# Patient Record
Sex: MALE | Race: BLACK OR AFRICAN AMERICAN | NOT HISPANIC OR LATINO | Employment: OTHER | URBAN - METROPOLITAN AREA
[De-identification: names, ages, dates, MRNs, and addresses within clinical notes are randomized per-mention and may not be internally consistent; named-entity substitution may affect disease eponyms.]

---

## 2017-02-24 ENCOUNTER — OFFICE VISIT (OUTPATIENT)
Dept: PODIATRY | Facility: CLINIC | Age: 62
End: 2017-02-24
Payer: MEDICARE

## 2017-02-24 VITALS
HEART RATE: 71 BPM | HEIGHT: 76 IN | WEIGHT: 222 LBS | BODY MASS INDEX: 27.03 KG/M2 | SYSTOLIC BLOOD PRESSURE: 122 MMHG | DIASTOLIC BLOOD PRESSURE: 57 MMHG

## 2017-02-24 DIAGNOSIS — B35.3 TINEA PEDIS OF BOTH FEET: ICD-10-CM

## 2017-02-24 DIAGNOSIS — E11.49 TYPE II DIABETES MELLITUS WITH NEUROLOGICAL MANIFESTATIONS: Primary | ICD-10-CM

## 2017-02-24 PROCEDURE — 3078F DIAST BP <80 MM HG: CPT | Mod: S$GLB,,, | Performed by: PODIATRIST

## 2017-02-24 PROCEDURE — 99214 OFFICE O/P EST MOD 30 MIN: CPT | Mod: S$GLB,,, | Performed by: PODIATRIST

## 2017-02-24 PROCEDURE — 3074F SYST BP LT 130 MM HG: CPT | Mod: S$GLB,,, | Performed by: PODIATRIST

## 2017-02-24 PROCEDURE — 2022F DILAT RTA XM EVC RTNOPTHY: CPT | Mod: S$GLB,,, | Performed by: PODIATRIST

## 2017-02-24 PROCEDURE — 4010F ACE/ARB THERAPY RXD/TAKEN: CPT | Mod: S$GLB,,, | Performed by: PODIATRIST

## 2017-02-24 PROCEDURE — 3046F HEMOGLOBIN A1C LEVEL >9.0%: CPT | Mod: S$GLB,,, | Performed by: PODIATRIST

## 2017-02-24 PROCEDURE — 99999 PR PBB SHADOW E&M-EST. PATIENT-LVL IV: CPT | Mod: PBBFAC,,, | Performed by: PODIATRIST

## 2017-02-24 PROCEDURE — 1160F RVW MEDS BY RX/DR IN RCRD: CPT | Mod: S$GLB,,, | Performed by: PODIATRIST

## 2017-02-24 NOTE — MR AVS SNAPSHOT
[facility] - [facility]iatry  [address] [facility] [address]  Phone: [phone]                  Ed Patton   2017 1:30 PM   Office Visit    Description:  Male : 1955   Provider:  Any Bhakta DPM   Department:  [facility]           Reason for Visit     Diabetes Mellitus     Foot Ulcer     Routine Foot Care           Diagnoses this Visit        Comments    Type II diabetes mellitus with neurological manifestations    -  Primary            To Do List           Future Appointments        Provider Department Dept Phone    3/15/2017 1:45 PM KAROLINE Ott [facility] - Podiatry [phone]    5/3/2017 9:30 AM DREW Pepe [facility] - Endocrinology [phone]      Goals (5 Years of Data)     None      [facility]     [facility] Care [facility] -  Assistance  Registered nurses in the [facility] provide clinical advisement, health education, appointment booking, and other advisory services.  Call for this free service at [phone].             Medications           Message regarding Medications     Verify the changes and/or additions to your medication regime listed below are the same as discussed with your clinician today.  If any of these changes or additions are incorrect, please notify your healthcare provider.             Verify that the below list of medications is an accurate representation of the medications you are currently taking.  If none reported, the list may be blank. If incorrect, please contact your healthcare provider. Carry this list with you in case of emergency.           Current Medications     albuterol (PROVENTIL) 2.5 mg /3 mL (0.083 %) nebulizer solution INHALE THE CONTENTS OF 1 VIAL VIA NEBULIZER EVERY 4 HOURS AS NEEDED FOR WHEEZING.    atorvastatin (LIPITOR) 40 MG tablet Take 1 tablet (40 mg total) by mouth once daily.    blood sugar diagnostic (FREESTYLE LITE STRIPS) Strp Use to check blood sugar before meals and at  "bedtime.    carvedilol (COREG) 25 MG tablet Take 1 tablet (25 mg total) by mouth 2 (two) times daily with meals.    digoxin (LANOXIN) 125 mcg tablet TAKE 1 TABLET ONE TIME DAILY    econazole nitrate 1 % cream Apply topically 2 (two) times daily. on the feet.    ferrous sulfate 325 mg (65 mg iron) Tab tablet Take 1 tablet (325 mg total) by mouth once daily.    fluticasone (FLONASE) 50 mcg/actuation nasal spray USE 1 SPRAY IN EACH NOSTRIL ONE TIME DAILY    FREESTYLE LITE STRIPS Strp USE ONE STRIP TO CHECK BLOOD SUGAR BEFORE MEAL(S) AND ONE AT BEDTIME    furosemide (LASIX) 80 MG tablet TAKE 1 TABLET IN THE MORNING  AND  1  TABLET  AT  3-4PM    gabapentin (NEURONTIN) 100 MG capsule     insulin NPH-insulin regular, 70/30, (NOVOLIN 70/30) 100 unit/mL (70-30) injection Inject 25 Units into the skin 2 (two) times daily. 15-30 minutes before breakfast and then again before dinner.    insulin syringe-needle U-100 (INSULIN SYRINGE) 1 mL 30 gauge x 5/16 Syrg 1 each by Misc.(Non-Drug; Combo Route) route 2 (two) times daily. Use twice daily as directed with insulin vials.    lancets (ONETOUCH DELICA LANCETS) 33 gauge Misc 1 lancet by Misc.(Non-Drug; Combo Route) route 4 (four) times daily before meals and nightly.    lisinopril (PRINIVIL,ZESTRIL) 40 MG tablet TAKE 1 TABLET ONE TIME DAILY    mometasone 0.1% (ELOCON) 0.1 % cream Applied to the affected area once daily for rash.    pen needle, diabetic (BD ULTRA-FINE RAMIREZ PEN NEEDLES) 32 gauge x 5/32" Ndle Use with multiple daily injections of insulin    spironolactone (ALDACTONE) 25 MG tablet Take 1 tablet (25 mg total) by mouth once daily.    aspirin (ECOTRIN) 81 MG EC tablet Take 1 tablet (81 mg total) by mouth once daily.    blood-glucose meter (FREESTYLE LITE METER) kit Use as instructed    nitroGLYCERIN (NITROSTAT) 0.4 MG SL tablet Place 1 tablet (0.4 mg total) under the tongue every 5 (five) minutes as needed.           Clinical Reference Information           Your Vitals " Were     BP                   122/57           Blood Pressure          Most Recent Value    BP  (!)  122/57      Allergies as of 2/24/2017     Vicodin [Hydrocodone-acetaminophen]      Immunizations Administered on Date of Encounter - 2/24/2017     None      Orders Placed During Today's Visit      Normal Orders This Visit    DIABETIC SHOES FOR HOME USE       [facility] Sign-Up     Activating your [facility] account is as easy as 1-2-3!     1) Visit [email or web address], select Sign Up Now, enter this activation code and your date of birth, then select Next.  [email or web address]  Expires: 4/10/2017  2:21 PM      2) Create a username and password to use when you visit [facility] in the future and select a security question in case you lose your password and select Next.    3) Enter your e-mail address and click Sign Up!    Additional Information  If you have questions, please e-mail [email or web address] or call [phone] to talk to our [facility] staff. Remember, [facility] is NOT to be used for urgent needs. For medical emergencies, dial 911.         Language Assistance Services     ATTENTION: Language assistance services are available, free of charge. Please call [phone].      ATENCIÓN: Si sue ron, tiene a guzman disposición servicios gratuitos de asistencia lingüística. Llame al [phone].     CHÚ Ý: N?u b?n nói Ti?ng Vi?t, có các d?ch v? h? tr? ngôn ng? mi?n phí dành cho b?n. G?i s? [phone].         [facility] complies with applicable Federal civil rights laws and does not discriminate on the basis of race, color, national origin, age, disability, or sex.

## 2017-02-24 NOTE — PROGRESS NOTES
CC:     Foot exam       HPI:   The patient is a 62 y.o.  male  who presents for a diabetic foot exam.     Patient reports no presence of abnormal sensation to the feet .    History of diabetic foot ulcers: none.   Noticed a blister on the right 2nd toe for about a week but no injury recalled.   History of foot surgery: none.     Shoes worn today:  Dress leather shoes    This patient has documented high risk feet requiring routine maintenance secondary to diabetes     Primary care doctor is: Primary Doctor No  Patient last saw primary care doctor on:   8- - Endocrinology        Past Medical History:   Diagnosis Date    CHF (congestive heart failure)     COPD (chronic obstructive pulmonary disease)     Coronary artery disease     Diabetes mellitus     Diabetes mellitus type II     DM (diabetes mellitus) type II uncontrolled with renal manifestation 9/4/2013    Hyperlipidemia     Hypertension          Current Outpatient Prescriptions on File Prior to Visit   Medication Sig Dispense Refill    albuterol (PROVENTIL) 2.5 mg /3 mL (0.083 %) nebulizer solution INHALE THE CONTENTS OF 1 VIAL VIA NEBULIZER EVERY 4 HOURS AS NEEDED FOR WHEEZING. 360 mL 3    atorvastatin (LIPITOR) 40 MG tablet Take 1 tablet (40 mg total) by mouth once daily. 90 tablet 1    blood sugar diagnostic (FREESTYLE LITE STRIPS) Strp Use to check blood sugar before meals and at bedtime. 200 each 11    carvedilol (COREG) 25 MG tablet Take 1 tablet (25 mg total) by mouth 2 (two) times daily with meals. 180 tablet 1    digoxin (LANOXIN) 125 mcg tablet TAKE 1 TABLET ONE TIME DAILY 90 tablet 11    econazole nitrate 1 % cream Apply topically 2 (two) times daily. on the feet. 30 g 1    ferrous sulfate 325 mg (65 mg iron) Tab tablet Take 1 tablet (325 mg total) by mouth once daily. 30 tablet 11    fluticasone (FLONASE) 50 mcg/actuation nasal spray USE 1 SPRAY IN EACH NOSTRIL ONE TIME DAILY 32 g 3    FREESTYLE LITE STRIPS Strp USE ONE STRIP  "TO CHECK BLOOD SUGAR BEFORE MEAL(S) AND ONE AT BEDTIME 200 each 0    furosemide (LASIX) 80 MG tablet TAKE 1 TABLET IN THE MORNING  AND  1  TABLET  AT  3-4PM 180 tablet 1    gabapentin (NEURONTIN) 100 MG capsule       insulin NPH-insulin regular, 70/30, (NOVOLIN 70/30) 100 unit/mL (70-30) injection Inject 25 Units into the skin 2 (two) times daily. 15-30 minutes before breakfast and then again before dinner. 2 vial 6    insulin syringe-needle U-100 (INSULIN SYRINGE) 1 mL 30 gauge x 5/16 Syrg 1 each by Misc.(Non-Drug; Combo Route) route 2 (two) times daily. Use twice daily as directed with insulin vials. 100 each 6    lancets (ONETOUCH DELICA LANCETS) 33 gauge Misc 1 lancet by Misc.(Non-Drug; Combo Route) route 4 (four) times daily before meals and nightly. 200 each 11    lisinopril (PRINIVIL,ZESTRIL) 40 MG tablet TAKE 1 TABLET ONE TIME DAILY 90 tablet 3    mometasone 0.1% (ELOCON) 0.1 % cream Applied to the affected area once daily for rash. 45 g 0    pen needle, diabetic (BD ULTRA-FINE RAMIREZ PEN NEEDLES) 32 gauge x 5/32" Ndle Use with multiple daily injections of insulin 200 each 11    spironolactone (ALDACTONE) 25 MG tablet Take 1 tablet (25 mg total) by mouth once daily. 90 tablet 1    aspirin (ECOTRIN) 81 MG EC tablet Take 1 tablet (81 mg total) by mouth once daily. 30 tablet 6    blood-glucose meter (FREESTYLE LITE METER) kit Use as instructed 1 each 1    nitroGLYCERIN (NITROSTAT) 0.4 MG SL tablet Place 1 tablet (0.4 mg total) under the tongue every 5 (five) minutes as needed. 30 tablet 6     No current facility-administered medications on file prior to visit.          Review of patient's allergies indicates:   Allergen Reactions    Vicodin [hydrocodone-acetaminophen] Itching             ROS:  General ROS: negative  Respiratory ROS: no cough, shortness of breath, or wheezing  Cardiovascular ROS: no chest pain or dyspnea on exertion  Musculoskeletal ROS: negative  Neurological ROS:   negative for - " "numbness/tingling, seizures or tremors  Dermatological ROS: negative      LAST HbA1c:   Hemoglobin A1C   Date Value Ref Range Status   11/11/2015 8.7 (H) 4.5 - 6.2 % Final   08/25/2015 10.9 (H) 4.5 - 6.2 % Final   09/04/2013 9.3 (H) 4.5 - 6.2 % Final           EXAM:   Vitals:    02/24/17 1340   BP: (!) 122/57   Pulse: 71   Weight: 100.7 kg (222 lb)   Height: 6' 4" (1.93 m)       General: alert, no distress, on nasal canula    Vascular:   Dorsalis pedis:   0 bilateral.   Posterior Tibial:   0  Bilateral.   3 seconds capillary refill time and temperature of toes are cool- B/L to touch.   reduced hair growth on the feet.    There is Trace edema to both feet.      Varicosities:  none      Dermatological:    Skin: dry and no suspicious lesions; flaky skin, no vesicles  Nails: toenails 1-5 L and 1-5 R  are onychomycosis of the toenails and dystrophic nails, elongated and thick by 1-2mm with subungual debris.    Callus:  None  Open Wounds: right 2nd toe superficial blister without clinical signs of acute infection.  No cellulitis, no drainage.  No tenderness to palpation   Ecchymoses is not observed.      Erythema:  none .     Interdigital spaces: clean, dry and without evidence of break in skin integrity      Neurological:    Sharp dull - B/L normal and light touch - B/L normal  Vibratory - normal  [place] diminished      Musculoskeletal:     Muscle strength: 5/5, adequate ROM, adequate strength     Right foot:  flat foot   Left foot: flat foot         Assessment:    1. Type II diabetes mellitus with neurological manifestations  DIABETIC SHOES FOR HOME USE   2. Tinea pedis of both feet   OTC Lotrimin daily    3.        right 2nd toe blister  - topical antibiotics daily and cover with thick bandaid.  Supplies provided to patient.       Treatment and Plan:  Shoe inspection. Diabetic Foot Education. Patient reminded of the importance of good nutrition and blood sugar control to help prevent podiatric complications of " diabetes. Patient instructed on proper foot hygiene. We discussed wearing proper shoe gear, daily foot inspections, never walking without protective shoe gear, never putting sharp instruments to feet, and routine diabetic foot exam and care every 3-6 months to prevent complications.      This patient has documented high risk feet requiring routine maintenance secondary to diabetes.     follow up in 2-3 weeks for toe blister.  Patient is amenable to plan.

## 2017-03-01 DIAGNOSIS — E78.5 HYPERLIPIDEMIA: ICD-10-CM

## 2017-03-01 RX ORDER — DIGOXIN 125 MCG
TABLET ORAL
Qty: 90 TABLET | Refills: 3 | Status: SHIPPED | OUTPATIENT
Start: 2017-03-01 | End: 2018-05-01 | Stop reason: SDUPTHER

## 2017-03-02 RX ORDER — LISINOPRIL 40 MG/1
TABLET ORAL
Qty: 90 TABLET | Refills: 3 | Status: SHIPPED | OUTPATIENT
Start: 2017-03-02 | End: 2018-05-01 | Stop reason: SDUPTHER

## 2017-03-02 RX ORDER — ATORVASTATIN CALCIUM 40 MG/1
TABLET, FILM COATED ORAL
Qty: 90 TABLET | Refills: 1 | OUTPATIENT
Start: 2017-03-02

## 2017-03-02 NOTE — TELEPHONE ENCOUNTER
Patient needs PCP for refills of meds.  Please assist in scheduling an establish care appointment for patient.

## 2017-04-21 DIAGNOSIS — E11.69 DIABETES MELLITUS ASSOCIATED WITH HORMONAL ETIOLOGY: Primary | ICD-10-CM

## 2017-04-21 NOTE — TELEPHONE ENCOUNTER
----- Message from Rowena Walton sent at 4/18/2017 10:39 AM CDT -----  Contact: pt   [phone]  carol   -   Patient calling top get an refill on medication NOVOLIN 70/30) 100 unit/mL (70-30) injection  Thanks,

## 2017-05-03 ENCOUNTER — OFFICE VISIT (OUTPATIENT)
Dept: ENDOCRINOLOGY | Facility: CLINIC | Age: 62
End: 2017-05-03
Payer: MEDICARE

## 2017-05-03 VITALS
WEIGHT: 225 LBS | SYSTOLIC BLOOD PRESSURE: 102 MMHG | BODY MASS INDEX: 27.4 KG/M2 | DIASTOLIC BLOOD PRESSURE: 80 MMHG | HEART RATE: 62 BPM | HEIGHT: 76 IN

## 2017-05-03 DIAGNOSIS — E04.1 THYROID NODULE: ICD-10-CM

## 2017-05-03 DIAGNOSIS — E11.65 UNCONTROLLED TYPE 2 DIABETES MELLITUS WITH HYPERGLYCEMIA, WITH LONG-TERM CURRENT USE OF INSULIN: ICD-10-CM

## 2017-05-03 DIAGNOSIS — G62.9 PERIPHERAL POLYNEUROPATHY: ICD-10-CM

## 2017-05-03 DIAGNOSIS — E78.5 HYPERLIPIDEMIA, UNSPECIFIED HYPERLIPIDEMIA TYPE: ICD-10-CM

## 2017-05-03 DIAGNOSIS — Z79.4 UNCONTROLLED TYPE 2 DIABETES MELLITUS WITH HYPERGLYCEMIA, WITH LONG-TERM CURRENT USE OF INSULIN: ICD-10-CM

## 2017-05-03 DIAGNOSIS — N18.3 CKD (CHRONIC KIDNEY DISEASE), STAGE 3 (MODERATE): ICD-10-CM

## 2017-05-03 LAB
CREAT UR-MCNC: 76 MG/DL
MICROALBUMIN UR DL<=1MG/L-MCNC: 8 UG/ML
MICROALBUMIN/CREATININE RATIO: 10.5 UG/MG

## 2017-05-03 PROCEDURE — 99999 PR PBB SHADOW E&M-EST. PATIENT-LVL V: CPT | Mod: PBBFAC,,, | Performed by: NURSE PRACTITIONER

## 2017-05-03 PROCEDURE — 82570 ASSAY OF URINE CREATININE: CPT

## 2017-05-03 PROCEDURE — 3079F DIAST BP 80-89 MM HG: CPT | Mod: S$GLB,,, | Performed by: NURSE PRACTITIONER

## 2017-05-03 PROCEDURE — 3074F SYST BP LT 130 MM HG: CPT | Mod: S$GLB,,, | Performed by: NURSE PRACTITIONER

## 2017-05-03 PROCEDURE — 1160F RVW MEDS BY RX/DR IN RCRD: CPT | Mod: S$GLB,,, | Performed by: NURSE PRACTITIONER

## 2017-05-03 PROCEDURE — 99214 OFFICE O/P EST MOD 30 MIN: CPT | Mod: S$GLB,,, | Performed by: NURSE PRACTITIONER

## 2017-05-03 PROCEDURE — 4010F ACE/ARB THERAPY RXD/TAKEN: CPT | Mod: S$GLB,,, | Performed by: NURSE PRACTITIONER

## 2017-05-03 NOTE — MR AVS SNAPSHOT
[facility] - Endocrinology  [address] [facility] [address]  Phone: [phone]                  Ed Patton   5/3/2017 9:30 AM   Office Visit    Description:  Male : 1955   Provider:  DREW Pepe   Department:  [facility]           Reason for Visit     Diabetes Mellitus           Diagnoses this Visit        Comments    Type 2 diabetes, uncontrolled, with neuropathy    -  Primary     Uncontrolled type 2 diabetes mellitus with hyperglycemia, with long-term current use of insulin         Peripheral polyneuropathy         Hyperlipidemia, unspecified hyperlipidemia type         Thyroid nodule         CKD (chronic kidney disease), stage 3 (moderate)                To Do List           Future Appointments        Provider Department Dept Phone    5/3/2017 11:00 AM LAB, APPOINTMENT [place] [facility] [phone]    5/10/2017 10:15 AM Hanane Partida DPM [facility] - Podiatry [phone]    2017 3:20 PM Kemal Cosme MD [facility] - Rheumatology [phone]    2017 10:30 AM [facility] END[facility]INOLOGY [facility]  [phone]    2017 7:10 AM LAB, APPOINTMENT [place] [facility] [phone]      Goals (5 Years of Data)     None      Follow-Up and Disposition     Return in about 3 months (around 8/3/2017).       These Medications        Disp Refills Start End    blood sugar diagnostic (FREESTYLE LITE STRIPS) Strp 200 each 11 5/3/2017     Use to check blood sugar before meals and at bedtime.    [facility]armacy: [facility] [place], [place] - [address] Ph #: [phone]         [facility]     [facility] -  Assistance  Unless otherwise directed by your provider, please contact [facility], our nurse care line that is available for  assistance.     Registered nurses in the [facility] provide: appointment scheduling, clinical  advisement, health education, and other advisory services.  Call: [phone] (toll free)               Medications           Message regarding Medications     Verify the changes and/or additions to your medication regime listed below are the same as discussed with your clinician today.  If any of these changes or additions are incorrect, please notify your healthcare provider.             Verify that the below list of medications is an accurate representation of the medications you are currently taking.  If none reported, the list may be blank. If incorrect, please contact your healthcare provider. Carry this list with you in case of emergency.           Current Medications     albuterol (PROVENTIL) 2.5 mg /3 mL (0.083 %) nebulizer solution INHALE THE CONTENTS OF 1 VIAL VIA NEBULIZER EVERY 4 HOURS AS NEEDED FOR WHEEZING.    aspirin (ECOTRIN) 81 MG EC tablet Take 1 tablet (81 mg total) by mouth once daily.    atorvastatin (LIPITOR) 40 MG tablet Take 1 tablet (40 mg total) by mouth once daily.    blood sugar diagnostic (FREESTYLE LITE STRIPS) Strp Use to check blood sugar before meals and at bedtime.    blood-glucose meter (FREESTYLE LITE METER) kit Use as instructed    carvedilol (COREG) 25 MG tablet Take 1 tablet (25 mg total) by mouth 2 (two) times daily with meals.    digoxin (LANOXIN) 125 mcg tablet TAKE 1 TABLET ONE TIME DAILY    ferrous sulfate 325 mg (65 mg iron) Tab tablet Take 1 tablet (325 mg total) by mouth once daily.    furosemide (LASIX) 80 MG tablet TAKE 1 TABLET IN THE MORNING  AND  1  TABLET  AT  3-4PM    insulin NPH-insulin regular, 70/30, (NOVOLIN 70/30) 100 unit/mL (70-30) injection Inject 25 Units into the skin 2 (two) times daily. 15-30 minutes before breakfast and then again before dinner.    insulin syringe-needle U-100 (INSULIN SYRINGE) 1 mL 30 gauge x 5/16 Syrg 1 each by Misc.(Non-Drug; Combo Route) route 2 (two) times daily. Use twice daily as directed with insulin vials.    lancets  "(ONETOUCH DELICA LANCETS) 33 gauge Misc 1 lancet by Misc.(Non-Drug; Combo Route) route 4 (four) times daily before meals and nightly.    lisinopril (PRINIVIL,ZESTRIL) 40 MG tablet TAKE 1 TABLET EVERY DAY    mometasone 0.1% (ELOCON) 0.1 % cream Applied to the affected area once daily for rash.    pen needle, diabetic (BD ULTRA-FINE RAMIREZ PEN NEEDLES) 32 gauge x 5/32" Ndle Use with multiple daily injections of insulin    spironolactone (ALDACTONE) 25 MG tablet Take 1 tablet (25 mg total) by mouth once daily.    econazole nitrate 1 % cream Apply topically 2 (two) times daily. on the feet.    fluticasone (FLONASE) 50 mcg/actuation nasal spray USE 1 SPRAY IN EACH NOSTRIL ONE TIME DAILY    gabapentin (NEURONTIN) 100 MG capsule     nitroGLYCERIN (NITROSTAT) 0.4 MG SL tablet Place 1 tablet (0.4 mg total) under the tongue every 5 (five) minutes as needed.           Clinical Reference Information           Your Vitals Were     BP Pulse Height Weight BMI    102/80 62 6' 4" (1.93 m) 102.1 kg (225 lb) 27.39 kg/m2      Blood Pressure          Most Recent Value    BP  102/80      Allergies as of 5/3/2017     Vicodin [Hydrocodone-acetaminophen]      Immunizations Administered on Date of Encounter - 5/3/2017     None      Orders Placed During Today's Visit      Normal Orders This Visit    Ambulatory Referral to Diabetes Education     Ambulatory referral to Optometry     Microalbumin/creatinine urine ratio     Future Labs/Procedures Expected by Expires    Comprehensive metabolic panel  5/3/2017 7/2/2018    Hemoglobin A1c  5/3/2017 7/2/2018    Hemoglobin A1c  5/3/2017 7/2/2018    Lipid panel  5/3/2017 7/2/2018    TSH  5/3/2017 7/2/2018    US Soft Tissue Head Neck Thyroid  5/3/2017 5/3/2018      [facility] Sign-Up     Activating your [facility] account is as easy as 1-2-3!     1) Visit [email or web address], select Sign Up Now, enter this activation code and your date of birth, then select Next.  [email or web address]  Expires: 6/17/2017 10:40 AM  "     2) Create a username and password to use when you visit [facility] in the future and select a security question in case you lose your password and select Next.    3) Enter your e-mail address and click Sign Up!    Additional Information  If you have questions, please e-mail [email or web address] or call [phone] to talk to our [email or web address] staff. Remember, [facility] is NOT to be used for urgent needs. For medical emergencies, dial 911.         Language Assistance Services     ATTENTION: Language assistance services are available, free of charge. Please call [phone].      ATENCIÓN: Si habla español, tiene a guzman disposición servicios gratuitos de asistencia lingüística. Llame al [phone].     CHÚ Ý: N?u b?n nói Ti?ng Vi?t, có các d?ch v? h? tr? ngôn ng? mi?n phí dành cho b?n. G?i s? [phone].         [facility] complies with applicable Federal civil rights laws and does not discriminate on the basis of race, color, national origin, age, disability, or sex.

## 2017-05-03 NOTE — PROGRESS NOTES
"CC: Mr. Ed Patton arrives today for management of Type 2 DM.     HPI: Mr. Ed Patton was diagnosed with Type 2 DM at age 55.  DM with PN.   Poor follow-up h/o. Seen in , then again 2013. Last seen 2015.   He arrives unaccompanied today.   Poor historian relating to medical self-management. Previously referred to case management.    Also with systolic CHF, CMP, CAD, CKD 3.   CRLD, on continuous O2.    BG readings are checked 2x/day. No logs. Oral recall:   Fastin-250, this morning was 100  -250  Hypoglycemia: Yes, up to twice weekly, before dinner. Treats with meat.     Physical Activity: No  Dietary Habits: Eats 3 meals/ day; snacks at night.    CURRENT DIABETIC MEDS: Relion 70/30 35 units before breakfast, 20 units before dinner.   + denies missed doses.     Last Eye Exam: has external appt ([facility]) 2015  Last Podiatry Exam: 2017; overdue for follow-up for blister.    REVIEW OF SYSTEMS  General: no weakness or fatigue. Mood stable.  Eyes: no visual disturbances. Denies intermittent blurry vision.  Cardiac: no chest pain or palpitations.   Respiratory: no cough; + chronic SOB, uses O2.   GI: no abdominal pain or nausea. No bowel changes.   Skin: no rashes or itching. No skin changes.   Neuro: no numbness or tingling.   Endocrine: no polyuria, polydipsia, polyphagia.     Vital Signs  Personally reviewed the labs noted below.     /80  Pulse 62  Ht 6' 4" (1.93 m)  Wt 102.1 kg (225 lb)  BMI 27.39 kg/m2    Hemoglobin A1C   Date Value Ref Range Status   2015 8.7 (H) 4.5 - 6.2 % Final   2015 10.9 (H) 4.5 - 6.2 % Final   2013 9.3 (H) 4.5 - 6.2 % Final       Chemistry        Component Value Date/Time     2015 0723    K 4.0 2015 07     2015 07    CO2 26 2015 07    BUN 20 2015 07    CREATININE 1.2 201523    GLU 91 2015        Component Value Date/Time    CALCIUM 9.5 2015    ALKPHOS " 56 11/11/2015 0723    AST 17 11/11/2015 0723    ALT 16 11/11/2015 0723    BILITOT 0.5 11/11/2015 0723      CrCl cannot be calculated (Unknown ideal weight.).      Lab Results   Component Value Date    CHOL 133 11/11/2015    CHOL 142 08/25/2015    CHOL 159 02/25/2012     Lab Results   Component Value Date    HDL 40 11/11/2015    HDL 39 (L) 08/25/2015    HDL 37 (L) 02/25/2012     Lab Results   Component Value Date    LDLCALC 77.0 11/11/2015    LDLCALC 81.0 08/25/2015    LDLCALC 108.0 02/25/2012     Lab Results   Component Value Date    TRIG 80 11/11/2015    TRIG 110 08/25/2015    TRIG 71 02/25/2012     Lab Results   Component Value Date    CHOLHDL 30.1 11/11/2015    CHOLHDL 27.5 08/25/2015    CHOLHDL 23.3 02/25/2012     Lab Results   Component Value Date    TSH 0.239 (L) 09/17/2015     CrCl cannot be calculated (Unknown ideal weight.).    Lab Results   Component Value Date    MICALBCREAT 20.2 08/25/2015     Vit D, 25-Hydroxy   Date Value Ref Range Status   11/11/2015 13 (L) 30 - 96 ng/mL Final     Comment:     Vitamin D deficiency.........<10 ng/mL                              Vitamin D insufficiency......10-29 ng/mL       Vitamin D sufficiency........> or equal to 30 ng/mL  Vitamin D toxicity............>100 ng/mL         PHYSICAL EXAMINATION  Constitutional: Appears well, no distress  Neck: Supple, trachea midline. R thyroid nodule. No tenderness.  Respiratory: CTA without wheezes, even and unlabored. O2 per NC  Cardiovascular: RRR; no carotid bruits or murmurs.   Lymph: DP pulses  2+ bilaterally; no edema.   Skin: warm and dry; no injection site reactions, no acanthosis nigracans observed.  Neuro:patient alert and cooperative.  Feet: appropriate footwear. exam deferred, left prior.        Assessment/Plan    1. Type II or unspecified type diabetes mellitus with other specified manifestations, uncontrolled   - A1c adjusted for co morbidities and general condition, < 8%    - BG at least twice daily and if sx of  hypo.  - Dubious report of BG. Concerned about hypoglycemia report. Bg was 100 this AM.    - Will continue current doses. Discussed adequate intake.  - Needs DM education for diet/ daily pattern review. Would also benefit from hypoglycemia management review. Bring meter.     Also:   A1c and TSH today  Urine today  Ophth referral   MA to make f/u pod appt- overdue.     - On ACE, ASA, statin  - Call for BG concerns or BG repeatedly < 100, > 180.  - Hypoglycemia management reviewed.      2. Peripheral neuropathy - Optimize BG control. Podiatry f/u next available.   3. CKD 3- avoid hypoglycemia.    4. Thyroid nodule - US 2011 indicates biopsy; did not have repeat US as ordered in 2013 or 2015.   TSH today, repeat thyroid US.    5. Hyperlipidemia - On statin. Reports compliance. Lipid panal RTC. Ate already.   6. CRLD (chronic restrictive lung disease) - follows with Dr. Alexandre.        FOLLOW UP  Return in about 3 months (around 8/3/2017).     Orders Placed This Encounter   Procedures    US Soft Tissue Head Neck Thyroid     Standing Status:   Future     Standing Expiration Date:   5/3/2018     Order Specific Question:   May the Radiologist modify the order per protocol to meet the clinical needs of the patient?     Answer:   Yes    Hemoglobin A1c     Standing Status:   Future     Standing Expiration Date:   7/2/2018    Comprehensive metabolic panel     Standing Status:   Future     Standing Expiration Date:   7/2/2018    Lipid panel     Standing Status:   Future     Standing Expiration Date:   7/2/2018    Microalbumin/creatinine urine ratio     Order Specific Question:   Specimen Source     Answer:   Urine    TSH     Standing Status:   Future     Standing Expiration Date:   7/2/2018    Hemoglobin A1c     Standing Status:   Future     Standing Expiration Date:   7/2/2018    Ambulatory Referral to Diabetes Education     Referral Priority:   Routine     Referral Type:   Consultation     Referral Reason:   Specialty  Services Required     Requested Specialty:   Endocrinology     Number of Visits Requested:   1    Ambulatory referral to Optometry     Referral Priority:   Routine     Referral Type:   Vision (Optometry)     Referral Reason:   Specialty Services Required     Requested Specialty:   Optometry     Number of Visits Requested:   1

## 2017-05-05 ENCOUNTER — TELEPHONE (OUTPATIENT)
Dept: ENDOCRINOLOGY | Facility: HOSPITAL | Age: 62
End: 2017-05-05

## 2017-05-05 NOTE — TELEPHONE ENCOUNTER
TSH low, T4 normal.   Thyroid US pending.   Please schedule appointment in general endocrine for evaluation of hyperthyroid.

## 2017-05-15 NOTE — TELEPHONE ENCOUNTER
----- Message from Ravi Boyd sent at 5/15/2017 11:10 AM CDT -----  Contact: [employer]  Requesting a Rx refill for atorvastatin (LIPITOR) 40 MG tablet    Please send Rx to [facility] [phone] (Phone)  [phone] (Fax)

## 2017-05-16 RX ORDER — ATORVASTATIN CALCIUM 40 MG/1
40 TABLET, FILM COATED ORAL DAILY
Qty: 90 TABLET | Refills: 1 | Status: SHIPPED | OUTPATIENT
Start: 2017-05-16 | End: 2017-07-25 | Stop reason: SDUPTHER

## 2017-05-28 ENCOUNTER — TELEPHONE (OUTPATIENT)
Dept: ENDOCRINOLOGY | Facility: HOSPITAL | Age: 62
End: 2017-05-28

## 2017-05-28 DIAGNOSIS — E11.65 TYPE 2 DIABETES MELLITUS WITH HYPERGLYCEMIA, WITH LONG-TERM CURRENT USE OF INSULIN: Primary | ICD-10-CM

## 2017-05-28 DIAGNOSIS — Z79.4 TYPE 2 DIABETES MELLITUS WITH HYPERGLYCEMIA, WITH LONG-TERM CURRENT USE OF INSULIN: Primary | ICD-10-CM

## 2017-05-28 NOTE — TELEPHONE ENCOUNTER
Please call pt.   A1c is very high.   Needs DM education for diet/ daily pattern/BG review. Would also benefit from hypoglycemia management review. PLEASE BRING METER to the appointment.

## 2017-05-29 ENCOUNTER — TELEPHONE (OUTPATIENT)
Dept: ENDOCRINOLOGY | Facility: CLINIC | Age: 62
End: 2017-05-29

## 2017-06-23 ENCOUNTER — OFFICE VISIT (OUTPATIENT)
Dept: ENDOCRINOLOGY | Facility: CLINIC | Age: 62
End: 2017-06-23
Payer: MEDICARE

## 2017-06-23 VITALS
SYSTOLIC BLOOD PRESSURE: 118 MMHG | DIASTOLIC BLOOD PRESSURE: 72 MMHG | BODY MASS INDEX: 26.13 KG/M2 | HEART RATE: 70 BPM | HEIGHT: 77 IN | WEIGHT: 221.31 LBS

## 2017-06-23 DIAGNOSIS — E04.1 THYROID NODULE: ICD-10-CM

## 2017-06-23 DIAGNOSIS — E05.90 SUBCLINICAL HYPERTHYROIDISM: Primary | ICD-10-CM

## 2017-06-23 PROCEDURE — 99999 PR PBB SHADOW E&M-EST. PATIENT-LVL III: CPT | Mod: PBBFAC,,, | Performed by: INTERNAL MEDICINE

## 2017-06-23 PROCEDURE — 3046F HEMOGLOBIN A1C LEVEL >9.0%: CPT | Mod: S$GLB,,, | Performed by: INTERNAL MEDICINE

## 2017-06-23 PROCEDURE — 99214 OFFICE O/P EST MOD 30 MIN: CPT | Mod: S$GLB,,, | Performed by: INTERNAL MEDICINE

## 2017-06-23 PROCEDURE — 4010F ACE/ARB THERAPY RXD/TAKEN: CPT | Mod: S$GLB,,, | Performed by: INTERNAL MEDICINE

## 2017-06-23 NOTE — PROGRESS NOTES
Subjective:       Patient ID: Ed Patton is a 62 y.o. male.    Chief Complaint: Follow-up    HPI   Mr. Patton presents to clinic today for evaluation of abnormal TFTs     He was found to have a suppressed TSH on routine labs ordered on 05/03/2017     The patient denies palpitations   Denies anxiety   Denies tremors   Denies changed in his bowels     Endorses occasional heat intolerance     Denies family history of thyroid disease or thyroid cancer   Denies personal history of radiation exposure    Denies use of amiodarone   Denies recent contrast   Denies recent viral illness   Denies fever    Denies use of lithium     Also has T2DM, followed by RISA Ortiz NP with last office visit in 05/2017  Reports testing BG 2 times daily with averages between 200-225 mg/dL   Current regimen:  Relion 70/30: 35 units AM, 25 units PM     Denies recent hypoglycemic episodes or symptoms   Reports eye exam is UTD       Review of Systems   Constitutional: Positive for fatigue. Negative for unexpected weight change.   HENT: Negative for sore throat, trouble swallowing and voice change.    Eyes: Negative for visual disturbance.   Respiratory: Positive for shortness of breath.         Portable oxygen at 2 L/minute    Cardiovascular: Negative for palpitations.   Gastrointestinal: Negative for constipation, diarrhea, nausea and vomiting.   Endocrine: Positive for heat intolerance. Negative for cold intolerance, polydipsia, polyphagia and polyuria.   Genitourinary: Negative for dysuria.   Musculoskeletal: Positive for neck pain.   Skin: Negative for rash.   Allergic/Immunologic: Negative for immunocompromised state.   Neurological: Positive for headaches. Negative for tremors.   Psychiatric/Behavioral: Negative for sleep disturbance. The patient is not nervous/anxious.        Objective:      Physical Exam   Constitutional: He is oriented to person, place, and time. No distress.   HENT:   Hearing normal    Neck: Normal range of motion.  Neck supple. Thyromegaly present.   Palpable right thyroid nodule    Cardiovascular: Normal rate.    Pulmonary/Chest: Effort normal.   Abdominal: Soft.   Musculoskeletal: He exhibits no edema.   Lymphadenopathy:     He has no cervical adenopathy.   Neurological: He is alert and oriented to person, place, and time.   Skin: Skin is warm.   Psychiatric: His behavior is normal.   Nursing note and vitals reviewed.      Results for YVON DARBY (MRN [id number]) as of 6/25/2017 18:47   Ref. Range 9/17/2015 11:33 11/11/2015 07:05 11/11/2015 07:23 5/3/2017 10:55   TSH Latest Ref Range: 0.400 - 4.000 uIU/mL 0.239 (L)   0.157 (L)   Free T4 Latest Ref Range: 0.71 - 1.51 ng/dL 1.17   1.08     Results for YVON DARBY (MRN [id number]) as of 6/25/2017 18:47   Ref. Range 11/11/2015 07:05 11/11/2015 07:23 5/3/2017 10:55   Hemoglobin A1C Latest Ref Range: 4.5 - 6.2 % 8.7 (H)  11.1 (H)   Estimated Avg Glucose Latest Ref Range: 68 - 131 mg/dL 203 (H)  272 (H)     Assessment:       1. Subclinical hyperthyroidism    2. Thyroid nodule    3. Type 2 diabetes, uncontrolled, with neuropathy        Plan:       1. Subclinical hyperthyroidism   - check TFTs today   - if abnormal, will plan for thyroid uptake and scan     2. Thyroid nodule   - differential includes toxic nodule   - obtain NM thyroid uptake and scan  - if toxic nodule, will consider COTO   - if cold nodule, will plan for FNA biopsy     3. T2DM   - uncontrolled   - encouraged compliance with dietary and lifestyle modifications   - increase 70/30 to 38 units with breakfast and 28 units with dinner   - send logs in 2 weeks to RISA Ortiz for close follow up   - keep all followup appointments as scheduled   - reviewed signs and symptoms of hypoglycemia along with appropriate treatment protocol

## 2017-06-25 ENCOUNTER — TELEPHONE (OUTPATIENT)
Dept: DIABETES | Facility: CLINIC | Age: 62
End: 2017-06-25

## 2017-06-25 DIAGNOSIS — E05.90 SUBCLINICAL HYPERTHYROIDISM: Primary | ICD-10-CM

## 2017-07-03 ENCOUNTER — TELEPHONE (OUTPATIENT)
Dept: RADIOLOGY | Facility: HOSPITAL | Age: 62
End: 2017-07-03

## 2017-07-21 ENCOUNTER — TELEPHONE (OUTPATIENT)
Dept: RADIOLOGY | Facility: HOSPITAL | Age: 62
End: 2017-07-21

## 2017-07-24 ENCOUNTER — HOSPITAL ENCOUNTER (OUTPATIENT)
Dept: RADIOLOGY | Facility: HOSPITAL | Age: 62
Discharge: HOME | End: 2017-07-24
Attending: INTERNAL MEDICINE
Payer: MEDICARE

## 2017-07-24 DIAGNOSIS — E04.1 THYROID NODULE: ICD-10-CM

## 2017-07-24 DIAGNOSIS — E05.90 SUBCLINICAL HYPERTHYROIDISM: ICD-10-CM

## 2017-07-24 PROCEDURE — 78014 THYROID IMAGING W/BLOOD FLOW: CPT | Mod: 26,,, | Performed by: NUCLEAR MEDICINE

## 2017-07-24 PROCEDURE — A9512 TC99M PERTECHNETATE: HCPCS

## 2017-07-25 ENCOUNTER — HOSPITAL ENCOUNTER (OUTPATIENT)
Dept: RADIOLOGY | Facility: HOSPITAL | Age: 62
Discharge: HOME | End: 2017-07-25
Attending: INTERNAL MEDICINE
Payer: MEDICARE

## 2017-07-25 DIAGNOSIS — Z79.4 UNCONTROLLED TYPE 2 DIABETES MELLITUS WITH HYPERGLYCEMIA, WITH LONG-TERM CURRENT USE OF INSULIN: ICD-10-CM

## 2017-07-25 DIAGNOSIS — E11.65 UNCONTROLLED TYPE 2 DIABETES MELLITUS WITH HYPERGLYCEMIA, WITH LONG-TERM CURRENT USE OF INSULIN: ICD-10-CM

## 2017-07-25 DIAGNOSIS — E08.41 DIABETIC MONONEUROPATHY ASSOCIATED WITH DIABETES MELLITUS DUE TO UNDERLYING CONDITION: ICD-10-CM

## 2017-07-25 PROCEDURE — A9503 TC99M MEDRONATE: HCPCS

## 2017-07-25 RX ORDER — ATORVASTATIN CALCIUM 40 MG/1
40 TABLET, FILM COATED ORAL DAILY
Qty: 90 TABLET | Refills: 1 | Status: SHIPPED | OUTPATIENT
Start: 2017-07-25 | End: 2018-01-11 | Stop reason: SDUPTHER

## 2017-07-25 RX ORDER — INSULIN PUMP SYRINGE, 3 ML
EACH MISCELLANEOUS
Qty: 1 EACH | Refills: 1 | Status: SHIPPED | OUTPATIENT
Start: 2017-07-25 | End: 2017-08-16 | Stop reason: CLARIF

## 2017-07-25 NOTE — TELEPHONE ENCOUNTER
----- Message from Kamari Sams sent at 7/25/2017  2:58 PM CDT -----  Contact: Pt  Pt is requesting rx refill for atorvastatin (LIPITOR) 40 MG tablet, blood sugar diagnostic (FREESTYLE LITE STRIPS) Strp and blood-glucose meter (FREESTYLE LITE METER) kit    Pt can be reached at [phone]

## 2017-07-26 ENCOUNTER — TELEPHONE (OUTPATIENT)
Dept: ENDOCRINOLOGY | Facility: CLINIC | Age: 62
End: 2017-07-26

## 2017-07-26 DIAGNOSIS — E05.10 TOXIC THYROID NODULE: Primary | ICD-10-CM

## 2017-07-26 PROBLEM — E04.1 THYROID NODULE: Status: RESOLVED | Noted: 2017-05-03 | Resolved: 2017-07-26

## 2017-07-26 NOTE — TELEPHONE ENCOUNTER
Patient notified via phone of thyroid uptake and scan results. Patient has a hyperfunctioning right thyroid nodule. Recommended treatment dose is 30 mCi of I-131. Discussed treatment in detail. Patient has agreed. We will arrange.

## 2017-07-26 NOTE — TELEPHONE ENCOUNTER
Patient notified via phone of thyroid uptake and scan results. Patient has a hyperfunction nodule within the right thyroid lobe - affecting the entirety of the right lobe Recommended dose of I-131 is 30 mCi orally. Results and treatment plan discussed in detail with the patient. He verbalized understanding and he agrees to COTO. Will arrange. He also requests medication refill for insulin. Will send to mail order as requested.

## 2017-07-27 ENCOUNTER — TELEPHONE (OUTPATIENT)
Dept: INTERNAL MEDICINE | Facility: CLINIC | Age: 62
End: 2017-07-27

## 2017-07-27 NOTE — TELEPHONE ENCOUNTER
----- Message from Kamari Sams sent at 7/25/2017  3:01 PM CDT -----  Contact: Pt   spironolactone (ALDACTONE) 25 MG tablet, carvedilol (COREG) 25 MG tablet and fluticasone (FLONASE) 50 mcg/actuation nasal spray    Can be reached at [phone]

## 2017-07-31 DIAGNOSIS — I50.42 CHRONIC COMBINED SYSTOLIC AND DIASTOLIC HF (HEART FAILURE): ICD-10-CM

## 2017-08-01 RX ORDER — CARVEDILOL 25 MG/1
TABLET ORAL
Qty: 180 TABLET | Refills: 1 | OUTPATIENT
Start: 2017-08-01

## 2017-08-01 RX ORDER — FUROSEMIDE 80 MG/1
TABLET ORAL
Qty: 180 TABLET | Refills: 1 | OUTPATIENT
Start: 2017-08-01

## 2017-08-01 RX ORDER — SPIRONOLACTONE 25 MG/1
TABLET ORAL
Qty: 90 TABLET | Refills: 1 | OUTPATIENT
Start: 2017-08-01

## 2017-08-03 DIAGNOSIS — Z79.4 UNCONTROLLED TYPE 2 DIABETES MELLITUS WITH HYPERGLYCEMIA, WITH LONG-TERM CURRENT USE OF INSULIN: ICD-10-CM

## 2017-08-03 DIAGNOSIS — E11.65 UNCONTROLLED TYPE 2 DIABETES MELLITUS WITH HYPERGLYCEMIA, WITH LONG-TERM CURRENT USE OF INSULIN: ICD-10-CM

## 2017-08-03 RX ORDER — LANCETS
EACH MISCELLANEOUS
Qty: 600 EACH | Refills: 3 | Status: SHIPPED | OUTPATIENT
Start: 2017-08-03 | End: 2017-08-16 | Stop reason: CLARIF

## 2017-08-03 RX ORDER — ISOPROPYL ALCOHOL 70 ML/100ML
1 SWAB TOPICAL
Refills: 0 | COMMUNITY
Start: 2017-08-03 | End: 2017-08-16 | Stop reason: CLARIF

## 2017-08-08 DIAGNOSIS — I50.42 CHRONIC COMBINED SYSTOLIC AND DIASTOLIC HF (HEART FAILURE): ICD-10-CM

## 2017-08-08 RX ORDER — SPIRONOLACTONE 25 MG/1
TABLET ORAL
Qty: 90 TABLET | Refills: 1 | OUTPATIENT
Start: 2017-08-08

## 2017-08-11 DIAGNOSIS — I50.42 CHRONIC COMBINED SYSTOLIC AND DIASTOLIC HF (HEART FAILURE): ICD-10-CM

## 2017-08-14 RX ORDER — SPIRONOLACTONE 25 MG/1
TABLET ORAL
Qty: 90 TABLET | Refills: 1 | OUTPATIENT
Start: 2017-08-14

## 2017-08-14 RX ORDER — CARVEDILOL 25 MG/1
TABLET ORAL
Qty: 180 TABLET | Refills: 1 | OUTPATIENT
Start: 2017-08-14

## 2017-08-15 DIAGNOSIS — I50.42 CHRONIC COMBINED SYSTOLIC AND DIASTOLIC HF (HEART FAILURE): ICD-10-CM

## 2017-08-15 RX ORDER — FUROSEMIDE 80 MG/1
TABLET ORAL
Qty: 180 TABLET | Refills: 1 | OUTPATIENT
Start: 2017-08-15

## 2017-08-16 ENCOUNTER — TELEPHONE (OUTPATIENT)
Dept: ENDOCRINOLOGY | Facility: CLINIC | Age: 62
End: 2017-08-16

## 2017-08-16 RX ORDER — LANCETS
EACH MISCELLANEOUS
COMMUNITY
End: 2018-12-06 | Stop reason: SDUPTHER

## 2017-08-16 RX ORDER — ISOPROPYL ALCOHOL 70 ML/100ML
1 SWAB TOPICAL
Status: ON HOLD | COMMUNITY
End: 2020-09-10

## 2017-08-16 NOTE — TELEPHONE ENCOUNTER
Spoke with Kylie with [facility] in which she wanted to get authorization for patient to get Accu chek pita meter dispense 1 no refills. Accu chek smartview test strips dispense 400 with 3 refills. Fastclicks lancets dispense 400 with 3 refills. Bd alcohol swabs dispense dispense 400 with 3 refills. Verbal readback done per Kylie

## 2017-08-16 NOTE — TELEPHONE ENCOUNTER
----- Message from Nicole Dave sent at 8/15/2017  9:26 AM CDT -----  [facility]- [phone] / Fax# [phone]  [facility] Accu Check kit (Rebecca) . She can be reached at [phone] / Fax# [phone].

## 2017-09-12 ENCOUNTER — OFFICE VISIT (OUTPATIENT)
Dept: ENDOCRINOLOGY | Facility: CLINIC | Age: 62
End: 2017-09-12
Payer: MEDICARE

## 2017-09-12 ENCOUNTER — LAB VISIT (OUTPATIENT)
Dept: LAB | Facility: HOSPITAL | Age: 62
End: 2017-09-12
Payer: MEDICARE

## 2017-09-12 VITALS
HEART RATE: 75 BPM | WEIGHT: 230.81 LBS | RESPIRATION RATE: 16 BRPM | HEIGHT: 77 IN | DIASTOLIC BLOOD PRESSURE: 76 MMHG | SYSTOLIC BLOOD PRESSURE: 128 MMHG | BODY MASS INDEX: 27.25 KG/M2

## 2017-09-12 DIAGNOSIS — I50.42 CHRONIC COMBINED SYSTOLIC AND DIASTOLIC HF (HEART FAILURE): ICD-10-CM

## 2017-09-12 DIAGNOSIS — E78.2 MIXED HYPERLIPIDEMIA: ICD-10-CM

## 2017-09-12 DIAGNOSIS — E55.9 VITAMIN D DEFICIENCY: ICD-10-CM

## 2017-09-12 DIAGNOSIS — E05.10 TOXIC THYROID NODULE: ICD-10-CM

## 2017-09-12 DIAGNOSIS — I10 ESSENTIAL HYPERTENSION: ICD-10-CM

## 2017-09-12 DIAGNOSIS — I50.9 CONGESTIVE HEART FAILURE, UNSPECIFIED CONGESTIVE HEART FAILURE CHRONICITY, UNSPECIFIED CONGESTIVE HEART FAILURE TYPE: ICD-10-CM

## 2017-09-12 LAB
25(OH)D3+25(OH)D2 SERPL-MCNC: 14 NG/ML
ALBUMIN SERPL BCP-MCNC: 3.1 G/DL
ALP SERPL-CCNC: 107 U/L
ALT SERPL W/O P-5'-P-CCNC: 123 U/L
ANION GAP SERPL CALC-SCNC: 12 MMOL/L
AST SERPL-CCNC: 59 U/L
BILIRUB SERPL-MCNC: 0.9 MG/DL
BUN SERPL-MCNC: 21 MG/DL
CALCIUM SERPL-MCNC: 9 MG/DL
CHLORIDE SERPL-SCNC: 101 MMOL/L
CO2 SERPL-SCNC: 25 MMOL/L
CREAT SERPL-MCNC: 1.2 MG/DL
EST. GFR  (AFRICAN AMERICAN): >60 ML/MIN/1.73 M^2
EST. GFR  (NON AFRICAN AMERICAN): >60 ML/MIN/1.73 M^2
ESTIMATED AVG GLUCOSE: 223 MG/DL
GLUCOSE SERPL-MCNC: 341 MG/DL
HBA1C MFR BLD HPLC: 9.4 %
POTASSIUM SERPL-SCNC: 3.8 MMOL/L
PROT SERPL-MCNC: 7.5 G/DL
SODIUM SERPL-SCNC: 138 MMOL/L
TSH SERPL DL<=0.005 MIU/L-ACNC: 0.69 UIU/ML

## 2017-09-12 PROCEDURE — 82306 VITAMIN D 25 HYDROXY: CPT

## 2017-09-12 PROCEDURE — 36415 COLL VENOUS BLD VENIPUNCTURE: CPT

## 2017-09-12 PROCEDURE — 3046F HEMOGLOBIN A1C LEVEL >9.0%: CPT | Mod: S$GLB,,, | Performed by: INTERNAL MEDICINE

## 2017-09-12 PROCEDURE — 3078F DIAST BP <80 MM HG: CPT | Mod: S$GLB,,, | Performed by: INTERNAL MEDICINE

## 2017-09-12 PROCEDURE — 83036 HEMOGLOBIN GLYCOSYLATED A1C: CPT

## 2017-09-12 PROCEDURE — 80053 COMPREHEN METABOLIC PANEL: CPT

## 2017-09-12 PROCEDURE — 3074F SYST BP LT 130 MM HG: CPT | Mod: S$GLB,,, | Performed by: INTERNAL MEDICINE

## 2017-09-12 PROCEDURE — 84443 ASSAY THYROID STIM HORMONE: CPT

## 2017-09-12 PROCEDURE — 99999 PR PBB SHADOW E&M-EST. PATIENT-LVL III: CPT | Mod: PBBFAC,,, | Performed by: INTERNAL MEDICINE

## 2017-09-12 PROCEDURE — 99214 OFFICE O/P EST MOD 30 MIN: CPT | Mod: S$GLB,,, | Performed by: INTERNAL MEDICINE

## 2017-09-12 PROCEDURE — 3008F BODY MASS INDEX DOCD: CPT | Mod: S$GLB,,, | Performed by: INTERNAL MEDICINE

## 2017-09-12 PROCEDURE — 4010F ACE/ARB THERAPY RXD/TAKEN: CPT | Mod: S$GLB,,, | Performed by: INTERNAL MEDICINE

## 2017-09-12 RX ORDER — GABAPENTIN 100 MG/1
100 CAPSULE ORAL DAILY
Qty: 90 CAPSULE | Refills: 3 | Status: SHIPPED | OUTPATIENT
Start: 2017-09-12 | End: 2019-10-17

## 2017-09-12 RX ORDER — SPIRONOLACTONE 25 MG/1
25 TABLET ORAL DAILY
Qty: 90 TABLET | Refills: 1 | Status: SHIPPED | OUTPATIENT
Start: 2017-09-12 | End: 2018-11-26

## 2017-09-12 RX ORDER — CARVEDILOL 25 MG/1
25 TABLET ORAL 2 TIMES DAILY WITH MEALS
Qty: 180 TABLET | Refills: 3 | Status: SHIPPED | OUTPATIENT
Start: 2017-09-12 | End: 2018-05-01 | Stop reason: SDUPTHER

## 2017-09-12 NOTE — PROGRESS NOTES
Subjective:       Patient ID: Ed Patton is a 62 y.o. male.    Chief Complaint: Diabetes Mellitus    HPI   Mr. Patton presents to clinic today for Toxic Adenoma and T2DM follow up     He was found to have a suppressed TSH on routine labs ordered on 05/03/2017   Evaluated in 06/2017 for subclinical hyperthyroidism   NM thyroid uptake and scan from 7/25/2017 showed a Hyperfunctioning thyroid nodule affecting the entirety of the right thyroid lobe     24 hours radioiodine uptake is 30% (normal is 10-30% )   Impression       1. Hyperfunctioning right thyroid nodule.  2. The 24-hours-Radioiodine Uptake is 30%.  3. In the clinical setting of hyperthyroidism, the suggested treatment dose is 30 mCi orally with I-131.       The patient denies palpitations   Denies anxiety   Denies tremors   Denies bowel changes     Endorses occasional heat intolerance     Denies family history of thyroid disease or thyroid cancer   Denies personal history of radiation exposure    Denies use of amiodarone   Denies recent contrast   Denies recent viral illness   Denies fever    Denies use of lithium     With regards to his T2DM  previously followed by RISA Ortiz NP with last office visit in 05/2017    He arrives today without meter or logs   Reports testing BG 2 times daily with fasting averages 180-200 mg/dL , AC dinner 250's     Current regimen:  Relion 70/30: 40 units AM, 30 units PM     Denies recent hypoglycemic episodes or symptoms     Standards of care:   ACEi/ARB: lisinopril   Statin: lipitor   Last diabetic eye exam: reports within the past 12 months at [facility], reports retinopathy   Last podiatry exam: 02/24/2017 by Dr Bhakta, overdue for follow up for blister     Known complications: Retinopathy     Denies numbness or tingling sensations to bilateral lower extremities    24 hour dietary recall:   Breakfast: french toast and coffee  Lunch: baked chicken, dirty rice, green peas, salad   Dinner: pork chop, french fries and bread    Mainly drinking water, occasional juice once a week      Review of Systems   Constitutional: Positive for fatigue. Negative for unexpected weight change.   HENT: Negative for sore throat, trouble swallowing and voice change.    Eyes: Negative for visual disturbance.   Respiratory: Positive for shortness of breath.    Cardiovascular: Negative for palpitations.   Gastrointestinal: Negative for constipation, diarrhea, nausea and vomiting.   Endocrine: Positive for heat intolerance. Negative for cold intolerance, polydipsia, polyphagia and polyuria.   Genitourinary: Negative for dysuria.   Musculoskeletal: Positive for neck pain.   Skin: Negative for rash.   Allergic/Immunologic: Negative for immunocompromised state.   Neurological: Positive for headaches. Negative for tremors.   Psychiatric/Behavioral: Negative for sleep disturbance. The patient is not nervous/anxious.        Objective:      Physical Exam   Constitutional: He is oriented to person, place, and time. He appears well-developed. No distress.   HENT:   Head: Atraumatic.   Hearing normal    Neck: Normal range of motion. Neck supple. Thyromegaly present.   Palpable right thyroid nodule    Cardiovascular: Normal rate.    Pulses:       Dorsalis pedis pulses are 1+ on the right side, and 1+ on the left side.   Pulmonary/Chest: Effort normal. He has decreased breath sounds in the right lower field and the left lower field.   Portable oxygen at 2 L/minute    Abdominal: Soft.   Musculoskeletal: Normal range of motion. He exhibits no edema.        Right foot: There is no deformity.        Left foot: There is no deformity.   Feet:   Right Foot:   Protective Sensation: 10 sites tested. 5 sites sensed.   Skin Integrity: Positive for dry skin. Negative for ulcer, blister or callus.   Left Foot:   Protective Sensation: 10 sites tested. 5 sites sensed.   Skin Integrity: Positive for dry skin. Negative for ulcer, blister or callus.   Lymphadenopathy:     He has no  cervical adenopathy.   Neurological: He is alert and oriented to person, place, and time.   Decreased vibratory sensation noted bilaterally    Skin: Skin is warm and dry. No rash noted.   Feet: toenails elongated and discolored bilaterally    Psychiatric: He has a normal mood and affect. His behavior is normal.   Nursing note and vitals reviewed.        Labs:   Results for YVON DARBY (MRN [id number]) as of 9/12/2017 09:50   Ref. Range 11/11/2015 07:23   Sodium Latest Ref Range: 136 - 145 mmol/L 141   Potassium Latest Ref Range: 3.5 - 5.1 mmol/L 4.0   Chloride Latest Ref Range: 95 - 110 mmol/L 107   CO2 Latest Ref Range: 23 - 29 mmol/L 26   Anion Gap Latest Ref Range: 8 - 16 mmol/L 8   BUN, Bld Latest Ref Range: 6 - 20 mg/dL 20   Creatinine Latest Ref Range: 0.5 - 1.4 mg/dL 1.2   eGFR if non African American Latest Ref Range: >60 mL/min/1.73 m^2 >60.0   eGFR if African American Latest Ref Range: >60 mL/min/1.73 m^2 >60.0   Glucose Latest Ref Range: 70 - 110 mg/dL 91   Calcium Latest Ref Range: 8.7 - 10.5 mg/dL 9.5   Alkaline Phosphatase Latest Ref Range: 55 - 135 U/L 56   Total Protein Latest Ref Range: 6.0 - 8.4 g/dL 7.7   Albumin Latest Ref Range: 3.5 - 5.2 g/dL 3.5   Total Bilirubin Latest Ref Range: 0.1 - 1.0 mg/dL 0.5   AST Latest Ref Range: 10 - 40 U/L 17   ALT Latest Ref Range: 10 - 44 U/L 16     Results for YVON DARBY (MRN [id number]) as of 9/12/2017 09:50   Ref. Range 11/11/2015 07:23   Vit D, 25-Hydroxy Latest Ref Range: 30 - 96 ng/mL 13 (L)      Ref. Range 11/11/2015 07:05 11/11/2015 07:23 5/3/2017 10:55   Hemoglobin A1C Latest Ref Range: 4.5 - 6.2 % 8.7 (H)  11.1 (H)   Estimated Avg Glucose Latest Ref Range: 68 - 131 mg/dL 203 (H)  272 (H)     Results for YVON DARBY (MRN [id number]) as of 9/12/2017 09:50   Ref. Range 5/3/2017 10:55 6/23/2017 15:25   TSH Latest Ref Range: 0.400 - 4.000 uIU/mL 0.157 (L) 0.056 (L)   Free T4 Latest Ref Range: 0.71 - 1.51 ng/dL 1.08 1.07   Thyrotropin Receptor Ab  Latest Ref Range: 0.00 - 1.75 IU/L  <1.00       Assessment:       1. Type 2 diabetes, uncontrolled, with neuropathy    2. Toxic thyroid nodule    3. Congestive heart failure, unspecified congestive heart failure chronicity, unspecified congestive heart failure type    4. Essential hypertension    5. Mixed hyperlipidemia        Plan:       1. T2DM, uncontrolled, with neuropathy   -- uncontrolled at this time per reported blood sugars   -- emphasized dietary and lifestyle modifications   -- check labs today   -- not enough data to warrant changes   -- advised to smbg 2-3 times daily and send log in 2 weeks for review and adjustments   -- follow up with podiatry   -- advised to never walk barefoot, never put sharp objects to feet   -- reviewed signs and symptoms of hypoglycemia along with appropriate treatment protocol     2. Toxic Thyroid nodule   -- plan for COTO   -- recommended treatment dose is 30 mCi     3. CHF   -- follow up with Cardiology as advised     4. HTN   -- recommended to establish care with new PCP   -- will make appointment today   -- continue current regimen as previously prescribed   -- encouraged to follow a low salt diet     5. HLD   -- on statin therapy per ADA recommendations   -- encouraged to follow a low fat, low chol diet       Labs today   Arrange COTO , will check labs 4-6 weeks after treatment dose     Send logs in 2 weeks for review and adjustments   The patient is instructed to notify the office with BG concerns or questions

## 2017-09-25 ENCOUNTER — TELEPHONE (OUTPATIENT)
Dept: RADIOLOGY | Facility: HOSPITAL | Age: 62
End: 2017-09-25

## 2017-09-26 ENCOUNTER — HOSPITAL ENCOUNTER (OUTPATIENT)
Dept: RADIOLOGY | Facility: HOSPITAL | Age: 62
Discharge: HOME | End: 2017-09-26
Attending: INTERNAL MEDICINE
Payer: MEDICARE

## 2017-09-26 DIAGNOSIS — E05.10 TOXIC THYROID NODULE: ICD-10-CM

## 2017-09-26 PROCEDURE — 79005 NUCLEAR RX ORAL ADMIN: CPT | Mod: TC

## 2017-09-26 PROCEDURE — 79005 NUCLEAR RX ORAL ADMIN: CPT | Mod: 26,,, | Performed by: RADIOLOGY

## 2017-09-29 ENCOUNTER — TELEPHONE (OUTPATIENT)
Dept: ENDOCRINOLOGY | Facility: CLINIC | Age: 62
End: 2017-09-29

## 2017-09-29 DIAGNOSIS — E05.10 TOXIC THYROID NODULE: Primary | ICD-10-CM

## 2017-10-09 ENCOUNTER — OFFICE VISIT (OUTPATIENT)
Dept: PODIATRY | Facility: CLINIC | Age: 62
End: 2017-10-09
Payer: MEDICARE

## 2017-10-09 ENCOUNTER — OFFICE VISIT (OUTPATIENT)
Dept: INTERNAL MEDICINE | Facility: CLINIC | Age: 62
End: 2017-10-09
Payer: MEDICARE

## 2017-10-09 VITALS
SYSTOLIC BLOOD PRESSURE: 118 MMHG | HEIGHT: 77 IN | WEIGHT: 230 LBS | BODY MASS INDEX: 27.16 KG/M2 | DIASTOLIC BLOOD PRESSURE: 74 MMHG | HEART RATE: 70 BPM

## 2017-10-09 VITALS
TEMPERATURE: 98 F | DIASTOLIC BLOOD PRESSURE: 72 MMHG | BODY MASS INDEX: 28.16 KG/M2 | SYSTOLIC BLOOD PRESSURE: 132 MMHG | HEIGHT: 76 IN | WEIGHT: 231.25 LBS | HEART RATE: 72 BPM | OXYGEN SATURATION: 97 %

## 2017-10-09 DIAGNOSIS — E05.10 TOXIC THYROID NODULE: ICD-10-CM

## 2017-10-09 DIAGNOSIS — J96.11 CHRONIC RESPIRATORY FAILURE WITH HYPOXIA: ICD-10-CM

## 2017-10-09 DIAGNOSIS — I50.9 CONGESTIVE HEART FAILURE, UNSPECIFIED CONGESTIVE HEART FAILURE CHRONICITY, UNSPECIFIED CONGESTIVE HEART FAILURE TYPE: ICD-10-CM

## 2017-10-09 DIAGNOSIS — Z79.4 UNCONTROLLED TYPE 2 DIABETES MELLITUS WITH HYPERGLYCEMIA, WITH LONG-TERM CURRENT USE OF INSULIN: Primary | ICD-10-CM

## 2017-10-09 DIAGNOSIS — E11.49 TYPE II DIABETES MELLITUS WITH NEUROLOGICAL MANIFESTATIONS: Primary | ICD-10-CM

## 2017-10-09 DIAGNOSIS — Z79.899 POLYPHARMACY: ICD-10-CM

## 2017-10-09 DIAGNOSIS — B35.1 DERMATOPHYTOSIS OF NAIL: ICD-10-CM

## 2017-10-09 DIAGNOSIS — E11.69 TYPE 2 DIABETES MELLITUS WITH HYPERLIPIDEMIA: ICD-10-CM

## 2017-10-09 DIAGNOSIS — I42.0 DILATED CARDIOMYOPATHY: ICD-10-CM

## 2017-10-09 DIAGNOSIS — J98.4 CRLD (CHRONIC RESTRICTIVE LUNG DISEASE): ICD-10-CM

## 2017-10-09 DIAGNOSIS — E11.22 CKD STAGE 3 DUE TO TYPE 2 DIABETES MELLITUS: ICD-10-CM

## 2017-10-09 DIAGNOSIS — N18.30 CKD STAGE 3 DUE TO TYPE 2 DIABETES MELLITUS: ICD-10-CM

## 2017-10-09 DIAGNOSIS — Z12.11 SCREENING FOR COLON CANCER: ICD-10-CM

## 2017-10-09 DIAGNOSIS — I20.9 ANGINA PECTORIS ASSOCIATED WITH TYPE 2 DIABETES MELLITUS: ICD-10-CM

## 2017-10-09 DIAGNOSIS — E78.5 TYPE 2 DIABETES MELLITUS WITH HYPERLIPIDEMIA: ICD-10-CM

## 2017-10-09 DIAGNOSIS — E11.65 UNCONTROLLED TYPE 2 DIABETES MELLITUS WITH HYPERGLYCEMIA, WITH LONG-TERM CURRENT USE OF INSULIN: Primary | ICD-10-CM

## 2017-10-09 DIAGNOSIS — Z11.59 NEED FOR HEPATITIS C SCREENING TEST: ICD-10-CM

## 2017-10-09 DIAGNOSIS — E11.59 ANGINA PECTORIS ASSOCIATED WITH TYPE 2 DIABETES MELLITUS: ICD-10-CM

## 2017-10-09 DIAGNOSIS — I15.2 HYPERTENSION ASSOCIATED WITH DIABETES: ICD-10-CM

## 2017-10-09 DIAGNOSIS — E11.59 HYPERTENSION ASSOCIATED WITH DIABETES: ICD-10-CM

## 2017-10-09 PROCEDURE — 99213 OFFICE O/P EST LOW 20 MIN: CPT | Mod: S$GLB,,, | Performed by: PODIATRIST

## 2017-10-09 PROCEDURE — 90686 IIV4 VACC NO PRSV 0.5 ML IM: CPT | Mod: S$GLB,,, | Performed by: INTERNAL MEDICINE

## 2017-10-09 PROCEDURE — 99215 OFFICE O/P EST HI 40 MIN: CPT | Mod: S$GLB,,, | Performed by: INTERNAL MEDICINE

## 2017-10-09 PROCEDURE — 99999 PR PBB SHADOW E&M-EST. PATIENT-LVL III: CPT | Mod: PBBFAC,,, | Performed by: PODIATRIST

## 2017-10-09 PROCEDURE — G0008 ADMIN INFLUENZA VIRUS VAC: HCPCS | Mod: S$GLB,,, | Performed by: INTERNAL MEDICINE

## 2017-10-09 PROCEDURE — 99999 PR PBB SHADOW E&M-EST. PATIENT-LVL IV: CPT | Mod: PBBFAC,,, | Performed by: INTERNAL MEDICINE

## 2017-10-09 NOTE — ASSESSMENT & PLAN NOTE
Patient taking multiple meds with limited insight, questionable compliance  · Advised to bring ALL medications to next appt  · Will work with pharmacy for pill packing

## 2017-10-09 NOTE — ASSESSMENT & PLAN NOTE
Abnormal foot exam, gabapentin from Endo  · Continue gabapentin per Neuro  · Advised caution with gabapentin

## 2017-10-09 NOTE — ASSESSMENT & PLAN NOTE
24-hours-Radioiodine Uptake 30% in R lobe. Treated with 30mci orally with I-131 in 9/2017  · Monitor  · F/U Endo

## 2017-10-09 NOTE — PROGRESS NOTES
"Primary Care Provider Appointment    Subjective:      Patient ID: Ed Patton is a 62 y.o. male with CHF, HLD, DM, toxic thyroid nodules, CRLD    Chief Complaint: Establish Care (Patient stated that he is here to Est. Care ); Knee Pain (1+ week , pain= 5); Back Pain (1+ week, pain = 5); Fall (1+ week , patient had a fall in [place]); and Shoulder Pain (1+ week , pain= 5)    New patient to this clinic, hs has never had PCP. He no-showed to a Rheum appt in He last saw Pulm (Dr Alexandre) in 2015 for CRLD. He is on continuous O2 for the past 4 years to treat CRLD.     He is regularly followed by Endo (Ms Wolfe) last appt in 9/2017. Last A1c was 9.4 in 9/2017. He reports compliance with novolin 40U in AM, and 30U in PM. He also has a toxic thyroid nodule in R lobe with plans for COTO. Patient reports being started on a "red and white pill" 3 weeks ago for diabetes.    He last saw Cardiology one year ago, with reported "mixed ischemic and non-ischemic cardiomyopathy out of proportion to coronary disease by catheterization 2006 and 2011." He complains of SOB with any exertion beyond 1/2 block distance. He feels better when he is sitting and not doing anything.    Patient with limited insight into medications and disease.     Social History  Components    Tobacco (-) quit 40 years ago, <1 pack year history    Alcohol (-) last drink 40 years ago    Ililcit drugs (-)    Sex (+) , exclusive, one female partner    Employment (-) Disability for CRLD       Past Surgical History:   Procedure Laterality Date    APPENDECTOMY      CHOLECYSTECTOMY      CORONARY ANGIOPLASTY WITH STENT PLACEMENT      TONSILLECTOMY         Past Medical History:   Diagnosis Date    CHF (congestive heart failure)     COPD (chronic obstructive pulmonary disease)     Coronary artery disease     Diabetes mellitus     Diabetes mellitus type II     DM (diabetes mellitus) type II uncontrolled with renal manifestation 9/4/2013    " "Hyperlipidemia     Hypertension        Review of Systems   Constitutional: Positive for activity change and fatigue. Negative for appetite change.   Respiratory: Positive for shortness of breath. Negative for cough and choking.    Cardiovascular: Negative for chest pain and leg swelling.   Gastrointestinal: Negative for abdominal pain and diarrhea.   Musculoskeletal: Positive for gait problem. Negative for joint swelling.   Psychiatric/Behavioral: Positive for confusion and decreased concentration. Negative for agitation.       Objective:   /72 (BP Location: Right arm, Patient Position: Sitting, BP Method: Medium (Manual))   Pulse 72   Temp 98 °F (36.7 °C)   Ht 6' 4" (1.93 m)   Wt 104.9 kg (231 lb 4.2 oz)   SpO2 97%   BMI 28.15 kg/m²     Physical Exam   Constitutional: He is oriented to person, place, and time. He appears well-developed and well-nourished.   Wearing NC oxygen   HENT:   Head: Normocephalic.   Eyes: EOM are normal.   Cardiovascular: Normal rate.    Musculoskeletal: Normal range of motion.   Neurological: He is alert and oriented to person, place, and time.   Skin: Skin is warm and dry.   Psychiatric: He has a normal mood and affect.   Poor insight into disease   Nursing note and vitals reviewed.      Lab Results   Component Value Date    WBC 14.60 (H) 05/01/2015    HGB 12.6 (L) 05/01/2015    HCT 38.9 (L) 05/01/2015     05/01/2015    CHOL 133 11/11/2015    TRIG 80 11/11/2015    HDL 40 11/11/2015     (H) 09/12/2017    AST 59 (H) 09/12/2017     09/12/2017    K 3.8 09/12/2017     09/12/2017    CREATININE 1.2 09/12/2017    BUN 21 (H) 09/12/2017    CO2 25 09/12/2017    TSH 0.689 09/12/2017    PSA 0.4 03/05/2004    INR 1.0 10/16/2014    HGBA1C 9.4 (H) 09/12/2017         Assessment:   62 y.o. male with multiple co-morbid illnesses here to continue work-up of chronic issues notably CHF, HLD, DM, toxic thyroid nodules, CRLD.     Plan:     Problem List Items Addressed This " Visit        Pulmonary    CRLD (chronic restrictive lung disease)     Followed by Pulm, last seen in 2015  · Continue NC O2  · Re-establish care with Dr Alexandre         Relevant Orders    Ambulatory consult to Pulmonology    Chronic respiratory failure with hypoxia     Continuous O2, followed by Pulm  · Last seen by Pulm in 2015  · Est care with Dr Quintnailla  · Referral placed         Relevant Orders    Ambulatory consult to Pulmonology    Hemoglobin A1c       Cardiac/Vascular    CHF (congestive heart failure)     EF 35% in 2015 echo, patient with mixed ischemic and non-ischemic cardiomyopathy  · F/U Cardiology  · Continue current therapies         Dilated cardiomyopathy     Followed by Cardiology, EF 35% in 2015 echo, with mixed ischemic and non-ischemic cardiomyopathy  · F/U Cardiology  · Continue med management         Angina pectoris associated with type 2 diabetes mellitus     CAD on cath in 2006 and 2011, angina controlled medically on ACE, BB, digoxin, statin, ASA  · F/U Cards  · Continue meds         Hypertension associated with diabetes     BP controlled, A1c elevated  · Continue ACE, BB, digoxin  · Continue statin, ASA  · F/U Cardiology         Relevant Orders    Lipid panel    Diabetic Eye Screening Photo    Hemoglobin A1c    Magnesium    Comprehensive metabolic panel       Endocrine    CKD stage 3 due to type 2 diabetes mellitus     GFR> 60, uncontrolled DM  · Continue novolin 70/30 40U in AM - 30U in PM         Relevant Orders    Diabetic Eye Screening Photo    CBC auto differential    Hemoglobin A1c    Type 2 diabetes, uncontrolled, with neuropathy     Abnormal foot exam, gabapentin from Endo  · Continue gabapentin per Neuro  · Advised caution with gabapentin         Relevant Orders    Diabetic Eye Screening Photo    Hemoglobin A1c    Type 2 diabetes mellitus with hyperlipidemia     High ASCVD with CAD, elevated A1c  · Continue Novolin 40-30  · Continue statin, ASA  · F/U Cardiology         Relevant  Orders    Lipid panel    Diabetic Eye Screening Photo    Hemoglobin A1c    Toxic thyroid nodule     24-hours-Radioiodine Uptake 30% in R lobe. Treated with 30mci orally with I-131 in 9/2017  · Monitor  · F/U Endo         Uncontrolled type 2 diabetes mellitus with hyperglycemia, with long-term current use of insulin - Primary     Novolin 70/30 40U in AM, 30U in PM. Elevated A1c  · Continue current therapies  · F/U Endo            GI    Screening for colon cancer     No history of colonscopy, not medically cleared for invasive imaging  FitKit was given to patient on 10/9/2017 12:06 PM            Relevant Orders    Fecal Immunochemical Test (iFOBT)    Hemoglobin A1c       Other    Polypharmacy     Patient taking multiple meds with limited insight, questionable compliance  · Advised to bring ALL medications to next appt  · Will work with pharmacy for pill packing           Other Visit Diagnoses     Need for hepatitis C screening test        Relevant Orders    Hepatitis C antibody    Comprehensive metabolic panel          Health Maintenance       Date Due Completion Date    Hepatitis C Screening 1955 ---TODAY    Eye Exam 02/09/1967 ---TODAY    Pneumococcal PPSV23 (Medium Risk) (1) 02/09/1975 ---NEXT    Colonoscopy 02/09/2007 ---iFOBT    Lipid Panel 11/11/2016 11/11/2015    Zoster Vaccine 02/09/2017 ---    Influenza Vaccine 08/01/2017 1/16/2014- TODAY    Hemoglobin A1c 12/12/2017 9/12/2017    Foot Exam 09/12/2018 9/12/2017    TETANUS VACCINE 09/14/2023 9/14/2013          Return in about 3 weeks (around 10/30/2017) for established patient follow-up. . One hour spent with this patient today, half of that in counseling.    Qiana Chow MD/MPH  Internal Medicine  [facility]  [phone]

## 2017-10-09 NOTE — PATIENT INSTRUCTIONS
TODAY:  - flu vaccine  - labs and next appt on 11/1/17  - stool card for cancer screening  - diabetic eye camera screening  - Pulm appt (Dr Quintanilla)  - Vit D3 5000U daily ordered from [employer]  - bring ALL meds to next appt  - bring glucose log to next appt  - call this office if you need a ride    NEXT:  - pneumonia vaccine

## 2017-10-09 NOTE — PATIENT INSTRUCTIONS
Your A1c:    Hemoglobin A1C   Date Value Ref Range Status   09/12/2017 9.4 (H) 4.0 - 5.6 % Final     Comment:     According to ADA guidelines, hemoglobin A1c <7.0% represents  optimal control in non-pregnant diabetic patients. Different  metrics may apply to specific patient populations.   Standards of Medical Care in Diabetes-2016.  For the purpose of screening for the presence of diabetes:  <5.7%     Consistent with the absence of diabetes  5.7-6.4%  Consistent with increasing risk for diabetes   (prediabetes)  >or=6.5%  Consistent with diabetes  Currently, no consensus exists for use of hemoglobin A1c  for diagnosis of diabetes for children.  This Hemoglobin A1c assay has significant interference with fetal   hemoglobin   (HbF). The results are invalid for patients with abnormal amounts of   HbF,   including those with known Hereditary Persistence   of Fetal Hemoglobin. Heterozygous hemoglobin variants (HbAS, HbAC,   HbAD, HbAE, HbA2) do not significantly interfere with this assay;   however, presence of multiple variants in a sample may impact the %   interference.     05/03/2017 11.1 (H) 4.5 - 6.2 % Final     Comment:     According to ADA guidelines, hemoglobin A1C <7.0% represents  optimal control in non-pregnant diabetic patients.  Different  metrics may apply to specific populations.   Standards of Medical Care in Diabetes - 2016.  For the purpose of screening for the presence of diabetes:  <5.7%     Consistent with the absence of diabetes  5.7-6.4%  Consistent with increasing risk for diabetes   (prediabetes)  >or=6.5%  Consistent with diabetes  Currently no consensus exists for use of hemoglobin A1C  for diagnosis of diabetes for children.     11/11/2015 8.7 (H) 4.5 - 6.2 % Final       How to Check Your Feet    Below are tips to help you look for foot problems. Try to check your feet at the same time each day, such as when you get out of bed in the morning.    · Check the top of each foot. The tops of  toes, back of the heel, and outer edge of the foot can get a lot of rubbing from poor-fitting shoes.    · Check the bottom of each foot. Daily wear and tear often leads to problems at pressure spots.    · Check the toes and nails. Fungal infections often occur between toes. Toenail problems can also be a sign of fungal infections or lead to breaks in the skin.    · Check your shoes, too. Loose objects inside a shoe can injure the foot. Use your hand to feel inside your shoes for things like maria isabel, loose stitching, or rough areas that could irritate your skin.        Diabetic Foot Care    Diabetes can lead to a number of different foot complications. Fortunately, most of these complications can be prevented with a little extra foot care. If diabetes is not well controlled, the high blood sugar can cause damage to blood vessels and result in poor circulation to the foot. When the skin does not get enough blood flow, it becomes prone to pressure sores and ulcers, which heal slowly.  High blood sugar can also damage nerves, interfering with the ability to feel pain and pressure. When you cant feel your foot normally, it is easy to injure your skin, bones and joints without knowing it. For these reasons diabetes increases the risk of fungal infections, bunions and ulcers. Deep ulcers can lead to bone infection. Gangrene is the most serious foot complication of diabetes. It usually occurs on the tips of the toes as blacked areas of skin. The black area is dead tissue. In severe cases, gangrene spreads to involve the entire toe, other toes and the entire foot. Foot or toe amputation may be required. Good foot care and blood sugar control can prevent this.    Home Care  1. Wear comfortable, proper fitting shoes.  2. Wash your feet daily with warm water and mild soap.  3. After drying, apply a moisturizing cream or lotion.  4. Check your feet daily for skin breaks, blisters, swelling, or redness. Look between your toes  also.  5. Wear cotton socks and change them every day.  6. Trim toe nails carefully and do not cut your cuticles.  7. Strive to keep your blood sugar under control with a combination of medicines, diet and activity.  8. If you smoke and have diabetes, it is very important that you stop. Smoking reduces blood flow to your foot.  9. Avoid activities that increase your risk of foot injury:  · Do not walk barefoot.  · Do not use heating pads or hot water bottles on your feet.  · Do not put your foot in a hot tub without first checking the temperature with your hand.  10) Schedule yearly foot exams.    Follow Up  with your doctor or as advised by our staff. Report any cut, puncture, scrape, other injury, blister, ingrown toenail or ulcer on your foot.    Get Prompt Medical Attention  if any of the following occur:  -- Open ulcer with pus draining from the wound  -- Increasing foot or leg pain  -- New areas of redness or swelling or tender areas of the foot    © 2361-5380 [employer]. [address] [place] [address], [place] [address]. All rights reserved. This information is not intended as a substitute for professional medical care. Always follow your healthcare professional's instructions.

## 2017-10-09 NOTE — ASSESSMENT & PLAN NOTE
EF 35% in 2015 echo, patient with mixed ischemic and non-ischemic cardiomyopathy  · F/U Cardiology  · Continue current therapies

## 2017-10-09 NOTE — ASSESSMENT & PLAN NOTE
High ASCVD with CAD, elevated A1c  · Continue Novolin 40-30  · Continue statin, ASA  · F/U Cardiology

## 2017-10-09 NOTE — ASSESSMENT & PLAN NOTE
BP controlled, A1c elevated  · Continue ACE, BB, digoxin  · Continue statin, ASA  · F/U Cardiology

## 2017-10-09 NOTE — ASSESSMENT & PLAN NOTE
CAD on cath in 2006 and 2011, angina controlled medically on ACE, BB, digoxin, statin, ASA  · F/U Cards  · Continue meds

## 2017-10-09 NOTE — PROGRESS NOTES
CC:     Foot exam       HPI:   The patient is a 62 y.o.  male  who presents for a diabetic foot exam.     Patient reports no presence of abnormal sensation to the feet .    History of diabetic foot ulcers: none.     History of foot surgery: none.     Shoes worn today:  Dress leather shoes  This patient has documented high risk feet requiring routine maintenance secondary to diabetes   Requesting toenail debridement today.  No wounds or blisters noted.         Primary care doctor is: Primary Doctor No  Patient last saw primary care doctor on:   9/12/17 Endocrinology         Past Medical History:   Diagnosis Date    CHF (congestive heart failure)     COPD (chronic obstructive pulmonary disease)     Coronary artery disease     Diabetes mellitus     Diabetes mellitus type II     DM (diabetes mellitus) type II uncontrolled with renal manifestation 9/4/2013    Hyperlipidemia     Hypertension          Current Outpatient Prescriptions on File Prior to Visit   Medication Sig Dispense Refill    albuterol (PROVENTIL) 2.5 mg /3 mL (0.083 %) nebulizer solution INHALE THE CONTENTS OF 1 VIAL VIA NEBULIZER EVERY 4 HOURS AS NEEDED FOR WHEEZING. 360 mL 3    alcohol swabs (BD ALCOHOL SWABS) PadM Apply 1 each topically as needed.      aspirin (ECOTRIN) 81 MG EC tablet Take 1 tablet (81 mg total) by mouth once daily. 30 tablet 6    atorvastatin (LIPITOR) 40 MG tablet Take 1 tablet (40 mg total) by mouth once daily. 90 tablet 1    BLOOD SUGAR DIAGNOSTIC (ACCU-CHEK SMARTVIEW TEST STRIP MISC) 1 strip by Misc.(Non-Drug; Combo Route) route 4 (four) times daily with meals and nightly.      BLOOD-GLUCOSE METER (ACCU-CHEK RAMIREZ MISC) 1 Package by Misc.(Non-Drug; Combo Route) route.      carvedilol (COREG) 25 MG tablet Take 1 tablet (25 mg total) by mouth 2 (two) times daily with meals. 180 tablet 3    digoxin (LANOXIN) 125 mcg tablet TAKE 1 TABLET ONE TIME DAILY 90 tablet 3    econazole nitrate 1 % cream Apply topically 2 (two)  "times daily. on the feet. 30 g 1    ferrous sulfate 325 mg (65 mg iron) Tab tablet Take 1 tablet (325 mg total) by mouth once daily. 30 tablet 11    fluticasone (FLONASE) 50 mcg/actuation nasal spray USE 1 SPRAY IN EACH NOSTRIL ONE TIME DAILY 32 g 3    furosemide (LASIX) 80 MG tablet TAKE 1 TABLET IN THE MORNING  AND  1  TABLET  AT  3-4PM 180 tablet 1    gabapentin (NEURONTIN) 100 MG capsule Take 1 capsule (100 mg total) by mouth once daily. 90 capsule 3    insulin NPH-insulin regular, 70/30, (NOVOLIN 70/30) 100 unit/mL (70-30) injection Inject 38 units before breakfast and 28 units before dinner. [id number] minutes before breakfast and dinner 2 vial 10    insulin syringe-needle U-100 (INSULIN SYRINGE) 1 mL 30 gauge x 5/16 Syrg 1 each by Misc.(Non-Drug; Combo Route) route 2 (two) times daily. Use twice daily as directed with insulin vials. 100 each 6    lancets Misc by Misc.(Non-Drug; Combo Route) route. Patient checks blood sugar 4 times daily      lisinopril (PRINIVIL,ZESTRIL) 40 MG tablet TAKE 1 TABLET EVERY DAY 90 tablet 3    mometasone 0.1% (ELOCON) 0.1 % cream Applied to the affected area once daily for rash. 45 g 0    nitroGLYCERIN (NITROSTAT) 0.4 MG SL tablet Place 1 tablet (0.4 mg total) under the tongue every 5 (five) minutes as needed. 30 tablet 6    pen needle, diabetic (BD ULTRA-FINE RAMIREZ PEN NEEDLES) 32 gauge x 5/32" Ndle Use with multiple daily injections of insulin 200 each 11    spironolactone (ALDACTONE) 25 MG tablet Take 1 tablet (25 mg total) by mouth once daily. 90 tablet 1     No current facility-administered medications on file prior to visit.          Review of patient's allergies indicates:   Allergen Reactions    Vicodin [hydrocodone-acetaminophen] Itching             ROS:  General ROS: negative  Respiratory ROS: no cough, shortness of breath, or wheezing  Cardiovascular ROS: no chest pain or dyspnea on exertion  Musculoskeletal ROS: negative  Neurological ROS:   negative " for - numbness/tingling, seizures or tremors  Dermatological ROS: negative      LAST HbA1c:   Hemoglobin A1C   Date Value Ref Range Status   09/12/2017 9.4 (H) 4.0 - 5.6 % Final     Comment:     According to ADA guidelines, hemoglobin A1c <7.0% represents  optimal control in non-pregnant diabetic patients. Different  metrics may apply to specific patient populations.   Standards of Medical Care in Diabetes-2016.  For the purpose of screening for the presence of diabetes:  <5.7%     Consistent with the absence of diabetes  5.7-6.4%  Consistent with increasing risk for diabetes   (prediabetes)  >or=6.5%  Consistent with diabetes  Currently, no consensus exists for use of hemoglobin A1c  for diagnosis of diabetes for children.  This Hemoglobin A1c assay has significant interference with fetal   hemoglobin   (HbF). The results are invalid for patients with abnormal amounts of   HbF,   including those with known Hereditary Persistence   of Fetal Hemoglobin. Heterozygous hemoglobin variants (HbAS, HbAC,   HbAD, HbAE, HbA2) do not significantly interfere with this assay;   however, presence of multiple variants in a sample may impact the %   interference.     05/03/2017 11.1 (H) 4.5 - 6.2 % Final     Comment:     According to ADA guidelines, hemoglobin A1C <7.0% represents  optimal control in non-pregnant diabetic patients.  Different  metrics may apply to specific populations.   Standards of Medical Care in Diabetes - 2016.  For the purpose of screening for the presence of diabetes:  <5.7%     Consistent with the absence of diabetes  5.7-6.4%  Consistent with increasing risk for diabetes   (prediabetes)  >or=6.5%  Consistent with diabetes  Currently no consensus exists for use of hemoglobin A1C  for diagnosis of diabetes for children.     11/11/2015 8.7 (H) 4.5 - 6.2 % Final             Social History     Social History    Marital status:      Spouse name: N/A    Number of children: N/A    Years of education:  "N/A     Occupational History    Not on file.     Social History Main Topics    Smoking status: Former Smoker     Packs/day: 0.01     Years: 3.00     Types: Cigarettes     Quit date: 5/22/1995    Smokeless tobacco: Never Used    Alcohol use No    Drug use: No    Sexual activity: No      Comment:  with 1 son     Other Topics Concern    Not on file     Social History Narrative     with 1 son           EXAM:   Vitals:    10/09/17 1002   BP: 118/74   Pulse: 70   Weight: 104.3 kg (230 lb)   Height: 6' 5" (1.956 m)       General: alert, no distress, on nasal canula    Vascular:   Dorsalis pedis:   0 bilateral.   Posterior Tibial:   0  Bilateral.   3 seconds capillary refill time and temperature of toes are cool- B/L to touch.   reduced hair growth on the feet.    There is Trace edema to both feet.      Varicosities:  none      Dermatological:    Skin: dry and no suspicious lesions; flaky skin, no vesicles  Nails: toenails 1-5 L and 1-5 R  are onychomycosis of the toenails and dystrophic nails, elongated and thick by 1-2mm with subungual debris.    Callus:  None  Open Wounds: none  Ecchymoses is not observed.      Erythema:  none .     Interdigital spaces: clean, dry and without evidence of break in skin integrity      Neurological:    Sharp dull - B/L normal and light touch - B/L normal  Vibratory - normal  [place] diminished      Musculoskeletal:     Muscle strength: 5/5, adequate ROM, adequate strength     Right foot:  flat foot   Left foot: flat foot         Assessment:    Type II diabetes mellitus with neurological manifestations    Dermatophytosis of nail            Treatment and Plan:  Shoe inspection. Diabetic Foot Education. Patient reminded of the importance of good nutrition and blood sugar control to help prevent podiatric complications of diabetes. Patient instructed on proper foot hygiene. We discussed wearing proper shoe gear, daily foot inspections, never walking without protective shoe " gear, never putting sharp instruments to feet, and routine diabetic foot exam and care every 3-6 months to prevent complications.      Consider Kerasal Nail.  Available OTC.  Toenails trimmed and filed as a courtesy today.     This patient has documented high risk feet requiring routine maintenance secondary to diabetes.       Return in about 4 months (around 2/9/2018) for foot care, or sooner if concerned.

## 2017-10-09 NOTE — ASSESSMENT & PLAN NOTE
Followed by Cardiology, EF 35% in 2015 echo, with mixed ischemic and non-ischemic cardiomyopathy  · F/U Cardiology  · Continue med management

## 2017-10-09 NOTE — ASSESSMENT & PLAN NOTE
No history of colonscopy, not medically cleared for invasive imaging  FitKit was given to patient on 10/9/2017 12:06 PM

## 2018-01-11 DIAGNOSIS — E11.69 TYPE 2 DIABETES MELLITUS WITH HYPERLIPIDEMIA: Primary | ICD-10-CM

## 2018-01-11 DIAGNOSIS — E78.5 TYPE 2 DIABETES MELLITUS WITH HYPERLIPIDEMIA: Primary | ICD-10-CM

## 2018-01-11 RX ORDER — ATORVASTATIN CALCIUM 40 MG/1
TABLET, FILM COATED ORAL
Qty: 90 TABLET | Refills: 1 | Status: SHIPPED | OUTPATIENT
Start: 2018-01-11 | End: 2018-05-01 | Stop reason: SDUPTHER

## 2018-02-07 RX ORDER — FLUTICASONE PROPIONATE 50 MCG
SPRAY, SUSPENSION (ML) NASAL
Qty: 32 G | Refills: 3 | Status: CANCELLED | OUTPATIENT
Start: 2018-02-07

## 2018-02-12 ENCOUNTER — TELEPHONE (OUTPATIENT)
Dept: INTERNAL MEDICINE | Facility: CLINIC | Age: 63
End: 2018-02-12

## 2018-02-12 DIAGNOSIS — J98.4 CRLD (CHRONIC RESTRICTIVE LUNG DISEASE): Primary | ICD-10-CM

## 2018-02-12 RX ORDER — FLUTICASONE PROPIONATE 50 MCG
SPRAY, SUSPENSION (ML) NASAL
Qty: 32 G | Refills: 3 | Status: SHIPPED | OUTPATIENT
Start: 2018-02-12 | End: 2018-11-26 | Stop reason: SDUPTHER

## 2018-02-12 NOTE — TELEPHONE ENCOUNTER
Please ask patient if he continues to use fluticasone nasal spray. Refill sent to [facility] pharmacy.    Thanks,  KJ

## 2018-03-06 ENCOUNTER — PATIENT OUTREACH (OUTPATIENT)
Dept: ADMINISTRATIVE | Facility: HOSPITAL | Age: 63
End: 2018-03-06

## 2018-03-06 NOTE — PROGRESS NOTES
Attempted several times to connect with pt and wife to remind him of the need to re-schedule his missed appt wih his PCP as instructed. All lines not able to accept VM and unable to send a [employer] message since pt declined. Will send a letter to his home.

## 2018-05-01 ENCOUNTER — CLINICAL SUPPORT (OUTPATIENT)
Dept: OPTOMETRY | Facility: CLINIC | Age: 63
End: 2018-05-01
Attending: INTERNAL MEDICINE
Payer: MEDICARE

## 2018-05-01 ENCOUNTER — OFFICE VISIT (OUTPATIENT)
Dept: INTERNAL MEDICINE | Facility: CLINIC | Age: 63
End: 2018-05-01
Payer: MEDICARE

## 2018-05-01 VITALS
WEIGHT: 243.63 LBS | DIASTOLIC BLOOD PRESSURE: 70 MMHG | HEART RATE: 70 BPM | HEIGHT: 76 IN | BODY MASS INDEX: 29.67 KG/M2 | SYSTOLIC BLOOD PRESSURE: 120 MMHG | OXYGEN SATURATION: 97 %

## 2018-05-01 DIAGNOSIS — I42.0 DILATED CARDIOMYOPATHY: ICD-10-CM

## 2018-05-01 DIAGNOSIS — Z79.899 POLYPHARMACY: ICD-10-CM

## 2018-05-01 DIAGNOSIS — J96.11 CHRONIC RESPIRATORY FAILURE WITH HYPOXIA: ICD-10-CM

## 2018-05-01 DIAGNOSIS — E11.69 TYPE 2 DIABETES MELLITUS WITH HYPERLIPIDEMIA: ICD-10-CM

## 2018-05-01 DIAGNOSIS — I50.9 CONGESTIVE HEART FAILURE, UNSPECIFIED HF CHRONICITY, UNSPECIFIED HEART FAILURE TYPE: ICD-10-CM

## 2018-05-01 DIAGNOSIS — N18.30 CKD STAGE 3 DUE TO TYPE 2 DIABETES MELLITUS: ICD-10-CM

## 2018-05-01 DIAGNOSIS — Z79.4 UNCONTROLLED TYPE 2 DIABETES MELLITUS WITH HYPERGLYCEMIA, WITH LONG-TERM CURRENT USE OF INSULIN: ICD-10-CM

## 2018-05-01 DIAGNOSIS — E78.5 TYPE 2 DIABETES MELLITUS WITH HYPERLIPIDEMIA: ICD-10-CM

## 2018-05-01 DIAGNOSIS — I20.9 ANGINA PECTORIS ASSOCIATED WITH TYPE 2 DIABETES MELLITUS: ICD-10-CM

## 2018-05-01 DIAGNOSIS — E11.59 ANGINA PECTORIS ASSOCIATED WITH TYPE 2 DIABETES MELLITUS: ICD-10-CM

## 2018-05-01 DIAGNOSIS — J98.4 CHRONIC RESTRICTIVE LUNG DISEASE: Primary | ICD-10-CM

## 2018-05-01 DIAGNOSIS — E11.59 HYPERTENSION ASSOCIATED WITH DIABETES: ICD-10-CM

## 2018-05-01 DIAGNOSIS — I15.2 HYPERTENSION ASSOCIATED WITH DIABETES: ICD-10-CM

## 2018-05-01 DIAGNOSIS — E11.65 UNCONTROLLED TYPE 2 DIABETES MELLITUS WITH HYPERGLYCEMIA, WITH LONG-TERM CURRENT USE OF INSULIN: ICD-10-CM

## 2018-05-01 DIAGNOSIS — E11.22 CKD STAGE 3 DUE TO TYPE 2 DIABETES MELLITUS: ICD-10-CM

## 2018-05-01 DIAGNOSIS — E83.59 OTHER DISORDERS OF CALCIUM METABOLISM: ICD-10-CM

## 2018-05-01 PROCEDURE — 3078F DIAST BP <80 MM HG: CPT | Mod: CPTII,S$GLB,, | Performed by: INTERNAL MEDICINE

## 2018-05-01 PROCEDURE — 99999 PR PBB SHADOW E&M-EST. PATIENT-LVL III: CPT | Mod: PBBFAC,,, | Performed by: INTERNAL MEDICINE

## 2018-05-01 PROCEDURE — 92250 FUNDUS PHOTOGRAPHY W/I&R: CPT | Mod: S$GLB,,, | Performed by: OPHTHALMOLOGY

## 2018-05-01 PROCEDURE — 3046F HEMOGLOBIN A1C LEVEL >9.0%: CPT | Mod: CPTII,S$GLB,, | Performed by: INTERNAL MEDICINE

## 2018-05-01 PROCEDURE — 99215 OFFICE O/P EST HI 40 MIN: CPT | Mod: S$GLB,,, | Performed by: INTERNAL MEDICINE

## 2018-05-01 PROCEDURE — 99999 PR PBB SHADOW E&M-EST. PATIENT-LVL II: CPT | Mod: PBBFAC,,,

## 2018-05-01 PROCEDURE — 3074F SYST BP LT 130 MM HG: CPT | Mod: CPTII,S$GLB,, | Performed by: INTERNAL MEDICINE

## 2018-05-01 RX ORDER — ALBUTEROL SULFATE 0.83 MG/ML
SOLUTION RESPIRATORY (INHALATION)
Qty: 360 ML | Refills: 3 | Status: ON HOLD | OUTPATIENT
Start: 2018-05-01 | End: 2020-10-07 | Stop reason: SDUPTHER

## 2018-05-01 RX ORDER — CARVEDILOL 25 MG/1
25 TABLET ORAL 2 TIMES DAILY WITH MEALS
Qty: 180 TABLET | Refills: 3 | Status: SHIPPED | OUTPATIENT
Start: 2018-05-01 | End: 2019-10-15

## 2018-05-01 RX ORDER — DIGOXIN 125 MCG
TABLET ORAL
Qty: 90 TABLET | Refills: 3 | Status: SHIPPED | OUTPATIENT
Start: 2018-05-01 | End: 2018-05-02

## 2018-05-01 RX ORDER — ATORVASTATIN CALCIUM 40 MG/1
40 TABLET, FILM COATED ORAL DAILY
Qty: 90 TABLET | Refills: 3 | Status: SHIPPED | OUTPATIENT
Start: 2018-05-01 | End: 2019-07-30 | Stop reason: SDUPTHER

## 2018-05-01 RX ORDER — LISINOPRIL 40 MG/1
40 TABLET ORAL DAILY
Qty: 90 TABLET | Refills: 3 | Status: SHIPPED | OUTPATIENT
Start: 2018-05-01 | End: 2019-10-15 | Stop reason: SDUPTHER

## 2018-05-01 NOTE — PROGRESS NOTES
"Primary Care Provider Appointment    Subjective:      Patient ID: Ed Patton is a 63 y.o. male with CHF, hypoxia, HTN, DM, HLD    Chief Complaint: Back Pain; Leg Pain; and Medication Refill    Patient has not been seen in clinic for 7 mos, has no-showed to EVERY specialist and PCP appointment since 10/2017 (Cardiology, Endo, labs, PCP, optometry). Patient faults the [facility] information delivery system for everything.    He was brought to [facility] by his grandson (Maryellen), who is willing to transport him to [facility] [place]. Patient's wife works, but has off on Mondays.    He is injecting 40U 70/30 in AM, 30U in PM. He states his AM CBG is 160-240, last A1c was 9.4.    Patient is unaware of any meds on his med list, reports compliance but can not confirm whether he's taking anything.      Past Surgical History:   Procedure Laterality Date    APPENDECTOMY      CHOLECYSTECTOMY      CORONARY ANGIOPLASTY WITH STENT PLACEMENT      TONSILLECTOMY         Past Medical History:   Diagnosis Date    CHF (congestive heart failure)     COPD (chronic obstructive pulmonary disease)     Coronary artery disease     Diabetes mellitus     Diabetes mellitus type II     DM (diabetes mellitus) type II uncontrolled with renal manifestation 9/4/2013    Hyperlipidemia     Hypertension        Review of Systems   Constitutional: Positive for activity change and fatigue. Negative for appetite change.   Respiratory: Positive for shortness of breath. Negative for cough and choking.    Cardiovascular: Negative for chest pain and leg swelling.   Gastrointestinal: Negative for abdominal pain and diarrhea.   Musculoskeletal: Positive for gait problem. Negative for joint swelling.   Psychiatric/Behavioral: Positive for confusion and decreased concentration. Negative for agitation.       Objective:   /70 (BP Location: Right arm, Patient Position: Sitting, BP Method: Large (Manual))   Pulse 70   Ht 6' 4" (1.93 m)   Wt 110.5 kg (243 " lb 9.7 oz)   SpO2 97%   BMI 29.65 kg/m²     Physical Exam   Constitutional: He is oriented to person, place, and time. He appears well-developed and well-nourished.   Wearing NC oxygen   HENT:   Head: Normocephalic.   Eyes: EOM are normal.   Cardiovascular: Normal rate.    Musculoskeletal: Normal range of motion.   Neurological: He is alert and oriented to person, place, and time.   Skin: Skin is warm and dry.   Psychiatric: He has a normal mood and affect.   Poor insight into disease   Nursing note and vitals reviewed.      Lab Results   Component Value Date    WBC 11.91 05/01/2018    HGB 12.7 (L) 05/01/2018    HCT 38.3 (L) 05/01/2018     05/01/2018    CHOL 133 11/11/2015    TRIG 80 11/11/2015    HDL 40 11/11/2015    ALT 33 05/01/2018    AST 23 05/01/2018     05/01/2018    K 4.3 05/01/2018     05/01/2018    CREATININE 1.4 05/01/2018    BUN 30 (H) 05/01/2018    CO2 29 05/01/2018    TSH 0.689 09/12/2017    PSA 0.4 03/05/2004    INR 1.0 10/16/2014    HGBA1C 11.4 (H) 05/01/2018       Assessment:   63 y.o. male with multiple co-morbid illnesses here to continue work-up of chronic issues notably CHF, hypoxia, HTN, DM, HLD     Plan:     Problem List Items Addressed This Visit        Pulmonary    Chronic respiratory failure with hypoxia     Continuous O2, followed by Pulm  · Last seen by Pulm in 2015  · Est care with Pulm  · Patient no-show  · Re-establish care         Relevant Medications    albuterol (PROVENTIL) 2.5 mg /3 mL (0.083 %) nebulizer solution       Cardiac/Vascular    CHF (congestive heart failure)     EF 35% in 2015 echo, patient with mixed ischemic and non-ischemic cardiomyopathy  · F/U Cardiology  · Continue current therapies  · Unclear if compliant         Relevant Orders    Magnesium    Dilated cardiomyopathy     Followed by Cardiology, EF 35% in 2015 echo, with mixed ischemic and non-ischemic cardiomyopathy  · F/U Cardiology  · Patient no-showed  · reschedule  · Continue med  management  · Unclear if compliant         Angina pectoris associated with type 2 diabetes mellitus     CAD on cath in 2006 and 2011, angina controlled medically on ACE, BB, digoxin, statin, ASA  · F/U Cards  · Continue meds  · Unclear if compliant         Relevant Orders    Hemoglobin A1c (Completed)    Hypertension associated with diabetes     BP controlled, A1c elevated  · Continue ACE, BB, digoxin  · Continue statin, ASA  · F/U Cardiology  · Reschedule appt         Relevant Medications    carvedilol (COREG) 25 MG tablet    digoxin (LANOXIN) 125 mcg tablet    lisinopril (PRINIVIL,ZESTRIL) 40 MG tablet    Other Relevant Orders    Hemoglobin A1c (Completed)    CBC auto differential (Completed)    Comprehensive metabolic panel (Completed)    Lipid panel    Vitamin D       Endocrine    Type 2 diabetes mellitus with hyperlipidemia    Relevant Medications    atorvastatin (LIPITOR) 40 MG tablet    Uncontrolled type 2 diabetes mellitus with hyperglycemia, with long-term current use of insulin     Novolin 70/30 40U in AM, 30U in PM. Elevated A1c  · Continue current therapies  · F/U Endo  · Patient no-showed  · reschedule         Relevant Orders    Hemoglobin A1c (Completed)    CBC auto differential (Completed)    Comprehensive metabolic panel (Completed)    Lipid panel    Hepatitis C antibody    TSH       Other    Polypharmacy     Patient taking multiple meds with limited insight, questionable compliance  · Advised to bring ALL medications to next appt  · Will work with pharmacy for pill packing           Other Visit Diagnoses     Other disorders of calcium metabolism         Relevant Orders    Vitamin D          Health Maintenance       Date Due Completion Date    Hepatitis C Screening 1955 ---    Eye Exam 02/09/1967 ---    Pneumococcal PPSV23 (Medium Risk) (1) 02/09/1975 ---    Colonoscopy 02/09/2007 ---    Lipid Panel 11/11/2016 11/11/2015    Zoster Vaccine 02/09/2017 ---    Hemoglobin A1c 12/12/2017 9/12/2017     Influenza Vaccine 08/01/2018 10/9/2017    Foot Exam 09/12/2018 9/12/2017    High Dose Statin 01/11/2019 1/11/2018    TETANUS VACCINE 09/14/2023 9/14/2013          Follow-up in about 6 weeks (around 6/12/2018). . One hour spent with this patient today, half of that in counseling.    Qiana Chow MD/MPH  Internal Medicine  [facility]  [phone]

## 2018-05-01 NOTE — ASSESSMENT & PLAN NOTE
Followed by Cardiology, EF 35% in 2015 echo, with mixed ischemic and non-ischemic cardiomyopathy  · F/U Cardiology  · Patient no-showed  · reschedule  · Continue med management  · Unclear if compliant

## 2018-05-01 NOTE — ASSESSMENT & PLAN NOTE
Novolin 70/30 40U in AM, 30U in PM. Elevated A1c  · Continue current therapies  · F/U Endo  · Patient no-showed  · reschedule

## 2018-05-01 NOTE — PATIENT INSTRUCTIONS
TODAY:  - labs  - diabetic eye camera  - Endocrinology (diabetes and thyroid) appt  - Cardiology  - Pulmonology  - pneumonia vaccine at pharmacy  - bring ALL meds to next appt  - Ms Moore needs to come to next appt    all appoitnments on Mondays and Wednesdays

## 2018-05-01 NOTE — ASSESSMENT & PLAN NOTE
Continuous O2, followed by Pulm  · Last seen by Pulm in 2015  · Est care with Pulm  · Patient no-show  · Re-establish care

## 2018-05-01 NOTE — ASSESSMENT & PLAN NOTE
EF 35% in 2015 echo, patient with mixed ischemic and non-ischemic cardiomyopathy  · F/U Cardiology  · Continue current therapies  · Unclear if compliant

## 2018-05-01 NOTE — PROGRESS NOTES
HPI     Diabetic Eye Exam    Additional comments: Photos           Comments   63 y.o. y/o here for screening for Diabetic Renopathy with non-dilated   fundus photos per Qiana Chow MD         Last edited by Kristi Candelario MA on 5/1/2018 10:21 AM. (History)            Assessment /Plan     For exam results, see Encounter Report.    Hypertension associated with diabetes  -     Diabetic Eye Screening Photo    Type 2 diabetes mellitus with hyperlipidemia  -     Diabetic Eye Screening Photo    CKD stage 3 due to type 2 diabetes mellitus  -     Diabetic Eye Screening Photo    Type 2 diabetes, uncontrolled, with neuropathy  -     Diabetic Eye Screening Photo    Patient has very small pupils. Couldn't get good photos on the patient. Patient also has slightly droopy lids.

## 2018-05-01 NOTE — ASSESSMENT & PLAN NOTE
CAD on cath in 2006 and 2011, angina controlled medically on ACE, BB, digoxin, statin, ASA  · F/U Cards  · Continue meds  · Unclear if compliant

## 2018-05-01 NOTE — ASSESSMENT & PLAN NOTE
BP controlled, A1c elevated  · Continue ACE, BB, digoxin  · Continue statin, ASA  · F/U Cardiology  · Reschedule appt

## 2018-05-02 ENCOUNTER — OFFICE VISIT (OUTPATIENT)
Dept: ENDOCRINOLOGY | Facility: CLINIC | Age: 63
End: 2018-05-02
Payer: MEDICARE

## 2018-05-02 ENCOUNTER — OFFICE VISIT (OUTPATIENT)
Dept: CARDIOLOGY | Facility: CLINIC | Age: 63
End: 2018-05-02
Payer: MEDICARE

## 2018-05-02 ENCOUNTER — HOSPITAL ENCOUNTER (OUTPATIENT)
Dept: PULMONOLOGY | Facility: CLINIC | Age: 63
Discharge: HOME | End: 2018-05-02
Payer: MEDICARE

## 2018-05-02 ENCOUNTER — OFFICE VISIT (OUTPATIENT)
Dept: PULMONOLOGY | Facility: CLINIC | Age: 63
End: 2018-05-02
Payer: MEDICARE

## 2018-05-02 VITALS
WEIGHT: 242 LBS | SYSTOLIC BLOOD PRESSURE: 108 MMHG | BODY MASS INDEX: 29.47 KG/M2 | HEIGHT: 76 IN | HEART RATE: 65 BPM | OXYGEN SATURATION: 99 % | DIASTOLIC BLOOD PRESSURE: 80 MMHG

## 2018-05-02 VITALS
DIASTOLIC BLOOD PRESSURE: 60 MMHG | SYSTOLIC BLOOD PRESSURE: 124 MMHG | BODY MASS INDEX: 29.56 KG/M2 | HEIGHT: 76 IN | RESPIRATION RATE: 24 BRPM | HEART RATE: 62 BPM | WEIGHT: 242.75 LBS

## 2018-05-02 VITALS
WEIGHT: 242.5 LBS | SYSTOLIC BLOOD PRESSURE: 112 MMHG | BODY MASS INDEX: 29.53 KG/M2 | DIASTOLIC BLOOD PRESSURE: 59 MMHG | HEART RATE: 62 BPM | HEIGHT: 76 IN

## 2018-05-02 DIAGNOSIS — J96.11 CHRONIC RESPIRATORY FAILURE WITH HYPOXIA: ICD-10-CM

## 2018-05-02 DIAGNOSIS — E11.59 HYPERTENSION ASSOCIATED WITH DIABETES: ICD-10-CM

## 2018-05-02 DIAGNOSIS — N18.30 CKD STAGE 3 DUE TO TYPE 2 DIABETES MELLITUS: ICD-10-CM

## 2018-05-02 DIAGNOSIS — J98.4 CRLD (CHRONIC RESTRICTIVE LUNG DISEASE): ICD-10-CM

## 2018-05-02 DIAGNOSIS — E11.22 CKD STAGE 3 DUE TO TYPE 2 DIABETES MELLITUS: ICD-10-CM

## 2018-05-02 DIAGNOSIS — J98.4 CHRONIC RESTRICTIVE LUNG DISEASE: ICD-10-CM

## 2018-05-02 DIAGNOSIS — E78.5 TYPE 2 DIABETES MELLITUS WITH HYPERLIPIDEMIA: ICD-10-CM

## 2018-05-02 DIAGNOSIS — E05.10 TOXIC THYROID NODULE: ICD-10-CM

## 2018-05-02 DIAGNOSIS — I50.9 CONGESTIVE HEART FAILURE, UNSPECIFIED HF CHRONICITY, UNSPECIFIED HEART FAILURE TYPE: ICD-10-CM

## 2018-05-02 DIAGNOSIS — D50.8 IRON DEFICIENCY ANEMIA SECONDARY TO INADEQUATE DIETARY IRON INTAKE: Chronic | ICD-10-CM

## 2018-05-02 DIAGNOSIS — I50.42 CHRONIC COMBINED SYSTOLIC AND DIASTOLIC CONGESTIVE HEART FAILURE: ICD-10-CM

## 2018-05-02 DIAGNOSIS — J44.9 COPD MIXED TYPE: Primary | ICD-10-CM

## 2018-05-02 DIAGNOSIS — I42.8 NICM (NONISCHEMIC CARDIOMYOPATHY): Primary | ICD-10-CM

## 2018-05-02 DIAGNOSIS — I15.2 HYPERTENSION ASSOCIATED WITH DIABETES: ICD-10-CM

## 2018-05-02 DIAGNOSIS — E78.5 HYPERLIPIDEMIA, UNSPECIFIED HYPERLIPIDEMIA TYPE: ICD-10-CM

## 2018-05-02 DIAGNOSIS — E11.69 TYPE 2 DIABETES MELLITUS WITH HYPERLIPIDEMIA: ICD-10-CM

## 2018-05-02 LAB
PRE FEV1 FVC: 67
PRE FEV1: 1.99
PRE FVC: 2.97
PREDICTED FEV1 FVC: 78
PREDICTED FEV1: 4.55
PREDICTED FVC: 5.68

## 2018-05-02 PROCEDURE — 3078F DIAST BP <80 MM HG: CPT | Mod: CPTII,GC,S$GLB, | Performed by: INTERNAL MEDICINE

## 2018-05-02 PROCEDURE — 99214 OFFICE O/P EST MOD 30 MIN: CPT | Mod: GC,S$GLB,, | Performed by: INTERNAL MEDICINE

## 2018-05-02 PROCEDURE — 3079F DIAST BP 80-89 MM HG: CPT | Mod: CPTII,S$GLB,, | Performed by: NURSE PRACTITIONER

## 2018-05-02 PROCEDURE — 99214 OFFICE O/P EST MOD 30 MIN: CPT | Mod: 25,S$GLB,, | Performed by: NURSE PRACTITIONER

## 2018-05-02 PROCEDURE — 99999 PR PBB SHADOW E&M-EST. PATIENT-LVL V: CPT | Mod: PBBFAC,GC,, | Performed by: INTERNAL MEDICINE

## 2018-05-02 PROCEDURE — 99999 PR PBB SHADOW E&M-EST. PATIENT-LVL III: CPT | Mod: PBBFAC,,, | Performed by: NURSE PRACTITIONER

## 2018-05-02 PROCEDURE — 94010 BREATHING CAPACITY TEST: CPT | Mod: S$GLB,,, | Performed by: INTERNAL MEDICINE

## 2018-05-02 PROCEDURE — 94729 DIFFUSING CAPACITY: CPT | Mod: S$GLB,,, | Performed by: INTERNAL MEDICINE

## 2018-05-02 PROCEDURE — 3046F HEMOGLOBIN A1C LEVEL >9.0%: CPT | Mod: CPTII,GC,S$GLB, | Performed by: INTERNAL MEDICINE

## 2018-05-02 PROCEDURE — 3074F SYST BP LT 130 MM HG: CPT | Mod: CPTII,S$GLB,, | Performed by: NURSE PRACTITIONER

## 2018-05-02 PROCEDURE — 99999 PR PBB SHADOW E&M-EST. PATIENT-LVL III: CPT | Mod: PBBFAC,,, | Performed by: INTERNAL MEDICINE

## 2018-05-02 PROCEDURE — 3074F SYST BP LT 130 MM HG: CPT | Mod: CPTII,GC,S$GLB, | Performed by: INTERNAL MEDICINE

## 2018-05-02 PROCEDURE — 99214 OFFICE O/P EST MOD 30 MIN: CPT | Mod: S$GLB,,, | Performed by: INTERNAL MEDICINE

## 2018-05-02 PROCEDURE — 3008F BODY MASS INDEX DOCD: CPT | Mod: CPTII,GC,S$GLB, | Performed by: INTERNAL MEDICINE

## 2018-05-02 RX ORDER — FLUTICASONE FUROATE AND VILANTEROL 200; 25 UG/1; UG/1
1 POWDER RESPIRATORY (INHALATION) DAILY
Qty: 1 EACH | Refills: 5 | Status: SHIPPED | OUTPATIENT
Start: 2018-05-02 | End: 2018-05-25 | Stop reason: SDUPTHER

## 2018-05-02 RX ORDER — BLOOD SUGAR DIAGNOSTIC
STRIP MISCELLANEOUS
COMMUNITY
Start: 2018-03-04 | End: 2018-12-06 | Stop reason: SDUPTHER

## 2018-05-02 NOTE — PROGRESS NOTES
"Subjective:       Patient ID: Ed Patton is a 63 y.o. male.    Chief Complaint: Diabetes    HPI     Previously seen by Dee Dee Turner.      With regards to the diabetes:   T2DM   Diagnosed:      Current regimen:  Novolog 70/30 40u qAM and 30u qPM     Previously tried:  Metformin in past    Missed doses? no     # times a day testin     Log reviewed: none      Fastin-200  Evening:  Checks after meal 240 - if its high he wont eat.      Hypoglycemic event? no  Symptoms:  During 130-140 (dizziness)  Corrects with - food (cookies, cake).      Typical meals: trying to keep to a healthy diet.    Baked meats (no fried), no sweets regularly, occasional soft drinks, fruits (bananas, oranges, apples, strawberries), vegetables (broccoli, green peas, etc).     Education - last visit:   Exercise - tried to stay active     No polyuria polydipsia nocturia   Notes some blurry vision changes occasionally (allergies)  Denies numbness or tingling of feet      Concerned for: "getting it right"     Standards of care:  ASA: 81 mg  ACEi/ARB: lisinopril 40mg daily  Statin: Lipitor 40mg  Last a1c: 11.4%  Last lipid panel: 18  Last microalb: 2017  Last eye exam: 2018  Last podiatry exam: 2017    Complications: retinopathy?  Comorbidities:  CHF, CKD,     With regards to hypertension:  Tolerating ACEi or ARB     With regards to dyslipidemia:  Tolerating statin     With regards to toxic adenoma:  He was found to have a suppressed TSH on routine labs ordered on 2017   Evaluated in 2017 for subclinical hyperthyroidism   NM thyroid uptake and scan from 2017 showed a Hyperfunctioning thyroid nodule affecting the entirety of the right thyroid lobe      24 hours radioiodine uptake is 30% (normal is 10-30% )   Impression   1. Hyperfunctioning right thyroid nodule.  2. The 24-hours-Radioiodine Uptake is 30%.  3. In the clinical setting of hyperthyroidism, the suggested treatment dose is 30 " mCi orally with I-131.     The patient denies palpitations   Denies anxiety   Denies tremors   Denies bowel changes      Endorses occasional heat intolerance      Denies family history of thyroid disease or thyroid cancer   Denies personal history of radiation exposure    Denies use of amiodarone   Denies recent contrast   Denies recent viral illness   Denies fever    Denies use of lithium     Review of Systems   Constitutional: Negative for chills, fever and unexpected weight change.   HENT: Negative for nosebleeds, sore throat and trouble swallowing.    Eyes: Negative for visual disturbance.   Respiratory: Negative for shortness of breath.    Cardiovascular: Negative for chest pain.   Gastrointestinal: Negative for abdominal pain.   Endocrine: Negative for polyuria.   Genitourinary: Negative for urgency.   Musculoskeletal: Negative for arthralgias.   Skin: Negative for wound.   Neurological: Negative for headaches.   Hematological: Does not bruise/bleed easily.   Psychiatric/Behavioral: Negative for sleep disturbance.         Objective:       Vitals:    05/02/18 1129   BP: 124/60   Pulse: 62   Resp: (!) 24     Body mass index is 29.55 kg/m².    Physical Exam   Neck: No thyromegaly present.   Cardiovascular: Normal rate.    Pulmonary/Chest: Effort normal.   Abdominal: Soft.   Musculoskeletal: He exhibits no edema.   Shoes appropriate    Skin:   No nodules. Injection sites are ok. No lipo hypertrophy or atrophy    Vitals reviewed.         TSH   Date Value Ref Range Status   05/01/2018 8.882 (H) 0.400 - 4.000 uIU/mL Final     Free T4   Date Value Ref Range Status   05/01/2018 0.91 0.71 - 1.51 ng/dL Final     Thyroglobulin Ab Screen   Date Value Ref Range Status   04/07/2011 Negative Neg,<4 IU/mL Final     Thyroglobulin   Date Value Ref Range Status   04/07/2011 158 (H) 2.0 - 40.0 ng/mL Final     Comment:     Positive Anti-Thyroglobulin Antibody Test results may cause an  under-estimation of the Thyroglobulin result  due to antibody  interference with the assay methodology.     Thyroid-Stim IG Quantitative   Date Value Ref Range Status   03/06/2011 < 89 <140 % Final     Comment:     Thyroid-Stim IG Quantitative:  Reference Range: <140% of baseline of Reference Control  Thyroid Stimulating Immunoglobulins (TSI) can engage the TSH  receptors resulting in hyperthyroidism in Graves' disease  patients. TSI levels can be useful in monitoring the clinical  outcome of Graves' disease as well as assessing the potential for  hyperthyroidism from maternal-fetal transfer.  NOTE: A serum TSH level greater than 350 micro-International  Units/mL can interfere with the TSI bioassay and potentially give  false positive results.  Test Performed By:  [facility]  Rohan Presley MD, FCAP,   [address] [place] [id number]  CLIA [id number]         Assessment:       1. Type 2 diabetes, uncontrolled, with neuropathy    2. Type 2 diabetes mellitus with hyperlipidemia    3. Toxic thyroid nodule          Plan:       1. T2DM, uncontrolled, with neuropathy   -- uncontrolled at this time per reported blood sugars   -- emphasized dietary and lifestyle modifications   -- not enough data to warrant changes.  Requested that patient send in logs.  -- advised to smbg 2-3 times daily and send log in 2 weeks for review and adjustments   -- reviewed signs and symptoms of hypoglycemia along with appropriate treatment protocol      2. Toxic Thyroid nodule   -- s/p COTO 30 mCi  -- TSH and free T4 in 4 weeks.     3. CHF   -- follow up with Cardiology as advised      4. HTN   -- BP controlled.   -- continue current regimen as previously prescribed   -- encouraged to follow a low salt diet      5. HLD   -- on statin therapy per ADA recommendations   -- encouraged to follow a low fat, low chol diet      6.  Chronic back pain       Labs in 4 weeks.  No change. Follow Up BG logs    Marie Stroud,  MD  Endocrinology Fellow     This case has been reviewed with staff, Dr. Anne.

## 2018-05-02 NOTE — PATIENT INSTRUCTIONS
Correct low sugars with glucose tablets (okay to buy over the counter)    Dietary Changes:  Chicken (baked/broiled)  Fish (baked/broiled)  Avoiding fried foods.  Minimize soft drinks, but if you must drink choose diet.  Increase vegetables  Okay to eat occasional fruit  Increase water intake.    No change to insulin at this time.  Return BG log once each sheet complete.    No thyroid changes at this time.  Repeat labs in 4 weeks.

## 2018-05-02 NOTE — PROGRESS NOTES
Subjective:       Patient ID: Ed Patton is a 63 y.o. male.    Chief Complaint: Restrictive and obstructive lung disease    HPI  Ed Patton is a 63 y.o. male who presents today for evaluation of chronic restrictive lung disease. His medical hx includes CHF, DM uncontrolled, CAD, HTN, and HDL. He was referred by PCP for reestablishing care with regards to restrictive lung disease. He has an albuterol nebulizer which he uses and notes it helps with SOB. His PFT's today show both obstructive and restrictive pattern. He has never been placed on daily inhaler but is willing to trial. He has not been followed by cardiology in almost 2 yrs but has a follow up appointment today. He has supplemental oxygen 3L. 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 He has no hx of lung cancer. No family hx of lung cancer. He has no fever, chills, chest pain, hemoptysis, or weight loss.     Review of patient's allergies indicates:   Allergen Reactions    Vicodin [hydrocodone-acetaminophen] Itching     Past Medical History:   Diagnosis Date    CHF (congestive heart failure)     COPD (chronic obstructive pulmonary disease)     Coronary artery disease     Diabetes mellitus     Diabetes mellitus type II     DM (diabetes mellitus) type II uncontrolled with renal manifestation 9/4/2013    Hyperlipidemia     Hypertension      Past Surgical History:   Procedure Laterality Date    APPENDECTOMY      CHOLECYSTECTOMY      CORONARY ANGIOPLASTY WITH STENT PLACEMENT      TONSILLECTOMY       Family History     Problem Relation (Age of Onset)    Heart disease Mother    Hypertension Son    No Known Problems Father, Sister, Son, Son        Social History Main Topics    Smoking status: Former Smoker     Packs/day: 0.01     Years: 3.00     Types: Cigarettes     Quit date: 5/22/1995    Smokeless tobacco: Never Used    Alcohol use No    Drug use: No  "   Sexual activity: No      Comment:  with 1 son       Review of Systems   Constitutional: Negative for fever and chills.   HENT: Negative for trouble swallowing.    Respiratory: Positive for shortness of breath and use of rescue inhaler. Negative for hemoptysis.    Cardiovascular: Negative for leg swelling.   Musculoskeletal: Negative for gait problem.   Skin: Negative for rash.   Gastrointestinal: Negative for acid reflux.   Neurological: Negative for headaches.   Hematological: Negative for adenopathy.   Psychiatric/Behavioral: Negative for confusion.       Objective:       Vitals:    05/02/18 0939   BP: 108/80   Pulse: 65   SpO2: 99%   Weight: 109.8 kg (242 lb)   Height: 6' 4" (1.93 m)     Physical Exam   Constitutional: He is oriented to person, place, and time. He appears well-developed. No distress.   HENT:   Head: Normocephalic.   Neck: Normal range of motion.   Cardiovascular: Normal rate and intact distal pulses.    Pulmonary/Chest: Symmetric chest wall expansion and effort normal. No respiratory distress. He has decreased breath sounds. He has no wheezes. He has no rhonchi.   On portable supplemental oxygen 3L (has been for yrs)   Abdominal: Soft. Bowel sounds are normal.   Musculoskeletal: Normal range of motion.   Neurological: He is alert and oriented to person, place, and time.   Skin: Skin is warm and dry. He is not diaphoretic.   Psychiatric: He has a normal mood and affect.   Vitals reviewed.    Personal Diagnostic Review    PFT's reviewed 5/2/18: severe obstruction with moderate/severe restriction. Moderately decreased DLCO FEV1 1.99L 44% with FEV1/FVC ratio of 67%; DLCO 16.7L 60%  Echo reviewed 2015: EF 35%  Assessment:          Outpatient Encounter Prescriptions as of 5/2/2018   Medication Sig Dispense Refill    ACCU-CHEK SMARTVIEW TEST STRIP Strp       albuterol (PROVENTIL) 2.5 mg /3 mL (0.083 %) nebulizer solution INHALE THE CONTENTS OF 1 VIAL VIA NEBULIZER EVERY 4 HOURS AS NEEDED " "FOR WHEEZING. 360 mL 3    alcohol swabs (BD ALCOHOL SWABS) PadM Apply 1 each topically as needed.      aspirin (ECOTRIN) 81 MG EC tablet Take 1 tablet (81 mg total) by mouth once daily. 30 tablet 6    atorvastatin (LIPITOR) 40 MG tablet Take 1 tablet (40 mg total) by mouth once daily. 90 tablet 3    BLOOD SUGAR DIAGNOSTIC (ACCU-CHEK SMARTVIEW TEST STRIP MISC) 1 strip by Misc.(Non-Drug; Combo Route) route 4 (four) times daily with meals and nightly.      BLOOD-GLUCOSE METER (ACCU-CHEK RAMIREZ MISC) 1 Package by Misc.(Non-Drug; Combo Route) route.      carvedilol (COREG) 25 MG tablet Take 1 tablet (25 mg total) by mouth 2 (two) times daily with meals. 180 tablet 3    digoxin (LANOXIN) 125 mcg tablet TAKE 1 TABLET ONE TIME DAILY 90 tablet 3    fluticasone (FLONASE) 50 mcg/actuation nasal spray USE 1 SPRAY IN EACH NOSTRIL ONE TIME DAILY 32 g 3    furosemide (LASIX) 80 MG tablet TAKE 1 TABLET IN THE MORNING  AND  1  TABLET  AT  3-4PM 180 tablet 1    insulin NPH-insulin regular, 70/30, (NOVOLIN 70/30) 100 unit/mL (70-30) injection Inject 38 units before breakfast and 28 units before dinner. [id number] minutes before breakfast and dinner 2 vial 10    insulin syringe-needle U-100 (INSULIN SYRINGE) 1 mL 30 gauge x 5/16 Syrg 1 each by Misc.(Non-Drug; Combo Route) route 2 (two) times daily. Use twice daily as directed with insulin vials. 100 each 6    lancets Misc by Misc.(Non-Drug; Combo Route) route. Patient checks blood sugar 4 times daily      lisinopril (PRINIVIL,ZESTRIL) 40 MG tablet Take 1 tablet (40 mg total) by mouth once daily. 90 tablet 3    nitroGLYCERIN (NITROSTAT) 0.4 MG SL tablet Place 1 tablet (0.4 mg total) under the tongue every 5 (five) minutes as needed. 30 tablet 6    pen needle, diabetic (BD ULTRA-FINE RAMIREZ PEN NEEDLES) 32 gauge x 5/32" Ndle Use with multiple daily injections of insulin 200 each 11    spironolactone (ALDACTONE) 25 MG tablet Take 1 tablet (25 mg total) by mouth once " daily. 90 tablet 1    econazole nitrate 1 % cream Apply topically 2 (two) times daily. on the feet. 30 g 1    ferrous sulfate 325 mg (65 mg iron) Tab tablet Take 1 tablet (325 mg total) by mouth once daily. 30 tablet 11    gabapentin (NEURONTIN) 100 MG capsule Take 1 capsule (100 mg total) by mouth once daily. 90 capsule 3    mometasone 0.1% (ELOCON) 0.1 % cream Applied to the affected area once daily for rash. 45 g 0    [] pneumococcal vaccine (PNU-IMMUNE 23) 25 mcg/0.5 mL injection Inject 0.5 mLs into the muscle once. 0.5 mL 0    [DISCONTINUED] albuterol (PROVENTIL) 2.5 mg /3 mL (0.083 %) nebulizer solution INHALE THE CONTENTS OF 1 VIAL VIA NEBULIZER EVERY 4 HOURS AS NEEDED FOR WHEEZING. 360 mL 3    [DISCONTINUED] atorvastatin (LIPITOR) 40 MG tablet TAKE 1 TABLET EVERY DAY 90 tablet 1    [DISCONTINUED] carvedilol (COREG) 25 MG tablet Take 1 tablet (25 mg total) by mouth 2 (two) times daily with meals. 180 tablet 3    [DISCONTINUED] digoxin (LANOXIN) 125 mcg tablet TAKE 1 TABLET ONE TIME DAILY 90 tablet 3    [DISCONTINUED] lisinopril (PRINIVIL,ZESTRIL) 40 MG tablet TAKE 1 TABLET EVERY DAY 90 tablet 3     No facility-administered encounter medications on file as of 2018.      Problem List Items Addressed This Visit        Pulmonary    Chronic respiratory failure with hypoxia    Relevant Medications    fluticasone-vilanterol (BREO ELLIPTA) 200-25 mcg/dose DsDv diskus inhaler       Cardiac/Vascular    CHF (congestive heart failure)       Oncology    Iron deficiency anemia (Chronic)      Other Visit Diagnoses     COPD mixed type with moderate restriction    -  Primary    Relevant Medications    fluticasone-vilanterol (BREO ELLIPTA) 200-25 mcg/dose DsDv diskus inhaler    Other Relevant Orders    X-Ray Chest PA And Lateral        Plan:       · Start Breo 1 puff daily. Rinse and gargle after every use.  · Take albuterol nebulizer every 6 hrs as needed for rescue.  · Continue with supplemental oxygen.  Patient benefits from small portable concentrator.  · Encouraged to keep cardiology appointment. Recommend repeat echo.  · Obtain CXR.  Follow up in 1-3 months  Contact prior to if any issues or concerns should arise.   Patient verbalized understanding of all information, instruction, education, recommendations provided and agrees with plan of care.

## 2018-05-02 NOTE — PATIENT INSTRUCTIONS
· Start Breo 1 puff daily. Rinse and gargle after every use.  · Take albuterol nebulizer every 6 hrs as needed for rescue.  · Continue with supplemental oxygen. Patient benefits from small portable concentrator.  · Encouraged to keep cardiology appointment. Recommend repeat echo.  Follow up in 1-3 months  Contact prior to if any issues or concerns should arise.

## 2018-05-02 NOTE — LETTER
May 2, 2018      Qiana Chow MD  [address]  [place] [address]           [facility] - Pulmonary Services  [address]  [facility] [address]  Phone: [phone]          Patient: Ed Patton   MR Number: [id number]   YOB: 1955   Date of Visit: 5/2/2018       Dear Dr. Qiana Chow:    Thank you for referring Ed Patton to me for evaluation. Attached you will find relevant portions of my assessment and plan of care.    If you have questions, please do not hesitate to call me. I look forward to following Ed Patton along with you.    Sincerely,    Stefanie Rodriguez, JESÚS    Enclosure  CC:  No Recipients    If you would like to receive this communication electronically, please contact [email or web address] or [phone] to request more information on [employer] access.    For providers and/or their staff who would like to refer a patient to [facility], please contact us through our one-stop-shop provider referral line, [facility], at [phone].    If you feel you have received this communication in error or would no longer like to receive these types of communications, please e-mail [email or web address]

## 2018-05-02 NOTE — LETTER
May 6, 2018      Qiana Chow MD  [address]  [facility] [address]           [facility] - Cardiology  [address]  [facility] [address]  Phone: [phone]          Patient: Ed Patton   MR Number: [id number]   YOB: 1955   Date of Visit: 5/2/2018       Dear Dr. Qiana Chow:    Thank you for referring Ed Patton to me for evaluation. Attached you will find relevant portions of my assessment and plan of care.    If you have questions, please do not hesitate to call me. I look forward to following Ed Patton along with you.    Sincerely,    Edelmira Patel MD    Enclosure  CC:  No Recipients    If you would like to receive this communication electronically, please contact [email or web address] or [phone] to request more information on [employer] Link access.    For providers and/or their staff who would like to refer a patient to [facility], please contact us through our one-stop-shop provider referral line, [facility] , at [phone].    If you feel you have received this communication in error or would no longer like to receive these types of communications, please e-mail [email or web address]

## 2018-05-02 NOTE — PROGRESS NOTES
Subjective:   Patient ID:  Ed Patton is a 63 y.o. male who presents for follow-up of Hypertension and Chronic combined systolic and diastolic HF (heart failure)   Patient is a 61 y.o. year old male with history of mixed ischemic and non-ischemic cardiomyopathy, CAD (moderate diffuse disease, no prior PCI, thought scar present on PET anterolateral wall), restrictive lung disease on home oxygen, CKD 2/3, and DM (HbA1c 8.7) who presents for evaluation of his cardiac disease. He was previously followed by the heart failure department and was last seen ~1.5 years ago. He was seen by primary care who referred patient back to cardiology. He currently feels well. He reports dyspnea after walking 1 to 2 blocks. He walks three days ago for exercise. He is on a good guideline directed heart failure regimen and takes his medications daily. Patient is otherwise well and denies syncope, chest pain, orthopnea, PND, palpitations, claudication, or lower extremity edema.     HPI:   On COPD and home oxygen  No chest pain, Orthopnea, PND of heart failure symptoms.   No palpitations  Patient has not taken lasix as it was not refilled.  HERNADEZ on mild to moderate exertion.    Echo 2015    1 - Moderately depressed left ventricular systolic function (EF 35%).     2 - Segmental wall motion abnormalities.     3 - Normal left ventricular diastolic function.     4 - Normal right ventricular systolic function .       Patient Active Problem List   Diagnosis    CRLD (chronic restrictive lung disease)    CHF (congestive heart failure)    Dilated cardiomyopathy    Angina pectoris associated with type 2 diabetes mellitus    CKD stage 3 due to type 2 diabetes mellitus    Type 2 diabetes, uncontrolled, with neuropathy    Type 2 diabetes mellitus with hyperlipidemia    Iron deficiency anemia    Toxic thyroid nodule    Hypertension associated with diabetes    Screening for colon cancer    Uncontrolled type 2 diabetes mellitus with  "hyperglycemia, with long-term current use of insulin    Polypharmacy    Chronic respiratory failure with hypoxia     BP (!) 112/59 (BP Location: Left arm, Patient Position: Sitting, BP Method: X-Large (Automatic))   Pulse 62   Ht 6' 4" (1.93 m)   Wt 110 kg (242 lb 8.1 oz)   BMI 29.52 kg/m²   Body mass index is 29.52 kg/m².  Estimated Creatinine Clearance: 75.3 mL/min (based on SCr of 1.4 mg/dL).    Lab Results   Component Value Date     05/01/2018    K 4.3 05/01/2018     05/01/2018    CO2 29 05/01/2018    BUN 30 (H) 05/01/2018    CREATININE 1.4 05/01/2018     (H) 05/01/2018    HGBA1C 11.4 (H) 05/01/2018    MG 2.1 05/01/2018    AST 23 05/01/2018    ALT 33 05/01/2018    ALBUMIN 3.9 05/01/2018    PROT 8.3 05/01/2018    BILITOT 0.5 05/01/2018    WBC 11.91 05/01/2018    HGB 12.7 (L) 05/01/2018    HCT 38.3 (L) 05/01/2018    MCV 87 05/01/2018     05/01/2018    INR 1.0 10/16/2014    PSA 0.4 03/05/2004    TSH 8.882 (H) 05/01/2018    CHOL 143 05/01/2018    HDL 41 05/01/2018    LDLCALC 78.2 05/01/2018    TRIG 119 05/01/2018       Current Outpatient Prescriptions   Medication Sig    ACCU-CHEK SMARTVIEW TEST STRIP Strp     albuterol (PROVENTIL) 2.5 mg /3 mL (0.083 %) nebulizer solution INHALE THE CONTENTS OF 1 VIAL VIA NEBULIZER EVERY 4 HOURS AS NEEDED FOR WHEEZING.    alcohol swabs (BD ALCOHOL SWABS) PadM Apply 1 each topically as needed.    aspirin (ECOTRIN) 81 MG EC tablet Take 1 tablet (81 mg total) by mouth once daily.    atorvastatin (LIPITOR) 40 MG tablet Take 1 tablet (40 mg total) by mouth once daily.    BLOOD SUGAR DIAGNOSTIC (ACCU-CHEK SMARTVIEW TEST STRIP MISC) 1 strip by Misc.(Non-Drug; Combo Route) route 4 (four) times daily with meals and nightly.    BLOOD-GLUCOSE METER (ACCU-CHEK RAMIREZ MISC) 1 Package by Misc.(Non-Drug; Combo Route) route.    carvedilol (COREG) 25 MG tablet Take 1 tablet (25 mg total) by mouth 2 (two) times daily with meals.    ferrous sulfate 325 mg (65 mg " "iron) Tab tablet Take 1 tablet (325 mg total) by mouth once daily.    fluticasone (FLONASE) 50 mcg/actuation nasal spray USE 1 SPRAY IN EACH NOSTRIL ONE TIME DAILY    furosemide (LASIX) 80 MG tablet TAKE 1 TABLET IN THE MORNING  AND  1  TABLET  AT  3-4PM    gabapentin (NEURONTIN) 100 MG capsule Take 1 capsule (100 mg total) by mouth once daily.    insulin NPH-insulin regular, 70/30, (NOVOLIN 70/30) 100 unit/mL (70-30) injection Inject 38 units before breakfast and 28 units before dinner. [id number] minutes before breakfast and dinner    insulin syringe-needle U-100 (INSULIN SYRINGE) 1 mL 30 gauge x 5/16 Syrg 1 each by Misc.(Non-Drug; Combo Route) route 2 (two) times daily. Use twice daily as directed with insulin vials.    lancets Misc by Misc.(Non-Drug; Combo Route) route. Patient checks blood sugar 4 times daily    lisinopril (PRINIVIL,ZESTRIL) 40 MG tablet Take 1 tablet (40 mg total) by mouth once daily.    mometasone 0.1% (ELOCON) 0.1 % cream Applied to the affected area once daily for rash.    nitroGLYCERIN (NITROSTAT) 0.4 MG SL tablet Place 1 tablet (0.4 mg total) under the tongue every 5 (five) minutes as needed.    pen needle, diabetic (BD ULTRA-FINE RAMIREZ PEN NEEDLES) 32 gauge x 5/32" Ndle Use with multiple daily injections of insulin    spironolactone (ALDACTONE) 25 MG tablet Take 1 tablet (25 mg total) by mouth once daily.    fluticasone-vilanterol (BREO ELLIPTA) 200-25 mcg/dose DsDv diskus inhaler Inhale 1 puff into the lungs once daily. Controller     No current facility-administered medications for this visit.        Review of Systems   Constitution: Negative for chills, decreased appetite, weakness, malaise/fatigue, night sweats, weight gain and weight loss.   Eyes: Negative for blurred vision, double vision, visual disturbance and visual halos.   Cardiovascular: Negative for chest pain, claudication, cyanosis, dyspnea on exertion, irregular heartbeat, leg swelling, near-syncope, " orthopnea, palpitations, paroxysmal nocturnal dyspnea and syncope.   Respiratory: Negative for cough, hemoptysis, snoring, sputum production and wheezing.    Endocrine: Negative for cold intolerance, heat intolerance, polydipsia and polyphagia.   Hematologic/Lymphatic: Negative for adenopathy and bleeding problem. Does not bruise/bleed easily.   Skin: Negative for flushing, itching, poor wound healing and rash.   Musculoskeletal: Negative for arthritis, back pain, falls, gout, joint pain, joint swelling, muscle cramps, muscle weakness, myalgias, neck pain and stiffness.   Gastrointestinal: Negative for bloating, abdominal pain, anorexia, diarrhea, dysphagia, excessive appetite, flatus, hematemesis, jaundice, melena and nausea.   Genitourinary: Negative for hesitancy and incomplete emptying.   Neurological: Negative for aphonia, brief paralysis, difficulty with concentration, disturbances in coordination, excessive daytime sleepiness, dizziness, focal weakness, light-headedness and loss of balance.   Psychiatric/Behavioral: Negative for altered mental status, depression, hallucinations, hypervigilance, memory loss, substance abuse and suicidal ideas. The patient does not have insomnia and is not nervous/anxious.        Objective:   Physical Exam   Constitutional: He is oriented to person, place, and time. He appears well-developed and well-nourished. No distress.   HENT:   Head: Normocephalic and atraumatic.   Nose: Nose normal.   Mouth/Throat: No oropharyngeal exudate.   Eyes: Conjunctivae and EOM are normal. Pupils are equal, round, and reactive to light. Right eye exhibits no discharge. Left eye exhibits no discharge. No scleral icterus.   Neck: Normal range of motion. Neck supple. No JVD present. No tracheal deviation present. No thyromegaly present.   Cardiovascular: Normal rate, regular rhythm, normal heart sounds and intact distal pulses.  Exam reveals no gallop and no friction rub.    No murmur  heard.  Pulmonary/Chest: Effort normal and breath sounds normal. No stridor. No respiratory distress. He has no wheezes. He has no rales. He exhibits no tenderness.   Abdominal: Soft. Bowel sounds are normal. He exhibits no distension and no mass. There is no tenderness.   Musculoskeletal: He exhibits no edema or tenderness.   Lymphadenopathy:     He has no cervical adenopathy.   Neurological: He is alert and oriented to person, place, and time. He displays normal reflexes. No cranial nerve deficit. He exhibits normal muscle tone. Coordination normal.   Skin: Skin is warm. No rash noted. He is not diaphoretic. No erythema. No pallor.   Psychiatric: He has a normal mood and affect. His behavior is normal. Judgment and thought content normal.       Assessment:     1. NICM (nonischemic cardiomyopathy)    2. Chronic combined systolic and diastolic congestive heart failure    3. Hyperlipidemia, unspecified hyperlipidemia type    4. Type 2 diabetes, uncontrolled, with neuropathy    5. CKD stage 3 due to type 2 diabetes mellitus    6. CRLD (chronic restrictive lung disease)    7. Chronic respiratory failure with hypoxia    8. Hypertension associated with diabetes        Plan:   Ed was seen today for hypertension and chronic combined systolic and diastolic hf (heart failure).    Diagnoses and all orders for this visit:    NICM (nonischemic cardiomyopathy)  -     2D Echo w/ Color Flow Doppler; Future  -     Brain natriuretic peptide; Future    Chronic combined systolic and diastolic congestive heart failure    Hyperlipidemia, unspecified hyperlipidemia type    Type 2 diabetes, uncontrolled, with neuropathy    CKD stage 3 due to type 2 diabetes mellitus    CRLD (chronic restrictive lung disease)    Chronic respiratory failure with hypoxia    Hypertension associated with diabetes      Patient is on optimal cardiac meds at this time. We discussed reevaluation of LV function and its prognostic signiuficance.   Excercsie as  tolerated  Counseled on importance of heart healthy diet low in saturated and trans fat and salt as well gradually starting a regular aerobic exercise regimen with goal of 30min 5x/week. Recommend BP diary. Call if systolic BP > 140 mmHg on checking repeatedly  All meds reviewed and are appropriate  Patient is compliant with his medications.

## 2018-05-09 ENCOUNTER — TELEPHONE (OUTPATIENT)
Dept: ENDOCRINOLOGY | Facility: CLINIC | Age: 63
End: 2018-05-09

## 2018-05-09 NOTE — TELEPHONE ENCOUNTER
"Called patient to get update on BG log.    Patient notes:     Fast Lunc Dinner  5/3 230 125 125  5/4   115 115 99  5/5 201 150 125  5/6 200 175 125  5/7 230 150 70    5/8 200 150 100   5/9 200    Unclear if these are actual numbers as patient continues to report "around 200" despite my request for the actual numbers on the glucometer.     Notes that he gets hungry at night, and eats crackers and peanut butter.   Recommended increasing vegetable intake throughout the day.  Remove late night snacks.    No change to insulin regimen at this time.  Will call patient in one week for another update on his BG.     Marie Stroud MD  Endocrinology Fellow     "

## 2018-05-11 ENCOUNTER — LAB VISIT (OUTPATIENT)
Dept: LAB | Facility: HOSPITAL | Age: 63
End: 2018-05-11
Attending: INTERNAL MEDICINE
Payer: MEDICARE

## 2018-05-11 ENCOUNTER — HOSPITAL ENCOUNTER (OUTPATIENT)
Dept: CARDIOLOGY | Facility: CLINIC | Age: 63
Discharge: HOME | End: 2018-05-11
Attending: INTERNAL MEDICINE
Payer: MEDICARE

## 2018-05-11 ENCOUNTER — TELEPHONE (OUTPATIENT)
Dept: CARDIOLOGY | Facility: CLINIC | Age: 63
End: 2018-05-11

## 2018-05-11 DIAGNOSIS — I42.8 NICM (NONISCHEMIC CARDIOMYOPATHY): ICD-10-CM

## 2018-05-11 LAB
BNP SERPL-MCNC: 69 PG/ML
RETIRED EF AND QEF - SEE NOTES: 45 (ref 55–65)

## 2018-05-11 PROCEDURE — 83880 ASSAY OF NATRIURETIC PEPTIDE: CPT

## 2018-05-11 PROCEDURE — 93306 TTE W/DOPPLER COMPLETE: CPT | Mod: S$GLB,,, | Performed by: INTERNAL MEDICINE

## 2018-05-11 PROCEDURE — 36415 COLL VENOUS BLD VENIPUNCTURE: CPT

## 2018-05-11 NOTE — PROGRESS NOTES
Please let patient know that heart function has improved compared to the prior studies. Continue medications as prescribed.

## 2018-05-21 ENCOUNTER — TELEPHONE (OUTPATIENT)
Dept: OPTOMETRY | Facility: CLINIC | Age: 63
End: 2018-05-21

## 2018-05-21 NOTE — TELEPHONE ENCOUNTER
Called the patient to give results from the diabetic photos but he didn't answer the phones. There wasn't a way for me to leave a message.

## 2018-05-23 ENCOUNTER — TELEPHONE (OUTPATIENT)
Dept: INTERNAL MEDICINE | Facility: CLINIC | Age: 63
End: 2018-05-23

## 2018-05-23 NOTE — TELEPHONE ENCOUNTER
Spoke with pt pt said as long as he's inside out of the sun he's great he also said yes he's taking his insulin as directed pt is also checking his BS fasting readings are as followed ELLEN   5-15-18 230  5-16-18 175  5-17-18 182  5-18-18 230  5-19-18 240  5-20-18 135  5-21-18 150  5-22-18 175  5-23-18 230  5-24-18 245

## 2018-05-23 NOTE — TELEPHONE ENCOUNTER
Please check on patient and see how he is doing. Is he injecting his insulin, what have his sugars been?    Thanks,  KJ

## 2018-05-24 DIAGNOSIS — R07.9 CHEST PAIN, CARDIAC: ICD-10-CM

## 2018-05-24 DIAGNOSIS — J96.11 CHRONIC RESPIRATORY FAILURE WITH HYPOXIA: ICD-10-CM

## 2018-05-24 DIAGNOSIS — J44.9 COPD MIXED TYPE: ICD-10-CM

## 2018-05-24 NOTE — TELEPHONE ENCOUNTER
Those readings are good for now. He can increase his morning insulin to 42U of 70/30 insulin, but keep the evening dose at 28U.    Thanks,  KRISTINA

## 2018-05-25 RX ORDER — NITROGLYCERIN 0.4 MG/1
0.4 TABLET SUBLINGUAL EVERY 5 MIN PRN
Qty: 30 TABLET | Refills: 3 | Status: SHIPPED | OUTPATIENT
Start: 2018-05-25 | End: 2019-06-11 | Stop reason: SDUPTHER

## 2018-05-25 RX ORDER — FLUTICASONE FUROATE AND VILANTEROL 200; 25 UG/1; UG/1
1 POWDER RESPIRATORY (INHALATION) DAILY
Qty: 90 EACH | Refills: 3 | Status: SHIPPED | OUTPATIENT
Start: 2018-05-25 | End: 2019-10-17 | Stop reason: SDUPTHER

## 2018-05-25 NOTE — TELEPHONE ENCOUNTER
Spoke with pt pt said he never received his breathing medication I pend two medications to you also informed him of the medication change he understood ELLEN

## 2018-08-09 ENCOUNTER — TELEPHONE (OUTPATIENT)
Dept: INTERNAL MEDICINE | Facility: CLINIC | Age: 63
End: 2018-08-09

## 2018-08-09 NOTE — TELEPHONE ENCOUNTER
Patient needs an appt ASAP, will run out of O2.    Please see if he can come tomorrow. We can confirm appointment tomorrow if we get more cancellations.    Thanks,  KJ

## 2018-08-09 NOTE — TELEPHONE ENCOUNTER
There is no 6MWT, oxygen desat test, or PFT in chart since 2015. Patient will need new testing to get new orders for O2.    Can you have DME fax the forms we need to us?    Thanks,  KRISTINA

## 2018-09-04 ENCOUNTER — TELEPHONE (OUTPATIENT)
Dept: ADMINISTRATIVE | Facility: CLINIC | Age: 63
End: 2018-09-04

## 2018-09-24 ENCOUNTER — TELEPHONE (OUTPATIENT)
Dept: PRIMARY CARE CLINIC | Facility: CLINIC | Age: 63
End: 2018-09-24

## 2018-09-24 NOTE — TELEPHONE ENCOUNTER
Please check on this patient. He has not opened the door for home health numerous times (notification sent).    He should be encouraged to attend his appointment with me and make sure he has his oxygen.    Thanks,  KJ

## 2018-09-26 ENCOUNTER — TELEPHONE (OUTPATIENT)
Dept: PRIMARY CARE CLINIC | Facility: CLINIC | Age: 63
End: 2018-09-26

## 2018-09-26 NOTE — TELEPHONE ENCOUNTER
Please let patient know that I only provide notes for jury duty excuse. You could reach out to [place] to see if we are allowed to provide excuses for actual court dates, but I don't see why this patient cant have someone push him in a wheelchair to court.    Thanks,  KJ

## 2018-09-26 NOTE — TELEPHONE ENCOUNTER
----- Message from Lilli Sams MA sent at 9/26/2018 12:33 PM CDT -----  Contact: Patient  [phone]  Pt said he couldn't make it to traffic court he said he wasn't feeling good and was scared to walk up the stairs he said he needs a doctors note to excuse his two court days he missed   ----- Message -----  From: Nella Bhatia  Sent: 9/26/2018  11:19 AM  To: Geraldo Ramirez Staff    Pt states that he received a call on yesterday from Lilli and he is returning her call. Please call back      Thanks

## 2018-10-25 ENCOUNTER — TELEPHONE (OUTPATIENT)
Dept: INTERNAL MEDICINE | Facility: CLINIC | Age: 63
End: 2018-10-25

## 2018-10-25 NOTE — TELEPHONE ENCOUNTER
Please set up peer to peer. Unfortunately I can't talk with anyone until Monday afternoon.    Thanks,  KJ    ----- Message from Lilli Sams MA sent at 10/24/2018  3:24 PM CDT -----  Contact: [place] [phone]  Please, advise   ----- Message -----  From: Gus Tam  Sent: 10/24/2018   3:13 PM  To: Geraldo Ramirez Staff    Peer to Peer, Calling in regards to home health case that has been denied, wants to know if Dr wants to do a peer to peer, is needing to hear back from office by 2:00pm Friday.    Please call an advise  Thank you

## 2018-10-29 ENCOUNTER — TELEPHONE (OUTPATIENT)
Dept: ADMINISTRATIVE | Facility: CLINIC | Age: 63
End: 2018-10-29

## 2018-10-29 NOTE — TELEPHONE ENCOUNTER
Home Health Recert with [facility]-Dr. Qiana Chow. Pt received SN and PT services.

## 2018-10-30 ENCOUNTER — TELEPHONE (OUTPATIENT)
Dept: INTERNAL MEDICINE | Facility: CLINIC | Age: 63
End: 2018-10-30

## 2018-10-30 NOTE — TELEPHONE ENCOUNTER
Patient's acute needs were addressed, now has home O2. We can manage the skilled nursing visits with the [facility] NPs. I will not push to perform a peer to peer at this time, since we are out of this window.    Thanks,  KJ    ----- Message from Lilli Sams MA sent at 10/30/2018 10:50 AM CDT -----  Contact: Naeem Hyatt [phone]  Spoke with [employer] its too late to do a peer to peer. Originally you asked for 9 skilled nurse visits but [place] only approved 2 if more are needed you will have to request it then follow up with a pear to pear   ----- Message -----  From: Nella Bhatia  Sent: 10/29/2018   3:58 PM  To: Geraldo Hyatt was calling to speak with Lilli in regards to a peer to peer She states that it is past the time that you asked to have this and that it has been denied. Please advise.      Thanks

## 2018-11-20 ENCOUNTER — TELEPHONE (OUTPATIENT)
Dept: INTERNAL MEDICINE | Facility: CLINIC | Age: 63
End: 2018-11-20

## 2018-11-20 ENCOUNTER — TELEPHONE (OUTPATIENT)
Dept: PRIMARY CARE CLINIC | Facility: CLINIC | Age: 63
End: 2018-11-20

## 2018-11-20 NOTE — TELEPHONE ENCOUNTER
Is patient taking his BP meds? If he is not, then his BP will be elevated. His current BP is not alarming, he can see me in clinic next week for it, and needs advice to take his meds.    Is [facility] performing pill packing for him? He would benefit from this. If not, we can perform here.    Thanks,  KJ    Nurse is calling to leave a message for Lilli. She states that the pt's BP is elevated to 152/94. She also states that he took his medication this morning and she wants to know if you can give her a call back is regards to this. Please advise. Sherrie [facility] [phone] also pt BP was elevated last week  nurse said do you want blood work to be done or any medication changes. Pt has apt next week.

## 2018-11-26 ENCOUNTER — IMMUNIZATION (OUTPATIENT)
Dept: INTERNAL MEDICINE | Facility: CLINIC | Age: 63
End: 2018-11-26
Payer: MEDICARE

## 2018-11-26 ENCOUNTER — OFFICE VISIT (OUTPATIENT)
Dept: PODIATRY | Facility: CLINIC | Age: 63
End: 2018-11-26
Payer: MEDICARE

## 2018-11-26 ENCOUNTER — OFFICE VISIT (OUTPATIENT)
Dept: PRIMARY CARE CLINIC | Facility: CLINIC | Age: 63
End: 2018-11-26
Payer: MEDICARE

## 2018-11-26 ENCOUNTER — TELEPHONE (OUTPATIENT)
Dept: INTERNAL MEDICINE | Facility: CLINIC | Age: 63
End: 2018-11-26

## 2018-11-26 ENCOUNTER — TELEPHONE (OUTPATIENT)
Dept: PRIMARY CARE CLINIC | Facility: CLINIC | Age: 63
End: 2018-11-26

## 2018-11-26 VITALS
SYSTOLIC BLOOD PRESSURE: 145 MMHG | HEART RATE: 58 BPM | HEIGHT: 77 IN | BODY MASS INDEX: 29.87 KG/M2 | WEIGHT: 253 LBS | DIASTOLIC BLOOD PRESSURE: 81 MMHG

## 2018-11-26 VITALS
SYSTOLIC BLOOD PRESSURE: 118 MMHG | BODY MASS INDEX: 30.87 KG/M2 | WEIGHT: 253.5 LBS | DIASTOLIC BLOOD PRESSURE: 70 MMHG | OXYGEN SATURATION: 99 % | HEART RATE: 65 BPM | HEIGHT: 76 IN

## 2018-11-26 DIAGNOSIS — J96.11 CHRONIC RESPIRATORY FAILURE WITH HYPOXIA: ICD-10-CM

## 2018-11-26 DIAGNOSIS — I50.9 CONGESTIVE HEART FAILURE, UNSPECIFIED HF CHRONICITY, UNSPECIFIED HEART FAILURE TYPE: ICD-10-CM

## 2018-11-26 DIAGNOSIS — Z79.899 POLYPHARMACY: ICD-10-CM

## 2018-11-26 DIAGNOSIS — J98.4 CRLD (CHRONIC RESTRICTIVE LUNG DISEASE): ICD-10-CM

## 2018-11-26 DIAGNOSIS — Z12.11 SCREENING FOR COLON CANCER: ICD-10-CM

## 2018-11-26 DIAGNOSIS — B35.1 ONYCHOMYCOSIS DUE TO DERMATOPHYTE: ICD-10-CM

## 2018-11-26 DIAGNOSIS — Z79.4 UNCONTROLLED TYPE 2 DIABETES MELLITUS WITH HYPERGLYCEMIA, WITH LONG-TERM CURRENT USE OF INSULIN: ICD-10-CM

## 2018-11-26 DIAGNOSIS — E05.10 TOXIC THYROID NODULE: ICD-10-CM

## 2018-11-26 DIAGNOSIS — E11.65 UNCONTROLLED TYPE 2 DIABETES MELLITUS WITH HYPERGLYCEMIA, WITH LONG-TERM CURRENT USE OF INSULIN: ICD-10-CM

## 2018-11-26 PROCEDURE — 99999 PR PBB SHADOW E&M-EST. PATIENT-LVL III: CPT | Mod: PBBFAC,,, | Performed by: PODIATRIST

## 2018-11-26 PROCEDURE — 3008F BODY MASS INDEX DOCD: CPT | Mod: CPTII,S$GLB,, | Performed by: PODIATRIST

## 2018-11-26 PROCEDURE — 3078F DIAST BP <80 MM HG: CPT | Mod: CPTII,S$GLB,, | Performed by: INTERNAL MEDICINE

## 2018-11-26 PROCEDURE — 3074F SYST BP LT 130 MM HG: CPT | Mod: CPTII,S$GLB,, | Performed by: INTERNAL MEDICINE

## 2018-11-26 PROCEDURE — 99213 OFFICE O/P EST LOW 20 MIN: CPT | Mod: 25,S$GLB,, | Performed by: PODIATRIST

## 2018-11-26 PROCEDURE — 3074F SYST BP LT 130 MM HG: CPT | Mod: CPTII,S$GLB,, | Performed by: PODIATRIST

## 2018-11-26 PROCEDURE — 11721 DEBRIDE NAIL 6 OR MORE: CPT | Mod: Q9,S$GLB,, | Performed by: PODIATRIST

## 2018-11-26 PROCEDURE — 3045F PR MOST RECENT HEMOGLOBIN A1C LEVEL 7.0-9.0%: CPT | Mod: CPTII,S$GLB,, | Performed by: PODIATRIST

## 2018-11-26 PROCEDURE — 3046F HEMOGLOBIN A1C LEVEL >9.0%: CPT | Mod: CPTII,S$GLB,, | Performed by: INTERNAL MEDICINE

## 2018-11-26 PROCEDURE — 90686 IIV4 VACC NO PRSV 0.5 ML IM: CPT | Mod: S$GLB,,, | Performed by: INTERNAL MEDICINE

## 2018-11-26 PROCEDURE — G0008 ADMIN INFLUENZA VIRUS VAC: HCPCS | Mod: S$GLB,,, | Performed by: INTERNAL MEDICINE

## 2018-11-26 PROCEDURE — 3008F BODY MASS INDEX DOCD: CPT | Mod: CPTII,S$GLB,, | Performed by: INTERNAL MEDICINE

## 2018-11-26 PROCEDURE — 3079F DIAST BP 80-89 MM HG: CPT | Mod: CPTII,S$GLB,, | Performed by: PODIATRIST

## 2018-11-26 PROCEDURE — 99215 OFFICE O/P EST HI 40 MIN: CPT | Mod: 25,S$GLB,, | Performed by: INTERNAL MEDICINE

## 2018-11-26 RX ORDER — FLUTICASONE PROPIONATE 50 MCG
SPRAY, SUSPENSION (ML) NASAL
Qty: 32 G | Refills: 3 | Status: SHIPPED | OUTPATIENT
Start: 2018-11-26 | End: 2019-10-17 | Stop reason: SDUPTHER

## 2018-11-26 RX ORDER — FLUTICASONE PROPIONATE AND SALMETEROL 50; 250 UG/1; UG/1
POWDER RESPIRATORY (INHALATION)
COMMUNITY
Start: 2018-08-24 | End: 2018-11-26

## 2018-11-26 RX ORDER — FLUTICASONE FUROATE AND VILANTEROL 200; 25 UG/1; UG/1
1 POWDER RESPIRATORY (INHALATION) DAILY
Qty: 60 EACH | Refills: 0 | Status: SHIPPED | OUTPATIENT
Start: 2018-11-26 | End: 2019-10-17 | Stop reason: SDUPTHER

## 2018-11-26 RX ORDER — LISINOPRIL 40 MG/1
40 TABLET ORAL DAILY
Qty: 90 TABLET | Refills: 0 | Status: SHIPPED | OUTPATIENT
Start: 2018-11-26 | End: 2019-10-15 | Stop reason: SDUPTHER

## 2018-11-26 RX ORDER — ATORVASTATIN CALCIUM 40 MG/1
40 TABLET, FILM COATED ORAL DAILY
Qty: 90 TABLET | Refills: 0 | Status: SHIPPED | OUTPATIENT
Start: 2018-11-26 | End: 2019-07-30 | Stop reason: SDUPTHER

## 2018-11-26 RX ORDER — ACETAMINOPHEN 500 MG
5000 TABLET ORAL DAILY
Qty: 90 TABLET | Refills: 3 | Status: SHIPPED | OUTPATIENT
Start: 2018-11-26 | End: 2019-10-15 | Stop reason: DRUGHIGH

## 2018-11-26 RX ORDER — CARVEDILOL 25 MG/1
25 TABLET ORAL 2 TIMES DAILY WITH MEALS
Qty: 180 TABLET | Refills: 0 | Status: SHIPPED | OUTPATIENT
Start: 2018-11-26 | End: 2019-10-15 | Stop reason: SDUPTHER

## 2018-11-26 RX ORDER — ASPIRIN 81 MG/1
81 TABLET ORAL DAILY
Qty: 30 TABLET | Refills: 6 | Status: SHIPPED | OUTPATIENT
Start: 2018-11-26 | End: 2019-10-15 | Stop reason: SDUPTHER

## 2018-11-26 NOTE — PATIENT INSTRUCTIONS
TODAY:  - pills packed at pharmacy for 2 months  - labs today  - flu vaccine  - stop taking digoxin and spironolactone  - [facility]  - will order ASA 81mg and Vit D3 5000U from [employer] OTC mag  - take your lasix twice daily  - take gabapentin twice daily as needed for pain

## 2018-11-26 NOTE — ASSESSMENT & PLAN NOTE
Continuous O2, followed by Pulm  · Lost to follow-up with Pulm since 2015  · Re-est care with Pulm in 5/2018  · Continue O2  · Started on BREO  · Continue PRN albuterol  · Needs follow-up

## 2018-11-26 NOTE — ASSESSMENT & PLAN NOTE
EF 35% in 2015 echo, improved in 5/2018. Patient with mixed ischemic and non-ischemic cardiomyopathy  · F/U Cardiology  · Continue current therapies  · Unclear if compliant  · Pill packign with [facility] pharmacy today  · DC spironolactone and digoxin (due to noncompliance)  · Continue BB, ACE, statin, ACE, diuretic

## 2018-11-26 NOTE — ASSESSMENT & PLAN NOTE
No history of colonscopy, not medically cleared for invasive imaging  [employer] was given to patient on 11/26/2018 12:06 PM

## 2018-11-26 NOTE — ASSESSMENT & PLAN NOTE
24-hours-Radioiodine Uptake 30% in R lobe. Treated with 30mci orally with I-131 in 9/2017  · Monitor TSH  · F/U Endo  · Schedule appt

## 2018-11-26 NOTE — PROGRESS NOTES
Primary Care Provider Appointment    Subjective:      Patient ID: Ed Patton is a 63 y.o. male with DM, CHF, CRLD, hypoxia    Chief Complaint: Pain (leg/back/knee); Congestive Heart Failure; and Diabetes    Patient not seen in clinic for quite some time due to lack of portable O2. He is here today with new tank. Is more mobile than usual.    He brought all pills today to appt, but some bottles almost 2 years old. When offered assistance with pill packing he states that he hangs on to the old bottles.     He is taking aleve for back and knee pain. He is receiving PT at home with HH.    Most of his meds have not been refilled in a year. He has not had inhalers recently from [facility], but does have his empty BREO with him today. Last seen by Pulm in 5/2018. BREO started and is overdue for follow-up.    BP well-controlled today. He reports compliance with lasix twice daily. His last echo was 5/2018, EF improved. Seen by Dr Patel in 5/2018 with no med changes. Noncompliant with spironolactone, but reports compliance with BB, ACE, statin ,diuretic, ASA,     He reports controlled glucose readings. AM readings . Injects 70/30 40U in AM, 30U in PM. Last seen by Endo in 5/2018.    Has toxic thyroid nodule s/p COTO. TSH elevated but at goal for geriatric patient.    Past Surgical History:   Procedure Laterality Date    APPENDECTOMY      CHOLECYSTECTOMY      CORONARY ANGIOPLASTY WITH STENT PLACEMENT      TONSILLECTOMY         Past Medical History:   Diagnosis Date    CHF (congestive heart failure)     COPD (chronic obstructive pulmonary disease)     Coronary artery disease     Diabetes mellitus     Diabetes mellitus type II     DM (diabetes mellitus) type II uncontrolled with renal manifestation 9/4/2013    Hyperlipidemia     Hypertension        Review of Systems   Constitutional: Positive for activity change and fatigue. Negative for appetite change.   Respiratory: Positive for shortness of breath.  "Negative for cough and choking.    Cardiovascular: Negative for chest pain and leg swelling.   Gastrointestinal: Negative for abdominal pain and diarrhea.   Musculoskeletal: Positive for gait problem. Negative for joint swelling.   Psychiatric/Behavioral: Positive for confusion and decreased concentration. Negative for agitation.       Objective:   /70 (BP Location: Right arm, Patient Position: Sitting, BP Method: Medium (Manual))   Pulse 65   Ht 6' 4" (1.93 m)   Wt 115 kg (253 lb 8.5 oz)   SpO2 99%   BMI 30.86 kg/m²     Physical Exam   Constitutional: He is oriented to person, place, and time. He appears well-developed and well-nourished.   Wearing NC oxygen   HENT:   Head: Normocephalic.   Eyes: EOM are normal.   Cardiovascular: Normal rate.   Musculoskeletal: Normal range of motion.   Neurological: He is alert and oriented to person, place, and time.   Skin: Skin is warm and dry.   Psychiatric: He has a normal mood and affect.   Poor insight into disease   Nursing note and vitals reviewed.      Lab Results   Component Value Date    WBC 11.91 05/01/2018    HGB 12.7 (L) 05/01/2018    HCT 38.3 (L) 05/01/2018     05/01/2018    CHOL 143 05/01/2018    TRIG 119 05/01/2018    HDL 41 05/01/2018    ALT 33 05/01/2018    AST 23 05/01/2018     05/01/2018    K 4.3 05/01/2018     05/01/2018    CREATININE 1.4 05/01/2018    BUN 30 (H) 05/01/2018    CO2 29 05/01/2018    TSH 8.882 (H) 05/01/2018    PSA 0.4 03/05/2004    INR 1.0 10/16/2014    HGBA1C 11.4 (H) 05/01/2018         Assessment:   63 y.o. male with multiple co-morbid illnesses here to continue work-up of chronic issues notably  DM, CHF, CRLD, hypoxia    Plan:     Problem List Items Addressed This Visit        Pulmonary    CRLD (chronic restrictive lung disease)    Relevant Medications    fluticasone (FLONASE) 50 mcg/actuation nasal spray    aspirin (ECOTRIN) 81 MG EC tablet    Chronic respiratory failure with hypoxia     Continuous O2, " followed by Pulm  · Lost to follow-up with Pulm since 2015  · Re-est care with Pulm in 5/2018  · Continue O2  · Started on BREO  · Continue PRN albuterol  · Needs follow-up         Relevant Medications    fluticasone-vilanterol (BREO ELLIPTA) 200-25 mcg/dose DsDv diskus inhaler       Cardiac/Vascular    CHF (congestive heart failure)     EF 35% in 2015 echo, improved in 5/2018. Patient with mixed ischemic and non-ischemic cardiomyopathy  · F/U Cardiology  · Continue current therapies  · Unclear if compliant  · Pill packign with [facility] today  · DC spironolactone and digoxin (due to noncompliance)  · Continue BB, ACE, statin, ACE, diuretic         Relevant Medications    aspirin (ECOTRIN) 81 MG EC tablet       Endocrine    Toxic thyroid nodule     24-hours-Radioiodine Uptake 30% in R lobe. Treated with 30mci orally with I-131 in 9/2017  · Monitor TSH  · F/U Endo  · Schedule appt         Relevant Orders    TSH    Uncontrolled type 2 diabetes mellitus with hyperglycemia, with long-term current use of insulin     Novolin 70/30 40U in AM, 30U in PM. Elevated A1c  · Continue current therapies  · F/U Endo  · Patient no-showed         Relevant Medications    aspirin (ECOTRIN) 81 MG EC tablet    Other Relevant Orders    Hemoglobin A1c    Basic metabolic panel    CBC auto differential       GI    Screening for colon cancer     No history of colonscopy, not medically cleared for invasive imaging  [facility] was given to patient on 11/26/2018 12:06 PM            Relevant Orders    Fecal Immunochemical Test (iFOBT)       Other    Polypharmacy     Patient taking multiple meds with limited insight, questionable compliance  · Brought ALL medications that he is taking to his appt today  · sprinolactone missing  · Will work with pharmacy for pill packing           Other Visit Diagnoses     Diabetes mellitus, type II              Health Maintenance       Date Due Completion Date    Colonoscopy 02/09/2007 ---iFOBT today    Zoster  Vaccine 02/09/2017 ---    Influenza Vaccine 08/01/2018 10/9/2017- TODAY    Hemoglobin A1c 08/01/2018 5/1/2018- TODAY    Foot Exam 09/12/2018 9/12/2017- TODAY    Lipid Panel 05/01/2019 5/1/2018    Eye Exam 05/01/2019 5/1/2018    High Dose Statin 05/08/2019 5/8/2018    Pneumococcal PPSV23 (Medium Risk) (2) 05/01/2023 5/1/2018    TETANUS VACCINE 09/14/2023 9/14/2013          Follow-up in about 2 months (around 1/26/2019). One hour spent with this patient today, half of that in counseling.    Qiana Chow MD/MPH  Internal Medicine  [facility]  [phone]

## 2018-11-26 NOTE — ASSESSMENT & PLAN NOTE
Novolin 70/30 40U in AM, 30U in PM. Elevated A1c  · Continue current therapies  · F/U Endo  · Patient no-showed

## 2018-11-26 NOTE — PROGRESS NOTES
Adherence packed for 60 days:    Morning card:  Aspirin 81 mg  Carvedilol 25 mg  Lisinopril 40 mg  Vitamin D3 5000 IU    Evening card:  Atorvastatin 40 mg  Carvedilol 25 mg

## 2018-12-03 ENCOUNTER — TELEPHONE (OUTPATIENT)
Dept: INTERNAL MEDICINE | Facility: CLINIC | Age: 63
End: 2018-12-03

## 2018-12-04 NOTE — TELEPHONE ENCOUNTER
PCP attempted to reach patient regarding his labs, but he did not answer phone.    Please add him to your call list and let me know when you reach him. I need to go over his test results.    KRISTINA Perez

## 2018-12-04 NOTE — PROGRESS NOTES
CC:     Foot exam       HPI:   The patient is a 63 y.o.  male  who presents for a diabetic foot exam.     Patient reports no presence of abnormal sensation to the feet .    History of diabetic foot ulcers: none.     History of foot surgery: none.     Shoes worn today:  Dress leather shoes  This patient has documented high risk feet requiring routine maintenance secondary to diabetes   Requesting toenail debridement today.  No wounds or blisters noted.         Primary care doctor is: Qiana Brush MD  Patient last saw primary care doctor on:   05/01/2018/ dr brush      Past Medical History:   Diagnosis Date    CHF (congestive heart failure)     COPD (chronic obstructive pulmonary disease)     Coronary artery disease     Diabetes mellitus     Diabetes mellitus type II     DM (diabetes mellitus) type II uncontrolled with renal manifestation 9/4/2013    Hyperlipidemia     Hypertension          Current Outpatient Medications on File Prior to Visit   Medication Sig Dispense Refill    ACCU-CHEK SMARTVIEW TEST STRIP Strp       albuterol (PROVENTIL) 2.5 mg /3 mL (0.083 %) nebulizer solution INHALE THE CONTENTS OF 1 VIAL VIA NEBULIZER EVERY 4 HOURS AS NEEDED FOR WHEEZING. 360 mL 3    alcohol swabs (BD ALCOHOL SWABS) PadM Apply 1 each topically as needed.      aspirin (ECOTRIN) 81 MG EC tablet Take 1 tablet (81 mg total) by mouth once daily. 30 tablet 6    atorvastatin (LIPITOR) 40 MG tablet Take 1 tablet (40 mg total) by mouth once daily. 90 tablet 3    atorvastatin (LIPITOR) 40 MG tablet Take 1 tablet (40 mg total) by mouth once daily. 90 tablet 0    BLOOD SUGAR DIAGNOSTIC (ACCU-CHEK SMARTVIEW TEST STRIP MISC) 1 strip by Misc.(Non-Drug; Combo Route) route 4 (four) times daily with meals and nightly.      BLOOD-GLUCOSE METER (ACCU-CHEK RAMIREZ MISC) 1 Package by Misc.(Non-Drug; Combo Route) route.      carvedilol (COREG) 25 MG tablet Take 1 tablet (25 mg total) by mouth 2 (two) times daily with  "meals. 180 tablet 3    carvedilol (COREG) 25 MG tablet Take 1 tablet (25 mg total) by mouth 2 (two) times daily with meals. 180 tablet 0    cholecalciferol, vitamin D3, (VITAMIN D3) 5,000 unit Tab Take 5,000 Units by mouth once daily. 90 tablet 3    fluticasone (FLONASE) 50 mcg/actuation nasal spray USE 1 SPRAY IN EACH NOSTRIL ONE TIME DAILY 32 g 3    fluticasone-vilanterol (BREO ELLIPTA) 200-25 mcg/dose DsDv diskus inhaler Inhale 1 puff into the lungs once daily. Controller 90 each 3    fluticasone-vilanterol (BREO ELLIPTA) 200-25 mcg/dose DsDv diskus inhaler Inhale 1 puff into the lungs once daily. Controller 60 each 0    furosemide (LASIX) 80 MG tablet TAKE 1 TABLET IN THE MORNING  AND  1  TABLET  AT  3-4PM 180 tablet 1    gabapentin (NEURONTIN) 100 MG capsule Take 1 capsule (100 mg total) by mouth once daily. 90 capsule 3    insulin NPH-insulin regular, 70/30, (NOVOLIN 70/30) 100 unit/mL (70-30) injection Inject 38 units before breakfast and 28 units before dinner. [id number] minutes before breakfast and dinner 2 vial 10    insulin syringe-needle U-100 (INSULIN SYRINGE) 1 mL 30 gauge x 5/16 Syrg 1 each by Misc.(Non-Drug; Combo Route) route 2 (two) times daily. Use twice daily as directed with insulin vials. 100 each 6    lancets Misc by Misc.(Non-Drug; Combo Route) route. Patient checks blood sugar 4 times daily      lisinopril (PRINIVIL,ZESTRIL) 40 MG tablet Take 1 tablet (40 mg total) by mouth once daily. 90 tablet 3    lisinopril (PRINIVIL,ZESTRIL) 40 MG tablet Take 1 tablet (40 mg total) by mouth once daily. 90 tablet 0    mometasone 0.1% (ELOCON) 0.1 % cream Applied to the affected area once daily for rash. 45 g 0    pen needle, diabetic (BD ULTRA-FINE RAMIREZ PEN NEEDLES) 32 gauge x 5/32" Ndle Use with multiple daily injections of insulin 200 each 11    nitroGLYCERIN (NITROSTAT) 0.4 MG SL tablet Place 1 tablet (0.4 mg total) under the tongue every 5 (five) minutes as needed. 30 tablet 3 "     No current facility-administered medications on file prior to visit.          Review of patient's allergies indicates:   Allergen Reactions    Vicodin [hydrocodone-acetaminophen] Itching             ROS:  General ROS: negative  Respiratory ROS: no cough, shortness of breath, or wheezing  Cardiovascular ROS: no chest pain or dyspnea on exertion  Musculoskeletal ROS: negative  Neurological ROS:   negative for - numbness/tingling, seizures or tremors  Dermatological ROS: negative      LAST HbA1c:   Hemoglobin A1C   Date Value Ref Range Status   11/26/2018 8.8 (H) 4.0 - 5.6 % Final     Comment:     ADA Screening Guidelines:  5.7-6.4%  Consistent with prediabetes  >or=6.5%  Consistent with diabetes  High levels of fetal hemoglobin interfere with the HbA1C  assay. Heterozygous hemoglobin variants (HbS, HgC, etc)do  not significantly interfere with this assay.   However, presence of multiple variants may affect accuracy.     05/01/2018 11.4 (H) 4.0 - 5.6 % Final     Comment:     According to ADA guidelines, hemoglobin A1c <7.0% represents  optimal control in non-pregnant diabetic patients. Different  metrics may apply to specific patient populations.   Standards of Medical Care in Diabetes-2016.  For the purpose of screening for the presence of diabetes:  <5.7%     Consistent with the absence of diabetes  5.7-6.4%  Consistent with increasing risk for diabetes   (prediabetes)  >or=6.5%  Consistent with diabetes  Currently, no consensus exists for use of hemoglobin A1c  for diagnosis of diabetes for children.  This Hemoglobin A1c assay has significant interference with fetal   hemoglobin   (HbF). The results are invalid for patients with abnormal amounts of   HbF,   including those with known Hereditary Persistence   of Fetal Hemoglobin. Heterozygous hemoglobin variants (HbAS, HbAC,   HbAD, HbAE, HbA2) do not significantly interfere with this assay;   however, presence of multiple variants in a sample may impact the %    interference.     2017 9.4 (H) 4.0 - 5.6 % Final     Comment:     According to ADA guidelines, hemoglobin A1c <7.0% represents  optimal control in non-pregnant diabetic patients. Different  metrics may apply to specific patient populations.   Standards of Medical Care in Diabetes-2016.  For the purpose of screening for the presence of diabetes:  <5.7%     Consistent with the absence of diabetes  5.7-6.4%  Consistent with increasing risk for diabetes   (prediabetes)  >or=6.5%  Consistent with diabetes  Currently, no consensus exists for use of hemoglobin A1c  for diagnosis of diabetes for children.  This Hemoglobin A1c assay has significant interference with fetal   hemoglobin   (HbF). The results are invalid for patients with abnormal amounts of   HbF,   including those with known Hereditary Persistence   of Fetal Hemoglobin. Heterozygous hemoglobin variants (HbAS, HbAC,   HbAD, HbAE, HbA2) do not significantly interfere with this assay;   however, presence of multiple variants in a sample may impact the %   interference.               Social History     Socioeconomic History    Marital status:      Spouse name: Not on file    Number of children: Not on file    Years of education: Not on file    Highest education level: Not on file   Social Needs    Financial resource strain: Not on file    Food insecurity - worry: Not on file    Food insecurity - inability: Not on file    Transportation needs - medical: Not on file    Transportation needs - non-medical: Not on file   Occupational History    Not on file   Tobacco Use    Smoking status: Former Smoker     Packs/day: 0.01     Years: 3.00     Pack years: 0.03     Types: Cigarettes     Last attempt to quit: 1995     Years since quittin.5    Smokeless tobacco: Never Used   Substance and Sexual Activity    Alcohol use: No    Drug use: No    Sexual activity: No     Comment:  with 1 son   Other Topics Concern    Not on file  "  Social History Narrative     with 1 son           EXAM:   Vitals:    11/26/18 1250   BP: (!) 145/81   Pulse: (!) 58   Weight: 114.8 kg (253 lb)   Height: 6' 4.8" (1.951 m)       General: alert, no distress, on nasal canula    Vascular:   Dorsalis pedis:   0 bilateral.   Posterior Tibial:   0  Bilateral.   3 seconds capillary refill time and temperature of toes are cool- B/L to touch.   reduced hair growth on the feet.    There is Trace edema to both feet.      Varicosities:  none      Dermatological:    Skin: dry and no suspicious lesions; flaky skin, no vesicles  Nails: toenails 1-5 L and 1-5 R  are onychomycosis of the toenails and dystrophic nails, elongated and thick by 1-2mm with subungual debris.    Callus:  None  Open Wounds: none  Ecchymoses is not observed.      Erythema:  none .     Interdigital spaces: clean, dry and without evidence of break in skin integrity      Neurological:    Sharp dull - B/L normal and light touch - B/L normal  Vibratory - normal  Rush diminished      Musculoskeletal:     Muscle strength: 5/5, adequate ROM, adequate strength     Right foot:  flat foot   Left foot: flat foot         Assessment:    Type 2 diabetes, uncontrolled, with neuropathy    Onychomycosis due to dermatophyte            Treatment and Plan:  Shoe inspection. Diabetic Foot Education. Patient reminded of the importance of good nutrition and blood sugar control to help prevent podiatric complications of diabetes. Patient instructed on proper foot hygiene. We discussed wearing proper shoe gear, daily foot inspections, never walking without protective shoe gear, never putting sharp instruments to feet, and routine diabetic foot exam and care every 3-6 months to prevent complications.      With the patient's permission, nails were aggressively reduced and debrided X 10 to their soft tissue attachment mechanically and with an electric  removing all offending nail and debris. Pt relates relief following " the procedure. He will continue to monitor the areas daily, inspect his feet, wear protective shoe gear when ambulating, moisturized daily to maintain skin integrity and follow up in the office in 3 months.         Follow-up in about 3 months (around 2/26/2019).

## 2018-12-06 DIAGNOSIS — E11.65 UNCONTROLLED TYPE 2 DIABETES MELLITUS WITH HYPERGLYCEMIA, WITH LONG-TERM CURRENT USE OF INSULIN: ICD-10-CM

## 2018-12-06 DIAGNOSIS — Z79.4 UNCONTROLLED TYPE 2 DIABETES MELLITUS WITH HYPERGLYCEMIA, WITH LONG-TERM CURRENT USE OF INSULIN: ICD-10-CM

## 2018-12-06 PROBLEM — E89.0 POSTABLATIVE HYPOTHYROIDISM: Status: ACTIVE | Noted: 2018-12-06

## 2018-12-10 ENCOUNTER — TELEPHONE (OUTPATIENT)
Dept: INTERNAL MEDICINE | Facility: CLINIC | Age: 63
End: 2018-12-10

## 2018-12-10 ENCOUNTER — TELEPHONE (OUTPATIENT)
Dept: ENDOCRINOLOGY | Facility: CLINIC | Age: 63
End: 2018-12-10

## 2018-12-10 DIAGNOSIS — Z79.4 UNCONTROLLED TYPE 2 DIABETES MELLITUS WITH HYPERGLYCEMIA, WITH LONG-TERM CURRENT USE OF INSULIN: ICD-10-CM

## 2018-12-10 DIAGNOSIS — E11.65 UNCONTROLLED TYPE 2 DIABETES MELLITUS WITH HYPERGLYCEMIA, WITH LONG-TERM CURRENT USE OF INSULIN: ICD-10-CM

## 2018-12-10 DIAGNOSIS — E89.0 POSTABLATIVE HYPOTHYROIDISM: Primary | ICD-10-CM

## 2018-12-10 RX ORDER — LANCETS
EACH MISCELLANEOUS
Qty: 408 EACH | Refills: 3 | Status: SHIPPED | OUTPATIENT
Start: 2018-12-10

## 2018-12-10 RX ORDER — PEN NEEDLE, DIABETIC 30 GX3/16"
NEEDLE, DISPOSABLE MISCELLANEOUS
Qty: 200 EACH | Refills: 3 | Status: ON HOLD | OUTPATIENT
Start: 2018-12-10 | End: 2020-09-10

## 2018-12-10 RX ORDER — BLOOD SUGAR DIAGNOSTIC
STRIP MISCELLANEOUS
Qty: 400 STRIP | Refills: 3 | Status: SHIPPED | OUTPATIENT
Start: 2018-12-10

## 2018-12-10 RX ORDER — LEVOTHYROXINE SODIUM 75 UG/1
75 TABLET ORAL DAILY
Qty: 30 TABLET | Refills: 11 | Status: SHIPPED | OUTPATIENT
Start: 2018-12-10 | End: 2019-07-30 | Stop reason: SDUPTHER

## 2018-12-10 RX ORDER — SYRINGE-NEEDLE,INSULIN,0.5 ML 30 GX5/16"
1 SYRINGE, EMPTY DISPOSABLE MISCELLANEOUS 2 TIMES DAILY
Qty: 200 EACH | Refills: 6 | Status: SHIPPED | OUTPATIENT
Start: 2018-12-10

## 2018-12-10 NOTE — TELEPHONE ENCOUNTER
PCP called [facility] to discuss his labs, but he does not answer any of his phones.    His wife was reached and informed that his thyroid lab was very abnormal (TSH 30), and he needs to start levothyroxine 75mcg. Script sent to [facility] on [address]. His wife was advised that he should take the thyroid medication separate from his other meds and food. He should continue his pill packs.    Adherence packed for 60 days:     Morning card:  Aspirin 81 mg  Carvedilol 25 mg  Lisinopril 40 mg  Vitamin D3 5000 IU     Evening card:  Atorvastatin 40 mg  Carvedilol 25 mg    He will need a repeat TSH and free T4 in 6 weeks. Please schedule him for labs in 6 weeks.    Also insulin needles and insulin refill sent to [facility]. His Endocrinologist already sent his glucometer script to [facility].    KRISTINA Perez

## 2018-12-10 NOTE — TELEPHONE ENCOUNTER
Prescription request refilled.  Supplies refilled for the year.  Insulin refilled for two months.    Patient no showed last 3 endocrine appointments.    Needs follow up.      Marie Stroud MD  Endocrinology Fellow

## 2018-12-10 NOTE — TELEPHONE ENCOUNTER
Patient last seen in Endo clinic 5/2018.  Patient has no showed last 3 appointments.  No recent labs.  Last creatinine and GFR, 1.5 and 50s respectively.  Patient has not sent in BG log since clinic visit.    Recommend patient update labs and schedule follow up appointment.  Prescription will last for only two months.

## 2018-12-11 NOTE — TELEPHONE ENCOUNTER
Spoke to patient and let him know that Dr Stroud prescription for insulin has been filled for two months only.  You need a follow up appointment. Dr Stroud is request BG log to be sent into clinic covering the next two weeks. Schedule an apt over the phone.

## 2019-03-19 ENCOUNTER — OFFICE VISIT (OUTPATIENT)
Dept: PODIATRY | Facility: CLINIC | Age: 64
End: 2019-03-19
Payer: MEDICARE

## 2019-03-19 VITALS
BODY MASS INDEX: 29.87 KG/M2 | HEART RATE: 63 BPM | SYSTOLIC BLOOD PRESSURE: 136 MMHG | DIASTOLIC BLOOD PRESSURE: 75 MMHG | HEIGHT: 77 IN | WEIGHT: 253 LBS

## 2019-03-19 DIAGNOSIS — B35.1 ONYCHOMYCOSIS DUE TO DERMATOPHYTE: ICD-10-CM

## 2019-03-19 LAB — HM FOOT EXAM: NORMAL

## 2019-03-19 PROCEDURE — 11721 DEBRIDE NAIL 6 OR MORE: CPT | Mod: Q9,S$GLB,, | Performed by: PODIATRIST

## 2019-03-19 PROCEDURE — 11721 PR DEBRIDEMENT OF NAILS, 6 OR MORE: ICD-10-PCS | Mod: Q9,S$GLB,, | Performed by: PODIATRIST

## 2019-03-19 PROCEDURE — 99499 NO LOS: ICD-10-PCS | Mod: S$GLB,,, | Performed by: PODIATRIST

## 2019-03-19 PROCEDURE — 99999 PR PBB SHADOW E&M-EST. PATIENT-LVL IV: CPT | Mod: PBBFAC,,, | Performed by: PODIATRIST

## 2019-03-19 PROCEDURE — 99999 PR PBB SHADOW E&M-EST. PATIENT-LVL IV: ICD-10-PCS | Mod: PBBFAC,,, | Performed by: PODIATRIST

## 2019-03-19 PROCEDURE — 99499 UNLISTED E&M SERVICE: CPT | Mod: S$GLB,,, | Performed by: PODIATRIST

## 2019-03-25 NOTE — PROGRESS NOTES
CC:     Foot exam       HPI:   The patient is a 64 y.o.  male  who presents for a diabetic foot exam.     Patient reports no presence of abnormal sensation to the feet .    History of diabetic foot ulcers: none.     History of foot surgery: none.     Shoes worn today: tennis shoes  This patient has documented high risk feet requiring routine maintenance secondary to diabetes   Requesting toenail debridement today.  No wounds or blisters noted. No new issues noted        Primary care doctor is: Qiana Brush MD  Patient last saw primary care doctor on:   12/10/2018 dr brush    Past Medical History:   Diagnosis Date    CHF (congestive heart failure)     COPD (chronic obstructive pulmonary disease)     Coronary artery disease     Diabetes mellitus     Diabetes mellitus type II     DM (diabetes mellitus) type II uncontrolled with renal manifestation 9/4/2013    Hyperlipidemia     Hypertension     Postablative hypothyroidism 12/6/2018         Current Outpatient Medications on File Prior to Visit   Medication Sig Dispense Refill    ACCU-CHEK FASTCLIX LANCET DRUM Misc TEST FOUR TIMES DAILY 408 each 3    ACCU-CHEK RAMIREZ Misc USE AS DIRECTED 1 each 0    ACCU-CHEK SMARTVIEW TEST STRIP Strp TEST FOUR TIMES DAILY 400 strip 3    albuterol (PROVENTIL) 2.5 mg /3 mL (0.083 %) nebulizer solution INHALE THE CONTENTS OF 1 VIAL VIA NEBULIZER EVERY 4 HOURS AS NEEDED FOR WHEEZING. 360 mL 3    alcohol swabs (BD ALCOHOL SWABS) PadM Apply 1 each topically as needed.      aspirin (ECOTRIN) 81 MG EC tablet Take 1 tablet (81 mg total) by mouth once daily. 30 tablet 6    atorvastatin (LIPITOR) 40 MG tablet Take 1 tablet (40 mg total) by mouth once daily. 90 tablet 3    atorvastatin (LIPITOR) 40 MG tablet Take 1 tablet (40 mg total) by mouth once daily. 90 tablet 0    carvedilol (COREG) 25 MG tablet Take 1 tablet (25 mg total) by mouth 2 (two) times daily with meals. 180 tablet 3    carvedilol (COREG) 25 MG tablet  "Take 1 tablet (25 mg total) by mouth 2 (two) times daily with meals. 180 tablet 0    cholecalciferol, vitamin D3, (VITAMIN D3) 5,000 unit Tab Take 5,000 Units by mouth once daily. 90 tablet 3    fluticasone (FLONASE) 50 mcg/actuation nasal spray USE 1 SPRAY IN EACH NOSTRIL ONE TIME DAILY 32 g 3    fluticasone-vilanterol (BREO ELLIPTA) 200-25 mcg/dose DsDv diskus inhaler Inhale 1 puff into the lungs once daily. Controller 90 each 3    fluticasone-vilanterol (BREO ELLIPTA) 200-25 mcg/dose DsDv diskus inhaler Inhale 1 puff into the lungs once daily. Controller 60 each 0    furosemide (LASIX) 80 MG tablet TAKE 1 TABLET IN THE MORNING  AND  1  TABLET  AT  3-4PM 180 tablet 1    gabapentin (NEURONTIN) 100 MG capsule Take 1 capsule (100 mg total) by mouth once daily. 90 capsule 3    insulin NPH-insulin regular, 70/30, (NOVOLIN 70/30 U-100 INSULIN) 100 unit/mL (70-30) injection Inject 38 Units into the skin before breakfast AND 28 Units before dinner. [id number] MINUTES BEFORE BREAKFAST AND DINNER. 20 mL 1    insulin syringe-needle U-100 (INSULIN SYRINGE) 1 mL 30 gauge x 5/16 Syrg 1 each by Misc.(Non-Drug; Combo Route) route 2 (two) times daily. Use twice daily as directed with insulin vials. 200 each 6    levothyroxine (SYNTHROID) 75 MCG tablet Take 1 tablet (75 mcg total) by mouth once daily. 30 tablet 11    lisinopril (PRINIVIL,ZESTRIL) 40 MG tablet Take 1 tablet (40 mg total) by mouth once daily. 90 tablet 3    lisinopril (PRINIVIL,ZESTRIL) 40 MG tablet Take 1 tablet (40 mg total) by mouth once daily. 90 tablet 0    mometasone 0.1% (ELOCON) 0.1 % cream Applied to the affected area once daily for rash. 45 g 0    pen needle, diabetic (BD ULTRA-FINE RAMIREZ PEN NEEDLE) 32 gauge x 5/32" Ndle Use with multiple daily injections of insulin 200 each 3    nitroGLYCERIN (NITROSTAT) 0.4 MG SL tablet Place 1 tablet (0.4 mg total) under the tongue every 5 (five) minutes as needed. 30 tablet 3     No current " facility-administered medications on file prior to visit.          Review of patient's allergies indicates:   Allergen Reactions    Vicodin [hydrocodone-acetaminophen] Itching             ROS:  General ROS: negative  Respiratory ROS: no cough, shortness of breath, or wheezing  Cardiovascular ROS: no chest pain or dyspnea on exertion  Musculoskeletal ROS: negative  Neurological ROS:   negative for - numbness/tingling, seizures or tremors  Dermatological ROS: negative      LAST HbA1c:   Hemoglobin A1C   Date Value Ref Range Status   11/26/2018 8.8 (H) 4.0 - 5.6 % Final     Comment:     ADA Screening Guidelines:  5.7-6.4%  Consistent with prediabetes  >or=6.5%  Consistent with diabetes  High levels of fetal hemoglobin interfere with the HbA1C  assay. Heterozygous hemoglobin variants (HbS, HgC, etc)do  not significantly interfere with this assay.   However, presence of multiple variants may affect accuracy.     05/01/2018 11.4 (H) 4.0 - 5.6 % Final     Comment:     According to ADA guidelines, hemoglobin A1c <7.0% represents  optimal control in non-pregnant diabetic patients. Different  metrics may apply to specific patient populations.   Standards of Medical Care in Diabetes-2016.  For the purpose of screening for the presence of diabetes:  <5.7%     Consistent with the absence of diabetes  5.7-6.4%  Consistent with increasing risk for diabetes   (prediabetes)  >or=6.5%  Consistent with diabetes  Currently, no consensus exists for use of hemoglobin A1c  for diagnosis of diabetes for children.  This Hemoglobin A1c assay has significant interference with fetal   hemoglobin   (HbF). The results are invalid for patients with abnormal amounts of   HbF,   including those with known Hereditary Persistence   of Fetal Hemoglobin. Heterozygous hemoglobin variants (HbAS, HbAC,   HbAD, HbAE, HbA2) do not significantly interfere with this assay;   however, presence of multiple variants in a sample may impact the %    interference.     2017 9.4 (H) 4.0 - 5.6 % Final     Comment:     According to ADA guidelines, hemoglobin A1c <7.0% represents  optimal control in non-pregnant diabetic patients. Different  metrics may apply to specific patient populations.   Standards of Medical Care in Diabetes-2016.  For the purpose of screening for the presence of diabetes:  <5.7%     Consistent with the absence of diabetes  5.7-6.4%  Consistent with increasing risk for diabetes   (prediabetes)  >or=6.5%  Consistent with diabetes  Currently, no consensus exists for use of hemoglobin A1c  for diagnosis of diabetes for children.  This Hemoglobin A1c assay has significant interference with fetal   hemoglobin   (HbF). The results are invalid for patients with abnormal amounts of   HbF,   including those with known Hereditary Persistence   of Fetal Hemoglobin. Heterozygous hemoglobin variants (HbAS, HbAC,   HbAD, HbAE, HbA2) do not significantly interfere with this assay;   however, presence of multiple variants in a sample may impact the %   interference.               Social History     Socioeconomic History    Marital status:      Spouse name: Not on file    Number of children: Not on file    Years of education: Not on file    Highest education level: Not on file   Occupational History    Not on file   Social Needs    Financial resource strain: Not on file    Food insecurity:     Worry: Not on file     Inability: Not on file    Transportation needs:     Medical: Not on file     Non-medical: Not on file   Tobacco Use    Smoking status: Former Smoker     Packs/day: 0.01     Years: 3.00     Pack years: 0.03     Types: Cigarettes     Last attempt to quit: 1995     Years since quittin.8    Smokeless tobacco: Never Used   Substance and Sexual Activity    Alcohol use: No    Drug use: No    Sexual activity: Never     Comment:  with 1 son   Lifestyle    Physical activity:     Days per week: Not on file      "Minutes per session: Not on file    Stress: Not on file   Relationships    Social connections:     Talks on phone: Not on file     Gets together: Not on file     Attends [employer] service: Not on file     Active member of club or organization: Not on file     Attends meetings of clubs or organizations: Not on file     Relationship status: Not on file    Intimate partner violence:     Fear of current or ex partner: Not on file     Emotionally abused: Not on file     Physically abused: Not on file     Forced sexual activity: Not on file   Other Topics Concern    Not on file   Social History Narrative     with 1 son           EXAM:   Vitals:    03/19/19 0800   BP: 136/75   Pulse: 63   Weight: 114.8 kg (253 lb)   Height: 6' 4.8" (1.951 m)       General: alert, no distress, on nasal canula    Vascular:   Dorsalis pedis:   0 bilateral.   Posterior Tibial:   0  Bilateral.   3 seconds capillary refill time and temperature of toes are cool- B/L to touch.   reduced hair growth on the feet.    There is Trace edema to both feet.      Varicosities:  none      Dermatological:    Skin: dry and no suspicious lesions; flaky skin, no vesicles  Nails: Nails x10 are elongated by  4-5mm's, thickened by 2-3 mm's, dystrophic, and are yellowish in  coloration . Xerosis Bilaterally. No open lesions noted.   Callus:  None  Open Wounds: none  Ecchymoses is not observed.      Erythema:  none .     Interdigital spaces: clean, dry and without evidence of break in skin integrity      Neurological:    Sharp dull - B/L normal and light touch - B/L normal  Vibratory - normal  [place]aelder diminished      Musculoskeletal:     Muscle strength: 5/5, adequate ROM, adequate strength     Right foot:  flat foot   Left foot: flat foot         Assessment:    Type 2 diabetes, uncontrolled, with neuropathy    Onychomycosis due to dermatophyte            Treatment and Plan:  Shoe inspection. Diabetic Foot Education. Patient reminded of the importance of " good nutrition and blood sugar control to help prevent podiatric complications of diabetes. Patient instructed on proper foot hygiene. We discussed wearing proper shoe gear, daily foot inspections, never walking without protective shoe gear, never putting sharp instruments to feet, and routine diabetic foot exam and care every 3-6 months to prevent complications.      With the patient's permission, nails were aggressively reduced and debrided X 10 to their soft tissue attachment mechanically and with an electric  removing all offending nail and debris. Pt relates relief following the procedure. He will continue to monitor the areas daily, inspect his feet, wear protective shoe gear when ambulating, moisturized daily to maintain skin integrity and follow up in the office in 3 months.         Follow up in about 3 months (around 6/19/2019).

## 2019-05-02 ENCOUNTER — TELEPHONE (OUTPATIENT)
Dept: PULMONOLOGY | Facility: CLINIC | Age: 64
End: 2019-05-02

## 2019-05-02 DIAGNOSIS — J44.9 CHRONIC OBSTRUCTIVE PULMONARY DISEASE, UNSPECIFIED COPD TYPE: Primary | ICD-10-CM

## 2019-05-29 DIAGNOSIS — J44.9 COPD MIXED TYPE: ICD-10-CM

## 2019-05-29 DIAGNOSIS — E11.65 UNCONTROLLED TYPE 2 DIABETES MELLITUS WITH HYPERGLYCEMIA, WITH LONG-TERM CURRENT USE OF INSULIN: ICD-10-CM

## 2019-05-29 DIAGNOSIS — I50.42 CHRONIC COMBINED SYSTOLIC AND DIASTOLIC HF (HEART FAILURE): ICD-10-CM

## 2019-05-29 DIAGNOSIS — I50.9 CONGESTIVE HEART FAILURE, UNSPECIFIED HF CHRONICITY, UNSPECIFIED HEART FAILURE TYPE: ICD-10-CM

## 2019-05-29 DIAGNOSIS — J98.4 CRLD (CHRONIC RESTRICTIVE LUNG DISEASE): ICD-10-CM

## 2019-05-29 DIAGNOSIS — I15.2 HYPERTENSION ASSOCIATED WITH DIABETES: ICD-10-CM

## 2019-05-29 DIAGNOSIS — Z79.4 UNCONTROLLED TYPE 2 DIABETES MELLITUS WITH HYPERGLYCEMIA, WITH LONG-TERM CURRENT USE OF INSULIN: ICD-10-CM

## 2019-05-29 DIAGNOSIS — E11.59 HYPERTENSION ASSOCIATED WITH DIABETES: ICD-10-CM

## 2019-05-29 DIAGNOSIS — E89.0 POSTABLATIVE HYPOTHYROIDISM: ICD-10-CM

## 2019-05-29 DIAGNOSIS — J96.11 CHRONIC RESPIRATORY FAILURE WITH HYPOXIA: ICD-10-CM

## 2019-05-29 RX ORDER — FUROSEMIDE 80 MG/1
TABLET ORAL
Qty: 180 TABLET | Refills: 1 | OUTPATIENT
Start: 2019-05-29

## 2019-05-29 RX ORDER — ASPIRIN 81 MG/1
81 TABLET ORAL DAILY
Qty: 30 TABLET | Refills: 6 | OUTPATIENT
Start: 2019-05-29 | End: 2020-05-28

## 2019-05-29 RX ORDER — LEVOTHYROXINE SODIUM 75 UG/1
75 TABLET ORAL DAILY
Qty: 30 TABLET | Refills: 11 | OUTPATIENT
Start: 2019-05-29 | End: 2020-05-28

## 2019-05-29 RX ORDER — PEN NEEDLE, DIABETIC 30 GX3/16"
NEEDLE, DISPOSABLE MISCELLANEOUS
Qty: 200 EACH | Refills: 3 | OUTPATIENT
Start: 2019-05-29

## 2019-05-29 RX ORDER — LISINOPRIL 40 MG/1
40 TABLET ORAL DAILY
Qty: 90 TABLET | Refills: 0 | OUTPATIENT
Start: 2019-05-29 | End: 2020-05-28

## 2019-05-29 RX ORDER — CARVEDILOL 25 MG/1
25 TABLET ORAL 2 TIMES DAILY WITH MEALS
Qty: 180 TABLET | Refills: 3 | OUTPATIENT
Start: 2019-05-29

## 2019-05-29 RX ORDER — FLUTICASONE FUROATE AND VILANTEROL 200; 25 UG/1; UG/1
1 POWDER RESPIRATORY (INHALATION) DAILY
Qty: 90 EACH | Refills: 3 | OUTPATIENT
Start: 2019-05-29

## 2019-05-29 RX ORDER — FLUTICASONE PROPIONATE 50 MCG
SPRAY, SUSPENSION (ML) NASAL
Qty: 32 G | Refills: 3 | OUTPATIENT
Start: 2019-05-29

## 2019-05-29 NOTE — TELEPHONE ENCOUNTER
"Please call krystina. He has no/showed or cancelled almost every appointment since 11/2018. He needs to be seen before all of those meds are refilled. Is he about to run out, has he been taking the meds?    He was doing pill packing in January. He should continue to do this. He needs to come in so that we can discuss his options to improve medication compliance.    Thanks,  KRISTINA    ----- Message from Jovana Morrison sent at 5/29/2019 10:38 AM CDT -----  Contact: Patient [phone]  Is this a refill or new RX:  Refill    RX name and strength: lisinopril (PRINIVIL,ZESTRIL) 40 MG tablet, furosemide (LASIX) 80 MG tablet, atorvastatin (LIPITOR) 40 MG tablet, levothyroxine (SYNTHROID) 75 MCG tablet, pen needle, diabetic (BD ULTRA-FINE RAMIREZ PEN NEEDLE) 32 gauge x 5/32" Ndle, and fluticasone (FLONASE) 50 mcg/actuation nasal spray and aspirin (ECOTRIN) 81 MG EC tablet, fluticasone-vilanterol (BREO ELLIPTA) 200-25 mcg/dose DsDv diskus inhaler and carvedilol (COREG) 25 MG tablet      Pharmacy name and phone # [facility] - [place], [place] - [address] [phone] (Phone) [phone] (Fax)              "

## 2019-06-11 DIAGNOSIS — R07.9 CHEST PAIN, CARDIAC: ICD-10-CM

## 2019-06-11 DIAGNOSIS — J96.11 CHRONIC RESPIRATORY FAILURE WITH HYPOXIA: Primary | ICD-10-CM

## 2019-06-11 RX ORDER — FLUTICASONE FUROATE AND VILANTEROL TRIFENATATE 200; 25 UG/1; UG/1
POWDER RESPIRATORY (INHALATION)
Qty: 180 EACH | Refills: 3 | Status: SHIPPED | OUTPATIENT
Start: 2019-06-11 | End: 2019-10-17 | Stop reason: SDUPTHER

## 2019-06-11 RX ORDER — NITROGLYCERIN 0.4 MG/1
TABLET SUBLINGUAL
Qty: 25 TABLET | Refills: 3 | Status: SHIPPED | OUTPATIENT
Start: 2019-06-11 | End: 2020-01-20 | Stop reason: SDUPTHER

## 2019-06-11 NOTE — TELEPHONE ENCOUNTER
BREO and nitroglycerine sent to [facility]. Is patient using this meds? Is this the pharmacy he wants them sent to ?    Thanks,  KJ

## 2019-07-11 DIAGNOSIS — E11.9 TYPE 2 DIABETES MELLITUS WITHOUT COMPLICATION: ICD-10-CM

## 2019-07-30 ENCOUNTER — TELEPHONE (OUTPATIENT)
Dept: INTERNAL MEDICINE | Facility: CLINIC | Age: 64
End: 2019-07-30

## 2019-07-30 DIAGNOSIS — E78.5 TYPE 2 DIABETES MELLITUS WITH HYPERLIPIDEMIA: ICD-10-CM

## 2019-07-30 DIAGNOSIS — M54.30 SCIATICA, UNSPECIFIED LATERALITY: Primary | ICD-10-CM

## 2019-07-30 DIAGNOSIS — I50.9 CONGESTIVE HEART FAILURE, UNSPECIFIED HF CHRONICITY, UNSPECIFIED HEART FAILURE TYPE: ICD-10-CM

## 2019-07-30 DIAGNOSIS — E89.0 POSTABLATIVE HYPOTHYROIDISM: ICD-10-CM

## 2019-07-30 DIAGNOSIS — E11.69 TYPE 2 DIABETES MELLITUS WITH HYPERLIPIDEMIA: ICD-10-CM

## 2019-07-30 DIAGNOSIS — M17.0 PRIMARY OSTEOARTHRITIS OF BOTH KNEES: ICD-10-CM

## 2019-07-30 DIAGNOSIS — Z79.4 UNCONTROLLED TYPE 2 DIABETES MELLITUS WITH HYPERGLYCEMIA, WITH LONG-TERM CURRENT USE OF INSULIN: ICD-10-CM

## 2019-07-30 DIAGNOSIS — E11.65 UNCONTROLLED TYPE 2 DIABETES MELLITUS WITH HYPERGLYCEMIA, WITH LONG-TERM CURRENT USE OF INSULIN: ICD-10-CM

## 2019-07-30 RX ORDER — ATORVASTATIN CALCIUM 40 MG/1
40 TABLET, FILM COATED ORAL DAILY
Qty: 90 TABLET | Refills: 3 | Status: SHIPPED | OUTPATIENT
Start: 2019-07-30 | End: 2019-10-15 | Stop reason: SDUPTHER

## 2019-07-30 RX ORDER — ATORVASTATIN CALCIUM 40 MG/1
40 TABLET, FILM COATED ORAL DAILY
Qty: 90 TABLET | Refills: 3 | Status: CANCELLED | OUTPATIENT
Start: 2019-07-30

## 2019-07-30 RX ORDER — LEVOTHYROXINE SODIUM 75 UG/1
75 TABLET ORAL DAILY
Qty: 90 TABLET | Refills: 3 | Status: SHIPPED | OUTPATIENT
Start: 2019-07-30 | End: 2019-10-15 | Stop reason: SDUPTHER

## 2019-07-30 RX ORDER — LEVOTHYROXINE SODIUM 75 UG/1
75 TABLET ORAL DAILY
Qty: 30 TABLET | Refills: 11 | Status: CANCELLED | OUTPATIENT
Start: 2019-07-30 | End: 2020-07-29

## 2019-07-30 RX ORDER — ATORVASTATIN CALCIUM 40 MG/1
40 TABLET, FILM COATED ORAL DAILY
Qty: 90 TABLET | Refills: 0 | Status: CANCELLED | OUTPATIENT
Start: 2019-07-30 | End: 2020-07-29

## 2019-07-30 NOTE — TELEPHONE ENCOUNTER
Pt is asking for Synthroid and Lipitor meds to be refilled @ [facility] near his home.  He would also like to restart physical therapy for his back and legs. He will need a new Eval and Tx order since last Pt was in 2015.

## 2019-07-30 NOTE — TELEPHONE ENCOUNTER
Called to notify pt his Rx was sent to [facility], attempted to Lm with Teresa but  is not set up with this phone

## 2019-07-30 NOTE — TELEPHONE ENCOUNTER
Synthroid and lipitor refilled to [facility]. PT ordered. Please let patient know.    Thanks,  KJ    Rosalva Huerta LPN 4 hours ago (11:19 AM)         Addended by: ROSALVA HUERTA on: 7/30/2019 11:19 AM       Modules accepted: Orders           Documentation       Rosalva Huerta LPN  You 4 hours ago (11:19 AM)      Pt is asking for PT and to have Synthroid and Lipitor med orders sent to [facility].    Routing comment       Rosalva Huerta LPN 4 hours ago (11:14 AM)         Pt is asking for Synthroid and Lipitor meds to be refilled @ [facility] near his home.  He would also like to restart physical therapy for his back and legs. He will need a new Eval and Tx order since last Pt was in 2015.         Documentation       Rosalva Huerta LPN  [facility] - [place], [place] - [address] 4 hours ago (11:01 AM)      CONSTANCE Serrano 5 hours ago (10:12 AM)      Message was sent to us     Routing comment       Ave Lezama MA 6 hours ago (9:45 AM)         ----- Message from Nakia Briceno sent at 7/30/2019  9:20 AM CDT -----  Contact: Self  Pt is calling to speak with Staff because he is scheduled to be seen on 8/15/19, however, he will run out of medication prior to the appt and requests a returned call for a refill.     He can be reached at [phone].

## 2019-07-30 NOTE — TELEPHONE ENCOUNTER
----- Message from Nakia Briceno sent at 7/30/2019  9:20 AM CDT -----  Contact: Self  Pt is calling to speak with Staff because he is scheduled to be seen on 8/15/19, however, he will run out of medication prior to the appt and requests a returned call for a refill.    He can be reached at [phone].    Thank you.

## 2019-09-24 ENCOUNTER — TELEPHONE (OUTPATIENT)
Dept: ENDOCRINOLOGY | Facility: CLINIC | Age: 64
End: 2019-09-24

## 2019-09-24 NOTE — TELEPHONE ENCOUNTER
----- Message from Alma Lynn sent at 9/24/2019  2:23 PM CDT -----  Contact: Pt  Pt called to speak to the nurse to schedule a work in appt and he would also like a call back today due to needing medication refills and can be reached at [phone]

## 2019-09-26 ENCOUNTER — TELEPHONE (OUTPATIENT)
Dept: PRIMARY CARE CLINIC | Facility: CLINIC | Age: 64
End: 2019-09-26

## 2019-09-26 DIAGNOSIS — I50.42 CHRONIC COMBINED SYSTOLIC AND DIASTOLIC HF (HEART FAILURE): Primary | ICD-10-CM

## 2019-09-26 RX ORDER — FUROSEMIDE 80 MG/1
TABLET ORAL
Qty: 180 TABLET | Refills: 1 | Status: SHIPPED | OUTPATIENT
Start: 2019-09-26 | End: 2019-10-17 | Stop reason: SDUPTHER

## 2019-10-14 ENCOUNTER — PATIENT OUTREACH (OUTPATIENT)
Dept: ADMINISTRATIVE | Facility: OTHER | Age: 64
End: 2019-10-14

## 2019-10-14 ENCOUNTER — TELEPHONE (OUTPATIENT)
Dept: PRIMARY CARE CLINIC | Facility: CLINIC | Age: 64
End: 2019-10-14

## 2019-10-14 NOTE — TELEPHONE ENCOUNTER
Patient has no-showed to many appointments, but I did refill his meds in 7/30/19 to [facility]. Did he pick those up?    He can come for pill packs as follows and meet with you tomorrow. His meds would be packed like this, he has not picked them up since 1/2019.    Adherence packed for 60 days:     Morning card:  Aspirin 81 mg  Carvedilol 25 mg  Lisinopril 40 mg  Vitamin D3 5000 IU     Evening card:  Atorvastatin 40 mg  Carvedilol 25 mg      Electronically signed by Anton Mullen, PharmD at 1/17/2019  2:37 PM    Overall he needs a routine appointment with me, but do NOT overbook him. He needs a full hour appointment.    Thanks,  KJ

## 2019-10-14 NOTE — TELEPHONE ENCOUNTER
Cammy Harley LPN 1 hour ago (3:54 PM)         Spoke to pt at length, he has agreed to come in tomorrow for med reconciliation and a flu shot.  He said he did  the meds from [facility] in July but has maybe 5 tabs of Lipitor and lisinopril. Completely out of Synthroid. Also needs Flonase, Breo, something for Sinus and pain.

## 2019-10-14 NOTE — TELEPHONE ENCOUNTER
Spoke to pt at length, he has agreed to come in tomorrow for med reconciliation and a flu shot.  He said he did  the meds from [facility] in July but has maybe 5 tabs of Lipitor and lisinopril. Completely out of Synthroid. Also needs Flonase, Breo, something for Sinus and pain.

## 2019-10-15 ENCOUNTER — OFFICE VISIT (OUTPATIENT)
Dept: PRIMARY CARE CLINIC | Facility: CLINIC | Age: 64
End: 2019-10-15
Payer: MEDICARE

## 2019-10-15 ENCOUNTER — CLINICAL SUPPORT (OUTPATIENT)
Dept: PRIMARY CARE CLINIC | Facility: CLINIC | Age: 64
End: 2019-10-15
Payer: MEDICARE

## 2019-10-15 ENCOUNTER — TELEPHONE (OUTPATIENT)
Dept: PRIMARY CARE CLINIC | Facility: CLINIC | Age: 64
End: 2019-10-15

## 2019-10-15 ENCOUNTER — OUTPATIENT CASE MANAGEMENT (OUTPATIENT)
Dept: ADMINISTRATIVE | Facility: OTHER | Age: 64
End: 2019-10-15

## 2019-10-15 VITALS
BODY MASS INDEX: 29.83 KG/M2 | WEIGHT: 250.25 LBS | SYSTOLIC BLOOD PRESSURE: 108 MMHG | HEART RATE: 63 BPM | DIASTOLIC BLOOD PRESSURE: 62 MMHG | OXYGEN SATURATION: 98 %

## 2019-10-15 DIAGNOSIS — I15.2 HYPERTENSION ASSOCIATED WITH DIABETES: ICD-10-CM

## 2019-10-15 DIAGNOSIS — E05.10 TOXIC THYROID NODULE: ICD-10-CM

## 2019-10-15 DIAGNOSIS — E11.65 UNCONTROLLED TYPE 2 DIABETES MELLITUS WITH HYPERGLYCEMIA, WITH LONG-TERM CURRENT USE OF INSULIN: ICD-10-CM

## 2019-10-15 DIAGNOSIS — Z79.899 POLYPHARMACY: ICD-10-CM

## 2019-10-15 DIAGNOSIS — E78.5 TYPE 2 DIABETES MELLITUS WITH HYPERLIPIDEMIA: ICD-10-CM

## 2019-10-15 DIAGNOSIS — E11.22 CKD STAGE 3 DUE TO TYPE 2 DIABETES MELLITUS: ICD-10-CM

## 2019-10-15 DIAGNOSIS — I50.9 CONGESTIVE HEART FAILURE, UNSPECIFIED HF CHRONICITY, UNSPECIFIED HEART FAILURE TYPE: ICD-10-CM

## 2019-10-15 DIAGNOSIS — E11.59 ANGINA PECTORIS ASSOCIATED WITH TYPE 2 DIABETES MELLITUS: ICD-10-CM

## 2019-10-15 DIAGNOSIS — Z79.4 UNCONTROLLED TYPE 2 DIABETES MELLITUS WITH HYPERGLYCEMIA, WITH LONG-TERM CURRENT USE OF INSULIN: ICD-10-CM

## 2019-10-15 DIAGNOSIS — E11.59 HYPERTENSION ASSOCIATED WITH DIABETES: ICD-10-CM

## 2019-10-15 DIAGNOSIS — J44.9 COPD MIXED TYPE: ICD-10-CM

## 2019-10-15 DIAGNOSIS — E55.9 VITAMIN D DEFICIENCY: Primary | ICD-10-CM

## 2019-10-15 DIAGNOSIS — I20.9 ANGINA PECTORIS ASSOCIATED WITH TYPE 2 DIABETES MELLITUS: ICD-10-CM

## 2019-10-15 DIAGNOSIS — J98.4 CRLD (CHRONIC RESTRICTIVE LUNG DISEASE): ICD-10-CM

## 2019-10-15 DIAGNOSIS — J96.11 CHRONIC RESPIRATORY FAILURE WITH HYPOXIA: ICD-10-CM

## 2019-10-15 DIAGNOSIS — N18.30 CKD STAGE 3 DUE TO TYPE 2 DIABETES MELLITUS: ICD-10-CM

## 2019-10-15 DIAGNOSIS — E11.69 TYPE 2 DIABETES MELLITUS WITH HYPERLIPIDEMIA: ICD-10-CM

## 2019-10-15 DIAGNOSIS — E89.0 POSTABLATIVE HYPOTHYROIDISM: ICD-10-CM

## 2019-10-15 PROCEDURE — 99215 OFFICE O/P EST HI 40 MIN: CPT | Mod: 25,S$GLB,, | Performed by: INTERNAL MEDICINE

## 2019-10-15 PROCEDURE — 90662 IIV NO PRSV INCREASED AG IM: CPT | Mod: S$GLB,,, | Performed by: INTERNAL MEDICINE

## 2019-10-15 PROCEDURE — 99215 PR OFFICE/OUTPT VISIT, EST, LEVL V, 40-54 MIN: ICD-10-PCS | Mod: 25,S$GLB,, | Performed by: INTERNAL MEDICINE

## 2019-10-15 PROCEDURE — 3078F DIAST BP <80 MM HG: CPT | Mod: CPTII,S$GLB,, | Performed by: INTERNAL MEDICINE

## 2019-10-15 PROCEDURE — 3074F SYST BP LT 130 MM HG: CPT | Mod: CPTII,S$GLB,, | Performed by: INTERNAL MEDICINE

## 2019-10-15 PROCEDURE — 90662 FLU VACCINE - HIGH DOSE (65+) PRESERVATIVE FREE IM: ICD-10-PCS | Mod: S$GLB,,, | Performed by: INTERNAL MEDICINE

## 2019-10-15 PROCEDURE — 3074F PR MOST RECENT SYSTOLIC BLOOD PRESSURE < 130 MM HG: ICD-10-PCS | Mod: CPTII,S$GLB,, | Performed by: INTERNAL MEDICINE

## 2019-10-15 PROCEDURE — G0008 FLU VACCINE - HIGH DOSE (65+) PRESERVATIVE FREE IM: ICD-10-PCS | Mod: S$GLB,,, | Performed by: INTERNAL MEDICINE

## 2019-10-15 PROCEDURE — 3078F PR MOST RECENT DIASTOLIC BLOOD PRESSURE < 80 MM HG: ICD-10-PCS | Mod: CPTII,S$GLB,, | Performed by: INTERNAL MEDICINE

## 2019-10-15 PROCEDURE — G0008 ADMIN INFLUENZA VIRUS VAC: HCPCS | Mod: S$GLB,,, | Performed by: INTERNAL MEDICINE

## 2019-10-15 RX ORDER — ASPIRIN 81 MG/1
81 TABLET ORAL DAILY
Qty: 30 TABLET | Refills: 6 | Status: SHIPPED | OUTPATIENT
Start: 2019-10-15 | End: 2019-10-15 | Stop reason: SDUPTHER

## 2019-10-15 RX ORDER — ATORVASTATIN CALCIUM 40 MG/1
40 TABLET, FILM COATED ORAL DAILY
Qty: 90 TABLET | Refills: 3 | Status: SHIPPED | OUTPATIENT
Start: 2019-10-15 | End: 2019-10-15 | Stop reason: SDUPTHER

## 2019-10-15 RX ORDER — LISINOPRIL 40 MG/1
40 TABLET ORAL DAILY
Qty: 90 TABLET | Refills: 3 | Status: SHIPPED | OUTPATIENT
Start: 2019-10-15 | End: 2019-10-15 | Stop reason: SDUPTHER

## 2019-10-15 RX ORDER — LISINOPRIL 40 MG/1
40 TABLET ORAL DAILY
Qty: 90 TABLET | Refills: 3 | Status: SHIPPED | OUTPATIENT
Start: 2019-10-15 | End: 2020-01-20 | Stop reason: SDUPTHER

## 2019-10-15 RX ORDER — VIT C/E/ZN/COPPR/LUTEIN/ZEAXAN 250MG-90MG
1000 CAPSULE ORAL DAILY
Refills: 0 | COMMUNITY
Start: 2019-10-15 | End: 2019-10-15 | Stop reason: SDUPTHER

## 2019-10-15 RX ORDER — ASPIRIN 81 MG/1
81 TABLET ORAL DAILY
Qty: 90 TABLET | Refills: 3 | Status: SHIPPED | OUTPATIENT
Start: 2019-10-15 | End: 2020-11-27 | Stop reason: SDUPTHER

## 2019-10-15 RX ORDER — ATORVASTATIN CALCIUM 40 MG/1
40 TABLET, FILM COATED ORAL DAILY
Qty: 90 TABLET | Refills: 3 | Status: SHIPPED | OUTPATIENT
Start: 2019-10-15 | End: 2020-01-20 | Stop reason: SDUPTHER

## 2019-10-15 RX ORDER — LEVOTHYROXINE SODIUM 75 UG/1
75 TABLET ORAL DAILY
Qty: 90 TABLET | Refills: 3 | Status: SHIPPED | OUTPATIENT
Start: 2019-10-15 | End: 2020-01-20 | Stop reason: SDUPTHER

## 2019-10-15 RX ORDER — VIT C/E/ZN/COPPR/LUTEIN/ZEAXAN 250MG-90MG
1000 CAPSULE ORAL DAILY
Qty: 90 CAPSULE | Refills: 3 | Status: ON HOLD | OUTPATIENT
Start: 2019-10-15 | End: 2020-10-07 | Stop reason: SDUPTHER

## 2019-10-15 RX ORDER — LEVOTHYROXINE SODIUM 75 UG/1
75 TABLET ORAL DAILY
Qty: 90 TABLET | Refills: 3 | Status: SHIPPED | OUTPATIENT
Start: 2019-10-15 | End: 2019-10-15 | Stop reason: SDUPTHER

## 2019-10-15 NOTE — Clinical Note
Please help patient with cost of inhalers (BREO). He has low health literacy. Any forms that need completion need to be brought to PCP.Thanks,KRISTINA

## 2019-10-15 NOTE — ASSESSMENT & PLAN NOTE
Novolin 70/30 40U in AM, 30U in PM. Elevated A1c  · Continue current therapies  · F/U Endo  · Patient consistently no-shows  · Monitor A1c today

## 2019-10-15 NOTE — TELEPHONE ENCOUNTER
Pt has been seen by nurse, med student and PCP today. Med rec was done, Used 2 pt identifiers and reviewed allergies prior to giving FLUZONE HD injection in the L/deltoid, VIS given then asked pt to wait 15 mins post injection for s/sx of adverse reactions.

## 2019-10-15 NOTE — ASSESSMENT & PLAN NOTE
Patient taking multiple meds with limited insight, questionable compliance  · Needs to bring ALL medications that he is taking to next appt  · Is he taking coreg or lasix?  · Very poor historian regarding med compliance  · pharmacy for pill packing with change in regimen to daily dosing

## 2019-10-15 NOTE — ASSESSMENT & PLAN NOTE
Continuous O2, followed by Pulm  · Lost to follow-up with Pulm since 2015  · Re-est care with Pulm in 5/2018  · Continue O2  · Started on BREO  · Needs patient assistance  · Continue PRN albuterol  · Needs follow-up, but refuses to navigate hospital  · Will address compliance issues today

## 2019-10-15 NOTE — ASSESSMENT & PLAN NOTE
24-hours-Radioiodine Uptake 30% in R lobe. Treated with 30mci orally with I-131 in 9/2017  · Monitor TSH  · Lost to F/U Endo  · Refuses appts in main hospital  · Continue to address

## 2019-10-15 NOTE — PROGRESS NOTES
"PCP Addendum to Nursing Note    HPI: Patient here for pill packing. Has not taken meds for an unknown amount of time. Last scripts sent to a local pharmacy in 7/2019, but it is unclear whether he took those meds. Last pill pack was 1/2019.    Plan to repeat pill packs with thyroid medication enclosed to improve compliance.    He is injecting insulin 70/30U 40U in AM, 30U in PM. His sugars are in the 130s when "I eat right."    Plan:  - Labs today  - flu vaccine  - pill packing   + will need coreg or lasix added    + dangerous to add in setting of unknown compliance  - pharmacy patient assistance for JOHN Chow MD/MPH  Internal Medicine  [facility]  [phone] spectralink     "

## 2019-10-15 NOTE — PROGRESS NOTES
Adherence packed for 28 days using Dispill:     Morning card:   Aspirin 81 mg   Atorvastatin 40 mg  Levothyroxine 75 mcg  Lisinopril 40 mg   Vitamin D3 5000 IU

## 2019-10-15 NOTE — PROGRESS NOTES
"Primary Care Provider Appointment    Subjective:      Patient ID: Ed Patton is a 64 y.o. male with CHF, HTN, HLD, DM, noncompliance, low health literacy    Chief Complaint: Diabetes; Hypertension; and Thyroid Problem    Patient here for pill packing. Has not taken meds for an unknown amount of time. Last scripts sent to a local pharmacy in 7/2019, but it is unclear whether he took those meds. Last pill pack was 1/2019. Has considerable confusion surrounding all meds.      Plan to repeat pill packs with thyroid medication enclosed to improve compliance.     He is injecting insulin 70/30U 40U in AM, 30U in PM. His sugars are in the 130s when "I eat right." States he buys his insulin OTC at [facility].    Proposed pill packing changes as follows to once daily dosing.           Past Surgical History:   Procedure Laterality Date    APPENDECTOMY      CHOLECYSTECTOMY      CORONARY ANGIOPLASTY WITH STENT PLACEMENT      TONSILLECTOMY         Past Medical History:   Diagnosis Date    CHF (congestive heart failure)     COPD (chronic obstructive pulmonary disease)     Coronary artery disease     Diabetes mellitus     Diabetes mellitus type II     DM (diabetes mellitus) type II uncontrolled with renal manifestation 9/4/2013    Hyperlipidemia     Hypertension     Postablative hypothyroidism 12/6/2018       Social History     Socioeconomic History    Marital status:      Spouse name: Not on file    Number of children: Not on file    Years of education: Not on file    Highest education level: Not on file   Occupational History    Not on file   Social Needs    Financial resource strain: Not on file    Food insecurity:     Worry: Not on file     Inability: Not on file    Transportation needs:     Medical: Not on file     Non-medical: Not on file   Tobacco Use    Smoking status: Former Smoker     Packs/day: 0.01     Years: 3.00     Pack years: 0.03     Types: Cigarettes     Last attempt to quit: " 1995     Years since quittin.4    Smokeless tobacco: Never Used   Substance and Sexual Activity    Alcohol use: No    Drug use: No    Sexual activity: Never     Comment:  with 1 son   Lifestyle    Physical activity:     Days per week: Not on file     Minutes per session: Not on file    Stress: Not on file   Relationships    Social connections:     Talks on phone: Not on file     Gets together: Not on file     Attends Catholic service: Not on file     Active member of club or organization: Not on file     Attends meetings of clubs or organizations: Not on file     Relationship status: Not on file   Other Topics Concern    Not on file   Social History Narrative     with 1 son       Review of Systems   Constitutional: Positive for activity change and fatigue. Negative for appetite change.   Respiratory: Negative for cough, choking and shortness of breath.    Cardiovascular: Negative for chest pain and leg swelling.   Gastrointestinal: Negative for abdominal pain and diarrhea.   Musculoskeletal: Positive for gait problem. Negative for joint swelling.   Psychiatric/Behavioral: Positive for confusion and decreased concentration. Negative for agitation.       Objective:   There were no vitals taken for this visit.    Physical Exam   Constitutional: He is oriented to person, place, and time. He appears well-developed and well-nourished.   Wearing NC oxygen   HENT:   Head: Normocephalic.   Eyes: EOM are normal.   Cardiovascular: Normal rate.   Musculoskeletal: Normal range of motion.   Neurological: He is alert and oriented to person, place, and time.   Skin: Skin is warm and dry.   Psychiatric: He has a normal mood and affect.   Poor insight into disease   Nursing note and vitals reviewed.      Lab Results   Component Value Date    WBC 11.19 2018    HGB 11.9 (L) 2018    HCT 39.1 (L) 2018     2018    CHOL 143 2018    TRIG 119 2018    HDL 41  05/01/2018    ALT 33 05/01/2018    AST 23 05/01/2018     11/26/2018    K 3.5 11/26/2018     11/26/2018    CREATININE 1.5 (H) 11/26/2018    BUN 32 (H) 11/26/2018    CO2 28 11/26/2018    TSH 31.843 (H) 11/26/2018    PSA 0.4 03/05/2004    INR 1.0 10/16/2014    HGBA1C 8.8 (H) 11/26/2018       RESULTS: Reviewed labs and images today    Assessment:   64 y.o. male with multiple co-morbid illnesses here to continue work-up of chronic issues notably with CHF, HTN, HLD, DM, noncompliance, low health literacy     Plan:     Problem List Items Addressed This Visit        Pulmonary    Chronic respiratory failure with hypoxia     Continuous O2, followed by Pulm  · Lost to follow-up with Pulm since 2015  · Re-est care with Pulm in 5/2018  · Continue O2  · Started on BREO  · Needs patient assistance  · Continue PRN albuterol  · Needs follow-up, but refuses to navigate hospital  · Will address compliance issues today         Relevant Orders    Ambulatory referral to Outpatient Case Management    COPD mixed type    Relevant Orders    Ambulatory referral to Outpatient Case Management       Cardiac/Vascular    CHF (congestive heart failure)    Relevant Orders    Ambulatory referral to Outpatient Case Management    Angina pectoris associated with type 2 diabetes mellitus     CAD on cath in 2006 and 2011, angina controlled medically on ACE, BB, digoxin, statin, ASA  · Refuses F/U Cards  · Continue meds  · NOT COMPLIANT         Relevant Orders    Ambulatory referral to Outpatient Case Management       Endocrine    CKD stage 3 due to type 2 diabetes mellitus     GFR> 60, uncontrolled DM  · Continue novolin 70/30 40U in AM - 30U in PM  · A1c today         Relevant Orders    Ambulatory referral to Outpatient Case Management    Type 2 diabetes mellitus with hyperlipidemia     High ASCVD with CAD, elevated A1c  · Continue Novolin 40-30  · Continue statin, ASA  · Refuses F/U with Cardiology due to challenges of navigating the  hospital         Relevant Orders    Ambulatory referral to Outpatient Case Management    Toxic thyroid nodule     24-hours-Radioiodine Uptake 30% in R lobe. Treated with 30mci orally with I-131 in 9/2017  · Monitor TSH  · Lost to F/U Endo  · Refuses appts in main hospital  · Continue to address         Relevant Orders    Ambulatory referral to Outpatient Case Management    Uncontrolled type 2 diabetes mellitus with hyperglycemia, with long-term current use of insulin     Novolin 70/30 40U in AM, 30U in PM. Elevated A1c  · Continue current therapies  · F/U Endo  · Patient consistently no-shows  · Monitor A1c today         Relevant Orders    Ambulatory referral to Outpatient Case Management       Other    Polypharmacy     Patient taking multiple meds with limited insight, questionable compliance  · Needs to bring ALL medications that he is taking to next appt  · Is he taking coreg or lasix?  · Very poor historian regarding med compliance  · pharmacy for pill packing with change in regimen to daily dosing         Relevant Orders    Ambulatory referral to Outpatient Case Management          Health Maintenance       Date Due Completion Date    Shingles Vaccine (1 of 2) 02/09/2007 ---    Colonoscopy 02/09/2007 ---    Hemoglobin A1c 02/26/2019 11/26/2018- TODAY    Lipid Panel 05/01/2019 5/1/2018- TODAY    Eye Exam 05/01/2019 5/1/2018- TODAY    Foot Exam 12/04/2019 12/4/2018 (Done)    Override on 12/4/2018: Done    High Dose Statin 10/15/2020 10/15/2019    Aspirin/Antiplatelet Therapy 10/15/2020 10/15/2019    TETANUS VACCINE 09/14/2023 9/14/2013          Follow up in about 4 weeks (around 11/12/2019). Total face-to-face time was 60 min, 50% of this was spent on counseling and coordination of care. The following issues were discussed: with CHF, HTN, HLD, DM, noncompliance, low health literacy    Qiana Chow MD/MPH  Internal Medicine  [facility]  [phone]

## 2019-10-15 NOTE — ASSESSMENT & PLAN NOTE
CAD on cath in 2006 and 2011, angina controlled medically on ACE, BB, digoxin, statin, ASA  · Refuses F/U Cards  · Continue meds  · NOT COMPLIANT

## 2019-10-15 NOTE — ASSESSMENT & PLAN NOTE
High ASCVD with CAD, elevated A1c  · Continue Novolin 40-30  · Continue statin, ASA  · Refuses F/U with Cardiology due to challenges of navigating the hospital

## 2019-10-15 NOTE — PATIENT INSTRUCTIONS
TODAY:  - Labs today  - flu vaccine  - pill packing  - pharmacy patient assistance for BREO  - follow-up appts on 11/5 Tues  - nurse visit in 1 week

## 2019-10-16 ENCOUNTER — TELEPHONE (OUTPATIENT)
Dept: PRIMARY CARE CLINIC | Facility: CLINIC | Age: 64
End: 2019-10-16

## 2019-10-16 NOTE — TELEPHONE ENCOUNTER
Pt called later to say he will come in the morning for instruction and med reconciliation w/ home supply.

## 2019-10-16 NOTE — PROGRESS NOTES
This LMSW received a referral on the above patient.   Reason for referral: assess and provide resources  Name of the community resource that was provided: [employer] advance care planning packet; [facility]; [employer] OTC catalog; [employer] transportation  Resource given to: pt via in person    [facility] LMSW met with pt in [facility] clinic to assess and provide resources. Pt is insured by [employer] O. Pts PCP is Dr. Qiana Chow. Pt performs ADLs independently. Pt drives and owns a vehicle. 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 Pt is a high school graduated; pt graduated from [employer] in [place], [place]. Pt reports that his income is approximately $2,700 per month. Pt does not know his wifes income. Pt reports that his disaster plan is to evacuate with his wife in his vehicle. LMSW discussed [employer] benefits with pt. LMSW provided education regarding [employer] transportation and [employer] OTC benefits. Pt reports that he does not have a medical POA or living will. LMSW provided education regarding advance directives and the [employer] Advance Care planning packet. Pt reports that he has difficulty paying for his medications and medical bills. LMSW referred pt to [facility] financial and prescription via [employer] in basket. Pt reports that he does not have a psychiatric dx. PHQ score is 0. Pt denies SI/HI.  LMSW explained and mailed information about all discussed resources. LMSW also provided contact information and encouraged pt to call if any additional needs arise. Case will be closed at this time.

## 2019-10-17 ENCOUNTER — CLINICAL SUPPORT (OUTPATIENT)
Dept: PRIMARY CARE CLINIC | Facility: CLINIC | Age: 64
End: 2019-10-17
Payer: MEDICARE

## 2019-10-17 VITALS — SYSTOLIC BLOOD PRESSURE: 122 MMHG | DIASTOLIC BLOOD PRESSURE: 78 MMHG | HEART RATE: 61 BPM

## 2019-10-17 DIAGNOSIS — J98.4 CRLD (CHRONIC RESTRICTIVE LUNG DISEASE): ICD-10-CM

## 2019-10-17 DIAGNOSIS — I50.9 CONGESTIVE HEART FAILURE, UNSPECIFIED HF CHRONICITY, UNSPECIFIED HEART FAILURE TYPE: Primary | ICD-10-CM

## 2019-10-17 DIAGNOSIS — J96.11 CHRONIC RESPIRATORY FAILURE WITH HYPOXIA: ICD-10-CM

## 2019-10-17 DIAGNOSIS — I50.42 CHRONIC COMBINED SYSTOLIC AND DIASTOLIC HF (HEART FAILURE): ICD-10-CM

## 2019-10-17 RX ORDER — FLUTICASONE FUROATE AND VILANTEROL 200; 25 UG/1; UG/1
POWDER RESPIRATORY (INHALATION)
Qty: 180 EACH | Refills: 3 | Status: SHIPPED | OUTPATIENT
Start: 2019-10-17 | End: 2021-03-03 | Stop reason: CLARIF

## 2019-10-17 RX ORDER — FLUTICASONE PROPIONATE 50 MCG
SPRAY, SUSPENSION (ML) NASAL
Qty: 32 G | Refills: 3 | Status: SHIPPED | OUTPATIENT
Start: 2019-10-17 | End: 2020-01-20 | Stop reason: SDUPTHER

## 2019-10-17 RX ORDER — CARVEDILOL 25 MG/1
25 TABLET ORAL 2 TIMES DAILY
Qty: 180 TABLET | Refills: 3 | Status: SHIPPED | OUTPATIENT
Start: 2019-10-17 | End: 2020-01-20 | Stop reason: SDUPTHER

## 2019-10-17 RX ORDER — FUROSEMIDE 80 MG/1
TABLET ORAL
Qty: 180 TABLET | Refills: 3 | Status: SHIPPED | OUTPATIENT
Start: 2019-10-17 | End: 2020-01-20 | Stop reason: SDUPTHER

## 2019-10-17 RX ORDER — GABAPENTIN 100 MG/1
100 CAPSULE ORAL DAILY
Qty: 90 CAPSULE | Refills: 3 | Status: SHIPPED | OUTPATIENT
Start: 2019-10-17 | End: 2020-01-20 | Stop reason: SDUPTHER

## 2019-10-17 RX ORDER — CARVEDILOL 25 MG/1
25 TABLET ORAL
COMMUNITY
End: 2019-10-17 | Stop reason: SDUPTHER

## 2019-10-17 NOTE — TELEPHONE ENCOUNTER
Pt visited included a full review of pill packs and meds from home. He explained he currently takes the following:    In the morning     ASA 81 mg po daily  Vit d3 5000 iu daily  Lisinopril 40 mg po daily  Neurontin 100 mg po daily  Coreg 25 mg po BID  Lasix 80 mg po BID         In the evening    Lasix 80 mg po BID  Coreg 25 mg po BID  Atorvastatin 40 mg po BID    He said he needs his Breo, NTG and Flonase, requested a price for him.  His vs were 122/78 - 61- 98%. He used his o2 while walking and took it off while seated for awhile.

## 2019-10-17 NOTE — TELEPHONE ENCOUNTER
Scripts sent to [facility] pharmacy.    Ray:  - please pack the coreg into his pill packs, will need repacking  - bottles of lasix, gabapentin sent, those should not be in packs  - BREO script sent, will need pharmacy patient assistance for cost. Message sent.    KJ  ----- Message from Magda Cruz PharmD sent at 10/17/2019 12:55 PM CDT -----  Regarding: need new script  Contact: [phone]  Hi!    I need a new script for carvedilol please. I sent the request for the gabapentin.    These are the last 2 I need to finish packing.     Thanks!

## 2019-10-17 NOTE — PROGRESS NOTES
Adherence packed for 28 days using Dispill:     Morning card:   Aspirin 81 mg   Atorvastatin 40 mg  Carvedilol 25 mg  Furosemide 80 mg  Levothyroxine 75 mcg   Lisinopril 40 mg   Vitamin D3 1000 IU     Evening card:  Carvedilol 25 mg  Furosemide 80 mg      10/17/2019: gave bottle of gabapentin 100 mg, 90 capsules.

## 2019-10-18 ENCOUNTER — TELEPHONE (OUTPATIENT)
Dept: PRIMARY CARE CLINIC | Facility: CLINIC | Age: 64
End: 2019-10-18

## 2019-10-18 NOTE — TELEPHONE ENCOUNTER
Please see if patient was able to  his pill packs. Is he taking the meds?    Also let him know that his A1c is uncontrolled at 9. Please get more information on whether he is injecting his insulin on a regular basis, and what is he injecting?    His TSH was elevated, but he should continue his current thyroid med in the pill packs. He is still within the normal range for his age for TSH, even though it's elevated.    Thanks,  KRISTINA

## 2019-10-22 ENCOUNTER — PATIENT OUTREACH (OUTPATIENT)
Dept: ADMINISTRATIVE | Facility: HOSPITAL | Age: 64
End: 2019-10-22

## 2019-10-22 ENCOUNTER — OFFICE VISIT (OUTPATIENT)
Dept: PRIMARY CARE CLINIC | Facility: CLINIC | Age: 64
End: 2019-10-22
Payer: MEDICARE

## 2019-10-22 VITALS
WEIGHT: 250.25 LBS | HEART RATE: 65 BPM | SYSTOLIC BLOOD PRESSURE: 130 MMHG | OXYGEN SATURATION: 97 % | BODY MASS INDEX: 33.89 KG/M2 | DIASTOLIC BLOOD PRESSURE: 80 MMHG | HEIGHT: 72 IN

## 2019-10-22 DIAGNOSIS — Z79.4 UNCONTROLLED TYPE 2 DIABETES MELLITUS WITH HYPERGLYCEMIA, WITH LONG-TERM CURRENT USE OF INSULIN: ICD-10-CM

## 2019-10-22 DIAGNOSIS — Z79.899 POLYPHARMACY: ICD-10-CM

## 2019-10-22 DIAGNOSIS — E11.65 UNCONTROLLED TYPE 2 DIABETES MELLITUS WITH HYPERGLYCEMIA, WITH LONG-TERM CURRENT USE OF INSULIN: ICD-10-CM

## 2019-10-22 PROCEDURE — 3075F SYST BP GE 130 - 139MM HG: CPT | Mod: CPTII,S$GLB,, | Performed by: INTERNAL MEDICINE

## 2019-10-22 PROCEDURE — 3079F DIAST BP 80-89 MM HG: CPT | Mod: CPTII,S$GLB,, | Performed by: INTERNAL MEDICINE

## 2019-10-22 PROCEDURE — 3046F PR MOST RECENT HEMOGLOBIN A1C LEVEL > 9.0%: ICD-10-PCS | Mod: CPTII,S$GLB,, | Performed by: INTERNAL MEDICINE

## 2019-10-22 PROCEDURE — 3046F HEMOGLOBIN A1C LEVEL >9.0%: CPT | Mod: CPTII,S$GLB,, | Performed by: INTERNAL MEDICINE

## 2019-10-22 PROCEDURE — 3008F PR BODY MASS INDEX (BMI) DOCUMENTED: ICD-10-PCS | Mod: CPTII,S$GLB,, | Performed by: INTERNAL MEDICINE

## 2019-10-22 PROCEDURE — 99215 OFFICE O/P EST HI 40 MIN: CPT | Mod: S$GLB,,, | Performed by: INTERNAL MEDICINE

## 2019-10-22 PROCEDURE — 3008F BODY MASS INDEX DOCD: CPT | Mod: CPTII,S$GLB,, | Performed by: INTERNAL MEDICINE

## 2019-10-22 PROCEDURE — 3075F PR MOST RECENT SYSTOLIC BLOOD PRESS GE 130-139MM HG: ICD-10-PCS | Mod: CPTII,S$GLB,, | Performed by: INTERNAL MEDICINE

## 2019-10-22 PROCEDURE — 3079F PR MOST RECENT DIASTOLIC BLOOD PRESSURE 80-89 MM HG: ICD-10-PCS | Mod: CPTII,S$GLB,, | Performed by: INTERNAL MEDICINE

## 2019-10-22 PROCEDURE — 99215 PR OFFICE/OUTPT VISIT, EST, LEVL V, 40-54 MIN: ICD-10-PCS | Mod: S$GLB,,, | Performed by: INTERNAL MEDICINE

## 2019-10-22 NOTE — ASSESSMENT & PLAN NOTE
Patient taking multiple meds with limited insight, questionable compliance  · Remains unclear whether he is consistently taking coreg or lasix?  · Very poor historian regarding med compliance  · pharmacy for pill packing with change in regimen to BID dosing  · Patient picked up today

## 2019-10-22 NOTE — PROGRESS NOTES
"Primary Care Provider Appointment    Subjective:      Patient ID: Ed Patton is a 64 y.o. male with HTN, HLD, CKD    Chief Complaint: Follow-up    Patient still hasn't started pill packs. He states he is taking all meds as directed, it remains unclear. He states today that he is taking ACE, BB BID, lasix BID. He only knows the color of the tablets and not the names.    Last A1c was 9.6. He reports he eats when they "go down low." He injects 70/30 insulin 40U in AM, 30U in PM. He reports not skipping any doses.    TSH at goal for his age with levothyroxine 75mcg. Will continue to monitor. Is asymptomatic.    Pills packed as followed:  Adherence packed for 28 days using Dispill:      Morning card:   Aspirin 81 mg   Atorvastatin 40 mg  Carvedilol 25 mg  Furosemide 80 mg  Levothyroxine 75 mcg   Lisinopril 40 mg   Vitamin D3 1000 IU      Evening card:  Carvedilol 25 mg  Furosemide 80 mg        10/17/2019: gave bottle of gabapentin 100 mg, 90 capsules.     Past Surgical History:   Procedure Laterality Date    APPENDECTOMY      CHOLECYSTECTOMY      CORONARY ANGIOPLASTY WITH STENT PLACEMENT      TONSILLECTOMY         Past Medical History:   Diagnosis Date    CHF (congestive heart failure)     COPD (chronic obstructive pulmonary disease)     Coronary artery disease     Diabetes mellitus     Diabetes mellitus type II     DM (diabetes mellitus) type II uncontrolled with renal manifestation 9/4/2013    Hyperlipidemia     Hypertension     Postablative hypothyroidism 12/6/2018       Social History     Socioeconomic History    Marital status:      Spouse name: Not on file    Number of children: Not on file    Years of education: Not on file    Highest education level: Not on file   Occupational History    Not on file   Social Needs    Financial resource strain: Not on file    Food insecurity:     Worry: Not on file     Inability: Not on file    Transportation needs:     Medical: Not on file     " Non-medical: Not on file   Tobacco Use    Smoking status: Former Smoker     Packs/day: 0.01     Years: 3.00     Pack years: 0.03     Types: Cigarettes     Last attempt to quit: 1995     Years since quittin.4    Smokeless tobacco: Never Used   Substance and Sexual Activity    Alcohol use: No    Drug use: No    Sexual activity: Never     Comment:  with 1 son   Lifestyle    Physical activity:     Days per week: Not on file     Minutes per session: Not on file    Stress: Not on file   Relationships    Social connections:     Talks on phone: Not on file     Gets together: Not on file     Attends Oriental orthodox service: Not on file     Active member of club or organization: Not on file     Attends meetings of clubs or organizations: Not on file     Relationship status: Not on file   Other Topics Concern    Not on file   Social History Narrative     with 1 son       Review of Systems   Constitutional: Positive for activity change and fatigue. Negative for appetite change.   Respiratory: Negative for cough, choking and shortness of breath.    Cardiovascular: Negative for chest pain and leg swelling.   Gastrointestinal: Negative for abdominal pain and diarrhea.   Musculoskeletal: Positive for gait problem. Negative for joint swelling.   Psychiatric/Behavioral: Positive for confusion and decreased concentration. Negative for agitation.       Objective:   /80   Pulse 65   Ht 6' (1.829 m)   Wt 113.5 kg (250 lb 3.6 oz)   SpO2 97%   BMI 33.94 kg/m²     Physical Exam   Constitutional: He is oriented to person, place, and time. He appears well-developed and well-nourished.   Wearing NC oxygen   HENT:   Head: Normocephalic.   Eyes: EOM are normal.   Cardiovascular: Normal rate.   Musculoskeletal: Normal range of motion.   Neurological: He is alert and oriented to person, place, and time.   Skin: Skin is warm and dry.   Psychiatric: He has a normal mood and affect.   Poor insight into disease    Nursing note and vitals reviewed.      Lab Results   Component Value Date    WBC 10.77 10/15/2019    HGB 12.2 (L) 10/15/2019    HCT 38.4 (L) 10/15/2019     10/15/2019    CHOL 157 10/15/2019    TRIG 73 10/15/2019    HDL 47 10/15/2019    ALT 18 10/15/2019    AST 16 10/15/2019     10/15/2019    K 3.7 10/15/2019     10/15/2019    CREATININE 1.5 (H) 10/15/2019    BUN 31 (H) 10/15/2019    CO2 30 (H) 10/15/2019    TSH 8.692 (H) 10/15/2019    PSA 0.4 03/05/2004    INR 1.0 10/16/2014    HGBA1C 9.6 (H) 10/15/2019       RESULTS: Reviewed labs and images today    Assessment:   64 y.o. male with multiple co-morbid illnesses here to continue work-up of chronic issues notably with HTN, HLD, CKD     Plan:     Problem List Items Addressed This Visit        Endocrine    Uncontrolled type 2 diabetes mellitus with hyperglycemia, with long-term current use of insulin    Relevant Medications    insulin NPH-insulin regular, 70/30, (NOVOLIN 70/30 U-100 INSULIN) 100 unit/mL (70-30) injection       Other    Polypharmacy     Patient taking multiple meds with limited insight, questionable compliance  · Remains unclear whether he is consistently taking coreg or lasix?  · Very poor historian regarding med compliance  · pharmacy for pill packing with change in regimen to BID dosing  · Patient picked up today               Health Maintenance       Date Due Completion Date    Shingles Vaccine (1 of 2) 02/09/2007 ---    Colonoscopy 02/09/2007 ---    Pneumococcal Vaccine (Highest Risk) (2 of 3 - PCV13) 05/01/2019 5/1/2018- NEXT    Eye Exam 05/01/2019 5/1/2018- NEXT    Foot Exam 12/04/2019 12/4/2018 (Done)- NEXT    Override on 12/4/2018: Done    Hemoglobin A1c 01/15/2020 10/15/2019    Lipid Panel 10/15/2020 10/15/2019    High Dose Statin 10/17/2020 10/17/2019    Aspirin/Antiplatelet Therapy 10/17/2020 10/17/2019    TETANUS VACCINE 09/14/2023 9/14/2013          Follow up in about 1 week (around 10/29/2019). . Total face-to-face  time was 60 min, 50% of this was spent on counseling and coordination of care. The following issues were discussed: with HTN, HLD, CKD    Qiana Chow MD/MPH  Internal Medicine  [facility]  [phone]

## 2019-10-23 ENCOUNTER — TELEPHONE (OUTPATIENT)
Dept: PRIMARY CARE CLINIC | Facility: CLINIC | Age: 64
End: 2019-10-23

## 2019-10-23 ENCOUNTER — TELEPHONE (OUTPATIENT)
Dept: PHARMACY | Facility: CLINIC | Age: 64
End: 2019-10-23

## 2019-10-23 NOTE — TELEPHONE ENCOUNTER
Unable to leave a voice message because patient do not have a voicemail set up.  I mailed a letter offering assistance with Pharmacy Patient Assistance.

## 2019-10-23 NOTE — TELEPHONE ENCOUNTER
----- Message from Ksenia Miller sent at 10/23/2019  4:16 PM CDT -----  Can we change the novolin 70/30 to humlin 70/30?  If not the patient will get Novolin 70/30 vials and must spend $1000 out of pocket expense before they would be approved.  ----- Message -----  From: Qiana Chow MD  Sent: 10/23/2019   1:04 PM CDT  To: Ksenia Miller    Can you bring me the forms? The patient can complete them with us at his next visit.    ThanksKRISTINA  ----- Message -----  From: Ksenia Miller  Sent: 10/23/2019  11:49 AM CDT  To: Qiana Chow MD    Hi Dr. Chow,  Unable to leave a voice message because patient do not have a voicemail set up.  I mailed a letter offering assistance with Pharmacy Patient Assistance.   ----- Message -----  From: Qiana Chow MD  Sent: 10/15/2019  12:05 PM CDT  To: Pharmacy Patient Assistance Team    Please help patient with cost of inhalers (BREO). He has low health literacy. Any forms that need completion need to be brought to PCP.    ThanksKRISTINA

## 2019-10-25 ENCOUNTER — TELEPHONE (OUTPATIENT)
Dept: PHARMACY | Facility: CLINIC | Age: 64
End: 2019-10-25

## 2019-10-28 ENCOUNTER — TELEPHONE (OUTPATIENT)
Dept: OPHTHALMOLOGY | Facility: CLINIC | Age: 64
End: 2019-10-28

## 2019-10-28 NOTE — TELEPHONE ENCOUNTER
Called Opthalmology to request assitance in scheduling and Axel sent a message to 's staff to schedule per [facility] protocol. Podiatry appt is scheduled for 11/5. Letter sent as a reminder.

## 2019-10-28 NOTE — TELEPHONE ENCOUNTER
----- Message from Axel Navarrete sent at 10/28/2019  4:25 PM CDT -----  Contact: Cammy (with Dr. Chow)l;  Cammy called to get the pt scheduled a appt to check for diabetic eye disease, pt was seen in the past to do the diabetes eye cam, camera was unable to read the pt's eyes, per Dr. Chow would like for the pt to be seen by MD      Please contact [phone]

## 2019-10-28 NOTE — TELEPHONE ENCOUNTER
No voice mail is set up for either phone [phone], [phone]. Pt stated his Arthritis flared up so he was willing to reschedule for next Tuesday. Pt stated he needs insulin needles and 70/30 insulin. Explained the 70/30 insulin was ordered on 10/22/2019. Pended insulin syringe order. Updated pt's new mobile number in demographics.

## 2019-10-29 RX ORDER — SYRINGE-NEEDLE,INSULIN,0.5 ML 30 GX5/16"
1 SYRINGE, EMPTY DISPOSABLE MISCELLANEOUS 2 TIMES DAILY
Qty: 200 EACH | Refills: 6 | OUTPATIENT
Start: 2019-10-29

## 2019-10-29 NOTE — TELEPHONE ENCOUNTER
----- Message from Ksenia Miller sent at 10/29/2019  9:03 AM CDT -----  Hi Dr. Chow,   I reminded him Friday  I still need his proof of income and EOB from his insurance.    ----- Message -----  From: Qiana Chow MD  Sent: 10/28/2019   4:34 PM CDT  To: Ksenia Miller    Do you have everything you need for this patient? He just messaged me asking for a refill but I'm not sure which version or where I should send it to.    KRISTINA Pascal  ----- Message -----  From: Ksenia Miller  Sent: 10/23/2019   4:16 PM CDT  To: Qiana Chow MD    Can we change the novolin 70/30 to humlin 70/30?  If not the patient will get Novolin 70/30 vials and must spend $1000 out of pocket expense before they would be approved.  ----- Message -----  From: Qiana Chow MD  Sent: 10/23/2019   1:04 PM CDT  To: Ksenia Miller    Can you bring me the forms? The patient can complete them with us at his next visit.    ThanksKRISTINA  ----- Message -----  From: Ksenia Miller  Sent: 10/23/2019  11:49 AM CDT  To: Qiana Chow MD    Hi Dr. Chow,  Unable to leave a voice message because patient do not have a voicemail set up.  I mailed a letter offering assistance with Pharmacy Patient Assistance.   ----- Message -----  From: Qiana Chow MD  Sent: 10/15/2019  12:05 PM CDT  To: Pharmacy Patient Assistance Team    Please help patient with cost of inhalers (BREO). He has low health literacy. Any forms that need completion need to be brought to PCP.    KRISTINA Pascal

## 2019-10-29 NOTE — TELEPHONE ENCOUNTER
Cammy- please see this note from pharmacy patient assistance and remind patient that he needs to bring those documents to clinic in order to get his meds discounted.    Where does he want me to send his insulin scripts to? Was he buying it at [facility]? I will send  humilin 70/30 and his syringes to wherever he can get it the cheapest.    Thanks,  KJ

## 2019-10-30 NOTE — TELEPHONE ENCOUNTER
LM for pt to return a call to us regarding the insulin and reminded him to turn in the necessary forms to the Pharmacy assistance program.

## 2019-10-31 ENCOUNTER — PATIENT OUTREACH (OUTPATIENT)
Dept: ADMINISTRATIVE | Facility: OTHER | Age: 64
End: 2019-10-31

## 2019-10-31 NOTE — PROGRESS NOTES
Pt was seen on 10/22/19 by the nurse to re-check his Bp and review meds for pill packing. Vs are doc on the flowsheet and med review was fwd'd to pharmacy to prepare pill packs.

## 2019-11-05 ENCOUNTER — TELEPHONE (OUTPATIENT)
Dept: PRIMARY CARE CLINIC | Facility: CLINIC | Age: 64
End: 2019-11-05

## 2019-11-05 NOTE — TELEPHONE ENCOUNTER
Does patient have enough pill packs? He no-showed today. Can he come to clinic sooner than 11/18?    Thanks,  KJ

## 2019-11-05 NOTE — TELEPHONE ENCOUNTER
CEE for pt but when I spoke w/ him earlier he did state  He had enough meds to last him until his appt

## 2019-11-14 ENCOUNTER — PATIENT OUTREACH (OUTPATIENT)
Dept: ADMINISTRATIVE | Facility: OTHER | Age: 64
End: 2019-11-14

## 2019-11-18 ENCOUNTER — TELEPHONE (OUTPATIENT)
Dept: PRIMARY CARE CLINIC | Facility: CLINIC | Age: 64
End: 2019-11-18

## 2019-11-18 NOTE — PROGRESS NOTES
Adherence packed for 28 days using Dispill:     Morning card:   Aspirin 81 mg   Atorvastatin 40 mg   Carvedilol 25 mg   Furosemide 80 mg   Levothyroxine 75 mcg   Lisinopril 40 mg   Vitamin D3 5,000 IU    Evening card:   Carvedilol 25 mg   Furosemide 80 mg

## 2019-11-18 NOTE — TELEPHONE ENCOUNTER
Patient is late for his appt today, is he coming?    Does he need more pill packs?     If he agrees please reschedule (but no overbook).    Thanks,  KJ

## 2019-12-04 ENCOUNTER — TELEPHONE (OUTPATIENT)
Dept: INTERNAL MEDICINE | Facility: CLINIC | Age: 64
End: 2019-12-04

## 2019-12-06 ENCOUNTER — TELEPHONE (OUTPATIENT)
Dept: PRIMARY CARE CLINIC | Facility: CLINIC | Age: 64
End: 2019-12-06

## 2019-12-27 ENCOUNTER — TELEPHONE (OUTPATIENT)
Dept: PHARMACY | Facility: CLINIC | Age: 64
End: 2019-12-27

## 2020-01-15 NOTE — PROGRESS NOTES
**Repacked November packs that were not picked up**    Adherence packed for 28 days using Dispill:     Morning card:   Aspirin 81 mg   Atorvastatin 40 mg   Carvedilol 25 mg   Furosemide 80 mg   Levothyroxine 75 mcg   Lisinopril 40 mg   Vitamin D3 5,000 IU     Evening card:   Carvedilol 25 mg   Furosemide 80 mg

## 2020-01-16 ENCOUNTER — TELEPHONE (OUTPATIENT)
Dept: PRIMARY CARE CLINIC | Facility: CLINIC | Age: 65
End: 2020-01-16

## 2020-01-16 NOTE — TELEPHONE ENCOUNTER
----- Message from Mary Kay Scott sent at 1/16/2020  1:24 PM CST -----  Contact: Patient [phone]  Stated that he needs refills on all of his Rx and said that last time you sent them to [facility] and they packed them out for him.    Please call and advise.    Thank You

## 2020-01-20 DIAGNOSIS — I50.42 CHRONIC COMBINED SYSTOLIC AND DIASTOLIC HF (HEART FAILURE): ICD-10-CM

## 2020-01-20 DIAGNOSIS — E11.65 UNCONTROLLED TYPE 2 DIABETES MELLITUS WITH HYPERGLYCEMIA, WITH LONG-TERM CURRENT USE OF INSULIN: ICD-10-CM

## 2020-01-20 DIAGNOSIS — Z79.4 UNCONTROLLED TYPE 2 DIABETES MELLITUS WITH HYPERGLYCEMIA, WITH LONG-TERM CURRENT USE OF INSULIN: ICD-10-CM

## 2020-01-20 DIAGNOSIS — J98.4 CRLD (CHRONIC RESTRICTIVE LUNG DISEASE): ICD-10-CM

## 2020-01-20 DIAGNOSIS — E11.59 HYPERTENSION ASSOCIATED WITH DIABETES: ICD-10-CM

## 2020-01-20 DIAGNOSIS — I15.2 HYPERTENSION ASSOCIATED WITH DIABETES: ICD-10-CM

## 2020-01-20 DIAGNOSIS — R07.9 CHEST PAIN, CARDIAC: ICD-10-CM

## 2020-01-20 DIAGNOSIS — E89.0 POSTABLATIVE HYPOTHYROIDISM: ICD-10-CM

## 2020-01-20 DIAGNOSIS — I50.9 CONGESTIVE HEART FAILURE, UNSPECIFIED HF CHRONICITY, UNSPECIFIED HEART FAILURE TYPE: ICD-10-CM

## 2020-01-20 NOTE — TELEPHONE ENCOUNTER
Got wet and got a cold started coughing x3 days with headache comes and goes. Took Vicks daytime helped but didn't want to take that much. Pt want to know if he could get sinus tablets and nasal spray. Pt does not feel need to be seen. Pt also states completely out of medications and would like a supply now to [employer] [facility] and than to [facility] mail delivery. Pt does not want the pill pack.

## 2020-01-20 NOTE — TELEPHONE ENCOUNTER
----- Message from Pepe Walton sent at 1/20/2020 12:18 PM CST -----  Contact: self   Patient state he need all his Rx refill. Patient state he is out of medication. The pharmacy the patient want it sent to [facility] [address] [place] [address], [place] - [address] [phone] (Phone) [phone] (Fax), so he can get is faster.    Please call and advise.    Patient would like to get medical advice.  Symptoms (please be specific):  Cough, sinus headache  How long has patient had these symptoms:  3 days  Pharmacy name and phone # (copy/paste from chart):  [facility] [address] [place] [address], [place] - [address] [phone] (Phone)  [phone] (Fax)  Any drug allergies (copy/paste from chart):      Would the patient rather a call back or a response via [facility]?:  Call back  Comments:

## 2020-01-21 NOTE — TELEPHONE ENCOUNTER
----- Message from Myah Turner sent at 1/21/2020  3:04 PM CST -----  Contact: self/[phone]  Pt called in regards to checking  the status of his Rx refill for 8 of his med's.    [facility]  [phone]   please advise

## 2020-01-22 ENCOUNTER — TELEPHONE (OUTPATIENT)
Dept: PRIMARY CARE CLINIC | Facility: CLINIC | Age: 65
End: 2020-01-22

## 2020-01-22 RX ORDER — LISINOPRIL 40 MG/1
40 TABLET ORAL DAILY
Qty: 90 TABLET | Refills: 3 | Status: ON HOLD | OUTPATIENT
Start: 2020-01-22 | End: 2020-10-07 | Stop reason: HOSPADM

## 2020-01-22 RX ORDER — FLUTICASONE PROPIONATE 50 MCG
SPRAY, SUSPENSION (ML) NASAL
Qty: 32 G | Refills: 3 | Status: SHIPPED | OUTPATIENT
Start: 2020-01-22

## 2020-01-22 RX ORDER — NITROGLYCERIN 0.4 MG/1
TABLET SUBLINGUAL
Qty: 25 TABLET | Refills: 3 | Status: SHIPPED | OUTPATIENT
Start: 2020-01-22

## 2020-01-22 RX ORDER — ATORVASTATIN CALCIUM 40 MG/1
40 TABLET, FILM COATED ORAL DAILY
Qty: 90 TABLET | Refills: 3 | Status: ON HOLD | OUTPATIENT
Start: 2020-01-22 | End: 2020-10-07 | Stop reason: SDUPTHER

## 2020-01-22 RX ORDER — GABAPENTIN 100 MG/1
100 CAPSULE ORAL DAILY
Qty: 90 CAPSULE | Refills: 3 | Status: SHIPPED | OUTPATIENT
Start: 2020-01-22 | End: 2020-04-15 | Stop reason: SDUPTHER

## 2020-01-22 RX ORDER — LEVOTHYROXINE SODIUM 75 UG/1
75 TABLET ORAL DAILY
Qty: 90 TABLET | Refills: 3 | Status: SHIPPED | OUTPATIENT
Start: 2020-01-22 | End: 2021-03-03 | Stop reason: SDUPTHER

## 2020-01-22 RX ORDER — FUROSEMIDE 80 MG/1
TABLET ORAL
Qty: 180 TABLET | Refills: 3 | Status: ON HOLD | OUTPATIENT
Start: 2020-01-22 | End: 2020-10-07 | Stop reason: HOSPADM

## 2020-01-22 RX ORDER — CARVEDILOL 25 MG/1
25 TABLET ORAL 2 TIMES DAILY
Qty: 180 TABLET | Refills: 3 | Status: SHIPPED | OUTPATIENT
Start: 2020-01-22 | End: 2021-03-03 | Stop reason: SDUPTHER

## 2020-01-22 NOTE — TELEPHONE ENCOUNTER
----- Message from Jovana Morrison sent at 1/22/2020  9:31 AM CST -----  Contact: Pt [phone]  Patient is returning a phone call.  Who left a message for the patient: Charla  Does patient know what this is regarding:  No

## 2020-01-22 NOTE — TELEPHONE ENCOUNTER
All meds refilled to [facility]. He needs an appointment! He has no-showed to past 2 appts.    Please get him rescheudled. Also needs labs.    Thanks,  KJ

## 2020-01-23 ENCOUNTER — PATIENT OUTREACH (OUTPATIENT)
Dept: ADMINISTRATIVE | Facility: OTHER | Age: 65
End: 2020-01-23

## 2020-02-04 ENCOUNTER — TELEPHONE (OUTPATIENT)
Dept: ADMINISTRATIVE | Facility: HOSPITAL | Age: 65
End: 2020-02-04

## 2020-02-04 ENCOUNTER — PATIENT OUTREACH (OUTPATIENT)
Dept: ADMINISTRATIVE | Facility: HOSPITAL | Age: 65
End: 2020-02-04

## 2020-02-04 NOTE — PROGRESS NOTES
Chart Reviewed - Vaccines Updated - Spoke with patient ,he declines to do his Colonoscopy and or Fit  Kit . He will call to schedule his Foot Exam AND Eye Exam .

## 2020-04-01 ENCOUNTER — TELEPHONE (OUTPATIENT)
Dept: PRIMARY CARE CLINIC | Facility: CLINIC | Age: 65
End: 2020-04-01

## 2020-04-01 NOTE — TELEPHONE ENCOUNTER
Charla- Please clarify what this patient wants. He previously refused pill packs, does he want to restart them? I sent all of his meds to [facility] [address] in January and he has refills for a year. What does he want??    Hoda- can you help him with food resources? Here are a few new ones:    Have them call 311 and request food delivery services due to COVID-19 quarantine. They must be over 60 years and just need to provide an address and valid phone number. It is porch drop off, no patient contact. Drop offs are Monday and Thursday between 11am - 2pm.   For more information [email or web address]/detail/?agency_[email or web address]    KRISTINA Pascal

## 2020-04-01 NOTE — TELEPHONE ENCOUNTER
"----- Message from Claudia Doty sent at 4/1/2020  2:24 PM CDT -----  Contact: Claudia Doty MS4  Pt NEEDS MEDICATION REFILL (~1 week supply left). PREFERS PILL PACK & DELIVERY OF MEDS. CAN SEND RX TO [facility] IF UNABLE TO DELIVER. NO Advance Directive ON FILE. WOULD LIKE TO BE CONSIDERED FOR HELP WITH OBTAINING FOOD AND/OR FOOD STAMPS.    Talked to patient directly on phone.  Discussed   -SOB: this is a chronic condition; pt uses home O2 as needed for relief of sx  -HTN: has not checked BP recently  -DM: reports BGL of "180, 200, and 85-89" recently.   -Requesting all medications be refilled    Pill packs/medications: Pt NEEDS MEDICATION REFILL. Has ~1 week supply left of most meds. PREFERS PILL PACKS, but states hasn't been able to pick them up due to them "not being ready" at [facility]. Would prefer medications be delivered to him, but can also  medications at [facility] @ [address]   Upcoming appointments: Pt is stable and denied appt  Adv Dir status: NO Advance Directive ON FILE.   Drew status: Pt denies [facility]  Telemedicine: Patient denied    Pt is coping okay with isolation precautions. Has been going to grocery store, bank etc. Trying to purchase mask. Pt has access to food and housing; but WOULD LIKE TO BE CONSIDERED FOR HELP WITH OBTAINING FOOD AND/OR FOOD STAMPS. He is able to access transportation if needed; wife and son drive.  -------------------------------------------------------------------------------------------------------------------------  Assessed for symptoms of covid.  Discussed symptoms to look out for and to call clinic FIRST with any concerns or if pt develops symptoms: [facility] [phone] or [phone], or COVID HOTLINE [phone].    Advised to NOT go to ED for testing.  Counseled on staying at home, washing hands frequently, avoiding touching face and covering cough or sneeze.     Resources for patients: " https://ready.[place].gov/incident/coronavirus/resources/    Drew help: [phone]

## 2020-04-01 NOTE — TELEPHONE ENCOUNTER
Spoke w/ pt he states he have his meds and is taking them everyday, he states he will pick them up from pharmacy

## 2020-08-17 ENCOUNTER — TELEPHONE (OUTPATIENT)
Dept: PRIMARY CARE CLINIC | Facility: CLINIC | Age: 65
End: 2020-08-17

## 2020-08-17 NOTE — TELEPHONE ENCOUNTER
----- Message from Walter Marrufo sent at 8/17/2020  1:30 PM CDT -----  Regarding: Please Schedule AM follow up appt  Talked to patient directly on phone.      Pill packs/medications: Pt needs MEDICATION REFILL - unsure how much medication he has left. Would like to do a home delivery service for this. He states that the gabapentin,and tylenol is no longer helping for his back pain.   Upcoming appointments: Pt would like to SCHEDULE FACE TO FACE VISIT  Adv Dir status: NO Advance Directive   Drew status: Does not have access . Has previously declined  Telemedicine: Patient does not have access and has previously declined though thinks one of his grandkids may be able to help him.    Pt is coping okay with isolation precautions.  Pt has has access to housing; Would like to speak to britney about food resources. Would like to potentially get a ride for  his appointment as wife is not always available . Wife does most the shopping.     -------------------------------------------------------------------------------------------------------------------------  Assessed for symptoms of covid.  Discussed symptoms to look out for and to call clinic FIRST with any concerns or if pt develops symptoms: [facility] [phone] or [phone], or COVID HOTLINE [phone].    Advised to NOT go to ED for testing.  Counseled on staying at home, washing hands frequently, avoiding touching face and covering cough or sneeze.     Resources for patients:     Telemed training/video: [email or web address]    Housing and food: https://ready.[place].gov/incident/coronavirus/resources/    Drew help: [phone]

## 2020-08-18 ENCOUNTER — TELEPHONE (OUTPATIENT)
Dept: PRIMARY CARE CLINIC | Facility: CLINIC | Age: 65
End: 2020-08-18

## 2020-08-30 ENCOUNTER — HOSPITAL ENCOUNTER (INPATIENT)
Facility: HOSPITAL | Age: 65
LOS: 40 days | Discharge: NURSING, REHAB OR LONG-TERM CARE | DRG: 853 | End: 2020-10-09
Attending: EMERGENCY MEDICINE | Admitting: HOSPITALIST
Payer: MEDICARE
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
## 2020-08-30 DIAGNOSIS — E11.42 DIABETIC POLYNEUROPATHY ASSOCIATED WITH TYPE 2 DIABETES MELLITUS: Chronic | ICD-10-CM

## 2020-08-30 DIAGNOSIS — A41.02 SEPSIS DUE TO METHICILLIN RESISTANT STAPHYLOCOCCUS AUREUS (MRSA): ICD-10-CM

## 2020-08-30 DIAGNOSIS — A41.02 SEPSIS DUE TO METHICILLIN RESISTANT STAPHYLOCOCCUS AUREUS: ICD-10-CM

## 2020-08-30 DIAGNOSIS — A41.02: ICD-10-CM

## 2020-08-30 DIAGNOSIS — E11.628 DIABETIC FOOT INFECTION: Primary | ICD-10-CM

## 2020-08-30 DIAGNOSIS — D72.829 LEUKOCYTOSIS, UNSPECIFIED TYPE: ICD-10-CM

## 2020-08-30 DIAGNOSIS — J44.9 COPD MIXED TYPE: Chronic | ICD-10-CM

## 2020-08-30 DIAGNOSIS — Z79.4 UNCONTROLLED TYPE 2 DIABETES MELLITUS WITH HYPERGLYCEMIA, WITH LONG-TERM CURRENT USE OF INSULIN: Chronic | ICD-10-CM

## 2020-08-30 DIAGNOSIS — Z79.4 TYPE 2 DIABETES MELLITUS WITH KETOACIDOSIS WITHOUT COMA, WITH LONG-TERM CURRENT USE OF INSULIN: ICD-10-CM

## 2020-08-30 DIAGNOSIS — Z79.4 TYPE 2 DIABETES MELLITUS WITH STAGE 3 CHRONIC KIDNEY DISEASE, WITH LONG-TERM CURRENT USE OF INSULIN: ICD-10-CM

## 2020-08-30 DIAGNOSIS — I50.9 CHF (CONGESTIVE HEART FAILURE): ICD-10-CM

## 2020-08-30 DIAGNOSIS — J96.11 CHRONIC RESPIRATORY FAILURE WITH HYPOXIA, ON HOME OXYGEN THERAPY: Chronic | ICD-10-CM

## 2020-08-30 DIAGNOSIS — R65.21: ICD-10-CM

## 2020-08-30 DIAGNOSIS — A41.02 SEPSIS DUE TO METHICILLIN RESISTANT STAPHYLOCOCCUS AUREUS (MRSA) WITHOUT ACUTE ORGAN DYSFUNCTION: ICD-10-CM

## 2020-08-30 DIAGNOSIS — Z79.4 TYPE 2 DIABETES MELLITUS WITH STAGE 3 CHRONIC KIDNEY DISEASE, WITH LONG-TERM CURRENT USE OF INSULIN, UNSPECIFIED WHETHER STAGE 3A OR 3B CKD: ICD-10-CM

## 2020-08-30 DIAGNOSIS — E11.621 DIABETIC ULCER OF LEFT FOOT ASSOCIATED WITH TYPE 2 DIABETES MELLITUS, WITH FAT LAYER EXPOSED, UNSPECIFIED PART OF FOOT: ICD-10-CM

## 2020-08-30 DIAGNOSIS — L97.522 DIABETIC ULCER OF LEFT FOOT ASSOCIATED WITH TYPE 2 DIABETES MELLITUS, WITH FAT LAYER EXPOSED, UNSPECIFIED PART OF FOOT: ICD-10-CM

## 2020-08-30 DIAGNOSIS — R78.81 BACTEREMIA DUE TO METHICILLIN RESISTANT STAPHYLOCOCCUS AUREUS: ICD-10-CM

## 2020-08-30 DIAGNOSIS — K83.1 CHOLESTASIS: ICD-10-CM

## 2020-08-30 DIAGNOSIS — D75.839 THROMBOCYTOSIS: ICD-10-CM

## 2020-08-30 DIAGNOSIS — I38 ENDOCARDITIS: ICD-10-CM

## 2020-08-30 DIAGNOSIS — E11.22 TYPE 2 DIABETES MELLITUS WITH STAGE 3 CHRONIC KIDNEY DISEASE, WITH LONG-TERM CURRENT USE OF INSULIN, UNSPECIFIED WHETHER STAGE 3A OR 3B CKD: ICD-10-CM

## 2020-08-30 DIAGNOSIS — I50.22 CHRONIC SYSTOLIC CONGESTIVE HEART FAILURE: Chronic | ICD-10-CM

## 2020-08-30 DIAGNOSIS — Z99.81 CHRONIC RESPIRATORY FAILURE WITH HYPOXIA, ON HOME OXYGEN THERAPY: Chronic | ICD-10-CM

## 2020-08-30 DIAGNOSIS — E89.0 POSTABLATIVE HYPOTHYROIDISM: Chronic | ICD-10-CM

## 2020-08-30 DIAGNOSIS — J96.20 ACUTE ON CHRONIC RESPIRATORY FAILURE: ICD-10-CM

## 2020-08-30 DIAGNOSIS — K81.2 ACUTE ON CHRONIC CHOLECYSTITIS: ICD-10-CM

## 2020-08-30 DIAGNOSIS — I50.9 CONGESTIVE HEART FAILURE, UNSPECIFIED HF CHRONICITY, UNSPECIFIED HEART FAILURE TYPE: ICD-10-CM

## 2020-08-30 DIAGNOSIS — L03.116 CELLULITIS OF LEFT LOWER EXTREMITY: ICD-10-CM

## 2020-08-30 DIAGNOSIS — E11.10 TYPE 2 DIABETES MELLITUS WITH KETOACIDOSIS WITHOUT COMA, WITH LONG-TERM CURRENT USE OF INSULIN: ICD-10-CM

## 2020-08-30 DIAGNOSIS — I95.9 HYPOTENSION, UNSPECIFIED HYPOTENSION TYPE: ICD-10-CM

## 2020-08-30 DIAGNOSIS — R65.21 SEPTIC SHOCK: ICD-10-CM

## 2020-08-30 DIAGNOSIS — E55.9 VITAMIN D DEFICIENCY: ICD-10-CM

## 2020-08-30 DIAGNOSIS — E11.22 TYPE 2 DIABETES MELLITUS WITH STAGE 3 CHRONIC KIDNEY DISEASE, WITH LONG-TERM CURRENT USE OF INSULIN: ICD-10-CM

## 2020-08-30 DIAGNOSIS — L08.9 DIABETIC FOOT INFECTION: Primary | ICD-10-CM

## 2020-08-30 DIAGNOSIS — N18.30 TYPE 2 DIABETES MELLITUS WITH STAGE 3 CHRONIC KIDNEY DISEASE, WITH LONG-TERM CURRENT USE OF INSULIN: ICD-10-CM

## 2020-08-30 DIAGNOSIS — I50.42 CHRONIC COMBINED SYSTOLIC AND DIASTOLIC HF (HEART FAILURE): ICD-10-CM

## 2020-08-30 DIAGNOSIS — I73.9 PVD (PERIPHERAL VASCULAR DISEASE): ICD-10-CM

## 2020-08-30 DIAGNOSIS — R33.9 URINARY RETENTION: ICD-10-CM

## 2020-08-30 DIAGNOSIS — R57.9 SHOCK, UNSPECIFIED: ICD-10-CM

## 2020-08-30 DIAGNOSIS — A41.9 SEPTIC SHOCK: ICD-10-CM

## 2020-08-30 DIAGNOSIS — N28.9 ACUTE RENAL INSUFFICIENCY: ICD-10-CM

## 2020-08-30 DIAGNOSIS — N18.30 CKD STAGE 3 DUE TO TYPE 2 DIABETES MELLITUS: Chronic | ICD-10-CM

## 2020-08-30 DIAGNOSIS — M00.061 STAPHYLOCOCCAL ARTHRITIS OF RIGHT KNEE: ICD-10-CM

## 2020-08-30 DIAGNOSIS — R00.0 TACHYCARDIA: ICD-10-CM

## 2020-08-30 DIAGNOSIS — E11.65 UNCONTROLLED TYPE 2 DIABETES MELLITUS WITH HYPERGLYCEMIA, WITH LONG-TERM CURRENT USE OF INSULIN: Chronic | ICD-10-CM

## 2020-08-30 DIAGNOSIS — B95.62 BACTEREMIA DUE TO METHICILLIN RESISTANT STAPHYLOCOCCUS AUREUS: ICD-10-CM

## 2020-08-30 DIAGNOSIS — N17.9 AKI (ACUTE KIDNEY INJURY): ICD-10-CM

## 2020-08-30 DIAGNOSIS — J96.11 CHRONIC RESPIRATORY FAILURE WITH HYPOXIA: ICD-10-CM

## 2020-08-30 DIAGNOSIS — B99.9 INFECTION: ICD-10-CM

## 2020-08-30 DIAGNOSIS — N18.30 TYPE 2 DIABETES MELLITUS WITH STAGE 3 CHRONIC KIDNEY DISEASE, WITH LONG-TERM CURRENT USE OF INSULIN, UNSPECIFIED WHETHER STAGE 3A OR 3B CKD: ICD-10-CM
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
## 2020-08-30 DIAGNOSIS — E11.22 CKD STAGE 3 DUE TO TYPE 2 DIABETES MELLITUS: Chronic | ICD-10-CM

## 2020-08-30 DIAGNOSIS — I38 ENDOCARDITIS, UNSPECIFIED CHRONICITY, UNSPECIFIED ENDOCARDITIS TYPE: ICD-10-CM

## 2020-08-30 PROBLEM — Z12.11 SCREENING FOR COLON CANCER: Status: RESOLVED | Noted: 2017-10-09 | Resolved: 2020-08-30

## 2020-08-30 LAB
ALBUMIN SERPL BCP-MCNC: 2.8 G/DL (ref 3.5–5.2)
ALP SERPL-CCNC: 100 U/L (ref 55–135)
ALT SERPL W/O P-5'-P-CCNC: 23 U/L (ref 10–44)
ANION GAP SERPL CALC-SCNC: 13 MMOL/L (ref 8–16)
AST SERPL-CCNC: 27 U/L (ref 10–40)
BACTERIA #/AREA URNS AUTO: ABNORMAL /HPF
BASOPHILS # BLD AUTO: 0.08 K/UL (ref 0–0.2)
BASOPHILS NFR BLD: 0.4 % (ref 0–1.9)
BILIRUB SERPL-MCNC: 0.9 MG/DL (ref 0.1–1)
BILIRUB UR QL STRIP: NEGATIVE
BUN SERPL-MCNC: 24 MG/DL (ref 8–23)
CALCIUM SERPL-MCNC: 9.8 MG/DL (ref 8.7–10.5)
CHLORIDE SERPL-SCNC: 97 MMOL/L (ref 95–110)
CLARITY UR REFRACT.AUTO: ABNORMAL
CO2 SERPL-SCNC: 29 MMOL/L (ref 23–29)
COLOR UR AUTO: YELLOW
CREAT SERPL-MCNC: 1.5 MG/DL (ref 0.5–1.4)
DIFFERENTIAL METHOD: ABNORMAL
EOSINOPHIL # BLD AUTO: 0.3 K/UL (ref 0–0.5)
EOSINOPHIL NFR BLD: 1.4 % (ref 0–8)
ERYTHROCYTE [DISTWIDTH] IN BLOOD BY AUTOMATED COUNT: 15.7 % (ref 11.5–14.5)
EST. GFR  (AFRICAN AMERICAN): 56.5 ML/MIN/1.73 M^2
EST. GFR  (NON AFRICAN AMERICAN): 48.8 ML/MIN/1.73 M^2
GLUCOSE SERPL-MCNC: 250 MG/DL (ref 70–110)
GLUCOSE UR QL STRIP: NEGATIVE
HCT VFR BLD AUTO: 36.3 % (ref 40–54)
HGB BLD-MCNC: 11.4 G/DL (ref 14–18)
HGB UR QL STRIP: ABNORMAL
HYALINE CASTS UR QL AUTO: 0 /LPF
IMM GRANULOCYTES # BLD AUTO: 0.14 K/UL (ref 0–0.04)
IMM GRANULOCYTES NFR BLD AUTO: 0.7 % (ref 0–0.5)
KETONES UR QL STRIP: NEGATIVE
LACTATE SERPL-SCNC: 1.4 MMOL/L (ref 0.5–2.2)
LEUKOCYTE ESTERASE UR QL STRIP: ABNORMAL
LYMPHOCYTES # BLD AUTO: 1.1 K/UL (ref 1–4.8)
LYMPHOCYTES NFR BLD: 5.2 % (ref 18–48)
MCH RBC QN AUTO: 27 PG (ref 27–31)
MCHC RBC AUTO-ENTMCNC: 31.4 G/DL (ref 32–36)
MCV RBC AUTO: 86 FL (ref 82–98)
MICROSCOPIC COMMENT: ABNORMAL
MONOCYTES # BLD AUTO: 1.6 K/UL (ref 0.3–1)
MONOCYTES NFR BLD: 7.5 % (ref 4–15)
NEUTROPHILS # BLD AUTO: 18.2 K/UL (ref 1.8–7.7)
NEUTROPHILS NFR BLD: 84.8 % (ref 38–73)
NITRITE UR QL STRIP: NEGATIVE
NRBC BLD-RTO: 0 /100 WBC
PH UR STRIP: 5 [PH] (ref 5–8)
PLATELET # BLD AUTO: 392 K/UL (ref 150–350)
PMV BLD AUTO: 11.6 FL (ref 9.2–12.9)
POTASSIUM SERPL-SCNC: 3.3 MMOL/L (ref 3.5–5.1)
PROT SERPL-MCNC: 8.8 G/DL (ref 6–8.4)
PROT UR QL STRIP: ABNORMAL
RBC # BLD AUTO: 4.22 M/UL (ref 4.6–6.2)
RBC #/AREA URNS AUTO: 5 /HPF (ref 0–4)
SARS-COV-2 RDRP RESP QL NAA+PROBE: NEGATIVE
SODIUM SERPL-SCNC: 139 MMOL/L (ref 136–145)
SP GR UR STRIP: 1.01 (ref 1–1.03)
TROPONIN I SERPL DL<=0.01 NG/ML-MCNC: 0.03 NG/ML (ref 0–0.03)
URN SPEC COLLECT METH UR: ABNORMAL
WBC # BLD AUTO: 21.43 K/UL (ref 3.9–12.7)
WBC #/AREA URNS AUTO: 39 /HPF (ref 0–5)

## 2020-08-30 PROCEDURE — U0002 COVID-19 LAB TEST NON-CDC: HCPCS | Mod: HCNC

## 2020-08-30 PROCEDURE — 87077 CULTURE AEROBIC IDENTIFY: CPT | Mod: 59,HCNC

## 2020-08-30 PROCEDURE — 63600175 PHARM REV CODE 636 W HCPCS: Mod: HCNC | Performed by: HOSPITALIST

## 2020-08-30 PROCEDURE — 84484 ASSAY OF TROPONIN QUANT: CPT | Mod: HCNC

## 2020-08-30 PROCEDURE — 99223 PR INITIAL HOSPITAL CARE,LEVL III: ICD-10-PCS | Mod: HCNC,AI,, | Performed by: HOSPITALIST

## 2020-08-30 PROCEDURE — 87086 URINE CULTURE/COLONY COUNT: CPT | Mod: HCNC

## 2020-08-30 PROCEDURE — 87186 SC STD MICRODIL/AGAR DIL: CPT | Mod: HCNC

## 2020-08-30 PROCEDURE — 11000001 HC ACUTE MED/SURG PRIVATE ROOM: Mod: HCNC

## 2020-08-30 PROCEDURE — 96366 THER/PROPH/DIAG IV INF ADDON: CPT

## 2020-08-30 PROCEDURE — 87040 BLOOD CULTURE FOR BACTERIA: CPT | Mod: 59,HCNC

## 2020-08-30 PROCEDURE — 85025 COMPLETE CBC W/AUTO DIFF WBC: CPT | Mod: HCNC

## 2020-08-30 PROCEDURE — 87088 URINE BACTERIA CULTURE: CPT | Mod: HCNC

## 2020-08-30 PROCEDURE — 93010 EKG 12-LEAD: ICD-10-PCS | Mod: HCNC,,, | Performed by: INTERNAL MEDICINE

## 2020-08-30 PROCEDURE — 99285 EMERGENCY DEPT VISIT HI MDM: CPT | Mod: 25,HCNC

## 2020-08-30 PROCEDURE — 25000003 PHARM REV CODE 250: Mod: HCNC | Performed by: HOSPITALIST

## 2020-08-30 PROCEDURE — 99284 EMERGENCY DEPT VISIT MOD MDM: CPT | Mod: ,,, | Performed by: EMERGENCY MEDICINE

## 2020-08-30 PROCEDURE — 96365 THER/PROPH/DIAG IV INF INIT: CPT | Mod: HCNC

## 2020-08-30 PROCEDURE — 99223 1ST HOSP IP/OBS HIGH 75: CPT | Mod: HCNC,AI,, | Performed by: HOSPITALIST

## 2020-08-30 PROCEDURE — 93010 ELECTROCARDIOGRAM REPORT: CPT | Mod: HCNC,,, | Performed by: INTERNAL MEDICINE

## 2020-08-30 PROCEDURE — 93005 ELECTROCARDIOGRAM TRACING: CPT | Mod: HCNC

## 2020-08-30 PROCEDURE — 96375 TX/PRO/DX INJ NEW DRUG ADDON: CPT

## 2020-08-30 PROCEDURE — 83605 ASSAY OF LACTIC ACID: CPT | Mod: HCNC

## 2020-08-30 PROCEDURE — 80053 COMPREHEN METABOLIC PANEL: CPT | Mod: HCNC

## 2020-08-30 PROCEDURE — 94761 N-INVAS EAR/PLS OXIMETRY MLT: CPT | Mod: HCNC

## 2020-08-30 PROCEDURE — 96367 TX/PROPH/DG ADDL SEQ IV INF: CPT | Mod: HCNC

## 2020-08-30 PROCEDURE — 63600175 PHARM REV CODE 636 W HCPCS: Mod: HCNC | Performed by: EMERGENCY MEDICINE

## 2020-08-30 PROCEDURE — 81001 URINALYSIS AUTO W/SCOPE: CPT | Mod: HCNC

## 2020-08-30 PROCEDURE — 99284 PR EMERGENCY DEPT VISIT,LEVEL IV: ICD-10-PCS | Mod: ,,, | Performed by: EMERGENCY MEDICINE

## 2020-08-30 RX ORDER — VANCOMYCIN HCL IN 5 % DEXTROSE 1G/250ML
1000 PLASTIC BAG, INJECTION (ML) INTRAVENOUS
Status: DISCONTINUED | OUTPATIENT
Start: 2020-08-31 | End: 2020-09-02

## 2020-08-30 RX ORDER — FLUTICASONE PROPIONATE 50 MCG
1 SPRAY, SUSPENSION (ML) NASAL DAILY
Status: DISCONTINUED | OUTPATIENT
Start: 2020-08-31 | End: 2020-10-09 | Stop reason: HOSPADM

## 2020-08-30 RX ORDER — IPRATROPIUM BROMIDE AND ALBUTEROL SULFATE 2.5; .5 MG/3ML; MG/3ML
3 SOLUTION RESPIRATORY (INHALATION) EVERY 4 HOURS PRN
Status: DISCONTINUED | OUTPATIENT
Start: 2020-08-30 | End: 2020-10-09 | Stop reason: HOSPADM

## 2020-08-30 RX ORDER — CARVEDILOL 25 MG/1
25 TABLET ORAL 2 TIMES DAILY
Status: DISCONTINUED | OUTPATIENT
Start: 2020-08-30 | End: 2020-09-10

## 2020-08-30 RX ORDER — LISINOPRIL 20 MG/1
40 TABLET ORAL DAILY
Status: DISCONTINUED | OUTPATIENT
Start: 2020-08-31 | End: 2020-09-09

## 2020-08-30 RX ORDER — SODIUM CHLORIDE 0.9 % (FLUSH) 0.9 %
10 SYRINGE (ML) INJECTION
Status: DISCONTINUED | OUTPATIENT
Start: 2020-08-30 | End: 2020-09-09

## 2020-08-30 RX ORDER — KETOROLAC TROMETHAMINE 30 MG/ML
15 INJECTION, SOLUTION INTRAMUSCULAR; INTRAVENOUS EVERY 6 HOURS PRN
Status: DISPENSED | OUTPATIENT
Start: 2020-08-30 | End: 2020-09-02

## 2020-08-30 RX ORDER — INSULIN ASPART 100 [IU]/ML
0-5 INJECTION, SOLUTION INTRAVENOUS; SUBCUTANEOUS
Status: DISCONTINUED | OUTPATIENT
Start: 2020-08-30 | End: 2020-09-12

## 2020-08-30 RX ORDER — IBUPROFEN 200 MG
16 TABLET ORAL
Status: DISCONTINUED | OUTPATIENT
Start: 2020-08-30 | End: 2020-09-18

## 2020-08-30 RX ORDER — INSULIN ASPART 100 [IU]/ML
8 INJECTION, SOLUTION INTRAVENOUS; SUBCUTANEOUS
Status: DISCONTINUED | OUTPATIENT
Start: 2020-08-31 | End: 2020-09-01

## 2020-08-30 RX ORDER — IBUPROFEN 200 MG
24 TABLET ORAL
Status: DISCONTINUED | OUTPATIENT
Start: 2020-08-30 | End: 2020-09-18

## 2020-08-30 RX ORDER — CHOLECALCIFEROL (VITAMIN D3) 25 MCG
1000 TABLET ORAL DAILY
Status: DISCONTINUED | OUTPATIENT
Start: 2020-08-31 | End: 2020-09-12

## 2020-08-30 RX ORDER — LEVOTHYROXINE SODIUM 75 UG/1
75 TABLET ORAL
Status: DISCONTINUED | OUTPATIENT
Start: 2020-08-31 | End: 2020-10-09 | Stop reason: HOSPADM

## 2020-08-30 RX ORDER — ACETAMINOPHEN 325 MG/1
650 TABLET ORAL EVERY 6 HOURS PRN
Status: DISCONTINUED | OUTPATIENT
Start: 2020-08-30 | End: 2020-09-12

## 2020-08-30 RX ORDER — FLUTICASONE FUROATE AND VILANTEROL 200; 25 UG/1; UG/1
1 POWDER RESPIRATORY (INHALATION) DAILY
Status: DISCONTINUED | OUTPATIENT
Start: 2020-08-31 | End: 2020-10-09 | Stop reason: HOSPADM

## 2020-08-30 RX ORDER — CIPROFLOXACIN 500 MG/1
500 TABLET ORAL EVERY 12 HOURS
Status: DISCONTINUED | OUTPATIENT
Start: 2020-08-31 | End: 2020-09-07

## 2020-08-30 RX ORDER — ASPIRIN 81 MG/1
81 TABLET ORAL DAILY
Status: DISCONTINUED | OUTPATIENT
Start: 2020-08-31 | End: 2020-09-13

## 2020-08-30 RX ORDER — ATORVASTATIN CALCIUM 20 MG/1
40 TABLET, FILM COATED ORAL DAILY
Status: DISCONTINUED | OUTPATIENT
Start: 2020-08-31 | End: 2020-09-10

## 2020-08-30 RX ORDER — FUROSEMIDE 40 MG/1
80 TABLET ORAL 2 TIMES DAILY
Status: DISCONTINUED | OUTPATIENT
Start: 2020-08-31 | End: 2020-09-08

## 2020-08-30 RX ORDER — POLYETHYLENE GLYCOL 3350 17 G/17G
17 POWDER, FOR SOLUTION ORAL 2 TIMES DAILY PRN
Status: DISCONTINUED | OUTPATIENT
Start: 2020-08-30 | End: 2020-10-09 | Stop reason: HOSPADM

## 2020-08-30 RX ORDER — GABAPENTIN 100 MG/1
100 CAPSULE ORAL 3 TIMES DAILY PRN
Status: DISCONTINUED | OUTPATIENT
Start: 2020-08-30 | End: 2020-09-12

## 2020-08-30 RX ORDER — ENOXAPARIN SODIUM 100 MG/ML
40 INJECTION SUBCUTANEOUS EVERY 24 HOURS
Status: DISCONTINUED | OUTPATIENT
Start: 2020-08-31 | End: 2020-09-09

## 2020-08-30 RX ORDER — GLUCAGON 1 MG
1 KIT INJECTION
Status: DISCONTINUED | OUTPATIENT
Start: 2020-08-30 | End: 2020-09-18

## 2020-08-30 RX ORDER — FUROSEMIDE 10 MG/ML
40 INJECTION INTRAMUSCULAR; INTRAVENOUS
Status: COMPLETED | OUTPATIENT
Start: 2020-08-30 | End: 2020-08-30

## 2020-08-30 RX ORDER — ONDANSETRON 2 MG/ML
4 INJECTION INTRAMUSCULAR; INTRAVENOUS EVERY 8 HOURS PRN
Status: DISCONTINUED | OUTPATIENT
Start: 2020-08-30 | End: 2020-10-09 | Stop reason: HOSPADM

## 2020-08-30 RX ORDER — TALC
6 POWDER (GRAM) TOPICAL NIGHTLY PRN
Status: DISCONTINUED | OUTPATIENT
Start: 2020-08-30 | End: 2020-10-09 | Stop reason: HOSPADM

## 2020-08-30 RX ADMIN — VANCOMYCIN HYDROCHLORIDE 1750 MG: 750 INJECTION, POWDER, LYOPHILIZED, FOR SOLUTION INTRAVENOUS at 04:08

## 2020-08-30 RX ADMIN — PIPERACILLIN SODIUM,TAZOBACTAM SODIUM 4.5 G: 4; .5 INJECTION, POWDER, FOR SOLUTION INTRAVENOUS at 03:08

## 2020-08-30 RX ADMIN — GABAPENTIN 100 MG: 100 CAPSULE ORAL at 09:08

## 2020-08-30 RX ADMIN — FUROSEMIDE 40 MG: 10 INJECTION, SOLUTION INTRAMUSCULAR; INTRAVENOUS at 06:08

## 2020-08-30 RX ADMIN — CARVEDILOL 25 MG: 25 TABLET, FILM COATED ORAL at 09:08

## 2020-08-30 RX ADMIN — INSULIN ASPART 1 UNITS: 100 INJECTION, SOLUTION INTRAVENOUS; SUBCUTANEOUS at 09:08

## 2020-08-30 NOTE — ED PROVIDER NOTES
"Encounter Date: 2020       History     Chief Complaint   Patient presents with    Frequent Falls     frequent falls, weakness, "stepped on a tack" left leg now swollen.     Leg Swelling     66 yo male with a h/o CHF, COPD, HLD, HTN, CAD, presenting with leg swelling and falls.    Context:  The patient endorses two fall episodes in the last week or so.  He states both times he was walking and his legs 'gave out.'  He never passed out or hit his head.  He also states that he stepped on a tack with his left foot and his foot has been painful.   Onset:  Gradual   Location: left foot   Duration:  1 week  Modifiers:  Pain in foot worse with weight bearing   Associated Symptoms: no head injury, no chest pain    The history is provided by the patient and medical records. No  was used.     Review of patient's allergies indicates:   Allergen Reactions    Vicodin [hydrocodone-acetaminophen] Itching     Past Medical History:   Diagnosis Date    CHF (congestive heart failure)     COPD (chronic obstructive pulmonary disease)     Coronary artery disease     Diabetes mellitus     Diabetes mellitus type II     DM (diabetes mellitus) type II uncontrolled with renal manifestation 2013    Hyperlipidemia     Hypertension     Postablative hypothyroidism 2018     Past Surgical History:   Procedure Laterality Date    APPENDECTOMY      CHOLECYSTECTOMY      CORONARY ANGIOPLASTY WITH STENT PLACEMENT      TONSILLECTOMY       Family History   Problem Relation Age of Onset    Heart disease Mother     No Known Problems Father     No Known Problems Sister     Hypertension Son     No Known Problems Son     No Known Problems Son      Social History     Tobacco Use    Smoking status: Former Smoker     Packs/day: 0.01     Years: 3.00     Pack years: 0.03     Types: Cigarettes     Quit date: 1995     Years since quittin.2    Smokeless tobacco: Never Used   Substance Use Topics    " Alcohol use: No    Drug use: No     Review of Systems   Constitutional: Negative for chills and fever.   HENT: Negative for facial swelling and rhinorrhea.    Cardiovascular: Positive for leg swelling. Negative for chest pain.   Skin: Positive for wound (to left foot ).   Neurological: Negative for tremors, syncope, facial asymmetry, speech difficulty, numbness and headaches.   All other systems reviewed and are negative.      Physical Exam     Initial Vitals [08/30/20 1253]   BP Pulse Resp Temp SpO2   124/73 102 20 98 °F (36.7 °C) 98 %      MAP       --         Physical Exam    Nursing note and vitals reviewed.  Constitutional: He is not diaphoretic. No distress.   HENT:   Head: Normocephalic and atraumatic.   Eyes: Right eye exhibits no discharge. Left eye exhibits no discharge.   Neck: Neck supple. No tracheal deviation present.   Cardiovascular: Normal rate, regular rhythm and intact distal pulses.   2+ DP b/l    Pulmonary/Chest: Breath sounds normal. No respiratory distress.   Abdominal: Soft. There is no abdominal tenderness.   Musculoskeletal: Edema (pitting, b/l LE) present.   Neurological: He is alert and oriented to person, place, and time. He has normal strength. No sensory deficit.   Skin: Skin is warm.        <1cm x 1 cm wound to plantar left heel, also wound to first toe with surrounding erythema and warmth    Psychiatric: He has a normal mood and affect. His behavior is normal.         ED Course   Procedures  Labs Reviewed   COMPREHENSIVE METABOLIC PANEL - Abnormal; Notable for the following components:       Result Value    Potassium 3.3 (*)     Glucose 250 (*)     BUN, Bld 24 (*)     Creatinine 1.5 (*)     Total Protein 8.8 (*)     Albumin 2.8 (*)     eGFR if  56.5 (*)     eGFR if non  48.8 (*)     All other components within normal limits   CBC W/ AUTO DIFFERENTIAL - Abnormal; Notable for the following components:    WBC 21.43 (*)     RBC 4.22 (*)     Hemoglobin  11.4 (*)     Hematocrit 36.3 (*)     Mean Corpuscular Hemoglobin Conc 31.4 (*)     RDW 15.7 (*)     Platelets 392 (*)     Immature Granulocytes 0.7 (*)     Gran # (ANC) 18.2 (*)     Immature Grans (Abs) 0.14 (*)     Mono # 1.6 (*)     Gran% 84.8 (*)     Lymph% 5.2 (*)     All other components within normal limits   TROPONIN I - Abnormal; Notable for the following components:    Troponin I 0.029 (*)     All other components within normal limits   URINALYSIS, REFLEX TO URINE CULTURE - Abnormal; Notable for the following components:    Appearance, UA Hazy (*)     Protein, UA 1+ (*)     Occult Blood UA 2+ (*)     Leukocytes, UA 2+ (*)     All other components within normal limits    Narrative:     Specimen Source->Urine   URINALYSIS MICROSCOPIC - Abnormal; Notable for the following components:    RBC, UA 5 (*)     WBC, UA 39 (*)     Bacteria Many (*)     All other components within normal limits    Narrative:     Specimen Source->Urine   CULTURE, BLOOD   CULTURE, BLOOD   CULTURE, URINE   SARS-COV-2 RNA AMPLIFICATION, QUAL   LACTIC ACID, PLASMA     EKG Readings: (Independently Interpreted)   Initial Reading: No STEMI. Previous EKG: Compared with most recent EKG Previous EKG Date: 5/1/2015. Rhythm: Normal Sinus Rhythm. Heart Rate: 101. Axis: Left Axis Deviation.       Imaging Results          CT Head Without Contrast (Final result)  Result time 08/30/20 16:51:33    Final result by Giovany Hernandez MD (08/30/20 16:51:33)                 Impression:      No acute abnormality.  Follow-up/further evaluation as clinically indicated.    Chronic microvascular ischemic changes.      Electronically signed by: Giovany Hernandez MD  Date:    08/30/2020  Time:    16:51             Narrative:    EXAMINATION:  CT HEAD WITHOUT CONTRAST    CLINICAL HISTORY:  Syncope, recurrent;    TECHNIQUE:  Low dose axial CT images obtained throughout the head without intravenous contrast. Sagittal and coronal reconstructions were  performed.    COMPARISON:  None.    FINDINGS:  Intracranial compartment:    Ventricles and sulci are normal in size for age without evidence of hydrocephalus. No extra-axial blood or fluid collections.    Mild generalized cerebral volume loss.  Scattered hypoattenuation of the supratentorial white matter which is nonspecific but likely represents chronic microvascular ischemic changes.  No parenchymal mass, hemorrhage, edema or major vascular distribution infarct.    Skull/extracranial contents (limited evaluation): No fracture. Mastoid air cells and paranasal sinuses are essentially clear.  Congenital nonunion of C1.                               X-Ray Foot Complete Left (Final result)  Result time 08/30/20 15:58:35    Final result by Giovany Hernandez MD (08/30/20 15:58:35)                 Impression:      No evidence of fracture.      Electronically signed by: Giovany Hernandez MD  Date:    08/30/2020  Time:    15:58             Narrative:    EXAMINATION:  XR FOOT COMPLETE 3 VIEW LEFT    CLINICAL HISTORY:  .  Unspecified infectious disease    TECHNIQUE:  AP, lateral and oblique views of the left foot were performed.    COMPARISON:  None    FINDINGS:  No evidence of acute fracture or dislocation.  No osseous destructive process.  Spurring of the inferior calcaneus.  Atherosclerosis noted.  Soft tissue defect along the plantar aspect of the midfoot likely a laceration or wound.  No radiopaque foreign body.                               X-Ray Chest AP Portable (Final result)  Result time 08/30/20 16:02:41    Final result by Rigoberto Keen MD (08/30/20 16:02:41)                 Impression:      Findings suggesting mild pulmonary edema/CHF pattern without focal consolidation.  Interstitial pneumonia not excluded.      Electronically signed by: Rigoberto Keen MD  Date:    08/30/2020  Time:    16:02             Narrative:    EXAMINATION:  XR CHEST AP PORTABLE    CLINICAL HISTORY:  leukocytosis;    TECHNIQUE:  Single  frontal view of the chest was performed.    COMPARISON:  Chest radiograph 05/22/2015    FINDINGS:  Monitoring leads overlie the chest.  Large body habitus.  Patient is slightly rotated.  Inferior-most aspect of the left costophrenic angle not included in field of view.    Cardiac silhouette is upper limits of normal in size with prominence of the central pulmonary vasculature and bilateral diffuse nonspecific interstitial coarsening with a perihilar and basilar distribution suggesting pulmonary edema/CHF pattern with interstitial pneumonia not excluded.  There is calcific atherosclerosis and tortuosity of the thoracic aorta.  Hilar contours are within normal limits.  There is chronic nonspecific elevation of the right hemidiaphragm with right basilar platelike scarring versus atelectasis.  No large consolidation, pleural effusion or pneumothorax definitively seen allowing for limitations.  No acute osseous process seen.                                 Medical Decision Making:   Initial Assessment:   64 yo male presenting after multiple falls and with left foot pain.  Afebrile, well appearing, no respiratory distress, nonfocal neuro exam.  Plan for labs, xray foot, CTH.   Clinical Tests:   Lab Tests: Reviewed and Ordered  Radiological Study: Ordered and Reviewed  Medical Tests: Ordered and Reviewed  ED Management:  Left foot/lower leg concerning for infection.  No gas on xray, no concern for necrotizing infection at this time.   Labs notable for leukocytosis, CTNI at approximate baseline, creatinine at baseline.    Given CXR findings and leg edema, patient given additional lasix in the ED.  Leg edema could be the source of his falls.  CTH w/o acute pathology and his symptomatology does not fit with CVA.     Admitted for IV abx, additional management.                    ED Course as of Aug 30 1729   Sun Aug 30, 2020   1527 Leukocytosis    WBC(!): 21.43 [AB]   1528 Stable anemia    Hemoglobin(!): 11.4 [AB]   1616 Most  recent 0.027   Troponin I(!): 0.029 [AB]   1616 Unchanged from most recent    Creatinine(!): 1.5 [AB]      ED Course User Index  [AB] Jimmy Smith MD                Clinical Impression:       ICD-10-CM ICD-9-CM   1. Cellulitis of left lower extremity  L03.116 682.6   2. Fall  W19.XXXA E888.9   3. Infection  B99.9 136.9             ED Disposition Condition    Observation                           Jimmy Smith MD  08/30/20 0674

## 2020-08-31 PROBLEM — E11.621 DIABETIC ULCER OF LEFT FOOT ASSOCIATED WITH TYPE 2 DIABETES MELLITUS, WITH FAT LAYER EXPOSED: Status: ACTIVE | Noted: 2020-08-31

## 2020-08-31 PROBLEM — J44.9 COPD MIXED TYPE: Chronic | Status: ACTIVE | Noted: 2019-10-15

## 2020-08-31 PROBLEM — Z99.81 CHRONIC RESPIRATORY FAILURE WITH HYPOXIA, ON HOME OXYGEN THERAPY: Chronic | Status: ACTIVE | Noted: 2017-10-09

## 2020-08-31 PROBLEM — J96.11 CHRONIC RESPIRATORY FAILURE WITH HYPOXIA, ON HOME OXYGEN THERAPY: Chronic | Status: ACTIVE | Noted: 2017-10-09

## 2020-08-31 PROBLEM — E11.65 UNCONTROLLED TYPE 2 DIABETES MELLITUS WITH HYPERGLYCEMIA, WITH LONG-TERM CURRENT USE OF INSULIN: Chronic | Status: ACTIVE | Noted: 2017-10-09

## 2020-08-31 PROBLEM — Z79.4 UNCONTROLLED TYPE 2 DIABETES MELLITUS WITH HYPERGLYCEMIA, WITH LONG-TERM CURRENT USE OF INSULIN: Chronic | Status: ACTIVE | Noted: 2017-10-09

## 2020-08-31 PROBLEM — E89.0 POSTABLATIVE HYPOTHYROIDISM: Chronic | Status: ACTIVE | Noted: 2018-12-06

## 2020-08-31 PROBLEM — L97.522 DIABETIC ULCER OF LEFT FOOT ASSOCIATED WITH TYPE 2 DIABETES MELLITUS, WITH FAT LAYER EXPOSED: Status: ACTIVE | Noted: 2020-08-31

## 2020-08-31 LAB
ANION GAP SERPL CALC-SCNC: 13 MMOL/L (ref 8–16)
BASOPHILS # BLD AUTO: 0.06 K/UL (ref 0–0.2)
BASOPHILS NFR BLD: 0.3 % (ref 0–1.9)
BUN SERPL-MCNC: 22 MG/DL (ref 8–23)
CALCIUM SERPL-MCNC: 8.8 MG/DL (ref 8.7–10.5)
CHLORIDE SERPL-SCNC: 99 MMOL/L (ref 95–110)
CO2 SERPL-SCNC: 28 MMOL/L (ref 23–29)
CREAT SERPL-MCNC: 1.4 MG/DL (ref 0.5–1.4)
DIFFERENTIAL METHOD: ABNORMAL
EOSINOPHIL # BLD AUTO: 0.5 K/UL (ref 0–0.5)
EOSINOPHIL NFR BLD: 2.2 % (ref 0–8)
ERYTHROCYTE [DISTWIDTH] IN BLOOD BY AUTOMATED COUNT: 15.6 % (ref 11.5–14.5)
EST. GFR  (AFRICAN AMERICAN): >60 ML/MIN/1.73 M^2
EST. GFR  (NON AFRICAN AMERICAN): 53.1 ML/MIN/1.73 M^2
ESTIMATED AVG GLUCOSE: 226 MG/DL (ref 68–131)
GLUCOSE SERPL-MCNC: 212 MG/DL (ref 70–110)
GRAM STN SPEC: NORMAL
HBA1C MFR BLD HPLC: 9.5 % (ref 4–5.6)
HCT VFR BLD AUTO: 34.5 % (ref 40–54)
HGB BLD-MCNC: 10.5 G/DL (ref 14–18)
IMM GRANULOCYTES # BLD AUTO: 0.16 K/UL (ref 0–0.04)
IMM GRANULOCYTES NFR BLD AUTO: 0.8 % (ref 0–0.5)
LYMPHOCYTES # BLD AUTO: 1.2 K/UL (ref 1–4.8)
LYMPHOCYTES NFR BLD: 5.9 % (ref 18–48)
MAGNESIUM SERPL-MCNC: 1.8 MG/DL (ref 1.6–2.6)
MCH RBC QN AUTO: 26.7 PG (ref 27–31)
MCHC RBC AUTO-ENTMCNC: 30.4 G/DL (ref 32–36)
MCV RBC AUTO: 88 FL (ref 82–98)
MONOCYTES # BLD AUTO: 1.6 K/UL (ref 0.3–1)
MONOCYTES NFR BLD: 7.6 % (ref 4–15)
NEUTROPHILS # BLD AUTO: 17.2 K/UL (ref 1.8–7.7)
NEUTROPHILS NFR BLD: 83.2 % (ref 38–73)
NRBC BLD-RTO: 0 /100 WBC
PHOSPHATE SERPL-MCNC: 4.1 MG/DL (ref 2.7–4.5)
PLATELET # BLD AUTO: 354 K/UL (ref 150–350)
PMV BLD AUTO: 11.8 FL (ref 9.2–12.9)
POCT GLUCOSE: 227 MG/DL (ref 70–110)
POCT GLUCOSE: 227 MG/DL (ref 70–110)
POCT GLUCOSE: 232 MG/DL (ref 70–110)
POCT GLUCOSE: 246 MG/DL (ref 70–110)
POCT GLUCOSE: 262 MG/DL (ref 70–110)
POTASSIUM SERPL-SCNC: 3.1 MMOL/L (ref 3.5–5.1)
RBC # BLD AUTO: 3.93 M/UL (ref 4.6–6.2)
SODIUM SERPL-SCNC: 140 MMOL/L (ref 136–145)
WBC # BLD AUTO: 20.6 K/UL (ref 3.9–12.7)

## 2020-08-31 PROCEDURE — 83036 HEMOGLOBIN GLYCOSYLATED A1C: CPT | Mod: HCNC

## 2020-08-31 PROCEDURE — 87184 SC STD DISK METHOD PER PLATE: CPT | Mod: HCNC

## 2020-08-31 PROCEDURE — 99233 SBSQ HOSP IP/OBS HIGH 50: CPT | Mod: HCNC,,, | Performed by: STUDENT IN AN ORGANIZED HEALTH CARE EDUCATION/TRAINING PROGRAM

## 2020-08-31 PROCEDURE — 63600175 PHARM REV CODE 636 W HCPCS: Mod: HCNC | Performed by: HOSPITALIST

## 2020-08-31 PROCEDURE — 85025 COMPLETE CBC W/AUTO DIFF WBC: CPT | Mod: HCNC

## 2020-08-31 PROCEDURE — 11000001 HC ACUTE MED/SURG PRIVATE ROOM: Mod: HCNC

## 2020-08-31 PROCEDURE — 99232 PR SUBSEQUENT HOSPITAL CARE,LEVL II: ICD-10-PCS | Mod: HCNC,,, | Performed by: HOSPITALIST

## 2020-08-31 PROCEDURE — 87205 SMEAR GRAM STAIN: CPT | Mod: HCNC

## 2020-08-31 PROCEDURE — 80048 BASIC METABOLIC PNL TOTAL CA: CPT | Mod: HCNC

## 2020-08-31 PROCEDURE — 87075 CULTR BACTERIA EXCEPT BLOOD: CPT | Mod: HCNC

## 2020-08-31 PROCEDURE — 25500020 PHARM REV CODE 255: Mod: HCNC | Performed by: HOSPITALIST

## 2020-08-31 PROCEDURE — 87186 SC STD MICRODIL/AGAR DIL: CPT | Mod: 59,HCNC

## 2020-08-31 PROCEDURE — 94761 N-INVAS EAR/PLS OXIMETRY MLT: CPT | Mod: HCNC

## 2020-08-31 PROCEDURE — 87040 BLOOD CULTURE FOR BACTERIA: CPT | Mod: 59,HCNC

## 2020-08-31 PROCEDURE — 25000003 PHARM REV CODE 250: Mod: HCNC | Performed by: HOSPITALIST

## 2020-08-31 PROCEDURE — 99232 SBSQ HOSP IP/OBS MODERATE 35: CPT | Mod: HCNC,,, | Performed by: HOSPITALIST

## 2020-08-31 PROCEDURE — C9399 UNCLASSIFIED DRUGS OR BIOLOG: HCPCS | Mod: HCNC | Performed by: HOSPITALIST

## 2020-08-31 PROCEDURE — 87077 CULTURE AEROBIC IDENTIFY: CPT | Mod: 59,HCNC

## 2020-08-31 PROCEDURE — 83735 ASSAY OF MAGNESIUM: CPT | Mod: HCNC

## 2020-08-31 PROCEDURE — 36415 COLL VENOUS BLD VENIPUNCTURE: CPT | Mod: HCNC

## 2020-08-31 PROCEDURE — 87070 CULTURE OTHR SPECIMN AEROBIC: CPT | Mod: HCNC

## 2020-08-31 PROCEDURE — 84100 ASSAY OF PHOSPHORUS: CPT | Mod: HCNC

## 2020-08-31 PROCEDURE — A9585 GADOBUTROL INJECTION: HCPCS | Mod: HCNC | Performed by: HOSPITALIST

## 2020-08-31 PROCEDURE — 99233 PR SUBSEQUENT HOSPITAL CARE,LEVL III: ICD-10-PCS | Mod: HCNC,,, | Performed by: STUDENT IN AN ORGANIZED HEALTH CARE EDUCATION/TRAINING PROGRAM

## 2020-08-31 RX ORDER — GADOBUTROL 604.72 MG/ML
10 INJECTION INTRAVENOUS
Status: COMPLETED | OUTPATIENT
Start: 2020-08-31 | End: 2020-08-31

## 2020-08-31 RX ORDER — POTASSIUM CHLORIDE 20 MEQ/1
40 TABLET, EXTENDED RELEASE ORAL ONCE
Status: COMPLETED | OUTPATIENT
Start: 2020-08-31 | End: 2020-08-31

## 2020-08-31 RX ADMIN — INSULIN DETEMIR 25 UNITS: 100 INJECTION, SOLUTION SUBCUTANEOUS at 02:08

## 2020-08-31 RX ADMIN — LEVOTHYROXINE SODIUM 75 MCG: 25 TABLET ORAL at 05:08

## 2020-08-31 RX ADMIN — INSULIN ASPART 8 UNITS: 100 INJECTION, SOLUTION INTRAVENOUS; SUBCUTANEOUS at 09:08

## 2020-08-31 RX ADMIN — KETOROLAC TROMETHAMINE 15 MG: 30 INJECTION, SOLUTION INTRAMUSCULAR at 06:08

## 2020-08-31 RX ADMIN — VANCOMYCIN HYDROCHLORIDE 1000 MG: 1 INJECTION, POWDER, LYOPHILIZED, FOR SOLUTION INTRAVENOUS at 05:08

## 2020-08-31 RX ADMIN — VANCOMYCIN HYDROCHLORIDE 1000 MG: 1 INJECTION, POWDER, LYOPHILIZED, FOR SOLUTION INTRAVENOUS at 06:08

## 2020-08-31 RX ADMIN — CARVEDILOL 25 MG: 25 TABLET, FILM COATED ORAL at 11:08

## 2020-08-31 RX ADMIN — INSULIN ASPART 2 UNITS: 100 INJECTION, SOLUTION INTRAVENOUS; SUBCUTANEOUS at 09:08

## 2020-08-31 RX ADMIN — CIPROFLOXACIN 500 MG: 500 TABLET, FILM COATED ORAL at 11:08

## 2020-08-31 RX ADMIN — ASPIRIN 81 MG: 81 TABLET, COATED ORAL at 09:08

## 2020-08-31 RX ADMIN — ATORVASTATIN CALCIUM 40 MG: 20 TABLET, FILM COATED ORAL at 09:08

## 2020-08-31 RX ADMIN — INSULIN ASPART 2 UNITS: 100 INJECTION, SOLUTION INTRAVENOUS; SUBCUTANEOUS at 02:08

## 2020-08-31 RX ADMIN — FUROSEMIDE 80 MG: 40 TABLET ORAL at 09:08

## 2020-08-31 RX ADMIN — INSULIN ASPART 1 UNITS: 100 INJECTION, SOLUTION INTRAVENOUS; SUBCUTANEOUS at 11:08

## 2020-08-31 RX ADMIN — FUROSEMIDE 80 MG: 40 TABLET ORAL at 06:08

## 2020-08-31 RX ADMIN — CIPROFLOXACIN 500 MG: 500 TABLET, FILM COATED ORAL at 09:08

## 2020-08-31 RX ADMIN — POTASSIUM CHLORIDE 40 MEQ: 1500 TABLET, EXTENDED RELEASE ORAL at 02:08

## 2020-08-31 RX ADMIN — KETOROLAC TROMETHAMINE 15 MG: 30 INJECTION, SOLUTION INTRAMUSCULAR at 09:08

## 2020-08-31 RX ADMIN — INSULIN ASPART 8 UNITS: 100 INJECTION, SOLUTION INTRAVENOUS; SUBCUTANEOUS at 06:08

## 2020-08-31 RX ADMIN — LISINOPRIL 40 MG: 20 TABLET ORAL at 09:08

## 2020-08-31 RX ADMIN — ENOXAPARIN SODIUM 40 MG: 40 INJECTION SUBCUTANEOUS at 06:08

## 2020-08-31 RX ADMIN — CARVEDILOL 25 MG: 25 TABLET, FILM COATED ORAL at 09:08

## 2020-08-31 RX ADMIN — INSULIN ASPART 8 UNITS: 100 INJECTION, SOLUTION INTRAVENOUS; SUBCUTANEOUS at 02:08

## 2020-08-31 RX ADMIN — GADOBUTROL 10 ML: 604.72 INJECTION INTRAVENOUS at 10:08

## 2020-08-31 RX ADMIN — INSULIN ASPART 2 UNITS: 100 INJECTION, SOLUTION INTRAVENOUS; SUBCUTANEOUS at 06:08

## 2020-08-31 RX ADMIN — CHOLECALCIFEROL (VITAMIN D3) 25 MCG (1,000 UNIT) TABLET 1000 UNITS: at 09:08

## 2020-08-31 NOTE — ASSESSMENT & PLAN NOTE
Converting to Levemir 25 units daily and NovoLog 8 units with meals plus low-dose sliding scale.  Will check A1c for the morning.

## 2020-08-31 NOTE — PROGRESS NOTES
Progress Note  Hospital Medicine      Admit Date: 8/30/2020   [facility] HOSP MED D    SUBJECTIVE:     Follow-up For:  Diabetic foot infection     Interval history/ROS: No acute events overnight.     HPI: 65-year-old male with type 2 diabetes on insulin, chronic hypoxemic respiratory failure on 2 L oxygen via nasal cannula, and chronic systolic heart failure who presented to the ED for recurrent falls.  He reports a few episodes where his legs seem to just give out under him and other times where he has to crawl around the house over the past week.  At some point prior to this he found a tack imbedded in the bottom of his slipper which had punctured the sole of his left foot.  His left foot has become more swollen recently and he has had occasional twinges of pain in both of his legs.  He denies any fevers or chills, or any other symptoms out of the ordinary.  He denies any change in his respiratory status.  He says that he has been afraid to leave his house on account of the Coronavirus pandemic.  He has been getting home health services including physical therapy has been walking to his mailbox and back for exercise.    Review of Systems   Constitutional: Negative for activity change, chills and fever.   HENT: Negative for congestion, nosebleeds, rhinorrhea and sore throat.    Eyes: Negative for photophobia and visual disturbance.   Respiratory: Negative for cough, chest tightness and shortness of breath.    Cardiovascular: Positive for leg swelling. Negative for chest pain.   Gastrointestinal: Negative for abdominal pain, nausea and vomiting.   Endocrine: Negative for polydipsia and polyuria.   Genitourinary: Negative for dysuria and hematuria.   Musculoskeletal: Positive for joint swelling. Negative for arthralgias and myalgias.   Skin: Negative for color change and rash.   Neurological: Positive for weakness. Negative for dizziness, syncope and light-headedness.        OBJECTIVE:     Body mass index is 31.45  kg/m².    Vital Signs Range (Last 24H):  Temp:  [98.1 °F (36.7 °C)-99.7 °F (37.6 °C)]   Pulse:  []   Resp:  [16-25]   BP: (112-171)/()   SpO2:  [94 %-100 %]     I & O (Last 24H):    Intake/Output Summary (Last 24 hours) at 8/31/2020 1438  Last data filed at 8/31/2020 1300  Gross per 24 hour   Intake 730 ml   Output 850 ml   Net -120 ml        Physical Exam:  Vitals signs and nursing note reviewed.   Constitutional:       General: He is not in acute distress.     Appearance: He is normal weight. He is not toxic-appearing.      Interventions: Nasal cannula in place.   HENT:      Head: Normocephalic and atraumatic.      Nose: Nose normal.      Mouth/Throat:      Comments: Wearing face mask below the nose to accommodate nasal cannula  Eyes:      General: No scleral icterus.     Extraocular Movements: Extraocular movements intact.      Conjunctiva/sclera: Conjunctivae normal.      Pupils: Pupils are equal, round, and reactive to light.   Neck:      Musculoskeletal: Normal range of motion and neck supple.   Cardiovascular:      Rate and Rhythm: Normal rate and regular rhythm.      Pulses: Normal pulses.      Heart sounds: Normal heart sounds. No murmur. No gallop.    Pulmonary:      Effort: Pulmonary effort is normal.      Breath sounds: Normal breath sounds.   Abdominal:      General: Bowel sounds are normal. There is no distension.      Palpations: Abdomen is soft.      Tenderness: There is no abdominal tenderness. There is no guarding.   Musculoskeletal:         General: Swelling (left ankle) present.   Feet:      Right foot:      Skin integrity: Callus and dry skin present.      Toenail Condition: Right toenails are abnormally thick. Fungal disease present.     Left foot:      Skin integrity: Ulcer (plantar surface), warmth, callus and dry skin present.      Toenail Condition: Left toenails are abnormally thick. Fungal disease present.     Comments: Extensive onychomycosis all toenails.  Left foot  "appreciably warmer than right.  Foot ulcer dorsum left foot from presumed puncture site, with subcutaneous pyoderma tracking from ulcer around lateral side of foot superiorly about 2 inches.  There is additionally an erosion at the distal tip of the left great toe.  Lymphadenopathy:      Cervical: No cervical adenopathy.   Skin:     General: Skin is warm and dry.      Comments: Dry skin of feet and other findings as described under "feet"   Neurological:      Mental Status: He is alert and oriented to person, place, and time.      Cranial Nerves: No cranial nerve deficit.   Psychiatric:         Behavior: Behavior is cooperative.           Recent Labs   Lab 08/30/20  1448 08/31/20  0434    140   K 3.3* 3.1*   CL 97 99   CO2 29 28   BUN 24* 22   CREATININE 1.5* 1.4   * 212*   CALCIUM 9.8 8.8   MG  --  1.8   PHOS  --  4.1     Recent Labs   Lab 08/30/20  1448   ALKPHOS 100   ALT 23   AST 27   ALBUMIN 2.8*   PROT 8.8*   BILITOT 0.9       Recent Labs   Lab 08/30/20  1448 08/31/20  0434   WBC 21.43* 20.60*   HGB 11.4* 10.5*   HCT 36.3* 34.5*   * 354*   GRAN 84.8*  18.2* 83.2*  17.2*   LYMPH 5.2*  1.1 5.9*  1.2   MONO 7.5  1.6* 7.6  1.6*       Recent Labs   Lab 08/30/20  2125 08/31/20  0745 08/31/20  1218   POCTGLUCOSE 227* 246* 227*       Recent Labs   Lab 08/30/20  1448   TROPONINI 0.029*       Diagnostic Results:  Labs: Reviewed    aspirin, 81 mg, Oral, Daily  atorvastatin, 40 mg, Oral, Daily  carvediloL, 25 mg, Oral, BID  ciprofloxacin HCl, 500 mg, Oral, Q12H  enoxaparin, 40 mg, Subcutaneous, Q24H  fluticasone furoate-vilanteroL, 1 puff, Inhalation, Daily  fluticasone propionate, 1 spray, Each Nostril, Daily  furosemide, 80 mg, Oral, BID  insulin aspart U-100, 8 Units, Subcutaneous, TIDWM  insulin detemir U-100, 25 Units, Subcutaneous, Daily  levothyroxine, 75 mcg, Oral, Before breakfast  lisinopriL, 40 mg, Oral, Daily  potassium chloride, 40 mEq, Oral, Once  vancomycin (VANCOCIN) IVPB, 1,000 " mg, Intravenous, Q12H  vitamin D, 1,000 Units, Oral, Daily        As Needed acetaminophen, albuterol-ipratropium, dextrose 50%, dextrose 50%, gabapentin, glucagon (human recombinant), glucose, glucose, insulin aspart U-100, ketorolac, melatonin, ondansetron, polyethylene glycol, sodium chloride 0.9%, DIPH,PERTUS (ADACEL),TETANUS PF VAC (ADULT), Pharmacy to dose Vancomycin consult **AND** vancomycin - pharmacy to dose    ASSESSMENT/PLAN:     Assessment: Ed Patton is a 65 y.o. male here with:     Active Hospital Problems    Diagnosis  POA    *Diabetic foot infection [E11.628, L08.9]  Yes    Cellulitis of left lower extremity [L03.116]  Yes    Sepsis [A41.9]  Yes    COPD mixed type [J44.9]  Yes     Chronic    Uncontrolled type 2 diabetes mellitus with hyperglycemia, with long-term current use of insulin [E11.65, Z79.4]  Not Applicable     Chronic     Novolin 70/30 40U in AM, 30U in PM. Elevated A1c      Chronic respiratory failure with hypoxia, on home oxygen therapy [J96.11, Z99.81]  Not Applicable     Chronic     Continuous O2, followed by Pulm      Chronic systolic congestive heart failure [I50.22]  Yes     Chronic     EF 35% in 2015 echo, improved in 5/2018. Patient with mixed ischemic and non-ischemic cardiomyopathy        Resolved Hospital Problems   No resolved problems to display.        Plan:    Diabetic foot infection  Patient's diabetic peripheral neuropathy his impaired his awareness of injury to the plantar aspect of his foot as well as its worsening.   Initial puncture wound has progressed into a diabetic foot ulcer   empirically with vancomycin IV and ciprofloxacin p.o.  consult Podiatry for evaluation for local debridement if necessary as well as further wound care as indicated.  Arterial assessment of LE        Sepsis  3/4 SIRS at presentation; no end-organ dysfunction.  Clinically improving with IV antibiotics and initial IV fluids given in emergency department.  Will continue to monitor.   Due to significant systolic heart failure will not give any further IV fluids unless clinically indicated.  Blood cultures G + cocci  Consult infectious disease        Cellulitis of left lower extremity  Infection of puncture wound appears to have spread through the soft tissues of the foot resulting in significant swelling of the foot and ankle.  In addition to antibiotic therapy will keep left lower extremity elevated to assist in lymphatic drainage.        COPD mixed type  Will continue Breo inhaler plus DuoNebs p.r.n..        Chronic respiratory failure with hypoxia  On 2 L nasal cannula chronically due to combination of COPD and CHF; titrate as needed.        Uncontrolled type 2 diabetes mellitus with hyperglycemia, with long-term current use of insulin  Converting to Levemir 25 units daily and NovoLog 8 units with meals plus low-dose sliding scale.  Will check A1c for the morning.        Chronic systolic congestive heart failure  No signs of decompensation presently.  Will continue carvedilol, Lasix, lisinopril for now.  Will avoid IV fluids for now unless clinically needed for hemodynamic support in context of sepsis.     HIGH RISK CONDITION(S):  Patient has a condition that poses threat to life and bodily function: bacteremia      Jacqueline Curran MD

## 2020-08-31 NOTE — PROGRESS NOTES
Nutrition-Related Diabetes Education      Time Spent: 5 minutes    Learners: patient    Current HbA1c: 9.5    Is patient aware of their A1c and their goal A1c?       >7.0_______ yes    Home diabetes medication(s): insulin    Nutrition Education with handouts: consistent carbohydrate diet     Comments: Pt denies any changes in weight and having a good appetite. Pt appears well nourished, NFPE not warranted at this time. RD began educating patient, however he fell asleep immediately. RD will follow up once pt is less drowsy.           Barriers to Learning: motivation, participation in education.     Follow up:yes    Please consult as needed.  Thank you!

## 2020-08-31 NOTE — HPI
65 y.o M with PMHx of Type 2 DM, chronic hypoxemic respiratory failure on 2 L O2 via NC, chronic systolic heart failure who presented to the ED for recurrent falls.      Podiatry consulted for left foot ulcer. Patient states he stepped on a thumb tack 1 week ago on the left foot. He did not feel the injury at the time but noticed the thumb tack embedded in the bottom of his shoe and punctured through the insole of the shoe and subsequently a wound at the bottom of his foot. He denies any drainage, denies any increased redness. He is complaining of swelling and pain from his lower back radiating down to his hips and knees described as sharp and shooting. Denies any pedal pain. Denies any numbness or tingling in his feet. He follows Dr. Taveras in clinic for diabetic foot management but has not seen her in over 1 year. He ambulates independently at baseline. Denies N/V/F/C. Denies smoking hx.     In the ED, afebrile. WBC elevated at 21k with left shift. Xray left foot with no signs of soft tissue gas, no fx, no osteo, no foreign body.

## 2020-08-31 NOTE — HPI
"Ed Patton is a 65 year old Black man with obesity, diabetes mellitus type 2 with neuropathy (treated with insulin), hyperlipidemia, coronary artery disease, dilated cardiomyopathy with chronic systolic heart failure, chronic obstructive pulmonary disease and chronic restrictive lung disease with chronic hypoxic respiratory failure (on supplemental oxygen with nasal cannula at 2 liters/minute), postablative hypothyroidism, iron deficiency anemia, chronic kidney disease stage 3. He lives in [facility]. He is . His primary care physician is Dr. Qiana Chow.    He presented to [facility] Emergency Department on 8/30/2020 due to recurrent falls, where his legs seemed to just "give out" under him and other times when he had to crawl around his house, over the past week. At some point prior to this, he found a tack embedded in the bottom of his slipper that had punctured the sole of his left foot. His left foot became swollen, and he had occasional twinges of pain in both legs. He was afraid to leave his house due to the COVID-19 pandemic. He had been getting home health services, including physical therapy, and was walking between his mailbox and house for exercise. He was admitted to Hospital Medicine Team D.  "

## 2020-08-31 NOTE — ASSESSMENT & PLAN NOTE
3/4 SIRS at presentation; no end-organ dysfunction.  Clinically improving with IV antibiotics and initial IV fluids given in emergency department.  Will continue to monitor.  Due to significant systolic heart failure will not give any further IV fluids unless clinically indicated.

## 2020-08-31 NOTE — SUBJECTIVE & OBJECTIVE
Past Medical History:   Diagnosis Date    CHF (congestive heart failure)     COPD (chronic obstructive pulmonary disease)     Coronary artery disease     Diabetes mellitus     Diabetes mellitus type II     DM (diabetes mellitus) type II uncontrolled with renal manifestation 9/4/2013    Hyperlipidemia     Hypertension     Postablative hypothyroidism 12/6/2018       Past Surgical History:   Procedure Laterality Date    APPENDECTOMY      CHOLECYSTECTOMY      CORONARY ANGIOPLASTY WITH STENT PLACEMENT      TONSILLECTOMY         Review of patient's allergies indicates:   Allergen Reactions    Vicodin [hydrocodone-acetaminophen] Itching       No current facility-administered medications on file prior to encounter.      Current Outpatient Medications on File Prior to Encounter   Medication Sig    ACCU-CHEK FASTCLIX LANCET DRUM Misc TEST FOUR TIMES DAILY    ACCU-CHEK RAMIREZ Misc USE AS DIRECTED    ACCU-CHEK SMARTVIEW TEST STRIP Strp TEST FOUR TIMES DAILY    albuterol (PROVENTIL) 2.5 mg /3 mL (0.083 %) nebulizer solution INHALE THE CONTENTS OF 1 VIAL VIA NEBULIZER EVERY 4 HOURS AS NEEDED FOR WHEEZING.    alcohol swabs (BD ALCOHOL SWABS) PadM Apply 1 each topically as needed.    aspirin (ECOTRIN) 81 MG EC tablet Take 1 tablet (81 mg total) by mouth once daily.    atorvastatin (LIPITOR) 40 MG tablet Take 1 tablet (40 mg total) by mouth once daily.    carvedilol (COREG) 25 MG tablet Take 1 tablet (25 mg total) by mouth 2 (two) times daily.    cholecalciferol, vitamin D3, (VITAMIN D3) 1,000 unit capsule Take 1 capsule (1,000 Units total) by mouth once daily.    fluticasone furoate-vilanterol (BREO ELLIPTA) 200-25 mcg/dose DsDv diskus inhaler INHALE 1 PUFF INTO THE LUNGS ONCE DAILY. (CONTROLLER)    fluticasone propionate (FLONASE) 50 mcg/actuation nasal spray USE 1 SPRAY IN EACH NOSTRIL ONE TIME DAILY    furosemide (LASIX) 80 MG tablet TAKE 1 TABLET BY MOUTH IN THE MORNING  AND  1 TABLET BY MOUTH DAILY  AT   "3- TO 4PM    gabapentin (NEURONTIN) 100 MG capsule Take 1 capsule (100 mg total) by mouth 3 (three) times daily as needed (pain).    insulin NPH-insulin regular, 70/30, (NOVOLIN 70/30 U-100 INSULIN) 100 unit/mL (70-30) injection Inject 45 Units into the skin before breakfast AND 35 Units before dinner. [id number] MINUTES BEFORE BREAKFAST AND DINNER.    insulin syringe-needle U-100 (INSULIN SYRINGE) 1 mL 30 gauge x 5/16 Syrg 1 each by Misc.(Non-Drug; Combo Route) route 2 (two) times daily. Use twice daily as directed with insulin vials.    levothyroxine (SYNTHROID) 75 MCG tablet Take 1 tablet (75 mcg total) by mouth once daily.    lisinopril (PRINIVIL,ZESTRIL) 40 MG tablet Take 1 tablet (40 mg total) by mouth once daily.    mometasone 0.1% (ELOCON) 0.1 % cream Applied to the affected area once daily for rash.    nitroGLYCERIN (NITROSTAT) 0.4 MG SL tablet PLACE 1 TABLET UNDER THE TONGUE EVERY 5  MINUTES AS NEEDED FOR CHEST PAIN    pen needle, diabetic (BD ULTRA-FINE RAMIREZ PEN NEEDLE) 32 gauge x 5/32" Ndle Use with multiple daily injections of insulin     Family History     Problem Relation (Age of Onset)    Heart disease Mother    Hypertension Son    No Known Problems Father, Sister, Son, Son        Tobacco Use    Smoking status: Former Smoker     Packs/day: 0.01     Years: 3.00     Pack years: 0.03     Types: Cigarettes     Quit date: 1995     Years since quittin.2    Smokeless tobacco: Never Used   Substance and Sexual Activity    Alcohol use: No    Drug use: No    Sexual activity: Never     Comment:  with 1 son     Review of Systems   Constitutional: Negative for activity change, chills and fever.   HENT: Negative for congestion, nosebleeds, rhinorrhea and sore throat.    Eyes: Negative for photophobia and visual disturbance.   Respiratory: Negative for cough, chest tightness and shortness of breath.    Cardiovascular: Positive for leg swelling. Negative for chest pain. "   Gastrointestinal: Negative for abdominal pain, nausea and vomiting.   Endocrine: Negative for polydipsia and polyuria.   Genitourinary: Negative for dysuria and hematuria.   Musculoskeletal: Positive for joint swelling. Negative for arthralgias and myalgias.   Skin: Negative for color change and rash.   Neurological: Positive for weakness. Negative for dizziness, syncope and light-headedness.     Objective:     Vital Signs (Most Recent):  Temp: 99.4 °F (37.4 °C) (08/30/20 2021)  Pulse: 99 (08/30/20 2021)  Resp: 18 (08/30/20 2021)  BP: 126/79 (08/30/20 2021)  SpO2: 98 % (08/30/20 2021) Vital Signs (24h Range):  Temp:  [98 °F (36.7 °C)-99.4 °F (37.4 °C)] 99.4 °F (37.4 °C)  Pulse:  [] 99  Resp:  [16-25] 18  SpO2:  [96 %-100 %] 98 %  BP: (124-171)/() 126/79     Weight: 108.9 kg (240 lb)  Body mass index is 29.21 kg/m².    Physical Exam  Vitals signs and nursing note reviewed.   Constitutional:       General: He is not in acute distress.     Appearance: He is normal weight. He is not toxic-appearing.      Interventions: Nasal cannula in place.   HENT:      Head: Normocephalic and atraumatic.      Nose: Nose normal.      Mouth/Throat:      Comments: Wearing face mask below the nose to accommodate nasal cannula  Eyes:      General: No scleral icterus.     Extraocular Movements: Extraocular movements intact.      Conjunctiva/sclera: Conjunctivae normal.      Pupils: Pupils are equal, round, and reactive to light.   Neck:      Musculoskeletal: Normal range of motion and neck supple.   Cardiovascular:      Rate and Rhythm: Normal rate and regular rhythm.      Pulses: Normal pulses.      Heart sounds: Normal heart sounds. No murmur. No gallop.    Pulmonary:      Effort: Pulmonary effort is normal.      Breath sounds: Normal breath sounds.   Abdominal:      General: Bowel sounds are normal. There is no distension.      Palpations: Abdomen is soft.      Tenderness: There is no abdominal tenderness. There is no  "guarding.   Musculoskeletal:         General: Swelling (left ankle) present.   Feet:      Right foot:      Skin integrity: Callus and dry skin present.      Toenail Condition: Right toenails are abnormally thick. Fungal disease present.     Left foot:      Skin integrity: Ulcer (plantar surface), warmth, callus and dry skin present.      Toenail Condition: Left toenails are abnormally thick. Fungal disease present.     Comments: Extensive onychomycosis all toenails.  Left foot appreciably warmer than right.  Foot ulcer dorsum left foot from presumed puncture site, with subcutaneous pyoderma tracking from ulcer around lateral side of foot superiorly about 2 inches.  There is additionally an erosion at the distal tip of the left great toe.  Lymphadenopathy:      Cervical: No cervical adenopathy.   Skin:     General: Skin is warm and dry.      Comments: Dry skin of feet and other findings as described under "feet"   Neurological:      Mental Status: He is alert and oriented to person, place, and time.      Cranial Nerves: No cranial nerve deficit.   Psychiatric:         Behavior: Behavior is cooperative.           CRANIAL NERVES     CN III, IV, VI   Pupils are equal, round, and reactive to light.       Significant Labs:   CBC:   Recent Labs   Lab 08/30/20  1448   WBC 21.43*   HGB 11.4*   HCT 36.3*   *     CMP:   Recent Labs   Lab 08/30/20  1448      K 3.3*   CL 97   CO2 29   *   BUN 24*   CREATININE 1.5*   CALCIUM 9.8   PROT 8.8*   ALBUMIN 2.8*   BILITOT 0.9   ALKPHOS 100   AST 27   ALT 23   ANIONGAP 13   EGFRNONAA 48.8*     Lactic Acid:   Recent Labs   Lab 08/30/20  1532   LACTATE 1.4       Significant Imaging: I have reviewed all pertinent imaging results/findings within the past 24 hours.  "

## 2020-08-31 NOTE — ASSESSMENT & PLAN NOTE
No signs of decompensation presently.  Will continue carvedilol, Lasix, lisinopril for now.  Will avoid IV fluids for now unless clinically needed for hemodynamic support in context of sepsis.

## 2020-08-31 NOTE — CONSULTS
[facility]  Podiatry  Consult Note    Patient Name: Ed Patton  MRN: [id number]  Admission Date: 8/30/2020  Hospital Length of Stay: 1 days  Attending Physician: Jacqueline Curran MD  Primary Care Provider: Qiana Chow MD     Inpatient consult to Podiatry  Consult performed by: Nita Faulkner MD  Consult ordered by: Jarad Ma MD        Subjective:     History of Present Illness:  65 y.o M with PMHx of Type 2 DM, chronic hypoxemic respiratory failure on 2 L O2 via NC, chronic systolic heart failure who presented to the ED for recurrent falls.      Podiatry consulted for left foot ulcer. Patient states he stepped on a thumb tack 1 week ago on the left foot. He did not feel the injury at the time but noticed the thumb tack embedded in the bottom of his shoe and punctured through the insole of the shoe and subsequently a wound at the bottom of his foot. He denies any drainage, denies any increased redness. He is complaining of swelling and pain from his lower back radiating down to his hips and knees described as sharp and shooting. Denies any pedal pain. Denies any numbness or tingling in his feet. He follows Dr. Taveras in clinic for diabetic foot management but has not seen her in over 1 year. He ambulates independently at baseline. Denies N/V/F/C. Denies smoking hx.     In the ED, afebrile. WBC elevated at 21k with left shift. Xray left foot with no signs of soft tissue gas, no fx, no osteo, no foreign body.     Scheduled Meds:   aspirin  81 mg Oral Daily    atorvastatin  40 mg Oral Daily    carvediloL  25 mg Oral BID    ciprofloxacin HCl  500 mg Oral Q12H    enoxaparin  40 mg Subcutaneous Q24H    fluticasone furoate-vilanteroL  1 puff Inhalation Daily    fluticasone propionate  1 spray Each Nostril Daily    furosemide  80 mg Oral BID    insulin aspart U-100  8 Units Subcutaneous TIDWM    insulin detemir U-100  25 Units Subcutaneous Daily    levothyroxine  75  mcg Oral Before breakfast    lisinopriL  40 mg Oral Daily    vancomycin (VANCOCIN) IVPB  1,000 mg Intravenous Q12H    vitamin D  1,000 Units Oral Daily     Continuous Infusions:  PRN Meds:acetaminophen, albuterol-ipratropium, dextrose 50%, dextrose 50%, gabapentin, glucagon (human recombinant), glucose, glucose, insulin aspart U-100, ketorolac, melatonin, ondansetron, polyethylene glycol, sodium chloride 0.9%, DIPH,PERTUS (ADACEL),TETANUS PF VAC (ADULT), Pharmacy to dose Vancomycin consult **AND** vancomycin - pharmacy to dose    Review of patient's allergies indicates:   Allergen Reactions    Vicodin [hydrocodone-acetaminophen] Itching        Past Medical History:   Diagnosis Date    CHF (congestive heart failure)     COPD (chronic obstructive pulmonary disease)     Coronary artery disease     Diabetes mellitus     Diabetes mellitus type II     DM (diabetes mellitus) type II uncontrolled with renal manifestation 2013    Hyperlipidemia     Hypertension     Postablative hypothyroidism 2018     Past Surgical History:   Procedure Laterality Date    APPENDECTOMY      CHOLECYSTECTOMY      CORONARY ANGIOPLASTY WITH STENT PLACEMENT      TONSILLECTOMY         Family History     Problem Relation (Age of Onset)    Heart disease Mother    Hypertension Son    No Known Problems Father, Sister, Son, Son        Tobacco Use    Smoking status: Former Smoker     Packs/day: 0.01     Years: 3.00     Pack years: 0.03     Types: Cigarettes     Quit date: 1995     Years since quittin.2    Smokeless tobacco: Never Used   Substance and Sexual Activity    Alcohol use: No    Drug use: No    Sexual activity: Never     Comment:  with 1 son     Review of Systems   Constitutional: Positive for activity change. Negative for appetite change, chills and fever.   HENT: Negative for congestion.    Respiratory: Negative for cough and shortness of breath.    Gastrointestinal: Negative for nausea and  vomiting.   Musculoskeletal: Positive for arthralgias, back pain and myalgias.   Skin: Positive for wound. Negative for color change.   Neurological: Positive for weakness. Negative for numbness.   Psychiatric/Behavioral: Negative for behavioral problems and confusion.     Objective:     Vital Signs (Most Recent):  Temp: 98.5 °F (36.9 °C) (08/31/20 1220)  Pulse: 92 (08/31/20 1415)  Resp: 18 (08/31/20 1415)  BP: 115/67 (08/31/20 1220)  SpO2: (!) 94 % (08/31/20 1415) Vital Signs (24h Range):  Temp:  [98.1 °F (36.7 °C)-99.7 °F (37.6 °C)] 98.5 °F (36.9 °C)  Pulse:  [] 92  Resp:  [16-20] 18  SpO2:  [94 %-100 %] 94 %  BP: (112-165)/() 115/67     Weight: 117.2 kg (258 lb 6.1 oz)  Body mass index is 31.45 kg/m².    Foot Exam    General  Orientation: alert and oriented to person, place, and time       Right Foot/Ankle     Inspection and Palpation  Tenderness: none   Swelling: none   Skin Exam: drainage and ulcer; no cellulitis and no erythema     Neurovascular  Dorsalis pedis: absent  Posterior tibial: absent      Left Foot/Ankle      Inspection and Palpation  Tenderness: none   Swelling: none   Skin Exam: skin intact;     Neurovascular  Dorsalis pedis: absent  Posterior tibial: absent            Laboratory:  CBC:   Recent Labs   Lab 08/31/20  0434   WBC 20.60*   RBC 3.93*   HGB 10.5*   HCT 34.5*   *   MCV 88   MCH 26.7*   MCHC 30.4*     CRP: No results for input(s): CRP in the last 168 hours.  ESR: No results for input(s): SEDRATE in the last 168 hours.  Wound Cultures: No results for input(s): LABAERO in the last 4320 hours.  Microbiology Results (last 7 days)     Procedure Component Value Units Date/Time    Culture, Anaerobe [[id number]] Collected: 08/31/20 1754    Order Status: Sent Specimen: Wound from Foot, Left Updated: 08/31/20 1754    Aerobic culture [[id number]] Collected: 08/31/20 1754    Order Status: Sent Specimen: Wound from Foot, Left Updated: 08/31/20 1754    Gram stain [[id number]] Collected:  08/31/20 1754    Order Status: Sent Specimen: Wound from Foot, Left Updated: 08/31/20 1754    Blood culture [[id number]] Collected: 08/31/20 1324    Order Status: Sent Specimen: Blood from Peripheral, Left Hand Updated: 08/31/20 1337    Blood culture [[id number]] Collected: 08/31/20 1328    Order Status: Sent Specimen: Blood from Peripheral, Right Hand Updated: 08/31/20 1337    Blood culture #2 **CANNOT BE ORDERED STAT** [[id number]] Collected: 08/30/20 1543    Order Status: Completed Specimen: Blood from Peripheral, Antecubital, Left Updated: 08/31/20 1236     Blood Culture, Routine Gram stain aer bottle: Gram positive cocci in clusters resembling Staph       Gram stain oksana bottle: Gram positive cocci in clusters resembling Staph       Results called to and read back by: Elsy Pena RN 08/31/2020  12:35    Blood culture #1 **CANNOT BE ORDERED STAT** [[id number]] Collected: 08/30/20 1532    Order Status: Completed Specimen: Blood from Peripheral, Antecubital, Left Updated: 08/31/20 1235     Blood Culture, Routine Gram stain aer bottle: Gram positive cocci in clusters resembling Staph       Gram stain oksana bottle: Gram positive cocci in clusters resembling Staph       Results called to and read back by: Elsy Pena RN  08/31/2020  12:35    Urine culture [[id number]] Collected: 08/30/20 1633    Order Status: No result Specimen: Urine Updated: 08/30/20 1721        Specimen (12h ago, onward)    None          Diagnostic Results:  I have reviewed all pertinent imaging results/findings within the past 24 hours.   Imaging Results          CT Head Without Contrast (Final result)  Result time 08/30/20 16:51:33    Final result by Giovany Hernandez MD (08/30/20 16:51:33)                 Impression:      No acute abnormality.  Follow-up/further evaluation as clinically indicated.    Chronic microvascular ischemic changes.      Electronically signed by: Giovany Hernandez MD  Date:    08/30/2020  Time:    16:51             Narrative:     EXAMINATION:  CT HEAD WITHOUT CONTRAST    CLINICAL HISTORY:  Syncope, recurrent;    TECHNIQUE:  Low dose axial CT images obtained throughout the head without intravenous contrast. Sagittal and coronal reconstructions were performed.    COMPARISON:  None.    FINDINGS:  Intracranial compartment:    Ventricles and sulci are normal in size for age without evidence of hydrocephalus. No extra-axial blood or fluid collections.    Mild generalized cerebral volume loss.  Scattered hypoattenuation of the supratentorial white matter which is nonspecific but likely represents chronic microvascular ischemic changes.  No parenchymal mass, hemorrhage, edema or major vascular distribution infarct.    Skull/extracranial contents (limited evaluation): No fracture. Mastoid air cells and paranasal sinuses are essentially clear.  Congenital nonunion of C1.                               X-Ray Foot Complete Left (Final result)  Result time 08/30/20 15:58:35    Final result by Giovany Hernandez MD (08/30/20 15:58:35)                 Impression:      No evidence of fracture.      Electronically signed by: Giovany Hernandez MD  Date:    08/30/2020  Time:    15:58             Narrative:    EXAMINATION:  XR FOOT COMPLETE 3 VIEW LEFT    CLINICAL HISTORY:  .  Unspecified infectious disease    TECHNIQUE:  AP, lateral and oblique views of the left foot were performed.    COMPARISON:  None    FINDINGS:  No evidence of acute fracture or dislocation.  No osseous destructive process.  Spurring of the inferior calcaneus.  Atherosclerosis noted.  Soft tissue defect along the plantar aspect of the midfoot likely a laceration or wound.  No radiopaque foreign body.                               X-Ray Chest AP Portable (Final result)  Result time 08/30/20 16:02:41    Final result by Rigoberto Keen MD (08/30/20 16:02:41)                 Impression:      Findings suggesting mild pulmonary edema/CHF pattern without focal consolidation.  Interstitial  pneumonia not excluded.      Electronically signed by: Rigoberto Keen MD  Date:    08/30/2020  Time:    16:02             Narrative:    EXAMINATION:  XR CHEST AP PORTABLE    CLINICAL HISTORY:  leukocytosis;    TECHNIQUE:  Single frontal view of the chest was performed.    COMPARISON:  Chest radiograph 05/22/2015    FINDINGS:  Monitoring leads overlie the chest.  Large body habitus.  Patient is slightly rotated.  Inferior-most aspect of the left costophrenic angle not included in field of view.    Cardiac silhouette is upper limits of normal in size with prominence of the central pulmonary vasculature and bilateral diffuse nonspecific interstitial coarsening with a perihilar and basilar distribution suggesting pulmonary edema/CHF pattern with interstitial pneumonia not excluded.  There is calcific atherosclerosis and tortuosity of the thoracic aorta.  Hilar contours are within normal limits.  There is chronic nonspecific elevation of the right hemidiaphragm with right basilar platelike scarring versus atelectasis.  No large consolidation, pleural effusion or pneumothorax definitively seen allowing for limitations.  No acute osseous process seen.                                  Clinical Findings:  Left plantar foot ulcer  Measurements: 4.0cmx1.5cmx0.3 cm  Wound base: fibrogranular  Drainage: Scant purulent drainage  No probe to bone, no undermining, no erythema, no edema, no fluctuance no crepitus.          Assessment/Plan:     Diabetic ulcer of left foot associated with type 2 diabetes mellitus, with fat layer exposed  65 y.o M with PMHx of Type 2 DM, chronic hypoxemic respiratory failure on 2 L O2 via NC, chronic systolic heart failure who presented to the ED for recurrent falls.  Podiatry consulted for left foot ulcer.  In the ED, afebrile. WBC elevated at 21k with left shift. Xray left foot with no signs of soft tissue gas, no fx, no osteo, no foreign body.     Plan:  -Wound cx obtained. Follow up cx for abx  tailoring  -Continue IV abx  -ID on board  -Follow up MRI and arterial studies  -No emergent surgical intervention from podiatry warranted at this time  -Continue local wound care. Orders placed  -Rest of care per primary  -Podiatry will continue to follow      Sepsis  Per primary        Thank you for your consult. I will follow-up with patient. Please contact us if you have any additional questions.    Nita Faulkner DPM  [facility]  Podiatry PGY2  Pager: [phone]  Spectra:[phone]

## 2020-08-31 NOTE — PROGRESS NOTES
Pharmacokinetic Initial Assessment: IV Vancomycin    Assessment/Plan:    Initiate intravenous vancomycin with loading dose of 1750 mg once, done in ED, followed by a maintenance dose of vancomycin 1000 mg IV every 12 hours.  Desired empiric serum trough concentration is 10 to 20 mcg/mL.  Draw vancomycin trough level 60 min prior to fourth dose on 09/01/2020 at 0400.  Pharmacy will continue to follow and monitor vancomycin.      Please contact pharmacy at extension [phone] with any questions regarding this assessment.     Thank you for the consult,   Alberto Mckeon       Patient brief summary:  Ed Patton is a 65 y.o. male initiated on antimicrobial therapy with IV Vancomycin for treatment of suspected skin & soft tissue infection.    Drug Allergies:   Review of patient's allergies indicates:   Allergen Reactions    Vicodin [hydrocodone-acetaminophen] Itching       Actual Body Weight:   108.9 kg    Renal Function:   Estimated Creatinine Clearance: 68.2 mL/min (A) (based on SCr of 1.5 mg/dL (H)).    CBC (last 72 hours):  Recent Labs   Lab Result Units 08/30/20  1448   WBC K/uL 21.43*   Hemoglobin g/dL 11.4*   Hematocrit % 36.3*   Platelets K/uL 392*   Gran% % 84.8*   Lymph% % 5.2*   Mono% % 7.5   Eosinophil% % 1.4   Basophil% % 0.4   Differential Method  Automated       Metabolic Panel (last 72 hours):  Recent Labs   Lab Result Units 08/30/20  1448 08/30/20  1633   Sodium mmol/L 139  --    Potassium mmol/L 3.3*  --    Chloride mmol/L 97  --    CO2 mmol/L 29  --    Glucose mg/dL 250*  --    Glucose, UA   --  Negative   BUN, Bld mg/dL 24*  --    Creatinine mg/dL 1.5*  --    Albumin g/dL 2.8*  --    Total Bilirubin mg/dL 0.9  --    Alkaline Phosphatase U/L 100  --    AST U/L 27  --    ALT U/L 23  --        Drug levels (last 3 results):  No results for input(s): VANCOMYCINRA, VANCOMYCINPE, VANCOMYCINTR in the last 72 hours.    Microbiologic Results:  Microbiology Results (last 7 days)     Procedure Component Value  Units Date/Time    Urine culture [[id number]] Collected: 08/30/20 1633    Order Status: No result Specimen: Urine Updated: 08/30/20 1721    Blood culture #2 **CANNOT BE ORDERED STAT** [[id number]] Collected: 08/30/20 1543    Order Status: Sent Specimen: Blood from Peripheral, Antecubital, Left Updated: 08/30/20 1557    Blood culture #1 **CANNOT BE ORDERED STAT** [[id number]] Collected: 08/30/20 1532    Order Status: Sent Specimen: Blood from Peripheral, Antecubital, Left Updated: 08/30/20 1541

## 2020-08-31 NOTE — SUBJECTIVE & OBJECTIVE
Scheduled Meds:   aspirin  81 mg Oral Daily    atorvastatin  40 mg Oral Daily    carvediloL  25 mg Oral BID    ciprofloxacin HCl  500 mg Oral Q12H    enoxaparin  40 mg Subcutaneous Q24H    fluticasone furoate-vilanteroL  1 puff Inhalation Daily    fluticasone propionate  1 spray Each Nostril Daily    furosemide  80 mg Oral BID    insulin aspart U-100  8 Units Subcutaneous TIDWM    insulin detemir U-100  25 Units Subcutaneous Daily    levothyroxine  75 mcg Oral Before breakfast    lisinopriL  40 mg Oral Daily    vancomycin (VANCOCIN) IVPB  1,000 mg Intravenous Q12H    vitamin D  1,000 Units Oral Daily     Continuous Infusions:  PRN Meds:acetaminophen, albuterol-ipratropium, dextrose 50%, dextrose 50%, gabapentin, glucagon (human recombinant), glucose, glucose, insulin aspart U-100, ketorolac, melatonin, ondansetron, polyethylene glycol, sodium chloride 0.9%, DIPH,PERTUS (ADACEL),TETANUS PF VAC (ADULT), Pharmacy to dose Vancomycin consult **AND** vancomycin - pharmacy to dose    Review of patient's allergies indicates:   Allergen Reactions    Vicodin [hydrocodone-acetaminophen] Itching        Past Medical History:   Diagnosis Date    CHF (congestive heart failure)     COPD (chronic obstructive pulmonary disease)     Coronary artery disease     Diabetes mellitus     Diabetes mellitus type II     DM (diabetes mellitus) type II uncontrolled with renal manifestation 9/4/2013    Hyperlipidemia     Hypertension     Postablative hypothyroidism 12/6/2018     Past Surgical History:   Procedure Laterality Date    APPENDECTOMY      CHOLECYSTECTOMY      CORONARY ANGIOPLASTY WITH STENT PLACEMENT      TONSILLECTOMY         Family History     Problem Relation (Age of Onset)    Heart disease Mother    Hypertension Son    No Known Problems Father, Sister, Son, Son        Tobacco Use    Smoking status: Former Smoker     Packs/day: 0.01     Years: 3.00     Pack years: 0.03     Types: Cigarettes     Quit  date: 1995     Years since quittin.2    Smokeless tobacco: Never Used   Substance and Sexual Activity    Alcohol use: No    Drug use: No    Sexual activity: Never     Comment:  with 1 son     Review of Systems   Constitutional: Positive for activity change. Negative for appetite change, chills and fever.   HENT: Negative for congestion.    Respiratory: Negative for cough and shortness of breath.    Gastrointestinal: Negative for nausea and vomiting.   Musculoskeletal: Positive for arthralgias, back pain and myalgias.   Skin: Positive for wound. Negative for color change.   Neurological: Positive for weakness. Negative for numbness.   Psychiatric/Behavioral: Negative for behavioral problems and confusion.     Objective:     Vital Signs (Most Recent):  Temp: 98.5 °F (36.9 °C) (20 1220)  Pulse: 92 (20 1415)  Resp: 18 (20 1415)  BP: 115/67 (20 1220)  SpO2: (!) 94 % (20 1415) Vital Signs (24h Range):  Temp:  [98.1 °F (36.7 °C)-99.7 °F (37.6 °C)] 98.5 °F (36.9 °C)  Pulse:  [] 92  Resp:  [16-20] 18  SpO2:  [94 %-100 %] 94 %  BP: (112-165)/() 115/67     Weight: 117.2 kg (258 lb 6.1 oz)  Body mass index is 31.45 kg/m².    Foot Exam    General  Orientation: alert and oriented to person, place, and time       Right Foot/Ankle     Inspection and Palpation  Tenderness: none   Swelling: none   Skin Exam: drainage and ulcer; no cellulitis and no erythema     Neurovascular  Dorsalis pedis: absent  Posterior tibial: absent      Left Foot/Ankle      Inspection and Palpation  Tenderness: none   Swelling: none   Skin Exam: skin intact;     Neurovascular  Dorsalis pedis: absent  Posterior tibial: absent            Laboratory:  CBC:   Recent Labs   Lab 20  0434   WBC 20.60*   RBC 3.93*   HGB 10.5*   HCT 34.5*   *   MCV 88   MCH 26.7*   MCHC 30.4*     CRP: No results for input(s): CRP in the last 168 hours.  ESR: No results for input(s): SEDRATE in the last 168  hours.  Wound Cultures: No results for input(s): LABAERO in the last 4320 hours.  Microbiology Results (last 7 days)     Procedure Component Value Units Date/Time    Culture, Anaerobe [[id number]] Collected: 08/31/20 1754    Order Status: Sent Specimen: Wound from Foot, Left Updated: 08/31/20 1754    Aerobic culture [[id number]] Collected: 08/31/20 1754    Order Status: Sent Specimen: Wound from Foot, Left Updated: 08/31/20 1754    Gram stain [[id number]] Collected: 08/31/20 1754    Order Status: Sent Specimen: Wound from Foot, Left Updated: 08/31/20 1754    Blood culture [[id number]] Collected: 08/31/20 1324    Order Status: Sent Specimen: Blood from Peripheral, Left Hand Updated: 08/31/20 1337    Blood culture [[id number]] Collected: 08/31/20 1328    Order Status: Sent Specimen: Blood from Peripheral, Right Hand Updated: 08/31/20 1337    Blood culture #2 **CANNOT BE ORDERED STAT** [[id number]] Collected: 08/30/20 1543    Order Status: Completed Specimen: Blood from Peripheral, Antecubital, Left Updated: 08/31/20 1236     Blood Culture, Routine Gram stain aer bottle: Gram positive cocci in clusters resembling Staph       Gram stain oksana bottle: Gram positive cocci in clusters resembling Staph       Results called to and read back by: Elsy Pena RN 08/31/2020  12:35    Blood culture #1 **CANNOT BE ORDERED STAT** [[id number]] Collected: 08/30/20 1532    Order Status: Completed Specimen: Blood from Peripheral, Antecubital, Left Updated: 08/31/20 1235     Blood Culture, Routine Gram stain aer bottle: Gram positive cocci in clusters resembling Staph       Gram stain oksana bottle: Gram positive cocci in clusters resembling Staph       Results called to and read back by: Elsy Pena RN  08/31/2020  12:35    Urine culture [[id number]] Collected: 08/30/20 1633    Order Status: No result Specimen: Urine Updated: 08/30/20 1721        Specimen (12h ago, onward)    None          Diagnostic Results:  I have reviewed all pertinent  imaging results/findings within the past 24 hours.   Imaging Results          CT Head Without Contrast (Final result)  Result time 08/30/20 16:51:33    Final result by Giovany Hernandez MD (08/30/20 16:51:33)                 Impression:      No acute abnormality.  Follow-up/further evaluation as clinically indicated.    Chronic microvascular ischemic changes.      Electronically signed by: Giovany Hernandez MD  Date:    08/30/2020  Time:    16:51             Narrative:    EXAMINATION:  CT HEAD WITHOUT CONTRAST    CLINICAL HISTORY:  Syncope, recurrent;    TECHNIQUE:  Low dose axial CT images obtained throughout the head without intravenous contrast. Sagittal and coronal reconstructions were performed.    COMPARISON:  None.    FINDINGS:  Intracranial compartment:    Ventricles and sulci are normal in size for age without evidence of hydrocephalus. No extra-axial blood or fluid collections.    Mild generalized cerebral volume loss.  Scattered hypoattenuation of the supratentorial white matter which is nonspecific but likely represents chronic microvascular ischemic changes.  No parenchymal mass, hemorrhage, edema or major vascular distribution infarct.    Skull/extracranial contents (limited evaluation): No fracture. Mastoid air cells and paranasal sinuses are essentially clear.  Congenital nonunion of C1.                               X-Ray Foot Complete Left (Final result)  Result time 08/30/20 15:58:35    Final result by Giovany Hernandez MD (08/30/20 15:58:35)                 Impression:      No evidence of fracture.      Electronically signed by: Giovany Hernandez MD  Date:    08/30/2020  Time:    15:58             Narrative:    EXAMINATION:  XR FOOT COMPLETE 3 VIEW LEFT    CLINICAL HISTORY:  .  Unspecified infectious disease    TECHNIQUE:  AP, lateral and oblique views of the left foot were performed.    COMPARISON:  None    FINDINGS:  No evidence of acute fracture or dislocation.  No osseous destructive  process.  Spurring of the inferior calcaneus.  Atherosclerosis noted.  Soft tissue defect along the plantar aspect of the midfoot likely a laceration or wound.  No radiopaque foreign body.                               X-Ray Chest AP Portable (Final result)  Result time 08/30/20 16:02:41    Final result by Rigoberto Keen MD (08/30/20 16:02:41)                 Impression:      Findings suggesting mild pulmonary edema/CHF pattern without focal consolidation.  Interstitial pneumonia not excluded.      Electronically signed by: Rigoberto Keen MD  Date:    08/30/2020  Time:    16:02             Narrative:    EXAMINATION:  XR CHEST AP PORTABLE    CLINICAL HISTORY:  leukocytosis;    TECHNIQUE:  Single frontal view of the chest was performed.    COMPARISON:  Chest radiograph 05/22/2015    FINDINGS:  Monitoring leads overlie the chest.  Large body habitus.  Patient is slightly rotated.  Inferior-most aspect of the left costophrenic angle not included in field of view.    Cardiac silhouette is upper limits of normal in size with prominence of the central pulmonary vasculature and bilateral diffuse nonspecific interstitial coarsening with a perihilar and basilar distribution suggesting pulmonary edema/CHF pattern with interstitial pneumonia not excluded.  There is calcific atherosclerosis and tortuosity of the thoracic aorta.  Hilar contours are within normal limits.  There is chronic nonspecific elevation of the right hemidiaphragm with right basilar platelike scarring versus atelectasis.  No large consolidation, pleural effusion or pneumothorax definitively seen allowing for limitations.  No acute osseous process seen.                                  Clinical Findings:  Left plantar foot ulcer  Measurements: 4.0cmx1.5cmx0.3 cm  Wound base: fibrogranular  Drainage: Scant purulent drainage  No probe to bone, no undermining, no erythema, no edema, no fluctuance no crepitus.

## 2020-08-31 NOTE — ASSESSMENT & PLAN NOTE
Infection of puncture wound appears to have spread through the soft tissues of the foot resulting in significant swelling of the foot and ankle.  In addition to antibiotic therapy will keep left lower extremity elevated to assist in lymphatic drainage.

## 2020-08-31 NOTE — ASSESSMENT & PLAN NOTE
Patient's diabetic peripheral neuropathy his impaired his awareness of injury to the plantar aspect of his foot as well as its worsening.  Initial puncture wound has progressed into a diabetic foot ulcer and will be treated empirically with vancomycin IV and ciprofloxacin p.o. and I will consult Podiatry for evaluation for local debridement if necessary as well as further wound care as indicated.

## 2020-08-31 NOTE — ASSESSMENT & PLAN NOTE
65 y.o M with PMHx of Type 2 DM, chronic hypoxemic respiratory failure on 2 L O2 via NC, chronic systolic heart failure who presented to the ED for recurrent falls.  Podiatry consulted for left foot ulcer.  In the ED, afebrile. WBC elevated at 21k with left shift. Xray left foot with no signs of soft tissue gas, no fx, no osteo, no foreign body.     Plan:  -Wound cx obtained. Follow up cx for abx tailoring  -Continue IV abx  -Follow up MRI and arterial studies  -No emergent surgical intervention from podiatry warranted at this time  -Continue local wound care. Orders placed  -Rest of care per primary  -Podiatry will continue to follow

## 2020-08-31 NOTE — H&P
"[facility] Hospital Medicine  History & Physical    Patient Name: Ed Patton  MRN: [id number]  Admission Date: 8/30/2020  Attending Physician: Jacqueline Curran MD   Primary Care Provider: Qiana Chow MD    [facility] Medicine Team: [facility] HOSP MED D Jarad Ma MD     Patient information was obtained from patient, past medical records and ER records.     Subjective:     Principal Problem:Diabetic foot infection    Chief Complaint:   Chief Complaint   Patient presents with    Frequent Falls     frequent falls, weakness, "stepped on a tack" left leg now swollen.     Leg Swelling        HPI: 65-year-old male with type 2 diabetes on insulin, chronic hypoxemic respiratory failure on 2 L oxygen via nasal cannula, and chronic systolic heart failure who presented to the ED for recurrent falls.  He reports a few episodes where his legs seem to just give out under him and other times where he has to crawl around the house over the past week.  At some point prior to this he found a tack imbedded in the bottom of his slipper which had punctured the sole of his left foot.  His left foot has become more swollen recently and he has had occasional twinges of pain in both of his legs.  He denies any fevers or chills, or any other symptoms out of the ordinary.  He denies any change in his respiratory status.  He says that he has been afraid to leave his house on account of the Coronavirus pandemic.  He has been getting home health services including physical therapy has been walking to his mailbox and back for exercise.    Past Medical History:   Diagnosis Date    CHF (congestive heart failure)     COPD (chronic obstructive pulmonary disease)     Coronary artery disease     Diabetes mellitus     Diabetes mellitus type II     DM (diabetes mellitus) type II uncontrolled with renal manifestation 9/4/2013    Hyperlipidemia     Hypertension     Postablative hypothyroidism 12/6/2018 "       Past Surgical History:   Procedure Laterality Date    APPENDECTOMY      CHOLECYSTECTOMY      CORONARY ANGIOPLASTY WITH STENT PLACEMENT      TONSILLECTOMY         Review of patient's allergies indicates:   Allergen Reactions    Vicodin [hydrocodone-acetaminophen] Itching       No current facility-administered medications on file prior to encounter.      Current Outpatient Medications on File Prior to Encounter   Medication Sig    ACCU-CHEK FASTCLIX LANCET DRUM Misc TEST FOUR TIMES DAILY    ACCU-CHEK RAMIREZ Misc USE AS DIRECTED    ACCU-CHEK SMARTVIEW TEST STRIP Strp TEST FOUR TIMES DAILY    albuterol (PROVENTIL) 2.5 mg /3 mL (0.083 %) nebulizer solution INHALE THE CONTENTS OF 1 VIAL VIA NEBULIZER EVERY 4 HOURS AS NEEDED FOR WHEEZING.    alcohol swabs (BD ALCOHOL SWABS) PadM Apply 1 each topically as needed.    aspirin (ECOTRIN) 81 MG EC tablet Take 1 tablet (81 mg total) by mouth once daily.    atorvastatin (LIPITOR) 40 MG tablet Take 1 tablet (40 mg total) by mouth once daily.    carvedilol (COREG) 25 MG tablet Take 1 tablet (25 mg total) by mouth 2 (two) times daily.    cholecalciferol, vitamin D3, (VITAMIN D3) 1,000 unit capsule Take 1 capsule (1,000 Units total) by mouth once daily.    fluticasone furoate-vilanterol (BREO ELLIPTA) 200-25 mcg/dose DsDv diskus inhaler INHALE 1 PUFF INTO THE LUNGS ONCE DAILY. (CONTROLLER)    fluticasone propionate (FLONASE) 50 mcg/actuation nasal spray USE 1 SPRAY IN EACH NOSTRIL ONE TIME DAILY    furosemide (LASIX) 80 MG tablet TAKE 1 TABLET BY MOUTH IN THE MORNING  AND  1 TABLET BY MOUTH DAILY  AT  3- TO 4PM    gabapentin (NEURONTIN) 100 MG capsule Take 1 capsule (100 mg total) by mouth 3 (three) times daily as needed (pain).    insulin NPH-insulin regular, 70/30, (NOVOLIN 70/30 U-100 INSULIN) 100 unit/mL (70-30) injection Inject 45 Units into the skin before breakfast AND 35 Units before dinner. [id number] MINUTES BEFORE BREAKFAST AND DINNER.     "insulin syringe-needle U-100 (INSULIN SYRINGE) 1 mL 30 gauge x 5/16 Syrg 1 each by Misc.(Non-Drug; Combo Route) route 2 (two) times daily. Use twice daily as directed with insulin vials.    levothyroxine (SYNTHROID) 75 MCG tablet Take 1 tablet (75 mcg total) by mouth once daily.    lisinopril (PRINIVIL,ZESTRIL) 40 MG tablet Take 1 tablet (40 mg total) by mouth once daily.    mometasone 0.1% (ELOCON) 0.1 % cream Applied to the affected area once daily for rash.    nitroGLYCERIN (NITROSTAT) 0.4 MG SL tablet PLACE 1 TABLET UNDER THE TONGUE EVERY 5  MINUTES AS NEEDED FOR CHEST PAIN    pen needle, diabetic (BD ULTRA-FINE RAMIREZ PEN NEEDLE) 32 gauge x 5/32" Ndle Use with multiple daily injections of insulin     Family History     Problem Relation (Age of Onset)    Heart disease Mother    Hypertension Son    No Known Problems Father, Sister, Son, Son        Tobacco Use    Smoking status: Former Smoker     Packs/day: 0.01     Years: 3.00     Pack years: 0.03     Types: Cigarettes     Quit date: 1995     Years since quittin.2    Smokeless tobacco: Never Used   Substance and Sexual Activity    Alcohol use: No    Drug use: No    Sexual activity: Never     Comment:  with 1 son     Review of Systems   Constitutional: Negative for activity change, chills and fever.   HENT: Negative for congestion, nosebleeds, rhinorrhea and sore throat.    Eyes: Negative for photophobia and visual disturbance.   Respiratory: Negative for cough, chest tightness and shortness of breath.    Cardiovascular: Positive for leg swelling. Negative for chest pain.   Gastrointestinal: Negative for abdominal pain, nausea and vomiting.   Endocrine: Negative for polydipsia and polyuria.   Genitourinary: Negative for dysuria and hematuria.   Musculoskeletal: Positive for joint swelling. Negative for arthralgias and myalgias.   Skin: Negative for color change and rash.   Neurological: Positive for weakness. Negative for dizziness, " syncope and light-headedness.     Objective:     Vital Signs (Most Recent):  Temp: 99.4 °F (37.4 °C) (08/30/20 2021)  Pulse: 99 (08/30/20 2021)  Resp: 18 (08/30/20 2021)  BP: 126/79 (08/30/20 2021)  SpO2: 98 % (08/30/20 2021) Vital Signs (24h Range):  Temp:  [98 °F (36.7 °C)-99.4 °F (37.4 °C)] 99.4 °F (37.4 °C)  Pulse:  [] 99  Resp:  [16-25] 18  SpO2:  [96 %-100 %] 98 %  BP: (124-171)/() 126/79     Weight: 108.9 kg (240 lb)  Body mass index is 29.21 kg/m².    Physical Exam  Vitals signs and nursing note reviewed.   Constitutional:       General: He is not in acute distress.     Appearance: He is normal weight. He is not toxic-appearing.      Interventions: Nasal cannula in place.   HENT:      Head: Normocephalic and atraumatic.      Nose: Nose normal.      Mouth/Throat:      Comments: Wearing face mask below the nose to accommodate nasal cannula  Eyes:      General: No scleral icterus.     Extraocular Movements: Extraocular movements intact.      Conjunctiva/sclera: Conjunctivae normal.      Pupils: Pupils are equal, round, and reactive to light.   Neck:      Musculoskeletal: Normal range of motion and neck supple.   Cardiovascular:      Rate and Rhythm: Normal rate and regular rhythm.      Pulses: Normal pulses.      Heart sounds: Normal heart sounds. No murmur. No gallop.    Pulmonary:      Effort: Pulmonary effort is normal.      Breath sounds: Normal breath sounds.   Abdominal:      General: Bowel sounds are normal. There is no distension.      Palpations: Abdomen is soft.      Tenderness: There is no abdominal tenderness. There is no guarding.   Musculoskeletal:         General: Swelling (left ankle) present.   Feet:      Right foot:      Skin integrity: Callus and dry skin present.      Toenail Condition: Right toenails are abnormally thick. Fungal disease present.     Left foot:      Skin integrity: Ulcer (plantar surface), warmth, callus and dry skin present.      Toenail Condition: Left toenails  "are abnormally thick. Fungal disease present.     Comments: Extensive onychomycosis all toenails.  Left foot appreciably warmer than right.  Foot ulcer dorsum left foot from presumed puncture site, with subcutaneous pyoderma tracking from ulcer around lateral side of foot superiorly about 2 inches.  There is additionally an erosion at the distal tip of the left great toe.  Lymphadenopathy:      Cervical: No cervical adenopathy.   Skin:     General: Skin is warm and dry.      Comments: Dry skin of feet and other findings as described under "feet"   Neurological:      Mental Status: He is alert and oriented to person, place, and time.      Cranial Nerves: No cranial nerve deficit.   Psychiatric:         Behavior: Behavior is cooperative.           CRANIAL NERVES     CN III, IV, VI   Pupils are equal, round, and reactive to light.       Significant Labs:   CBC:   Recent Labs   Lab 08/30/20  1448   WBC 21.43*   HGB 11.4*   HCT 36.3*   *     CMP:   Recent Labs   Lab 08/30/20  1448      K 3.3*   CL 97   CO2 29   *   BUN 24*   CREATININE 1.5*   CALCIUM 9.8   PROT 8.8*   ALBUMIN 2.8*   BILITOT 0.9   ALKPHOS 100   AST 27   ALT 23   ANIONGAP 13   EGFRNONAA 48.8*     Lactic Acid:   Recent Labs   Lab 08/30/20  1532   LACTATE 1.4       Significant Imaging: I have reviewed all pertinent imaging results/findings within the past 24 hours.    Assessment/Plan:     * Diabetic foot infection  Patient's diabetic peripheral neuropathy his impaired his awareness of injury to the plantar aspect of his foot as well as its worsening.  Initial puncture wound has progressed into a diabetic foot ulcer and will be treated empirically with vancomycin IV and ciprofloxacin p.o. and I will consult Podiatry for evaluation for local debridement if necessary as well as further wound care as indicated.      Sepsis  3/4 SIRS at presentation; no end-organ dysfunction.  Clinically improving with IV antibiotics and initial IV fluids " given in emergency department.  Will continue to monitor.  Due to significant systolic heart failure will not give any further IV fluids unless clinically indicated.      Cellulitis of left lower extremity  Infection of puncture wound appears to have spread through the soft tissues of the foot resulting in significant swelling of the foot and ankle.  In addition to antibiotic therapy will keep left lower extremity elevated to assist in lymphatic drainage.      COPD mixed type  Will continue Breo inhaler plus DuoNebs p.r.n..      Chronic respiratory failure with hypoxia  On 2 L nasal cannula chronically due to combination of COPD and CHF; titrate as needed.      Uncontrolled type 2 diabetes mellitus with hyperglycemia, with long-term current use of insulin  Converting to Levemir 25 units daily and NovoLog 8 units with meals plus low-dose sliding scale.  Will check A1c for the morning.      Chronic systolic congestive heart failure  No signs of decompensation presently.  Will continue carvedilol, Lasix, lisinopril for now.  Will avoid IV fluids for now unless clinically needed for hemodynamic support in context of sepsis.        VTE Risk Mitigation (From admission, onward)         Ordered     enoxaparin injection 40 mg  Every 24 hours      08/30/20 2004     IP VTE HIGH RISK PATIENT  Once      08/30/20 2004     Place sequential compression device  Until discontinued      08/30/20 8183                   Jarad Ma MD  Department of Hospital Medicine   [facility]

## 2020-08-31 NOTE — PLAN OF CARE
Pt is AAOx4. Pt remain free from fall and injuries. VS remain stable. Questions and concerns voiced and answered. Medication compliance. Call light in reach.  Weakness to BLE and in generalized weakness, educated to call before getting up, Verbalized understanding. Bed in low locked position. Side rails x2. Belongings at bedside.

## 2020-09-01 LAB
ANION GAP SERPL CALC-SCNC: 11 MMOL/L (ref 8–16)
ASCENDING AORTA: 2.82 CM
AV INDEX (PROSTH): 0.53
AV MEAN GRADIENT: 3 MMHG
AV PEAK GRADIENT: 6 MMHG
AV VALVE AREA: 2.19 CM2
AV VELOCITY RATIO: 0.57
BASOPHILS # BLD AUTO: 0.06 K/UL (ref 0–0.2)
BASOPHILS NFR BLD: 0.3 % (ref 0–1.9)
BSA FOR ECHO PROCEDURE: 2.5 M2
BUN SERPL-MCNC: 33 MG/DL (ref 8–23)
CALCIUM SERPL-MCNC: 8.8 MG/DL (ref 8.7–10.5)
CHLORIDE SERPL-SCNC: 98 MMOL/L (ref 95–110)
CO2 SERPL-SCNC: 29 MMOL/L (ref 23–29)
CREAT SERPL-MCNC: 1.6 MG/DL (ref 0.5–1.4)
CV ECHO LV RWT: 0.4 CM
DIFFERENTIAL METHOD: ABNORMAL
DOP CALC AO PEAK VEL: 1.18 M/S
DOP CALC AO VTI: 23.1 CM
DOP CALC LVOT AREA: 4.2 CM2
DOP CALC LVOT DIAMETER: 2.3 CM
DOP CALC LVOT PEAK VEL: 0.67 M/S
DOP CALC LVOT STROKE VOLUME: 50.66 CM3
DOP CALCLVOT PEAK VEL VTI: 12.2 CM
E WAVE DECELERATION TIME: 164.89 MSEC
E/E' RATIO: 13 M/S
ECHO LV POSTERIOR WALL: 1.07 CM (ref 0.6–1.1)
EOSINOPHIL # BLD AUTO: 0.8 K/UL (ref 0–0.5)
EOSINOPHIL NFR BLD: 4 % (ref 0–8)
ERYTHROCYTE [DISTWIDTH] IN BLOOD BY AUTOMATED COUNT: 15.7 % (ref 11.5–14.5)
EST. GFR  (AFRICAN AMERICAN): 52.2 ML/MIN/1.73 M^2
EST. GFR  (NON AFRICAN AMERICAN): 45.2 ML/MIN/1.73 M^2
FRACTIONAL SHORTENING: 23 % (ref 28–44)
GLUCOSE SERPL-MCNC: 243 MG/DL (ref 70–110)
HCT VFR BLD AUTO: 33.2 % (ref 40–54)
HGB BLD-MCNC: 10.1 G/DL (ref 14–18)
IMM GRANULOCYTES # BLD AUTO: 0.22 K/UL (ref 0–0.04)
IMM GRANULOCYTES NFR BLD AUTO: 1.1 % (ref 0–0.5)
INTERVENTRICULAR SEPTUM: 0.76 CM (ref 0.6–1.1)
IVRT: 42.82 MSEC
LA MAJOR: 5.84 CM
LA MINOR: 5.82 CM
LA WIDTH: 4.84 CM
LEFT ATRIUM SIZE: 4.79 CM
LEFT ATRIUM VOLUME INDEX: 46.5 ML/M2
LEFT ATRIUM VOLUME: 114.89 CM3
LEFT INTERNAL DIMENSION IN SYSTOLE: 4.14 CM (ref 2.1–4)
LEFT VENTRICLE DIASTOLIC VOLUME INDEX: 55.95 ML/M2
LEFT VENTRICLE DIASTOLIC VOLUME: 138.12 ML
LEFT VENTRICLE MASS INDEX: 73 G/M2
LEFT VENTRICLE SYSTOLIC VOLUME INDEX: 30.8 ML/M2
LEFT VENTRICLE SYSTOLIC VOLUME: 76.15 ML
LEFT VENTRICULAR INTERNAL DIMENSION IN DIASTOLE: 5.35 CM (ref 3.5–6)
LEFT VENTRICULAR MASS: 181.16 G
LV LATERAL E/E' RATIO: 10.11 M/S
LV SEPTAL E/E' RATIO: 18.2 M/S
LYMPHOCYTES # BLD AUTO: 1.5 K/UL (ref 1–4.8)
LYMPHOCYTES NFR BLD: 6.9 % (ref 18–48)
MAGNESIUM SERPL-MCNC: 2 MG/DL (ref 1.6–2.6)
MCH RBC QN AUTO: 27 PG (ref 27–31)
MCHC RBC AUTO-ENTMCNC: 30.4 G/DL (ref 32–36)
MCV RBC AUTO: 89 FL (ref 82–98)
MONOCYTES # BLD AUTO: 1.4 K/UL (ref 0.3–1)
MONOCYTES NFR BLD: 6.6 % (ref 4–15)
MV PEAK E VEL: 0.91 M/S
MV STENOSIS PRESSURE HALF TIME: 47.82 MS
MV VALVE AREA P 1/2 METHOD: 4.6 CM2
NEUTROPHILS # BLD AUTO: 17 K/UL (ref 1.8–7.7)
NEUTROPHILS NFR BLD: 81.1 % (ref 38–73)
NRBC BLD-RTO: 0 /100 WBC
PHOSPHATE SERPL-MCNC: 3.6 MG/DL (ref 2.7–4.5)
PISA TR MAX VEL: 3.37 M/S
PLATELET # BLD AUTO: 326 K/UL (ref 150–350)
PMV BLD AUTO: 11.9 FL (ref 9.2–12.9)
POCT GLUCOSE: 234 MG/DL (ref 70–110)
POCT GLUCOSE: 268 MG/DL (ref 70–110)
POCT GLUCOSE: 288 MG/DL (ref 70–110)
POTASSIUM SERPL-SCNC: 3.3 MMOL/L (ref 3.5–5.1)
PULM VEIN S/D RATIO: 0.32
PV PEAK D VEL: 0.53 M/S
PV PEAK S VEL: 0.17 M/S
RA MAJOR: 5.61 CM
RA PRESSURE: 8 MMHG
RA WIDTH: 3.95 CM
RBC # BLD AUTO: 3.74 M/UL (ref 4.6–6.2)
RIGHT VENTRICULAR END-DIASTOLIC DIMENSION: 3.25 CM
RV TISSUE DOPPLER FREE WALL SYSTOLIC VELOCITY 1 (APICAL 4 CHAMBER VIEW): 10.24 CM/S
SINUS: 3.01 CM
SODIUM SERPL-SCNC: 138 MMOL/L (ref 136–145)
STJ: 2.56 CM
TDI LATERAL: 0.09 M/S
TDI SEPTAL: 0.05 M/S
TDI: 0.07 M/S
TR MAX PG: 45 MMHG
TRICUSPID ANNULAR PLANE SYSTOLIC EXCURSION: 1.24 CM
TV REST PULMONARY ARTERY PRESSURE: 53 MMHG
VANCOMYCIN TROUGH SERPL-MCNC: 17.8 UG/ML (ref 10–22)
WBC # BLD AUTO: 20.95 K/UL (ref 3.9–12.7)

## 2020-09-01 PROCEDURE — 11000001 HC ACUTE MED/SURG PRIVATE ROOM: Mod: HCNC

## 2020-09-01 PROCEDURE — 97165 OT EVAL LOW COMPLEX 30 MIN: CPT | Mod: HCNC

## 2020-09-01 PROCEDURE — 83735 ASSAY OF MAGNESIUM: CPT | Mod: HCNC

## 2020-09-01 PROCEDURE — 94640 AIRWAY INHALATION TREATMENT: CPT | Mod: HCNC

## 2020-09-01 PROCEDURE — 97116 GAIT TRAINING THERAPY: CPT | Mod: HCNC

## 2020-09-01 PROCEDURE — 25000242 PHARM REV CODE 250 ALT 637 W/ HCPCS: Mod: HCNC | Performed by: HOSPITALIST

## 2020-09-01 PROCEDURE — 97161 PT EVAL LOW COMPLEX 20 MIN: CPT | Mod: HCNC

## 2020-09-01 PROCEDURE — 99232 SBSQ HOSP IP/OBS MODERATE 35: CPT | Mod: HCNC,,, | Performed by: PODIATRIST

## 2020-09-01 PROCEDURE — 99223 1ST HOSP IP/OBS HIGH 75: CPT | Mod: HCNC,,, | Performed by: INTERNAL MEDICINE

## 2020-09-01 PROCEDURE — 80202 ASSAY OF VANCOMYCIN: CPT | Mod: HCNC

## 2020-09-01 PROCEDURE — 80048 BASIC METABOLIC PNL TOTAL CA: CPT | Mod: HCNC

## 2020-09-01 PROCEDURE — 99232 PR SUBSEQUENT HOSPITAL CARE,LEVL II: ICD-10-PCS | Mod: HCNC,,, | Performed by: HOSPITALIST

## 2020-09-01 PROCEDURE — 87040 BLOOD CULTURE FOR BACTERIA: CPT | Mod: HCNC

## 2020-09-01 PROCEDURE — 97530 THERAPEUTIC ACTIVITIES: CPT | Mod: HCNC

## 2020-09-01 PROCEDURE — 94761 N-INVAS EAR/PLS OXIMETRY MLT: CPT | Mod: HCNC

## 2020-09-01 PROCEDURE — 25000003 PHARM REV CODE 250: Mod: HCNC | Performed by: HOSPITALIST

## 2020-09-01 PROCEDURE — 85025 COMPLETE CBC W/AUTO DIFF WBC: CPT | Mod: HCNC

## 2020-09-01 PROCEDURE — 36415 COLL VENOUS BLD VENIPUNCTURE: CPT | Mod: HCNC

## 2020-09-01 PROCEDURE — 97110 THERAPEUTIC EXERCISES: CPT | Mod: HCNC

## 2020-09-01 PROCEDURE — 99232 PR SUBSEQUENT HOSPITAL CARE,LEVL II: ICD-10-PCS | Mod: HCNC,,, | Performed by: PODIATRIST

## 2020-09-01 PROCEDURE — 99900035 HC TECH TIME PER 15 MIN (STAT): Mod: HCNC

## 2020-09-01 PROCEDURE — 27000221 HC OXYGEN, UP TO 24 HOURS: Mod: HCNC

## 2020-09-01 PROCEDURE — 99223 PR INITIAL HOSPITAL CARE,LEVL III: ICD-10-PCS | Mod: HCNC,,, | Performed by: INTERNAL MEDICINE

## 2020-09-01 PROCEDURE — 99232 SBSQ HOSP IP/OBS MODERATE 35: CPT | Mod: HCNC,,, | Performed by: HOSPITALIST

## 2020-09-01 PROCEDURE — 84100 ASSAY OF PHOSPHORUS: CPT | Mod: HCNC

## 2020-09-01 PROCEDURE — 63600175 PHARM REV CODE 636 W HCPCS: Mod: HCNC | Performed by: HOSPITALIST

## 2020-09-01 PROCEDURE — 97535 SELF CARE MNGMENT TRAINING: CPT | Mod: HCNC

## 2020-09-01 RX ORDER — POTASSIUM CHLORIDE 20 MEQ/1
40 TABLET, EXTENDED RELEASE ORAL ONCE
Status: COMPLETED | OUTPATIENT
Start: 2020-09-01 | End: 2020-09-01

## 2020-09-01 RX ORDER — INSULIN ASPART 100 [IU]/ML
12 INJECTION, SOLUTION INTRAVENOUS; SUBCUTANEOUS
Status: DISCONTINUED | OUTPATIENT
Start: 2020-09-01 | End: 2020-09-02

## 2020-09-01 RX ADMIN — FUROSEMIDE 80 MG: 40 TABLET ORAL at 09:09

## 2020-09-01 RX ADMIN — FLUTICASONE PROPIONATE 50 MCG: 50 SPRAY, METERED NASAL at 03:09

## 2020-09-01 RX ADMIN — ENOXAPARIN SODIUM 40 MG: 40 INJECTION SUBCUTANEOUS at 04:09

## 2020-09-01 RX ADMIN — FUROSEMIDE 80 MG: 40 TABLET ORAL at 06:09

## 2020-09-01 RX ADMIN — CIPROFLOXACIN 500 MG: 500 TABLET, FILM COATED ORAL at 09:09

## 2020-09-01 RX ADMIN — LISINOPRIL 40 MG: 20 TABLET ORAL at 09:09

## 2020-09-01 RX ADMIN — KETOROLAC TROMETHAMINE 15 MG: 30 INJECTION, SOLUTION INTRAMUSCULAR at 05:09

## 2020-09-01 RX ADMIN — GABAPENTIN 100 MG: 100 CAPSULE ORAL at 09:09

## 2020-09-01 RX ADMIN — INSULIN ASPART 12 UNITS: 100 INJECTION, SOLUTION INTRAVENOUS; SUBCUTANEOUS at 04:09

## 2020-09-01 RX ADMIN — LEVOTHYROXINE SODIUM 75 MCG: 25 TABLET ORAL at 05:09

## 2020-09-01 RX ADMIN — KETOROLAC TROMETHAMINE 15 MG: 30 INJECTION, SOLUTION INTRAMUSCULAR at 06:09

## 2020-09-01 RX ADMIN — ATORVASTATIN CALCIUM 40 MG: 20 TABLET, FILM COATED ORAL at 09:09

## 2020-09-01 RX ADMIN — INSULIN ASPART 3 UNITS: 100 INJECTION, SOLUTION INTRAVENOUS; SUBCUTANEOUS at 04:09

## 2020-09-01 RX ADMIN — INSULIN ASPART 8 UNITS: 100 INJECTION, SOLUTION INTRAVENOUS; SUBCUTANEOUS at 09:09

## 2020-09-01 RX ADMIN — ASPIRIN 81 MG: 81 TABLET, COATED ORAL at 09:09

## 2020-09-01 RX ADMIN — FLUTICASONE FUROATE AND VILANTEROL TRIFENATATE 1 PUFF: 200; 25 POWDER RESPIRATORY (INHALATION) at 09:09

## 2020-09-01 RX ADMIN — CARVEDILOL 25 MG: 25 TABLET, FILM COATED ORAL at 09:09

## 2020-09-01 RX ADMIN — CHOLECALCIFEROL (VITAMIN D3) 25 MCG (1,000 UNIT) TABLET 1000 UNITS: at 09:09

## 2020-09-01 RX ADMIN — POTASSIUM CHLORIDE 40 MEQ: 1500 TABLET, EXTENDED RELEASE ORAL at 01:09

## 2020-09-01 RX ADMIN — VANCOMYCIN HYDROCHLORIDE 1000 MG: 1 INJECTION, POWDER, LYOPHILIZED, FOR SOLUTION INTRAVENOUS at 05:09

## 2020-09-01 RX ADMIN — INSULIN ASPART 3 UNITS: 100 INJECTION, SOLUTION INTRAVENOUS; SUBCUTANEOUS at 01:09

## 2020-09-01 RX ADMIN — INSULIN ASPART 3 UNITS: 100 INJECTION, SOLUTION INTRAVENOUS; SUBCUTANEOUS at 09:09

## 2020-09-01 RX ADMIN — INSULIN DETEMIR 25 UNITS: 100 INJECTION, SOLUTION SUBCUTANEOUS at 09:09

## 2020-09-01 RX ADMIN — VANCOMYCIN HYDROCHLORIDE 1000 MG: 1 INJECTION, POWDER, LYOPHILIZED, FOR SOLUTION INTRAVENOUS at 06:09

## 2020-09-01 NOTE — CONSULTS
[facility]  Infectious Disease  Consult Note    Patient Name: Ed Patton  MRN: [id number]  Admission Date: 8/30/2020  Hospital Length of Stay: 2 days  Attending Physician: Jacqueline Curran MD  Primary Care Provider: Qiana Chow MD     Isolation Status: No active isolations    Patient information was obtained from patient, past medical records and ER records.      Inpatient consult to Infectious Diseases  Consult performed by: Mk Cook MD  Consult ordered by: Jacqueline Curran MD        Assessment/Plan:     * Diabetic foot infection  A: 64 yo M with left plantar foot wound. Episodes of tachypnea and tachycardia today, on 3.5 L/min O2, BUN/Cr 33/1.6. WBCs 20.95, glucose 243. Lactate WNL. Vascular study showed left PT APSV 35 cm/s and right AT APSV 35 cm/s, both monophasic. MRI shows talocalcaneal joint inflammatory process likely to be degenerative rather than septic, anterolateral foot cellulitis, no definite abscess. Blood cultures growing Staph aureus, urine growing GNRs, Gram stain shows Gram + cocci and GNRs, cultures growing presumptive Proteus species.    P:  -Continue IV vancomycin and PO ciprofloxacin.  -Anticipate 2 weeks parenteral antibiotics for bacteremia and skin and soft tissue infection.   -Followup on wound cultures and repeat blood cultures in progress.  -Will continue to follow.         Thank you for your consult. I will follow-up with patient. Please contact us if you have any additional questions.    Mk Cook MD   Infectious Disease  [facility]    Subjective:     Principal Problem: Diabetic foot infection    HPI: 64 yo M with Hx of DM2 (on insulin), chronic hypoxemic respiratory failure (on O2 at home), chronic systolic heart failure presented to ED after recurrent falls 7 and 8 days ago. He has a history of falls but reports that his tendency to fall has worsened recently, resulting in injury to his lower back and right  thigh. After his fall last Tuesday he looked at his left foot and noticed a wound, denies pain or drainage related to the wound, he admits to not regularly checking his feet and does not know how long the wound has been present. He believes the wound is related to a tack that he previously found imbedded in the bottom of a slipper. He had previously seen Ang Lugo DPM for diabetic foot care but has not seen her this year due to fear of the coronavirus pandemic. He has been getting home health services including physical therapy. No subjective change as of today, still denies pain and drainage related to the wound. Denies F/C/N/V.    Past Medical History:   Diagnosis Date    CHF (congestive heart failure)     COPD (chronic obstructive pulmonary disease)     Coronary artery disease     Diabetes mellitus     Diabetes mellitus type II     DM (diabetes mellitus) type II uncontrolled with renal manifestation 9/4/2013    Hyperlipidemia     Hypertension     Postablative hypothyroidism 12/6/2018       Past Surgical History:   Procedure Laterality Date    APPENDECTOMY      CHOLECYSTECTOMY      CORONARY ANGIOPLASTY WITH STENT PLACEMENT      TONSILLECTOMY         Review of patient's allergies indicates:   Allergen Reactions    Vicodin [hydrocodone-acetaminophen] Itching       Medications:  Medications Prior to Admission   Medication Sig    ACCU-CHEK FASTCLIX LANCET DRUM Misc TEST FOUR TIMES DAILY    ACCU-CHEK RAMIREZ Misc USE AS DIRECTED    ACCU-CHEK SMARTVIEW TEST STRIP Strp TEST FOUR TIMES DAILY    albuterol (PROVENTIL) 2.5 mg /3 mL (0.083 %) nebulizer solution INHALE THE CONTENTS OF 1 VIAL VIA NEBULIZER EVERY 4 HOURS AS NEEDED FOR WHEEZING.    alcohol swabs (BD ALCOHOL SWABS) PadM Apply 1 each topically as needed.    aspirin (ECOTRIN) 81 MG EC tablet Take 1 tablet (81 mg total) by mouth once daily.    atorvastatin (LIPITOR) 40 MG tablet Take 1 tablet (40 mg total) by mouth once daily.    carvedilol  "(COREG) 25 MG tablet Take 1 tablet (25 mg total) by mouth 2 (two) times daily.    cholecalciferol, vitamin D3, (VITAMIN D3) 1,000 unit capsule Take 1 capsule (1,000 Units total) by mouth once daily.    fluticasone furoate-vilanterol (BREO ELLIPTA) 200-25 mcg/dose DsDv diskus inhaler INHALE 1 PUFF INTO THE LUNGS ONCE DAILY. (CONTROLLER)    fluticasone propionate (FLONASE) 50 mcg/actuation nasal spray USE 1 SPRAY IN EACH NOSTRIL ONE TIME DAILY    furosemide (LASIX) 80 MG tablet TAKE 1 TABLET BY MOUTH IN THE MORNING  AND  1 TABLET BY MOUTH DAILY  AT  3- TO 4PM    gabapentin (NEURONTIN) 100 MG capsule Take 1 capsule (100 mg total) by mouth 3 (three) times daily as needed (pain).    insulin NPH-insulin regular, 70/30, (NOVOLIN 70/30 U-100 INSULIN) 100 unit/mL (70-30) injection Inject 45 Units into the skin before breakfast AND 35 Units before dinner. [id number] MINUTES BEFORE BREAKFAST AND DINNER.    insulin syringe-needle U-100 (INSULIN SYRINGE) 1 mL 30 gauge x 5/16 Syrg 1 each by Misc.(Non-Drug; Combo Route) route 2 (two) times daily. Use twice daily as directed with insulin vials.    levothyroxine (SYNTHROID) 75 MCG tablet Take 1 tablet (75 mcg total) by mouth once daily.    lisinopril (PRINIVIL,ZESTRIL) 40 MG tablet Take 1 tablet (40 mg total) by mouth once daily.    mometasone 0.1% (ELOCON) 0.1 % cream Applied to the affected area once daily for rash.    nitroGLYCERIN (NITROSTAT) 0.4 MG SL tablet PLACE 1 TABLET UNDER THE TONGUE EVERY 5  MINUTES AS NEEDED FOR CHEST PAIN    pen needle, diabetic (BD ULTRA-FINE RAMIREZ PEN NEEDLE) 32 gauge x 5/32" Ndle Use with multiple daily injections of insulin     Antibiotics (From admission, onward)    Start     Stop Route Frequency Ordered    08/31/20 0900  ciprofloxacin HCl tablet 500 mg      -- Oral Every 12 hours 08/30/20 2004 08/31/20 0500  vancomycin in dextrose 5 % 1 gram/250 mL IVPB 1,000 mg      -- IV Every 12 hours (non-standard times) 08/30/20 2010    " 20  vancomycin - pharmacy to dose  (vancomycin IVPB)      -- IV pharmacy to manage frequency 20        Antifungals (From admission, onward)    None        Antivirals (From admission, onward)    None           Immunization History   Administered Date(s) Administered    Influenza - High Dose - PF (65 years and older) 10/15/2019    Influenza - Quadrivalent - PF *Preferred* (6 months and older) 2014, 10/09/2017, 2018    Pneumococcal Polysaccharide - 23 Valent 2018    Tdap 2013       Family History     Problem Relation (Age of Onset)    Heart disease Mother    Hypertension Son    No Known Problems Father, Sister, Son, Son        Social History     Socioeconomic History    Marital status:      Spouse name: Not on file    Number of children: Not on file    Years of education: Not on file    Highest education level: Not on file   Occupational History    Not on file   Social Needs    Financial resource strain: Not on file    Food insecurity     Worry: Not on file     Inability: Not on file    Transportation needs     Medical: Not on file     Non-medical: Not on file   Tobacco Use    Smoking status: Former Smoker     Packs/day: 0.01     Years: 3.00     Pack years: 0.03     Types: Cigarettes     Quit date: 1995     Years since quittin.2    Smokeless tobacco: Never Used   Substance and Sexual Activity    Alcohol use: No    Drug use: No    Sexual activity: Never     Comment:  with 1 son   Lifestyle    Physical activity     Days per week: Not on file     Minutes per session: Not on file    Stress: Not on file   Relationships    Social connections     Talks on phone: Not on file     Gets together: Not on file     Attends Anglican service: Not on file     Active member of club or organization: Not on file     Attends meetings of clubs or organizations: Not on file     Relationship status: Not on file   Other Topics Concern    Not on file    Social History Narrative     with 1 son     Review of Systems   Constitutional: Negative for chills and fever.   HENT: Negative for congestion, ear pain, sneezing and sore throat.    Eyes: Negative for pain.   Respiratory: Negative for cough and shortness of breath.    Cardiovascular: Negative for chest pain.   Gastrointestinal: Negative for abdominal pain, constipation, diarrhea, nausea and vomiting.   Endocrine: Negative for polyuria.   Genitourinary: Negative for decreased urine volume, difficulty urinating, dysuria and flank pain.   Musculoskeletal: Positive for back pain and gait problem. Negative for neck pain and neck stiffness.   Skin: Positive for wound. Negative for rash.   Neurological: Positive for numbness. Negative for headaches.   Psychiatric/Behavioral: Negative for confusion. The patient is not nervous/anxious.      Objective:     Vital Signs (Most Recent):  Temp: 97.4 °F (36.3 °C) (09/01/20 0950)  Pulse: 101 (09/01/20 0950)  Resp: 18 (09/01/20 0950)  BP: 117/70 (09/01/20 0950)  SpO2: 97 % (09/01/20 0950) Vital Signs (24h Range):  Temp:  [97.4 °F (36.3 °C)-98.7 °F (37.1 °C)] 97.4 °F (36.3 °C)  Pulse:  [] 101  Resp:  [15-21] 18  SpO2:  [94 %-99 %] 97 %  BP: (115-121)/(67-74) 117/70     Weight: 117 kg (257 lb 15 oz)  Body mass index is 31.4 kg/m².    Estimated Creatinine Clearance: 66.1 mL/min (A) (based on SCr of 1.6 mg/dL (H)).    Physical Exam  Vitals signs reviewed.   Constitutional:       General: He is not in acute distress.     Appearance: Normal appearance. He is normal weight. He is not diaphoretic.   HENT:      Head: Normocephalic and atraumatic.      Right Ear: External ear normal.      Left Ear: External ear normal.      Nose: Nose normal. No congestion or rhinorrhea.   Eyes:      General:         Right eye: No discharge.         Left eye: No discharge.      Extraocular Movements: Extraocular movements intact.   Neck:      Musculoskeletal: Normal range of motion and neck  supple. No neck rigidity or muscular tenderness.   Cardiovascular:      Rate and Rhythm: Regular rhythm. Tachycardia present.      Heart sounds: No murmur.      Comments: Diminished DP/PT pulses  Pulmonary:      Effort: Pulmonary effort is normal. No respiratory distress.      Breath sounds: No wheezing.      Comments: On 3.5 L/m oxygen  Chest:      Chest wall: No tenderness.   Abdominal:      General: There is no distension.      Palpations: Abdomen is soft.      Tenderness: There is no abdominal tenderness. There is no guarding.   Musculoskeletal: Normal range of motion.         General: Tenderness (right anterior thigh, lower back) present.   Skin:     General: Skin is warm and dry.      Findings: Lesion (left plantar foot, 4.0x1.5x0.3 cm, no drainage at time of exam, no probe to bone or undermining, not tender) present.   Neurological:      Mental Status: He is alert and oriented to person, place, and time.      Sensory: Sensory deficit present.      Gait: Gait abnormal.   Psychiatric:         Mood and Affect: Mood normal.         Behavior: Behavior normal.         Thought Content: Thought content normal.         Judgment: Judgment normal.         Significant Labs:   Blood Culture:   Recent Labs   Lab 08/30/20  1532 08/30/20  1543 08/31/20  1324 08/31/20  1328   LABBLOO Gram stain aer bottle: Gram positive cocci in clusters resembling Staph   Gram stain oksana bottle: Gram positive cocci in clusters resembling Staph   Results called to and read back by: Elsy Pena RN  08/31/2020  12:35  STAPHYLOCOCCUS AUREUS  Susceptibility pending  ID consult required at [facility] and [facility].  * Gram stain aer bottle: Gram positive cocci in clusters resembling Staph   Gram stain oksana bottle: Gram positive cocci in clusters resembling Staph   Results called to and read back by: Elsy Pena RN 08/31/2020  12:35  STAPHYLOCOCCUS AUREUS  ID consult required at [facility] and [facility].  For  susceptibility see order # [id number]  * No Growth to date No Growth to date     CBC:   Recent Labs   Lab 08/30/20  1448 08/31/20  0434 09/01/20  0510   WBC 21.43* 20.60* 20.95*   HGB 11.4* 10.5* 10.1*   HCT 36.3* 34.5* 33.2*   * 354* 326     CMP:   Recent Labs   Lab 08/30/20  1448 08/31/20  0434 09/01/20  0510    140 138   K 3.3* 3.1* 3.3*   CL 97 99 98   CO2 29 28 29   * 212* 243*   BUN 24* 22 33*   CREATININE 1.5* 1.4 1.6*   CALCIUM 9.8 8.8 8.8   PROT 8.8*  --   --    ALBUMIN 2.8*  --   --    BILITOT 0.9  --   --    ALKPHOS 100  --   --    AST 27  --   --    ALT 23  --   --    ANIONGAP 13 13 11   EGFRNONAA 48.8* 53.1* 45.2*     Lactic Acid:   Recent Labs   Lab 08/30/20  1532   LACTATE 1.4     POCT Glucose:   Recent Labs   Lab 08/31/20  1831 08/31/20  2034 09/01/20  0745   POCTGLUCOSE 232* 262* 268*     Procalcitonin: No results for input(s): PROCAL in the last 48 hours.  Urine Culture:   Recent Labs   Lab 08/30/20  1633   LABURIN GRAM NEGATIVE FAVIAN  > 100,000 cfu/ml  Identification and susceptibility pending  *     Wound Culture:   Recent Labs   Lab 08/31/20  1754   LABAERO PRESUMPTIVE PROTEUS SPECIES  Moderate  Identification and susceptibility pending  *       Significant Imaging  US Arteries 08/31: Posterior tibial artery: 35. Anterior tibial artery: 35. Left lower extremity demonstrates triphasic waveforms from the common femoral artery to the distal superficial femoral artery.  The popliteal artery, anterior tibial artery, and posterior tibial artery demonstrate monophasic waveforms.   MRI L Foot 09/01: Intense inflammatory process at the mid/anterior talocalcaneal joint, possibly active degenerative change, early septic arthritis is consider less likely, not excluded.  Changes of neuropathic joint not excluded. Small osteochondral defects at the anterior distal tibial plateau fall possibly degenerative. Peroneus brevis tendinosis.   Cellulitis anterior and lateral aspect of the foot. No  definite abscess seen. Well-defined, benign-appearing osseous lesion distal fibula most suggestive of an intra osseous ganglion cyst. Consider short-term follow-up MRI examination to monitor evolution if clinically warranted.

## 2020-09-01 NOTE — PT/OT/SLP EVAL
"Physical Therapy Evaluation    Patient Name:  Ed Patton   MRN:  [id number]    Recommendations:     Discharge Recommendations:  nursing facility, skilled   Discharge Equipment Recommendations: walker, rolling, bedside commode   Barriers to discharge: None    Assessment:       Ed Patton is a 65 y.o. male admitted with a medical diagnosis of Diabetic foot infection.  He presents with the following impairments/functional limitations:  weakness, impaired endurance, impaired self care skills, impaired functional mobilty, gait instability, impaired balance, pain, decreased lower extremity function, decreased ROM, impaired muscle length, orthopedic precautions.       Patient demonstrates good motivation and decreased activity tolerance secondary to weakness and pain.  Patient demonstrated increased soreness of R LE along with L foot pain.  Patient with decreased functional mobility secondary to pain and difficulty maintaining weight bearing status.  Patient required between min A and mod A for bed mobility, transfers and short distance ambulation.  Patient unable to maintain L foot nonweight bearing during 2-3 side steps.  Patient will benefit from skilled PT for strengthening and mobility training in an acute care and skilled nursing setting.      Rehab Prognosis: Good; patient would benefit from acute skilled PT services 4 x/week to address these deficits and reach maximum level of function.  Patient is most appropriate to go to nursing facility, skilled .  Recent Surgery: * No surgery found *      Plan:     During this hospitalization, patient to be seen 4 x/week to address the identified rehab impairments via gait training, therapeutic activities, therapeutic exercises, neuromuscular re-education and progress toward the following goals:    · Plan of Care Expires:  10/01/20    Subjective     Subjective:"Both my legs are pretty sore"  Pain/Comfort:  · Pain Rating 1: 6/10  · Location - Side 1: " Bilateral  · Location 1: leg    Patients cultural, spiritual, Gnosticist conflicts given the current situation: no    Living Environment:  Prior level of function: Patient reports intermittently ambulating with a cane and uses a shower chair for bathing.  Patient reports multiple falls.  Residence: lives with their spouse and son often there 1-story house/ trailer, number of outside stairs: 0, walk-in shower   Support available upon discharge: family  Equipment owned: cane, straight, shower chair, grab bar  Objective:     Communicated with RN prior to session.  Patient found supine with telemetry, pulse ox (continuous), oxygen  upon PT entry to room.    General Precautions: Standard, fall   Orthopedic Precautions:LLE non weight bearing   Braces: N/A     Exams:  · RLE ROM: ~ 75% of functional range secondary to pain  · RLE Strength: grossly 4/5  · LLE ROM: WFL  · LLE Strength: grossly 4/5  · Cognitive Exam:  Patient is oriented to Person, Place, Time and Situation      Functional Mobility:  · Bed Mobility:     · Supine to Sit: minimum assistance for trunk management  · Sit to Supine: minimum assistance  · Transfers:     · Sit to Stand:  moderate assistance with rolling walker and poor maintenance of weight bearing status.  · Gait: Patient ambulated 2-3 side steps with rolling walker and moderate assistance with difficulty maintaining weight bearing status.  · Balance:   · Sitting: GOOD: Maintains balance through MODERATE excursions of active trunk movement  · Standing: POOR+: Needs MIN (minimal ) assist during gait      Therapeutic Activities and Exercises:   Exercises: Patient performed 1 set(s) of 10 repetitions of  the following bed level exercises: ankle pumps, quad sets and glut sets for bilateral LE. Patient required skilled PT for instruction of exercises and appropriate cues to perform exercises safely and appropriately.    Gait training:  Patient required cues for upright posture and weight bearing status to  increase independence and safety.  Patient required cues ~ 50% of the time.    Patient Education:    Patient educated on assistive device use, bed mobility training, Fall risk, gait training, home safety, Home exercise program, plan of care and transfer training by explanation.  Patient was receptive to education and needs reinforcement.     AM-PAC 6 CLICK MOBILITY  Total Score:14     Patient left supine with all lines intact and call button in reach.    GOALS:   Multidisciplinary Problems     Physical Therapy Goals        Problem: Physical Therapy Goal    Goal Priority Disciplines Outcome Goal Variances Interventions   Physical Therapy Goal     PT, PT/OT Ongoing, Progressing     Description: Goals to be met by: 9/15/20    Patient will increase functional independence with mobility by performin. Supine to sit with Stand-by Assistance  2. Sit to supine with Stand-by Assistance  3. Sit to stand transfer with Stand-by Assistance  4. Gait  x 50 feet with Stand-by Assistance using Rolling Walker and maintenance of weight bearing status.  5.  Patient will demonstrate independence with a home exercise program  6. Patient's balance will be GOOD: Needs SUPERVISION only during gait and able to self right with moderate LOB.                   History:     Past Medical History:   Diagnosis Date    CHF (congestive heart failure)     COPD (chronic obstructive pulmonary disease)     Coronary artery disease     Diabetes mellitus     Diabetes mellitus type II     DM (diabetes mellitus) type II uncontrolled with renal manifestation 2013    Hyperlipidemia     Hypertension     Postablative hypothyroidism 2018       Past Surgical History:   Procedure Laterality Date    APPENDECTOMY      CHOLECYSTECTOMY      CORONARY ANGIOPLASTY WITH STENT PLACEMENT      TONSILLECTOMY         Time Tracking:     PT Received On: 20  PT Start Time: 1152     PT Stop Time: 1230  PT Total Time (min): 38 min     Billable  Minutes: Evaluation 10 min Gait Training 10 min and Therapeutic Activity 18 min      Giovany Parker, PT  09/01/2020

## 2020-09-01 NOTE — HPI
64 yo M with Hx of DM2 (on insulin), chronic hypoxemic respiratory failure (on O2 at home), chronic systolic heart failure presented to ED after recurrent falls 7 and 8 days ago. He has a history of falls but reports that his tendency to fall has worsened recently, resulting in injury to his lower back and right thigh. After his fall last Tuesday he looked at his left foot and noticed a wound, denies pain or drainage related to the wound, he admits to not regularly checking his feet and does not know how long the wound has been present. He believes the wound is related to a tack that he previously found imbedded in the bottom of a slipper. He had previously seen Ang Lugo DPM for diabetic foot care but has not seen her this year due to fear of the coronavirus pandemic. He has been getting home health services including physical therapy. No subjective change as of today, still denies pain and drainage related to the wound. Denies F/C/N/V.

## 2020-09-01 NOTE — PLAN OF CARE
Problem: Physical Therapy Goal  Goal: Physical Therapy Goal  Description: Goals to be met by: 9/15/20    Patient will increase functional independence with mobility by performin. Supine to sit with Stand-by Assistance  2. Sit to supine with Stand-by Assistance  3. Sit to stand transfer with Stand-by Assistance  4. Gait  x 50 feet with Stand-by Assistance using Rolling Walker and maintenance of weight bearing status.  5.  Patient will demonstrate independence with a home exercise program  6. Patient's balance will be GOOD: Needs SUPERVISION only during gait and able to self right with moderate LOB.  Outcome: Ongoing, Progressing     PT evaluation completed, goals established and plan of care reviewed with patient.  Patient demonstrates good motivation and decreased activity tolerance secondary to weakness and pain.  Patient demonstrated increased soreness of R LE along with L foot pain.  Patient with decreased functional mobility secondary to pain and difficulty maintaining weight bearing status.  Patient required between min A and mod A for bed mobility, transfers and short distance ambulation.  Patient unable to maintain L foot nonweight bearing during 2-3 side steps.  Patient will benefit from skilled PT for strengthening and mobility training in an acute care and skilled nursing setting.      Goivany Parker, PT, DPT  2020  Pager #: [phone]

## 2020-09-01 NOTE — PLAN OF CARE
Problem: Adult Inpatient Plan of Care  Goal: Plan of Care Review  Outcome: Ongoing, Progressing  Goal: Patient-Specific Goal (Individualization)  Outcome: Ongoing, Progressing  Goal: Absence of Hospital-Acquired Illness or Injury  Outcome: Ongoing, Progressing  Goal: Optimal Comfort and Wellbeing  Outcome: Ongoing, Progressing  Goal: Readiness for Transition of Care  Outcome: Ongoing, Progressing  Goal: Rounds/Family Conference  Outcome: Ongoing, Progressing   Patient is AAOx4. Blood cultures collected on 8/31 resulted in gram + cocci. Results messaged to Dr. Bina MD. Patient has minimal discomfort on today. Vanc IV abt in progress. PT at bedside this morning. Safety measures maintained. Call light within reach.

## 2020-09-01 NOTE — PROGRESS NOTES
Progress Note  Hospital Medicine      Admit Date: 8/30/2020   [facility] HOSP MED D    SUBJECTIVE:     Follow-up For:  Diabetic foot infection     Interval history/ROS: No acute events overnight.     HPI: 65-year-old male with type 2 diabetes on insulin, chronic hypoxemic respiratory failure on 2 L oxygen via nasal cannula, and chronic systolic heart failure who presented to the ED for recurrent falls.  He reports a few episodes where his legs seem to just give out under him and other times where he has to crawl around the house over the past week.  At some point prior to this he found a tack imbedded in the bottom of his slipper which had punctured the sole of his left foot.  His left foot has become more swollen recently and he has had occasional twinges of pain in both of his legs.  He denies any fevers or chills, or any other symptoms out of the ordinary.  He denies any change in his respiratory status.  He says that he has been afraid to leave his house on account of the Coronavirus pandemic.  He has been getting home health services including physical therapy has been walking to his mailbox and back for exercise.    Review of Systems   Constitutional: Negative for activity change, chills and fever.   HENT: Negative for congestion, nosebleeds, rhinorrhea and sore throat.    Eyes: Negative for photophobia and visual disturbance.   Respiratory: Negative for cough, chest tightness and shortness of breath.    Cardiovascular: Positive for leg swelling. Negative for chest pain.   Gastrointestinal: Negative for abdominal pain, nausea and vomiting.   Endocrine: Negative for polydipsia and polyuria.   Genitourinary: Negative for dysuria and hematuria.   Musculoskeletal: Positive for joint swelling. Negative for arthralgias and myalgias.   Skin: Negative for color change and rash.   Neurological: Positive for weakness. Negative for dizziness, syncope and light-headedness.        OBJECTIVE:     Body mass index is 31.4  kg/m².    Vital Signs Range (Last 24H):  Temp:  [97.4 °F (36.3 °C)-98.7 °F (37.1 °C)]   Pulse:  []   Resp:  [15-21]   BP: (115-121)/(70-74)   SpO2:  [94 %-99 %]     I & O (Last 24H):    Intake/Output Summary (Last 24 hours) at 9/1/2020 1353  Last data filed at 9/1/2020 1100  Gross per 24 hour   Intake 580 ml   Output 700 ml   Net -120 ml        Physical Exam:  Vitals signs and nursing note reviewed.   Constitutional:       General: He is not in acute distress.     Appearance: He is normal weight. He is not toxic-appearing.      Interventions: Nasal cannula in place.   HENT:      Head: Normocephalic and atraumatic.      Nose: Nose normal.      Mouth/Throat:      Comments: Wearing face mask below the nose to accommodate nasal cannula  Eyes:      General: No scleral icterus.     Extraocular Movements: Extraocular movements intact.      Conjunctiva/sclera: Conjunctivae normal.      Pupils: Pupils are equal, round, and reactive to light.   Neck:      Musculoskeletal: Normal range of motion and neck supple.   Cardiovascular:      Rate and Rhythm: Normal rate and regular rhythm.      Pulses: Normal pulses.      Heart sounds: Normal heart sounds. No murmur. No gallop.    Pulmonary:      Effort: Pulmonary effort is normal.      Breath sounds: Normal breath sounds.   Abdominal:      General: Bowel sounds are normal. There is no distension.      Palpations: Abdomen is soft.      Tenderness: There is no abdominal tenderness. There is no guarding.   Musculoskeletal:         General: Swelling (left ankle) present.   Feet:      Right foot:      Skin integrity: Callus and dry skin present.      Toenail Condition: Right toenails are abnormally thick. Fungal disease present.     Left foot:      Skin integrity: Ulcer (plantar surface), warmth, callus and dry skin present.      Toenail Condition: Left toenails are abnormally thick. Fungal disease present.     Comments: Extensive onychomycosis all toenails.  Left foot appreciably  "warmer than right.  Foot ulcer dorsum left foot from presumed puncture site, with subcutaneous pyoderma tracking from ulcer around lateral side of foot superiorly about 2 inches.  There is additionally an erosion at the distal tip of the left great toe.  Lymphadenopathy:      Cervical: No cervical adenopathy.   Skin:     General: Skin is warm and dry.      Comments: Dry skin of feet and other findings as described under "feet"   Neurological:      Mental Status: He is alert and oriented to person, place, and time.      Cranial Nerves: No cranial nerve deficit.   Psychiatric:         Behavior: Behavior is cooperative.           Recent Labs   Lab 08/30/20  1448 08/31/20  0434 09/01/20  0510    140 138   K 3.3* 3.1* 3.3*   CL 97 99 98   CO2 29 28 29   BUN 24* 22 33*   CREATININE 1.5* 1.4 1.6*   * 212* 243*   CALCIUM 9.8 8.8 8.8   MG  --  1.8 2.0   PHOS  --  4.1 3.6     Recent Labs   Lab 08/30/20  1448   ALKPHOS 100   ALT 23   AST 27   ALBUMIN 2.8*   PROT 8.8*   BILITOT 0.9       Recent Labs   Lab 08/30/20  1448 08/31/20  0434 09/01/20  0510   WBC 21.43* 20.60* 20.95*   HGB 11.4* 10.5* 10.1*   HCT 36.3* 34.5* 33.2*   * 354* 326   GRAN 84.8*  18.2* 83.2*  17.2* 81.1*  17.0*   LYMPH 5.2*  1.1 5.9*  1.2 6.9*  1.5   MONO 7.5  1.6* 7.6  1.6* 6.6  1.4*       Recent Labs   Lab 08/31/20  0745 08/31/20  1218 08/31/20  1831 08/31/20  2034 09/01/20  0745 09/01/20  1154   POCTGLUCOSE 246* 227* 232* 262* 268* 288*       Recent Labs   Lab 08/30/20  1448   TROPONINI 0.029*       Diagnostic Results:  Labs: Reviewed    aspirin, 81 mg, Oral, Daily  atorvastatin, 40 mg, Oral, Daily  carvediloL, 25 mg, Oral, BID  ciprofloxacin HCl, 500 mg, Oral, Q12H  enoxaparin, 40 mg, Subcutaneous, Q24H  fluticasone furoate-vilanteroL, 1 puff, Inhalation, Daily  fluticasone propionate, 1 spray, Each Nostril, Daily  furosemide, 80 mg, Oral, BID  insulin aspart U-100, 12 Units, Subcutaneous, TIDWM  [START ON 9/2/2020] insulin " detemir U-100, 25 Units, Subcutaneous, QHS  levothyroxine, 75 mcg, Oral, Before breakfast  lisinopriL, 40 mg, Oral, Daily  vancomycin (VANCOCIN) IVPB, 1,000 mg, Intravenous, Q12H  vitamin D, 1,000 Units, Oral, Daily        As Needed acetaminophen, albuterol-ipratropium, dextrose 50%, dextrose 50%, gabapentin, glucagon (human recombinant), glucose, glucose, insulin aspart U-100, ketorolac, melatonin, ondansetron, polyethylene glycol, sodium chloride 0.9%, DIPH,PERTUS (ADACEL),TETANUS PF VAC (ADULT), Pharmacy to dose Vancomycin consult **AND** vancomycin - pharmacy to dose    ASSESSMENT/PLAN:     Assessment: Ed Patton is a 65 y.o. male here with:     Active Hospital Problems    Diagnosis  POA    *Diabetic foot infection [E11.628, L08.9]  Yes    Diabetic ulcer of left foot associated with type 2 diabetes mellitus, with fat layer exposed [E11.621, L97.522]  Yes    Cellulitis of left lower extremity [L03.116]  Yes    Sepsis [A41.9]  Yes    COPD mixed type [J44.9]  Yes     Chronic    Uncontrolled type 2 diabetes mellitus with hyperglycemia, with long-term current use of insulin [E11.65, Z79.4]  Not Applicable     Chronic     Novolin 70/30 40U in AM, 30U in PM. Elevated A1c      Chronic respiratory failure with hypoxia, on home oxygen therapy [J96.11, Z99.81]  Not Applicable     Chronic     Continuous O2, followed by Pulm      Chronic systolic congestive heart failure [I50.22]  Yes     Chronic     EF 35% in 2015 echo, improved in 5/2018. Patient with mixed ischemic and non-ischemic cardiomyopathy        Resolved Hospital Problems   No resolved problems to display.        Plan:    Diabetic foot infection  Patient's diabetic peripheral neuropathy his impaired his awareness of injury to the plantar aspect of his foot as well as its worsening.   Initial puncture wound has progressed into a diabetic foot ulcer   empirically with vancomycin IV and ciprofloxacin p.o.  consult Podiatry for evaluation for local  debridement if necessary as well as further wound care as indicated.  Arterial assessment of LE without stenosis  MRI without osteo        Sepsis  3/4 SIRS at presentation; no end-organ dysfunction.  Clinically improving with IV antibiotics and initial IV fluids given in emergency department.  Will continue to monitor.  Due to significant systolic heart failure will not give any further IV fluids unless clinically indicated.  Blood cultures G + cocci  Consult infectious disease  Check echo         Cellulitis of left lower extremity  Infection of puncture wound appears to have spread through the soft tissues of the foot resulting in significant swelling of the foot and ankle.  In addition to antibiotic therapy will keep left lower extremity elevated to assist in lymphatic drainage.        COPD mixed type  Will continue Breo inhaler plus DuoNebs p.r.n..        Chronic respiratory failure with hypoxia  On 2 L nasal cannula chronically due to combination of COPD and CHF; titrate as needed.        Uncontrolled type 2 diabetes mellitus with hyperglycemia, with long-term current use of insulin  Converting to Levemir 25 units daily and NovoLog 8 units with meals plus low-dose sliding scale.  Will check A1c for the morning.        Chronic systolic congestive heart failure  No signs of decompensation presently.  Will continue carvedilol, Lasix, lisinopril for now.  Will avoid IV fluids for now unless clinically needed for hemodynamic support in context of sepsis.     HIGH RISK CONDITION(S):  Patient has a condition that poses threat to life and bodily function: bacteremia      Jacqueline Curran MD

## 2020-09-01 NOTE — PLAN OF CARE
Problem: Occupational Therapy Goal  Goal: Occupational Therapy Goal  Description: Goals to be met by:10/01/2020    Patient will increase functional independence with ADLs by performing:    UE Dressing with [place].  LE Dressing with Stand-by Assistance.  Grooming while seated at sink with [place].  Toileting from bedside commode with Supervision for hygiene and clothing management.   Bathing from  sitting at sink with Set-up Assistance.  Toilet transfer to bedside commode with Stand-by Assistance, accurate adherence to NWB precautions LLE.    Outcome: Ongoing, Not Progressing

## 2020-09-01 NOTE — SUBJECTIVE & OBJECTIVE
Past Medical History:   Diagnosis Date    CHF (congestive heart failure)     COPD (chronic obstructive pulmonary disease)     Coronary artery disease     Diabetes mellitus     Diabetes mellitus type II     DM (diabetes mellitus) type II uncontrolled with renal manifestation 9/4/2013    Hyperlipidemia     Hypertension     Postablative hypothyroidism 12/6/2018       Past Surgical History:   Procedure Laterality Date    APPENDECTOMY      CHOLECYSTECTOMY      CORONARY ANGIOPLASTY WITH STENT PLACEMENT      TONSILLECTOMY         Review of patient's allergies indicates:   Allergen Reactions    Vicodin [hydrocodone-acetaminophen] Itching       Medications:  Medications Prior to Admission   Medication Sig    ACCU-CHEK FASTCLIX LANCET DRUM Misc TEST FOUR TIMES DAILY    ACCU-CHEK RAMIREZ Misc USE AS DIRECTED    ACCU-CHEK SMARTVIEW TEST STRIP Strp TEST FOUR TIMES DAILY    albuterol (PROVENTIL) 2.5 mg /3 mL (0.083 %) nebulizer solution INHALE THE CONTENTS OF 1 VIAL VIA NEBULIZER EVERY 4 HOURS AS NEEDED FOR WHEEZING.    alcohol swabs (BD ALCOHOL SWABS) PadM Apply 1 each topically as needed.    aspirin (ECOTRIN) 81 MG EC tablet Take 1 tablet (81 mg total) by mouth once daily.    atorvastatin (LIPITOR) 40 MG tablet Take 1 tablet (40 mg total) by mouth once daily.    carvedilol (COREG) 25 MG tablet Take 1 tablet (25 mg total) by mouth 2 (two) times daily.    cholecalciferol, vitamin D3, (VITAMIN D3) 1,000 unit capsule Take 1 capsule (1,000 Units total) by mouth once daily.    fluticasone furoate-vilanterol (BREO ELLIPTA) 200-25 mcg/dose DsDv diskus inhaler INHALE 1 PUFF INTO THE LUNGS ONCE DAILY. (CONTROLLER)    fluticasone propionate (FLONASE) 50 mcg/actuation nasal spray USE 1 SPRAY IN EACH NOSTRIL ONE TIME DAILY    furosemide (LASIX) 80 MG tablet TAKE 1 TABLET BY MOUTH IN THE MORNING  AND  1 TABLET BY MOUTH DAILY  AT  3- TO 4PM    gabapentin (NEURONTIN) 100 MG capsule Take 1 capsule (100 mg total) by mouth  "3 (three) times daily as needed (pain).    insulin NPH-insulin regular, 70/30, (NOVOLIN 70/30 U-100 INSULIN) 100 unit/mL (70-30) injection Inject 45 Units into the skin before breakfast AND 35 Units before dinner. [id number] MINUTES BEFORE BREAKFAST AND DINNER.    insulin syringe-needle U-100 (INSULIN SYRINGE) 1 mL 30 gauge x 5/16 Syrg 1 each by Misc.(Non-Drug; Combo Route) route 2 (two) times daily. Use twice daily as directed with insulin vials.    levothyroxine (SYNTHROID) 75 MCG tablet Take 1 tablet (75 mcg total) by mouth once daily.    lisinopril (PRINIVIL,ZESTRIL) 40 MG tablet Take 1 tablet (40 mg total) by mouth once daily.    mometasone 0.1% (ELOCON) 0.1 % cream Applied to the affected area once daily for rash.    nitroGLYCERIN (NITROSTAT) 0.4 MG SL tablet PLACE 1 TABLET UNDER THE TONGUE EVERY 5  MINUTES AS NEEDED FOR CHEST PAIN    pen needle, diabetic (BD ULTRA-FINE RAMIREZ PEN NEEDLE) 32 gauge x 5/32" Ndle Use with multiple daily injections of insulin     Antibiotics (From admission, onward)    Start     Stop Route Frequency Ordered    08/31/20 0900  ciprofloxacin HCl tablet 500 mg      -- Oral Every 12 hours 08/30/20 2004 08/31/20 0500  vancomycin in dextrose 5 % 1 gram/250 mL IVPB 1,000 mg      -- IV Every 12 hours (non-standard times) 08/30/20 2010 08/30/20 2101  vancomycin - pharmacy to dose  (vancomycin IVPB)      -- IV pharmacy to manage frequency 08/30/20 2004        Antifungals (From admission, onward)    None        Antivirals (From admission, onward)    None           Immunization History   Administered Date(s) Administered    Influenza - High Dose - PF (65 years and older) 10/15/2019    Influenza - Quadrivalent - PF *Preferred* (6 months and older) 01/16/2014, 10/09/2017, 11/26/2018    Pneumococcal Polysaccharide - 23 Valent 05/01/2018    Tdap 09/14/2013       Family History     Problem Relation (Age of Onset)    Heart disease Mother    Hypertension Son    No Known Problems " Father, Sister, Son, Son        Social History     Socioeconomic History    Marital status:      Spouse name: Not on file    Number of children: Not on file    Years of education: Not on file    Highest education level: Not on file   Occupational History    Not on file   Social Needs    Financial resource strain: Not on file    Food insecurity     Worry: Not on file     Inability: Not on file    Transportation needs     Medical: Not on file     Non-medical: Not on file   Tobacco Use    Smoking status: Former Smoker     Packs/day: 0.01     Years: 3.00     Pack years: 0.03     Types: Cigarettes     Quit date: 1995     Years since quittin.2    Smokeless tobacco: Never Used   Substance and Sexual Activity    Alcohol use: No    Drug use: No    Sexual activity: Never     Comment:  with 1 son   Lifestyle    Physical activity     Days per week: Not on file     Minutes per session: Not on file    Stress: Not on file   Relationships    Social connections     Talks on phone: Not on file     Gets together: Not on file     Attends Orthodoxy service: Not on file     Active member of club or organization: Not on file     Attends meetings of clubs or organizations: Not on file     Relationship status: Not on file   Other Topics Concern    Not on file   Social History Narrative     with 1 son     Review of Systems   Constitutional: Negative for chills and fever.   HENT: Negative for congestion, ear pain, sneezing and sore throat.    Eyes: Negative for pain.   Respiratory: Negative for cough and shortness of breath.    Cardiovascular: Negative for chest pain.   Gastrointestinal: Negative for abdominal pain, constipation, diarrhea, nausea and vomiting.   Endocrine: Negative for polyuria.   Genitourinary: Negative for decreased urine volume, difficulty urinating, dysuria and flank pain.   Musculoskeletal: Positive for back pain and gait problem. Negative for neck pain and neck stiffness.    Skin: Positive for wound. Negative for rash.   Neurological: Positive for numbness. Negative for headaches.   Psychiatric/Behavioral: Negative for confusion. The patient is not nervous/anxious.      Objective:     Vital Signs (Most Recent):  Temp: 97.4 °F (36.3 °C) (09/01/20 0950)  Pulse: 101 (09/01/20 0950)  Resp: 18 (09/01/20 0950)  BP: 117/70 (09/01/20 0950)  SpO2: 97 % (09/01/20 0950) Vital Signs (24h Range):  Temp:  [97.4 °F (36.3 °C)-98.7 °F (37.1 °C)] 97.4 °F (36.3 °C)  Pulse:  [] 101  Resp:  [15-21] 18  SpO2:  [94 %-99 %] 97 %  BP: (115-121)/(67-74) 117/70     Weight: 117 kg (257 lb 15 oz)  Body mass index is 31.4 kg/m².    Estimated Creatinine Clearance: 66.1 mL/min (A) (based on SCr of 1.6 mg/dL (H)).    Physical Exam  Vitals signs reviewed.   Constitutional:       General: He is not in acute distress.     Appearance: Normal appearance. He is normal weight. He is not diaphoretic.   HENT:      Head: Normocephalic and atraumatic.      Right Ear: External ear normal.      Left Ear: External ear normal.      Nose: Nose normal. No congestion or rhinorrhea.   Eyes:      General:         Right eye: No discharge.         Left eye: No discharge.      Extraocular Movements: Extraocular movements intact.   Neck:      Musculoskeletal: Normal range of motion and neck supple. No neck rigidity or muscular tenderness.   Cardiovascular:      Rate and Rhythm: Regular rhythm. Tachycardia present.      Heart sounds: No murmur.      Comments: Diminished DP/PT pulses  Pulmonary:      Effort: Pulmonary effort is normal. No respiratory distress.      Breath sounds: No wheezing.      Comments: On 3.5 L/m oxygen  Chest:      Chest wall: No tenderness.   Abdominal:      General: There is no distension.      Palpations: Abdomen is soft.      Tenderness: There is no abdominal tenderness. There is no guarding.   Musculoskeletal: Normal range of motion.         General: Tenderness (right anterior thigh, lower back) present.    Skin:     General: Skin is warm and dry.      Findings: Lesion (left plantar foot, 4.0x1.5x0.3 cm, no drainage at time of exam, no probe to bone or undermining, not tender) present.   Neurological:      Mental Status: He is alert and oriented to person, place, and time.      Sensory: Sensory deficit present.      Gait: Gait abnormal.   Psychiatric:         Mood and Affect: Mood normal.         Behavior: Behavior normal.         Thought Content: Thought content normal.         Judgment: Judgment normal.         Significant Labs:   Blood Culture:   Recent Labs   Lab 08/30/20  1532 08/30/20  1543 08/31/20  1324 08/31/20  1328   LABBLOO Gram stain aer bottle: Gram positive cocci in clusters resembling Staph   Gram stain oksana bottle: Gram positive cocci in clusters resembling Staph   Results called to and read back by: Elsy Pena RN  08/31/2020  12:35  STAPHYLOCOCCUS AUREUS  Susceptibility pending  ID consult required at [facility].  * Gram stain aer bottle: Gram positive cocci in clusters resembling Staph   Gram stain oksana bottle: Gram positive cocci in clusters resembling Staph   Results called to and read back by: Elsy Pena RN 08/31/2020  12:35  STAPHYLOCOCCUS AUREUS  ID consult required at [facility].  For susceptibility see order # [id number]  * No Growth to date No Growth to date     CBC:   Recent Labs   Lab 08/30/20  1448 08/31/20  0434 09/01/20  0510   WBC 21.43* 20.60* 20.95*   HGB 11.4* 10.5* 10.1*   HCT 36.3* 34.5* 33.2*   * 354* 326     CMP:   Recent Labs   Lab 08/30/20  1448 08/31/20  0434 09/01/20  0510    140 138   K 3.3* 3.1* 3.3*   CL 97 99 98   CO2 29 28 29   * 212* 243*   BUN 24* 22 33*   CREATININE 1.5* 1.4 1.6*   CALCIUM 9.8 8.8 8.8   PROT 8.8*  --   --    ALBUMIN 2.8*  --   --    BILITOT 0.9  --   --    ALKPHOS 100  --   --    AST 27  --   --    ALT 23  --   --    ANIONGAP 13 13 11   EGFRNONAA 48.8* 53.1*  45.2*     Lactic Acid:   Recent Labs   Lab 08/30/20  1532   LACTATE 1.4     POCT Glucose:   Recent Labs   Lab 08/31/20  1831 08/31/20  2034 09/01/20  0745   POCTGLUCOSE 232* 262* 268*     Procalcitonin: No results for input(s): PROCAL in the last 48 hours.  Urine Culture:   Recent Labs   Lab 08/30/20  1633   LABURIN GRAM NEGATIVE FAVIAN  > 100,000 cfu/ml  Identification and susceptibility pending  *     Wound Culture:   Recent Labs   Lab 08/31/20  1754   LABAERO PRESUMPTIVE PROTEUS SPECIES  Moderate  Identification and susceptibility pending  *       Significant Imaging  US Arteries 08/31: Posterior tibial artery: 35. Anterior tibial artery: 35. Left lower extremity demonstrates triphasic waveforms from the common femoral artery to the distal superficial femoral artery.  The popliteal artery, anterior tibial artery, and posterior tibial artery demonstrate monophasic waveforms.   MRI L Foot 09/01: Intense inflammatory process at the mid/anterior talocalcaneal joint, possibly active degenerative change, early septic arthritis is consider less likely, not excluded.  Changes of neuropathic joint not excluded. Small osteochondral defects at the anterior distal tibial plateau fall possibly degenerative. Peroneus brevis tendinosis.   Cellulitis anterior and lateral aspect of the foot. No definite abscess seen. Well-defined, benign-appearing osseous lesion distal fibula most suggestive of an intra osseous ganglion cyst. Consider short-term follow-up MRI examination to monitor evolution if clinically warranted.

## 2020-09-01 NOTE — ASSESSMENT & PLAN NOTE
A: 66 yo M with left plantar foot wound. Episodes of tachypnea and tachycardia today, on 3.5 L/min O2, BUN/Cr 33/1.6. WBCs 20.95, glucose 243. Lactate WNL. Vascular study showed left PT APSV 35 cm/s and right AT APSV 35 cm/s, both monophasic. MRI shows talocalcaneal joint inflammatory process likely to be degenerative rather than septic, anterolateral foot cellulitis, no definite abscess. Blood cultures growing Staph aureus, urine growing GNRs, Gram stain shows Gram + cocci and GNRs, cultures growing presumptive Proteus species.    P:  -Continue IV vancomycin and PO ciprofloxacin.  -Anticipate 2 weeks parenteral antibiotics for bacteremia and skin and soft tissue infection.   -Followup on wound cultures and repeat blood cultures in progress.  -Will continue to follow.

## 2020-09-01 NOTE — SUBJECTIVE & OBJECTIVE
Subjective:     Interval History:   Patient still with persistent WBC in the 20k. Denies any pedal pain. Denies N/V/F/C. Gram stain of left foot wound +GPCs, GNRs. Aerobic +Proteus. Blood cx + GPCs. MRI left foot with no signs of abscess or osteo. +degenerative changes.         Scheduled Meds:   aspirin  81 mg Oral Daily    atorvastatin  40 mg Oral Daily    carvediloL  25 mg Oral BID    ciprofloxacin HCl  500 mg Oral Q12H    enoxaparin  40 mg Subcutaneous Q24H    fluticasone furoate-vilanteroL  1 puff Inhalation Daily    fluticasone propionate  1 spray Each Nostril Daily    furosemide  80 mg Oral BID    insulin aspart U-100  12 Units Subcutaneous TIDWM    [START ON 9/2/2020] insulin detemir U-100  25 Units Subcutaneous QHS    levothyroxine  75 mcg Oral Before breakfast    lisinopriL  40 mg Oral Daily    vancomycin (VANCOCIN) IVPB  1,000 mg Intravenous Q12H    vitamin D  1,000 Units Oral Daily     Continuous Infusions:  PRN Meds:acetaminophen, albuterol-ipratropium, dextrose 50%, dextrose 50%, gabapentin, glucagon (human recombinant), glucose, glucose, insulin aspart U-100, ketorolac, melatonin, ondansetron, polyethylene glycol, sodium chloride 0.9%, DIPH,PERTUS (ADACEL),TETANUS PF VAC (ADULT), Pharmacy to dose Vancomycin consult **AND** vancomycin - pharmacy to dose    Review of Systems   Constitutional: Positive for activity change. Negative for appetite change, chills and fever.   HENT: Negative for congestion.    Respiratory: Negative for cough and shortness of breath.    Gastrointestinal: Negative for nausea and vomiting.   Musculoskeletal: Positive for arthralgias, back pain and myalgias.   Skin: Positive for wound. Negative for color change.   Neurological: Positive for weakness. Negative for numbness.   Psychiatric/Behavioral: Negative for behavioral problems and confusion.     Objective:     Vital Signs (Most Recent):  Temp: 97.4 °F (36.3 °C) (09/01/20 1156)  Pulse: 101 (09/01/20 0950)  Resp:  18 (09/01/20 0950)  BP: 117/70 (09/01/20 0950)  SpO2: 97 % (09/01/20 0950) Vital Signs (24h Range):  Temp:  [97.4 °F (36.3 °C)-98.7 °F (37.1 °C)] 97.4 °F (36.3 °C)  Pulse:  [] 101  Resp:  [15-21] 18  SpO2:  [95 %-99 %] 97 %  BP: (115-121)/(70-74) 117/70     Weight: 117 kg (257 lb 15 oz)  Body mass index is 31.4 kg/m².    Foot Exam    General  Orientation: alert and oriented to person, place, and time       Right Foot/Ankle     Inspection and Palpation  Tenderness: none   Swelling: none   Skin Exam: drainage and ulcer; no cellulitis and no erythema     Neurovascular  Dorsalis pedis: absent  Posterior tibial: absent      Left Foot/Ankle      Inspection and Palpation  Tenderness: none   Swelling: none   Skin Exam: skin intact;     Neurovascular  Dorsalis pedis: absent  Posterior tibial: absent            Laboratory:  CBC:   Recent Labs   Lab 09/01/20  0510   WBC 20.95*   RBC 3.74*   HGB 10.1*   HCT 33.2*      MCV 89   MCH 27.0   MCHC 30.4*     CRP: No results for input(s): CRP in the last 168 hours.  ESR: No results for input(s): SEDRATE in the last 168 hours.  Wound Cultures:   Recent Labs   Lab 08/31/20  1754   LABAERO PRESUMPTIVE PROTEUS SPECIES  Moderate  Identification and susceptibility pending  *     Microbiology Results (last 7 days)     Procedure Component Value Units Date/Time    Blood culture [[id number]] Collected: 09/01/20 1417    Order Status: Sent Specimen: Blood from Antecubital, Right Hand Updated: 09/01/20 1429    Narrative:      Collection has been rescheduled by CBE at 09/01/2020 13:58 Reason:   due at 13:55  Collection has been rescheduled by CBE at 09/01/2020 13:58 Reason:   due at 13:55    Blood culture [[id number]] Collected: 08/31/20 1328    Order Status: Completed Specimen: Blood from Peripheral, Right Hand Updated: 09/01/20 1147     Blood Culture, Routine Gram stain oksana bottle: Gram positive cocci       Results called to and read back by: Elsy Pena  09/01/2020  11:46    Blood  culture [[id number]] Collected: 08/31/20 1324    Order Status: Completed Specimen: Blood from Peripheral, Left Hand Updated: 09/01/20 1146     Blood Culture, Routine Gram stain aer bottle: Gram positive cocci       Gram stain oksana bottle: Gram positive cocci       Results called to and read back by: Elsy Pena  09/01/2020  11:46    Blood culture #2 **CANNOT BE ORDERED STAT** [[id number]]  (Abnormal) Collected: 08/30/20 1543    Order Status: Completed Specimen: Blood from Peripheral, Antecubital, Left Updated: 09/01/20 1103     Blood Culture, Routine Gram stain aer bottle: Gram positive cocci in clusters resembling Staph       Gram stain oksana bottle: Gram positive cocci in clusters resembling Staph       Results called to and read back by: Elsy Pena RN 08/31/2020  12:35      STAPHYLOCOCCUS AUREUS  ID consult required at [facility].  For susceptibility see order # [id number]      Blood culture #1 **CANNOT BE ORDERED STAT** [[id number]]  (Abnormal) Collected: 08/30/20 1532    Order Status: Completed Specimen: Blood from Peripheral, Antecubital, Left Updated: 09/01/20 1100     Blood Culture, Routine Gram stain aer bottle: Gram positive cocci in clusters resembling Staph       Gram stain oksana bottle: Gram positive cocci in clusters resembling Staph       Results called to and read back by: Elsy Pena RN  08/31/2020  12:35      STAPHYLOCOCCUS AUREUS  Susceptibility pending  ID consult required at [facility].      Aerobic culture [[id number]]  (Abnormal) Collected: 08/31/20 1754    Order Status: Completed Specimen: Wound from Foot, Left Updated: 09/01/20 0922     Aerobic Bacterial Culture PRESUMPTIVE PROTEUS SPECIES  Moderate  Identification and susceptibility pending      Gram stain [[id number]] Collected: 08/31/20 1754    Order Status: Completed Specimen: Wound from Foot, Left Updated: 08/31/20 2143     Gram Stain Result Rare WBC's      Moderate Gram negative  rods      Moderate Gram positive cocci    Urine culture [[id number]]  (Abnormal) Collected: 08/30/20 1633    Order Status: Completed Specimen: Urine Updated: 08/31/20 2005     Urine Culture, Routine GRAM NEGATIVE FAVIAN  > 100,000 cfu/ml  Identification and susceptibility pending      Narrative:      Specimen Source->Urine    Culture, Anaerobe [[id number]] Collected: 08/31/20 5514    Order Status: Sent Specimen: Wound from Foot, Left Updated: 08/31/20 1928        Specimen (12h ago, onward)    None          Diagnostic Results:  I have reviewed all pertinent imaging results/findings within the past 24 hours.   Imaging Results          CT Head Without Contrast (Final result)  Result time 08/30/20 16:51:33    Final result by Giovany Hernandez MD (08/30/20 16:51:33)                 Impression:      No acute abnormality.  Follow-up/further evaluation as clinically indicated.    Chronic microvascular ischemic changes.      Electronically signed by: Giovany Hernandez MD  Date:    08/30/2020  Time:    16:51             Narrative:    EXAMINATION:  CT HEAD WITHOUT CONTRAST    CLINICAL HISTORY:  Syncope, recurrent;    TECHNIQUE:  Low dose axial CT images obtained throughout the head without intravenous contrast. Sagittal and coronal reconstructions were performed.    COMPARISON:  None.    FINDINGS:  Intracranial compartment:    Ventricles and sulci are normal in size for age without evidence of hydrocephalus. No extra-axial blood or fluid collections.    Mild generalized cerebral volume loss.  Scattered hypoattenuation of the supratentorial white matter which is nonspecific but likely represents chronic microvascular ischemic changes.  No parenchymal mass, hemorrhage, edema or major vascular distribution infarct.    Skull/extracranial contents (limited evaluation): No fracture. Mastoid air cells and paranasal sinuses are essentially clear.  Congenital nonunion of C1.                               X-Ray Foot Complete Left (Final  result)  Result time 08/30/20 15:58:35    Final result by Giovany Hernandez MD (08/30/20 15:58:35)                 Impression:      No evidence of fracture.      Electronically signed by: Giovany Hernandez MD  Date:    08/30/2020  Time:    15:58             Narrative:    EXAMINATION:  XR FOOT COMPLETE 3 VIEW LEFT    CLINICAL HISTORY:  .  Unspecified infectious disease    TECHNIQUE:  AP, lateral and oblique views of the left foot were performed.    COMPARISON:  None    FINDINGS:  No evidence of acute fracture or dislocation.  No osseous destructive process.  Spurring of the inferior calcaneus.  Atherosclerosis noted.  Soft tissue defect along the plantar aspect of the midfoot likely a laceration or wound.  No radiopaque foreign body.                               X-Ray Chest AP Portable (Final result)  Result time 08/30/20 16:02:41    Final result by Rigoberto Keen MD (08/30/20 16:02:41)                 Impression:      Findings suggesting mild pulmonary edema/CHF pattern without focal consolidation.  Interstitial pneumonia not excluded.      Electronically signed by: Rigoberto Keen MD  Date:    08/30/2020  Time:    16:02             Narrative:    EXAMINATION:  XR CHEST AP PORTABLE    CLINICAL HISTORY:  leukocytosis;    TECHNIQUE:  Single frontal view of the chest was performed.    COMPARISON:  Chest radiograph 05/22/2015    FINDINGS:  Monitoring leads overlie the chest.  Large body habitus.  Patient is slightly rotated.  Inferior-most aspect of the left costophrenic angle not included in field of view.    Cardiac silhouette is upper limits of normal in size with prominence of the central pulmonary vasculature and bilateral diffuse nonspecific interstitial coarsening with a perihilar and basilar distribution suggesting pulmonary edema/CHF pattern with interstitial pneumonia not excluded.  There is calcific atherosclerosis and tortuosity of the thoracic aorta.  Hilar contours are within normal limits.  There is  chronic nonspecific elevation of the right hemidiaphragm with right basilar platelike scarring versus atelectasis.  No large consolidation, pleural effusion or pneumothorax definitively seen allowing for limitations.  No acute osseous process seen.                              MRI left midfoot 9/1/2020  Narrative & Impression     EXAMINATION:  MRI FOOT (MIDFOOT) LEFT W W/O CONTRAST     CLINICAL HISTORY:  Osteomyelitis suspected, diabetic;     TECHNIQUE:  Multiplanar multi sequences images of the mid foot obtained before and after the administration of contrast, the patient received 10 cc of Gadavist IV contrast.     COMPARISON:  Correlation 08/30/2020 radiographs     FINDINGS:  There are 2 small areas of osteochondral defect anterolateral tibial plafond, one measures 11 x 12 mm and one measures 7 x 7 mm.     The talar dome is intact.     Trace of fluid in the tibiotalar joint.     There is significant nonspecific edema at the anterior talonavicular joint and sinus tarsi region, no cortical irregularity or definite osseous erosions.     There is a well define low T1 high T2 septated lesion tip of the fibula measuring 16 by 17 mm, well define possibly an intra osseous ganglion cyst.     There is nonspecific edema diffusely seen of the anterior calcaneus and mid talus.     The navicular, cuboid and cuneiforms appear intact.  The metatarsal bones are intact.     There is the thickening and tendinosis of the peroneus brevis tendon.  The peroneus longus tendon is intact.  There is a trace of fluid within the tendon sheath of  the flexor hallucis longus tendon, the tendon is intact.     The extensor tendons appear normal.     There is sick Cheo it can not subcutaneous soft tissue edema dorsal aspect of the foot and lateral aspect of the foot.     No soft tissue or osseous abscess seen.     There is significant postcontrast enhancement at the anterior talocalcaneal joint, mid talus and anterior  calcaneus.     Impression:     Intense inflammatory process at the mid/anterior talocalcaneal joint, possibly active degenerative change, early septic arthritis is consider less likely, not excluded.  Changes of neuropathic joint not excluded.     Small osteochondral defects at the anterior distal tibial plateau fall possibly degenerative.     Peroneus brevis tendinosis.     Cellulitis anterior and lateral aspect of the foot.     No definite abscess seen.     Well-defined, benign-appearing osseous lesion distal fibula most suggestive of an intra osseous ganglion cyst.     Consider short-term follow-up MRI examination to monitor evolution if clinically warranted.     Narrative & Impression     EXAMINATION:  US ARTERIAL LOWER EXTREMITY BILAT WITH GINA (XPD)     CLINICAL HISTORY:  non healing diabetic foor infection;     TECHNIQUE:  Bilateral lower extremity arterial duplex ultrasound examination performed. Multiple gray scale and color doppler images were obtained in addition to waveform analysis.     COMPARISON:  No relevant prior imaging for comparison.     FINDINGS:  Ankle brachial indices were not obtained.     The peak systolic velocities on the right are as follows, in centimeters/second:     Common femoral artery: 95     Deep femoral artery: 80     Superficial femoral artery, proximal: 167     Superficial femoral artery, mid portion: 71     Superficial femoral artery, distal: 114     Popliteal artery: 96     Posterior tibial artery: 50     Anterior tibial artery: 174     The peak systolic velocities on the left are as follows, in centimeters/second:     Common femoral artery: 90     Deep femoral artery: 101     Superficial femoral artery, proximal: 83     Superficial femoral artery, mid portion: 72     Superficial femoral artery, distal: 105     Popliteal artery: 106     Posterior tibial artery: 35     Anterior tibial artery: 35     Right lower extremity demonstrates biphasic or triphasic waveforms from the  common femoral artery to the distal popliteal artery.  The anterior tibial artery and posterior tibial artery demonstrate monophasic waveforms.     Left lower extremity demonstrates triphasic waveforms from the common femoral artery to the distal superficial femoral artery.  The popliteal artery, anterior tibial artery, and posterior tibial artery demonstrate monophasic waveforms.     Impression:     No hemodynamically significant stenosis demonstrated in the right or left lower extremity arterial system.     Monophasic waveforms in the right anterior tibial artery and posterior tibial artery, as well as the left popliteal artery, anterior tibial artery, and posterior tibial artery.     Electronically signed by resident: Fred Benavides  Date:                                            08/31/2020  Time:                                           17:34     Electronically signed by: Rigoberto Keen MD  Date:                                            08/31/2020  Time:                                           18:51            Clinical Findings:    9/1  Left plantar foot ulcer  Wound base: fibrogranular  Drainage: none  No probe to bone, no undermining, no erythema, no edema, no fluctuance no crepitus.

## 2020-09-01 NOTE — PLAN OF CARE
09/01/20 1100   Discharge Assessment   Assessment Type Discharge Planning Assessment   Confirmed/corrected address and phone number on facesheet? Yes   Assessment information obtained from? Caregiver;Medical Record   Prior to hospitilization cognitive status: Alert/Oriented   Prior to hospitalization functional status: Independent   Current cognitive status: Alert/Oriented   Current Functional Status: Independent;Needs Assistance   Lives With spouse   Able to Return to Prior Arrangements yes   Is patient able to care for self after discharge? Unable to determine at this time (comments)   Who are your caregiver(s) and their phone number(s)? Teresa Patton([phone])   Readmission Within the Last 30 Days no previous admission in last 30 days   Patient currently receives any other outside agency services? No   Equipment Currently Used at Home walker, rolling;glucometer   Do you have any problems affording any of your prescribed medications? No   Is the patient taking medications as prescribed? yes   Discharge Plan A Home with family;Home Health   Discharge Plan B Skilled Nursing Facility   DME Needed Upon Discharge  other (see comments)  (TBD)   DME Needs TBD    Qiana Chow MD  [address] [address] [facility] [address]       [facility] - [place], [place] - [address]  [address] [address]  Phone: [phone] Fax: [phone]    [facility] - [place], [place] - [address]  [address] [facility] [address]  Phone: [phone] Fax: [phone]    Payor: [employer] MANAGED MEDICARE / Plan: [employer] MEDICARE HMO / Product Type: Capitation /    PCP F/U scheduled.

## 2020-09-01 NOTE — ASSESSMENT & PLAN NOTE
65 y.o M with PMHx of Type 2 DM, chronic hypoxemic respiratory failure on 2 L O2 via NC, chronic systolic heart failure who presented to the ED for recurrent falls.  Podiatry consulted for left foot ulcer.  In the ED, afebrile. WBC elevated at 21k with left shift. Xray left foot with no signs of soft tissue gas, no fx, no osteo, no foreign body.     Plan:  -Continue IV abx. Wound cx gram stain +GPCs, GNRs. +Proteus. Blood cx 8/30, 8/31 +GPCs.   -MRI left foot with no signs of osteo or abscess. ID on board.  -Arterial studies with monophasic waveforms, no significant stenosis.  -No emergent surgical intervention from podiatry warranted at this time  -Continue local wound care. Orders placed  -Rest of care per primary  -Podiatry will continue to follow

## 2020-09-01 NOTE — PLAN OF CARE
09/01/20 1317   Post-Acute Status   Post-Acute Authorization Home Health   Home Health Status Awaiting Therapy Documentation

## 2020-09-01 NOTE — PT/OT/SLP EVAL
Occupational Therapy   Evaluation    Name: Ed Patton  MRN: [id number]  Admitting Diagnosis:  Diabetic foot infection      Recommendations:     Discharge Recommendations: nursing facility, skilled  Discharge Equipment Recommendations:  bedside commode, bath bench, walker, rolling  Barriers to discharge:  Decreased caregiver support    Assessment:     Ed Patton is a 65 y.o. male with a medical diagnosis of Diabetic foot infection.  Patient presents with difficult weight bearing precaution adherence. Performance deficits affecting function: weakness, impaired endurance, impaired self care skills, impaired functional mobilty, gait instability, impaired balance, decreased lower extremity function, decreased safety awareness, pain, impaired cardiopulmonary response to activity, orthopedic precautions.      Rehab Prognosis: Good; patient would benefit from acute skilled OT services to address these deficits and reach maximum level of function.       Plan:     Patient to be seen 3 x/week to address the above listed problems via    · Plan of Care Expires: 10/01/20  · Plan of Care Reviewed with: patient    Subjective     Chief Complaint: BLE pain  Patient/Family Comments/goals: Home at recent norm ADL engagement levels. Minimize care giver strain risk (spouse).   Occupational Profile:  Living Environment: Patient reports 1-story home unable to ID # of outside stairs, walk-in shower, s-chair, 1 bar for t/f stabilization. Patient further reporting ambulating with a cane inconsistently, utilizing shower chair for bathing.  Patient reports multiple falls, lives with their spouse (his Son visits frequently and is supportive). Spouse and Son post d/c support.  Equipment owned: cane, straight, shower chair, grab bar  Equipment Used at Home:  cane, straight, shower chair, grab bar      Pain/Comfort:  · Pain Rating 1: 6/10  · Location - Side 1: Right  · Location 1: leg  · Pain Addressed 1: Nurse notified, Distraction,  Reposition  · Pain Rating Post-Intervention 1: 4/10    Patients cultural, spiritual, Worship conflicts given the current situation: no    Objective:     Communicated with: RN prior to session.  Patient found semi recumbent, engaged w/ PT.  with oxygen, pulse ox (continuous), telemetry upon OT entry to room.    General Precautions: Standard, fall   Orthopedic Precautions:LLE non weight bearing   Braces: N/A     Occupational Performance:    Bed Mobility:  Min a bed mobility, min a sit to stand, max cues required for weight bearing precaution observance, for effective weight shifting, and effective use of UB as a stabilizer.     Functional Mobility/Transfers:  · Patient completed Sit <> Stand Transfer with minimum assistance  with  rolling walker   · Functional Mobility: See above.     Activities of Daily Living:  · Feeding:  independence    · Upper Body Dressing: minimum assistance    · Lower Body Dressing: maximal assistance  > mod a.   · Toileting: maximal assistance BSC requested.     Cognitive/Visual Perceptual:  Oriented to person, place, situation, able to make needs known. Behaviors pleasant, cooperative, thankful for interventions.     Physical Exam:  BUE AROM WFL  BUE MMT grossly >/= 4/5  G/FM coordination WFL  genral seated and standing task iniitation/task tolerance levels good.     AMPAC 6 Click ADL:  AMPAC Total Score: 17    Treatment & Education:  SKILLED EDUCATION WAS PROVIDED TO PATIENT FOR:    *Patient educated on role of OT, Treatment rationales and protocols  *Importance of OOB activities daily in conjunction w/ edu related to importance of performing BUE AROM exercises while in bed.  *Patient agreeable to therapy session  *Lights turned on and shade open for session   *Basic self-care tasks and rudimentary functional mobility undertaken -- assist levels noted above, edu for aspiration risks and necessary precautions;:    fullest upright seating for all intake w/ bed in chair position or OOB  instructed.    *General in room safety and (S) mobility advised, Call button use reviewed  *Communication board up to date, questions/concerns within OT scope addressed  *Patient further engaged in there ex to BUE/BLE for all major planes of tolerable motion x 1 set 10 reps while seated .    INITIAL TRAINING: Intro deep relaxed breathing tech as needed for non Rx pain management/MM relaxation, and for internal self regulation, in conjunction w/ edu for AROM seated all joints/allplanes as indicated for recovery of effective respiration in functional tasks, and to bolster proper circulation.  Education:    Patient left with bed in chair position with all lines intact, call button in reach, bed alarm on and RN notified    GOALS:   Multidisciplinary Problems     Occupational Therapy Goals        Problem: Occupational Therapy Goal    Goal Priority Disciplines Outcome Interventions   Occupational Therapy Goal     OT, PT/OT Ongoing, Not Progressing    Description: Goals to be met by:10/01/2020    Patient will increase functional independence with ADLs by performing:    UE Dressing with Ware.  LE Dressing with Stand-by Assistance.  Grooming while seated at sink with Ware.  Toileting from bedside commode with Supervision for hygiene and clothing management.   Bathing from  sitting at sink with Set-up Assistance.  Toilet transfer to bedside commode with Stand-by Assistance, accurate adherence to NWB precautions LLE.                     History:     Past Medical History:   Diagnosis Date    CHF (congestive heart failure)     COPD (chronic obstructive pulmonary disease)     Coronary artery disease     Diabetes mellitus     Diabetes mellitus type II     DM (diabetes mellitus) type II uncontrolled with renal manifestation 9/4/2013    Hyperlipidemia     Hypertension     Postablative hypothyroidism 12/6/2018       Past Surgical History:   Procedure Laterality Date    APPENDECTOMY      CHOLECYSTECTOMY       CORONARY ANGIOPLASTY WITH STENT PLACEMENT      TONSILLECTOMY         Time Tracking:     OT Date of Treatment: 09/01/20  OT Start Time: 1147  OT Stop Time: 1227  OT Total Time (min): 40 min    Billable Minutes:Evaluation 15  Self Care/Home Management 15  Therapeutic Exercise 10    ELISEO Harrison  9/1/2020

## 2020-09-01 NOTE — PROGRESS NOTES
Pharmacokinetic Assessment Follow Up: IV Vancomycin    Vancomycin serum concentration assessment(s):    -Vancomycin trough was drawn appropriately, ~ 11 hrs from the previous dose, and can be used to guide therapy.  -A level of 17.8 mcg/mL is within therapeutic goal 15-20 mcg/mL for diabetic foot infection.  -Scr remains elevated 1.6 mg/dL.  -ke= 0.059 hr-1   -T 1/2= 11.74 hr      -GPCs in wound Cx, GNRs in UCx     Vancomycin Regimen Plan:    -Continue vancomycin 1000 mg IV Q12H.  -Obtain a trough prior to the fourth dose of this regimen, 9/2 @ 1630.  -Continue to monitor renal function closely.     Drug levels (last 3 results):  Recent Labs   Lab Result Units 09/01/20  0510   Vancomycin-Trough ug/mL 17.8       Pharmacy will continue to follow and monitor vancomycin.    Please contact pharmacy at extension [phone] for questions regarding this assessment.    Thank you for the consult,   Shayan Bhakta       Patient brief summary:  Ed Patton is a 65 y.o. male initiated on antimicrobial therapy with IV Vancomycin for treatment of diabetic foot infection    Drug Allergies:   Review of patient's allergies indicates:   Allergen Reactions    Vicodin [hydrocodone-acetaminophen] Itching       Actual Body Weight:   117 kg    Renal Function:   Estimated Creatinine Clearance: 66.1 mL/min (A) (based on SCr of 1.6 mg/dL (H)).,     Dialysis Method (if applicable):  N/A    CBC (last 72 hours):  Recent Labs   Lab Result Units 08/30/20  1448 08/31/20  0434 09/01/20  0510   WBC K/uL 21.43* 20.60* 20.95*   Hemoglobin g/dL 11.4* 10.5* 10.1*   Hemoglobin A1C %  --  9.5*  --    Hematocrit % 36.3* 34.5* 33.2*   Platelets K/uL 392* 354* 326   Gran% % 84.8* 83.2* 81.1*   Lymph% % 5.2* 5.9* 6.9*   Mono% % 7.5 7.6 6.6   Eosinophil% % 1.4 2.2 4.0   Basophil% % 0.4 0.3 0.3   Differential Method  Automated Automated Automated       Metabolic Panel (last 72 hours):  Recent Labs   Lab Result Units 08/30/20  1448 08/30/20  1633 08/31/20  0434  09/01/20  0510   Sodium mmol/L 139  --  140 138   Potassium mmol/L 3.3*  --  3.1* 3.3*   Chloride mmol/L 97  --  99 98   CO2 mmol/L 29  --  28 29   Glucose mg/dL 250*  --  212* 243*   Glucose, UA   --  Negative  --   --    BUN, Bld mg/dL 24*  --  22 33*   Creatinine mg/dL 1.5*  --  1.4 1.6*   Albumin g/dL 2.8*  --   --   --    Total Bilirubin mg/dL 0.9  --   --   --    Alkaline Phosphatase U/L 100  --   --   --    AST U/L 27  --   --   --    ALT U/L 23  --   --   --    Magnesium mg/dL  --   --  1.8 2.0   Phosphorus mg/dL  --   --  4.1 3.6       Vancomycin Administrations:  vancomycin given in the last 96 hours                   vancomycin in dextrose 5 % 1 gram/250 mL IVPB 1,000 mg (mg) 1,000 mg New Bag 09/01/20 0540     1,000 mg New Bag 08/31/20 1825     1,000 mg New Bag  0508    vancomycin 1.75 g in 5 % dextrose 500 mL IVPB (mg) 1,750 mg New Bag 08/30/20 1655                Microbiologic Results:  Microbiology Results (last 7 days)     Procedure Component Value Units Date/Time    Gram stain [[id number]] Collected: 08/31/20 1754    Order Status: Completed Specimen: Wound from Foot, Left Updated: 08/31/20 2143     Gram Stain Result Rare WBC's      Moderate Gram negative rods      Moderate Gram positive cocci    Blood culture [[id number]] Collected: 08/31/20 1324    Order Status: Completed Specimen: Blood from Peripheral, Left Hand Updated: 08/31/20 2115     Blood Culture, Routine No Growth to date    Blood culture [[id number]] Collected: 08/31/20 1328    Order Status: Completed Specimen: Blood from Peripheral, Right Hand Updated: 08/31/20 2115     Blood Culture, Routine No Growth to date    Urine culture [[id number]]  (Abnormal) Collected: 08/30/20 1633    Order Status: Completed Specimen: Urine Updated: 08/31/20 2005     Urine Culture, Routine GRAM NEGATIVE FAVIAN  > 100,000 cfu/ml  Identification and susceptibility pending      Narrative:      Specimen Source->Urine    Aerobic culture [[id number]] Collected: 08/31/20 7059     Order Status: Sent Specimen: Wound from Foot, Left Updated: 08/31/20 1929    Culture, Anaerobe [[id number]] Collected: 08/31/20 1754    Order Status: Sent Specimen: Wound from Foot, Left Updated: 08/31/20 1928    Blood culture #2 **CANNOT BE ORDERED STAT** [[id number]] Collected: 08/30/20 1543    Order Status: Completed Specimen: Blood from Peripheral, Antecubital, Left Updated: 08/31/20 1236     Blood Culture, Routine Gram stain aer bottle: Gram positive cocci in clusters resembling Staph       Gram stain oksana bottle: Gram positive cocci in clusters resembling Staph       Results called to and read back by: Elsy Pena RN 08/31/2020  12:35    Blood culture #1 **CANNOT BE ORDERED STAT** [[id number]] Collected: 08/30/20 1532    Order Status: Completed Specimen: Blood from Peripheral, Antecubital, Left Updated: 08/31/20 1235     Blood Culture, Routine Gram stain aer bottle: Gram positive cocci in clusters resembling Staph       Gram stain oksana bottle: Gram positive cocci in clusters resembling Staph       Results called to and read back by: Elsy Pena RN  08/31/2020  12:35

## 2020-09-01 NOTE — PROGRESS NOTES
[facility]  Podiatry  Progress Note    Patient Name: Ed Patton  MRN: [id number]  Admission Date: 8/30/2020  Hospital Length of Stay: 2 days  Attending Physician: Jacqueline Curran MD  Primary Care Provider: Qiana Chow MD     Subjective:     Interval History:   Patient still with persistent WBC in the 20k. Denies any pedal pain. Denies N/V/F/C. Gram stain of left foot wound +GPCs, GNRs. Aerobic +Proteus. Blood cx + GPCs. MRI left foot with no signs of abscess or osteo. +degenerative changes.         Scheduled Meds:   aspirin  81 mg Oral Daily    atorvastatin  40 mg Oral Daily    carvediloL  25 mg Oral BID    ciprofloxacin HCl  500 mg Oral Q12H    enoxaparin  40 mg Subcutaneous Q24H    fluticasone furoate-vilanteroL  1 puff Inhalation Daily    fluticasone propionate  1 spray Each Nostril Daily    furosemide  80 mg Oral BID    insulin aspart U-100  12 Units Subcutaneous TIDWM    [START ON 9/2/2020] insulin detemir U-100  25 Units Subcutaneous QHS    levothyroxine  75 mcg Oral Before breakfast    lisinopriL  40 mg Oral Daily    vancomycin (VANCOCIN) IVPB  1,000 mg Intravenous Q12H    vitamin D  1,000 Units Oral Daily     Continuous Infusions:  PRN Meds:acetaminophen, albuterol-ipratropium, dextrose 50%, dextrose 50%, gabapentin, glucagon (human recombinant), glucose, glucose, insulin aspart U-100, ketorolac, melatonin, ondansetron, polyethylene glycol, sodium chloride 0.9%, DIPH,PERTUS (ADACEL),TETANUS PF VAC (ADULT), Pharmacy to dose Vancomycin consult **AND** vancomycin - pharmacy to dose    Review of Systems   Constitutional: Positive for activity change. Negative for appetite change, chills and fever.   HENT: Negative for congestion.    Respiratory: Negative for cough and shortness of breath.    Gastrointestinal: Negative for nausea and vomiting.   Musculoskeletal: Positive for arthralgias, back pain and myalgias.   Skin: Positive for wound. Negative for color change.    Neurological: Positive for weakness. Negative for numbness.   Psychiatric/Behavioral: Negative for behavioral problems and confusion.     Objective:     Vital Signs (Most Recent):  Temp: 97.4 °F (36.3 °C) (09/01/20 1156)  Pulse: 101 (09/01/20 0950)  Resp: 18 (09/01/20 0950)  BP: 117/70 (09/01/20 0950)  SpO2: 97 % (09/01/20 0950) Vital Signs (24h Range):  Temp:  [97.4 °F (36.3 °C)-98.7 °F (37.1 °C)] 97.4 °F (36.3 °C)  Pulse:  [] 101  Resp:  [15-21] 18  SpO2:  [95 %-99 %] 97 %  BP: (115-121)/(70-74) 117/70     Weight: 117 kg (257 lb 15 oz)  Body mass index is 31.4 kg/m².    Foot Exam    General  Orientation: alert and oriented to person, place, and time       Right Foot/Ankle     Inspection and Palpation  Tenderness: none   Swelling: none   Skin Exam: drainage and ulcer; no cellulitis and no erythema     Neurovascular  Dorsalis pedis: absent  Posterior tibial: absent      Left Foot/Ankle      Inspection and Palpation  Tenderness: none   Swelling: none   Skin Exam: skin intact;     Neurovascular  Dorsalis pedis: absent  Posterior tibial: absent            Laboratory:  CBC:   Recent Labs   Lab 09/01/20  0510   WBC 20.95*   RBC 3.74*   HGB 10.1*   HCT 33.2*      MCV 89   MCH 27.0   MCHC 30.4*     CRP: No results for input(s): CRP in the last 168 hours.  ESR: No results for input(s): SEDRATE in the last 168 hours.  Wound Cultures:   Recent Labs   Lab 08/31/20  1900   LABAERO PRESUMPTIVE PROTEUS SPECIES  Moderate  Identification and susceptibility pending  *     Microbiology Results (last 7 days)     Procedure Component Value Units Date/Time    Blood culture [[id number]] Collected: 09/01/20 1417    Order Status: Sent Specimen: Blood from Antecubital, Right Hand Updated: 09/01/20 1429    Narrative:      Collection has been rescheduled by CBE at 09/01/2020 13:58 Reason:   due at 13:55  Collection has been rescheduled by CBE at 09/01/2020 13:58 Reason:   due at 13:55    Blood culture [[id number]] Collected:  08/31/20 1328    Order Status: Completed Specimen: Blood from Peripheral, Right Hand Updated: 09/01/20 1147     Blood Culture, Routine Gram stain oksana bottle: Gram positive cocci       Results called to and read back by: Elsy Pena  09/01/2020  11:46    Blood culture [[id number]] Collected: 08/31/20 1324    Order Status: Completed Specimen: Blood from Peripheral, Left Hand Updated: 09/01/20 1146     Blood Culture, Routine Gram stain aer bottle: Gram positive cocci       Gram stain oksana bottle: Gram positive cocci       Results called to and read back by: Elsy Pena  09/01/2020  11:46    Blood culture #2 **CANNOT BE ORDERED STAT** [[id number]]  (Abnormal) Collected: 08/30/20 1543    Order Status: Completed Specimen: Blood from Peripheral, Antecubital, Left Updated: 09/01/20 1103     Blood Culture, Routine Gram stain aer bottle: Gram positive cocci in clusters resembling Staph       Gram stain oksana bottle: Gram positive cocci in clusters resembling Staph       Results called to and read back by: Elsy Pena RN 08/31/2020  12:35      STAPHYLOCOCCUS AUREUS  ID consult required at [facility].  For susceptibility see order # [id number]      Blood culture #1 **CANNOT BE ORDERED STAT** [[id number]]  (Abnormal) Collected: 08/30/20 1532    Order Status: Completed Specimen: Blood from Peripheral, Antecubital, Left Updated: 09/01/20 1100     Blood Culture, Routine Gram stain aer bottle: Gram positive cocci in clusters resembling Staph       Gram stain oksana bottle: Gram positive cocci in clusters resembling Staph       Results called to and read back by: Elsy Pena RN  08/31/2020  12:35      STAPHYLOCOCCUS AUREUS  Susceptibility pending  ID consult required at [facility].      Aerobic culture [[id number]]  (Abnormal) Collected: 08/31/20 1754    Order Status: Completed Specimen: Wound from Foot, Left Updated: 09/01/20 0922     Aerobic Bacterial Culture PRESUMPTIVE  PROTEUS SPECIES  Moderate  Identification and susceptibility pending      Gram stain [[id number]] Collected: 08/31/20 1754    Order Status: Completed Specimen: Wound from Foot, Left Updated: 08/31/20 2143     Gram Stain Result Rare WBC's      Moderate Gram negative rods      Moderate Gram positive cocci    Urine culture [[id number]]  (Abnormal) Collected: 08/30/20 1633    Order Status: Completed Specimen: Urine Updated: 08/31/20 2005     Urine Culture, Routine GRAM NEGATIVE FAVIAN  > 100,000 cfu/ml  Identification and susceptibility pending      Narrative:      Specimen Source->Urine    Culture, Anaerobe [[id number]] Collected: 08/31/20 1754    Order Status: Sent Specimen: Wound from Foot, Left Updated: 08/31/20 1928        Specimen (12h ago, onward)    None          Diagnostic Results:  I have reviewed all pertinent imaging results/findings within the past 24 hours.   Imaging Results          CT Head Without Contrast (Final result)  Result time 08/30/20 16:51:33    Final result by Giovany Hernandez MD (08/30/20 16:51:33)                 Impression:      No acute abnormality.  Follow-up/further evaluation as clinically indicated.    Chronic microvascular ischemic changes.      Electronically signed by: Giovany Hernandez MD  Date:    08/30/2020  Time:    16:51             Narrative:    EXAMINATION:  CT HEAD WITHOUT CONTRAST    CLINICAL HISTORY:  Syncope, recurrent;    TECHNIQUE:  Low dose axial CT images obtained throughout the head without intravenous contrast. Sagittal and coronal reconstructions were performed.    COMPARISON:  None.    FINDINGS:  Intracranial compartment:    Ventricles and sulci are normal in size for age without evidence of hydrocephalus. No extra-axial blood or fluid collections.    Mild generalized cerebral volume loss.  Scattered hypoattenuation of the supratentorial white matter which is nonspecific but likely represents chronic microvascular ischemic changes.  No parenchymal mass, hemorrhage,  edema or major vascular distribution infarct.    Skull/extracranial contents (limited evaluation): No fracture. Mastoid air cells and paranasal sinuses are essentially clear.  Congenital nonunion of C1.                               X-Ray Foot Complete Left (Final result)  Result time 08/30/20 15:58:35    Final result by Giovany Hernandez MD (08/30/20 15:58:35)                 Impression:      No evidence of fracture.      Electronically signed by: Giovany Hernandez MD  Date:    08/30/2020  Time:    15:58             Narrative:    EXAMINATION:  XR FOOT COMPLETE 3 VIEW LEFT    CLINICAL HISTORY:  .  Unspecified infectious disease    TECHNIQUE:  AP, lateral and oblique views of the left foot were performed.    COMPARISON:  None    FINDINGS:  No evidence of acute fracture or dislocation.  No osseous destructive process.  Spurring of the inferior calcaneus.  Atherosclerosis noted.  Soft tissue defect along the plantar aspect of the midfoot likely a laceration or wound.  No radiopaque foreign body.                               X-Ray Chest AP Portable (Final result)  Result time 08/30/20 16:02:41    Final result by Rigoberto Keen MD (08/30/20 16:02:41)                 Impression:      Findings suggesting mild pulmonary edema/CHF pattern without focal consolidation.  Interstitial pneumonia not excluded.      Electronically signed by: Rigoberto Keen MD  Date:    08/30/2020  Time:    16:02             Narrative:    EXAMINATION:  XR CHEST AP PORTABLE    CLINICAL HISTORY:  leukocytosis;    TECHNIQUE:  Single frontal view of the chest was performed.    COMPARISON:  Chest radiograph 05/22/2015    FINDINGS:  Monitoring leads overlie the chest.  Large body habitus.  Patient is slightly rotated.  Inferior-most aspect of the left costophrenic angle not included in field of view.    Cardiac silhouette is upper limits of normal in size with prominence of the central pulmonary vasculature and bilateral diffuse nonspecific interstitial  coarsening with a perihilar and basilar distribution suggesting pulmonary edema/CHF pattern with interstitial pneumonia not excluded.  There is calcific atherosclerosis and tortuosity of the thoracic aorta.  Hilar contours are within normal limits.  There is chronic nonspecific elevation of the right hemidiaphragm with right basilar platelike scarring versus atelectasis.  No large consolidation, pleural effusion or pneumothorax definitively seen allowing for limitations.  No acute osseous process seen.                              MRI left midfoot 9/1/2020  Narrative & Impression     EXAMINATION:  MRI FOOT (MIDFOOT) LEFT W W/O CONTRAST     CLINICAL HISTORY:  Osteomyelitis suspected, diabetic;     TECHNIQUE:  Multiplanar multi sequences images of the mid foot obtained before and after the administration of contrast, the patient received 10 cc of Gadavist IV contrast.     COMPARISON:  Correlation 08/30/2020 radiographs     FINDINGS:  There are 2 small areas of osteochondral defect anterolateral tibial plafond, one measures 11 x 12 mm and one measures 7 x 7 mm.     The talar dome is intact.     Trace of fluid in the tibiotalar joint.     There is significant nonspecific edema at the anterior talonavicular joint and sinus tarsi region, no cortical irregularity or definite osseous erosions.     There is a well define low T1 high T2 septated lesion tip of the fibula measuring 16 by 17 mm, well define possibly an intra osseous ganglion cyst.     There is nonspecific edema diffusely seen of the anterior calcaneus and mid talus.     The navicular, cuboid and cuneiforms appear intact.  The metatarsal bones are intact.     There is the thickening and tendinosis of the peroneus brevis tendon.  The peroneus longus tendon is intact.  There is a trace of fluid within the tendon sheath of  the flexor hallucis longus tendon, the tendon is intact.     The extensor tendons appear normal.     There is sick Cheo it can not subcutaneous  soft tissue edema dorsal aspect of the foot and lateral aspect of the foot.     No soft tissue or osseous abscess seen.     There is significant postcontrast enhancement at the anterior talocalcaneal joint, mid talus and anterior calcaneus.     Impression:     Intense inflammatory process at the mid/anterior talocalcaneal joint, possibly active degenerative change, early septic arthritis is consider less likely, not excluded.  Changes of neuropathic joint not excluded.     Small osteochondral defects at the anterior distal tibial plateau fall possibly degenerative.     Peroneus brevis tendinosis.     Cellulitis anterior and lateral aspect of the foot.     No definite abscess seen.     Well-defined, benign-appearing osseous lesion distal fibula most suggestive of an intra osseous ganglion cyst.     Consider short-term follow-up MRI examination to monitor evolution if clinically warranted.     Narrative & Impression     EXAMINATION:  US ARTERIAL LOWER EXTREMITY BILAT WITH GINA (XPD)     CLINICAL HISTORY:  non healing diabetic foor infection;     TECHNIQUE:  Bilateral lower extremity arterial duplex ultrasound examination performed. Multiple gray scale and color doppler images were obtained in addition to waveform analysis.     COMPARISON:  No relevant prior imaging for comparison.     FINDINGS:  Ankle brachial indices were not obtained.     The peak systolic velocities on the right are as follows, in centimeters/second:     Common femoral artery: 95     Deep femoral artery: 80     Superficial femoral artery, proximal: 167     Superficial femoral artery, mid portion: 71     Superficial femoral artery, distal: 114     Popliteal artery: 96     Posterior tibial artery: 50     Anterior tibial artery: 174     The peak systolic velocities on the left are as follows, in centimeters/second:     Common femoral artery: 90     Deep femoral artery: 101     Superficial femoral artery, proximal: 83     Superficial femoral artery, mid  portion: 72     Superficial femoral artery, distal: 105     Popliteal artery: 106     Posterior tibial artery: 35     Anterior tibial artery: 35     Right lower extremity demonstrates biphasic or triphasic waveforms from the common femoral artery to the distal popliteal artery.  The anterior tibial artery and posterior tibial artery demonstrate monophasic waveforms.     Left lower extremity demonstrates triphasic waveforms from the common femoral artery to the distal superficial femoral artery.  The popliteal artery, anterior tibial artery, and posterior tibial artery demonstrate monophasic waveforms.     Impression:     No hemodynamically significant stenosis demonstrated in the right or left lower extremity arterial system.     Monophasic waveforms in the right anterior tibial artery and posterior tibial artery, as well as the left popliteal artery, anterior tibial artery, and posterior tibial artery.     Electronically signed by resident: Fred Benavides  Date:                                            08/31/2020  Time:                                           17:34     Electronically signed by: Rigoberto Keen MD  Date:                                            08/31/2020  Time:                                           18:51            Clinical Findings:    9/1  Left plantar foot ulcer  Wound base: fibrogranular  Drainage: none  No probe to bone, no undermining, no erythema, no edema, no fluctuance no crepitus.          Assessment/Plan:     Diabetic ulcer of left foot associated with type 2 diabetes mellitus, with fat layer exposed  65 y.o M with PMHx of Type 2 DM, chronic hypoxemic respiratory failure on 2 L O2 via NC, chronic systolic heart failure who presented to the ED for recurrent falls.  Podiatry consulted for left foot ulcer.  In the ED, afebrile. WBC elevated at 21k with left shift. Xray left foot with no signs of soft tissue gas, no fx, no osteo, no foreign body.     Plan:  -Continue IV abx. Wound cx gram  stain +GPCs, GNRs. +Proteus. Blood cx 8/30, 8/31 +GPCs.   -MRI left foot with no signs of osteo or abscess. ID on board.  -Arterial studies with monophasic waveforms, no significant stenosis.  -No emergent surgical intervention from podiatry warranted at this time  -Continue local wound care. Orders placed  -Rest of care per primary  -Podiatry will continue to follow      Sepsis  Follow up ID reccs        Nita Faulkner DPM  [facility]  Podiatry PGY2  Pager: [phone]  Spectra:[phone]

## 2020-09-01 NOTE — NURSING
Pt arrived to MRI and transferred to MRI table without difficulty. MRI safe cardiac monitor and O2 monitor applied to pt. No acute distress noted. Will continue to monitor pt and to monitor vital signs during duration of exam.                                        HR                                 98                                    SpO2                                 99% 3 lpm                                    BP

## 2020-09-02 PROBLEM — R78.81 BACTEREMIA DUE TO METHICILLIN RESISTANT STAPHYLOCOCCUS AUREUS: Status: ACTIVE | Noted: 2020-08-30

## 2020-09-02 PROBLEM — B95.62 BACTEREMIA DUE TO METHICILLIN RESISTANT STAPHYLOCOCCUS AUREUS: Status: ACTIVE | Noted: 2020-08-30

## 2020-09-02 LAB
ANION GAP SERPL CALC-SCNC: 13 MMOL/L (ref 8–16)
BACTERIA BLD CULT: ABNORMAL
BASOPHILS # BLD AUTO: 0.07 K/UL (ref 0–0.2)
BASOPHILS NFR BLD: 0.4 % (ref 0–1.9)
BUN SERPL-MCNC: 43 MG/DL (ref 8–23)
CALCIUM SERPL-MCNC: 8.7 MG/DL (ref 8.7–10.5)
CHLORIDE SERPL-SCNC: 98 MMOL/L (ref 95–110)
CO2 SERPL-SCNC: 27 MMOL/L (ref 23–29)
CREAT SERPL-MCNC: 1.8 MG/DL (ref 0.5–1.4)
DIFFERENTIAL METHOD: ABNORMAL
EOSINOPHIL # BLD AUTO: 1 K/UL (ref 0–0.5)
EOSINOPHIL NFR BLD: 5.3 % (ref 0–8)
ERYTHROCYTE [DISTWIDTH] IN BLOOD BY AUTOMATED COUNT: 15.8 % (ref 11.5–14.5)
EST. GFR  (AFRICAN AMERICAN): 45.3 ML/MIN/1.73 M^2
EST. GFR  (NON AFRICAN AMERICAN): 39.2 ML/MIN/1.73 M^2
GLUCOSE SERPL-MCNC: 180 MG/DL (ref 70–110)
HCT VFR BLD AUTO: 32.6 % (ref 40–54)
HGB BLD-MCNC: 9.8 G/DL (ref 14–18)
IMM GRANULOCYTES # BLD AUTO: 0.27 K/UL (ref 0–0.04)
IMM GRANULOCYTES NFR BLD AUTO: 1.4 % (ref 0–0.5)
LYMPHOCYTES # BLD AUTO: 1.2 K/UL (ref 1–4.8)
LYMPHOCYTES NFR BLD: 6.1 % (ref 18–48)
MAGNESIUM SERPL-MCNC: 2 MG/DL (ref 1.6–2.6)
MCH RBC QN AUTO: 26.6 PG (ref 27–31)
MCHC RBC AUTO-ENTMCNC: 30.1 G/DL (ref 32–36)
MCV RBC AUTO: 89 FL (ref 82–98)
MONOCYTES # BLD AUTO: 1.4 K/UL (ref 0.3–1)
MONOCYTES NFR BLD: 7 % (ref 4–15)
NEUTROPHILS # BLD AUTO: 15.6 K/UL (ref 1.8–7.7)
NEUTROPHILS NFR BLD: 79.8 % (ref 38–73)
NRBC BLD-RTO: 0 /100 WBC
PHOSPHATE SERPL-MCNC: 3.1 MG/DL (ref 2.7–4.5)
PLATELET # BLD AUTO: 382 K/UL (ref 150–350)
PMV BLD AUTO: 11.4 FL (ref 9.2–12.9)
POCT GLUCOSE: 224 MG/DL (ref 70–110)
POCT GLUCOSE: 226 MG/DL (ref 70–110)
POCT GLUCOSE: 237 MG/DL (ref 70–110)
POCT GLUCOSE: 238 MG/DL (ref 70–110)
POCT GLUCOSE: 245 MG/DL (ref 70–110)
POTASSIUM SERPL-SCNC: 3.6 MMOL/L (ref 3.5–5.1)
RBC # BLD AUTO: 3.68 M/UL (ref 4.6–6.2)
SODIUM SERPL-SCNC: 138 MMOL/L (ref 136–145)
VANCOMYCIN TROUGH SERPL-MCNC: 26.5 UG/ML (ref 10–22)
WBC # BLD AUTO: 19.52 K/UL (ref 3.9–12.7)

## 2020-09-02 PROCEDURE — 63600175 PHARM REV CODE 636 W HCPCS: Mod: HCNC | Performed by: HOSPITALIST

## 2020-09-02 PROCEDURE — 97110 THERAPEUTIC EXERCISES: CPT | Mod: HCNC,CQ

## 2020-09-02 PROCEDURE — 97530 THERAPEUTIC ACTIVITIES: CPT | Mod: HCNC,CQ

## 2020-09-02 PROCEDURE — 36415 COLL VENOUS BLD VENIPUNCTURE: CPT | Mod: HCNC

## 2020-09-02 PROCEDURE — 11000001 HC ACUTE MED/SURG PRIVATE ROOM: Mod: HCNC

## 2020-09-02 PROCEDURE — 80048 BASIC METABOLIC PNL TOTAL CA: CPT | Mod: HCNC

## 2020-09-02 PROCEDURE — 25000003 PHARM REV CODE 250: Mod: HCNC | Performed by: HOSPITALIST

## 2020-09-02 PROCEDURE — 25000242 PHARM REV CODE 250 ALT 637 W/ HCPCS: Mod: HCNC | Performed by: HOSPITALIST

## 2020-09-02 PROCEDURE — 87077 CULTURE AEROBIC IDENTIFY: CPT | Mod: HCNC

## 2020-09-02 PROCEDURE — 99233 PR SUBSEQUENT HOSPITAL CARE,LEVL III: ICD-10-PCS | Mod: HCNC,,, | Performed by: INTERNAL MEDICINE

## 2020-09-02 PROCEDURE — 80202 ASSAY OF VANCOMYCIN: CPT | Mod: HCNC

## 2020-09-02 PROCEDURE — 94761 N-INVAS EAR/PLS OXIMETRY MLT: CPT | Mod: HCNC

## 2020-09-02 PROCEDURE — 63600175 PHARM REV CODE 636 W HCPCS: Mod: HCNC | Performed by: NURSE PRACTITIONER

## 2020-09-02 PROCEDURE — 87186 SC STD MICRODIL/AGAR DIL: CPT | Mod: HCNC

## 2020-09-02 PROCEDURE — 85025 COMPLETE CBC W/AUTO DIFF WBC: CPT | Mod: HCNC

## 2020-09-02 PROCEDURE — 83735 ASSAY OF MAGNESIUM: CPT | Mod: HCNC

## 2020-09-02 PROCEDURE — 99233 SBSQ HOSP IP/OBS HIGH 50: CPT | Mod: HCNC,,, | Performed by: INTERNAL MEDICINE

## 2020-09-02 PROCEDURE — 97112 NEUROMUSCULAR REEDUCATION: CPT | Mod: HCNC,CQ

## 2020-09-02 PROCEDURE — 27000221 HC OXYGEN, UP TO 24 HOURS: Mod: HCNC

## 2020-09-02 PROCEDURE — 84100 ASSAY OF PHOSPHORUS: CPT | Mod: HCNC

## 2020-09-02 PROCEDURE — 99232 SBSQ HOSP IP/OBS MODERATE 35: CPT | Mod: HCNC,,, | Performed by: HOSPITALIST

## 2020-09-02 PROCEDURE — 87040 BLOOD CULTURE FOR BACTERIA: CPT | Mod: HCNC

## 2020-09-02 PROCEDURE — 99232 PR SUBSEQUENT HOSPITAL CARE,LEVL II: ICD-10-PCS | Mod: HCNC,,, | Performed by: HOSPITALIST

## 2020-09-02 RX ORDER — KETOROLAC TROMETHAMINE 15 MG/ML
15 INJECTION, SOLUTION INTRAMUSCULAR; INTRAVENOUS EVERY 6 HOURS PRN
Status: DISPENSED | OUTPATIENT
Start: 2020-09-02 | End: 2020-09-03

## 2020-09-02 RX ORDER — INSULIN ASPART 100 [IU]/ML
14 INJECTION, SOLUTION INTRAVENOUS; SUBCUTANEOUS
Status: DISCONTINUED | OUTPATIENT
Start: 2020-09-02 | End: 2020-09-09

## 2020-09-02 RX ADMIN — CIPROFLOXACIN 500 MG: 500 TABLET, FILM COATED ORAL at 09:09

## 2020-09-02 RX ADMIN — LISINOPRIL 40 MG: 20 TABLET ORAL at 08:09

## 2020-09-02 RX ADMIN — INSULIN ASPART 1 UNITS: 100 INJECTION, SOLUTION INTRAVENOUS; SUBCUTANEOUS at 10:09

## 2020-09-02 RX ADMIN — INSULIN ASPART 2 UNITS: 100 INJECTION, SOLUTION INTRAVENOUS; SUBCUTANEOUS at 08:09

## 2020-09-02 RX ADMIN — INSULIN ASPART 2 UNITS: 100 INJECTION, SOLUTION INTRAVENOUS; SUBCUTANEOUS at 05:09

## 2020-09-02 RX ADMIN — INSULIN ASPART 12 UNITS: 100 INJECTION, SOLUTION INTRAVENOUS; SUBCUTANEOUS at 08:09

## 2020-09-02 RX ADMIN — KETOROLAC TROMETHAMINE 15 MG: 30 INJECTION, SOLUTION INTRAMUSCULAR at 01:09

## 2020-09-02 RX ADMIN — INSULIN ASPART 14 UNITS: 100 INJECTION, SOLUTION INTRAVENOUS; SUBCUTANEOUS at 11:09

## 2020-09-02 RX ADMIN — INSULIN ASPART 1 UNITS: 100 INJECTION, SOLUTION INTRAVENOUS; SUBCUTANEOUS at 01:09

## 2020-09-02 RX ADMIN — INSULIN ASPART 2 UNITS: 100 INJECTION, SOLUTION INTRAVENOUS; SUBCUTANEOUS at 11:09

## 2020-09-02 RX ADMIN — ENOXAPARIN SODIUM 40 MG: 40 INJECTION SUBCUTANEOUS at 05:09

## 2020-09-02 RX ADMIN — INSULIN DETEMIR 29 UNITS: 100 INJECTION, SOLUTION SUBCUTANEOUS at 10:09

## 2020-09-02 RX ADMIN — FUROSEMIDE 80 MG: 40 TABLET ORAL at 08:09

## 2020-09-02 RX ADMIN — FLUTICASONE PROPIONATE 50 MCG: 50 SPRAY, METERED NASAL at 08:09

## 2020-09-02 RX ADMIN — INSULIN ASPART 14 UNITS: 100 INJECTION, SOLUTION INTRAVENOUS; SUBCUTANEOUS at 05:09

## 2020-09-02 RX ADMIN — CARVEDILOL 25 MG: 25 TABLET, FILM COATED ORAL at 09:09

## 2020-09-02 RX ADMIN — VANCOMYCIN HYDROCHLORIDE 1000 MG: 1 INJECTION, POWDER, LYOPHILIZED, FOR SOLUTION INTRAVENOUS at 05:09

## 2020-09-02 RX ADMIN — CHOLECALCIFEROL (VITAMIN D3) 25 MCG (1,000 UNIT) TABLET 1000 UNITS: at 08:09

## 2020-09-02 RX ADMIN — CARVEDILOL 25 MG: 25 TABLET, FILM COATED ORAL at 08:09

## 2020-09-02 RX ADMIN — FLUTICASONE FUROATE AND VILANTEROL TRIFENATATE 1 PUFF: 200; 25 POWDER RESPIRATORY (INHALATION) at 01:09

## 2020-09-02 RX ADMIN — ASPIRIN 81 MG: 81 TABLET, COATED ORAL at 08:09

## 2020-09-02 RX ADMIN — ATORVASTATIN CALCIUM 40 MG: 20 TABLET, FILM COATED ORAL at 08:09

## 2020-09-02 RX ADMIN — LEVOTHYROXINE SODIUM 75 MCG: 25 TABLET ORAL at 05:09

## 2020-09-02 RX ADMIN — CIPROFLOXACIN 500 MG: 500 TABLET, FILM COATED ORAL at 08:09

## 2020-09-02 RX ADMIN — FUROSEMIDE 80 MG: 40 TABLET ORAL at 05:09

## 2020-09-02 RX ADMIN — KETOROLAC TROMETHAMINE 15 MG: 15 INJECTION, SOLUTION INTRAMUSCULAR; INTRAVENOUS at 10:09

## 2020-09-02 NOTE — PLAN OF CARE
09/02/20 0827   Post-Acute Status   Post-Acute Authorization Placement   Post-Acute Placement Status Patient List Provided     Pt to consider facilities in his zip code area and list is with Pt.

## 2020-09-02 NOTE — PROGRESS NOTES
[facility]  Infectious Disease  Progress Note    Patient Name: Ed Patton  MRN: [id number]  Admission Date: 8/30/2020  Length of Stay: 3 days  Attending Physician: Jacqueline Curran MD  Primary Care Provider: Qiana Chow MD    Isolation Status: Contact  Assessment/Plan:      * Diabetic foot infection  A: 64 yo M with left plantar foot wound. Still tachycardic, still on 3.5 L/min oxygen, BUN/Cr worsened to 43/1.8. Vascular study showed left PT APSV 35 cm/s and right AT APSV 35 cm/s, both monophasic. MRI shows talocalcaneal joint inflammatory process (degenerative favored over septic process), anterolateral foot cellulitis, no definite abscess. Persistent MRSA bacteremia, wound growing Proteus and Staph aureus (susceptibility pending), urine growing GNRs. Vancomycin trough 17.8 yesterday. TTE negative for signs of cardiac infection.     P:  -Continue IV vancomycin and PO ciprofloxacin. Maintain vanc trough in 15-20 range.   -Followup on wound culture susceptibilities.  -Followup on LUKAS findings.   -Check ESR, CRP, rheumatoid factor. Orders entered.  -Will continue to follow.           Thank you for your consult. I will follow-up with patient. Please contact us if you have any additional questions.    Mk Cook MD  Infectious Disease  [facility]    Subjective:     Principal Problem:Diabetic foot infection    HPI: 64 yo M with Hx of DM2 (on insulin), chronic hypoxemic respiratory failure (on O2 at home), chronic systolic heart failure presented to ED after recurrent falls 7 and 8 days ago. He has a history of falls but reports that his tendency to fall has worsened recently, resulting in injury to his lower back and right thigh. After his fall last Tuesday he looked at his left foot and noticed a wound, denies pain or drainage related to the wound, he admits to not regularly checking his feet and does not know how long the wound has been present. He believes  the wound is related to a tack that he previously found imbedded in the bottom of a slipper. He had previously seen Ang Lugo DPM for diabetic foot care but has not seen her this year due to fear of the coronavirus pandemic. He has been getting home health services including physical therapy. No subjective change as of today, still denies pain and drainage related to the wound. Denies F/C/N/V.  Interval History: NAEON. Patient still tachycardic. WBCs from 20.95 yesterday to 19.52 today. Persistent MRSA bacteremia. TTE showed no vegetations or abscesses, LUKAS ordered. Wound culture now growing Staph aureus in addition to Proteus, susceptibility pending.     Review of Systems   Constitutional: Negative for chills and fever.   HENT: Negative for congestion, ear pain, sneezing and sore throat.    Eyes: Negative for pain.   Respiratory: Negative for cough and shortness of breath.    Cardiovascular: Negative for chest pain.   Gastrointestinal: Negative for abdominal pain, constipation, diarrhea, nausea and vomiting.   Endocrine: Negative for polyuria.   Genitourinary: Negative for decreased urine volume, difficulty urinating, dysuria and flank pain.   Musculoskeletal: Positive for back pain and gait problem. Negative for neck pain and neck stiffness.   Skin: Positive for wound. Negative for rash.   Neurological: Positive for numbness. Negative for headaches.   Psychiatric/Behavioral: Negative for confusion. The patient is not nervous/anxious.      Objective:     Vital Signs (Most Recent):  Temp: 97.5 °F (36.4 °C) (09/02/20 1155)  Pulse: 78 (09/02/20 1334)  Resp: 18 (09/02/20 1334)  BP: 124/64 (09/02/20 1203)  SpO2: 95 % (09/02/20 1334) Vital Signs (24h Range):  Temp:  [97.5 °F (36.4 °C)-99 °F (37.2 °C)] 97.5 °F (36.4 °C)  Pulse:  [] 78  Resp:  [16-18] 18  SpO2:  [95 %-100 %] 95 %  BP: (107-135)/(64-82) 124/64     Weight: 120.3 kg (265 lb 3.4 oz)  Body mass index is 32.28 kg/m².    Estimated Creatinine Clearance:  59.5 mL/min (A) (based on SCr of 1.8 mg/dL (H)).    Physical Exam  Vitals signs reviewed.   Constitutional:       General: He is not in acute distress.     Appearance: Normal appearance. He is normal weight. He is not diaphoretic.   HENT:      Head: Normocephalic and atraumatic.      Right Ear: External ear normal.      Left Ear: External ear normal.      Nose: Nose normal. No congestion or rhinorrhea.   Eyes:      General:         Right eye: No discharge.         Left eye: No discharge.      Extraocular Movements: Extraocular movements intact.   Neck:      Musculoskeletal: Normal range of motion and neck supple. No neck rigidity or muscular tenderness.   Cardiovascular:      Rate and Rhythm: Regular rhythm. Tachycardia present.      Heart sounds: No murmur.      Comments: Diminished DP/PT pulses  Pulmonary:      Effort: Pulmonary effort is normal. No respiratory distress.      Breath sounds: No wheezing.      Comments: On 3.5 L/m oxygen  Chest:      Chest wall: No tenderness.   Abdominal:      General: There is no distension.      Palpations: Abdomen is soft.      Tenderness: There is no abdominal tenderness. There is no guarding.   Musculoskeletal: Normal range of motion.         General: Tenderness (right anterior thigh, lower back) present.   Skin:     General: Skin is warm and dry.      Findings: Lesion (left plantar foot, 4.0x1.5x0.3 cm, no drainage at time of exam, no probe to bone or undermining, not tender) present.   Neurological:      Mental Status: He is alert and oriented to person, place, and time.      Sensory: Sensory deficit present.      Gait: Gait abnormal.   Psychiatric:         Mood and Affect: Mood normal.         Behavior: Behavior normal.         Thought Content: Thought content normal.         Judgment: Judgment normal.         Significant Labs:   Blood Culture:   Recent Labs   Lab 08/30/20  1532 08/30/20  1543 08/31/20  1324 08/31/20  1328 09/01/20  1417   LABBLOO Gram stain aer bottle:  Gram positive cocci in clusters resembling Staph   Gram stain oksana bottle: Gram positive cocci in clusters resembling Staph   Results called to and read back by: Elsy Pena RN  08/31/2020  12:35  METHICILLIN RESISTANT STAPHYLOCOCCUS AUREUS  ID consult required at [facility].  * Gram stain aer bottle: Gram positive cocci in clusters resembling Staph   Gram stain oksana bottle: Gram positive cocci in clusters resembling Staph   Results called to and read back by: Elsy Pena RN 08/31/2020  12:35  METHICILLIN RESISTANT STAPHYLOCOCCUS AUREUS  ID consult required at [facility].  For susceptibility see order # [id number]  * Gram stain aer bottle: Gram positive cocci   Gram stain oksana bottle: Gram positive cocci   Results called to and read back by: Elsy Pena  09/01/2020  11:46  METHICILLIN RESISTANT STAPHYLOCOCCUS AUREUS  ID consult required at [facility].  For susceptibility see order #[id number]  * Gram stain oksana bottle: Gram positive cocci   Results called to and read back by: Elsy Pena  09/01/2020  11:46  Gram stain aer bottle: Gram positive cocci in clusters resembling Staph   Positive results previously called  METHICILLIN RESISTANT STAPHYLOCOCCUS AUREUS  ID consult required at [facility].  For susceptibility see order #[id number]  * Gram stain aer bottle: Gram positive cocci   Results called to and read back by: Tuesday Christi   09/02/2020  12:06     CBC:   Recent Labs   Lab 09/01/20  0510 09/02/20  0352   WBC 20.95* 19.52*   HGB 10.1* 9.8*   HCT 33.2* 32.6*    382*     CMP:   Recent Labs   Lab 09/01/20  0510 09/02/20  0352    138   K 3.3* 3.6   CL 98 98   CO2 29 27   * 180*   BUN 33* 43*   CREATININE 1.6* 1.8*   CALCIUM 8.8 8.7   ANIONGAP 11 13   EGFRNONAA 45.2* 39.2*     Lactic Acid: No results for input(s): LACTATE in the last 48 hours.  Pathology  Results  (Last 10 years)    None        POCT Glucose:   Recent Labs   Lab 09/01/20  2120 09/02/20  0744 09/02/20  1153   POCTGLUCOSE 234* 238* 237*     Urine Culture:   Recent Labs   Lab 08/30/20  1633   LABURIN GRAM NEGATIVE FAVIAN  > 100,000 cfu/ml  Identification and susceptibility pending  *  KLEBSIELLA PNEUMONIAE  10,000 - 49,999 cfu/ml  *     Wound Culture:   Recent Labs   Lab 08/31/20  1754   LABAERO PROTEUS MIRABILIS  Moderate  *  STAPHYLOCOCCUS AUREUS  Moderate  Susceptibility pending  *       Significant Imaging  Transthoracic Echo 9/1:   Moderate decreased ejection fraction at 35%.  Normal left ventricular cavity size. Normal wall thickness observed. Abnormal septal motion. Atrial fibrillation observed. Global hypokinesis.   Right Ventricle Normal cavity size. The systolic function is moderately reduced.   Left Atrium There is moderate left atrial enlargement. Left atrial volume index is 46.5 mL/m2. Systolic blunting of pulmonary venous inflow.   Right Atrium There is normal right atrial size.   Aortic Valve The aortic valve appears structurally normal. There is normal leaflet mobility.   Mitral Valve The mitral valve appears structurally normal. There is normal leaflet mobility.   Tricuspid Valve The tricuspid valve appears structurally normal. Trace regurgitation. Pulmonary hypertension present. There is normal leaflet mobility. The estimated PA systolic pressure is 53 mmHg.   Pulmonic Valve The pulmonic valve was not well visualized.   IVC/SVC Intermediate central venous pressure (8 mm Hg).   Ascending Aorta The aortic root and ascending aorta are normal in size.   Pericardium No pericardial effusion.

## 2020-09-02 NOTE — SUBJECTIVE & OBJECTIVE
Interval History: NAEON. Patient still tachycardic. WBCs from 20.95 yesterday to 19.52 today. Persistent MRSA bacteremia. TTE showed no vegetations or abscesses, LUKAS ordered. Wound culture now growing Staph aureus in addition to Proteus, susceptibility pending.     Review of Systems   Constitutional: Negative for chills and fever.   HENT: Negative for congestion, ear pain, sneezing and sore throat.    Eyes: Negative for pain.   Respiratory: Negative for cough and shortness of breath.    Cardiovascular: Negative for chest pain.   Gastrointestinal: Negative for abdominal pain, constipation, diarrhea, nausea and vomiting.   Endocrine: Negative for polyuria.   Genitourinary: Negative for decreased urine volume, difficulty urinating, dysuria and flank pain.   Musculoskeletal: Positive for back pain and gait problem. Negative for neck pain and neck stiffness.   Skin: Positive for wound. Negative for rash.   Neurological: Positive for numbness. Negative for headaches.   Psychiatric/Behavioral: Negative for confusion. The patient is not nervous/anxious.      Objective:     Vital Signs (Most Recent):  Temp: 97.5 °F (36.4 °C) (09/02/20 1155)  Pulse: 78 (09/02/20 1334)  Resp: 18 (09/02/20 1334)  BP: 124/64 (09/02/20 1203)  SpO2: 95 % (09/02/20 1334) Vital Signs (24h Range):  Temp:  [97.5 °F (36.4 °C)-99 °F (37.2 °C)] 97.5 °F (36.4 °C)  Pulse:  [] 78  Resp:  [16-18] 18  SpO2:  [95 %-100 %] 95 %  BP: (107-135)/(64-82) 124/64     Weight: 120.3 kg (265 lb 3.4 oz)  Body mass index is 32.28 kg/m².    Estimated Creatinine Clearance: 59.5 mL/min (A) (based on SCr of 1.8 mg/dL (H)).    Physical Exam  Vitals signs reviewed.   Constitutional:       General: He is not in acute distress.     Appearance: Normal appearance. He is normal weight. He is not diaphoretic.   HENT:      Head: Normocephalic and atraumatic.      Right Ear: External ear normal.      Left Ear: External ear normal.      Nose: Nose normal. No congestion or  rhinorrhea.   Eyes:      General:         Right eye: No discharge.         Left eye: No discharge.      Extraocular Movements: Extraocular movements intact.   Neck:      Musculoskeletal: Normal range of motion and neck supple. No neck rigidity or muscular tenderness.   Cardiovascular:      Rate and Rhythm: Regular rhythm. Tachycardia present.      Heart sounds: No murmur.      Comments: Diminished DP/PT pulses  Pulmonary:      Effort: Pulmonary effort is normal. No respiratory distress.      Breath sounds: No wheezing.      Comments: On 3.5 L/m oxygen  Chest:      Chest wall: No tenderness.   Abdominal:      General: There is no distension.      Palpations: Abdomen is soft.      Tenderness: There is no abdominal tenderness. There is no guarding.   Musculoskeletal: Normal range of motion.         General: Tenderness (right anterior thigh, lower back) present.   Skin:     General: Skin is warm and dry.      Findings: Lesion (left plantar foot, 4.0x1.5x0.3 cm, no drainage at time of exam, no probe to bone or undermining, not tender) present.   Neurological:      Mental Status: He is alert and oriented to person, place, and time.      Sensory: Sensory deficit present.      Gait: Gait abnormal.   Psychiatric:         Mood and Affect: Mood normal.         Behavior: Behavior normal.         Thought Content: Thought content normal.         Judgment: Judgment normal.         Significant Labs:   Blood Culture:   Recent Labs   Lab 08/30/20  1532 08/30/20  1543 08/31/20  1324 08/31/20  1328 09/01/20  1417   LABBLOO Gram stain aer bottle: Gram positive cocci in clusters resembling Staph   Gram stain oksana bottle: Gram positive cocci in clusters resembling Staph   Results called to and read back by: Elsy Pena RN  08/31/2020  12:35  METHICILLIN RESISTANT STAPHYLOCOCCUS AUREUS  ID consult required at [facility] and [place] locations.  * Gram stain aer bottle: Gram positive cocci in clusters resembling Staph   Gram  stain oksana bottle: Gram positive cocci in clusters resembling Staph   Results called to and read back by: Elsy Pena RN 08/31/2020  12:35  METHICILLIN RESISTANT STAPHYLOCOCCUS AUREUS  ID consult required at [facility].  For susceptibility see order # [id number]  * Gram stain aer bottle: Gram positive cocci   Gram stain oksana bottle: Gram positive cocci   Results called to and read back by: Elsy Pena  09/01/2020  11:46  METHICILLIN RESISTANT STAPHYLOCOCCUS AUREUS  ID consult required at [facility].  For susceptibility see order #[id number]  * Gram stain oksana bottle: Gram positive cocci   Results called to and read back by: Elsy Pena  09/01/2020  11:46  Gram stain aer bottle: Gram positive cocci in clusters resembling Staph   Positive results previously called  METHICILLIN RESISTANT STAPHYLOCOCCUS AUREUS  ID consult required at [facility].  For susceptibility see order #[id number]  * Gram stain aer bottle: Gram positive cocci   Results called to and read back by: Jolene Barraza   09/02/2020  12:06     CBC:   Recent Labs   Lab 09/01/20  0510 09/02/20  0352   WBC 20.95* 19.52*   HGB 10.1* 9.8*   HCT 33.2* 32.6*    382*     CMP:   Recent Labs   Lab 09/01/20  0510 09/02/20  0352    138   K 3.3* 3.6   CL 98 98   CO2 29 27   * 180*   BUN 33* 43*   CREATININE 1.6* 1.8*   CALCIUM 8.8 8.7   ANIONGAP 11 13   EGFRNONAA 45.2* 39.2*     Lactic Acid: No results for input(s): LACTATE in the last 48 hours.  Pathology Results  (Last 10 years)    None        POCT Glucose:   Recent Labs   Lab 09/01/20  2120 09/02/20  0744 09/02/20  1153   POCTGLUCOSE 234* 238* 237*     Urine Culture:   Recent Labs   Lab 08/30/20  1633   LABURIN GRAM NEGATIVE FAVIAN  > 100,000 cfu/ml  Identification and susceptibility pending  *  KLEBSIELLA PNEUMONIAE  10,000 - 49,999 cfu/ml  *     Wound Culture:   Recent Labs   Lab 08/31/20  4824    LABAERO PROTEUS MIRABILIS  Moderate  *  STAPHYLOCOCCUS AUREUS  Moderate  Susceptibility pending  *       Significant Imaging  Transthoracic Echo 9/1:   Moderate decreased ejection fraction at 35%.  Normal left ventricular cavity size. Normal wall thickness observed. Abnormal septal motion. Atrial fibrillation observed. Global hypokinesis.   Right Ventricle Normal cavity size. The systolic function is moderately reduced.   Left Atrium There is moderate left atrial enlargement. Left atrial volume index is 46.5 mL/m2. Systolic blunting of pulmonary venous inflow.   Right Atrium There is normal right atrial size.   Aortic Valve The aortic valve appears structurally normal. There is normal leaflet mobility.   Mitral Valve The mitral valve appears structurally normal. There is normal leaflet mobility.   Tricuspid Valve The tricuspid valve appears structurally normal. Trace regurgitation. Pulmonary hypertension present. There is normal leaflet mobility. The estimated PA systolic pressure is 53 mmHg.   Pulmonic Valve The pulmonic valve was not well visualized.   IVC/SVC Intermediate central venous pressure (8 mm Hg).   Ascending Aorta The aortic root and ascending aorta are normal in size.   Pericardium No pericardial effusion.

## 2020-09-02 NOTE — PT/OT/SLP PROGRESS
"Physical Therapy Treatment    Patient Name:  Ed Patton   MRN:  [id number]    Recommendations:     Discharge Recommendations:  nursing facility, skilled   Discharge Equipment Recommendations: bedside commode, bath bench, walker, rolling   Barriers to discharge: Inaccessible home and Decreased caregiver support    Assessment:     Ed Patton is a 65 y.o. male admitted with a medical diagnosis of Diabetic foot infection.  He presents with the following impairments/functional limitations:  weakness, impaired endurance, impaired self care skills, impaired functional mobilty, gait instability, impaired balance, decreased lower extremity function, decreased safety awareness, pain, impaired cardiopulmonary response to activity Patient tolerated treatment well focusing on bed mobility, therex and sitting balance/tolerance. Patient will continue to improve with skilled physical therapy services in order to return to functional baseline.    Rehab Prognosis: Good; patient would benefit from acute skilled PT services to address these deficits and reach maximum level of function.    Recent Surgery: * No surgery found *      Plan:     During this hospitalization, patient to be seen 4 x/week to address the identified rehab impairments via gait training, therapeutic activities, therapeutic exercises, neuromuscular re-education and progress toward the following goals:    · Plan of Care Expires:  10/01/20    Subjective     Chief Complaint: back pain  Patient/Family Comments/goals: "I'll do the standing tomorrow"  Pain/Comfort:  · Pain Rating 1: other (see comments)(not rated)  · Location - Side 1: Bilateral  · Location - Orientation 1: lower  · Location 1: back  · Pain Addressed 1: Reposition, Distraction, Cessation of Activity  · Pain Rating Post-Intervention 1: other (see comments)(not rated)      Objective:     Communicated with nursing prior to session.  Patient found HOB elevated with oxygen, pulse ox (continuous), " telemetry upon PT entry to room.     General Precautions: Standard, fall   Orthopedic Precautions:LLE non weight bearing   Braces:       Functional Mobility:  · Bed Mobility:     · Rolling Left:  stand by assistance  · Rolling Right: stand by assistance  · Scooting: stand by assistance  · Bridging: stand by assistance  · Supine to Sit: moderate assistance  · Sit to Supine: minimum assistance  · Transfers: pt refused on this date due to increased pain/fatigue  · Balance: seated EOB Min/CGA for posterior lean, dynamic sitting while reaching out side base of support R and L UE 10xeach      AM-PAC 6 CLICK MOBILITY  Turning over in bed (including adjusting bedclothes, sheets and blankets)?: 4  Sitting down on and standing up from a chair with arms (e.g., wheelchair, bedside commode, etc.): 2  Moving from lying on back to sitting on the side of the bed?: 3  Moving to and from a bed to a chair (including a wheelchair)?: 2  Need to walk in hospital room?: 2  Climbing 3-5 steps with a railing?: 1  Basic Mobility Total Score: 14       Therapeutic Activities and Exercises:  weight shift ant/post, R/L lateral onto elbows Min A to return to midline, vcs for upright posture and to correct posterior lean pt c/o increased back pain  Patient educated on importance of increased time out of bed and OLGA throughout the day    Patient left HOB elevated with call button in reach and nurse notified.    GOALS:   Multidisciplinary Problems     Physical Therapy Goals        Problem: Physical Therapy Goal    Goal Priority Disciplines Outcome Goal Variances Interventions   Physical Therapy Goal     PT, PT/OT Ongoing, Progressing     Description: Goals to be met by: 9/15/20    Patient will increase functional independence with mobility by performin. Supine to sit with Stand-by Assistance  2. Sit to supine with Stand-by Assistance  3. Sit to stand transfer with Stand-by Assistance  4. Gait  x 50 feet with Stand-by Assistance using Rolling  Walker and maintenance of weight bearing status.  5.  Patient will demonstrate independence with a home exercise program  6. Patient's balance will be GOOD: Needs SUPERVISION only during gait and able to self right with moderate LOB.                   Time Tracking:     PT Received On: 09/02/20  PT Start Time: 1337     PT Stop Time: 1415  PT Total Time (min): 38 min     Billable Minutes: Therapeutic Activity 13, Therapeutic Exercise 10 and Neuromuscular Re-education 15    Treatment Type: Treatment  PT/PTA: PTA     PTA Visit Number: 1     Blaire Lopez, PTA  09/02/2020

## 2020-09-02 NOTE — ASSESSMENT & PLAN NOTE
A: 66 yo M with left plantar foot wound. Still tachycardic, still on 3.5 L/min oxygen, BUN/Cr worsened to 43/1.8. Vascular study showed left PT APSV 35 cm/s and right AT APSV 35 cm/s, both monophasic. MRI shows talocalcaneal joint inflammatory process (degenerative favored over septic process), anterolateral foot cellulitis, no definite abscess. Persistent MRSA bacteremia, wound growing Proteus and Staph aureus (susceptibility pending), urine growing GNRs. Vancomycin trough 17.8 yesterday. TTE negative for signs of cardiac infection.     P:  -Continue IV vancomycin and PO ciprofloxacin. Maintain vanc trough in 15-20 range.   -Followup on wound culture susceptibilities.  -Followup on LUKAS findings.   -Check ESR, CRP, rheumatoid factor. Orders entered.  -Will continue to follow.

## 2020-09-02 NOTE — PROGRESS NOTES
Lab called, pt with positive blood culture, gram positive cocci drawn sept 1 from right hand, paged med team with a report

## 2020-09-02 NOTE — PROGRESS NOTES
Progress Note  Hospital Medicine      Admit Date: 8/30/2020   [facility] [place] [facility]    SUBJECTIVE:     Follow-up For:  Diabetic foot infection     Interval history/ROS: No acute events overnight. Foot looks improved.     HPI: 65-year-old male with type 2 diabetes on insulin, chronic hypoxemic respiratory failure on 2 L oxygen via nasal cannula, and chronic systolic heart failure who presented to the ED for recurrent falls.  He reports a few episodes where his legs seem to just give out under him and other times where he has to crawl around the house over the past week.  At some point prior to this he found a tack imbedded in the bottom of his slipper which had punctured the sole of his left foot.  His left foot has become more swollen recently and he has had occasional twinges of pain in both of his legs.  He denies any fevers or chills, or any other symptoms out of the ordinary.  He denies any change in his respiratory status.  He says that he has been afraid to leave his house on account of the Coronavirus pandemic.  He has been getting home health services including physical therapy has been walking to his mailbox and back for exercise.    Review of Systems   Constitutional: Negative for activity change, chills and fever.   HENT: Negative for congestion, nosebleeds, rhinorrhea and sore throat.    Eyes: Negative for photophobia and visual disturbance.   Respiratory: Negative for cough, chest tightness and shortness of breath.    Cardiovascular: Positive for leg swelling. Negative for chest pain.   Gastrointestinal: Negative for abdominal pain, nausea and vomiting.   Endocrine: Negative for polydipsia and polyuria.   Genitourinary: Negative for dysuria and hematuria.   Musculoskeletal: Positive for joint swelling. Negative for arthralgias and myalgias.   Skin: Negative for color change and rash.   Neurological: Positive for weakness. Negative for dizziness, syncope and light-headedness.        OBJECTIVE:     Body mass  index is 32.28 kg/m².    Vital Signs Range (Last 24H):  Temp:  [97.5 °F (36.4 °C)-99 °F (37.2 °C)]   Pulse:  []   Resp:  [16-18]   BP: (107-135)/(64-82)   SpO2:  [95 %-100 %]     I & O (Last 24H):    Intake/Output Summary (Last 24 hours) at 9/2/2020 1428  Last data filed at 9/2/2020 0100  Gross per 24 hour   Intake 120 ml   Output 440 ml   Net -320 ml        Physical Exam:  Vitals signs and nursing note reviewed.   Constitutional:       General: He is not in acute distress.     Appearance: He is normal weight. He is not toxic-appearing.      Interventions: Nasal cannula in place.   HENT:      Head: Normocephalic and atraumatic.      Nose: Nose normal.      Mouth/Throat:      Comments: Wearing face mask below the nose to accommodate nasal cannula  Eyes:      General: No scleral icterus.     Extraocular Movements: Extraocular movements intact.      Conjunctiva/sclera: Conjunctivae normal.      Pupils: Pupils are equal, round, and reactive to light.   Neck:      Musculoskeletal: Normal range of motion and neck supple.   Cardiovascular:      Rate and Rhythm: Normal rate and regular rhythm.      Pulses: Normal pulses.      Heart sounds: Normal heart sounds. No murmur. No gallop.    Pulmonary:      Effort: Pulmonary effort is normal.      Breath sounds: Normal breath sounds.   Abdominal:      General: Bowel sounds are normal. There is no distension.      Palpations: Abdomen is soft.      Tenderness: There is no abdominal tenderness. There is no guarding.   Musculoskeletal:         General: Swelling (left ankle) present.   Feet:      Right foot:      Skin integrity: Callus and dry skin present.      Toenail Condition: Right toenails are abnormally thick. Fungal disease present.     Left foot:      Skin integrity: Ulcer (plantar surface), warmth, callus and dry skin present.      Toenail Condition: Left toenails are abnormally thick. Fungal disease present.     Comments: Extensive onychomycosis all toenails.  Left  "foot appreciably warmer than right.  Foot ulcer dorsum left foot from presumed puncture site, with subcutaneous pyoderma tracking from ulcer around lateral side of foot superiorly about 2 inches.  There is additionally an erosion at the distal tip of the left great toe.  Lymphadenopathy:      Cervical: No cervical adenopathy.   Skin:     General: Skin is warm and dry.      Comments: Dry skin of feet and other findings as described under "feet"   Neurological:      Mental Status: He is alert and oriented to person, place, and time.      Cranial Nerves: No cranial nerve deficit.   Psychiatric:         Behavior: Behavior is cooperative.           Recent Labs   Lab 08/31/20  0434 09/01/20  0510 09/02/20  0352    138 138   K 3.1* 3.3* 3.6   CL 99 98 98   CO2 28 29 27   BUN 22 33* 43*   CREATININE 1.4 1.6* 1.8*   * 243* 180*   CALCIUM 8.8 8.8 8.7   MG 1.8 2.0 2.0   PHOS 4.1 3.6 3.1     Recent Labs   Lab 08/30/20  1448   ALKPHOS 100   ALT 23   AST 27   ALBUMIN 2.8*   PROT 8.8*   BILITOT 0.9       Recent Labs   Lab 08/31/20  0434 09/01/20  0510 09/02/20  0352   WBC 20.60* 20.95* 19.52*   HGB 10.5* 10.1* 9.8*   HCT 34.5* 33.2* 32.6*   * 326 382*   GRAN 83.2*  17.2* 81.1*  17.0* 79.8*  15.6*   LYMPH 5.9*  1.2 6.9*  1.5 6.1*  1.2   MONO 7.6  1.6* 6.6  1.4* 7.0  1.4*       Recent Labs   Lab 09/01/20  0745 09/01/20  1154 09/01/20  1533 09/01/20  2120 09/02/20  0744 09/02/20  1153   POCTGLUCOSE 268* 288* 224* 234* 238* 237*       Recent Labs   Lab 08/30/20  1448   TROPONINI 0.029*       Diagnostic Results:  Labs: Reviewed    aspirin, 81 mg, Oral, Daily  atorvastatin, 40 mg, Oral, Daily  carvediloL, 25 mg, Oral, BID  ciprofloxacin HCl, 500 mg, Oral, Q12H  enoxaparin, 40 mg, Subcutaneous, Q24H  fluticasone furoate-vilanteroL, 1 puff, Inhalation, Daily  fluticasone propionate, 1 spray, Each Nostril, Daily  furosemide, 80 mg, Oral, BID  insulin aspart U-100, 14 Units, Subcutaneous, TIDWM  insulin detemir " U-100, 29 Units, Subcutaneous, QHS  levothyroxine, 75 mcg, Oral, Before breakfast  lisinopriL, 40 mg, Oral, Daily  vitamin D, 1,000 Units, Oral, Daily        As Needed acetaminophen, albuterol-ipratropium, dextrose 50%, dextrose 50%, gabapentin, glucagon (human recombinant), glucose, glucose, insulin aspart U-100, ketorolac, melatonin, ondansetron, polyethylene glycol, sodium chloride 0.9%, DIPH,PERTUS (ADACEL),TETANUS PF VAC (ADULT), Pharmacy to dose Vancomycin consult **AND** vancomycin - pharmacy to dose    ASSESSMENT/PLAN:     Assessment: Ed Patton is a 65 y.o. male here with:     Active Hospital Problems    Diagnosis  POA    *Diabetic foot infection [E11.628, L08.9]  Yes    Diabetic ulcer of left foot associated with type 2 diabetes mellitus, with fat layer exposed [E11.621, L97.522]  Yes    Cellulitis of left lower extremity [L03.116]  Yes    Sepsis [A41.9]  Yes    Bacteremia due to methicillin resistant Staphylococcus aureus [R78.81]  Yes    COPD mixed type [J44.9]  Yes     Chronic    Postablative hypothyroidism [E89.0]  Yes     Chronic    Uncontrolled type 2 diabetes mellitus with hyperglycemia, with long-term current use of insulin [E11.65, Z79.4]  Not Applicable     Chronic     Novolin 70/30 40U in AM, 30U in PM. Elevated A1c      Chronic respiratory failure with hypoxia, on home oxygen therapy [J96.11, Z99.81]  Not Applicable     Chronic     Continuous O2, followed by Pulm      Diabetic polyneuropathy associated with type 2 diabetes mellitus [E11.42]  Yes     Chronic     Abnormal foot exam, gabapentin from Endo      CKD stage 3 due to type 2 diabetes mellitus [E11.22, N18.3]  Yes     Chronic     GFR> 60, uncontrolled DM      Chronic systolic congestive heart failure [I50.22]  Yes     Chronic     EF 35% in 2015 echo, improved in 5/2018. Patient with mixed ischemic and non-ischemic cardiomyopathy        Resolved Hospital Problems   No resolved problems to display.        Plan:    Diabetic  foot infection  Patient's diabetic peripheral neuropathy his impaired his awareness of injury to the plantar aspect of his foot as well as its worsening.   Initial puncture wound has progressed into a diabetic foot ulcer   Empirically treating with vancomycin IV and ciprofloxacin p.o.  consulted Podiatry for evaluation for local debridement if necessary as well as further wound care as indicated.  Arterial assessment of LE without stenosis  MRI without osteo        Sepsis  3/4 SIRS at presentation; no end-organ dysfunction.  Clinically improving with IV antibiotics and initial IV fluids given in emergency department.  Will continue to monitor.  Due to significant systolic heart failure will not give any further IV fluids unless clinically indicated.  Blood cultures G + cocci- MRSA resulted continue positive on 9/1  Consult infectious disease  2 D echo without vegetation  LUKAS ordered will need scheduled         Cellulitis of left lower extremity  Infection of puncture wound appears to have spread through the soft tissues of the foot resulting in significant swelling of the foot and ankle.  In addition to antibiotic therapy will keep left lower extremity elevated to assist in lymphatic drainage.        COPD mixed type  Will continue Breo inhaler plus DuoNebs p.r.n..        Chronic respiratory failure with hypoxia  On 2 L nasal cannula chronically due to combination of COPD and CHF; titrate as needed.        Uncontrolled type 2 diabetes mellitus with hyperglycemia, with long-term current use of insulin  Converting to Levemir 25 units daily and NovoLog 8 units with meals plus low-dose sliding scale.  Will check A1c for the morning.        Chronic systolic congestive heart failure  No signs of decompensation presently.  Will continue carvedilol, Lasix, lisinopril for now.  Will avoid IV fluids for now unless clinically needed for hemodynamic support in context of sepsis.     HIGH RISK CONDITION(S):  Patient has a  condition that poses threat to life and bodily function: bacteremia      Jacqueline Curran MD

## 2020-09-02 NOTE — PROGRESS NOTES
Pharmacokinetic Assessment Follow Up: IV Vancomycin    Vancomycin serum concentration assessment(s):     -Vancomycin trough was drawn appropriately, ~ 11 hrs from the previous dose, and can be used to guide therapy.  -A level of 17.8 mcg/mL is within therapeutic goal 15-20 mcg/mL for diabetic foot infection.  -Worsening Scr, 1.4 > 1.6 > 1.8 mg/dL. Will discontinue standing order of vancomycin.  -ke= 0.059 hr-1   -T 1/2= 11.74 hr       -GPCs in wound Cx, GNRs in UCx      Vancomycin Regimen Plan:     -Initiate pulse dosing in the setting of NANETTE.  -Obtain a trough prior to the fourth dose of this regimen, 9/2 @ 1630. ~ 12 hrs from the last dose this morning. Re-dose for levels < 20 mcg/mL.   -Continue to monitor renal function closely.     Drug levels (last 3 results):  Recent Labs   Lab Result Units 09/01/20  0510   Vancomycin-Trough ug/mL 17.8       Pharmacy will continue to follow and monitor vancomycin.    Please contact pharmacy at extension [phone] for questions regarding this assessment.    Thank you for the consult,   Shayan Bhakta       Patient brief summary:  Ed Patton is a 65 y.o. male initiated on antimicrobial therapy with IV Vancomycin for treatment of diabetic foot infection    Drug Allergies:   Review of patient's allergies indicates:   Allergen Reactions    Vicodin [hydrocodone-acetaminophen] Itching       Actual Body Weight:   120.3 kg    Renal Function:   Estimated Creatinine Clearance: 59.5 mL/min (A) (based on SCr of 1.8 mg/dL (H)).,     Dialysis Method (if applicable):  N/A    CBC (last 72 hours):  Recent Labs   Lab Result Units 08/30/20  1448 08/31/20  0434 09/01/20  0510 09/02/20  0352   WBC K/uL 21.43* 20.60* 20.95* 19.52*   Hemoglobin g/dL 11.4* 10.5* 10.1* 9.8*   Hemoglobin A1C %  --  9.5*  --   --    Hematocrit % 36.3* 34.5* 33.2* 32.6*   Platelets K/uL 392* 354* 326 382*   Gran% % 84.8* 83.2* 81.1* 79.8*   Lymph% % 5.2* 5.9* 6.9* 6.1*   Mono% % 7.5 7.6 6.6 7.0   Eosinophil% % 1.4 2.2  4.0 5.3   Basophil% % 0.4 0.3 0.3 0.4   Differential Method  Automated Automated Automated Automated       Metabolic Panel (last 72 hours):  Recent Labs   Lab Result Units 08/30/20  1448 08/30/20  1633 08/31/20  0434 09/01/20  0510 09/02/20  0352   Sodium mmol/L 139  --  140 138 138   Potassium mmol/L 3.3*  --  3.1* 3.3* 3.6   Chloride mmol/L 97  --  99 98 98   CO2 mmol/L 29  --  28 29 27   Glucose mg/dL 250*  --  212* 243* 180*   Glucose, UA   --  Negative  --   --   --    BUN, Bld mg/dL 24*  --  22 33* 43*   Creatinine mg/dL 1.5*  --  1.4 1.6* 1.8*   Albumin g/dL 2.8*  --   --   --   --    Total Bilirubin mg/dL 0.9  --   --   --   --    Alkaline Phosphatase U/L 100  --   --   --   --    AST U/L 27  --   --   --   --    ALT U/L 23  --   --   --   --    Magnesium mg/dL  --   --  1.8 2.0 2.0   Phosphorus mg/dL  --   --  4.1 3.6 3.1       Vancomycin Administrations:  vancomycin given in the last 96 hours                   vancomycin in dextrose 5 % 1 gram/250 mL IVPB 1,000 mg (mg) 1,000 mg New Bag 09/02/20 0533     1,000 mg New Bag 09/01/20 1839     1,000 mg New Bag  0540     1,000 mg New Bag 08/31/20 1825     1,000 mg New Bag  0508    vancomycin 1.75 g in 5 % dextrose 500 mL IVPB (mg) 1,750 mg New Bag 08/30/20 1655                Microbiologic Results:  Microbiology Results (last 7 days)     Procedure Component Value Units Date/Time    Blood culture #2 **CANNOT BE ORDERED STAT** [[id number]]  (Abnormal) Collected: 08/30/20 9897    Order Status: Completed Specimen: Blood from Peripheral, Antecubital, Left Updated: 09/02/20 0747     Blood Culture, Routine Gram stain aer bottle: Gram positive cocci in clusters resembling Staph       Gram stain oksana bottle: Gram positive cocci in clusters resembling Staph       Results called to and read back by: Elsy Pena RN 08/31/2020  12:35      METHICILLIN RESISTANT STAPHYLOCOCCUS AUREUS  ID consult required at [facility] [place] [facility].[place] and [place]ogesh lerner.  For susceptibility see order  # [id number]      Blood culture #1 **CANNOT BE ORDERED STAT** [[id number]]  (Abnormal)  (Susceptibility) Collected: 08/30/20 1532    Order Status: Completed Specimen: Blood from Peripheral, Antecubital, Left Updated: 09/02/20 0746     Blood Culture, Routine Gram stain aer bottle: Gram positive cocci in clusters resembling Staph       Gram stain oksana bottle: Gram positive cocci in clusters resembling Staph       Results called to and read back by: Elsy Pena RN  08/31/2020  12:35      METHICILLIN RESISTANT STAPHYLOCOCCUS AUREUS  ID consult required at [facility],[place] and [place] locations.      Culture, Anaerobe [[id number]] Collected: 08/31/20 1754    Order Status: Completed Specimen: Wound from Foot, Left Updated: 09/02/20 0728     Anaerobic Culture Culture in progress    Urine culture [[id number]]  (Abnormal)  (Susceptibility) Collected: 08/30/20 1633    Order Status: Completed Specimen: Urine Updated: 09/01/20 2239     Urine Culture, Routine GRAM NEGATIVE FAVIAN  > 100,000 cfu/ml  Identification and susceptibility pending        KLEBSIELLA PNEUMONIAE  10,000 - 49,999 cfu/ml      Narrative:      Specimen Source->Urine    Blood culture [[id number]] Collected: 09/01/20 1417    Order Status: Completed Specimen: Blood from Antecubital, Right Hand Updated: 09/01/20 2145     Blood Culture, Routine No Growth to date    Narrative:      Collection has been rescheduled by CBE at 09/01/2020 13:58 Reason:   due at 13:55  Collection has been rescheduled by CBE at 09/01/2020 13:58 Reason:   due at 13:55    Blood culture [[id number]] Collected: 08/31/20 1328    Order Status: Completed Specimen: Blood from Peripheral, Right Hand Updated: 09/01/20 1533     Blood Culture, Routine Gram stain oksana bottle: Gram positive cocci       Results called to and read back by: Elsy Pena  09/01/2020  11:46      Gram stain aer bottle: Gram positive cocci in clusters resembling Staph       Positive results previously called    Blood culture  [[id number]] Collected: 08/31/20 1324    Order Status: Completed Specimen: Blood from Peripheral, Left Hand Updated: 09/01/20 1146     Blood Culture, Routine Gram stain aer bottle: Gram positive cocci       Gram stain oksana bottle: Gram positive cocci       Results called to and read back by: Elsy Pena  09/01/2020  11:46    Aerobic culture [[id number]]  (Abnormal) Collected: 08/31/20 1754    Order Status: Completed Specimen: Wound from Foot, Left Updated: 09/01/20 0922     Aerobic Bacterial Culture PRESUMPTIVE PROTEUS SPECIES  Moderate  Identification and susceptibility pending      Gram stain [[id number]] Collected: 08/31/20 1754    Order Status: Completed Specimen: Wound from Foot, Left Updated: 08/31/20 2143     Gram Stain Result Rare WBC's      Moderate Gram negative rods      Moderate Gram positive cocci

## 2020-09-03 ENCOUNTER — ANESTHESIA EVENT (OUTPATIENT)
Dept: MEDSURG UNIT | Facility: HOSPITAL | Age: 65
DRG: 853 | End: 2020-09-03
Payer: MEDICARE

## 2020-09-03 ENCOUNTER — TELEPHONE (OUTPATIENT)
Dept: ADMINISTRATIVE | Facility: OTHER | Age: 65
End: 2020-09-03

## 2020-09-03 PROBLEM — M25.561 RIGHT KNEE PAIN: Status: ACTIVE | Noted: 2020-09-03

## 2020-09-03 LAB
ANION GAP SERPL CALC-SCNC: 12 MMOL/L (ref 8–16)
APPEARANCE FLD: NORMAL
BACTERIA BLD CULT: ABNORMAL
BACTERIA SPEC AEROBE CULT: ABNORMAL
BACTERIA SPEC AEROBE CULT: ABNORMAL
BACTERIA UR CULT: ABNORMAL
BASOPHILS # BLD AUTO: 0.06 K/UL (ref 0–0.2)
BASOPHILS NFR BLD: 0.3 % (ref 0–1.9)
BODY FLD TYPE: NORMAL
BODY FLD TYPE: NORMAL
BUN SERPL-MCNC: 46 MG/DL (ref 8–23)
CALCIUM SERPL-MCNC: 9 MG/DL (ref 8.7–10.5)
CHLORIDE SERPL-SCNC: 99 MMOL/L (ref 95–110)
CO2 SERPL-SCNC: 26 MMOL/L (ref 23–29)
COLOR FLD: YELLOW
CREAT SERPL-MCNC: 1.8 MG/DL (ref 0.5–1.4)
CRP SERPL-MCNC: 227.1 MG/L (ref 0–8.2)
CRYSTALS FLD MICRO: NEGATIVE
DIFFERENTIAL METHOD: ABNORMAL
EOSINOPHIL # BLD AUTO: 1 K/UL (ref 0–0.5)
EOSINOPHIL NFR BLD: 5 % (ref 0–8)
ERYTHROCYTE [DISTWIDTH] IN BLOOD BY AUTOMATED COUNT: 15.7 % (ref 11.5–14.5)
ERYTHROCYTE [SEDIMENTATION RATE] IN BLOOD BY WESTERGREN METHOD: 80 MM/HR (ref 0–23)
EST. GFR  (AFRICAN AMERICAN): 45.3 ML/MIN/1.73 M^2
EST. GFR  (NON AFRICAN AMERICAN): 39.2 ML/MIN/1.73 M^2
GLUCOSE SERPL-MCNC: 280 MG/DL (ref 70–110)
GRAM STN SPEC: NORMAL
GRAM STN SPEC: NORMAL
HCT VFR BLD AUTO: 32.4 % (ref 40–54)
HGB BLD-MCNC: 9.7 G/DL (ref 14–18)
IMM GRANULOCYTES # BLD AUTO: 0.22 K/UL (ref 0–0.04)
IMM GRANULOCYTES NFR BLD AUTO: 1.1 % (ref 0–0.5)
LYMPHOCYTES # BLD AUTO: 1.2 K/UL (ref 1–4.8)
LYMPHOCYTES NFR BLD: 5.9 % (ref 18–48)
LYMPHOCYTES NFR FLD MANUAL: 8 %
MAGNESIUM SERPL-MCNC: 1.9 MG/DL (ref 1.6–2.6)
MCH RBC QN AUTO: 26.9 PG (ref 27–31)
MCHC RBC AUTO-ENTMCNC: 29.9 G/DL (ref 32–36)
MCV RBC AUTO: 90 FL (ref 82–98)
MONOCYTES # BLD AUTO: 1.2 K/UL (ref 0.3–1)
MONOCYTES NFR BLD: 6.1 % (ref 4–15)
MONOS+MACROS NFR FLD MANUAL: 2 %
NEUTROPHILS # BLD AUTO: 15.9 K/UL (ref 1.8–7.7)
NEUTROPHILS NFR BLD: 81.6 % (ref 38–73)
NEUTROPHILS NFR FLD MANUAL: 90 %
NRBC BLD-RTO: 0 /100 WBC
PHOSPHATE SERPL-MCNC: 3.6 MG/DL (ref 2.7–4.5)
PLATELET # BLD AUTO: 408 K/UL (ref 150–350)
PMV BLD AUTO: 11 FL (ref 9.2–12.9)
POCT GLUCOSE: 117 MG/DL (ref 70–110)
POCT GLUCOSE: 260 MG/DL (ref 70–110)
POCT GLUCOSE: 265 MG/DL (ref 70–110)
POCT GLUCOSE: 86 MG/DL (ref 70–110)
POTASSIUM SERPL-SCNC: 3.8 MMOL/L (ref 3.5–5.1)
PROCALCITONIN SERPL IA-MCNC: 0.13 NG/ML
RBC # BLD AUTO: 3.61 M/UL (ref 4.6–6.2)
RHEUMATOID FACT SERPL-ACNC: 13 IU/ML (ref 0–15)
SARS-COV-2 RNA RESP QL NAA+PROBE: NOT DETECTED
SODIUM SERPL-SCNC: 137 MMOL/L (ref 136–145)
VANCOMYCIN SERPL-MCNC: 20.9 UG/ML
WBC # BLD AUTO: 19.52 K/UL (ref 3.9–12.7)
WBC # FLD: NORMAL /CU MM

## 2020-09-03 PROCEDURE — 36415 COLL VENOUS BLD VENIPUNCTURE: CPT | Mod: HCNC

## 2020-09-03 PROCEDURE — 87102 FUNGUS ISOLATION CULTURE: CPT | Mod: HCNC

## 2020-09-03 PROCEDURE — 97112 NEUROMUSCULAR REEDUCATION: CPT | Mod: HCNC

## 2020-09-03 PROCEDURE — 80202 ASSAY OF VANCOMYCIN: CPT | Mod: HCNC

## 2020-09-03 PROCEDURE — 63600175 PHARM REV CODE 636 W HCPCS: Mod: HCNC | Performed by: HOSPITALIST

## 2020-09-03 PROCEDURE — 89051 BODY FLUID CELL COUNT: CPT | Mod: HCNC

## 2020-09-03 PROCEDURE — 30200315 PPD INTRADERMAL TEST REV CODE 302: Mod: HCNC | Performed by: HOSPITALIST

## 2020-09-03 PROCEDURE — 11000001 HC ACUTE MED/SURG PRIVATE ROOM: Mod: HCNC

## 2020-09-03 PROCEDURE — 63600175 PHARM REV CODE 636 W HCPCS: Mod: HCNC | Performed by: NURSE PRACTITIONER

## 2020-09-03 PROCEDURE — U0003 INFECTIOUS AGENT DETECTION BY NUCLEIC ACID (DNA OR RNA); SEVERE ACUTE RESPIRATORY SYNDROME CORONAVIRUS 2 (SARS-COV-2) (CORONAVIRUS DISEASE [COVID-19]), AMPLIFIED PROBE TECHNIQUE, MAKING USE OF HIGH THROUGHPUT TECHNOLOGIES AS DESCRIBED BY CMS-2020-01-R: HCPCS | Mod: HCNC

## 2020-09-03 PROCEDURE — 80048 BASIC METABOLIC PNL TOTAL CA: CPT | Mod: HCNC

## 2020-09-03 PROCEDURE — 97530 THERAPEUTIC ACTIVITIES: CPT | Mod: HCNC

## 2020-09-03 PROCEDURE — 83735 ASSAY OF MAGNESIUM: CPT | Mod: HCNC

## 2020-09-03 PROCEDURE — 97535 SELF CARE MNGMENT TRAINING: CPT | Mod: HCNC

## 2020-09-03 PROCEDURE — 84100 ASSAY OF PHOSPHORUS: CPT | Mod: HCNC

## 2020-09-03 PROCEDURE — 85652 RBC SED RATE AUTOMATED: CPT | Mod: HCNC

## 2020-09-03 PROCEDURE — 89060 EXAM SYNOVIAL FLUID CRYSTALS: CPT | Mod: HCNC

## 2020-09-03 PROCEDURE — 87116 MYCOBACTERIA CULTURE: CPT | Mod: HCNC

## 2020-09-03 PROCEDURE — 99232 PR SUBSEQUENT HOSPITAL CARE,LEVL II: ICD-10-PCS | Mod: HCNC,,, | Performed by: HOSPITALIST

## 2020-09-03 PROCEDURE — 87070 CULTURE OTHR SPECIMN AEROBIC: CPT | Mod: HCNC

## 2020-09-03 PROCEDURE — 86140 C-REACTIVE PROTEIN: CPT | Mod: HCNC

## 2020-09-03 PROCEDURE — 99233 PR SUBSEQUENT HOSPITAL CARE,LEVL III: ICD-10-PCS | Mod: HCNC,,, | Performed by: INTERNAL MEDICINE

## 2020-09-03 PROCEDURE — 86580 TB INTRADERMAL TEST: CPT | Mod: HCNC | Performed by: HOSPITALIST

## 2020-09-03 PROCEDURE — 86431 RHEUMATOID FACTOR QUANT: CPT | Mod: HCNC

## 2020-09-03 PROCEDURE — 25000003 PHARM REV CODE 250: Mod: HCNC | Performed by: HOSPITALIST

## 2020-09-03 PROCEDURE — 84145 PROCALCITONIN (PCT): CPT | Mod: HCNC

## 2020-09-03 PROCEDURE — 87077 CULTURE AEROBIC IDENTIFY: CPT | Mod: HCNC

## 2020-09-03 PROCEDURE — 87205 SMEAR GRAM STAIN: CPT | Mod: HCNC

## 2020-09-03 PROCEDURE — 99232 SBSQ HOSP IP/OBS MODERATE 35: CPT | Mod: HCNC,,, | Performed by: HOSPITALIST

## 2020-09-03 PROCEDURE — 87206 SMEAR FLUORESCENT/ACID STAI: CPT | Mod: HCNC

## 2020-09-03 PROCEDURE — 87186 SC STD MICRODIL/AGAR DIL: CPT | Mod: HCNC

## 2020-09-03 PROCEDURE — 97110 THERAPEUTIC EXERCISES: CPT | Mod: HCNC

## 2020-09-03 PROCEDURE — 99233 SBSQ HOSP IP/OBS HIGH 50: CPT | Mod: HCNC,,, | Performed by: INTERNAL MEDICINE

## 2020-09-03 PROCEDURE — 87075 CULTR BACTERIA EXCEPT BLOOD: CPT | Mod: HCNC

## 2020-09-03 PROCEDURE — 85025 COMPLETE CBC W/AUTO DIFF WBC: CPT | Mod: HCNC

## 2020-09-03 RX ADMIN — FLUTICASONE PROPIONATE 50 MCG: 50 SPRAY, METERED NASAL at 08:09

## 2020-09-03 RX ADMIN — INSULIN ASPART 14 UNITS: 100 INJECTION, SOLUTION INTRAVENOUS; SUBCUTANEOUS at 08:09

## 2020-09-03 RX ADMIN — INSULIN ASPART 14 UNITS: 100 INJECTION, SOLUTION INTRAVENOUS; SUBCUTANEOUS at 05:09

## 2020-09-03 RX ADMIN — CIPROFLOXACIN 500 MG: 500 TABLET, FILM COATED ORAL at 08:09

## 2020-09-03 RX ADMIN — TUBERCULIN PURIFIED PROTEIN DERIVATIVE 5 UNITS: 5 INJECTION, SOLUTION INTRADERMAL at 12:09

## 2020-09-03 RX ADMIN — CARVEDILOL 25 MG: 25 TABLET, FILM COATED ORAL at 08:09

## 2020-09-03 RX ADMIN — LEVOTHYROXINE SODIUM 75 MCG: 25 TABLET ORAL at 05:09

## 2020-09-03 RX ADMIN — KETOROLAC TROMETHAMINE 15 MG: 15 INJECTION, SOLUTION INTRAMUSCULAR; INTRAVENOUS at 03:09

## 2020-09-03 RX ADMIN — ENOXAPARIN SODIUM 40 MG: 40 INJECTION SUBCUTANEOUS at 04:09

## 2020-09-03 RX ADMIN — ASPIRIN 81 MG: 81 TABLET, COATED ORAL at 08:09

## 2020-09-03 RX ADMIN — VANCOMYCIN HYDROCHLORIDE 750 MG: 750 INJECTION, POWDER, LYOPHILIZED, FOR SOLUTION INTRAVENOUS at 09:09

## 2020-09-03 RX ADMIN — ATORVASTATIN CALCIUM 40 MG: 20 TABLET, FILM COATED ORAL at 08:09

## 2020-09-03 RX ADMIN — CIPROFLOXACIN 500 MG: 500 TABLET, FILM COATED ORAL at 09:09

## 2020-09-03 RX ADMIN — FUROSEMIDE 80 MG: 40 TABLET ORAL at 08:09

## 2020-09-03 RX ADMIN — FLUTICASONE FUROATE AND VILANTEROL TRIFENATATE 1 PUFF: 200; 25 POWDER RESPIRATORY (INHALATION) at 08:09

## 2020-09-03 RX ADMIN — FUROSEMIDE 80 MG: 40 TABLET ORAL at 05:09

## 2020-09-03 RX ADMIN — CHOLECALCIFEROL (VITAMIN D3) 25 MCG (1,000 UNIT) TABLET 1000 UNITS: at 08:09

## 2020-09-03 RX ADMIN — INSULIN ASPART 14 UNITS: 100 INJECTION, SOLUTION INTRAVENOUS; SUBCUTANEOUS at 12:09

## 2020-09-03 RX ADMIN — INSULIN ASPART 3 UNITS: 100 INJECTION, SOLUTION INTRAVENOUS; SUBCUTANEOUS at 08:09

## 2020-09-03 RX ADMIN — CARVEDILOL 25 MG: 25 TABLET, FILM COATED ORAL at 09:09

## 2020-09-03 RX ADMIN — INSULIN ASPART 3 UNITS: 100 INJECTION, SOLUTION INTRAVENOUS; SUBCUTANEOUS at 12:09

## 2020-09-03 RX ADMIN — LISINOPRIL 40 MG: 20 TABLET ORAL at 08:09

## 2020-09-03 NOTE — PROGRESS NOTES
Pharmacokinetic Assessment Follow Up: IV Vancomycin    Vancomycin serum concentration assessment(s):    The random level was drawn correctly and can be used to guide therapy at this time. The measurement is above the desired definitive target range of 15 to 20 mcg/mL.    Vancomycin Regimen Plan:    Re-dose when the random level is less than 20 mcg/mL, next level to be drawn at 0400 on 9/3    Drug levels (last 3 results):  Recent Labs   Lab Result Units 09/01/20  0510 09/02/20  1639   Vancomycin-Trough ug/mL 17.8 26.5*       Pharmacy will continue to follow and monitor vancomycin.    Please contact pharmacy at extension [phone] for questions regarding this assessment.    Thank you for the consult,   Livia Madrid       Patient brief summary:  Ed Patton is a 65 y.o. male initiated on antimicrobial therapy with IV Vancomycin for treatment of skin & soft tissue infection    The patient's current regimen is pulse dosing    Drug Allergies:   Review of patient's allergies indicates:   Allergen Reactions    Vicodin [hydrocodone-acetaminophen] Itching       Actual Body Weight:   120.3 kg    Renal Function:   Estimated Creatinine Clearance: 59.5 mL/min (A) (based on SCr of 1.8 mg/dL (H)).,     Dialysis Method (if applicable):  N/A    CBC (last 72 hours):  Recent Labs   Lab Result Units 08/31/20 0434 09/01/20  0510 09/02/20  0352   WBC K/uL 20.60* 20.95* 19.52*   Hemoglobin g/dL 10.5* 10.1* 9.8*   Hemoglobin A1C % 9.5*  --   --    Hematocrit % 34.5* 33.2* 32.6*   Platelets K/uL 354* 326 382*   Gran% % 83.2* 81.1* 79.8*   Lymph% % 5.9* 6.9* 6.1*   Mono% % 7.6 6.6 7.0   Eosinophil% % 2.2 4.0 5.3   Basophil% % 0.3 0.3 0.4   Differential Method  Automated Automated Automated       Metabolic Panel (last 72 hours):  Recent Labs   Lab Result Units 08/31/20  0434 09/01/20  0510 09/02/20  0352   Sodium mmol/L 140 138 138   Potassium mmol/L 3.1* 3.3* 3.6   Chloride mmol/L 99 98 98   CO2 mmol/L 28 29 27   Glucose mg/dL 212* 243*  180*   BUN, Bld mg/dL 22 33* 43*   Creatinine mg/dL 1.4 1.6* 1.8*   Magnesium mg/dL 1.8 2.0 2.0   Phosphorus mg/dL 4.1 3.6 3.1       Vancomycin Administrations:  vancomycin given in the last 96 hours                   vancomycin in dextrose 5 % 1 gram/250 mL IVPB 1,000 mg (mg) 1,000 mg New Bag 09/02/20 0533     1,000 mg New Bag 09/01/20 1839     1,000 mg New Bag  0540     1,000 mg New Bag 08/31/20 1825     1,000 mg New Bag  0508    vancomycin 1.75 g in 5 % dextrose 500 mL IVPB (mg) 1,750 mg New Bag 08/30/20 1655                Microbiologic Results:  Microbiology Results (last 7 days)     Procedure Component Value Units Date/Time    Blood culture [[id number]] Collected: 09/02/20 1441    Order Status: Sent Specimen: Blood Updated: 09/02/20 1524    Narrative:      right median cubital  right median    Aerobic culture [[id number]]  (Abnormal)  (Susceptibility) Collected: 08/31/20 1754    Order Status: Completed Specimen: Wound from Foot, Left Updated: 09/02/20 1247     Aerobic Bacterial Culture PROTEUS MIRABILIS  Moderate        STAPHYLOCOCCUS AUREUS  Moderate  Susceptibility pending      Blood culture [[id number]] Collected: 09/01/20 1417    Order Status: Completed Specimen: Blood from Antecubital, Right Hand Updated: 09/02/20 1207     Blood Culture, Routine Gram stain aer bottle: Gram positive cocci       Results called to and read back by: Jolene Barraza   09/02/2020  12:06    Narrative:      Collection has been rescheduled by CBE at 09/01/2020 13:58 Reason:   due at 13:55  Collection has been rescheduled by CBE at 09/01/2020 13:58 Reason:   due at 13:55    Culture, Anaerobe [[id number]] Collected: 08/31/20 1754    Order Status: Completed Specimen: Wound from Foot, Left Updated: 09/02/20 1122     Anaerobic Culture Culture in progress    Blood culture [[id number]]  (Abnormal) Collected: 08/31/20 1328    Order Status: Completed Specimen: Blood from Peripheral, Right Hand Updated: 09/02/20 0958     Blood Culture, Routine  Gram stain oksana bottle: Gram positive cocci       Results called to and read back by: Elsy Pena  09/01/2020  11:46      Gram stain aer bottle: Gram positive cocci in clusters resembling Staph       Positive results previously called      METHICILLIN RESISTANT STAPHYLOCOCCUS AUREUS  ID consult required at [facility].  For susceptibility see order #[id number]      Blood culture [[id number]]  (Abnormal) Collected: 08/31/20 1324    Order Status: Completed Specimen: Blood from Peripheral, Left Hand Updated: 09/02/20 0955     Blood Culture, Routine Gram stain aer bottle: Gram positive cocci       Gram stain oksana bottle: Gram positive cocci       Results called to and read back by: Elsy Pena  09/01/2020  11:46      METHICILLIN RESISTANT STAPHYLOCOCCUS AUREUS  ID consult required at [facility].  For susceptibility see order #[id number]      Blood culture #2 **CANNOT BE ORDERED STAT** [[id number]]  (Abnormal) Collected: 08/30/20 1543    Order Status: Completed Specimen: Blood from Peripheral, Antecubital, Left Updated: 09/02/20 0747     Blood Culture, Routine Gram stain aer bottle: Gram positive cocci in clusters resembling Staph       Gram stain oksana bottle: Gram positive cocci in clusters resembling Staph       Results called to and read back by: Elsy Pena RN 08/31/2020  12:35      METHICILLIN RESISTANT STAPHYLOCOCCUS AUREUS  ID consult required at [facility].  For susceptibility see order # [id number]      Blood culture #1 **CANNOT BE ORDERED STAT** [[id number]]  (Abnormal)  (Susceptibility) Collected: 08/30/20 1532    Order Status: Completed Specimen: Blood from Peripheral, Antecubital, Left Updated: 09/02/20 0746     Blood Culture, Routine Gram stain aer bottle: Gram positive cocci in clusters resembling Staph       Gram stain oksana bottle: Gram positive cocci in clusters resembling Staph       Results called to and read back  by: Elsy Pena RN  08/31/2020  12:35      METHICILLIN RESISTANT STAPHYLOCOCCUS AUREUS  ID consult required at [facility] [facility].      Urine culture [[id number]]  (Abnormal)  (Susceptibility) Collected: 08/30/20 1633    Order Status: Completed Specimen: Urine Updated: 09/01/20 2239     Urine Culture, Routine GRAM NEGATIVE FAVIAN  > 100,000 cfu/ml  Identification and susceptibility pending        KLEBSIELLA PNEUMONIAE  10,000 - 49,999 cfu/ml      Narrative:      Specimen Source->Urine    Gram stain [[id number]] Collected: 08/31/20 1754    Order Status: Completed Specimen: Wound from Foot, Left Updated: 08/31/20 2143     Gram Stain Result Rare WBC's      Moderate Gram negative rods      Moderate Gram positive cocci

## 2020-09-03 NOTE — PLAN OF CARE
Ed Patton tolerated treatment fair today. He is very pleasant and agreeable to participate. Current PT/OT orders in chart list for LLE non-weightbearing so he is being treated as such. He is unable to maintain weightbearing precaution with transfers today. Despite max cueing to not use LLE to stand, continuously attempts to do so. Had OT kneel on floor and hold L foot off floor while PT assisting patient to stand but he is unable to achieve full stand. I do feel that if he were allowed to weightbear onto LLE that he could be able to stand with min/moderate assist and possibly take a few steps with a rolling walker at this current time. I've messaged hospital medicine and podiatry via [employer] to get update to L leg weightbearing status; recent podiatry notes indicating MRI (-) for osteo, no acute fracture. PT/OT hopeful that patient is allowed to weightbear with darco shoe to L foot which will greatly help his progress and increasing mobility. Discussed PT role, POC, goals and recommendations (Skilled Nursing Facility) with patient; verbalized understanding. Ed Patton will continue to benefit from acute PT services to promote mobility during this admission and improve return to PLOF.    *Addendum: received new orders from podiatry after completion of this session; ok for patient to weightbear as tolerated in darco shoe to LLE*    Problem: Physical Therapy Goal  Goal: Physical Therapy Goal  Description: Goals to be met by: 9/15/20    Patient will increase functional independence with mobility by performin. Supine to sit with Stand-by Assistance - Not met  2. Sit to supine with Stand-by Assistance - Not met  3. Sit to stand transfer with Stand-by Assistance with RW - Not met  4. Gait  x 50 feet with Stand-by Assistance using Rolling Walker and maintenance of weight bearing status - Not met  5.  Patient will demonstrate independence with a home exercise program - Not met  6. Patient's balance  will be GOOD: Needs SUPERVISION only during gait and able to self right with moderate LOB - Not met  Outcome: Ongoing, Progressing    Yazan So, PT  9/3/2020

## 2020-09-03 NOTE — PLAN OF CARE
09/03/20 0911   Post-Acute Status   Post-Acute Authorization Placement   Post-Acute Placement Status Referrals Sent     Pt may need LUKAS and ortho intervention. Sw to follow, did update Brenda with [facility].

## 2020-09-03 NOTE — PROGRESS NOTES
Pharmacokinetic Assessment Follow Up: IV Vancomycin    Vancomycin serum concentration assessment(s):    -Vancomycin random level was drawn ~ 23 hrs from the previous dose and can be used to guide therapy.  -A random level of 20.9 mcg/mL is above therapeutic goal 15-20 mcg/mL for GPC bacteremia and diabetic foot infection.   -NANETTE, Scr 1.6 > 1.8>1.8 mg/dL. UO 0.5 mL/kg/hr. Standing order of vancomycin was stopped, pulse dosing initiated.  -Two-point kinetics estimates:  -ke= 0.0197 hr-1  -T 1/2 = 35 hrs     Vancomycin Regimen Plan:    -Resume vancomycin 750 mg IV Q12H later this evening. Scr has peaked at 1.8 mg/dL.   -Obtain a trough prior to the fourth dose of the restarted regimen, 9/5 @ 0730.   -Monitor renal function closely.    Drug levels (last 3 results):  Recent Labs   Lab Result Units 09/01/20  0510 09/02/20  1639 09/03/20  0410   Vancomycin, Random ug/mL  --   --  20.9   Vancomycin-Trough ug/mL 17.8 26.5*  --        Pharmacy will continue to follow and monitor vancomycin.    Please contact pharmacy at extension [phone] for questions regarding this assessment.    Thank you for the consult,   Shayan Bhakta       Patient brief summary:  Ed Patton is a 65 y.o. male initiated on antimicrobial therapy with IV Vancomycin for treatment of bacteremia/diabetic foot infection    Drug Allergies:   Review of patient's allergies indicates:   Allergen Reactions    Vicodin [hydrocodone-acetaminophen] Itching       Actual Body Weight:   120.4 kg    Renal Function:   Estimated Creatinine Clearance: 59.5 mL/min (A) (based on SCr of 1.8 mg/dL (H)).,     Dialysis Method (if applicable):  N/A    CBC (last 72 hours):  Recent Labs   Lab Result Units 09/01/20  0510 09/02/20  0352 09/03/20  0410   WBC K/uL 20.95* 19.52* 19.52*   Hemoglobin g/dL 10.1* 9.8* 9.7*   Hematocrit % 33.2* 32.6* 32.4*   Platelets K/uL 326 382* 408*   Gran% % 81.1* 79.8* 81.6*   Lymph% % 6.9* 6.1* 5.9*   Mono% % 6.6 7.0 6.1   Eosinophil% % 4.0 5.3 5.0    Basophil% % 0.3 0.4 0.3   Differential Method  Automated Automated Automated       Metabolic Panel (last 72 hours):  Recent Labs   Lab Result Units 09/01/20  0510 09/02/20  0352   Sodium mmol/L 138 138   Potassium mmol/L 3.3* 3.6   Chloride mmol/L 98 98   CO2 mmol/L 29 27   Glucose mg/dL 243* 180*   BUN, Bld mg/dL 33* 43*   Creatinine mg/dL 1.6* 1.8*   Magnesium mg/dL 2.0 2.0   Phosphorus mg/dL 3.6 3.1       Vancomycin Administrations:  vancomycin given in the last 96 hours                   vancomycin in dextrose 5 % 1 gram/250 mL IVPB 1,000 mg (mg) 1,000 mg New Bag 09/02/20 0533     1,000 mg New Bag 09/01/20 1839     1,000 mg New Bag  0540     1,000 mg New Bag 08/31/20 1825     1,000 mg New Bag  0508    vancomycin 1.75 g in 5 % dextrose 500 mL IVPB (mg) 1,750 mg New Bag 08/30/20 1655                Microbiologic Results:  Microbiology Results (last 7 days)     Procedure Component Value Units Date/Time    Urine culture [[id number]]  (Abnormal)  (Susceptibility) Collected: 08/30/20 1633    Order Status: Completed Specimen: Urine Updated: 09/03/20 0124     Urine Culture, Routine KLEBSIELLA PNEUMONIAE  > 100,000 cfu/ml      Narrative:      Specimen Source->Urine    Blood culture [[id number]] Collected: 09/02/20 1441    Order Status: Completed Specimen: Blood Updated: 09/03/20 0115     Blood Culture, Routine No Growth to date    Narrative:      right median cubital  right median    Aerobic culture [[id number]]  (Abnormal)  (Susceptibility) Collected: 08/31/20 1754    Order Status: Completed Specimen: Wound from Foot, Left Updated: 09/02/20 1247     Aerobic Bacterial Culture PROTEUS MIRABILIS  Moderate        STAPHYLOCOCCUS AUREUS  Moderate  Susceptibility pending      Blood culture [[id number]] Collected: 09/01/20 1417    Order Status: Completed Specimen: Blood from Antecubital, Right Hand Updated: 09/02/20 1207     Blood Culture, Routine Gram stain aer bottle: Gram positive cocci       Results called to and read back  by: Jolene Barraza   09/02/2020  12:06    Narrative:      Collection has been rescheduled by CBE at 09/01/2020 13:58 Reason:   due at 13:55  Collection has been rescheduled by CBE at 09/01/2020 13:58 Reason:   due at 13:55    Culture, Anaerobe [[id number]] Collected: 08/31/20 1754    Order Status: Completed Specimen: Wound from Foot, Left Updated: 09/02/20 1122     Anaerobic Culture Culture in progress    Blood culture [[id number]]  (Abnormal) Collected: 08/31/20 1328    Order Status: Completed Specimen: Blood from Peripheral, Right Hand Updated: 09/02/20 0958     Blood Culture, Routine Gram stain oksana bottle: Gram positive cocci       Results called to and read back by: Elsy Pena  09/01/2020  11:46      Gram stain aer bottle: Gram positive cocci in clusters resembling Staph       Positive results previously called      METHICILLIN RESISTANT STAPHYLOCOCCUS AUREUS  ID consult required at [facility].  For susceptibility see order #[id number]      Blood culture [[id number]]  (Abnormal) Collected: 08/31/20 1324    Order Status: Completed Specimen: Blood from Peripheral, Left Hand Updated: 09/02/20 0955     Blood Culture, Routine Gram stain aer bottle: Gram positive cocci       Gram stain oksana bottle: Gram positive cocci       Results called to and read back by: Elsy Pena  09/01/2020  11:46      METHICILLIN RESISTANT STAPHYLOCOCCUS AUREUS  ID consult required at [facility].  For susceptibility see order #[id number]      Blood culture #2 **CANNOT BE ORDERED STAT** [[id number]]  (Abnormal) Collected: 08/30/20 1543    Order Status: Completed Specimen: Blood from Peripheral, Antecubital, Left Updated: 09/02/20 0747     Blood Culture, Routine Gram stain aer bottle: Gram positive cocci in clusters resembling Staph       Gram stain oksana bottle: Gram positive cocci in clusters resembling Staph       Results called to and read back by: Elsy Pena RN 08/31/2020  12:35       METHICILLIN RESISTANT STAPHYLOCOCCUS AUREUS  ID consult required at [facility] and [facility].  For susceptibility see order # [id number]      Blood culture #1 **CANNOT BE ORDERED STAT** [[id number]]  (Abnormal)  (Susceptibility) Collected: 08/30/20 1532    Order Status: Completed Specimen: Blood from Peripheral, Antecubital, Left Updated: 09/02/20 0746     Blood Culture, Routine Gram stain aer bottle: Gram positive cocci in clusters resembling Staph       Gram stain oksana bottle: Gram positive cocci in clusters resembling Staph       Results called to and read back by: Elsy Pena RN  08/31/2020  12:35      METHICILLIN RESISTANT STAPHYLOCOCCUS AUREUS  ID consult required at [facility] and [facility] locations.      Gram stain [[id number]] Collected: 08/31/20 1754    Order Status: Completed Specimen: Wound from Foot, Left Updated: 08/31/20 2143     Gram Stain Result Rare WBC's      Moderate Gram negative rods      Moderate Gram positive cocci

## 2020-09-03 NOTE — ASSESSMENT & PLAN NOTE
A: MRSA septicemia. Still tachycardic, SpO2 reached 92%, renal function continues to worsen. WBCs not improving. ESR and CRP elevated, RF WNL. Vancomycin was supratherapeutic, switched to pulse dose regimen. Source of bacteremia unclear, may be left foot wound or a septic process at the right knee joint. Right knee now clinically suspicious for septic arthritis (see physical exam). Left foot wound growing Proteus and MRSA, urine growing Klebsiella. TTE negative for signs of cardiac infection, LUKAS is planned.     P:  -Continue IV vancomycin and PO ciprofloxacin. Maintain vanc trough in 15-20 range.   -Followup on subsequent blood cultures.   -Followup on LUKAS findings.   -Recommend consult to ortho for suspected septic arthritis of the right knee. Order placed by primary team.   -Will continue to follow.

## 2020-09-03 NOTE — ASSESSMENT & PLAN NOTE
Diabetic polyneuropathy associated with type 2 diabetes mellitus  Takes insulin NPH-insulin regular 70/30 45 units before breakfast and 35 units before dinner. Giving insulin detemir 29 units daily and insulin aspart 14 units with meals and sliding scale. Monitor Accuchecks.

## 2020-09-03 NOTE — PROGRESS NOTES
"[facility] Hospital Medicine  Progress Note    Patient Name: Ed Patton  MRN: [id number]  Patient Class: IP- Inpatient   Admission Date: 8/30/2020  Length of Stay: 4 days  Attending Physician: Lele Calderón MD  Primary Care Provider: Qiana Chow MD    [facility] Medicine Team: [facility] HOSP MED D Lele Calderón MD    Subjective:     Principal Problem:Diabetic foot infection        HPI:  Ed Patton is a 65 year old Black man with obesity, diabetes mellitus type 2 with neuropathy (treated with insulin), hyperlipidemia, coronary artery disease, dilated cardiomyopathy with chronic systolic heart failure, chronic obstructive pulmonary disease and chronic restrictive lung disease with chronic hypoxic respiratory failure (on supplemental oxygen with nasal cannula at 2 liters/minute), postablative hypothyroidism, iron deficiency anemia, chronic kidney disease stage 3. He lives in [place]. He is . His primary care physician is Dr. Qiana Chow.    He presented to [facility] on 8/30/2020 due to recurrent falls, where his legs seemed to just "give out" under him and other times when he had to crawl around his house, over the past week. At some point prior to this, he found a tack embedded in the bottom of his slipper that had punctured the sole of his left foot. His left foot became swollen, and he had occasional twinges of pain in both legs. He was afraid to leave his house due to the COVID-19 pandemic. He had been getting home health services, including physical therapy, and was walking between his mailbox and house for exercise. He was admitted to Hospital Medicine Team D.    Overview/Hospital Course:  He was put on antibiotics. Blood cultures taken on admission grew methicillin-resistant Staphylococcus aureus. Foot wound cultures grew Proteus mirabilis and Staphylococcus aureus. Urine culture grew Klebsiella pneumoniae. " Foot MRI showed no osteomyelitis or abscess. Physical and Occupational Therapy recommended skilled nursing facility.     Interval History: Blood cultures still positive. He was told that the LUKAS would be done tomorrow.    Review of Systems   Constitutional: Negative for chills and fever.   Respiratory: Negative for cough and shortness of breath.    Gastrointestinal: Negative for nausea and vomiting.     Objective:     Vital Signs (Most Recent):  Temp: 98.3 °F (36.8 °C) (09/03/20 1218)  Pulse: 99 (09/03/20 1218)  Resp: 16 (09/03/20 1218)  BP: 114/68 (09/03/20 1218)  SpO2: 100 % (09/03/20 1218) Vital Signs (24h Range):  Temp:  [96.4 °F (35.8 °C)-99.2 °F (37.3 °C)] 98.3 °F (36.8 °C)  Pulse:  [] 99  Resp:  [16-20] 16  SpO2:  [92 %-100 %] 100 %  BP: ()/(56-72) 114/68     Weight: 120.4 kg (265 lb 6.9 oz)  Body mass index is 32.31 kg/m².    Intake/Output Summary (Last 24 hours) at 9/3/2020 1334  Last data filed at 9/3/2020 1200  Gross per 24 hour   Intake 200 ml   Output 1900 ml   Net -1700 ml      Physical Exam  Vitals signs and nursing note reviewed.   Constitutional:       General: He is not in acute distress.  Pulmonary:      Effort: Pulmonary effort is normal. No respiratory distress.   Skin:     General: Skin is warm and dry.   Neurological:      Mental Status: He is alert and oriented to person, place, and time.   Psychiatric:         Attention and Perception: Attention normal.         Mood and Affect: Mood and affect normal.         Significant Labs: All pertinent labs within the past 24 hours have been reviewed.    Significant Imaging: I have reviewed all pertinent imaging results/findings within the past 24 hours.   X-Ray Foot Complete Left 8/30/20: FINDINGS: No evidence of acute fracture or dislocation.  No osseous destructive process.  Spurring of the inferior calcaneus.  Atherosclerosis noted.  Soft tissue defect along the plantar aspect of the midfoot likely a laceration or wound.  No radiopaque  foreign body.   X-Ray Chest AP Portable 8/30/20: FINDINGS: Monitoring leads overlie the chest.  Large body habitus.  Patient is slightly rotated.  Inferior-most aspect of the left costophrenic angle not included in field of view.   Cardiac silhouette is upper limits of normal in size with prominence of the central pulmonary vasculature and bilateral diffuse nonspecific interstitial coarsening with a perihilar and basilar distribution suggesting pulmonary edema/CHF pattern with interstitial pneumonia not excluded.  There is calcific atherosclerosis and tortuosity of the thoracic aorta.  Hilar contours are within normal limits.  There is chronic nonspecific elevation of the right hemidiaphragm with right basilar platelike scarring versus atelectasis.  No large consolidation, pleural effusion or pneumothorax definitively seen allowing for limitations.  No acute osseous process seen.   Impression: Findings suggesting mild pulmonary edema/CHF pattern without focal consolidation.  Interstitial pneumonia not excluded.   CT Head Without Contrast 8/30/20: FINDINGS:   Intracranial compartment:   Ventricles and sulci are normal in size for age without evidence of hydrocephalus. No extra-axial blood or fluid collections.   Mild generalized cerebral volume loss.  Scattered hypoattenuation of the supratentorial white matter which is nonspecific but likely represents chronic microvascular ischemic changes.  No parenchymal mass, hemorrhage, edema or major vascular distribution infarct.   Skull/extracranial contents (limited evaluation): No fracture. Mastoid air cells and paranasal sinuses are essentially clear.  Congenital nonunion of C1.   Impression: No acute abnormality.  Follow-up/further evaluation as clinically indicated.   Chronic microvascular ischemic changes.   US Lower Extrem Arteries Bilat with GINA 8/31/20: FINDINGS:   Ankle brachial indices were not obtained.   The peak systolic velocities on the right are as follows,  in centimeters/second:   Common femoral artery: 95   Deep femoral artery: 80   Superficial femoral artery, proximal: 167   Superficial femoral artery, mid portion: 71   Superficial femoral artery, distal: 114   Popliteal artery: 96   Posterior tibial artery: 50   Anterior tibial artery: 174   The peak systolic velocities on the left are as follows, in centimeters/second:   Common femoral artery: 90   Deep femoral artery: 101   Superficial femoral artery, proximal: 83   Superficial femoral artery, mid portion: 72   Superficial femoral artery, distal: 105   Popliteal artery: 106   Posterior tibial artery: 35   Anterior tibial artery: 35   Right lower extremity demonstrates biphasic or triphasic waveforms from the common femoral artery to the distal popliteal artery.  The anterior tibial artery and posterior tibial artery demonstrate monophasic waveforms.   Left lower extremity demonstrates triphasic waveforms from the common femoral artery to the distal superficial femoral artery.  The popliteal artery, anterior tibial artery, and posterior tibial artery demonstrate monophasic waveforms.   Impression:   No hemodynamically significant stenosis demonstrated in the right or left lower extremity arterial system.   Monophasic waveforms in the right anterior tibial artery and posterior tibial artery, as well as the left popliteal artery, anterior tibial artery, and posterior tibial artery.   MRI Foot (Midfoot) Left W W/O Contrast 9/01/20: FINDINGS:   There are 2 small areas of osteochondral defect anterolateral tibial plafond, one measures 11 x 12 mm and one measures 7 x 7 mm.   The talar dome is intact.   Trace of fluid in the tibiotalar joint.   There is significant nonspecific edema at the anterior talonavicular joint and sinus tarsi region, no cortical irregularity or definite osseous erosions.   There is a well define low T1 high T2 septated lesion tip of the fibula measuring 16 by 17 mm, well define possibly an intra  osseous ganglion cyst.   There is nonspecific edema diffusely seen of the anterior calcaneus and mid talus.   The navicular, cuboid and cuneiforms appear intact.  The metatarsal bones are intact.   There is the thickening and tendinosis of the peroneus brevis tendon.  The peroneus longus tendon is intact.  There is a trace of fluid within the tendon sheath of  the flexor hallucis longus tendon, the tendon is intact.   The extensor tendons appear normal.   There is sick Cheo it can not subcutaneous soft tissue edema dorsal aspect of the foot and lateral aspect of the foot.   No soft tissue or osseous abscess seen.   There is significant postcontrast enhancement at the anterior talocalcaneal joint, mid talus and anterior calcaneus.   Impression:  Intense inflammatory process at the mid/anterior talocalcaneal joint, possibly active degenerative change, early septic arthritis is consider less likely, not excluded.  Changes of neuropathic joint not excluded.   Small osteochondral defects at the anterior distal tibial plateau fall possibly degenerative.   Peroneus brevis tendinosis.   Cellulitis anterior and lateral aspect of the foot.   No definite abscess seen.   Well-defined, benign-appearing osseous lesion distal fibula most suggestive of an intra osseous ganglion cyst.   Consider short-term follow-up MRI examination to monitor evolution if clinically warranted.   Transthoracic echo complete 9/01/20:   · Moderately decreased left ventricular systolic function. The estimated ejection fraction is 35%.  · Moderately reduced right ventricular systolic function.  · Moderate left atrial enlargement.  · Atrial fibrillation observed.  · The estimated PA systolic pressure is 53 mmHg.  · Intermediate central venous pressure (8 mmHg).      Assessment/Plan:      * Diabetic foot infection  Bacteremia due to methicillin resistant Staphylococcus aureus  Diabetic ulcer of left foot associated with type 2 diabetes mellitus, with fat  layer exposed  Giving antibiotics. LUKAS.    Sepsis  Due to Proteus and MRSA. Giving antibiotics.    Cellulitis of left lower extremity  Giving antibiotics.    COPD mixed type  Continue home fluticasone-vilanterol, albuterol nebulizer.    Postablative hypothyroidism  Continue home levothyroxine.    Chronic respiratory failure with hypoxia, on home oxygen therapy  On 2 L nasal cannula chronically due to combination of COPD and CHF; titrate as needed.    Uncontrolled type 2 diabetes mellitus with hyperglycemia, with long-term current use of insulin  Diabetic polyneuropathy associated with type 2 diabetes mellitus  Takes insulin NPH-insulin regular 70/30 45 units before breakfast and 35 units before dinner. Giving insulin detemir 29 units daily and insulin aspart 14 units with meals and sliding scale. Monitor Accuchecks.    Chronic systolic congestive heart failure  Continue home carvedilol, lisinopril.      VTE Risk Mitigation (From admission, onward)         Ordered     enoxaparin injection 40 mg  Every 24 hours      08/30/20 2004     IP VTE HIGH RISK PATIENT  Once      08/30/20 2004     Place sequential compression device  Until discontinued      08/30/20 5764                Discharge Planning   EDY: 9/4/2020     Code Status: Full Code   Is the patient medically ready for discharge?:     Reason for patient still in hospital (select all that apply): Treatment  Discharge Plan A: Home with family, Home Health                  Lele Calderón MD  Department of Hospital Medicine   [facility]

## 2020-09-03 NOTE — PROGRESS NOTES
Pt placed in contact isolation due to MRSA infection, wound care done pt was medicated for pain x1 today, He has been kept free from falls and injury

## 2020-09-03 NOTE — ASSESSMENT & PLAN NOTE
Bacteremia due to methicillin resistant Staphylococcus aureus  Diabetic ulcer of left foot associated with type 2 diabetes mellitus, with fat layer exposed  Giving antibiotics. LUKAS.

## 2020-09-03 NOTE — SUBJECTIVE & OBJECTIVE
Interval History: Blood cultures still positive. He was told that the LUKAS would be done tomorrow.    Review of Systems   Constitutional: Negative for chills and fever.   Respiratory: Negative for cough and shortness of breath.    Gastrointestinal: Negative for nausea and vomiting.     Objective:     Vital Signs (Most Recent):  Temp: 98.3 °F (36.8 °C) (09/03/20 1218)  Pulse: 99 (09/03/20 1218)  Resp: 16 (09/03/20 1218)  BP: 114/68 (09/03/20 1218)  SpO2: 100 % (09/03/20 1218) Vital Signs (24h Range):  Temp:  [96.4 °F (35.8 °C)-99.2 °F (37.3 °C)] 98.3 °F (36.8 °C)  Pulse:  [] 99  Resp:  [16-20] 16  SpO2:  [92 %-100 %] 100 %  BP: ()/(56-72) 114/68     Weight: 120.4 kg (265 lb 6.9 oz)  Body mass index is 32.31 kg/m².    Intake/Output Summary (Last 24 hours) at 9/3/2020 1334  Last data filed at 9/3/2020 1200  Gross per 24 hour   Intake 200 ml   Output 1900 ml   Net -1700 ml      Physical Exam  Vitals signs and nursing note reviewed.   Constitutional:       General: He is not in acute distress.  Pulmonary:      Effort: Pulmonary effort is normal. No respiratory distress.   Skin:     General: Skin is warm and dry.   Neurological:      Mental Status: He is alert and oriented to person, place, and time.   Psychiatric:         Attention and Perception: Attention normal.         Mood and Affect: Mood and affect normal.         Significant Labs: All pertinent labs within the past 24 hours have been reviewed.    Significant Imaging: I have reviewed all pertinent imaging results/findings within the past 24 hours.   X-Ray Foot Complete Left 8/30/20: FINDINGS: No evidence of acute fracture or dislocation.  No osseous destructive process.  Spurring of the inferior calcaneus.  Atherosclerosis noted.  Soft tissue defect along the plantar aspect of the midfoot likely a laceration or wound.  No radiopaque foreign body.   X-Ray Chest AP Portable 8/30/20: FINDINGS: Monitoring leads overlie the chest.  Large body habitus.   Patient is slightly rotated.  Inferior-most aspect of the left costophrenic angle not included in field of view.   Cardiac silhouette is upper limits of normal in size with prominence of the central pulmonary vasculature and bilateral diffuse nonspecific interstitial coarsening with a perihilar and basilar distribution suggesting pulmonary edema/CHF pattern with interstitial pneumonia not excluded.  There is calcific atherosclerosis and tortuosity of the thoracic aorta.  Hilar contours are within normal limits.  There is chronic nonspecific elevation of the right hemidiaphragm with right basilar platelike scarring versus atelectasis.  No large consolidation, pleural effusion or pneumothorax definitively seen allowing for limitations.  No acute osseous process seen.   Impression: Findings suggesting mild pulmonary edema/CHF pattern without focal consolidation.  Interstitial pneumonia not excluded.   CT Head Without Contrast 8/30/20: FINDINGS:   Intracranial compartment:   Ventricles and sulci are normal in size for age without evidence of hydrocephalus. No extra-axial blood or fluid collections.   Mild generalized cerebral volume loss.  Scattered hypoattenuation of the supratentorial white matter which is nonspecific but likely represents chronic microvascular ischemic changes.  No parenchymal mass, hemorrhage, edema or major vascular distribution infarct.   Skull/extracranial contents (limited evaluation): No fracture. Mastoid air cells and paranasal sinuses are essentially clear.  Congenital nonunion of C1.   Impression: No acute abnormality.  Follow-up/further evaluation as clinically indicated.   Chronic microvascular ischemic changes.   US Lower Extrem Arteries Bilat with GINA 8/31/20: FINDINGS:   Ankle brachial indices were not obtained.   The peak systolic velocities on the right are as follows, in centimeters/second:   Common femoral artery: 95   Deep femoral artery: 80   Superficial femoral artery, proximal:  167   Superficial femoral artery, mid portion: 71   Superficial femoral artery, distal: 114   Popliteal artery: 96   Posterior tibial artery: 50   Anterior tibial artery: 174   The peak systolic velocities on the left are as follows, in centimeters/second:   Common femoral artery: 90   Deep femoral artery: 101   Superficial femoral artery, proximal: 83   Superficial femoral artery, mid portion: 72   Superficial femoral artery, distal: 105   Popliteal artery: 106   Posterior tibial artery: 35   Anterior tibial artery: 35   Right lower extremity demonstrates biphasic or triphasic waveforms from the common femoral artery to the distal popliteal artery.  The anterior tibial artery and posterior tibial artery demonstrate monophasic waveforms.   Left lower extremity demonstrates triphasic waveforms from the common femoral artery to the distal superficial femoral artery.  The popliteal artery, anterior tibial artery, and posterior tibial artery demonstrate monophasic waveforms.   Impression:   No hemodynamically significant stenosis demonstrated in the right or left lower extremity arterial system.   Monophasic waveforms in the right anterior tibial artery and posterior tibial artery, as well as the left popliteal artery, anterior tibial artery, and posterior tibial artery.   MRI Foot (Midfoot) Left W W/O Contrast 9/01/20: FINDINGS:   There are 2 small areas of osteochondral defect anterolateral tibial plafond, one measures 11 x 12 mm and one measures 7 x 7 mm.   The talar dome is intact.   Trace of fluid in the tibiotalar joint.   There is significant nonspecific edema at the anterior talonavicular joint and sinus tarsi region, no cortical irregularity or definite osseous erosions.   There is a well define low T1 high T2 septated lesion tip of the fibula measuring 16 by 17 mm, well define possibly an intra osseous ganglion cyst.   There is nonspecific edema diffusely seen of the anterior calcaneus and mid talus.   The  navicular, cuboid and cuneiforms appear intact.  The metatarsal bones are intact.   There is the thickening and tendinosis of the peroneus brevis tendon.  The peroneus longus tendon is intact.  There is a trace of fluid within the tendon sheath of  the flexor hallucis longus tendon, the tendon is intact.   The extensor tendons appear normal.   There is sick [place] it can not subcutaneous soft tissue edema dorsal aspect of the foot and lateral aspect of the foot.   No soft tissue or osseous abscess seen.   There is significant postcontrast enhancement at the anterior talocalcaneal joint, mid talus and anterior calcaneus.   Impression:  Intense inflammatory process at the mid/anterior talocalcaneal joint, possibly active degenerative change, early septic arthritis is consider less likely, not excluded.  Changes of neuropathic joint not excluded.   Small osteochondral defects at the anterior distal tibial plateau fall possibly degenerative.   Peroneus brevis tendinosis.   Cellulitis anterior and lateral aspect of the foot.   No definite abscess seen.   Well-defined, benign-appearing osseous lesion distal fibula most suggestive of an intra osseous ganglion cyst.   Consider short-term follow-up MRI examination to monitor evolution if clinically warranted.   Transthoracic echo complete 9/01/20:   · Moderately decreased left ventricular systolic function. The estimated ejection fraction is 35%.  · Moderately reduced right ventricular systolic function.  · Moderate left atrial enlargement.  · Atrial fibrillation observed.  · The estimated PA systolic pressure is 53 mmHg.  · Intermediate central venous pressure (8 mmHg).

## 2020-09-03 NOTE — PROGRESS NOTES
"[facility]  Infectious Disease  Progress Note    Patient Name: Ed Patton  MRN: [id number]  Admission Date: 8/30/2020  Length of Stay: 4 days  Attending Physician: Lele Calderón MD  Primary Care Provider: Qiana Chow MD    Isolation Status: Contact  Assessment/Plan:      * Diabetic foot infection  See "sepsis"      Bacteremia due to methicillin resistant Staphylococcus aureus  See "sepsis"    Sepsis  A: MRSA septicemia. Still tachycardic, SpO2 reached 92%, renal function continues to worsen. WBCs not improving. ESR and CRP elevated, RF WNL. Vancomycin was supratherapeutic, switched to pulse dose regimen. Source of bacteremia unclear, may be left foot wound or a septic process at the right knee joint. Right knee now clinically suspicious for septic arthritis (see physical exam). Left foot wound growing Proteus and MRSA, urine growing Klebsiella. TTE negative for signs of cardiac infection, LUKAS is planned.     P:  -Continue IV vancomycin and PO ciprofloxacin. Maintain vanc trough in 15-20 range.   -Followup on subsequent blood cultures.   -Followup on LUKAS findings.   -Recommend consult to ortho for suspected septic arthritis of the right knee. Order placed by primary team.   -Will continue to follow.         Thank you for your consult. I will follow-up with patient. Please contact us if you have any additional questions.    Mk Cook MD  Infectious Disease  [facility]    Subjective:     Principal Problem:Diabetic foot infection    HPI: 64 yo M with Hx of DM2 (on insulin), chronic hypoxemic respiratory failure (on O2 at home), chronic systolic heart failure presented to ED after recurrent falls 7 and 8 days ago. He has a history of falls but reports that his tendency to fall has worsened recently, resulting in injury to his lower back and right thigh. After his fall last Tuesday he looked at his left foot and noticed a wound, denies pain or drainage related " to the wound, he admits to not regularly checking his feet and does not know how long the wound has been present. He believes the wound is related to a tack that he previously found imbedded in the bottom of a slipper. He had previously seen Ang Lugo DPM for diabetic foot care but has not seen her this year due to fear of the coronavirus pandemic. He has been getting home health services including physical therapy. No subjective change as of today, still denies pain and drainage related to the wound. Denies F/C/N/V.  Interval History: NAEON. Still tachycardic. SpO2 transiently reached 92% today, still on 3.5 L/min oxygen. WBCs not improving. ESR 80, .1. Recent vancomycin levels have been supratherapeutic and renal function has been declining, pulse dosing initiated. Discussed need for LUKAS with patient, this had not previously been discussed with him, he expressed understanding. Patient is reporting worsening pain in the right knee, 8/10 at rest up to 10/10 with motion. Patient reports history of severe injury to left ankle/rearfoot over 2 decades ago with subsequent occasional pain which may account for inflammatory changes seen at talocalcaneal joint on MRI.     Review of Systems   Constitutional: Negative for chills and fever.   HENT: Negative for congestion, ear pain, sneezing and sore throat.    Eyes: Negative for pain.   Respiratory: Negative for cough and shortness of breath.    Cardiovascular: Negative for chest pain.   Gastrointestinal: Negative for abdominal pain, constipation, diarrhea, nausea and vomiting.   Endocrine: Negative for polyuria.   Genitourinary: Negative for decreased urine volume, difficulty urinating, dysuria and flank pain.   Musculoskeletal: Positive for arthralgias (R knee pain), back pain and gait problem. Negative for neck pain and neck stiffness.   Skin: Positive for wound. Negative for rash.   Neurological: Positive for numbness. Negative for headaches.    Psychiatric/Behavioral: Negative for confusion. The patient is not nervous/anxious.      Objective:     Vital Signs (Most Recent):  Temp: 97.7 °F (36.5 °C) (09/03/20 0735)  Pulse: 74 (09/03/20 0836)  Resp: 18 (09/03/20 0836)  BP: 121/64 (09/03/20 0735)  SpO2: (!) 92 % (09/03/20 0735) Vital Signs (24h Range):  Temp:  [96.4 °F (35.8 °C)-99.2 °F (37.3 °C)] 97.7 °F (36.5 °C)  Pulse:  [] 74  Resp:  [16-20] 18  SpO2:  [92 %-100 %] 92 %  BP: ()/(56-72) 121/64     Weight: 120.4 kg (265 lb 6.9 oz)  Body mass index is 32.31 kg/m².    Estimated Creatinine Clearance: 59.5 mL/min (A) (based on SCr of 1.8 mg/dL (H)).    Physical Exam  Vitals signs reviewed.   Constitutional:       General: He is not in acute distress.     Appearance: Normal appearance. He is normal weight. He is not diaphoretic.   HENT:      Head: Normocephalic and atraumatic.      Right Ear: External ear normal.      Left Ear: External ear normal.      Nose: Nose normal. No congestion or rhinorrhea.   Eyes:      General:         Right eye: No discharge.         Left eye: No discharge.      Extraocular Movements: Extraocular movements intact.   Neck:      Musculoskeletal: Normal range of motion and neck supple. No neck rigidity or muscular tenderness.   Cardiovascular:      Rate and Rhythm: Regular rhythm. Tachycardia present.      Heart sounds: No murmur.      Comments: Diminished DP/PT pulses  Pulmonary:      Effort: Pulmonary effort is normal. No respiratory distress.      Breath sounds: No wheezing.      Comments: On 3.5 L/m oxygen  Chest:      Chest wall: No tenderness.   Abdominal:      General: There is no distension.      Palpations: Abdomen is soft.      Tenderness: There is no abdominal tenderness. There is no guarding.   Musculoskeletal: Normal range of motion.         General: Tenderness (right anterior thigh, lower back) present.      Comments: Swelling, warmth, tenderness of the right knee. Pain out of proportion on attempted PROM of  right knee.    Skin:     General: Skin is warm and dry.      Findings: Lesion (left plantar foot, 4.0x1.5x0.3 cm, no drainage at time of exam, no probe to bone or undermining, not tender) present.   Neurological:      Mental Status: He is alert and oriented to person, place, and time.      Sensory: Sensory deficit present.      Gait: Gait abnormal.   Psychiatric:         Mood and Affect: Mood normal.         Behavior: Behavior normal.         Thought Content: Thought content normal.         Judgment: Judgment normal.         Significant Labs:   Blood Culture:   Recent Labs   Lab 08/30/20  1543 08/31/20  1324 08/31/20  1328 09/01/20  1417 09/02/20  1441   LABBLOO Gram stain aer bottle: Gram positive cocci in clusters resembling Staph   Gram stain oksana bottle: Gram positive cocci in clusters resembling Staph   Results called to and read back by: Elsy Pena RN 08/31/2020  12:35  METHICILLIN RESISTANT STAPHYLOCOCCUS AUREUS  ID consult required at [facility].  For susceptibility see order # [id number]  * Gram stain aer bottle: Gram positive cocci   Gram stain oksana bottle: Gram positive cocci   Results called to and read back by: Elsy Pena  09/01/2020  11:46  METHICILLIN RESISTANT STAPHYLOCOCCUS AUREUS  ID consult required at [facility].  For susceptibility see order #[id number]  * Gram stain oksana bottle: Gram positive cocci   Results called to and read back by: Elsy Pena  09/01/2020  11:46  Gram stain aer bottle: Gram positive cocci in clusters resembling Staph   Positive results previously called  METHICILLIN RESISTANT STAPHYLOCOCCUS AUREUS  ID consult required at [facility].  For susceptibility see order #[id number]  * Gram stain aer bottle: Gram positive cocci   Results called to and read back by: Tuesday Christi   09/02/2020  12:06  METHICILLIN RESISTANT STAPHYLOCOCCUS AUREUS  ID consult required at [facility]  [facility] and [place] locations.  For susceptibility see order #[id number]  * No Growth to date     CBC:   Recent Labs   Lab 09/02/20  0352 09/03/20  0410   WBC 19.52* 19.52*   HGB 9.8* 9.7*   HCT 32.6* 32.4*   * 408*     CMP:   Recent Labs   Lab 09/02/20  0352 09/03/20  0410    137   K 3.6 3.8   CL 98 99   CO2 27 26   * 280*   BUN 43* 46*   CREATININE 1.8* 1.8*   CALCIUM 8.7 9.0   ANIONGAP 13 12   EGFRNONAA 39.2* 39.2*     Lactic Acid: No results for input(s): LACTATE in the last 48 hours.  Pathology Results  (Last 10 years)    None        POCT Glucose:   Recent Labs   Lab 09/02/20  1633 09/02/20  2128 09/03/20  0821   POCTGLUCOSE 245* 226* 260*     Urine Culture:   Recent Labs   Lab 08/30/20  1633   LABURIN KLEBSIELLA PNEUMONIAE  > 100,000 cfu/ml  *     Wound Culture:   Recent Labs   Lab 08/31/20  1754   LABAERO PROTEUS MIRABILIS  Moderate  *  STAPHYLOCOCCUS AUREUS  Moderate  Susceptibility pending  *       Significant Imaging: None

## 2020-09-03 NOTE — PLAN OF CARE
Problem: Occupational Therapy Goal  Goal: Occupational Therapy Goal  Description: Goals to be met by:10/01/2020    Patient will increase functional independence with ADLs by performing:    UE Dressing with [place].  LE Dressing with Stand-by Assistance.  Grooming while seated at sink with [place].  Toileting from bedside commode with Supervision for hygiene and clothing management.   Bathing from  sitting at sink with Set-up Assistance.  Toilet transfer to bedside commode with Stand-by Assistance, accurate adherence to NWB precautions LLE.    Outcome: Ongoing, Progressing   Patient's goals are appropriate.  ELISEO Stokes  9/3/2020

## 2020-09-03 NOTE — PLAN OF CARE
Pt remains free from falls and injury. Provided with PRN analgesic with moderate results. Bed low and locked, Call light within reach.

## 2020-09-03 NOTE — TELEPHONE ENCOUNTER
Pt is currently an inpatient at [facility]. Per pt's request [facility] added pt's name to the  waiting list for the [employer] meals on wheels program.

## 2020-09-03 NOTE — PT/OT/SLP PROGRESS
Occupational Therapy   Treatment    Name: Ed Patton  MRN: [id number]  Admitting Diagnosis:  Diabetic foot infection  Day of Surgery    Recommendations:     Discharge Recommendations: nursing facility, skilled  Discharge Equipment Recommendations:  bedside commode, wheelchair, walker, rolling  Barriers to discharge:  Decreased caregiver support    Assessment:     Ed Patton is a 65 y.o. male with a medical diagnosis of Diabetic foot infection. Performance deficits affecting function are weakness, impaired endurance, impaired self care skills, impaired functional mobilty, gait instability, impaired balance, impaired cognition, pain, decreased ROM, orthopedic precautions, impaired skin, decreased lower extremity function. Patient seen on this date and educated patient with L LE NWB. Despite maximal cueing verbally and tactilly, patient was unable to maintain weightbearing status on multiple attempts sit<>stands with RW. Patient needed max A<>Total A of 2 assists each attempt. After treating patient, hospital medicine and podiatry via [employer] was messaged to provide an update to L leg weightbearing status with notification of DARCO shoe in room. They agreed to L LE WBAT as long as patient had DARCO shoe on in future therapy sessions. Patient would benefit from continued skilled acute OT 3x/wk to improve functional mobility, increase independence with ADLs, and address established goals. Recommending SNF once medically appropriate for discharge to increase maximal independence, reduce burden of care, and ensure safety.     Rehab Prognosis:  Good; patient would benefit from acute skilled OT services to address these deficits and reach maximum level of function.       Plan:     Patient to be seen 3 x/week to address the above listed problems via self-care/home management, therapeutic activities, therapeutic exercises  · Plan of Care Expires: 10/01/20  · Plan of Care Reviewed with: patient    Subjective      Pain/Comfort:  · Pain Rating 1: 0/10  · Pain Rating Post-Intervention 1: 0/10    Objective:     Communicated with: NSG prior to session.  Patient found HOB elevated with oxygen, telemetry, pulse ox (continuous) upon OT entry to room.    General Precautions: Standard, fall, contact   Orthopedic Precautions:LLE weight bearing as tolerated   Braces: (patient must wear DARCO shoe. Clarification and new orders enters regarding weight bearing status from MD on 9/3/2020)     Occupational Performance:     Bed Mobility:    · Patient completed Supine to Sit with moderate assistance  · Patient completed Sit to Supine with minimum assistance     Functional Mobility/Transfers:  · Patient completed Sit <> Stand Transfer with maximal assistance<>total assistance with  rolling walker 3 attempts. Patient was unable to maintain NWB precautions each attempt. Second attempt with therapist foot under L foot to cue patient not to put weight on floor/therapist foot and once with therapist holding L LE off of ground.     Activities of Daily Living:  Lower Body Dressing: total assistance Wife assisted patient with donning shorts while in bed prior to OT session, but they were not all the way pulled up. When patient attempted first sit<>stand from EOB, therapist had to pull them up for patient.     [facility] 6 Click ADL: 17    Treatment & Education:  Patient performed B UE ROM exercises in all planes and joints focusing to improve strength and endurance to increase independence with ADLs 1 x 10 sitting EOB.     Role of OT and POC  Safety  ADL retraining  Functional mobility training  Importance of weightbearing status    Patient left HOB elevated with call button in reach and all needs met. Education:      GOALS:   Multidisciplinary Problems     Occupational Therapy Goals        Problem: Occupational Therapy Goal    Goal Priority Disciplines Outcome Interventions   Occupational Therapy Goal     OT, PT/OT Ongoing, Progressing    Description:  Goals to be met by:10/01/2020    Patient will increase functional independence with ADLs by performing:    UE Dressing with [place].  LE Dressing with Stand-by Assistance.  Grooming while seated at sink with [place].  Toileting from bedside commode with Supervision for hygiene and clothing management.   Bathing from  sitting at sink with Set-up Assistance.  Toilet transfer to bedside commode with Stand-by Assistance, accurate adherence to NWB precautions LLE.                     Time Tracking:     OT Date of Treatment: 09/03/20  OT Start Time: 1335  OT Stop Time: 1402  OT Total Time (min): 27 min    Billable Minutes:Self Care/Home Management 17  Therapeutic Exercise 10    ELISEO Stokes  9/3/2020

## 2020-09-03 NOTE — HOSPITAL COURSE
He was put on antibiotics. Blood cultures taken on admission grew methicillin-resistant Staphylococcus aureus. Foot wound cultures grew Proteus mirabilis and Staphylococcus aureus. Urine culture grew Klebsiella pneumoniae. Foot MRI showed no osteomyelitis or abscess. Physical and Occupational Therapy recommended skilled nursing facility. Infectious Disease was concerned about his right knee pain, which is chronic but worsened after a fall. Orthopedic Surgery was consulted and performed right knee arthrocentesis, finding fluid with 10566 white blood cells with 90% segmented neutrophils. Echocardiogram showed no valvular vegetations.

## 2020-09-03 NOTE — PT/OT/SLP PROGRESS
Physical Therapy  Treatment    Ed Patton   [id number]    Time Tracking:     PT Received On: 09/03/20   PT Start Time: 1335   PT Stop Time: 1405   PT Total Time (min): 30 min    Billable Minutes: Therapeutic Activity 15 and Neuromuscular Re-education 15      Recommendations:     Discharge recommendations: Skilled Nursing Facility     Equipment recommendations: Rolling Walker and Bedside Commode    Barriers to Discharge: Not ready to discharge home from a mobility standpoint, needs further therapy.    Patient Information:     Recent Surgery: none    Diagnosis: Diabetic foot infection    Length of Stay: 4 days    General Precautions: Standard, fall, contact  Orthopedic Precautions: LLE NWB (I messaged podiatry after session regarding weightbearing status and obtained new order for LLE WBAT in darco shoe)  Brace: None (despite darco shoe being in room in plastic wrapping)    Assessment:     Ed Patton tolerated treatment fair today. He is very pleasant and agreeable to participate. Current PT/OT orders in chart list for LLE non-weightbearing so he is being treated as such. He is unable to maintain weightbearing precaution with transfers today. Despite max cueing to not use LLE to stand, continuously attempts to do so. Had OT kneel on floor and hold L foot off floor while PT assisting patient to stand but he is unable to achieve full stand. I do feel that if he were allowed to weightbear onto LLE that he could be able to stand with min/moderate assist and possibly take a few steps with a rolling walker at this current time. I've messaged [facility] medicine and podiatry via [employer] to get update to L leg weightbearing status; recent podiatry notes indicating MRI (-) for osteo, no acute fracture. PT/OT hopeful that patient is allowed to weightbear with darco shoe to L foot which will greatly help his progress and increasing mobility. Discussed PT role, POC, goals and recommendations (Skilled Nursing  Facility) with patient; verbalized understanding. Ed Patton will continue to benefit from acute PT services to promote mobility during this admission and improve return to PLOF.    Addendum: received new orders from podiatry after completion of this session; ok for patient to weightbear as tolerated in darco shoe to LLE    Problem List: weakness, decreased endurance, impaired self-care skills, impaired mobility, decreased sitting or standing balance, gait instability, decreased cognition and orthopedic precautions    Rehab Prognosis: Good; patient would benefit from acute skilled PT services to address these deficits and reach maximum level of function.    Plan:     Patient to be seen 4 x/week to address the above listed problems via gait training, therapeutic activities, therapeutic exercises, neuromuscular re-education    Plan of Care Expires: 10/01/20  Plan of Care reviewed with: patient    Subjective:     Communicated with RN prior to treatment, appropriate to see for treatment.    Pt found supine in bed (HOB elevated) upon PT entry to room, agreeable to treatment.    Does this patient have any cultural, spiritual, Jainism conflicts given the current situation? Patient/family has no barriers to learning. Patient/family verbalizes understanding of his/her program and goals and demonstrates them correctly. No cultural, spiritual, or educational needs identified.    Objective:     Patient found with: oxygen, telemetry, pulse ox (continuous)    Pain:  Pain Rating 1: 0/10  Pain Rating Post-Intervention 1: 0/10    Functional Mobility:    · Bed Mobility:  · Supine to Sitting: moderate assist for trunk elevation and LE assistance  · Sitting to Supine: min A, max cueing    · Transfers:  · Sit to Stand: max-total A from elevated EOB with RW x 3 attempts; however, he is unable to achieve full standing within LLE weightbearing (NWB) precautions    · Gait:  · NT (see above, transfers)    · Balance:  · Static Sit:  Stand-By Assist at EOB for 20 minutes; participating in therex and ADL's    Additional Therapeutic Activity/Exercises:     1. Current PT/OT orders in chart list for LLE non-weightbearing so he is being treated as such. He is unable to maintain weightbearing precaution with transfers today. Despite max cueing to not use LLE to stand, continuously attempts to do so. Had OT kneel on floor and hold L foot off floor while PT assisting patient to stand but he is unable to achieve full stand. I do feel that if he were allowed to weightbear onto LLE that he could be able to stand with min/moderate assist and possibly take a few steps with a rolling walker at this current time.    2. I've messaged hospital medicine and podiatry via [employer] to get update to L leg weightbearing status; recent podiatry notes indicating MRI (-) for osteo, no acute fracture. PT/OT hopeful that patient is allowed to weightbear with darco shoe to L foot which will greatly help his progress and increasing mobility.    3. Discussed PT role, POC, goals and recommendations (Skilled Nursing Facility) with patient; verbalized understanding.    AM-PAC 6 CLICK MOBILITY  Turning over in bed (including adjusting bedclothes, sheets and blankets)?: 3  Sitting down on and standing up from a chair with arms (e.g., wheelchair, bedside commode, etc.): 2  Moving from lying on back to sitting on the side of the bed?: 3  Moving to and from a bed to a chair (including a wheelchair)?: 2  Need to walk in hospital room?: 1  Climbing 3-5 steps with a railing?: 1  Basic Mobility Total Score: 12    Patient was left supine in bed (HOB elevated) with all lines intact, call button in reach and bed alarm on.    GOALS:   Multidisciplinary Problems     Physical Therapy Goals        Problem: Physical Therapy Goal    Goal Priority Disciplines Outcome Goal Variances Interventions   Physical Therapy Goal     PT, PT/OT Ongoing, Progressing     Description: Goals to be met by:  9/15/20    Patient will increase functional independence with mobility by performin. Supine to sit with Stand-by Assistance - Not met  2. Sit to supine with Stand-by Assistance - Not met  3. Sit to stand transfer with Stand-by Assistance with RW - Not met  4. Gait  x 50 feet with Stand-by Assistance using Rolling Walker and maintenance of weight bearing status - Not met  5.  Patient will demonstrate independence with a home exercise program - Not met  6. Patient's balance will be GOOD: Needs SUPERVISION only during gait and able to self right with moderate LOB - Not met                 Yazan So, PT   9/3/2020

## 2020-09-03 NOTE — SUBJECTIVE & OBJECTIVE
Interval History: NAEON. Still tachycardic. SpO2 transiently reached 92% today, still on 3.5 L/min oxygen. WBCs not improving. ESR 80, .1. Recent vancomycin levels have been supratherapeutic and renal function has been declining, pulse dosing initiated. Discussed need for LUKAS with patient, this had not previously been discussed with him, he expressed understanding. Patient is reporting worsening pain in the right knee, 8/10 at rest up to 10/10 with motion. Patient reports history of severe injury to left ankle/rearfoot over 2 decades ago with subsequent occasional pain which may account for inflammatory changes seen at talocalcaneal joint on MRI.     Review of Systems   Constitutional: Negative for chills and fever.   HENT: Negative for congestion, ear pain, sneezing and sore throat.    Eyes: Negative for pain.   Respiratory: Negative for cough and shortness of breath.    Cardiovascular: Negative for chest pain.   Gastrointestinal: Negative for abdominal pain, constipation, diarrhea, nausea and vomiting.   Endocrine: Negative for polyuria.   Genitourinary: Negative for decreased urine volume, difficulty urinating, dysuria and flank pain.   Musculoskeletal: Positive for arthralgias (R knee pain), back pain and gait problem. Negative for neck pain and neck stiffness.   Skin: Positive for wound. Negative for rash.   Neurological: Positive for numbness. Negative for headaches.   Psychiatric/Behavioral: Negative for confusion. The patient is not nervous/anxious.      Objective:     Vital Signs (Most Recent):  Temp: 97.7 °F (36.5 °C) (09/03/20 0735)  Pulse: 74 (09/03/20 0836)  Resp: 18 (09/03/20 0836)  BP: 121/64 (09/03/20 0735)  SpO2: (!) 92 % (09/03/20 0735) Vital Signs (24h Range):  Temp:  [96.4 °F (35.8 °C)-99.2 °F (37.3 °C)] 97.7 °F (36.5 °C)  Pulse:  [] 74  Resp:  [16-20] 18  SpO2:  [92 %-100 %] 92 %  BP: ()/(56-72) 121/64     Weight: 120.4 kg (265 lb 6.9 oz)  Body mass index is 32.31  kg/m².    Estimated Creatinine Clearance: 59.5 mL/min (A) (based on SCr of 1.8 mg/dL (H)).    Physical Exam  Vitals signs reviewed.   Constitutional:       General: He is not in acute distress.     Appearance: Normal appearance. He is normal weight. He is not diaphoretic.   HENT:      Head: Normocephalic and atraumatic.      Right Ear: External ear normal.      Left Ear: External ear normal.      Nose: Nose normal. No congestion or rhinorrhea.   Eyes:      General:         Right eye: No discharge.         Left eye: No discharge.      Extraocular Movements: Extraocular movements intact.   Neck:      Musculoskeletal: Normal range of motion and neck supple. No neck rigidity or muscular tenderness.   Cardiovascular:      Rate and Rhythm: Regular rhythm. Tachycardia present.      Heart sounds: No murmur.      Comments: Diminished DP/PT pulses  Pulmonary:      Effort: Pulmonary effort is normal. No respiratory distress.      Breath sounds: No wheezing.      Comments: On 3.5 L/m oxygen  Chest:      Chest wall: No tenderness.   Abdominal:      General: There is no distension.      Palpations: Abdomen is soft.      Tenderness: There is no abdominal tenderness. There is no guarding.   Musculoskeletal: Normal range of motion.         General: Tenderness (right anterior thigh, lower back) present.      Comments: Swelling, warmth, tenderness of the right knee. Pain out of proportion on attempted PROM of right knee.    Skin:     General: Skin is warm and dry.      Findings: Lesion (left plantar foot, 4.0x1.5x0.3 cm, no drainage at time of exam, no probe to bone or undermining, not tender) present.   Neurological:      Mental Status: He is alert and oriented to person, place, and time.      Sensory: Sensory deficit present.      Gait: Gait abnormal.   Psychiatric:         Mood and Affect: Mood normal.         Behavior: Behavior normal.         Thought Content: Thought content normal.         Judgment: Judgment normal.          Significant Labs:   Blood Culture:   Recent Labs   Lab 08/30/20  1543 08/31/20  1324 08/31/20  1328 09/01/20  1417 09/02/20  1441   LABBLOO Gram stain aer bottle: Gram positive cocci in clusters resembling Staph   Gram stain oksana bottle: Gram positive cocci in clusters resembling Staph   Results called to and read back by: Elsy Pena RN 08/31/2020  12:35  METHICILLIN RESISTANT STAPHYLOCOCCUS AUREUS  ID consult required at [facility].  For susceptibility see order # [id number]  * Gram stain aer bottle: Gram positive cocci   Gram stain oksana bottle: Gram positive cocci   Results called to and read back by: Elsy Pena  09/01/2020  11:46  METHICILLIN RESISTANT STAPHYLOCOCCUS AUREUS  ID consult required at [facility].  For susceptibility see order #[id number]  * Gram stain oksana bottle: Gram positive cocci   Results called to and read back by: Elsy Pena  09/01/2020  11:46  Gram stain aer bottle: Gram positive cocci in clusters resembling Staph   Positive results previously called  METHICILLIN RESISTANT STAPHYLOCOCCUS AUREUS  ID consult required at [facility].  For susceptibility see order #[id number]  * Gram stain aer bottle: Gram positive cocci   Results called to and read back by: Jolene Barraza   09/02/2020  12:06  METHICILLIN RESISTANT STAPHYLOCOCCUS AUREUS  ID consult required at [facility].  For susceptibility see order #[id number]  * No Growth to date     CBC:   Recent Labs   Lab 09/02/20  0352 09/03/20  0410   WBC 19.52* 19.52*   HGB 9.8* 9.7*   HCT 32.6* 32.4*   * 408*     CMP:   Recent Labs   Lab 09/02/20  0352 09/03/20  0410    137   K 3.6 3.8   CL 98 99   CO2 27 26   * 280*   BUN 43* 46*   CREATININE 1.8* 1.8*   CALCIUM 8.7 9.0   ANIONGAP 13 12   EGFRNONAA 39.2* 39.2*     Lactic Acid: No results for input(s): LACTATE in the last 48  hours.  Pathology Results  (Last 10 years)    None        POCT Glucose:   Recent Labs   Lab 09/02/20  1633 09/02/20  2128 09/03/20  0821   POCTGLUCOSE 245* 226* 260*     Urine Culture:   Recent Labs   Lab 08/30/20  1633   LABURIN KLEBSIELLA PNEUMONIAE  > 100,000 cfu/ml  *     Wound Culture:   Recent Labs   Lab 08/31/20  1754   LABAERO PROTEUS MIRABILIS  Moderate  *  STAPHYLOCOCCUS AUREUS  Moderate  Susceptibility pending  *       Significant Imaging: None

## 2020-09-03 NOTE — PLAN OF CARE
NO FALLS  COVID SCREEN PERFORMED  TB SKIN TEST TO LEFT FOREARM ADMINISTERED  RIGHT KNEE FLUID ASPIRATION PERFORMED  NPO AT MIDNIGHT FOR LUKAS IN AM  POSITIVE BLOOD CULTURES NOTED  XRAY OF RIGHT KNEE PERFORMED

## 2020-09-04 ENCOUNTER — OUTPATIENT CASE MANAGEMENT (OUTPATIENT)
Dept: ADMINISTRATIVE | Facility: OTHER | Age: 65
End: 2020-09-04

## 2020-09-04 ENCOUNTER — ANESTHESIA (OUTPATIENT)
Dept: MEDSURG UNIT | Facility: HOSPITAL | Age: 65
DRG: 853 | End: 2020-09-04
Payer: MEDICARE

## 2020-09-04 PROBLEM — A41.02 SEPSIS DUE TO METHICILLIN RESISTANT STAPHYLOCOCCUS AUREUS: Status: ACTIVE | Noted: 2020-08-30

## 2020-09-04 LAB
ANION GAP SERPL CALC-SCNC: 14 MMOL/L (ref 8–16)
BACTERIA BLD CULT: ABNORMAL
BACTERIA SPEC ANAEROBE CULT: NORMAL
BASOPHILS # BLD AUTO: 0.08 K/UL (ref 0–0.2)
BASOPHILS NFR BLD: 0.4 % (ref 0–1.9)
BSA FOR ECHO PROCEDURE: 2.5 M2
BUN SERPL-MCNC: 40 MG/DL (ref 8–23)
CALCIUM SERPL-MCNC: 9.1 MG/DL (ref 8.7–10.5)
CHLORIDE SERPL-SCNC: 100 MMOL/L (ref 95–110)
CO2 SERPL-SCNC: 24 MMOL/L (ref 23–29)
CREAT SERPL-MCNC: 1.5 MG/DL (ref 0.5–1.4)
DIFFERENTIAL METHOD: ABNORMAL
EOSINOPHIL # BLD AUTO: 0.8 K/UL (ref 0–0.5)
EOSINOPHIL NFR BLD: 3.5 % (ref 0–8)
ERYTHROCYTE [DISTWIDTH] IN BLOOD BY AUTOMATED COUNT: 15.6 % (ref 11.5–14.5)
EST. GFR  (AFRICAN AMERICAN): 56.5 ML/MIN/1.73 M^2
EST. GFR  (NON AFRICAN AMERICAN): 48.8 ML/MIN/1.73 M^2
GLUCOSE SERPL-MCNC: 169 MG/DL (ref 70–110)
HCT VFR BLD AUTO: 31.4 % (ref 40–54)
HGB BLD-MCNC: 9.6 G/DL (ref 14–18)
IMM GRANULOCYTES # BLD AUTO: 0.22 K/UL (ref 0–0.04)
IMM GRANULOCYTES NFR BLD AUTO: 1 % (ref 0–0.5)
LYMPHOCYTES # BLD AUTO: 1.1 K/UL (ref 1–4.8)
LYMPHOCYTES NFR BLD: 5.2 % (ref 18–48)
MAGNESIUM SERPL-MCNC: 1.9 MG/DL (ref 1.6–2.6)
MCH RBC QN AUTO: 26.8 PG (ref 27–31)
MCHC RBC AUTO-ENTMCNC: 30.6 G/DL (ref 32–36)
MCV RBC AUTO: 88 FL (ref 82–98)
MONOCYTES # BLD AUTO: 1.4 K/UL (ref 0.3–1)
MONOCYTES NFR BLD: 6.6 % (ref 4–15)
NEUTROPHILS # BLD AUTO: 17.8 K/UL (ref 1.8–7.7)
NEUTROPHILS NFR BLD: 83.3 % (ref 38–73)
NRBC BLD-RTO: 0 /100 WBC
PATH INTERP FLD-IMP: NORMAL
PHOSPHATE SERPL-MCNC: 3.7 MG/DL (ref 2.7–4.5)
PLATELET # BLD AUTO: 418 K/UL (ref 150–350)
PMV BLD AUTO: 11.1 FL (ref 9.2–12.9)
POCT GLUCOSE: 142 MG/DL (ref 70–110)
POCT GLUCOSE: 187 MG/DL (ref 70–110)
POCT GLUCOSE: 235 MG/DL (ref 70–110)
POCT GLUCOSE: 259 MG/DL (ref 70–110)
POCT GLUCOSE: 272 MG/DL (ref 70–110)
POCT GLUCOSE: 346 MG/DL (ref 70–110)
POTASSIUM SERPL-SCNC: 3.6 MMOL/L (ref 3.5–5.1)
RBC # BLD AUTO: 3.58 M/UL (ref 4.6–6.2)
SODIUM SERPL-SCNC: 138 MMOL/L (ref 136–145)
WBC # BLD AUTO: 21.41 K/UL (ref 3.9–12.7)

## 2020-09-04 PROCEDURE — 84100 ASSAY OF PHOSPHORUS: CPT | Mod: HCNC

## 2020-09-04 PROCEDURE — D9220A PRA ANESTHESIA: ICD-10-PCS | Mod: HCNC,ANES,, | Performed by: ANESTHESIOLOGY

## 2020-09-04 PROCEDURE — 27000221 HC OXYGEN, UP TO 24 HOURS: Mod: HCNC

## 2020-09-04 PROCEDURE — 99232 PR SUBSEQUENT HOSPITAL CARE,LEVL II: ICD-10-PCS | Mod: HCNC,,, | Performed by: HOSPITALIST

## 2020-09-04 PROCEDURE — D9220A PRA ANESTHESIA: ICD-10-PCS | Mod: HCNC,CRNA,, | Performed by: NURSE ANESTHETIST, CERTIFIED REGISTERED

## 2020-09-04 PROCEDURE — 83735 ASSAY OF MAGNESIUM: CPT | Mod: HCNC

## 2020-09-04 PROCEDURE — 63600175 PHARM REV CODE 636 W HCPCS: Mod: HCNC | Performed by: NURSE ANESTHETIST, CERTIFIED REGISTERED

## 2020-09-04 PROCEDURE — 25000003 PHARM REV CODE 250: Mod: HCNC | Performed by: HOSPITALIST

## 2020-09-04 PROCEDURE — 63600175 PHARM REV CODE 636 W HCPCS: Mod: HCNC | Performed by: HOSPITALIST

## 2020-09-04 PROCEDURE — 97530 THERAPEUTIC ACTIVITIES: CPT | Mod: HCNC

## 2020-09-04 PROCEDURE — 25000003 PHARM REV CODE 250: Mod: HCNC | Performed by: NURSE ANESTHETIST, CERTIFIED REGISTERED

## 2020-09-04 PROCEDURE — 99233 SBSQ HOSP IP/OBS HIGH 50: CPT | Mod: HCNC,,, | Performed by: INTERNAL MEDICINE

## 2020-09-04 PROCEDURE — 99233 PR SUBSEQUENT HOSPITAL CARE,LEVL III: ICD-10-PCS | Mod: HCNC,,, | Performed by: INTERNAL MEDICINE

## 2020-09-04 PROCEDURE — 94761 N-INVAS EAR/PLS OXIMETRY MLT: CPT | Mod: HCNC

## 2020-09-04 PROCEDURE — 99232 SBSQ HOSP IP/OBS MODERATE 35: CPT | Mod: HCNC,,, | Performed by: HOSPITALIST

## 2020-09-04 PROCEDURE — 82962 GLUCOSE BLOOD TEST: CPT | Mod: HCNC

## 2020-09-04 PROCEDURE — 80048 BASIC METABOLIC PNL TOTAL CA: CPT | Mod: HCNC

## 2020-09-04 PROCEDURE — 37000008 HC ANESTHESIA 1ST 15 MINUTES: Mod: HCNC

## 2020-09-04 PROCEDURE — 37000009 HC ANESTHESIA EA ADD 15 MINS: Mod: HCNC

## 2020-09-04 PROCEDURE — 36415 COLL VENOUS BLD VENIPUNCTURE: CPT | Mod: HCNC

## 2020-09-04 PROCEDURE — D9220A PRA ANESTHESIA: Mod: HCNC,ANES,, | Performed by: ANESTHESIOLOGY

## 2020-09-04 PROCEDURE — 97535 SELF CARE MNGMENT TRAINING: CPT | Mod: HCNC

## 2020-09-04 PROCEDURE — 85025 COMPLETE CBC W/AUTO DIFF WBC: CPT | Mod: HCNC

## 2020-09-04 PROCEDURE — 94640 AIRWAY INHALATION TREATMENT: CPT | Mod: HCNC

## 2020-09-04 PROCEDURE — D9220A PRA ANESTHESIA: Mod: HCNC,CRNA,, | Performed by: NURSE ANESTHETIST, CERTIFIED REGISTERED

## 2020-09-04 PROCEDURE — 11000001 HC ACUTE MED/SURG PRIVATE ROOM: Mod: HCNC

## 2020-09-04 RX ORDER — LIDOCAINE HYDROCHLORIDE 20 MG/ML
SOLUTION OROPHARYNGEAL
Status: DISCONTINUED | OUTPATIENT
Start: 2020-09-04 | End: 2020-09-04

## 2020-09-04 RX ORDER — SODIUM CHLORIDE 9 MG/ML
INJECTION, SOLUTION INTRAVENOUS CONTINUOUS PRN
Status: DISCONTINUED | OUTPATIENT
Start: 2020-09-04 | End: 2020-09-04

## 2020-09-04 RX ORDER — PROPOFOL 10 MG/ML
VIAL (ML) INTRAVENOUS
Status: DISCONTINUED | OUTPATIENT
Start: 2020-09-04 | End: 2020-09-04

## 2020-09-04 RX ORDER — LIDOCAINE HYDROCHLORIDE 20 MG/ML
INJECTION INTRAVENOUS
Status: DISCONTINUED | OUTPATIENT
Start: 2020-09-04 | End: 2020-09-04

## 2020-09-04 RX ORDER — PROPOFOL 10 MG/ML
VIAL (ML) INTRAVENOUS CONTINUOUS PRN
Status: DISCONTINUED | OUTPATIENT
Start: 2020-09-04 | End: 2020-09-04

## 2020-09-04 RX ORDER — ACETAMINOPHEN 500 MG
1000 TABLET ORAL EVERY 8 HOURS PRN
Status: DISCONTINUED | OUTPATIENT
Start: 2020-09-04 | End: 2020-09-12

## 2020-09-04 RX ORDER — OXYCODONE HYDROCHLORIDE 5 MG/1
5 TABLET ORAL EVERY 8 HOURS PRN
Status: DISCONTINUED | OUTPATIENT
Start: 2020-09-04 | End: 2020-09-20

## 2020-09-04 RX ORDER — KETAMINE HCL IN 0.9 % NACL 50 MG/5 ML
SYRINGE (ML) INTRAVENOUS
Status: DISCONTINUED | OUTPATIENT
Start: 2020-09-04 | End: 2020-09-04

## 2020-09-04 RX ADMIN — ATORVASTATIN CALCIUM 40 MG: 20 TABLET, FILM COATED ORAL at 09:09

## 2020-09-04 RX ADMIN — PROPOFOL 10 MG: 10 INJECTION, EMULSION INTRAVENOUS at 02:09

## 2020-09-04 RX ADMIN — CIPROFLOXACIN 500 MG: 500 TABLET, FILM COATED ORAL at 09:09

## 2020-09-04 RX ADMIN — LIDOCAINE HYDROCHLORIDE 15 ML: 20 SOLUTION OROPHARYNGEAL at 02:09

## 2020-09-04 RX ADMIN — FLUTICASONE PROPIONATE 50 MCG: 50 SPRAY, METERED NASAL at 09:09

## 2020-09-04 RX ADMIN — CHOLECALCIFEROL (VITAMIN D3) 25 MCG (1,000 UNIT) TABLET 1000 UNITS: at 09:09

## 2020-09-04 RX ADMIN — FUROSEMIDE 80 MG: 40 TABLET ORAL at 04:09

## 2020-09-04 RX ADMIN — ENOXAPARIN SODIUM 40 MG: 40 INJECTION SUBCUTANEOUS at 04:09

## 2020-09-04 RX ADMIN — VANCOMYCIN HYDROCHLORIDE 750 MG: 750 INJECTION, POWDER, LYOPHILIZED, FOR SOLUTION INTRAVENOUS at 09:09

## 2020-09-04 RX ADMIN — SODIUM CHLORIDE: 9 INJECTION, SOLUTION INTRAVENOUS at 02:09

## 2020-09-04 RX ADMIN — Medication 20 MG: at 02:09

## 2020-09-04 RX ADMIN — FLUTICASONE FUROATE AND VILANTEROL TRIFENATATE 1 PUFF: 200; 25 POWDER RESPIRATORY (INHALATION) at 09:09

## 2020-09-04 RX ADMIN — PROPOFOL 40 MG: 10 INJECTION, EMULSION INTRAVENOUS at 02:09

## 2020-09-04 RX ADMIN — GABAPENTIN 100 MG: 100 CAPSULE ORAL at 01:09

## 2020-09-04 RX ADMIN — FUROSEMIDE 80 MG: 40 TABLET ORAL at 09:09

## 2020-09-04 RX ADMIN — ASPIRIN 81 MG: 81 TABLET, COATED ORAL at 09:09

## 2020-09-04 RX ADMIN — LIDOCAINE HYDROCHLORIDE 100 MG: 20 INJECTION, SOLUTION INTRAVENOUS at 02:09

## 2020-09-04 RX ADMIN — CARVEDILOL 25 MG: 25 TABLET, FILM COATED ORAL at 09:09

## 2020-09-04 RX ADMIN — INSULIN DETEMIR 29 UNITS: 100 INJECTION, SOLUTION SUBCUTANEOUS at 09:09

## 2020-09-04 RX ADMIN — INSULIN ASPART 2 UNITS: 100 INJECTION, SOLUTION INTRAVENOUS; SUBCUTANEOUS at 09:09

## 2020-09-04 RX ADMIN — LEVOTHYROXINE SODIUM 75 MCG: 25 TABLET ORAL at 06:09

## 2020-09-04 RX ADMIN — ACETAMINOPHEN 650 MG: 325 TABLET ORAL at 01:09

## 2020-09-04 RX ADMIN — LISINOPRIL 40 MG: 20 TABLET ORAL at 09:09

## 2020-09-04 RX ADMIN — INSULIN ASPART 3 UNITS: 100 INJECTION, SOLUTION INTRAVENOUS; SUBCUTANEOUS at 04:09

## 2020-09-04 RX ADMIN — PROPOFOL 125 MCG/KG/MIN: 10 INJECTION, EMULSION INTRAVENOUS at 02:09

## 2020-09-04 RX ADMIN — OXYCODONE 5 MG: 5 TABLET ORAL at 04:09

## 2020-09-04 RX ADMIN — INSULIN ASPART 14 UNITS: 100 INJECTION, SOLUTION INTRAVENOUS; SUBCUTANEOUS at 04:09

## 2020-09-04 RX ADMIN — INSULIN ASPART 4 UNITS: 100 INJECTION, SOLUTION INTRAVENOUS; SUBCUTANEOUS at 12:09

## 2020-09-04 NOTE — TRANSFER OF CARE
"Anesthesia Transfer of Care Note    Patient: Ed Patton    Procedure(s) Performed: Procedure(s) (LRB):  ECHOCARDIOGRAM, TRANSESOPHAGEAL (N/A)    Patient location: PACU    Anesthesia Type: general    Transport from OR: Transported from OR on 2-3 L/min O2 by NC with adequate spontaneous ventilation    Post pain: adequate analgesia    Post assessment: no apparent anesthetic complications and tolerated procedure well    Post vital signs: stable    Level of consciousness: sedated    Nausea/Vomiting: no nausea/vomiting    Complications: none    Transfer of care protocol was followed      Last vitals:   Visit Vitals  /70 (BP Location: Left arm, Patient Position: Lying)   Pulse 102   Temp 35.7 °C (96.2 °F) (Oral)   Resp 16   Ht 6' 4" (1.93 m)   Wt 120.4 kg (265 lb 6.9 oz)   SpO2 100%   BMI 32.31 kg/m²     "

## 2020-09-04 NOTE — PROGRESS NOTES
[facility]  Podiatry  Progress Note    Patient Name: Ed Patton  MRN: [id number]  Admission Date: 8/30/2020  Hospital Length of Stay: 5 days  Attending Physician: Lele Calderón MD  Primary Care Provider: Qiana Chow MD     Subjective:     Interval History: NAEON. Patient still having occasional episodes of hypoxia but his nasal cannula regularly displaces from his nose which may account for some occasions. Tachycardic today. Awaiting LUKAS today. Right knee tap yesterday did not confirm source of right knee pain. No new pedal complaints.     Follow-up For: Procedure(s) (LRB):  ECHOCARDIOGRAM, TRANSESOPHAGEAL (N/A)    Post-Operative Day: 1 Day Post-Op    Scheduled Meds:   aspirin  81 mg Oral Daily    atorvastatin  40 mg Oral Daily    carvediloL  25 mg Oral BID    ciprofloxacin HCl  500 mg Oral Q12H    enoxaparin  40 mg Subcutaneous Q24H    fluticasone furoate-vilanteroL  1 puff Inhalation Daily    fluticasone propionate  1 spray Each Nostril Daily    furosemide  80 mg Oral BID    insulin aspart U-100  14 Units Subcutaneous TIDWM    insulin detemir U-100  29 Units Subcutaneous QHS    levothyroxine  75 mcg Oral Before breakfast    lisinopriL  40 mg Oral Daily    vancomycin (VANCOCIN) IVPB  750 mg Intravenous Q12H    vitamin D  1,000 Units Oral Daily     Continuous Infusions:  PRN Meds:acetaminophen, albuterol-ipratropium, dextrose 50%, dextrose 50%, gabapentin, glucagon (human recombinant), glucose, glucose, insulin aspart U-100, melatonin, ondansetron, polyethylene glycol, sodium chloride 0.9%, DIPH,PERTUS (ADACEL),TETANUS PF VAC (ADULT), Pharmacy to dose Vancomycin consult **AND** vancomycin - pharmacy to dose    Review of Systems   Constitutional: Positive for activity change. Negative for appetite change, chills and fever.   HENT: Negative for congestion.    Respiratory: Negative for cough and shortness of breath.    Gastrointestinal: Negative for nausea and vomiting.    Musculoskeletal: Positive for arthralgias, back pain and myalgias.   Skin: Positive for wound. Negative for color change.   Neurological: Positive for weakness. Negative for numbness.   Psychiatric/Behavioral: Negative for behavioral problems and confusion.     Objective:     Vital Signs (Most Recent):  Temp: 96.2 °F (35.7 °C) (09/04/20 1112)  Pulse: 90 (09/04/20 1112)  Resp: 16 (09/04/20 1112)  BP: 118/72 (09/04/20 1112)  SpO2: 96 % (09/04/20 1112) Vital Signs (24h Range):  Temp:  [96.2 °F (35.7 °C)-99.1 °F (37.3 °C)] 96.2 °F (35.7 °C)  Pulse:  [] 90  Resp:  [16-20] 16  SpO2:  [90 %-100 %] 96 %  BP: (114-143)/(58-77) 118/72     Weight: 120.4 kg (265 lb 6.9 oz)  Body mass index is 32.31 kg/m².    Foot Exam    General  Orientation: alert and oriented to person, place, and time       Right Foot/Ankle     Inspection and Palpation  Tenderness: none   Swelling: none   Skin Exam: drainage and ulcer; no cellulitis and no erythema     Neurovascular  Dorsalis pedis: absent  Posterior tibial: absent      Left Foot/Ankle      Inspection and Palpation  Tenderness: none   Swelling: none   Skin Exam: skin intact;     Neurovascular  Dorsalis pedis: absent  Posterior tibial: absent            Laboratory:  CBC:   Recent Labs   Lab 09/04/20  0332   WBC 21.41*   RBC 3.58*   HGB 9.6*   HCT 31.4*   *   MCV 88   MCH 26.8*   MCHC 30.6*     CMP:   Recent Labs   Lab 08/30/20  1448  09/04/20  0332   *   < > 169*   CALCIUM 9.8   < > 9.1   ALBUMIN 2.8*  --   --    PROT 8.8*  --   --       < > 138   K 3.3*   < > 3.6   CO2 29   < > 24   CL 97   < > 100   BUN 24*   < > 40*   CREATININE 1.5*   < > 1.5*   ALKPHOS 100  --   --    ALT 23  --   --    AST 27  --   --    BILITOT 0.9  --   --     < > = values in this interval not displayed.     CRP:   Recent Labs   Lab 09/03/20  0410   .1*     ESR:   Recent Labs   Lab 09/03/20  0410   SEDRATE 80*     Wound Cultures:   Recent Labs   Lab 08/31/20  1754 09/03/20  1647    LABAERO PROTEUS MIRABILIS  Moderate  *  METHICILLIN RESISTANT STAPHYLOCOCCUS AUREUS  Moderate  * No growth     Microbiology Results (last 7 days)     Procedure Component Value Units Date/Time    AFB Culture & Smear [[id number]] Collected: 09/03/20 1647    Order Status: Completed Specimen: Joint Fluid from Knee, Right Updated: 09/04/20 1237     AFB CULTURE STAIN No acid fast bacilli seen.    Blood culture [[id number]]  (Abnormal) Collected: 09/02/20 1441    Order Status: Completed Specimen: Blood Updated: 09/04/20 0955     Blood Culture, Routine Gram stain oksana bottle: Gram positive cocci in clusters resembling Staph       Results called to and read back by: Rossana Garza RN 09/03/2020  10:12      Gram stain aer bottle: Gram positive cocci in clusters resembling Staph       Positive results previously called      STAPHYLOCOCCUS AUREUS  Susceptibility pending  ID consult required at [facility] and [facility].      Narrative:      right median cubital  right median    Blood culture [[id number]]  (Abnormal) Collected: 09/01/20 1417    Order Status: Completed Specimen: Blood from Antecubital, Right Hand Updated: 09/04/20 0838     Blood Culture, Routine Gram stain aer bottle: Gram positive cocci       Results called to and read back by: Tuesday Christi   09/02/2020  12:06      METHICILLIN RESISTANT STAPHYLOCOCCUS AUREUS  ID consult required at [facility].  For susceptibility see order #[id number]      Narrative:      Collection has been rescheduled by CBE at 09/01/2020 13:58 Reason:   due at 13:55  Collection has been rescheduled by CBE at 09/01/2020 13:58 Reason:   due at 13:55    Culture, Anaerobe [[id number]] Collected: 08/31/20 1754    Order Status: Completed Specimen: Wound from Foot, Left Updated: 09/04/20 0741     Anaerobic Culture No anaerobes isolated    Culture, Anaerobe [[id number]] Collected: 09/03/20 1647    Order Status: Completed Specimen: Joint Fluid from Knee,  Right Updated: 09/04/20 0731     Anaerobic Culture Culture in progress    Aerobic culture [[id number]] Collected: 09/03/20 1647    Order Status: Completed Specimen: Joint Fluid from Knee, Right Updated: 09/04/20 0729     Aerobic Bacterial Culture No growth    Gram stain [[id number]] Collected: 09/03/20 1647    Order Status: Completed Specimen: Joint Fluid from Knee, Right Updated: 09/03/20 1851     Gram Stain Result Rare WBC's      No organisms seen    Fungus culture [[id number]] Collected: 09/03/20 1647    Order Status: Sent Specimen: Joint Fluid from Knee, Right Updated: 09/03/20 1734    Aerobic culture [[id number]]  (Abnormal)  (Susceptibility) Collected: 08/31/20 1754    Order Status: Completed Specimen: Wound from Foot, Left Updated: 09/03/20 1316     Aerobic Bacterial Culture PROTEUS MIRABILIS  Moderate        METHICILLIN RESISTANT STAPHYLOCOCCUS AUREUS  Moderate      Blood culture [[id number]]  (Abnormal) Collected: 08/31/20 1328    Order Status: Completed Specimen: Blood from Peripheral, Right Hand Updated: 09/03/20 0716     Blood Culture, Routine Gram stain oksana bottle: Gram positive cocci       Results called to and read back by: Elsy Pena  09/01/2020  11:46      Gram stain aer bottle: Gram positive cocci in clusters resembling Staph       Positive results previously called      METHICILLIN RESISTANT STAPHYLOCOCCUS AUREUS  ID consult required at [facility].  For susceptibility see order #[id number]      Blood culture [[id number]]  (Abnormal) Collected: 08/31/20 1324    Order Status: Completed Specimen: Blood from Peripheral, Left Hand Updated: 09/03/20 0714     Blood Culture, Routine Gram stain aer bottle: Gram positive cocci       Gram stain oksana bottle: Gram positive cocci       Results called to and read back by: Elsy Pena  09/01/2020  11:46      METHICILLIN RESISTANT STAPHYLOCOCCUS AUREUS  ID consult required at [facility].  For susceptibility  see order #[id number]      Urine culture [[id number]]  (Abnormal)  (Susceptibility) Collected: 08/30/20 1633    Order Status: Completed Specimen: Urine Updated: 09/03/20 0124     Urine Culture, Routine KLEBSIELLA PNEUMONIAE  > 100,000 cfu/ml      Narrative:      Specimen Source->Urine    Blood culture #2 **CANNOT BE ORDERED STAT** [[id number]]  (Abnormal) Collected: 08/30/20 1543    Order Status: Completed Specimen: Blood from Peripheral, Antecubital, Left Updated: 09/02/20 0747     Blood Culture, Routine Gram stain aer bottle: Gram positive cocci in clusters resembling Staph       Gram stain oksana bottle: Gram positive cocci in clusters resembling Staph       Results called to and read back by: Elsy Pena RN 08/31/2020  12:35      METHICILLIN RESISTANT STAPHYLOCOCCUS AUREUS  ID consult required at [facility].  For susceptibility see order # [id number]      Blood culture #1 **CANNOT BE ORDERED STAT** [[id number]]  (Abnormal)  (Susceptibility) Collected: 08/30/20 1532    Order Status: Completed Specimen: Blood from Peripheral, Antecubital, Left Updated: 09/02/20 0746     Blood Culture, Routine Gram stain aer bottle: Gram positive cocci in clusters resembling Staph       Gram stain oksana bottle: Gram positive cocci in clusters resembling Staph       Results called to and read back by: Elsy Pena RN  08/31/2020  12:35      METHICILLIN RESISTANT STAPHYLOCOCCUS AUREUS  ID consult required at [facility].      Gram stain [[id number]] Collected: 08/31/20 1754    Order Status: Completed Specimen: Wound from Foot, Left Updated: 08/31/20 2143     Gram Stain Result Rare WBC's      Moderate Gram negative rods      Moderate Gram positive cocci        Specimen (12h ago, onward)    None        Recent Labs   Lab 08/30/20  1633   COLORU Yellow   SPECGRAV 1.015   PHUR 5.0   PROTEINUA 1+*   BACTERIA Many*   NITRITE Negative   LEUKOCYTESUR 2+*   HYALINECASTS 0       Diagnostic  Results  09/03 R Knee X Rays: Possible small suprapatellar effusion versus positioning, no acute displaced fracture or dislocation of the knee.     Clinical Findings: Left Shearer 1 plantar foot ulcer, fibrogranular wound base, no drainage, no undermining, no erythema, no edema, no tenderness, no fluctuance or crepitus. Wound dry, no exudate. Appears stable overall.     Assessment/Plan:     Diabetic ulcer of left foot associated with type 2 diabetes mellitus, with fat layer exposed  65 y.o M with PMHx of Type 2 DM, chronic hypoxemic respiratory failure on 2 L O2 via NC, chronic systolic heart failure. Podiatry consulted for left foot ulcer. Xray left foot with no signs of soft tissue gas, no fx, no osteo, no foreign body. MRI left foot with no signs of osteo or abscess. Foot wound superficial and stable and likely not the cause of patient's persistent septicemia.     Plan:  -Dressing changed today.  -Continue IV abx per ID.   -MRI left foot with no signs of osteo or abscess. ID on board.  -No emergent surgical intervention from podiatry warranted at this time.   -Continue local wound care. Orders placed.  -Rest of care per primary  -Podiatry will continue to follow      Mk Cook DPM PGY-1  Podiatric Medicine & Surgery  [facility]

## 2020-09-04 NOTE — CONSULTS
[facility]  Cardiology  Consult Note    Patient Name: Ed Patton  MRN: [id number]  Admission Date: 8/30/2020  Hospital Length of Stay: 5 days  Code Status: Full Code   Attending Provider: Lele Calderón MD   Consulting Provider: Liz Meier MD  Primary Care Physician: Qiana Chow MD  Principal Problem:Diabetic foot infection    Patient information was obtained from patient and ER records.     Consults  Subjective:     HPI:   Ed Patton is a 65 year old M with obesity, diabetes mellitus type 2 with neuropathy (treated with insulin), hyperlipidemia, coronary artery disease, dilated cardiomyopathy with chronic systolic heart failure, chronic obstructive pulmonary disease and chronic restrictive lung disease with chronic hypoxic respiratory failure (on supplemental oxygen with nasal cannula at 2 liters/minute), postablative hypothyroidism, iron deficiency anemia, chronic kidney disease stage 3. [place] [facility] [place] and was found to be septic, blood Cx growing MRSA. Foot wound cultures grew Proteus mirabilis and Staphylococcus aureus. Repeat blood Cx still growing MRSA despite Abx therapy. TTE ordered without evidence of valvular vegetations. Cardiology was consulted for LUKAS to rule out vegetations/endocarditis given recurrent MRSA bacteremia.     Dysphagia or odynophagia:  No  Liver Disease, esophageal disease, or known varices:  No  Upper GI Bleeding: No  Snoring:  No  Sleep Apnea:  No  Prior neck surgery or radiation:  No  History of anesthetic difficulties:  No  Family history of anesthetic difficulties:  No  Last oral intake:  12 hours ago  Able to move neck in all directions:  Yes     TTE: 09/01/2020  · Moderately decreased left ventricular systolic function. The estimated ejection fraction is 35%.  · Moderately reduced right ventricular systolic function.  · Moderate left atrial enlargement.  · Atrial fibrillation observed.  · The estimated PA  systolic pressure is 53 mmHg.  · Intermediate central venous pressure (8 mmHg).             Past Medical History:   Diagnosis Date    CHF (congestive heart failure)     COPD (chronic obstructive pulmonary disease)     Coronary artery disease     Diabetes mellitus     Diabetes mellitus type II     DM (diabetes mellitus) type II uncontrolled with renal manifestation 9/4/2013    Hyperlipidemia     Hypertension     Postablative hypothyroidism 12/6/2018       Past Surgical History:   Procedure Laterality Date    APPENDECTOMY      CHOLECYSTECTOMY      CORONARY ANGIOPLASTY WITH STENT PLACEMENT      TONSILLECTOMY         Review of patient's allergies indicates:   Allergen Reactions    Vicodin [hydrocodone-acetaminophen] Itching       No current facility-administered medications on file prior to encounter.      Current Outpatient Medications on File Prior to Encounter   Medication Sig    ACCU-CHEK FASTCLIX LANCET DRUM Misc TEST FOUR TIMES DAILY    ACCU-CHEK RAMIREZ Misc USE AS DIRECTED    ACCU-CHEK SMARTVIEW TEST STRIP Strp TEST FOUR TIMES DAILY    albuterol (PROVENTIL) 2.5 mg /3 mL (0.083 %) nebulizer solution INHALE THE CONTENTS OF 1 VIAL VIA NEBULIZER EVERY 4 HOURS AS NEEDED FOR WHEEZING.    alcohol swabs (BD ALCOHOL SWABS) PadM Apply 1 each topically as needed.    aspirin (ECOTRIN) 81 MG EC tablet Take 1 tablet (81 mg total) by mouth once daily.    atorvastatin (LIPITOR) 40 MG tablet Take 1 tablet (40 mg total) by mouth once daily.    carvedilol (COREG) 25 MG tablet Take 1 tablet (25 mg total) by mouth 2 (two) times daily.    cholecalciferol, vitamin D3, (VITAMIN D3) 1,000 unit capsule Take 1 capsule (1,000 Units total) by mouth once daily.    fluticasone furoate-vilanterol (BREO ELLIPTA) 200-25 mcg/dose DsDv diskus inhaler INHALE 1 PUFF INTO THE LUNGS ONCE DAILY. (CONTROLLER)    fluticasone propionate (FLONASE) 50 mcg/actuation nasal spray USE 1 SPRAY IN EACH NOSTRIL ONE TIME DAILY    furosemide  "(LASIX) 80 MG tablet TAKE 1 TABLET BY MOUTH IN THE MORNING  AND  1 TABLET BY MOUTH DAILY  AT  3- TO 4PM    gabapentin (NEURONTIN) 100 MG capsule Take 1 capsule (100 mg total) by mouth 3 (three) times daily as needed (pain).    insulin NPH-insulin regular, 70/30, (NOVOLIN 70/30 U-100 INSULIN) 100 unit/mL (70-30) injection Inject 45 Units into the skin before breakfast AND 35 Units before dinner. [id number] MINUTES BEFORE BREAKFAST AND DINNER.    insulin syringe-needle U-100 (INSULIN SYRINGE) 1 mL 30 gauge x 5/16 Syrg 1 each by Misc.(Non-Drug; Combo Route) route 2 (two) times daily. Use twice daily as directed with insulin vials.    levothyroxine (SYNTHROID) 75 MCG tablet Take 1 tablet (75 mcg total) by mouth once daily.    lisinopril (PRINIVIL,ZESTRIL) 40 MG tablet Take 1 tablet (40 mg total) by mouth once daily.    mometasone 0.1% (ELOCON) 0.1 % cream Applied to the affected area once daily for rash.    nitroGLYCERIN (NITROSTAT) 0.4 MG SL tablet PLACE 1 TABLET UNDER THE TONGUE EVERY 5  MINUTES AS NEEDED FOR CHEST PAIN    pen needle, diabetic (BD ULTRA-FINE RAMIREZ PEN NEEDLE) 32 gauge x 532" Ndle Use with multiple daily injections of insulin     Family History     Problem Relation (Age of Onset)    Heart disease Mother    Hypertension Son    No Known Problems Father, Sister, Son, Son        Tobacco Use    Smoking status: Former Smoker     Packs/day: 0.01     Years: 3.00     Pack years: 0.03     Types: Cigarettes     Quit date: 1995     Years since quittin.3    Smokeless tobacco: Never Used   Substance and Sexual Activity    Alcohol use: No    Drug use: No    Sexual activity: Never     Comment:  with 1 son     ROS  Objective:     Vital Signs (Most Recent):  Temp: 96.2 °F (35.7 °C) (20 111)  Pulse: 90 (20)  Resp: 16 (20 111)  BP: 118/72 (20 1112)  SpO2: 96 % (20) Vital Signs (24h Range):  Temp:  [96.2 °F (35.7 °C)-99.1 °F (37.3 °C)] 96.2 °F " (35.7 °C)  Pulse:  [] 90  Resp:  [16-20] 16  SpO2:  [90 %-100 %] 96 %  BP: (114-143)/(58-77) 118/72     Weight: 120.4 kg (265 lb 6.9 oz)  Body mass index is 32.31 kg/m².    SpO2: 96 %  O2 Device (Oxygen Therapy): nasal cannula      Intake/Output Summary (Last 24 hours) at 9/4/2020 1429  Last data filed at 9/4/2020 1200  Gross per 24 hour   Intake 60 ml   Output 1725 ml   Net -1665 ml       Lines/Drains/Airways     Peripheral Intravenous Line                 Peripheral IV - Single Lumen 08/30/20 1448 20 G Left Antecubital 4 days                Physical Exam   Constitutional: He is oriented to person, place, and time. He appears well-developed.   HENT:   Head: Normocephalic.   Eyes: Pupils are equal, round, and reactive to light.   Neck: No JVD present.   Cardiovascular: Normal rate and regular rhythm.   No murmur heard.  Pulmonary/Chest: Effort normal.   Abdominal: Soft.   Musculoskeletal:         General: Tenderness present. No edema.   Neurological: He is alert and oriented to person, place, and time.   Skin: Skin is warm.   Psychiatric: He has a normal mood and affect.       Significant Labs:   Recent Lab Results       09/04/20  1115   09/04/20  0819   09/04/20  0332   09/03/20 2022 09/03/20  1804        WBC, Body Fluid               Lymphs, Fluid               Segs, Fluid               Body Fluid Type               Monocytes/Macrophages, Fluid               Body Fluid Source, Crystal Exam               Body Fluid Crystal               Pathologist Review, Body Fluid               Procalcitonin               Aerobic Bacterial Culture               AFB CULTURE STAIN               Anaerobic Culture               Anion Gap     14         Baso #     0.08         Basophil%     0.4         BUN, Bld     40         Calcium     9.1         Chloride     100         CO2     24         Creatinine     1.5         Differential Method     Automated         eGFR if      56.5         eGFR if non   American     48.8  Comment:  Calculation used to obtain the estimated glomerular filtration  rate (eGFR) is the CKD-EPI equation.            Eos #     0.8         Eosinophil%     3.5         Fluid Appearance               Fluid Color               Glucose     169         Gram Stain Result               Gran # (ANC)     17.8         Gran%     83.3         Hematocrit     31.4         Hemoglobin     9.6         Immature Grans (Abs)     0.22  Comment:  Mild elevation in immature granulocytes is non specific and   can be seen in a variety of conditions including stress response,   acute inflammation, trauma and pregnancy. Correlation with other   laboratory and clinical findings is essential.           Immature Granulocytes     1.0         Lymph #     1.1         Lymph%     5.2         Magnesium     1.9         MCH     26.8         MCHC     30.6         MCV     88         Mono #     1.4         Mono%     6.6         MPV     11.1         nRBC     0         Phosphorus     3.7         Platelets     418         POCT Glucose 346 235   86 117     Potassium     3.6         RBC     3.58         RDW     15.6         Sodium     138         WBC     21.41                          09/03/20  1708   09/03/20  1647   09/03/20  1538        WBC, Body Fluid   03414  Comment:  Reference ranges for body fluids not established.   Correlate clinically.         Lymphs, Fluid   8       Segs, Fluid   90       Body Fluid Type   Joint Fluid, Right Knee       Monocytes/Macrophages, Fluid   2       Body Fluid Source, Crystal Exam   Joint Fluid, Right Knee       Body Fluid Crystal   Negative       Pathologist Review, Body Fluid   REVIEWED  Comment:  Electronically reviewed and signed by:  Beatriz Valderrama M.D.  Signed on 09/04/20 at 10:37  PATHOLOGIST INTERPRETATION  No urate or pyrophosphate crystals identified  Interpreted by Beatriz Valderrama M.D.         Procalcitonin     0.13  Comment:  A concentration < 0.25 ng/mL represents a low risk  bacterial   infection.  Procalcitonin may not be accurate among patients with localized   infection, recent trauma or major surgery, immunosuppressed state,   invasive fungal infection, renal dysfunction. Decisions regarding   initiation or continuation of antibiotic therapy should not be based   solely on procalcitonin levels.       Aerobic Bacterial Culture   No growth[P]       AFB CULTURE STAIN   No acid fast bacilli seen.       Anaerobic Culture   Culture in progress[P]       Anion Gap           Baso #           Basophil%           BUN, Bld           Calcium           Chloride           CO2           Creatinine           Differential Method           eGFR if            eGFR if non            Eos #           Eosinophil%           Fluid Appearance   Cloudy       Fluid Color   Yellow       Glucose           Gram Stain Result   Rare WBC's          No organisms seen       Gran # (ANC)           Gran%           Hematocrit           Hemoglobin           Immature Grans (Abs)           Immature Granulocytes           Lymph #           Lymph%           Magnesium           MCH           MCHC           MCV           Mono #           Mono%           MPV           nRBC           Phosphorus           Platelets           POCT Glucose 142         Potassium           RBC           RDW           Sodium           WBC                 Significant Imaging: Echocardiogram:   2D echo with color flow doppler:   Results for orders placed or performed during the hospital encounter of 05/11/18   2D Echo w/ Color Flow Doppler   Result Value Ref Range    QEF 45 55 - 65    Narrative    Date of Procedure: 05/11/2018        TEST DESCRIPTION   Technical Quality: This is a technically adequate study.     Aorta: The aortic root is normal in size, measuring 3.0 cm at sinotubular junction and 3.1 cm at Sinuses of Valsalva. The proximal ascending aorta is normal in size, measuring 2.9 cm across.     Left Atrium: The  left atrial volume index is moderately enlarged, measuring 43.28 cc/m2.     Left Ventricle: The left ventricle is upper limit of normal, with an end-diastolic diameter of 5.8 cm, and an end-systolic diameter of 4.4 cm. LV wall thickness is normal, with the septum measuring 1.0 cm and the posterior wall measuring 0.9 cm across.   Relative wall thickness was normal at 0.31, and the LV mass index was 107.6 g/m2 consistent with normal left ventricular mass. There is global hypokinesis. Left ventricular systolic function appears mildly depressed. Visually estimated ejection fraction   is 45-50%. The LV Doppler derived stroke volume equals 62.0 ccs.     Diastolic indices: E wave velocity 0.6 m/s, E/A ratio 0.8,  msec., E/e' ratio(avg) 12. Diastolic function is indeterminate.     Right Atrium: The right atrium is normal in size, measuring 4.5 cm in length and 3.8 cm in width in the apical view.     Right Ventricle: The right ventricle is upper limit of normal measuring 4.2 cm at the base in the apical right ventricle-focused view. Global right ventricular systolic function appears normal. Tricuspid annular plane systolic excursion (TAPSE) is 2.3   cm. Tissue Doppler-derived tricuspid annular peak systolic velocity (S prime) is 10.2 cm/s.     Aortic Valve:  The aortic valve is mildly sclerotic. The aortic valve is tri-leaflet in structure.     IVC: IVC is normal in size and collapses > 50% with a sniff, suggesting normal right atrial pressure of 3 mmHg.     Intracavitary: There is no evidence of pericardial effusion, intracavity mass, thrombi, or vegetation.         CONCLUSIONS     1 - Mildly depressed left ventricular systolic function (EF 45-50%).     2 - Right ventricle is upper limit of normal in size with normal systolic function.     3 - Indeterminate LV diastolic function.     4 - Moderate left atrial enlargement.             This document has been electronically    SIGNED BY: Chencho Ratliff MD On: 05/11/2018  15:00    and Transthoracic echo (TTE) complete (Cupid Only):   Results for orders placed or performed during the hospital encounter of 08/30/20   Echo Color Flow Doppler? Yes   Result Value Ref Range    Ascending aorta 2.82 cm    STJ 2.56 cm    AV mean gradient 3 mmHg    Ao peak david 1.18 m/s    Ao VTI 23.10 cm    IVRT 42.82 msec    IVS 0.76 0.6 - 1.1 cm    LA size 4.79 cm    Left Atrium Major Axis 5.84 cm    Left Atrium Minor Axis 5.82 cm    LVIDD 5.35 3.5 - 6.0 cm    LVIDS 4.14 (A) 2.1 - 4.0 cm    LVOT diameter 2.30 cm    LVOT peak VTI 12.20 cm    PW 1.07 0.6 - 1.1 cm    E wave decelartion time 164.89 msec    MV Peak E David 0.91 m/s    PV Peak D David 0.53 m/s    PV Peak S David 0.17 m/s    RA Major Axis 5.61 cm    RA Width 3.95 cm    RVDD 3.25 cm    Sinus 3.01 cm    TAPSE 1.24 cm    TR Max David 3.37 m/s    TDI LATERAL 0.09 m/s    TDI SEPTAL 0.05 m/s    LA WIDTH 4.84 cm    MV stenosis pressure 1/2 time 47.82 ms    LV Diastolic Volume 138.12 mL    LV Systolic Volume 76.15 mL    RV S' 10.24 cm/s    LVOT peak david 0.67 m/s    LV LATERAL E/E' RATIO 10.11 m/s    LV SEPTAL E/E' RATIO 18.20 m/s    FS 23 %    LA volume 114.89 cm3    LV mass 181.16 g    Left Ventricle Relative Wall Thickness 0.40 cm    AV valve area 2.19 cm2    AV Velocity Ratio 0.57     AV index (prosthetic) 0.53     MV valve area p 1/2 method 4.60 cm2    Mean e' 0.07 m/s    Pulm vein S/D ratio 0.32     LVOT area 4.2 cm2    LVOT stroke volume 50.66 cm3    AV peak gradient 6 mmHg    E/E' ratio 13.00 m/s    LV Systolic Volume Index 30.8 mL/m2    LV Diastolic Volume Index 55.95 mL/m2    LA Volume Index 46.5 mL/m2    LV Mass Index 73 g/m2    Triscuspid Valve Regurgitation Peak Gradient 45 mmHg    BSA 2.5 m2    Right Atrial Pressure (from IVC) 8 mmHg    TV rest pulmonary artery pressure 53 mmHg    Narrative    · Moderately decreased left ventricular systolic function. The estimated   ejection fraction is 35%.  · Moderately reduced right ventricular systolic  function.  · Moderate left atrial enlargement.  · Atrial fibrillation observed.  · The estimated PA systolic pressure is 53 mmHg.  · Intermediate central venous pressure (8 mmHg).       and X-Ray: CXR: X-Ray Chest 1 View (CXR): No results found for this visit on 08/30/20. and X-Ray Chest PA and Lateral (CXR): No results found for this visit on 08/30/20.    Assessment and Plan:     Bacteremia due to methicillin resistant Staphylococcus aureus  1. LUKAS for evaluation for vegetation/endocarditis given MRSA bacteremia   -No absolute contraindications of esophageal stricture, tumor, perforation, laceration,or diverticulum and/or active GI bleed  -The risks, benefits & alternatives of the procedure were explained to the patient.   -The risks of transesophageal echo include but are not limited to:  Dental trauma, esophageal trauma/perforation, bleeding, laryngospasm/brochospasm, aspiration, sore throat/hoarseness, & dislodgement of the endotracheal tube/nasogastric tube (where applicable).    -The risks of moderate sedation include hypotension, respiratory depression, arrhythmias, bronchospasm, & death.    -Informed consent was obtained. The patient is agreeable to proceed with the procedure and all questions and concerns addressed.    Case discussed with an attending in echocardiography lab.     Further recommendations per attending addendum          VTE Risk Mitigation (From admission, onward)         Ordered     enoxaparin injection 40 mg  Every 24 hours      08/30/20 2004     IP VTE HIGH RISK PATIENT  Once      08/30/20 2004     Place sequential compression device  Until discontinued      08/30/20 7289                Liz Meier MD  Cardiology   [facility]

## 2020-09-04 NOTE — SUBJECTIVE & OBJECTIVE
Interval History: He was told that the LUKAS would be done this afternoon.    Review of Systems   Constitutional: Negative for chills and fever.   Respiratory: Negative for cough and shortness of breath.    Gastrointestinal: Negative for nausea and vomiting.     Objective:     Vital Signs (Most Recent):  Temp: 96.2 °F (35.7 °C) (09/04/20 1112)  Pulse: 90 (09/04/20 1112)  Resp: 16 (09/04/20 1112)  BP: 118/72 (09/04/20 1112)  SpO2: 96 % (09/04/20 1112) Vital Signs (24h Range):  Temp:  [96.2 °F (35.7 °C)-99.1 °F (37.3 °C)] 96.2 °F (35.7 °C)  Pulse:  [] 90  Resp:  [16-20] 16  SpO2:  [90 %-100 %] 96 %  BP: (114-143)/(58-77) 118/72     Weight: 120.4 kg (265 lb 6.9 oz)  Body mass index is 32.31 kg/m².    Intake/Output Summary (Last 24 hours) at 9/4/2020 1324  Last data filed at 9/4/2020 0900  Gross per 24 hour   Intake --   Output 1375 ml   Net -1375 ml      Physical Exam  Vitals signs and nursing note reviewed.   Constitutional:       General: He is not in acute distress.  Pulmonary:      Effort: Pulmonary effort is normal. No respiratory distress.   Skin:     General: Skin is warm and dry.   Neurological:      Mental Status: He is alert and oriented to person, place, and time.   Psychiatric:         Attention and Perception: Attention normal.         Mood and Affect: Mood and affect normal.         Significant Labs: All pertinent labs within the past 24 hours have been reviewed.    Significant Imaging: I have reviewed all pertinent imaging results/findings within the past 24 hours.   X-Ray Knee 1 or 2 View Right 9/03/20: FINDINGS: Two views right knee.   No acute displaced fracture of the knee.  There may be a small suprapatellar effusion versus positioning..  There is vascular calcification.  No dislocation.   Impression: Possible small suprapatellar effusion versus positioning, no acute displaced fracture or dislocation of the knee.

## 2020-09-04 NOTE — HPI
Ed Patton is a 65 year old M with obesity, diabetes mellitus type 2 with neuropathy (treated with insulin), hyperlipidemia, coronary artery disease, dilated cardiomyopathy with chronic systolic heart failure, chronic obstructive pulmonary disease and chronic restrictive lung disease with chronic hypoxic respiratory failure (on supplemental oxygen with nasal cannula at 2 liters/minute), postablative hypothyroidism, iron deficiency anemia, chronic kidney disease stage 3. Presented to [facility] due to recurrent falls and was found to be septic, blood Cx growing MRSA. Foot wound cultures grew Proteus mirabilis and Staphylococcus aureus. Repeat blood Cx still growing MRSA despite Abx therapy. TTE ordered without evidence of valvular vegetations. Cardiology was consulted for LUKAS to rule out vegetations/endocarditis given recurrent MRSA bacteremia.     Dysphagia or odynophagia:  No  Liver Disease, esophageal disease, or known varices:  No  Upper GI Bleeding: No  Snoring:  No  Sleep Apnea:  No  Prior neck surgery or radiation:  No  History of anesthetic difficulties:  No  Family history of anesthetic difficulties:  No  Last oral intake:  12 hours ago  Able to move neck in all directions:  Yes     TTE: 09/01/2020  · Moderately decreased left ventricular systolic function. The estimated ejection fraction is 35%.  · Moderately reduced right ventricular systolic function.  · Moderate left atrial enlargement.  · Atrial fibrillation observed.  · The estimated PA systolic pressure is 53 mmHg.  · Intermediate central venous pressure (8 mmHg).

## 2020-09-04 NOTE — ASSESSMENT & PLAN NOTE
Ed Patton is a 65 y.o. male with chronic right knee pain acutely worse after fall.  Discussed management options with the patient. Explained that to rule out infection from the joint, aspiration of the joint would be required. Currently, the patient has no other joints that are tender or painful with ROM. The patient agrees to aspiration of the joint. See procedure note for details. The aspiration results are currently pending. Will follow these lab results up and update in the chart. For now, remain NPO.  -WBAT RLE  -NPO  -FU lab results

## 2020-09-04 NOTE — ASSESSMENT & PLAN NOTE
1. LUKAS for evaluation for vegetation/endocarditis given MRSA bacteremia   -No absolute contraindications of esophageal stricture, tumor, perforation, laceration,or diverticulum and/or active GI bleed  -The risks, benefits & alternatives of the procedure were explained to the patient.   -The risks of transesophageal echo include but are not limited to:  Dental trauma, esophageal trauma/perforation, bleeding, laryngospasm/brochospasm, aspiration, sore throat/hoarseness, & dislodgement of the endotracheal tube/nasogastric tube (where applicable).    -The risks of moderate sedation include hypotension, respiratory depression, arrhythmias, bronchospasm, & death.    -Informed consent was obtained. The patient is agreeable to proceed with the procedure and all questions and concerns addressed.    Case discussed with an attending in echocardiography lab.     Further recommendations per attending addendum

## 2020-09-04 NOTE — PROGRESS NOTES
"[facility] Hospital Medicine  Progress Note    Patient Name: Ed Patton  MRN: [id number]  Patient Class: IP- Inpatient   Admission Date: 8/30/2020  Length of Stay: 5 days  Attending Physician: Lele Calderón MD  Primary Care Provider: Qiana Chow MD    [facility] Medicine Team: [facility] HOSP MED D Lele Calderón MD    Subjective:     Principal Problem:Diabetic foot infection        HPI:  Ed Patton is a 65 year old Black man with obesity, diabetes mellitus type 2 with neuropathy (treated with insulin), hyperlipidemia, coronary artery disease, dilated cardiomyopathy with chronic systolic heart failure, chronic obstructive pulmonary disease and chronic restrictive lung disease with chronic hypoxic respiratory failure (on supplemental oxygen with nasal cannula at 2 liters/minute), postablative hypothyroidism, iron deficiency anemia, chronic kidney disease stage 3. He lives in [facility]. He is . His primary care physician is Dr. Qiana Chow.    He presented to [facility] Emergency Department on 8/30/2020 due to recurrent falls, where his legs seemed to just "give out" under him and other times when he had to crawl around his house, over the past week. At some point prior to this, he found a tack embedded in the bottom of his slipper that had punctured the sole of his left foot. His left foot became swollen, and he had occasional twinges of pain in both legs. He was afraid to leave his house due to the COVID-19 pandemic. He had been getting home health services, including physical therapy, and was walking between his mailbox and house for exercise. He was admitted to Hospital Medicine Team D.    Overview/Hospital Course:  He was put on antibiotics. Blood cultures taken on admission grew methicillin-resistant Staphylococcus aureus. Foot wound cultures grew Proteus mirabilis and Staphylococcus aureus. Urine culture grew Klebsiella pneumoniae. " Foot MRI showed no osteomyelitis or abscess. Physical and Occupational Therapy recommended skilled nursing facility. Infectious Disease was concerned about his right knee pain, which is chronic but worsened after a fall. Orthopedic Surgery was consulted and performed right knee arthrocentesis, finding fluid with 12309 white blood cells with 90% segmented neutrophils.     Interval History: He was told that the LUKAS would be done this afternoon.    Review of Systems   Constitutional: Negative for chills and fever.   Respiratory: Negative for cough and shortness of breath.    Gastrointestinal: Negative for nausea and vomiting.     Objective:     Vital Signs (Most Recent):  Temp: 96.2 °F (35.7 °C) (09/04/20 1112)  Pulse: 90 (09/04/20 1112)  Resp: 16 (09/04/20 1112)  BP: 118/72 (09/04/20 1112)  SpO2: 96 % (09/04/20 1112) Vital Signs (24h Range):  Temp:  [96.2 °F (35.7 °C)-99.1 °F (37.3 °C)] 96.2 °F (35.7 °C)  Pulse:  [] 90  Resp:  [16-20] 16  SpO2:  [90 %-100 %] 96 %  BP: (114-143)/(58-77) 118/72     Weight: 120.4 kg (265 lb 6.9 oz)  Body mass index is 32.31 kg/m².    Intake/Output Summary (Last 24 hours) at 9/4/2020 1324  Last data filed at 9/4/2020 0900  Gross per 24 hour   Intake --   Output 1375 ml   Net -1375 ml      Physical Exam  Vitals signs and nursing note reviewed.   Constitutional:       General: He is not in acute distress.  Pulmonary:      Effort: Pulmonary effort is normal. No respiratory distress.   Skin:     General: Skin is warm and dry.   Neurological:      Mental Status: He is alert and oriented to person, place, and time.   Psychiatric:         Attention and Perception: Attention normal.         Mood and Affect: Mood and affect normal.         Significant Labs: All pertinent labs within the past 24 hours have been reviewed.    Significant Imaging: I have reviewed all pertinent imaging results/findings within the past 24 hours.   X-Ray Knee 1 or 2 View Right 9/03/20: FINDINGS: Two views right  knee.   No acute displaced fracture of the knee.  There may be a small suprapatellar effusion versus positioning..  There is vascular calcification.  No dislocation.   Impression: Possible small suprapatellar effusion versus positioning, no acute displaced fracture or dislocation of the knee.       Assessment/Plan:      * Diabetic foot infection  Bacteremia due to methicillin resistant Staphylococcus aureus  Diabetic ulcer of left foot associated with type 2 diabetes mellitus, with fat layer exposed  Right knee pain  Appreciate Infectious Disease, Orthopedic Surgery. Giving antibiotics. Arthrocentesis suggest septic knee. LUKAS to evaluate for endocarditis.    Sepsis  Due to Proteus and MRSA. Giving antibiotics.    Cellulitis of left lower extremity  Giving antibiotics.    COPD mixed type  Continue home fluticasone-vilanterol, albuterol nebulizer.    Postablative hypothyroidism  Continue home levothyroxine.    Chronic respiratory failure with hypoxia, on home oxygen therapy  On 2 L nasal cannula chronically due to combination of COPD and CHF; titrate as needed.    Uncontrolled type 2 diabetes mellitus with hyperglycemia, with long-term current use of insulin  Diabetic polyneuropathy associated with type 2 diabetes mellitus  Takes insulin NPH-insulin regular 70/30 45 units before breakfast and 35 units before dinner. Giving insulin detemir 29 units daily and insulin aspart 14 units with meals and sliding scale. Monitor Accuchecks.    Chronic systolic congestive heart failure  Continue home carvedilol, lisinopril.      VTE Risk Mitigation (From admission, onward)         Ordered     enoxaparin injection 40 mg  Every 24 hours      08/30/20 2004     IP VTE HIGH RISK PATIENT  Once      08/30/20 2004     Place sequential compression device  Until discontinued      08/30/20 2233                Discharge Planning   EDY: 9/7/2020     Code Status: Full Code   Is the patient medically ready for discharge?:     Reason for patient  still in hospital (select all that apply): Treatment  Discharge Plan A: Home with family, Home Health                  Lele Calderón MD  Department of Hospital Medicine   [facility]

## 2020-09-04 NOTE — PROGRESS NOTES
"[facility]  Adult Nutrition  Progress Note    SUMMARY       Recommendations  1. As medically appropriate, ADAT to diabetic cardiac.   2. If PO intake < 50%, add Boost Glucose Control TID.   3. RD to monitor.    Goals: Adequate PO intake to meet > 75% EEN/EPN by RD follow up  Nutrition Goal Status: new  Communication of RD Recs: other (comment)(POC)    Reason for Assessment    Reason For Assessment: RD follow-up  Diagnosis: diabetes diagnosis/complications(diabetic foot infection)  Relevant Medical History: DM2, CHF, DCM, CAD, COPD, HLD  Interdisciplinary Rounds: did not attend  General Information Comments: NPO for procedure today. Pt continues to report good appetite and states he has been consuming 100% of meals. 50-75% intake per RN documentation. Pt with good appetite, no wt changes PTA. -250# > 1 year. 25# wt gain noted since admission, unclear if scale error or fluid, will monitor. NFPE not warranted, pt with no indicators of malnutrition at this time. Last A1c > 9, reviewed diabetic diet education today with handout provided. Pt voiced understanding.  Nutrition Discharge Planning: Adequate PO intake on diabetic cardiac diet    Nutrition Risk Screen    Nutrition Risk Screen: large or nonhealing wound, burn or pressure injury    Nutrition/Diet History    Spiritual, Cultural Beliefs, Bahai Practices, Values that Affect Care: no    Anthropometrics    Temp: 96.2 °F (35.7 °C)  Height Method: Stated  Height: 6' 4" (193 cm)  Height (inches): 76 in  Weight Method: Bed Scale  Weight: 120.4 kg (265 lb 6.9 oz)  Weight (lb): 265.44 lb  Ideal Body Weight (IBW), Male: 202 lb  % Ideal Body Weight, Male (lb): 127.72 %  BMI (Calculated): 32.3    Lab/Procedures/Meds    Pertinent Labs Reviewed: reviewed  Pertinent Labs Comments: BUN 40, Cr 1.5, GFR 56.5, Glu 169, .1  Pertinent Medications Reviewed: reviewed  Pertinent Medications Comments: statin, lasix, insulin, lisinopril, vitamin " D    Estimated/Assessed Needs    Weight Used For Calorie Calculations: 120.4 kg (265 lb 6.9 oz)  Energy Calorie Requirements (kcal): 2521 kcal/day  Energy Need Method: [place]-St Jeor(x 1.2 PAL)  Protein Requirements: 121-145 g/day(1-1.2 g/kg)  Weight Used For Protein Calculations: 120.4 kg (265 lb 6.9 oz)  Fluid Requirements (mL): per MD or 1 mL/kcal     RDA Method (mL): 2521  CHO Requirement: 315g/day    Nutrition Prescription Ordered    Current Diet Order: NPO    Evaluation of Received Nutrient/Fluid Intake    I/O: -2.6L since admit  Comments: LBM 9/2  % Intake of Estimated Energy Needs: 0 - 25 %  % Meal Intake: NPO    Nutrition Risk    Level of Risk/Frequency of Follow-up: low     Assessment and Plan    Nutrition Problem  Inadequate energy intake    Related to (etiology):   Decreased ability to consume sufficient energy    Signs and Symptoms (as evidenced by):   NPO with no alternate means of nutrition at this time    Interventions (treatment strategy):  Collaboration with other providers    Nutrition Diagnosis Status:   New    Monitor and Evaluation    Food and Nutrient Intake: energy intake, food and beverage intake  Food and Nutrient Adminstration: diet order  Knowledge/Beliefs/Attitudes: food and nutrition knowledge/skill  Anthropometric Measurements: weight, weight change, body mass index  Biochemical Data, Medical Tests and Procedures: electrolyte and renal panel, gastrointestinal profile, glucose/endocrine profile, inflammatory profile, lipid profile  Nutrition-Focused Physical Findings: overall appearance    Nutrition Follow-Up    RD Follow-up?: Yes

## 2020-09-04 NOTE — ASSESSMENT & PLAN NOTE
A: MRSA septicemia. Still tachycardic, SpO2 reached 92%, renal function continues to worsen. WBCs not improving. ESR and CRP elevated, RF WNL. Vancomycin was supratherapeutic, switched to pulse dose regimen. Source of bacteremia unclear, may be left foot wound or a septic process at the right knee joint. Right knee now clinically suspicious for septic arthritis (see physical exam). Left foot wound growing Proteus and MRSA, urine growing Klebsiella. TTE negative for signs of cardiac infection, LUKAS is planned.     P:  -Continue IV vancomycin and PO ciprofloxacin. Maintain vanc trough in 15-20 range.   -Followup on subsequent blood cultures. BCx from 9/2/20 MRSA+. Recommend alternate day Bcx for following clearance  -Followup on LUKAS findings. To happen today  -Swollen and tender right knee s/p ortho bedside aspiration, WBC 11k, 90% segs, but negative gram stain and no crystals. Would ideally expect gram stain to be positive if blood seeded joint but unclear at this point and also WBCs not very significantly elevated. Patient to receive antibiotics regardless for MRSA bacteremia.   - Further recs: Currently pending LUKAS. If LUKAS normal and persistent MRSA bacteremia, would discuss switching antibiotics to as likely vanc not effective.   -Will continue to follow.

## 2020-09-04 NOTE — ANESTHESIA POSTPROCEDURE EVALUATION
Anesthesia Post Evaluation    Patient: Ed Patton    Procedure(s) Performed: Procedure(s) (LRB):  ECHOCARDIOGRAM, TRANSESOPHAGEAL (N/A)    Final Anesthesia Type: general    Patient location during evaluation: PACU  Patient participation: Yes- Able to Participate  Level of consciousness: awake and alert  Post-procedure vital signs: reviewed and stable  Pain management: adequate  Airway patency: patent    PONV status at discharge: No PONV  Anesthetic complications: no      Cardiovascular status: hemodynamically stable  Respiratory status: unassisted  Hydration status: euvolemic  Follow-up not needed.          Vitals Value Taken Time   /78 09/04/20 1615   Temp 36.6 °C (97.9 °F) 09/04/20 1600   Pulse 102 09/04/20 1615   Resp 18 09/04/20 1615   SpO2 99 % 09/04/20 1615         No case tracking events are documented in the log.      Pain/Natalie Score: Pain Rating Prior to Med Admin: 4 (9/4/2020  1:41 AM)  Natalie Score: 8 (9/4/2020  3:45 PM)

## 2020-09-04 NOTE — SUBJECTIVE & OBJECTIVE
Past Medical History:   Diagnosis Date    CHF (congestive heart failure)     COPD (chronic obstructive pulmonary disease)     Coronary artery disease     Diabetes mellitus     Diabetes mellitus type II     DM (diabetes mellitus) type II uncontrolled with renal manifestation 9/4/2013    Hyperlipidemia     Hypertension     Postablative hypothyroidism 12/6/2018       Past Surgical History:   Procedure Laterality Date    APPENDECTOMY      CHOLECYSTECTOMY      CORONARY ANGIOPLASTY WITH STENT PLACEMENT      TONSILLECTOMY         Review of patient's allergies indicates:   Allergen Reactions    Vicodin [hydrocodone-acetaminophen] Itching       Current Facility-Administered Medications   Medication    acetaminophen tablet 650 mg    albuterol-ipratropium 2.5 mg-0.5 mg/3 mL nebulizer solution 3 mL    aspirin EC tablet 81 mg    atorvastatin tablet 40 mg    carvediloL tablet 25 mg    ciprofloxacin HCl tablet 500 mg    dextrose 50% injection 12.5 g    dextrose 50% injection 25 g    enoxaparin injection 40 mg    fluticasone furoate-vilanteroL 200-25 mcg/dose diskus inhaler 1 puff    fluticasone propionate 50 mcg/actuation nasal spray 50 mcg    furosemide tablet 80 mg    gabapentin capsule 100 mg    glucagon (human recombinant) injection 1 mg    glucose chewable tablet 16 g    glucose chewable tablet 24 g    insulin aspart U-100 pen 0-5 Units    insulin aspart U-100 pen 14 Units    insulin detemir U-100 pen 29 Units    ketorolac injection 15 mg    levothyroxine tablet 75 mcg    lisinopriL tablet 40 mg    melatonin tablet 6 mg    ondansetron injection 4 mg    polyethylene glycol packet 17 g    sodium chloride 0.9% flush 10 mL    Tdap vaccine injection 0.5 mL    vancomycin - pharmacy to dose    vancomycin 750 mg in dextrose 5 % 250 mL IVPB (ready to mix system)    vitamin D 1000 units tablet 1,000 Units     Family History     Problem Relation (Age of Onset)    Heart disease Mother     "Hypertension Son    No Known Problems Father, Sister, Son, Son        Tobacco Use    Smoking status: Former Smoker     Packs/day: 0.01     Years: 3.00     Pack years: 0.03     Types: Cigarettes     Quit date: 1995     Years since quittin.3    Smokeless tobacco: Never Used   Substance and Sexual Activity    Alcohol use: No    Drug use: No    Sexual activity: Never     Comment:  with 1 son     ROS   Per primary ROS 9/3/20  Objective:     Vital Signs (Most Recent):  Temp: 98.7 °F (37.1 °C) (20)  Pulse: 85 (20)  Resp: 16 (20)  BP: (!) 143/66 (20)  SpO2: 100 % (20) Vital Signs (24h Range):  Temp:  [97.7 °F (36.5 °C)-99.2 °F (37.3 °C)] 98.7 °F (37.1 °C)  Pulse:  [] 85  Resp:  [16-20] 16  SpO2:  [92 %-100 %] 100 %  BP: (114-143)/(64-72) 143/66     Weight: 120.4 kg (265 lb 6.9 oz)  Height: 6' 4" (193 cm)  Body mass index is 32.31 kg/m².      Intake/Output Summary (Last 24 hours) at 9/3/2020 2058  Last data filed at 9/3/2020 1700  Gross per 24 hour   Intake 720 ml   Output 1975 ml   Net -1255 ml       Ortho/SPM Exam     Vitals: Afebrile.  Vital signs stable.  General: No acute distress.  Cardio: Regular rate.  Chest: No increased work of breathing.    RLE  Skin intact  Pain with Passive ROM of right knee  Flexion contracture of 20 degrees  No bony TTP throughout  Compartments soft  Painless ROM ankle   SILT Sa/Cho/DP/SP/T  Motor intact TA/SP/DP  1+ DP and PT     LLE:  Dressing over left foot by podiatry  No TTP  Compartments soft  Full painless ROM throughout lower extremity  SILT Sa/Cho/DP/SP/T  Motor intact TA/SP/DP  2+ DP/PT     BUE:  Skin intact, no deformity noted  No open wounds/abrasions/crepitus  No bony TTP  FROM shoulder, elbow and wrist  SILT M/U/R  Motor intact AIN/PIN/M/U/R   Cap refill < 2s  2+ RP      Spine: No TTP along spine, no step offs palpated. no sacral decubitus ulcers      Significant Labs:   BMP:   Recent Labs   Lab " 09/03/20  0410   *      K 3.8   CL 99   CO2 26   BUN 46*   CREATININE 1.8*   CALCIUM 9.0   MG 1.9     CMP:   Recent Labs   Lab 09/02/20  0352 09/03/20  0410    137   K 3.6 3.8   CL 98 99   CO2 27 26   * 280*   BUN 43* 46*   CREATININE 1.8* 1.8*   CALCIUM 8.7 9.0   ANIONGAP 13 12   EGFRNONAA 39.2* 39.2*     CRP:   Recent Labs   Lab 09/03/20  0410   .1*     All pertinent labs within the past 24 hours have been reviewed.    Significant Imaging: I have reviewed all pertinent imaging results/findings.  Xray right knee: no fracture or dislocation    Procedure Note: Right knee joint aspiration  Patient was explained risks, benefits, and alternatives to treatment and verbalized consent to proceed. Following confirmation of the  patient name, site, and procedure, we began. Skin was sterilely prepared with betadine and then alcohol solution. An 18 gauge needle on a 60 cc syringe was used for aspiration through anterior approach. 5 cc of yellow colored fluid collected. Fluid and cultures were obtained and sent for analysis. Aspiration site was covered with a bandaid. The patient tolerated the procedure quite well.

## 2020-09-04 NOTE — CARE UPDATE
Aspiration results reviewed:    -Cell count 65502 cells, 90 percent segs  -neg gram stain.  - Cultures are pending  -may  have diet  -WBAT with PT/OT  -Will fu cultures  Please call with any questions

## 2020-09-04 NOTE — PT/OT/SLP PROGRESS
Occupational Therapy   Treatment    Name: Ed Patton  MRN: [id number]  Admitting Diagnosis:  Diabetic foot infection  Day of Surgery    Recommendations:     Discharge Recommendations: nursing facility, skilled  Discharge Equipment Recommendations:  bedside commode, wheelchair, walker, rolling  Barriers to discharge:  Decreased caregiver support    Assessment:     Ed Patton is a 65 y.o. male with a medical diagnosis of Diabetic foot infection. Performance deficits affecting function are weakness, impaired endurance, impaired self care skills, impaired functional mobilty, gait instability, impaired balance, impaired cognition, pain, decreased ROM, orthopedic precautions, impaired skin, decreased lower extremity function. Patient's treatment session was limited by pain on this date. Patient would benefit from continued skilled acute OT 3x/wk to improve functional mobility, increase independence with ADLs, and address established goals. Recommending SNF once medically appropriate for discharge to increase maximal independence, reduce burden of care, and ensure safety.     Rehab Prognosis:  Good; patient would benefit from acute skilled OT services to address these deficits and reach maximum level of function.       Plan:     Patient to be seen 3 x/week to address the above listed problems via self-care/home management, therapeutic activities, therapeutic exercises  · Plan of Care Expires: 10/01/20  · Plan of Care Reviewed with: patient    Subjective     Pain/Comfort:  · Pain Rating 1: 8/10(patient reports pain with standing only)  · Location - Side 1: Right  · Location - Orientation 1: lower  · Location 1: knee  · Pain Addressed 1: Reposition, Distraction, Cessation of Activity  · Pain Rating Post-Intervention 1: (see above)    Objective:     Communicated with: NSG prior to session.  Patient found performing bed mobility supine>sit on EOB to R side with oxygen, telemetry, pulse ox (continuous) upon OT entry to  room.    General Precautions: Standard, fall, contact   Orthopedic Precautions:LLE weight bearing as tolerated   Braces: (DARCO shoe)     Occupational Performance:     Bed Mobility:    · Patient completed Rolling/Turning to Left with  supervision  · Patient completed Rolling/Turning to Right with supervision  · Patient completed Scooting/Bridging with supervision to EOB sitting EOB  · Patient completed Supine to Sit with supervision when sitting up on R side of bed, but needed minimal assistance to sit up on the L side of bed  · Patient completed Sit to Supine with minimum assistance 2x    Functional Mobility/Transfers:  · Patient completed Sit <> Stand Transfer with moderate assistance  with  rolling walker. 2 attempts. Patient was unable to come to a complete stand due to having a lot of pain in R knee.    Activities of Daily Living:  · Lower Body Dressing: total assistance Donning and doffing L DARCO shoe and R sock and slipper sitting EOB. Patient attempted to don slipper, but was unable to perform task.     [facility] 6 Click ADL: 16    Treatment & Education:  Role of OT and POC  ADL retraining  Functional mobility   safety  Importance of using DARCO shoe when OOB    Patient left HOB elevated with call button in reach and all needs met. Education:      GOALS:   Multidisciplinary Problems     Occupational Therapy Goals        Problem: Occupational Therapy Goal    Goal Priority Disciplines Outcome Interventions   Occupational Therapy Goal     OT, PT/OT Ongoing, Progressing    Description: Goals to be met by:10/01/2020    Patient will increase functional independence with ADLs by performing:    UE Dressing with [place].  LE Dressing with Stand-by Assistance.  Grooming while seated at sink with [place].  Toileting from bedside commode with Supervision for hygiene and clothing management.   Bathing from  sitting at sink with Set-up Assistance.  Toilet transfer to bedside commode with Stand-by Assistance,  accurate adherence to NWB precautions LLE.                     Time Tracking:     OT Date of Treatment: 09/04/20  OT Start Time: 1047  OT Stop Time: 1102  OT Total Time (min): 15 min    Billable Minutes:Self Care/Home Management 15    ELISEO Stokes  9/4/2020

## 2020-09-04 NOTE — PT/OT/SLP PROGRESS
Physical Therapy  Co-Treatment with OT    Patient Name:  Ed Patton   MRN:  [id number]    Recommendations:     Discharge Recommendations:  nursing facility, skilled   Discharge Equipment Recommendations: (will determine DME needs closer to discharge)   Barriers to discharge: Decreased caregiver support    Assessment:     Ed Patton is a 65 y.o. male admitted with a medical diagnosis of Diabetic foot infection.  He presents with the following impairments/functional limitations:  impaired endurance, impaired functional mobilty, gait instability, impaired balance, decreased safety awareness, pain pt ajun treatment fair but pain limited functional mobility. Pt will benefit from cont skilled PT 4x/wk to progress physically. Pt will need SNF placement to maximize rehab potential.     Rehab Prognosis: Good; patient would benefit from acute skilled PT services to address these deficits and reach maximum level of function.    Recent Surgery: Procedure(s) (LRB):  ECHOCARDIOGRAM, TRANSESOPHAGEAL (N/A) 1 Day Post-Op    Plan:     During this hospitalization, patient to be seen 4 x/week to address the identified rehab impairments via gait training, therapeutic activities, therapeutic exercises and progress toward the following goals:    · Plan of Care Expires:  10/01/20    Subjective     Chief Complaint: pt c/o pain in R knee with standing.   Patient/Family Comments/goals:  To get better and go home.   Pain/Comfort:  · Pain Rating 1: 10/10(R knee. pain with standing)  · Pain Rating Post-Intervention 1: (see above)      Objective:     Communicated with nurse prior to session.  Patient found sitting on EOB.  with blood pressure cuff, telemetry, pulse ox (continuous), peripheral IV upon PT entry to room.  Pt is c/o treatment with OT due to decreased tolerance to activities and not being able to tolerate 2 sessions of therapy in one day.     General Precautions: Standard, contact   Orthopedic Precautions:LLE weight bearing as  tolerated   Braces: (Darco shoe L foot)     Functional Mobility:  · Bed Mobility:  Pt needed verbal cues for hand placement and sequencing for functional mobility.    · Rolling Left:  minimum assistance  · Supine to Sit: minimum assistance  · Sit to Supine: minimum assistance  ·   · Transfers:     · Sit to Stand:  moderate assistance with rolling walker. Pt stood x 2 reps and was not able to come to upright posture with standing.   ·   · Gait: pt was not able to gait train due to pain in R knee. pt was Independent scooting on bed to get higher in bed.       AM-PAC 6 CLICK MOBILITY  Turning over in bed (including adjusting bedclothes, sheets and blankets)?: 3  Sitting down on and standing up from a chair with arms (e.g., wheelchair, bedside commode, etc.): 2  Moving from lying on back to sitting on the side of the bed?: 3  Moving to and from a bed to a chair (including a wheelchair)?: 1  Need to walk in hospital room?: 1  Climbing 3-5 steps with a railing?: 1  Basic Mobility Total Score: 11       Therapeutic Activities and Exercises:   pt received verbal instruction in PT POC and verbally expressed understanding of such.     Patient left supine with all lines intact and call button in reach..    GOALS:   Multidisciplinary Problems     Physical Therapy Goals        Problem: Physical Therapy Goal    Goal Priority Disciplines Outcome Goal Variances Interventions   Physical Therapy Goal     PT, PT/OT Ongoing, Progressing     Description: Goals to be met by: 9/15/20    Patient will increase functional independence with mobility by performin. Supine to sit with Stand-by Assistance - Not met  2. Sit to supine with Stand-by Assistance - Not met  3. Sit to stand transfer with Stand-by Assistance with RW - Not met  4. Gait  x 50 feet with Stand-by Assistance using Rolling Walker and maintenance of weight bearing status - Not met  5.  Patient will demonstrate independence with a home exercise program - Not met  6.  Patient's balance will be GOOD: Needs SUPERVISION only during gait and able to self right with moderate LOB - Not met                   Time Tracking:     PT Received On: 09/04/20  PT Start Time: 1047     PT Stop Time: 1102  PT Total Time (min): 15 min     Billable Minutes: Therapeutic Activity 15 min    Treatment Type: Treatment(co-treatment with OT)  PT/PTA: PT     PTA Visit Number: 0     Vandana Helm, PT  09/04/2020

## 2020-09-04 NOTE — HPI
Ed Patton is a 65 year old  man withdiabetes mellitus type 2 insulin dependent, hyperlipidemia, coronary artery disease, dilated cardiomyopathy with chronic systolic heart failure  He presented to [facility] 8/30/2020 due to recurrent falls. Patient complains about weakness. He developed a foot infection and is being managed by podiatry. Additionally, the patient complains of right knee pain. He has chronic pain, but this is now worse than usual after a recent fall. He has elevated inflammatory  Markers.

## 2020-09-04 NOTE — SUBJECTIVE & OBJECTIVE
Interval History: No events overnight. Joint fluid aspiration    Review of Systems   Constitutional: Negative for chills and fever.   HENT: Negative for congestion, ear pain, sneezing and sore throat.    Eyes: Negative for pain.   Respiratory: Negative for cough and shortness of breath.    Cardiovascular: Negative for chest pain.   Gastrointestinal: Negative for abdominal pain, constipation, diarrhea, nausea and vomiting.   Endocrine: Negative for polyuria.   Genitourinary: Negative for decreased urine volume, difficulty urinating, dysuria and flank pain.   Musculoskeletal: Positive for arthralgias (R knee pain) and gait problem. Negative for back pain, neck pain and neck stiffness.   Skin: Positive for wound. Negative for rash.   Neurological: Positive for numbness. Negative for headaches.   Psychiatric/Behavioral: Negative for confusion. The patient is not nervous/anxious.      Objective:     Vital Signs (Most Recent):  Temp: 96.2 °F (35.7 °C) (09/04/20 1112)  Pulse: 90 (09/04/20 1112)  Resp: 16 (09/04/20 1112)  BP: 118/72 (09/04/20 1112)  SpO2: 96 % (09/04/20 1112) Vital Signs (24h Range):  Temp:  [96.2 °F (35.7 °C)-99.1 °F (37.3 °C)] 96.2 °F (35.7 °C)  Pulse:  [] 90  Resp:  [16-20] 16  SpO2:  [90 %-100 %] 96 %  BP: (114-143)/(58-77) 118/72     Weight: 120.4 kg (265 lb 6.9 oz)  Body mass index is 32.31 kg/m².    Estimated Creatinine Clearance: 71.4 mL/min (A) (based on SCr of 1.5 mg/dL (H)).    Physical Exam  Vitals signs reviewed.   Constitutional:       General: He is not in acute distress.     Appearance: Normal appearance. He is normal weight. He is not diaphoretic.   HENT:      Head: Normocephalic and atraumatic.      Right Ear: External ear normal.      Left Ear: External ear normal.      Nose: Nose normal. No congestion or rhinorrhea.   Eyes:      General:         Right eye: No discharge.         Left eye: No discharge.      Extraocular Movements: Extraocular movements intact.   Neck:       Musculoskeletal: Normal range of motion and neck supple. No neck rigidity or muscular tenderness.   Cardiovascular:      Rate and Rhythm: Regular rhythm. Tachycardia present.      Heart sounds: No murmur.      Comments: Diminished DP/PT pulses  Pulmonary:      Effort: Pulmonary effort is normal. No respiratory distress.      Breath sounds: No wheezing.      Comments: On 3.5 L/m oxygen  Chest:      Chest wall: No tenderness.   Abdominal:      General: There is no distension.      Palpations: Abdomen is soft.      Tenderness: There is no abdominal tenderness. There is no guarding.   Musculoskeletal: Normal range of motion.         General: Tenderness (right anterior thigh, lower back) present.      Comments: Improved swelling and lesser tenderness of the right knee this am.    Skin:     General: Skin is warm and dry.      Findings: Lesion (left foot. Dressing cdi) present.   Neurological:      Mental Status: He is alert and oriented to person, place, and time.      Sensory: Sensory deficit present.      Gait: Gait normal.   Psychiatric:         Mood and Affect: Mood normal.         Behavior: Behavior normal.         Thought Content: Thought content normal.         Judgment: Judgment normal.         Significant Labs: All pertinent labs within the past 24 hours have been reviewed.    Significant Imaging: I have reviewed all pertinent imaging results/findings within the past 24 hours.

## 2020-09-04 NOTE — PROGRESS NOTES
"[facility]  Infectious Disease  Progress Note    Patient Name: Ed Patton  MRN: [id number]  Admission Date: 8/30/2020  Length of Stay: 5 days  Attending Physician: Lele Calderón MD  Primary Care Provider: Qiana Chow MD    Isolation Status: Contact  Assessment/Plan:      * Diabetic foot infection  See "sepsis"      Bacteremia due to methicillin resistant Staphylococcus aureus  See "sepsis"    Sepsis  A: MRSA septicemia. Still tachycardic, SpO2 reached 92%, renal function continues to worsen. WBCs not improving. ESR and CRP elevated, RF WNL. Vancomycin was supratherapeutic, switched to pulse dose regimen. Source of bacteremia unclear, may be left foot wound or a septic process at the right knee joint. Right knee now clinically suspicious for septic arthritis (see physical exam). Left foot wound growing Proteus and MRSA, urine growing Klebsiella. TTE negative for signs of cardiac infection, LUKAS is planned.     P:  -Continue IV vancomycin and PO ciprofloxacin. Maintain vanc trough in 15-20 range.   -Followup on subsequent blood cultures. BCx from 9/2/20 MRSA+. Recommend alternate day Bcx for following clearance  -Followup on LUKAS findings. To happen today  -Swollen and tender right knee s/p ortho bedside aspiration, WBC 11k, 90% segs, but negative gram stain and no crystals. Would ideally expect gram stain to be positive if blood seeded joint but unclear at this point and also WBCs not very significantly elevated. Patient to receive antibiotics regardless for MRSA bacteremia.   - Further recs: Currently pending LUKAS. If LUKAS normal and persistent MRSA bacteremia, would discuss switching antibiotics to as likely vanc not effective.   -Will continue to follow.         Anticipated Disposition:TBD    Thank you for your consult. I will follow-up with patient. Please contact us if you have any additional questions.    Shabbir Quezada MD  Infectious Disease  [facility]    Subjective:     Principal Problem:Diabetic foot infection    HPI: 64 yo M with Hx of DM2 (on insulin), chronic hypoxemic respiratory failure (on O2 at home), chronic systolic heart failure presented to ED after recurrent falls 7 and 8 days ago. He has a history of falls but reports that his tendency to fall has worsened recently, resulting in injury to his lower back and right thigh. After his fall last Tuesday he looked at his left foot and noticed a wound, denies pain or drainage related to the wound, he admits to not regularly checking his feet and does not know how long the wound has been present. He believes the wound is related to a tack that he previously found imbedded in the bottom of a slipper. He had previously seen Ang Lugo DPM for diabetic foot care but has not seen her this year due to fear of the coronavirus pandemic. He has been getting home health services including physical therapy. No subjective change as of today, still denies pain and drainage related to the wound. Denies F/C/N/V.  Interval History: No events overnight. Joint fluid aspiration    Review of Systems   Constitutional: Negative for chills and fever.   HENT: Negative for congestion, ear pain, sneezing and sore throat.    Eyes: Negative for pain.   Respiratory: Negative for cough and shortness of breath.    Cardiovascular: Negative for chest pain.   Gastrointestinal: Negative for abdominal pain, constipation, diarrhea, nausea and vomiting.   Endocrine: Negative for polyuria.   Genitourinary: Negative for decreased urine volume, difficulty urinating, dysuria and flank pain.   Musculoskeletal: Positive for arthralgias (R knee pain) and gait problem. Negative for back pain, neck pain and neck stiffness.   Skin: Positive for wound. Negative for rash.   Neurological: Positive for numbness. Negative for headaches.   Psychiatric/Behavioral: Negative for confusion. The patient is not nervous/anxious.      Objective:      Vital Signs (Most Recent):  Temp: 96.2 °F (35.7 °C) (09/04/20 1112)  Pulse: 90 (09/04/20 1112)  Resp: 16 (09/04/20 1112)  BP: 118/72 (09/04/20 1112)  SpO2: 96 % (09/04/20 1112) Vital Signs (24h Range):  Temp:  [96.2 °F (35.7 °C)-99.1 °F (37.3 °C)] 96.2 °F (35.7 °C)  Pulse:  [] 90  Resp:  [16-20] 16  SpO2:  [90 %-100 %] 96 %  BP: (114-143)/(58-77) 118/72     Weight: 120.4 kg (265 lb 6.9 oz)  Body mass index is 32.31 kg/m².    Estimated Creatinine Clearance: 71.4 mL/min (A) (based on SCr of 1.5 mg/dL (H)).    Physical Exam  Vitals signs reviewed.   Constitutional:       General: He is not in acute distress.     Appearance: Normal appearance. He is normal weight. He is not diaphoretic.   HENT:      Head: Normocephalic and atraumatic.      Right Ear: External ear normal.      Left Ear: External ear normal.      Nose: Nose normal. No congestion or rhinorrhea.   Eyes:      General:         Right eye: No discharge.         Left eye: No discharge.      Extraocular Movements: Extraocular movements intact.   Neck:      Musculoskeletal: Normal range of motion and neck supple. No neck rigidity or muscular tenderness.   Cardiovascular:      Rate and Rhythm: Regular rhythm. Tachycardia present.      Heart sounds: No murmur.      Comments: Diminished DP/PT pulses  Pulmonary:      Effort: Pulmonary effort is normal. No respiratory distress.      Breath sounds: No wheezing.      Comments: On 3.5 L/m oxygen  Chest:      Chest wall: No tenderness.   Abdominal:      General: There is no distension.      Palpations: Abdomen is soft.      Tenderness: There is no abdominal tenderness. There is no guarding.   Musculoskeletal: Normal range of motion.         General: Tenderness (right anterior thigh, lower back) present.      Comments: Improved swelling and lesser tenderness of the right knee this am.    Skin:     General: Skin is warm and dry.      Findings: Lesion (left foot. Dressing cdi) present.   Neurological:      Mental  Status: He is alert and oriented to person, place, and time.      Sensory: Sensory deficit present.      Gait: Gait normal.   Psychiatric:         Mood and Affect: Mood normal.         Behavior: Behavior normal.         Thought Content: Thought content normal.         Judgment: Judgment normal.         Significant Labs: All pertinent labs within the past 24 hours have been reviewed.    Significant Imaging: I have reviewed all pertinent imaging results/findings within the past 24 hours.

## 2020-09-04 NOTE — SUBJECTIVE & OBJECTIVE
Subjective:     Interval History: NAEON. Patient still having occasional episodes of hypoxia but his nasal cannula regularly displaces from his nose which may account for some occasions. Tachycardic today. Awaiting LUKAS today. Right knee tap yesterday did not confirm source of right knee pain. No new pedal complaints.     Follow-up For: Procedure(s) (LRB):  ECHOCARDIOGRAM, TRANSESOPHAGEAL (N/A)    Post-Operative Day: 1 Day Post-Op    Scheduled Meds:   aspirin  81 mg Oral Daily    atorvastatin  40 mg Oral Daily    carvediloL  25 mg Oral BID    ciprofloxacin HCl  500 mg Oral Q12H    enoxaparin  40 mg Subcutaneous Q24H    fluticasone furoate-vilanteroL  1 puff Inhalation Daily    fluticasone propionate  1 spray Each Nostril Daily    furosemide  80 mg Oral BID    insulin aspart U-100  14 Units Subcutaneous TIDWM    insulin detemir U-100  29 Units Subcutaneous QHS    levothyroxine  75 mcg Oral Before breakfast    lisinopriL  40 mg Oral Daily    vancomycin (VANCOCIN) IVPB  750 mg Intravenous Q12H    vitamin D  1,000 Units Oral Daily     Continuous Infusions:  PRN Meds:acetaminophen, albuterol-ipratropium, dextrose 50%, dextrose 50%, gabapentin, glucagon (human recombinant), glucose, glucose, insulin aspart U-100, melatonin, ondansetron, polyethylene glycol, sodium chloride 0.9%, DIPH,PERTUS (ADACEL),TETANUS PF VAC (ADULT), Pharmacy to dose Vancomycin consult **AND** vancomycin - pharmacy to dose    Review of Systems   Constitutional: Positive for activity change. Negative for appetite change, chills and fever.   HENT: Negative for congestion.    Respiratory: Negative for cough and shortness of breath.    Gastrointestinal: Negative for nausea and vomiting.   Musculoskeletal: Positive for arthralgias, back pain and myalgias.   Skin: Positive for wound. Negative for color change.   Neurological: Positive for weakness. Negative for numbness.   Psychiatric/Behavioral: Negative for behavioral problems and  confusion.     Objective:     Vital Signs (Most Recent):  Temp: 96.2 °F (35.7 °C) (09/04/20 1112)  Pulse: 90 (09/04/20 1112)  Resp: 16 (09/04/20 1112)  BP: 118/72 (09/04/20 1112)  SpO2: 96 % (09/04/20 1112) Vital Signs (24h Range):  Temp:  [96.2 °F (35.7 °C)-99.1 °F (37.3 °C)] 96.2 °F (35.7 °C)  Pulse:  [] 90  Resp:  [16-20] 16  SpO2:  [90 %-100 %] 96 %  BP: (114-143)/(58-77) 118/72     Weight: 120.4 kg (265 lb 6.9 oz)  Body mass index is 32.31 kg/m².    Foot Exam    General  Orientation: alert and oriented to person, place, and time       Right Foot/Ankle     Inspection and Palpation  Tenderness: none   Swelling: none   Skin Exam: drainage and ulcer; no cellulitis and no erythema     Neurovascular  Dorsalis pedis: absent  Posterior tibial: absent      Left Foot/Ankle      Inspection and Palpation  Tenderness: none   Swelling: none   Skin Exam: skin intact;     Neurovascular  Dorsalis pedis: absent  Posterior tibial: absent            Laboratory:  CBC:   Recent Labs   Lab 09/04/20 0332   WBC 21.41*   RBC 3.58*   HGB 9.6*   HCT 31.4*   *   MCV 88   MCH 26.8*   MCHC 30.6*     CMP:   Recent Labs   Lab 08/30/20  1448  09/04/20  0332   *   < > 169*   CALCIUM 9.8   < > 9.1   ALBUMIN 2.8*  --   --    PROT 8.8*  --   --       < > 138   K 3.3*   < > 3.6   CO2 29   < > 24   CL 97   < > 100   BUN 24*   < > 40*   CREATININE 1.5*   < > 1.5*   ALKPHOS 100  --   --    ALT 23  --   --    AST 27  --   --    BILITOT 0.9  --   --     < > = values in this interval not displayed.     CRP:   Recent Labs   Lab 09/03/20  0410   .1*     ESR:   Recent Labs   Lab 09/03/20 0410   SEDRATE 80*     Wound Cultures:   Recent Labs   Lab 08/31/20  1754 09/03/20  1647   LABAERO PROTEUS MIRABILIS  Moderate  *  METHICILLIN RESISTANT STAPHYLOCOCCUS AUREUS  Moderate  * No growth     Microbiology Results (last 7 days)     Procedure Component Value Units Date/Time    AFB Culture & Smear [[id number]] Collected: 09/03/20  1647    Order Status: Completed Specimen: Joint Fluid from Knee, Right Updated: 09/04/20 1237     AFB CULTURE STAIN No acid fast bacilli seen.    Blood culture [[id number]]  (Abnormal) Collected: 09/02/20 1441    Order Status: Completed Specimen: Blood Updated: 09/04/20 0955     Blood Culture, Routine Gram stain oksana bottle: Gram positive cocci in clusters resembling Staph       Results called to and read back by: Rossana Garza RN 09/03/2020  10:12      Gram stain aer bottle: Gram positive cocci in clusters resembling Staph       Positive results previously called      STAPHYLOCOCCUS AUREUS  Susceptibility pending  [facility].      Narrative:      right median cubital  right median    Blood culture [[id number]]  (Abnormal) Collected: 09/01/20 1417    Order Status: Completed Specimen: Blood from Antecubital, Right Hand Updated: 09/04/20 0838     Blood Culture, Routine Gram stain aer bottle: Gram positive cocci       Results called to and read back by: Jolene Barraza   09/02/2020  12:06      METHICILLIN RESISTANT STAPHYLOCOCCUS AUREUS  ID consult required at [facility] and [facility].  For susceptibility see order #[id number]      Narrative:      Collection has been rescheduled by CBE at 09/01/2020 13:58 Reason:   due at 13:55  Collection has been rescheduled by CBE at 09/01/2020 13:58 Reason:   due at 13:55    Culture, Anaerobe [[id number]] Collected: 08/31/20 1754    Order Status: Completed Specimen: Wound from Foot, Left Updated: 09/04/20 0741     Anaerobic Culture No anaerobes isolated    Culture, Anaerobe [[id number]] Collected: 09/03/20 1647    Order Status: Completed Specimen: Joint Fluid from Knee, Right Updated: 09/04/20 0731     Anaerobic Culture Culture in progress    Aerobic culture [[id number]] Collected: 09/03/20 1647    Order Status: Completed Specimen: Joint Fluid from Knee, Right Updated: 09/04/20 0729     Aerobic Bacterial Culture No  growth    Gram stain [[id number]] Collected: 09/03/20 1647    Order Status: Completed Specimen: Joint Fluid from Knee, Right Updated: 09/03/20 1851     Gram Stain Result Rare WBC's      No organisms seen    Fungus culture [[id number]] Collected: 09/03/20 1647    Order Status: Sent Specimen: Joint Fluid from Knee, Right Updated: 09/03/20 1734    Aerobic culture [[id number]]  (Abnormal)  (Susceptibility) Collected: 08/31/20 1754    Order Status: Completed Specimen: Wound from Foot, Left Updated: 09/03/20 1316     Aerobic Bacterial Culture PROTEUS MIRABILIS  Moderate        METHICILLIN RESISTANT STAPHYLOCOCCUS AUREUS  Moderate      Blood culture [[id number]]  (Abnormal) Collected: 08/31/20 1328    Order Status: Completed Specimen: Blood from Peripheral, Right Hand Updated: 09/03/20 0716     Blood Culture, Routine Gram stain oksana bottle: Gram positive cocci       Results called to and read back by: Elsy Pena  09/01/2020  11:46      Gram stain aer bottle: Gram positive cocci in clusters resembling Staph       Positive results previously called      METHICILLIN RESISTANT STAPHYLOCOCCUS AUREUS  ID consult required at [facility].  For susceptibility see order #[id number]      Blood culture [[id number]]  (Abnormal) Collected: 08/31/20 1324    Order Status: Completed Specimen: Blood from Peripheral, Left Hand Updated: 09/03/20 0714     Blood Culture, Routine Gram stain aer bottle: Gram positive cocci       Gram stain oksana bottle: Gram positive cocci       Results called to and read back by: Elsy Pena  09/01/2020  11:46      METHICILLIN RESISTANT STAPHYLOCOCCUS AUREUS  ID consult required at [facility].  For susceptibility see order #[id number]      Urine culture [[id number]]  (Abnormal)  (Susceptibility) Collected: 08/30/20 1633    Order Status: Completed Specimen: Urine Updated: 09/03/20 0124     Urine Culture, Routine KLEBSIELLA PNEUMONIAE  > 100,000 cfu/ml       Narrative:      Specimen Source->Urine    Blood culture #2 **CANNOT BE ORDERED STAT** [[id number]]  (Abnormal) Collected: 08/30/20 1543    Order Status: Completed Specimen: Blood from Peripheral, Antecubital, Left Updated: 09/02/20 0747     Blood Culture, Routine Gram stain aer bottle: Gram positive cocci in clusters resembling Staph       Gram stain oksana bottle: Gram positive cocci in clusters resembling Staph       Results called to and read back by: Elsy Pena RN 08/31/2020  12:35      METHICILLIN RESISTANT STAPHYLOCOCCUS AUREUS  ID consult required at [facility].  For susceptibility see order # [id number]      Blood culture #1 **CANNOT BE ORDERED STAT** [[id number]]  (Abnormal)  (Susceptibility) Collected: 08/30/20 1532    Order Status: Completed Specimen: Blood from Peripheral, Antecubital, Left Updated: 09/02/20 0746     Blood Culture, Routine Gram stain aer bottle: Gram positive cocci in clusters resembling Staph       Gram stain oksana bottle: Gram positive cocci in clusters resembling Staph       Results called to and read back by: Elsy Pena RN  08/31/2020  12:35      METHICILLIN RESISTANT STAPHYLOCOCCUS AUREUS  ID consult required at [facility].      Gram stain [[id number]] Collected: 08/31/20 1754    Order Status: Completed Specimen: Wound from Foot, Left Updated: 08/31/20 2143     Gram Stain Result Rare WBC's      Moderate Gram negative rods      Moderate Gram positive cocci        Specimen (12h ago, onward)    None        Recent Labs   Lab 08/30/20  1633   COLORU Yellow   SPECGRAV 1.015   PHUR 5.0   PROTEINUA 1+*   BACTERIA Many*   NITRITE Negative   LEUKOCYTESUR 2+*   HYALINECASTS 0       Diagnostic Results  09/03 R Knee X Rays: Possible small suprapatellar effusion versus positioning, no acute displaced fracture or dislocation of the knee.     Clinical Findings: Left Shearer 1 plantar foot ulcer, fibrogranular wound base, no drainage, no  undermining, no erythema, no edema, no tenderness, no fluctuance or crepitus. Wound dry, no exudate. Appears stable overall.

## 2020-09-04 NOTE — PLAN OF CARE
09/04/20 0906   Post-Acute Status   Post-Acute Authorization Placement   Home Health Status Awaiting Internal Medical Clearance

## 2020-09-04 NOTE — PLAN OF CARE
Problem: Occupational Therapy Goal  Goal: Occupational Therapy Goal  Description: Goals to be met by:10/01/2020    Patient will increase functional independence with ADLs by performing:    UE Dressing with [place].  LE Dressing with Stand-by Assistance.  Grooming while seated at sink with [place].  Toileting from bedside commode with Supervision for hygiene and clothing management.   Bathing from  sitting at sink with Set-up Assistance.  Toilet transfer to bedside commode with Stand-by Assistance, accurate adherence to NWB precautions LLE.    Outcome: Ongoing, Progressing   Patient's goals are appropriate.   ELISEO Stokes  9/4/2020

## 2020-09-04 NOTE — CONSULTS
"[facility]  Orthopedics  Consult Note    Patient Name: Ed Patton  MRN: [id number]  Admission Date: 8/30/2020  Hospital Length of Stay: 4 days  Attending Provider: Lele Calderón MD  Primary Care Provider: Qiana Chow MD      Inpatient consult to Orthopedic Surgery  Consult performed by: Simon Sales MD  Consult ordered by: Lele Calderón MD        Subjective:     Principal Problem:Diabetic foot infection    Chief Complaint:   Chief Complaint   Patient presents with    Frequent Falls     frequent falls, weakness, "stepped on a tack" left leg now swollen.     Leg Swelling        HPI: Ed Patton is a 65 year old  man withdiabetes mellitus type 2 insulin dependent, hyperlipidemia, coronary artery disease, dilated cardiomyopathy with chronic systolic heart failure  He presented to [facility] 8/30/2020 due to recurrent falls. Patient complains about weakness. He developed a foot infection and is being managed by podiatry. Additionally, the patient complains of right knee pain. He has chronic pain, but this is now worse than usual after a recent fall. He has elevated inflammatory  Markers.     Past Medical History:   Diagnosis Date    CHF (congestive heart failure)     COPD (chronic obstructive pulmonary disease)     Coronary artery disease     Diabetes mellitus     Diabetes mellitus type II     DM (diabetes mellitus) type II uncontrolled with renal manifestation 9/4/2013    Hyperlipidemia     Hypertension     Postablative hypothyroidism 12/6/2018       Past Surgical History:   Procedure Laterality Date    APPENDECTOMY      CHOLECYSTECTOMY      CORONARY ANGIOPLASTY WITH STENT PLACEMENT      TONSILLECTOMY         Review of patient's allergies indicates:   Allergen Reactions    Vicodin [hydrocodone-acetaminophen] Itching       Current Facility-Administered Medications   Medication    acetaminophen tablet 650 mg    albuterol-ipratropium 2.5 mg-0.5 mg/3 mL " nebulizer solution 3 mL    aspirin EC tablet 81 mg    atorvastatin tablet 40 mg    carvediloL tablet 25 mg    ciprofloxacin HCl tablet 500 mg    dextrose 50% injection 12.5 g    dextrose 50% injection 25 g    enoxaparin injection 40 mg    fluticasone furoate-vilanteroL 200-25 mcg/dose diskus inhaler 1 puff    fluticasone propionate 50 mcg/actuation nasal spray 50 mcg    furosemide tablet 80 mg    gabapentin capsule 100 mg    glucagon (human recombinant) injection 1 mg    glucose chewable tablet 16 g    glucose chewable tablet 24 g    insulin aspart U-100 pen 0-5 Units    insulin aspart U-100 pen 14 Units    insulin detemir U-100 pen 29 Units    ketorolac injection 15 mg    levothyroxine tablet 75 mcg    lisinopriL tablet 40 mg    melatonin tablet 6 mg    ondansetron injection 4 mg    polyethylene glycol packet 17 g    sodium chloride 0.9% flush 10 mL    Tdap vaccine injection 0.5 mL    vancomycin - pharmacy to dose    vancomycin 750 mg in dextrose 5 % 250 mL IVPB (ready to mix system)    vitamin D 1000 units tablet 1,000 Units     Family History     Problem Relation (Age of Onset)    Heart disease Mother    Hypertension Son    No Known Problems Father, Sister, Son, Son        Tobacco Use    Smoking status: Former Smoker     Packs/day: 0.01     Years: 3.00     Pack years: 0.03     Types: Cigarettes     Quit date: 1995     Years since quittin.3    Smokeless tobacco: Never Used   Substance and Sexual Activity    Alcohol use: No    Drug use: No    Sexual activity: Never     Comment:  with 1 son     ROS   Per primary ROS 9/3/20  Objective:     Vital Signs (Most Recent):  Temp: 98.7 °F (37.1 °C) (20)  Pulse: 85 (20)  Resp: 16 (20)  BP: (!) 143/66 (20)  SpO2: 100 % (20) Vital Signs (24h Range):  Temp:  [97.7 °F (36.5 °C)-99.2 °F (37.3 °C)] 98.7 °F (37.1 °C)  Pulse:  [] 85  Resp:  [16-20] 16  SpO2:  [92 %-100 %]  "100 %  BP: (114-143)/(64-72) 143/66     Weight: 120.4 kg (265 lb 6.9 oz)  Height: 6' 4" (193 cm)  Body mass index is 32.31 kg/m².      Intake/Output Summary (Last 24 hours) at 9/3/2020 2058  Last data filed at 9/3/2020 1700  Gross per 24 hour   Intake 720 ml   Output 1975 ml   Net -1255 ml       Ortho/SPM Exam     Vitals: Afebrile.  Vital signs stable.  General: No acute distress.  Cardio: Regular rate.  Chest: No increased work of breathing.    RLE  Skin intact  Pain with Passive ROM of right knee  Flexion contracture of 20 degrees  No bony TTP throughout  Compartments soft  Painless ROM ankle   SILT Sa/Cho/DP/SP/T  Motor intact TA/SP/DP  1+ DP and PT     LLE:  Dressing over left foot by podiatry  No TTP  Compartments soft  Full painless ROM throughout lower extremity  SILT Sa/Cho/DP/SP/T  Motor intact TA/SP/DP  2+ DP/PT     BUE:  Skin intact, no deformity noted  No open wounds/abrasions/crepitus  No bony TTP  FROM shoulder, elbow and wrist  SILT M/U/R  Motor intact AIN/PIN/M/U/R   Cap refill < 2s  2+ RP      Spine: No TTP along spine, no step offs palpated. no sacral decubitus ulcers      Significant Labs:   BMP:   Recent Labs   Lab 09/03/20 0410   *      K 3.8   CL 99   CO2 26   BUN 46*   CREATININE 1.8*   CALCIUM 9.0   MG 1.9     CMP:   Recent Labs   Lab 09/02/20  0352 09/03/20  0410    137   K 3.6 3.8   CL 98 99   CO2 27 26   * 280*   BUN 43* 46*   CREATININE 1.8* 1.8*   CALCIUM 8.7 9.0   ANIONGAP 13 12   EGFRNONAA 39.2* 39.2*     CRP:   Recent Labs   Lab 09/03/20 0410   .1*     All pertinent labs within the past 24 hours have been reviewed.    Significant Imaging: I have reviewed all pertinent imaging results/findings.  Xray right knee: no fracture or dislocation    Procedure Note: Right knee joint aspiration  Patient was explained risks, benefits, and alternatives to treatment and verbalized consent to proceed. Following confirmation of the  patient name, site, and procedure, " we began. Skin was sterilely prepared with betadine and then alcohol solution. An 18 gauge needle on a 60 cc syringe was used for aspiration through anterior approach. 5 cc of yellow colored fluid collected. Fluid and cultures were obtained and sent for analysis. Aspiration site was covered with a bandaid. The patient tolerated the procedure quite well.             Assessment/Plan:     Right knee pain  Ed Patton is a 65 y.o. male with chronic right knee pain acutely worse after fall.  Discussed management options with the patient. Explained that to rule out infection from the joint, aspiration of the joint would be required. Currently, the patient has no other joints that are tender or painful with ROM. The patient agrees to aspiration of the joint. See procedure note for details. The aspiration results are currently pending. Will follow these lab results up and update in the chart. For now, remain NPO.  -WBAT RLE  -NPO  -FU lab results          Simon Sales MD  Orthopedics  [facility]

## 2020-09-04 NOTE — PLAN OF CARE
Problem: Physical Therapy Goal  Goal: Physical Therapy Goal  Description: Goals to be met by: 9/15/20    Patient will increase functional independence with mobility by performin. Supine to sit with Stand-by Assistance - Not met  2. Sit to supine with Stand-by Assistance - Not met  3. Sit to stand transfer with Stand-by Assistance with RW - Not met  4. Gait  x 50 feet with Stand-by Assistance using Rolling Walker and maintenance of weight bearing status - Not met  5.  Patient will demonstrate independence with a home exercise program - Not met  6. Patient's balance will be GOOD: Needs SUPERVISION only during gait and able to self right with moderate LOB - Not met  Outcome: Ongoing, Progressing   Goals remain appropriate. Vandana Helm, PT  2020

## 2020-09-04 NOTE — ASSESSMENT & PLAN NOTE
Bacteremia due to methicillin resistant Staphylococcus aureus  Diabetic ulcer of left foot associated with type 2 diabetes mellitus, with fat layer exposed  Right knee pain  Appreciate Infectious Disease, Orthopedic Surgery. Giving antibiotics. Arthrocentesis suggest septic knee. LUKAS to evaluate for endocarditis.

## 2020-09-04 NOTE — ASSESSMENT & PLAN NOTE
65 y.o M with PMHx of Type 2 DM, chronic hypoxemic respiratory failure on 2 L O2 via NC, chronic systolic heart failure. Podiatry consulted for left foot ulcer. Xray left foot with no signs of soft tissue gas, no fx, no osteo, no foreign body. MRI left foot with no signs of osteo or abscess. Foot wound superficial and stable and likely not the cause of patient's persistent septicemia.     Plan:  -Dressing changed today.  -Continue IV abx per ID.   -MRI left foot with no signs of osteo or abscess. ID on board.  -No emergent surgical intervention from podiatry warranted at this time.   -Continue local wound care. Orders placed.  -Rest of care per primary  -Podiatry will continue to follow

## 2020-09-04 NOTE — PROGRESS NOTES
Outpatient Care Management  Patient Not Qualified    Patient: Ed Patton  MRN:  [id number]  Date of Service:  9/4/2020  Completed by:  Hoda Natarajan LMSW    Chief Complaint   Patient presents with    OPCM Chart Review     9/4/2020    Case Closure     9/4/2020       Patient Summary     Program:  [facility] Low Risk  Qualification Status:  No  Reason Not Qualified:  Currently inpatient    Pt is currently an inpatient at [facility] main campus and discharge plan is for pt to be transferred to SNF.[facility] LMSW will follow this [facility] pt's chart and will assist as needed.

## 2020-09-04 NOTE — SUBJECTIVE & OBJECTIVE
Past Medical History:   Diagnosis Date    CHF (congestive heart failure)     COPD (chronic obstructive pulmonary disease)     Coronary artery disease     Diabetes mellitus     Diabetes mellitus type II     DM (diabetes mellitus) type II uncontrolled with renal manifestation 9/4/2013    Hyperlipidemia     Hypertension     Postablative hypothyroidism 12/6/2018       Past Surgical History:   Procedure Laterality Date    APPENDECTOMY      CHOLECYSTECTOMY      CORONARY ANGIOPLASTY WITH STENT PLACEMENT      TONSILLECTOMY         Review of patient's allergies indicates:   Allergen Reactions    Vicodin [hydrocodone-acetaminophen] Itching       No current facility-administered medications on file prior to encounter.      Current Outpatient Medications on File Prior to Encounter   Medication Sig    ACCU-CHEK FASTCLIX LANCET DRUM Misc TEST FOUR TIMES DAILY    ACCU-CHEK RAMIREZ Misc USE AS DIRECTED    ACCU-CHEK SMARTVIEW TEST STRIP Strp TEST FOUR TIMES DAILY    albuterol (PROVENTIL) 2.5 mg /3 mL (0.083 %) nebulizer solution INHALE THE CONTENTS OF 1 VIAL VIA NEBULIZER EVERY 4 HOURS AS NEEDED FOR WHEEZING.    alcohol swabs (BD ALCOHOL SWABS) PadM Apply 1 each topically as needed.    aspirin (ECOTRIN) 81 MG EC tablet Take 1 tablet (81 mg total) by mouth once daily.    atorvastatin (LIPITOR) 40 MG tablet Take 1 tablet (40 mg total) by mouth once daily.    carvedilol (COREG) 25 MG tablet Take 1 tablet (25 mg total) by mouth 2 (two) times daily.    cholecalciferol, vitamin D3, (VITAMIN D3) 1,000 unit capsule Take 1 capsule (1,000 Units total) by mouth once daily.    fluticasone furoate-vilanterol (BREO ELLIPTA) 200-25 mcg/dose DsDv diskus inhaler INHALE 1 PUFF INTO THE LUNGS ONCE DAILY. (CONTROLLER)    fluticasone propionate (FLONASE) 50 mcg/actuation nasal spray USE 1 SPRAY IN EACH NOSTRIL ONE TIME DAILY    furosemide (LASIX) 80 MG tablet TAKE 1 TABLET BY MOUTH IN THE MORNING  AND  1 TABLET BY MOUTH DAILY  AT   "3- TO 4PM    gabapentin (NEURONTIN) 100 MG capsule Take 1 capsule (100 mg total) by mouth 3 (three) times daily as needed (pain).    insulin NPH-insulin regular, 70/30, (NOVOLIN 70/30 U-100 INSULIN) 100 unit/mL (70-30) injection Inject 45 Units into the skin before breakfast AND 35 Units before dinner. [id number] MINUTES BEFORE BREAKFAST AND DINNER.    insulin syringe-needle U-100 (INSULIN SYRINGE) 1 mL 30 gauge x 5/16 Syrg 1 each by Misc.(Non-Drug; Combo Route) route 2 (two) times daily. Use twice daily as directed with insulin vials.    levothyroxine (SYNTHROID) 75 MCG tablet Take 1 tablet (75 mcg total) by mouth once daily.    lisinopril (PRINIVIL,ZESTRIL) 40 MG tablet Take 1 tablet (40 mg total) by mouth once daily.    mometasone 0.1% (ELOCON) 0.1 % cream Applied to the affected area once daily for rash.    nitroGLYCERIN (NITROSTAT) 0.4 MG SL tablet PLACE 1 TABLET UNDER THE TONGUE EVERY 5  MINUTES AS NEEDED FOR CHEST PAIN    pen needle, diabetic (BD ULTRA-FINE RAMIREZ PEN NEEDLE) 32 gauge x 5/32" Ndle Use with multiple daily injections of insulin     Family History     Problem Relation (Age of Onset)    Heart disease Mother    Hypertension Son    No Known Problems Father, Sister, Son, Son        Tobacco Use    Smoking status: Former Smoker     Packs/day: 0.01     Years: 3.00     Pack years: 0.03     Types: Cigarettes     Quit date: 1995     Years since quittin.3    Smokeless tobacco: Never Used   Substance and Sexual Activity    Alcohol use: No    Drug use: No    Sexual activity: Never     Comment:  with 1 son     ROS  Objective:     Vital Signs (Most Recent):  Temp: 96.2 °F (35.7 °C) (20 111)  Pulse: 90 (20)  Resp: 16 (20)  BP: 118/72 (20)  SpO2: 96 % (20) Vital Signs (24h Range):  Temp:  [96.2 °F (35.7 °C)-99.1 °F (37.3 °C)] 96.2 °F (35.7 °C)  Pulse:  [] 90  Resp:  [16-20] 16  SpO2:  [90 %-100 %] 96 %  BP: " (114-143)/(58-77) 118/72     Weight: 120.4 kg (265 lb 6.9 oz)  Body mass index is 32.31 kg/m².    SpO2: 96 %  O2 Device (Oxygen Therapy): nasal cannula      Intake/Output Summary (Last 24 hours) at 9/4/2020 1429  Last data filed at 9/4/2020 1200  Gross per 24 hour   Intake 60 ml   Output 1725 ml   Net -1665 ml       Lines/Drains/Airways     Peripheral Intravenous Line                 Peripheral IV - Single Lumen 08/30/20 1448 20 G Left Antecubital 4 days                Physical Exam   Constitutional: He is oriented to person, place, and time. He appears well-developed.   HENT:   Head: Normocephalic.   Eyes: Pupils are equal, round, and reactive to light.   Neck: No JVD present.   Cardiovascular: Normal rate and regular rhythm.   No murmur heard.  Pulmonary/Chest: Effort normal.   Abdominal: Soft.   Musculoskeletal:         General: Tenderness present. No edema.   Neurological: He is alert and oriented to person, place, and time.   Skin: Skin is warm.   Psychiatric: He has a normal mood and affect.       Significant Labs:   Recent Lab Results       09/04/20  1115   09/04/20  0819   09/04/20  0332   09/03/20 2022 09/03/20  1804        WBC, Body Fluid               Lymphs, Fluid               Segs, Fluid               Body Fluid Type               Monocytes/Macrophages, Fluid               Body Fluid Source, Crystal Exam               Body Fluid Crystal               Pathologist Review, Body Fluid               Procalcitonin               Aerobic Bacterial Culture               AFB CULTURE STAIN               Anaerobic Culture               Anion Gap     14         Baso #     0.08         Basophil%     0.4         BUN, Bld     40         Calcium     9.1         Chloride     100         CO2     24         Creatinine     1.5         Differential Method     Automated         eGFR if      56.5         eGFR if non      48.8  Comment:  Calculation used to obtain the estimated glomerular  filtration  rate (eGFR) is the CKD-EPI equation.            Eos #     0.8         Eosinophil%     3.5         Fluid Appearance               Fluid Color               Glucose     169         Gram Stain Result               Gran # (ANC)     17.8         Gran%     83.3         Hematocrit     31.4         Hemoglobin     9.6         Immature Grans (Abs)     0.22  Comment:  Mild elevation in immature granulocytes is non specific and   can be seen in a variety of conditions including stress response,   acute inflammation, trauma and pregnancy. Correlation with other   laboratory and clinical findings is essential.           Immature Granulocytes     1.0         Lymph #     1.1         Lymph%     5.2         Magnesium     1.9         MCH     26.8         MCHC     30.6         MCV     88         Mono #     1.4         Mono%     6.6         MPV     11.1         nRBC     0         Phosphorus     3.7         Platelets     418         POCT Glucose 346 235   86 117     Potassium     3.6         RBC     3.58         RDW     15.6         Sodium     138         WBC     21.41                          09/03/20  1708   09/03/20  1647   09/03/20  1538        WBC, Body Fluid   89960  Comment:  Reference ranges for body fluids not established.   Correlate clinically.         Lymphs, Fluid   8       Segs, Fluid   90       Body Fluid Type   Joint Fluid, Right Knee       Monocytes/Macrophages, Fluid   2       Body Fluid Source, Crystal Exam   Joint Fluid, Right Knee       Body Fluid Crystal   Negative       Pathologist Review, Body Fluid   REVIEWED  Comment:  Electronically reviewed and signed by:  Beatirz Valderrama M.D.  Signed on 09/04/20 at 10:37  PATHOLOGIST INTERPRETATION  No urate or pyrophosphate crystals identified  Interpreted by Beatriz Valderrama M.D.         Procalcitonin     0.13  Comment:  A concentration < 0.25 ng/mL represents a low risk bacterial   infection.  Procalcitonin may not be accurate among patients with  localized   infection, recent trauma or major surgery, immunosuppressed state,   invasive fungal infection, renal dysfunction. Decisions regarding   initiation or continuation of antibiotic therapy should not be based   solely on procalcitonin levels.       Aerobic Bacterial Culture   No growth[P]       AFB CULTURE STAIN   No acid fast bacilli seen.       Anaerobic Culture   Culture in progress[P]       Anion Gap           Baso #           Basophil%           BUN, Bld           Calcium           Chloride           CO2           Creatinine           Differential Method           eGFR if            eGFR if non            Eos #           Eosinophil%           Fluid Appearance   Cloudy       Fluid Color   Yellow       Glucose           Gram Stain Result   Rare WBC's          No organisms seen       Gran # (ANC)           Gran%           Hematocrit           Hemoglobin           Immature Grans (Abs)           Immature Granulocytes           Lymph #           Lymph%           Magnesium           MCH           MCHC           MCV           Mono #           Mono%           MPV           nRBC           Phosphorus           Platelets           POCT Glucose 142         Potassium           RBC           RDW           Sodium           WBC                 Significant Imaging: Echocardiogram:   2D echo with color flow doppler:   Results for orders placed or performed during the hospital encounter of 05/11/18   2D Echo w/ Color Flow Doppler   Result Value Ref Range    QEF 45 55 - 65    Narrative    Date of Procedure: 05/11/2018        TEST DESCRIPTION   Technical Quality: This is a technically adequate study.     Aorta: The aortic root is normal in size, measuring 3.0 cm at sinotubular junction and 3.1 cm at Sinuses of Valsalva. The proximal ascending aorta is normal in size, measuring 2.9 cm across.     Left Atrium: The left atrial volume index is moderately enlarged, measuring 43.28 cc/m2.     Left  Ventricle: The left ventricle is upper limit of normal, with an end-diastolic diameter of 5.8 cm, and an end-systolic diameter of 4.4 cm. LV wall thickness is normal, with the septum measuring 1.0 cm and the posterior wall measuring 0.9 cm across.   Relative wall thickness was normal at 0.31, and the LV mass index was 107.6 g/m2 consistent with normal left ventricular mass. There is global hypokinesis. Left ventricular systolic function appears mildly depressed. Visually estimated ejection fraction   is 45-50%. The LV Doppler derived stroke volume equals 62.0 ccs.     Diastolic indices: E wave velocity 0.6 m/s, E/A ratio 0.8,  msec., E/e' ratio(avg) 12. Diastolic function is indeterminate.     Right Atrium: The right atrium is normal in size, measuring 4.5 cm in length and 3.8 cm in width in the apical view.     Right Ventricle: The right ventricle is upper limit of normal measuring 4.2 cm at the base in the apical right ventricle-focused view. Global right ventricular systolic function appears normal. Tricuspid annular plane systolic excursion (TAPSE) is 2.3   cm. Tissue Doppler-derived tricuspid annular peak systolic velocity (S prime) is 10.2 cm/s.     Aortic Valve:  The aortic valve is mildly sclerotic. The aortic valve is tri-leaflet in structure.     IVC: IVC is normal in size and collapses > 50% with a sniff, suggesting normal right atrial pressure of 3 mmHg.     Intracavitary: There is no evidence of pericardial effusion, intracavity mass, thrombi, or vegetation.         CONCLUSIONS     1 - Mildly depressed left ventricular systolic function (EF 45-50%).     2 - Right ventricle is upper limit of normal in size with normal systolic function.     3 - Indeterminate LV diastolic function.     4 - Moderate left atrial enlargement.             This document has been electronically    SIGNED BY: Chencho Ratliff MD On: 05/11/2018 15:00    and Transthoracic echo (TTE) complete (Cupid Only):   Results for orders  placed or performed during the hospital encounter of 08/30/20   Echo Color Flow Doppler? Yes   Result Value Ref Range    Ascending aorta 2.82 cm    STJ 2.56 cm    AV mean gradient 3 mmHg    Ao peak david 1.18 m/s    Ao VTI 23.10 cm    IVRT 42.82 msec    IVS 0.76 0.6 - 1.1 cm    LA size 4.79 cm    Left Atrium Major Axis 5.84 cm    Left Atrium Minor Axis 5.82 cm    LVIDD 5.35 3.5 - 6.0 cm    LVIDS 4.14 (A) 2.1 - 4.0 cm    LVOT diameter 2.30 cm    LVOT peak VTI 12.20 cm    PW 1.07 0.6 - 1.1 cm    E wave decelartion time 164.89 msec    MV Peak E David 0.91 m/s    PV Peak D David 0.53 m/s    PV Peak S David 0.17 m/s    RA Major Axis 5.61 cm    RA Width 3.95 cm    RVDD 3.25 cm    Sinus 3.01 cm    TAPSE 1.24 cm    TR Max David 3.37 m/s    TDI LATERAL 0.09 m/s    TDI SEPTAL 0.05 m/s    LA WIDTH 4.84 cm    MV stenosis pressure 1/2 time 47.82 ms    LV Diastolic Volume 138.12 mL    LV Systolic Volume 76.15 mL    RV S' 10.24 cm/s    LVOT peak david 0.67 m/s    LV LATERAL E/E' RATIO 10.11 m/s    LV SEPTAL E/E' RATIO 18.20 m/s    FS 23 %    LA volume 114.89 cm3    LV mass 181.16 g    Left Ventricle Relative Wall Thickness 0.40 cm    AV valve area 2.19 cm2    AV Velocity Ratio 0.57     AV index (prosthetic) 0.53     MV valve area p 1/2 method 4.60 cm2    Mean e' 0.07 m/s    Pulm vein S/D ratio 0.32     LVOT area 4.2 cm2    LVOT stroke volume 50.66 cm3    AV peak gradient 6 mmHg    E/E' ratio 13.00 m/s    LV Systolic Volume Index 30.8 mL/m2    LV Diastolic Volume Index 55.95 mL/m2    LA Volume Index 46.5 mL/m2    LV Mass Index 73 g/m2    Triscuspid Valve Regurgitation Peak Gradient 45 mmHg    BSA 2.5 m2    Right Atrial Pressure (from IVC) 8 mmHg    TV rest pulmonary artery pressure 53 mmHg    Narrative    · Moderately decreased left ventricular systolic function. The estimated   ejection fraction is 35%.  · Moderately reduced right ventricular systolic function.  · Moderate left atrial enlargement.  · Atrial fibrillation observed.  · The  estimated PA systolic pressure is 53 mmHg.  · Intermediate central venous pressure (8 mmHg).       and X-Ray: CXR: X-Ray Chest 1 View (CXR): No results found for this visit on 08/30/20. and X-Ray Chest PA and Lateral (CXR): No results found for this visit on 08/30/20.

## 2020-09-04 NOTE — PROGRESS NOTES
Patient admitted to recovery see [facility] for complete assessment pacu bcg's maintained safety measures verified patient sedated at this time no distress noted. Patient's blood sugar 272.

## 2020-09-04 NOTE — NURSING TRANSFER
Nursing Transfer Note      9/4/2020     Transfer To: [id number]    Transfer via stretcher    Transfer with 2liters nc to O2    Transported by escort with ticket to ride    Medicines sent: none    Chart send with patient: Yes    Notified: reported to floor nurse and also patient stated that he will call his family when he goes back to his room     Patient reassessed at: see epic (date, time)    Upon arrival to floor: to room no complaints no distress noted.

## 2020-09-05 PROBLEM — M00.061 STAPHYLOCOCCAL ARTHRITIS OF RIGHT KNEE: Status: ACTIVE | Noted: 2020-09-05

## 2020-09-05 LAB
BACTERIA BLD CULT: ABNORMAL
CK SERPL-CCNC: 210 U/L (ref 20–200)
POCT GLUCOSE: 114 MG/DL (ref 70–110)
POCT GLUCOSE: 145 MG/DL (ref 70–110)
POCT GLUCOSE: 266 MG/DL (ref 70–110)
POCT GLUCOSE: 310 MG/DL (ref 70–110)
TB INDURATION 48 - 72 HR READ: 0 MM
VANCOMYCIN TROUGH SERPL-MCNC: 13.1 UG/ML (ref 10–22)

## 2020-09-05 PROCEDURE — 11000001 HC ACUTE MED/SURG PRIVATE ROOM: Mod: HCNC

## 2020-09-05 PROCEDURE — 27000221 HC OXYGEN, UP TO 24 HOURS: Mod: HCNC

## 2020-09-05 PROCEDURE — 99233 PR SUBSEQUENT HOSPITAL CARE,LEVL III: ICD-10-PCS | Mod: HCNC,,, | Performed by: INTERNAL MEDICINE

## 2020-09-05 PROCEDURE — 87040 BLOOD CULTURE FOR BACTERIA: CPT | Mod: HCNC

## 2020-09-05 PROCEDURE — 99233 SBSQ HOSP IP/OBS HIGH 50: CPT | Mod: HCNC,,, | Performed by: INTERNAL MEDICINE

## 2020-09-05 PROCEDURE — 63600175 PHARM REV CODE 636 W HCPCS: Mod: HCNC | Performed by: HOSPITALIST

## 2020-09-05 PROCEDURE — 25000003 PHARM REV CODE 250: Mod: HCNC | Performed by: HOSPITALIST

## 2020-09-05 PROCEDURE — 25000003 PHARM REV CODE 250: Mod: HCNC | Performed by: INTERNAL MEDICINE

## 2020-09-05 PROCEDURE — 80202 ASSAY OF VANCOMYCIN: CPT | Mod: HCNC

## 2020-09-05 PROCEDURE — 63600175 PHARM REV CODE 636 W HCPCS: Mod: HCNC | Performed by: INTERNAL MEDICINE

## 2020-09-05 PROCEDURE — 99900035 HC TECH TIME PER 15 MIN (STAT): Mod: HCNC

## 2020-09-05 PROCEDURE — 82550 ASSAY OF CK (CPK): CPT | Mod: HCNC

## 2020-09-05 PROCEDURE — 36415 COLL VENOUS BLD VENIPUNCTURE: CPT | Mod: HCNC

## 2020-09-05 PROCEDURE — 94761 N-INVAS EAR/PLS OXIMETRY MLT: CPT | Mod: HCNC

## 2020-09-05 PROCEDURE — 87077 CULTURE AEROBIC IDENTIFY: CPT | Mod: HCNC

## 2020-09-05 PROCEDURE — 87186 SC STD MICRODIL/AGAR DIL: CPT | Mod: HCNC

## 2020-09-05 RX ORDER — VANCOMYCIN HCL IN 5 % DEXTROSE 1G/250ML
1000 PLASTIC BAG, INJECTION (ML) INTRAVENOUS
Status: DISCONTINUED | OUTPATIENT
Start: 2020-09-05 | End: 2020-09-07

## 2020-09-05 RX ORDER — CALCIUM CARBONATE 200(500)MG
500 TABLET,CHEWABLE ORAL 2 TIMES DAILY PRN
Status: DISCONTINUED | OUTPATIENT
Start: 2020-09-05 | End: 2020-10-09 | Stop reason: HOSPADM

## 2020-09-05 RX ADMIN — CHOLECALCIFEROL (VITAMIN D3) 25 MCG (1,000 UNIT) TABLET 1000 UNITS: at 09:09

## 2020-09-05 RX ADMIN — CIPROFLOXACIN 500 MG: 500 TABLET, FILM COATED ORAL at 09:09

## 2020-09-05 RX ADMIN — FUROSEMIDE 80 MG: 40 TABLET ORAL at 09:09

## 2020-09-05 RX ADMIN — FUROSEMIDE 80 MG: 40 TABLET ORAL at 05:09

## 2020-09-05 RX ADMIN — CIPROFLOXACIN 500 MG: 500 TABLET, FILM COATED ORAL at 10:09

## 2020-09-05 RX ADMIN — LEVOTHYROXINE SODIUM 75 MCG: 25 TABLET ORAL at 05:09

## 2020-09-05 RX ADMIN — INSULIN ASPART 3 UNITS: 100 INJECTION, SOLUTION INTRAVENOUS; SUBCUTANEOUS at 12:09

## 2020-09-05 RX ADMIN — ENOXAPARIN SODIUM 40 MG: 40 INJECTION SUBCUTANEOUS at 05:09

## 2020-09-05 RX ADMIN — INSULIN ASPART 14 UNITS: 100 INJECTION, SOLUTION INTRAVENOUS; SUBCUTANEOUS at 05:09

## 2020-09-05 RX ADMIN — ASPIRIN 81 MG: 81 TABLET, COATED ORAL at 09:09

## 2020-09-05 RX ADMIN — ATORVASTATIN CALCIUM 40 MG: 20 TABLET, FILM COATED ORAL at 09:09

## 2020-09-05 RX ADMIN — INSULIN ASPART 14 UNITS: 100 INJECTION, SOLUTION INTRAVENOUS; SUBCUTANEOUS at 12:09

## 2020-09-05 RX ADMIN — LISINOPRIL 40 MG: 20 TABLET ORAL at 09:09

## 2020-09-05 RX ADMIN — FLUTICASONE FUROATE AND VILANTEROL TRIFENATATE 1 PUFF: 200; 25 POWDER RESPIRATORY (INHALATION) at 08:09

## 2020-09-05 RX ADMIN — CARVEDILOL 25 MG: 25 TABLET, FILM COATED ORAL at 10:09

## 2020-09-05 RX ADMIN — INSULIN DETEMIR 29 UNITS: 100 INJECTION, SOLUTION SUBCUTANEOUS at 10:09

## 2020-09-05 RX ADMIN — INSULIN ASPART 14 UNITS: 100 INJECTION, SOLUTION INTRAVENOUS; SUBCUTANEOUS at 09:09

## 2020-09-05 RX ADMIN — VANCOMYCIN HYDROCHLORIDE 750 MG: 750 INJECTION, POWDER, LYOPHILIZED, FOR SOLUTION INTRAVENOUS at 09:09

## 2020-09-05 RX ADMIN — ONDANSETRON 4 MG: 2 INJECTION INTRAMUSCULAR; INTRAVENOUS at 05:09

## 2020-09-05 RX ADMIN — FLUTICASONE PROPIONATE 50 MCG: 50 SPRAY, METERED NASAL at 09:09

## 2020-09-05 RX ADMIN — CALCIUM CARBONATE (ANTACID) CHEW TAB 500 MG 500 MG: 500 CHEW TAB at 05:09

## 2020-09-05 RX ADMIN — CARVEDILOL 25 MG: 25 TABLET, FILM COATED ORAL at 09:09

## 2020-09-05 RX ADMIN — INSULIN ASPART 4 UNITS: 100 INJECTION, SOLUTION INTRAVENOUS; SUBCUTANEOUS at 09:09

## 2020-09-05 RX ADMIN — VANCOMYCIN HYDROCHLORIDE 1000 MG: 1 INJECTION, POWDER, LYOPHILIZED, FOR SOLUTION INTRAVENOUS at 10:09

## 2020-09-05 NOTE — ASSESSMENT & PLAN NOTE
A: MRSA septicemia. Still tachycardic, SpO2 reached 92%, renal function continues to worsen. WBCs not improving. ESR and CRP elevated, RF WNL. Vancomycin was supratherapeutic, switched to pulse dose regimen. Source of bacteremia unclear, may be left foot wound or a septic process at the right knee joint. Right knee now clinically suspicious for septic arthritis (see physical exam). Left foot wound growing Proteus and MRSA, urine growing Klebsiella. TTE negative for signs of cardiac infection, LUKAS is negative as well.     P:  -Swollen and tender right knee s/p ortho bedside aspiration, WBC 11k, 90% segs, but negative gram stain and no crystals. Growing Staph Aureus now- unlikely to be the primary source. Likely seeding from blood rather than the other way around.   -Will order NM whole body tagged wbc scan for inflammatory localization  -Will switch vanc and cipro to daptomycin and ceftaroline because of persistent bacteremia and persistent leucocytosis and now likely seeding joint. Negative LUKAS  - will get a baseline CPK  - Daily CBC and CMP and weekly CPK after initiating dapto and ceftaroline  - Will need a PICC line after clearance of bacteremia, anticipate prolonged IV antibiotics on discharge  -Followup on subsequent blood cultures from 9/4/20. BCx from 9/2/20 MRSA+. Recommend alternate day Bcx for following clearance  -Will continue to follow.

## 2020-09-05 NOTE — PROGRESS NOTES
"      [facility] Hospital Medicine  Telemedicine Progress Note    Patient Name: Ed Patton  MRN: [id number]  Patient Class: IP- Inpatient   Admission Date: 8/30/2020  Length of Stay: 6 days  Attending Physician: Gisela Mota MD  Primary Care Provider: Qiana Chow MD    [facility] Medicine Team: [facility] VIRTUAL TEAM 10 Gisela Mota MD  Virtual Telemedicine Progress Note  Start time: 1023  Chief complaint: Sepsis due to methicillin resistant Staphylococcus aureus  The patient location is: 660/660 A  The patient arrived at: 8/30/2020  2:18 PM  Present with the patient at the time of the telemed/virtual assessment: telepresenter   End time:  1028  Total time spent with patient: 5 min  I have assessed findings virtually using a telemedicine platform and with assistance of the bedside nurse or telemedicine presenter.  The attending portion of this evaluation, treatment, and documentation was performed per Gisela Mota MD via audiovisual.    Patient was transferred to the telemedicine service on: 9/5/2020    Subjective:     Admission CC:   Chief Complaint   Patient presents with    Frequent Falls     frequent falls, weakness, "stepped on a tack" left leg now swollen.     Leg Swelling     Follow up visit for: Sepsis due to methicillin resistant Staphylococcus aureus    Interval History / Events Overnight:   The patient is able to provide adequate history. Additional history was obtained from past medical records. No significant events reported by Nursing. Has home O2.  Patient complains of insomnia. Symptoms have been unchanged since yesterday. Associated symptoms include: LE pain. Symptoms are stable. Alleviating factors include: medication: analgesics.     Data reviewed 9/5/2020: Lab test(s) reviewed: H&H and sCR stable. Hyperglycemia  I have assessed findings virtually using a telemedicine platform and with assistance of the bedside nurse or telemedicine presenter.      Review " of Systems   Constitutional: Negative for fever.   Respiratory: Negative for shortness of breath.      Objective:     Vital Signs (Most Recent):  Temp: 97.7 °F (36.5 °C) (09/05/20 1632)  Pulse: (!) 116 (09/05/20 0934)  Resp: 16 (09/05/20 0934)  BP: (!) 140/88 (09/05/20 0934)  SpO2: 98 % (09/05/20 0934) Vital Signs (24h Range):  Temp:  [96.9 °F (36.1 °C)-99 °F (37.2 °C)] 97.7 °F (36.5 °C)  Pulse:  [] 116  Resp:  [16-17] 16  SpO2:  [95 %-98 %] 98 %  BP: (115-140)/(75-88) 140/88     Weight: 118 kg (260 lb 2.3 oz)  Body mass index is 31.67 kg/m².    Intake/Output Summary (Last 24 hours) at 9/5/2020 1725  Last data filed at 9/5/2020 1600  Gross per 24 hour   Intake 700 ml   Output 450 ml   Net 250 ml      Physical Exam  Constitutional:       General: He is not in acute distress.     Appearance: Normal appearance. He is not diaphoretic.   Eyes:      General: Lids are normal. No scleral icterus.        Right eye: No discharge.         Left eye: No discharge.      Conjunctiva/sclera: Conjunctivae normal.   Neck:      Trachea: Phonation normal.   Cardiovascular:      Rate and Rhythm: Tachycardia present.   Pulmonary:      Effort: Pulmonary effort is normal. No tachypnea, accessory muscle usage or respiratory distress.   Abdominal:      General: There is no distension.   Neurological:      Mental Status: He is alert. Mental status is at baseline. He is not disoriented.   Psychiatric:         Attention and Perception: Attention normal.         Mood and Affect: Affect normal.         Behavior: Behavior is cooperative.           Significant Labs:   Recent Labs   Lab 09/02/20  0352 09/03/20  0410 09/04/20  0332   WBC 19.52* 19.52* 21.41*   HGB 9.8* 9.7* 9.6*   HCT 32.6* 32.4* 31.4*   * 408* 418*     Recent Labs   Lab 09/02/20  0352 09/03/20  0410 09/03/20  1647 09/04/20  0332   GRAN 79.8*  15.6* 81.6*  15.9*  --  83.3*  17.8*   SEGS  --   --  90  --    LYMPH 6.1*  1.2 5.9*  1.2  --  5.2*  1.1   LYMPHS  --   --   8  --    MONO 7.0  1.4* 6.1  1.2*  --  6.6  1.4*   EOS 1.0* 1.0*  --  0.8*     Recent Labs   Lab 08/30/20  1448  09/02/20  0352 09/03/20  0410 09/04/20  0332      < > 138 137 138   K 3.3*   < > 3.6 3.8 3.6   CL 97   < > 98 99 100   CO2 29   < > 27 26 24   BUN 24*   < > 43* 46* 40*   CREATININE 1.5*   < > 1.8* 1.8* 1.5*   *   < > 180* 280* 169*   CALCIUM 9.8   < > 8.7 9.0 9.1   ALBUMIN 2.8*  --   --   --   --    MG  --    < > 2.0 1.9 1.9   PHOS  --    < > 3.1 3.6 3.7    < > = values in this interval not displayed.     Recent Labs   Lab 08/30/20  1448 09/03/20  0410   ALKPHOS 100  --    ALT 23  --    AST 27  --    PROT 8.8*  --    BILITOT 0.9  --    CRP  --  227.1*     Recent Labs   Lab 08/30/20  1448 09/05/20  1346   CPK  --  210*   TROPONINI 0.029*  --      Recent Labs   Lab 08/30/20  1532 09/03/20  0410 09/03/20  1538   PROCAL  --   --  0.13   LACTATE 1.4  --   --    SEDRATE  --  80*  --      SARS-CoV2 (COVID-19) Qualitative PCR (no units)   Date Value   09/03/2020 Not Detected     SARS-CoV-2 RNA, Amplification, Qual (no units)   Date Value   08/30/2020 Negative     Recent Labs   Lab 08/31/20  0434   HGBA1C 9.5*     Recent Labs   Lab 09/05/20  0752 09/05/20  1206 09/05/20  1630   POCTGLUCOSE 310* 266* 145*       Significant Imaging:     Assessment/Plan:      Active Diagnoses:    Diagnosis Date Noted POA    PRINCIPAL PROBLEM:  Sepsis due to methicillin resistant Staphylococcus aureus [A41.02] 08/30/2020 Yes    Staphylococcal arthritis of right knee [M00.061] 09/05/2020 Yes    Right knee pain [M25.561] 09/03/2020 Yes    Diabetic ulcer of left foot associated with type 2 diabetes mellitus, with fat layer exposed [E11.621, L97.522] 08/31/2020 Yes    Cellulitis of left lower extremity [L03.116] 08/30/2020 Yes    Diabetic foot infection [E11.628, L08.9] 08/30/2020 Yes    Bacteremia due to methicillin resistant Staphylococcus aureus [R78.81] 08/30/2020 Yes    COPD mixed type [J44.9] 10/15/2019 Yes      Chronic    Postablative hypothyroidism [E89.0] 12/06/2018 Yes     Chronic    Uncontrolled type 2 diabetes mellitus with hyperglycemia, with long-term current use of insulin [E11.65, Z79.4] 10/09/2017 Not Applicable     Chronic    Chronic respiratory failure with hypoxia, on home oxygen therapy [J96.11, Z99.81] 10/09/2017 Not Applicable     Chronic    Diabetic polyneuropathy associated with type 2 diabetes mellitus [E11.42] 08/25/2015 Yes     Chronic    CKD stage 3 due to type 2 diabetes mellitus [E11.22, N18.3] 12/23/2014 Yes     Chronic    Chronic systolic congestive heart failure [I50.22] 05/07/2013 Yes     Chronic      Problems Resolved During this Admission:       Overview / ICU Course:    Ed Patton is a 65 y.o. male admitted for Sepsis due to methicillin resistant Staphylococcus aureus.    Inpatient Medications Prescribed for Management of Current Problems:     Scheduled Meds:    aspirin  81 mg Oral Daily    atorvastatin  40 mg Oral Daily    carvediloL  25 mg Oral BID    ciprofloxacin HCl  500 mg Oral Q12H    enoxaparin  40 mg Subcutaneous Q24H    fluticasone furoate-vilanteroL  1 puff Inhalation Daily    fluticasone propionate  1 spray Each Nostril Daily    furosemide  80 mg Oral BID    insulin aspart U-100  14 Units Subcutaneous TIDWM    insulin detemir U-100  29 Units Subcutaneous QHS    levothyroxine  75 mcg Oral Before breakfast    lisinopriL  40 mg Oral Daily    vancomycin (VANCOCIN) IVPB  1,000 mg Intravenous Q12H    vitamin D  1,000 Units Oral Daily     Continuous Infusions:   As Needed: acetaminophen, acetaminophen, albuterol-ipratropium, calcium carbonate, dextrose 50%, dextrose 50%, gabapentin, glucagon (human recombinant), glucose, glucose, insulin aspart U-100, melatonin, ondansetron, oxyCODONE, polyethylene glycol, sodium chloride 0.9%, DIPH,PERTUS (ADACEL),TETANUS PF VAC (ADULT), Pharmacy to dose Vancomycin consult **AND** vancomycin - pharmacy to  dose      Assessment and Plan by Problem    Diabetic foot infection  Bacteremia due to methicillin resistant Staphylococcus aureus  Diabetic ulcer of left foot associated with type 2 diabetes mellitus, with fat layer exposed  Right knee pain / septic arthritis  Appreciate Infectious Disease, Orthopedic Surgery. Treating with antibiotics per ID.   Arthrocentesis and cultures suggest septic knee. LUKAS to evaluate for endocarditis negative for vegetations.     Sepsis  Due to Proteus and MRSA. Continuing current management with antibiotics.  Follow up on ID recommendations.     Cellulitis of left lower extremity  Continue antibiotics.     COPD mixed type  Continue home fluticasone-vilanterol, albuterol nebulizer.     Postablative hypothyroidism  Continue home levothyroxine.     Chronic respiratory failure with hypoxia, on home oxygen therapy  On 2 L nasal cannula chronically due to combination of COPD and CHF; titrate as needed.     Uncontrolled type 2 diabetes mellitus with hyperglycemia, with long-term current use of insulin  Diabetic polyneuropathy associated with type 2 diabetes mellitus  Takes insulin NPH-insulin regular 70/30 45 units before breakfast and 35 units before dinner. Giving insulin detemir 29 units daily and insulin aspart 14 units with meals and correction dose scale. Monitor BGs.     Chronic systolic congestive heart failure  Continue home carvedilol, lisinopril. Lasix.  EF 25%         Diet: Diet diabetic [facility]; 2000 Calorie  GI Prophylaxis: Not indicated  Significant LDAs:   IV Access Type: Peripheral  Urinary Catheter Indication if present: Patient Does Not Have Urinary Catheter  Other Lines/Tubes/Drains:    HIGH RISK CONDITION(S):   Patient is currently on drug therapy requiring intensive monitoring for toxicity: Vancomycin     Goals of Care:    Previous admission:  4/15/16  Likely prognosis:  Fair  Code Status: Full Code  Comfort Only: No  Hospice: No  Goals at discharge: remain at  home    Discharge Planning   EDY: 9/7/2020     Code Status: Full Code   Is the patient medically ready for discharge?:     Reason for patient still in hospital (select all that apply): Treatment  Discharge Plan A: Home with family, Home Health        VTE Risk Mitigation (From admission, onward)         Ordered     enoxaparin injection 40 mg  Every 24 hours      08/30/20 2004     IP VTE HIGH RISK PATIENT  Once      08/30/20 2004     Place sequential compression device  Until discontinued      08/30/20 2585                   Gisela Mota MD  Department of Hospital Medicine   [facility]

## 2020-09-05 NOTE — PROGRESS NOTES
"[facility]  Infectious Disease  Progress Note    Patient Name: Ed Patton  MRN: [id number]  Admission Date: 8/30/2020  Length of Stay: 6 days  Attending Physician: Gisela Mota MD  Primary Care Provider: Qiana Chow MD    Isolation Status: Contact  Assessment/Plan:      * Sepsis due to methicillin resistant Staphylococcus aureus  A: MRSA septicemia. Still tachycardic, SpO2 reached 92%, renal function continues to worsen. WBCs not improving. ESR and CRP elevated, RF WNL. Vancomycin was supratherapeutic, switched to pulse dose regimen. Source of bacteremia unclear, may be left foot wound or a septic process at the right knee joint. Right knee now clinically suspicious for septic arthritis (see physical exam). Left foot wound growing Proteus and MRSA, urine growing Klebsiella. TTE negative for signs of cardiac infection, LUKAS is negative as well.     P:  -Swollen and tender right knee s/p ortho bedside aspiration, WBC 11k, 90% segs, but negative gram stain and no crystals. Growing Staph Aureus now- unlikely to be the primary source. Likely seeding from blood rather than the other way around.   -Will order NM whole body tagged wbc scan for inflammatory localization  -Will switch vanc and cipro to daptomycin and ceftaroline because of persistent bacteremia and persistent leucocytosis and now likely seeding joint. Negative LUKAS  - will get a baseline CPK  - Daily CBC and CMP and weekly CPK after initiating dapto and ceftaroline  - Will need a PICC line after clearance of bacteremia, anticipate prolonged IV antibiotics on discharge  -Followup on subsequent blood cultures from 9/4/20. BCx from 9/2/20 MRSA+. Recommend alternate day Bcx for following clearance  -Will continue to follow.     Staphylococcal arthritis of right knee  See 'Sepsis'    Diabetic foot infection  See "sepsis"        Anticipated Disposition: TBD    Thank you for your consult. I will follow-up with patient. Please " contact us if you have any additional questions.    Shabbir Quezada MD  Infectious Disease  [facility]    Subjective:     Principal Problem:Sepsis due to methicillin resistant Staphylococcus aureus    HPI: 64 yo M with Hx of DM2 (on insulin), chronic hypoxemic respiratory failure (on O2 at home), chronic systolic heart failure presented to ED after recurrent falls 7 and 8 days ago. He has a history of falls but reports that his tendency to fall has worsened recently, resulting in injury to his lower back and right thigh. After his fall last Tuesday he looked at his left foot and noticed a wound, denies pain or drainage related to the wound, he admits to not regularly checking his feet and does not know how long the wound has been present. He believes the wound is related to a tack that he previously found imbedded in the bottom of a slipper. He had previously seen Ang Lugo DPM for diabetic foot care but has not seen her this year due to fear of the coronavirus pandemic. He has been getting home health services including physical therapy. No subjective change as of today, still denies pain and drainage related to the wound. Denies F/C/N/V.  Interval History: No acute events overnight. LUKAS negative    Review of Systems   Constitutional: Negative for chills and fever.   HENT: Negative for congestion, ear pain, sneezing and sore throat.    Eyes: Negative for pain.   Respiratory: Negative for cough and shortness of breath.    Cardiovascular: Negative for chest pain.   Gastrointestinal: Negative for abdominal pain, constipation, diarrhea, nausea and vomiting.   Endocrine: Negative for polyuria.   Genitourinary: Negative for decreased urine volume, difficulty urinating, dysuria and flank pain.   Musculoskeletal: Positive for arthralgias (R knee pain) and gait problem. Negative for back pain, neck pain and neck stiffness.   Skin: Positive for wound. Negative for rash.   Neurological: Positive  for numbness. Negative for headaches.   Psychiatric/Behavioral: Negative for confusion. The patient is not nervous/anxious.      Objective:     Vital Signs (Most Recent):  Temp: 98.7 °F (37.1 °C) (09/05/20 1204)  Pulse: (!) 116 (09/05/20 0934)  Resp: 16 (09/05/20 0934)  BP: (!) 140/88 (09/05/20 0934)  SpO2: 98 % (09/05/20 0934) Vital Signs (24h Range):  Temp:  [96.9 °F (36.1 °C)-99 °F (37.2 °C)] 98.7 °F (37.1 °C)  Pulse:  [] 116  Resp:  [16-20] 16  SpO2:  [95 %-100 %] 98 %  BP: (101-140)/(67-88) 140/88     Weight: 118 kg (260 lb 2.3 oz)  Body mass index is 31.67 kg/m².    Estimated Creatinine Clearance: 70.8 mL/min (A) (based on SCr of 1.5 mg/dL (H)).    Physical Exam  Vitals signs reviewed.   Constitutional:       General: He is not in acute distress.     Appearance: Normal appearance. He is normal weight. He is not diaphoretic.   HENT:      Head: Normocephalic and atraumatic.      Right Ear: External ear normal.      Left Ear: External ear normal.      Nose: Nose normal. No congestion or rhinorrhea.   Eyes:      General:         Right eye: No discharge.         Left eye: No discharge.      Extraocular Movements: Extraocular movements intact.   Neck:      Musculoskeletal: Normal range of motion and neck supple. No neck rigidity or muscular tenderness.   Cardiovascular:      Rate and Rhythm: Regular rhythm. Tachycardia present.      Heart sounds: No murmur.      Comments: Diminished DP/PT pulses  Pulmonary:      Effort: Pulmonary effort is normal. No respiratory distress.      Breath sounds: No wheezing.      Comments: On 2 L/m oxygen  Chest:      Chest wall: No tenderness.   Abdominal:      General: There is no distension.      Palpations: Abdomen is soft.      Tenderness: There is no abdominal tenderness. There is no guarding.   Musculoskeletal: Normal range of motion.         General: Tenderness (right anterior thigh, lower back) present.      Comments: Improved swelling and lesser tenderness of the right  knee this am.    Skin:     General: Skin is warm and dry.      Findings: Lesion (left foot. Dressing cdi) present.   Neurological:      Mental Status: He is alert and oriented to person, place, and time.      Sensory: Sensory deficit present.      Gait: Gait normal.   Psychiatric:         Mood and Affect: Mood normal.         Behavior: Behavior normal.         Thought Content: Thought content normal.         Judgment: Judgment normal.         Significant Labs: All pertinent labs within the past 24 hours have been reviewed.    Significant Imaging: I have reviewed all pertinent imaging results/findings within the past 24 hours.

## 2020-09-05 NOTE — SUBJECTIVE & OBJECTIVE
Interval History: No acute events overnight. LUKAS negative    Review of Systems   Constitutional: Negative for chills and fever.   HENT: Negative for congestion, ear pain, sneezing and sore throat.    Eyes: Negative for pain.   Respiratory: Negative for cough and shortness of breath.    Cardiovascular: Negative for chest pain.   Gastrointestinal: Negative for abdominal pain, constipation, diarrhea, nausea and vomiting.   Endocrine: Negative for polyuria.   Genitourinary: Negative for decreased urine volume, difficulty urinating, dysuria and flank pain.   Musculoskeletal: Positive for arthralgias (R knee pain) and gait problem. Negative for back pain, neck pain and neck stiffness.   Skin: Positive for wound. Negative for rash.   Neurological: Positive for numbness. Negative for headaches.   Psychiatric/Behavioral: Negative for confusion. The patient is not nervous/anxious.      Objective:     Vital Signs (Most Recent):  Temp: 98.7 °F (37.1 °C) (09/05/20 1204)  Pulse: (!) 116 (09/05/20 0934)  Resp: 16 (09/05/20 0934)  BP: (!) 140/88 (09/05/20 0934)  SpO2: 98 % (09/05/20 0934) Vital Signs (24h Range):  Temp:  [96.9 °F (36.1 °C)-99 °F (37.2 °C)] 98.7 °F (37.1 °C)  Pulse:  [] 116  Resp:  [16-20] 16  SpO2:  [95 %-100 %] 98 %  BP: (101-140)/(67-88) 140/88     Weight: 118 kg (260 lb 2.3 oz)  Body mass index is 31.67 kg/m².    Estimated Creatinine Clearance: 70.8 mL/min (A) (based on SCr of 1.5 mg/dL (H)).    Physical Exam  Vitals signs reviewed.   Constitutional:       General: He is not in acute distress.     Appearance: Normal appearance. He is normal weight. He is not diaphoretic.   HENT:      Head: Normocephalic and atraumatic.      Right Ear: External ear normal.      Left Ear: External ear normal.      Nose: Nose normal. No congestion or rhinorrhea.   Eyes:      General:         Right eye: No discharge.         Left eye: No discharge.      Extraocular Movements: Extraocular movements intact.   Neck:       Musculoskeletal: Normal range of motion and neck supple. No neck rigidity or muscular tenderness.   Cardiovascular:      Rate and Rhythm: Regular rhythm. Tachycardia present.      Heart sounds: No murmur.      Comments: Diminished DP/PT pulses  Pulmonary:      Effort: Pulmonary effort is normal. No respiratory distress.      Breath sounds: No wheezing.      Comments: On 2 L/m oxygen  Chest:      Chest wall: No tenderness.   Abdominal:      General: There is no distension.      Palpations: Abdomen is soft.      Tenderness: There is no abdominal tenderness. There is no guarding.   Musculoskeletal: Normal range of motion.         General: Tenderness (right anterior thigh, lower back) present.      Comments: Improved swelling and lesser tenderness of the right knee this am.    Skin:     General: Skin is warm and dry.      Findings: Lesion (left foot. Dressing cdi) present.   Neurological:      Mental Status: He is alert and oriented to person, place, and time.      Sensory: Sensory deficit present.      Gait: Gait normal.   Psychiatric:         Mood and Affect: Mood normal.         Behavior: Behavior normal.         Thought Content: Thought content normal.         Judgment: Judgment normal.         Significant Labs: All pertinent labs within the past 24 hours have been reviewed.    Significant Imaging: I have reviewed all pertinent imaging results/findings within the past 24 hours.

## 2020-09-05 NOTE — CONSULTS
[facility] Hospital Medicine  Telemedicine Consult Note    Patient Name: Ed Patton  MRN: [id number]  Admission Date: 8/30/2020  Hospital Length of Stay: 5 days  Attending Physician: Lele Calderón MD   Primary Care Provider: Qiana Chow MD         Ed Patton has been accepted for transfer to [facility] and will be followed through telemedicine services beginning 09/05/20 at 7 AM.        Gisela Mota MD  Department of Hospital Medicine   [facility]

## 2020-09-05 NOTE — PLAN OF CARE
Problem: Adult Inpatient Plan of Care  Goal: Plan of Care Review  Outcome: Ongoing, Progressing  Goal: Patient-Specific Goal (Individualization)  Outcome: Ongoing, Progressing  Goal: Absence of Hospital-Acquired Illness or Injury  Outcome: Ongoing, Progressing  Goal: Optimal Comfort and Wellbeing  Outcome: Ongoing, Progressing  Goal: Readiness for Transition of Care  Outcome: Ongoing, Progressing  Goal: Rounds/Family Conference  Outcome: Ongoing, Progressing   Patient is AAOx4. Vital signs stable. O2 3.5 liters continuous. No reports of pain or distress noted. Safety measures maintained.

## 2020-09-05 NOTE — PROGRESS NOTES
Pharmacokinetic Assessment Follow Up: IV Vancomycin    Vancomycin serum concentration assessment(s):    -Vancomycin trough 13.1 mcg/mL (~11h level), goal 15-20 mcg/mL  -Increase vancomycin from 750 mg q12h to 1000 mg q12h, dose this AM already charted- increased dose to begin 9/5 2000   -Vancomycin level ordered prior to new 4th dose 9/7 0700   -Renal function improving, pharmacy to continue to follow      Drug levels (last 3 results):  Recent Labs   Lab Result Units 09/02/20  1639 09/03/20  0410 09/05/20  0750   Vancomycin, Random ug/mL  --  20.9  --    Vancomycin-Trough ug/mL 26.5*  --  13.1       Pharmacy will continue to follow and monitor vancomycin.    Please contact pharmacy at extension [phone] for questions regarding this assessment.    Thank you for the consult,   Geraldine Pena, PharmD, BCPS       Patient brief summary:  Ed Patton is a 65 y.o. male initiated on antimicrobial therapy with IV Vancomycin for treatment of bacteremia, diabetic foot infection.    Actual Body Weight:   118 kg    Renal Function:   Estimated Creatinine Clearance: 70.8 mL/min (A) (based on SCr of 1.5 mg/dL (H)).,     Dialysis Method (if applicable):  N/A

## 2020-09-06 LAB
25(OH)D3+25(OH)D2 SERPL-MCNC: 35 NG/ML (ref 30–96)
ALBUMIN SERPL BCP-MCNC: 1.8 G/DL (ref 3.5–5.2)
ANION GAP SERPL CALC-SCNC: 10 MMOL/L (ref 8–16)
BASOPHILS # BLD AUTO: 0.09 K/UL (ref 0–0.2)
BASOPHILS NFR BLD: 0.5 % (ref 0–1.9)
BUN SERPL-MCNC: 30 MG/DL (ref 8–23)
CALCIUM SERPL-MCNC: 9 MG/DL (ref 8.7–10.5)
CHLORIDE SERPL-SCNC: 99 MMOL/L (ref 95–110)
CO2 SERPL-SCNC: 29 MMOL/L (ref 23–29)
CREAT SERPL-MCNC: 1.3 MG/DL (ref 0.5–1.4)
CRP SERPL-MCNC: 197.3 MG/L (ref 0–8.2)
DIFFERENTIAL METHOD: ABNORMAL
EOSINOPHIL # BLD AUTO: 0.6 K/UL (ref 0–0.5)
EOSINOPHIL NFR BLD: 2.9 % (ref 0–8)
ERYTHROCYTE [DISTWIDTH] IN BLOOD BY AUTOMATED COUNT: 15.4 % (ref 11.5–14.5)
EST. GFR  (AFRICAN AMERICAN): >60 ML/MIN/1.73 M^2
EST. GFR  (NON AFRICAN AMERICAN): 58.1 ML/MIN/1.73 M^2
GLUCOSE SERPL-MCNC: 200 MG/DL (ref 70–110)
HCT VFR BLD AUTO: 33.8 % (ref 40–54)
HGB BLD-MCNC: 9.9 G/DL (ref 14–18)
IMM GRANULOCYTES # BLD AUTO: 0.41 K/UL (ref 0–0.04)
IMM GRANULOCYTES NFR BLD AUTO: 2.1 % (ref 0–0.5)
LYMPHOCYTES # BLD AUTO: 1.3 K/UL (ref 1–4.8)
LYMPHOCYTES NFR BLD: 6.8 % (ref 18–48)
MAGNESIUM SERPL-MCNC: 1.9 MG/DL (ref 1.6–2.6)
MCH RBC QN AUTO: 26.7 PG (ref 27–31)
MCHC RBC AUTO-ENTMCNC: 29.3 G/DL (ref 32–36)
MCV RBC AUTO: 91 FL (ref 82–98)
MONOCYTES # BLD AUTO: 1.8 K/UL (ref 0.3–1)
MONOCYTES NFR BLD: 9.4 % (ref 4–15)
NEUTROPHILS # BLD AUTO: 15.3 K/UL (ref 1.8–7.7)
NEUTROPHILS NFR BLD: 78.3 % (ref 38–73)
NRBC BLD-RTO: 0 /100 WBC
PHOSPHATE SERPL-MCNC: 3.4 MG/DL (ref 2.7–4.5)
PLATELET # BLD AUTO: 432 K/UL (ref 150–350)
PMV BLD AUTO: 10.4 FL (ref 9.2–12.9)
POCT GLUCOSE: 117 MG/DL (ref 70–110)
POCT GLUCOSE: 125 MG/DL (ref 70–110)
POCT GLUCOSE: 132 MG/DL (ref 70–110)
POCT GLUCOSE: 181 MG/DL (ref 70–110)
POCT GLUCOSE: 214 MG/DL (ref 70–110)
POTASSIUM SERPL-SCNC: 3.7 MMOL/L (ref 3.5–5.1)
RBC # BLD AUTO: 3.71 M/UL (ref 4.6–6.2)
SODIUM SERPL-SCNC: 138 MMOL/L (ref 136–145)
WBC # BLD AUTO: 19.52 K/UL (ref 3.9–12.7)

## 2020-09-06 PROCEDURE — 36415 COLL VENOUS BLD VENIPUNCTURE: CPT | Mod: HCNC

## 2020-09-06 PROCEDURE — 94640 AIRWAY INHALATION TREATMENT: CPT | Mod: HCNC

## 2020-09-06 PROCEDURE — 99233 SBSQ HOSP IP/OBS HIGH 50: CPT | Mod: HCNC,,, | Performed by: INTERNAL MEDICINE

## 2020-09-06 PROCEDURE — 82306 VITAMIN D 25 HYDROXY: CPT | Mod: HCNC

## 2020-09-06 PROCEDURE — 86140 C-REACTIVE PROTEIN: CPT | Mod: HCNC

## 2020-09-06 PROCEDURE — 25000003 PHARM REV CODE 250: Mod: HCNC | Performed by: INTERNAL MEDICINE

## 2020-09-06 PROCEDURE — 25000003 PHARM REV CODE 250: Mod: HCNC | Performed by: HOSPITALIST

## 2020-09-06 PROCEDURE — 11000001 HC ACUTE MED/SURG PRIVATE ROOM: Mod: HCNC

## 2020-09-06 PROCEDURE — 94761 N-INVAS EAR/PLS OXIMETRY MLT: CPT | Mod: HCNC

## 2020-09-06 PROCEDURE — 83735 ASSAY OF MAGNESIUM: CPT | Mod: HCNC

## 2020-09-06 PROCEDURE — 80069 RENAL FUNCTION PANEL: CPT | Mod: HCNC

## 2020-09-06 PROCEDURE — 63600175 PHARM REV CODE 636 W HCPCS: Mod: HCNC | Performed by: INTERNAL MEDICINE

## 2020-09-06 PROCEDURE — 99233 PR SUBSEQUENT HOSPITAL CARE,LEVL III: ICD-10-PCS | Mod: HCNC,,, | Performed by: INTERNAL MEDICINE

## 2020-09-06 PROCEDURE — 85025 COMPLETE CBC W/AUTO DIFF WBC: CPT | Mod: HCNC

## 2020-09-06 PROCEDURE — 27000221 HC OXYGEN, UP TO 24 HOURS: Mod: HCNC

## 2020-09-06 PROCEDURE — 87040 BLOOD CULTURE FOR BACTERIA: CPT | Mod: HCNC

## 2020-09-06 PROCEDURE — 63600175 PHARM REV CODE 636 W HCPCS: Mod: HCNC | Performed by: HOSPITALIST

## 2020-09-06 RX ADMIN — VANCOMYCIN HYDROCHLORIDE 1000 MG: 1 INJECTION, POWDER, LYOPHILIZED, FOR SOLUTION INTRAVENOUS at 01:09

## 2020-09-06 RX ADMIN — POLYETHYLENE GLYCOL 3350 17 G: 17 POWDER, FOR SOLUTION ORAL at 09:09

## 2020-09-06 RX ADMIN — FUROSEMIDE 80 MG: 40 TABLET ORAL at 09:09

## 2020-09-06 RX ADMIN — ATORVASTATIN CALCIUM 40 MG: 20 TABLET, FILM COATED ORAL at 09:09

## 2020-09-06 RX ADMIN — INSULIN ASPART 14 UNITS: 100 INJECTION, SOLUTION INTRAVENOUS; SUBCUTANEOUS at 01:09

## 2020-09-06 RX ADMIN — OXYCODONE 5 MG: 5 TABLET ORAL at 09:09

## 2020-09-06 RX ADMIN — FUROSEMIDE 80 MG: 40 TABLET ORAL at 05:09

## 2020-09-06 RX ADMIN — CARVEDILOL 25 MG: 25 TABLET, FILM COATED ORAL at 08:09

## 2020-09-06 RX ADMIN — POLYETHYLENE GLYCOL 3350 17 G: 17 POWDER, FOR SOLUTION ORAL at 08:09

## 2020-09-06 RX ADMIN — FLUTICASONE FUROATE AND VILANTEROL TRIFENATATE 1 PUFF: 200; 25 POWDER RESPIRATORY (INHALATION) at 08:09

## 2020-09-06 RX ADMIN — CIPROFLOXACIN 500 MG: 500 TABLET, FILM COATED ORAL at 09:09

## 2020-09-06 RX ADMIN — ENOXAPARIN SODIUM 40 MG: 40 INJECTION SUBCUTANEOUS at 05:09

## 2020-09-06 RX ADMIN — CIPROFLOXACIN 500 MG: 500 TABLET, FILM COATED ORAL at 08:09

## 2020-09-06 RX ADMIN — VANCOMYCIN HYDROCHLORIDE 1000 MG: 1 INJECTION, POWDER, LYOPHILIZED, FOR SOLUTION INTRAVENOUS at 08:09

## 2020-09-06 RX ADMIN — ASPIRIN 81 MG: 81 TABLET, COATED ORAL at 09:09

## 2020-09-06 RX ADMIN — LISINOPRIL 40 MG: 20 TABLET ORAL at 09:09

## 2020-09-06 RX ADMIN — LEVOTHYROXINE SODIUM 75 MCG: 25 TABLET ORAL at 06:09

## 2020-09-06 RX ADMIN — CARVEDILOL 25 MG: 25 TABLET, FILM COATED ORAL at 09:09

## 2020-09-06 RX ADMIN — INSULIN ASPART 14 UNITS: 100 INJECTION, SOLUTION INTRAVENOUS; SUBCUTANEOUS at 09:09

## 2020-09-06 RX ADMIN — INSULIN DETEMIR 29 UNITS: 100 INJECTION, SOLUTION SUBCUTANEOUS at 10:09

## 2020-09-06 RX ADMIN — INSULIN ASPART 2 UNITS: 100 INJECTION, SOLUTION INTRAVENOUS; SUBCUTANEOUS at 09:09

## 2020-09-06 RX ADMIN — OXYCODONE 5 MG: 5 TABLET ORAL at 01:09

## 2020-09-06 RX ADMIN — CHOLECALCIFEROL (VITAMIN D3) 25 MCG (1,000 UNIT) TABLET 1000 UNITS: at 09:09

## 2020-09-06 RX ADMIN — ONDANSETRON 4 MG: 2 INJECTION INTRAMUSCULAR; INTRAVENOUS at 01:09

## 2020-09-06 RX ADMIN — INSULIN ASPART 14 UNITS: 100 INJECTION, SOLUTION INTRAVENOUS; SUBCUTANEOUS at 05:09

## 2020-09-06 RX ADMIN — FLUTICASONE PROPIONATE 50 MCG: 50 SPRAY, METERED NASAL at 09:09

## 2020-09-06 NOTE — PROGRESS NOTES
"  [facility] Hospital Medicine  Telemedicine Progress Note    Patient Name: Ed Patton  MRN: [id number]  Patient Class: IP- Inpatient   Admission Date: 8/30/2020  Length of Stay: 7 days  Attending Physician: Gisela Mota MD  Primary Care Provider: Qiana Chow MD    [facility] Medicine Team: [facility] VIRTUAL TEAM 10 Gisela Mota MD  Virtual Telemedicine Progress Note  Start time: 1152  Chief complaint: Sepsis due to methicillin resistant Staphylococcus aureus  The patient location is: 660/660 A  The patient arrived at: 8/30/2020  2:18 PM  Present with the patient at the time of the telemed/virtual assessment: telepresenter   End time:  1201  Total time spent with patient: 9 min  I have assessed findings virtually using a telemedicine platform and with assistance of the bedside nurse or telemedicine presenter.  The attending portion of this evaluation, treatment, and documentation was performed per Gisela Mota MD via audiovisual.    Patient was transferred to the telemedicine service on: 9/5/2020    Subjective:     Admission CC:   Chief Complaint   Patient presents with    Frequent Falls     frequent falls, weakness, "stepped on a tack" left leg now swollen.     Leg Swelling     Follow up visit for: Sepsis due to methicillin resistant Staphylococcus aureus    Interval History / Events Overnight:   The patient is able to provide adequate history. Additional history was obtained from past medical records and nurse. No significant events reported by Nursing. Has home O2. Blood cultures remain positive.  Patient complains of insomnia. Symptoms have improved since yesterday. Associated symptoms include: LE pain. Symptoms are stable. Alleviating factors include: medication: analgesics.     Data reviewed 9/6/2020: Lab test(s) reviewed: H&H and sCR stable. Hyperglycemia improved  I have assessed findings virtually using a telemedicine platform and with assistance of the bedside " nurse or telemedicine presenter.    Review of Systems   Constitutional: Negative for fever.   Respiratory: Negative for shortness of breath.      Objective:     Vital Signs (Most Recent):  Temp: 98.5 °F (36.9 °C) (09/06/20 1733)  Pulse: 101 (09/06/20 1733)  Resp: 12 (09/06/20 1733)  BP: 135/80 (09/06/20 1733)  SpO2: (!) 92 % (09/06/20 1733) Vital Signs (24h Range):  Temp:  [97.1 °F (36.2 °C)-98.7 °F (37.1 °C)] 98.5 °F (36.9 °C)  Pulse:  [] 101  Resp:  [12-21] 12  SpO2:  [92 %-98 %] 92 %  BP: (123-144)/(64-84) 135/80     Weight: 116.2 kg (256 lb 2.8 oz)  Body mass index is 31.18 kg/m².    Intake/Output Summary (Last 24 hours) at 9/6/2020 1752  Last data filed at 9/6/2020 1350  Gross per 24 hour   Intake 1110 ml   Output 1470 ml   Net -360 ml      Physical Exam  Constitutional:       General: He is not in acute distress.     Appearance: Normal appearance. He is not diaphoretic.   Eyes:      General: Lids are normal. No scleral icterus.        Right eye: No discharge.         Left eye: No discharge.      Conjunctiva/sclera: Conjunctivae normal.   Neck:      Trachea: Phonation normal.   Cardiovascular:      Rate and Rhythm: Tachycardia present.   Pulmonary:      Effort: Pulmonary effort is normal. No tachypnea, accessory muscle usage or respiratory distress.   Abdominal:      General: There is no distension.   Musculoskeletal:      Right lower leg: Edema present.      Left lower leg: Edema present.   Neurological:      Mental Status: He is alert. Mental status is at baseline. He is not disoriented.   Psychiatric:         Attention and Perception: Attention normal.         Mood and Affect: Affect normal.         Behavior: Behavior is cooperative.         Significant Labs:   Recent Labs   Lab 09/03/20  0410 09/04/20  0332 09/06/20  0517   WBC 19.52* 21.41* 19.52*   HGB 9.7* 9.6* 9.9*   HCT 32.4* 31.4* 33.8*   * 418* 432*     Recent Labs   Lab 09/03/20  0410 09/03/20  1647 09/04/20  0332 09/06/20  0517   GRAN  81.6*  15.9*  --  83.3*  17.8* 78.3*  15.3*   SEGS  --  90  --   --    LYMPH 5.9*  1.2  --  5.2*  1.1 6.8*  1.3   LYMPHS  --  8  --   --    MONO 6.1  1.2*  --  6.6  1.4* 9.4  1.8*   EOS 1.0*  --  0.8* 0.6*     Recent Labs   Lab 09/03/20  0410 09/04/20  0332 09/06/20  0517    138 138   K 3.8 3.6 3.7   CL 99 100 99   CO2 26 24 29   BUN 46* 40* 30*   CREATININE 1.8* 1.5* 1.3   * 169* 200*   CALCIUM 9.0 9.1 9.0   ALBUMIN  --   --  1.8*   MG 1.9 1.9 1.9   PHOS 3.6 3.7 3.4     Recent Labs   Lab 09/03/20  0410 09/06/20  0517   .1* 197.3*     Recent Labs   Lab 09/05/20  1346   *     Recent Labs   Lab 08/30/20  1532 09/03/20  0410 09/03/20  1538   PROCAL  --   --  0.13   LACTATE 1.4  --   --    SEDRATE  --  80*  --      SARS-CoV2 (COVID-19) Qualitative PCR (no units)   Date Value   09/03/2020 Not Detected     SARS-CoV-2 RNA, Amplification, Qual (no units)   Date Value   08/30/2020 Negative     Recent Labs   Lab 08/31/20  0434   HGBA1C 9.5*     Recent Labs   Lab 09/06/20  0726 09/06/20  1214 09/06/20  1721   POCTGLUCOSE 214* 181* 125*       Significant Imaging:     Assessment/Plan:      Active Diagnoses:    Diagnosis Date Noted POA    PRINCIPAL PROBLEM:  Sepsis due to methicillin resistant Staphylococcus aureus [A41.02] 08/30/2020 Yes    Staphylococcal arthritis of right knee [M00.061] 09/05/2020 Yes    Right knee pain [M25.561] 09/03/2020 Yes    Diabetic ulcer of left foot associated with type 2 diabetes mellitus, with fat layer exposed [E11.621, L97.522] 08/31/2020 Yes    Cellulitis of left lower extremity [L03.116] 08/30/2020 Yes    Diabetic foot infection [E11.628, L08.9] 08/30/2020 Yes    Bacteremia due to methicillin resistant Staphylococcus aureus [R78.81] 08/30/2020 Yes    COPD mixed type [J44.9] 10/15/2019 Yes     Chronic    Postablative hypothyroidism [E89.0] 12/06/2018 Yes     Chronic    Uncontrolled type 2 diabetes mellitus with hyperglycemia, with long-term current  use of insulin [E11.65, Z79.4] 10/09/2017 Not Applicable     Chronic    Chronic respiratory failure with hypoxia, on home oxygen therapy [J96.11, Z99.81] 10/09/2017 Not Applicable     Chronic    Diabetic polyneuropathy associated with type 2 diabetes mellitus [E11.42] 08/25/2015 Yes     Chronic    CKD stage 3 due to type 2 diabetes mellitus [E11.22, N18.3] 12/23/2014 Yes     Chronic    Chronic systolic congestive heart failure [I50.22] 05/07/2013 Yes     Chronic      Problems Resolved During this Admission:       Overview / ICU Course:    Ed Patton is a 65 y.o. male admitted for Sepsis due to methicillin resistant Staphylococcus aureus.    Inpatient Medications Prescribed for Management of Current Problems:     Scheduled Meds:    aspirin  81 mg Oral Daily    atorvastatin  40 mg Oral Daily    carvediloL  25 mg Oral BID    ciprofloxacin HCl  500 mg Oral Q12H    enoxaparin  40 mg Subcutaneous Q24H    fluticasone furoate-vilanteroL  1 puff Inhalation Daily    fluticasone propionate  1 spray Each Nostril Daily    furosemide  80 mg Oral BID    insulin aspart U-100  14 Units Subcutaneous TIDWM    insulin detemir U-100  29 Units Subcutaneous QHS    levothyroxine  75 mcg Oral Before breakfast    lisinopriL  40 mg Oral Daily    vancomycin (VANCOCIN) IVPB  1,000 mg Intravenous Q12H    vitamin D  1,000 Units Oral Daily     Continuous Infusions:   As Needed: acetaminophen, acetaminophen, albuterol-ipratropium, calcium carbonate, dextrose 50%, dextrose 50%, gabapentin, glucagon (human recombinant), glucose, glucose, insulin aspart U-100, melatonin, ondansetron, oxyCODONE, polyethylene glycol, sodium chloride 0.9%, DIPH,PERTUS (ADACEL),TETANUS PF VAC (ADULT), Pharmacy to dose Vancomycin consult **AND** vancomycin - pharmacy to dose      Assessment and Plan by Problem    Diabetic foot infection  Bacteremia due to methicillin resistant Staphylococcus aureus  Diabetic ulcer of left foot associated with type 2  diabetes mellitus, with fat layer exposed  Right knee pain / septic arthritis  Appreciate Infectious Disease, Orthopedic Surgery. Treating with antibiotics per ID.   Arthrocentesis and cultures suggest septic knee. LUKAS to evaluate for endocarditis negative for vegetations.     Sepsis  Due to Proteus and MRSA. Continuing current management with antibiotics.  Follow up on ID recommendations.     Cellulitis of left lower extremity  Continue antibiotics.     COPD mixed type  Continue home fluticasone-vilanterol, albuterol nebulizer.     Postablative hypothyroidism  Continue home levothyroxine.     Chronic respiratory failure with hypoxia, on home oxygen therapy  On 2 L nasal cannula chronically due to combination of COPD and CHF; titrate as needed.     Uncontrolled type 2 diabetes mellitus with hyperglycemia, with long-term current use of insulin  Diabetic polyneuropathy associated with type 2 diabetes mellitus  Takes insulin NPH-insulin regular 70/30 45 units before breakfast and 35 units before dinner. Giving insulin detemir 29 units daily and insulin aspart 14 units with meals and correction dose scale. Monitor BGs.     Chronic systolic congestive heart failure  Continue home carvedilol, lisinopril. Lasix.  EF 25%       Diet: Diet diabetic [facility]; 2000 Calorie  GI Prophylaxis: Not indicated  Significant LDAs:   IV Access Type: Peripheral  Urinary Catheter Indication if present: Patient Does Not Have Urinary Catheter  Other Lines/Tubes/Drains:    HIGH RISK CONDITION(S):   Patient is currently on drug therapy requiring intensive monitoring for toxicity: Vancomycin     Goals of Care:    Previous admission:  4/15/16  Likely prognosis:  Fair  Code Status: Full Code  Comfort Only: No  Hospice: No  Goals at discharge: remain at home    Discharge Planning   EDY: 9/7/2020     Code Status: Full Code   Is the patient medically ready for discharge?:     Reason for patient still in hospital (select all that apply):  Treatment  Discharge Plan A: Home with family, Home Health        VTE Risk Mitigation (From admission, onward)         Ordered     enoxaparin injection 40 mg  Every 24 hours      08/30/20 2004     IP VTE HIGH RISK PATIENT  Once      08/30/20 2004     Place sequential compression device  Until discontinued      08/30/20 6670                   Gisela Mota MD  Department of Hospital Medicine   [facility]

## 2020-09-06 NOTE — PROGRESS NOTES
[facility]  Infectious Disease  Progress Note    Patient Name: Ed Patton  MRN: [id number]  Admission Date: 8/30/2020  Length of Stay: 7 days  Attending Physician: Gisela Mota MD  Primary Care Provider: Qiana Chow MD    Isolation Status: Contact  Assessment/Plan:      * Sepsis due to methicillin resistant Staphylococcus aureus  MRSA septicemia. Still tachycardic, SpO2 reached 92%, renal function continues to worsen. WBCs not improving. ESR and CRP elevated, RF WNL. Vancomycin was supratherapeutic, switched to pulse dose regimen. Source of bacteremia unclear, may be left foot wound or a septic process at the right knee joint. Right knee now clinically suspicious for septic arthritis (see physical exam). TTE negative for signs of cardiac infection, LUKAS is negative as well.     Blood cultures: MRSA  R knee fluid culture: Staph aureus  Left foot wound: Proteus, MRSA  Urine culture: Klebsiella    Recommendations:   -- Currently on Vancomycin. If repeat blood cultures continue to grow MRSA, will switch to Daptomycin + Ceftaroline.   -- Repeat blood cultures until clear.  -- s/p R knee aspiration (WBC 11k, 90% segs, but negative gram stain and no crystals).    -- Follow up NM whole body tagged WBC scan for inflammatory localization   -- CPK = 210  -- Will need a PICC line after clearance of bacteremia, anticipate prolonged IV antibiotics on discharge  -- Will continue to follow.     Staphylococcal arthritis of right knee  See Sepsis    Diabetic foot infection  See Sepsis          Thank you for your consult. I will follow-up with patient. Please contact us if you have any additional questions.    Meenu Hernandez MD  Infectious Disease  [facility]    Subjective:     Principal Problem:Sepsis due to methicillin resistant Staphylococcus aureus    HPI: 66 yo M with Hx of DM2 (on insulin), chronic hypoxemic respiratory failure (on O2 at home), chronic systolic heart  failure presented to ED after recurrent falls 7 and 8 days ago. He has a history of falls but reports that his tendency to fall has worsened recently, resulting in injury to his lower back and right thigh. After his fall last Tuesday he looked at his left foot and noticed a wound, denies pain or drainage related to the wound, he admits to not regularly checking his feet and does not know how long the wound has been present. He believes the wound is related to a tack that he previously found imbedded in the bottom of a slipper. He had previously seen Ang Lugo DPM for diabetic foot care but has not seen her this year due to fear of the coronavirus pandemic. He has been getting home health services including physical therapy. No subjective change as of today, still denies pain and drainage related to the wound. Denies F/C/N/V.      Interval History: Patient examined by myself this morning. No acute events overnight. R knee fluid growing Staph. Most recent BCx with NGTD. Currently on vancomycin. Patient complaining of right knee soreness and left calf tenderness with some swelling on exam today. Will order US to rule out DVT of the left lower extremity.     Review of Systems   Constitutional: Negative for chills and fever.   HENT: Negative for congestion, ear pain, sneezing and sore throat.    Eyes: Negative for pain.   Respiratory: Negative for cough and shortness of breath.    Cardiovascular: Negative for chest pain.   Gastrointestinal: Negative for abdominal pain, constipation, diarrhea, nausea and vomiting.   Endocrine: Negative for polyuria.   Genitourinary: Negative for decreased urine volume, difficulty urinating, dysuria and flank pain.   Musculoskeletal: Positive for arthralgias (R knee pain) and gait problem. Negative for back pain, neck pain and neck stiffness.   Skin: Positive for wound. Negative for rash.   Neurological: Positive for numbness. Negative for headaches.   Psychiatric/Behavioral: Negative  for confusion. The patient is not nervous/anxious.      Objective:     Vital Signs (Most Recent):  Temp: 97.7 °F (36.5 °C) (09/06/20 0929)  Pulse: (!) 115 (09/06/20 0837)  Resp: (!) 21 (09/06/20 0930)  BP: 127/64 (09/06/20 0300)  SpO2: 96 % (09/06/20 0837) Vital Signs (24h Range):  Temp:  [97.1 °F (36.2 °C)-98.7 °F (37.1 °C)] 97.7 °F (36.5 °C)  Pulse:  [] 115  Resp:  [14-21] 21  SpO2:  [96 %-98 %] 96 %  BP: (127-144)/(64-84) 127/64     Weight: 116.2 kg (256 lb 2.8 oz)  Body mass index is 31.18 kg/m².    Estimated Creatinine Clearance: 81.1 mL/min (based on SCr of 1.3 mg/dL).    Physical Exam  Vitals signs reviewed.   Constitutional:       General: He is not in acute distress.     Appearance: Normal appearance. He is normal weight. He is not diaphoretic.   HENT:      Head: Normocephalic and atraumatic.      Right Ear: External ear normal.      Left Ear: External ear normal.      Nose: Nose normal. No congestion or rhinorrhea.   Eyes:      General:         Right eye: No discharge.         Left eye: No discharge.      Extraocular Movements: Extraocular movements intact.   Neck:      Musculoskeletal: Normal range of motion and neck supple. No neck rigidity or muscular tenderness.   Cardiovascular:      Rate and Rhythm: Regular rhythm. Tachycardia present.      Heart sounds: No murmur.      Comments: Diminished DP/PT pulses  Pulmonary:      Effort: Pulmonary effort is normal. No respiratory distress.      Breath sounds: No wheezing.   Chest:      Chest wall: No tenderness.   Abdominal:      General: There is no distension.      Palpations: Abdomen is soft.      Tenderness: There is no abdominal tenderness. There is no guarding.   Musculoskeletal: Normal range of motion.         General: Tenderness (right anterior thigh, lower back) present.      Comments: Improved swelling and lesser tenderness of the right knee this am.    Skin:     General: Skin is warm and dry.      Findings: Lesion (left foot. Dressing cdi)  present.   Neurological:      Mental Status: He is alert and oriented to person, place, and time.      Sensory: Sensory deficit present.      Gait: Gait normal.   Psychiatric:         Mood and Affect: Mood normal.         Behavior: Behavior normal.         Thought Content: Thought content normal.         Judgment: Judgment normal.         Significant Labs: All pertinent labs within the past 24 hours have been reviewed.    Significant Imaging: I have reviewed all pertinent imaging results/findings within the past 24 hours.

## 2020-09-06 NOTE — PLAN OF CARE
Problem: Fall Injury Risk  Goal: Absence of Fall and Fall-Related Injury  Outcome: Ongoing, Progressing     Problem: Adult Inpatient Plan of Care  Goal: Plan of Care Review  Outcome: Ongoing, Progressing   ALert with intermittent confusion noted. Void 700 Ml clear yellow urine this shift.  Dressing in place to Lt foot. Rt knee remain swollen. Complain once this shift of pain nausea which verbalize relieve with medicines. Receive IV VANc and oral Cipro this shift. No fall this shift

## 2020-09-06 NOTE — SUBJECTIVE & OBJECTIVE
Interval History: Patient examined by myself this morning. No acute events overnight. R knee fluid growing Staph. Most recent BCx with NGTD. Currently on vancomycin. Patient complaining of right knee soreness and left calf tenderness with some swelling on exam today. Will order US to rule out DVT of the left lower extremity.     Review of Systems   Constitutional: Negative for chills and fever.   HENT: Negative for congestion, ear pain, sneezing and sore throat.    Eyes: Negative for pain.   Respiratory: Negative for cough and shortness of breath.    Cardiovascular: Negative for chest pain.   Gastrointestinal: Negative for abdominal pain, constipation, diarrhea, nausea and vomiting.   Endocrine: Negative for polyuria.   Genitourinary: Negative for decreased urine volume, difficulty urinating, dysuria and flank pain.   Musculoskeletal: Positive for arthralgias (R knee pain) and gait problem. Negative for back pain, neck pain and neck stiffness.   Skin: Positive for wound. Negative for rash.   Neurological: Positive for numbness. Negative for headaches.   Psychiatric/Behavioral: Negative for confusion. The patient is not nervous/anxious.      Objective:     Vital Signs (Most Recent):  Temp: 97.7 °F (36.5 °C) (09/06/20 0929)  Pulse: (!) 115 (09/06/20 0837)  Resp: (!) 21 (09/06/20 0930)  BP: 127/64 (09/06/20 0300)  SpO2: 96 % (09/06/20 0837) Vital Signs (24h Range):  Temp:  [97.1 °F (36.2 °C)-98.7 °F (37.1 °C)] 97.7 °F (36.5 °C)  Pulse:  [] 115  Resp:  [14-21] 21  SpO2:  [96 %-98 %] 96 %  BP: (127-144)/(64-84) 127/64     Weight: 116.2 kg (256 lb 2.8 oz)  Body mass index is 31.18 kg/m².    Estimated Creatinine Clearance: 81.1 mL/min (based on SCr of 1.3 mg/dL).    Physical Exam  Vitals signs reviewed.   Constitutional:       General: He is not in acute distress.     Appearance: Normal appearance. He is normal weight. He is not diaphoretic.   HENT:      Head: Normocephalic and atraumatic.      Right Ear: External  ear normal.      Left Ear: External ear normal.      Nose: Nose normal. No congestion or rhinorrhea.   Eyes:      General:         Right eye: No discharge.         Left eye: No discharge.      Extraocular Movements: Extraocular movements intact.   Neck:      Musculoskeletal: Normal range of motion and neck supple. No neck rigidity or muscular tenderness.   Cardiovascular:      Rate and Rhythm: Regular rhythm. Tachycardia present.      Heart sounds: No murmur.      Comments: Diminished DP/PT pulses  Pulmonary:      Effort: Pulmonary effort is normal. No respiratory distress.      Breath sounds: No wheezing.   Chest:      Chest wall: No tenderness.   Abdominal:      General: There is no distension.      Palpations: Abdomen is soft.      Tenderness: There is no abdominal tenderness. There is no guarding.   Musculoskeletal: Normal range of motion.         General: Tenderness (right anterior thigh, lower back) present.      Comments: Improved swelling and lesser tenderness of the right knee this am.    Skin:     General: Skin is warm and dry.      Findings: Lesion (left foot. Dressing cdi) present.   Neurological:      Mental Status: He is alert and oriented to person, place, and time.      Sensory: Sensory deficit present.      Gait: Gait normal.   Psychiatric:         Mood and Affect: Mood normal.         Behavior: Behavior normal.         Thought Content: Thought content normal.         Judgment: Judgment normal.         Significant Labs: All pertinent labs within the past 24 hours have been reviewed.    Significant Imaging: I have reviewed all pertinent imaging results/findings within the past 24 hours.

## 2020-09-06 NOTE — PLAN OF CARE
Problem: Adult Inpatient Plan of Care  Goal: Plan of Care Review  Outcome: Ongoing, Progressing  Goal: Patient-Specific Goal (Individualization)  Outcome: Ongoing, Progressing  Goal: Absence of Hospital-Acquired Illness or Injury  Outcome: Ongoing, Progressing  Goal: Optimal Comfort and Wellbeing  Outcome: Ongoing, Progressing  Goal: Readiness for Transition of Care  Outcome: Ongoing, Progressing  Goal: Rounds/Family Conference  Outcome: Ongoing, Progressing   Patient is scheduled for US of left lower extremity today.

## 2020-09-06 NOTE — ASSESSMENT & PLAN NOTE
MRSA septicemia. Still tachycardic, SpO2 reached 92%, renal function continues to worsen. WBCs not improving. ESR and CRP elevated, RF WNL. Vancomycin was supratherapeutic, switched to pulse dose regimen. Source of bacteremia unclear, may be left foot wound or a septic process at the right knee joint. Right knee now clinically suspicious for septic arthritis (see physical exam). Left foot wound growing Proteus and MRSA, urine growing Klebsiella. TTE negative for signs of cardiac infection, LUKAS is negative as well.     Blood cultures: MRSA  R knee fluid culture: Staph aureus  Left foot wound: Proteus, MRSA  Urine culture: Klebsiella    Recommendations:   -- Currently on Vancomycin. If repeat blood cultures continue to grow MRSA, will switch to Daptomycin + Ceftaroline.   -- Repeat blood cultures until clear.  -- s/p R knee aspiration (WBC 11k, 90% segs, but negative gram stain and no crystals).    -- Follow up NM whole body tagged WBC scan for inflammatory localization   -- CPK = 210  -- Will need a PICC line after clearance of bacteremia, anticipate prolonged IV antibiotics on discharge  -- Will continue to follow.

## 2020-09-07 ENCOUNTER — ANESTHESIA (OUTPATIENT)
Dept: SURGERY | Facility: HOSPITAL | Age: 65
DRG: 853 | End: 2020-09-07
Payer: MEDICARE

## 2020-09-07 ENCOUNTER — ANESTHESIA EVENT (OUTPATIENT)
Dept: SURGERY | Facility: HOSPITAL | Age: 65
DRG: 853 | End: 2020-09-07
Payer: MEDICARE

## 2020-09-07 PROBLEM — M00.9 SEPTIC ARTHRITIS OF KNEE, RIGHT: Status: ACTIVE | Noted: 2020-09-07

## 2020-09-07 LAB
ABO + RH BLD: NORMAL
ALBUMIN SERPL BCP-MCNC: 1.8 G/DL (ref 3.5–5.2)
ANION GAP SERPL CALC-SCNC: 11 MMOL/L (ref 8–16)
BACTERIA SPEC AEROBE CULT: ABNORMAL
BACTERIA SPEC ANAEROBE CULT: NORMAL
BASOPHILS # BLD AUTO: 0.11 K/UL (ref 0–0.2)
BASOPHILS NFR BLD: 0.5 % (ref 0–1.9)
BLD GP AB SCN CELLS X3 SERPL QL: NORMAL
BUN SERPL-MCNC: 27 MG/DL (ref 8–23)
CALCIUM SERPL-MCNC: 8.9 MG/DL (ref 8.7–10.5)
CHLORIDE SERPL-SCNC: 97 MMOL/L (ref 95–110)
CO2 SERPL-SCNC: 27 MMOL/L (ref 23–29)
CREAT SERPL-MCNC: 1.4 MG/DL (ref 0.5–1.4)
DIFFERENTIAL METHOD: ABNORMAL
EOSINOPHIL # BLD AUTO: 0.9 K/UL (ref 0–0.5)
EOSINOPHIL NFR BLD: 4 % (ref 0–8)
ERYTHROCYTE [DISTWIDTH] IN BLOOD BY AUTOMATED COUNT: 15.5 % (ref 11.5–14.5)
EST. GFR  (AFRICAN AMERICAN): >60 ML/MIN/1.73 M^2
EST. GFR  (NON AFRICAN AMERICAN): 53.1 ML/MIN/1.73 M^2
GLUCOSE SERPL-MCNC: 234 MG/DL (ref 70–110)
GRAM STN SPEC: NORMAL
GRAM STN SPEC: NORMAL
HCT VFR BLD AUTO: 35.6 % (ref 40–54)
HGB BLD-MCNC: 11 G/DL (ref 14–18)
IMM GRANULOCYTES # BLD AUTO: 0.67 K/UL (ref 0–0.04)
IMM GRANULOCYTES NFR BLD AUTO: 2.8 % (ref 0–0.5)
LYMPHOCYTES # BLD AUTO: 1.5 K/UL (ref 1–4.8)
LYMPHOCYTES NFR BLD: 6.5 % (ref 18–48)
MAGNESIUM SERPL-MCNC: 1.7 MG/DL (ref 1.6–2.6)
MCH RBC QN AUTO: 26.8 PG (ref 27–31)
MCHC RBC AUTO-ENTMCNC: 30.9 G/DL (ref 32–36)
MCV RBC AUTO: 87 FL (ref 82–98)
MONOCYTES # BLD AUTO: 2 K/UL (ref 0.3–1)
MONOCYTES NFR BLD: 8.7 % (ref 4–15)
NEUTROPHILS # BLD AUTO: 18.2 K/UL (ref 1.8–7.7)
NEUTROPHILS NFR BLD: 77.5 % (ref 38–73)
NRBC BLD-RTO: 0 /100 WBC
PHOSPHATE SERPL-MCNC: 3.7 MG/DL (ref 2.7–4.5)
PLATELET # BLD AUTO: 372 K/UL (ref 150–350)
PMV BLD AUTO: 11.3 FL (ref 9.2–12.9)
POCT GLUCOSE: 208 MG/DL (ref 70–110)
POCT GLUCOSE: 253 MG/DL (ref 70–110)
POCT GLUCOSE: 280 MG/DL (ref 70–110)
POCT GLUCOSE: 325 MG/DL (ref 70–110)
POTASSIUM SERPL-SCNC: 4 MMOL/L (ref 3.5–5.1)
RBC # BLD AUTO: 4.11 M/UL (ref 4.6–6.2)
SARS-COV-2 RDRP RESP QL NAA+PROBE: NEGATIVE
SODIUM SERPL-SCNC: 135 MMOL/L (ref 136–145)
VANCOMYCIN TROUGH SERPL-MCNC: 20.9 UG/ML (ref 10–22)
WBC # BLD AUTO: 23.53 K/UL (ref 3.9–12.7)

## 2020-09-07 PROCEDURE — 25000003 PHARM REV CODE 250: Mod: HCNC | Performed by: STUDENT IN AN ORGANIZED HEALTH CARE EDUCATION/TRAINING PROGRAM

## 2020-09-07 PROCEDURE — D9220A PRA ANESTHESIA: Mod: HCNC,ANES,, | Performed by: ANESTHESIOLOGY

## 2020-09-07 PROCEDURE — 63600175 PHARM REV CODE 636 W HCPCS: Mod: JG,HCNC | Performed by: STUDENT IN AN ORGANIZED HEALTH CARE EDUCATION/TRAINING PROGRAM

## 2020-09-07 PROCEDURE — 85025 COMPLETE CBC W/AUTO DIFF WBC: CPT | Mod: HCNC

## 2020-09-07 PROCEDURE — 25000003 PHARM REV CODE 250: Mod: HCNC | Performed by: HOSPITALIST

## 2020-09-07 PROCEDURE — 94640 AIRWAY INHALATION TREATMENT: CPT | Mod: HCNC

## 2020-09-07 PROCEDURE — 94761 N-INVAS EAR/PLS OXIMETRY MLT: CPT | Mod: HCNC

## 2020-09-07 PROCEDURE — 36000710: Mod: HCNC | Performed by: ORTHOPAEDIC SURGERY

## 2020-09-07 PROCEDURE — 71000033 HC RECOVERY, INTIAL HOUR: Mod: HCNC | Performed by: ORTHOPAEDIC SURGERY

## 2020-09-07 PROCEDURE — 87075 CULTR BACTERIA EXCEPT BLOOD: CPT | Mod: HCNC

## 2020-09-07 PROCEDURE — 37000009 HC ANESTHESIA EA ADD 15 MINS: Mod: HCNC | Performed by: ORTHOPAEDIC SURGERY

## 2020-09-07 PROCEDURE — 99232 SBSQ HOSP IP/OBS MODERATE 35: CPT | Mod: HCNC,,, | Performed by: INTERNAL MEDICINE

## 2020-09-07 PROCEDURE — 99900035 HC TECH TIME PER 15 MIN (STAT): Mod: HCNC

## 2020-09-07 PROCEDURE — 87040 BLOOD CULTURE FOR BACTERIA: CPT | Mod: HCNC

## 2020-09-07 PROCEDURE — 11000001 HC ACUTE MED/SURG PRIVATE ROOM: Mod: HCNC

## 2020-09-07 PROCEDURE — 87070 CULTURE OTHR SPECIMN AEROBIC: CPT | Mod: 59,HCNC

## 2020-09-07 PROCEDURE — 27000221 HC OXYGEN, UP TO 24 HOURS: Mod: HCNC

## 2020-09-07 PROCEDURE — 83735 ASSAY OF MAGNESIUM: CPT | Mod: HCNC

## 2020-09-07 PROCEDURE — 27201423 OPTIME MED/SURG SUP & DEVICES STERILE SUPPLY: Mod: HCNC | Performed by: ORTHOPAEDIC SURGERY

## 2020-09-07 PROCEDURE — 80202 ASSAY OF VANCOMYCIN: CPT | Mod: HCNC

## 2020-09-07 PROCEDURE — 99233 PR SUBSEQUENT HOSPITAL CARE,LEVL III: ICD-10-PCS | Mod: HCNC,GC,, | Performed by: INTERNAL MEDICINE

## 2020-09-07 PROCEDURE — 99232 PR SUBSEQUENT HOSPITAL CARE,LEVL II: ICD-10-PCS | Mod: HCNC,,, | Performed by: INTERNAL MEDICINE

## 2020-09-07 PROCEDURE — U0002 COVID-19 LAB TEST NON-CDC: HCPCS | Mod: HCNC

## 2020-09-07 PROCEDURE — 87205 SMEAR GRAM STAIN: CPT | Mod: 59,HCNC

## 2020-09-07 PROCEDURE — 29876 ARTHRS KNEE SURG SYNVCT MAJ: CPT | Mod: RT,,, | Performed by: ORTHOPAEDIC SURGERY

## 2020-09-07 PROCEDURE — D9220A PRA ANESTHESIA: Mod: HCNC,CRNA,, | Performed by: NURSE ANESTHETIST, CERTIFIED REGISTERED

## 2020-09-07 PROCEDURE — 63600175 PHARM REV CODE 636 W HCPCS: Mod: HCNC | Performed by: ANESTHESIOLOGY

## 2020-09-07 PROCEDURE — 87116 MYCOBACTERIA CULTURE: CPT | Mod: 59,HCNC

## 2020-09-07 PROCEDURE — 36415 COLL VENOUS BLD VENIPUNCTURE: CPT | Mod: HCNC

## 2020-09-07 PROCEDURE — 63600175 PHARM REV CODE 636 W HCPCS: Mod: HCNC | Performed by: ORTHOPAEDIC SURGERY

## 2020-09-07 PROCEDURE — C9399 UNCLASSIFIED DRUGS OR BIOLOG: HCPCS | Mod: HCNC | Performed by: HOSPITALIST

## 2020-09-07 PROCEDURE — 63600175 PHARM REV CODE 636 W HCPCS: Mod: HCNC

## 2020-09-07 PROCEDURE — 87102 FUNGUS ISOLATION CULTURE: CPT | Mod: 59,HCNC

## 2020-09-07 PROCEDURE — 82962 GLUCOSE BLOOD TEST: CPT | Mod: HCNC | Performed by: ORTHOPAEDIC SURGERY

## 2020-09-07 PROCEDURE — 99233 SBSQ HOSP IP/OBS HIGH 50: CPT | Mod: HCNC,GC,, | Performed by: INTERNAL MEDICINE

## 2020-09-07 PROCEDURE — 87206 SMEAR FLUORESCENT/ACID STAI: CPT | Mod: 91,HCNC

## 2020-09-07 PROCEDURE — D9220A PRA ANESTHESIA: ICD-10-PCS | Mod: HCNC,CRNA,, | Performed by: NURSE ANESTHETIST, CERTIFIED REGISTERED

## 2020-09-07 PROCEDURE — 80069 RENAL FUNCTION PANEL: CPT | Mod: HCNC

## 2020-09-07 PROCEDURE — 36000711: Mod: HCNC | Performed by: ORTHOPAEDIC SURGERY

## 2020-09-07 PROCEDURE — 71000039 HC RECOVERY, EACH ADD'L HOUR: Mod: HCNC | Performed by: ORTHOPAEDIC SURGERY

## 2020-09-07 PROCEDURE — 63600175 PHARM REV CODE 636 W HCPCS: Mod: HCNC | Performed by: INTERNAL MEDICINE

## 2020-09-07 PROCEDURE — 29876 PR KNEE SCOPE,FULL SYNOVECT: ICD-10-PCS | Mod: RT,,, | Performed by: ORTHOPAEDIC SURGERY

## 2020-09-07 PROCEDURE — 37000008 HC ANESTHESIA 1ST 15 MINUTES: Mod: HCNC | Performed by: ORTHOPAEDIC SURGERY

## 2020-09-07 PROCEDURE — D9220A PRA ANESTHESIA: ICD-10-PCS | Mod: HCNC,ANES,, | Performed by: ANESTHESIOLOGY

## 2020-09-07 PROCEDURE — 86850 RBC ANTIBODY SCREEN: CPT | Mod: HCNC

## 2020-09-07 PROCEDURE — 25000003 PHARM REV CODE 250: Mod: HCNC | Performed by: ANESTHESIOLOGY

## 2020-09-07 PROCEDURE — 63600175 PHARM REV CODE 636 W HCPCS: Mod: HCNC | Performed by: HOSPITALIST

## 2020-09-07 RX ORDER — MUPIROCIN 20 MG/G
OINTMENT TOPICAL
Status: CANCELLED | OUTPATIENT
Start: 2020-09-07

## 2020-09-07 RX ORDER — ONDANSETRON 2 MG/ML
4 INJECTION INTRAMUSCULAR; INTRAVENOUS DAILY PRN
Status: DISCONTINUED | OUTPATIENT
Start: 2020-09-07 | End: 2020-09-08

## 2020-09-07 RX ORDER — PHENYLEPHRINE HCL IN 0.9% NACL 1 MG/10 ML
SYRINGE (ML) INTRAVENOUS
Status: DISCONTINUED | OUTPATIENT
Start: 2020-09-07 | End: 2020-09-07

## 2020-09-07 RX ORDER — MEPERIDINE HYDROCHLORIDE 50 MG/ML
12.5 INJECTION INTRAMUSCULAR; INTRAVENOUS; SUBCUTANEOUS ONCE AS NEEDED
Status: DISCONTINUED | OUTPATIENT
Start: 2020-09-07 | End: 2020-09-08

## 2020-09-07 RX ORDER — EPINEPHRINE 1 MG/ML
INJECTION INTRAMUSCULAR; INTRAVENOUS; SUBCUTANEOUS
Status: DISCONTINUED | OUTPATIENT
Start: 2020-09-07 | End: 2020-09-07 | Stop reason: HOSPADM

## 2020-09-07 RX ORDER — ETOMIDATE 2 MG/ML
INJECTION INTRAVENOUS
Status: DISCONTINUED | OUTPATIENT
Start: 2020-09-07 | End: 2020-09-07

## 2020-09-07 RX ORDER — FENTANYL CITRATE 50 UG/ML
INJECTION, SOLUTION INTRAMUSCULAR; INTRAVENOUS
Status: COMPLETED
Start: 2020-09-07 | End: 2020-09-07

## 2020-09-07 RX ORDER — ESMOLOL HYDROCHLORIDE 10 MG/ML
INJECTION INTRAVENOUS
Status: DISCONTINUED | OUTPATIENT
Start: 2020-09-07 | End: 2020-09-07

## 2020-09-07 RX ORDER — SODIUM CHLORIDE 0.9 % (FLUSH) 0.9 %
3 SYRINGE (ML) INJECTION
Status: DISCONTINUED | OUTPATIENT
Start: 2020-09-07 | End: 2020-09-08

## 2020-09-07 RX ORDER — FENTANYL CITRATE 50 UG/ML
25 INJECTION, SOLUTION INTRAMUSCULAR; INTRAVENOUS EVERY 5 MIN PRN
Status: COMPLETED | OUTPATIENT
Start: 2020-09-07 | End: 2020-09-07

## 2020-09-07 RX ORDER — MIDAZOLAM HYDROCHLORIDE 1 MG/ML
INJECTION INTRAMUSCULAR; INTRAVENOUS
Status: DISCONTINUED | OUTPATIENT
Start: 2020-09-07 | End: 2020-09-07

## 2020-09-07 RX ORDER — VASOPRESSIN 20 [USP'U]/ML
INJECTION, SOLUTION INTRAMUSCULAR; SUBCUTANEOUS
Status: DISCONTINUED | OUTPATIENT
Start: 2020-09-07 | End: 2020-09-07

## 2020-09-07 RX ORDER — LIDOCAINE HCL/PF 100 MG/5ML
SYRINGE (ML) INTRAVENOUS
Status: DISCONTINUED | OUTPATIENT
Start: 2020-09-07 | End: 2020-09-07

## 2020-09-07 RX ORDER — VANCOMYCIN HYDROCHLORIDE 1 G/20ML
INJECTION, POWDER, LYOPHILIZED, FOR SOLUTION INTRAVENOUS
Status: DISCONTINUED | OUTPATIENT
Start: 2020-09-07 | End: 2020-09-07 | Stop reason: HOSPADM

## 2020-09-07 RX ORDER — PROPOFOL 10 MG/ML
VIAL (ML) INTRAVENOUS
Status: DISCONTINUED | OUTPATIENT
Start: 2020-09-07 | End: 2020-09-07

## 2020-09-07 RX ORDER — SODIUM CHLORIDE 0.9 % (FLUSH) 0.9 %
10 SYRINGE (ML) INJECTION
Status: DISCONTINUED | OUTPATIENT
Start: 2020-09-07 | End: 2020-09-12

## 2020-09-07 RX ORDER — FENTANYL CITRATE 50 UG/ML
INJECTION, SOLUTION INTRAMUSCULAR; INTRAVENOUS
Status: DISCONTINUED | OUTPATIENT
Start: 2020-09-07 | End: 2020-09-07

## 2020-09-07 RX ORDER — CEFAZOLIN SODIUM 1 G/3ML
2 INJECTION, POWDER, FOR SOLUTION INTRAMUSCULAR; INTRAVENOUS
Status: CANCELLED | OUTPATIENT
Start: 2020-09-07

## 2020-09-07 RX ADMIN — DAPTOMYCIN 1050 MG: 350 INJECTION, POWDER, LYOPHILIZED, FOR SOLUTION INTRAVENOUS at 02:09

## 2020-09-07 RX ADMIN — LISINOPRIL 40 MG: 20 TABLET ORAL at 08:09

## 2020-09-07 RX ADMIN — PROPOFOL 40 MG: 10 INJECTION, EMULSION INTRAVENOUS at 04:09

## 2020-09-07 RX ADMIN — ETOMIDATE 8 MG: 2 INJECTION, SOLUTION INTRAVENOUS at 04:09

## 2020-09-07 RX ADMIN — FUROSEMIDE 80 MG: 40 TABLET ORAL at 10:09

## 2020-09-07 RX ADMIN — FUROSEMIDE 80 MG: 40 TABLET ORAL at 08:09

## 2020-09-07 RX ADMIN — FENTANYL CITRATE 25 MCG: 50 INJECTION INTRAMUSCULAR; INTRAVENOUS at 06:09

## 2020-09-07 RX ADMIN — LIDOCAINE HYDROCHLORIDE 100 MG: 20 INJECTION, SOLUTION INTRAVENOUS at 04:09

## 2020-09-07 RX ADMIN — Medication 200 MCG: at 05:09

## 2020-09-07 RX ADMIN — FENTANYL CITRATE 25 MCG: 50 INJECTION, SOLUTION INTRAMUSCULAR; INTRAVENOUS at 04:09

## 2020-09-07 RX ADMIN — CIPROFLOXACIN 500 MG: 500 TABLET, FILM COATED ORAL at 08:09

## 2020-09-07 RX ADMIN — MIDAZOLAM HYDROCHLORIDE 2 MG: 1 INJECTION, SOLUTION INTRAMUSCULAR; INTRAVENOUS at 04:09

## 2020-09-07 RX ADMIN — CHOLECALCIFEROL (VITAMIN D3) 25 MCG (1,000 UNIT) TABLET 1000 UNITS: at 08:09

## 2020-09-07 RX ADMIN — CARVEDILOL 25 MG: 25 TABLET, FILM COATED ORAL at 08:09

## 2020-09-07 RX ADMIN — ESMOLOL HYDROCHLORIDE 20 MG: 100 INJECTION, SOLUTION INTRAVENOUS at 04:09

## 2020-09-07 RX ADMIN — ONDANSETRON 4 MG: 2 INJECTION INTRAMUSCULAR; INTRAVENOUS at 12:09

## 2020-09-07 RX ADMIN — CEFTAROLINE FOSAMIL 600 MG: 600 POWDER, FOR SOLUTION INTRAVENOUS at 03:09

## 2020-09-07 RX ADMIN — MELATONIN TAB 3 MG 6 MG: 3 TAB at 09:09

## 2020-09-07 RX ADMIN — ENOXAPARIN SODIUM 40 MG: 40 INJECTION SUBCUTANEOUS at 10:09

## 2020-09-07 RX ADMIN — FLUTICASONE FUROATE AND VILANTEROL TRIFENATATE 1 PUFF: 200; 25 POWDER RESPIRATORY (INHALATION) at 07:09

## 2020-09-07 RX ADMIN — CEFTAROLINE FOSAMIL 600 MG: 600 POWDER, FOR SOLUTION INTRAVENOUS at 09:09

## 2020-09-07 RX ADMIN — ATORVASTATIN CALCIUM 40 MG: 20 TABLET, FILM COATED ORAL at 08:09

## 2020-09-07 RX ADMIN — OXYCODONE 5 MG: 5 TABLET ORAL at 08:09

## 2020-09-07 RX ADMIN — ASPIRIN 81 MG: 81 TABLET, COATED ORAL at 08:09

## 2020-09-07 RX ADMIN — CARVEDILOL 25 MG: 25 TABLET, FILM COATED ORAL at 09:09

## 2020-09-07 RX ADMIN — OXYCODONE 5 MG: 5 TABLET ORAL at 12:09

## 2020-09-07 RX ADMIN — INSULIN DETEMIR 29 UNITS: 100 INJECTION, SOLUTION SUBCUTANEOUS at 09:09

## 2020-09-07 RX ADMIN — FENTANYL CITRATE 50 MCG: 50 INJECTION, SOLUTION INTRAMUSCULAR; INTRAVENOUS at 04:09

## 2020-09-07 RX ADMIN — OXYCODONE 5 MG: 5 TABLET ORAL at 06:09

## 2020-09-07 RX ADMIN — SODIUM CHLORIDE, SODIUM GLUCONATE, SODIUM ACETATE, POTASSIUM CHLORIDE, MAGNESIUM CHLORIDE, SODIUM PHOSPHATE, DIBASIC, AND POTASSIUM PHOSPHATE: .53; .5; .37; .037; .03; .012; .00082 INJECTION, SOLUTION INTRAVENOUS at 04:09

## 2020-09-07 RX ADMIN — GABAPENTIN 100 MG: 100 CAPSULE ORAL at 09:09

## 2020-09-07 RX ADMIN — INSULIN ASPART 1 UNITS: 100 INJECTION, SOLUTION INTRAVENOUS; SUBCUTANEOUS at 10:09

## 2020-09-07 RX ADMIN — VASOPRESSIN 3 UNITS: 20 INJECTION INTRAVENOUS at 05:09

## 2020-09-07 NOTE — NURSING TRANSFER
Nursing Transfer Note      9/7/2020     Transfer to: 660    Transfer via: Bed    Transfer with: Face Mask in place; Oxygen at 2L via NC    Transported by: Patient Escort    Medicines sent: N/A    Chart send with patient: Yes    Notified: Report called to FENG Bashir    Patient reassessed at: [id number]

## 2020-09-07 NOTE — OP NOTE
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 Right  Arthroscopy, knee, synovectomy, major 86191  2.  Right  Arthroscopy, debridement/shaving of articular cartilage (chondroplasty) 97405  3.  Right  Arthroscopy, knee, surgical; for infection, lavage and drainage 88955      ANESTHESIA: General    FLUIDS IN THE CASE: 1000 ml    ESTIMATED BLOOD LOSS: Minimal    URINE OUTPUT: 0 ml    COMPLICATIONS: none    CONDITION ON RETURN TO RECOVERY ROOM: Good     Expand All Collapse All       IMPLANTS UTILIZED: [employer] Arthroscopic equipment    9 L Vancomycin solution with epinephrine    INDICATIONS FOR OPERATIVE PROCEDURE: Ed Patton is a 65 y.o.  male with history of right knee pain and pathology. The patient's history and physical examination findings consistent with the procedure performed. He noted significant problems in the area of concern with problems on activities of daily living and aggressive use of the right leg. As a result of these problems and problems with overall activity level, the patient was deemed to be an appropriate candidate for operative intervention. Nonoperative versus operative options were discussed. The risks and benefits were discussed with the  patient. The patient acknowledged understanding and wished to proceed with operative intervention. Informed consent was obtained prior to the procedure. Details of the surgical procedure were explained, including incisions and probable rehabilitation course. The patient understands the likely length of convalescence after surgery; and we have explained the risks, benefits, and alternatives of surgery. Reasonable expectations and potential complications were discussed and acknowledged, including but not limited to infection, bleeding, blood clots, (DVT and/or PE), nerve injury, retear, instability, continued pain and stiffness. It was also explained that there was a chance of failure which may require further management. The patient agreed and understood and wished to proceed.       FINDINGS:     ARTICULAR CARTILAGE LESION(S):  Medial Femoral Condyle: ICRS Grade 3      Size: 2.0 x 2.0 cm  Medial tibial plateau: ICRS Grade 0      Size: none        Lateral Femoral Condyle: ICRS Grade 0      Size: none  Lateral tibial plateau: ICRS Grade 0      Size: none        Patellar surface: ICRS Grade 0      Size: none  Trochlear groove: ICRS Grade 0      Size: none        EXAMINATION UNDER ANESTHESIA:   Extension 50 degrees  Flexion 90 degrees  Lachman Maneuver:  Negative  Anterior Drawer: Negative  Pivot Shift: Negative  Posterior Drawer:  Negative  Varus stability @ 30 degrees: 0  Valgus stability @ 30 degrees: 0  Patellar glide:1 quadrant lateral, 2 quadrant medial          DESCRIPTION OF PROCEDURE: The patient was brought into the Operating Room and placed in supine position. Upon application of no block in the preoperative holding area, the patient underwent General to stabilize the airway. The patient was given the appropriate dose of antibiotics based on body weight. Timeout was utilized to verify the side as the operative side. Both upper extremities were placed in comfortable position. Examination under anesthesia was  performed. The nonoperative leg was carefully padded along the heel and peroneal nerve regions and then placed into a well padded well leg anderson. The operative leg was then placed into an arthroscopic leg anderson with a tourniquet applied proximally.  No bump was placed under the hip on the operative side. The operative leg was prepped and draped in a sterile fashion with ChloraPrep material.     A #11 blade was used to make the arthroscopic portals. Fluid was sent for anaerobic, aerobic, AFB, Fungal cultures, gram stain and cell count. A separate superolateral portal was created filled with a 3 mm cannula for outflow. Blunt trocar and sheath were inserted into the intercondylar notch and then subsequently into the suprapatellar pouch. This patellofemoral joint was visualized, demonstrating normal lateral patellar tilt, normal patellar subluxation. The patellar tracked midline with flexion and extension. Arthroscopic pictures were obtained. There was no articular cartilage damage in the patellofemoral compartment The lesion if present was treated with observation.    Attention was turned to the intercondylar notch where the anterior cruciate ligament (ACL) and posterior cruciate ligament (PCL) structures were visualized. Visualization demonstrated an intact ACL and an intact PCL. Probe analysis revealed no signs of occult pathology within the ligamentous structures.     Attention was then turned to the lateral compartment. The patient demonstrated an intact lateral meniscus with probe analysis demonstrating no occult tears or pathology Arthroscopic instrumentation was used to veify no tear and pictures obtained. no articular cartilage damage in the lateral compartment The lesion if present was treated withobservation.    Attention was then turned to the medial compartment. The patient demonstrated an intact medial meniscus with probe analysis demonstrating no occult tears or pathology Arthroscopic instrumentation  was used to  veify no tear and pictures obtained. There was articular cartilage damage was present in the medial compartment as noted in the Findings section of this operative report. The lesion if present was treated with arthroscopic chondroplasty technique removing all irregular edges and flaps of articular cartilage at the lesion.    Arthroscopic major synovectomy was performed in the suprapatellar pouch, medial and lateral gutters and anterior medial and lateral regions of the knee.    No further treatments were performed in the knee.     Final arthroscopic pictures were obtained.  no further treatments were performed to the knee. Xeroform was applied along with application of sterile electrodes proximally and distally, gauze, ABD pads,cast padding, long-leg WILMAR hose stocking and cooling unit. No immobilizer was required postoperatively for this patient. The patient was then allowed to recover from anesthesia.  General was removed. The patient was taken to Recovery Room in  Good condition. At the completion case, all instrument and sponge counts were   correct.    NOTE: I was present and scrubbed for the key portions of the procedure.    PHYSICAL THERAPY:  The patient should begin physical therapy on postoperative   day # 1 and will be advanced to inpatient therapy as soon as   Possible following discharge.  Weight bearing:as tolerated  right leg  Range of Motion:Full normal motion symmetric to opposite side    No CPM required due to procedure performed    Additional exercises to be performed are:   to begin quad sets with a heel roll to obtain hyperextension, straight leg raise and heel slides with the heel supported in a closed-chain fashion    Immediate specific exercises should include:   Gait program should include the above stated weightbearing; in addition: active extension with a heel-to-toe gait working on maintaining extension    Discharge summary:  The patient was discharged to home in  Good  Follow-up as scheduled preoperatively.    Medication(s): Refer to Discharge Medication List         Resume preoperative diet as tolerated    Activity per outpatient discharge instruction sheet              Estimated Blood Loss: * No values recorded between 9/7/2020  4:52 PM and 9/7/2020  5:16 PM *           Specimens (From admission, onward)    None        Implants: * No implants in log *           Disposition: PACU - hemodynamically stable.           Condition: Good    Attestation:  I was present and scrubbed for the key portions of the procedure.           Discharge Note    Admit Date: 8/30/2020    Attending Physician: Gisela Mota MD     Discharge Physician: Gisela Mota MD    Final Diagnosis: Post-Op Diagnosis Codes:     * Staphylococcal arthritis of right knee [M00.061]    Disposition: Admitted as an Inpatient    Patient Instructions:   Current Discharge Medication List      CONTINUE these medications which have NOT CHANGED    Details   ACCU-CHEK FASTCLIX LANCET DRUM Misc TEST FOUR TIMES DAILY  Qty: 408 each, Refills: 3      ACCU-CHEK RAMIREZ Misc USE AS DIRECTED  Qty: 1 each, Refills: 0      ACCU-CHEK SMARTVIEW TEST STRIP Strp TEST FOUR TIMES DAILY  Qty: 400 strip, Refills: 3      albuterol (PROVENTIL) 2.5 mg /3 mL (0.083 %) nebulizer solution INHALE THE CONTENTS OF 1 VIAL VIA NEBULIZER EVERY 4 HOURS AS NEEDED FOR WHEEZING.  Qty: 360 mL, Refills: 3    Associated Diagnoses: Chronic respiratory failure with hypoxia      alcohol swabs (BD ALCOHOL SWABS) PadM Apply 1 each topically as needed.      aspirin (ECOTRIN) 81 MG EC tablet Take 1 tablet (81 mg total) by mouth once daily.  Qty: 90 tablet, Refills: 3    Associated Diagnoses: CRLD (chronic restrictive lung disease); Uncontrolled type 2 diabetes mellitus with hyperglycemia, with long-term current use of insulin; Congestive heart failure, unspecified HF chronicity, unspecified heart failure type      atorvastatin (LIPITOR) 40 MG tablet Take 1  tablet (40 mg total) by mouth once daily.  Qty: 90 tablet, Refills: 3    Associated Diagnoses: Uncontrolled type 2 diabetes mellitus with hyperglycemia, with long-term current use of insulin; Congestive heart failure, unspecified HF chronicity, unspecified heart failure type      carvedilol (COREG) 25 MG tablet Take 1 tablet (25 mg total) by mouth 2 (two) times daily.  Qty: 180 tablet, Refills: 3    Associated Diagnoses: Chronic combined systolic and diastolic HF (heart failure)      cholecalciferol, vitamin D3, (VITAMIN D3) 1,000 unit capsule Take 1 capsule (1,000 Units total) by mouth once daily.  Qty: 90 capsule, Refills: 3    Associated Diagnoses: Vitamin D deficiency      fluticasone furoate-vilanterol (BREO ELLIPTA) 200-25 mcg/dose DsDv diskus inhaler INHALE 1 PUFF INTO THE LUNGS ONCE DAILY. (CONTROLLER)  Qty: 180 each, Refills: 3    Associated Diagnoses: Chronic respiratory failure with hypoxia      fluticasone propionate (FLONASE) 50 mcg/actuation nasal spray USE 1 SPRAY IN EACH NOSTRIL ONE TIME DAILY  Qty: 32 g, Refills: 3    Associated Diagnoses: CRLD (chronic restrictive lung disease)      furosemide (LASIX) 80 MG tablet TAKE 1 TABLET BY MOUTH IN THE MORNING  AND  1 TABLET BY MOUTH DAILY  AT  3- TO 4PM  Qty: 180 tablet, Refills: 3    Associated Diagnoses: Chronic combined systolic and diastolic HF (heart failure)      gabapentin (NEURONTIN) 100 MG capsule Take 1 capsule (100 mg total) by mouth 3 (three) times daily as needed (pain).  Qty: 90 capsule, Refills: 3    Associated Diagnoses: Type 2 diabetes, uncontrolled, with neuropathy      insulin NPH-insulin regular, 70/30, (NOVOLIN 70/30 U-100 INSULIN) 100 unit/mL (70-30) injection Inject 45 Units into the skin before breakfast AND 35 Units before dinner. [id number] MINUTES BEFORE BREAKFAST AND DINNER.  Qty: 20 mL, Refills: 1    Associated Diagnoses: Uncontrolled type 2 diabetes mellitus with hyperglycemia, with long-term current use of insulin     "  insulin syringe-needle U-100 (INSULIN SYRINGE) 1 mL 30 gauge x 5/16 Syrg 1 each by Misc.(Non-Drug; Combo Route) route 2 (two) times daily. Use twice daily as directed with insulin vials.  Qty: 200 each, Refills: 6    Associated Diagnoses: DM (diabetes mellitus), type 2, uncontrolled      levothyroxine (SYNTHROID) 75 MCG tablet Take 1 tablet (75 mcg total) by mouth once daily.  Qty: 90 tablet, Refills: 3    Associated Diagnoses: Postablative hypothyroidism      lisinopril (PRINIVIL,ZESTRIL) 40 MG tablet Take 1 tablet (40 mg total) by mouth once daily.  Qty: 90 tablet, Refills: 3    Associated Diagnoses: Hypertension associated with diabetes      mometasone 0.1% (ELOCON) 0.1 % cream Applied to the affected area once daily for rash.  Qty: 45 g, Refills: 0      nitroGLYCERIN (NITROSTAT) 0.4 MG SL tablet PLACE 1 TABLET UNDER THE TONGUE EVERY 5  MINUTES AS NEEDED FOR CHEST PAIN  Qty: 25 tablet, Refills: 3    Associated Diagnoses: Chest pain, cardiac      pen needle, diabetic (BD ULTRA-FINE RAMIREZ PEN NEEDLE) 32 gauge x 5/32" Ndle Use with multiple daily injections of insulin  Qty: 200 each, Refills: 3    Associated Diagnoses: DM (diabetes mellitus), type 2, uncontrolled             Discharge Procedure Orders (must include Diet, Follow-up, Activity)   Discharge Procedure Orders (must include Diet, Follow-up, Activity)   WALKER FOR HOME USE     Order Specific Question Answer Comments   Type of Walker: Adult (5'4"-6'6")    With wheels? Yes    Height: 6' 4" (1.93 m)    Weight: 117 kg (257 lb 15 oz)    Length of need (1-99 months): 99    Please check all that apply: Patient's condition impairs ambulation.      Ambulatory referral/consult to Outpatient Case Management   Referral Priority: Routine Referral Type: Consultation   Referral Reason: Specialty Services Required   Number of Visits Requested: 1        Discharge Date: No discharge date for patient encounter.  "

## 2020-09-07 NOTE — ANESTHESIA PREPROCEDURE EVALUATION
[facility]  Anesthesia Pre-Operative Evaluation         Patient Name: Ed Patton  YOB: 1955  MRN: [id number]         09/07/2020    Ed Patton is a 65 y.o. male w/ a significant PMHx of CAD s/p PCI with stent placement, CHF (EF 25%) PA pressures 53mmhg, COPD on home oxygen, chronic restrictive lung disease, uncontrolled DM II, HTN, admitted for diabetic foot ulcer with MRSA bacteremia on vanc and cipro. Plan for LUKAS tomorrow to r/o endocarditis.     Patient now presents for the above procedure(s).      LDA: None documented.       Peripheral IV - Single Lumen 08/30/20 1448 20 G Left Antecubital (Active)   Site Assessment Clean;Dry;Intact;No redness;No swelling;No warmth;No drainage 09/03/20 0735   Line Status Saline locked 09/03/20 0735   Dressing Status Clean;Dry;Intact 09/03/20 0735   Dressing Intervention Integrity maintained 09/02/20 0800   Dressing Change Due 09/03/20 09/02/20 0800   Site Change Due 09/03/20 09/02/20 0800   Reason Not Rotated Not due 09/02/20 0800   Number of days: 4       Prev airway: None documented.    Drips: None documented.      Patient Active Problem List   Diagnosis    CRLD (chronic restrictive lung disease)    Chronic systolic congestive heart failure    Dilated cardiomyopathy    Angina pectoris associated with type 2 diabetes mellitus    CKD stage 3 due to type 2 diabetes mellitus    Diabetic polyneuropathy associated with type 2 diabetes mellitus    Hyperlipidemia associated with type 2 diabetes mellitus    Iron deficiency anemia    Toxic thyroid nodule    Hypertension associated with diabetes    Uncontrolled type 2 diabetes mellitus with hyperglycemia, with long-term current use of insulin    Polypharmacy    Chronic respiratory failure with hypoxia, on home oxygen therapy    Postablative hypothyroidism    COPD mixed type     Cellulitis of left lower extremity    Diabetic foot infection    Sepsis due to methicillin resistant Staphylococcus aureus    Diabetic ulcer of left foot associated with type 2 diabetes mellitus, with fat layer exposed    Bacteremia due to methicillin resistant Staphylococcus aureus    Right knee pain    Staphylococcal arthritis of right knee    Septic arthritis of knee, right       Review of patient's allergies indicates:   Allergen Reactions    Vicodin [hydrocodone-acetaminophen] Itching       Current Inpatient Medications:      Current Facility-Administered Medications on File Prior to Visit   Medication Dose Route Frequency Provider Last Rate Last Dose    acetaminophen tablet 1,000 mg  1,000 mg Oral Q8H PRN Lele Calderón MD        acetaminophen tablet 650 mg  650 mg Oral Q6H PRN Jarad Ma MD   650 mg at 09/04/20 0141    albuterol-ipratropium 2.5 mg-0.5 mg/3 mL nebulizer solution 3 mL  3 mL Nebulization Q4H PRN Jarad Ma MD        aspirin EC tablet 81 mg  81 mg Oral Daily Jarad Ma MD   81 mg at 09/07/20 0849    atorvastatin tablet 40 mg  40 mg Oral Daily Jarad Ma MD   40 mg at 09/07/20 0849    calcium carbonate 200 mg calcium (500 mg) chewable tablet 500 mg  500 mg Oral BID PRN Gisela Mota MD   500 mg at 09/05/20 1713    carvediloL tablet 25 mg  25 mg Oral BID Jarad Ma MD   25 mg at 09/07/20 0848    ceftaroline fosamiL (TEFLARO) 600 mg in dextrose 5 % 50 mL IVPB  600 mg Intravenous Q8H Shabbir Quezada MD        DAPTOmycin (CUBICIN) 1,050 mg in sodium chloride 0.9% IVPB  1,050 mg Intravenous Q24H Shabbir Quezada  mL/hr at 09/07/20 1403 1,050 mg at 09/07/20 1403    dextrose 50% injection 12.5 g  12.5 g Intravenous PRN Jarad Ma MD        dextrose 50% injection 25 g  25 g Intravenous PRN Jarad Ma MD        enoxaparin injection 40 mg  40 mg Subcutaneous Q24H Jarad Ma  MD   40 mg at 09/06/20 1705    fluticasone furoate-vilanteroL 200-25 mcg/dose diskus inhaler 1 puff  1 puff Inhalation Daily Jarad Ma MD   1 puff at 09/07/20 0749    fluticasone propionate 50 mcg/actuation nasal spray 50 mcg  1 spray Each Nostril Daily Jarad Ma MD   50 mcg at 09/06/20 0930    furosemide tablet 80 mg  80 mg Oral BID Jarad Ma MD   80 mg at 09/07/20 0848    gabapentin capsule 100 mg  100 mg Oral TID PRN Jarad Ma MD   100 mg at 09/04/20 0141    glucagon (human recombinant) injection 1 mg  1 mg Intramuscular PRN Jarad Ma MD        glucose chewable tablet 16 g  16 g Oral PRN Jarad Ma MD        glucose chewable tablet 24 g  24 g Oral PRN Jarad Ma MD        insulin aspart U-100 pen 0-5 Units  0-5 Units Subcutaneous QID (AC + HS) PRN Jarad Ma MD   2 Units at 09/06/20 0932    insulin aspart U-100 pen 14 Units  14 Units Subcutaneous TIDWM Jacqueline Curran MD   14 Units at 09/06/20 1723    insulin detemir U-100 pen 29 Units  29 Units Subcutaneous QHS Jacqueline Curran MD   29 Units at 09/06/20 2203    levothyroxine tablet 75 mcg  75 mcg Oral Before breakfast Jarad Ma MD   75 mcg at 09/06/20 0608    lisinopriL tablet 40 mg  40 mg Oral Daily Jarad Ma MD   40 mg at 09/07/20 0848    melatonin tablet 6 mg  6 mg Oral Nightly PRN Jarad Ma MD        ondansetron injection 4 mg  4 mg Intravenous Q8H PRN Jarad Ma MD   4 mg at 09/07/20 0019    oxyCODONE immediate release tablet 5 mg  5 mg Oral Q8H PRN Lele Calderón MD   5 mg at 09/07/20 0857    polyethylene glycol packet 17 g  17 g Oral BID PRN Jarad Ma MD   17 g at 09/06/20 2033    sodium chloride 0.9% flush 10 mL  10 mL Intravenous PRN Jimmy Smith MD        sodium chloride 0.9% flush 10 mL  10 mL Intravenous PRN Lynette Quiroz MD        Tdap vaccine injection 0.5 mL  0.5 mL  Intramuscular vaccine x 1 dose Jarad Ma MD        vitamin D 1000 units tablet 1,000 Units  1,000 Units Oral Daily Jarad Ma MD   1,000 Units at 09/07/20 0848     Current Outpatient Medications on File Prior to Visit   Medication Sig Dispense Refill    ACCU-CHEK FASTCLIX LANCET DRUM Misc TEST FOUR TIMES DAILY 408 each 3    ACCU-CHEK RAMIREZ Misc USE AS DIRECTED 1 each 0    ACCU-CHEK SMARTVIEW TEST STRIP Strp TEST FOUR TIMES DAILY 400 strip 3    albuterol (PROVENTIL) 2.5 mg /3 mL (0.083 %) nebulizer solution INHALE THE CONTENTS OF 1 VIAL VIA NEBULIZER EVERY 4 HOURS AS NEEDED FOR WHEEZING. 360 mL 3    alcohol swabs (BD ALCOHOL SWABS) PadM Apply 1 each topically as needed.      aspirin (ECOTRIN) 81 MG EC tablet Take 1 tablet (81 mg total) by mouth once daily. 90 tablet 3    atorvastatin (LIPITOR) 40 MG tablet Take 1 tablet (40 mg total) by mouth once daily. 90 tablet 3    carvedilol (COREG) 25 MG tablet Take 1 tablet (25 mg total) by mouth 2 (two) times daily. 180 tablet 3    cholecalciferol, vitamin D3, (VITAMIN D3) 1,000 unit capsule Take 1 capsule (1,000 Units total) by mouth once daily. 90 capsule 3    fluticasone furoate-vilanterol (BREO ELLIPTA) 200-25 mcg/dose DsDv diskus inhaler INHALE 1 PUFF INTO THE LUNGS ONCE DAILY. (CONTROLLER) 180 each 3    fluticasone propionate (FLONASE) 50 mcg/actuation nasal spray USE 1 SPRAY IN EACH NOSTRIL ONE TIME DAILY 32 g 3    furosemide (LASIX) 80 MG tablet TAKE 1 TABLET BY MOUTH IN THE MORNING  AND  1 TABLET BY MOUTH DAILY  AT  3- TO 4PM 180 tablet 3    gabapentin (NEURONTIN) 100 MG capsule Take 1 capsule (100 mg total) by mouth 3 (three) times daily as needed (pain). 90 capsule 3    insulin NPH-insulin regular, 70/30, (NOVOLIN 70/30 U-100 INSULIN) 100 unit/mL (70-30) injection Inject 45 Units into the skin before breakfast AND 35 Units before dinner. [id number] MINUTES BEFORE BREAKFAST AND DINNER. 20 mL 1    insulin syringe-needle  "U-100 (INSULIN SYRINGE) 1 mL 30 gauge x 5/16 Syrg 1 each by Misc.(Non-Drug; Combo Route) route 2 (two) times daily. Use twice daily as directed with insulin vials. 200 each 6    levothyroxine (SYNTHROID) 75 MCG tablet Take 1 tablet (75 mcg total) by mouth once daily. 90 tablet 3    lisinopril (PRINIVIL,ZESTRIL) 40 MG tablet Take 1 tablet (40 mg total) by mouth once daily. 90 tablet 3    mometasone 0.1% (ELOCON) 0.1 % cream Applied to the affected area once daily for rash. 45 g 0    nitroGLYCERIN (NITROSTAT) 0.4 MG SL tablet PLACE 1 TABLET UNDER THE TONGUE EVERY 5  MINUTES AS NEEDED FOR CHEST PAIN 25 tablet 3    pen needle, diabetic (BD ULTRA-FINE RAMIREZ PEN NEEDLE) 32 gauge x 5/32" Ndle Use with multiple daily injections of insulin 200 each 3       Past Surgical History:   Procedure Laterality Date    APPENDECTOMY      CHOLECYSTECTOMY      CORONARY ANGIOPLASTY WITH STENT PLACEMENT      TONSILLECTOMY      TRANSESOPHAGEAL ECHOCARDIOGRAPHY N/A 2020    Procedure: ECHOCARDIOGRAM, TRANSESOPHAGEAL;  Surgeon: [facility] Diagnostic Provider;  Location: [facility] EP LAB;  Service: Anesthesiology;  Laterality: N/A;       Social History     Socioeconomic History    Marital status:      Spouse name: Not on file    Number of children: Not on file    Years of education: Not on file    Highest education level: Not on file   Occupational History    Not on file   Social Needs    Financial resource strain: Not on file    Food insecurity     Worry: Not on file     Inability: Not on file    Transportation needs     Medical: Not on file     Non-medical: Not on file   Tobacco Use    Smoking status: Former Smoker     Packs/day: 0.01     Years: 3.00     Pack years: 0.03     Types: Cigarettes     Quit date: 1995     Years since quittin.3    Smokeless tobacco: Never Used   Substance and Sexual Activity    Alcohol use: No    Drug use: No    Sexual activity: Never     Comment:  with 1 son   Lifestyle    " Physical activity     Days per week: Not on file     Minutes per session: Not on file    Stress: Not on file   Relationships    Social connections     Talks on phone: Not on file     Gets together: Not on file     Attends Judaism service: Not on file     Active member of club or organization: Not on file     Attends meetings of clubs or organizations: Not on file     Relationship status: Not on file   Other Topics Concern    Not on file   Social History Narrative     with 1 son       OBJECTIVE:     Vital Signs Range (Last 24H):  Temp:  [35.7 °C (96.3 °F)-37.1 °C (98.8 °F)]   Pulse:  []   Resp:  [12-20]   BP: (100-135)/(64-86)   SpO2:  [92 %-99 %]       Significant Labs:  Lab Results   Component Value Date    WBC 23.53 (H) 09/07/2020    HGB 11.0 (L) 09/07/2020    HCT 35.6 (L) 09/07/2020     (H) 09/07/2020    CHOL 157 10/15/2019    TRIG 73 10/15/2019    HDL 47 10/15/2019    ALT 23 08/30/2020    AST 27 08/30/2020     (L) 09/07/2020    K 4.0 09/07/2020    CL 97 09/07/2020    CREATININE 1.4 09/07/2020    BUN 27 (H) 09/07/2020    CO2 27 09/07/2020    TSH 8.692 (H) 10/15/2019    PSA 0.4 03/05/2004    INR 1.0 10/16/2014    HGBA1C 9.5 (H) 08/31/2020       Diagnostic Studies: No relevant studies.    EKG:   Results for orders placed or performed during the hospital encounter of 08/30/20   EKG 12-lead    Collection Time: 08/30/20  3:17 PM    Narrative    Test Reason : W19.XXXA,    Vent. Rate : 101 BPM     Atrial Rate : 234 BPM     P-R Int : 000 ms          QRS Dur : 098 ms      QT Int : 308 ms       P-R-T Axes : 000 -55 035 degrees     QTc Int : 399 ms    Age and gender specific analysis  Atrial fibrillation with rapid ventricular response  Left axis deviation  Cannot rule out Septal infarct ,age undetermined  Nonspecific ST and/or T wave abnormalities  Abnormal ECG  When compared with ECG of 01-MAY-2015 10:32,  Atrial fibrillation has replaced Sinus rhythm  Confirmed by Danish Linda MD ([id number]) on  9/1/2020 5:01:56 AM    Referred By: [email or web address]   SELF           Confirmed By:Danish Linda MD       2D ECHO:  TTE:  Results for orders placed or performed during the hospital encounter of 08/30/20   Echo Color Flow Doppler? Yes   Result Value Ref Range    Ascending aorta 2.82 cm    STJ 2.56 cm    AV mean gradient 3 mmHg    Ao peak david 1.18 m/s    Ao VTI 23.10 cm    IVRT 42.82 msec    IVS 0.76 0.6 - 1.1 cm    LA size 4.79 cm    Left Atrium Major Axis 5.84 cm    Left Atrium Minor Axis 5.82 cm    LVIDD 5.35 3.5 - 6.0 cm    LVIDS 4.14 (A) 2.1 - 4.0 cm    LVOT diameter 2.30 cm    LVOT peak VTI 12.20 cm    PW 1.07 0.6 - 1.1 cm    E wave decelartion time 164.89 msec    MV Peak E David 0.91 m/s    PV Peak D David 0.53 m/s    PV Peak S David 0.17 m/s    RA Major Axis 5.61 cm    RA Width 3.95 cm    RVDD 3.25 cm    Sinus 3.01 cm    TAPSE 1.24 cm    TR Max David 3.37 m/s    TDI LATERAL 0.09 m/s    TDI SEPTAL 0.05 m/s    LA WIDTH 4.84 cm    MV stenosis pressure 1/2 time 47.82 ms    LV Diastolic Volume 138.12 mL    LV Systolic Volume 76.15 mL    RV S' 10.24 cm/s    LVOT peak david 0.67 m/s    LV LATERAL E/E' RATIO 10.11 m/s    LV SEPTAL E/E' RATIO 18.20 m/s    FS 23 %    LA volume 114.89 cm3    LV mass 181.16 g    Left Ventricle Relative Wall Thickness 0.40 cm    AV valve area 2.19 cm2    AV Velocity Ratio 0.57     AV index (prosthetic) 0.53     MV valve area p 1/2 method 4.60 cm2    Mean e' 0.07 m/s    Pulm vein S/D ratio 0.32     LVOT area 4.2 cm2    LVOT stroke volume 50.66 cm3    AV peak gradient 6 mmHg    E/E' ratio 13.00 m/s    LV Systolic Volume Index 30.8 mL/m2    LV Diastolic Volume Index 55.95 mL/m2    LA Volume Index 46.5 mL/m2    LV Mass Index 73 g/m2    Triscuspid Valve Regurgitation Peak Gradient 45 mmHg    BSA 2.5 m2    Right Atrial Pressure (from IVC) 8 mmHg    TV rest pulmonary artery pressure 53 mmHg    Narrative    · Moderately decreased left ventricular systolic function. The estimated   ejection fraction is 35%.  ·  Moderately reduced right ventricular systolic function.  · Moderate left atrial enlargement.  · Atrial fibrillation observed.  · The estimated PA systolic pressure is 53 mmHg.  · Intermediate central venous pressure (8 mmHg).          LUKAS:  Results for orders placed or performed during the hospital encounter of 08/30/20   Transesophageal echo (LUKAS)   Result Value Ref Range    BSA 2.5 m2    Narrative    · Severely decreased left ventricular systolic function. The estimated   ejection fraction is 25%.  · Moderately reduced right ventricular systolic function.  · Grade 2 plaque present in the descending aorta.  · No vegetations seen.          ASSESSMENT/PLAN:       Pre-op Assessment    I have reviewed the Patient Summary Reports.     I have reviewed the Nursing Notes. I have reviewed the NPO Status.   I have reviewed the Medications.     Review of Systems  Anesthesia Hx:  History of prior surgery of interest to airway management or planning:  Denies Personal Hx of Anesthesia complications.   Hematology/Oncology:         -- Anemia:   Cardiovascular:   Hypertension CAD  CABG/stent  CHF · Severely decreased left ventricular systolic function. The estimated ejection fraction is 25%.  · Moderately reduced right ventricular systolic function.  · Grade 2 plaque present in the descending aorta.  · No vegetations seen.   Pulmonary:   COPD, moderate    Renal/:   Chronic Renal Disease, CRI    Neurological:   Peripheral Neuropathy    Endocrine:   Diabetes, poorly controlled, type 2, using insulin        Physical Exam  General:  Well nourished    Airway/Jaw/Neck:  Airway Findings: Mouth Opening: Normal Tongue: Normal  General Airway Assessment: Adult  Mallampati: III  TM Distance: Normal, at least 6 cm         Dental:  Dental Findings: Edentulous   Chest/Lungs:  Chest/Lungs Findings: Clear to auscultation, Normal Respiratory Rate  On 2 liters   Heart/Vascular:  Heart Findings: Rate: Normal  Rhythm: Irregularly Irregular  Sounds:  Normal  Heart murmur: negative       Mental Status:  Mental Status Findings:  Cooperative, Alert and Oriented         Anesthesia Plan  Type of Anesthesia, risks & benefits discussed:  Anesthesia Type:  general, MAC  Patient's Preference:   Intra-op Monitoring Plan: standard ASA monitors  Intra-op Monitoring Plan Comments:   Post Op Pain Control Plan: per primary service following discharge from PACU  Post Op Pain Control Plan Comments:   Induction:   IV  Beta Blocker:  Patient is on a Beta-Blocker and has received one dose within the past 24 hours (No further documentation required).       Informed Consent: Patient understands risks and agrees with Anesthesia plan.  Questions answered. Anesthesia consent signed with patient.  ASA Score: 4     Day of Surgery Review of History & Physical: I have interviewed and examined the patient. I have reviewed the patient's H&P dated:  There are no significant changes.  H&P update referred to the provider.         Ready For Surgery From Anesthesia Perspective.

## 2020-09-07 NOTE — PROGRESS NOTES
"  [facility] Medicine  Telemedicine Progress Note    Patient Name: Ed Patton  MRN: [id number]  Patient Class: IP- Inpatient   Admission Date: 8/30/2020  Length of Stay: 8 days  Attending Physician: Gisela Mota MD  Primary Care Provider: Qiana Chow MD    [facility] Medicine Team: [facility] VIRTUAL TEAM 10 Gisela Mota MD  Virtual Telemedicine Progress Note  Start time: 0903  Chief complaint: Sepsis due to methicillin resistant Staphylococcus aureus  The patient location is: 660/660 A  The patient arrived at: 8/30/2020  2:18 PM  Present with the patient at the time of the telemed/virtual assessment: telepresenter   End time:  0909  Total time spent with patient: 6 min  I have assessed findings virtually using a telemedicine platform and with assistance of the bedside nurse or telemedicine presenter.  The attending portion of this evaluation, treatment, and documentation was performed per Gisela Mota MD via audiovisual.    Patient was transferred to the telemedicine service on: 9/5/2020    Subjective:     Admission CC:   Chief Complaint   Patient presents with    Frequent Falls     frequent falls, weakness, "stepped on a tack" left leg now swollen.     Leg Swelling     Follow up visit for: Sepsis due to methicillin resistant Staphylococcus aureus    Interval History / Events Overnight:   The patient is able to provide adequate history. Additional history was obtained from past medical records and nurse. No significant events reported by Nursing.  Blood cultures remain positive.  Awaiting I and D of the knee by Ortho today.  Patient complains of insomnia. Symptoms have improved since yesterday. Associated symptoms include: LE pain. Symptoms are stable. Alleviating factors include: medication: analgesics.     Data reviewed 9/7/2020: Lab test(s) reviewed: H&H and sCR stable. Hyperglycemia improved  I have assessed findings virtually using a telemedicine platform and " with assistance of the bedside nurse or telemedicine presenter.    Review of Systems   Constitutional: Negative for fever.   Respiratory: Negative for shortness of breath.      Objective:     Vital Signs (Most Recent):  Temp: 98.3 °F (36.8 °C) (09/07/20 1305)  Pulse: 107 (09/07/20 0845)  Resp: 16 (09/07/20 0857)  BP: 125/86 (09/07/20 0845)  SpO2: 98 % (09/07/20 0845) Vital Signs (24h Range):  Temp:  [96.3 °F (35.7 °C)-98.8 °F (37.1 °C)] 98.3 °F (36.8 °C)  Pulse:  [] 107  Resp:  [12-20] 16  SpO2:  [92 %-99 %] 98 %  BP: (100-135)/(64-86) 125/86     Weight: 116 kg (255 lb 11.7 oz)  Body mass index is 31.13 kg/m².    Intake/Output Summary (Last 24 hours) at 9/7/2020 1638  Last data filed at 9/7/2020 0700  Gross per 24 hour   Intake 750 ml   Output 1625 ml   Net -875 ml      Physical Exam  Constitutional:       General: He is not in acute distress.     Appearance: Normal appearance. He is not diaphoretic.   Eyes:      General: Lids are normal. No scleral icterus.        Right eye: No discharge.         Left eye: No discharge.      Conjunctiva/sclera: Conjunctivae normal.   Neck:      Trachea: Phonation normal.   Cardiovascular:      Rate and Rhythm: Tachycardia present.   Pulmonary:      Effort: Pulmonary effort is normal. No tachypnea, accessory muscle usage or respiratory distress.   Abdominal:      General: There is no distension.   Musculoskeletal:      Right lower leg: Edema present.      Left lower leg: Edema present.   Neurological:      Mental Status: He is alert. Mental status is at baseline. He is not disoriented.   Psychiatric:         Attention and Perception: Attention normal.         Mood and Affect: Affect normal.         Behavior: Behavior is cooperative.         Significant Labs:   Recent Labs   Lab 09/04/20  0332 09/06/20  0517 09/07/20  0733   WBC 21.41* 19.52* 23.53*   HGB 9.6* 9.9* 11.0*   HCT 31.4* 33.8* 35.6*   * 432* 372*     Recent Labs   Lab 09/03/20  0410 09/03/20  3274  09/04/20  0332 09/06/20  0517   GRAN 81.6*  15.9*  --  83.3*  17.8* 78.3*  15.3*   SEGS  --  90  --   --    LYMPH 5.9*  1.2  --  5.2*  1.1 6.8*  1.3   LYMPHS  --  8  --   --    MONO 6.1  1.2*  --  6.6  1.4* 9.4  1.8*   EOS 1.0*  --  0.8* 0.6*     Recent Labs   Lab 09/04/20  0332 09/06/20  0517 09/07/20  1041    138 135*   K 3.6 3.7 4.0    99 97   CO2 24 29 27   BUN 40* 30* 27*   CREATININE 1.5* 1.3 1.4   * 200* 234*   CALCIUM 9.1 9.0 8.9   ALBUMIN  --  1.8* 1.8*   MG 1.9 1.9 1.7   PHOS 3.7 3.4 3.7     Recent Labs   Lab 09/03/20  0410 09/06/20  0517   .1* 197.3*     Recent Labs   Lab 09/05/20  1346   *     Recent Labs   Lab 08/30/20  1532 09/03/20  0410 09/03/20  1538   PROCAL  --   --  0.13   LACTATE 1.4  --   --    SEDRATE  --  80*  --      SARS-CoV2 (COVID-19) Qualitative PCR (no units)   Date Value   09/03/2020 Not Detected     SARS-CoV-2 RNA, Amplification, Qual (no units)   Date Value   09/07/2020 Negative   08/30/2020 Negative     Recent Labs   Lab 08/31/20  0434   HGBA1C 9.5*     Recent Labs   Lab 09/06/20  2118 09/07/20  0829 09/07/20  1303   POCTGLUCOSE 132* 208* 253*       Significant Imaging:     Assessment/Plan:      Active Diagnoses:    Diagnosis Date Noted POA    PRINCIPAL PROBLEM:  Sepsis due to methicillin resistant Staphylococcus aureus [A41.02] 08/30/2020 Yes    Septic arthritis of knee, right [M00.9] 09/07/2020 Unknown    Staphylococcal arthritis of right knee [M00.061] 09/05/2020 Yes    Right knee pain [M25.561] 09/03/2020 Yes    Diabetic ulcer of left foot associated with type 2 diabetes mellitus, with fat layer exposed [E11.621, L97.522] 08/31/2020 Yes    Cellulitis of left lower extremity [L03.116] 08/30/2020 Yes    Diabetic foot infection [E11.628, L08.9] 08/30/2020 Yes    Bacteremia due to methicillin resistant Staphylococcus aureus [R78.81] 08/30/2020 Yes    COPD mixed type [J44.9] 10/15/2019 Yes     Chronic    Postablative hypothyroidism  [E89.0] 12/06/2018 Yes     Chronic    Uncontrolled type 2 diabetes mellitus with hyperglycemia, with long-term current use of insulin [E11.65, Z79.4] 10/09/2017 Not Applicable     Chronic    Chronic respiratory failure with hypoxia, on home oxygen therapy [J96.11, Z99.81] 10/09/2017 Not Applicable     Chronic    Diabetic polyneuropathy associated with type 2 diabetes mellitus [E11.42] 08/25/2015 Yes     Chronic    CKD stage 3 due to type 2 diabetes mellitus [E11.22, N18.3] 12/23/2014 Yes     Chronic    Chronic systolic congestive heart failure [I50.22] 05/07/2013 Yes     Chronic      Problems Resolved During this Admission:       Overview / ICU Course:    Ed Patton is a 65 y.o. male admitted for Sepsis due to methicillin resistant Staphylococcus aureus.    Inpatient Medications Prescribed for Management of Current Problems:     Scheduled Meds:    aspirin  81 mg Oral Daily    atorvastatin  40 mg Oral Daily    carvediloL  25 mg Oral BID    ceftaroline fosamil  600 mg Intravenous Q8H    DAPTOmycin (CUBICIN)  IV  1,050 mg Intravenous Q24H    enoxaparin  40 mg Subcutaneous Q24H    fluticasone furoate-vilanteroL  1 puff Inhalation Daily    fluticasone propionate  1 spray Each Nostril Daily    furosemide  80 mg Oral BID    insulin aspart U-100  14 Units Subcutaneous TIDWM    insulin detemir U-100  29 Units Subcutaneous QHS    levothyroxine  75 mcg Oral Before breakfast    lisinopriL  40 mg Oral Daily    vitamin D  1,000 Units Oral Daily     Continuous Infusions:   As Needed: acetaminophen, acetaminophen, albuterol-ipratropium, calcium carbonate, dextrose 50%, dextrose 50%, gabapentin, glucagon (human recombinant), glucose, glucose, insulin aspart U-100, melatonin, ondansetron, oxyCODONE, polyethylene glycol, sodium chloride 0.9%, sodium chloride 0.9%, DIPH,PERTUS (ADACEL),TETANUS PF VAC (ADULT)      Assessment and Plan by Problem    Diabetic foot infection  Bacteremia due to methicillin resistant  Staphylococcus aureus  Diabetic ulcer of left foot associated with type 2 diabetes mellitus, with fat layer exposed  Right knee pain / septic arthritis  Appreciate Infectious Disease, Orthopedic Surgery. Treating with antibiotics per ID.   Arthrocentesis and cultures suggest septic knee. LUKAS to evaluate for endocarditis negative for vegetations.  Plan for I and D of the knee 9/7/2020.  Per ID: Antibiotics changed to daptomycin and ceftaroline combination therapy for 'persistent MRSA bacteremia'  -- Monitor CPK and CBC (ceftaroline can lead to leucopenia)  -- MRI lumbar spine w/wo contrast ordered  -- NM WB WBC scan for inflammatory localization ordered  -- Repeat blood cultures  pending  -- PICC line after clearance of bacteremia, anticipate prolonged IV antibiotics on discharge.     Sepsis  Due to Proteus and MRSA. Continuing current management with antibiotics.  Resolved.  Follow up on ID recommendations.  Discontinue Ciprofloxacine (completed 7 days, was administered for diabetic foot wound growing Proteus and urine growing Klebseilla)     Cellulitis of left lower extremity  Continued antibiotics.  Resolved     COPD mixed type  Continue home fluticasone-vilanterol, albuterol nebulizer.     Postablative hypothyroidism  Continue home levothyroxine.     Chronic respiratory failure with hypoxia, on home oxygen therapy  On 2 L nasal cannula chronically due to combination of COPD and CHF; titrate as needed.     Uncontrolled type 2 diabetes mellitus with hyperglycemia, with long-term current use of insulin  Diabetic polyneuropathy associated with type 2 diabetes mellitus  Takes insulin NPH-insulin regular 70/30 45 units before breakfast and 35 units before dinner. Giving insulin detemir 29 units daily and insulin aspart 14 units with meals and correction dose scale. Monitor BGs.     Chronic systolic congestive heart failure  Continue home carvedilol, lisinopril. Lasix.  EF 25%       Diet: Diet NPO  GI Prophylaxis: Not  indicated  Significant LDAs:   IV Access Type: Peripheral  Urinary Catheter Indication if present: Patient Does Not Have Urinary Catheter  Other Lines/Tubes/Drains:    Goals of Care:    Previous admission:  4/15/16  Likely prognosis:  Fair  Code Status: Full Code  Comfort Only: No  Hospice: No  Goals at discharge: remain at home    Discharge Planning   EDY: 9/7/2020     Code Status: Full Code   Is the patient medically ready for discharge?:     Reason for patient still in hospital (select all that apply): Treatment  Discharge Plan A: Home with family, Home Health        VTE Risk Mitigation (From admission, onward)         Ordered     enoxaparin injection 40 mg  Every 24 hours      08/30/20 2004     IP VTE HIGH RISK PATIENT  Once      08/30/20 2004     Place sequential compression device  Until discontinued      08/30/20 8370                   Gisela Mota MD  Department of Hospital Medicine   [facility]

## 2020-09-07 NOTE — PROGRESS NOTES
Pharmacokinetic Assessment Follow Up: IV Vancomycin    Assessment/Plan:  -Vancomycin trough returned supratherapeutic at 20.9 mcg/mL (~14h level) on vancomycin 1000 mg q12h   -Scr increased slightly from 1.3 to 1.4 mg/dL, UOP 2.3L/24h  -Will HOLD and re-check Vr 9/7 1800 to ensure clearance prior to resuming dose.   -Of note, pt previously tolerated vancomycin 750 mg q12h.   -Pharmacy to continue to follow and monitor vancomycin.    Drug levels (last 3 results):  Recent Labs   Lab Result Units 09/05/20  0750 09/07/20  1041   Vancomycin-Trough ug/mL 13.1 20.9       Pharmacy will continue to follow and monitor vancomycin.    Please contact pharmacy at extension [phone] for questions regarding this assessment.    Thank you for the consult,   Geraldine Pena, PharmD, BCPS       Patient brief summary:  Ed Patton is a 65 y.o. male initiated on antimicrobial therapy with IV Vancomycin for treatment of bacteremia, diabetic foot infection.    The patient's current regimen is pulse dose vancomycin.    Drug Allergies:   Review of patient's allergies indicates:   Allergen Reactions    Vicodin [hydrocodone-acetaminophen] Itching       Actual Body Weight:   116 kg    Renal Function:   Estimated Creatinine Clearance: 75.2 mL/min (based on SCr of 1.4 mg/dL).,     Dialysis Method (if applicable):  N/A

## 2020-09-07 NOTE — TRANSFER OF CARE
"Anesthesia Transfer of Care Note    Patient: Ed Patton    Procedure(s) Performed: Procedure(s) (LRB):  ARTHROSCOPY, KNEE, RIGHT  (Right)    Patient location: PACU    Anesthesia Type: general    Transport from OR: Transported from OR on 6-10 L/min O2 by face mask with adequate spontaneous ventilation    Post pain: adequate analgesia    Post assessment: no apparent anesthetic complications    Post vital signs: stable    Level of consciousness: sedated    Nausea/Vomiting: no nausea/vomiting    Complications: none    Transfer of care protocol was followed      Last vitals:   Visit Vitals  BP (!) 140/94   Pulse (!) 112   Temp 36.5 °C (97.7 °F) (Temporal)   Resp 12   Ht 6' 4" (1.93 m)   Wt 116 kg (255 lb 11.7 oz)   SpO2 99%   BMI 31.13 kg/m²     "

## 2020-09-07 NOTE — PLAN OF CARE
Problem: Fall Injury Risk  Goal: Absence of Fall and Fall-Related Injury  Outcome: Ongoing, Progressing     Problem: Adult Inpatient Plan of Care  Goal: Plan of Care Review  Outcome: Ongoing, Progressing   Alert and orient times 4 this shift. Md round before midnight and discuss with patient about having surgery this morning. Pt made aware that is is to be nothing by mouth after midnight. A couple hours later and other MD came to see patient and I obtain covid 19 test and MD took swab to lab. BG was 132 at HS. No Bm since 9/2. Received miralax on day shift as well as with HS meds with no results. Day shift Rn report pt with decrease appetite.No fall this shift.

## 2020-09-07 NOTE — SUBJECTIVE & OBJECTIVE
Principal Problem:Sepsis due to methicillin resistant Staphylococcus aureus    Principal Orthopedic Problem: right septic knee    Interval History: Patient's right knee aspirate resulted positive for MRSA. Patient reports continued pain in right knee, unable to bear weight 2/2 pain.    Review of patient's allergies indicates:   Allergen Reactions    Vicodin [hydrocodone-acetaminophen] Itching       Current Facility-Administered Medications   Medication    acetaminophen tablet 1,000 mg    acetaminophen tablet 650 mg    albuterol-ipratropium 2.5 mg-0.5 mg/3 mL nebulizer solution 3 mL    aspirin EC tablet 81 mg    atorvastatin tablet 40 mg    calcium carbonate 200 mg calcium (500 mg) chewable tablet 500 mg    carvediloL tablet 25 mg    ciprofloxacin HCl tablet 500 mg    dextrose 50% injection 12.5 g    dextrose 50% injection 25 g    enoxaparin injection 40 mg    fluticasone furoate-vilanteroL 200-25 mcg/dose diskus inhaler 1 puff    fluticasone propionate 50 mcg/actuation nasal spray 50 mcg    furosemide tablet 80 mg    gabapentin capsule 100 mg    glucagon (human recombinant) injection 1 mg    glucose chewable tablet 16 g    glucose chewable tablet 24 g    insulin aspart U-100 pen 0-5 Units    insulin aspart U-100 pen 14 Units    insulin detemir U-100 pen 29 Units    levothyroxine tablet 75 mcg    lisinopriL tablet 40 mg    melatonin tablet 6 mg    ondansetron injection 4 mg    oxyCODONE immediate release tablet 5 mg    polyethylene glycol packet 17 g    sodium chloride 0.9% flush 10 mL    sodium chloride 0.9% flush 10 mL    Tdap vaccine injection 0.5 mL    vancomycin - pharmacy to dose    vancomycin in dextrose 5 % 1 gram/250 mL IVPB 1,000 mg    vitamin D 1000 units tablet 1,000 Units     Objective:     Vital Signs (Most Recent):  Temp: 98.8 °F (37.1 °C) (09/07/20 0037)  Pulse: 103 (09/07/20 0100)  Resp: 20 (09/07/20 0018)  BP: 100/64 (09/06/20 2300)  SpO2: 98 % (09/07/20 0100) Vital  "Signs (24h Range):  Temp:  [97.1 °F (36.2 °C)-98.8 °F (37.1 °C)] 98.8 °F (37.1 °C)  Pulse:  [] 103  Resp:  [12-21] 20  SpO2:  [92 %-99 %] 98 %  BP: (100-135)/(64-80) 100/64     Weight: 116.2 kg (256 lb 2.8 oz)  Height: 6' 4" (193 cm)  Body mass index is 31.18 kg/m².      Intake/Output Summary (Last 24 hours) at 9/7/2020 0435  Last data filed at 9/6/2020 1800  Gross per 24 hour   Intake 820 ml   Output 1670 ml   Net -850 ml       Ortho/SPM Exam   General: A&Ox3, NAD, VS wnl except mild tachycardia    Focused Exam of RLE:   Palpable effusion right knee. ROM of knee limited 2/2 pain. Motor and sensory intact in all nerve distributions. DP pulse 2+, toes WWP    Significant Labs:   BMP:   Recent Labs   Lab 09/06/20  0517   *      K 3.7   CL 99   CO2 29   BUN 30*   CREATININE 1.3   CALCIUM 9.0   MG 1.9     CBC:   Recent Labs   Lab 09/06/20  0517   WBC 19.52*   HGB 9.9*   HCT 33.8*   *     All pertinent labs within the past 24 hours have been reviewed.    Significant Imaging: I have reviewed all pertinent imaging results/findings.  "

## 2020-09-07 NOTE — PROGRESS NOTES
[facility]  Infectious Disease  Progress Note    Patient Name: Ed Patton  MRN: [id number]  Admission Date: 8/30/2020  Length of Stay: 8 days  Attending Physician: Gisela Mota MD  Primary Care Provider: Qiana Chow MD    Isolation Status: Contact  Assessment/Plan:      * Sepsis due to methicillin resistant Staphylococcus aureus  MRSA septicemia. Still tachycardic, SpO2 reached 92%, renal function continues to worsen. WBCs not improving. ESR and CRP elevated, RF WNL. Vancomycin was supratherapeutic, switched to pulse dose regimen. Source of bacteremia unclear, may be left foot wound or a septic process at the right knee joint. Right knee now clinically suspicious for septic arthritis (see physical exam). Left foot wound growing Proteus and MRSA, urine growing Klebsiella. TTE negative for signs of cardiac infection, LUKAS is negative as well.     Blood cultures: MRSA  R knee fluid culture: Staph aureus  Left foot wound: Proteus, MRSA  Urine culture: Klebsiella    Recommendations:   -- Cultures from 9/5 grew MRSA. Patient will be switched to daptomycin and ceftaroline combination therapy for 'persistent MRSA bacteremia'  -- Baseline CPK  -- Daily CBC (ceftaroline can lead to leucopenia)  -- s/p R knee aspiration (WBC 11k, 90% segs, but negative gram stain and no crystals), could be the source, to OR for I&D today for source control  -- will order MRI lumbar spine w/wo contrast  -- Follow up on NM WB WBC scan for inflammatory localization (?hiding abscess)  -- Discontinue Ciprofloxacine (completed 7 days, was administered for diabetic foot wound growing proteus and urine growing Klebseilla)  -- Repeat blood cultures every 48 hours until clear >48 hours  -- Baseline CPK = 210  -- Will need a PICC line after clearance of bacteremia, anticipate prolonged IV antibiotics on discharge  -- Will continue to follow closely      Anticipated Disposition: TBD  Thank you for your consult. I will  follow-up with patient. Please contact us if you have any additional questions.    Shabbir Quezada MD  Infectious Disease  [facility]    Subjective:     Principal Problem:Sepsis due to methicillin resistant Staphylococcus aureus    HPI: 64 yo M with Hx of DM2 (on insulin), chronic hypoxemic respiratory failure (on O2 at home), chronic systolic heart failure presented to ED after recurrent falls 7 and 8 days ago. He has a history of falls but reports that his tendency to fall has worsened recently, resulting in injury to his lower back and right thigh. After his fall last Tuesday he looked at his left foot and noticed a wound, denies pain or drainage related to the wound, he admits to not regularly checking his feet and does not know how long the wound has been present. He believes the wound is related to a tack that he previously found imbedded in the bottom of a slipper. He had previously seen Ang Lugo DPM for diabetic foot care but has not seen her this year due to fear of the coronavirus pandemic. He has been getting home health services including physical therapy. No subjective change as of today, still denies pain and drainage related to the wound. Denies F/C/N/V.  Interval History: NAEON    Review of Systems   Constitutional: Negative for chills and fever.   HENT: Negative for congestion, ear pain, sneezing and sore throat.    Eyes: Negative for pain.   Respiratory: Negative for cough and shortness of breath.    Cardiovascular: Negative for chest pain.   Gastrointestinal: Negative for abdominal pain, constipation, diarrhea, nausea and vomiting.   Endocrine: Negative for polyuria.   Genitourinary: Negative for decreased urine volume, difficulty urinating, dysuria and flank pain.   Musculoskeletal: Positive for arthralgias (R knee pain) and gait problem. Negative for back pain, neck pain and neck stiffness.   Skin: Positive for wound. Negative for rash.   Neurological: Positive  for numbness. Negative for headaches.   Psychiatric/Behavioral: Negative for confusion. The patient is not nervous/anxious.      Objective:     Vital Signs (Most Recent):  Temp: 98.3 °F (36.8 °C) (09/07/20 0845)  Pulse: 107 (09/07/20 0845)  Resp: 16 (09/07/20 0857)  BP: 125/86 (09/07/20 0845)  SpO2: 98 % (09/07/20 0845) Vital Signs (24h Range):  Temp:  [96.3 °F (35.7 °C)-98.8 °F (37.1 °C)] 98.3 °F (36.8 °C)  Pulse:  [] 107  Resp:  [12-20] 16  SpO2:  [92 %-99 %] 98 %  BP: (100-135)/(64-86) 125/86     Weight: 116 kg (255 lb 11.7 oz)  Body mass index is 31.13 kg/m².    Estimated Creatinine Clearance: 75.2 mL/min (based on SCr of 1.4 mg/dL).    Physical Exam  Vitals signs reviewed.   Constitutional:       General: He is not in acute distress.     Appearance: Normal appearance. He is normal weight. He is not diaphoretic.   HENT:      Head: Normocephalic and atraumatic.      Right Ear: External ear normal.      Left Ear: External ear normal.      Nose: Nose normal. No congestion or rhinorrhea.   Eyes:      General:         Right eye: No discharge.         Left eye: No discharge.      Extraocular Movements: Extraocular movements intact.   Neck:      Musculoskeletal: Normal range of motion and neck supple. No neck rigidity or muscular tenderness.   Cardiovascular:      Rate and Rhythm: Regular rhythm. Tachycardia present.      Heart sounds: No murmur.      Comments: Diminished DP/PT pulses  Pulmonary:      Effort: Pulmonary effort is normal. No respiratory distress.      Breath sounds: No wheezing.      Comments: On 2 L/m oxygen  Chest:      Chest wall: No tenderness.   Abdominal:      General: There is no distension.      Palpations: Abdomen is soft.      Tenderness: There is no abdominal tenderness. There is no guarding.   Musculoskeletal: Normal range of motion.         General: Tenderness (right anterior thigh, lower back) present.      Comments: Improved swelling and lesser tenderness of the right knee this am.     Skin:     General: Skin is warm and dry.      Findings: Lesion (left foot. Dressing cdi) present.   Neurological:      Mental Status: He is alert and oriented to person, place, and time.      Sensory: Sensory deficit present.      Gait: Gait normal.   Psychiatric:         Mood and Affect: Mood normal.         Behavior: Behavior normal.         Thought Content: Thought content normal.         Judgment: Judgment normal.         Significant Labs: All pertinent labs within the past 24 hours have been reviewed.    Significant Imaging: I have reviewed all pertinent imaging results/findings within the past 24 hours.

## 2020-09-07 NOTE — ASSESSMENT & PLAN NOTE
65yoM who from an orthopedic standpoint has right knee septic arthritis.           Plan:  OR: I&D today. Patient marked and consented for surgery.  WBS: WBAT RLE  DVT ppx: per primary- on LVX 40 qhs and ASA 81mg qd  PT/OT: continue with PT after surgery  Cx: 9/3 right knee aspirate +MRSA  Abx: vanc  Labs: type & screen UTD, covid UTD (negative), Hb pending  Dispo: pending clinical improvement  F/u: 2 weeks from date of surgery

## 2020-09-07 NOTE — PROGRESS NOTES
[facility]  Orthopedics  Progress Note    Patient Name: Ed Patton  MRN: [id number]  Admission Date: 8/30/2020  Hospital Length of Stay: 8 days  Attending Provider: Gisela Mota MD  Primary Care Provider: Qiana Chow MD  Follow-up For: Procedure(s) (LRB):  ECHOCARDIOGRAM, TRANSESOPHAGEAL (N/A)    Post-Operative Day: 3 Days Post-Op  Subjective:     Principal Problem:Sepsis due to methicillin resistant Staphylococcus aureus    Principal Orthopedic Problem: right septic knee    Interval History: Patient's right knee aspirate resulted positive for MRSA. Patient reports continued pain in right knee, unable to bear weight 2/2 pain.    Review of patient's allergies indicates:   Allergen Reactions    Vicodin [hydrocodone-acetaminophen] Itching       Current Facility-Administered Medications   Medication    acetaminophen tablet 1,000 mg    acetaminophen tablet 650 mg    albuterol-ipratropium 2.5 mg-0.5 mg/3 mL nebulizer solution 3 mL    aspirin EC tablet 81 mg    atorvastatin tablet 40 mg    calcium carbonate 200 mg calcium (500 mg) chewable tablet 500 mg    carvediloL tablet 25 mg    ciprofloxacin HCl tablet 500 mg    dextrose 50% injection 12.5 g    dextrose 50% injection 25 g    enoxaparin injection 40 mg    fluticasone furoate-vilanteroL 200-25 mcg/dose diskus inhaler 1 puff    fluticasone propionate 50 mcg/actuation nasal spray 50 mcg    furosemide tablet 80 mg    gabapentin capsule 100 mg    glucagon (human recombinant) injection 1 mg    glucose chewable tablet 16 g    glucose chewable tablet 24 g    insulin aspart U-100 pen 0-5 Units    insulin aspart U-100 pen 14 Units    insulin detemir U-100 pen 29 Units    levothyroxine tablet 75 mcg    lisinopriL tablet 40 mg    melatonin tablet 6 mg    ondansetron injection 4 mg    oxyCODONE immediate release tablet 5 mg    polyethylene glycol packet 17 g    sodium chloride 0.9% flush 10 mL    sodium chloride  "0.9% flush 10 mL    Tdap vaccine injection 0.5 mL    vancomycin - pharmacy to dose    vancomycin in dextrose 5 % 1 gram/250 mL IVPB 1,000 mg    vitamin D 1000 units tablet 1,000 Units     Objective:     Vital Signs (Most Recent):  Temp: 98.8 °F (37.1 °C) (09/07/20 0037)  Pulse: 103 (09/07/20 0100)  Resp: 20 (09/07/20 0018)  BP: 100/64 (09/06/20 2300)  SpO2: 98 % (09/07/20 0100) Vital Signs (24h Range):  Temp:  [97.1 °F (36.2 °C)-98.8 °F (37.1 °C)] 98.8 °F (37.1 °C)  Pulse:  [] 103  Resp:  [12-21] 20  SpO2:  [92 %-99 %] 98 %  BP: (100-135)/(64-80) 100/64     Weight: 116.2 kg (256 lb 2.8 oz)  Height: 6' 4" (193 cm)  Body mass index is 31.18 kg/m².      Intake/Output Summary (Last 24 hours) at 9/7/2020 0435  Last data filed at 9/6/2020 1800  Gross per 24 hour   Intake 820 ml   Output 1670 ml   Net -850 ml       Ortho/SPM Exam   General: A&Ox3, NAD, VS wnl except mild tachycardia    Focused Exam of RLE:   Palpable effusion right knee. ROM of knee limited 2/2 pain. Motor and sensory intact in all nerve distributions. DP pulse 2+, toes WWP    Significant Labs:   BMP:   Recent Labs   Lab 09/06/20  0517   *      K 3.7   CL 99   CO2 29   BUN 30*   CREATININE 1.3   CALCIUM 9.0   MG 1.9     CBC:   Recent Labs   Lab 09/06/20  0517   WBC 19.52*   HGB 9.9*   HCT 33.8*   *     All pertinent labs within the past 24 hours have been reviewed.    Significant Imaging: I have reviewed all pertinent imaging results/findings.    Assessment/Plan:     Septic arthritis of knee, right  65yoM who from an orthopedic standpoint has right knee septic arthritis.           Plan:  OR: I&D today. Patient marked and consented for surgery.  WBS: WBAT RLE  DVT ppx: per primary- on LVX 40 qhs and ASA 81mg qd  PT/OT: continue with PT after surgery  Cx: 9/3 right knee aspirate +MRSA  Abx: vanc  Labs: type & screen UTD, covid UTD (negative), Hb pending  Dispo: pending clinical improvement  F/u: 2 weeks from date of surgery "               Lynette Quiroz MD  Orthopedics  [facility]

## 2020-09-07 NOTE — SUBJECTIVE & OBJECTIVE
Interval History: NAEON    Review of Systems   Constitutional: Negative for chills and fever.   HENT: Negative for congestion, ear pain, sneezing and sore throat.    Eyes: Negative for pain.   Respiratory: Negative for cough and shortness of breath.    Cardiovascular: Negative for chest pain.   Gastrointestinal: Negative for abdominal pain, constipation, diarrhea, nausea and vomiting.   Endocrine: Negative for polyuria.   Genitourinary: Negative for decreased urine volume, difficulty urinating, dysuria and flank pain.   Musculoskeletal: Positive for arthralgias (R knee pain) and gait problem. Negative for back pain, neck pain and neck stiffness.   Skin: Positive for wound. Negative for rash.   Neurological: Positive for numbness. Negative for headaches.   Psychiatric/Behavioral: Negative for confusion. The patient is not nervous/anxious.      Objective:     Vital Signs (Most Recent):  Temp: 98.3 °F (36.8 °C) (09/07/20 0845)  Pulse: 107 (09/07/20 0845)  Resp: 16 (09/07/20 0857)  BP: 125/86 (09/07/20 0845)  SpO2: 98 % (09/07/20 0845) Vital Signs (24h Range):  Temp:  [96.3 °F (35.7 °C)-98.8 °F (37.1 °C)] 98.3 °F (36.8 °C)  Pulse:  [] 107  Resp:  [12-20] 16  SpO2:  [92 %-99 %] 98 %  BP: (100-135)/(64-86) 125/86     Weight: 116 kg (255 lb 11.7 oz)  Body mass index is 31.13 kg/m².    Estimated Creatinine Clearance: 75.2 mL/min (based on SCr of 1.4 mg/dL).    Physical Exam  Vitals signs reviewed.   Constitutional:       General: He is not in acute distress.     Appearance: Normal appearance. He is normal weight. He is not diaphoretic.   HENT:      Head: Normocephalic and atraumatic.      Right Ear: External ear normal.      Left Ear: External ear normal.      Nose: Nose normal. No congestion or rhinorrhea.   Eyes:      General:         Right eye: No discharge.         Left eye: No discharge.      Extraocular Movements: Extraocular movements intact.   Neck:      Musculoskeletal: Normal range of motion and neck supple.  No neck rigidity or muscular tenderness.   Cardiovascular:      Rate and Rhythm: Regular rhythm. Tachycardia present.      Heart sounds: No murmur.      Comments: Diminished DP/PT pulses  Pulmonary:      Effort: Pulmonary effort is normal. No respiratory distress.      Breath sounds: No wheezing.      Comments: On 2 L/m oxygen  Chest:      Chest wall: No tenderness.   Abdominal:      General: There is no distension.      Palpations: Abdomen is soft.      Tenderness: There is no abdominal tenderness. There is no guarding.   Musculoskeletal: Normal range of motion.         General: Tenderness (right anterior thigh, lower back) present.      Comments: Improved swelling and lesser tenderness of the right knee this am.    Skin:     General: Skin is warm and dry.      Findings: Lesion (left foot. Dressing cdi) present.   Neurological:      Mental Status: He is alert and oriented to person, place, and time.      Sensory: Sensory deficit present.      Gait: Gait normal.   Psychiatric:         Mood and Affect: Mood normal.         Behavior: Behavior normal.         Thought Content: Thought content normal.         Judgment: Judgment normal.         Significant Labs: All pertinent labs within the past 24 hours have been reviewed.    Significant Imaging: I have reviewed all pertinent imaging results/findings within the past 24 hours.

## 2020-09-07 NOTE — PROGRESS NOTES
Therapy with IV vancomycin complete and/or consult discontinued by provider.  Pharmacy will sign off, please re-consult as needed.    Geraldine ePna, GlenD, BCPS

## 2020-09-07 NOTE — ASSESSMENT & PLAN NOTE
MRSA septicemia. Still tachycardic, SpO2 reached 92%, renal function continues to worsen. WBCs not improving. ESR and CRP elevated, RF WNL. Vancomycin was supratherapeutic, switched to pulse dose regimen. Source of bacteremia unclear, may be left foot wound or a septic process at the right knee joint. Right knee now clinically suspicious for septic arthritis (see physical exam). Left foot wound growing Proteus and MRSA, urine growing Klebsiella. TTE negative for signs of cardiac infection, LUKAS is negative as well.     Blood cultures: MRSA  R knee fluid culture: Staph aureus  Left foot wound: Proteus, MRSA  Urine culture: Klebsiella    Recommendations:   -- Cultures from 9/5 grew MRSA. Patient will be switched to daptomycin and ceftaroline combination therapy for 'persistent MRSA bacteremia'  -- Baseline CPK  -- Daily CBC (ceftaroline can lead to leucopenia)  -- s/p R knee aspiration (WBC 11k, 90% segs, but negative gram stain and no crystals), could be the source, to OR for I&D today for source control  -- Follow up on NM WB WBC scan for inflammatory localization (?hiding abscess)  -- Discontinue Ciprofloxacine (completed 7 days, was administered for diabetic foot wound growing proteus and urine growing Klebseilla)  -- Repeat blood cultures every 48 hours until clear >48 hours  -- Baseline CPK = 210  -- Will need a PICC line after clearance of bacteremia, anticipate prolonged IV antibiotics on discharge  -- Will continue to follow closely

## 2020-09-08 ENCOUNTER — TELEPHONE (OUTPATIENT)
Dept: ADMINISTRATIVE | Facility: OTHER | Age: 65
End: 2020-09-08

## 2020-09-08 LAB
ALBUMIN SERPL BCP-MCNC: 1.9 G/DL (ref 3.5–5.2)
ANION GAP SERPL CALC-SCNC: 11 MMOL/L (ref 8–16)
BACTERIA BLD CULT: ABNORMAL
BASOPHILS # BLD AUTO: 0.08 K/UL (ref 0–0.2)
BASOPHILS NFR BLD: 0.4 % (ref 0–1.9)
BUN SERPL-MCNC: 26 MG/DL (ref 8–23)
CALCIUM SERPL-MCNC: 8.6 MG/DL (ref 8.7–10.5)
CHLORIDE SERPL-SCNC: 95 MMOL/L (ref 95–110)
CK SERPL-CCNC: 164 U/L (ref 20–200)
CO2 SERPL-SCNC: 29 MMOL/L (ref 23–29)
CREAT SERPL-MCNC: 1.9 MG/DL (ref 0.5–1.4)
CRP SERPL-MCNC: 176.2 MG/L (ref 0–8.2)
DIFFERENTIAL METHOD: ABNORMAL
EOSINOPHIL # BLD AUTO: 0.8 K/UL (ref 0–0.5)
EOSINOPHIL NFR BLD: 4.3 % (ref 0–8)
ERYTHROCYTE [DISTWIDTH] IN BLOOD BY AUTOMATED COUNT: 15.6 % (ref 11.5–14.5)
EST. GFR  (AFRICAN AMERICAN): 42.4 ML/MIN/1.73 M^2
EST. GFR  (NON AFRICAN AMERICAN): 36.7 ML/MIN/1.73 M^2
GLUCOSE SERPL-MCNC: 337 MG/DL (ref 70–110)
GRAM STN SPEC: NORMAL
GRAM STN SPEC: NORMAL
HCT VFR BLD AUTO: 34.5 % (ref 40–54)
HGB BLD-MCNC: 10.1 G/DL (ref 14–18)
IMM GRANULOCYTES # BLD AUTO: 0.34 K/UL (ref 0–0.04)
IMM GRANULOCYTES NFR BLD AUTO: 1.8 % (ref 0–0.5)
LYMPHOCYTES # BLD AUTO: 1.1 K/UL (ref 1–4.8)
LYMPHOCYTES NFR BLD: 6 % (ref 18–48)
MAGNESIUM SERPL-MCNC: 1.9 MG/DL (ref 1.6–2.6)
MCH RBC QN AUTO: 26.4 PG (ref 27–31)
MCHC RBC AUTO-ENTMCNC: 29.3 G/DL (ref 32–36)
MCV RBC AUTO: 90 FL (ref 82–98)
MONOCYTES # BLD AUTO: 1.4 K/UL (ref 0.3–1)
MONOCYTES NFR BLD: 7.5 % (ref 4–15)
NEUTROPHILS # BLD AUTO: 15.1 K/UL (ref 1.8–7.7)
NEUTROPHILS NFR BLD: 80 % (ref 38–73)
NRBC BLD-RTO: 0 /100 WBC
PHOSPHATE SERPL-MCNC: 4.7 MG/DL (ref 2.7–4.5)
PLATELET # BLD AUTO: 394 K/UL (ref 150–350)
PMV BLD AUTO: 11.2 FL (ref 9.2–12.9)
POCT GLUCOSE: 190 MG/DL (ref 70–110)
POCT GLUCOSE: 223 MG/DL (ref 70–110)
POCT GLUCOSE: 229 MG/DL (ref 70–110)
POCT GLUCOSE: 285 MG/DL (ref 70–110)
POCT GLUCOSE: 314 MG/DL (ref 70–110)
POTASSIUM SERPL-SCNC: 4.1 MMOL/L (ref 3.5–5.1)
RBC # BLD AUTO: 3.82 M/UL (ref 4.6–6.2)
SODIUM SERPL-SCNC: 135 MMOL/L (ref 136–145)
WBC # BLD AUTO: 18.87 K/UL (ref 3.9–12.7)

## 2020-09-08 PROCEDURE — 97164 PT RE-EVAL EST PLAN CARE: CPT | Mod: HCNC

## 2020-09-08 PROCEDURE — 83735 ASSAY OF MAGNESIUM: CPT | Mod: HCNC

## 2020-09-08 PROCEDURE — 11000001 HC ACUTE MED/SURG PRIVATE ROOM: Mod: HCNC

## 2020-09-08 PROCEDURE — 99232 SBSQ HOSP IP/OBS MODERATE 35: CPT | Mod: HCNC,,, | Performed by: INTERNAL MEDICINE

## 2020-09-08 PROCEDURE — 25500020 PHARM REV CODE 255: Mod: HCNC | Performed by: INTERNAL MEDICINE

## 2020-09-08 PROCEDURE — 94640 AIRWAY INHALATION TREATMENT: CPT | Mod: HCNC

## 2020-09-08 PROCEDURE — 63600175 PHARM REV CODE 636 W HCPCS: Mod: HCNC | Performed by: HOSPITALIST

## 2020-09-08 PROCEDURE — 97168 OT RE-EVAL EST PLAN CARE: CPT | Mod: HCNC

## 2020-09-08 PROCEDURE — 86140 C-REACTIVE PROTEIN: CPT | Mod: HCNC

## 2020-09-08 PROCEDURE — 97530 THERAPEUTIC ACTIVITIES: CPT | Mod: HCNC

## 2020-09-08 PROCEDURE — 99232 PR SUBSEQUENT HOSPITAL CARE,LEVL II: ICD-10-PCS | Mod: HCNC,,, | Performed by: INTERNAL MEDICINE

## 2020-09-08 PROCEDURE — A9585 GADOBUTROL INJECTION: HCPCS | Mod: HCNC | Performed by: INTERNAL MEDICINE

## 2020-09-08 PROCEDURE — 99233 PR SUBSEQUENT HOSPITAL CARE,LEVL III: ICD-10-PCS | Mod: HCNC,,, | Performed by: INTERNAL MEDICINE

## 2020-09-08 PROCEDURE — 25000242 PHARM REV CODE 250 ALT 637 W/ HCPCS: Mod: HCNC | Performed by: HOSPITALIST

## 2020-09-08 PROCEDURE — 80069 RENAL FUNCTION PANEL: CPT | Mod: HCNC

## 2020-09-08 PROCEDURE — 36415 COLL VENOUS BLD VENIPUNCTURE: CPT | Mod: HCNC

## 2020-09-08 PROCEDURE — 87040 BLOOD CULTURE FOR BACTERIA: CPT | Mod: HCNC

## 2020-09-08 PROCEDURE — 25000003 PHARM REV CODE 250: Mod: HCNC | Performed by: STUDENT IN AN ORGANIZED HEALTH CARE EDUCATION/TRAINING PROGRAM

## 2020-09-08 PROCEDURE — 27000221 HC OXYGEN, UP TO 24 HOURS: Mod: HCNC

## 2020-09-08 PROCEDURE — 25000003 PHARM REV CODE 250: Mod: HCNC | Performed by: HOSPITALIST

## 2020-09-08 PROCEDURE — 87077 CULTURE AEROBIC IDENTIFY: CPT | Mod: HCNC

## 2020-09-08 PROCEDURE — 82550 ASSAY OF CK (CPK): CPT | Mod: HCNC

## 2020-09-08 PROCEDURE — 63600175 PHARM REV CODE 636 W HCPCS: Mod: JG,HCNC | Performed by: STUDENT IN AN ORGANIZED HEALTH CARE EDUCATION/TRAINING PROGRAM

## 2020-09-08 PROCEDURE — 87040 BLOOD CULTURE FOR BACTERIA: CPT | Mod: 59,HCNC

## 2020-09-08 PROCEDURE — 85025 COMPLETE CBC W/AUTO DIFF WBC: CPT | Mod: HCNC

## 2020-09-08 PROCEDURE — 97535 SELF CARE MNGMENT TRAINING: CPT | Mod: HCNC

## 2020-09-08 PROCEDURE — 94761 N-INVAS EAR/PLS OXIMETRY MLT: CPT | Mod: HCNC

## 2020-09-08 PROCEDURE — 99233 SBSQ HOSP IP/OBS HIGH 50: CPT | Mod: HCNC,,, | Performed by: INTERNAL MEDICINE

## 2020-09-08 PROCEDURE — 87186 SC STD MICRODIL/AGAR DIL: CPT | Mod: HCNC

## 2020-09-08 PROCEDURE — 25000003 PHARM REV CODE 250: Mod: HCNC | Performed by: FAMILY MEDICINE

## 2020-09-08 RX ORDER — FUROSEMIDE 40 MG/1
80 TABLET ORAL 2 TIMES DAILY
Status: DISCONTINUED | OUTPATIENT
Start: 2020-09-09 | End: 2020-09-09

## 2020-09-08 RX ORDER — GADOBUTROL 604.72 MG/ML
10 INJECTION INTRAVENOUS
Status: COMPLETED | OUTPATIENT
Start: 2020-09-08 | End: 2020-09-08

## 2020-09-08 RX ADMIN — FLUTICASONE FUROATE AND VILANTEROL TRIFENATATE 1 PUFF: 200; 25 POWDER RESPIRATORY (INHALATION) at 10:09

## 2020-09-08 RX ADMIN — DAPTOMYCIN 1050 MG: 350 INJECTION, POWDER, LYOPHILIZED, FOR SOLUTION INTRAVENOUS at 01:09

## 2020-09-08 RX ADMIN — CEFTAROLINE FOSAMIL 600 MG: 600 POWDER, FOR SOLUTION INTRAVENOUS at 01:09

## 2020-09-08 RX ADMIN — CHOLECALCIFEROL (VITAMIN D3) 25 MCG (1,000 UNIT) TABLET 1000 UNITS: at 08:09

## 2020-09-08 RX ADMIN — OXYCODONE 5 MG: 5 TABLET ORAL at 09:09

## 2020-09-08 RX ADMIN — CEFTAROLINE FOSAMIL 600 MG: 600 POWDER, FOR SOLUTION INTRAVENOUS at 06:09

## 2020-09-08 RX ADMIN — INSULIN ASPART 2 UNITS: 100 INJECTION, SOLUTION INTRAVENOUS; SUBCUTANEOUS at 05:09

## 2020-09-08 RX ADMIN — OXYCODONE 5 MG: 5 TABLET ORAL at 01:09

## 2020-09-08 RX ADMIN — OXYCODONE 5 MG: 5 TABLET ORAL at 06:09

## 2020-09-08 RX ADMIN — ASPIRIN 81 MG: 81 TABLET, COATED ORAL at 08:09

## 2020-09-08 RX ADMIN — INSULIN ASPART 4 UNITS: 100 INJECTION, SOLUTION INTRAVENOUS; SUBCUTANEOUS at 08:09

## 2020-09-08 RX ADMIN — INSULIN ASPART 14 UNITS: 100 INJECTION, SOLUTION INTRAVENOUS; SUBCUTANEOUS at 05:09

## 2020-09-08 RX ADMIN — INSULIN ASPART 3 UNITS: 100 INJECTION, SOLUTION INTRAVENOUS; SUBCUTANEOUS at 12:09

## 2020-09-08 RX ADMIN — GABAPENTIN 100 MG: 100 CAPSULE ORAL at 09:09

## 2020-09-08 RX ADMIN — SODIUM CHLORIDE 250 ML: 0.9 INJECTION, SOLUTION INTRAVENOUS at 09:09

## 2020-09-08 RX ADMIN — CEFTAROLINE FOSAMIL 600 MG: 600 POWDER, FOR SOLUTION INTRAVENOUS at 09:09

## 2020-09-08 RX ADMIN — GADOBUTROL 10 ML: 604.72 INJECTION INTRAVENOUS at 08:09

## 2020-09-08 RX ADMIN — CARVEDILOL 25 MG: 25 TABLET, FILM COATED ORAL at 08:09

## 2020-09-08 RX ADMIN — INSULIN ASPART 14 UNITS: 100 INJECTION, SOLUTION INTRAVENOUS; SUBCUTANEOUS at 08:09

## 2020-09-08 RX ADMIN — FLUTICASONE PROPIONATE 50 MCG: 50 SPRAY, METERED NASAL at 08:09

## 2020-09-08 RX ADMIN — ENOXAPARIN SODIUM 40 MG: 40 INJECTION SUBCUTANEOUS at 05:09

## 2020-09-08 RX ADMIN — LISINOPRIL 40 MG: 20 TABLET ORAL at 08:09

## 2020-09-08 RX ADMIN — INSULIN ASPART 14 UNITS: 100 INJECTION, SOLUTION INTRAVENOUS; SUBCUTANEOUS at 12:09

## 2020-09-08 RX ADMIN — LEVOTHYROXINE SODIUM 75 MCG: 25 TABLET ORAL at 06:09

## 2020-09-08 RX ADMIN — ATORVASTATIN CALCIUM 40 MG: 20 TABLET, FILM COATED ORAL at 08:09

## 2020-09-08 RX ADMIN — CARVEDILOL 25 MG: 25 TABLET, FILM COATED ORAL at 09:09

## 2020-09-08 NOTE — PROGRESS NOTES
"  [facility] Hospital Medicine  Telemedicine Progress Note    Patient Name: Ed Patton  MRN: [id number]  Patient Class: IP- Inpatient   Admission Date: 8/30/2020  Length of Stay: 9 days  Attending Physician: Gisela Mota MD  Primary Care Provider: Qiana Chow MD    [facility] Medicine Team: [facility] VIRTUAL TEAM 10 Gisela Mota MD  Virtual Telemedicine Progress Note  Start time: 1236  Chief complaint: Sepsis due to methicillin resistant Staphylococcus aureus  The patient location is: 660/660 A  The patient arrived at: 8/30/2020  2:18 PM  Present with the patient at the time of the telemed/virtual assessment: telepresenter   End time:  1240  Total time spent with patient: 4 min  I have assessed findings virtually using a telemedicine platform and with assistance of the bedside nurse or telemedicine presenter.  The attending portion of this evaluation, treatment, and documentation was performed per Gisela Mota MD via audiovisual.    Patient was transferred to the telemedicine service on: 9/5/2020    Subjective:     Admission CC:   Chief Complaint   Patient presents with    Frequent Falls     frequent falls, weakness, "stepped on a tack" left leg now swollen.     Leg Swelling     Follow up visit for: Sepsis due to methicillin resistant Staphylococcus aureus    Interval History / Events Overnight:   The patient is able to provide adequate history. Additional history was obtained from past medical records and nurse. No significant events reported by Nursing.  Blood cultures pending.  I and D of the knee by Ortho yesterday. NPO most of the day so prandial insulin was held => hyperglycemia.  Patient complains of nothing specific. Symptoms have improved since yesterday. Associated symptoms include: LE pain. Symptoms are stable. Alleviating factors include: medication: analgesics.     Data reviewed 9/8/2020: Lab test(s) reviewed: H&H stable. sCR increased. Hyperglycemia   I have " assessed findings virtually using a telemedicine platform and with assistance of the bedside nurse or telemedicine presenter.    Review of Systems   Constitutional: Negative for fever.   Respiratory: Negative for shortness of breath.      Objective:     Vital Signs (Most Recent):  Temp: 97.7 °F (36.5 °C) (09/08/20 1600)  Pulse: 90 (09/08/20 1600)  Resp: 16 (09/08/20 1600)  BP: 96/67 (09/08/20 1600)  SpO2: 96 % (09/08/20 1600) Vital Signs (24h Range):  Temp:  [97.3 °F (36.3 °C)-98.7 °F (37.1 °C)] 97.7 °F (36.5 °C)  Pulse:  [] 90  Resp:  [12-29] 16  SpO2:  [93 %-100 %] 96 %  BP: ()/(50-97) 96/67     Weight: 116 kg (255 lb 11.7 oz)  Body mass index is 31.13 kg/m².    Intake/Output Summary (Last 24 hours) at 9/8/2020 1620  Last data filed at 9/7/2020 1730  Gross per 24 hour   Intake 500 ml   Output --   Net 500 ml      Physical Exam  Constitutional:       General: He is not in acute distress.     Appearance: Normal appearance. He is not diaphoretic.   Eyes:      General: Lids are normal. No scleral icterus.        Right eye: No discharge.         Left eye: No discharge.      Conjunctiva/sclera: Conjunctivae normal.   Neck:      Trachea: Phonation normal.   Cardiovascular:      Rate and Rhythm: Normal rate.   Pulmonary:      Effort: Pulmonary effort is normal. No tachypnea, accessory muscle usage or respiratory distress.   Abdominal:      General: There is no distension.   Musculoskeletal:      Right lower leg: Edema present.      Left lower leg: Edema present.   Neurological:      Mental Status: He is alert. Mental status is at baseline. He is not disoriented.   Psychiatric:         Attention and Perception: Attention normal.         Mood and Affect: Affect normal.         Behavior: Behavior is cooperative.         Significant Labs:   Recent Labs   Lab 09/06/20  0517 09/07/20  0733 09/08/20  0408   WBC 19.52* 23.53* 18.87*   HGB 9.9* 11.0* 10.1*   HCT 33.8* 35.6* 34.5*   * 372* 394*     Recent Labs    Lab 09/03/20  1647  09/06/20  0517 09/07/20  0733 09/08/20  0408   GRAN  --    < > 78.3*  15.3* 77.5*  18.2* 80.0*  15.1*   SEGS 90  --   --   --   --    LYMPH  --    < > 6.8*  1.3 6.5*  1.5 6.0*  1.1   LYMPHS 8  --   --   --   --    MONO  --    < > 9.4  1.8* 8.7  2.0* 7.5  1.4*   EOS  --    < > 0.6* 0.9* 0.8*    < > = values in this interval not displayed.     Recent Labs   Lab 09/06/20  0517 09/07/20  1041 09/08/20  0408    135* 135*   K 3.7 4.0 4.1   CL 99 97 95   CO2 29 27 29   BUN 30* 27* 26*   CREATININE 1.3 1.4 1.9*   * 234* 337*   CALCIUM 9.0 8.9 8.6*   ALBUMIN 1.8* 1.8* 1.9*   MG 1.9 1.7 1.9   PHOS 3.4 3.7 4.7*     Recent Labs   Lab 09/03/20  0410 09/06/20  0517 09/08/20  0408   .1* 197.3* 176.2*     Recent Labs   Lab 09/05/20  1346 09/08/20  0408   * 164     Recent Labs   Lab 08/30/20  1532 09/03/20  0410 09/03/20  1538   PROCAL  --   --  0.13   LACTATE 1.4  --   --    SEDRATE  --  80*  --      SARS-CoV2 (COVID-19) Qualitative PCR (no units)   Date Value   09/03/2020 Not Detected     SARS-CoV-2 RNA, Amplification, Qual (no units)   Date Value   09/07/2020 Negative   08/30/2020 Negative     Recent Labs   Lab 08/31/20  0434   HGBA1C 9.5*     Recent Labs   Lab 09/08/20  0734 09/08/20  1113 09/08/20  1602   POCTGLUCOSE 314* 285* 223*       Significant Imaging:     Assessment/Plan:      Active Diagnoses:    Diagnosis Date Noted POA    PRINCIPAL PROBLEM:  Sepsis due to methicillin resistant Staphylococcus aureus [A41.02] 08/30/2020 Yes    Septic arthritis of knee, right [M00.9] 09/07/2020 Unknown    Staphylococcal arthritis of right knee [M00.061] 09/05/2020 Yes    Right knee pain [M25.561] 09/03/2020 Yes    Diabetic ulcer of left foot associated with type 2 diabetes mellitus, with fat layer exposed [E11.621, L97.522] 08/31/2020 Yes    Cellulitis of left lower extremity [L03.116] 08/30/2020 Yes    Diabetic foot infection [E11.628, L08.9] 08/30/2020 Yes    Bacteremia  due to methicillin resistant Staphylococcus aureus [R78.81] 08/30/2020 Yes    COPD mixed type [J44.9] 10/15/2019 Yes     Chronic    Postablative hypothyroidism [E89.0] 12/06/2018 Yes     Chronic    Uncontrolled type 2 diabetes mellitus with hyperglycemia, with long-term current use of insulin [E11.65, Z79.4] 10/09/2017 Not Applicable     Chronic    Chronic respiratory failure with hypoxia, on home oxygen therapy [J96.11, Z99.81] 10/09/2017 Not Applicable     Chronic    Diabetic polyneuropathy associated with type 2 diabetes mellitus [E11.42] 08/25/2015 Yes     Chronic    CKD stage 3 due to type 2 diabetes mellitus [E11.22, N18.3] 12/23/2014 Yes     Chronic    Chronic systolic congestive heart failure [I50.22] 05/07/2013 Yes     Chronic      Problems Resolved During this Admission:       Overview / ICU Course:    Ed Patton is a 65 y.o. male admitted for Sepsis due to methicillin resistant Staphylococcus aureus.    Inpatient Medications Prescribed for Management of Current Problems:     Scheduled Meds:    aspirin  81 mg Oral Daily    atorvastatin  40 mg Oral Daily    carvediloL  25 mg Oral BID    ceftaroline fosamil  600 mg Intravenous Q8H    DAPTOmycin (CUBICIN)  IV  1,050 mg Intravenous Q24H    enoxaparin  40 mg Subcutaneous Q24H    fluticasone furoate-vilanteroL  1 puff Inhalation Daily    fluticasone propionate  1 spray Each Nostril Daily    [START ON 9/9/2020] furosemide  80 mg Oral BID    insulin aspart U-100  14 Units Subcutaneous TIDWM    insulin detemir U-100  29 Units Subcutaneous QHS    levothyroxine  75 mcg Oral Before breakfast    lisinopriL  40 mg Oral Daily    vitamin D  1,000 Units Oral Daily     Continuous Infusions:   As Needed: acetaminophen, acetaminophen, albuterol-ipratropium, calcium carbonate, dextrose 50%, dextrose 50%, gabapentin, glucagon (human recombinant), glucose, glucose, insulin aspart U-100, melatonin, meperidine, ondansetron, ondansetron, oxyCODONE,  polyethylene glycol, promethazine (PHENERGAN) IVPB, sodium chloride 0.9%, sodium chloride 0.9%, sodium chloride 0.9%, DIPH,PERTUS (ADACEL),TETANUS PF VAC (ADULT)      Assessment and Plan by Problem    Diabetic foot infection  Bacteremia due to methicillin resistant Staphylococcus aureus  Diabetic ulcer of left foot associated with type 2 diabetes mellitus, with fat layer exposed  Right knee pain / septic arthritis  Appreciate Infectious Disease, Orthopedic Surgery. Treating with antibiotics per ID.   Arthrocentesis and cultures suggest septic knee. LUKAS to evaluate for endocarditis negative for vegetations.  Plan for I and D of the knee 9/7/2020.  Per ID: Antibiotics changed to daptomycin and ceftaroline combination therapy for 'persistent MRSA bacteremia'  -- Monitor CPK and CBC (ceftaroline can lead to leucopenia)  -- MRI lumbar spine w/wo contrast ordered  -- NM WB WBC scan for inflammatory localization pending  -- Repeat blood cultures until neagtive  -- PICC line after clearance of bacteremia, anticipate prolonged IV antibiotics on discharge.  -- ID recommends: MRI Lumbar spine/L psoas to r/o abscess. Broaden coverage to dapto/ceftaroline. Baseline CK obtained.     Sepsis  Due to Proteus and MRSA. Continued management with antibiotics.  Resolved.  Follow up on ID recommendations.  Discontinued Ciprofloxacine (completed 7 days, was administered for diabetic foot wound growing Proteus and urine growing Klebseilla)     Cellulitis of left lower extremity  Continued antibiotics.  Resolved     COPD mixed type  Continue home fluticasone-vilanterol, albuterol nebulizer.     Postablative hypothyroidism  Continue home levothyroxine.     Chronic respiratory failure with hypoxia, on home oxygen therapy  On 2 L nasal cannula chronically due to combination of COPD and CHF; titrate as needed.     Uncontrolled type 2 diabetes mellitus with hyperglycemia, with long-term current use of insulin  Diabetic polyneuropathy associated  with type 2 diabetes mellitus  Takes insulin NPH-insulin regular 70/30 45 units before breakfast and 35 units before dinner.   Giving insulin detemir 29 units daily and insulin aspart 14 units with meals and correction dose scale. Monitor BGs.  Increased basal insulin slightly 9/8.     Chronic systolic congestive heart failure  Continue home carvedilol, lisinopril. Lasix.  EF 25%  Held Lasix 9/8 due to sCr increase after being NPO 9/7      Diet: Diet Adult Regular (IDDSI Level 7)  GI Prophylaxis: Not indicated  Significant LDAs:   IV Access Type: Peripheral  Urinary Catheter Indication if present: Patient Does Not Have Urinary Catheter  Other Lines/Tubes/Drains:    Goals of Care:    Previous admission:  4/15/16  Likely prognosis:  Fair  Code Status: Full Code  Comfort Only: No  Hospice: No  Goals at discharge: remain at home    Discharge Planning   EDY: 9/11/2020     Code Status: Full Code   Is the patient medically ready for discharge?: No    Reason for patient still in hospital (select all that apply): Treatment  Discharge Plan A: Home with family, Home Health   Discharge Delays: None known at this time    VTE Risk Mitigation (From admission, onward)         Ordered     enoxaparin injection 40 mg  Every 24 hours      08/30/20 2004     IP VTE HIGH RISK PATIENT  Once      08/30/20 2004     Place sequential compression device  Until discontinued      08/30/20 8199                   Gisela Mota MD  Department of Hospital Medicine   [facility]

## 2020-09-08 NOTE — PLAN OF CARE
Problem: Occupational Therapy Goal  Goal: Occupational Therapy Goal  Description: Goals to be met by:10/01/2020    Patient will increase functional independence with ADLs by performing:    UE Dressing with [place].  LE Dressing with Stand-by Assistance.  Grooming while seated at sink with [place].  Toileting from bedside commode with Supervision for hygiene and clothing management.   Bathing from  sitting at sink with Set-up Assistance.  Toilet transfer to bedside commode with Stand-by Assistance, accurate adherence to NWB precautions LLE.    Outcome: Ongoing, Progressing  Patient's goals are appropriate. Reassessment on this date due to having a R knee arthroscopy on 9/7/2020.  ELISEO Stokes  9/8/2020

## 2020-09-08 NOTE — ANESTHESIA PROCEDURE NOTES
Intubation  Performed by: Dominique Stern CRNA  Authorized by: Jimmy Mccann Jr., MD     Intubation:     Induction:  Intravenous    Intubated:  Postinduction    Mask Ventilation:  Easy mask    Attempts:  1    Attempted By:  CRNA    Difficult Airway Encountered?: No      Complications:  None    Airway Device:  Supraglottic airway/LMA (Air-Q)    Airway Device Size:  4.5    Style/Cuff Inflation:  Cuffed    Inflation Amount (mL):  20    Secured at:  The teeth    Placement Verified By:  Capnometry    Complicating Factors:  None    Findings Post-Intubation:  BS equal bilateral and atraumatic/condition of teeth unchanged

## 2020-09-08 NOTE — PLAN OF CARE
Problem: Fall Injury Risk  Goal: Absence of Fall and Fall-Related Injury  Outcome: Ongoing, Progressing     Problem: Adult Inpatient Plan of Care  Goal: Plan of Care Review  Outcome: Ongoing, Progressing  Goal: Patient-Specific Goal (Individualization)  Outcome: Ongoing, Progressing  Goal: Absence of Hospital-Acquired Illness or Injury  Outcome: Ongoing, Progressing  Goal: Optimal Comfort and Wellbeing  Outcome: Ongoing, Progressing  Goal: Readiness for Transition of Care  Outcome: Ongoing, Progressing  Goal: Rounds/Family Conference  Outcome: Ongoing, Progressing     Problem: Diabetes Comorbidity  Goal: Blood Glucose Level Within Desired Range  Outcome: Ongoing, Progressing     Problem: Adjustment to Illness (Sepsis/Septic Shock)  Goal: Optimal Coping  Outcome: Ongoing, Progressing     Problem: Bleeding (Sepsis/Septic Shock)  Goal: Absence of Bleeding  Outcome: Ongoing, Progressing     Problem: Glycemic Control Impaired (Sepsis/Septic Shock)  Goal: Blood Glucose Level Within Desired Range  Outcome: Ongoing, Progressing     Problem: Hemodynamic Instability (Sepsis/Septic Shock)  Goal: Effective Tissue Perfusion  Outcome: Ongoing, Progressing     Problem: Infection (Sepsis/Septic Shock)  Goal: Absence of Infection Signs/Symptoms  Outcome: Ongoing, Progressing     Problem: Nutrition Impaired (Sepsis/Septic Shock)  Goal: Optimal Nutrition Intake  Outcome: Ongoing, Progressing     Problem: Respiratory Compromise (Sepsis/Septic Shock)  Goal: Effective Oxygenation and Ventilation  Outcome: Ongoing, Progressing     Problem: Skin Injury Risk Increased  Goal: Skin Health and Integrity  Outcome: Ongoing, Progressing     Problem: Wound  Goal: Optimal Wound Healing  Outcome: Ongoing, Progressing

## 2020-09-08 NOTE — PT/OT/SLP RE-EVAL
Occupational Therapy   Re-evaluation/Treatment    Name: Ed Patton  MRN: [id number]  Admitting Diagnosis:  Sepsis due to methicillin resistant Staphylococcus aureus 1 Day Post-Op    Recommendations:     Discharge Recommendations: nursing facility, skilled  Discharge Equipment Recommendations:  (to be determined closer to discharge home)  Barriers to discharge:  Decreased caregiver support    Assessment:     Ed Patton is a 65 y.o. male with a medical diagnosis of Sepsis due to methicillin resistant Staphylococcus aureus. Performance deficits affecting function are weakness, impaired endurance, impaired self care skills, impaired functional mobilty, gait instability, impaired balance, decreased safety awareness, pain. Patient was re-evaluated on this date due to having a R knee arthroscopy performed on 9/7/2020. Patient would benefit from continued skilled acute OT 3x/wk to improve functional mobility, increase independence with ADLs, and address established goals. Recommending SNF once medically appropriate for discharge to increase maximal independence, reduce burden of care, and ensure safety.     Rehab Prognosis:  good; patient would benefit from acute skilled OT services to address these deficits and reach maximum level of function.       Plan:     Patient to be seen 3 x/week to address the above listed problems via self-care/home management, therapeutic activities, therapeutic exercises  · Plan of Care Expires: 10/08/20  · Plan of Care Reviewed with: patient    Subjective     Chief Complaint: R knee pain  Communicated with: YESI prior to session.  Pain/Comfort:  · Pain Rating 1: 8/10  · Location - Side 1: Right  · Location - Orientation 1: generalized  · Location 1: knee  · Pain Addressed 1: Reposition, Distraction, Cessation of Activity  · Pain Rating Post-Intervention 1: 8/10    Objective:     Communicated with: YESI prior to session.  Patient found HOB elevated with: oxygen, telemetry, pulse ox  (continuous) upon OT entry to room.    General Precautions: Standard, fall, contact   Orthopedic Precautions:LLE weight bearing as tolerated   Braces: (DARCO shoe)     Occupational Performance:    Bed Mobility:    · Patient completed Rolling/Turning to Right with maximal assistance  · Patient completed Scooting/Bridging with moderate assistance to EOB sitting EOB; to HOB sitting EOB with min A  · Patient completed Supine to Sit with maximal assistance  · Patient completed Sit to Supine with minimal assistance    Functional Mobility/Transfers:  · Patient completed Sit <> Stand Transfer with maximal assistance and of 2 persons  with  rolling walker  multiple attempts and patient was unable to come to a complete stand    Activities of Daily Living:  · Upper Body Dressing: stand by assistance [place] front gown and donning pullover shirt  · Lower Body Dressing: total assistance Donning and doffing DARCO shoe    Cognitive/Visual Perceptual:  Cognitive/Psychosocial Skills:     -       Oriented to: Person, Place, Time and Situation   -       Follows Commands/attention:Follows multistep  commands  -       Communication: clear/fluent  -       Memory: No Deficits noted  -       Safety awareness/insight to disability: intact   -       Mood/Affect/Coping skills/emotional control: Appropriate to situation  Visual/Perceptual:      -Intact      Physical Exam:  Upper Extremity Range of Motion:     -       Right Upper Extremity: WFL  -       Left Upper Extremity: WFL  Upper Extremity Strength:    -       Right Upper Extremity: WFL  -       Left Upper Extremity: WFL   Strength:    -       Right Upper Extremity: WFL  -       Left Upper Extremity: WFL  Fine Motor Coordination:    -       Intact  Gross motor coordination:   WFL    AMPAC 6 Click:  AMPAC Total Score: 16    Treatment & Education:  REducation:  ole of OT and POC  Safety  ADL retraining  Functional mobility training    Linens were soiled and needed to be changed during  therapy session.     Patient left HOB elevated with call button in reach and all needs met (wife present)    GOALS:   Multidisciplinary Problems     Occupational Therapy Goals        Problem: Occupational Therapy Goal    Goal Priority Disciplines Outcome Interventions   Occupational Therapy Goal     OT, PT/OT Ongoing, Progressing    Description: Goals to be met by:10/01/2020    Patient will increase functional independence with ADLs by performing:    UE Dressing with [place].  LE Dressing with Stand-by Assistance.  Grooming while seated at sink with [place].  Toileting from bedside commode with Supervision for hygiene and clothing management.   Bathing from  sitting at sink with Set-up Assistance.  Toilet transfer to bedside commode with Stand-by Assistance, accurate adherence to NWB precautions LLE.                     History:     Past Medical History:   Diagnosis Date    CHF (congestive heart failure)     COPD (chronic obstructive pulmonary disease)     Coronary artery disease     Diabetes mellitus     Diabetes mellitus type II     DM (diabetes mellitus) type II uncontrolled with renal manifestation 9/4/2013    Hyperlipidemia     Hypertension     Postablative hypothyroidism 12/6/2018       Past Surgical History:   Procedure Laterality Date    APPENDECTOMY      ARTHROSCOPY OF KNEE Right 9/7/2020    Procedure: ARTHROSCOPY, KNEE, RIGHT ;  Surgeon: You Bhatia MD;  Location: [facility] OR [address] [place] [address];  Service: Orthopedics;  Laterality: Right;    CHOLECYSTECTOMY      CORONARY ANGIOPLASTY WITH STENT PLACEMENT      TONSILLECTOMY      TRANSESOPHAGEAL ECHOCARDIOGRAPHY N/A 9/4/2020    Procedure: ECHOCARDIOGRAM, TRANSESOPHAGEAL;  Surgeon: [facility] Diagnostic Provider;  Location: [facility] EP LAB;  Service: Anesthesiology;  Laterality: N/A;    TRANSESOPHAGEAL ECHOCARDIOGRAPHY N/A 9/3/2020    Procedure: ECHOCARDIOGRAM, TRANSESOPHAGEAL;  Surgeon: [facility] Diagnostic Provider;  Location: [facility] EP LAB;  Service:  Anesthesiology;  Laterality: N/A;       Time Tracking:     OT Date of Treatment: 09/08/20  OT Start Time: 1133  OT Stop Time: 1215  OT Total Time (min): 42 min    Billable Minutes:Re-eval 15  Self Care/Home Management 27    ELISEO Stokes  9/8/2020

## 2020-09-08 NOTE — SUBJECTIVE & OBJECTIVE
Interval History: No events overnight. S/p I&D of septic right knee yesterday. Patient comfortable except for some persistent gradually improving right knee pain. No new concerns.     Review of Systems   Constitutional: Negative for chills and fever.   HENT: Negative for congestion, ear pain, sneezing and sore throat.    Eyes: Negative for pain.   Respiratory: Negative for cough and shortness of breath.    Cardiovascular: Negative for chest pain.   Gastrointestinal: Negative for abdominal pain, constipation, diarrhea, nausea and vomiting.   Endocrine: Negative for polyuria.   Genitourinary: Negative for decreased urine volume, difficulty urinating, dysuria and flank pain.   Musculoskeletal: Positive for arthralgias (R knee pain). Negative for back pain, neck pain and neck stiffness.   Skin: Positive for wound. Negative for rash.   Neurological: Positive for numbness. Negative for headaches.   Psychiatric/Behavioral: Negative for confusion. The patient is not nervous/anxious.      Objective:     Vital Signs (Most Recent):  Temp: 98.7 °F (37.1 °C) (09/08/20 1306)  Pulse: 99 (09/08/20 1306)  Resp: 16 (09/08/20 1306)  BP: 93/67 (09/08/20 1306)  SpO2: 98 % (09/08/20 1306) Vital Signs (24h Range):  Temp:  [97.3 °F (36.3 °C)-98.7 °F (37.1 °C)] 98.7 °F (37.1 °C)  Pulse:  [] 99  Resp:  [12-29] 16  SpO2:  [93 %-100 %] 98 %  BP: ()/(50-97) 93/67     Weight: 116 kg (255 lb 11.7 oz)  Body mass index is 31.13 kg/m².    Estimated Creatinine Clearance: 55.4 mL/min (A) (based on SCr of 1.9 mg/dL (H)).    Physical Exam  Vitals signs reviewed.   Constitutional:       General: He is not in acute distress.     Appearance: Normal appearance. He is normal weight. He is not diaphoretic.   HENT:      Head: Normocephalic and atraumatic.      Right Ear: External ear normal.      Left Ear: External ear normal.      Nose: Nose normal. No congestion or rhinorrhea.   Eyes:      General:         Right eye: No discharge.         Left  eye: No discharge.      Extraocular Movements: Extraocular movements intact.   Neck:      Musculoskeletal: Normal range of motion and neck supple. No neck rigidity or muscular tenderness.   Cardiovascular:      Rate and Rhythm: Normal rate and regular rhythm.      Heart sounds: No murmur.      Comments: Diminished DP/PT pulses  Pulmonary:      Effort: Pulmonary effort is normal. No respiratory distress.      Breath sounds: No wheezing.      Comments: On 2 L/m oxygen  Chest:      Chest wall: No tenderness.   Abdominal:      General: There is no distension.      Palpations: Abdomen is soft.      Tenderness: There is no abdominal tenderness. There is no guarding.   Musculoskeletal: Normal range of motion.         General: Tenderness (right knee) present.      Comments: Cooling brace applied to right knee    Skin:     General: Skin is warm and dry.      Findings: Lesion (left foot) present.   Neurological:      Mental Status: He is alert and oriented to person, place, and time.      Sensory: Sensory deficit present.      Gait: Gait normal.   Psychiatric:         Mood and Affect: Mood normal.         Behavior: Behavior normal.         Thought Content: Thought content normal.         Judgment: Judgment normal.         Significant Labs: All pertinent labs within the past 24 hours have been reviewed.    Significant Imaging: I have reviewed all pertinent imaging results/findings within the past 24 hours.

## 2020-09-08 NOTE — TELEPHONE ENCOUNTER
OPCM LMSW reviewed pt's chart. Pt is currently an inpatient at [facility] main [facility]. OPC LMSW will follow and assist as needed.

## 2020-09-08 NOTE — SUBJECTIVE & OBJECTIVE
Principal Problem:Sepsis due to methicillin resistant Staphylococcus aureus    Principal Orthopedic Problem: R knee septic arthritis    Interval History: Patient seen and examined at bedside.  No acute events overnight.  Pain controlled.  Surgery yesterday.      Review of patient's allergies indicates:   Allergen Reactions    Vicodin [hydrocodone-acetaminophen] Itching       Current Facility-Administered Medications   Medication    acetaminophen tablet 1,000 mg    acetaminophen tablet 650 mg    albuterol-ipratropium 2.5 mg-0.5 mg/3 mL nebulizer solution 3 mL    aspirin EC tablet 81 mg    atorvastatin tablet 40 mg    calcium carbonate 200 mg calcium (500 mg) chewable tablet 500 mg    carvediloL tablet 25 mg    ceftaroline fosamiL (TEFLARO) 600 mg in dextrose 5 % 50 mL IVPB    DAPTOmycin (CUBICIN) 1,050 mg in sodium chloride 0.9% IVPB    dextrose 50% injection 12.5 g    dextrose 50% injection 25 g    enoxaparin injection 40 mg    fluticasone furoate-vilanteroL 200-25 mcg/dose diskus inhaler 1 puff    fluticasone propionate 50 mcg/actuation nasal spray 50 mcg    [START ON 9/9/2020] furosemide tablet 80 mg    gabapentin capsule 100 mg    glucagon (human recombinant) injection 1 mg    glucose chewable tablet 16 g    glucose chewable tablet 24 g    insulin aspart U-100 pen 0-5 Units    insulin aspart U-100 pen 14 Units    insulin detemir U-100 pen 29 Units    levothyroxine tablet 75 mcg    lisinopriL tablet 40 mg    melatonin tablet 6 mg    meperidine injection 12.5 mg    ondansetron injection 4 mg    ondansetron injection 4 mg    oxyCODONE immediate release tablet 5 mg    polyethylene glycol packet 17 g    promethazine (PHENERGAN) 6.25 mg in dextrose 5 % 50 mL IVPB    sodium chloride 0.9% flush 10 mL    sodium chloride 0.9% flush 10 mL    sodium chloride 0.9% flush 3 mL    Tdap vaccine injection 0.5 mL    vitamin D 1000 units tablet 1,000 Units     Objective:     Vital Signs (Most  "Recent):  Temp: 97.9 °F (36.6 °C) (09/08/20 0403)  Pulse: 97 (09/08/20 0049)  Resp: 16 (09/08/20 0625)  BP: 107/60 (09/08/20 0049)  SpO2: 97 % (09/08/20 0049) Vital Signs (24h Range):  Temp:  [97.3 °F (36.3 °C)-98.3 °F (36.8 °C)] 97.9 °F (36.6 °C)  Pulse:  [] 97  Resp:  [12-29] 16  SpO2:  [93 %-100 %] 97 %  BP: (107-145)/(60-97) 107/60     Weight: 116 kg (255 lb 11.7 oz)  Height: 6' 4" (193 cm)  Body mass index is 31.13 kg/m².      Intake/Output Summary (Last 24 hours) at 9/8/2020 0727  Last data filed at 9/7/2020 1730  Gross per 24 hour   Intake 500 ml   Output --   Net 500 ml       Ortho/SPM Exam  NAD  No increased WOB  Dressing c/d/i  SILT T/SP/DP  Motor intact T/DP  WWP extremities    Significant Labs:   CBC:   Recent Labs   Lab 09/07/20  0733 09/08/20  0408   WBC 23.53* 18.87*   HGB 11.0* 10.1*   HCT 35.6* 34.5*   * 394*     CMP:   Recent Labs   Lab 09/07/20  1041 09/08/20  0408   * 135*   K 4.0 4.1   CL 97 95   CO2 27 29   * 337*   BUN 27* 26*   CREATININE 1.4 1.9*   CALCIUM 8.9 8.6*   ALBUMIN 1.8* 1.9*   ANIONGAP 11 11   EGFRNONAA 53.1* 36.7*     All pertinent labs within the past 24 hours have been reviewed.    Significant Imaging: I have reviewed all pertinent imaging results/findings.  "

## 2020-09-08 NOTE — PLAN OF CARE
09/08/20 1440   Discharge Assessment   Assessment Type Discharge Planning Reassessment   Confirmed/corrected address and phone number on facesheet? Yes   Assessment information obtained from? Patient   Prior to hospitilization cognitive status: Alert/Oriented   Prior to hospitalization functional status: Independent   Current cognitive status: Alert/Oriented   Current Functional Status: Independent;Assistive Equipment   Lives With spouse   Able to Return to Prior Arrangements yes   Is patient able to care for self after discharge? Unable to determine at this time (comments)   Readmission Within the Last 30 Days no previous admission in last 30 days   Patient currently receives any other outside agency services? No   Equipment Currently Used at Home cane, straight;grab bar;shower chair   Do you have any problems affording any of your prescribed medications? No   Is the patient taking medications as prescribed? yes   Does the patient have transportation home? Yes   Transportation Anticipated family or friend will provide   Does the patient receive services at the [facility]? No   Discharge Plan A Home with family;Home Health   Discharge Plan B Skilled Nursing Facility   DME Needed Upon Discharge  other (see comments)  (TBD)   Patient/Family in Agreement with Plan yes

## 2020-09-08 NOTE — PLAN OF CARE
09/08/20 1440   Discharge Reassessment   Assessment Type Discharge Planning Reassessment   Do you have any problems affording any of your prescribed medications? No   Discharge Plan A Home with family;Home Health   Discharge Plan B Skilled Nursing Facility   DME Needed Upon Discharge  other (see comments)  (TBD)   Anticipated Discharge Disposition Home-Health   Can the patient/caregiver answer the patient profile reliably? Yes, cognitively intact   How does the patient rate their overall health at the present time? Good   Describe the patient's ability to walk at the present time. Major restrictions/daily assistance from another person   How often would a person be available to care for the patient? Whenever needed   Number of comorbid conditions (as recorded on the chart) Five or more   During the past month, has the patient often been bothered by feeling down, depressed or hopeless? No   During the past month, has the patient often been bothered by little interest or pleasure in doing things? No   Post-Acute Status   Post-Acute Authorization Placement  (TBD pending REC's)   Post-Acute Placement Status Awaiting Internal Medical Clearance   Home Health Status Awaiting Internal Medical Clearance   Discharge Delays None known at this time

## 2020-09-08 NOTE — PROGRESS NOTES
[facility]  Orthopedics  Progress Note    Patient Name: Ed Patton  MRN: [id number]  Admission Date: 8/30/2020  Hospital Length of Stay: 9 days  Attending Provider: Gisela Mota MD  Primary Care Provider: Qiana Chow MD  Follow-up For: Procedure(s) (LRB):  ARTHROSCOPY, KNEE, RIGHT  (Right)    Post-Operative Day: 1 Day Post-Op  Subjective:     Principal Problem:Sepsis due to methicillin resistant Staphylococcus aureus    Principal Orthopedic Problem: R knee septic arthritis    Interval History: Patient seen and examined at bedside.  No acute events overnight.  Pain controlled.  Surgery yesterday.      Review of patient's allergies indicates:   Allergen Reactions    Vicodin [hydrocodone-acetaminophen] Itching       Current Facility-Administered Medications   Medication    acetaminophen tablet 1,000 mg    acetaminophen tablet 650 mg    albuterol-ipratropium 2.5 mg-0.5 mg/3 mL nebulizer solution 3 mL    aspirin EC tablet 81 mg    atorvastatin tablet 40 mg    calcium carbonate 200 mg calcium (500 mg) chewable tablet 500 mg    carvediloL tablet 25 mg    ceftaroline fosamiL (TEFLARO) 600 mg in dextrose 5 % 50 mL IVPB    DAPTOmycin (CUBICIN) 1,050 mg in sodium chloride 0.9% IVPB    dextrose 50% injection 12.5 g    dextrose 50% injection 25 g    enoxaparin injection 40 mg    fluticasone furoate-vilanteroL 200-25 mcg/dose diskus inhaler 1 puff    fluticasone propionate 50 mcg/actuation nasal spray 50 mcg    [START ON 9/9/2020] furosemide tablet 80 mg    gabapentin capsule 100 mg    glucagon (human recombinant) injection 1 mg    glucose chewable tablet 16 g    glucose chewable tablet 24 g    insulin aspart U-100 pen 0-5 Units    insulin aspart U-100 pen 14 Units    insulin detemir U-100 pen 29 Units    levothyroxine tablet 75 mcg    lisinopriL tablet 40 mg    melatonin tablet 6 mg    meperidine injection 12.5 mg    ondansetron injection 4 mg    ondansetron  "injection 4 mg    oxyCODONE immediate release tablet 5 mg    polyethylene glycol packet 17 g    promethazine (PHENERGAN) 6.25 mg in dextrose 5 % 50 mL IVPB    sodium chloride 0.9% flush 10 mL    sodium chloride 0.9% flush 10 mL    sodium chloride 0.9% flush 3 mL    Tdap vaccine injection 0.5 mL    vitamin D 1000 units tablet 1,000 Units     Objective:     Vital Signs (Most Recent):  Temp: 97.9 °F (36.6 °C) (09/08/20 0403)  Pulse: 97 (09/08/20 0049)  Resp: 16 (09/08/20 0625)  BP: 107/60 (09/08/20 0049)  SpO2: 97 % (09/08/20 0049) Vital Signs (24h Range):  Temp:  [97.3 °F (36.3 °C)-98.3 °F (36.8 °C)] 97.9 °F (36.6 °C)  Pulse:  [] 97  Resp:  [12-29] 16  SpO2:  [93 %-100 %] 97 %  BP: (107-145)/(60-97) 107/60     Weight: 116 kg (255 lb 11.7 oz)  Height: 6' 4" (193 cm)  Body mass index is 31.13 kg/m².      Intake/Output Summary (Last 24 hours) at 9/8/2020 0727  Last data filed at 9/7/2020 1730  Gross per 24 hour   Intake 500 ml   Output --   Net 500 ml       Ortho/SPM Exam  NAD  No increased WOB  Dressing c/d/i  SILT T/SP/DP  Motor intact T/DP  WWP extremities    Significant Labs:   CBC:   Recent Labs   Lab 09/07/20  0733 09/08/20 0408   WBC 23.53* 18.87*   HGB 11.0* 10.1*   HCT 35.6* 34.5*   * 394*     CMP:   Recent Labs   Lab 09/07/20  1041 09/08/20 0408   * 135*   K 4.0 4.1   CL 97 95   CO2 27 29   * 337*   BUN 27* 26*   CREATININE 1.4 1.9*   CALCIUM 8.9 8.6*   ALBUMIN 1.8* 1.9*   ANIONGAP 11 11   EGFRNONAA 53.1* 36.7*     All pertinent labs within the past 24 hours have been reviewed.    Significant Imaging: I have reviewed all pertinent imaging results/findings.    Assessment/Plan:     Septic arthritis of knee, right  65 y.o. male s/p R knee scope I&D 9/7/20    VS:  stable  Nerve block:  none  PT/OT:  WBAT  DVT PPx:  lovenox per primary  Cultures:  MRSA from R knee aspiration 9/3/20; NGTD intraop cx; BCx 9/6 positive  Abx:  Daptomycin, ceftaroline  Labs:  , , Cr 1.9, " Hb 10, WBC 19  Drain:  none  Newell:  none    F/u cx, ID recs    Right knee pain              Durga Walker MD  Orthopedics  [facility]

## 2020-09-08 NOTE — PROGRESS NOTES
Pt arrived to MRI with copper gown and O2 / changed and placed pt to table / O2 2L N/C / placed to Sat monitor 99%

## 2020-09-08 NOTE — ASSESSMENT & PLAN NOTE
MRSA septicemia. Still tachycardic, SpO2 reached 92%, renal function continues to worsen. WBCs not improving. ESR and CRP elevated, RF WNL. Vancomycin was supratherapeutic, switched to pulse dose regimen. Source of bacteremia unclear, may be left foot wound or a septic process at the right knee joint. Right knee now clinically suspicious for septic arthritis (see physical exam). Left foot wound growing Proteus and MRSA, urine growing Klebsiella. TTE negative for signs of cardiac infection, LUKAS is negative as well.     Blood cultures: MRSA  R knee fluid culture: Staph aureus  Left foot wound: Proteus, MRSA  Urine culture: Klebsiella    Recommendations:   -- Last cultures from 9/6 grew MRSA. Patient has been switched to daptomycin and ceftaroline combination therapy for 'persistent MRSA bacteremia'.  -- Daily CBC (ceftaroline can lead to leucopenia)  -- s/p R knee aspiration (WBC 11k, 90% segs, but negative gram stain and no crystals), could be the source s/p I&D yesterday for source control  -- Follow up on NM WB WBC scan for inflammatory localization (?hiding abscess)  -- MRI Lumbar spine with no signs of psoas abscess, unremarkable overall  - If no other possible source found, ultimately patient will need to complete 4 weeks total Abx therapy counting from bacterial clearance since starting dapto-ceftaroline or source control (knee I&D). Daily blood cultures recommended.   -- Discontinued Ciprofloxacine on 9/7 (completed 7 days, was administered for diabetic foot wound growing proteus and urine growing Klebseilla)  -- Will need a PICC line after clearance of bacteremia  -- Will continue to follow closely

## 2020-09-08 NOTE — PT/OT/SLP RE-EVAL
Physical Therapy Re-evaluation    Patient Name:  Ed Patton   MRN:  [id number]    Recommendations:     Discharge Recommendations:  nursing facility, skilled   Discharge Equipment Recommendations: other (see comments)(TBD)   Barriers to discharge: Inaccessible home and Decreased caregiver support    Assessment:     Ed Patton is a 65 y.o. male admitted with a medical diagnosis of Sepsis due to methicillin resistant Staphylococcus aureus.  He presents with the following impairments/functional limitations:  weakness, impaired endurance, impaired functional mobilty, gait instability, impaired balance, pain, decreased lower extremity function, decreased safety awareness, decreased upper extremity function. Pt is WBAT LLE with Darco Shoe, R knee is WBAT. Patient tolerated re-assessment fairly. Pt unable to transfer or ambulate at this time due to pain and subjective reports of weakness. Patient is not safe to return home at this time due to high fall risk. Patient would benefit from SNF placement to address the above deficits and maximize independence with functional mobility.     Rehab Prognosis: fair ; patient would benefit from acute skilled PT services to address these deficits and reach maximum level of function.      Recent Surgery: Procedure(s) (LRB):  ARTHROSCOPY, KNEE, RIGHT  (Right) 1 Day Post-Op    Plan:     During this hospitalization, patient to be seen 4 x/week to address the above listed problems via therapeutic activities, gait training, therapeutic exercises, neuromuscular re-education  · Plan of Care Expires:  10/01/20   Plan of Care Reviewed with: patient    Subjective     Communicated with RN prior to session.  Patient found supine with   upon PT entry to room, agreeable to evaluation.      Chief Complaint: fatigue  Patient comments/goals: none stated  Pain/Comfort:  · Pain Rating 1: 8/10  · Pain Rating Post-Intervention 1: 8/10    Patients cultural, spiritual, Zoroastrianism conflicts given the  current situation: yes      Objective:     Patient found with:       General Precautions: Standard, fall, contact   Orthopedic Precautions:LLE weight bearing as tolerated   Braces: N/A     Exams:  · Cognitive Exam:  Patient is oriented to Person, Place, Time and Situation  · RLE ROM: limited due to knee pain  · RLE Strength: limited due to knee pain, 3/5 grossly  · LLE ROM: WFL  · LLE Strength: WFL    Functional Mobility:  · Bed Mobility:     · Supine to Sit: maximal assistance  · Sit to Supine: maximal assistance  · Transfers:     · Sit to Stand:  maximal assistance x 2, pt unable to achieve full stand   · Gait: unable    Sittng balance: poor +, patient swaying while seated at EOB and reporting dizziness.     AM-PAC 6 CLICK MOBILITY  Total Score:10       Therapeutic Activities and Exercises:   STS x 4 trials, pt unable to clear the bed   Scooting for repositioning in bed with verbal cues and increased time, SBA   Donning/doffing DARCO shoe   Sitting balance at EOB x 15 mins     Patient left supine with all lines intact and call button in reach.wife present     GOALS:   Multidisciplinary Problems     Physical Therapy Goals        Problem: Physical Therapy Goal    Goal Priority Disciplines Outcome Goal Variances Interventions   Physical Therapy Goal     PT, PT/OT Ongoing, Progressing     Description: Goals to be met by: 9/15/20    Patient will increase functional independence with mobility by performin. Supine to sit with Stand-by Assistance - Not met  2. Sit to supine with Stand-by Assistance - Not met  3. Sit to stand transfer with Stand-by Assistance with RW - Not met  4. Gait  x 50 feet with Stand-by Assistance using Rolling Walker and maintenance of weight bearing status - Not met  5.  Patient will demonstrate independence with a home exercise program - Not met  6. Patient's balance will be GOOD: Needs SUPERVISION only during gait and able to self right with moderate LOB - Not met                    History:     Past Medical History:   Diagnosis Date    CHF (congestive heart failure)     COPD (chronic obstructive pulmonary disease)     Coronary artery disease     Diabetes mellitus     Diabetes mellitus type II     DM (diabetes mellitus) type II uncontrolled with renal manifestation 9/4/2013    Hyperlipidemia     Hypertension     Postablative hypothyroidism 12/6/2018       Past Surgical History:   Procedure Laterality Date    APPENDECTOMY      ARTHROSCOPY OF KNEE Right 9/7/2020    Procedure: ARTHROSCOPY, KNEE, RIGHT ;  Surgeon: You Bhatia MD;  Location: [facility] OR [address] [place] [address];  Service: Orthopedics;  Laterality: Right;    CHOLECYSTECTOMY      CORONARY ANGIOPLASTY WITH STENT PLACEMENT      TONSILLECTOMY      TRANSESOPHAGEAL ECHOCARDIOGRAPHY N/A 9/4/2020    Procedure: ECHOCARDIOGRAM, TRANSESOPHAGEAL;  Surgeon: [facility] Diagnostic Provider;  Location: [facility] EP LAB;  Service: Anesthesiology;  Laterality: N/A;    TRANSESOPHAGEAL ECHOCARDIOGRAPHY N/A 9/3/2020    Procedure: ECHOCARDIOGRAM, TRANSESOPHAGEAL;  Surgeon: [facility] Diagnostic Provider;  Location: [facility] EP LAB;  Service: Anesthesiology;  Laterality: N/A;       Time Tracking:     PT Received On: 09/08/20  PT Start Time: 1133     PT Stop Time: 1215  PT Total Time (min): 42 min     Billable Minutes: Re-eval 10 and Therapeutic Activity 32      GRAYSON DELACRUZ, PT  09/08/2020

## 2020-09-08 NOTE — PLAN OF CARE
"   09/08/20 0824   Post-Acute Status   Post-Acute Authorization Placement   Home Health Status Awaiting Internal Medical Clearance     [facility] does not have any beds and [facility] "cannot meet needs."  "

## 2020-09-08 NOTE — PROGRESS NOTES
[facility]  Infectious Disease  Progress Note    Patient Name: Ed Patton  MRN: [id number]  Admission Date: 8/30/2020  Length of Stay: 9 days  Attending Physician: Gisela Mota MD  Primary Care Provider: Qiana Chow MD    Isolation Status: Contact  Assessment/Plan:      * Sepsis due to methicillin resistant Staphylococcus aureus  MRSA septicemia. Still tachycardic, SpO2 reached 92%, renal function continues to worsen. WBCs not improving. ESR and CRP elevated, RF WNL. Vancomycin was supratherapeutic, switched to pulse dose regimen. Source of bacteremia unclear, may be left foot wound or a septic process at the right knee joint. Right knee now clinically suspicious for septic arthritis (see physical exam). Left foot wound growing Proteus and MRSA, urine growing Klebsiella. TTE negative for signs of cardiac infection, LUKAS is negative as well.     Blood cultures: MRSA  R knee fluid culture: Staph aureus  Left foot wound: Proteus, MRSA  Urine culture: Klebsiella    Recommendations:   -- Last cultures from 9/6 grew MRSA. Patient has been switched to daptomycin and ceftaroline combination therapy for 'persistent MRSA bacteremia'.  -- Daily CBC (ceftaroline can lead to leucopenia)  -- s/p R knee aspiration (WBC 11k, 90% segs, but negative gram stain and no crystals), could be the source s/p I&D yesterday for source control  -- Follow up on NM WB WBC scan for inflammatory localization (?hiding abscess)  -- MRI Lumbar spine with no signs of psoas abscess, unremarkable overall  - If no other possible source found, ultimately patient will need to complete 4 weeks total Abx therapy counting from bacterial clearance since starting dapto-ceftaroline or source control (knee I&D). Daily blood cultures recommended.   -- Discontinued Ciprofloxacine on 9/7 (completed 7 days, was administered for diabetic foot wound growing proteus and urine growing Klebseilla)  -- Will need a PICC line after  "clearance of bacteremia  -- Will continue to follow closely    Staphylococcal arthritis of right knee  See Sepsis    Bacteremia due to methicillin resistant Staphylococcus aureus  See "sepsis"      Anticipated Disposition: TBD    Thank you for your consult. I will follow-up with patient. Please contact us if you have any additional questions.    Shabbir Quezada MD  Infectious Disease  [facility]    Subjective:     Principal Problem:Sepsis due to methicillin resistant Staphylococcus aureus    HPI: 64 yo M with Hx of DM2 (on insulin), chronic hypoxemic respiratory failure (on O2 at home), chronic systolic heart failure presented to ED after recurrent falls 7 and 8 days ago. He has a history of falls but reports that his tendency to fall has worsened recently, resulting in injury to his lower back and right thigh. After his fall last Tuesday he looked at his left foot and noticed a wound, denies pain or drainage related to the wound, he admits to not regularly checking his feet and does not know how long the wound has been present. He believes the wound is related to a tack that he previously found imbedded in the bottom of a slipper. He had previously seen Ang Lugo DPM for diabetic foot care but has not seen her this year due to fear of the coronavirus pandemic. He has been getting home health services including physical therapy. No subjective change as of today, still denies pain and drainage related to the wound. Denies F/C/N/V.  Interval History: No events overnight. S/p I&D of septic right knee yesterday. Patient comfortable except for some persistent gradually improving right knee pain. No new concerns.     Review of Systems   Constitutional: Negative for chills and fever.   HENT: Negative for congestion, ear pain, sneezing and sore throat.    Eyes: Negative for pain.   Respiratory: Negative for cough and shortness of breath.    Cardiovascular: Negative for chest pain. "   Gastrointestinal: Negative for abdominal pain, constipation, diarrhea, nausea and vomiting.   Endocrine: Negative for polyuria.   Genitourinary: Negative for decreased urine volume, difficulty urinating, dysuria and flank pain.   Musculoskeletal: Positive for arthralgias (R knee pain). Negative for back pain, neck pain and neck stiffness.   Skin: Positive for wound. Negative for rash.   Neurological: Positive for numbness. Negative for headaches.   Psychiatric/Behavioral: Negative for confusion. The patient is not nervous/anxious.      Objective:     Vital Signs (Most Recent):  Temp: 98.7 °F (37.1 °C) (09/08/20 1306)  Pulse: 99 (09/08/20 1306)  Resp: 16 (09/08/20 1306)  BP: 93/67 (09/08/20 1306)  SpO2: 98 % (09/08/20 1306) Vital Signs (24h Range):  Temp:  [97.3 °F (36.3 °C)-98.7 °F (37.1 °C)] 98.7 °F (37.1 °C)  Pulse:  [] 99  Resp:  [12-29] 16  SpO2:  [93 %-100 %] 98 %  BP: ()/(50-97) 93/67     Weight: 116 kg (255 lb 11.7 oz)  Body mass index is 31.13 kg/m².    Estimated Creatinine Clearance: 55.4 mL/min (A) (based on SCr of 1.9 mg/dL (H)).    Physical Exam  Vitals signs reviewed.   Constitutional:       General: He is not in acute distress.     Appearance: Normal appearance. He is normal weight. He is not diaphoretic.   HENT:      Head: Normocephalic and atraumatic.      Right Ear: External ear normal.      Left Ear: External ear normal.      Nose: Nose normal. No congestion or rhinorrhea.   Eyes:      General:         Right eye: No discharge.         Left eye: No discharge.      Extraocular Movements: Extraocular movements intact.   Neck:      Musculoskeletal: Normal range of motion and neck supple. No neck rigidity or muscular tenderness.   Cardiovascular:      Rate and Rhythm: Normal rate and regular rhythm.      Heart sounds: No murmur.      Comments: Diminished DP/PT pulses  Pulmonary:      Effort: Pulmonary effort is normal. No respiratory distress.      Breath sounds: No wheezing.       Comments: On 2 L/m oxygen  Chest:      Chest wall: No tenderness.   Abdominal:      General: There is no distension.      Palpations: Abdomen is soft.      Tenderness: There is no abdominal tenderness. There is no guarding.   Musculoskeletal: Normal range of motion.         General: Tenderness (right knee) present.      Comments: Cooling brace applied to right knee    Skin:     General: Skin is warm and dry.      Findings: Lesion (left foot) present.   Neurological:      Mental Status: He is alert and oriented to person, place, and time.      Sensory: Sensory deficit present.      Gait: Gait normal.   Psychiatric:         Mood and Affect: Mood normal.         Behavior: Behavior normal.         Thought Content: Thought content normal.         Judgment: Judgment normal.         Significant Labs: All pertinent labs within the past 24 hours have been reviewed.    Significant Imaging: I have reviewed all pertinent imaging results/findings within the past 24 hours.

## 2020-09-08 NOTE — ASSESSMENT & PLAN NOTE
65 y.o. male s/p R knee scope I&D 9/7/20    VS:  stable  Nerve block:  none  PT/OT:  WBAT  DVT PPx:  lovenox per primary  Cultures:  MRSA from R knee aspiration 9/3/20; NGTD intraop cx; BCx 9/6 positive  Abx:  Daptomycin, ceftaroline  Labs:  , , Cr 1.9, Hb 10, WBC 19  Drain:  none  Newell:  none    F/u cx, ID recs

## 2020-09-08 NOTE — ANESTHESIA POSTPROCEDURE EVALUATION
Anesthesia Post Evaluation    Patient: Ed Patton    Procedure(s) Performed: Procedure(s) (LRB):  ARTHROSCOPY, KNEE, RIGHT  (Right)    Final Anesthesia Type: general    Patient location during evaluation: PACU  Patient participation: Yes- Able to Participate  Level of consciousness: awake and alert  Post-procedure vital signs: reviewed and stable  Pain management: adequate  Airway patency: patent    PONV status at discharge: No PONV  Anesthetic complications: no      Cardiovascular status: blood pressure returned to baseline and hemodynamically stable  Respiratory status: unassisted, spontaneous ventilation and room air  Hydration status: euvolemic  Follow-up not needed.          Vitals Value Taken Time   BP 96/53 09/08/20 0752   Temp 36.6 °C (97.9 °F) 09/08/20 0403   Pulse 99 09/08/20 0751   Resp 16 09/08/20 0750   SpO2 99 % 09/08/20 0807   Vitals shown include unvalidated device data.      Event Time   Out of Recovery 09/07/2020 18:52:00         Pain/Natalie Score: Pain Rating Prior to Med Admin: 8 (9/8/2020  6:25 AM)  Pain Rating Post Med Admin: 3 (9/7/2020  8:45 AM)  Natalie Score: 9 (9/7/2020  6:15 PM)

## 2020-09-09 ENCOUNTER — TELEPHONE (OUTPATIENT)
Dept: ADMINISTRATIVE | Facility: OTHER | Age: 65
End: 2020-09-09

## 2020-09-09 PROBLEM — N17.9 AKI (ACUTE KIDNEY INJURY): Status: ACTIVE | Noted: 2020-09-09

## 2020-09-09 PROBLEM — R33.9 URINARY RETENTION: Status: ACTIVE | Noted: 2020-09-09

## 2020-09-09 PROBLEM — N28.9 ACUTE RENAL INSUFFICIENCY: Status: ACTIVE | Noted: 2020-09-09

## 2020-09-09 LAB
ALBUMIN SERPL BCP-MCNC: 1.8 G/DL (ref 3.5–5.2)
ANION GAP SERPL CALC-SCNC: 14 MMOL/L (ref 8–16)
BACTERIA #/AREA URNS AUTO: ABNORMAL /HPF
BASOPHILS # BLD AUTO: 0.1 K/UL (ref 0–0.2)
BASOPHILS NFR BLD: 0.5 % (ref 0–1.9)
BILIRUB UR QL STRIP: NEGATIVE
BUN SERPL-MCNC: 41 MG/DL (ref 8–23)
C3 SERPL-MCNC: 123 MG/DL (ref 50–180)
C3 SERPL-MCNC: 129 MG/DL (ref 50–180)
C4 SERPL-MCNC: 72 MG/DL (ref 11–44)
C4 SERPL-MCNC: 75 MG/DL (ref 11–44)
CALCIUM SERPL-MCNC: 8.7 MG/DL (ref 8.7–10.5)
CHLORIDE SERPL-SCNC: 96 MMOL/L (ref 95–110)
CK SERPL-CCNC: 1142 U/L (ref 20–200)
CLARITY UR REFRACT.AUTO: ABNORMAL
CO2 SERPL-SCNC: 27 MMOL/L (ref 23–29)
COLOR UR AUTO: ABNORMAL
CREAT SERPL-MCNC: 3 MG/DL (ref 0.5–1.4)
DIFFERENTIAL METHOD: ABNORMAL
EOSINOPHIL # BLD AUTO: 1.1 K/UL (ref 0–0.5)
EOSINOPHIL NFR BLD: 5 % (ref 0–8)
ERYTHROCYTE [DISTWIDTH] IN BLOOD BY AUTOMATED COUNT: 15.4 % (ref 11.5–14.5)
EST. GFR  (AFRICAN AMERICAN): 24.4 ML/MIN/1.73 M^2
EST. GFR  (NON AFRICAN AMERICAN): 21.1 ML/MIN/1.73 M^2
GLUCOSE SERPL-MCNC: 105 MG/DL (ref 70–110)
GLUCOSE UR QL STRIP: NEGATIVE
HCT VFR BLD AUTO: 31.9 % (ref 40–54)
HGB BLD-MCNC: 9.3 G/DL (ref 14–18)
HGB UR QL STRIP: NEGATIVE
HYALINE CASTS UR QL AUTO: 7 /LPF
IMM GRANULOCYTES # BLD AUTO: 0.53 K/UL (ref 0–0.04)
IMM GRANULOCYTES NFR BLD AUTO: 2.4 % (ref 0–0.5)
KETONES UR QL STRIP: NEGATIVE
LEUKOCYTE ESTERASE UR QL STRIP: ABNORMAL
LYMPHOCYTES # BLD AUTO: 1.8 K/UL (ref 1–4.8)
LYMPHOCYTES NFR BLD: 8.1 % (ref 18–48)
MAGNESIUM SERPL-MCNC: 1.9 MG/DL (ref 1.6–2.6)
MCH RBC QN AUTO: 26.4 PG (ref 27–31)
MCHC RBC AUTO-ENTMCNC: 29.2 G/DL (ref 32–36)
MCV RBC AUTO: 91 FL (ref 82–98)
MICROSCOPIC COMMENT: ABNORMAL
MONOCYTES # BLD AUTO: 1.6 K/UL (ref 0.3–1)
MONOCYTES NFR BLD: 7.2 % (ref 4–15)
NEUTROPHILS # BLD AUTO: 16.9 K/UL (ref 1.8–7.7)
NEUTROPHILS NFR BLD: 76.8 % (ref 38–73)
NITRITE UR QL STRIP: NEGATIVE
NRBC BLD-RTO: 0 /100 WBC
PH UR STRIP: 5 [PH] (ref 5–8)
PHOSPHATE SERPL-MCNC: 5.4 MG/DL (ref 2.7–4.5)
PLATELET # BLD AUTO: 477 K/UL (ref 150–350)
PMV BLD AUTO: 10.7 FL (ref 9.2–12.9)
POCT GLUCOSE: 150 MG/DL (ref 70–110)
POCT GLUCOSE: 207 MG/DL (ref 70–110)
POTASSIUM SERPL-SCNC: 4 MMOL/L (ref 3.5–5.1)
PROT UR QL STRIP: NEGATIVE
RBC # BLD AUTO: 3.52 M/UL (ref 4.6–6.2)
RBC #/AREA URNS AUTO: 1 /HPF (ref 0–4)
SODIUM SERPL-SCNC: 137 MMOL/L (ref 136–145)
SP GR UR STRIP: 1.02 (ref 1–1.03)
SQUAMOUS #/AREA URNS AUTO: 0 /HPF
URN SPEC COLLECT METH UR: ABNORMAL
WBC # BLD AUTO: 22.01 K/UL (ref 3.9–12.7)
WBC #/AREA URNS AUTO: 4 /HPF (ref 0–5)

## 2020-09-09 PROCEDURE — 86160 COMPLEMENT ANTIGEN: CPT | Mod: 59,HCNC

## 2020-09-09 PROCEDURE — 27000221 HC OXYGEN, UP TO 24 HOURS: Mod: HCNC

## 2020-09-09 PROCEDURE — 63600175 PHARM REV CODE 636 W HCPCS: Mod: HCNC | Performed by: INTERNAL MEDICINE

## 2020-09-09 PROCEDURE — 11000001 HC ACUTE MED/SURG PRIVATE ROOM: Mod: HCNC

## 2020-09-09 PROCEDURE — 25000003 PHARM REV CODE 250: Mod: HCNC | Performed by: INTERNAL MEDICINE

## 2020-09-09 PROCEDURE — 63600175 PHARM REV CODE 636 W HCPCS: Mod: JG,HCNC | Performed by: STUDENT IN AN ORGANIZED HEALTH CARE EDUCATION/TRAINING PROGRAM

## 2020-09-09 PROCEDURE — 99233 SBSQ HOSP IP/OBS HIGH 50: CPT | Mod: HCNC,,, | Performed by: INTERNAL MEDICINE

## 2020-09-09 PROCEDURE — 94761 N-INVAS EAR/PLS OXIMETRY MLT: CPT | Mod: HCNC

## 2020-09-09 PROCEDURE — 97110 THERAPEUTIC EXERCISES: CPT | Mod: HCNC,CQ

## 2020-09-09 PROCEDURE — 36415 COLL VENOUS BLD VENIPUNCTURE: CPT | Mod: HCNC

## 2020-09-09 PROCEDURE — 81001 URINALYSIS AUTO W/SCOPE: CPT | Mod: HCNC

## 2020-09-09 PROCEDURE — 87040 BLOOD CULTURE FOR BACTERIA: CPT | Mod: 59,HCNC

## 2020-09-09 PROCEDURE — 99233 PR SUBSEQUENT HOSPITAL CARE,LEVL III: ICD-10-PCS | Mod: HCNC,,, | Performed by: INTERNAL MEDICINE

## 2020-09-09 PROCEDURE — 25000003 PHARM REV CODE 250: Mod: HCNC | Performed by: STUDENT IN AN ORGANIZED HEALTH CARE EDUCATION/TRAINING PROGRAM

## 2020-09-09 PROCEDURE — 25000003 PHARM REV CODE 250: Mod: HCNC | Performed by: HOSPITALIST

## 2020-09-09 PROCEDURE — 99223 PR INITIAL HOSPITAL CARE,LEVL III: ICD-10-PCS | Mod: HCNC,,, | Performed by: INTERNAL MEDICINE

## 2020-09-09 PROCEDURE — 87040 BLOOD CULTURE FOR BACTERIA: CPT | Mod: HCNC

## 2020-09-09 PROCEDURE — 80069 RENAL FUNCTION PANEL: CPT | Mod: HCNC

## 2020-09-09 PROCEDURE — 82550 ASSAY OF CK (CPK): CPT | Mod: HCNC

## 2020-09-09 PROCEDURE — 97530 THERAPEUTIC ACTIVITIES: CPT | Mod: HCNC,CQ

## 2020-09-09 PROCEDURE — 86160 COMPLEMENT ANTIGEN: CPT | Mod: HCNC

## 2020-09-09 PROCEDURE — 85025 COMPLETE CBC W/AUTO DIFF WBC: CPT | Mod: HCNC

## 2020-09-09 PROCEDURE — 99223 1ST HOSP IP/OBS HIGH 75: CPT | Mod: HCNC,,, | Performed by: INTERNAL MEDICINE

## 2020-09-09 PROCEDURE — 83735 ASSAY OF MAGNESIUM: CPT | Mod: HCNC

## 2020-09-09 RX ORDER — POLYETHYLENE GLYCOL 3350 17 G/17G
17 POWDER, FOR SOLUTION ORAL 2 TIMES DAILY
Status: DISCONTINUED | OUTPATIENT
Start: 2020-09-09 | End: 2020-09-11

## 2020-09-09 RX ORDER — AMOXICILLIN 250 MG
1 CAPSULE ORAL 2 TIMES DAILY
Status: DISCONTINUED | OUTPATIENT
Start: 2020-09-09 | End: 2020-09-11

## 2020-09-09 RX ORDER — ENOXAPARIN SODIUM 100 MG/ML
30 INJECTION SUBCUTANEOUS EVERY 24 HOURS
Status: DISCONTINUED | OUTPATIENT
Start: 2020-09-09 | End: 2020-09-11

## 2020-09-09 RX ORDER — SODIUM CHLORIDE 9 MG/ML
INJECTION, SOLUTION INTRAVENOUS CONTINUOUS
Status: DISCONTINUED | OUTPATIENT
Start: 2020-09-09 | End: 2020-09-09

## 2020-09-09 RX ORDER — INSULIN ASPART 100 [IU]/ML
10 INJECTION, SOLUTION INTRAVENOUS; SUBCUTANEOUS
Status: DISCONTINUED | OUTPATIENT
Start: 2020-09-09 | End: 2020-09-10

## 2020-09-09 RX ADMIN — HYPROMELLOSE 2910 1 DROP: 5 SOLUTION OPHTHALMIC at 09:09

## 2020-09-09 RX ADMIN — INSULIN ASPART 2 UNITS: 100 INJECTION, SOLUTION INTRAVENOUS; SUBCUTANEOUS at 06:09

## 2020-09-09 RX ADMIN — OXYCODONE 5 MG: 5 TABLET ORAL at 05:09

## 2020-09-09 RX ADMIN — ENOXAPARIN SODIUM 30 MG: 30 INJECTION SUBCUTANEOUS at 04:09

## 2020-09-09 RX ADMIN — CHOLECALCIFEROL (VITAMIN D3) 25 MCG (1,000 UNIT) TABLET 1000 UNITS: at 09:09

## 2020-09-09 RX ADMIN — SODIUM CHLORIDE: 0.9 INJECTION, SOLUTION INTRAVENOUS at 01:09

## 2020-09-09 RX ADMIN — DAPTOMYCIN 1050 MG: 350 INJECTION, POWDER, LYOPHILIZED, FOR SOLUTION INTRAVENOUS at 03:09

## 2020-09-09 RX ADMIN — CEFTAROLINE FOSAMIL 400 MG: 400 POWDER, FOR SOLUTION INTRAVENOUS at 09:09

## 2020-09-09 RX ADMIN — DOCUSATE SODIUM 50MG AND SENNOSIDES 8.6MG 1 TABLET: 8.6; 5 TABLET, FILM COATED ORAL at 05:09

## 2020-09-09 RX ADMIN — INSULIN ASPART 10 UNITS: 100 INJECTION, SOLUTION INTRAVENOUS; SUBCUTANEOUS at 01:09

## 2020-09-09 RX ADMIN — CEFTAROLINE FOSAMIL 600 MG: 600 POWDER, FOR SOLUTION INTRAVENOUS at 05:09

## 2020-09-09 RX ADMIN — CARVEDILOL 25 MG: 25 TABLET, FILM COATED ORAL at 09:09

## 2020-09-09 RX ADMIN — CEFTAROLINE FOSAMIL 400 MG: 400 POWDER, FOR SOLUTION INTRAVENOUS at 01:09

## 2020-09-09 RX ADMIN — LEVOTHYROXINE SODIUM 75 MCG: 25 TABLET ORAL at 06:09

## 2020-09-09 RX ADMIN — FLUTICASONE PROPIONATE 50 MCG: 50 SPRAY, METERED NASAL at 09:09

## 2020-09-09 RX ADMIN — POLYETHYLENE GLYCOL 3350 17 G: 17 POWDER, FOR SOLUTION ORAL at 05:09

## 2020-09-09 RX ADMIN — GABAPENTIN 100 MG: 100 CAPSULE ORAL at 09:09

## 2020-09-09 RX ADMIN — INSULIN ASPART 2 UNITS: 100 INJECTION, SOLUTION INTRAVENOUS; SUBCUTANEOUS at 01:09

## 2020-09-09 RX ADMIN — OXYCODONE 5 MG: 5 TABLET ORAL at 09:09

## 2020-09-09 RX ADMIN — INSULIN ASPART 10 UNITS: 100 INJECTION, SOLUTION INTRAVENOUS; SUBCUTANEOUS at 06:09

## 2020-09-09 RX ADMIN — FLUTICASONE FUROATE AND VILANTEROL TRIFENATATE 1 PUFF: 200; 25 POWDER RESPIRATORY (INHALATION) at 09:09

## 2020-09-09 RX ADMIN — ASPIRIN 81 MG: 81 TABLET, COATED ORAL at 09:09

## 2020-09-09 RX ADMIN — ATORVASTATIN CALCIUM 40 MG: 20 TABLET, FILM COATED ORAL at 09:09

## 2020-09-09 NOTE — ASSESSMENT & PLAN NOTE
MRSA septicemia. Still tachycardic, SpO2 reached 92%, renal function continues to worsen. WBCs not improving. ESR and CRP elevated, RF WNL. Vancomycin was supratherapeutic, switched to pulse dose regimen. Source of bacteremia unclear, may be left foot wound or a septic process at the right knee joint. Right knee now clinically suspicious for septic arthritis (see physical exam). Left foot wound growing Proteus and MRSA, urine growing Klebsiella. TTE negative for signs of cardiac infection, LUKAS is negative as well.     Blood cultures: MRSA  R knee fluid culture: Staph aureus  Left foot wound: Proteus, MRSA  Urine culture: Klebsiella    Recommendations:   Worsening leucocytosis to 26k now   -- NM wbc scan - no signs of increased signal/infectious foci anywhere  -- Last cultures from 9/8 growing MRSA. Patient was switched to daptomycin and ceftaroline combination therapy since 9/7/20 for 'persistent MRSA bacteremia'.  - CPK  up 1100s yesterday with worsening kidney function (Cr 3.7 from 1.4-1.5 3 days ago)  - Timeline of NANETTE and CPK elevation not consistent with dapto induced injury, however inadequate data to completely rule it out  - plus now given decreased alertness and minimal urine output, concerns about metabolic encephalopathy (?uremic from NANETTE), will switch back to vancomycin renally dosed (maintain trough 15-20). Stopped daptomycin-ceftaroline.  -- Nephrology following for NANETTE -  Concerned about ATN (on urine microscopy) from pre+post renal etiology  -- Will repeat CPK today - follow up on results  -- s/p R knee aspiration (WBC 11k, 90% segs, but negative gram stain and no crystals), could be the source s/p I&D on 9/7/20 for source control  -- MRI Lumbar spine with no signs of psoas abscess, unremarkable overall  -- MRI foot from 8/31 did not show any signs of OM, however, with persistent bacteremia, discussed with podiatry if wounds needs debridement. Per podiatry - no debridement necessary at this point.  -  If no other possible source found, ultimately patient will need to complete 4 weeks total Abx therapy counting from bacterial clearance since source control (knee I&D). Daily blood cultures recommended.   -- Discontinued Ciprofloxacine on 9/7 (completed 7 days, was administered for diabetic foot wound growing proteus and urine growing Klebseilla)  -- Will need a PICC line after 48-72 hours of clearance of bacteremia  -- Will continue to follow closely

## 2020-09-09 NOTE — PROGRESS NOTES
"  [facility] Medicine  Telemedicine Progress Note    Patient Name: Ed Patton  MRN: [id number]  Patient Class: IP- Inpatient   Admission Date: 8/30/2020  Length of Stay: 10 days  Attending Physician: Gisela Mota MD  Primary Care Provider: Qiana Chow MD    [facility] Medicine Team: [facility] VIRTUAL TEAM 10 Gisela Mota MD  Virtual Telemedicine Progress Note  Start time: 1109  Chief complaint: Sepsis due to methicillin resistant Staphylococcus aureus  The patient location is: 660/660 A  The patient arrived at: 8/30/2020  2:18 PM  Present with the patient at the time of the telemed/virtual assessment: telepresenter   End time:  1150  Total time spent with patient: 41 min  I have assessed findings virtually using a telemedicine platform and with assistance of the bedside nurse or telemedicine presenter.  The attending portion of this evaluation, treatment, and documentation was performed per Gisela Mota MD via audiovisual.    Patient was transferred to the telemedicine service on: 9/5/2020    Subjective:     Admission CC:   Chief Complaint   Patient presents with    Frequent Falls     frequent falls, weakness, "stepped on a tack" left leg now swollen.     Leg Swelling     Follow up visit for: Sepsis due to methicillin resistant Staphylococcus aureus    Interval History / Events Overnight:   The patient is able to provide adequate history. Additional history was obtained from past medical records and nurse. On Call Provider contacted about hypotension received NS bolus x 1; Lasix and lisinopril held. Notified in late afternoon about anuria; bladder scan > 500 mL. Newell ordered.  Patient complains of thirst and dry eyes. Symptoms have worsened since yesterday. Associated symptoms include: constipation. Symptoms are stable. Alleviating factors include: nothing. Patient requesting Systane eye drops.    Data reviewed 9/9/2020: Lab test(s) reviewed: H&H stable. sCR " increased.  I have assessed findings virtually using a telemedicine platform and with assistance of the bedside nurse or telemedicine presenter.    Review of Systems   Constitutional: Negative for fever.   Respiratory: Negative for shortness of breath.    Gastrointestinal: Negative for abdominal pain and nausea.     Objective:     Vital Signs (Most Recent):  Temp: 97.5 °F (36.4 °C) (09/09/20 1648)  Pulse: 102 (09/09/20 1648)  Resp: 17 (09/09/20 1648)  BP: 113/76 (09/09/20 1648)  SpO2: 98 % (09/09/20 1648) Vital Signs (24h Range):  Temp:  [96.5 °F (35.8 °C)-98.3 °F (36.8 °C)] 97.5 °F (36.4 °C)  Pulse:  [] 102  Resp:  [16-18] 17  SpO2:  [91 %-99 %] 98 %  BP: ()/(50-76) 113/76     Weight: 116 kg (255 lb 11.7 oz)  Body mass index is 31.13 kg/m².    Intake/Output Summary (Last 24 hours) at 9/9/2020 1719  Last data filed at 9/9/2020 1515  Gross per 24 hour   Intake 300 ml   Output --   Net 300 ml      Physical Exam  Constitutional:       General: He is not in acute distress.     Appearance: Normal appearance. He is not diaphoretic.   Eyes:      General: Lids are normal. No scleral icterus.        Right eye: No discharge.         Left eye: No discharge.      Conjunctiva/sclera: Conjunctivae normal.   Neck:      Trachea: Phonation normal.   Cardiovascular:      Rate and Rhythm: Normal rate.   Pulmonary:      Effort: Pulmonary effort is normal. No tachypnea, accessory muscle usage or respiratory distress.   Abdominal:      General: There is no distension.   Musculoskeletal:      Right lower leg: Edema present.      Left lower leg: Edema present.   Neurological:      Mental Status: He is alert. Mental status is at baseline. He is not disoriented.   Psychiatric:         Attention and Perception: Attention normal.         Mood and Affect: Affect normal.         Behavior: Behavior is cooperative.         Significant Labs:   Recent Labs   Lab 09/07/20  0733 09/08/20  0408 09/09/20  0303   WBC 23.53* 18.87* 22.01*    HGB 11.0* 10.1* 9.3*   HCT 35.6* 34.5* 31.9*   * 394* 477*     Recent Labs   Lab 09/03/20  1647  09/07/20  0733 09/08/20  0408 09/09/20  0303   GRAN  --    < > 77.5*  18.2* 80.0*  15.1* 76.8*  16.9*   SEGS 90  --   --   --   --    LYMPH  --    < > 6.5*  1.5 6.0*  1.1 8.1*  1.8   LYMPHS 8  --   --   --   --    MONO  --    < > 8.7  2.0* 7.5  1.4* 7.2  1.6*   EOS  --    < > 0.9* 0.8* 1.1*    < > = values in this interval not displayed.     Recent Labs   Lab 09/07/20  1041 09/08/20  0408 09/09/20  0303   * 135* 137   K 4.0 4.1 4.0   CL 97 95 96   CO2 27 29 27   BUN 27* 26* 41*   CREATININE 1.4 1.9* 3.0*   * 337* 105   CALCIUM 8.9 8.6* 8.7   ALBUMIN 1.8* 1.9* 1.8*   MG 1.7 1.9 1.9   PHOS 3.7 4.7* 5.4*     Recent Labs   Lab 09/03/20  0410 09/06/20  0517 09/08/20  0408   .1* 197.3* 176.2*     Recent Labs   Lab 09/05/20  1346 09/08/20  0408 09/09/20  0303   * 164 1142*     Recent Labs   Lab 08/30/20  1532 09/03/20  0410 09/03/20  1538   PROCAL  --   --  0.13   LACTATE 1.4  --   --    SEDRATE  --  80*  --      SARS-CoV2 (COVID-19) Qualitative PCR (no units)   Date Value   09/03/2020 Not Detected     SARS-CoV-2 RNA, Amplification, Qual (no units)   Date Value   09/07/2020 Negative   08/30/2020 Negative     Recent Labs   Lab 08/31/20  0434   HGBA1C 9.5*     Recent Labs   Lab 09/08/20  2118 09/09/20  0729 09/09/20  1624   POCTGLUCOSE 190* 150* 207*     Microbiology Results (last 7 days)     Procedure Component Value Units Date/Time    Blood culture [[id number]] Collected: 09/08/20 1247    Order Status: Completed Specimen: Blood from Antecubital, Right Hand Updated: 09/09/20 1412     Blood Culture, Routine No Growth to date      No Growth to date    Narrative:      Collection has been rescheduled by RBMadhavi at 09/08/2020 11:43 Reason:   Patient unavailable,nurse Bashir #[id number] was notified  Collection has been rescheduled by FOUZIA at 09/08/2020 11:43 Reason:   Patient unavailable,nurse Bashir  #[id number] was notified    Blood culture [[id number]] Collected: 09/08/20 1247    Order Status: Completed Specimen: Blood from Antecubital, Right Arm Updated: 09/09/20 1412     Blood Culture, Routine No Growth to date      No Growth to date    Narrative:      Collection has been rescheduled by RB8 at 09/08/2020 11:43 Reason:   Patient unavailable,nurse Mckay #[id number] was notified  Collection has been rescheduled by RB8 at 09/08/2020 11:43 Reason:   Patient unavailable,nurse Mckay #[id number] was notified    Fungus culture [[id number]] Collected: 09/07/20 1721    Order Status: Completed Specimen: Body Fluid from Knee, Right Updated: 09/09/20 1407     Fungus (Mycology) Culture Culture in progress    Narrative:      1) Right Knee Joint Fluid    Fungus culture [[id number]] Collected: 09/07/20 1721    Order Status: Completed Specimen: Body Fluid from Knee, Right Updated: 09/09/20 1406     Fungus (Mycology) Culture Culture in progress    Narrative:      2) Right Knee Joint Fluid    Fungus culture [[id number]] Collected: 09/03/20 1647    Order Status: Completed Specimen: Joint Fluid from Knee, Right Updated: 09/09/20 1346     Fungus (Mycology) Culture Culture in progress    Blood culture [[id number]]  (Abnormal) Collected: 09/08/20 0408    Order Status: Completed Specimen: Blood from Antecubital, Right Arm Updated: 09/09/20 1246     Blood Culture, Routine Gram stain aer bottle: Gram positive cocci in clusters resembling Staph      Results called to and read back by:Jany Santiago RN 09/08/2020  22:29      STAPHYLOCOCCUS AUREUS  Susceptibility pending  ID consult required at [facility] and [place] locations.      Blood culture [[id number]] Collected: 09/09/20 1040    Order Status: Sent Specimen: Blood Updated: 09/09/20 1103    Culture, Anaerobe [[id number]] Collected: 09/07/20 1721    Order Status: Completed Specimen: Body Fluid from Knee, Right Updated: 09/09/20 1013     Anaerobic Culture Culture in progress    Narrative:      1)  Right Knee Joint Fluid    Blood culture [[id number]]  (Abnormal) Collected: 09/06/20 0517    Order Status: Completed Specimen: Blood Updated: 09/09/20 0823     Blood Culture, Routine Gram stain oksana bottle: Gram positive cocci in clusters resembling Staph       Results called to and read back by: Mckay Cummings RN 09/07/2020  11:37      METHICILLIN RESISTANT STAPHYLOCOCCUS AUREUS  ID consult required at [facility],[place] and [facility].  For susceptibility see order #[id number]      Blood culture [[id number]] Collected: 09/07/20 0733    Order Status: Completed Specimen: Blood from Antecubital, Right Updated: 09/09/20 0822     Blood Culture, Routine No Growth to date      No Growth to date      No Growth to date    Aerobic culture [[id number]] Collected: 09/07/20 1721    Order Status: Completed Specimen: Body Fluid from Knee, Right Updated: 09/09/20 0721     Aerobic Bacterial Culture No growth    Narrative:      1) Right Knee Joint Fluid    Aerobic culture [[id number]] Collected: 09/07/20 1721    Order Status: Completed Specimen: Body Fluid from Knee, Right Updated: 09/09/20 0721     Aerobic Bacterial Culture No growth    Narrative:      2) Right Knee Joint Fluid    Culture, Anaerobe [[id number]] Collected: 09/07/20 1721    Order Status: Completed Specimen: Body Fluid from Knee, Right Updated: 09/09/20 0640     Anaerobic Culture Culture in progress    Narrative:      2) Right Knee Joint Fluid    Blood culture [[id number]] Collected: 09/09/20 0303    Order Status: Sent Specimen: Blood Updated: 09/09/20 0429    AFB Culture & Smear [[id number]] Collected: 09/07/20 1721    Order Status: Completed Specimen: Body Fluid from Knee, Right Updated: 09/08/20 2127     AFB Culture & Smear Culture in progress     AFB CULTURE STAIN No acid fast bacilli seen.    Narrative:      1) Right Knee Joint Fluid    AFB Culture & Smear [[id number]] Collected: 09/07/20 1721    Order Status: Completed Specimen: Body Fluid from Knee, Right  Updated: 09/08/20 2127     AFB Culture & Smear Culture in progress     AFB CULTURE STAIN No acid fast bacilli seen.    Narrative:      2) Right Knee Joint Fluid    Blood culture [[id number]]  (Abnormal)  (Susceptibility) Collected: 09/05/20 1115    Order Status: Completed Specimen: Blood from Peripheral, Right Hand Updated: 09/08/20 0820     Blood Culture, Routine Gram stain aer bottle: Gram positive cocci in clusters resembling Staph       Results called to and read back by: Shalini Pena RN  09/06/2020  11:17      METHICILLIN RESISTANT STAPHYLOCOCCUS AUREUS  ID consult required at [facility],[place] and [facility] locations.      Gram stain [[id number]] Collected: 09/07/20 1721    Order Status: Completed Specimen: Body Fluid from Knee, Right Updated: 09/08/20 0003     Gram Stain Result No WBC's      No organisms seen    Narrative:      2) Right Knee Joint Fluid    Gram stain [[id number]] Collected: 09/07/20 1721    Order Status: Completed Specimen: Body Fluid from Knee, Right Updated: 09/07/20 2245     Gram Stain Result Rare WBC's      No organisms seen    Narrative:      1) Right Knee Joint Fluid    Aerobic culture [[id number]]  (Abnormal)  (Susceptibility) Collected: 09/03/20 1647    Order Status: Completed Specimen: Joint Fluid from Knee, Right Updated: 09/07/20 1049     Aerobic Bacterial Culture METHICILLIN RESISTANT STAPHYLOCOCCUS AUREUS  Few      Culture, Anaerobe [[id number]] Collected: 09/03/20 1647    Order Status: Completed Specimen: Joint Fluid from Knee, Right Updated: 09/07/20 1032     Anaerobic Culture No anaerobes isolated    Blood culture [[id number]]  (Abnormal)  (Susceptibility) Collected: 09/02/20 1441    Order Status: Completed Specimen: Blood Updated: 09/05/20 1401     Blood Culture, Routine Gram stain oksana bottle: Gram positive cocci in clusters resembling Staph       Results called to and read back by: Rossana Garza RN 09/03/2020  10:12      Gram stain aer bottle: Gram positive cocci in clusters  resembling Staph       Positive results previously called      METHICILLIN RESISTANT STAPHYLOCOCCUS AUREUS  ID consult required at [facility] and [facility] locations.      Narrative:      right median cubital  right median    AFB Culture & Smear [[id number]] Collected: 09/03/20 1647    Order Status: Completed Specimen: Joint Fluid from Knee, Right Updated: 09/04/20 2127     AFB Culture & Smear Culture in progress     AFB CULTURE STAIN No acid fast bacilli seen.    Blood culture [[id number]]  (Abnormal) Collected: 09/01/20 1417    Order Status: Completed Specimen: Blood from Antecubital, Right Hand Updated: 09/04/20 0838     Blood Culture, Routine Gram stain aer bottle: Gram positive cocci       Results called to and read back by: Tuesday Christi   09/02/2020  12:06      METHICILLIN RESISTANT STAPHYLOCOCCUS AUREUS  ID consult required at [facility] and [facility].  For susceptibility see order #[id number]      Narrative:      Collection has been rescheduled by CBE at 09/01/2020 13:58 Reason:   due at 13:55  Collection has been rescheduled by CBE at 09/01/2020 13:58 Reason:   due at 13:55    Culture, Anaerobe [[id number]] Collected: 08/31/20 1754    Order Status: Completed Specimen: Wound from Foot, Left Updated: 09/04/20 0741     Anaerobic Culture No anaerobes isolated    Gram stain [[id number]] Collected: 09/03/20 1647    Order Status: Completed Specimen: Joint Fluid from Knee, Right Updated: 09/03/20 1851     Gram Stain Result Rare WBC's      No organisms seen    Aerobic culture [[id number]]  (Abnormal)  (Susceptibility) Collected: 08/31/20 1754    Order Status: Completed Specimen: Wound from Foot, Left Updated: 09/03/20 1316     Aerobic Bacterial Culture PROTEUS MIRABILIS  Moderate        METHICILLIN RESISTANT STAPHYLOCOCCUS AUREUS  Moderate      Blood culture [[id number]]  (Abnormal) Collected: 08/31/20 1328    Order Status: Completed Specimen: Blood from Peripheral, Right Hand Updated: 09/03/20  0716     Blood Culture, Routine Gram stain oksana bottle: Gram positive cocci       Results called to and read back by: Elsy Pena  09/01/2020  11:46      Gram stain aer bottle: Gram positive cocci in clusters resembling Staph       Positive results previously called      METHICILLIN RESISTANT STAPHYLOCOCCUS AUREUS  ID consult required at [facility].  For susceptibility see order #[id number]      Blood culture [[id number]]  (Abnormal) Collected: 08/31/20 1324    Order Status: Completed Specimen: Blood from Peripheral, Left Hand Updated: 09/03/20 0714     Blood Culture, Routine Gram stain aer bottle: Gram positive cocci       Gram stain oksana bottle: Gram positive cocci       Results called to and read back by: Elsy Pena  09/01/2020  11:46      METHICILLIN RESISTANT STAPHYLOCOCCUS AUREUS  ID consult required at [facility].  For susceptibility see order #[id number]      Urine culture [[id number]]  (Abnormal)  (Susceptibility) Collected: 08/30/20 1633    Order Status: Completed Specimen: Urine Updated: 09/03/20 0124     Urine Culture, Routine KLEBSIELLA PNEUMONIAE  > 100,000 cfu/ml      Narrative:      Specimen Source->Urine            Significant Imaging:     Assessment/Plan:      Active Diagnoses:    Diagnosis Date Noted POA    PRINCIPAL PROBLEM:  Sepsis due to methicillin resistant Staphylococcus aureus [A41.02] 08/30/2020 Yes    NANETTE (acute kidney injury) [N17.9] 09/09/2020 Unknown    Urinary retention [R33.9] 09/09/2020 Unknown    Acute renal insufficiency [N28.9] 09/09/2020 Unknown    Septic arthritis of knee, right [M00.9] 09/07/2020 Yes    Staphylococcal arthritis of right knee [M00.061] 09/05/2020 Yes    Right knee pain [M25.561] 09/03/2020 Yes    Diabetic ulcer of left foot associated with type 2 diabetes mellitus, with fat layer exposed [E11.621, L97.522] 08/31/2020 Yes    Cellulitis of left lower extremity [L03.116] 08/30/2020 Yes     Diabetic foot infection [E11.628, L08.9] 08/30/2020 Yes    Bacteremia due to methicillin resistant Staphylococcus aureus [R78.81] 08/30/2020 Yes    COPD mixed type [J44.9] 10/15/2019 Yes     Chronic    Postablative hypothyroidism [E89.0] 12/06/2018 Yes     Chronic    Uncontrolled type 2 diabetes mellitus with hyperglycemia, with long-term current use of insulin [E11.65, Z79.4] 10/09/2017 Not Applicable     Chronic    Chronic respiratory failure with hypoxia, on home oxygen therapy [J96.11, Z99.81] 10/09/2017 Not Applicable     Chronic    Diabetic polyneuropathy associated with type 2 diabetes mellitus [E11.42] 08/25/2015 Yes     Chronic    CKD stage 3 due to type 2 diabetes mellitus [E11.22, N18.3] 12/23/2014 Yes     Chronic    Chronic systolic congestive heart failure [I50.22] 05/07/2013 Yes     Chronic      Problems Resolved During this Admission:       Overview / ICU Course:    Ed Patton is a 65 y.o. male admitted for Sepsis due to methicillin resistant Staphylococcus aureus.    Inpatient Medications Prescribed for Management of Current Problems:     Scheduled Meds:    aspirin  81 mg Oral Daily    atorvastatin  40 mg Oral Daily    carvediloL  25 mg Oral BID    ceftaroline fosamil  400 mg Intravenous Q8H    [START ON 9/11/2020] DAPTOmycin (CUBICIN)  IV  1,050 mg Intravenous Q48H    enoxaparin  30 mg Subcutaneous Q24H    fluticasone furoate-vilanteroL  1 puff Inhalation Daily    fluticasone propionate  1 spray Each Nostril Daily    insulin aspart U-100  10 Units Subcutaneous TIDWM    insulin detemir U-100  25 Units Subcutaneous QHS    levothyroxine  75 mcg Oral Before breakfast    polyethylene glycol  17 g Oral BID    senna-docusate 8.6-50 mg  1 tablet Oral BID    vitamin D  1,000 Units Oral Daily     Continuous Infusions:   As Needed: acetaminophen, acetaminophen, albuterol-ipratropium, calcium carbonate, dextrose 50%, dextrose 50%, gabapentin, glucagon (human recombinant), glucose,  glucose, insulin aspart U-100, melatonin, ondansetron, oxyCODONE, polyethylene glycol, sodium chloride 0.9%, sodium chloride 0.9%, DIPH,PERTUS (ADACEL),TETANUS PF VAC (ADULT)      Assessment and Plan by Problem    Diabetic foot infection  Bacteremia due to methicillin resistant Staphylococcus aureus  Diabetic ulcer of left foot associated with type 2 diabetes mellitus, with fat layer exposed  Right knee pain / septic arthritis  Appreciate Infectious Disease, Orthopedic Surgery. Treating with antibiotics per ID.   Arthrocentesis and cultures suggest septic knee. LUKAS to evaluate for endocarditis negative for vegetations.  Plan for I and D of the knee 9/7/2020.  Per ID: Antibiotics changed to daptomycin and ceftaroline combination therapy for 'persistent MRSA bacteremia'  -- Monitor CPK and CBC (ceftaroline can lead to leucopenia)  -- MRI lumbar spine w/wo contrast ordered  -- NM WB WBC scan for inflammatory localization pending  -- Repeat blood cultures until neagtive  -- PICC line after clearance of bacteremia, anticipate prolonged IV antibiotics on discharge.  -- ID recommends: MRI Lumbar spine/L psoas to r/o abscess. Broaden coverage to dapto/ceftaroline. Baseline CK obtained. Renal dose antibiotics.  -- s/p R knee scope I&D 9/7/20. PT/OT:  WBAT  -- Per ID: Blood cultures seem to be clearing since I&D of knee / adequate source control. Podiatry considering need for debridement. Await repeat blood cultures to be negative x 72h before placing PICC line.     Sepsis due to DM wound, UTI, bacteremia  Due to Proteus and MRSA. Continued management with antibiotics.  Resolved.  Follow up on ID recommendations.  Discontinued Ciprofloxacine (completed 7 days, was administered for diabetic foot wound growing Proteus and urine growing Klebseilla)  -Foot wound Dressing changed 9/9 by Podiatry; plan to consider repeating debridement soon.      Cellulitis of left lower extremity  Continued antibiotics.  Resolved     COPD mixed  type  Continue home fluticasone-vilanterol, albuterol nebulizer.     Postablative hypothyroidism  Continue home levothyroxine.     Chronic respiratory failure with hypoxia, on home oxygen therapy  On 2 L nasal cannula chronically due to combination of COPD and CHF; titrate as needed.     Uncontrolled type 2 diabetes mellitus with hyperglycemia, with long-term current use of insulin  Diabetic polyneuropathy associated with type 2 diabetes mellitus  Takes insulin NPH-insulin regular 70/30 45 units before breakfast and 35 units before dinner.   Giving insulin detemir 29 units daily and insulin aspart 14 units with meals and correction dose scale. Monitor BGs.  Increased basal insulin slightly 9/8. BGs improved.  Due to increase in sCr, insulin regimen reduced 9/9. Monitor for hyperglycemia.     Chronic systolic congestive heart failure  Continue home carvedilol, lisinopril. Lasix.  EF 25%  Held Lasix 9/8 and 9/9 due to sCr increase after being NPO 9/7  Lisinopril held 9/9 due to sCr of 3; parameters placed on Coreg    Acute renal insufficiency, Urinary retention  - Patient has been NPO for procedures intermittently for the past few days while on Lasix b.i.d.  Lasix held 9/8 and 9/9. He received a normal saline bolus overnight 9/9 and a little more fluid 9/9 before found to be anuric for the day. Bladder scan showed >500 mL. He now has a Newell. ACEi held for now.  - Per Nephrology: Given hypotension and simultaneous volume overload reccomend IVF as small volume boluses rather than continuous IVF to maintain MAPS >65.   Repeat UA and Urine culture.  Strict I/Os. Daily weights. C3/C4 levels.  - Treating constipation with Miralax and Angelia-Colace      Diet: Diet Adult Regular (IDDSI Level 7)  GI Prophylaxis: Not indicated  Significant LDAs:   IV Access Type: Peripheral  Urinary Catheter Indication if present: Patient Does Not Have Urinary Catheter  Other Lines/Tubes/Drains:    Goals of Care:    Previous admission:   4/15/16  Likely prognosis:  Fair  Code Status: Full Code  Comfort Only: No  Hospice: No  Goals at discharge: remain at home    Discharge Planning   EDY: 9/15/2020     Code Status: Full Code   Is the patient medically ready for discharge?: No    Reason for patient still in hospital (select all that apply): Treatment  Discharge Plan A: Home with family, Home Health   Discharge Delays: None known at this time    VTE Risk Mitigation (From admission, onward)         Ordered     enoxaparin injection 30 mg  Every 24 hours      09/09/20 0714     IP VTE HIGH RISK PATIENT  Once      08/30/20 2004     Place sequential compression device  Until discontinued      08/30/20 2706                   Gisela Mota MD  Department of Hospital Medicine   [facility]

## 2020-09-09 NOTE — NURSING
Patient B/p  Was below baseline around 2100. Md called. 250 bolus given per MD order. VS signs stable at the moment. Day shift nurse aware.

## 2020-09-09 NOTE — PLAN OF CARE
Problem: Physical Therapy Goal  Goal: Physical Therapy Goal  Description: Goals to be met by: 9/15/20    Patient will increase functional independence with mobility by performin. Supine to sit with Stand-by Assistance - Not met  2. Sit to supine with Stand-by Assistance - Not met  3. Sit to stand transfer with Stand-by Assistance with RW - Not met  4. Gait  x 50 feet with Stand-by Assistance using Rolling Walker and maintenance of weight bearing status - Not met  5.  Patient will demonstrate independence with a home exercise program - Not met  6. Patient's balance will be GOOD: Needs SUPERVISION only during gait and able to self right with moderate LOB - Not met  Outcome: Ongoing, Progressing

## 2020-09-09 NOTE — TELEPHONE ENCOUNTER
OPCM LMSW reviewed pt's EMR. Pt is currently an inpatient at [facility]. Discharge plan is SNF. OPCM LMSW will follow and assist as needed.

## 2020-09-09 NOTE — CONSULTS
PharmD Consult received for:    1.) Admit medication history/reconciliation:  · Separate note to follow shortly    2.) Renally adjust all medications:  Estimated Creatinine Clearance: 35.1 mL/min (A) (based on SCr of 3 mg/dL (H)).   Serum creatinine increased from 1.9 to 3.0 in 24 hours   Patient meets criteria for NANETTE  Medications reviewed, no dose adjustments needed       Thank you for the consult,  Zahra Powers PharmD, BCPS  [id number]     **Note: Consults are reviewed Monday-Friday 7:00am-3:30pm. The above recommendations are only suggested. The recommendations should be considered in conjunction with all patient factors.**

## 2020-09-09 NOTE — SUBJECTIVE & OBJECTIVE
Past Medical History:   Diagnosis Date    CHF (congestive heart failure)     COPD (chronic obstructive pulmonary disease)     Coronary artery disease     Diabetes mellitus     Diabetes mellitus type II     DM (diabetes mellitus) type II uncontrolled with renal manifestation 9/4/2013    Hyperlipidemia     Hypertension     Postablative hypothyroidism 12/6/2018       Past Surgical History:   Procedure Laterality Date    APPENDECTOMY      ARTHROSCOPY OF KNEE Right 9/7/2020    Procedure: ARTHROSCOPY, KNEE, RIGHT ;  Surgeon: You Bhatia MD;  Location: [facility] OR [address] [place] [address];  Service: Orthopedics;  Laterality: Right;    CHOLECYSTECTOMY      CORONARY ANGIOPLASTY WITH STENT PLACEMENT      TONSILLECTOMY      TRANSESOPHAGEAL ECHOCARDIOGRAPHY N/A 9/4/2020    Procedure: ECHOCARDIOGRAM, TRANSESOPHAGEAL;  Surgeon: [facility] Diagnostic Provider;  Location: [facility] EP LAB;  Service: Anesthesiology;  Laterality: N/A;    TRANSESOPHAGEAL ECHOCARDIOGRAPHY N/A 9/3/2020    Procedure: ECHOCARDIOGRAM, TRANSESOPHAGEAL;  Surgeon: [facility] Diagnostic Provider;  Location: [facility] EP LAB;  Service: Anesthesiology;  Laterality: N/A;       Review of patient's allergies indicates:   Allergen Reactions    Vicodin [hydrocodone-acetaminophen] Itching     Current Facility-Administered Medications   Medication Frequency    0.9%  NaCl infusion Continuous    acetaminophen tablet 1,000 mg Q8H PRN    acetaminophen tablet 650 mg Q6H PRN    albuterol-ipratropium 2.5 mg-0.5 mg/3 mL nebulizer solution 3 mL Q4H PRN    aspirin EC tablet 81 mg Daily    atorvastatin tablet 40 mg Daily    calcium carbonate 200 mg calcium (500 mg) chewable tablet 500 mg BID PRN    carvediloL tablet 25 mg BID    ceftaroline fosamiL (TEFLARO) 600 mg in dextrose 5 % 50 mL IVPB Q8H    DAPTOmycin (CUBICIN) 1,050 mg in sodium chloride 0.9% IVPB Q24H    dextrose 50% injection 12.5 g PRN    dextrose 50% injection 25 g PRN    enoxaparin injection 30 mg Q24H    fluticasone  furoate-vilanteroL 200-25 mcg/dose diskus inhaler 1 puff Daily    fluticasone propionate 50 mcg/actuation nasal spray 50 mcg Daily    gabapentin capsule 100 mg TID PRN    glucagon (human recombinant) injection 1 mg PRN    glucose chewable tablet 16 g PRN    glucose chewable tablet 24 g PRN    insulin aspart U-100 pen 0-5 Units QID (AC + HS) PRN    insulin aspart U-100 pen 10 Units TIDWM    insulin detemir U-100 pen 25 Units QHS    levothyroxine tablet 75 mcg Before breakfast    melatonin tablet 6 mg Nightly PRN    ondansetron injection 4 mg Q8H PRN    oxyCODONE immediate release tablet 5 mg Q8H PRN    polyethylene glycol packet 17 g BID PRN    sodium chloride 0.9% flush 10 mL PRN    sodium chloride 0.9% flush 10 mL PRN    Tdap vaccine injection 0.5 mL vaccine x 1 dose    vitamin D 1000 units tablet 1,000 Units Daily     Family History     Problem Relation (Age of Onset)    Heart disease Mother    Hypertension Son    No Known Problems Father, Sister, Son, Son        Tobacco Use    Smoking status: Former Smoker     Packs/day: 0.01     Years: 3.00     Pack years: 0.03     Types: Cigarettes     Quit date: 1995     Years since quittin.3    Smokeless tobacco: Never Used   Substance and Sexual Activity    Alcohol use: No    Drug use: No    Sexual activity: Never     Comment:  with 1 son     Review of Systems   Constitutional: Negative for chills and fever.   HENT: Negative for congestion, ear pain, sneezing and sore throat.    Eyes: Negative for pain.   Respiratory: Negative for cough and shortness of breath.    Cardiovascular: Negative for chest pain and leg swelling.   Gastrointestinal: Negative for abdominal pain, constipation, diarrhea, nausea and vomiting.   Endocrine: Negative for polyuria.   Genitourinary: Negative for decreased urine volume, difficulty urinating, dysuria and flank pain.   Musculoskeletal: Positive for arthralgias (R knee pain). Negative for back pain, neck  pain and neck stiffness.   Skin: Positive for wound. Negative for rash.   Neurological: Positive for numbness. Negative for headaches.   Psychiatric/Behavioral: Negative for confusion. The patient is not nervous/anxious.      Objective:     Vital Signs (Most Recent):  Temp: 98.1 °F (36.7 °C) (09/09/20 0746)  Pulse: 102 (09/09/20 0746)  Resp: 16 (09/09/20 0746)  BP: (!) 95/57 (09/09/20 0746)  SpO2: 99 % (09/09/20 0746)  O2 Device (Oxygen Therapy): nasal cannula (09/08/20 2119) Vital Signs (24h Range):  Temp:  [96.5 °F (35.8 °C)-98.7 °F (37.1 °C)] 98.1 °F (36.7 °C)  Pulse:  [] 102  Resp:  [16-18] 16  SpO2:  [91 %-99 %] 99 %  BP: ()/(50-67) 95/57     Weight: 116 kg (255 lb 11.7 oz) (09/07/20 0400)  Body mass index is 31.13 kg/m².  Body surface area is 2.49 meters squared.    No intake/output data recorded.    Physical Exam  Vitals signs reviewed.   Constitutional:       General: He is not in acute distress.     Appearance: Normal appearance. He is normal weight. He is not diaphoretic.   HENT:      Head: Normocephalic and atraumatic.      Right Ear: External ear normal.      Left Ear: External ear normal.      Nose: Nose normal. No congestion or rhinorrhea.   Eyes:      General:         Right eye: No discharge.         Left eye: No discharge.      Extraocular Movements: Extraocular movements intact.   Neck:      Musculoskeletal: Normal range of motion and neck supple. No neck rigidity or muscular tenderness.   Cardiovascular:      Rate and Rhythm: Normal rate and regular rhythm.      Heart sounds: No murmur.      Comments: + JVD to jawline   Pulmonary:      Effort: Pulmonary effort is normal. No respiratory distress.      Breath sounds: No wheezing.      Comments: On 2 L/m oxygen  Chest:      Chest wall: No tenderness.   Abdominal:      General: There is distension.      Palpations: Abdomen is soft.      Tenderness: There is no abdominal tenderness. There is no guarding.   Musculoskeletal: Normal range of  motion.         General: Tenderness (right knee) present.      Right lower leg: Edema (1+) present.      Left lower leg: Edema (1+) present.      Comments: Cooling brace applied to right knee    Skin:     General: Skin is warm and dry.      Findings: Lesion (left foot) present.   Neurological:      Mental Status: He is alert and oriented to person, place, and time.      Sensory: No sensory deficit.      Gait: Gait normal.   Psychiatric:         Mood and Affect: Mood normal.         Behavior: Behavior normal.         Thought Content: Thought content normal.         Judgment: Judgment normal.         Significant Labs:  CBC:   Recent Labs   Lab 09/09/20  0303   WBC 22.01*   RBC 3.52*   HGB 9.3*   HCT 31.9*   *   MCV 91   MCH 26.4*   MCHC 29.2*     CMP:   Recent Labs   Lab 09/09/20  0303      CALCIUM 8.7   ALBUMIN 1.8*      K 4.0   CO2 27   CL 96   BUN 41*   CREATININE 3.0*     All labs within the past 24 hours have been reviewed.    Significant Imaging:  Labs: Reviewed  Elevated Cr from baseline

## 2020-09-09 NOTE — SUBJECTIVE & OBJECTIVE
Interval History: No events overnight. This am patient had the NM tagged wbc scan which was unremarkable for any focal infection. We did a bedside diabetic foot wound infection again today and wound is little deeper than earlier but not probing to bone    Review of Systems   Constitutional: Negative for chills and fever.   HENT: Negative for congestion, ear pain, sneezing and sore throat.    Eyes: Negative for pain.   Respiratory: Negative for cough and shortness of breath.    Cardiovascular: Negative for chest pain.   Gastrointestinal: Negative for abdominal pain, constipation, diarrhea, nausea and vomiting.   Endocrine: Negative for polyuria.   Genitourinary: Negative for decreased urine volume, difficulty urinating, dysuria and flank pain.   Musculoskeletal: Positive for arthralgias (R knee pain). Negative for back pain, neck pain and neck stiffness.   Skin: Positive for wound. Negative for rash.   Neurological: Positive for numbness. Negative for headaches.   Psychiatric/Behavioral: Negative for confusion. The patient is not nervous/anxious.      Objective:     Vital Signs (Most Recent):  Temp: 98.1 °F (36.7 °C) (09/09/20 0746)  Pulse: 84 (09/09/20 0957)  Resp: 16 (09/09/20 0957)  BP: (!) 95/57 (09/09/20 0746)  SpO2: 99 % (09/09/20 0933) Vital Signs (24h Range):  Temp:  [96.5 °F (35.8 °C)-98.7 °F (37.1 °C)] 98.1 °F (36.7 °C)  Pulse:  [] 84  Resp:  [16-18] 16  SpO2:  [91 %-99 %] 99 %  BP: (85-99)/(50-67) 95/57     Weight: 116 kg (255 lb 11.7 oz)  Body mass index is 31.13 kg/m².    Estimated Creatinine Clearance: 35.1 mL/min (A) (based on SCr of 3 mg/dL (H)).    Physical Exam  Vitals signs reviewed.   Constitutional:       General: He is not in acute distress.     Appearance: Normal appearance. He is normal weight. He is not diaphoretic.   HENT:      Head: Normocephalic and atraumatic.      Right Ear: External ear normal.      Left Ear: External ear normal.      Nose: Nose normal. No congestion or  rhinorrhea.   Eyes:      General:         Right eye: No discharge.         Left eye: No discharge.      Extraocular Movements: Extraocular movements intact.   Neck:      Musculoskeletal: Normal range of motion and neck supple. No neck rigidity or muscular tenderness.   Cardiovascular:      Rate and Rhythm: Normal rate and regular rhythm.      Heart sounds: No murmur.      Comments: Diminished DP/PT pulses  Pulmonary:      Effort: Pulmonary effort is normal. No respiratory distress.      Breath sounds: No wheezing.      Comments: On 2 L/m oxygen  Chest:      Chest wall: No tenderness.   Abdominal:      General: There is no distension.      Palpations: Abdomen is soft.      Tenderness: There is no abdominal tenderness. There is no guarding.   Musculoskeletal: Normal range of motion.         General: Tenderness (right knee) present.      Comments: Cooling brace applied to right knee    Skin:     General: Skin is warm and dry.      Findings: Lesion (left foot - dry, no drainage, probes 1 cm deep but not to the bone, necrotic wound base, tender joseluis-wound) present.   Neurological:      Mental Status: He is alert and oriented to person, place, and time.      Sensory: No sensory deficit.      Gait: Gait normal.   Psychiatric:         Mood and Affect: Mood normal.         Behavior: Behavior normal.         Thought Content: Thought content normal.         Judgment: Judgment normal.         Significant Labs: All pertinent labs within the past 24 hours have been reviewed.    Significant Imaging: I have reviewed all pertinent imaging results/findings within the past 24 hours.

## 2020-09-09 NOTE — PROGRESS NOTES
[facility]  Podiatry  Progress Note    Patient Name: Ed Patton  MRN: [id number]  Admission Date: 8/30/2020  Hospital Length of Stay: 10 days  Attending Physician: Gisela Mota MD  Primary Care Provider: Qiana Chow MD     Subjective:     Interval History: NAEON. Patient still reporting right knee pain and left foot pain. Tachycardic, still having transient hypoxia. No new pedal complaints. No focal uptake seen in the feet on nuclear medicine scan. Infectious disease may consider repeating MRI of foot if bacteremia persists and no other sources are found.     Follow-up For: Procedure(s) (LRB):  ARTHROSCOPY, KNEE, RIGHT  (Right)    Post-Operative Day: 2 Days Post-Op    Scheduled Meds:   aspirin  81 mg Oral Daily    atorvastatin  40 mg Oral Daily    carvediloL  25 mg Oral BID    ceftaroline fosamil  400 mg Intravenous Q8H    [START ON 9/11/2020] DAPTOmycin (CUBICIN)  IV  1,050 mg Intravenous Q48H    enoxaparin  30 mg Subcutaneous Q24H    fluticasone furoate-vilanteroL  1 puff Inhalation Daily    fluticasone propionate  1 spray Each Nostril Daily    insulin aspart U-100  10 Units Subcutaneous TIDWM    insulin detemir U-100  25 Units Subcutaneous QHS    levothyroxine  75 mcg Oral Before breakfast    vitamin D  1,000 Units Oral Daily     Continuous Infusions:   sodium chloride 0.9% 50 mL/hr at 09/09/20 1326     PRN Meds:acetaminophen, acetaminophen, albuterol-ipratropium, calcium carbonate, dextrose 50%, dextrose 50%, gabapentin, glucagon (human recombinant), glucose, glucose, insulin aspart U-100, melatonin, ondansetron, oxyCODONE, polyethylene glycol, sodium chloride 0.9%, sodium chloride 0.9%, DIPH,PERTUS (ADACEL),TETANUS PF VAC (ADULT)    Review of Systems   Constitutional: Positive for activity change. Negative for appetite change, chills and fever.   HENT: Negative for congestion and sneezing.    Respiratory: Negative for cough and shortness of breath.     Gastrointestinal: Negative for nausea and vomiting.   Musculoskeletal: Positive for arthralgias, back pain and myalgias.   Skin: Positive for wound. Negative for color change.   Neurological: Positive for weakness and numbness.   Psychiatric/Behavioral: Negative for behavioral problems and confusion.     Objective:     Vital Signs (Most Recent):  Temp: 97.1 °F (36.2 °C) (09/09/20 1306)  Pulse: 91 (09/09/20 1306)  Resp: 16 (09/09/20 1306)  BP: (!) 99/54 (09/09/20 1306)  SpO2: 99 % (09/09/20 0933) Vital Signs (24h Range):  Temp:  [96.5 °F (35.8 °C)-98.3 °F (36.8 °C)] 97.1 °F (36.2 °C)  Pulse:  [] 91  Resp:  [16-18] 16  SpO2:  [91 %-99 %] 99 %  BP: (85-99)/(50-57) 99/54     Weight: 116 kg (255 lb 11.7 oz)  Body mass index is 31.13 kg/m².    Foot Exam    General  Orientation: alert and oriented to person, place, and time   Affect: appropriate       Right Foot/Ankle     Inspection and Palpation  Ecchymosis: none  Tenderness: none   Swelling: none   Skin Exam: skin intact;     Neurovascular  Dorsalis pedis: absent  Posterior tibial: absent  Saphenous nerve sensation: diminished  Tibial nerve sensation: diminished  Superficial peroneal nerve sensation: diminished  Deep peroneal nerve sensation: diminished  Sural nerve sensation: diminished      Left Foot/Ankle      Inspection and Palpation  Ecchymosis: none  Tenderness: (Periwound)  Swelling: none   Skin Exam: dry skin, skin changes and ulcer; no drainage     Neurovascular  Dorsalis pedis: absent  Posterior tibial: absent  Saphenous nerve sensation: diminished  Tibial nerve sensation: diminished  Superficial peroneal nerve sensation: diminished  Deep peroneal nerve sensation: diminished  Sural nerve sensation: diminished            Laboratory:  Blood Cultures:   Recent Labs   Lab 09/08/20  0408 09/08/20  1247   LABBLOO Gram stain aer bottle: Gram positive cocci in clusters resembling Staph  Results called to and read back by:Jany Santiago RN 09/08/2020  22:29   STAPHYLOCOCCUS AUREUS  Susceptibility pending  ID consult required at [facility] and [facility] locations.  * No Growth to date  No Growth to date  No Growth to date  No Growth to date     CBC:   Recent Labs   Lab 09/09/20  0303   WBC 22.01*   RBC 3.52*   HGB 9.3*   HCT 31.9*   *   MCV 91   MCH 26.4*   MCHC 29.2*     CMP:   Recent Labs   Lab 09/09/20  0303      CALCIUM 8.7   ALBUMIN 1.8*      K 4.0   CO2 27   CL 96   BUN 41*   CREATININE 3.0*     CRP:   Recent Labs   Lab 09/08/20  0408   .2*     ESR:   Recent Labs   Lab 09/03/20  0410   SEDRATE 80*     Wound Cultures:   Recent Labs   Lab 08/31/20  1754 09/03/20  1647 09/07/20  1721   LABAERO PROTEUS MIRABILIS  Moderate  *  METHICILLIN RESISTANT STAPHYLOCOCCUS AUREUS  Moderate  * METHICILLIN RESISTANT STAPHYLOCOCCUS AUREUS  Few  * No growth  No growth     Microbiology Results (last 7 days)     Procedure Component Value Units Date/Time    Blood culture [[id number]] Collected: 09/08/20 1247    Order Status: Completed Specimen: Blood from Antecubital, Right Hand Updated: 09/09/20 1412     Blood Culture, Routine No Growth to date      No Growth to date    Narrative:      Collection has been rescheduled by RB8 at 09/08/2020 11:43 Reason:   Patient unavailable,nurse Mckay #[id number] was notified  Collection has been rescheduled by RB8 at 09/08/2020 11:43 Reason:   Patient unavailable,nurse Mckay #[id number] was notified    Blood culture [[id number]] Collected: 09/08/20 1247    Order Status: Completed Specimen: Blood from Antecubital, Right Arm Updated: 09/09/20 1412     Blood Culture, Routine No Growth to date      No Growth to date    Narrative:      Collection has been rescheduled by RB8 at 09/08/2020 11:43 Reason:   Patient unavailable,nurse Mckay #[id number] was notified  Collection has been rescheduled by RB8 at 09/08/2020 11:43 Reason:   Patient unavailable,nurse Mckay #[id number] was notified    Fungus culture [[id number]] Collected: 09/07/20 1721     Order Status: Completed Specimen: Body Fluid from Knee, Right Updated: 09/09/20 1407     Fungus (Mycology) Culture Culture in progress    Narrative:      1) Right Knee Joint Fluid    Fungus culture [[id number]] Collected: 09/07/20 1721    Order Status: Completed Specimen: Body Fluid from Knee, Right Updated: 09/09/20 1406     Fungus (Mycology) Culture Culture in progress    Narrative:      2) Right Knee Joint Fluid    Fungus culture [[id number]] Collected: 09/03/20 1647    Order Status: Completed Specimen: Joint Fluid from Knee, Right Updated: 09/09/20 1346     Fungus (Mycology) Culture Culture in progress    Blood culture [[id number]]  (Abnormal) Collected: 09/08/20 0408    Order Status: Completed Specimen: Blood from Antecubital, Right Arm Updated: 09/09/20 1246     Blood Culture, Routine Gram stain aer bottle: Gram positive cocci in clusters resembling Staph      Results called to and read back by:Jany Santiago RN 09/08/2020  22:29      STAPHYLOCOCCUS AUREUS  Susceptibility pending  ID consult required at [facility] [facility] and [facility].      Blood culture [[id number]] Collected: 09/09/20 1040    Order Status: Sent Specimen: Blood Updated: 09/09/20 1103    Culture, Anaerobe [[id number]] Collected: 09/07/20 1721    Order Status: Completed Specimen: Body Fluid from Knee, Right Updated: 09/09/20 1013     Anaerobic Culture Culture in progress    Narrative:      1) Right Knee Joint Fluid    Blood culture [[id number]]  (Abnormal) Collected: 09/06/20 0517    Order Status: Completed Specimen: Blood Updated: 09/09/20 0823     Blood Culture, Routine Gram stain oksana bottle: Gram positive cocci in clusters resembling Staph       Results called to and read back by: Mckay Cummings RN 09/07/2020  11:37      METHICILLIN RESISTANT STAPHYLOCOCCUS AUREUS  [facility]  For susceptibility see order #[id number]      Blood culture [[id number]] Collected: 09/07/20 0733    Order  Status: Completed Specimen: Blood from Antecubital, Right Updated: 09/09/20 0822     Blood Culture, Routine No Growth to date      No Growth to date      No Growth to date    Aerobic culture [[id number]] Collected: 09/07/20 1721    Order Status: Completed Specimen: Body Fluid from Knee, Right Updated: 09/09/20 0721     Aerobic Bacterial Culture No growth    Narrative:      1) Right Knee Joint Fluid    Aerobic culture [[id number]] Collected: 09/07/20 1721    Order Status: Completed Specimen: Body Fluid from Knee, Right Updated: 09/09/20 0721     Aerobic Bacterial Culture No growth    Narrative:      2) Right Knee Joint Fluid    Culture, Anaerobe [[id number]] Collected: 09/07/20 1721    Order Status: Completed Specimen: Body Fluid from Knee, Right Updated: 09/09/20 0640     Anaerobic Culture Culture in progress    Narrative:      2) Right Knee Joint Fluid    Blood culture [[id number]] Collected: 09/09/20 0303    Order Status: Sent Specimen: Blood Updated: 09/09/20 0429    AFB Culture & Smear [[id number]] Collected: 09/07/20 1721    Order Status: Completed Specimen: Body Fluid from Knee, Right Updated: 09/08/20 2127     AFB Culture & Smear Culture in progress     AFB CULTURE STAIN No acid fast bacilli seen.    Narrative:      1) Right Knee Joint Fluid    AFB Culture & Smear [[id number]] Collected: 09/07/20 1721    Order Status: Completed Specimen: Body Fluid from Knee, Right Updated: 09/08/20 2127     AFB Culture & Smear Culture in progress     AFB CULTURE STAIN No acid fast bacilli seen.    Narrative:      2) Right Knee Joint Fluid    Blood culture [[id number]]  (Abnormal)  (Susceptibility) Collected: 09/05/20 1115    Order Status: Completed Specimen: Blood from Peripheral, Right Hand Updated: 09/08/20 0820     Blood Culture, Routine Gram stain aer bottle: Gram positive cocci in clusters resembling Staph       Results called to and read back by: Shalini Pena RN  09/06/2020  11:17      METHICILLIN RESISTANT STAPHYLOCOCCUS  AUREUS  ID consult required at [facility].      Gram stain [[id number]] Collected: 09/07/20 1721    Order Status: Completed Specimen: Body Fluid from Knee, Right Updated: 09/08/20 0003     Gram Stain Result No WBC's      No organisms seen    Narrative:      2) Right Knee Joint Fluid    Gram stain [[id number]] Collected: 09/07/20 1721    Order Status: Completed Specimen: Body Fluid from Knee, Right Updated: 09/07/20 2245     Gram Stain Result Rare WBC's      No organisms seen    Narrative:      1) Right Knee Joint Fluid    Aerobic culture [[id number]]  (Abnormal)  (Susceptibility) Collected: 09/03/20 1647    Order Status: Completed Specimen: Joint Fluid from Knee, Right Updated: 09/07/20 1049     Aerobic Bacterial Culture METHICILLIN RESISTANT STAPHYLOCOCCUS AUREUS  Few      Culture, Anaerobe [[id number]] Collected: 09/03/20 1647    Order Status: Completed Specimen: Joint Fluid from Knee, Right Updated: 09/07/20 1032     Anaerobic Culture No anaerobes isolated    Blood culture [[id number]]  (Abnormal)  (Susceptibility) Collected: 09/02/20 1441    Order Status: Completed Specimen: Blood Updated: 09/05/20 1401     Blood Culture, Routine Gram stain oksana bottle: Gram positive cocci in clusters resembling Staph       Results called to and read back by: Rossana Garza RN 09/03/2020  10:12      Gram stain aer bottle: Gram positive cocci in clusters resembling Staph       Positive results previously called      METHICILLIN RESISTANT STAPHYLOCOCCUS AUREUS  ID consult required at [facility].      Narrative:      right median cubital  right median    AFB Culture & Smear [[id number]] Collected: 09/03/20 1647    Order Status: Completed Specimen: Joint Fluid from Knee, Right Updated: 09/04/20 2127     AFB Culture & Smear Culture in progress     AFB CULTURE STAIN No acid fast bacilli seen.    Blood culture [[id number]]  (Abnormal) Collected: 09/01/20 1417    Order Status:  Completed Specimen: Blood from Antecubital, Right Hand Updated: 09/04/20 0838     Blood Culture, Routine Gram stain aer bottle: Gram positive cocci       Results called to and read back by: Jolene Barraza   09/02/2020  12:06      METHICILLIN RESISTANT STAPHYLOCOCCUS AUREUS  ID consult required at [facility] and [facility].  For susceptibility see order #[id number]      Narrative:      Collection has been rescheduled by CBE at 09/01/2020 13:58 Reason:   due at 13:55  Collection has been rescheduled by CBE at 09/01/2020 13:58 Reason:   due at 13:55    Culture, Anaerobe [[id number]] Collected: 08/31/20 1754    Order Status: Completed Specimen: Wound from Foot, Left Updated: 09/04/20 0741     Anaerobic Culture No anaerobes isolated    Gram stain [[id number]] Collected: 09/03/20 1647    Order Status: Completed Specimen: Joint Fluid from Knee, Right Updated: 09/03/20 1851     Gram Stain Result Rare WBC's      No organisms seen    Aerobic culture [[id number]]  (Abnormal)  (Susceptibility) Collected: 08/31/20 1754    Order Status: Completed Specimen: Wound from Foot, Left Updated: 09/03/20 1316     Aerobic Bacterial Culture PROTEUS MIRABILIS  Moderate        METHICILLIN RESISTANT STAPHYLOCOCCUS AUREUS  Moderate      Blood culture [[id number]]  (Abnormal) Collected: 08/31/20 1328    Order Status: Completed Specimen: Blood from Peripheral, Right Hand Updated: 09/03/20 0716     Blood Culture, Routine Gram stain oksana bottle: Gram positive cocci       Results called to and read back by: Elsy Pena  09/01/2020  11:46      Gram stain aer bottle: Gram positive cocci in clusters resembling Staph       Positive results previously called      METHICILLIN RESISTANT STAPHYLOCOCCUS AUREUS  ID consult required at [facility] and [facility].  For susceptibility see order #[id number]      Blood culture [[id number]]  (Abnormal) Collected: 08/31/20 1324    Order Status: Completed Specimen: Blood from Peripheral,  Left Hand Updated: 09/03/20 0714     Blood Culture, Routine Gram stain aer bottle: Gram positive cocci       Gram stain oksana bottle: Gram positive cocci       Results called to and read back by: Elsy Pena  09/01/2020  11:46      METHICILLIN RESISTANT STAPHYLOCOCCUS AUREUS  ID consult required at [facility] and [facility] locations.  For susceptibility see order #[id number]      Urine culture [[id number]]  (Abnormal)  (Susceptibility) Collected: 08/30/20 1633    Order Status: Completed Specimen: Urine Updated: 09/03/20 0124     Urine Culture, Routine KLEBSIELLA PNEUMONIAE  > 100,000 cfu/ml      Narrative:      Specimen Source->Urine        Specimen (12h ago, onward)    None          Diagnostic Results  9/9 Nuclear Medicine Scan: There is no scintigraphic finding to suggest source of infection.     Clinical Findings: Left Shearer 1 plantar foot ulcer, fibrous wound base, no drainage, no undermining, no erythema, no edema, no fluctuance or crepitus. No fluid could be expressed on manual compression. Mild periwound tenderness. Wound dry, no exudate. Appears stable overall. Not clinically infected.           Assessment/Plan:     Diabetic ulcer of left foot associated with type 2 diabetes mellitus, with fat layer exposed  65 y.o M with PMHx of Type 2 DM, chronic hypoxemic respiratory failure on 2 L O2 via NC, chronic systolic heart failure. Tachycardic, hypotensive, WBCs still not improving. Podiatry consulted for left foot ulcer. No signs of deep infection on X ray, MRI, or nuclear medicine scan. Foot wound superficial and stable and likely not the cause of patient's persistent septicemia.     Plan:  -Dressing changed today. Will consider repeating debridement soon.   -Continue IV abx per ID.   -No emergent surgical intervention from podiatry warranted at this time.   -Continue local wound care. Orders placed.  -Rest of care per primary  -Podiatry will continue to follow        Mk Cook DPM PGY-1  Podiatric  Medicine & Surgery  [facility]

## 2020-09-09 NOTE — ASSESSMENT & PLAN NOTE
Mr. Patton is a 64 yo male with IDDM2, chronic hypoxemic resp failure on 2L of oxygen at home, and CHF (25% EF) who presented with recurrent falls and sepsis on 8/30 with septicemia with source likely from diabetic ulcer. Blood cultures have been persistently positive for MRSA. IE negative on LUKAS. Septic arthritis to right knee with I&D performed with ortho on 9/7. Initial management of septicemia was with ciprofloxacin and Vancomycin which was supratherapeutic to 26.5 on 9/2 prompting switch to a pulse dose regimen. Due to persistent bacteremia ID changed antibiotics to ceftaroline and daptomycin Cr on admission was 1.5 at admit up from the baseline (1.2-1.5). On 9/9 Cr level had a sudden increased to 3. Nephrology was consulted for NANETTE.     Over 24-48 hours prior to rise in creatinine Mr. Patton had blood pressures in the 80-90s/50s and Maps in the low 60s. Hypotension could have resulted in ischemic ATN. Also, was retaining urine and has had improved urine output after a toure was placed. Urine microscopy: scant normal red blood cells without any dysmorphic shapes evident and numerous muddy brown casts. Given the casts in the urine and the urinary retention it is likely a mixed pre-renal and a post-renal kidney injury. We will continue to monitor for improvement in Cr.       Reccomendations:  - Bladder scan showed urinary retention and toure was placed; maintain toure for now  - Recommend starting flomax now to prepare for future removal of toure   - Strict I/Os  - Daily weights

## 2020-09-09 NOTE — ASSESSMENT & PLAN NOTE
65 y.o M with PMHx of Type 2 DM, chronic hypoxemic respiratory failure on 2 L O2 via NC, chronic systolic heart failure. Tachycardic, hypotensive, still having transient hypoxia. WBCs now 40.90. No signs of deep foot infection on X ray, MRI, or nuclear medicine scan. Foot wound superficial and stable and likely not the cause of patient's persistent septicemia. Left ankle peak systolic velocity 35 cm/s which is concerning for risk of nonhealing. ABIs ordered, awaiting results.     Plan:  -No surgical intervention from podiatry warranted at this time.   -Awaiting ABIs. Recommend consulting vascular surgery based on 8/31 arterial ultrasound and delayed healing of left foot wound.   -Antibiotic management per ID.  -Dressing changed today.    -Continue local wound care.   -Rest of care per primary  -Podiatry will continue to follow

## 2020-09-09 NOTE — ASSESSMENT & PLAN NOTE
65 y.o. male s/p R knee scope I&D 9/7/20    VS:  stable  Nerve block:  none  PT/OT:  WBAT  DVT PPx:  lovenox per primary  Cultures:  MRSA from R knee aspiration 9/3/20; NGTD intraop cx; BCx 9/8 positive  Abx:  Daptomycin, ceftaroline  Labs:  last , Cr 3, Hb 9.3, WBC 22  Drain:  none  Newell:  none    F/u cx, ID recs

## 2020-09-09 NOTE — HPI
Mr. Patton is a 64yo M with insulin dependent T2DM, chronic hypoxemic resp. failure (on 2L oxygen at home), and CHF (EF 25%). He presented to the ED for recurrent falls and sepsis from an infected diabetic ulcer from stepping on a tack 8/30. Wound cultures from foot positive for proteus and MRSA 8/31. Urine culture 8/30 positive for klebsiella. Blood cultures have been positive for MRSA repeatedly from 8/30 to 9/8. He also developed septic arthritis of the right knee likely from seeding secondary to septicemia positive for MRSA 9/3. LUKAS was negative for IE 9/4. Initial management of septicemia was with ciprofloxacin and Vancomycin which was supratherapeutic to 26.5 on 9/2 prompting switch to a pulse dose regimen. Due to persistent bacteremia ID changed antibiotics to ceftaroline and daptomycin on 9/7 I&D of right knee performed by ortho on 9/7. Cr on admission was 1.5 at admit up from the baseline (1.2-1.5). On 9/9 Cr level had a sudden increased to 3. Nephrology was consulted for NANETTE.

## 2020-09-09 NOTE — ASSESSMENT & PLAN NOTE
MRSA septicemia. Still tachycardic, SpO2 reached 92%, renal function continues to worsen. WBCs not improving. ESR and CRP elevated, RF WNL. Vancomycin was supratherapeutic, switched to pulse dose regimen. Source of bacteremia unclear, may be left foot wound or a septic process at the right knee joint. Right knee now clinically suspicious for septic arthritis (see physical exam). Left foot wound growing Proteus and MRSA, urine growing Klebsiella. TTE negative for signs of cardiac infection, LUKAS is negative as well.     Blood cultures: MRSA  R knee fluid culture: Staph aureus  Left foot wound: Proteus, MRSA  Urine culture: Klebsiella    Recommendations:   -- Last cultures from 9/8 growing GPC (likely MRSA). Patient has been switched to daptomycin and ceftaroline combination therapy since 9/7/20 for 'persistent MRSA bacteremia'.  - CPK rising to 1100s today with worsening kidney function (Cr 3 from 1.4-1.5 2 days ago)  - Timeline of NANETTE and CPK elevation not consistent with dapto induced injury, however inadequate data to completely rule it out  - will switch to daptomycin q48 hours and continue ceftaroline.   -- CPK q48h  -- Daily CBC (ceftaroline can lead to leucopenia)  -- s/p R knee aspiration (WBC 11k, 90% segs, but negative gram stain and no crystals), could be the source s/p I&D on 9/7/20 for source control  -- Follow up on NM WB WBC scan for inflammatory localization (?hiding abscess)  -- MRI Lumbar spine with no signs of psoas abscess, unremarkable overall  -- MRI foot from 8/31 did not show any signs of OM, however, with persistent bacteremia and slow progressively increasing wound pain, will re-consider repeat foot MRI in coming days. Holding off right now  - If no other possible source found, ultimately patient will need to complete 4 weeks total Abx therapy counting from bacterial clearance since starting dapto-ceftaroline or source control (knee I&D). Daily blood cultures recommended.   -- Discontinued  Ciprofloxacine on 9/7 (completed 7 days, was administered for diabetic foot wound growing proteus and urine growing Klebseilla)  -- Will need a PICC line after clearance of bacteremia  -- Will continue to follow closely

## 2020-09-09 NOTE — SUBJECTIVE & OBJECTIVE
Principal Problem:Sepsis due to methicillin resistant Staphylococcus aureus    Principal Orthopedic Problem: R knee septic arthritis    Interval History: Patient seen and examined at bedside.  No acute events overnight.  Pain controlled.  Stood with PT yesterday.      Review of patient's allergies indicates:   Allergen Reactions    Vicodin [hydrocodone-acetaminophen] Itching       Current Facility-Administered Medications   Medication    acetaminophen tablet 1,000 mg    acetaminophen tablet 650 mg    albuterol-ipratropium 2.5 mg-0.5 mg/3 mL nebulizer solution 3 mL    aspirin EC tablet 81 mg    atorvastatin tablet 40 mg    calcium carbonate 200 mg calcium (500 mg) chewable tablet 500 mg    carvediloL tablet 25 mg    ceftaroline fosamiL (TEFLARO) 600 mg in dextrose 5 % 50 mL IVPB    DAPTOmycin (CUBICIN) 1,050 mg in sodium chloride 0.9% IVPB    dextrose 50% injection 12.5 g    dextrose 50% injection 25 g    enoxaparin injection 40 mg    fluticasone furoate-vilanteroL 200-25 mcg/dose diskus inhaler 1 puff    fluticasone propionate 50 mcg/actuation nasal spray 50 mcg    furosemide tablet 80 mg    gabapentin capsule 100 mg    glucagon (human recombinant) injection 1 mg    glucose chewable tablet 16 g    glucose chewable tablet 24 g    insulin aspart U-100 pen 0-5 Units    insulin aspart U-100 pen 14 Units    insulin detemir U-100 pen 32 Units    levothyroxine tablet 75 mcg    lisinopriL tablet 40 mg    melatonin tablet 6 mg    ondansetron injection 4 mg    oxyCODONE immediate release tablet 5 mg    polyethylene glycol packet 17 g    sodium chloride 0.9% flush 10 mL    sodium chloride 0.9% flush 10 mL    Tdap vaccine injection 0.5 mL    vitamin D 1000 units tablet 1,000 Units     Objective:     Vital Signs (Most Recent):  Temp: 98.3 °F (36.8 °C) (09/09/20 0453)  Pulse: 93 (09/09/20 0453)  Resp: 16 (09/09/20 0555)  BP: (!) 99/57 (09/09/20 0453)  SpO2: 99 % (09/09/20 0453) Vital Signs (24h  "Range):  Temp:  [96.5 °F (35.8 °C)-98.7 °F (37.1 °C)] 98.3 °F (36.8 °C)  Pulse:  [82-99] 93  Resp:  [16-18] 16  SpO2:  [91 %-99 %] 99 %  BP: ()/(50-67) 99/57     Weight: 116 kg (255 lb 11.7 oz)  Height: 6' 4" (193 cm)  Body mass index is 31.13 kg/m².        Ortho/SPM Exam    NAD  No increased WOB  Dressing c/d/i  SILT T/SP/DP  Motor intact T/DP  WWP extremities    Significant Labs:   CBC:   Recent Labs   Lab 09/07/20  0733 09/08/20  0408 09/09/20  0303   WBC 23.53* 18.87* 22.01*   HGB 11.0* 10.1* 9.3*   HCT 35.6* 34.5* 31.9*   * 394* 477*     CMP:   Recent Labs   Lab 09/07/20  1041 09/08/20  0408 09/09/20  0303   * 135* 137   K 4.0 4.1 4.0   CL 97 95 96   CO2 27 29 27   * 337* 105   BUN 27* 26* 41*   CREATININE 1.4 1.9* 3.0*   CALCIUM 8.9 8.6* 8.7   ALBUMIN 1.8* 1.9* 1.8*   ANIONGAP 11 11 14   EGFRNONAA 53.1* 36.7* 21.1*     All pertinent labs within the past 24 hours have been reviewed.    Significant Imaging: I have reviewed all pertinent imaging results/findings.  "

## 2020-09-09 NOTE — ASSESSMENT & PLAN NOTE
Mr. Patton is a 64 yo male with IDDM2, chronic hypoxemic resp failure on 2L of oxygen at home, and CHF (25% EF) who presented with recurrent falls and sepsis on 8/30 with septicemia with source likely from diabetic ulcer. Blood cultures have been persistently positive for MRSA. IE negative on LUKAS. Septic arthritis to right knee with I&D performed with ortho on 9/7. Initial management of septicemia was with ciprofloxacin and Vancomycin which was supratherapeutic to 26.5 on 9/2 prompting switch to a pulse dose regimen. Due to persistent bacteremia ID changed antibiotics to ceftaroline and daptomycin Cr on admission was 1.5 at admit up from the baseline (1.2-1.5). On 9/9 Cr level had a sudden increased to 3. Nephrology was consulted for NANETTE.     Over the past 24-48 hours Mr. Patton had blood pressures in the 80-90s/50s and Maps in the low 60s. Hypotension could be causing ischemic ATN, but will need to rule out other causes as well.  Patient is currently fluid overloaded with + JVD and abdominal distention.       Reccomendations:  - Bladder scan  - Urine microscopy   - Repeat UA and Ucx  - Strict I/Os  - Daily weights   - C3/C4 levels

## 2020-09-09 NOTE — PT/OT/SLP PROGRESS
"Physical Therapy Treatment    Patient Name:  Ed Patton   MRN:  [id number]    Recommendations:     Discharge Recommendations:  nursing facility, skilled   Discharge Equipment Recommendations: (TBD)   Barriers to discharge: increased assist level for mobility and self care    Assessment:     Ed Patton is a 65 y.o. male admitted with a medical diagnosis of Sepsis due to methicillin resistant Staphylococcus aureus.  He presents with the following impairments/functional limitations:  weakness, impaired endurance, impaired functional mobilty, gait instability, impaired balance, pain, decreased ROM, decreased lower extremity function. Today patient with slight improvement as evidenced by his ability to sit up EOB with less pain and perform some AROM to R knee. Remains with increased pain and declined attempting to stand today due to pain.     Rehab Prognosis: Good; patient would benefit from acute skilled PT services to address these deficits and reach maximum level of function.    Recent Surgery: Procedure(s) (LRB):  ARTHROSCOPY, KNEE, RIGHT  (Right) 2 Days Post-Op    Plan:     During this hospitalization, patient to be seen 4 x/week to address the identified rehab impairments via therapeutic activities, gait training, therapeutic exercises and progress toward the following goals:    · Plan of Care Expires:  10/01/20    Subjective     Chief Complaint: pain to R knee    Patient/Family Comments/goals: "I feel better but not good enough to stand, I'll be ready tomorrow".   Pain/Comfort:  · Pain Rating 1: 8/10  · Location - Side 1: Right  · Location - Orientation 1: generalized  · Location 1: knee  · Pain Addressed 1: Reposition, Distraction, Cessation of Activity      Objective:     Communicated with nurse prior to session.  Patient found supine with blood pressure cuff, telemetry, pulse ox (continuous), peripheral IV upon PT entry to room.     General Precautions: Standard, fall, contact   Orthopedic " Precautions:LLE weight bearing as tolerated   Braces: N/A     Functional Mobility:  · Bed Mobility:     · Supine to Sit: moderate assistance  · Sit to Supine: minimum assistance  · Transfers:     · Sit to Stand:  unable  · Gait: unable      AM-PAC 6 CLICK MOBILITY  Turning over in bed (including adjusting bedclothes, sheets and blankets)?: 3  Sitting down on and standing up from a chair with arms (e.g., wheelchair, bedside commode, etc.): 2  Moving from lying on back to sitting on the side of the bed?: 2  Moving to and from a bed to a chair (including a wheelchair)?: 1  Need to walk in hospital room?: 1  Climbing 3-5 steps with a railing?: 1  Basic Mobility Total Score: 10       Therapeutic Activities and Exercises:   white board updated  Performed seated EOB rle laq,ap 10 reps each through limited range due to pain    Patient left supine with all lines intact, call button in reach and nurse and podiatrist present..    GOALS:   Multidisciplinary Problems     Physical Therapy Goals        Problem: Physical Therapy Goal    Goal Priority Disciplines Outcome Goal Variances Interventions   Physical Therapy Goal     PT, PT/OT Ongoing, Progressing     Description: Goals to be met by: 9/15/20    Patient will increase functional independence with mobility by performin. Supine to sit with Stand-by Assistance - Not met  2. Sit to supine with Stand-by Assistance - Not met  3. Sit to stand transfer with Stand-by Assistance with RW - Not met  4. Gait  x 50 feet with Stand-by Assistance using Rolling Walker and maintenance of weight bearing status - Not met  5.  Patient will demonstrate independence with a home exercise program - Not met  6. Patient's balance will be GOOD: Needs SUPERVISION only during gait and able to self right with moderate LOB - Not met                   Time Tracking:     PT Received On: 20  PT Start Time: 227     PT Stop Time: 305  PT Total Time (min): 38 min     Billable Minutes: Therapeutic  Activity 23 and Therapeutic Exercise 15    Treatment Type: Treatment  PT/PTA: PTA     PTA Visit Number: 1     Macey Gardner, PTA  09/09/2020

## 2020-09-09 NOTE — PLAN OF CARE
09/09/20 1045   Post-Acute Status   Post-Acute Authorization Placement   Home Health Status Awaiting Internal Medical Clearance     Sw sent updated PT/OT notes to all referred SNF units, awaiting acceptance and medical clearance.

## 2020-09-09 NOTE — CONSULTS
[facility]  Nephrology  Consult Note    Patient Name: Ed Patton  MRN: [id number]  Admission Date: 8/30/2020  Hospital Length of Stay: 10 days  Attending Provider: Gisela Mota MD   Primary Care Physician: Qiana Chow MD  Principal Problem:Sepsis due to methicillin resistant Staphylococcus aureus    Inpatient consult to Nephrology  Consult performed by: Liz Membreno MD  Consult ordered by: Gisela Mota MD        Subjective:     HPI: Mr. Patton is a 64yo M with insulin dependent T2DM, chronic hypoxemic resp. failure (on 2L oxygen at home), and CHF (EF 25%). He presented to the ED for recurrent falls and sepsis from an infected diabetic ulcer from stepping on a tack 8/30. Wound cultures from foot positive for proteus and MRSA 8/31. Urine culture 8/30 positive for klebsiella. Blood cultures have been positive for MRSA repeatedly from 8/30 to 9/8. He also developed septic arthritis of the right knee likely from seeding secondary to septicemia positive for MRSA 9/3. LUKAS was negative for IE 9/4. Initial management of septicemia was with ciprofloxacin and Vancomycin which was supratherapeutic to 26.5 on 9/2 prompting switch to a pulse dose regimen. Due to persistent bacteremia ID changed antibiotics to ceftaroline and daptomycin on 9/7 I&D of right knee performed by ortho on 9/7. Cr on admission was 1.5 at admit up from the baseline (1.2-1.5). On 9/9 Cr level had a sudden increased to 3. Nephrology was consulted for NANETTE.     No new subjective & objective note has been filed under this hospital service since the last note was generated.    Assessment/Plan:     NANETTE (acute kidney injury)  Mr. Patton is a 66 yo male with IDDM2, chronic hypoxemic resp failure on 2L of oxygen at home, and CHF (25% EF) who presented with recurrent falls and sepsis on 8/30 with septicemia with source likely from diabetic ulcer. Blood cultures have been persistently positive for MRSA. IE negative on LUKAS.  Septic arthritis to right knee with I&D performed with ortho on 9/7. Initial management of septicemia was with ciprofloxacin and Vancomycin which was supratherapeutic to 26.5 on 9/2 prompting switch to a pulse dose regimen. Due to persistent bacteremia ID changed antibiotics to ceftaroline and daptomycin Cr on admission was 1.5 at admit up from the baseline (1.2-1.5). On 9/9 Cr level had a sudden increased to 3. Nephrology was consulted for NANETTE.     Over the past 24-48 hours Mr. Patton had blood pressures in the 80-90s/50s and Maps in the low 60s. Hypotension could be causing ischemic ATN, but will need to rule out other causes as well.  Patient is currently fluid overloaded with + JVD and abdominal distention.       Reccomendations:  - Given hypotension and simultaneous volume overload reccomend IVF as small volume boluses rather than continuous IVF to maintain MAPS >65. May require vasopressors as we can only give limited amounts of fluid boluses due to heart failure    - Bladder scan; FU given patient is anuric  - Urine microscopy   - Repeat UA and Ucx  - Strict I/Os  - Daily weights   - C3/C4 levels        Thank you for your consult. I will follow-up with patient. Please contact us if you have any additional questions.    Liz Membreno MD  Nephrology  [facility]

## 2020-09-09 NOTE — PROGRESS NOTES
[facility]  Orthopedics  Progress Note    Patient Name: Ed Patton  MRN: [id number]  Admission Date: 8/30/2020  Hospital Length of Stay: 10 days  Attending Provider: Gisela Mota MD  Primary Care Provider: Qiana Chow MD  Follow-up For: Procedure(s) (LRB):  ARTHROSCOPY, KNEE, RIGHT  (Right)    Post-Operative Day: 2 Days Post-Op  Subjective:     Principal Problem:Sepsis due to methicillin resistant Staphylococcus aureus    Principal Orthopedic Problem: R knee septic arthritis    Interval History: Patient seen and examined at bedside.  No acute events overnight.  Pain controlled.  Stood with PT yesterday.      Review of patient's allergies indicates:   Allergen Reactions    Vicodin [hydrocodone-acetaminophen] Itching       Current Facility-Administered Medications   Medication    acetaminophen tablet 1,000 mg    acetaminophen tablet 650 mg    albuterol-ipratropium 2.5 mg-0.5 mg/3 mL nebulizer solution 3 mL    aspirin EC tablet 81 mg    atorvastatin tablet 40 mg    calcium carbonate 200 mg calcium (500 mg) chewable tablet 500 mg    carvediloL tablet 25 mg    ceftaroline fosamiL (TEFLARO) 600 mg in dextrose 5 % 50 mL IVPB    DAPTOmycin (CUBICIN) 1,050 mg in sodium chloride 0.9% IVPB    dextrose 50% injection 12.5 g    dextrose 50% injection 25 g    enoxaparin injection 40 mg    fluticasone furoate-vilanteroL 200-25 mcg/dose diskus inhaler 1 puff    fluticasone propionate 50 mcg/actuation nasal spray 50 mcg    furosemide tablet 80 mg    gabapentin capsule 100 mg    glucagon (human recombinant) injection 1 mg    glucose chewable tablet 16 g    glucose chewable tablet 24 g    insulin aspart U-100 pen 0-5 Units    insulin aspart U-100 pen 14 Units    insulin detemir U-100 pen 32 Units    levothyroxine tablet 75 mcg    lisinopriL tablet 40 mg    melatonin tablet 6 mg    ondansetron injection 4 mg    oxyCODONE immediate release tablet 5 mg    polyethylene  "glycol packet 17 g    sodium chloride 0.9% flush 10 mL    sodium chloride 0.9% flush 10 mL    Tdap vaccine injection 0.5 mL    vitamin D 1000 units tablet 1,000 Units     Objective:     Vital Signs (Most Recent):  Temp: 98.3 °F (36.8 °C) (09/09/20 0453)  Pulse: 93 (09/09/20 0453)  Resp: 16 (09/09/20 0555)  BP: (!) 99/57 (09/09/20 0453)  SpO2: 99 % (09/09/20 0453) Vital Signs (24h Range):  Temp:  [96.5 °F (35.8 °C)-98.7 °F (37.1 °C)] 98.3 °F (36.8 °C)  Pulse:  [82-99] 93  Resp:  [16-18] 16  SpO2:  [91 %-99 %] 99 %  BP: ()/(50-67) 99/57     Weight: 116 kg (255 lb 11.7 oz)  Height: 6' 4" (193 cm)  Body mass index is 31.13 kg/m².        Ortho/SPM Exam    NAD  No increased WOB  Dressing c/d/i  SILT T/SP/DP  Motor intact T/DP  WWP extremities    Significant Labs:   CBC:   Recent Labs   Lab 09/07/20  0733 09/08/20  0408 09/09/20  0303   WBC 23.53* 18.87* 22.01*   HGB 11.0* 10.1* 9.3*   HCT 35.6* 34.5* 31.9*   * 394* 477*     CMP:   Recent Labs   Lab 09/07/20  1041 09/08/20  0408 09/09/20  0303   * 135* 137   K 4.0 4.1 4.0   CL 97 95 96   CO2 27 29 27   * 337* 105   BUN 27* 26* 41*   CREATININE 1.4 1.9* 3.0*   CALCIUM 8.9 8.6* 8.7   ALBUMIN 1.8* 1.9* 1.8*   ANIONGAP 11 11 14   EGFRNONAA 53.1* 36.7* 21.1*     All pertinent labs within the past 24 hours have been reviewed.    Significant Imaging: I have reviewed all pertinent imaging results/findings.    Assessment/Plan:     Septic arthritis of knee, right  65 y.o. male s/p R knee scope I&D 9/7/20    VS:  stable  Nerve block:  none  PT/OT:  WBAT  DVT PPx:  lovenox per primary  Cultures:  MRSA from R knee aspiration 9/3/20; NGTD intraop cx; BCx 9/8 positive  Abx:  Daptomycin, ceftaroline  Labs:  last , Cr 3, Hb 9.3, WBC 22  Drain:  none  Newell:  none    F/u cx, ID recs    Right knee pain              Durga Walker MD  Orthopedics  [facility]  "

## 2020-09-09 NOTE — PROGRESS NOTES
[facility]  Infectious Disease  Progress Note    Patient Name: Ed Patton  MRN: [id number]  Admission Date: 8/30/2020  Length of Stay: 10 days  Attending Physician: Gisela Mota MD  Primary Care Provider: Qiana Chow MD    Isolation Status: Contact  Assessment/Plan:      * Sepsis due to methicillin resistant Staphylococcus aureus  MRSA septicemia. Still tachycardic, SpO2 reached 92%, renal function continues to worsen. WBCs not improving. ESR and CRP elevated, RF WNL. Vancomycin was supratherapeutic, switched to pulse dose regimen. Source of bacteremia unclear, may be left foot wound or a septic process at the right knee joint. Right knee now clinically suspicious for septic arthritis (see physical exam). Left foot wound growing Proteus and MRSA, urine growing Klebsiella. TTE negative for signs of cardiac infection, LUKAS is negative as well.     Blood cultures: MRSA  R knee fluid culture: Staph aureus  Left foot wound: Proteus, MRSA  Urine culture: Klebsiella    Recommendations:   -- NM wbc scan - no signs of increased signal/infectious foci anywhere  -- Last cultures from 9/8 growing GPC (likely MRSA). Patient has been switched to daptomycin and ceftaroline combination therapy since 9/7/20 for 'persistent MRSA bacteremia'.  - CPK rising to 1100s today with worsening kidney function (Cr 3 from 1.4-1.5 2 days ago)  - Timeline of NANETTE and CPK elevation not consistent with dapto induced injury, however inadequate data to completely rule it out  - will switch to daptomycin q48 hours and continue ceftaroline.   -- CPK q48h  -- Daily CBC (ceftaroline can lead to leucopenia)  -- s/p R knee aspiration (WBC 11k, 90% segs, but negative gram stain and no crystals), could be the source s/p I&D on 9/7/20 for source control  -- Follow up on NM WB WBC scan for inflammatory localization (?hiding abscess)  -- MRI Lumbar spine with no signs of psoas abscess, unremarkable overall  -- MRI foot from  8/31 did not show any signs of OM, however, with persistent bacteremia and slow progressively increasing wound pain, will re-consider repeat foot MRI in coming days. Holding off right now  - If no other possible source found, ultimately patient will need to complete 4 weeks total Abx therapy counting from bacterial clearance since starting dapto-ceftaroline or source control (knee I&D). Daily blood cultures recommended.   -- Discontinued Ciprofloxacine on 9/7 (completed 7 days, was administered for diabetic foot wound growing proteus and urine growing Klebseilla)  -- Will need a PICC line after clearance of bacteremia  -- Will continue to follow closely      Anticipated Disposition: TBD    Thank you for your consult. I will follow-up with patient. Please contact us if you have any additional questions.    Shabbir Quezada MD  Infectious Disease  [facility]    Subjective:     Principal Problem:Sepsis due to methicillin resistant Staphylococcus aureus    HPI: 66 yo M with Hx of DM2 (on insulin), chronic hypoxemic respiratory failure (on O2 at home), chronic systolic heart failure presented to ED after recurrent falls 7 and 8 days ago. He has a history of falls but reports that his tendency to fall has worsened recently, resulting in injury to his lower back and right thigh. After his fall last Tuesday he looked at his left foot and noticed a wound, denies pain or drainage related to the wound, he admits to not regularly checking his feet and does not know how long the wound has been present. He believes the wound is related to a tack that he previously found imbedded in the bottom of a slipper. He had previously seen Ang Lugo DPM for diabetic foot care but has not seen her this year due to fear of the coronavirus pandemic. He has been getting home health services including physical therapy. No subjective change as of today, still denies pain and drainage related to the wound. Denies  F/C/N/V.  Interval History: No events overnight. This am patient had the NM tagged wbc scan which was unremarkable for any focal infection. We did a bedside diabetic foot wound infection again today and wound is little deeper than earlier but not probing to bone    Review of Systems   Constitutional: Negative for chills and fever.   HENT: Negative for congestion, ear pain, sneezing and sore throat.    Eyes: Negative for pain.   Respiratory: Negative for cough and shortness of breath.    Cardiovascular: Negative for chest pain.   Gastrointestinal: Negative for abdominal pain, constipation, diarrhea, nausea and vomiting.   Endocrine: Negative for polyuria.   Genitourinary: Negative for decreased urine volume, difficulty urinating, dysuria and flank pain.   Musculoskeletal: Positive for arthralgias (R knee pain). Negative for back pain, neck pain and neck stiffness.   Skin: Positive for wound. Negative for rash.   Neurological: Positive for numbness. Negative for headaches.   Psychiatric/Behavioral: Negative for confusion. The patient is not nervous/anxious.      Objective:     Vital Signs (Most Recent):  Temp: 98.1 °F (36.7 °C) (09/09/20 0746)  Pulse: 84 (09/09/20 0957)  Resp: 16 (09/09/20 0957)  BP: (!) 95/57 (09/09/20 0746)  SpO2: 99 % (09/09/20 0933) Vital Signs (24h Range):  Temp:  [96.5 °F (35.8 °C)-98.7 °F (37.1 °C)] 98.1 °F (36.7 °C)  Pulse:  [] 84  Resp:  [16-18] 16  SpO2:  [91 %-99 %] 99 %  BP: (85-99)/(50-67) 95/57     Weight: 116 kg (255 lb 11.7 oz)  Body mass index is 31.13 kg/m².    Estimated Creatinine Clearance: 35.1 mL/min (A) (based on SCr of 3 mg/dL (H)).    Physical Exam  Vitals signs reviewed.   Constitutional:       General: He is not in acute distress.     Appearance: Normal appearance. He is normal weight. He is not diaphoretic.   HENT:      Head: Normocephalic and atraumatic.      Right Ear: External ear normal.      Left Ear: External ear normal.      Nose: Nose normal. No congestion or  rhinorrhea.   Eyes:      General:         Right eye: No discharge.         Left eye: No discharge.      Extraocular Movements: Extraocular movements intact.   Neck:      Musculoskeletal: Normal range of motion and neck supple. No neck rigidity or muscular tenderness.   Cardiovascular:      Rate and Rhythm: Normal rate and regular rhythm.      Heart sounds: No murmur.      Comments: Diminished DP/PT pulses  Pulmonary:      Effort: Pulmonary effort is normal. No respiratory distress.      Breath sounds: No wheezing.      Comments: On 2 L/m oxygen  Chest:      Chest wall: No tenderness.   Abdominal:      General: There is no distension.      Palpations: Abdomen is soft.      Tenderness: There is no abdominal tenderness. There is no guarding.   Musculoskeletal: Normal range of motion.         General: Tenderness (right knee) present.      Comments: Cooling brace applied to right knee    Skin:     General: Skin is warm and dry.      Findings: Lesion (left foot - dry, no drainage, probes 1 cm deep but not to the bone, necrotic wound base, tender joseluis-wound) present.   Neurological:      Mental Status: He is alert and oriented to person, place, and time.      Sensory: No sensory deficit.      Gait: Gait normal.   Psychiatric:         Mood and Affect: Mood normal.         Behavior: Behavior normal.         Thought Content: Thought content normal.         Judgment: Judgment normal.         Significant Labs: All pertinent labs within the past 24 hours have been reviewed.    Significant Imaging: I have reviewed all pertinent imaging results/findings within the past 24 hours.

## 2020-09-09 NOTE — SUBJECTIVE & OBJECTIVE
Subjective:     Interval History: Patient still reporting right knee pain and left foot pain, although he admits that the left foot pain is improved compared to when he first arrived. No new pedal complaints. Tachycardic, hypotensive, still having transient hypoxia. WBCs now 40.90. No focal uptake seen in the feet on recent nuclear medicine scan. ABIs ordered, awaiting results.     Follow-up For: Procedure(s) (LRB):  ARTHROSCOPY, KNEE, RIGHT  (Right)    Post-Operative Day: 2 Days Post-Op    Scheduled Meds:   aspirin  81 mg Oral Daily    atorvastatin  40 mg Oral Daily    carvediloL  25 mg Oral BID    ceftaroline fosamil  400 mg Intravenous Q8H    [START ON 9/11/2020] DAPTOmycin (CUBICIN)  IV  1,050 mg Intravenous Q48H    enoxaparin  30 mg Subcutaneous Q24H    fluticasone furoate-vilanteroL  1 puff Inhalation Daily    fluticasone propionate  1 spray Each Nostril Daily    insulin aspart U-100  10 Units Subcutaneous TIDWM    insulin detemir U-100  25 Units Subcutaneous QHS    levothyroxine  75 mcg Oral Before breakfast    vitamin D  1,000 Units Oral Daily     Continuous Infusions:   sodium chloride 0.9% 50 mL/hr at 09/09/20 1326     PRN Meds:acetaminophen, acetaminophen, albuterol-ipratropium, calcium carbonate, dextrose 50%, dextrose 50%, gabapentin, glucagon (human recombinant), glucose, glucose, insulin aspart U-100, melatonin, ondansetron, oxyCODONE, polyethylene glycol, sodium chloride 0.9%, sodium chloride 0.9%, DIPH,PERTUS (ADACEL),TETANUS PF VAC (ADULT)    Review of Systems   Constitutional: Positive for activity change. Negative for appetite change, chills and fever.   HENT: Negative for congestion and sneezing.    Respiratory: Negative for cough and shortness of breath.    Gastrointestinal: Negative for nausea and vomiting.   Musculoskeletal: Positive for arthralgias, back pain and myalgias.   Skin: Positive for wound. Negative for color change.   Neurological: Positive for weakness and  numbness.   Psychiatric/Behavioral: Negative for behavioral problems and confusion.     Objective:     Vital Signs (Most Recent):  Temp: 97.1 °F (36.2 °C) (09/09/20 1306)  Pulse: 91 (09/09/20 1306)  Resp: 16 (09/09/20 1306)  BP: (!) 99/54 (09/09/20 1306)  SpO2: 99 % (09/09/20 0933) Vital Signs (24h Range):  Temp:  [96.5 °F (35.8 °C)-98.3 °F (36.8 °C)] 97.1 °F (36.2 °C)  Pulse:  [] 91  Resp:  [16-18] 16  SpO2:  [91 %-99 %] 99 %  BP: (85-99)/(50-57) 99/54     Weight: 116 kg (255 lb 11.7 oz)  Body mass index is 31.13 kg/m².    Foot Exam    General  Orientation: alert and oriented to person, place, and time   Affect: appropriate       Right Foot/Ankle     Inspection and Palpation  Ecchymosis: none  Tenderness: none   Swelling: none   Skin Exam: skin intact;     Neurovascular  Dorsalis pedis: absent  Posterior tibial: absent  Saphenous nerve sensation: diminished  Tibial nerve sensation: diminished  Superficial peroneal nerve sensation: diminished  Deep peroneal nerve sensation: diminished  Sural nerve sensation: diminished      Left Foot/Ankle      Inspection and Palpation  Ecchymosis: none  Tenderness: (Periwound)  Swelling: none   Skin Exam: dry skin, skin changes and ulcer; no drainage     Neurovascular  Dorsalis pedis: absent  Posterior tibial: absent  Saphenous nerve sensation: diminished  Tibial nerve sensation: diminished  Superficial peroneal nerve sensation: diminished  Deep peroneal nerve sensation: diminished  Sural nerve sensation: diminished            Laboratory:  Blood Cultures:   Recent Labs   Lab 09/08/20  0408 09/08/20  1247   LABBLOO Gram stain aer bottle: Gram positive cocci in clusters resembling Staph  Results called to and read back by:Jany Santiago RN 09/08/2020  22:29  STAPHYLOCOCCUS AUREUS  Susceptibility pending  ID consult required at [facility],[place] and [facility] locations.  * No Growth to date  No Growth to date  No Growth to date  No Growth to date     CBC:   Recent Labs    Lab 09/09/20  0303   WBC 22.01*   RBC 3.52*   HGB 9.3*   HCT 31.9*   *   MCV 91   MCH 26.4*   MCHC 29.2*     CMP:   Recent Labs   Lab 09/09/20  0303      CALCIUM 8.7   ALBUMIN 1.8*      K 4.0   CO2 27   CL 96   BUN 41*   CREATININE 3.0*     CRP:   Recent Labs   Lab 09/08/20  0408   .2*     ESR:   Recent Labs   Lab 09/03/20  0410   SEDRATE 80*     Wound Cultures:   Recent Labs   Lab 08/31/20  1754 09/03/20  1647 09/07/20  1721   LABAERO PROTEUS MIRABILIS  Moderate  *  METHICILLIN RESISTANT STAPHYLOCOCCUS AUREUS  Moderate  * METHICILLIN RESISTANT STAPHYLOCOCCUS AUREUS  Few  * No growth  No growth     Microbiology Results (last 7 days)     Procedure Component Value Units Date/Time    Blood culture [[id number]] Collected: 09/08/20 1247    Order Status: Completed Specimen: Blood from Antecubital, Right Hand Updated: 09/09/20 1412     Blood Culture, Routine No Growth to date      No Growth to date    Narrative:      Collection has been rescheduled by RB8 at 09/08/2020 11:43 Reason:   Patient unavailable,nurse Mckya #[id number] was notified  Collection has been rescheduled by RB8 at 09/08/2020 11:43 Reason:   Patient unavailable,nurse Mckay #[id number] was notified    Blood culture [[id number]] Collected: 09/08/20 1247    Order Status: Completed Specimen: Blood from Antecubital, Right Arm Updated: 09/09/20 1412     Blood Culture, Routine No Growth to date      No Growth to date    Narrative:      Collection has been rescheduled by RB8 at 09/08/2020 11:43 Reason:   Patient unavailable,nurse Mckay #[id number] was notified  Collection has been rescheduled by RB8 at 09/08/2020 11:43 Reason:   Patient unavailable,nurse Mckay #[id number] was notified    Fungus culture [[id number]] Collected: 09/07/20 1721    Order Status: Completed Specimen: Body Fluid from Knee, Right Updated: 09/09/20 1407     Fungus (Mycology) Culture Culture in progress    Narrative:      1) Right Knee Joint Fluid    Fungus culture [[id number]]  Collected: 09/07/20 1721    Order Status: Completed Specimen: Body Fluid from Knee, Right Updated: 09/09/20 1406     Fungus (Mycology) Culture Culture in progress    Narrative:      2) Right Knee Joint Fluid    Fungus culture [[id number]] Collected: 09/03/20 1647    Order Status: Completed Specimen: Joint Fluid from Knee, Right Updated: 09/09/20 1346     Fungus (Mycology) Culture Culture in progress    Blood culture [[id number]]  (Abnormal) Collected: 09/08/20 0408    Order Status: Completed Specimen: Blood from Antecubital, Right Arm Updated: 09/09/20 1246     Blood Culture, Routine Gram stain aer bottle: Gram positive cocci in clusters resembling Staph      Results called to and read back by:Jany Santiago RN 09/08/2020  22:29      STAPHYLOCOCCUS AUREUS  Susceptibility pending  [facility].      Blood culture [[id number]] Collected: 09/09/20 1040    Order Status: Sent Specimen: Blood Updated: 09/09/20 1103    Culture, Anaerobe [[id number]] Collected: 09/07/20 1721    Order Status: Completed Specimen: Body Fluid from Knee, Right Updated: 09/09/20 1013     Anaerobic Culture Culture in progress    Narrative:      1) Right Knee Joint Fluid    Blood culture [[id number]]  (Abnormal) Collected: 09/06/20 0517    Order Status: Completed Specimen: Blood Updated: 09/09/20 0823     Blood Culture, Routine Gram stain oksana bottle: Gram positive cocci in clusters resembling Staph       Results called to and read back by: Mckay Cummings RN 09/07/2020  11:37      METHICILLIN RESISTANT STAPHYLOCOCCUS AUREUS  ID consult required at [facility] and [facility].  For susceptibility see order #[id number]      Blood culture [[id number]] Collected: 09/07/20 0733    Order Status: Completed Specimen: Blood from Antecubital, Right Updated: 09/09/20 0822     Blood Culture, Routine No Growth to date      No Growth to date      No Growth to date    Aerobic culture [[id number]] Collected:  09/07/20 1721    Order Status: Completed Specimen: Body Fluid from Knee, Right Updated: 09/09/20 0721     Aerobic Bacterial Culture No growth    Narrative:      1) Right Knee Joint Fluid    Aerobic culture [[id number]] Collected: 09/07/20 1721    Order Status: Completed Specimen: Body Fluid from Knee, Right Updated: 09/09/20 0721     Aerobic Bacterial Culture No growth    Narrative:      2) Right Knee Joint Fluid    Culture, Anaerobe [[id number]] Collected: 09/07/20 1721    Order Status: Completed Specimen: Body Fluid from Knee, Right Updated: 09/09/20 0640     Anaerobic Culture Culture in progress    Narrative:      2) Right Knee Joint Fluid    Blood culture [[id number]] Collected: 09/09/20 0303    Order Status: Sent Specimen: Blood Updated: 09/09/20 0429    AFB Culture & Smear [[id number]] Collected: 09/07/20 1721    Order Status: Completed Specimen: Body Fluid from Knee, Right Updated: 09/08/20 2127     AFB Culture & Smear Culture in progress     AFB CULTURE STAIN No acid fast bacilli seen.    Narrative:      1) Right Knee Joint Fluid    AFB Culture & Smear [[id number]] Collected: 09/07/20 1721    Order Status: Completed Specimen: Body Fluid from Knee, Right Updated: 09/08/20 2127     AFB Culture & Smear Culture in progress     AFB CULTURE STAIN No acid fast bacilli seen.    Narrative:      2) Right Knee Joint Fluid    Blood culture [[id number]]  (Abnormal)  (Susceptibility) Collected: 09/05/20 1115    Order Status: Completed Specimen: Blood from Peripheral, Right Hand Updated: 09/08/20 0820     Blood Culture, Routine Gram stain aer bottle: Gram positive cocci in clusters resembling Staph       Results called to and read back by: Shalini Pena RN  09/06/2020  11:17      METHICILLIN RESISTANT STAPHYLOCOCCUS AUREUS  ID consult required at [facility].[place] and Yogesh lerner.      Gram stain [[id number]] Collected: 09/07/20 1721    Order Status: Completed Specimen: Body Fluid from Knee, Right Updated: 09/08/20 0003      Gram Stain Result No WBC's      No organisms seen    Narrative:      2) Right Knee Joint Fluid    Gram stain [[id number]] Collected: 09/07/20 1721    Order Status: Completed Specimen: Body Fluid from Knee, Right Updated: 09/07/20 2245     Gram Stain Result Rare WBC's      No organisms seen    Narrative:      1) Right Knee Joint Fluid    Aerobic culture [[id number]]  (Abnormal)  (Susceptibility) Collected: 09/03/20 1647    Order Status: Completed Specimen: Joint Fluid from Knee, Right Updated: 09/07/20 1049     Aerobic Bacterial Culture METHICILLIN RESISTANT STAPHYLOCOCCUS AUREUS  Few      Culture, Anaerobe [[id number]] Collected: 09/03/20 1647    Order Status: Completed Specimen: Joint Fluid from Knee, Right Updated: 09/07/20 1032     Anaerobic Culture No anaerobes isolated    Blood culture [[id number]]  (Abnormal)  (Susceptibility) Collected: 09/02/20 1441    Order Status: Completed Specimen: Blood Updated: 09/05/20 1401     Blood Culture, Routine Gram stain oksana bottle: Gram positive cocci in clusters resembling Staph       Results called to and read back by: Rossana Garza RN 09/03/2020  10:12      Gram stain aer bottle: Gram positive cocci in clusters resembling Staph       Positive results previously called      METHICILLIN RESISTANT STAPHYLOCOCCUS AUREUS  ID consult required at [facility],[place] and [facility] locations.      Narrative:      right median cubital  right median    AFB Culture & Smear [[id number]] Collected: 09/03/20 1647    Order Status: Completed Specimen: Joint Fluid from Knee, Right Updated: 09/04/20 2127     AFB Culture & Smear Culture in progress     AFB CULTURE STAIN No acid fast bacilli seen.    Blood culture [[id number]]  (Abnormal) Collected: 09/01/20 1417    Order Status: Completed Specimen: Blood from Antecubital, Right Hand Updated: 09/04/20 0838     Blood Culture, Routine Gram stain aer bottle: Gram positive cocci       Results called to and read back by: Jolene Barraza   09/02/2020  12:06       METHICILLIN RESISTANT STAPHYLOCOCCUS AUREUS  ID consult required at [facility].  For susceptibility see order #[id number]      Narrative:      Collection has been rescheduled by CBE at 09/01/2020 13:58 Reason:   due at 13:55  Collection has been rescheduled by CBE at 09/01/2020 13:58 Reason:   due at 13:55    Culture, Anaerobe [[id number]] Collected: 08/31/20 1754    Order Status: Completed Specimen: Wound from Foot, Left Updated: 09/04/20 0741     Anaerobic Culture No anaerobes isolated    Gram stain [[id number]] Collected: 09/03/20 1647    Order Status: Completed Specimen: Joint Fluid from Knee, Right Updated: 09/03/20 1851     Gram Stain Result Rare WBC's      No organisms seen    Aerobic culture [[id number]]  (Abnormal)  (Susceptibility) Collected: 08/31/20 1754    Order Status: Completed Specimen: Wound from Foot, Left Updated: 09/03/20 1316     Aerobic Bacterial Culture PROTEUS MIRABILIS  Moderate        METHICILLIN RESISTANT STAPHYLOCOCCUS AUREUS  Moderate      Blood culture [[id number]]  (Abnormal) Collected: 08/31/20 1328    Order Status: Completed Specimen: Blood from Peripheral, Right Hand Updated: 09/03/20 0716     Blood Culture, Routine Gram stain oksana bottle: Gram positive cocci       Results called to and read back by: Elsy Pena  09/01/2020  11:46      Gram stain aer bottle: Gram positive cocci in clusters resembling Staph       Positive results previously called      METHICILLIN RESISTANT STAPHYLOCOCCUS AUREUS  ID consult required at [facility].  For susceptibility see order #[id number]      Blood culture [[id number]]  (Abnormal) Collected: 08/31/20 1324    Order Status: Completed Specimen: Blood from Peripheral, Left Hand Updated: 09/03/20 0714     Blood Culture, Routine Gram stain aer bottle: Gram positive cocci       Gram stain oksana bottle: Gram positive cocci       Results called to and read back by: Elsy Pena  09/01/2020  11:46       METHICILLIN RESISTANT STAPHYLOCOCCUS AUREUS  ID consult required at [facility],[place] and [facility] locations.  For susceptibility see order #[id number]      Urine culture [[id number]]  (Abnormal)  (Susceptibility) Collected: 08/30/20 1633    Order Status: Completed Specimen: Urine Updated: 09/03/20 0124     Urine Culture, Routine KLEBSIELLA PNEUMONIAE  > 100,000 cfu/ml      Narrative:      Specimen Source->Urine        Specimen (12h ago, onward)    None          Diagnostic Results  9/9 Nuclear Medicine Scan: There is no scintigraphic finding to suggest source of infection.     Clinical Findings: Left Shearer 1 plantar foot ulcer 3.2x1.4x0.7 cm, fibronecrotic wound base, no drainage, no undermining, no erythema, no edema, no fluctuance or crepitus. No fluid could be expressed on manual compression. Mild periwound tenderness. Wound dry, no exudate. Appears stable overall. Not clinically infected.

## 2020-09-10 LAB
ALBUMIN SERPL BCP-MCNC: 1.6 G/DL (ref 3.5–5.2)
ANION GAP SERPL CALC-SCNC: 13 MMOL/L (ref 8–16)
ANISOCYTOSIS BLD QL SMEAR: SLIGHT
BACTERIA BLD CULT: ABNORMAL
BASOPHILS # BLD AUTO: 0.08 K/UL (ref 0–0.2)
BASOPHILS NFR BLD: 0.3 % (ref 0–1.9)
BNP SERPL-MCNC: 216 PG/ML (ref 0–99)
BUN SERPL-MCNC: 51 MG/DL (ref 8–23)
CALCIUM SERPL-MCNC: 8.4 MG/DL (ref 8.7–10.5)
CHLORIDE SERPL-SCNC: 97 MMOL/L (ref 95–110)
CK SERPL-CCNC: 1240 U/L (ref 20–200)
CO2 SERPL-SCNC: 25 MMOL/L (ref 23–29)
CREAT SERPL-MCNC: 3.7 MG/DL (ref 0.5–1.4)
CRP SERPL-MCNC: 173 MG/L (ref 0–8.2)
DIFFERENTIAL METHOD: ABNORMAL
EOSINOPHIL # BLD AUTO: 0.7 K/UL (ref 0–0.5)
EOSINOPHIL NFR BLD: 2.6 % (ref 0–8)
ERYTHROCYTE [DISTWIDTH] IN BLOOD BY AUTOMATED COUNT: 15.8 % (ref 11.5–14.5)
EST. GFR  (AFRICAN AMERICAN): 19 ML/MIN/1.73 M^2
EST. GFR  (NON AFRICAN AMERICAN): 16.4 ML/MIN/1.73 M^2
GLUCOSE SERPL-MCNC: 105 MG/DL (ref 70–110)
HCT VFR BLD AUTO: 29.8 % (ref 40–54)
HCT VFR BLD AUTO: 30.3 % (ref 40–54)
HGB BLD-MCNC: 9.1 G/DL (ref 14–18)
HGB BLD-MCNC: 9.1 G/DL (ref 14–18)
HYPOCHROMIA BLD QL SMEAR: ABNORMAL
IMM GRANULOCYTES # BLD AUTO: 0.35 K/UL (ref 0–0.04)
IMM GRANULOCYTES NFR BLD AUTO: 1.3 % (ref 0–0.5)
LACTATE SERPL-SCNC: 1.2 MMOL/L (ref 0.5–2.2)
LYMPHOCYTES # BLD AUTO: 1.1 K/UL (ref 1–4.8)
LYMPHOCYTES NFR BLD: 3.9 % (ref 18–48)
MAGNESIUM SERPL-MCNC: 2 MG/DL (ref 1.6–2.6)
MCH RBC QN AUTO: 26.4 PG (ref 27–31)
MCHC RBC AUTO-ENTMCNC: 30 G/DL (ref 32–36)
MCV RBC AUTO: 88 FL (ref 82–98)
MONOCYTES # BLD AUTO: 1.9 K/UL (ref 0.3–1)
MONOCYTES NFR BLD: 7.2 % (ref 4–15)
NEUTROPHILS # BLD AUTO: 22.7 K/UL (ref 1.8–7.7)
NEUTROPHILS NFR BLD: 84.7 % (ref 38–73)
NRBC BLD-RTO: 0 /100 WBC
OVALOCYTES BLD QL SMEAR: ABNORMAL
PHOSPHATE SERPL-MCNC: 5.7 MG/DL (ref 2.7–4.5)
PLATELET # BLD AUTO: 491 K/UL (ref 150–350)
PMV BLD AUTO: 10.6 FL (ref 9.2–12.9)
POCT GLUCOSE: 195 MG/DL (ref 70–110)
POCT GLUCOSE: 196 MG/DL (ref 70–110)
POCT GLUCOSE: 205 MG/DL (ref 70–110)
POCT GLUCOSE: 58 MG/DL (ref 70–110)
POCT GLUCOSE: 92 MG/DL (ref 70–110)
POIKILOCYTOSIS BLD QL SMEAR: SLIGHT
POLYCHROMASIA BLD QL SMEAR: ABNORMAL
POTASSIUM SERPL-SCNC: 3.9 MMOL/L (ref 3.5–5.1)
PROCALCITONIN SERPL IA-MCNC: 0.26 NG/ML
RBC # BLD AUTO: 3.45 M/UL (ref 4.6–6.2)
SODIUM SERPL-SCNC: 135 MMOL/L (ref 136–145)
WBC # BLD AUTO: 26.74 K/UL (ref 3.9–12.7)

## 2020-09-10 PROCEDURE — 83735 ASSAY OF MAGNESIUM: CPT | Mod: HCNC

## 2020-09-10 PROCEDURE — 99233 SBSQ HOSP IP/OBS HIGH 50: CPT | Mod: HCNC,,, | Performed by: INTERNAL MEDICINE

## 2020-09-10 PROCEDURE — 36415 COLL VENOUS BLD VENIPUNCTURE: CPT | Mod: HCNC

## 2020-09-10 PROCEDURE — 86140 C-REACTIVE PROTEIN: CPT | Mod: HCNC

## 2020-09-10 PROCEDURE — 94761 N-INVAS EAR/PLS OXIMETRY MLT: CPT | Mod: HCNC

## 2020-09-10 PROCEDURE — 80069 RENAL FUNCTION PANEL: CPT | Mod: HCNC

## 2020-09-10 PROCEDURE — 83605 ASSAY OF LACTIC ACID: CPT | Mod: HCNC

## 2020-09-10 PROCEDURE — 85014 HEMATOCRIT: CPT | Mod: HCNC

## 2020-09-10 PROCEDURE — 93005 ELECTROCARDIOGRAM TRACING: CPT | Mod: HCNC

## 2020-09-10 PROCEDURE — 93010 EKG 12-LEAD: ICD-10-PCS | Mod: HCNC,,, | Performed by: INTERNAL MEDICINE

## 2020-09-10 PROCEDURE — 82550 ASSAY OF CK (CPK): CPT | Mod: HCNC

## 2020-09-10 PROCEDURE — 93010 ELECTROCARDIOGRAM REPORT: CPT | Mod: HCNC,,, | Performed by: INTERNAL MEDICINE

## 2020-09-10 PROCEDURE — 25000003 PHARM REV CODE 250: Mod: HCNC | Performed by: STUDENT IN AN ORGANIZED HEALTH CARE EDUCATION/TRAINING PROGRAM

## 2020-09-10 PROCEDURE — 27000221 HC OXYGEN, UP TO 24 HOURS: Mod: HCNC

## 2020-09-10 PROCEDURE — 97530 THERAPEUTIC ACTIVITIES: CPT | Mod: HCNC,CQ

## 2020-09-10 PROCEDURE — 84145 PROCALCITONIN (PCT): CPT | Mod: HCNC

## 2020-09-10 PROCEDURE — 99233 PR SUBSEQUENT HOSPITAL CARE,LEVL III: ICD-10-PCS | Mod: HCNC,,, | Performed by: INTERNAL MEDICINE

## 2020-09-10 PROCEDURE — 85018 HEMOGLOBIN: CPT | Mod: HCNC

## 2020-09-10 PROCEDURE — 85025 COMPLETE CBC W/AUTO DIFF WBC: CPT | Mod: HCNC

## 2020-09-10 PROCEDURE — 97535 SELF CARE MNGMENT TRAINING: CPT | Mod: HCNC

## 2020-09-10 PROCEDURE — 87040 BLOOD CULTURE FOR BACTERIA: CPT | Mod: HCNC

## 2020-09-10 PROCEDURE — 93922 UPR/L XTREMITY ART 2 LEVELS: CPT | Mod: HCNC | Performed by: SURGERY

## 2020-09-10 PROCEDURE — 25000003 PHARM REV CODE 250: Mod: HCNC | Performed by: HOSPITALIST

## 2020-09-10 PROCEDURE — 25000003 PHARM REV CODE 250: Mod: HCNC | Performed by: INTERNAL MEDICINE

## 2020-09-10 PROCEDURE — 99232 SBSQ HOSP IP/OBS MODERATE 35: CPT | Mod: HCNC,,, | Performed by: INTERNAL MEDICINE

## 2020-09-10 PROCEDURE — 63600175 PHARM REV CODE 636 W HCPCS: Mod: HCNC | Performed by: INTERNAL MEDICINE

## 2020-09-10 PROCEDURE — 99232 PR SUBSEQUENT HOSPITAL CARE,LEVL II: ICD-10-PCS | Mod: HCNC,,, | Performed by: INTERNAL MEDICINE

## 2020-09-10 PROCEDURE — 83880 ASSAY OF NATRIURETIC PEPTIDE: CPT | Mod: HCNC

## 2020-09-10 PROCEDURE — 63600175 PHARM REV CODE 636 W HCPCS: Mod: JG,HCNC | Performed by: STUDENT IN AN ORGANIZED HEALTH CARE EDUCATION/TRAINING PROGRAM

## 2020-09-10 PROCEDURE — 11000001 HC ACUTE MED/SURG PRIVATE ROOM: Mod: HCNC

## 2020-09-10 PROCEDURE — U0003 INFECTIOUS AGENT DETECTION BY NUCLEIC ACID (DNA OR RNA); SEVERE ACUTE RESPIRATORY SYNDROME CORONAVIRUS 2 (SARS-COV-2) (CORONAVIRUS DISEASE [COVID-19]), AMPLIFIED PROBE TECHNIQUE, MAKING USE OF HIGH THROUGHPUT TECHNOLOGIES AS DESCRIBED BY CMS-2020-01-R: HCPCS | Mod: HCNC

## 2020-09-10 RX ORDER — TAMSULOSIN HYDROCHLORIDE 0.4 MG/1
0.4 CAPSULE ORAL NIGHTLY
Status: DISCONTINUED | OUTPATIENT
Start: 2020-09-10 | End: 2020-09-13

## 2020-09-10 RX ORDER — CARVEDILOL 6.25 MG/1
6.25 TABLET ORAL 2 TIMES DAILY
Status: DISCONTINUED | OUTPATIENT
Start: 2020-09-10 | End: 2020-09-11

## 2020-09-10 RX ORDER — ALBUTEROL SULFATE 90 UG/1
2 AEROSOL, METERED RESPIRATORY (INHALATION) EVERY 6 HOURS PRN
COMMUNITY
End: 2021-03-03 | Stop reason: SDUPTHER

## 2020-09-10 RX ORDER — INSULIN ASPART 100 [IU]/ML
5 INJECTION, SOLUTION INTRAVENOUS; SUBCUTANEOUS
Status: DISCONTINUED | OUTPATIENT
Start: 2020-09-10 | End: 2020-09-10

## 2020-09-10 RX ADMIN — HYPROMELLOSE 2910 1 DROP: 5 SOLUTION OPHTHALMIC at 02:09

## 2020-09-10 RX ADMIN — DOCUSATE SODIUM 50MG AND SENNOSIDES 8.6MG 1 TABLET: 8.6; 5 TABLET, FILM COATED ORAL at 08:09

## 2020-09-10 RX ADMIN — LEVOTHYROXINE SODIUM 75 MCG: 25 TABLET ORAL at 05:09

## 2020-09-10 RX ADMIN — POLYETHYLENE GLYCOL 3350 17 G: 17 POWDER, FOR SOLUTION ORAL at 08:09

## 2020-09-10 RX ADMIN — HYPROMELLOSE 2910 1 DROP: 5 SOLUTION OPHTHALMIC at 08:09

## 2020-09-10 RX ADMIN — CEFTAROLINE FOSAMIL 400 MG: 400 POWDER, FOR SOLUTION INTRAVENOUS at 01:09

## 2020-09-10 RX ADMIN — FLUTICASONE FUROATE AND VILANTEROL TRIFENATATE 1 PUFF: 200; 25 POWDER RESPIRATORY (INHALATION) at 08:09

## 2020-09-10 RX ADMIN — FLUTICASONE PROPIONATE 50 MCG: 50 SPRAY, METERED NASAL at 08:09

## 2020-09-10 RX ADMIN — SODIUM CHLORIDE 500 ML: 0.9 INJECTION, SOLUTION INTRAVENOUS at 03:09

## 2020-09-10 RX ADMIN — CHOLECALCIFEROL (VITAMIN D3) 25 MCG (1,000 UNIT) TABLET 1000 UNITS: at 08:09

## 2020-09-10 RX ADMIN — CARVEDILOL 6.25 MG: 6.25 TABLET, FILM COATED ORAL at 08:09

## 2020-09-10 RX ADMIN — INSULIN ASPART 10 UNITS: 100 INJECTION, SOLUTION INTRAVENOUS; SUBCUTANEOUS at 09:09

## 2020-09-10 RX ADMIN — INSULIN ASPART 10 UNITS: 100 INJECTION, SOLUTION INTRAVENOUS; SUBCUTANEOUS at 12:09

## 2020-09-10 RX ADMIN — ASPIRIN 81 MG: 81 TABLET, COATED ORAL at 08:09

## 2020-09-10 RX ADMIN — INSULIN ASPART 2 UNITS: 100 INJECTION, SOLUTION INTRAVENOUS; SUBCUTANEOUS at 09:09

## 2020-09-10 RX ADMIN — SODIUM CHLORIDE 250 ML: 0.9 INJECTION, SOLUTION INTRAVENOUS at 02:09

## 2020-09-10 RX ADMIN — HYPROMELLOSE 2910 1 DROP: 5 SOLUTION OPHTHALMIC at 09:09

## 2020-09-10 RX ADMIN — ENOXAPARIN SODIUM 30 MG: 30 INJECTION SUBCUTANEOUS at 04:09

## 2020-09-10 RX ADMIN — ATORVASTATIN CALCIUM 40 MG: 20 TABLET, FILM COATED ORAL at 08:09

## 2020-09-10 RX ADMIN — CEFTAROLINE FOSAMIL 400 MG: 400 POWDER, FOR SOLUTION INTRAVENOUS at 05:09

## 2020-09-10 RX ADMIN — TAMSULOSIN HYDROCHLORIDE 0.4 MG: 0.4 CAPSULE ORAL at 08:09

## 2020-09-10 RX ADMIN — VANCOMYCIN HYDROCHLORIDE 1750 MG: 100 INJECTION, POWDER, LYOPHILIZED, FOR SOLUTION INTRAVENOUS at 05:09

## 2020-09-10 RX ADMIN — DEXTROSE MONOHYDRATE 12.5 G: 25 INJECTION, SOLUTION INTRAVENOUS at 05:09

## 2020-09-10 RX ADMIN — CARVEDILOL 25 MG: 25 TABLET, FILM COATED ORAL at 08:09

## 2020-09-10 NOTE — PROGRESS NOTES
Pharmacokinetic Initial Assessment: IV Vancomycin    Assessment/Plan:    MRSA bacteremia previously on vancomycin with supratherapeutic trough of 26 on 1000mg q12h regimen, stepped down to 750mg q12h with a trough of 13.1, then back to 1000mg q12h with a trough of 20.9, subsequently switched to daptomycin/ceftaroline by ID for persistent bacteremia.    Patient now with increasing Scr (3.7 > 3.0 > 1.9) and CPK >1000. Nephro consulted and no RRT planned at this point. ID recommending step down to vancomycin given increasing CPK and clearance of blood cultures x48h.    Initiate intravenous vancomycin with 1750mg x 1 dose given NANETTE.   Plan for 24h random level 9/11 @ 2075.  Pharmacy will continue to follow and monitor vancomycin.      Please contact pharmacy at extension [phone] with any questions regarding this assessment.     Thank you for the consult,   Keenan Tavares, PharmD  Infectious Diseases Clinical Pharmacist  [phone]       Patient brief summary:  Ed Patton is a 65 y.o. male initiated on antimicrobial therapy with IV Vancomycin for treatment of suspected bacteremia    Drug Allergies:   Review of patient's allergies indicates:   Allergen Reactions    Vicodin [hydrocodone-acetaminophen] Itching       Actual Body Weight:   116kg    Renal Function:   Estimated Creatinine Clearance: 28.5 mL/min (A) (based on SCr of 3.7 mg/dL (H)).,     Dialysis Method (if applicable):  N/A    CBC (last 72 hours):  Recent Labs   Lab Result Units 09/08/20  0408 09/09/20  0303 09/10/20  0459 09/10/20  1513   WBC K/uL 18.87* 22.01* 26.74*  --    Hemoglobin g/dL 10.1* 9.3* 9.1* 9.1*   Hematocrit % 34.5* 31.9* 30.3* 29.8*   Platelets K/uL 394* 477* 491*  --    Gran% % 80.0* 76.8* 84.7*  --    Lymph% % 6.0* 8.1* 3.9*  --    Mono% % 7.5 7.2 7.2  --    Eosinophil% % 4.3 5.0 2.6  --    Basophil% % 0.4 0.5 0.3  --    Differential Method  Automated Automated Automated  --        Metabolic Panel (last 72 hours):  Recent Labs   Lab Result Units  09/08/20  0408 09/09/20  0303 09/09/20  1719 09/10/20  0459   Sodium mmol/L 135* 137  --  135*   Potassium mmol/L 4.1 4.0  --  3.9   Chloride mmol/L 95 96  --  97   CO2 mmol/L 29 27  --  25   Glucose mg/dL 337* 105  --  105   Glucose, UA   --   --  Negative  --    BUN, Bld mg/dL 26* 41*  --  51*   Creatinine mg/dL 1.9* 3.0*  --  3.7*   Albumin g/dL 1.9* 1.8*  --  1.6*   Magnesium mg/dL 1.9 1.9  --  2.0   Phosphorus mg/dL 4.7* 5.4*  --  5.7*       Drug levels (last 3 results):  No results for input(s): VANCOMYCINRA, VANCOMYCINPE, VANCOMYCINTR in the last 72 hours.    Microbiologic Results:  Microbiology Results (last 7 days)     Procedure Component Value Units Date/Time    Blood culture [[id number]]  (Abnormal)  (Susceptibility) Collected: 09/08/20 0408    Order Status: Completed Specimen: Blood from Antecubital, Right Arm Updated: 09/10/20 1601     Blood Culture, Routine Gram stain aer bottle: Gram positive cocci in clusters resembling Staph      Results called to and read back by:Jany Santiago RN 09/08/2020  22:29      METHICILLIN RESISTANT STAPHYLOCOCCUS AUREUS  ID consult required at [facility].      Blood culture [[id number]]  (Abnormal) Collected: 09/06/20 0517    Order Status: Completed Specimen: Blood Updated: 09/10/20 1538     Blood Culture, Routine Gram stain oksana bottle: Gram positive cocci in clusters resembling Staph       Results called to and read back by: Mckay Cummings RN 09/07/2020  11:37      METHICILLIN RESISTANT STAPHYLOCOCCUS AUREUS  ID consult required at [facility].  For susceptibility see order #[id number]      Blood culture [[id number]] Collected: 09/08/20 1247    Order Status: Completed Specimen: Blood from Antecubital, Right Hand Updated: 09/10/20 1412     Blood Culture, Routine No Growth to date      No Growth to date      No Growth to date    Narrative:      Collection has been rescheduled by RB8 at 09/08/2020 11:43 Reason:    Patient unavailable,nurse Mckay #[id number] was notified  Collection has been rescheduled by RB8 at 09/08/2020 11:43 Reason:   Patient unavailable,nurse Mckay #[id number] was notified    Blood culture [[id number]] Collected: 09/08/20 1247    Order Status: Completed Specimen: Blood from Antecubital, Right Arm Updated: 09/10/20 1412     Blood Culture, Routine No Growth to date      No Growth to date      No Growth to date    Narrative:      Collection has been rescheduled by RB8 at 09/08/2020 11:43 Reason:   Patient unavailable,nurse Mckay #[id number] was notified  Collection has been rescheduled by RB8 at 09/08/2020 11:43 Reason:   Patient unavailable,nurse Mckay #[id number] was notified    Blood culture [[id number]] Collected: 09/09/20 1040    Order Status: Completed Specimen: Blood Updated: 09/10/20 1358     Blood Culture, Routine No growth to date    Blood culture [[id number]] Collected: 09/09/20 0303    Order Status: Completed Specimen: Blood Updated: 09/10/20 1355     Blood Culture, Routine No growth to date    Culture, Anaerobe [[id number]] Collected: 09/07/20 1721    Order Status: Completed Specimen: Body Fluid from Knee, Right Updated: 09/10/20 0924     Anaerobic Culture Culture in progress    Narrative:      2) Right Knee Joint Fluid    Blood culture [[id number]] Collected: 09/07/20 0733    Order Status: Completed Specimen: Blood from Antecubital, Right Updated: 09/10/20 0822     Blood Culture, Routine No Growth to date      No Growth to date      No Growth to date      No Growth to date    Blood culture [[id number]] Collected: 09/10/20 0459    Order Status: Sent Specimen: Blood Updated: 09/10/20 0516    Fungus culture [[id number]] Collected: 09/07/20 1721    Order Status: Completed Specimen: Body Fluid from Knee, Right Updated: 09/09/20 1407     Fungus (Mycology) Culture Culture in progress    Narrative:      1) Right Knee Joint Fluid    Fungus culture [[id number]] Collected: 09/07/20 1721    Order Status: Completed Specimen: Body  Fluid from Knee, Right Updated: 09/09/20 1406     Fungus (Mycology) Culture Culture in progress    Narrative:      2) Right Knee Joint Fluid    Fungus culture [[id number]] Collected: 09/03/20 1647    Order Status: Completed Specimen: Joint Fluid from Knee, Right Updated: 09/09/20 1346     Fungus (Mycology) Culture Culture in progress    Culture, Anaerobe [[id number]] Collected: 09/07/20 1721    Order Status: Completed Specimen: Body Fluid from Knee, Right Updated: 09/09/20 1013     Anaerobic Culture Culture in progress    Narrative:      1) Right Knee Joint Fluid    Aerobic culture [[id number]] Collected: 09/07/20 1721    Order Status: Completed Specimen: Body Fluid from Knee, Right Updated: 09/09/20 0721     Aerobic Bacterial Culture No growth    Narrative:      1) Right Knee Joint Fluid    Aerobic culture [[id number]] Collected: 09/07/20 1721    Order Status: Completed Specimen: Body Fluid from Knee, Right Updated: 09/09/20 0721     Aerobic Bacterial Culture No growth    Narrative:      2) Right Knee Joint Fluid    AFB Culture & Smear [[id number]] Collected: 09/07/20 1721    Order Status: Completed Specimen: Body Fluid from Knee, Right Updated: 09/08/20 2127     AFB Culture & Smear Culture in progress     AFB CULTURE STAIN No acid fast bacilli seen.    Narrative:      1) Right Knee Joint Fluid    AFB Culture & Smear [[id number]] Collected: 09/07/20 1721    Order Status: Completed Specimen: Body Fluid from Knee, Right Updated: 09/08/20 2127     AFB Culture & Smear Culture in progress     AFB CULTURE STAIN No acid fast bacilli seen.    Narrative:      2) Right Knee Joint Fluid    Blood culture [[id number]]  (Abnormal)  (Susceptibility) Collected: 09/05/20 1115    Order Status: Completed Specimen: Blood from Peripheral, Right Hand Updated: 09/08/20 0820     Blood Culture, Routine Gram stain aer bottle: Gram positive cocci in clusters resembling Staph       Results called to and read back by: Shalini Pena RN  09/06/2020   11:17      METHICILLIN RESISTANT STAPHYLOCOCCUS AUREUS  ID consult required at [facility] and [facility] locations.      Gram stain [[id number]] Collected: 09/07/20 1721    Order Status: Completed Specimen: Body Fluid from Knee, Right Updated: 09/08/20 0003     Gram Stain Result No WBC's      No organisms seen    Narrative:      2) Right Knee Joint Fluid    Gram stain [[id number]] Collected: 09/07/20 1721    Order Status: Completed Specimen: Body Fluid from Knee, Right Updated: 09/07/20 2245     Gram Stain Result Rare WBC's      No organisms seen    Narrative:      1) Right Knee Joint Fluid    Aerobic culture [[id number]]  (Abnormal)  (Susceptibility) Collected: 09/03/20 1647    Order Status: Completed Specimen: Joint Fluid from Knee, Right Updated: 09/07/20 1049     Aerobic Bacterial Culture METHICILLIN RESISTANT STAPHYLOCOCCUS AUREUS  Few      Culture, Anaerobe [[id number]] Collected: 09/03/20 1647    Order Status: Completed Specimen: Joint Fluid from Knee, Right Updated: 09/07/20 1032     Anaerobic Culture No anaerobes isolated    Blood culture [[id number]]  (Abnormal)  (Susceptibility) Collected: 09/02/20 1441    Order Status: Completed Specimen: Blood Updated: 09/05/20 1401     Blood Culture, Routine Gram stain oksana bottle: Gram positive cocci in clusters resembling Staph       Results called to and read back by: Rossana Garza RN 09/03/2020  10:12      Gram stain aer bottle: Gram positive cocci in clusters resembling Staph       Positive results previously called      METHICILLIN RESISTANT STAPHYLOCOCCUS AUREUS  ID consult required at [facility] and [facility] locations.      Narrative:      right median cubital  right median    AFB Culture & Smear [[id number]] Collected: 09/03/20 1647    Order Status: Completed Specimen: Joint Fluid from Knee, Right Updated: 09/04/20 2127     AFB Culture & Smear Culture in progress     AFB CULTURE STAIN No acid fast bacilli seen.    Blood culture [[id number]]  (Abnormal)  Collected: 09/01/20 1417    Order Status: Completed Specimen: Blood from Antecubital, Right Hand Updated: 09/04/20 0838     Blood Culture, Routine Gram stain aer bottle: Gram positive cocci       Results called to and read back by: Jolene Barraza   09/02/2020  12:06      METHICILLIN RESISTANT STAPHYLOCOCCUS AUREUS  ID consult required at [facility] and [facility] locations.  For susceptibility see order #[id number]      Narrative:      Collection has been rescheduled by CBE at 09/01/2020 13:58 Reason:   due at 13:55  Collection has been rescheduled by CBE at 09/01/2020 13:58 Reason:   due at 13:55    Culture, Anaerobe [[id number]] Collected: 08/31/20 1754    Order Status: Completed Specimen: Wound from Foot, Left Updated: 09/04/20 0741     Anaerobic Culture No anaerobes isolated    Gram stain [[id number]] Collected: 09/03/20 1647    Order Status: Completed Specimen: Joint Fluid from Knee, Right Updated: 09/03/20 1851     Gram Stain Result Rare WBC's      No organisms seen

## 2020-09-10 NOTE — SUBJECTIVE & OBJECTIVE
Principal Problem:Sepsis due to methicillin resistant Staphylococcus aureus    Principal Orthopedic Problem: R knee septic arthritis    Interval History: Patient seen and examined at bedside.  No acute events overnight.  Pain controlled.  Transfers with PT yesterday.      Review of patient's allergies indicates:   Allergen Reactions    Vicodin [hydrocodone-acetaminophen] Itching       Current Facility-Administered Medications   Medication    acetaminophen tablet 1,000 mg    acetaminophen tablet 650 mg    albuterol-ipratropium 2.5 mg-0.5 mg/3 mL nebulizer solution 3 mL    artificial tears 0.5 % ophthalmic solution 1 drop    aspirin EC tablet 81 mg    atorvastatin tablet 40 mg    calcium carbonate 200 mg calcium (500 mg) chewable tablet 500 mg    carvediloL tablet 25 mg    ceftaroline fosamiL (TEFLARO) 400 mg in dextrose 5 % 50 mL IVPB    [START ON 9/11/2020] DAPTOmycin (CUBICIN) 1,050 mg in sodium chloride 0.9% IVPB    dextrose 50% injection 12.5 g    dextrose 50% injection 25 g    enoxaparin injection 30 mg    fluticasone furoate-vilanteroL 200-25 mcg/dose diskus inhaler 1 puff    fluticasone propionate 50 mcg/actuation nasal spray 50 mcg    gabapentin capsule 100 mg    glucagon (human recombinant) injection 1 mg    glucose chewable tablet 16 g    glucose chewable tablet 24 g    insulin aspart U-100 pen 0-5 Units    insulin aspart U-100 pen 10 Units    insulin detemir U-100 pen 25 Units    levothyroxine tablet 75 mcg    melatonin tablet 6 mg    ondansetron injection 4 mg    oxyCODONE immediate release tablet 5 mg    polyethylene glycol packet 17 g    polyethylene glycol packet 17 g    senna-docusate 8.6-50 mg per tablet 1 tablet    sodium chloride 0.9% bolus 250 mL    sodium chloride 0.9% flush 10 mL    Tdap vaccine injection 0.5 mL    vitamin D 1000 units tablet 1,000 Units     Objective:     Vital Signs (Most Recent):  Temp: 98.6 °F (37 °C) (09/09/20 2346)  Pulse: 90 (09/10/20  "0421)  Resp: 18 (09/10/20 0421)  BP: 113/67 (09/10/20 0421)  SpO2: 96 % (09/10/20 0421) Vital Signs (24h Range):  Temp:  [97.1 °F (36.2 °C)-98.6 °F (37 °C)] 98.6 °F (37 °C)  Pulse:  [] 90  Resp:  [16-18] 18  SpO2:  [95 %-100 %] 96 %  BP: ()/(54-76) 113/67     Weight: 116 kg (255 lb 11.7 oz)  Height: 6' 4" (193 cm)  Body mass index is 31.13 kg/m².        Ortho/SPM Exam    NAD  No increased WOB  Incisions c/d/i  SILT T/SP/DP  Motor intact T/DP  WWP extremities    Significant Labs:   CBC:   Recent Labs   Lab 09/09/20  0303 09/10/20  0459   WBC 22.01* 26.74*   HGB 9.3* 9.1*   HCT 31.9* 30.3*   * 491*     CMP:   Recent Labs   Lab 09/09/20  0303 09/10/20  0459    135*   K 4.0 3.9   CL 96 97   CO2 27 25    105   BUN 41* 51*   CREATININE 3.0* 3.7*   CALCIUM 8.7 8.4*   ALBUMIN 1.8* 1.6*   ANIONGAP 14 13   EGFRNONAA 21.1* 16.4*     All pertinent labs within the past 24 hours have been reviewed.    Significant Imaging: I have reviewed all pertinent imaging results/findings.  "

## 2020-09-10 NOTE — PROGRESS NOTES
[facility]  Infectious Disease  Progress Note    Patient Name: Ed Patton  MRN: [id number]  Admission Date: 8/30/2020  Length of Stay: 11 days  Attending Physician: Palmira Gage MD  Primary Care Provider: Qiana Chow MD    Isolation Status: Contact  Assessment/Plan:      * Sepsis due to methicillin resistant Staphylococcus aureus  MRSA septicemia. Still tachycardic, SpO2 reached 92%, renal function continues to worsen. WBCs not improving. ESR and CRP elevated, RF WNL. Vancomycin was supratherapeutic, switched to pulse dose regimen. Source of bacteremia unclear, may be left foot wound or a septic process at the right knee joint. Right knee now clinically suspicious for septic arthritis (see physical exam). Left foot wound growing Proteus and MRSA, urine growing Klebsiella. TTE negative for signs of cardiac infection, LUKAS is negative as well.     Blood cultures: MRSA  R knee fluid culture: Staph aureus  Left foot wound: Proteus, MRSA  Urine culture: Klebsiella    Recommendations:   Worsening leucocytosis to 26k now   -- NM wbc scan - no signs of increased signal/infectious foci anywhere  -- Last cultures from 9/8 growing MRSA. Patient was switched to daptomycin and ceftaroline combination therapy since 9/7/20 for 'persistent MRSA bacteremia'.  - CPK  up 1100s yesterday with worsening kidney function (Cr 3.7 from 1.4-1.5 3 days ago)  - Timeline of NANETTE and CPK elevation not consistent with dapto induced injury, however inadequate data to completely rule it out  - plus now given decreased alertness and minimal urine output, concerns about metabolic encephalopathy (?uremic from NANETTE), will switch back to vancomycin renally dosed (maintain trough 15-20). Stopped daptomycin-ceftaroline.  -- Nephrology following for NANETTE -  Concerned about ATN (on urine microscopy) from pre+post renal etiology  -- Will repeat CPK today - follow up on results  -- s/p R knee aspiration (WBC 11k, 90% segs, but  negative gram stain and no crystals), could be the source s/p I&D on 9/7/20 for source control  -- MRI Lumbar spine with no signs of psoas abscess, unremarkable overall  -- MRI foot from 8/31 did not show any signs of OM, however, with persistent bacteremia, discussed with podiatry if wounds needs debridement. Per podiatry - likely no debridement needed, will get vascular US GINA of LE to assess blood flow and risk of complications if any procedure is planned.   - If no other possible source found, ultimately patient will need to complete 4 weeks total Abx therapy counting from bacterial clearance since source control (knee I&D). Daily blood cultures recommended.   -- Discontinued Ciprofloxacine on 9/7 (completed 7 days, was administered for diabetic foot wound growing proteus and urine growing Klebseilla)  -- Will need a PICC line after 48-72 hours of clearance of bacteremia  -- Will continue to follow closely      Anticipated Disposition:TBD    Thank you for your consult. I will follow-up with patient. Please contact us if you have any additional questions.    Shabbir Quezada MD  Infectious Disease  [facility]    Subjective:     Principal Problem:Sepsis due to methicillin resistant Staphylococcus aureus    HPI: 64 yo M with Hx of DM2 (on insulin), chronic hypoxemic respiratory failure (on O2 at home), chronic systolic heart failure presented to ED after recurrent falls 7 and 8 days ago. He has a history of falls but reports that his tendency to fall has worsened recently, resulting in injury to his lower back and right thigh. After his fall last Tuesday he looked at his left foot and noticed a wound, denies pain or drainage related to the wound, he admits to not regularly checking his feet and does not know how long the wound has been present. He believes the wound is related to a tack that he previously found imbedded in the bottom of a slipper. He had previously seen Ang Lugo DPM  for diabetic foot care but has not seen her this year due to fear of the coronavirus pandemic. He has been getting home health services including physical therapy. No subjective change as of today, still denies pain and drainage related to the wound. Denies F/C/N/V.  Interval History: Patient drowsy this morning and also again on our assessment later in the day. Dozing off and not able to hold a conversation because of that. Kidney function keeps worsening, minimal urine output, Hypotensive in last 48 hours, urinary retention.    Review of Systems   Constitutional: Negative for chills and fever.   HENT: Negative for congestion, ear pain, sneezing and sore throat.    Eyes: Negative for pain.   Respiratory: Negative for cough and shortness of breath.    Cardiovascular: Negative for chest pain.   Gastrointestinal: Negative for abdominal pain, constipation, diarrhea, nausea and vomiting.   Endocrine: Negative for polyuria.   Genitourinary: Positive for decreased urine volume. Negative for difficulty urinating, dysuria and flank pain.   Musculoskeletal: Positive for arthralgias (R knee pain). Negative for back pain, neck pain and neck stiffness.   Skin: Positive for wound. Negative for rash.   Neurological: Negative for numbness and headaches.   Psychiatric/Behavioral: Positive for confusion and sleep disturbance. The patient is not nervous/anxious.      Objective:     Vital Signs (Most Recent):  Temp: 97.7 °F (36.5 °C) (09/10/20 1253)  Pulse: 88 (09/10/20 1331)  Resp: 16 (09/10/20 1331)  BP: (!) 94/58 (09/10/20 1500)  SpO2: 99 % (09/10/20 1331) Vital Signs (24h Range):  Temp:  [97.7 °F (36.5 °C)-98.6 °F (37 °C)] 97.7 °F (36.5 °C)  Pulse:  [] 88  Resp:  [16-20] 16  SpO2:  [95 %-100 %] 99 %  BP: ()/(52-72) 94/58     Weight: 116 kg (255 lb 11.7 oz)  Body mass index is 31.13 kg/m².    Estimated Creatinine Clearance: 28.5 mL/min (A) (based on SCr of 3.7 mg/dL (H)).    Physical Exam  Vitals signs reviewed.    Constitutional:       General: He is not in acute distress.     Appearance: Normal appearance. He is normal weight. He is not diaphoretic.   HENT:      Head: Normocephalic and atraumatic.      Right Ear: External ear normal.      Left Ear: External ear normal.      Nose: Nose normal. No congestion or rhinorrhea.   Eyes:      General:         Right eye: No discharge.         Left eye: No discharge.      Extraocular Movements: Extraocular movements intact.   Neck:      Musculoskeletal: Normal range of motion and neck supple. No neck rigidity or muscular tenderness.   Cardiovascular:      Rate and Rhythm: Normal rate and regular rhythm.      Heart sounds: No murmur. No gallop.       Comments: Diminished DP/PT pulses  Pulmonary:      Effort: Pulmonary effort is normal. No respiratory distress.      Breath sounds: No wheezing.      Comments: On 2 L/m oxygen  Chest:      Chest wall: No tenderness.   Abdominal:      General: There is no distension.      Palpations: Abdomen is soft.      Tenderness: There is no abdominal tenderness. There is no guarding.   Musculoskeletal:         General: Tenderness (right knee) present.      Comments: Cooling brace applied to right knee   Very limited ROM at right knee since the infection and then drainage   Skin:     General: Skin is warm and dry.      Findings: Lesion (left foot - dry, no drainage, unchanged otherwise) present.   Neurological:      Mental Status: He is alert and oriented to person, place, and time.      Sensory: No sensory deficit.      Gait: Gait normal.   Psychiatric:         Mood and Affect: Mood normal.         Behavior: Behavior normal.         Thought Content: Thought content normal.         Judgment: Judgment normal.         Significant Labs: All pertinent labs within the past 24 hours have been reviewed.    Significant Imaging: I have reviewed all pertinent imaging results/findings within the past 24 hours.

## 2020-09-10 NOTE — PROGRESS NOTES
[facility]  Orthopedics  Progress Note    Patient Name: Ed Patton  MRN: [id number]  Admission Date: 8/30/2020  Hospital Length of Stay: 11 days  Attending Provider: Palmira Gage MD  Primary Care Provider: Qiana Chow MD  Follow-up For: Procedure(s) (LRB):  ARTHROSCOPY, KNEE, RIGHT  (Right)    Post-Operative Day: 3 Days Post-Op  Subjective:     Principal Problem:Sepsis due to methicillin resistant Staphylococcus aureus    Principal Orthopedic Problem: R knee septic arthritis    Interval History: Patient seen and examined at bedside.  No acute events overnight.  Pain controlled.  Transfers with PT yesterday.      Review of patient's allergies indicates:   Allergen Reactions    Vicodin [hydrocodone-acetaminophen] Itching       Current Facility-Administered Medications   Medication    acetaminophen tablet 1,000 mg    acetaminophen tablet 650 mg    albuterol-ipratropium 2.5 mg-0.5 mg/3 mL nebulizer solution 3 mL    artificial tears 0.5 % ophthalmic solution 1 drop    aspirin EC tablet 81 mg    atorvastatin tablet 40 mg    calcium carbonate 200 mg calcium (500 mg) chewable tablet 500 mg    carvediloL tablet 25 mg    ceftaroline fosamiL (TEFLARO) 400 mg in dextrose 5 % 50 mL IVPB    [START ON 9/11/2020] DAPTOmycin (CUBICIN) 1,050 mg in sodium chloride 0.9% IVPB    dextrose 50% injection 12.5 g    dextrose 50% injection 25 g    enoxaparin injection 30 mg    fluticasone furoate-vilanteroL 200-25 mcg/dose diskus inhaler 1 puff    fluticasone propionate 50 mcg/actuation nasal spray 50 mcg    gabapentin capsule 100 mg    glucagon (human recombinant) injection 1 mg    glucose chewable tablet 16 g    glucose chewable tablet 24 g    insulin aspart U-100 pen 0-5 Units    insulin aspart U-100 pen 10 Units    insulin detemir U-100 pen 25 Units    levothyroxine tablet 75 mcg    melatonin tablet 6 mg    ondansetron injection 4 mg    oxyCODONE immediate release tablet 5 mg  "   polyethylene glycol packet 17 g    polyethylene glycol packet 17 g    senna-docusate 8.6-50 mg per tablet 1 tablet    sodium chloride 0.9% bolus 250 mL    sodium chloride 0.9% flush 10 mL    Tdap vaccine injection 0.5 mL    vitamin D 1000 units tablet 1,000 Units     Objective:     Vital Signs (Most Recent):  Temp: 98.6 °F (37 °C) (09/09/20 2346)  Pulse: 90 (09/10/20 0421)  Resp: 18 (09/10/20 0421)  BP: 113/67 (09/10/20 0421)  SpO2: 96 % (09/10/20 0421) Vital Signs (24h Range):  Temp:  [97.1 °F (36.2 °C)-98.6 °F (37 °C)] 98.6 °F (37 °C)  Pulse:  [] 90  Resp:  [16-18] 18  SpO2:  [95 %-100 %] 96 %  BP: ()/(54-76) 113/67     Weight: 116 kg (255 lb 11.7 oz)  Height: 6' 4" (193 cm)  Body mass index is 31.13 kg/m².        Ortho/SPM Exam    NAD  No increased WOB  Incisions c/d/i  SILT T/SP/DP  Motor intact T/DP  WWP extremities    Significant Labs:   CBC:   Recent Labs   Lab 09/09/20  0303 09/10/20  0459   WBC 22.01* 26.74*   HGB 9.3* 9.1*   HCT 31.9* 30.3*   * 491*     CMP:   Recent Labs   Lab 09/09/20  0303 09/10/20  0459    135*   K 4.0 3.9   CL 96 97   CO2 27 25    105   BUN 41* 51*   CREATININE 3.0* 3.7*   CALCIUM 8.7 8.4*   ALBUMIN 1.8* 1.6*   ANIONGAP 14 13   EGFRNONAA 21.1* 16.4*     All pertinent labs within the past 24 hours have been reviewed.    Significant Imaging: I have reviewed all pertinent imaging results/findings.    Assessment/Plan:     Septic arthritis of knee, right  65 y.o. male s/p R knee scope I&D 9/7/20    VS:  stable  Nerve block:  none  PT/OT:  WBAT  DVT PPx:  lovenox per primary  Cultures:  MRSA from R knee aspiration 9/3/20; NGTD intraop cx; BCx 9/8 positive  Abx:  Daptomycin, ceftaroline  Labs:  last , , Cr 3.7, Hb 9.1, WBC 27  Drain:  none  Newell:  none    F/u cx, ID recs    Right knee pain              Durga Walker MD  Orthopedics  [facility]  "

## 2020-09-10 NOTE — PLAN OF CARE
Problem: Fall Injury Risk  Goal: Absence of Fall and Fall-Related Injury  Outcome: Ongoing, Progressing     Problem: Adult Inpatient Plan of Care  Goal: Plan of Care Review  Outcome: Ongoing, Progressing  Goal: Patient-Specific Goal (Individualization)  Outcome: Ongoing, Progressing  Goal: Absence of Hospital-Acquired Illness or Injury  Outcome: Ongoing, Progressing  Goal: Optimal Comfort and Wellbeing  Outcome: Ongoing, Progressing  Goal: Readiness for Transition of Care  Outcome: Ongoing, Progressing  Goal: Rounds/Family Conference  Outcome: Ongoing, Progressing     Problem: Diabetes Comorbidity  Goal: Blood Glucose Level Within Desired Range  Outcome: Ongoing, Progressing     Problem: Adjustment to Illness (Sepsis/Septic Shock)  Goal: Optimal Coping  Outcome: Ongoing, Progressing     Problem: Bleeding (Sepsis/Septic Shock)  Goal: Absence of Bleeding  Outcome: Ongoing, Progressing     Problem: Glycemic Control Impaired (Sepsis/Septic Shock)  Goal: Blood Glucose Level Within Desired Range  Outcome: Ongoing, Progressing     Problem: Hemodynamic Instability (Sepsis/Septic Shock)  Goal: Effective Tissue Perfusion  Outcome: Ongoing, Progressing     Problem: Infection (Sepsis/Septic Shock)  Goal: Absence of Infection Signs/Symptoms  Outcome: Ongoing, Progressing     Problem: Nutrition Impaired (Sepsis/Septic Shock)  Goal: Optimal Nutrition Intake  Outcome: Ongoing, Progressing     Problem: Respiratory Compromise (Sepsis/Septic Shock)  Goal: Effective Oxygenation and Ventilation  Outcome: Ongoing, Progressing     Problem: Skin Injury Risk Increased  Goal: Skin Health and Integrity  Outcome: Ongoing, Progressing     Problem: Wound  Goal: Optimal Wound Healing  Outcome: Ongoing, Progressing     Problem: Electrolyte Imbalance (Acute Kidney Injury/Impairment)  Goal: Serum Electrolyte Balance  Outcome: Ongoing, Progressing     Problem: Fluid Imbalance (Acute Kidney Injury/Impairment)  Goal: Optimal Fluid Balance  Outcome:  Ongoing, Progressing     Problem: Hematologic Alteration (Acute Kidney Injury/Impairment)  Goal: Hemoglobin, Hematocrit and Platelets Within Normal Range  Outcome: Ongoing, Progressing     Problem: Oral Intake Inadequate (Acute Kidney Injury/Impairment)  Goal: Optimal Nutrition Intake  Outcome: Ongoing, Progressing     Problem: Renal Function Impairment (Acute Kidney Injury/Impairment)  Goal: Effective Renal Function  Outcome: Ongoing, Progressing     Problem: Infection  Goal: Infection Symptom Resolution  Outcome: Ongoing, Progressing

## 2020-09-10 NOTE — PROGRESS NOTES
"  [facility] Medicine  Telemedicine Progress Note    Patient Name: Ed Patton  MRN: [id number]  Patient Class: IP- Inpatient   Admission Date: 8/30/2020  Length of Stay: 11 days  Attending Physician: Palmira Gage MD  Primary Care Provider: Qiana Chow MD    [facility] Medicine Team: [facility] VIRTUAL TEAM 10 Palmira Gage MD    Provider visited patient at bedside.     Patient was transferred to the telemedicine service on: 9/5/2020    Subjective:     Admission CC:   Chief Complaint   Patient presents with    Frequent Falls     frequent falls, weakness, "stepped on a tack" left leg now swollen.     Leg Swelling     Follow up visit for: Sepsis due to methicillin resistant Staphylococcus aureus    Interval History / Events Overnight:   Patient drowsy with poor memory of day's events.  BP low after coreg 25 mg given with minimal increase with 250 cc IV bolus so 500 cc IV bolus given; afebrile; minimal (<100 cc) UOP to toure; Cr continues to rise; lactate nl and procalcitonin minimally elevated. Patient without cough.  Blood culture remains negative from 9/8 and 9/9 with repeat ordered 9/10.        Data reviewed 9/10/2020:       Review of Systems   Constitutional: Negative for fever.   Respiratory: Negative for shortness of breath.    Gastrointestinal: Negative for abdominal pain, nausea and vomiting.   Psychiatric/Behavioral: Positive for confusion.     Objective:     Vital Signs (Most Recent):  Temp: 97.7 °F (36.5 °C) (09/10/20 1253)  Pulse: 88 (09/10/20 1331)  Resp: 16 (09/10/20 1331)  BP: (!) 94/58 (09/10/20 1500)  SpO2: 99 % (09/10/20 1331) Vital Signs (24h Range):  Temp:  [97.5 °F (36.4 °C)-98.6 °F (37 °C)] 97.7 °F (36.5 °C)  Pulse:  [] 88  Resp:  [16-20] 16  SpO2:  [95 %-100 %] 99 %  BP: ()/(52-76) 94/58     Weight: 116 kg (255 lb 11.7 oz)  Body mass index is 31.13 kg/m².    Intake/Output Summary (Last 24 hours) at 9/10/2020 1613  Last data filed at 9/10/2020 " 1200  Gross per 24 hour   Intake 580 ml   Output 1135 ml   Net -555 ml      Physical Exam  Constitutional:       General: He is not in acute distress.     Appearance: Normal appearance. He is not diaphoretic.   HENT:      Head: Normocephalic and atraumatic.   Eyes:      General: Lids are normal. No scleral icterus.        Right eye: No discharge.         Left eye: No discharge.      Conjunctiva/sclera: Conjunctivae normal.   Neck:      Musculoskeletal: No neck rigidity.      Trachea: Phonation normal.   Cardiovascular:      Rate and Rhythm: Normal rate and regular rhythm.   Pulmonary:      Effort: Pulmonary effort is normal. No tachypnea, accessory muscle usage or respiratory distress.      Breath sounds: Examination of the right-lower field reveals decreased breath sounds. Examination of the left-lower field reveals decreased breath sounds. Decreased breath sounds present. No wheezing.   Abdominal:      General: Bowel sounds are normal. There is distension.      Palpations: Abdomen is soft.      Tenderness: There is no abdominal tenderness.   Musculoskeletal:      Right knee: He exhibits swelling.      Right lower leg: Edema present.      Left lower leg: Edema present.   Neurological:      Mental Status: He is alert. He is disoriented.      Cranial Nerves: Cranial nerves are intact.      Motor: Motor function is intact.   Psychiatric:         Attention and Perception: He is inattentive.         Mood and Affect: Affect normal.         Behavior: Behavior is cooperative.         Cognition and Memory: He exhibits impaired recent memory.         Significant Labs:   Recent Labs   Lab 09/08/20  0408 09/09/20  0303 09/10/20  0459 09/10/20  1513   WBC 18.87* 22.01* 26.74*  --    HGB 10.1* 9.3* 9.1* 9.1*   HCT 34.5* 31.9* 30.3* 29.8*   * 477* 491*  --      Recent Labs   Lab 09/03/20  1647  09/08/20  0408 09/09/20  0303 09/10/20  0459   GRAN  --    < > 80.0*  15.1* 76.8*  16.9* 84.7*  22.7*   SEGS 90  --   --   --    --    LYMPH  --    < > 6.0*  1.1 8.1*  1.8 3.9*  1.1   LYMPHS 8  --   --   --   --    MONO  --    < > 7.5  1.4* 7.2  1.6* 7.2  1.9*   EOS  --    < > 0.8* 1.1* 0.7*    < > = values in this interval not displayed.     Recent Labs   Lab 09/08/20  0408 09/09/20  0303 09/10/20  0459   * 137 135*   K 4.1 4.0 3.9   CL 95 96 97   CO2 29 27 25   BUN 26* 41* 51*   CREATININE 1.9* 3.0* 3.7*   * 105 105   CALCIUM 8.6* 8.7 8.4*   ALBUMIN 1.9* 1.8* 1.6*   MG 1.9 1.9 2.0   PHOS 4.7* 5.4* 5.7*     Recent Labs   Lab 09/06/20  0517 09/08/20  0408 09/10/20  0459   .3* 176.2* 173.0*     Recent Labs   Lab 09/05/20  1346 09/08/20  0408 09/09/20  0303 09/10/20  0459   * 164 1142*  --    BNP  --   --   --  216*     Recent Labs   Lab 08/30/20  1532 09/03/20  0410 09/03/20  1538 09/10/20  1514   PROCAL  --   --  0.13 0.26*   LACTATE 1.4  --   --  1.2   SEDRATE  --  80*  --   --      SARS-CoV2 (COVID-19) Qualitative PCR (no units)   Date Value   09/03/2020 Not Detected     SARS-CoV-2 RNA, Amplification, Qual (no units)   Date Value   09/07/2020 Negative   08/30/2020 Negative     Recent Labs   Lab 08/31/20  0434   HGBA1C 9.5*     Recent Labs   Lab 09/09/20  2110 09/10/20  0754 09/10/20  1253   POCTGLUCOSE 195* 205* 92     Microbiology Results (last 7 days)     Procedure Component Value Units Date/Time    Blood culture [[id number]]  (Abnormal)  (Susceptibility) Collected: 09/08/20 0408    Order Status: Completed Specimen: Blood from Antecubital, Right Arm Updated: 09/10/20 1601     Blood Culture, Routine Gram stain aer bottle: Gram positive cocci in clusters resembling Staph      Results called to and read back by:Jany Santiago RN 09/08/2020  22:29      METHICILLIN RESISTANT STAPHYLOCOCCUS AUREUS  ID consult required at [facility].Critical access hospital,[place] and [facility] locations.      Blood culture [[id number]]  (Abnormal) Collected: 09/06/20 0517    Order Status: Completed Specimen: Blood Updated: 09/10/20 1538     Blood Culture,  Routine Gram stain oksana bottle: Gram positive cocci in clusters resembling Staph       Results called to and read back by: Mckay Cummings RN 09/07/2020  11:37      METHICILLIN RESISTANT STAPHYLOCOCCUS AUREUS  ID consult required at [facility] Jasvir.Isidra,Lorraine and Yogesh lerner.  For susceptibility see order #[id number]      Blood culture [[id number]] Collected: 09/08/20 1247    Order Status: Completed Specimen: Blood from Antecubital, Right Hand Updated: 09/10/20 1412     Blood Culture, Routine No Growth to date      No Growth to date      No Growth to date    Narrative:      Collection has been rescheduled by RB8 at 09/08/2020 11:43 Reason:   Patient unavailable,nurse Mckay #[id number] was notified  Collection has been rescheduled by RB8 at 09/08/2020 11:43 Reason:   Patient unavailable,nurse Mckay #[id number] was notified    Blood culture [[id number]] Collected: 09/08/20 1247    Order Status: Completed Specimen: Blood from Antecubital, Right Arm Updated: 09/10/20 1412     Blood Culture, Routine No Growth to date      No Growth to date      No Growth to date    Narrative:      Collection has been rescheduled by RB8 at 09/08/2020 11:43 Reason:   Patient unavailable,nurse Mckay #[id number] was notified  Collection has been rescheduled by RB8 at 09/08/2020 11:43 Reason:   Patient unavailable,nurse Mckay #[id number] was notified    Blood culture [[id number]] Collected: 09/09/20 1040    Order Status: Completed Specimen: Blood Updated: 09/10/20 1358     Blood Culture, Routine No growth to date    Blood culture [[id number]] Collected: 09/09/20 0303    Order Status: Completed Specimen: Blood Updated: 09/10/20 1355     Blood Culture, Routine No growth to date    Culture, Anaerobe [[id number]] Collected: 09/07/20 1721    Order Status: Completed Specimen: Body Fluid from Knee, Right Updated: 09/10/20 0924     Anaerobic Culture Culture in progress    Narrative:      2) Right Knee Joint Fluid    Blood culture [[id number]] Collected: 09/07/20 0733    Order  Status: Completed Specimen: Blood from Antecubital, Right Updated: 09/10/20 0822     Blood Culture, Routine No Growth to date      No Growth to date      No Growth to date      No Growth to date    Blood culture [[id number]] Collected: 09/10/20 0459    Order Status: Sent Specimen: Blood Updated: 09/10/20 0516    Fungus culture [[id number]] Collected: 09/07/20 1721    Order Status: Completed Specimen: Body Fluid from Knee, Right Updated: 09/09/20 1407     Fungus (Mycology) Culture Culture in progress    Narrative:      1) Right Knee Joint Fluid    Fungus culture [[id number]] Collected: 09/07/20 1721    Order Status: Completed Specimen: Body Fluid from Knee, Right Updated: 09/09/20 1406     Fungus (Mycology) Culture Culture in progress    Narrative:      2) Right Knee Joint Fluid    Fungus culture [[id number]] Collected: 09/03/20 1647    Order Status: Completed Specimen: Joint Fluid from Knee, Right Updated: 09/09/20 1346     Fungus (Mycology) Culture Culture in progress    Culture, Anaerobe [[id number]] Collected: 09/07/20 1721    Order Status: Completed Specimen: Body Fluid from Knee, Right Updated: 09/09/20 1013     Anaerobic Culture Culture in progress    Narrative:      1) Right Knee Joint Fluid    Aerobic culture [[id number]] Collected: 09/07/20 1721    Order Status: Completed Specimen: Body Fluid from Knee, Right Updated: 09/09/20 0721     Aerobic Bacterial Culture No growth    Narrative:      1) Right Knee Joint Fluid    Aerobic culture [[id number]] Collected: 09/07/20 1721    Order Status: Completed Specimen: Body Fluid from Knee, Right Updated: 09/09/20 0721     Aerobic Bacterial Culture No growth    Narrative:      2) Right Knee Joint Fluid    AFB Culture & Smear [[id number]] Collected: 09/07/20 1721    Order Status: Completed Specimen: Body Fluid from Knee, Right Updated: 09/08/20 2127     AFB Culture & Smear Culture in progress     AFB CULTURE STAIN No acid fast bacilli seen.    Narrative:      1) Right Knee  Joint Fluid    AFB Culture & Smear [[id number]] Collected: 09/07/20 1721    Order Status: Completed Specimen: Body Fluid from Knee, Right Updated: 09/08/20 2127     AFB Culture & Smear Culture in progress     AFB CULTURE STAIN No acid fast bacilli seen.    Narrative:      2) Right Knee Joint Fluid    Blood culture [[id number]]  (Abnormal)  (Susceptibility) Collected: 09/05/20 1115    Order Status: Completed Specimen: Blood from Peripheral, Right Hand Updated: 09/08/20 0820     Blood Culture, Routine Gram stain aer bottle: Gram positive cocci in clusters resembling Staph       Results called to and read back by: Shalini Pena RN  09/06/2020  11:17      METHICILLIN RESISTANT STAPHYLOCOCCUS AUREUS  ID consult required at [facility] Jas[facility].Lorraine Miner and Yogesh lerner.      Gram stain [[id number]] Collected: 09/07/20 1721    Order Status: Completed Specimen: Body Fluid from Knee, Right Updated: 09/08/20 0003     Gram Stain Result No WBC's      No organisms seen    Narrative:      2) Right Knee Joint Fluid    Gram stain [[id number]] Collected: 09/07/20 1721    Order Status: Completed Specimen: Body Fluid from Knee, Right Updated: 09/07/20 2245     Gram Stain Result Rare WBC's      No organisms seen    Narrative:      1) Right Knee Joint Fluid    Aerobic culture [[id number]]  (Abnormal)  (Susceptibility) Collected: 09/03/20 1647    Order Status: Completed Specimen: Joint Fluid from Knee, Right Updated: 09/07/20 1049     Aerobic Bacterial Culture METHICILLIN RESISTANT STAPHYLOCOCCUS AUREUS  Few      Culture, Anaerobe [[id number]] Collected: 09/03/20 1647    Order Status: Completed Specimen: Joint Fluid from Knee, Right Updated: 09/07/20 1032     Anaerobic Culture No anaerobes isolated    Blood culture [[id number]]  (Abnormal)  (Susceptibility) Collected: 09/02/20 1441    Order Status: Completed Specimen: Blood Updated: 09/05/20 1401     Blood Culture, Routine Gram stain oksana bottle: Gram positive cocci in clusters resembling Staph        Results called to and read back by: Rossana Garza RN 09/03/2020  10:12      Gram stain aer bottle: Gram positive cocci in clusters resembling Staph       Positive results previously called      METHICILLIN RESISTANT STAPHYLOCOCCUS AUREUS  ID consult required at [facility].      Narrative:      right median cubital  right median    AFB Culture & Smear [[id number]] Collected: 09/03/20 1647    Order Status: Completed Specimen: Joint Fluid from Knee, Right Updated: 09/04/20 2127     AFB Culture & Smear Culture in progress     AFB CULTURE STAIN No acid fast bacilli seen.    Blood culture [[id number]]  (Abnormal) Collected: 09/01/20 1417    Order Status: Completed Specimen: Blood from Antecubital, Right Hand Updated: 09/04/20 0838     Blood Culture, Routine Gram stain aer bottle: Gram positive cocci       Results called to and read back by: Jolene Barraza   09/02/2020  12:06      METHICILLIN RESISTANT STAPHYLOCOCCUS AUREUS  ID consult required at [facility] and [facility].  For susceptibility see order #[id number]      Narrative:      Collection has been rescheduled by CBE at 09/01/2020 13:58 Reason:   due at 13:55  Collection has been rescheduled by CBE at 09/01/2020 13:58 Reason:   due at 13:55    Culture, Anaerobe [[id number]] Collected: 08/31/20 1754    Order Status: Completed Specimen: Wound from Foot, Left Updated: 09/04/20 0741     Anaerobic Culture No anaerobes isolated    Gram stain [[id number]] Collected: 09/03/20 1647    Order Status: Completed Specimen: Joint Fluid from Knee, Right Updated: 09/03/20 1851     Gram Stain Result Rare WBC's      No organisms seen            Significant Imaging:     Assessment/Plan:      Active Diagnoses:    Diagnosis Date Noted POA    PRINCIPAL PROBLEM:  Sepsis due to methicillin resistant Staphylococcus aureus [A41.02] 08/30/2020 Yes    NANETTE (acute kidney injury) [N17.9] 09/09/2020 Unknown    Urinary retention [R33.9] 09/09/2020  Unknown    Acute renal insufficiency [N28.9] 09/09/2020 Unknown    Septic arthritis of knee, right [M00.9] 09/07/2020 Yes    Staphylococcal arthritis of right knee [M00.061] 09/05/2020 Yes    Right knee pain [M25.561] 09/03/2020 Yes    Diabetic ulcer of left foot associated with type 2 diabetes mellitus, with fat layer exposed [E11.621, L97.522] 08/31/2020 Yes    Cellulitis of left lower extremity [L03.116] 08/30/2020 Yes    Diabetic foot infection [E11.628, L08.9] 08/30/2020 Yes    Bacteremia due to methicillin resistant Staphylococcus aureus [R78.81] 08/30/2020 Yes    COPD mixed type [J44.9] 10/15/2019 Yes     Chronic    Postablative hypothyroidism [E89.0] 12/06/2018 Yes     Chronic    Uncontrolled type 2 diabetes mellitus with hyperglycemia, with long-term current use of insulin [E11.65, Z79.4] 10/09/2017 Not Applicable     Chronic    Chronic respiratory failure with hypoxia, on home oxygen therapy [J96.11, Z99.81] 10/09/2017 Not Applicable     Chronic    Diabetic polyneuropathy associated with type 2 diabetes mellitus [E11.42] 08/25/2015 Yes     Chronic    CKD stage 3 due to type 2 diabetes mellitus [E11.22, N18.3] 12/23/2014 Yes     Chronic    Chronic systolic congestive heart failure [I50.22] 05/07/2013 Yes     Chronic      Problems Resolved During this Admission:       Overview / ICU Course:    Ed Patton is a 65 y.o. male admitted for Sepsis due to methicillin resistant Staphylococcus aureus.    Inpatient Medications Prescribed for Management of Current Problems:     Scheduled Meds:    artificial tears  1 drop Both Eyes TID    aspirin  81 mg Oral Daily    carvediloL  6.25 mg Oral BID    ceftaroline fosamil  400 mg Intravenous Q8H    [START ON 9/11/2020] DAPTOmycin (CUBICIN)  IV  1,050 mg Intravenous Q48H    enoxaparin  30 mg Subcutaneous Q24H    fluticasone furoate-vilanteroL  1 puff Inhalation Daily    fluticasone propionate  1 spray Each Nostril Daily    insulin aspart U-100   10 Units Subcutaneous TIDWM    insulin detemir U-100  25 Units Subcutaneous QHS    levothyroxine  75 mcg Oral Before breakfast    polyethylene glycol  17 g Oral BID    senna-docusate 8.6-50 mg  1 tablet Oral BID    vitamin D  1,000 Units Oral Daily     Continuous Infusions:   As Needed: acetaminophen, acetaminophen, albuterol-ipratropium, calcium carbonate, dextrose 50%, dextrose 50%, gabapentin, glucagon (human recombinant), glucose, glucose, insulin aspart U-100, melatonin, ondansetron, oxyCODONE, polyethylene glycol, sodium chloride 0.9%, sodium chloride 0.9%, DIPH,PERTUS (ADACEL),TETANUS PF VAC (ADULT)      Assessment and Plan by Problem    Diabetic foot infection  Bacteremia due to methicillin resistant Staphylococcus aureus  Diabetic ulcer of left foot associated with type 2 diabetes mellitus, with fat layer exposed  Right knee pain / septic arthritis  Appreciate Infectious Disease, Orthopedic Surgery. Treating with antibiotics per ID.   Arthrocentesis and cultures suggest septic knee. LUKAS to evaluate for endocarditis negative for vegetations.  Plan for I and D of the knee 9/7/2020.  Per ID: Antibiotics changed to daptomycin and ceftaroline combination therapy for 'persistent MRSA bacteremia'  -- Monitor CPK and CBC (ceftaroline can lead to leucopenia)  -- MRI lumbar spine w/wo contrast ordered  -- NM WB WBC scan for inflammatory localization pending  -- Repeat blood cultures until neagtive  -- PICC line after clearance of bacteremia, anticipate prolonged IV antibiotics on discharge.  -- ID recommends: MRI Lumbar spine/L psoas to r/o abscess. Broaden coverage to dapto/ceftaroline. Baseline CK obtained. Renal dose antibiotics.  -- s/p R knee scope I&D 9/7/20. PT/OT:  WBAT  -- Per ID: Blood cultures seem to be clearing since I&D of knee / adequate source control. Podiatry considering need for debridement. Await repeat blood cultures to be negative x 72h before placing PICC line.  -GINA's ordered by podiatry  but may need vascular surgery evaluation, unfortunately he is not a candidate for contrasted studies due to NANETTE  -- patient with low BP 9/10/2020 -suspect is due to BP med but must rule out infectious cause - H/H stable; coreg decreased with holding parameters in place; IVF bolus due to hypotension and low urine output; blood culture pending; lactic acid nl - check CXR; persistent leukocytosis and elevated CRP     Sepsis due to DM wound, UTI, bacteremia  Due to Proteus and MRSA. Continued management with antibiotics.  Resolved.  Follow up on ID recommendations.  Discontinued Ciprofloxacine (completed 7 days, was administered for diabetic foot wound growing Proteus and urine growing Klebseilla)  -Foot wound Dressing changed 9/9 by Podiatry; plan to consider repeating debridement soon.      Cellulitis of left lower extremity  Continued antibiotics.  Resolved     COPD mixed type  Continue home fluticasone-vilanterol, albuterol nebulizer.     Postablative hypothyroidism  Continue home levothyroxine.     Chronic respiratory failure with hypoxia, on home oxygen therapy  On 2 L nasal cannula chronically due to combination of COPD and CHF; titrate as needed.     Uncontrolled type 2 diabetes mellitus with hyperglycemia, with long-term current use of insulin  Diabetic polyneuropathy associated with type 2 diabetes mellitus  Takes insulin NPH-insulin regular 70/30 45 units before breakfast and 35 units before dinner.   Giving insulin detemir 29 units daily and insulin aspart 14 units with meals and correction dose scale. Monitor BGs.  Increased basal insulin slightly 9/8. BGs improved.  Due to increase in sCr, insulin regimen reduced 9/9 and 9/10. Monitor for hyperglycemia/hypoglycemia.     Chronic systolic congestive heart failure  Continue home carvedilol, lisinopril. Lasix on admit.  EF 25%  Held Lasix 9/8 and 9/9 due to sCr increase after being NPO 9/7  Lisinopril held 9/9 due to sCr of 3; parameters placed on  Coreg  -hesitant with IVF    Acute kidney injury  Urinary retention - toure in place  - Patient has been NPO for procedures intermittently for the past few days while on Lasix b.i.d.  Lasix held 9/8 and 9/9. He received a normal saline bolus overnight 9/9 and a little more fluid 9/9 before found to be anuric for the day. Bladder scan showed >500 mL. He now has a Toure. ACEi held for now.  - Per Nephrology: Given hypotension and simultaneous volume overload reccomend IVF as small volume boluses rather than continuous IVF to maintain MAPS >65.   Repeat UA and Urine culture.  Strict I/Os. Daily weights. C3/C4 levels.  - Treating constipation with Miralax and Angelia-Colace - last BM on 9/2; will also check an abdominal film       Diet: Diet Adult Regular (IDDSI Level 7)  GI Prophylaxis: Not indicated  Significant LDAs:   IV Access Type: Peripheral  Urinary Catheter Indication if present: Patient Does Not Have Urinary Catheter  Other Lines/Tubes/Drains:    Goals of Care:    Previous admission:  4/15/16  Likely prognosis:  Fair  Code Status: Full Code  Comfort Only: No  Hospice: No  Goals at discharge: remain at home    Discharge Planning   EDY: 9/15/2020     Code Status: Full Code   Is the patient medically ready for discharge?: No    Reason for patient still in hospital (select all that apply): Patient unstable, Patient trending condition and Treatment  Discharge Plan A: SNF  Discharge Delays: None known at this time    VTE Risk Mitigation (From admission, onward)         Ordered     enoxaparin injection 30 mg  Every 24 hours      09/09/20 0714     IP VTE HIGH RISK PATIENT  Once      08/30/20 2004     Place sequential compression device  Until discontinued      08/30/20 1714              High Risk Conditions:    Patient has a condition that poses threat to life and bodily function: Acute Renal Failure     Patient will be transferred to a General Hospital medicine service.     Palmira Gage MD  Department of Hospital  Medicine   [facility]  [phone]

## 2020-09-10 NOTE — ASSESSMENT & PLAN NOTE
MRSA septicemia. Still tachycardic, SpO2 reached 92%, renal function continues to worsen. WBCs not improving. ESR and CRP elevated, RF WNL. Vancomycin was supratherapeutic, switched to pulse dose regimen. Source of bacteremia unclear, may be left foot wound or a septic process at the right knee joint. Right knee now clinically suspicious for septic arthritis (see physical exam). Left foot wound growing Proteus and MRSA, urine growing Klebsiella. TTE negative for signs of cardiac infection, LUKAS is negative as well.     Blood cultures: MRSA  R knee fluid culture: Staph aureus  Left foot wound: Proteus, MRSA  Urine culture: Klebsiella  Imaging: Negative MRI L Foot, MRI L-spine, NM WBC scan  Source Control: I&D of Septic R knee on 9/7/20  Completed 7 days of Ciprofloxacin on 9/7/20 for wound and urine positive cultures  Blood Cultures NGSF since 9/8/20  Daptomycin-ceftaroline switched back to Vancomycin due to worsening kidney function and early signs of rhabdomyolysis (9/10/20). CK down-trending now  Left foot wound not appropriate for any debridement at this point  Worsening leucocytosis to 40k now with new diarrhea and worsening kidney function. No other focal infectious symptoms/signs    Recommendations:   -- C diff test  -- Recommend getting a non contrast CT Chest Abd-Pelvis  -- NANETTE worsening, oliguric renal failure (ATN). Nephrology following the patient. No plans on dialysis at this point. Concerned about new sepsis, however, etiology unclear at this point. Will pan-scan patient  -- MRI foot from 8/31 did not show any signs of OM, however, with persistent bacteremia, had discussed with podiatry earlier if wounds needs debridement. Per podiatry - likely no debridement needed, will get vascular US GINA of LE to assess blood flow and risk of complications if any procedure is planned.   -- If no other possible source found, ultimately patient will need to complete 4 weeks total Vancomycin therapy counting from  bacterial clearance since source control (knee I&D). Daily blood cultures recommended.   -- Will need a PICC line after 48-72 hours of clearance of bacteremia  -- Will continue to follow closely

## 2020-09-10 NOTE — PT/OT/SLP PROGRESS
Occupational Therapy   Treatment    Name: Ed Patton  MRN: [id number]  Admitting Diagnosis:  Sepsis due to methicillin resistant Staphylococcus aureus  3 Days Post-Op    Recommendations:     Discharge Recommendations: nursing facility, skilled  Discharge Equipment Recommendations:  (to be determined closer to discharge home)  Barriers to discharge:  Decreased caregiver support    Assessment:     Ed Patton is a 65 y.o. male with a medical diagnosis of Sepsis due to methicillin resistant Staphylococcus aureus. Performance deficits affecting function are weakness, impaired endurance, impaired self care skills, impaired functional mobilty, gait instability, impaired balance, decreased safety awareness, pain. Patient had difficulty participating in therapy on this date. Patient reports that he was in pain and exhausted due to go to two appointments this am. Patient also verbalized his frustrated that it has been one thing after another.     Rehab Prognosis:  Fair; patient would benefit from acute skilled OT services to address these deficits and reach maximum level of function.       Plan:     Patient to be seen 3 x/week to address the above listed problems via self-care/home management, therapeutic activities, therapeutic exercises  · Plan of Care Expires: 10/08/20  · Plan of Care Reviewed with: patient    Subjective     Pain/Comfort:  · Pain Rating 1: (patient did not rate)  · Location - Side 1: Right  · Location - Orientation 1: generalized  · Pain Addressed 1: Reposition, Distraction  · Pain Rating Post-Intervention 1: (patient did not rate)    Objective:     Communicated with: NSBARTOLO prior to session.  Patient found right sidelying with oxygen, telemetry, pulse ox (continuous) upon OT entry to room.    General Precautions: Standard, fall, contact   Orthopedic Precautions:LLE weight bearing as tolerated   Braces:  N/A    Occupational Performance:     Bed Mobility:    · Patient completed Rolling/Turning to Left  with  maximal assistance  · Patient completed Rolling/Turning to Right with maximal assistance  · Patient completed Scooting/Bridging with maximal assistance to EOB sitting EOB; min A lying supine to HOB  · Patient completed Supine to Sit with maximal assistance  · Patient completed Sit to Supine with maximal assistance   · Patient had difficulty sitting EOB and needed min A<>mod A to maintain upright position as patient wanted to lean posteriorly    Activities of Daily Living:  · Lower Body Dressing: total assistance Donning right sock, donning and doffing L DARCO shoe, and donning L sock    [facility] 6 Click ADL: 16    Treatment & Education:  Role of OT and POC  Safety  ADL retraining  Functional ambulation training    Patient was found R sidelying with catheter kinked and placed under patient's back with a few maribell pads/sheets bunched under him after returning from transport and patient education on importance of rolling and participating with therapy in order reposition patient correctly. Nursing notified.     Patient left HOB elevated with call button in reach, nurse notified and all needs met.Education:      GOALS:   Multidisciplinary Problems     Occupational Therapy Goals        Problem: Occupational Therapy Goal    Goal Priority Disciplines Outcome Interventions   Occupational Therapy Goal     OT, PT/OT Ongoing, Progressing    Description: Goals to be met by:10/01/2020    Patient will increase functional independence with ADLs by performing:    UE Dressing with [place].  LE Dressing with Stand-by Assistance.  Grooming while seated at sink with [place].  Toileting from bedside commode with Supervision for hygiene and clothing management.   Bathing from  sitting at sink with Set-up Assistance.  Toilet transfer to bedside commode with Stand-by Assistance, accurate adherence to NWB precautions LLE.                     Time Tracking:     OT Date of Treatment: 09/10/20  OT Start Time: 1115  OT Stop Time:  1138  OT Total Time (min): 23 min    Billable Minutes:Self Care/Home Management 23    ELISEO Stokes  9/10/2020

## 2020-09-10 NOTE — SUBJECTIVE & OBJECTIVE
Interval History: Mr Patton had urinary retention found yesterday during a bladder scan which has been resolved with the placement of a toure catheter with consequential improvement in urinary output. No change in physical exam from prior and no acute events overnight.    Review of patient's allergies indicates:   Allergen Reactions    Vicodin [hydrocodone-acetaminophen] Itching     Current Facility-Administered Medications   Medication Frequency    acetaminophen tablet 1,000 mg Q8H PRN    acetaminophen tablet 650 mg Q6H PRN    albuterol-ipratropium 2.5 mg-0.5 mg/3 mL nebulizer solution 3 mL Q4H PRN    artificial tears 0.5 % ophthalmic solution 1 drop TID    aspirin EC tablet 81 mg Daily    calcium carbonate 200 mg calcium (500 mg) chewable tablet 500 mg BID PRN    carvediloL tablet 25 mg BID    ceftaroline fosamiL (TEFLARO) 400 mg in dextrose 5 % 50 mL IVPB Q8H    [START ON 9/11/2020] DAPTOmycin (CUBICIN) 1,050 mg in sodium chloride 0.9% IVPB Q48H    dextrose 50% injection 12.5 g PRN    dextrose 50% injection 25 g PRN    enoxaparin injection 30 mg Q24H    fluticasone furoate-vilanteroL 200-25 mcg/dose diskus inhaler 1 puff Daily    fluticasone propionate 50 mcg/actuation nasal spray 50 mcg Daily    gabapentin capsule 100 mg TID PRN    glucagon (human recombinant) injection 1 mg PRN    glucose chewable tablet 16 g PRN    glucose chewable tablet 24 g PRN    insulin aspart U-100 pen 0-5 Units QID (AC + HS) PRN    insulin aspart U-100 pen 10 Units TIDWM    insulin detemir U-100 pen 25 Units QHS    levothyroxine tablet 75 mcg Before breakfast    melatonin tablet 6 mg Nightly PRN    ondansetron injection 4 mg Q8H PRN    oxyCODONE immediate release tablet 5 mg Q8H PRN    polyethylene glycol packet 17 g BID PRN    polyethylene glycol packet 17 g BID    senna-docusate 8.6-50 mg per tablet 1 tablet BID    sodium chloride 0.9% bolus 250 mL PRN    sodium chloride 0.9% flush 10 mL PRN    Tdap  vaccine injection 0.5 mL vaccine x 1 dose    vitamin D 1000 units tablet 1,000 Units Daily       Objective:     Vital Signs (Most Recent):  Temp: 98.5 °F (36.9 °C) (09/10/20 0735)  Pulse: 93 (09/10/20 0823)  Resp: 16 (09/10/20 0823)  BP: 104/66 (09/10/20 0735)  SpO2: 98 % (09/10/20 0823)  O2 Device (Oxygen Therapy): nasal cannula (09/10/20 0735) Vital Signs (24h Range):  Temp:  [97.1 °F (36.2 °C)-98.6 °F (37 °C)] 98.5 °F (36.9 °C)  Pulse:  [] 93  Resp:  [16-18] 16  SpO2:  [95 %-100 %] 98 %  BP: ()/(54-76) 104/66     Weight: 116 kg (255 lb 11.7 oz) (09/07/20 0400)  Body mass index is 31.13 kg/m².  Body surface area is 2.49 meters squared.    I/O last 3 completed shifts:  In: 300 [P.O.:200; IV Piggyback:100]  Out: 1095 [Urine:1095]    Physical Exam  Vitals signs reviewed.   Constitutional:       General: He is not in acute distress.     Appearance: Normal appearance. He is normal weight. He is not diaphoretic.   HENT:      Head: Normocephalic and atraumatic.      Right Ear: External ear normal.      Left Ear: External ear normal.      Nose: Nose normal. No congestion or rhinorrhea.   Eyes:      General:         Right eye: No discharge.         Left eye: No discharge.      Extraocular Movements: Extraocular movements intact.   Neck:      Musculoskeletal: Normal range of motion and neck supple. No neck rigidity or muscular tenderness.   Cardiovascular:      Rate and Rhythm: Normal rate and regular rhythm.      Heart sounds: No murmur.      Comments: + JVD to jawline   Pulmonary:      Effort: Pulmonary effort is normal. No respiratory distress.      Breath sounds: No wheezing.      Comments: On 2 L/m oxygen  Chest:      Chest wall: No tenderness.   Abdominal:      General: There is distension.      Palpations: Abdomen is soft.      Tenderness: There is no abdominal tenderness. There is no guarding.   Musculoskeletal: Normal range of motion.         General: Tenderness (right knee) present.      Right lower  leg: Edema (1+) present.      Left lower leg: Edema (1+) present.      Comments: Cooling brace applied to right knee    Skin:     General: Skin is warm and dry.      Findings: Lesion (left foot) present.   Neurological:      Mental Status: He is alert and oriented to person, place, and time.      Sensory: No sensory deficit.      Gait: Gait normal.   Psychiatric:         Mood and Affect: Mood normal.         Behavior: Behavior normal.         Thought Content: Thought content normal.         Judgment: Judgment normal.         Significant Labs:  CBC:   Recent Labs   Lab 09/10/20  0459   WBC 26.74*   RBC 3.45*   HGB 9.1*   HCT 30.3*   *   MCV 88   MCH 26.4*   MCHC 30.0*     CMP:   Recent Labs   Lab 09/10/20  0459      CALCIUM 8.4*   ALBUMIN 1.6*   *   K 3.9   CO2 25   CL 97   BUN 51*   CREATININE 3.7*     All labs within the past 24 hours have been reviewed.     Significant Imaging:  Labs: Reviewed  Cr is not rising as sharply as days prior

## 2020-09-10 NOTE — PROGRESS NOTES
[facility]  Nephrology  Progress Note    Patient Name: Ed Patton  MRN: [id number]  Admission Date: 8/30/2020  Hospital Length of Stay: 11 days  Attending Provider: Palmira Gage MD   Primary Care Physician: Qiana Chow MD  Principal Problem:Sepsis due to methicillin resistant Staphylococcus aureus    Subjective:     HPI: Mr. Patton is a 64yo M with insulin dependent T2DM, chronic hypoxemic resp. failure (on 2L oxygen at home), and CHF (EF 25%). He presented to the ED for recurrent falls and sepsis from an infected diabetic ulcer from stepping on a tack 8/30. Wound cultures from foot positive for proteus and MRSA 8/31. Urine culture 8/30 positive for klebsiella. Blood cultures have been positive for MRSA repeatedly from 8/30 to 9/8. He also developed septic arthritis of the right knee likely from seeding secondary to septicemia positive for MRSA 9/3. LUKAS was negative for IE 9/4. Initial management of septicemia was with ciprofloxacin and Vancomycin which was supratherapeutic to 26.5 on 9/2 prompting switch to a pulse dose regimen. Due to persistent bacteremia ID changed antibiotics to ceftaroline and daptomycin on 9/7 I&D of right knee performed by ortho on 9/7. Cr on admission was 1.5 at admit up from the baseline (1.2-1.5). On 9/9 Cr level had a sudden increased to 3. Nephrology was consulted for NANETTE.     Interval History: Mr Patton had urinary retention found yesterday during a bladder scan which has been resolved with the placement of a toure catheter with consequential improvement in urinary output. No change in physical exam from prior and no acute events overnight.    Review of patient's allergies indicates:   Allergen Reactions    Vicodin [hydrocodone-acetaminophen] Itching     Current Facility-Administered Medications   Medication Frequency    acetaminophen tablet 1,000 mg Q8H PRN    acetaminophen tablet 650 mg Q6H PRN    albuterol-ipratropium 2.5 mg-0.5 mg/3 mL nebulizer  solution 3 mL Q4H PRN    artificial tears 0.5 % ophthalmic solution 1 drop TID    aspirin EC tablet 81 mg Daily    calcium carbonate 200 mg calcium (500 mg) chewable tablet 500 mg BID PRN    carvediloL tablet 25 mg BID    ceftaroline fosamiL (TEFLARO) 400 mg in dextrose 5 % 50 mL IVPB Q8H    [START ON 9/11/2020] DAPTOmycin (CUBICIN) 1,050 mg in sodium chloride 0.9% IVPB Q48H    dextrose 50% injection 12.5 g PRN    dextrose 50% injection 25 g PRN    enoxaparin injection 30 mg Q24H    fluticasone furoate-vilanteroL 200-25 mcg/dose diskus inhaler 1 puff Daily    fluticasone propionate 50 mcg/actuation nasal spray 50 mcg Daily    gabapentin capsule 100 mg TID PRN    glucagon (human recombinant) injection 1 mg PRN    glucose chewable tablet 16 g PRN    glucose chewable tablet 24 g PRN    insulin aspart U-100 pen 0-5 Units QID (AC + HS) PRN    insulin aspart U-100 pen 10 Units TIDWM    insulin detemir U-100 pen 25 Units QHS    levothyroxine tablet 75 mcg Before breakfast    melatonin tablet 6 mg Nightly PRN    ondansetron injection 4 mg Q8H PRN    oxyCODONE immediate release tablet 5 mg Q8H PRN    polyethylene glycol packet 17 g BID PRN    polyethylene glycol packet 17 g BID    senna-docusate 8.6-50 mg per tablet 1 tablet BID    sodium chloride 0.9% bolus 250 mL PRN    sodium chloride 0.9% flush 10 mL PRN    Tdap vaccine injection 0.5 mL vaccine x 1 dose    vitamin D 1000 units tablet 1,000 Units Daily       Objective:     Vital Signs (Most Recent):  Temp: 98.5 °F (36.9 °C) (09/10/20 0735)  Pulse: 93 (09/10/20 0823)  Resp: 16 (09/10/20 0823)  BP: 104/66 (09/10/20 0735)  SpO2: 98 % (09/10/20 0823)  O2 Device (Oxygen Therapy): nasal cannula (09/10/20 0735) Vital Signs (24h Range):  Temp:  [97.1 °F (36.2 °C)-98.6 °F (37 °C)] 98.5 °F (36.9 °C)  Pulse:  [] 93  Resp:  [16-18] 16  SpO2:  [95 %-100 %] 98 %  BP: ()/(54-76) 104/66     Weight: 116 kg (255 lb 11.7 oz) (09/07/20 0400)  Body  mass index is 31.13 kg/m².  Body surface area is 2.49 meters squared.    I/O last 3 completed shifts:  In: 300 [P.O.:200; IV Piggyback:100]  Out: 1095 [Urine:1095]    Physical Exam  Vitals signs reviewed.   Constitutional:       General: He is not in acute distress.     Appearance: Normal appearance. He is normal weight. He is not diaphoretic.   HENT:      Head: Normocephalic and atraumatic.      Right Ear: External ear normal.      Left Ear: External ear normal.      Nose: Nose normal. No congestion or rhinorrhea.   Eyes:      General:         Right eye: No discharge.         Left eye: No discharge.      Extraocular Movements: Extraocular movements intact.   Neck:      Musculoskeletal: Normal range of motion and neck supple. No neck rigidity or muscular tenderness.   Cardiovascular:      Rate and Rhythm: Normal rate and regular rhythm.      Heart sounds: No murmur.      Comments: + JVD to jawline   Pulmonary:      Effort: Pulmonary effort is normal. No respiratory distress.      Breath sounds: No wheezing.      Comments: On 2 L/m oxygen  Chest:      Chest wall: No tenderness.   Abdominal:      General: There is distension.      Palpations: Abdomen is soft.      Tenderness: There is no abdominal tenderness. There is no guarding.   Musculoskeletal: Normal range of motion.         General: Tenderness (right knee) present.      Right lower leg: Edema (1+) present.      Left lower leg: Edema (1+) present.      Comments: Cooling brace applied to right knee    Skin:     General: Skin is warm and dry.      Findings: Lesion (left foot) present.   Neurological:      Mental Status: He is alert and oriented to person, place, and time.      Sensory: No sensory deficit.      Gait: Gait normal.   Psychiatric:         Mood and Affect: Mood normal.         Behavior: Behavior normal.         Thought Content: Thought content normal.         Judgment: Judgment normal.         Significant Labs:  CBC:   Recent Labs   Lab 09/10/20  5010    WBC 26.74*   RBC 3.45*   HGB 9.1*   HCT 30.3*   *   MCV 88   MCH 26.4*   MCHC 30.0*     CMP:   Recent Labs   Lab 09/10/20  0459      CALCIUM 8.4*   ALBUMIN 1.6*   *   K 3.9   CO2 25   CL 97   BUN 51*   CREATININE 3.7*     All labs within the past 24 hours have been reviewed.     Significant Imaging:  Labs: Reviewed  Cr is not rising as sharply as days prior    Assessment/Plan:     NANETTE (acute kidney injury)  Mr. Patton is a 66 yo male with IDDM2, chronic hypoxemic resp failure on 2L of oxygen at home, and CHF (25% EF) who presented with recurrent falls and sepsis on 8/30 with septicemia with source likely from diabetic ulcer. Blood cultures have been persistently positive for MRSA. IE negative on LUKAS. Septic arthritis to right knee with I&D performed with ortho on 9/7. Initial management of septicemia was with ciprofloxacin and Vancomycin which was supratherapeutic to 26.5 on 9/2 prompting switch to a pulse dose regimen. Due to persistent bacteremia ID changed antibiotics to ceftaroline and daptomycin Cr on admission was 1.5 at admit up from the baseline (1.2-1.5). On 9/9 Cr level had a sudden increased to 3. Nephrology was consulted for NANETTE.     Over 24-48 hours prior to rise in creatinine Mr. Patton had blood pressures in the 80-90s/50s and Maps in the low 60s. Hypotension could have resulted in ischemic ATN. Also, was retaining urine and has had improved urine output after a toure was placed. Urine microscopy: scant normal red blood cells without any dysmorphic shapes evident and numerous muddy brown casts. Given the casts in the urine and the urinary retention it is likely a mixed pre-renal and a post-renal kidney injury. We will continue to monitor for improvement in Cr.       Reccomendations:  - Bladder scan showed urinary retention and toure was placed; maintain toure for now  - Recommend starting flomax now to prepare for future removal of toure   - Strict I/Os  - Daily weights              Thank you for your consult. I will follow-up with patient. Please contact us if you have any additional questions.    Liz Membreno MD  Nephrology  [facility]

## 2020-09-10 NOTE — PLAN OF CARE
Problem: Occupational Therapy Goal  Goal: Occupational Therapy Goal  Description: Goals to be met by:10/01/2020    Patient will increase functional independence with ADLs by performing:    UE Dressing with [place].  LE Dressing with Stand-by Assistance.  Grooming while seated at sink with [place].  Toileting from bedside commode with Supervision for hygiene and clothing management.   Bathing from  sitting at sink with Set-up Assistance.  Toilet transfer to bedside commode with Stand-by Assistance, accurate adherence to NWB precautions LLE.    Outcome: Ongoing, Progressing   Patient's goals are appropriate.   ELISEO Stokes  9/10/2020

## 2020-09-10 NOTE — PT/OT/SLP PROGRESS
"Physical Therapy Treatment    Patient Name:  Ed Patton   MRN:  [id number]    Recommendations:     Discharge Recommendations:  nursing facility, skilled   Discharge Equipment Recommendations: (TBD)   Barriers to discharge: Decreased caregiver support and increased assist level for all mobility and self care    Assessment:     Ed Patton is a 65 y.o. male admitted with a medical diagnosis of Sepsis due to methicillin resistant Staphylococcus aureus.  He presents with the following impairments/functional limitations:  weakness, impaired endurance, impaired functional mobilty, gait instability, pain, decreased ROM, decreased lower extremity function, edema. Patient with increased pain, lethargic. Tolerated sitting up EOB for 10 min with fair balance, unwilling to attempt standing due to increased pain. Patient not feeling well today, hoping he is better tomorrow.     Rehab Prognosis: Fair; patient would benefit from acute skilled PT services to address these deficits and reach maximum level of function.    Recent Surgery: Procedure(s) (LRB):  ARTHROSCOPY, KNEE, RIGHT  (Right) 3 Days Post-Op    Plan:     During this hospitalization, patient to be seen 4 x/week to address the identified rehab impairments via therapeutic activities, gait training, therapeutic exercises and progress toward the following goals:    · Plan of Care Expires:  10/01/20    Subjective     Chief Complaint: pain, fatigue  Patient/Family Comments/goals: "I can't today, I had two tests today".   Pain/Comfort:  · Pain Rating 1: 8/10  · Location - Side 1: Right  · Location - Orientation 1: generalized  · Location 1: knee  · Pain Addressed 1: Pre-medicate for activity      Objective:     Communicated with nurse prior to session.  Patient found supine with toure under patient back, charge nurse notfied.  with blood pressure cuff, telemetry, peripheral IV upon PT entry to room.     General Precautions: Standard, fall, contact   Orthopedic " Precautions:LLE weight bearing as tolerated   Braces: N/A     Functional Mobility:  · Bed Mobility:     · Supine to Sit: maximal assistance  · Sit to Supine: maximal assistance      AM-PAC 6 CLICK MOBILITY  Turning over in bed (including adjusting bedclothes, sheets and blankets)?: 2  Sitting down on and standing up from a chair with arms (e.g., wheelchair, bedside commode, etc.): 2  Moving from lying on back to sitting on the side of the bed?: 2  Moving to and from a bed to a chair (including a wheelchair)?: 1  Need to walk in hospital room?: 1  Climbing 3-5 steps with a railing?: 1  Basic Mobility Total Score: 9       Therapeutic Activities and Exercises:   white board updated  Patient with increased pain today to R LE, lethargic, c/o being tired.     Patient left supine with all lines intact and call button in reach..    GOALS:   Multidisciplinary Problems     Physical Therapy Goals        Problem: Physical Therapy Goal    Goal Priority Disciplines Outcome Goal Variances Interventions   Physical Therapy Goal     PT, PT/OT Ongoing, Progressing     Description: Goals to be met by: 9/15/20    Patient will increase functional independence with mobility by performin. Supine to sit with Stand-by Assistance - Not met  2. Sit to supine with Stand-by Assistance - Not met  3. Sit to stand transfer with Stand-by Assistance with RW - Not met  4. Gait  x 50 feet with Stand-by Assistance using Rolling Walker and maintenance of weight bearing status - Not met  5.  Patient will demonstrate independence with a home exercise program - Not met  6. Patient's balance will be GOOD: Needs SUPERVISION only during gait and able to self right with moderate LOB - Not met                   Time Tracking:     PT Received On: 09/10/20  PT Start Time: 1114     PT Stop Time: 1138  PT Total Time (min): 24 min     Billable Minutes: Therapeutic Activity 24    Treatment Type: Treatment  PT/PTA: PTA     PTA Visit Number: 2     Macey Gardner  PTA  09/10/2020

## 2020-09-10 NOTE — PLAN OF CARE
09/10/20 0887   Post-Acute Status   Post-Acute Authorization Placement   Home Health Status Awaiting Internal Medical Clearance     [facility] following, will update on more clinical information shortly and follow.

## 2020-09-10 NOTE — PHARMACY MED REC
"Admission Medication Reconciliation - Pharmacy Consult Note    The home medication history was taken by Xin Mix, Pharmacy Technician. Based on information gathered and subsequent review by the clinical pharmacist, the items below may need attention.    You may go to "Admission" then "Reconcile Home Medications" tabs to review and/or act upon these items.    No issues noted with the medication reconciliation.    Potential issues to be addressed PRIOR TO DISCHARGE  o Patient lacks understanding of therapy     Please address this information as you see fit.  Feel free to contact us if you have any questions or require assistance.    Zahra Powers, PharmD, BCPS  [phone]     PTA Medications   Medication Sig    ACCU-CHEK FASTCLIX LANCET DRUM Misc TEST FOUR TIMES DAILY (Patient taking differently: Test twice daily)    ACCU-CHEK SMARTVIEW TEST STRIP Strp TEST FOUR TIMES DAILY (Patient taking differently: Test twice daily)    albuterol (PROVENTIL) 2.5 mg /3 mL (0.083 %) nebulizer solution INHALE THE CONTENTS OF 1 VIAL VIA NEBULIZER EVERY 4 HOURS AS NEEDED FOR WHEEZING. (Patient taking differently: INHALE THE CONTENTS OF 1 VIAL VIA NEBULIZER TWICE DAILY)    albuterol (PROVENTIL/VENTOLIN HFA) 90 mcg/actuation inhaler Inhale 2 puffs into the lungs every 6 (six) hours as needed for Wheezing or Shortness of Breath. Rescue    aspirin (ECOTRIN) 81 MG EC tablet Take 1 tablet (81 mg total) by mouth once daily.    atorvastatin (LIPITOR) 40 MG tablet Take 1 tablet (40 mg total) by mouth once daily.    carvedilol (COREG) 25 MG tablet Take 1 tablet (25 mg total) by mouth 2 (two) times daily.    cholecalciferol, vitamin D3, (VITAMIN D3) 1,000 unit capsule Take 1 capsule (1,000 Units total) by mouth once daily. (Patient taking differently: Take 2,000 Units by mouth once daily. )    fluticasone furoate-vilanterol (BREO ELLIPTA) 200-25 mcg/dose DsDv diskus inhaler INHALE 1 PUFF INTO THE LUNGS ONCE DAILY. (CONTROLLER)    " fluticasone propionate (FLONASE) 50 mcg/actuation nasal spray USE 1 SPRAY IN EACH NOSTRIL ONE TIME DAILY    furosemide (LASIX) 80 MG tablet TAKE 1 TABLET BY MOUTH IN THE MORNING  AND  1 TABLET BY MOUTH DAILY  AT  3- TO 4PM    gabapentin (NEURONTIN) 100 MG capsule Take 1 capsule (100 mg total) by mouth 3 (three) times daily as needed (pain).    insulin NPH-insulin regular, 70/30, (NOVOLIN 70/30 U-100 INSULIN) 100 unit/mL (70-30) injection Inject 45 Units into the skin before breakfast AND 35 Units before dinner. [id number] MINUTES BEFORE BREAKFAST AND DINNER. (Patient taking differently: Inject 40 Units into the skin before breakfast AND 20 Units before dinner.)    insulin syringe-needle U-100 (INSULIN SYRINGE) 1 mL 30 gauge x 5/16 Syrg 1 each by Misc.(Non-Drug; Combo Route) route 2 (two) times daily. Use twice daily as directed with insulin vials.    levothyroxine (SYNTHROID) 75 MCG tablet Take 1 tablet (75 mcg total) by mouth once daily.    lisinopril (PRINIVIL,ZESTRIL) 40 MG tablet Take 1 tablet (40 mg total) by mouth once daily.    ACCU-CHEK RAMIREZ Misc USE AS DIRECTED    nitroGLYCERIN (NITROSTAT) 0.4 MG SL tablet PLACE 1 TABLET UNDER THE TONGUE EVERY 5  MINUTES AS NEEDED FOR CHEST PAIN     .    .

## 2020-09-10 NOTE — PHYSICIAN QUERY
PT Name: Ed Patton  MR #: [id number]    Consultant Diagnosis Clarification     CDS/: Dominguez Alicia RN               Contact information:   [email or web address]      This form is a permanent document in the medical record.    Query Date: September 10, 2020      By submitting this query, we are merely seeking further clarification of documentation.  Please utilize your independent clinical judgment when addressing the question(s) below.    The Medical Record reflects the following:    Clinical Information Location in Medical Record   NANETTE is most likely related to ischemic ATN (hemodynamically mediated with documented drop in BP over the last 48 hours)....    Cr on admission was 1.5 at admit up from the baseline (1.2-1.5). On 9/9 Cr level had a sudden increased to 3. Nephrology was consulted for NANETTE....Mr Patton had urinary retention found yesterday during a bladder scan which has been resolved with the placement of a toure catheter with consequential improvement in urinary output....NANETTE...Hypotension could have resulted in ischemic ATN.     Acute renal insufficiency, Urinary retention  - Patient has been NPO for procedures intermittently for the past few days while on Lasix b.i.d.  Lasix held 9/8 and 9/9. He received a normal saline bolus overnight 9/9 and a little more fluid 9/9 before found to be anuric for the day. Bladder scan showed >500 mL. He now has a Toure. ACEi held for now.  - Per Nephrology: Given hypotension and simultaneous volume overload reccomend IVF as small volume boluses rather than continuous IVF to maintain MAPS >65.   Repeat UA and Urine culture.  Strict I/Os. Daily weights. C3/C4 levels.      8/30 sCr: 1.5  8/31 sCr: 1.4  9/2 sCr: 1.8  9/9 sCR 3.0  9/10  SCr: 3.7    8/30 I/O Net: -600  8/31 I/O Net: -60  9/1 I/O Net: 100  9/2 I/O Net: -710     9/9 PN, Nephrology, Dr. Membreno/  Dr. Levin    9/10 PN,  Nephrology, dr. Membreno              9/9 PN, Hospital medicine,     "navarro                      Labs    "  "  "  "    Vitals - flow sheet  "  "  "       Please clarify/confirm the Consultants diagnosis of _____AKI with ATN____________________________:     [  ] Diagnosis ruled in     [  ] Diagnosis ruled in, but it resolved prior to my assessment of the patient     [  ] Diagnosis ruled out     [  ] Other diagnosis (please specify): _____________________________  [   ]  Only NANETTE ruled in      [ x ] Clinically undetermined     Present on admission (POA) status:   [   ] Yes (Y)                          [ x ] Clinically Undetermined (W)  [   ] No (N)                            [   ] Documentation insufficient to determine if condition is POA (U)           "

## 2020-09-10 NOTE — ASSESSMENT & PLAN NOTE
65 y.o. male s/p R knee scope I&D 9/7/20    VS:  stable  Nerve block:  none  PT/OT:  WBAT  DVT PPx:  lovenox per primary  Cultures:  MRSA from R knee aspiration 9/3/20; NGTD intraop cx; BCx 9/8 positive  Abx:  Daptomycin, ceftaroline  Labs:  last , , Cr 3.7, Hb 9.1, WBC 27  Drain:  none  Newell:  none    F/u cx, ID recs

## 2020-09-10 NOTE — PROGRESS NOTES
[facility]  Podiatry  Progress Note    Patient Name: Ed Patton  MRN: [id number]  Admission Date: 8/30/2020  Hospital Length of Stay: 10 days  Attending Physician: Gisela Mota MD  Primary Care Provider: Qiana Chow MD     Subjective:     Interval History: NAEON. Patient still reporting right knee pain and left foot pain. Tachycardic, still having transient hypoxia. No new pedal complaints. No focal uptake seen in the feet on nuclear medicine scan. Infectious disease may consider repeating MRI of foot if bacteremia persists and no other sources are found.     Follow-up For: Procedure(s) (LRB):  ARTHROSCOPY, KNEE, RIGHT  (Right)    Post-Operative Day: 2 Days Post-Op    Scheduled Meds:   aspirin  81 mg Oral Daily    atorvastatin  40 mg Oral Daily    carvediloL  25 mg Oral BID    ceftaroline fosamil  400 mg Intravenous Q8H    [START ON 9/11/2020] DAPTOmycin (CUBICIN)  IV  1,050 mg Intravenous Q48H    enoxaparin  30 mg Subcutaneous Q24H    fluticasone furoate-vilanteroL  1 puff Inhalation Daily    fluticasone propionate  1 spray Each Nostril Daily    insulin aspart U-100  10 Units Subcutaneous TIDWM    insulin detemir U-100  25 Units Subcutaneous QHS    levothyroxine  75 mcg Oral Before breakfast    vitamin D  1,000 Units Oral Daily     Continuous Infusions:   sodium chloride 0.9% 50 mL/hr at 09/09/20 1326     PRN Meds:acetaminophen, acetaminophen, albuterol-ipratropium, calcium carbonate, dextrose 50%, dextrose 50%, gabapentin, glucagon (human recombinant), glucose, glucose, insulin aspart U-100, melatonin, ondansetron, oxyCODONE, polyethylene glycol, sodium chloride 0.9%, sodium chloride 0.9%, DIPH,PERTUS (ADACEL),TETANUS PF VAC (ADULT)    Review of Systems   Constitutional: Positive for activity change. Negative for appetite change, chills and fever.   HENT: Negative for congestion and sneezing.    Respiratory: Negative for cough and shortness of breath.     Gastrointestinal: Negative for nausea and vomiting.   Musculoskeletal: Positive for arthralgias, back pain and myalgias.   Skin: Positive for wound. Negative for color change.   Neurological: Positive for weakness and numbness.   Psychiatric/Behavioral: Negative for behavioral problems and confusion.     Objective:     Vital Signs (Most Recent):  Temp: 97.1 °F (36.2 °C) (09/09/20 1306)  Pulse: 91 (09/09/20 1306)  Resp: 16 (09/09/20 1306)  BP: (!) 99/54 (09/09/20 1306)  SpO2: 99 % (09/09/20 0933) Vital Signs (24h Range):  Temp:  [96.5 °F (35.8 °C)-98.3 °F (36.8 °C)] 97.1 °F (36.2 °C)  Pulse:  [] 91  Resp:  [16-18] 16  SpO2:  [91 %-99 %] 99 %  BP: (85-99)/(50-57) 99/54     Weight: 116 kg (255 lb 11.7 oz)  Body mass index is 31.13 kg/m².    Foot Exam    General  Orientation: alert and oriented to person, place, and time   Affect: appropriate       Right Foot/Ankle     Inspection and Palpation  Ecchymosis: none  Tenderness: none   Swelling: none   Skin Exam: skin intact;     Neurovascular  Dorsalis pedis: absent  Posterior tibial: absent  Saphenous nerve sensation: diminished  Tibial nerve sensation: diminished  Superficial peroneal nerve sensation: diminished  Deep peroneal nerve sensation: diminished  Sural nerve sensation: diminished      Left Foot/Ankle      Inspection and Palpation  Ecchymosis: none  Tenderness: (Periwound)  Swelling: none   Skin Exam: dry skin, skin changes and ulcer; no drainage     Neurovascular  Dorsalis pedis: absent  Posterior tibial: absent  Saphenous nerve sensation: diminished  Tibial nerve sensation: diminished  Superficial peroneal nerve sensation: diminished  Deep peroneal nerve sensation: diminished  Sural nerve sensation: diminished            Laboratory:  Blood Cultures:   Recent Labs   Lab 09/08/20  0408 09/08/20  1247   LABBLOO Gram stain aer bottle: Gram positive cocci in clusters resembling Staph  Results called to and read back by:Jany Santiago RN 09/08/2020  22:29   STAPHYLOCOCCUS AUREUS  Susceptibility pending  ID consult required at [facility] and [facility] locations.  * No Growth to date  No Growth to date  No Growth to date  No Growth to date     CBC:   Recent Labs   Lab 09/09/20  0303   WBC 22.01*   RBC 3.52*   HGB 9.3*   HCT 31.9*   *   MCV 91   MCH 26.4*   MCHC 29.2*     CMP:   Recent Labs   Lab 09/09/20  0303      CALCIUM 8.7   ALBUMIN 1.8*      K 4.0   CO2 27   CL 96   BUN 41*   CREATININE 3.0*     CRP:   Recent Labs   Lab 09/08/20  0408   .2*     ESR:   Recent Labs   Lab 09/03/20  0410   SEDRATE 80*     Wound Cultures:   Recent Labs   Lab 08/31/20  1754 09/03/20  1647 09/07/20  1721   LABAERO PROTEUS MIRABILIS  Moderate  *  METHICILLIN RESISTANT STAPHYLOCOCCUS AUREUS  Moderate  * METHICILLIN RESISTANT STAPHYLOCOCCUS AUREUS  Few  * No growth  No growth     Microbiology Results (last 7 days)     Procedure Component Value Units Date/Time    Blood culture [[id number]] Collected: 09/08/20 1247    Order Status: Completed Specimen: Blood from Antecubital, Right Hand Updated: 09/09/20 1412     Blood Culture, Routine No Growth to date      No Growth to date    Narrative:      Collection has been rescheduled by RB8 at 09/08/2020 11:43 Reason:   Patient unavailable,nurse Mckay #[id number] was notified  Collection has been rescheduled by RB8 at 09/08/2020 11:43 Reason:   Patient unavailable,nurse Mckay #[id number] was notified    Blood culture [[id number]] Collected: 09/08/20 1247    Order Status: Completed Specimen: Blood from Antecubital, Right Arm Updated: 09/09/20 1412     Blood Culture, Routine No Growth to date      No Growth to date    Narrative:      Collection has been rescheduled by RB8 at 09/08/2020 11:43 Reason:   Patient unavailable,nurse Mckay #[id number] was notified  Collection has been rescheduled by RB8 at 09/08/2020 11:43 Reason:   Patient unavailable,nurse Mckay #[id number] was notified    Fungus culture [[id number]] Collected: 09/07/20 1721     Order Status: Completed Specimen: Body Fluid from Knee, Right Updated: 09/09/20 1407     Fungus (Mycology) Culture Culture in progress    Narrative:      1) Right Knee Joint Fluid    Fungus culture [[id number]] Collected: 09/07/20 1721    Order Status: Completed Specimen: Body Fluid from Knee, Right Updated: 09/09/20 1406     Fungus (Mycology) Culture Culture in progress    Narrative:      2) Right Knee Joint Fluid    Fungus culture [[id number]] Collected: 09/03/20 1647    Order Status: Completed Specimen: Joint Fluid from Knee, Right Updated: 09/09/20 1346     Fungus (Mycology) Culture Culture in progress    Blood culture [[id number]]  (Abnormal) Collected: 09/08/20 0408    Order Status: Completed Specimen: Blood from Antecubital, Right Arm Updated: 09/09/20 1246     Blood Culture, Routine Gram stain aer bottle: Gram positive cocci in clusters resembling Staph      Results called to and read back by:Jany Santiago RN 09/08/2020  22:29      STAPHYLOCOCCUS AUREUS  Susceptibility pending  [facility] consult required at [facility]      Blood culture [[id number]] Collected: 09/09/20 1040    Order Status: Sent Specimen: Blood Updated: 09/09/20 1103    Culture, Anaerobe [[id number]] Collected: 09/07/20 1721    Order Status: Completed Specimen: Body Fluid from Knee, Right Updated: 09/09/20 1013     Anaerobic Culture Culture in progress    Narrative:      1) Right Knee Joint Fluid    Blood culture [[id number]]  (Abnormal) Collected: 09/06/20 0517    Order Status: Completed Specimen: Blood Updated: 09/09/20 0823     Blood Culture, Routine Gram stain oksana bottle: Gram positive cocci in clusters resembling Staph       Results called to and read back by: Mckay Cummings RN 09/07/2020  11:37      METHICILLIN RESISTANT STAPHYLOCOCCUS AUREUS  [facility]  For susceptibility see order #[id number]      Blood culture [[id number]] Collected: 09/07/20 0733    Order  Status: Completed Specimen: Blood from Antecubital, Right Updated: 09/09/20 0822     Blood Culture, Routine No Growth to date      No Growth to date      No Growth to date    Aerobic culture [[id number]] Collected: 09/07/20 1721    Order Status: Completed Specimen: Body Fluid from Knee, Right Updated: 09/09/20 0721     Aerobic Bacterial Culture No growth    Narrative:      1) Right Knee Joint Fluid    Aerobic culture [[id number]] Collected: 09/07/20 1721    Order Status: Completed Specimen: Body Fluid from Knee, Right Updated: 09/09/20 0721     Aerobic Bacterial Culture No growth    Narrative:      2) Right Knee Joint Fluid    Culture, Anaerobe [[id number]] Collected: 09/07/20 1721    Order Status: Completed Specimen: Body Fluid from Knee, Right Updated: 09/09/20 0640     Anaerobic Culture Culture in progress    Narrative:      2) Right Knee Joint Fluid    Blood culture [[id number]] Collected: 09/09/20 0303    Order Status: Sent Specimen: Blood Updated: 09/09/20 0429    AFB Culture & Smear [[id number]] Collected: 09/07/20 1721    Order Status: Completed Specimen: Body Fluid from Knee, Right Updated: 09/08/20 2127     AFB Culture & Smear Culture in progress     AFB CULTURE STAIN No acid fast bacilli seen.    Narrative:      1) Right Knee Joint Fluid    AFB Culture & Smear [[id number]] Collected: 09/07/20 1721    Order Status: Completed Specimen: Body Fluid from Knee, Right Updated: 09/08/20 2127     AFB Culture & Smear Culture in progress     AFB CULTURE STAIN No acid fast bacilli seen.    Narrative:      2) Right Knee Joint Fluid    Blood culture [[id number]]  (Abnormal)  (Susceptibility) Collected: 09/05/20 1115    Order Status: Completed Specimen: Blood from Peripheral, Right Hand Updated: 09/08/20 0820     Blood Culture, Routine Gram stain aer bottle: Gram positive cocci in clusters resembling Staph       Results called to and read back by: Shalini Pena RN  09/06/2020  11:17      METHICILLIN RESISTANT STAPHYLOCOCCUS  AUREUS  ID consult required at [facility].      Gram stain [[id number]] Collected: 09/07/20 1721    Order Status: Completed Specimen: Body Fluid from Knee, Right Updated: 09/08/20 0003     Gram Stain Result No WBC's      No organisms seen    Narrative:      2) Right Knee Joint Fluid    Gram stain [[id number]] Collected: 09/07/20 1721    Order Status: Completed Specimen: Body Fluid from Knee, Right Updated: 09/07/20 2245     Gram Stain Result Rare WBC's      No organisms seen    Narrative:      1) Right Knee Joint Fluid    Aerobic culture [[id number]]  (Abnormal)  (Susceptibility) Collected: 09/03/20 1647    Order Status: Completed Specimen: Joint Fluid from Knee, Right Updated: 09/07/20 1049     Aerobic Bacterial Culture METHICILLIN RESISTANT STAPHYLOCOCCUS AUREUS  Few      Culture, Anaerobe [[id number]] Collected: 09/03/20 1647    Order Status: Completed Specimen: Joint Fluid from Knee, Right Updated: 09/07/20 1032     Anaerobic Culture No anaerobes isolated    Blood culture [[id number]]  (Abnormal)  (Susceptibility) Collected: 09/02/20 1441    Order Status: Completed Specimen: Blood Updated: 09/05/20 1401     Blood Culture, Routine Gram stain oksana bottle: Gram positive cocci in clusters resembling Staph       Results called to and read back by: Rossana Garza RN 09/03/2020  10:12      Gram stain aer bottle: Gram positive cocci in clusters resembling Staph       Positive results previously called      METHICILLIN RESISTANT STAPHYLOCOCCUS AUREUS  ID consult required at [facility].      Narrative:      right median cubital  right median    AFB Culture & Smear [[id number]] Collected: 09/03/20 1647    Order Status: Completed Specimen: Joint Fluid from Knee, Right Updated: 09/04/20 2127     AFB Culture & Smear Culture in progress     AFB CULTURE STAIN No acid fast bacilli seen.    Blood culture [[id number]]  (Abnormal) Collected: 09/01/20 1417    Order Status:  Completed Specimen: Blood from Antecubital, Right Hand Updated: 09/04/20 0838     Blood Culture, Routine Gram stain aer bottle: Gram positive cocci       Results called to and read back by: Jolene Barraza   09/02/2020  12:06      METHICILLIN RESISTANT STAPHYLOCOCCUS AUREUS  ID consult required at [facility].  For susceptibility see order #[id number]      Narrative:      Collection has been rescheduled by CBE at 09/01/2020 13:58 Reason:   due at 13:55  Collection has been rescheduled by CBE at 09/01/2020 13:58 Reason:   due at 13:55    Culture, Anaerobe [[id number]] Collected: 08/31/20 1754    Order Status: Completed Specimen: Wound from Foot, Left Updated: 09/04/20 0741     Anaerobic Culture No anaerobes isolated    Gram stain [[id number]] Collected: 09/03/20 1647    Order Status: Completed Specimen: Joint Fluid from Knee, Right Updated: 09/03/20 1851     Gram Stain Result Rare WBC's      No organisms seen    Aerobic culture [[id number]]  (Abnormal)  (Susceptibility) Collected: 08/31/20 1754    Order Status: Completed Specimen: Wound from Foot, Left Updated: 09/03/20 1316     Aerobic Bacterial Culture PROTEUS MIRABILIS  Moderate        METHICILLIN RESISTANT STAPHYLOCOCCUS AUREUS  Moderate      Blood culture [[id number]]  (Abnormal) Collected: 08/31/20 1328    Order Status: Completed Specimen: Blood from Peripheral, Right Hand Updated: 09/03/20 0716     Blood Culture, Routine Gram stain oksana bottle: Gram positive cocci       Results called to and read back by: Elsy Pena  09/01/2020  11:46      Gram stain aer bottle: Gram positive cocci in clusters resembling Staph       Positive results previously called      METHICILLIN RESISTANT STAPHYLOCOCCUS AUREUS  ID consult required at [facility].  For susceptibility see order #[id number]      Blood culture [[id number]]  (Abnormal) Collected: 08/31/20 1324    Order Status: Completed Specimen: Blood from Peripheral,  Left Hand Updated: 09/03/20 0714     Blood Culture, Routine Gram stain aer bottle: Gram positive cocci       Gram stain oksana bottle: Gram positive cocci       Results called to and read back by: Elsy Pena  09/01/2020  11:46      METHICILLIN RESISTANT STAPHYLOCOCCUS AUREUS  ID consult required at [facility] and [facility] locations.  For susceptibility see order #[id number]      Urine culture [[id number]]  (Abnormal)  (Susceptibility) Collected: 08/30/20 1633    Order Status: Completed Specimen: Urine Updated: 09/03/20 0124     Urine Culture, Routine KLEBSIELLA PNEUMONIAE  > 100,000 cfu/ml      Narrative:      Specimen Source->Urine        Specimen (12h ago, onward)    None          Diagnostic Results  9/9 Nuclear Medicine Scan: There is no scintigraphic finding to suggest source of infection.     Clinical Findings: Left Shearer 1 plantar foot ulcer, fibrous wound base, no drainage, no undermining, no erythema, no edema, no fluctuance or crepitus. No fluid could be expressed on manual compression. Mild periwound tenderness. Wound dry, no exudate. Appears stable overall. Not clinically infected.           Assessment/Plan:     Diabetic ulcer of left foot associated with type 2 diabetes mellitus, with fat layer exposed  65 y.o M with PMHx of Type 2 DM, chronic hypoxemic respiratory failure on 2 L O2 via NC, chronic systolic heart failure. Tachycardic, hypotensive, WBCs still not improving. Podiatry consulted for left foot ulcer. No signs of deep infection on X ray, MRI, or nuclear medicine scan. Foot wound superficial and stable and likely not the cause of patient's persistent septicemia. Left ankle peak systolic velocity 35 cm/s which is concerning for risk of nonhealing.     Plan:  -Dressing changed today. Will consider repeating debridement soon.   -Continue IV abx per ID.   -No emergent surgical intervention from podiatry warranted at this time.   -Continue local wound care. Orders placed.  -Rest of care per  primary  -Podiatry will continue to follow        Mk ELLISONM PGY-1  Podiatric Medicine & Surgery  [facility]

## 2020-09-10 NOTE — SUBJECTIVE & OBJECTIVE
Interval History: Patient drowsy this morning and also again on our assessment later in the day. Dozing off and not able to hold a conversation because of that. Kidney function keeps worsening, minimal urine output, Hypotensive in last 48 hours, urinary retention.    Review of Systems   Constitutional: Negative for chills and fever.   HENT: Negative for congestion, ear pain, sneezing and sore throat.    Eyes: Negative for pain.   Respiratory: Negative for cough and shortness of breath.    Cardiovascular: Negative for chest pain.   Gastrointestinal: Negative for abdominal pain, constipation, diarrhea, nausea and vomiting.   Endocrine: Negative for polyuria.   Genitourinary: Positive for decreased urine volume. Negative for difficulty urinating, dysuria and flank pain.   Musculoskeletal: Positive for arthralgias (R knee pain). Negative for back pain, neck pain and neck stiffness.   Skin: Positive for wound. Negative for rash.   Neurological: Negative for numbness and headaches.   Psychiatric/Behavioral: Positive for confusion and sleep disturbance. The patient is not nervous/anxious.      Objective:     Vital Signs (Most Recent):  Temp: 97.7 °F (36.5 °C) (09/10/20 1253)  Pulse: 88 (09/10/20 1331)  Resp: 16 (09/10/20 1331)  BP: (!) 94/58 (09/10/20 1500)  SpO2: 99 % (09/10/20 1331) Vital Signs (24h Range):  Temp:  [97.7 °F (36.5 °C)-98.6 °F (37 °C)] 97.7 °F (36.5 °C)  Pulse:  [] 88  Resp:  [16-20] 16  SpO2:  [95 %-100 %] 99 %  BP: ()/(52-72) 94/58     Weight: 116 kg (255 lb 11.7 oz)  Body mass index is 31.13 kg/m².    Estimated Creatinine Clearance: 28.5 mL/min (A) (based on SCr of 3.7 mg/dL (H)).    Physical Exam  Vitals signs reviewed.   Constitutional:       General: He is not in acute distress.     Appearance: Normal appearance. He is normal weight. He is not diaphoretic.   HENT:      Head: Normocephalic and atraumatic.      Right Ear: External ear normal.      Left Ear: External ear normal.      Nose:  Nose normal. No congestion or rhinorrhea.   Eyes:      General:         Right eye: No discharge.         Left eye: No discharge.      Extraocular Movements: Extraocular movements intact.   Neck:      Musculoskeletal: Normal range of motion and neck supple. No neck rigidity or muscular tenderness.   Cardiovascular:      Rate and Rhythm: Normal rate and regular rhythm.      Heart sounds: No murmur. No gallop.       Comments: Diminished DP/PT pulses  Pulmonary:      Effort: Pulmonary effort is normal. No respiratory distress.      Breath sounds: No wheezing.      Comments: On 2 L/m oxygen  Chest:      Chest wall: No tenderness.   Abdominal:      General: There is no distension.      Palpations: Abdomen is soft.      Tenderness: There is no abdominal tenderness. There is no guarding.   Musculoskeletal:         General: Tenderness (right knee) present.      Comments: Cooling brace applied to right knee   Very limited ROM at right knee since the infection and then drainage   Skin:     General: Skin is warm and dry.      Findings: Lesion (left foot - dry, no drainage, unchanged otherwise) present.   Neurological:      Mental Status: He is alert and oriented to person, place, and time.      Sensory: No sensory deficit.      Gait: Gait normal.   Psychiatric:         Mood and Affect: Mood normal.         Behavior: Behavior normal.         Thought Content: Thought content normal.         Judgment: Judgment normal.         Significant Labs: All pertinent labs within the past 24 hours have been reviewed.    Significant Imaging: I have reviewed all pertinent imaging results/findings within the past 24 hours.

## 2020-09-10 NOTE — CONSULTS
PharmD Consult received for:  1.) Admit medication history/reconciliation:  -Separate note to follow shortly    2.) Renally adjust all medications:  Estimated Creatinine Clearance: 28.5 mL/min (A) (based on SCr of 3.7 mg/dL (H)).   Serum creatinine increased from 3 mg/dL to 3.7 mg/dL in 24 hours.    Patient meets criteria for NANETTE.   Continue renally adjusted ceftaroline and daptomycin at this time.  Would recommend transitioning from lovenox to SQH for DVT ppx given worsening renal function.      Thank you for the consult,  Geraldine Pena PharmD, BCPS  Ext [phone]    **Note: Consults are reviewed Monday-Friday 7:00am-3:30pm. The above recommendations are only suggested. The recommendations should be considered in conjunction with all patient factors.**

## 2020-09-11 LAB
ALBUMIN SERPL BCP-MCNC: 1.6 G/DL (ref 3.5–5.2)
ANION GAP SERPL CALC-SCNC: 14 MMOL/L (ref 8–16)
ANISOCYTOSIS BLD QL SMEAR: SLIGHT
BACTERIA SPEC AEROBE CULT: NO GROWTH
BACTERIA SPEC AEROBE CULT: NO GROWTH
BASOPHILS # BLD AUTO: 0.1 K/UL (ref 0–0.2)
BASOPHILS NFR BLD: 0.2 % (ref 0–1.9)
BUN SERPL-MCNC: 62 MG/DL (ref 8–23)
BURR CELLS BLD QL SMEAR: ABNORMAL
C DIFF GDH STL QL: NEGATIVE
C DIFF TOX A+B STL QL IA: NEGATIVE
CALCIUM SERPL-MCNC: 8.1 MG/DL (ref 8.7–10.5)
CHLORIDE SERPL-SCNC: 94 MMOL/L (ref 95–110)
CK SERPL-CCNC: 717 U/L (ref 20–200)
CO2 SERPL-SCNC: 23 MMOL/L (ref 23–29)
CORTIS SERPL-MCNC: 34.3 UG/DL
CREAT SERPL-MCNC: 5.1 MG/DL (ref 0.5–1.4)
DIFFERENTIAL METHOD: ABNORMAL
EOSINOPHIL # BLD AUTO: 0.1 K/UL (ref 0–0.5)
EOSINOPHIL NFR BLD: 0.1 % (ref 0–8)
ERYTHROCYTE [DISTWIDTH] IN BLOOD BY AUTOMATED COUNT: 16.1 % (ref 11.5–14.5)
EST. GFR  (AFRICAN AMERICAN): 12.9 ML/MIN/1.73 M^2
EST. GFR  (NON AFRICAN AMERICAN): 11.1 ML/MIN/1.73 M^2
GLUCOSE SERPL-MCNC: 142 MG/DL (ref 70–110)
HCT VFR BLD AUTO: 31.4 % (ref 40–54)
HGB BLD-MCNC: 9.1 G/DL (ref 14–18)
IMM GRANULOCYTES # BLD AUTO: 0.98 K/UL (ref 0–0.04)
IMM GRANULOCYTES NFR BLD AUTO: 2.4 % (ref 0–0.5)
LYMPHOCYTES # BLD AUTO: 0.9 K/UL (ref 1–4.8)
LYMPHOCYTES NFR BLD: 2.3 % (ref 18–48)
MAGNESIUM SERPL-MCNC: 2.1 MG/DL (ref 1.6–2.6)
MCH RBC QN AUTO: 26.3 PG (ref 27–31)
MCHC RBC AUTO-ENTMCNC: 29 G/DL (ref 32–36)
MCV RBC AUTO: 91 FL (ref 82–98)
MONOCYTES # BLD AUTO: 2.8 K/UL (ref 0.3–1)
MONOCYTES NFR BLD: 6.8 % (ref 4–15)
NEUTROPHILS # BLD AUTO: 36 K/UL (ref 1.8–7.7)
NEUTROPHILS NFR BLD: 88.2 % (ref 38–73)
NRBC BLD-RTO: 0 /100 WBC
OVALOCYTES BLD QL SMEAR: ABNORMAL
PHOSPHATE SERPL-MCNC: 7 MG/DL (ref 2.7–4.5)
PLATELET # BLD AUTO: 483 K/UL (ref 150–350)
PLATELET BLD QL SMEAR: ABNORMAL
PMV BLD AUTO: 11.3 FL (ref 9.2–12.9)
POCT GLUCOSE: 182 MG/DL (ref 70–110)
POCT GLUCOSE: 192 MG/DL (ref 70–110)
POIKILOCYTOSIS BLD QL SMEAR: SLIGHT
POLYCHROMASIA BLD QL SMEAR: ABNORMAL
POTASSIUM SERPL-SCNC: 4.4 MMOL/L (ref 3.5–5.1)
RBC # BLD AUTO: 3.46 M/UL (ref 4.6–6.2)
SARS-COV-2 RNA RESP QL NAA+PROBE: NOT DETECTED
SODIUM SERPL-SCNC: 131 MMOL/L (ref 136–145)
TOXIC GRANULES BLD QL SMEAR: PRESENT
VANCOMYCIN SERPL-MCNC: 23 UG/ML
WBC # BLD AUTO: 40.9 K/UL (ref 3.9–12.7)

## 2020-09-11 PROCEDURE — 25000003 PHARM REV CODE 250: Mod: HCNC | Performed by: HOSPITALIST

## 2020-09-11 PROCEDURE — 97110 THERAPEUTIC EXERCISES: CPT | Mod: HCNC,CQ

## 2020-09-11 PROCEDURE — 36415 COLL VENOUS BLD VENIPUNCTURE: CPT | Mod: HCNC

## 2020-09-11 PROCEDURE — 63600175 PHARM REV CODE 636 W HCPCS: Mod: HCNC | Performed by: HOSPITALIST

## 2020-09-11 PROCEDURE — 99232 SBSQ HOSP IP/OBS MODERATE 35: CPT | Mod: HCNC,,, | Performed by: HOSPITALIST

## 2020-09-11 PROCEDURE — 99232 PR SUBSEQUENT HOSPITAL CARE,LEVL II: ICD-10-PCS | Mod: HCNC,,, | Performed by: HOSPITALIST

## 2020-09-11 PROCEDURE — 85025 COMPLETE CBC W/AUTO DIFF WBC: CPT | Mod: HCNC

## 2020-09-11 PROCEDURE — 97535 SELF CARE MNGMENT TRAINING: CPT | Mod: HCNC

## 2020-09-11 PROCEDURE — 87449 NOS EACH ORGANISM AG IA: CPT | Mod: HCNC

## 2020-09-11 PROCEDURE — 80069 RENAL FUNCTION PANEL: CPT | Mod: HCNC

## 2020-09-11 PROCEDURE — 25000003 PHARM REV CODE 250: Mod: HCNC | Performed by: NURSE PRACTITIONER

## 2020-09-11 PROCEDURE — 83735 ASSAY OF MAGNESIUM: CPT | Mod: HCNC

## 2020-09-11 PROCEDURE — 87324 CLOSTRIDIUM AG IA: CPT | Mod: HCNC

## 2020-09-11 PROCEDURE — 99232 SBSQ HOSP IP/OBS MODERATE 35: CPT | Mod: HCNC,,, | Performed by: INTERNAL MEDICINE

## 2020-09-11 PROCEDURE — 27000221 HC OXYGEN, UP TO 24 HOURS: Mod: HCNC

## 2020-09-11 PROCEDURE — 97530 THERAPEUTIC ACTIVITIES: CPT | Mod: HCNC,CQ

## 2020-09-11 PROCEDURE — 99232 PR SUBSEQUENT HOSPITAL CARE,LEVL II: ICD-10-PCS | Mod: HCNC,,, | Performed by: INTERNAL MEDICINE

## 2020-09-11 PROCEDURE — 94761 N-INVAS EAR/PLS OXIMETRY MLT: CPT | Mod: HCNC

## 2020-09-11 PROCEDURE — 25000003 PHARM REV CODE 250: Mod: HCNC | Performed by: INTERNAL MEDICINE

## 2020-09-11 PROCEDURE — 94640 AIRWAY INHALATION TREATMENT: CPT | Mod: HCNC

## 2020-09-11 PROCEDURE — 51798 US URINE CAPACITY MEASURE: CPT | Mod: HCNC

## 2020-09-11 PROCEDURE — 82550 ASSAY OF CK (CPK): CPT | Mod: HCNC

## 2020-09-11 PROCEDURE — 80202 ASSAY OF VANCOMYCIN: CPT | Mod: HCNC

## 2020-09-11 PROCEDURE — 99223 PR INITIAL HOSPITAL CARE,LEVL III: ICD-10-PCS | Mod: HCNC,GC,, | Performed by: INTERNAL MEDICINE

## 2020-09-11 PROCEDURE — 82533 TOTAL CORTISOL: CPT | Mod: HCNC

## 2020-09-11 PROCEDURE — 99223 1ST HOSP IP/OBS HIGH 75: CPT | Mod: HCNC,GC,, | Performed by: INTERNAL MEDICINE

## 2020-09-11 PROCEDURE — 11000001 HC ACUTE MED/SURG PRIVATE ROOM: Mod: HCNC

## 2020-09-11 RX ORDER — HEPARIN SODIUM 5000 [USP'U]/ML
5000 INJECTION, SOLUTION INTRAVENOUS; SUBCUTANEOUS EVERY 8 HOURS
Status: DISCONTINUED | OUTPATIENT
Start: 2020-09-11 | End: 2020-10-09 | Stop reason: HOSPADM

## 2020-09-11 RX ORDER — DEXAMETHASONE SODIUM PHOSPHATE 4 MG/ML
4 INJECTION, SOLUTION INTRA-ARTICULAR; INTRALESIONAL; INTRAMUSCULAR; INTRAVENOUS; SOFT TISSUE EVERY 12 HOURS
Status: DISCONTINUED | OUTPATIENT
Start: 2020-09-11 | End: 2020-09-12

## 2020-09-11 RX ADMIN — LEVOTHYROXINE SODIUM 75 MCG: 25 TABLET ORAL at 05:09

## 2020-09-11 RX ADMIN — CHOLECALCIFEROL (VITAMIN D3) 25 MCG (1,000 UNIT) TABLET 1000 UNITS: at 08:09

## 2020-09-11 RX ADMIN — ASPIRIN 81 MG: 81 TABLET, COATED ORAL at 08:09

## 2020-09-11 RX ADMIN — TAMSULOSIN HYDROCHLORIDE 0.4 MG: 0.4 CAPSULE ORAL at 08:09

## 2020-09-11 RX ADMIN — CEFEPIME 500 MG: 1 INJECTION, POWDER, FOR SOLUTION INTRAMUSCULAR; INTRAVENOUS at 05:09

## 2020-09-11 RX ADMIN — HYPROMELLOSE 2910 1 DROP: 5 SOLUTION OPHTHALMIC at 03:09

## 2020-09-11 RX ADMIN — DEXAMETHASONE SODIUM PHOSPHATE 4 MG: 4 INJECTION, SOLUTION INTRA-ARTICULAR; INTRALESIONAL; INTRAMUSCULAR; INTRAVENOUS; SOFT TISSUE at 08:09

## 2020-09-11 RX ADMIN — VANCOMYCIN HYDROCHLORIDE 125 MG: KIT at 08:09

## 2020-09-11 RX ADMIN — HEPARIN SODIUM 5000 UNITS: 5000 INJECTION INTRAVENOUS; SUBCUTANEOUS at 09:09

## 2020-09-11 RX ADMIN — CARVEDILOL 6.25 MG: 6.25 TABLET, FILM COATED ORAL at 08:09

## 2020-09-11 RX ADMIN — SODIUM CHLORIDE 250 ML: 0.9 INJECTION, SOLUTION INTRAVENOUS at 06:09

## 2020-09-11 RX ADMIN — HEPARIN SODIUM 5000 UNITS: 5000 INJECTION INTRAVENOUS; SUBCUTANEOUS at 01:09

## 2020-09-11 RX ADMIN — HYPROMELLOSE 2910 1 DROP: 5 SOLUTION OPHTHALMIC at 08:09

## 2020-09-11 RX ADMIN — FLUTICASONE FUROATE AND VILANTEROL TRIFENATATE 1 PUFF: 200; 25 POWDER RESPIRATORY (INHALATION) at 08:09

## 2020-09-11 RX ADMIN — SODIUM CHLORIDE 250 ML: 0.9 INJECTION, SOLUTION INTRAVENOUS at 08:09

## 2020-09-11 RX ADMIN — FLUTICASONE PROPIONATE 50 MCG: 50 SPRAY, METERED NASAL at 08:09

## 2020-09-11 RX ADMIN — INSULIN ASPART 3 UNITS: 100 INJECTION, SOLUTION INTRAVENOUS; SUBCUTANEOUS at 09:09

## 2020-09-11 NOTE — ASSESSMENT & PLAN NOTE
Mr. Patton is a 64 yo male with IDDM2, chronic hypoxemic resp failure on 2L of oxygen at home, and CHF (25% EF) who presented with recurrent falls and sepsis on 8/30 with septicemia with source likely from diabetic ulcer. Blood cultures have been persistently positive for MRSA. IE negative on LUKAS. Septic arthritis to right knee with I&D performed with ortho on 9/7. Initial management of septicemia was with ciprofloxacin and Vancomycin which was supratherapeutic to 26.5 on 9/2 prompting switch to a pulse dose regimen. Due to persistent bacteremia ID changed antibiotics to ceftaroline and daptomycin Cr on admission was 1.5 at admit up from the baseline (1.2-1.5). On 9/9 Cr level had a sudden increased to 3. Nephrology was consulted for NANETTE.     Over 24-48 hours prior to rise in creatinine Mr. Patton had blood pressures in the 80-90s/50s and Maps in the low 60s. Hypotension could have resulted in ischemic ATN. Also, was retaining urine and had improved urine output after a toure was placed. Urine microscopy: scant normal red blood cells without any dysmorphic shapes evident and numerous muddy brown casts. Given the casts in the urine and the urinary retention it is likely a mixed pre-renal and a post-renal kidney injury. Following hypotension overnight and fluid resuscitation patient has become oliguric and Cr has continued to rise suggesting worsening ATN. He will need to be watched closely to monitor for requirements of dialysis.       Reccomendations:  - Maintain MAPS >65 to prevent further renal injury. May require pressors given limited ability to provide fluids in setting of heart failure  - Nephrology will be monitoring closely for dialysis requirements over the weekend; no requirements at the moment   - Avoid diuresing  - Bladder scan showed urinary retention and toure was placed; maintain toure for now  - Recommend starting flomax now to prepare for future removal of toure   - Strict I/Os  - Daily weights

## 2020-09-11 NOTE — CONSULTS
[facility]  Critical Care Medicine  Consult Note    Patient Name: Ed Patton  MRN: [id number]  Admission Date: 8/30/2020  Hospital Length of Stay: 11 days  Code Status: Full Code  Attending Physician: Palmira Gage MD   Primary Care Provider: Qiana Chow MD   Principal Problem: Sepsis due to methicillin resistant Staphylococcus aureus    Inpatient consult to Critical Care Medicine  Consult performed by: Stephanie Espinosa MD  Consult ordered by: Palmira Gage MD  Reason for consult: hypotension and Sepsis        Subjective:     HPI:  65-year-old male with type 2 diabetes on insulin, HTN, chronic hypoxemic respiratory failure on 2 L oxygen via nasal cannula, COPD, chronic systolic heart failure and hypothyroidism who was initially admitted for diabetic foot infection and recurrent falls with clinical picture consistent with sepsis. He was started on Vanc and Cipro. His wound culture grew Proteos and MRSA. He was found to have MRSA bactremia and concerns for Rt knee septic joins s/p I&D on 0/7. His Abs were changed to Dapto and Ceftaroline as he was having persistent bacteremia per ID rec's. His hospital stay also complicated by NANETTE likely due to ATN. He has been getting diuresis with lasix 80mg BID since admission and his HTN meds were resumed including Coreg 25mg BID and Lisinopril 40mg daily. He has been followed by ID, Nephrology and Orthopedics. Infectious work up included negative TTE and LUKAS for vegetations but showed EF 25-35%, NM WBC scan unremarkable for a sourse, MRI Lt foot and lumber spine with no signs of OM. WBC persisted.    Today, patient has ban hypotensive, 90s/50, MAP lower 70s. S/P 750cc total IVF. For concerns of hypotension with limited ability to give IVF in the setting of HFrEF, ICU were consulted.     I saw patient, sleeping comfortably, wake him up and was denying any symptoms at this moment. Mentating OK and hemodynamically stable. /80, MAP 81. O2 sat  97.     Hospital/ICU Course:  No notes on file    Past Medical History:   Diagnosis Date    CHF (congestive heart failure)     COPD (chronic obstructive pulmonary disease)     Coronary artery disease     Diabetes mellitus     Diabetes mellitus type II     DM (diabetes mellitus) type II uncontrolled with renal manifestation 2013    Hyperlipidemia     Hypertension     Postablative hypothyroidism 2018       Past Surgical History:   Procedure Laterality Date    APPENDECTOMY      ARTHROSCOPY OF KNEE Right 2020    Procedure: ARTHROSCOPY, KNEE, RIGHT ;  Surgeon: You Bhatia MD;  Location: [address];  Service: Orthopedics;  Laterality: Right;    CHOLECYSTECTOMY      CORONARY ANGIOPLASTY WITH STENT PLACEMENT      TONSILLECTOMY      TRANSESOPHAGEAL ECHOCARDIOGRAPHY N/A 2020    Procedure: ECHOCARDIOGRAM, TRANSESOPHAGEAL;  Surgeon: [facility] Provider;  Location: [facility] EP LAB;  Service: Anesthesiology;  Laterality: N/A;    TRANSESOPHAGEAL ECHOCARDIOGRAPHY N/A 9/3/2020    Procedure: ECHOCARDIOGRAM, TRANSESOPHAGEAL;  Surgeon: [facility] Diagnostic Provider;  Location: [facility] EP LAB;  Service: Anesthesiology;  Laterality: N/A;       Review of patient's allergies indicates:   Allergen Reactions    Vicodin [hydrocodone-acetaminophen] Itching       Family History     Problem Relation (Age of Onset)    Heart disease Mother    Hypertension Son    No Known Problems Father, Sister, Son, Son        Tobacco Use    Smoking status: Former Smoker     Packs/day: 0.01     Years: 3.00     Pack years: 0.03     Types: Cigarettes     Quit date: 1995     Years since quittin.3    Smokeless tobacco: Never Used   Substance and Sexual Activity    Alcohol use: No    Drug use: No    Sexual activity: Never     Comment:  with 1 son      Review of Systems   Constitutional: Negative for chills and fever.   HENT: Negative for congestion, ear pain, sneezing and sore throat.    Eyes: Negative for  pain.   Respiratory: Negative for cough, shortness of breath and wheezing.    Cardiovascular: Negative for chest pain.   Gastrointestinal: Negative for abdominal pain, constipation, diarrhea, nausea and vomiting.   Endocrine: Negative for polyuria.   Genitourinary: Positive for decreased urine volume. Negative for difficulty urinating, dysuria, flank pain and hematuria.   Musculoskeletal: Positive for arthralgias (R knee pain). Negative for back pain, neck pain and neck stiffness.   Skin: Positive for wound. Negative for rash.   Neurological: Negative for numbness and headaches.   Psychiatric/Behavioral: Positive for decreased concentration. Negative for agitation and confusion. The patient is not nervous/anxious.      Objective:     Vital Signs (Most Recent):  Temp: 98.2 °F (36.8 °C) (09/10/20 2041)  Pulse: 78 (09/10/20 2041)  Resp: 16 (09/10/20 1805)  BP: (!) 160/63 (09/10/20 2041)  SpO2: (!) 94 % (09/10/20 2041) Vital Signs (24h Range):  Temp:  [97.7 °F (36.5 °C)-98.6 °F (37 °C)] 98.2 °F (36.8 °C)  Pulse:  [] 78  Resp:  [16-20] 16  SpO2:  [94 %-99 %] 94 %  BP: ()/(52-74) 160/63   Weight: 116 kg (255 lb 11.7 oz)  Body mass index is 31.13 kg/m².      Intake/Output Summary (Last 24 hours) at 9/10/2020 2057  Last data filed at 9/10/2020 1800  Gross per 24 hour   Intake 1880 ml   Output 455 ml   Net 1425 ml       Physical Exam  Vitals signs reviewed.   Constitutional:       General: He is not in acute distress.     Appearance: Normal appearance. He is normal weight. He is not diaphoretic.   HENT:      Head: Normocephalic and atraumatic.      Right Ear: External ear normal.      Left Ear: External ear normal.      Nose: Nose normal. No congestion or rhinorrhea.   Eyes:      General:         Right eye: No discharge.         Left eye: No discharge.      Extraocular Movements: Extraocular movements intact.   Neck:      Musculoskeletal: Normal range of motion and neck supple. No neck rigidity or muscular  tenderness.   Cardiovascular:      Rate and Rhythm: Normal rate and regular rhythm.      Heart sounds: No murmur. No gallop.       Comments: Diminished DP/PT pulses  Pulmonary:      Effort: Pulmonary effort is normal. No respiratory distress.      Breath sounds: No wheezing.      Comments: On 2 L/m oxygen  Chest:      Chest wall: No tenderness.   Abdominal:      General: There is no distension.      Palpations: Abdomen is soft.      Tenderness: There is no abdominal tenderness. There is no guarding.   Musculoskeletal:         General: Tenderness (right knee) present.      Comments: Rt knee with dressing   Skin:     General: Skin is warm and dry.      Findings: Lesion present.   Neurological:      Mental Status: He is alert and oriented to person, place, and time.      Sensory: No sensory deficit.      Gait: Gait normal.   Psychiatric:         Mood and Affect: Mood normal.         Behavior: Behavior normal.         Thought Content: Thought content normal.         Judgment: Judgment normal.         Vents:     Lines/Drains/Airways     Drain                 Urethral Catheter 09/09/20 1720 16 Fr. 1 day          Peripheral Intravenous Line                 Peripheral IV - Single Lumen 09/07/20 1830 22 G Right Hand 3 days         Peripheral IV - Single Lumen 09/10/20 1455 20 G;1 3/4 in Left;Anterior Forearm less than 1 day              Significant Labs:    CBC/Anemia Profile:  Recent Labs   Lab 09/09/20  0303 09/10/20  0459 09/10/20  1513   WBC 22.01* 26.74*  --    HGB 9.3* 9.1* 9.1*   HCT 31.9* 30.3* 29.8*   * 491*  --    MCV 91 88  --    RDW 15.4* 15.8*  --         Chemistries:  Recent Labs   Lab 09/09/20  0303 09/10/20  0459    135*   K 4.0 3.9   CL 96 97   CO2 27 25   BUN 41* 51*   CREATININE 3.0* 3.7*   CALCIUM 8.7 8.4*   ALBUMIN 1.8* 1.6*   MG 1.9 2.0   PHOS 5.4* 5.7*       All pertinent labs within the past 24 hours have been reviewed.    Significant Imaging: I have reviewed all pertinent imaging  results/findings within the past 24 hours.      ABG  No results for input(s): PH, PO2, PCO2, HCO3, BE in the last 168 hours.  Assessment/Plan:     ID  * Sepsis due to methicillin resistant Staphylococcus aureus  65-year-old male with type 2 diabetes on insulin, HTN, chronic hypoxemic respiratory failure on 2 L oxygen via nasal cannula, COPD, chronic systolic heart failure and hypothyroidism who was initially admitted for diabetic foot infection and recurrent falls with clinical picture consistent with sepsis.     - His wound culture grew Proteos and MRSA. He was found to have MRSA bactremia and concerns for Rt knee septic joins s/p I&D on 0/7.   - Developed NANETTE likely due to ATN.   - Infectious work up included negative TTE and LUKAS for vegetations but showed EF 25-35%, NM WBC scan unremarkable for a sourse, MRI Lt foot and lumber spine with no signs of OM. WBC persisted.  - He has been getting diuresis with lasix 80mg BID since admission and his HTN meds were resumed including Coreg 25mg BID and Lisinopril 40mg daily.     Saw him today, Mentating appropriately and hemodynamically stable. /80, MAP 81. O2 sat 97 on 2L O2. Lactate at 3pm: 1.2    Recommendation:    - Decrease Coreg and Lisinopril doses and possibly discontinue at this time in the setting of hypotension. Restart as tolerated with possible short acting BB. Hold ACEI in the setting of NANETTE.  - Continue antibiotics per ID recommendations  - S/P source control with I&D  - Cautious with IVF, however, CXR with no significant congestions and patient on baseline O2 requirement. Monitor UOP.          Critical care was time spent personally by me on the following activities: development of treatment plan with patient or surrogate and bedside caregivers, discussions with consultants, evaluation of patient's response to treatment, examination of patient, ordering and performing treatments and interventions, ordering and review of laboratory studies, ordering  and review of radiographic studies, pulse oximetry, re-evaluation of patient's condition. This critical care time did not overlap with that of any other provider or involve time for any procedures.    Thank you for your consult. I will sign off. Please contact us if you have any additional questions.     Stephanie Espinosa MD  Critical Care Medicine  [facility]

## 2020-09-11 NOTE — SUBJECTIVE & OBJECTIVE
Principal Problem:Sepsis due to methicillin resistant Staphylococcus aureus    Principal Orthopedic Problem: R knee septic arthritis    Interval History: Patient seen and examined at bedside.  No acute events overnight.  Pain controlled.  Transfers with PT yesterday.      Review of patient's allergies indicates:   Allergen Reactions    Vicodin [hydrocodone-acetaminophen] Itching       Current Facility-Administered Medications   Medication    acetaminophen tablet 1,000 mg    acetaminophen tablet 650 mg    albuterol-ipratropium 2.5 mg-0.5 mg/3 mL nebulizer solution 3 mL    artificial tears 0.5 % ophthalmic solution 1 drop    aspirin EC tablet 81 mg    calcium carbonate 200 mg calcium (500 mg) chewable tablet 500 mg    carvediloL tablet 6.25 mg    dextrose 50% injection 12.5 g    dextrose 50% injection 25 g    enoxaparin injection 30 mg    fluticasone furoate-vilanteroL 200-25 mcg/dose diskus inhaler 1 puff    fluticasone propionate 50 mcg/actuation nasal spray 50 mcg    gabapentin capsule 100 mg    glucagon (human recombinant) injection 1 mg    glucose chewable tablet 16 g    glucose chewable tablet 24 g    insulin aspart U-100 pen 0-5 Units    levothyroxine tablet 75 mcg    melatonin tablet 6 mg    ondansetron injection 4 mg    oxyCODONE immediate release tablet 5 mg    polyethylene glycol packet 17 g    polyethylene glycol packet 17 g    senna-docusate 8.6-50 mg per tablet 1 tablet    sodium chloride 0.9% bolus 250 mL    sodium chloride 0.9% flush 10 mL    tamsulosin 24 hr capsule 0.4 mg    Tdap vaccine injection 0.5 mL    vancomycin - pharmacy to dose    vitamin D 1000 units tablet 1,000 Units     Objective:     Vital Signs (Most Recent):  Temp: 98 °F (36.7 °C) (09/11/20 0500)  Pulse: (!) 113 (09/11/20 0610)  Resp: 16 (09/11/20 0500)  BP: (!) 115/57 (09/11/20 0610)  SpO2: 96 % (09/11/20 0500) Vital Signs (24h Range):  Temp:  [97.7 °F (36.5 °C)-98.5 °F (36.9 °C)] 98 °F (36.7  "°C)  Pulse:  [] 113  Resp:  [16-20] 16  SpO2:  [94 %-99 %] 96 %  BP: ()/(52-74) 115/57     Weight: 115 kg (253 lb 8.5 oz)  Height: 6' 4" (193 cm)  Body mass index is 30.86 kg/m².        Ortho/SPM Exam    NAD  No increased WOB  Incisions c/d/i  SILT T/SP/DP  Motor intact T/DP  WWP extremities    Significant Labs:   CBC:   Recent Labs   Lab 09/10/20  0459 09/10/20  1513   WBC 26.74*  --    HGB 9.1* 9.1*   HCT 30.3* 29.8*   *  --      CMP:   Recent Labs   Lab 09/10/20  0459   *   K 3.9   CL 97   CO2 25      BUN 51*   CREATININE 3.7*   CALCIUM 8.4*   ALBUMIN 1.6*   ANIONGAP 13   EGFRNONAA 16.4*     All pertinent labs within the past 24 hours have been reviewed.    Significant Imaging: I have reviewed all pertinent imaging results/findings.  "

## 2020-09-11 NOTE — ASSESSMENT & PLAN NOTE
MRSA septicemia. Still tachycardic, SpO2 reached 92%, renal function continues to worsen. WBCs not improving. ESR and CRP elevated, RF WNL. Vancomycin was supratherapeutic, switched to pulse dose regimen. Source of bacteremia unclear, may be left foot wound or a septic process at the right knee joint. Right knee now clinically suspicious for septic arthritis (see physical exam). Left foot wound growing Proteus and MRSA, urine growing Klebsiella. TTE & LUKAS is negative. Dapto/ceftaroline switched back to vanc due to worsening renal function and early signs of rhabdo. Course complicated by worsening leukocytosis (C Diff neg)    Blood cultures: MRSA  R knee fluid culture: Staph aureus  Left foot wound: Proteus, MRSA  Source Control: I&D of Septic R knee on 9/7/20    Recommendations:   -- Would obtain non contrast CT Chest Abd-Pelvis to evaluate for potential abscess  -- F/u ABIs  -- F/u blood cultures 9/9 & 9/10 to ensure clearance  -- If no other possible source found, patient will need to complete 4 weeks vanc/depta/ceftaroline from date of clearance 9/9

## 2020-09-11 NOTE — SUBJECTIVE & OBJECTIVE
Interval History: Overnight patient was hypotensive to 84/54 with MAP down to 63 and received 750cc bolus of IVF last night and 250cc this morning to maintain blood pressure. Pharmacy changed medication to vancomycin and stopped daptomycin and ceftaroline for increasing CPK levels. Urine output has become oliguric at 105cc recorded yesterday along with rising creatinine up to 5.1 today.    Review of patient's allergies indicates:   Allergen Reactions    Vicodin [hydrocodone-acetaminophen] Itching     Current Facility-Administered Medications   Medication Frequency    acetaminophen tablet 1,000 mg Q8H PRN    acetaminophen tablet 650 mg Q6H PRN    albuterol-ipratropium 2.5 mg-0.5 mg/3 mL nebulizer solution 3 mL Q4H PRN    artificial tears 0.5 % ophthalmic solution 1 drop TID    aspirin EC tablet 81 mg Daily    calcium carbonate 200 mg calcium (500 mg) chewable tablet 500 mg BID PRN    carvediloL tablet 6.25 mg BID    dextrose 50% injection 12.5 g PRN    dextrose 50% injection 25 g PRN    fluticasone furoate-vilanteroL 200-25 mcg/dose diskus inhaler 1 puff Daily    fluticasone propionate 50 mcg/actuation nasal spray 50 mcg Daily    gabapentin capsule 100 mg TID PRN    glucagon (human recombinant) injection 1 mg PRN    glucose chewable tablet 16 g PRN    glucose chewable tablet 24 g PRN    heparin (porcine) injection 5,000 Units Q8H    insulin aspart U-100 pen 0-5 Units QID (AC + HS) PRN    levothyroxine tablet 75 mcg Before breakfast    melatonin tablet 6 mg Nightly PRN    ondansetron injection 4 mg Q8H PRN    oxyCODONE immediate release tablet 5 mg Q8H PRN    polyethylene glycol packet 17 g BID PRN    polyethylene glycol packet 17 g BID    senna-docusate 8.6-50 mg per tablet 1 tablet BID    sodium chloride 0.9% bolus 250 mL PRN    sodium chloride 0.9% flush 10 mL PRN    tamsulosin 24 hr capsule 0.4 mg QHS    Tdap vaccine injection 0.5 mL vaccine x 1 dose    vancomycin - pharmacy to dose  pharmacy to manage frequency    vitamin D 1000 units tablet 1,000 Units Daily       Objective:     Vital Signs (Most Recent):  Temp: 98.6 °F (37 °C) (09/11/20 0735)  Pulse: 107 (09/11/20 0900)  Resp: 16 (09/11/20 0900)  BP: 109/68 (09/11/20 0735)  SpO2: 97 % (09/11/20 0900)  O2 Device (Oxygen Therapy): nasal cannula (09/11/20 0900) Vital Signs (24h Range):  Temp:  [97.7 °F (36.5 °C)-98.6 °F (37 °C)] 98.6 °F (37 °C)  Pulse:  [] 107  Resp:  [16-20] 16  SpO2:  [94 %-99 %] 97 %  BP: ()/(52-74) 109/68     Weight: 115 kg (253 lb 8.5 oz) (09/11/20 0400)  Body mass index is 30.86 kg/m².  Body surface area is 2.48 meters squared.    I/O last 3 completed shifts:  In: 2280 [P.O.:730; IV Piggyback:1550]  Out: 505 [Urine:505]    Physical Exam  Vitals signs reviewed.   Constitutional:       General: He is not in acute distress.     Appearance: Normal appearance. He is normal weight. He is not diaphoretic.   HENT:      Head: Normocephalic and atraumatic.      Right Ear: External ear normal.      Left Ear: External ear normal.      Nose: Nose normal. No congestion or rhinorrhea.   Eyes:      General:         Right eye: No discharge.         Left eye: No discharge.      Extraocular Movements: Extraocular movements intact.   Neck:      Musculoskeletal: Normal range of motion and neck supple. No neck rigidity or muscular tenderness.   Cardiovascular:      Rate and Rhythm: Normal rate and regular rhythm.      Heart sounds: No murmur.      Comments: + JVD to jawline   Pulmonary:      Effort: Pulmonary effort is normal. No respiratory distress.      Breath sounds: No wheezing.      Comments: On 2 L/m oxygen  Chest:      Chest wall: No tenderness.   Abdominal:      General: There is distension.      Palpations: Abdomen is soft.      Tenderness: There is no abdominal tenderness. There is no guarding.   Musculoskeletal: Normal range of motion.         General: Tenderness (right knee) present.      Right lower leg: Edema (1+)  present.      Left lower leg: Edema (1+) present.      Comments: Cooling brace applied to right knee    Skin:     General: Skin is warm and dry.      Findings: Lesion (left foot) present.   Neurological:      Mental Status: He is alert and oriented to person, place, and time.      Sensory: No sensory deficit.      Gait: Gait normal.   Psychiatric:         Mood and Affect: Mood normal.         Behavior: Behavior normal.         Thought Content: Thought content normal.         Judgment: Judgment normal.         Significant Labs:  CBC:   Recent Labs   Lab 09/11/20  0731   WBC 40.90*   RBC 3.46*   HGB 9.1*   HCT 31.4*   *   MCV 91   MCH 26.3*   MCHC 29.0*     CMP:   Recent Labs   Lab 09/11/20  0731   *   CALCIUM 8.1*   ALBUMIN 1.6*   *   K 4.4   CO2 23   CL 94*   BUN 62*   CREATININE 5.1*     All labs within the past 24 hours have been reviewed.     Significant Imaging:  Labs: Reviewed  Worsening renal function that is not platauing

## 2020-09-11 NOTE — SUBJECTIVE & OBJECTIVE
Interval History: NAEON. Reports some diarrhea >3 watery BM yesterday. Worsening leucocytosis, worsening kidney function, oliguric, improving CPK, now on Vanc only.  Review of Systems   Constitutional: Negative for chills and fever.   HENT: Negative for congestion, ear pain, sneezing and sore throat.    Eyes: Negative for pain.   Respiratory: Negative for cough, shortness of breath and wheezing.    Cardiovascular: Negative for chest pain.   Gastrointestinal: Positive for diarrhea. Negative for abdominal pain, constipation, nausea and vomiting.   Endocrine: Negative for polyuria.   Genitourinary: Positive for decreased urine volume. Negative for difficulty urinating, dysuria, flank pain and hematuria.   Musculoskeletal: Positive for arthralgias (R knee pain). Negative for back pain, neck pain and neck stiffness.   Skin: Positive for wound. Negative for rash.   Neurological: Negative for numbness and headaches.   Psychiatric/Behavioral: Negative for agitation, confusion and decreased concentration. The patient is not nervous/anxious.      Objective:     Vital Signs (Most Recent):  Temp: 98.6 °F (37 °C) (09/11/20 0735)  Pulse: 107 (09/11/20 0900)  Resp: 16 (09/11/20 0900)  BP: 109/68 (09/11/20 0735)  SpO2: 97 % (09/11/20 0900) Vital Signs (24h Range):  Temp:  [97.7 °F (36.5 °C)-98.6 °F (37 °C)] 98.6 °F (37 °C)  Pulse:  [] 107  Resp:  [16-20] 16  SpO2:  [94 %-99 %] 97 %  BP: ()/(52-74) 109/68     Weight: 115 kg (253 lb 8.5 oz)  Body mass index is 30.86 kg/m².    Estimated Creatinine Clearance: 20.6 mL/min (A) (based on SCr of 5.1 mg/dL (H)).    Physical Exam  Vitals signs reviewed.   Constitutional:       General: He is not in acute distress.     Appearance: Normal appearance. He is normal weight. He is not diaphoretic.   HENT:      Head: Normocephalic and atraumatic.      Right Ear: External ear normal.      Left Ear: External ear normal.      Nose: Nose normal. No congestion or rhinorrhea.   Eyes:       General:         Right eye: No discharge.         Left eye: No discharge.      Extraocular Movements: Extraocular movements intact.   Neck:      Musculoskeletal: Normal range of motion and neck supple. No neck rigidity or muscular tenderness.   Cardiovascular:      Rate and Rhythm: Normal rate and regular rhythm.      Heart sounds: No murmur. No gallop.       Comments: Diminished DP/PT pulses  Pulmonary:      Effort: Pulmonary effort is normal. No respiratory distress.      Breath sounds: No wheezing.      Comments: On 2 L/m oxygen  Chest:      Chest wall: No tenderness.   Abdominal:      General: There is no distension.      Palpations: Abdomen is soft.      Tenderness: There is no abdominal tenderness. There is no guarding.   Musculoskeletal:         General: Tenderness (right knee) present.      Comments: Cooling brace applied to right knee   Very limited ROM at right knee since the infection and then drainage   Skin:     General: Skin is warm and dry.      Findings: Lesion (left foot - dry, no drainage, unchanged otherwise) present.   Neurological:      Mental Status: He is alert and oriented to person, place, and time.      Sensory: No sensory deficit.      Gait: Gait normal.   Psychiatric:         Mood and Affect: Mood normal.         Behavior: Behavior normal.         Thought Content: Thought content normal.         Judgment: Judgment normal.         Significant Labs: All pertinent labs within the past 24 hours have been reviewed.    Significant Imaging: I have reviewed all pertinent imaging results/findings within the past 24 hours.

## 2020-09-11 NOTE — ASSESSMENT & PLAN NOTE
65-year-old male with type 2 diabetes on insulin, HTN, chronic hypoxemic respiratory failure on 2 L oxygen via nasal cannula, COPD, chronic systolic heart failure and hypothyroidism who was initially admitted for diabetic foot infection and recurrent falls with clinical picture consistent with sepsis.     - His wound culture grew Proteos and MRSA. He was found to have MRSA bactremia and concerns for Rt knee septic joins s/p I&D on 0/7.   - Developed NANETTE likely due to ATN.   - Infectious work up included negative TTE and LUKAS for vegetations but showed EF 25-35%, NM WBC scan unremarkable for a sourse, MRI Lt foot and lumber spine with no signs of OM. WBC persisted.  - He has been getting diuresis with lasix 80mg BID since admission and his HTN meds were resumed including Coreg 25mg BID and Lisinopril 40mg daily.     Saw him today, Mentating appropriately and hemodynamically stable. /80, MAP 81. O2 sat 97 on 2L O2. Lactate at 3pm: 1.2    Recommendation:    - Decrease Coreg and Lisinopril doses and possibly discontinue at this time in the setting of hypotension. Restart as tolerated with possible short acting BB. Hold ACEI in the setting of NANETTE.  - Continue antibiotics per ID recommendations  - S/P source control with I&D  - Cautious with IVF, however, CXR with no significant congestions and patient on baseline O2 requirement. Monitor UOP.

## 2020-09-11 NOTE — CONSULTS
"PharmD Consult received for:    1.) Admit medication history/reconciliation:  Completed 9/10, please see note titled "Admission Medication Reconciliation"    2.) Renally adjust all medications:  Estimated Creatinine Clearance: 20.6 mL/min (A) (based on SCr of 5.1 mg/dL (H)).   Serum creatinine increased from 3.7 mg/dL to 5.1 mg/dL in 24 hours.   Patient meets criteria for NANETTE.   Medications reviewed, recommendation was made to transition from enoxaparin to SQH for DVT ppx given acute renal failure.    Thank you for the consult,  Geraldine Pena, PharmD, BCPS  Ext [phone]    **Note: Consults are reviewed Monday-Friday 7:00am-3:30pm. The above recommendations are only suggested. The recommendations should be considered in conjunction with all patient factors.**      "

## 2020-09-11 NOTE — SUBJECTIVE & OBJECTIVE
Past Medical History:   Diagnosis Date    CHF (congestive heart failure)     COPD (chronic obstructive pulmonary disease)     Coronary artery disease     Diabetes mellitus     Diabetes mellitus type II     DM (diabetes mellitus) type II uncontrolled with renal manifestation 2013    Hyperlipidemia     Hypertension     Postablative hypothyroidism 2018       Past Surgical History:   Procedure Laterality Date    APPENDECTOMY      ARTHROSCOPY OF KNEE Right 2020    Procedure: ARTHROSCOPY, KNEE, RIGHT ;  Surgeon: oYu Bhatia MD;  Location: [address];  Service: Orthopedics;  Laterality: Right;    CHOLECYSTECTOMY      CORONARY ANGIOPLASTY WITH STENT PLACEMENT      TONSILLECTOMY      TRANSESOPHAGEAL ECHOCARDIOGRAPHY N/A 2020    Procedure: ECHOCARDIOGRAM, TRANSESOPHAGEAL;  Surgeon: [facility] Diagnostic Provider;  Location: [facility] EP LAB;  Service: Anesthesiology;  Laterality: N/A;    TRANSESOPHAGEAL ECHOCARDIOGRAPHY N/A 9/3/2020    Procedure: ECHOCARDIOGRAM, TRANSESOPHAGEAL;  Surgeon: [facility] Diagnostic Provider;  Location: [facility] EP LAB;  Service: Anesthesiology;  Laterality: N/A;       Review of patient's allergies indicates:   Allergen Reactions    Vicodin [hydrocodone-acetaminophen] Itching       Family History     Problem Relation (Age of Onset)    Heart disease Mother    Hypertension Son    No Known Problems Father, Sister, Son, Son        Tobacco Use    Smoking status: Former Smoker     Packs/day: 0.01     Years: 3.00     Pack years: 0.03     Types: Cigarettes     Quit date: 1995     Years since quittin.3    Smokeless tobacco: Never Used   Substance and Sexual Activity    Alcohol use: No    Drug use: No    Sexual activity: Never     Comment:  with 1 son      Review of Systems   Constitutional: Negative for chills and fever.   HENT: Negative for congestion, ear pain, sneezing and sore throat.    Eyes: Negative for pain.   Respiratory: Negative for cough, shortness of  breath and wheezing.    Cardiovascular: Negative for chest pain.   Gastrointestinal: Negative for abdominal pain, constipation, diarrhea, nausea and vomiting.   Endocrine: Negative for polyuria.   Genitourinary: Positive for decreased urine volume. Negative for difficulty urinating, dysuria, flank pain and hematuria.   Musculoskeletal: Positive for arthralgias (R knee pain). Negative for back pain, neck pain and neck stiffness.   Skin: Positive for wound. Negative for rash.   Neurological: Negative for numbness and headaches.   Psychiatric/Behavioral: Positive for decreased concentration. Negative for agitation and confusion. The patient is not nervous/anxious.      Objective:     Vital Signs (Most Recent):  Temp: 98.2 °F (36.8 °C) (09/10/20 2041)  Pulse: 78 (09/10/20 2041)  Resp: 16 (09/10/20 1805)  BP: (!) 160/63 (09/10/20 2041)  SpO2: (!) 94 % (09/10/20 2041) Vital Signs (24h Range):  Temp:  [97.7 °F (36.5 °C)-98.6 °F (37 °C)] 98.2 °F (36.8 °C)  Pulse:  [] 78  Resp:  [16-20] 16  SpO2:  [94 %-99 %] 94 %  BP: ()/(52-74) 160/63   Weight: 116 kg (255 lb 11.7 oz)  Body mass index is 31.13 kg/m².      Intake/Output Summary (Last 24 hours) at 9/10/2020 2057  Last data filed at 9/10/2020 1800  Gross per 24 hour   Intake 1880 ml   Output 455 ml   Net 1425 ml       Physical Exam  Vitals signs reviewed.   Constitutional:       General: He is not in acute distress.     Appearance: Normal appearance. He is normal weight. He is not diaphoretic.   HENT:      Head: Normocephalic and atraumatic.      Right Ear: External ear normal.      Left Ear: External ear normal.      Nose: Nose normal. No congestion or rhinorrhea.   Eyes:      General:         Right eye: No discharge.         Left eye: No discharge.      Extraocular Movements: Extraocular movements intact.   Neck:      Musculoskeletal: Normal range of motion and neck supple. No neck rigidity or muscular tenderness.   Cardiovascular:      Rate and Rhythm: Normal  rate and regular rhythm.      Heart sounds: No murmur. No gallop.       Comments: Diminished DP/PT pulses  Pulmonary:      Effort: Pulmonary effort is normal. No respiratory distress.      Breath sounds: No wheezing.      Comments: On 2 L/m oxygen  Chest:      Chest wall: No tenderness.   Abdominal:      General: There is no distension.      Palpations: Abdomen is soft.      Tenderness: There is no abdominal tenderness. There is no guarding.   Musculoskeletal:         General: Tenderness (right knee) present.      Comments: Rt knee with dressing   Skin:     General: Skin is warm and dry.      Findings: Lesion present.   Neurological:      Mental Status: He is alert and oriented to person, place, and time.      Sensory: No sensory deficit.      Gait: Gait normal.   Psychiatric:         Mood and Affect: Mood normal.         Behavior: Behavior normal.         Thought Content: Thought content normal.         Judgment: Judgment normal.         Vents:     Lines/Drains/Airways     Drain                 Urethral Catheter 09/09/20 1720 16 Fr. 1 day          Peripheral Intravenous Line                 Peripheral IV - Single Lumen 09/07/20 1830 22 G Right Hand 3 days         Peripheral IV - Single Lumen 09/10/20 1455 20 G;1 3/4 in Left;Anterior Forearm less than 1 day              Significant Labs:    CBC/Anemia Profile:  Recent Labs   Lab 09/09/20  0303 09/10/20  0459 09/10/20  1513   WBC 22.01* 26.74*  --    HGB 9.3* 9.1* 9.1*   HCT 31.9* 30.3* 29.8*   * 491*  --    MCV 91 88  --    RDW 15.4* 15.8*  --         Chemistries:  Recent Labs   Lab 09/09/20  0303 09/10/20  0459    135*   K 4.0 3.9   CL 96 97   CO2 27 25   BUN 41* 51*   CREATININE 3.0* 3.7*   CALCIUM 8.7 8.4*   ALBUMIN 1.8* 1.6*   MG 1.9 2.0   PHOS 5.4* 5.7*       All pertinent labs within the past 24 hours have been reviewed.    Significant Imaging: I have reviewed all pertinent imaging results/findings within the past 24 hours.

## 2020-09-11 NOTE — CARE UPDATE
Rapid Response Nurse Follow-up Note     Followed up with patient for proactive rounding.   No acute issues at this time. Reviewed plan of care with bedside RNMckay.   Team will continue to follow.  Please call Rapid Response RN, Carissa Alatorre RN with any questions or concerns at [phone].

## 2020-09-11 NOTE — PROGRESS NOTES
"[facility]  Adult Nutrition  Progress Note    SUMMARY       Recommendations    1. Continue Regular diet as tolerated.     2. Consider adding Boost Glucose Control with meals to increase protein intake.    Goals: Adequate PO intake to meet > 75% EEN/EPN by RD follow up  Nutrition Goal Status: progressing towards goal  Communication of RD Recs: other (comment)(POC)    Reason for Assessment    Reason For Assessment: RD follow-up  Diagnosis: diabetes diagnosis/complications(diabetic foot infection)  Relevant Medical History: DM2, CHF, DCM, CAD, COPD, HLD  Interdisciplinary Rounds: did not attend  General Information Comments: Pt is on regular diet, eating between 25-50% of meals. Pt reports good appetite. Pt has lost 11lbs since 9/3. -250# > 1 year. 25# wt gain noted since admission, unclear if scale error or fluid, will monitor. NFPE not warranted, pt with no indicators of malnutrition at this time.    Nutrition Discharge Planning: Adequate PO intake on diabetic cardiac diet    Nutrition Risk Screen    Nutrition Risk Screen: large or nonhealing wound, burn or pressure injury    Nutrition/Diet History    Spiritual, Cultural Beliefs, Spiritism Practices, Values that Affect Care: no    Anthropometrics    Temp: 98.6 °F (37 °C)  Height Method: Stated  Height: 6' 4" (193 cm)  Height (inches): 76 in  Weight Method: Bed Scale  Weight: 115 kg (253 lb 8.5 oz)  Weight (lb): 253.53 lb  Ideal Body Weight (IBW), Male: 202 lb  % Ideal Body Weight, Male (lb): 127.72 %  BMI (Calculated): 30.9       Lab/Procedures/Meds    Pertinent Labs Reviewed: reviewed  Pertinent Labs Comments: Na 135, Cre 3.7, BUN 51, Ca 8.4, Phos 5.7, Alb 1.6  Pertinent Medications Reviewed: reviewed  Pertinent Medications Comments: senna, vitamin D    Estimated/Assessed Needs    Weight Used For Calorie Calculations: 115 kg (253 lb 8.5 oz)  Energy Calorie Requirements (kcal): 2455 kcal/d  Energy Need Method: [place]((x1.2))  Protein Requirements: 115-144 g/d  Weight Used For Protein Calculations: 115 kg (253 lb 8.5 oz)  Fluid Requirements (mL): per MD or 1 mL/kcal     RDA Method (mL): 2455  CHO Requirement: 315g/d      Nutrition Prescription Ordered    Current Diet Order: Regular diet    Evaluation of Received Nutrient/Fluid Intake    I/O: -2.8L since admit  Comments: LBM 9/10  % Intake of Estimated Energy Needs: 25-50%  % Meal Intake: 25-50%    Nutrition Risk    Level of Risk/Frequency of Follow-up: low     Assessment and Plan    Nutrition Problem  Inadequate energy intake     Related to (etiology):   Decreased ability to consume sufficient energy     Signs and Symptoms (as evidenced by):   NPO with no alternate means of nutrition at this time     Interventions (treatment strategy):  Collaboration with other providers     Nutrition Diagnosis Status:   Resolved    Monitor and Evaluation    Food and Nutrient Intake: energy intake, food and beverage intake  Food and Nutrient Adminstration: diet order  Knowledge/Beliefs/Attitudes: food and nutrition knowledge/skill  Anthropometric Measurements: weight, weight change, body mass index  Biochemical Data, Medical Tests and Procedures: electrolyte and renal panel, gastrointestinal profile, glucose/endocrine profile, inflammatory profile, lipid profile  Nutrition-Focused Physical Findings: overall appearance          Nutrition Follow-Up    RD Follow-up?: Yes

## 2020-09-11 NOTE — PROGRESS NOTES
[facility]  Podiatry  Progress Note    Patient Name: Ed Patton  MRN: [id number]  Admission Date: 8/30/2020  Hospital Length of Stay: 12 days  Attending Physician: Jacqueline Curran MD  Primary Care Provider: Qiana Chow MD     Subjective:     Interval History: Patient still reporting right knee pain and left foot pain, although he admits that the left foot pain is improved compared to when he first arrived. No new pedal complaints. Tachycardic, hypotensive, still having transient hypoxia. WBCs now 40.90. No focal uptake seen in the feet on recent nuclear medicine scan. ABIs ordered, awaiting results.     Follow-up For: Procedure(s) (LRB):  ARTHROSCOPY, KNEE, RIGHT  (Right)    Post-Operative Day: 2 Days Post-Op    Scheduled Meds:   aspirin  81 mg Oral Daily    atorvastatin  40 mg Oral Daily    carvediloL  25 mg Oral BID    ceftaroline fosamil  400 mg Intravenous Q8H    [START ON 9/11/2020] DAPTOmycin (CUBICIN)  IV  1,050 mg Intravenous Q48H    enoxaparin  30 mg Subcutaneous Q24H    fluticasone furoate-vilanteroL  1 puff Inhalation Daily    fluticasone propionate  1 spray Each Nostril Daily    insulin aspart U-100  10 Units Subcutaneous TIDWM    insulin detemir U-100  25 Units Subcutaneous QHS    levothyroxine  75 mcg Oral Before breakfast    vitamin D  1,000 Units Oral Daily     Continuous Infusions:   sodium chloride 0.9% 50 mL/hr at 09/09/20 1326     PRN Meds:acetaminophen, acetaminophen, albuterol-ipratropium, calcium carbonate, dextrose 50%, dextrose 50%, gabapentin, glucagon (human recombinant), glucose, glucose, insulin aspart U-100, melatonin, ondansetron, oxyCODONE, polyethylene glycol, sodium chloride 0.9%, sodium chloride 0.9%, DIPH,PERTUS (ADACEL),TETANUS PF VAC (ADULT)    Review of Systems   Constitutional: Positive for activity change. Negative for appetite change, chills and fever.   HENT: Negative for congestion and sneezing.    Respiratory: Negative for  cough and shortness of breath.    Gastrointestinal: Negative for nausea and vomiting.   Musculoskeletal: Positive for arthralgias, back pain and myalgias.   Skin: Positive for wound. Negative for color change.   Neurological: Positive for weakness and numbness.   Psychiatric/Behavioral: Negative for behavioral problems and confusion.     Objective:     Vital Signs (Most Recent):  Temp: 97.1 °F (36.2 °C) (09/09/20 1306)  Pulse: 91 (09/09/20 1306)  Resp: 16 (09/09/20 1306)  BP: (!) 99/54 (09/09/20 1306)  SpO2: 99 % (09/09/20 0933) Vital Signs (24h Range):  Temp:  [96.5 °F (35.8 °C)-98.3 °F (36.8 °C)] 97.1 °F (36.2 °C)  Pulse:  [] 91  Resp:  [16-18] 16  SpO2:  [91 %-99 %] 99 %  BP: (85-99)/(50-57) 99/54     Weight: 116 kg (255 lb 11.7 oz)  Body mass index is 31.13 kg/m².    Foot Exam    General  Orientation: alert and oriented to person, place, and time   Affect: appropriate       Right Foot/Ankle     Inspection and Palpation  Ecchymosis: none  Tenderness: none   Swelling: none   Skin Exam: skin intact;     Neurovascular  Dorsalis pedis: absent  Posterior tibial: absent  Saphenous nerve sensation: diminished  Tibial nerve sensation: diminished  Superficial peroneal nerve sensation: diminished  Deep peroneal nerve sensation: diminished  Sural nerve sensation: diminished      Left Foot/Ankle      Inspection and Palpation  Ecchymosis: none  Tenderness: (Periwound)  Swelling: none   Skin Exam: dry skin, skin changes and ulcer; no drainage     Neurovascular  Dorsalis pedis: absent  Posterior tibial: absent  Saphenous nerve sensation: diminished  Tibial nerve sensation: diminished  Superficial peroneal nerve sensation: diminished  Deep peroneal nerve sensation: diminished  Sural nerve sensation: diminished            Laboratory:  Blood Cultures:   Recent Labs   Lab 09/08/20  0408 09/08/20  1247   LABBLOO Gram stain aer bottle: Gram positive cocci in clusters resembling Staph  Results called to and read back  by:Jany Santiago RN 09/08/2020  22:29  STAPHYLOCOCCUS AUREUS  Susceptibility pending  ID consult required at [facility] and [facility] locations.  * No Growth to date  No Growth to date  No Growth to date  No Growth to date     CBC:   Recent Labs   Lab 09/09/20  0303   WBC 22.01*   RBC 3.52*   HGB 9.3*   HCT 31.9*   *   MCV 91   MCH 26.4*   MCHC 29.2*     CMP:   Recent Labs   Lab 09/09/20  0303      CALCIUM 8.7   ALBUMIN 1.8*      K 4.0   CO2 27   CL 96   BUN 41*   CREATININE 3.0*     CRP:   Recent Labs   Lab 09/08/20  0408   .2*     ESR:   Recent Labs   Lab 09/03/20  0410   SEDRATE 80*     Wound Cultures:   Recent Labs   Lab 08/31/20  1754 09/03/20  1647 09/07/20  1721   LABAERO PROTEUS MIRABILIS  Moderate  *  METHICILLIN RESISTANT STAPHYLOCOCCUS AUREUS  Moderate  * METHICILLIN RESISTANT STAPHYLOCOCCUS AUREUS  Few  * No growth  No growth     Microbiology Results (last 7 days)     Procedure Component Value Units Date/Time    Blood culture [[id number]] Collected: 09/08/20 1247    Order Status: Completed Specimen: Blood from Antecubital, Right Hand Updated: 09/09/20 1412     Blood Culture, Routine No Growth to date      No Growth to date    Narrative:      Collection has been rescheduled by RB8 at 09/08/2020 11:43 Reason:   Patient unavailable,nurse Mckay #[id number] was notified  Collection has been rescheduled by RB8 at 09/08/2020 11:43 Reason:   Patient unavailable,nurse Mckay #[id number] was notified    Blood culture [[id number]] Collected: 09/08/20 1247    Order Status: Completed Specimen: Blood from Antecubital, Right Arm Updated: 09/09/20 1412     Blood Culture, Routine No Growth to date      No Growth to date    Narrative:      Collection has been rescheduled by RB8 at 09/08/2020 11:43 Reason:   Patient unavailable,nurse Mckay #[id number] was notified  Collection has been rescheduled by RB8 at 09/08/2020 11:43 Reason:   Patient unavailable,nurse Mckay #[id number] was notified    Fungus  culture [[id number]] Collected: 09/07/20 1721    Order Status: Completed Specimen: Body Fluid from Knee, Right Updated: 09/09/20 1407     Fungus (Mycology) Culture Culture in progress    Narrative:      1) Right Knee Joint Fluid    Fungus culture [[id number]] Collected: 09/07/20 1721    Order Status: Completed Specimen: Body Fluid from Knee, Right Updated: 09/09/20 1406     Fungus (Mycology) Culture Culture in progress    Narrative:      2) Right Knee Joint Fluid    Fungus culture [[id number]] Collected: 09/03/20 1647    Order Status: Completed Specimen: Joint Fluid from Knee, Right Updated: 09/09/20 1346     Fungus (Mycology) Culture Culture in progress    Blood culture [[id number]]  (Abnormal) Collected: 09/08/20 0408    Order Status: Completed Specimen: Blood from Antecubital, Right Arm Updated: 09/09/20 1246     Blood Culture, Routine Gram stain aer bottle: Gram positive cocci in clusters resembling Staph      Results called to and read back by:Jany Santiago RN 09/08/2020  22:29      STAPHYLOCOCCUS AUREUS  Susceptibility pending  [facility]      Blood culture [[id number]] Collected: 09/09/20 1040    Order Status: Sent Specimen: Blood Updated: 09/09/20 1103    Culture, Anaerobe [[id number]] Collected: 09/07/20 1721    Order Status: Completed Specimen: Body Fluid from Knee, Right Updated: 09/09/20 1013     Anaerobic Culture Culture in progress    Narrative:      1) Right Knee Joint Fluid    Blood culture [[id number]]  (Abnormal) Collected: 09/06/20 0517    Order Status: Completed Specimen: Blood Updated: 09/09/20 0823     Blood Culture, Routine Gram stain oksana bottle: Gram positive cocci in clusters resembling Staph       Results called to and read back by: Mckay Cummings RN 09/07/2020  11:37      METHICILLIN RESISTANT STAPHYLOCOCCUS AUREUS  [facility]  For susceptibility see order #[id number]      Blood culture  [[id number]] Collected: 09/07/20 0733    Order Status: Completed Specimen: Blood from Antecubital, Right Updated: 09/09/20 0822     Blood Culture, Routine No Growth to date      No Growth to date      No Growth to date    Aerobic culture [[id number]] Collected: 09/07/20 1721    Order Status: Completed Specimen: Body Fluid from Knee, Right Updated: 09/09/20 0721     Aerobic Bacterial Culture No growth    Narrative:      1) Right Knee Joint Fluid    Aerobic culture [[id number]] Collected: 09/07/20 1721    Order Status: Completed Specimen: Body Fluid from Knee, Right Updated: 09/09/20 0721     Aerobic Bacterial Culture No growth    Narrative:      2) Right Knee Joint Fluid    Culture, Anaerobe [[id number]] Collected: 09/07/20 1721    Order Status: Completed Specimen: Body Fluid from Knee, Right Updated: 09/09/20 0640     Anaerobic Culture Culture in progress    Narrative:      2) Right Knee Joint Fluid    Blood culture [[id number]] Collected: 09/09/20 0303    Order Status: Sent Specimen: Blood Updated: 09/09/20 0429    AFB Culture & Smear [[id number]] Collected: 09/07/20 1721    Order Status: Completed Specimen: Body Fluid from Knee, Right Updated: 09/08/20 2127     AFB Culture & Smear Culture in progress     AFB CULTURE STAIN No acid fast bacilli seen.    Narrative:      1) Right Knee Joint Fluid    AFB Culture & Smear [[id number]] Collected: 09/07/20 1721    Order Status: Completed Specimen: Body Fluid from Knee, Right Updated: 09/08/20 2127     AFB Culture & Smear Culture in progress     AFB CULTURE STAIN No acid fast bacilli seen.    Narrative:      2) Right Knee Joint Fluid    Blood culture [[id number]]  (Abnormal)  (Susceptibility) Collected: 09/05/20 1115    Order Status: Completed Specimen: Blood from Peripheral, Right Hand Updated: 09/08/20 0820     Blood Culture, Routine Gram stain aer bottle: Gram positive cocci in clusters resembling Staph       Results called to and read back by: Shalini Pena RN  09/06/2020  11:17       METHICILLIN RESISTANT STAPHYLOCOCCUS AUREUS  ID consult required at [facility] and [facility] [place] [place].      Gram stain [[id number]] Collected: 09/07/20 1721    Order Status: Completed Specimen: Body Fluid from Knee, Right Updated: 09/08/20 0003     Gram Stain Result No WBC's      No organisms seen    Narrative:      2) Right Knee Joint Fluid    Gram stain [[id number]] Collected: 09/07/20 1721    Order Status: Completed Specimen: Body Fluid from Knee, Right Updated: 09/07/20 2245     Gram Stain Result Rare WBC's      No organisms seen    Narrative:      1) Right Knee Joint Fluid    Aerobic culture [[id number]]  (Abnormal)  (Susceptibility) Collected: 09/03/20 1647    Order Status: Completed Specimen: Joint Fluid from Knee, Right Updated: 09/07/20 1049     Aerobic Bacterial Culture METHICILLIN RESISTANT STAPHYLOCOCCUS AUREUS  Few      Culture, Anaerobe [[id number]] Collected: 09/03/20 1647    Order Status: Completed Specimen: Joint Fluid from Knee, Right Updated: 09/07/20 1032     Anaerobic Culture No anaerobes isolated    Blood culture [[id number]]  (Abnormal)  (Susceptibility) Collected: 09/02/20 1441    Order Status: Completed Specimen: Blood Updated: 09/05/20 1401     Blood Culture, Routine Gram stain oksana bottle: Gram positive cocci in clusters resembling Staph       Results called to and read back by: Rossana Garza RN 09/03/2020  10:12      Gram stain aer bottle: Gram positive cocci in clusters resembling Staph       Positive results previously called      METHICILLIN RESISTANT STAPHYLOCOCCUS AUREUS  ID consult required at [facility].      Narrative:      right median cubital  right median    AFB Culture & Smear [[id number]] Collected: 09/03/20 1647    Order Status: Completed Specimen: Joint Fluid from Knee, Right Updated: 09/04/20 2127     AFB Culture & Smear Culture in progress     AFB CULTURE STAIN No acid fast bacilli seen.    Blood culture [[id number]]  (Abnormal)  Collected: 09/01/20 1417    Order Status: Completed Specimen: Blood from Antecubital, Right Hand Updated: 09/04/20 0838     Blood Culture, Routine Gram stain aer bottle: Gram positive cocci       Results called to and read back by: Jolene Barraza   09/02/2020  12:06      METHICILLIN RESISTANT STAPHYLOCOCCUS AUREUS  ID consult required at [facility].  For susceptibility see order #[id number]      Narrative:      Collection has been rescheduled by CBE at 09/01/2020 13:58 Reason:   due at 13:55  Collection has been rescheduled by CBE at 09/01/2020 13:58 Reason:   due at 13:55    Culture, Anaerobe [[id number]] Collected: 08/31/20 1754    Order Status: Completed Specimen: Wound from Foot, Left Updated: 09/04/20 0741     Anaerobic Culture No anaerobes isolated    Gram stain [[id number]] Collected: 09/03/20 1647    Order Status: Completed Specimen: Joint Fluid from Knee, Right Updated: 09/03/20 1851     Gram Stain Result Rare WBC's      No organisms seen    Aerobic culture [[id number]]  (Abnormal)  (Susceptibility) Collected: 08/31/20 1754    Order Status: Completed Specimen: Wound from Foot, Left Updated: 09/03/20 1316     Aerobic Bacterial Culture PROTEUS MIRABILIS  Moderate        METHICILLIN RESISTANT STAPHYLOCOCCUS AUREUS  Moderate      Blood culture [[id number]]  (Abnormal) Collected: 08/31/20 1328    Order Status: Completed Specimen: Blood from Peripheral, Right Hand Updated: 09/03/20 0716     Blood Culture, Routine Gram stain oksana bottle: Gram positive cocci       Results called to and read back by: Elsy Pena  09/01/2020  11:46      Gram stain aer bottle: Gram positive cocci in clusters resembling Staph       Positive results previously called      METHICILLIN RESISTANT STAPHYLOCOCCUS AUREUS  ID consult required at [facility].  For susceptibility see order #[id number]      Blood culture [[id number]]  (Abnormal) Collected: 08/31/20 1324    Order Status:  Completed Specimen: Blood from Peripheral, Left Hand Updated: 09/03/20 0714     Blood Culture, Routine Gram stain aer bottle: Gram positive cocci       Gram stain oksana bottle: Gram positive cocci       Results called to and read back by: Elsy Pena  09/01/2020  11:46      METHICILLIN RESISTANT STAPHYLOCOCCUS AUREUS  ID consult required at [facility] and [facility] locations.  For susceptibility see order #[id number]      Urine culture [[id number]]  (Abnormal)  (Susceptibility) Collected: 08/30/20 1633    Order Status: Completed Specimen: Urine Updated: 09/03/20 0124     Urine Culture, Routine KLEBSIELLA PNEUMONIAE  > 100,000 cfu/ml      Narrative:      Specimen Source->Urine        Specimen (12h ago, onward)    None          Diagnostic Results  9/9 Nuclear Medicine Scan: There is no scintigraphic finding to suggest source of infection.     Clinical Findings: Left Shearer 1 plantar foot ulcer, fibronecrotic wound base, no drainage, no undermining, no erythema, no edema, no fluctuance or crepitus. No fluid could be expressed on manual compression. Mild periwound tenderness. Wound dry, no exudate. Appears stable overall. Not clinically infected.           Assessment/Plan:     Diabetic ulcer of left foot associated with type 2 diabetes mellitus, with fat layer exposed  65 y.o M with PMHx of Type 2 DM, chronic hypoxemic respiratory failure on 2 L O2 via NC, chronic systolic heart failure. Tachycardic, hypotensive, still having transient hypoxia. WBCs now 40.90. No signs of deep foot infection on X ray, MRI, or nuclear medicine scan. Foot wound superficial and stable and likely not the cause of patient's persistent septicemia. Left ankle peak systolic velocity 35 cm/s which is concerning for risk of nonhealing. ABIs ordered, awaiting results.     Plan:  -No surgical intervention from podiatry warranted at this time.   -Awaiting ABIs. Recommend consulting vascular surgery based on 8/31 arterial ultrasound and delayed  healing of left foot wound.   -Antibiotic management per ID.  -Dressing changed today.    -Continue local wound care.   -Rest of care per primary  -Podiatry will continue to follow      Mk Cook DPM PGY-1  Podiatric Medicine & Surgery  [facility]

## 2020-09-11 NOTE — PLAN OF CARE
Pt is AAOx4. Pt remain free from fall and injuries. VS communicated with team.  Questions and concerns voiced and answered. Medication compliance. Call light in reach.  Weakness to BLE and in generalized weakness, educated to call before getting up, Verbalized understanding. Bed in low locked position. Side rails x2. Belongings at bedside.

## 2020-09-11 NOTE — PROGRESS NOTES
[facility]  Infectious Disease  Progress Note    Patient Name: Ed Patton  MRN: [id number]  Admission Date: 8/30/2020  Length of Stay: 12 days  Attending Physician: Jacqueline Curran MD  Primary Care Provider: Qiana Chow MD    Isolation Status: Contact, Special Contact  Assessment/Plan:      * Sepsis due to methicillin resistant Staphylococcus aureus  MRSA septicemia. Still tachycardic, SpO2 reached 92%, renal function continues to worsen. WBCs not improving. ESR and CRP elevated, RF WNL. Vancomycin was supratherapeutic, switched to pulse dose regimen. Source of bacteremia unclear, may be left foot wound or a septic process at the right knee joint. Right knee now clinically suspicious for septic arthritis (see physical exam). Left foot wound growing Proteus and MRSA, urine growing Klebsiella. TTE negative for signs of cardiac infection, LUKAS is negative as well.     Blood cultures: MRSA  R knee fluid culture: Staph aureus  Left foot wound: Proteus, MRSA  Urine culture: Klebsiella  Imaging: Negative MRI L Foot, MRI L-spine, NM WBC scan  Source Control: I&D of Septic R knee on 9/7/20  Completed 7 days of Ciprofloxacin on 9/7/20 for wound and urine positive cultures  Blood Cultures NGSF since 9/8/20  Daptomycin-ceftaroline switched back to Vancomycin due to worsening kidney function and early signs of rhabdomyolysis (9/10/20). CK down-trending now  Left foot wound not appropriate for any debridement at this point  Worsening leucocytosis to 40k now with new diarrhea and worsening kidney function. No other focal infectious symptoms/signs    Recommendations:   -- C diff test  -- Renal Ultrasound  --  Will consider getting a non contrast CT Chest Abd-Pelvis if clinical markers not improved tomorrow  -- NANETTE worsening, oliguric renal failure (ATN). Nephrology following the patient. No plans on dialysis at this point. Concerned about new sepsis, however, etiology unclear at this point. Will  pan-scan patient  -- MRI foot from 8/31 did not show any signs of OM, however, with persistent bacteremia, had discussed with podiatry earlier if wounds needs debridement. Per podiatry - likely no debridement needed, will get vascular US GINA of LE to assess blood flow and risk of complications if any procedure is planned.   -- If no other possible source found, ultimately patient will need to complete 4 weeks total Vancomycin therapy counting from bacterial clearance since source control (knee I&D). Daily blood cultures recommended.   -- Will need a PICC line after 48-72 hours of clearance of bacteremia  -- Will continue to follow closely        Anticipated Disposition: TBD    Thank you for your consult. I will follow-up with patient. Please contact us if you have any additional questions.    Shabbir Quezada MD  Infectious Disease  [facility]    Subjective:     Principal Problem:Sepsis due to methicillin resistant Staphylococcus aureus    HPI: 64 yo M with Hx of DM2 (on insulin), chronic hypoxemic respiratory failure (on O2 at home), chronic systolic heart failure presented to ED after recurrent falls 7 and 8 days ago. He has a history of falls but reports that his tendency to fall has worsened recently, resulting in injury to his lower back and right thigh. After his fall last Tuesday he looked at his left foot and noticed a wound, denies pain or drainage related to the wound, he admits to not regularly checking his feet and does not know how long the wound has been present. He believes the wound is related to a tack that he previously found imbedded in the bottom of a slipper. He had previously seen Ang Lugo DPM for diabetic foot care but has not seen her this year due to fear of the coronavirus pandemic. He has been getting home health services including physical therapy. No subjective change as of today, still denies pain and drainage related to the wound. Denies  F/C/N/V.  Interval History: NAEON. Reports some diarrhea >3 watery BM yesterday. Worsening leucocytosis, worsening kidney function, oliguric, improving CPK, now on Vanc only.  Review of Systems   Constitutional: Negative for chills and fever.   HENT: Negative for congestion, ear pain, sneezing and sore throat.    Eyes: Negative for pain.   Respiratory: Negative for cough, shortness of breath and wheezing.    Cardiovascular: Negative for chest pain.   Gastrointestinal: Positive for diarrhea. Negative for abdominal pain, constipation, nausea and vomiting.   Endocrine: Negative for polyuria.   Genitourinary: Positive for decreased urine volume. Negative for difficulty urinating, dysuria, flank pain and hematuria.   Musculoskeletal: Positive for arthralgias (R knee pain). Negative for back pain, neck pain and neck stiffness.   Skin: Positive for wound. Negative for rash.   Neurological: Negative for numbness and headaches.   Psychiatric/Behavioral: Negative for agitation, confusion and decreased concentration. The patient is not nervous/anxious.      Objective:     Vital Signs (Most Recent):  Temp: 98.6 °F (37 °C) (09/11/20 0735)  Pulse: 107 (09/11/20 0900)  Resp: 16 (09/11/20 0900)  BP: 109/68 (09/11/20 0735)  SpO2: 97 % (09/11/20 0900) Vital Signs (24h Range):  Temp:  [97.7 °F (36.5 °C)-98.6 °F (37 °C)] 98.6 °F (37 °C)  Pulse:  [] 107  Resp:  [16-20] 16  SpO2:  [94 %-99 %] 97 %  BP: ()/(52-74) 109/68     Weight: 115 kg (253 lb 8.5 oz)  Body mass index is 30.86 kg/m².    Estimated Creatinine Clearance: 20.6 mL/min (A) (based on SCr of 5.1 mg/dL (H)).    Physical Exam  Vitals signs reviewed.   Constitutional:       General: He is not in acute distress.     Appearance: Normal appearance. He is normal weight. He is not diaphoretic.   HENT:      Head: Normocephalic and atraumatic.      Right Ear: External ear normal.      Left Ear: External ear normal.      Nose: Nose normal. No congestion or rhinorrhea.    Eyes:      General:         Right eye: No discharge.         Left eye: No discharge.      Extraocular Movements: Extraocular movements intact.   Neck:      Musculoskeletal: Normal range of motion and neck supple. No neck rigidity or muscular tenderness.   Cardiovascular:      Rate and Rhythm: Normal rate and regular rhythm.      Heart sounds: No murmur. No gallop.       Comments: Diminished DP/PT pulses  Pulmonary:      Effort: Pulmonary effort is normal. No respiratory distress.      Breath sounds: No wheezing.      Comments: On 2 L/m oxygen  Chest:      Chest wall: No tenderness.   Abdominal:      General: There is no distension.      Palpations: Abdomen is soft.      Tenderness: There is no abdominal tenderness. There is no guarding.   Musculoskeletal:         General: Tenderness (right knee) present.      Comments: Cooling brace applied to right knee   Very limited ROM at right knee since the infection and then drainage   Skin:     General: Skin is warm and dry.      Findings: Lesion (left foot - dry, no drainage, unchanged otherwise) present.   Neurological:      Mental Status: He is alert and oriented to person, place, and time.      Sensory: No sensory deficit.      Gait: Gait normal.   Psychiatric:         Mood and Affect: Mood normal.         Behavior: Behavior normal.         Thought Content: Thought content normal.         Judgment: Judgment normal.         Significant Labs: All pertinent labs within the past 24 hours have been reviewed.    Significant Imaging: I have reviewed all pertinent imaging results/findings within the past 24 hours.

## 2020-09-11 NOTE — PROGRESS NOTES
[facility]  Orthopedics  Progress Note    Patient Name: Ed Patton  MRN: [id number]  Admission Date: 8/30/2020  Hospital Length of Stay: 12 days  Attending Provider: Jacqueline Curran MD  Primary Care Provider: Qiana Chow MD  Follow-up For: Procedure(s) (LRB):  ARTHROSCOPY, KNEE, RIGHT  (Right)    Post-Operative Day: 4 Days Post-Op  Subjective:     Principal Problem:Sepsis due to methicillin resistant Staphylococcus aureus    Principal Orthopedic Problem: R knee septic arthritis    Interval History: Patient seen and examined at bedside.  No acute events overnight.  Pain controlled.  Transfers with PT yesterday.      Review of patient's allergies indicates:   Allergen Reactions    Vicodin [hydrocodone-acetaminophen] Itching       Current Facility-Administered Medications   Medication    acetaminophen tablet 1,000 mg    acetaminophen tablet 650 mg    albuterol-ipratropium 2.5 mg-0.5 mg/3 mL nebulizer solution 3 mL    artificial tears 0.5 % ophthalmic solution 1 drop    aspirin EC tablet 81 mg    calcium carbonate 200 mg calcium (500 mg) chewable tablet 500 mg    carvediloL tablet 6.25 mg    dextrose 50% injection 12.5 g    dextrose 50% injection 25 g    enoxaparin injection 30 mg    fluticasone furoate-vilanteroL 200-25 mcg/dose diskus inhaler 1 puff    fluticasone propionate 50 mcg/actuation nasal spray 50 mcg    gabapentin capsule 100 mg    glucagon (human recombinant) injection 1 mg    glucose chewable tablet 16 g    glucose chewable tablet 24 g    insulin aspart U-100 pen 0-5 Units    levothyroxine tablet 75 mcg    melatonin tablet 6 mg    ondansetron injection 4 mg    oxyCODONE immediate release tablet 5 mg    polyethylene glycol packet 17 g    polyethylene glycol packet 17 g    senna-docusate 8.6-50 mg per tablet 1 tablet    sodium chloride 0.9% bolus 250 mL    sodium chloride 0.9% flush 10 mL    tamsulosin 24 hr capsule 0.4 mg    Tdap vaccine  "injection 0.5 mL    vancomycin - pharmacy to dose    vitamin D 1000 units tablet 1,000 Units     Objective:     Vital Signs (Most Recent):  Temp: 98 °F (36.7 °C) (09/11/20 0500)  Pulse: (!) 113 (09/11/20 0610)  Resp: 16 (09/11/20 0500)  BP: (!) 115/57 (09/11/20 0610)  SpO2: 96 % (09/11/20 0500) Vital Signs (24h Range):  Temp:  [97.7 °F (36.5 °C)-98.5 °F (36.9 °C)] 98 °F (36.7 °C)  Pulse:  [] 113  Resp:  [16-20] 16  SpO2:  [94 %-99 %] 96 %  BP: ()/(52-74) 115/57     Weight: 115 kg (253 lb 8.5 oz)  Height: 6' 4" (193 cm)  Body mass index is 30.86 kg/m².        Ortho/SPM Exam    NAD  No increased WOB  Incisions c/d/i  SILT T/SP/DP  Motor intact T/DP  WWP extremities    Significant Labs:   CBC:   Recent Labs   Lab 09/10/20  0459 09/10/20  1513   WBC 26.74*  --    HGB 9.1* 9.1*   HCT 30.3* 29.8*   *  --      CMP:   Recent Labs   Lab 09/10/20  0459   *   K 3.9   CL 97   CO2 25      BUN 51*   CREATININE 3.7*   CALCIUM 8.4*   ALBUMIN 1.6*   ANIONGAP 13   EGFRNONAA 16.4*     All pertinent labs within the past 24 hours have been reviewed.    Significant Imaging: I have reviewed all pertinent imaging results/findings.    Assessment/Plan:     Septic arthritis of knee, right  65 y.o. male s/p R knee scope I&D 9/7/20    VS:  Some tachycardia and hypotension; seen by crit care  Nerve block:  none  PT/OT:  WBAT  DVT PPx:  lovenox per primary  Cultures:  MRSA from R knee aspiration 9/3/20; NGTD intraop cx; BCx 9/8 positive, 9/9 NGTD  Abx:  vanc per ID  Labs: pending  Drain:  none  Newell:  none    F/u cx, ID recs          Durga O Walker, MD  Orthopedics  [facility]  "

## 2020-09-11 NOTE — PT/OT/SLP PROGRESS
"Physical Therapy Treatment    Patient Name:  Ed Patton   MRN:  [id number]    Recommendations:     Discharge Recommendations:  nursing facility, skilled   Discharge Equipment Recommendations: none   Barriers to discharge: Decreased caregiver support    Assessment:     Ed Patton is a 65 y.o. male admitted with a medical diagnosis of Sepsis due to methicillin resistant Staphylococcus aureus.  He presents with the following impairments/functional limitations:  weakness, impaired endurance, impaired functional mobilty, gait instability, impaired balance, impaired cognition, decreased lower extremity function, abnormal tone, decreased ROM, impaired joint extensibility, impaired cardiopulmonary response to activity. Limited progress with therapy due to increased pain but he was able to stand today for about 4 seconds before returning to EOB. Will benefit from SNF to improve strength.     Rehab Prognosis: Good; patient would benefit from acute skilled PT services to address these deficits and reach maximum level of function.    Recent Surgery: Procedure(s) (LRB):  ARTHROSCOPY, KNEE, RIGHT  (Right) 4 Days Post-Op    Plan:     During this hospitalization, patient to be seen 4 x/week to address the identified rehab impairments via gait training, therapeutic activities, therapeutic exercises and progress toward the following goals:    · Plan of Care Expires:  10/01/20    Subjective     Chief Complaint: pain  Patient/Family Comments/goals: "I wanna lay down, I gotta use the bedpan".   Pain/Comfort:  · Pain Rating 1: 5/10  · Location - Side 1: Right  · Location - Orientation 1: generalized  · Location 1: knee      Objective:     Communicated with nurse prior to session.  Patient found supine with blood pressure cuff, telemetry, peripheral IV upon PT entry to room.     General Precautions: Standard, fall, contact   Orthopedic Precautions:LLE weight bearing as tolerated   Braces: N/A     Functional Mobility:  · Bed " Mobility:     · Supine to Sit: minimum assistance  · Sit to Supine: minimum assistance  · Transfers:     · Sit to Stand:  moderate assistance with rolling walker, Scooted to L with CGA and max encouragement.       AM-PAC 6 CLICK MOBILITY  Turning over in bed (including adjusting bedclothes, sheets and blankets)?: 3  Sitting down on and standing up from a chair with arms (e.g., wheelchair, bedside commode, etc.): 2  Moving from lying on back to sitting on the side of the bed?: 3  Moving to and from a bed to a chair (including a wheelchair)?: 1  Need to walk in hospital room?: 1  Climbing 3-5 steps with a railing?: 1  Basic Mobility Total Score: 11       Therapeutic Activities and Exercises:   white board updated  Darco shoe donned for standing.  Performed Seated LAQ, AP eob 10 reps each      Patient left supine with all lines intact, call button in reach and nurse present..    GOALS:   Multidisciplinary Problems     Physical Therapy Goals        Problem: Physical Therapy Goal    Goal Priority Disciplines Outcome Goal Variances Interventions   Physical Therapy Goal     PT, PT/OT Ongoing, Progressing     Description: Goals to be met by: 9/15/20    Patient will increase functional independence with mobility by performin. Supine to sit with Stand-by Assistance - Not met  2. Sit to supine with Stand-by Assistance - Not met  3. Sit to stand transfer with Stand-by Assistance with RW - Not met  4. Gait  x 50 feet with Stand-by Assistance using Rolling Walker and maintenance of weight bearing status - Not met  5.  Patient will demonstrate independence with a home exercise program - Not met  6. Patient's balance will be GOOD: Needs SUPERVISION only during gait and able to self right with moderate LOB - Not met                   Time Tracking:     PT Received On: 20  PT Start Time: 307     PT Stop Time: 344  PT Total Time (min): 37 min     Billable Minutes: Therapeutic Activity 27 and Therapeutic Exercise  10    Treatment Type: Treatment  PT/PTA: PTA     PTA Visit Number: 3     Macey Gardner, PTA  09/11/2020

## 2020-09-11 NOTE — PROGRESS NOTES
[facility]  Podiatry  Progress Note    Patient Name: Ed Patton  MRN: [id number]  Admission Date: 8/30/2020  Hospital Length of Stay: 12 days  Attending Physician: Jacqueline Curran MD  Primary Care Provider: Qiana Chow MD     Subjective:     Interval History: Patient still reporting right knee pain and left foot pain, although he admits that the left foot pain is improved compared to when he first arrived. No new pedal complaints. Tachycardic, hypotensive, still having transient hypoxia. WBCs now 40.90. No focal uptake seen in the feet on recent nuclear medicine scan. ABIs ordered, awaiting results.     Follow-up For: Procedure(s) (LRB):  ARTHROSCOPY, KNEE, RIGHT  (Right)    Post-Operative Day: 2 Days Post-Op    Scheduled Meds:   aspirin  81 mg Oral Daily    atorvastatin  40 mg Oral Daily    carvediloL  25 mg Oral BID    ceftaroline fosamil  400 mg Intravenous Q8H    [START ON 9/11/2020] DAPTOmycin (CUBICIN)  IV  1,050 mg Intravenous Q48H    enoxaparin  30 mg Subcutaneous Q24H    fluticasone furoate-vilanteroL  1 puff Inhalation Daily    fluticasone propionate  1 spray Each Nostril Daily    insulin aspart U-100  10 Units Subcutaneous TIDWM    insulin detemir U-100  25 Units Subcutaneous QHS    levothyroxine  75 mcg Oral Before breakfast    vitamin D  1,000 Units Oral Daily     Continuous Infusions:   sodium chloride 0.9% 50 mL/hr at 09/09/20 1326     PRN Meds:acetaminophen, acetaminophen, albuterol-ipratropium, calcium carbonate, dextrose 50%, dextrose 50%, gabapentin, glucagon (human recombinant), glucose, glucose, insulin aspart U-100, melatonin, ondansetron, oxyCODONE, polyethylene glycol, sodium chloride 0.9%, sodium chloride 0.9%, DIPH,PERTUS (ADACEL),TETANUS PF VAC (ADULT)    Review of Systems   Constitutional: Positive for activity change. Negative for appetite change, chills and fever.   HENT: Negative for congestion and sneezing.    Respiratory: Negative for  cough and shortness of breath.    Gastrointestinal: Negative for nausea and vomiting.   Musculoskeletal: Positive for arthralgias, back pain and myalgias.   Skin: Positive for wound. Negative for color change.   Neurological: Positive for weakness and numbness.   Psychiatric/Behavioral: Negative for behavioral problems and confusion.     Objective:     Vital Signs (Most Recent):  Temp: 97.1 °F (36.2 °C) (09/09/20 1306)  Pulse: 91 (09/09/20 1306)  Resp: 16 (09/09/20 1306)  BP: (!) 99/54 (09/09/20 1306)  SpO2: 99 % (09/09/20 0933) Vital Signs (24h Range):  Temp:  [96.5 °F (35.8 °C)-98.3 °F (36.8 °C)] 97.1 °F (36.2 °C)  Pulse:  [] 91  Resp:  [16-18] 16  SpO2:  [91 %-99 %] 99 %  BP: (85-99)/(50-57) 99/54     Weight: 116 kg (255 lb 11.7 oz)  Body mass index is 31.13 kg/m².    Foot Exam    General  Orientation: alert and oriented to person, place, and time   Affect: appropriate       Right Foot/Ankle     Inspection and Palpation  Ecchymosis: none  Tenderness: none   Swelling: none   Skin Exam: skin intact;     Neurovascular  Dorsalis pedis: absent  Posterior tibial: absent  Saphenous nerve sensation: diminished  Tibial nerve sensation: diminished  Superficial peroneal nerve sensation: diminished  Deep peroneal nerve sensation: diminished  Sural nerve sensation: diminished      Left Foot/Ankle      Inspection and Palpation  Ecchymosis: none  Tenderness: (Periwound)  Swelling: none   Skin Exam: dry skin, skin changes and ulcer; no drainage     Neurovascular  Dorsalis pedis: absent  Posterior tibial: absent  Saphenous nerve sensation: diminished  Tibial nerve sensation: diminished  Superficial peroneal nerve sensation: diminished  Deep peroneal nerve sensation: diminished  Sural nerve sensation: diminished            Laboratory:  Blood Cultures:   Recent Labs   Lab 09/08/20  0408 09/08/20  1247   LABBLOO Gram stain aer bottle: Gram positive cocci in clusters resembling Staph  Results called to and read back  by:Jany Santiago RN 09/08/2020  22:29  STAPHYLOCOCCUS AUREUS  Susceptibility pending  ID consult required at [facility],[place] and [facility] locations.  * No Growth to date  No Growth to date  No Growth to date  No Growth to date     CBC:   Recent Labs   Lab 09/09/20  0303   WBC 22.01*   RBC 3.52*   HGB 9.3*   HCT 31.9*   *   MCV 91   MCH 26.4*   MCHC 29.2*     CMP:   Recent Labs   Lab 09/09/20  0303      CALCIUM 8.7   ALBUMIN 1.8*      K 4.0   CO2 27   CL 96   BUN 41*   CREATININE 3.0*     CRP:   Recent Labs   Lab 09/08/20  0408   .2*     ESR:   Recent Labs   Lab 09/03/20  0410   SEDRATE 80*     Wound Cultures:   Recent Labs   Lab 08/31/20  1754 09/03/20  1647 09/07/20  1721   LABAERO PROTEUS MIRABILIS  Moderate  *  METHICILLIN RESISTANT STAPHYLOCOCCUS AUREUS  Moderate  * METHICILLIN RESISTANT STAPHYLOCOCCUS AUREUS  Few  * No growth  No growth     Microbiology Results (last 7 days)     Procedure Component Value Units Date/Time    Blood culture [[id number]] Collected: 09/08/20 1247    Order Status: Completed Specimen: Blood from Antecubital, Right Hand Updated: 09/09/20 1412     Blood Culture, Routine No Growth to date      No Growth to date    Narrative:      Collection has been rescheduled by RB8 at 09/08/2020 11:43 Reason:   Patient unavailable,nurse Mckay #[id number] was notified  Collection has been rescheduled by RB8 at 09/08/2020 11:43 Reason:   Patient unavailable,nurse Mckay #[id number] was notified    Blood culture [[id number]] Collected: 09/08/20 1247    Order Status: Completed Specimen: Blood from Antecubital, Right Arm Updated: 09/09/20 1412     Blood Culture, Routine No Growth to date      No Growth to date    Narrative:      Collection has been rescheduled by RB8 at 09/08/2020 11:43 Reason:   Patient unavailable,nurse Mckay #[id number] was notified  Collection has been rescheduled by RB8 at 09/08/2020 11:43 Reason:   Patient unavailable,nurse Mckay #[id number] was notified    Fungus  culture [[id number]] Collected: 09/07/20 1721    Order Status: Completed Specimen: Body Fluid from Knee, Right Updated: 09/09/20 1407     Fungus (Mycology) Culture Culture in progress    Narrative:      1) Right Knee Joint Fluid    Fungus culture [[id number]] Collected: 09/07/20 1721    Order Status: Completed Specimen: Body Fluid from Knee, Right Updated: 09/09/20 1406     Fungus (Mycology) Culture Culture in progress    Narrative:      2) Right Knee Joint Fluid    Fungus culture [[id number]] Collected: 09/03/20 1647    Order Status: Completed Specimen: Joint Fluid from Knee, Right Updated: 09/09/20 1346     Fungus (Mycology) Culture Culture in progress    Blood culture [[id number]]  (Abnormal) Collected: 09/08/20 0408    Order Status: Completed Specimen: Blood from Antecubital, Right Arm Updated: 09/09/20 1246     Blood Culture, Routine Gram stain aer bottle: Gram positive cocci in clusters resembling Staph      Results called to and read back by:Jany Santiago RN 09/08/2020  22:29      STAPHYLOCOCCUS AUREUS  Susceptibility pending  [facility].      Blood culture [[id number]] Collected: 09/09/20 1040    Order Status: Sent Specimen: Blood Updated: 09/09/20 1103    Culture, Anaerobe [[id number]] Collected: 09/07/20 1721    Order Status: Completed Specimen: Body Fluid from Knee, Right Updated: 09/09/20 1013     Anaerobic Culture Culture in progress    Narrative:      1) Right Knee Joint Fluid    Blood culture [[id number]]  (Abnormal) Collected: 09/06/20 0517    Order Status: Completed Specimen: Blood Updated: 09/09/20 0823     Blood Culture, Routine Gram stain oksana bottle: Gram positive cocci in clusters resembling Staph       Results called to and read back by: Mckay Cummings RN 09/07/2020  11:37      METHICILLIN RESISTANT STAPHYLOCOCCUS AUREUS  [facility].  For susceptibility see order #[id number]      Blood culture  [[id number]] Collected: 09/07/20 0733    Order Status: Completed Specimen: Blood from Antecubital, Right Updated: 09/09/20 0822     Blood Culture, Routine No Growth to date      No Growth to date      No Growth to date    Aerobic culture [[id number]] Collected: 09/07/20 1721    Order Status: Completed Specimen: Body Fluid from Knee, Right Updated: 09/09/20 0721     Aerobic Bacterial Culture No growth    Narrative:      1) Right Knee Joint Fluid    Aerobic culture [[id number]] Collected: 09/07/20 1721    Order Status: Completed Specimen: Body Fluid from Knee, Right Updated: 09/09/20 0721     Aerobic Bacterial Culture No growth    Narrative:      2) Right Knee Joint Fluid    Culture, Anaerobe [[id number]] Collected: 09/07/20 1721    Order Status: Completed Specimen: Body Fluid from Knee, Right Updated: 09/09/20 0640     Anaerobic Culture Culture in progress    Narrative:      2) Right Knee Joint Fluid    Blood culture [[id number]] Collected: 09/09/20 0303    Order Status: Sent Specimen: Blood Updated: 09/09/20 0429    AFB Culture & Smear [[id number]] Collected: 09/07/20 1721    Order Status: Completed Specimen: Body Fluid from Knee, Right Updated: 09/08/20 2127     AFB Culture & Smear Culture in progress     AFB CULTURE STAIN No acid fast bacilli seen.    Narrative:      1) Right Knee Joint Fluid    AFB Culture & Smear [[id number]] Collected: 09/07/20 1721    Order Status: Completed Specimen: Body Fluid from Knee, Right Updated: 09/08/20 2127     AFB Culture & Smear Culture in progress     AFB CULTURE STAIN No acid fast bacilli seen.    Narrative:      2) Right Knee Joint Fluid    Blood culture [[id number]]  (Abnormal)  (Susceptibility) Collected: 09/05/20 1115    Order Status: Completed Specimen: Blood from Peripheral, Right Hand Updated: 09/08/20 0820     Blood Culture, Routine Gram stain aer bottle: Gram positive cocci in clusters resembling Staph       Results called to and read back by: Shalini Pena RN  09/06/2020  11:17       METHICILLIN RESISTANT STAPHYLOCOCCUS AUREUS  ID consult required at [facility] and [facility].      Gram stain [[id number]] Collected: 09/07/20 1721    Order Status: Completed Specimen: Body Fluid from Knee, Right Updated: 09/08/20 0003     Gram Stain Result No WBC's      No organisms seen    Narrative:      2) Right Knee Joint Fluid    Gram stain [[id number]] Collected: 09/07/20 1721    Order Status: Completed Specimen: Body Fluid from Knee, Right Updated: 09/07/20 2245     Gram Stain Result Rare WBC's      No organisms seen    Narrative:      1) Right Knee Joint Fluid    Aerobic culture [[id number]]  (Abnormal)  (Susceptibility) Collected: 09/03/20 1647    Order Status: Completed Specimen: Joint Fluid from Knee, Right Updated: 09/07/20 1049     Aerobic Bacterial Culture METHICILLIN RESISTANT STAPHYLOCOCCUS AUREUS  Few      Culture, Anaerobe [[id number]] Collected: 09/03/20 1647    Order Status: Completed Specimen: Joint Fluid from Knee, Right Updated: 09/07/20 1032     Anaerobic Culture No anaerobes isolated    Blood culture [[id number]]  (Abnormal)  (Susceptibility) Collected: 09/02/20 1441    Order Status: Completed Specimen: Blood Updated: 09/05/20 1401     Blood Culture, Routine Gram stain oksana bottle: Gram positive cocci in clusters resembling Staph       Results called to and read back by: Rossana Garza RN 09/03/2020  10:12      Gram stain aer bottle: Gram positive cocci in clusters resembling Staph       Positive results previously called      METHICILLIN RESISTANT STAPHYLOCOCCUS AUREUS  ID consult required at [facility].      Narrative:      right median cubital  right median    AFB Culture & Smear [[id number]] Collected: 09/03/20 1647    Order Status: Completed Specimen: Joint Fluid from Knee, Right Updated: 09/04/20 2127     AFB Culture & Smear Culture in progress     AFB CULTURE STAIN No acid fast bacilli seen.    Blood culture [[id number]]  (Abnormal)  Collected: 09/01/20 1417    Order Status: Completed Specimen: Blood from Antecubital, Right Hand Updated: 09/04/20 0838     Blood Culture, Routine Gram stain aer bottle: Gram positive cocci       Results called to and read back by: Jolene Barraza   09/02/2020  12:06      METHICILLIN RESISTANT STAPHYLOCOCCUS AUREUS  ID consult required at [facility] and [facility].  For susceptibility see order #[id number]      Narrative:      Collection has been rescheduled by CBE at 09/01/2020 13:58 Reason:   due at 13:55  Collection has been rescheduled by CBE at 09/01/2020 13:58 Reason:   due at 13:55    Culture, Anaerobe [[id number]] Collected: 08/31/20 1754    Order Status: Completed Specimen: Wound from Foot, Left Updated: 09/04/20 0741     Anaerobic Culture No anaerobes isolated    Gram stain [[id number]] Collected: 09/03/20 1647    Order Status: Completed Specimen: Joint Fluid from Knee, Right Updated: 09/03/20 1851     Gram Stain Result Rare WBC's      No organisms seen    Aerobic culture [[id number]]  (Abnormal)  (Susceptibility) Collected: 08/31/20 1754    Order Status: Completed Specimen: Wound from Foot, Left Updated: 09/03/20 1316     Aerobic Bacterial Culture PROTEUS MIRABILIS  Moderate        METHICILLIN RESISTANT STAPHYLOCOCCUS AUREUS  Moderate      Blood culture [[id number]]  (Abnormal) Collected: 08/31/20 1328    Order Status: Completed Specimen: Blood from Peripheral, Right Hand Updated: 09/03/20 0716     Blood Culture, Routine Gram stain oksana bottle: Gram positive cocci       Results called to and read back by: Elsy Pena  09/01/2020  11:46      Gram stain aer bottle: Gram positive cocci in clusters resembling Staph       Positive results previously called      METHICILLIN RESISTANT STAPHYLOCOCCUS AUREUS  ID consult required at [facility] and [facility].  For susceptibility see order #[id number]      Blood culture [[id number]]  (Abnormal) Collected: 08/31/20 1324    Order Status:  Completed Specimen: Blood from Peripheral, Left Hand Updated: 09/03/20 0714     Blood Culture, Routine Gram stain aer bottle: Gram positive cocci       Gram stain oksana bottle: Gram positive cocci       Results called to and read back by: Elsy Pena  09/01/2020  11:46      METHICILLIN RESISTANT STAPHYLOCOCCUS AUREUS  ID consult required at [facility],[place] and [facility] locations.  For susceptibility see order #[id number]      Urine culture [[id number]]  (Abnormal)  (Susceptibility) Collected: 08/30/20 1633    Order Status: Completed Specimen: Urine Updated: 09/03/20 0124     Urine Culture, Routine KLEBSIELLA PNEUMONIAE  > 100,000 cfu/ml      Narrative:      Specimen Source->Urine        Specimen (12h ago, onward)    None          Diagnostic Results  9/9 Nuclear Medicine Scan: There is no scintigraphic finding to suggest source of infection.     Clinical Findings: Left Shearer 1 plantar foot ulcer 3.2x1.4x0.7 cm, fibronecrotic wound base, no drainage, no undermining, no erythema, no edema, no fluctuance or crepitus. No fluid could be expressed on manual compression. Mild periwound tenderness. Wound dry, no exudate. Appears stable overall. Not clinically infected.           Assessment/Plan:     Diabetic ulcer of left foot associated with type 2 diabetes mellitus, with fat layer exposed  65 y.o M with PMHx of Type 2 DM, chronic hypoxemic respiratory failure on 2 L O2 via NC, chronic systolic heart failure. Tachycardic, hypotensive, still having transient hypoxia. WBCs now 40.90. No signs of deep foot infection on X ray, MRI, or nuclear medicine scan. Foot wound superficial and stable and likely not the cause of patient's persistent septicemia. Left ankle peak systolic velocity 35 cm/s which is concerning for risk of nonhealing. ABIs ordered, awaiting results.     Plan:  -No surgical intervention from podiatry warranted at this time.   -Awaiting ABIs. Recommend consulting vascular surgery based on 8/31 arterial  ultrasound and delayed healing of left foot wound.   -Antibiotic management per ID.  -Dressing changed today.    -Continue local wound care.   -Rest of care per primary  -Podiatry will continue to follow          Mk Cook DPM PGY-1  Podiatric Medicine & Surgery  [facility]

## 2020-09-11 NOTE — HPI
Mr. Ed Patton is a 65 year old male for whom MICU was consulted for hypotension. He has a PMH significant for HFrEF and COPD with chronic respiratory failure (on 2L home oxygen), T2DM with CKD III, and iron deficiency anemia. History obtained from chart review and speaking with patient. See hospital course below for detail regarding care and course of this patient.

## 2020-09-11 NOTE — PLAN OF CARE
09/11/20 0904   Discharge Reassessment   Assessment Type Discharge Planning Reassessment   Provided patient/caregiver education on the expected discharge date and the discharge plan Yes   Do you have any problems affording any of your prescribed medications? No   Discharge Plan A Skilled Nursing Facility   Discharge Plan B Long-term acute care facility (LTAC)   DME Needed Upon Discharge  other (see comments)  (TBD)   Anticipated Discharge Disposition SNF   Can the patient/caregiver answer the patient profile reliably? Yes, cognitively intact   How does the patient rate their overall health at the present time? Good   Describe the patient's ability to walk at the present time. Major restrictions/daily assistance from another person   How often would a person be available to care for the patient? Whenever needed   Number of comorbid conditions (as recorded on the chart) Five or more   During the past month, has the patient often been bothered by feeling down, depressed or hopeless? No   During the past month, has the patient often been bothered by little interest or pleasure in doing things? No   Post-Acute Status   Post-Acute Authorization Placement   Post-Acute Placement Status Awaiting Internal Medical Clearance   Discharge Delays None known at this time

## 2020-09-11 NOTE — PROGRESS NOTES
[facility]  Nephrology  Progress Note    Patient Name: Ed Patton  MRN: [id number]  Admission Date: 8/30/2020  Hospital Length of Stay: 12 days  Attending Provider: Jacqueline Curran MD   Primary Care Physician: Qiana Chow MD  Principal Problem:Sepsis due to methicillin resistant Staphylococcus aureus    Subjective:     HPI: Mr. Patton is a 66yo M with insulin dependent T2DM, chronic hypoxemic resp. failure (on 2L oxygen at home), and CHF (EF 25%). He presented to the ED for recurrent falls and sepsis from an infected diabetic ulcer from stepping on a tack 8/30. Wound cultures from foot positive for proteus and MRSA 8/31. Urine culture 8/30 positive for klebsiella. Blood cultures have been positive for MRSA repeatedly from 8/30 to 9/8. He also developed septic arthritis of the right knee likely from seeding secondary to septicemia positive for MRSA 9/3. LUKAS was negative for IE 9/4. Initial management of septicemia was with ciprofloxacin and Vancomycin which was supratherapeutic to 26.5 on 9/2 prompting switch to a pulse dose regimen. Due to persistent bacteremia ID changed antibiotics to ceftaroline and daptomycin on 9/7 I&D of right knee performed by ortho on 9/7. Cr on admission was 1.5 at admit up from the baseline (1.2-1.5). On 9/9 Cr level had a sudden increased to 3. Nephrology was consulted for NANETTE.     Interval History: Overnight patient was hypotensive to 84/54 with MAP down to 63 and received 750cc bolus of IVF last night and 250cc this morning to maintain blood pressure. Pharmacy changed medication to vancomycin and stopped daptomycin and ceftaroline for increasing CPK levels. Urine output has become oliguric at 105cc recorded yesterday along with rising creatinine up to 5.1 today.    Review of patient's allergies indicates:   Allergen Reactions    Vicodin [hydrocodone-acetaminophen] Itching     Current Facility-Administered Medications   Medication Frequency     acetaminophen tablet 1,000 mg Q8H PRN    acetaminophen tablet 650 mg Q6H PRN    albuterol-ipratropium 2.5 mg-0.5 mg/3 mL nebulizer solution 3 mL Q4H PRN    artificial tears 0.5 % ophthalmic solution 1 drop TID    aspirin EC tablet 81 mg Daily    calcium carbonate 200 mg calcium (500 mg) chewable tablet 500 mg BID PRN    carvediloL tablet 6.25 mg BID    dextrose 50% injection 12.5 g PRN    dextrose 50% injection 25 g PRN    fluticasone furoate-vilanteroL 200-25 mcg/dose diskus inhaler 1 puff Daily    fluticasone propionate 50 mcg/actuation nasal spray 50 mcg Daily    gabapentin capsule 100 mg TID PRN    glucagon (human recombinant) injection 1 mg PRN    glucose chewable tablet 16 g PRN    glucose chewable tablet 24 g PRN    heparin (porcine) injection 5,000 Units Q8H    insulin aspart U-100 pen 0-5 Units QID (AC + HS) PRN    levothyroxine tablet 75 mcg Before breakfast    melatonin tablet 6 mg Nightly PRN    ondansetron injection 4 mg Q8H PRN    oxyCODONE immediate release tablet 5 mg Q8H PRN    polyethylene glycol packet 17 g BID PRN    polyethylene glycol packet 17 g BID    senna-docusate 8.6-50 mg per tablet 1 tablet BID    sodium chloride 0.9% bolus 250 mL PRN    sodium chloride 0.9% flush 10 mL PRN    tamsulosin 24 hr capsule 0.4 mg QHS    Tdap vaccine injection 0.5 mL vaccine x 1 dose    vancomycin - pharmacy to dose pharmacy to manage frequency    vitamin D 1000 units tablet 1,000 Units Daily       Objective:     Vital Signs (Most Recent):  Temp: 98.6 °F (37 °C) (09/11/20 0735)  Pulse: 107 (09/11/20 0900)  Resp: 16 (09/11/20 0900)  BP: 109/68 (09/11/20 0735)  SpO2: 97 % (09/11/20 0900)  O2 Device (Oxygen Therapy): nasal cannula (09/11/20 0900) Vital Signs (24h Range):  Temp:  [97.7 °F (36.5 °C)-98.6 °F (37 °C)] 98.6 °F (37 °C)  Pulse:  [] 107  Resp:  [16-20] 16  SpO2:  [94 %-99 %] 97 %  BP: ()/(52-74) 109/68     Weight: 115 kg (253 lb 8.5 oz) (09/11/20 0400)  Body mass  index is 30.86 kg/m².  Body surface area is 2.48 meters squared.    I/O last 3 completed shifts:  In: 2280 [P.O.:730; IV Piggyback:1550]  Out: 505 [Urine:505]    Physical Exam  Vitals signs reviewed.   Constitutional:       General: He is not in acute distress.     Appearance: Normal appearance. He is normal weight. He is not diaphoretic.   HENT:      Head: Normocephalic and atraumatic.      Right Ear: External ear normal.      Left Ear: External ear normal.      Nose: Nose normal. No congestion or rhinorrhea.   Eyes:      General:         Right eye: No discharge.         Left eye: No discharge.      Extraocular Movements: Extraocular movements intact.   Neck:      Musculoskeletal: Normal range of motion and neck supple. No neck rigidity or muscular tenderness.   Cardiovascular:      Rate and Rhythm: Normal rate and regular rhythm.      Heart sounds: No murmur.      Comments: + JVD to jawline   Pulmonary:      Effort: Pulmonary effort is normal. No respiratory distress.      Breath sounds: No wheezing.      Comments: On 2 L/m oxygen  Chest:      Chest wall: No tenderness.   Abdominal:      General: There is distension.      Palpations: Abdomen is soft.      Tenderness: There is no abdominal tenderness. There is no guarding.   Musculoskeletal: Normal range of motion.         General: Tenderness (right knee) present.      Right lower leg: Edema (1+) present.      Left lower leg: Edema (1+) present.      Comments: Cooling brace applied to right knee    Skin:     General: Skin is warm and dry.      Findings: Lesion (left foot) present.   Neurological:      Mental Status: He is alert and oriented to person, place, and time.      Sensory: No sensory deficit.      Gait: Gait normal.   Psychiatric:         Mood and Affect: Mood normal.         Behavior: Behavior normal.         Thought Content: Thought content normal.         Judgment: Judgment normal.         Significant Labs:  CBC:   Recent Labs   Lab 09/11/20  0731   WBC  40.90*   RBC 3.46*   HGB 9.1*   HCT 31.4*   *   MCV 91   MCH 26.3*   MCHC 29.0*     CMP:   Recent Labs   Lab 09/11/20  0731   *   CALCIUM 8.1*   ALBUMIN 1.6*   *   K 4.4   CO2 23   CL 94*   BUN 62*   CREATININE 5.1*     All labs within the past 24 hours have been reviewed.     Significant Imaging:  Labs: Reviewed  Worsening renal function that is not platauing     Assessment/Plan:     NANETTE (acute kidney injury)  Mr. Patton is a 64 yo male with IDDM2, chronic hypoxemic resp failure on 2L of oxygen at home, and CHF (25% EF) who presented with recurrent falls and sepsis on 8/30 with septicemia with source likely from diabetic ulcer. Blood cultures have been persistently positive for MRSA. IE negative on LUKAS. Septic arthritis to right knee with I&D performed with ortho on 9/7. Initial management of septicemia was with ciprofloxacin and Vancomycin which was supratherapeutic to 26.5 on 9/2 prompting switch to a pulse dose regimen. Due to persistent bacteremia ID changed antibiotics to ceftaroline and daptomycin Cr on admission was 1.5 at admit up from the baseline (1.2-1.5). On 9/9 Cr level had a sudden increased to 3. Nephrology was consulted for NANETTE.     Over 24-48 hours prior to rise in creatinine Mr. Patton had blood pressures in the 80-90s/50s and Maps in the low 60s. Hypotension could have resulted in ischemic ATN. Also, was retaining urine and had improved urine output after a toure was placed. Urine microscopy: scant normal red blood cells without any dysmorphic shapes evident and numerous muddy brown casts. Given the casts in the urine and the urinary retention it is likely a mixed pre-renal and a post-renal kidney injury. Following hypotension overnight and fluid resuscitation patient has become oliguric and Cr has continued to rise suggesting worsening ATN. He will need to be watched closely to monitor for requirements of dialysis.       Reccomendations:  - Maintain MAPS >65 to prevent further  renal injury. May require pressors given limited ability to provide fluids in setting of heart failure  - Nephrology will be monitoring closely for dialysis requirements over the weekend; no requirements at the moment   - Avoid diuresing  - Bladder scan showed urinary retention and toure was placed; maintain toure for now  - Recommend starting flomax now to prepare for future removal of toure   - Strict I/Os  - Daily weights             Thank you for your consult. I will follow-up with patient. Please contact us if you have any additional questions.    Liz Membreno MD  Nephrology  [facility]

## 2020-09-11 NOTE — PLAN OF CARE
09/11/20 8875   Post-Acute Status   Post-Acute Authorization Placement   Home Health Status Awaiting Internal Medical Clearance

## 2020-09-12 PROBLEM — E11.10 TYPE 2 DIABETES MELLITUS WITH KETOACIDOSIS WITHOUT COMA, WITH LONG-TERM CURRENT USE OF INSULIN: Status: ACTIVE | Noted: 2017-10-09

## 2020-09-12 PROBLEM — A41.9 SEPTIC SHOCK: Status: ACTIVE | Noted: 2020-09-12

## 2020-09-12 PROBLEM — E87.29 INCREASED ANION GAP METABOLIC ACIDOSIS: Status: ACTIVE | Noted: 2020-09-12

## 2020-09-12 PROBLEM — R57.9 SHOCK, UNSPECIFIED: Status: ACTIVE | Noted: 2020-09-12

## 2020-09-12 PROBLEM — D72.829 LEUKOCYTOSIS: Status: ACTIVE | Noted: 2020-09-12

## 2020-09-12 PROBLEM — R65.21 SEPTIC SHOCK: Status: ACTIVE | Noted: 2020-09-12

## 2020-09-12 LAB
ALBUMIN SERPL BCP-MCNC: 1.5 G/DL (ref 3.5–5.2)
ALBUMIN SERPL BCP-MCNC: 1.5 G/DL (ref 3.5–5.2)
ALP SERPL-CCNC: 269 U/L (ref 55–135)
ALT SERPL W/O P-5'-P-CCNC: 58 U/L (ref 10–44)
ANION GAP SERPL CALC-SCNC: 16 MMOL/L (ref 8–16)
ANION GAP SERPL CALC-SCNC: 20 MMOL/L (ref 8–16)
ANISOCYTOSIS BLD QL SMEAR: SLIGHT
ANISOCYTOSIS BLD QL SMEAR: SLIGHT
AST SERPL-CCNC: 42 U/L (ref 10–40)
B-OH-BUTYR BLD STRIP-SCNC: 0.9 MMOL/L (ref 0–0.5)
BACTERIA BLD CULT: NORMAL
BASOPHILS # BLD AUTO: 0.09 K/UL (ref 0–0.2)
BASOPHILS # BLD AUTO: 0.11 K/UL (ref 0–0.2)
BASOPHILS NFR BLD: 0.2 % (ref 0–1.9)
BASOPHILS NFR BLD: 0.2 % (ref 0–1.9)
BILIRUB SERPL-MCNC: 0.5 MG/DL (ref 0.1–1)
BUN SERPL-MCNC: 80 MG/DL (ref 8–23)
BUN SERPL-MCNC: 95 MG/DL (ref 8–23)
BURR CELLS BLD QL SMEAR: ABNORMAL
BURR CELLS BLD QL SMEAR: ABNORMAL
CALCIUM SERPL-MCNC: 8.2 MG/DL (ref 8.7–10.5)
CALCIUM SERPL-MCNC: 8.5 MG/DL (ref 8.7–10.5)
CHLORIDE SERPL-SCNC: 92 MMOL/L (ref 95–110)
CHLORIDE SERPL-SCNC: 93 MMOL/L (ref 95–110)
CO2 SERPL-SCNC: 20 MMOL/L (ref 23–29)
CO2 SERPL-SCNC: 22 MMOL/L (ref 23–29)
CORTIS SERPL-MCNC: 25.3 UG/DL
CREAT SERPL-MCNC: 5.8 MG/DL (ref 0.5–1.4)
CREAT SERPL-MCNC: 6.4 MG/DL (ref 0.5–1.4)
CRP SERPL-MCNC: 283.2 MG/L (ref 0–8.2)
DIFFERENTIAL METHOD: ABNORMAL
DIFFERENTIAL METHOD: ABNORMAL
EOSINOPHIL # BLD AUTO: 0 K/UL (ref 0–0.5)
EOSINOPHIL # BLD AUTO: 0 K/UL (ref 0–0.5)
EOSINOPHIL NFR BLD: 0 % (ref 0–8)
EOSINOPHIL NFR BLD: 0 % (ref 0–8)
ERYTHROCYTE [DISTWIDTH] IN BLOOD BY AUTOMATED COUNT: 15.9 % (ref 11.5–14.5)
ERYTHROCYTE [DISTWIDTH] IN BLOOD BY AUTOMATED COUNT: 15.9 % (ref 11.5–14.5)
EST. GFR  (AFRICAN AMERICAN): 11 ML/MIN/1.73 M^2
EST. GFR  (AFRICAN AMERICAN): 9.8 ML/MIN/1.73 M^2
EST. GFR  (NON AFRICAN AMERICAN): 8.5 ML/MIN/1.73 M^2
EST. GFR  (NON AFRICAN AMERICAN): 9.5 ML/MIN/1.73 M^2
GLUCOSE SERPL-MCNC: 209 MG/DL (ref 70–110)
GLUCOSE SERPL-MCNC: 307 MG/DL (ref 70–110)
HCT VFR BLD AUTO: 30.7 % (ref 40–54)
HCT VFR BLD AUTO: 31.9 % (ref 40–54)
HGB BLD-MCNC: 9.4 G/DL (ref 14–18)
HGB BLD-MCNC: 9.7 G/DL (ref 14–18)
HYPOCHROMIA BLD QL SMEAR: ABNORMAL
IMM GRANULOCYTES # BLD AUTO: 1.64 K/UL (ref 0–0.04)
IMM GRANULOCYTES # BLD AUTO: 1.71 K/UL (ref 0–0.04)
IMM GRANULOCYTES NFR BLD AUTO: 3.6 % (ref 0–0.5)
IMM GRANULOCYTES NFR BLD AUTO: 4 % (ref 0–0.5)
LACTATE SERPL-SCNC: 1.4 MMOL/L (ref 0.5–2.2)
LYMPHOCYTES # BLD AUTO: 0.8 K/UL (ref 1–4.8)
LYMPHOCYTES # BLD AUTO: 1 K/UL (ref 1–4.8)
LYMPHOCYTES NFR BLD: 1.7 % (ref 18–48)
LYMPHOCYTES NFR BLD: 2.3 % (ref 18–48)
MAGNESIUM SERPL-MCNC: 2.3 MG/DL (ref 1.6–2.6)
MCH RBC QN AUTO: 26.6 PG (ref 27–31)
MCH RBC QN AUTO: 26.9 PG (ref 27–31)
MCHC RBC AUTO-ENTMCNC: 30.4 G/DL (ref 32–36)
MCHC RBC AUTO-ENTMCNC: 30.6 G/DL (ref 32–36)
MCV RBC AUTO: 88 FL (ref 82–98)
MCV RBC AUTO: 88 FL (ref 82–98)
MONOCYTES # BLD AUTO: 1.7 K/UL (ref 0.3–1)
MONOCYTES # BLD AUTO: 2.1 K/UL (ref 0.3–1)
MONOCYTES NFR BLD: 3.6 % (ref 4–15)
MONOCYTES NFR BLD: 4.8 % (ref 4–15)
NEUTROPHILS # BLD AUTO: 37.9 K/UL (ref 1.8–7.7)
NEUTROPHILS # BLD AUTO: 41.8 K/UL (ref 1.8–7.7)
NEUTROPHILS NFR BLD: 88.7 % (ref 38–73)
NEUTROPHILS NFR BLD: 90.9 % (ref 38–73)
NRBC BLD-RTO: 0 /100 WBC
NRBC BLD-RTO: 0 /100 WBC
OVALOCYTES BLD QL SMEAR: ABNORMAL
OVALOCYTES BLD QL SMEAR: ABNORMAL
PHOSPHATE SERPL-MCNC: 8.5 MG/DL (ref 2.7–4.5)
PLATELET # BLD AUTO: 593 K/UL (ref 150–350)
PLATELET # BLD AUTO: 625 K/UL (ref 150–350)
PLATELET BLD QL SMEAR: ABNORMAL
PMV BLD AUTO: 10.6 FL (ref 9.2–12.9)
PMV BLD AUTO: 10.6 FL (ref 9.2–12.9)
POCT GLUCOSE: 123 MG/DL (ref 70–110)
POCT GLUCOSE: 210 MG/DL (ref 70–110)
POCT GLUCOSE: 213 MG/DL (ref 70–110)
POCT GLUCOSE: 263 MG/DL (ref 70–110)
POCT GLUCOSE: 274 MG/DL (ref 70–110)
POCT GLUCOSE: 276 MG/DL (ref 70–110)
POCT GLUCOSE: 296 MG/DL (ref 70–110)
POCT GLUCOSE: 310 MG/DL (ref 70–110)
POCT GLUCOSE: 316 MG/DL (ref 70–110)
POCT GLUCOSE: 324 MG/DL (ref 70–110)
POCT GLUCOSE: 339 MG/DL (ref 70–110)
POIKILOCYTOSIS BLD QL SMEAR: SLIGHT
POIKILOCYTOSIS BLD QL SMEAR: SLIGHT
POLYCHROMASIA BLD QL SMEAR: ABNORMAL
POLYCHROMASIA BLD QL SMEAR: ABNORMAL
POTASSIUM SERPL-SCNC: 4.4 MMOL/L (ref 3.5–5.1)
POTASSIUM SERPL-SCNC: 4.8 MMOL/L (ref 3.5–5.1)
PROT SERPL-MCNC: 7.6 G/DL (ref 6–8.4)
RBC # BLD AUTO: 3.5 M/UL (ref 4.6–6.2)
RBC # BLD AUTO: 3.64 M/UL (ref 4.6–6.2)
SODIUM SERPL-SCNC: 131 MMOL/L (ref 136–145)
SODIUM SERPL-SCNC: 132 MMOL/L (ref 136–145)
T4 FREE SERPL-MCNC: 0.5 NG/DL (ref 0.71–1.51)
TOXIC GRANULES BLD QL SMEAR: PRESENT
TSH SERPL DL<=0.005 MIU/L-ACNC: 7.14 UIU/ML (ref 0.4–4)
VANCOMYCIN SERPL-MCNC: 21.2 UG/ML
WBC # BLD AUTO: 42.74 K/UL (ref 3.9–12.7)
WBC # BLD AUTO: 46.04 K/UL (ref 3.9–12.7)

## 2020-09-12 PROCEDURE — 93005 ELECTROCARDIOGRAM TRACING: CPT | Mod: HCNC

## 2020-09-12 PROCEDURE — 94640 AIRWAY INHALATION TREATMENT: CPT | Mod: HCNC

## 2020-09-12 PROCEDURE — 87040 BLOOD CULTURE FOR BACTERIA: CPT | Mod: 59,HCNC

## 2020-09-12 PROCEDURE — 63600175 PHARM REV CODE 636 W HCPCS: Mod: HCNC | Performed by: HOSPITALIST

## 2020-09-12 PROCEDURE — 93010 ELECTROCARDIOGRAM REPORT: CPT | Mod: HCNC,,, | Performed by: INTERNAL MEDICINE

## 2020-09-12 PROCEDURE — 99233 SBSQ HOSP IP/OBS HIGH 50: CPT | Mod: HCNC,GC,, | Performed by: INTERNAL MEDICINE

## 2020-09-12 PROCEDURE — 84443 ASSAY THYROID STIM HORMONE: CPT | Mod: HCNC

## 2020-09-12 PROCEDURE — 25000003 PHARM REV CODE 250: Mod: HCNC | Performed by: HOSPITALIST

## 2020-09-12 PROCEDURE — 27000221 HC OXYGEN, UP TO 24 HOURS: Mod: HCNC

## 2020-09-12 PROCEDURE — 83735 ASSAY OF MAGNESIUM: CPT | Mod: HCNC

## 2020-09-12 PROCEDURE — 84439 ASSAY OF FREE THYROXINE: CPT | Mod: HCNC

## 2020-09-12 PROCEDURE — 80202 ASSAY OF VANCOMYCIN: CPT | Mod: HCNC

## 2020-09-12 PROCEDURE — 99232 PR SUBSEQUENT HOSPITAL CARE,LEVL II: ICD-10-PCS | Mod: HCNC,,, | Performed by: HOSPITALIST

## 2020-09-12 PROCEDURE — 25000003 PHARM REV CODE 250: Mod: HCNC | Performed by: INTERNAL MEDICINE

## 2020-09-12 PROCEDURE — 99900035 HC TECH TIME PER 15 MIN (STAT): Mod: HCNC

## 2020-09-12 PROCEDURE — 99223 1ST HOSP IP/OBS HIGH 75: CPT | Mod: HCNC,,, | Performed by: INTERNAL MEDICINE

## 2020-09-12 PROCEDURE — 20000000 HC ICU ROOM: Mod: HCNC

## 2020-09-12 PROCEDURE — 82010 KETONE BODYS QUAN: CPT | Mod: HCNC

## 2020-09-12 PROCEDURE — 25000003 PHARM REV CODE 250: Mod: HCNC | Performed by: STUDENT IN AN ORGANIZED HEALTH CARE EDUCATION/TRAINING PROGRAM

## 2020-09-12 PROCEDURE — 63600175 PHARM REV CODE 636 W HCPCS: Mod: HCNC | Performed by: STUDENT IN AN ORGANIZED HEALTH CARE EDUCATION/TRAINING PROGRAM

## 2020-09-12 PROCEDURE — 86140 C-REACTIVE PROTEIN: CPT | Mod: HCNC

## 2020-09-12 PROCEDURE — 99232 SBSQ HOSP IP/OBS MODERATE 35: CPT | Mod: HCNC,,, | Performed by: INTERNAL MEDICINE

## 2020-09-12 PROCEDURE — 80053 COMPREHEN METABOLIC PANEL: CPT | Mod: HCNC

## 2020-09-12 PROCEDURE — 99233 PR SUBSEQUENT HOSPITAL CARE,LEVL III: ICD-10-PCS | Mod: HCNC,GC,, | Performed by: INTERNAL MEDICINE

## 2020-09-12 PROCEDURE — 85025 COMPLETE CBC W/AUTO DIFF WBC: CPT | Mod: 91,HCNC

## 2020-09-12 PROCEDURE — 82533 TOTAL CORTISOL: CPT | Mod: HCNC

## 2020-09-12 PROCEDURE — 99232 SBSQ HOSP IP/OBS MODERATE 35: CPT | Mod: HCNC,,, | Performed by: HOSPITALIST

## 2020-09-12 PROCEDURE — 99223 PR INITIAL HOSPITAL CARE,LEVL III: ICD-10-PCS | Mod: HCNC,,, | Performed by: INTERNAL MEDICINE

## 2020-09-12 PROCEDURE — 99232 PR SUBSEQUENT HOSPITAL CARE,LEVL II: ICD-10-PCS | Mod: HCNC,,, | Performed by: INTERNAL MEDICINE

## 2020-09-12 PROCEDURE — 94761 N-INVAS EAR/PLS OXIMETRY MLT: CPT | Mod: HCNC

## 2020-09-12 PROCEDURE — 80069 RENAL FUNCTION PANEL: CPT | Mod: HCNC

## 2020-09-12 PROCEDURE — 93010 EKG 12-LEAD: ICD-10-PCS | Mod: HCNC,,, | Performed by: INTERNAL MEDICINE

## 2020-09-12 PROCEDURE — 83605 ASSAY OF LACTIC ACID: CPT | Mod: HCNC

## 2020-09-12 RX ORDER — NOREPINEPHRINE BITARTRATE/D5W 4MG/250ML
0.02 PLASTIC BAG, INJECTION (ML) INTRAVENOUS CONTINUOUS
Status: DISCONTINUED | OUTPATIENT
Start: 2020-09-12 | End: 2020-09-13

## 2020-09-12 RX ORDER — DEXTROSE MONOHYDRATE 100 MG/ML
1000 INJECTION, SOLUTION INTRAVENOUS
Status: DISCONTINUED | OUTPATIENT
Start: 2020-09-12 | End: 2020-09-18

## 2020-09-12 RX ORDER — SODIUM CHLORIDE 0.9 % (FLUSH) 0.9 %
10 SYRINGE (ML) INJECTION
Status: DISCONTINUED | OUTPATIENT
Start: 2020-09-12 | End: 2020-09-18

## 2020-09-12 RX ORDER — INSULIN ASPART 100 [IU]/ML
0-5 INJECTION, SOLUTION INTRAVENOUS; SUBCUTANEOUS
Status: DISCONTINUED | OUTPATIENT
Start: 2020-09-12 | End: 2020-09-12

## 2020-09-12 RX ORDER — CEFEPIME HYDROCHLORIDE 1 G/1
1 INJECTION, POWDER, FOR SOLUTION INTRAMUSCULAR; INTRAVENOUS
Status: DISCONTINUED | OUTPATIENT
Start: 2020-09-12 | End: 2020-09-13

## 2020-09-12 RX ORDER — ACETAMINOPHEN 500 MG
1000 TABLET ORAL EVERY 8 HOURS PRN
Status: DISCONTINUED | OUTPATIENT
Start: 2020-09-12 | End: 2020-10-09 | Stop reason: HOSPADM

## 2020-09-12 RX ADMIN — HYPROMELLOSE 2910 1 DROP: 5 SOLUTION OPHTHALMIC at 04:09

## 2020-09-12 RX ADMIN — HEPARIN SODIUM 5000 UNITS: 5000 INJECTION INTRAVENOUS; SUBCUTANEOUS at 10:09

## 2020-09-12 RX ADMIN — CEFEPIME 1 G: 1 INJECTION, POWDER, FOR SOLUTION INTRAMUSCULAR; INTRAVENOUS at 04:09

## 2020-09-12 RX ADMIN — OXYCODONE 5 MG: 5 TABLET ORAL at 10:09

## 2020-09-12 RX ADMIN — INSULIN ASPART 3 UNITS: 100 INJECTION, SOLUTION INTRAVENOUS; SUBCUTANEOUS at 01:09

## 2020-09-12 RX ADMIN — ONDANSETRON 4 MG: 2 INJECTION INTRAMUSCULAR; INTRAVENOUS at 08:09

## 2020-09-12 RX ADMIN — HYPROMELLOSE 2910 1 DROP: 5 SOLUTION OPHTHALMIC at 09:09

## 2020-09-12 RX ADMIN — SODIUM CHLORIDE 500 ML: 0.9 INJECTION, SOLUTION INTRAVENOUS at 05:09

## 2020-09-12 RX ADMIN — VANCOMYCIN HYDROCHLORIDE 125 MG: KIT at 06:09

## 2020-09-12 RX ADMIN — VANCOMYCIN HYDROCHLORIDE 125 MG: KIT at 07:09

## 2020-09-12 RX ADMIN — HEPARIN SODIUM 5000 UNITS: 5000 INJECTION INTRAVENOUS; SUBCUTANEOUS at 06:09

## 2020-09-12 RX ADMIN — ASPIRIN 81 MG: 81 TABLET, COATED ORAL at 10:09

## 2020-09-12 RX ADMIN — VANCOMYCIN HYDROCHLORIDE 125 MG: KIT at 01:09

## 2020-09-12 RX ADMIN — TAMSULOSIN HYDROCHLORIDE 0.4 MG: 0.4 CAPSULE ORAL at 08:09

## 2020-09-12 RX ADMIN — VANCOMYCIN HYDROCHLORIDE 125 MG: KIT at 02:09

## 2020-09-12 RX ADMIN — FLUTICASONE FUROATE AND VILANTEROL TRIFENATATE 1 PUFF: 200; 25 POWDER RESPIRATORY (INHALATION) at 10:09

## 2020-09-12 RX ADMIN — OXYCODONE 5 MG: 5 TABLET ORAL at 06:09

## 2020-09-12 RX ADMIN — LEVOTHYROXINE SODIUM 75 MCG: 25 TABLET ORAL at 06:09

## 2020-09-12 RX ADMIN — FLUTICASONE PROPIONATE 50 MCG: 50 SPRAY, METERED NASAL at 03:09

## 2020-09-12 RX ADMIN — ONDANSETRON 4 MG: 2 INJECTION INTRAMUSCULAR; INTRAVENOUS at 01:09

## 2020-09-12 RX ADMIN — SODIUM CHLORIDE 2 UNITS/HR  (ORDERING DOSE ONLY,MUST KEEP AS DOSE RANGE.ONLY CHANGE IF INITIAL DOSE EXCEEDS SUGGESTED RANGE): 9 INJECTION, SOLUTION INTRAVENOUS at 10:09

## 2020-09-12 RX ADMIN — HYPROMELLOSE 2910 1 DROP: 5 SOLUTION OPHTHALMIC at 08:09

## 2020-09-12 RX ADMIN — CHOLECALCIFEROL (VITAMIN D3) 25 MCG (1,000 UNIT) TABLET 1000 UNITS: at 10:09

## 2020-09-12 RX ADMIN — HEPARIN SODIUM 5000 UNITS: 5000 INJECTION INTRAVENOUS; SUBCUTANEOUS at 01:09

## 2020-09-12 NOTE — PROGRESS NOTES
[facility]  Infectious Disease  Progress Note    Patient Name: Ed Patton  MRN: [id number]  Admission Date: 8/30/2020  Length of Stay: 13 days  Attending Physician: Jacqueline Curran MD  Primary Care Provider: Qiana Chow MD    Isolation Status: Contact, Special Contact  Assessment/Plan:      * Sepsis due to methicillin resistant Staphylococcus aureus  MRSA septicemia. Still tachycardic, SpO2 reached 92%, renal function continues to worsen. WBCs not improving. ESR and CRP elevated, RF WNL. Vancomycin was supratherapeutic, switched to pulse dose regimen. Source of bacteremia unclear, may be left foot wound or a septic process at the right knee joint. Right knee now clinically suspicious for septic arthritis (see physical exam). Left foot wound growing Proteus and MRSA, urine growing Klebsiella. TTE & LUKAS is negative. Dapto/ceftaroline switched back to vanc due to worsening renal function and early signs of rhabdo. Course complicated by worsening leukocytosis (C Diff neg)    Blood cultures: MRSA  R knee fluid culture: Staph aureus  Left foot wound: Proteus, MRSA  Source Control: I&D of Septic R knee on 9/7/20    Recommendations:   -- Would obtain non contrast CT Chest Abd-Pelvis to evaluate for potential abscess  -- F/u ABIs  -- F/u blood cultures 9/9 & 9/10 to ensure clearance  -- If no other possible source found, patient will need to complete 4 weeks vanc/depta/ceftaroline from date of clearance 9/9      Thank you for your consult. I will follow-up with patient. Please contact us if you have any additional questions.    Mikie Liang MD  Infectious Disease  [facility]    Subjective:     Principal Problem:Sepsis due to methicillin resistant Staphylococcus aureus    HPI: 64 yo M with Hx of DM2 (on insulin), chronic hypoxemic respiratory failure (on O2 at home), chronic systolic heart failure presented to ED after recurrent falls 7 and 8 days ago. He has a  history of falls but reports that his tendency to fall has worsened recently, resulting in injury to his lower back and right thigh. After his fall last Tuesday he looked at his left foot and noticed a wound, denies pain or drainage related to the wound, he admits to not regularly checking his feet and does not know how long the wound has been present. He believes the wound is related to a tack that he previously found imbedded in the bottom of a slipper. He had previously seen Ang Lugo DPM for diabetic foot care but has not seen her this year due to fear of the coronavirus pandemic. He has been getting home health services including physical therapy. No subjective change as of today, still denies pain and drainage related to the wound. Denies F/C/N/V.  Interval History: Remains afebrile, but leukocytosis continues to increase. Patient denies any symptoms except for R knee pain. Hernandez stim yest neg.    Review of Systems   Constitutional: Negative for chills and fever.   HENT: Negative for sore throat.    Eyes: Negative for photophobia.   Respiratory: Negative for cough and shortness of breath.    Cardiovascular: Negative for chest pain.   Gastrointestinal: Negative for abdominal pain, constipation, nausea and vomiting.   Genitourinary: Negative for difficulty urinating, dysuria, flank pain and hematuria.   Musculoskeletal: Positive for arthralgias (R knee pain). Negative for back pain, neck pain and neck stiffness.   Skin: Positive for wound. Negative for rash.   Neurological: Negative for headaches.   Psychiatric/Behavioral: Negative for agitation and confusion.     Objective:     Vital Signs (Most Recent):  Temp: 97.3 °F (36.3 °C) (09/12/20 1234)  Pulse: (!) 119 (09/12/20 1514)  Resp: 18 (09/12/20 1234)  BP: 91/61 (09/12/20 1234)  SpO2: (!) 93 % (09/12/20 1234) Vital Signs (24h Range):  Temp:  [97.3 °F (36.3 °C)-98.7 °F (37.1 °C)] 97.3 °F (36.3 °C)  Pulse:  [117-120] 119  Resp:  [18-22] 18  SpO2:  [93 %-96 %]  93 %  BP: ()/(51-61) 91/61     Weight: 115 kg (253 lb 8.5 oz)  Body mass index is 30.86 kg/m².    Estimated Creatinine Clearance: 18.1 mL/min (A) (based on SCr of 5.8 mg/dL (H)).    Physical Exam  Vitals signs reviewed.   Constitutional:       General: He is not in acute distress.     Appearance: Normal appearance. He is normal weight. He is not diaphoretic.   HENT:      Head: Normocephalic and atraumatic.      Nose: Nose normal.   Eyes:      Extraocular Movements: Extraocular movements intact.   Neck:      Musculoskeletal: Normal range of motion and neck supple. No neck rigidity or muscular tenderness.   Cardiovascular:      Rate and Rhythm: Regular rhythm. Tachycardia present.      Heart sounds: No murmur. No gallop.    Pulmonary:      Effort: Pulmonary effort is normal. No respiratory distress.      Comments: Nasal cannula  Abdominal:      Palpations: Abdomen is soft. There is no mass.   Musculoskeletal:         General: Tenderness (right knee) present.      Comments: Cooling brace applied to right knee.    Lymphadenopathy:      Cervical: No cervical adenopathy.   Skin:     General: Skin is warm and dry.   Neurological:      General: No focal deficit present.      Mental Status: He is alert and oriented to person, place, and time.   Psychiatric:         Mood and Affect: Mood normal.         Behavior: Behavior normal.         Significant Labs:   Blood Culture:   Recent Labs   Lab 09/08/20  0408 09/08/20  1247 09/09/20  0303 09/09/20  1040 09/10/20  0459   LABBLOO Gram stain aer bottle: Gram positive cocci in clusters resembling Staph  Results called to and read back by:Jany Santiago RN 09/08/2020  22:29  METHICILLIN RESISTANT STAPHYLOCOCCUS AUREUS  ID consult required at [facility],[place] and [facility] locations.  * No Growth to date  No Growth to date  No Growth to date  No Growth to date  No Growth to date  No Growth to date  No Growth to date  No Growth to date  No Growth to date  No Growth to  date No growth to date  No Growth to date  No Growth to date No growth to date  No Growth to date  No Growth to date No growth to date  No Growth to date     CBC:   Recent Labs   Lab 09/11/20 0731 09/12/20 0252   WBC 40.90* 46.04*   HGB 9.1* 9.4*   HCT 31.4* 30.7*   * 593*     CMP:   Recent Labs   Lab 09/11/20 0731 09/12/20 0252   * 132*   K 4.4 4.4   CL 94* 92*   CO2 23 20*   * 209*   BUN 62* 80*   CREATININE 5.1* 5.8*   CALCIUM 8.1* 8.2*   ALBUMIN 1.6* 1.5*   ANIONGAP 14 20*   EGFRNONAA 11.1* 9.5*     Microbiology Results (last 7 days)     Procedure Component Value Units Date/Time    Blood culture [[id number]] Collected: 09/08/20 1247    Order Status: Completed Specimen: Blood from Antecubital, Right Arm Updated: 09/12/20 1412     Blood Culture, Routine No Growth to date      No Growth to date      No Growth to date      No Growth to date      No Growth to date    Narrative:      Collection has been rescheduled by RB8 at 09/08/2020 11:43 Reason:   Patient unavailable,nurse Mckay #[id number] was notified  Collection has been rescheduled by RB8 at 09/08/2020 11:43 Reason:   Patient unavailable,nurse Mckay #[id number] was notified    Blood culture [[id number]] Collected: 09/08/20 1247    Order Status: Completed Specimen: Blood from Antecubital, Right Hand Updated: 09/12/20 1412     Blood Culture, Routine No Growth to date      No Growth to date      No Growth to date      No Growth to date      No Growth to date    Narrative:      Collection has been rescheduled by RB8 at 09/08/2020 11:43 Reason:   Patient unavailable,nurse Mckay #[id number] was notified  Collection has been rescheduled by RB8 at 09/08/2020 11:43 Reason:   Patient unavailable,nurse Mckay #[id number] was notified    Blood culture [[id number]] Collected: 09/09/20 1040    Order Status: Completed Specimen: Blood Updated: 09/12/20 1412     Blood Culture, Routine No growth to date      No Growth to date      No Growth to date    Blood culture  [[id number]] Collected: 09/07/20 0733    Order Status: Completed Specimen: Blood from Antecubital, Right Updated: 09/12/20 0822     Blood Culture, Routine No growth after 5 days.    Blood culture [[id number]] Collected: 09/10/20 0459    Order Status: Completed Specimen: Blood Updated: 09/12/20 0812     Blood Culture, Routine No growth to date      No Growth to date    Blood culture [[id number]] Collected: 09/09/20 0303    Order Status: Completed Specimen: Blood Updated: 09/12/20 0812     Blood Culture, Routine No growth to date      No Growth to date      No Growth to date    Clostridium difficile EIA [[id number]] Collected: 09/11/20 1734    Order Status: Completed Specimen: Stool Updated: 09/11/20 2258     C. diff Antigen Negative     C difficile Toxins A+B, EIA Negative     Comment: Testing not recommended for children <24 months old.       Aerobic culture [[id number]] Collected: 09/07/20 1721    Order Status: Completed Specimen: Body Fluid from Knee, Right Updated: 09/11/20 0754     Aerobic Bacterial Culture No growth    Narrative:      1) Right Knee Joint Fluid    Aerobic culture [[id number]] Collected: 09/07/20 1721    Order Status: Completed Specimen: Body Fluid from Knee, Right Updated: 09/11/20 0754     Aerobic Bacterial Culture No growth    Narrative:      2) Right Knee Joint Fluid    Culture, Anaerobe [[id number]] Collected: 09/07/20 1721    Order Status: Completed Specimen: Body Fluid from Knee, Right Updated: 09/11/20 0749     Anaerobic Culture Culture in progress    Narrative:      1) Right Knee Joint Fluid    Blood culture [[id number]]  (Abnormal)  (Susceptibility) Collected: 09/08/20 0408    Order Status: Completed Specimen: Blood from Antecubital, Right Arm Updated: 09/10/20 1601     Blood Culture, Routine Gram stain aer bottle: Gram positive cocci in clusters resembling Staph      Results called to and read back by:Jany Santiago RN 09/08/2020  22:29      METHICILLIN RESISTANT STAPHYLOCOCCUS AUREUS  ID  consult required at [facility] and [facility] locations.      Blood culture [[id number]]  (Abnormal) Collected: 09/06/20 0517    Order Status: Completed Specimen: Blood Updated: 09/10/20 1538     Blood Culture, Routine Gram stain oksana bottle: Gram positive cocci in clusters resembling Staph       Results called to and read back by: Mckay Cummings RN 09/07/2020  11:37      METHICILLIN RESISTANT STAPHYLOCOCCUS AUREUS  ID consult required at [facility].  For susceptibility see order #[id number]      Culture, Anaerobe [[id number]] Collected: 09/07/20 1721    Order Status: Completed Specimen: Body Fluid from Knee, Right Updated: 09/10/20 0924     Anaerobic Culture Culture in progress    Narrative:      2) Right Knee Joint Fluid    Fungus culture [[id number]] Collected: 09/07/20 1721    Order Status: Completed Specimen: Body Fluid from Knee, Right Updated: 09/09/20 1407     Fungus (Mycology) Culture Culture in progress    Narrative:      1) Right Knee Joint Fluid    Fungus culture [[id number]] Collected: 09/07/20 1721    Order Status: Completed Specimen: Body Fluid from Knee, Right Updated: 09/09/20 1406     Fungus (Mycology) Culture Culture in progress    Narrative:      2) Right Knee Joint Fluid    Fungus culture [[id number]] Collected: 09/03/20 1647    Order Status: Completed Specimen: Joint Fluid from Knee, Right Updated: 09/09/20 1346     Fungus (Mycology) Culture Culture in progress    AFB Culture & Smear [[id number]] Collected: 09/07/20 1721    Order Status: Completed Specimen: Body Fluid from Knee, Right Updated: 09/08/20 2127     AFB Culture & Smear Culture in progress     AFB CULTURE STAIN No acid fast bacilli seen.    Narrative:      1) Right Knee Joint Fluid    AFB Culture & Smear [[id number]] Collected: 09/07/20 1721    Order Status: Completed Specimen: Body Fluid from Knee, Right Updated: 09/08/20 2127     AFB Culture & Smear Culture in progress     AFB CULTURE STAIN No acid fast  bacilli seen.    Narrative:      2) Right Knee Joint Fluid    Blood culture [[id number]]  (Abnormal)  (Susceptibility) Collected: 09/05/20 1115    Order Status: Completed Specimen: Blood from Peripheral, Right Hand Updated: 09/08/20 0820     Blood Culture, Routine Gram stain aer bottle: Gram positive cocci in clusters resembling Staph       Results called to and read back by: Shalini Pena RN  09/06/2020  11:17      METHICILLIN RESISTANT STAPHYLOCOCCUS AUREUS  ID consult required at [facility],[place] and [facility].      Gram stain [[id number]] Collected: 09/07/20 1721    Order Status: Completed Specimen: Body Fluid from Knee, Right Updated: 09/08/20 0003     Gram Stain Result No WBC's      No organisms seen    Narrative:      2) Right Knee Joint Fluid    Gram stain [[id number]] Collected: 09/07/20 1721    Order Status: Completed Specimen: Body Fluid from Knee, Right Updated: 09/07/20 2245     Gram Stain Result Rare WBC's      No organisms seen    Narrative:      1) Right Knee Joint Fluid    Aerobic culture [[id number]]  (Abnormal)  (Susceptibility) Collected: 09/03/20 1647    Order Status: Completed Specimen: Joint Fluid from Knee, Right Updated: 09/07/20 1049     Aerobic Bacterial Culture METHICILLIN RESISTANT STAPHYLOCOCCUS AUREUS  Few      Culture, Anaerobe [[id number]] Collected: 09/03/20 1647    Order Status: Completed Specimen: Joint Fluid from Knee, Right Updated: 09/07/20 1032     Anaerobic Culture No anaerobes isolated          Significant Imaging: I have reviewed all pertinent imaging results/findings within the past 24 hours.

## 2020-09-12 NOTE — PROGRESS NOTES
Ed Patton is a 65 y.o. male patient.  No new issues  Feels well  Making urine  Scheduled Meds:   artificial tears  1 drop Both Eyes TID    aspirin  81 mg Oral Daily    ceFEPime (MAXIPIME) IVPB  500 mg Intravenous Q24H    fluticasone furoate-vilanteroL  1 puff Inhalation Daily    fluticasone propionate  1 spray Each Nostril Daily    heparin (porcine)  5,000 Units Subcutaneous Q8H    levothyroxine  75 mcg Oral Before breakfast    tamsulosin  0.4 mg Oral QHS    vancomycin  125 mg Oral Q6H    vitamin D  1,000 Units Oral Daily       Review of patient's allergies indicates:   Allergen Reactions    Vicodin [hydrocodone-acetaminophen] Itching       Past Medical History:   Diagnosis Date    CHF (congestive heart failure)     COPD (chronic obstructive pulmonary disease)     Coronary artery disease     Diabetes mellitus     Diabetes mellitus type II     DM (diabetes mellitus) type II uncontrolled with renal manifestation 9/4/2013    Hyperlipidemia     Hypertension     Postablative hypothyroidism 12/6/2018     Past Surgical History:   Procedure Laterality Date    APPENDECTOMY      ARTHROSCOPY OF KNEE Right 9/7/2020    Procedure: ARTHROSCOPY, KNEE, RIGHT ;  Surgeon: You Bhatia MD;  Location: [facility] OR [address] [place] [address];  Service: Orthopedics;  Laterality: Right;    CHOLECYSTECTOMY      CORONARY ANGIOPLASTY WITH STENT PLACEMENT      TONSILLECTOMY      TRANSESOPHAGEAL ECHOCARDIOGRAPHY N/A 9/4/2020    Procedure: ECHOCARDIOGRAM, TRANSESOPHAGEAL;  Surgeon: [facility] Diagnostic Provider;  Location: [facility] EP LAB;  Service: Anesthesiology;  Laterality: N/A;    TRANSESOPHAGEAL ECHOCARDIOGRAPHY N/A 9/3/2020    Procedure: ECHOCARDIOGRAM, TRANSESOPHAGEAL;  Surgeon: [facility] Diagnostic Provider;  Location: [facility] EP LAB;  Service: Anesthesiology;  Laterality: N/A;      reports that he quit smoking about 25 years ago. His smoking use included cigarettes. He has a 0.03 pack-year smoking history. He has never used smokeless  tobacco. He reports that he does not drink alcohol or use drugs.   Family History   Problem Relation Age of Onset    Heart disease Mother     No Known Problems Father     No Known Problems Sister     Hypertension Son     No Known Problems Son     No Known Problems Son           Vital Signs Range (Last 24H):  Temp:  [97.8 °F (36.6 °C)-98.7 °F (37.1 °C)]   Pulse:  [115-120]   Resp:  [18-22]   BP: ()/(55-59)   SpO2:  [93 %-99 %]     I & O (Last 24H):    Intake/Output Summary (Last 24 hours) at 9/12/2020 1058  Last data filed at 9/12/2020 0600  Gross per 24 hour   Intake 100 ml   Output 625 ml   Net -525 ml           Physical Exam:  Vitals signs reviewed.   Constitutional:       General: He is not in acute distress.     Appearance: Normal appearance. He is normal weight. He is not diaphoretic.   HENT:      Head: Normocephalic and atraumatic.      Right Ear: External ear normal.      Left Ear: External ear normal.      Nose: Nose normal. No congestion or rhinorrhea.   Eyes:      General:         Right eye: No discharge.         Left eye: No discharge.      Extraocular Movements: Extraocular movements intact.   Neck:      Musculoskeletal: Normal range of motion and neck supple. No neck rigidity or muscular tenderness.   Cardiovascular:      Rate and Rhythm: Normal rate and regular rhythm.      Heart sounds: No murmur.      Comments: + JVD to jawline   Pulmonary:      Effort: Pulmonary effort is normal. No respiratory distress.      Breath sounds: No wheezing.      Comments: On 2 L/m oxygen  Chest:      Chest wall: No tenderness.   Abdominal:      General: There is distension.      Palpations: Abdomen is soft.      Tenderness: There is no abdominal tenderness. There is no guarding.   Musculoskeletal: Normal range of motion.         General: Tenderness (right knee) present.      Right lower leg: Edema (1+) present.      Left lower leg: Edema (1+) present.      Comments: Cooling brace applied to right knee     Skin:     General: Skin is warm and dry.      Findings: Lesion (left foot) present.   Neurological:      Mental Status: He is alert and oriented to person, place, and time.      Sensory: No sensory deficit.      Gait: Gait normal.      Laboratory:  CBC:   Recent Labs   Lab 09/12/20  0252   WBC 46.04*   RBC 3.50*   HGB 9.4*   HCT 30.7*   *   MCV 88   MCH 26.9*   MCHC 30.6*     BMP:   Recent Labs   Lab 09/12/20  0252   *   *   K 4.4   CL 92*   CO2 20*   BUN 80*   CREATININE 5.8*   CALCIUM 8.2*   MG 2.3     ABGs: No results for input(s): PH, PCO2, PO2, HCO3, POCSATURATED, BE in the last 168 hours.      Diagnostic Results:    NANETTE (acute kidney injury)  Mr. Patton is a 64 yo male with IDDM2, chronic hypoxemic resp failure on 2L of oxygen at home, and CHF (25% EF) who presented with recurrent falls and sepsis on 8/30 with septicemia with source likely from diabetic ulcer. Blood cultures have been persistently positive for MRSA. IE negative on LUKAS. Septic arthritis to right knee with I&D performed with ortho on 9/7. Initial management of septicemia was with ciprofloxacin and Vancomycin which was supratherapeutic to 26.5 on 9/2 prompting switch to a pulse dose regimen. Due to persistent bacteremia ID changed antibiotics to ceftaroline and daptomycin Cr on admission was 1.5 at admit up from the baseline (1.2-1.5). On 9/9 Cr level had a sudden increased to 3. Nephrology was consulted for NANETTE.      Over 24-48 hours prior to rise in creatinine Mr. Patton had blood pressures in the 80-90s/50s and Maps in the low 60s. Hypotension could have resulted in ischemic ATN. Also, was retaining urine and had improved urine output after a toure was placed. Urine microscopy: scant normal red blood cells without any dysmorphic shapes evident and numerous muddy brown casts. Given the casts in the urine and the urinary retention it is likely a mixed pre-renal and a post-renal kidney injury. Following hypotension overnight  and fluid resuscitation patient has become oliguric and Cr has continued to rise suggesting worsening ATN. He will need to be watched closely to monitor for requirements of dialysis.         Reccomendations:  - Maintain MAPS >65 to prevent further renal injury. May require pressors given limited ability to provide fluids in setting of heart failure    Continue to monitor labs  Making urine  No need for HD at this time  Yomi Craig  9/12/2020

## 2020-09-12 NOTE — PROGRESS NOTES
Pharmacokinetic Assessment Follow Up: IV Vancomycin    Vancomycin serum concentration assessment(s):    The random level was drawn correctly and can be used to guide therapy at this time. The measurement is above the desired definitive target range of 15 to 20 mcg/mL.    Vancomycin Regimen Plan:    Re-dose when the random level is less than 20 mcg/mL, next level to be drawn at AM draw on 9/12    Drug levels (last 3 results):  Recent Labs   Lab Result Units 09/11/20  1835   Vancomycin, Random ug/mL 23.0       Pharmacy will continue to follow and monitor vancomycin.    Thank you for allowing pharmacy participate in this patient's care.     Maggy Plascencia, PharmD  Clinical Pharmacist, [facility] Pharmacy/[facility] Team  [email or web address]  office [phone]         Patient brief summary:  Ed Patton is a 65 y.o. male initiated on antimicrobial therapy with IV Vancomycin for treatment of bacteremia    The patient's current regimen is 1750mg x1 on 9/10 at 1720    Drug Allergies:   Review of patient's allergies indicates:   Allergen Reactions    Vicodin [hydrocodone-acetaminophen] Itching       Actual Body Weight:   115kg    Renal Function:   Estimated Creatinine Clearance: 20.6 mL/min (A) (based on SCr of 5.1 mg/dL (H)).,     Dialysis Method (if applicable):  N/A    CBC (last 72 hours):  Recent Labs   Lab Result Units 09/09/20  0303 09/10/20  0459 09/10/20  1513 09/11/20  0731   WBC K/uL 22.01* 26.74*  --  40.90*   Hemoglobin g/dL 9.3* 9.1* 9.1* 9.1*   Hematocrit % 31.9* 30.3* 29.8* 31.4*   Platelets K/uL 477* 491*  --  483*   Gran% % 76.8* 84.7*  --  88.2*   Lymph% % 8.1* 3.9*  --  2.3*   Mono% % 7.2 7.2  --  6.8   Eosinophil% % 5.0 2.6  --  0.1   Basophil% % 0.5 0.3  --  0.2   Differential Method  Automated Automated  --  Automated       Metabolic Panel (last 72 hours):  Recent Labs   Lab Result Units 09/09/20  0303 09/09/20  1719 09/10/20  0459 09/11/20  0731   Sodium mmol/L 137  --  135* 131*   Potassium mmol/L 4.0  --   3.9 4.4   Chloride mmol/L 96  --  97 94*   CO2 mmol/L 27  --  25 23   Glucose mg/dL 105  --  105 142*   Glucose, UA   --  Negative  --   --    BUN, Bld mg/dL 41*  --  51* 62*   Creatinine mg/dL 3.0*  --  3.7* 5.1*   Albumin g/dL 1.8*  --  1.6* 1.6*   Magnesium mg/dL 1.9  --  2.0 2.1   Phosphorus mg/dL 5.4*  --  5.7* 7.0*       Vancomycin Administrations:  vancomycin given in the last 96 hours                     vancomycin 1.75 g in 5 % dextrose 500 mL IVPB (mg) 1,750 mg New Bag 09/10/20 1720                    Microbiologic Results:  Microbiology Results (last 7 days)       Procedure Component Value Units Date/Time    Clostridium difficile EIA [[id number]] Collected: 09/11/20 1734    Order Status: Sent Specimen: Stool Updated: 09/11/20 1847    Blood culture [[id number]] Collected: 09/09/20 1040    Order Status: Completed Specimen: Blood Updated: 09/11/20 1412     Blood Culture, Routine No growth to date      No Growth to date    Blood culture [[id number]] Collected: 09/08/20 1247    Order Status: Completed Specimen: Blood from Antecubital, Right Hand Updated: 09/11/20 1412     Blood Culture, Routine No Growth to date      No Growth to date      No Growth to date      No Growth to date    Narrative:      Collection has been rescheduled by RB8 at 09/08/2020 11:43 Reason:   Patient unavailable,nurse Mckay #[id number] was notified  Collection has been rescheduled by RB8 at 09/08/2020 11:43 Reason:   Patient unavailable,nurse Mckay #[id number] was notified    Blood culture [[id number]] Collected: 09/08/20 1247    Order Status: Completed Specimen: Blood from Antecubital, Right Arm Updated: 09/11/20 1412     Blood Culture, Routine No Growth to date      No Growth to date      No Growth to date      No Growth to date    Narrative:      Collection has been rescheduled by RB8 at 09/08/2020 11:43 Reason:   Patient unavailable,nurse Mckay #[id number] was notified  Collection has been rescheduled by RB8 at 09/08/2020 11:43 Reason:   Patient  unavailable,nurse Mckay #[id number] was notified    Blood culture [[id number]] Collected: 09/10/20 0459    Order Status: Completed Specimen: Blood Updated: 09/11/20 1155     Blood Culture, Routine No growth to date    Blood culture [[id number]] Collected: 09/07/20 0733    Order Status: Completed Specimen: Blood from Antecubital, Right Updated: 09/11/20 0822     Blood Culture, Routine No Growth to date      No Growth to date      No Growth to date      No Growth to date      No Growth to date    Blood culture [[id number]] Collected: 09/09/20 0303    Order Status: Completed Specimen: Blood Updated: 09/11/20 0812     Blood Culture, Routine No growth to date      No Growth to date    Aerobic culture [[id number]] Collected: 09/07/20 1721    Order Status: Completed Specimen: Body Fluid from Knee, Right Updated: 09/11/20 0754     Aerobic Bacterial Culture No growth    Narrative:      1) Right Knee Joint Fluid    Aerobic culture [[id number]] Collected: 09/07/20 1721    Order Status: Completed Specimen: Body Fluid from Knee, Right Updated: 09/11/20 0754     Aerobic Bacterial Culture No growth    Narrative:      2) Right Knee Joint Fluid    Culture, Anaerobe [[id number]] Collected: 09/07/20 1721    Order Status: Completed Specimen: Body Fluid from Knee, Right Updated: 09/11/20 0749     Anaerobic Culture Culture in progress    Narrative:      1) Right Knee Joint Fluid    Blood culture [[id number]]  (Abnormal)  (Susceptibility) Collected: 09/08/20 0408    Order Status: Completed Specimen: Blood from Antecubital, Right Arm Updated: 09/10/20 1601     Blood Culture, Routine Gram stain aer bottle: Gram positive cocci in clusters resembling Staph      Results called to and read back by:Jany Santiago RN 09/08/2020  22:29      METHICILLIN RESISTANT STAPHYLOCOCCUS AUREUS  ID consult required at [facility].Lorraine Miner and Yogesh lerner.      Blood culture [[id number]]  (Abnormal) Collected: 09/06/20 0517    Order Status: Completed Specimen: Blood  Updated: 09/10/20 1538     Blood Culture, Routine Gram stain oksana bottle: Gram positive cocci in clusters resembling Staph       Results called to and read back by: Mckay Cummings RN 09/07/2020  11:37      METHICILLIN RESISTANT STAPHYLOCOCCUS AUREUS  ID consult required at [facility].[place],Lorraine and Yogesh lerner.  For susceptibility see order #[id number]      Culture, Anaerobe [[id number]] Collected: 09/07/20 1721    Order Status: Completed Specimen: Body Fluid from Knee, Right Updated: 09/10/20 0924     Anaerobic Culture Culture in progress    Narrative:      2) Right Knee Joint Fluid    Fungus culture [[id number]] Collected: 09/07/20 1721    Order Status: Completed Specimen: Body Fluid from Knee, Right Updated: 09/09/20 1407     Fungus (Mycology) Culture Culture in progress    Narrative:      1) Right Knee Joint Fluid    Fungus culture [[id number]] Collected: 09/07/20 1721    Order Status: Completed Specimen: Body Fluid from Knee, Right Updated: 09/09/20 1406     Fungus (Mycology) Culture Culture in progress    Narrative:      2) Right Knee Joint Fluid    Fungus culture [[id number]] Collected: 09/03/20 1647    Order Status: Completed Specimen: Joint Fluid from Knee, Right Updated: 09/09/20 1346     Fungus (Mycology) Culture Culture in progress    AFB Culture & Smear [[id number]] Collected: 09/07/20 1721    Order Status: Completed Specimen: Body Fluid from Knee, Right Updated: 09/08/20 2127     AFB Culture & Smear Culture in progress     AFB CULTURE STAIN No acid fast bacilli seen.    Narrative:      1) Right Knee Joint Fluid    AFB Culture & Smear [[id number]] Collected: 09/07/20 1721    Order Status: Completed Specimen: Body Fluid from Knee, Right Updated: 09/08/20 2127     AFB Culture & Smear Culture in progress     AFB CULTURE STAIN No acid fast bacilli seen.    Narrative:      2) Right Knee Joint Fluid    Blood culture [[id number]]  (Abnormal)  (Susceptibility) Collected: 09/05/20 1115    Order Status: Completed  Specimen: Blood from Peripheral, Right Hand Updated: 09/08/20 0820     Blood Culture, Routine Gram stain aer bottle: Gram positive cocci in clusters resembling Staph       Results called to and read back by: Shalini Pena RN  09/06/2020  11:17      METHICILLIN RESISTANT STAPHYLOCOCCUS AUREUS  ID consult required at [facility] and [facility].      Gram stain [[id number]] Collected: 09/07/20 1721    Order Status: Completed Specimen: Body Fluid from Knee, Right Updated: 09/08/20 0003     Gram Stain Result No WBC's      No organisms seen    Narrative:      2) Right Knee Joint Fluid    Gram stain [[id number]] Collected: 09/07/20 1721    Order Status: Completed Specimen: Body Fluid from Knee, Right Updated: 09/07/20 2245     Gram Stain Result Rare WBC's      No organisms seen    Narrative:      1) Right Knee Joint Fluid    Aerobic culture [[id number]]  (Abnormal)  (Susceptibility) Collected: 09/03/20 1647    Order Status: Completed Specimen: Joint Fluid from Knee, Right Updated: 09/07/20 1049     Aerobic Bacterial Culture METHICILLIN RESISTANT STAPHYLOCOCCUS AUREUS  Few      Culture, Anaerobe [[id number]] Collected: 09/03/20 1647    Order Status: Completed Specimen: Joint Fluid from Knee, Right Updated: 09/07/20 1032     Anaerobic Culture No anaerobes isolated    Blood culture [[id number]]  (Abnormal)  (Susceptibility) Collected: 09/02/20 1441    Order Status: Completed Specimen: Blood Updated: 09/05/20 1401     Blood Culture, Routine Gram stain oksana bottle: Gram positive cocci in clusters resembling Staph       Results called to and read back by: Rossana Garza RN 09/03/2020  10:12      Gram stain aer bottle: Gram positive cocci in clusters resembling Staph       Positive results previously called      METHICILLIN RESISTANT STAPHYLOCOCCUS AUREUS  ID consult required at [facility] and [facility] locations.      Narrative:      right median cubital  right median    AFB Culture & Smear [[id number]] Collected:  09/03/20 1647    Order Status: Completed Specimen: Joint Fluid from Knee, Right Updated: 09/04/20 2127     AFB Culture & Smear Culture in progress     AFB CULTURE STAIN No acid fast bacilli seen.

## 2020-09-12 NOTE — CONSULTS
Consult acknowledged.     Patient to be admitted to MICU for further management. Please see H&P regarding further details of assessment and plan.         Junito Mccain MD, PGY-III  Internal Medicine Resident  Critical Care Medicine

## 2020-09-12 NOTE — NURSING
Received patient into [id number]. Patient altered and drowsy. Vitals WNL. Patient oriented to care setting, side rails up x4. Full assessment to follow.     Patient with multiple labs ordered. Newell will need to be removed and replaced to obtain Urine, will endorse to night shift. Patient stuck for labs and unable to obtain at this time, will also endorse to night shift.      Called patient's wife who did not answer. Brief VM let with unit number

## 2020-09-12 NOTE — NURSING
MD notified in regards to sustaining HR of 120-126. Pt is asymptomatic BP 98/55.Will continue to monitor per MD order.Pt has altered mental status, AAOx2 self and time.

## 2020-09-12 NOTE — ASSESSMENT & PLAN NOTE
65 y.o. male s/p R knee scope I&D 9/7/20    VS:  Some tachycardia and hypotension; seen by crit care  Nerve block:  none  PT/OT:  WBAT  DVT PPx:  lovenox per primary  Cultures:  MRSA from R knee aspiration 9/3/20; NGTD intraop cx; BCx 9/8 positive, 9/9 NGTD  Abx:  vanc per ID  Labs: pending  Drain:  none  Newell:  none    F/u cx, ID recs

## 2020-09-12 NOTE — CARE UPDATE
"RAPID RESPONSE NURSE NOTE     Admit Date: 2020  LOS: 13  Code Status: Full Code   Date of Consult: 2020  : 1955  Age: 65 y.o.  Weight:   Wt Readings from Last 1 Encounters:   20 115 kg (253 lb 8.5 oz)     Sex: male  Race: Black or    [facility]:   MRN: [id number]  Time Rapid Response Team page Received: 1629  Time Rapid Response Team at Bedside: 1632  Time Rapid Response Team left Bedside: 3313  Was the patient discharged from an ICU this admission?   no  Was the patient discharged from a PACU within last 24 hours?  no  Did the patient receive conscious sedation/general anesthesia within last 24 hours?  no  Was the patient in the ED within the past 24 hours?  no  Was the patient started on NIPPV within the past 24 hours?  no  Did this progress into an ARC or CPA:  no  Attending Physician: Jacqueline Curran MD  Primary Service: [facility] MED A  Consult Requested By: Jacqueline Curran MD     SITUATION     Notified by bedside RN via phone call.  Reason for alert: Hypotensive   Called to evaluate the patient for Hypotension    BACKGROUND     Why is the patient in the hospital?: Sepsis due to methicillin resistant Staphylococcus aureus    Patient has a past medical history of CHF (congestive heart failure), COPD (chronic obstructive pulmonary disease), Coronary artery disease, Diabetes mellitus, Diabetes mellitus type II, DM (diabetes mellitus) type II uncontrolled with renal manifestation, Hyperlipidemia, Hypertension, and Postablative hypothyroidism.    ASSESSMENT/INTERVENTIONS     BP (!) 78/51 (BP Location: Left arm, Patient Position: Lying)   Pulse (!) 118   Temp 97.7 °F (36.5 °C) (Oral)   Resp 18   Ht 6' 4" (1.93 m)   Wt 115 kg (253 lb 8.5 oz)   SpO2 (!) 94%   BMI 30.86 kg/m²     What did you find: Patient in bed bed, supine, awake and alert. Bedside SBP reads 70's over 30's-40's both manual and automatic. Patient complains of abdominal pain but denies lightheadedness " or dizziness. Primary team contact failed x2 due to no answer per hospital . PRN bolus for 250 mL NaCl initiated. Follow up blood pressure 69/46. Critical care consulted and additional 500 mL bolus ordered by primary.     RECOMMENDATIONS     We recommend: Transfer to ICU     FOLLOW-UP/CONTINGENCY PLAN     Call the Rapid Response Nurse, Alonso Blair RN at x [phone] for additional questions or concerns.    PHYSICIAN ESCALATION     Orders received and case discussed with Dr. Tinoco.    Disposition: Tx in ICU bed 6077.

## 2020-09-12 NOTE — HPI
Mr. Patton is a 65 year old man with DMII, chronic respiratory failure (on 2L NC at home), HFrEF (EF 25%) who was admitted on 08/30 for recurrent falls. He was found to have a diabetic foot infection and right knee septic arthritis. His course has been complicated by MRSA bacteremia and worsening renal function. He underwent I&D with Orthopedic Surgery on 09/07. Infectious Disease is following and he is currently on vancomycin and cefepime. NM WBC whole body was negative. C.diff testing was negative. Regarding labs, patient was admitted with WBC 21.43 and has been up and down throughout hospital course. It has been steadily climbing up to 26 on 09/10 and then increased to 40.90 on 09/11 and today, on day of consult was 46. Patient has been anemic throughout hospital stay but has been stable. He also has thrombocytosis as well that as been stable and appears to be a chronic issue from chart review.    Hematology was consulted for leukocytosis.

## 2020-09-12 NOTE — SUBJECTIVE & OBJECTIVE
Oncology Treatment Plan:   [No treatment plan]    Medications:  Continuous Infusions:  Scheduled Meds:   artificial tears  1 drop Both Eyes TID    aspirin  81 mg Oral Daily    ceFEPime (MAXIPIME) IVPB  500 mg Intravenous Q24H    fluticasone furoate-vilanteroL  1 puff Inhalation Daily    fluticasone propionate  1 spray Each Nostril Daily    heparin (porcine)  5,000 Units Subcutaneous Q8H    levothyroxine  75 mcg Oral Before breakfast    tamsulosin  0.4 mg Oral QHS    vancomycin  125 mg Oral Q6H    vitamin D  1,000 Units Oral Daily     PRN Meds:acetaminophen, acetaminophen, albuterol-ipratropium, calcium carbonate, dextrose 50%, dextrose 50%, gabapentin, glucagon (human recombinant), glucose, glucose, insulin aspart U-100, melatonin, ondansetron, oxyCODONE, polyethylene glycol, sodium chloride 0.9%, sodium chloride 0.9%, DIPH,PERTUS (ADACEL),TETANUS PF VAC (ADULT), Pharmacy to dose Vancomycin consult **AND** vancomycin - pharmacy to dose     Review of patient's allergies indicates:   Allergen Reactions    Vicodin [hydrocodone-acetaminophen] Itching        Past Medical History:   Diagnosis Date    CHF (congestive heart failure)     COPD (chronic obstructive pulmonary disease)     Coronary artery disease     Diabetes mellitus     Diabetes mellitus type II     DM (diabetes mellitus) type II uncontrolled with renal manifestation 9/4/2013    Hyperlipidemia     Hypertension     Postablative hypothyroidism 12/6/2018     Past Surgical History:   Procedure Laterality Date    APPENDECTOMY      ARTHROSCOPY OF KNEE Right 9/7/2020    Procedure: ARTHROSCOPY, KNEE, RIGHT ;  Surgeon: You Bhatia MD;  Location: [facility] OR [address] [place] [address];  Service: Orthopedics;  Laterality: Right;    CHOLECYSTECTOMY      CORONARY ANGIOPLASTY WITH STENT PLACEMENT      TONSILLECTOMY      TRANSESOPHAGEAL ECHOCARDIOGRAPHY N/A 9/4/2020    Procedure: ECHOCARDIOGRAM, TRANSESOPHAGEAL;  Surgeon: [facility] Diagnostic Provider;  [facility]: [facility];  Service: Anesthesiology;  Laterality: N/A;    TRANSESOPHAGEAL ECHOCARDIOGRAPHY N/A 9/3/2020    Procedure: ECHOCARDIOGRAM, TRANSESOPHAGEAL;  Surgeon: [facility] Diagnostic Provider;  Location: [facility] LAB;  Service: Anesthesiology;  Laterality: N/A;     Family History     Problem Relation (Age of Onset)    Heart disease Mother    Hypertension Son    No Known Problems Father, Sister, Son, Son        Tobacco Use    Smoking status: Former Smoker     Packs/day: 0.01     Years: 3.00     Pack years: 0.03     Types: Cigarettes     Quit date: 1995     Years since quittin.3    Smokeless tobacco: Never Used   Substance and Sexual Activity    Alcohol use: No    Drug use: No    Sexual activity: Never     Comment:  with 1 son       Review of Systems   Constitutional: Negative for chills and fever.   HENT: Negative for congestion, ear pain, sneezing and sore throat.    Eyes: Negative for pain and visual disturbance.   Respiratory: Negative for cough and shortness of breath.    Cardiovascular: Negative for chest pain and palpitations.   Gastrointestinal: Positive for diarrhea. Negative for abdominal pain, constipation, nausea and vomiting.   Genitourinary: Negative for difficulty urinating, dysuria, flank pain and hematuria.   Musculoskeletal: Positive for arthralgias (R knee pain). Negative for back pain, neck pain and neck stiffness.   Skin: Positive for wound. Negative for rash.   Neurological: Negative for numbness and headaches.   Psychiatric/Behavioral: Negative for agitation, confusion and decreased concentration. The patient is not nervous/anxious.      Objective:     Vital Signs (Most Recent):  Temp: 98.7 °F (37.1 °C) (20 0737)  Pulse: (!) 120 (20 1124)  Resp: 18 (20 1035)  BP: (!) 98/55 (20 0052)  SpO2: 96 % (20 1035) Vital Signs (24h Range):  Temp:  [97.8 °F (36.6 °C)-98.7 °F (37.1 °C)] 98.7 °F (37.1 °C)  Pulse:  [115-120] 120  Resp:  [18-22] 18  SpO2:  [93 %-99 %] 96  %  BP: ()/(55-59) 98/55     Weight: 115 kg (253 lb 8.5 oz)  Body mass index is 30.86 kg/m².  Body surface area is 2.48 meters squared.      Intake/Output Summary (Last 24 hours) at 9/12/2020 1131  Last data filed at 9/12/2020 0600  Gross per 24 hour   Intake 100 ml   Output 625 ml   Net -525 ml       Physical Exam  Vitals signs reviewed.   Constitutional:       General: He is not in acute distress.     Appearance: Normal appearance. He is normal weight. He is not diaphoretic.   HENT:      Head: Normocephalic and atraumatic.      Nose: Nose normal.   Eyes:      Extraocular Movements: Extraocular movements intact.   Neck:      Musculoskeletal: Normal range of motion and neck supple. No neck rigidity or muscular tenderness.   Cardiovascular:      Rate and Rhythm: Regular rhythm. Tachycardia present.      Heart sounds: No murmur. No gallop.    Pulmonary:      Effort: Pulmonary effort is normal. No respiratory distress.      Comments: On 2L O2  Abdominal:      Palpations: Abdomen is soft. There is no mass.      Comments: Did not palpate organomegaly   Musculoskeletal:         General: Tenderness (right knee) present.      Comments: Cooling brace applied to right knee.    Lymphadenopathy:      Cervical: No cervical adenopathy.   Skin:     General: Skin is warm and dry.   Neurological:      General: No focal deficit present.      Mental Status: He is alert and oriented to person, place, and time.   Psychiatric:         Mood and Affect: Mood normal.         Behavior: Behavior normal.         Significant Labs:   CBC:   Recent Labs   Lab 09/10/20  1513 09/11/20  0731 09/12/20  0252   WBC  --  40.90* 46.04*   HGB 9.1* 9.1* 9.4*   HCT 29.8* 31.4* 30.7*   PLT  --  483* 593*    and CMP:   Recent Labs   Lab 09/11/20  0731 09/12/20  0252   * 132*   K 4.4 4.4   CL 94* 92*   CO2 23 20*   * 209*   BUN 62* 80*   CREATININE 5.1* 5.8*   CALCIUM 8.1* 8.2*   ALBUMIN 1.6* 1.5*   ANIONGAP 14 20*   EGFRNONAA 11.1* 9.5*        Diagnostic Results:  US Retroperitoneal (09/12/2020):  Sonographic features suggesting medical renal disease.  No evidence of hydronephrosis.

## 2020-09-12 NOTE — SUBJECTIVE & OBJECTIVE
Principal Problem:Sepsis due to methicillin resistant Staphylococcus aureus    Principal Orthopedic Problem: R knee septic arthritis    Interval History: Patient seen and examined at bedside.  No acute events overnight.  Pain controlled.  Transfers with PT yesterday.      Review of patient's allergies indicates:   Allergen Reactions    Vicodin [hydrocodone-acetaminophen] Itching       Current Facility-Administered Medications   Medication    acetaminophen tablet 1,000 mg    acetaminophen tablet 650 mg    albuterol-ipratropium 2.5 mg-0.5 mg/3 mL nebulizer solution 3 mL    artificial tears 0.5 % ophthalmic solution 1 drop    aspirin EC tablet 81 mg    calcium carbonate 200 mg calcium (500 mg) chewable tablet 500 mg    ceFEPIme (MAXIPIME) 500 mg in dextrose 5 % 50 mL IVPB    dextrose 50% injection 12.5 g    dextrose 50% injection 25 g    fluticasone furoate-vilanteroL 200-25 mcg/dose diskus inhaler 1 puff    fluticasone propionate 50 mcg/actuation nasal spray 50 mcg    gabapentin capsule 100 mg    glucagon (human recombinant) injection 1 mg    glucose chewable tablet 16 g    glucose chewable tablet 24 g    heparin (porcine) injection 5,000 Units    insulin aspart U-100 pen 0-5 Units    levothyroxine tablet 75 mcg    melatonin tablet 6 mg    ondansetron injection 4 mg    oxyCODONE immediate release tablet 5 mg    polyethylene glycol packet 17 g    sodium chloride 0.9% bolus 250 mL    sodium chloride 0.9% flush 10 mL    tamsulosin 24 hr capsule 0.4 mg    Tdap vaccine injection 0.5 mL    vancomycin - pharmacy to dose    vancomycin 25 mg/mL oral solution 125 mg    vitamin D 1000 units tablet 1,000 Units     Objective:     Vital Signs (Most Recent):  Temp: 98.2 °F (36.8 °C) (09/12/20 0052)  Pulse: (!) 118 (09/12/20 0737)  Resp: (!) 22 (09/12/20 0052)  BP: (!) 98/55 (09/12/20 0052)  SpO2: 96 % (09/12/20 0052) Vital Signs (24h Range):  Temp:  [97.8 °F (36.6 °C)-98.2 °F (36.8 °C)] 98.2 °F (36.8  "°C)  Pulse:  [107-120] 118  Resp:  [16-22] 22  SpO2:  [93 %-99 %] 96 %  BP: ()/(55-59) 98/55     Weight: 115 kg (253 lb 8.5 oz)  Height: 6' 4" (193 cm)  Body mass index is 30.86 kg/m².        Ortho/SPM Exam    NAD  No increased WOB  Incisions c/d/i  SILT T/SP/DP  Motor intact T/DP  WWP extremities    Significant Labs:   CBC:   Recent Labs   Lab 09/10/20  1513 09/11/20  0731 09/12/20  0252   WBC  --  40.90* 46.04*   HGB 9.1* 9.1* 9.4*   HCT 29.8* 31.4* 30.7*   PLT  --  483* 593*     CMP:   Recent Labs   Lab 09/11/20  0731 09/12/20  0252   * 132*   K 4.4 4.4   CL 94* 92*   CO2 23 20*   * 209*   BUN 62* 80*   CREATININE 5.1* 5.8*   CALCIUM 8.1* 8.2*   ALBUMIN 1.6* 1.5*   ANIONGAP 14 20*   EGFRNONAA 11.1* 9.5*     All pertinent labs within the past 24 hours have been reviewed.    Significant Imaging: I have reviewed all pertinent imaging results/findings.  "

## 2020-09-12 NOTE — CONSULTS
[facility]  Hematology/Oncology  Consult Note    Patient Name: Ed Patton  MRN: [id number]  Admission Date: 8/30/2020  Hospital Length of Stay: 13 days  Code Status: Full Code   Attending Provider: Jacqueline Curran MD  Consulting Provider: Stefanie Alberts MD  Primary Care Physician: Qiana Chow MD  Principal Problem:Sepsis due to methicillin resistant Staphylococcus aureus    Inpatient consult to Hematology  Consult performed by: Stefanie Alberts MD  Consult ordered by: Jacqueline Curran MD        Subjective:     HPI:  Mr. Patton is a 65 year old man with DMII, chronic respiratory failure (on 2L NC at home), HFrEF (EF 25%) who was admitted on 08/30 for recurrent falls. He was found to have a diabetic foot infection and right knee septic arthritis. His course has been complicated by MRSA bacteremia and worsening renal function. He underwent I&D with Orthopedic Surgery on 09/07. Infectious Disease is following and he is currently on vancomycin and cefepime. NM WBC whole body was negative. C.diff testing was negative. Regarding labs, patient was admitted with WBC 21.43 and has been up and down throughout hospital course. It has been steadily climbing up to 26 on 09/10 and then increased to 40.90 on 09/11 and today, on day of consult was 46. Patient has been anemic throughout hospital stay but has been stable. He also has thrombocytosis as well that as been stable and appears to be a chronic issue from chart review.    Hematology was consulted for leukocytosis.     Oncology Treatment Plan:   [No treatment plan]    Medications:  Continuous Infusions:  Scheduled Meds:   artificial tears  1 drop Both Eyes TID    aspirin  81 mg Oral Daily    ceFEPime (MAXIPIME) IVPB  500 mg Intravenous Q24H    fluticasone furoate-vilanteroL  1 puff Inhalation Daily    fluticasone propionate  1 spray Each Nostril Daily    heparin (porcine)  5,000 Units Subcutaneous Q8H    levothyroxine  75 mcg Oral Before  breakfast    tamsulosin  0.4 mg Oral QHS    vancomycin  125 mg Oral Q6H    vitamin D  1,000 Units Oral Daily     PRN Meds:acetaminophen, acetaminophen, albuterol-ipratropium, calcium carbonate, dextrose 50%, dextrose 50%, gabapentin, glucagon (human recombinant), glucose, glucose, insulin aspart U-100, melatonin, ondansetron, oxyCODONE, polyethylene glycol, sodium chloride 0.9%, sodium chloride 0.9%, DIPH,PERTUS (ADACEL),TETANUS PF VAC (ADULT), Pharmacy to dose Vancomycin consult **AND** vancomycin - pharmacy to dose     Review of patient's allergies indicates:   Allergen Reactions    Vicodin [hydrocodone-acetaminophen] Itching        Past Medical History:   Diagnosis Date    CHF (congestive heart failure)     COPD (chronic obstructive pulmonary disease)     Coronary artery disease     Diabetes mellitus     Diabetes mellitus type II     DM (diabetes mellitus) type II uncontrolled with renal manifestation 9/4/2013    Hyperlipidemia     Hypertension     Postablative hypothyroidism 12/6/2018     Past Surgical History:   Procedure Laterality Date    APPENDECTOMY      ARTHROSCOPY OF KNEE Right 9/7/2020    Procedure: ARTHROSCOPY, KNEE, RIGHT ;  Surgeon: You Bhatia MD;  Location: [facility] OR [address] [place] [address];  Service: Orthopedics;  Laterality: Right;    CHOLECYSTECTOMY      CORONARY ANGIOPLASTY WITH STENT PLACEMENT      TONSILLECTOMY      TRANSESOPHAGEAL ECHOCARDIOGRAPHY N/A 9/4/2020    Procedure: ECHOCARDIOGRAM, TRANSESOPHAGEAL;  Surgeon: [facility] Diagnostic Provider;  Location: [facility] EP LAB;  Service: Anesthesiology;  Laterality: N/A;    TRANSESOPHAGEAL ECHOCARDIOGRAPHY N/A 9/3/2020    Procedure: ECHOCARDIOGRAM, TRANSESOPHAGEAL;  Surgeon: [facility] Diagnostic Provider;  Location: [facility] EP LAB;  Service: Anesthesiology;  Laterality: N/A;     Family History     Problem Relation (Age of Onset)    Heart disease Mother    Hypertension Son    No Known Problems Father, Sister, Son, Son        Tobacco Use    Smoking status:  Former Smoker     Packs/day: 0.01     Years: 3.00     Pack years: 0.03     Types: Cigarettes     Quit date: 1995     Years since quittin.3    Smokeless tobacco: Never Used   Substance and Sexual Activity    Alcohol use: No    Drug use: No    Sexual activity: Never     Comment:  with 1 son       Review of Systems   Constitutional: Negative for chills and fever.   HENT: Negative for congestion, ear pain, sneezing and sore throat.    Eyes: Negative for pain and visual disturbance.   Respiratory: Negative for cough and shortness of breath.    Cardiovascular: Negative for chest pain and palpitations.   Gastrointestinal: Positive for diarrhea. Negative for abdominal pain, constipation, nausea and vomiting.   Genitourinary: Negative for difficulty urinating, dysuria, flank pain and hematuria.   Musculoskeletal: Positive for arthralgias (R knee pain). Negative for back pain, neck pain and neck stiffness.   Skin: Positive for wound. Negative for rash.   Neurological: Negative for numbness and headaches.   Psychiatric/Behavioral: Negative for agitation, confusion and decreased concentration. The patient is not nervous/anxious.      Objective:     Vital Signs (Most Recent):  Temp: 98.7 °F (37.1 °C) (20 0737)  Pulse: (!) 120 (20 1124)  Resp: 18 (20 1035)  BP: (!) 98/55 (20 0052)  SpO2: 96 % (20 1035) Vital Signs (24h Range):  Temp:  [97.8 °F (36.6 °C)-98.7 °F (37.1 °C)] 98.7 °F (37.1 °C)  Pulse:  [115-120] 120  Resp:  [18-22] 18  SpO2:  [93 %-99 %] 96 %  BP: ()/(55-59) 98/55     Weight: 115 kg (253 lb 8.5 oz)  Body mass index is 30.86 kg/m².  Body surface area is 2.48 meters squared.      Intake/Output Summary (Last 24 hours) at 2020 1131  Last data filed at 2020 0600  Gross per 24 hour   Intake 100 ml   Output 625 ml   Net -525 ml       Physical Exam  Vitals signs reviewed.   Constitutional:       General: He is not in acute distress.     Appearance: Normal  appearance. He is normal weight. He is not diaphoretic.   HENT:      Head: Normocephalic and atraumatic.      Nose: Nose normal.   Eyes:      Extraocular Movements: Extraocular movements intact.   Neck:      Musculoskeletal: Normal range of motion and neck supple. No neck rigidity or muscular tenderness.   Cardiovascular:      Rate and Rhythm: Regular rhythm. Tachycardia present.      Heart sounds: No murmur. No gallop.    Pulmonary:      Effort: Pulmonary effort is normal. No respiratory distress.      Comments: On 2L O2  Abdominal:      Palpations: Abdomen is soft. There is no mass.      Comments: Did not palpate organomegaly   Musculoskeletal:         General: Tenderness (right knee) present.      Comments: Cooling brace applied to right knee.    Lymphadenopathy:      Cervical: No cervical adenopathy.   Skin:     General: Skin is warm and dry.   Neurological:      General: No focal deficit present.      Mental Status: He is alert and oriented to person, place, and time.   Psychiatric:         Mood and Affect: Mood normal.         Behavior: Behavior normal.         Significant Labs:   CBC:   Recent Labs   Lab 09/10/20  1513 09/11/20  0731 09/12/20  0252   WBC  --  40.90* 46.04*   HGB 9.1* 9.1* 9.4*   HCT 29.8* 31.4* 30.7*   PLT  --  483* 593*    and CMP:   Recent Labs   Lab 09/11/20  0731 09/12/20  0252   * 132*   K 4.4 4.4   CL 94* 92*   CO2 23 20*   * 209*   BUN 62* 80*   CREATININE 5.1* 5.8*   CALCIUM 8.1* 8.2*   ALBUMIN 1.6* 1.5*   ANIONGAP 14 20*   EGFRNONAA 11.1* 9.5*       Diagnostic Results:  US Retroperitoneal (09/12/2020):  Sonographic features suggesting medical renal disease.  No evidence of hydronephrosis.      Assessment/Plan:     Leukocytosis  Patient is a 65 year old man admitted for diabetic foot infection and right knee septic arthritis, found to have MRSA bacteremia and worsening renal function. Labs notable for leukocytosis with WBC as high as 46.    Leukocytosis likely reactive  leukocytosis (leukemoid reaction) given that on CBC, there is high percentage and number of granulocytes and patient has on going MRSA bacteremia with R knee septic arthritis. Peripheral smear reviewed - showed numerous granulocytes but no concerning morphological changes.      Recommendations:  - Continue antibiotics as recommended by ID to treat bacteremia, septic arthritis, left diabetic foot infection  - Will follow up CT chest/abdomen/pelvis ordered  - Can obtain flow cytometry to further evaluate  - Consider bone marrow biopsy if labs not improving      Patient seen, and case discussed with fellow and staff. Attending attestation to follow.    Thank you for your consult. I will follow-up with patient. Please contact us if you have any additional questions.      Stefanie Alberts MD  Hematology/Oncology  [facility]        I have reviewed the notes, assessments, and/or procedures performed by the housestaff, as above.  I have personally interviewed and examined the patient at the beside, and rounded with the housestaff. I concur with her/his assessment and plan and the documentation of Ed Patton.  I, Dr. Emilio Wong, personally spent more than 70 mins during this encounter, greater than 50% was spent in direct counseling and/or coordination of care.     Emilio Wong M.D., M.S., F.A.C.P.  Hematology/Oncology Attending  [facility]

## 2020-09-12 NOTE — ASSESSMENT & PLAN NOTE
Patient is a 65 year old man admitted for diabetic foot infection and right knee septic arthritis, found to have MRSA bacteremia and worsening renal function. Labs notable for leukocytosis with WBC as high as 46.    Leukocytosis likely reactive leukocytosis (leukemoid reaction) given that on CBC, there is high percentage and number of granulocytes and patient has on going MRSA bacteremia with R knee septic arthritis. Peripheral smear reviewed - showed numerous granulocytes but no concerning morphological changes.      Recommendations:  - Continue antibiotics as recommended by ID to treat bacteremia, septic arthritis, left diabetic foot infection  - Will follow up CT chest/abdomen/pelvis ordered  - Can obtain flow cytometry to further evaluate  - Consider bone marrow biopsy if labs not improving

## 2020-09-12 NOTE — PROGRESS NOTES
Pharmacist Renal Dose Adjustment Note    Ed Patton is a 65 y.o. male being treated with the medication cefepime    Patient Data:    Vital Signs (Most Recent):  Temp: 97.3 °F (36.3 °C) (09/12/20 1234)  Pulse: (!) 117 (09/12/20 1234)  Resp: 18 (09/12/20 1234)  BP: 91/61 (09/12/20 1234)  SpO2: (!) 93 % (09/12/20 1234)   Vital Signs (72h Range):  Temp:  [97.3 °F (36.3 °C)-98.7 °F (37.1 °C)]   Pulse:  []   Resp:  [16-22]   BP: ()/(51-76)   SpO2:  [93 %-100 %]      Recent Labs   Lab 09/10/20  0459 09/11/20  0731 09/12/20  0252   CREATININE 3.7* 5.1* 5.8*     Serum creatinine: 5.8 mg/dL (H) 09/12/20 0252  Estimated creatinine clearance: 18.1 mL/min (A)    Cefepime 500mg q24 hours will  be adjusted to 1g q24 hours.    Pharmacist's Name: Anupama Johnson  Pharmacist's Extension: [phone]

## 2020-09-12 NOTE — SUBJECTIVE & OBJECTIVE
Interval History: Remains afebrile, but leukocytosis continues to increase. Patient denies any symptoms except for R knee pain. Hernandez stim yest neg.    Review of Systems   Constitutional: Negative for chills and fever.   HENT: Negative for sore throat.    Eyes: Negative for photophobia.   Respiratory: Negative for cough and shortness of breath.    Cardiovascular: Negative for chest pain.   Gastrointestinal: Negative for abdominal pain, constipation, nausea and vomiting.   Genitourinary: Negative for difficulty urinating, dysuria, flank pain and hematuria.   Musculoskeletal: Positive for arthralgias (R knee pain). Negative for back pain, neck pain and neck stiffness.   Skin: Positive for wound. Negative for rash.   Neurological: Negative for headaches.   Psychiatric/Behavioral: Negative for agitation and confusion.     Objective:     Vital Signs (Most Recent):  Temp: 97.3 °F (36.3 °C) (09/12/20 1234)  Pulse: (!) 119 (09/12/20 1514)  Resp: 18 (09/12/20 1234)  BP: 91/61 (09/12/20 1234)  SpO2: (!) 93 % (09/12/20 1234) Vital Signs (24h Range):  Temp:  [97.3 °F (36.3 °C)-98.7 °F (37.1 °C)] 97.3 °F (36.3 °C)  Pulse:  [117-120] 119  Resp:  [18-22] 18  SpO2:  [93 %-96 %] 93 %  BP: ()/(51-61) 91/61     Weight: 115 kg (253 lb 8.5 oz)  Body mass index is 30.86 kg/m².    Estimated Creatinine Clearance: 18.1 mL/min (A) (based on SCr of 5.8 mg/dL (H)).    Physical Exam  Vitals signs reviewed.   Constitutional:       General: He is not in acute distress.     Appearance: Normal appearance. He is normal weight. He is not diaphoretic.   HENT:      Head: Normocephalic and atraumatic.      Nose: Nose normal.   Eyes:      Extraocular Movements: Extraocular movements intact.   Neck:      Musculoskeletal: Normal range of motion and neck supple. No neck rigidity or muscular tenderness.   Cardiovascular:      Rate and Rhythm: Regular rhythm. Tachycardia present.      Heart sounds: No murmur. No gallop.    Pulmonary:      Effort:  Pulmonary effort is normal. No respiratory distress.      Comments: Nasal cannula  Abdominal:      Palpations: Abdomen is soft. There is no mass.   Musculoskeletal:         General: Tenderness (right knee) present.      Comments: Cooling brace applied to right knee.    Lymphadenopathy:      Cervical: No cervical adenopathy.   Skin:     General: Skin is warm and dry.   Neurological:      General: No focal deficit present.      Mental Status: He is alert and oriented to person, place, and time.   Psychiatric:         Mood and Affect: Mood normal.         Behavior: Behavior normal.         Significant Labs:   Blood Culture:   Recent Labs   Lab 09/08/20  0408 09/08/20  1247 09/09/20  0303 09/09/20  1040 09/10/20  0459   LABBLOO Gram stain aer bottle: Gram positive cocci in clusters resembling Staph  Results called to and read back by:Jany Santiago RN 09/08/2020  22:29  METHICILLIN RESISTANT STAPHYLOCOCCUS AUREUS  ID consult required at [facility],[place] and [facility] locations.  * No Growth to date  No Growth to date  No Growth to date  No Growth to date  No Growth to date  No Growth to date  No Growth to date  No Growth to date  No Growth to date  No Growth to date No growth to date  No Growth to date  No Growth to date No growth to date  No Growth to date  No Growth to date No growth to date  No Growth to date     CBC:   Recent Labs   Lab 09/11/20  0731 09/12/20  0252   WBC 40.90* 46.04*   HGB 9.1* 9.4*   HCT 31.4* 30.7*   * 593*     CMP:   Recent Labs   Lab 09/11/20  0731 09/12/20  0252   * 132*   K 4.4 4.4   CL 94* 92*   CO2 23 20*   * 209*   BUN 62* 80*   CREATININE 5.1* 5.8*   CALCIUM 8.1* 8.2*   ALBUMIN 1.6* 1.5*   ANIONGAP 14 20*   EGFRNONAA 11.1* 9.5*     Microbiology Results (last 7 days)     Procedure Component Value Units Date/Time    Blood culture [[id number]] Collected: 09/08/20 1247    Order Status: Completed Specimen: Blood from Antecubital, Right Arm Updated: 09/12/20  1412     Blood Culture, Routine No Growth to date      No Growth to date      No Growth to date      No Growth to date      No Growth to date    Narrative:      Collection has been rescheduled by RB8 at 09/08/2020 11:43 Reason:   Patient unavailable,nurse Mckay #[id number] was notified  Collection has been rescheduled by RB8 at 09/08/2020 11:43 Reason:   Patient unavailable,nurse Mckay #[id number] was notified    Blood culture [[id number]] Collected: 09/08/20 1247    Order Status: Completed Specimen: Blood from Antecubital, Right Hand Updated: 09/12/20 1412     Blood Culture, Routine No Growth to date      No Growth to date      No Growth to date      No Growth to date      No Growth to date    Narrative:      Collection has been rescheduled by RB8 at 09/08/2020 11:43 Reason:   Patient unavailable,nurse Mckay #[id number] was notified  Collection has been rescheduled by RB8 at 09/08/2020 11:43 Reason:   Patient unavailable,nurse Mckay #[id number] was notified    Blood culture [[id number]] Collected: 09/09/20 1040    Order Status: Completed Specimen: Blood Updated: 09/12/20 1412     Blood Culture, Routine No growth to date      No Growth to date      No Growth to date    Blood culture [[id number]] Collected: 09/07/20 0733    Order Status: Completed Specimen: Blood from Antecubital, Right Updated: 09/12/20 0822     Blood Culture, Routine No growth after 5 days.    Blood culture [[id number]] Collected: 09/10/20 0459    Order Status: Completed Specimen: Blood Updated: 09/12/20 0812     Blood Culture, Routine No growth to date      No Growth to date    Blood culture [[id number]] Collected: 09/09/20 0303    Order Status: Completed Specimen: Blood Updated: 09/12/20 0812     Blood Culture, Routine No growth to date      No Growth to date      No Growth to date    Clostridium difficile EIA [[id number]] Collected: 09/11/20 8884    Order Status: Completed Specimen: Stool Updated: 09/11/20 2258     C. diff Antigen Negative     C difficile Toxins A+B, EIA  Negative     Comment: Testing not recommended for children <24 months old.       Aerobic culture [[id number]] Collected: 09/07/20 1721    Order Status: Completed Specimen: Body Fluid from Knee, Right Updated: 09/11/20 0754     Aerobic Bacterial Culture No growth    Narrative:      1) Right Knee Joint Fluid    Aerobic culture [[id number]] Collected: 09/07/20 1721    Order Status: Completed Specimen: Body Fluid from Knee, Right Updated: 09/11/20 0754     Aerobic Bacterial Culture No growth    Narrative:      2) Right Knee Joint Fluid    Culture, Anaerobe [[id number]] Collected: 09/07/20 1721    Order Status: Completed Specimen: Body Fluid from Knee, Right Updated: 09/11/20 0749     Anaerobic Culture Culture in progress    Narrative:      1) Right Knee Joint Fluid    Blood culture [[id number]]  (Abnormal)  (Susceptibility) Collected: 09/08/20 0408    Order Status: Completed Specimen: Blood from Antecubital, Right Arm Updated: 09/10/20 1601     Blood Culture, Routine Gram stain aer bottle: Gram positive cocci in clusters resembling Staph      Results called to and read back by:Jany Santiago RN 09/08/2020  22:29      METHICILLIN RESISTANT STAPHYLOCOCCUS AUREUS  ID consult required at [facility] and [facility].      Blood culture [[id number]]  (Abnormal) Collected: 09/06/20 0517    Order Status: Completed Specimen: Blood Updated: 09/10/20 1538     Blood Culture, Routine Gram stain oksana bottle: Gram positive cocci in clusters resembling Staph       Results called to and read back by: Mckay Cummings RN 09/07/2020  11:37      METHICILLIN RESISTANT STAPHYLOCOCCUS AUREUS  ID consult required at [facility].  For susceptibility see order #[id number]      Culture, Anaerobe [[id number]] Collected: 09/07/20 1721    Order Status: Completed Specimen: Body Fluid from Knee, Right Updated: 09/10/20 0924     Anaerobic Culture Culture in progress    Narrative:      2) Right Knee Joint Fluid     Fungus culture [[id number]] Collected: 09/07/20 1721    Order Status: Completed Specimen: Body Fluid from Knee, Right Updated: 09/09/20 1407     Fungus (Mycology) Culture Culture in progress    Narrative:      1) Right Knee Joint Fluid    Fungus culture [[id number]] Collected: 09/07/20 1721    Order Status: Completed Specimen: Body Fluid from Knee, Right Updated: 09/09/20 1406     Fungus (Mycology) Culture Culture in progress    Narrative:      2) Right Knee Joint Fluid    Fungus culture [[id number]] Collected: 09/03/20 1647    Order Status: Completed Specimen: Joint Fluid from Knee, Right Updated: 09/09/20 1346     Fungus (Mycology) Culture Culture in progress    AFB Culture & Smear [[id number]] Collected: 09/07/20 1721    Order Status: Completed Specimen: Body Fluid from Knee, Right Updated: 09/08/20 2127     AFB Culture & Smear Culture in progress     AFB CULTURE STAIN No acid fast bacilli seen.    Narrative:      1) Right Knee Joint Fluid    AFB Culture & Smear [[id number]] Collected: 09/07/20 1721    Order Status: Completed Specimen: Body Fluid from Knee, Right Updated: 09/08/20 2127     AFB Culture & Smear Culture in progress     AFB CULTURE STAIN No acid fast bacilli seen.    Narrative:      2) Right Knee Joint Fluid    Blood culture [[id number]]  (Abnormal)  (Susceptibility) Collected: 09/05/20 1115    Order Status: Completed Specimen: Blood from Peripheral, Right Hand Updated: 09/08/20 0820     Blood Culture, Routine Gram stain aer bottle: Gram positive cocci in clusters resembling Staph       Results called to and read back by: Shalini Pena RN  09/06/2020  11:17      METHICILLIN RESISTANT STAPHYLOCOCCUS AUREUS  ID consult required at [facility].Lorraine Miner and Yogesh lerner.      Gram stain [[id number]] Collected: 09/07/20 1721    Order Status: Completed Specimen: Body Fluid from Knee, Right Updated: 09/08/20 0003     Gram Stain Result No WBC's      No organisms seen    Narrative:      2) Right Knee Joint Fluid     Gram stain [[id number]] Collected: 09/07/20 1721    Order Status: Completed Specimen: Body Fluid from Knee, Right Updated: 09/07/20 2245     Gram Stain Result Rare WBC's      No organisms seen    Narrative:      1) Right Knee Joint Fluid    Aerobic culture [[id number]]  (Abnormal)  (Susceptibility) Collected: 09/03/20 1647    Order Status: Completed Specimen: Joint Fluid from Knee, Right Updated: 09/07/20 1049     Aerobic Bacterial Culture METHICILLIN RESISTANT STAPHYLOCOCCUS AUREUS  Few      Culture, Anaerobe [[id number]] Collected: 09/03/20 1647    Order Status: Completed Specimen: Joint Fluid from Knee, Right Updated: 09/07/20 1032     Anaerobic Culture No anaerobes isolated          Significant Imaging: I have reviewed all pertinent imaging results/findings within the past 24 hours.

## 2020-09-12 NOTE — PROGRESS NOTES
"  [facility] Hospital Medicine    Patient Name: Ed Patton  MRN: [id number]  Patient Class: IP- Inpatient   Admission Date: 8/30/2020  Length of Stay: 12 days  Attending Physician: Jacqueline Curran MD  Primary Care Provider: Qiana Chow MD    [facility] Medicine Team: [facility] HOSP MED A Jacqueline Curran MD        Subjective:     Admission CC:   Chief Complaint   Patient presents with    Frequent Falls     frequent falls, weakness, "stepped on a tack" left leg now swollen.     Leg Swelling     Follow up visit for: Sepsis due to methicillin resistant Staphylococcus aureus    Interval History / Events Overnight:   Patient drowsy with poor memory of day's events.  BP still remains low. Patient with delayed answering but otherwise neurologically intact.  Infectious eval not particularly helpful. Report diarrhea.       Data reviewed 9/11/2020:       Review of Systems   Constitutional: Negative for fever.   Respiratory: Negative for shortness of breath.    Gastrointestinal: Negative for abdominal pain, nausea and vomiting.   Psychiatric/Behavioral: Positive for confusion.     Objective:     Vital Signs (Most Recent):  Temp: 97.8 °F (36.6 °C) (09/11/20 1413)  Pulse: (!) 115 (09/11/20 1413)  Resp: 20 (09/11/20 1413)  BP: (!) 92/59 (09/11/20 1413)  SpO2: 99 % (09/11/20 1413) Vital Signs (24h Range):  Temp:  [97.8 °F (36.6 °C)-98.6 °F (37 °C)] 97.8 °F (36.6 °C)  Pulse:  [] 115  Resp:  [16-20] 20  SpO2:  [94 %-99 %] 99 %  BP: ()/(53-68) 92/59     Weight: 115 kg (253 lb 8.5 oz)  Body mass index is 30.86 kg/m².    Intake/Output Summary (Last 24 hours) at 9/11/2020 1903  Last data filed at 9/11/2020 1800  Gross per 24 hour   Intake 500 ml   Output 225 ml   Net 275 ml      Physical Exam  Constitutional:       General: He is not in acute distress.     Appearance: Normal appearance. He is not diaphoretic.   HENT:      Head: Normocephalic and atraumatic.   Eyes:      General: Lids are normal. " No scleral icterus.        Right eye: No discharge.         Left eye: No discharge.      Conjunctiva/sclera: Conjunctivae normal.   Neck:      Musculoskeletal: No neck rigidity.      Trachea: Phonation normal.   Cardiovascular:      Rate and Rhythm: Normal rate and regular rhythm.   Pulmonary:      Effort: Pulmonary effort is normal. No tachypnea, accessory muscle usage or respiratory distress.      Breath sounds: Examination of the right-lower field reveals decreased breath sounds. Examination of the left-lower field reveals decreased breath sounds. Decreased breath sounds present. No wheezing.   Abdominal:      General: Bowel sounds are normal. There is distension.      Palpations: Abdomen is soft.      Tenderness: There is no abdominal tenderness.   Musculoskeletal:      Right knee: He exhibits swelling.      Right lower leg: Edema present.      Left lower leg: Edema present.   Neurological:      Mental Status: He is alert. He is disoriented.      Cranial Nerves: Cranial nerves are intact.      Motor: Motor function is intact.   Psychiatric:         Attention and Perception: He is inattentive.         Mood and Affect: Affect normal.         Behavior: Behavior is cooperative.         Cognition and Memory: He exhibits impaired recent memory.         Significant Labs:   Recent Labs   Lab 09/09/20  0303 09/10/20  0459 09/10/20  1513 09/11/20  0731   WBC 22.01* 26.74*  --  40.90*   HGB 9.3* 9.1* 9.1* 9.1*   HCT 31.9* 30.3* 29.8* 31.4*   * 491*  --  483*     Recent Labs   Lab 09/09/20  0303 09/10/20  0459 09/11/20  0731   GRAN 76.8*  16.9* 84.7*  22.7* 88.2*  36.0*   LYMPH 8.1*  1.8 3.9*  1.1 2.3*  0.9*   MONO 7.2  1.6* 7.2  1.9* 6.8  2.8*   EOS 1.1* 0.7* 0.1     Recent Labs   Lab 09/09/20  0303 09/10/20  0459 09/11/20  0731    135* 131*   K 4.0 3.9 4.4   CL 96 97 94*   CO2 27 25 23   BUN 41* 51* 62*   CREATININE 3.0* 3.7* 5.1*    105 142*   CALCIUM 8.7 8.4* 8.1*   ALBUMIN 1.8* 1.6* 1.6*    MG 1.9 2.0 2.1   PHOS 5.4* 5.7* 7.0*     Recent Labs   Lab 09/06/20  0517 09/08/20  0408 09/10/20  0459   .3* 176.2* 173.0*     Recent Labs   Lab 09/05/20  1346 09/08/20  0408 09/09/20  0303 09/10/20  0459 09/11/20  0731   * 164 1142* 1240* 717*   BNP  --   --   --  216*  --      Recent Labs   Lab 08/30/20  1532 09/03/20  0410 09/03/20  1538 09/10/20  1514   PROCAL  --   --  0.13 0.26*   LACTATE 1.4  --   --  1.2   SEDRATE  --  80*  --   --      SARS-CoV2 (COVID-19) Qualitative PCR (no units)   Date Value   09/10/2020 Not Detected   09/03/2020 Not Detected     SARS-CoV-2 RNA, Amplification, Qual (no units)   Date Value   09/07/2020 Negative   08/30/2020 Negative     Recent Labs   Lab 08/31/20  0434   HGBA1C 9.5*     Recent Labs   Lab 09/10/20  1707 09/10/20  2050 09/10/20  2120   POCTGLUCOSE 58* 196* 182*     Microbiology Results (last 7 days)     Procedure Component Value Units Date/Time    Clostridium difficile EIA [[id number]] Collected: 09/11/20 1734    Order Status: Sent Specimen: Stool Updated: 09/11/20 1847    Blood culture [[id number]] Collected: 09/09/20 1040    Order Status: Completed Specimen: Blood Updated: 09/11/20 1412     Blood Culture, Routine No growth to date      No Growth to date    Blood culture [[id number]] Collected: 09/08/20 1247    Order Status: Completed Specimen: Blood from Antecubital, Right Hand Updated: 09/11/20 1412     Blood Culture, Routine No Growth to date      No Growth to date      No Growth to date      No Growth to date    Narrative:      Collection has been rescheduled by RB8 at 09/08/2020 11:43 Reason:   Patient unavailable,nurse Mckay #[id number] was notified  Collection has been rescheduled by RB8 at 09/08/2020 11:43 Reason:   Patient unavailable,nurse Mckay #[id number] was notified    Blood culture [[id number]] Collected: 09/08/20 1247    Order Status: Completed Specimen: Blood from Antecubital, Right Arm Updated: 09/11/20 1412     Blood Culture, Routine No Growth to  date      No Growth to date      No Growth to date      No Growth to date    Narrative:      Collection has been rescheduled by RB8 at 09/08/2020 11:43 Reason:   Patient unavailable,nurse Mckay #[id number] was notified  Collection has been rescheduled by RB8 at 09/08/2020 11:43 Reason:   Patient unavailable,nurse Mckay #[id number] was notified    Blood culture [[id number]] Collected: 09/10/20 0459    Order Status: Completed Specimen: Blood Updated: 09/11/20 1155     Blood Culture, Routine No growth to date    Blood culture [[id number]] Collected: 09/07/20 0733    Order Status: Completed Specimen: Blood from Antecubital, Right Updated: 09/11/20 0822     Blood Culture, Routine No Growth to date      No Growth to date      No Growth to date      No Growth to date      No Growth to date    Blood culture [[id number]] Collected: 09/09/20 0303    Order Status: Completed Specimen: Blood Updated: 09/11/20 0812     Blood Culture, Routine No growth to date      No Growth to date    Aerobic culture [[id number]] Collected: 09/07/20 1721    Order Status: Completed Specimen: Body Fluid from Knee, Right Updated: 09/11/20 0754     Aerobic Bacterial Culture No growth    Narrative:      1) Right Knee Joint Fluid    Aerobic culture [[id number]] Collected: 09/07/20 1721    Order Status: Completed Specimen: Body Fluid from Knee, Right Updated: 09/11/20 0754     Aerobic Bacterial Culture No growth    Narrative:      2) Right Knee Joint Fluid    Culture, Anaerobe [[id number]] Collected: 09/07/20 1721    Order Status: Completed Specimen: Body Fluid from Knee, Right Updated: 09/11/20 0749     Anaerobic Culture Culture in progress    Narrative:      1) Right Knee Joint Fluid    Blood culture [[id number]]  (Abnormal)  (Susceptibility) Collected: 09/08/20 0408    Order Status: Completed Specimen: Blood from Antecubital, Right Arm Updated: 09/10/20 1601     Blood Culture, Routine Gram stain aer bottle: Gram positive cocci in clusters resembling Staph       Results called to and read back by:Jany Santiago RN 09/08/2020  22:29      METHICILLIN RESISTANT STAPHYLOCOCCUS AUREUS  ID consult required at [facility] and [facility] locations.      Blood culture [[id number]]  (Abnormal) Collected: 09/06/20 0517    Order Status: Completed Specimen: Blood Updated: 09/10/20 1538     Blood Culture, Routine Gram stain oksana bottle: Gram positive cocci in clusters resembling Staph       Results called to and read back by: Mckay Cummings RN 09/07/2020  11:37      METHICILLIN RESISTANT STAPHYLOCOCCUS AUREUS  ID consult required at [facility] and [facility] locations.  For susceptibility see order #[id number]      Culture, Anaerobe [[id number]] Collected: 09/07/20 1721    Order Status: Completed Specimen: Body Fluid from Knee, Right Updated: 09/10/20 0924     Anaerobic Culture Culture in progress    Narrative:      2) Right Knee Joint Fluid    Fungus culture [[id number]] Collected: 09/07/20 1721    Order Status: Completed Specimen: Body Fluid from Knee, Right Updated: 09/09/20 1407     Fungus (Mycology) Culture Culture in progress    Narrative:      1) Right Knee Joint Fluid    Fungus culture [[id number]] Collected: 09/07/20 1721    Order Status: Completed Specimen: Body Fluid from Knee, Right Updated: 09/09/20 1406     Fungus (Mycology) Culture Culture in progress    Narrative:      2) Right Knee Joint Fluid    Fungus culture [[id number]] Collected: 09/03/20 1647    Order Status: Completed Specimen: Joint Fluid from Knee, Right Updated: 09/09/20 1346     Fungus (Mycology) Culture Culture in progress    AFB Culture & Smear [[id number]] Collected: 09/07/20 1721    Order Status: Completed Specimen: Body Fluid from Knee, Right Updated: 09/08/20 2127     AFB Culture & Smear Culture in progress     AFB CULTURE STAIN No acid fast bacilli seen.    Narrative:      1) Right Knee Joint Fluid    AFB Culture & Smear [[id number]] Collected: 09/07/20 1721    Order Status: Completed Specimen: Body  Fluid from Knee, Right Updated: 09/08/20 2127     AFB Culture & Smear Culture in progress     AFB CULTURE STAIN No acid fast bacilli seen.    Narrative:      2) Right Knee Joint Fluid    Blood culture [[id number]]  (Abnormal)  (Susceptibility) Collected: 09/05/20 1115    Order Status: Completed Specimen: Blood from Peripheral, Right Hand Updated: 09/08/20 0820     Blood Culture, Routine Gram stain aer bottle: Gram positive cocci in clusters resembling Staph       Results called to and read back by: Shalini Pena RN  09/06/2020  11:17      METHICILLIN RESISTANT STAPHYLOCOCCUS AUREUS  ID consult required at [facility],[place] and [place] locations.      Gram stain [[id number]] Collected: 09/07/20 1721    Order Status: Completed Specimen: Body Fluid from Knee, Right Updated: 09/08/20 0003     Gram Stain Result No WBC's      No organisms seen    Narrative:      2) Right Knee Joint Fluid    Gram stain [[id number]] Collected: 09/07/20 1721    Order Status: Completed Specimen: Body Fluid from Knee, Right Updated: 09/07/20 2245     Gram Stain Result Rare WBC's      No organisms seen    Narrative:      1) Right Knee Joint Fluid    Aerobic culture [[id number]]  (Abnormal)  (Susceptibility) Collected: 09/03/20 1647    Order Status: Completed Specimen: Joint Fluid from Knee, Right Updated: 09/07/20 1049     Aerobic Bacterial Culture METHICILLIN RESISTANT STAPHYLOCOCCUS AUREUS  Few      Culture, Anaerobe [[id number]] Collected: 09/03/20 1647    Order Status: Completed Specimen: Joint Fluid from Knee, Right Updated: 09/07/20 1032     Anaerobic Culture No anaerobes isolated    Blood culture [[id number]]  (Abnormal)  (Susceptibility) Collected: 09/02/20 1441    Order Status: Completed Specimen: Blood Updated: 09/05/20 1401     Blood Culture, Routine Gram stain oksana bottle: Gram positive cocci in clusters resembling Staph       Results called to and read back by: Rossana aGrza RN 09/03/2020  10:12      Gram stain aer bottle: Gram positive  cocci in clusters resembling Staph       Positive results previously called      METHICILLIN RESISTANT STAPHYLOCOCCUS AUREUS  ID consult required at [facility],[place] and [facility] locations.      Narrative:      right median cubital  right median    AFB Culture & Smear [[id number]] Collected: 09/03/20 1647    Order Status: Completed Specimen: Joint Fluid from Knee, Right Updated: 09/04/20 2127     AFB Culture & Smear Culture in progress     AFB CULTURE STAIN No acid fast bacilli seen.            Significant Imaging:     Assessment/Plan:      Active Diagnoses:    Diagnosis Date Noted POA    PRINCIPAL PROBLEM:  Sepsis due to methicillin resistant Staphylococcus aureus [A41.02] 08/30/2020 Yes    Hypotension [I95.9]  Unknown    NANETTE (acute kidney injury) [N17.9] 09/09/2020 Unknown    Urinary retention [R33.9] 09/09/2020 Unknown    Acute renal insufficiency [N28.9] 09/09/2020 Unknown    Septic arthritis of knee, right [M00.9] 09/07/2020 Yes    Staphylococcal arthritis of right knee [M00.061] 09/05/2020 Yes    Diabetic ulcer of left foot associated with type 2 diabetes mellitus, with fat layer exposed [E11.621, L97.522] 08/31/2020 Yes    Cellulitis of left lower extremity [L03.116] 08/30/2020 Yes    Diabetic foot infection [E11.628, L08.9] 08/30/2020 Yes    Bacteremia due to methicillin resistant Staphylococcus aureus [R78.81] 08/30/2020 Yes    COPD mixed type [J44.9] 10/15/2019 Yes     Chronic    Postablative hypothyroidism [E89.0] 12/06/2018 Yes     Chronic    Uncontrolled type 2 diabetes mellitus with hyperglycemia, with long-term current use of insulin [E11.65, Z79.4] 10/09/2017 Not Applicable     Chronic    Chronic respiratory failure with hypoxia, on home oxygen therapy [J96.11, Z99.81] 10/09/2017 Not Applicable     Chronic    Diabetic polyneuropathy associated with type 2 diabetes mellitus [E11.42] 08/25/2015 Yes     Chronic    CKD stage 3 due to type 2 diabetes mellitus [E11.22, N18.3] 12/23/2014  Yes     Chronic    Chronic systolic congestive heart failure [I50.22] 05/07/2013 Yes     Chronic      Problems Resolved During this Admission:       Overview / ICU Course:    Ed Patton is a 65 y.o. male admitted for Sepsis due to methicillin resistant Staphylococcus aureus.    Inpatient Medications Prescribed for Management of Current Problems:     Scheduled Meds:    artificial tears  1 drop Both Eyes TID    aspirin  81 mg Oral Daily    ceFEPime (MAXIPIME) IVPB  500 mg Intravenous Q24H    dexamethasone  4 mg Intravenous Q12H    fluticasone furoate-vilanteroL  1 puff Inhalation Daily    fluticasone propionate  1 spray Each Nostril Daily    heparin (porcine)  5,000 Units Subcutaneous Q8H    levothyroxine  75 mcg Oral Before breakfast    sodium chloride 0.9%  250 mL Intravenous Once    tamsulosin  0.4 mg Oral QHS    vancomycin  125 mg Oral Q6H    vitamin D  1,000 Units Oral Daily     Continuous Infusions:   As Needed: acetaminophen, acetaminophen, albuterol-ipratropium, calcium carbonate, dextrose 50%, dextrose 50%, gabapentin, glucagon (human recombinant), glucose, glucose, insulin aspart U-100, melatonin, ondansetron, oxyCODONE, polyethylene glycol, sodium chloride 0.9%, sodium chloride 0.9%, DIPH,PERTUS (ADACEL),TETANUS PF VAC (ADULT), Pharmacy to dose Vancomycin consult **AND** vancomycin - pharmacy to dose      Assessment and Plan by Problem    Diabetic foot infection  Bacteremia due to methicillin resistant Staphylococcus aureus  Diabetic ulcer of left foot associated with type 2 diabetes mellitus, with fat layer exposed  Right knee pain / septic arthritis  Appreciate Infectious Disease, Orthopedic Surgery. Treating with antibiotics per ID.   Arthrocentesis and cultures suggest septic knee. LUKAS to evaluate for endocarditis negative for vegetations.  Plan for I and D of the knee 9/7/2020.  Per ID: Antibiotics changed to daptomycin and ceftaroline combination therapy for 'persistent MRSA  bacteremia'  -- Monitor CPK and CBC (ceftaroline can lead to leucopenia)  -- MRI lumbar spine w/wo contrast ordered  -- NM WB WBC scan for inflammatory localization pending  -- Repeat blood cultures until neagtive  -- PICC line after clearance of bacteremia, anticipate prolonged IV antibiotics on discharge.  -- ID recommends: MRI Lumbar spine/L psoas to r/o abscess. Broaden coverage to dapto/ceftaroline. Baseline CK obtained. Renal dose antibiotics.  -- s/p R knee scope I&D 9/7/20. PT/OT:  WBAT  -- Per ID: Blood cultures seem to be clearing since I&D of knee / adequate source control. Podiatry considering need for debridement. Await repeat blood cultures to be negative x 72h before placing PICC line.  -GINA's ordered by podiatry but may need vascular surgery evaluation, unfortunately he is not a candidate for contrasted studies due to NANETTE  -- patient with low BP 9/10/2020 -suspect is due to BP med but must rule out infectious cause, held BP meds, small fluid bolus today, check cortisol, trial dexamethasone pending adrenal results, check c dif and start treatment. Discussed with ID- assess renal ultrasound     Sepsis due to DM wound, UTI, bacteremia  Due to Proteus and MRSA. Continued management with antibiotics.  Resolved.  Follow up on ID recommendations.  Discontinued Ciprofloxacine (completed 7 days, was administered for diabetic foot wound growing Proteus and urine growing Klebseilla)  -Foot wound Dressing changed 9/9 by Podiatry; plan to consider repeating debridement soon.      Cellulitis of left lower extremity  Continued antibiotics.  Resolved     COPD mixed type  Continue home fluticasone-vilanterol, albuterol nebulizer.     Postablative hypothyroidism  Continue home levothyroxine.     Chronic respiratory failure with hypoxia, on home oxygen therapy  On 2 L nasal cannula chronically due to combination of COPD and CHF; titrate as needed.     Uncontrolled type 2 diabetes mellitus with hyperglycemia, with  long-term current use of insulin  Diabetic polyneuropathy associated with type 2 diabetes mellitus  Takes insulin NPH-insulin regular 70/30 45 units before breakfast and 35 units before dinner.   Giving insulin detemir 29 units daily and insulin aspart 14 units with meals and correction dose scale. Monitor BGs.  Increased basal insulin slightly 9/8. BGs improved.  Due to increase in sCr, insulin regimen reduced 9/9 and 9/10. Monitor for hyperglycemia/hypoglycemia.     Chronic systolic congestive heart failure  Continue home carvedilol, lisinopril. Lasix on admit.  EF 25%  Held Lasix 9/8 and 9/9 due to sCr increase after being NPO 9/7  Lisinopril held 9/9 due to sCr of 3; parameters placed on Coreg  -hesitant with IVF    Acute kidney injury  Urinary retention - toure in place  - Patient has been NPO for procedures intermittently for the past few days while on Lasix b.i.d.  Lasix held 9/8 and 9/9. He received a normal saline bolus overnight 9/9 and a little more fluid 9/9 before found to be anuric for the day. Bladder scan showed >500 mL. He now has a Toure. ACEi held for now.  - Per Nephrology: Given hypotension and simultaneous volume overload reccomend IVF as small volume boluses rather than continuous IVF to maintain MAPS >65.   Repeat UA and Urine culture.  Strict I/Os. Daily weights. C3/C4 levels.      Diet: Diet Adult Regular (IDDSI Level 7)  GI Prophylaxis: Not indicated  Significant LDAs:   IV Access Type: Peripheral  Urinary Catheter Indication if present: Patient Does Not Have Urinary Catheter  Other Lines/Tubes/Drains:    Goals of Care:    Previous admission:  4/15/16  Likely prognosis:  Fair  Code Status: Full Code  Comfort Only: No  Hospice: No  Goals at discharge: remain at home    Discharge Planning   EDY: 9/15/2020     Code Status: Full Code   Is the patient medically ready for discharge?: No    Reason for patient still in hospital (select all that apply): Patient unstable, Patient trending condition  and Treatment  Discharge Plan A: SNF  Discharge Delays: None known at this time    VTE Risk Mitigation (From admission, onward)         Ordered     heparin (porcine) injection 5,000 Units  Every 8 hours      09/11/20 0943     IP VTE HIGH RISK PATIENT  Once      08/30/20 2004     Place sequential compression device  Until discontinued      08/30/20 1714              High Risk Conditions:    Patient has a condition that poses threat to life and bodily function: Acute Renal Failure     Patient will be transferred to a [facility] medicine service.     Jacqueline Curran MD  Department of Hospital Medicine   [facility]

## 2020-09-12 NOTE — PROGRESS NOTES
[facility]  Orthopedics  Progress Note    Patient Name: Ed Patton  MRN: [id number]  Admission Date: 8/30/2020  Hospital Length of Stay: 13 days  Attending Provider: Jacqueline Curran MD  Primary Care Provider: Qiana Chow MD  Follow-up For: Procedure(s) (LRB):  ARTHROSCOPY, KNEE, RIGHT  (Right)    Post-Operative Day: 5 Days Post-Op  Subjective:     Principal Problem:Sepsis due to methicillin resistant Staphylococcus aureus    Principal Orthopedic Problem: R knee septic arthritis    Interval History: Patient seen and examined at bedside.  No acute events overnight.  Pain controlled.  Transfers with PT yesterday.      Review of patient's allergies indicates:   Allergen Reactions    Vicodin [hydrocodone-acetaminophen] Itching       Current Facility-Administered Medications   Medication    acetaminophen tablet 1,000 mg    acetaminophen tablet 650 mg    albuterol-ipratropium 2.5 mg-0.5 mg/3 mL nebulizer solution 3 mL    artificial tears 0.5 % ophthalmic solution 1 drop    aspirin EC tablet 81 mg    calcium carbonate 200 mg calcium (500 mg) chewable tablet 500 mg    ceFEPIme (MAXIPIME) 500 mg in dextrose 5 % 50 mL IVPB    dextrose 50% injection 12.5 g    dextrose 50% injection 25 g    fluticasone furoate-vilanteroL 200-25 mcg/dose diskus inhaler 1 puff    fluticasone propionate 50 mcg/actuation nasal spray 50 mcg    gabapentin capsule 100 mg    glucagon (human recombinant) injection 1 mg    glucose chewable tablet 16 g    glucose chewable tablet 24 g    heparin (porcine) injection 5,000 Units    insulin aspart U-100 pen 0-5 Units    levothyroxine tablet 75 mcg    melatonin tablet 6 mg    ondansetron injection 4 mg    oxyCODONE immediate release tablet 5 mg    polyethylene glycol packet 17 g    sodium chloride 0.9% bolus 250 mL    sodium chloride 0.9% flush 10 mL    tamsulosin 24 hr capsule 0.4 mg    Tdap vaccine injection 0.5 mL    vancomycin - pharmacy to dose  "   vancomycin 25 mg/mL oral solution 125 mg    vitamin D 1000 units tablet 1,000 Units     Objective:     Vital Signs (Most Recent):  Temp: 98.2 °F (36.8 °C) (09/12/20 0052)  Pulse: (!) 118 (09/12/20 0737)  Resp: (!) 22 (09/12/20 0052)  BP: (!) 98/55 (09/12/20 0052)  SpO2: 96 % (09/12/20 0052) Vital Signs (24h Range):  Temp:  [97.8 °F (36.6 °C)-98.2 °F (36.8 °C)] 98.2 °F (36.8 °C)  Pulse:  [107-120] 118  Resp:  [16-22] 22  SpO2:  [93 %-99 %] 96 %  BP: ()/(55-59) 98/55     Weight: 115 kg (253 lb 8.5 oz)  Height: 6' 4" (193 cm)  Body mass index is 30.86 kg/m².        Ortho/SPM Exam    NAD  No increased WOB  Incisions c/d/i  SILT T/SP/DP  Motor intact T/DP  WWP extremities    Significant Labs:   CBC:   Recent Labs   Lab 09/10/20  1513 09/11/20  0731 09/12/20  0252   WBC  --  40.90* 46.04*   HGB 9.1* 9.1* 9.4*   HCT 29.8* 31.4* 30.7*   PLT  --  483* 593*     CMP:   Recent Labs   Lab 09/11/20  0731 09/12/20  0252   * 132*   K 4.4 4.4   CL 94* 92*   CO2 23 20*   * 209*   BUN 62* 80*   CREATININE 5.1* 5.8*   CALCIUM 8.1* 8.2*   ALBUMIN 1.6* 1.5*   ANIONGAP 14 20*   EGFRNONAA 11.1* 9.5*     All pertinent labs within the past 24 hours have been reviewed.    Significant Imaging: I have reviewed all pertinent imaging results/findings.    Assessment/Plan:     Septic arthritis of knee, right  65 y.o. male s/p R knee scope I&D 9/7/20    VS:  Some tachycardia and hypotension; seen by crit care  Nerve block:  none  PT/OT:  WBAT  DVT PPx:  lovenox per primary  Cultures:  MRSA from R knee aspiration 9/3/20; NGTD intraop cx; BCx 9/8 positive, 9/9 NGTD  Abx:  vanc per ID  Labs: pending  Drain:  none  Newell:  none    F/u cx, ID recs          Marek Garcia MD  Orthopedics  [facility]  "

## 2020-09-12 NOTE — PROGRESS NOTES
Pharmacokinetic Assessment Follow Up: IV Vancomycin    Vancomycin serum concentration assessment(s):  · Random level of 21.2 mcg/mL is above the desired target of < 20 mcg/mL  · Pulse dosing due to NANETTE- Scr continues to trend up 5.1 > 5.8 today. Nephro following, no plans for HD today    Vancomycin Regimen Plan:  1. Will hold vancomycin dose today and obtain random level with AM labs 9/13. Re-dose when < 20    Drug levels (last 3 results):  Recent Labs   Lab Result Units 09/11/20  1835 09/12/20  0252   Vancomycin, Random ug/mL 23.0 21.2       Pharmacy will continue to follow and monitor vancomycin.    Please contact pharmacy at extension [phone] for questions regarding this assessment.    Thank you for the consult,   Anupama Johnson       Patient brief summary:  Ed Patton is a 65 y.o. male initiated on antimicrobial therapy with IV Vancomycin for treatment of bacteremia      Drug Allergies:   Review of patient's allergies indicates:   Allergen Reactions    Vicodin [hydrocodone-acetaminophen] Itching       Actual Body Weight:   115 kg    Renal Function:   Estimated Creatinine Clearance: 18.1 mL/min (A) (based on SCr of 5.8 mg/dL (H)).,       CBC (last 72 hours):  Recent Labs   Lab Result Units 09/10/20  0459 09/10/20  1513 09/11/20  0731 09/12/20  0252   WBC K/uL 26.74*  --  40.90* 46.04*   Hemoglobin g/dL 9.1* 9.1* 9.1* 9.4*   Hematocrit % 30.3* 29.8* 31.4* 30.7*   Platelets K/uL 491*  --  483* 593*   Gran% % 84.7*  --  88.2* 90.9*   Lymph% % 3.9*  --  2.3* 1.7*   Mono% % 7.2  --  6.8 3.6*   Eosinophil% % 2.6  --  0.1 0.0   Basophil% % 0.3  --  0.2 0.2   Differential Method  Automated  --  Automated Automated       Metabolic Panel (last 72 hours):  Recent Labs   Lab Result Units 09/09/20  1719 09/10/20  0459 09/11/20  0731 09/12/20  0252   Sodium mmol/L  --  135* 131* 132*   Potassium mmol/L  --  3.9 4.4 4.4   Chloride mmol/L  --  97 94* 92*   CO2 mmol/L  --  25 23 20*   Glucose mg/dL  --  105 142* 209*    Glucose, UA  Negative  --   --   --    BUN, Bld mg/dL  --  51* 62* 80*   Creatinine mg/dL  --  3.7* 5.1* 5.8*   Albumin g/dL  --  1.6* 1.6* 1.5*   Magnesium mg/dL  --  2.0 2.1 2.3   Phosphorus mg/dL  --  5.7* 7.0* 8.5*       Vancomycin Administrations:  vancomycin given in the last 96 hours                   vancomycin 25 mg/mL oral solution 125 mg (mg) 125 mg Given 09/12/20 1320     125 mg Given  0628     125 mg Given  0205     125 mg Given 09/11/20 2052    vancomycin 1.75 g in 5 % dextrose 500 mL IVPB (mg) 1,750 mg New Bag 09/10/20 1720                Microbiologic Results:  Microbiology Results (last 7 days)     Procedure Component Value Units Date/Time    Blood culture [[id number]] Collected: 09/07/20 0733    Order Status: Completed Specimen: Blood from Antecubital, Right Updated: 09/12/20 0822     Blood Culture, Routine No growth after 5 days.    Blood culture [[id number]] Collected: 09/10/20 0459    Order Status: Completed Specimen: Blood Updated: 09/12/20 0812     Blood Culture, Routine No growth to date      No Growth to date    Blood culture [[id number]] Collected: 09/09/20 0303    Order Status: Completed Specimen: Blood Updated: 09/12/20 0812     Blood Culture, Routine No growth to date      No Growth to date      No Growth to date    Clostridium difficile EIA [[id number]] Collected: 09/11/20 1734    Order Status: Completed Specimen: Stool Updated: 09/11/20 2258     C. diff Antigen Negative     C difficile Toxins A+B, EIA Negative     Comment: Testing not recommended for children <24 months old.       Blood culture [[id number]] Collected: 09/09/20 1040    Order Status: Completed Specimen: Blood Updated: 09/11/20 1412     Blood Culture, Routine No growth to date      No Growth to date    Blood culture [[id number]] Collected: 09/08/20 1247    Order Status: Completed Specimen: Blood from Antecubital, Right Hand Updated: 09/11/20 1412     Blood Culture, Routine No Growth to date      No Growth to date      No  Growth to date      No Growth to date    Narrative:      Collection has been rescheduled by RB8 at 09/08/2020 11:43 Reason:   Patient unavailable,nurse Mckay #[id number] was notified  Collection has been rescheduled by RB8 at 09/08/2020 11:43 Reason:   Patient unavailable,nurse Mckay #[id number] was notified    Blood culture [[id number]] Collected: 09/08/20 1247    Order Status: Completed Specimen: Blood from Antecubital, Right Arm Updated: 09/11/20 1412     Blood Culture, Routine No Growth to date      No Growth to date      No Growth to date      No Growth to date    Narrative:      Collection has been rescheduled by RB8 at 09/08/2020 11:43 Reason:   Patient unavailable,nurse Mckay #[id number] was notified  Collection has been rescheduled by RB8 at 09/08/2020 11:43 Reason:   Patient unavailable,nurse Mckay #[id number] was notified    Aerobic culture [[id number]] Collected: 09/07/20 1721    Order Status: Completed Specimen: Body Fluid from Knee, Right Updated: 09/11/20 0754     Aerobic Bacterial Culture No growth    Narrative:      1) Right Knee Joint Fluid    Aerobic culture [[id number]] Collected: 09/07/20 1721    Order Status: Completed Specimen: Body Fluid from Knee, Right Updated: 09/11/20 0754     Aerobic Bacterial Culture No growth    Narrative:      2) Right Knee Joint Fluid    Culture, Anaerobe [[id number]] Collected: 09/07/20 1721    Order Status: Completed Specimen: Body Fluid from Knee, Right Updated: 09/11/20 0749     Anaerobic Culture Culture in progress    Narrative:      1) Right Knee Joint Fluid    Blood culture [[id number]]  (Abnormal)  (Susceptibility) Collected: 09/08/20 0408    Order Status: Completed Specimen: Blood from Antecubital, Right Arm Updated: 09/10/20 1601     Blood Culture, Routine Gram stain aer bottle: Gram positive cocci in clusters resembling Staph      Results called to and read back by:Jany Santiago RN 09/08/2020  22:29      METHICILLIN RESISTANT STAPHYLOCOCCUS AUREUS  ID consult required at [facility]  [facility] and [facility] locations.      Blood culture [[id number]]  (Abnormal) Collected: 09/06/20 0517    Order Status: Completed Specimen: Blood Updated: 09/10/20 1538     Blood Culture, Routine Gram stain oksana bottle: Gram positive cocci in clusters resembling Staph       Results called to and read back by: Mckay Cummings RN 09/07/2020  11:37      METHICILLIN RESISTANT STAPHYLOCOCCUS AUREUS  ID consult required at [facility] and [facility].  For susceptibility see order #[id number]      Culture, Anaerobe [[id number]] Collected: 09/07/20 1721    Order Status: Completed Specimen: Body Fluid from Knee, Right Updated: 09/10/20 0924     Anaerobic Culture Culture in progress    Narrative:      2) Right Knee Joint Fluid    Fungus culture [[id number]] Collected: 09/07/20 1721    Order Status: Completed Specimen: Body Fluid from Knee, Right Updated: 09/09/20 1407     Fungus (Mycology) Culture Culture in progress    Narrative:      1) Right Knee Joint Fluid    Fungus culture [[id number]] Collected: 09/07/20 1721    Order Status: Completed Specimen: Body Fluid from Knee, Right Updated: 09/09/20 1406     Fungus (Mycology) Culture Culture in progress    Narrative:      2) Right Knee Joint Fluid    Fungus culture [[id number]] Collected: 09/03/20 1647    Order Status: Completed Specimen: Joint Fluid from Knee, Right Updated: 09/09/20 1346     Fungus (Mycology) Culture Culture in progress    AFB Culture & Smear [[id number]] Collected: 09/07/20 1721    Order Status: Completed Specimen: Body Fluid from Knee, Right Updated: 09/08/20 2127     AFB Culture & Smear Culture in progress     AFB CULTURE STAIN No acid fast bacilli seen.    Narrative:      1) Right Knee Joint Fluid    AFB Culture & Smear [[id number]] Collected: 09/07/20 1721    Order Status: Completed Specimen: Body Fluid from Knee, Right Updated: 09/08/20 2127     AFB Culture & Smear Culture in progress     AFB CULTURE STAIN No acid fast bacilli seen.     Narrative:      2) Right Knee Joint Fluid    Blood culture [[id number]]  (Abnormal)  (Susceptibility) Collected: 09/05/20 1115    Order Status: Completed Specimen: Blood from Peripheral, Right Hand Updated: 09/08/20 0820     Blood Culture, Routine Gram stain aer bottle: Gram positive cocci in clusters resembling Staph       Results called to and read back by: Shalini Pena RN  09/06/2020  11:17      METHICILLIN RESISTANT STAPHYLOCOCCUS AUREUS  ID consult required at [facility],[place] and [facility].      Gram stain [[id number]] Collected: 09/07/20 1721    Order Status: Completed Specimen: Body Fluid from Knee, Right Updated: 09/08/20 0003     Gram Stain Result No WBC's      No organisms seen    Narrative:      2) Right Knee Joint Fluid    Gram stain [[id number]] Collected: 09/07/20 1721    Order Status: Completed Specimen: Body Fluid from Knee, Right Updated: 09/07/20 2245     Gram Stain Result Rare WBC's      No organisms seen    Narrative:      1) Right Knee Joint Fluid    Aerobic culture [[id number]]  (Abnormal)  (Susceptibility) Collected: 09/03/20 1647    Order Status: Completed Specimen: Joint Fluid from Knee, Right Updated: 09/07/20 1049     Aerobic Bacterial Culture METHICILLIN RESISTANT STAPHYLOCOCCUS AUREUS  Few      Culture, Anaerobe [[id number]] Collected: 09/03/20 1647    Order Status: Completed Specimen: Joint Fluid from Knee, Right Updated: 09/07/20 1032     Anaerobic Culture No anaerobes isolated    Blood culture [[id number]]  (Abnormal)  (Susceptibility) Collected: 09/02/20 1441    Order Status: Completed Specimen: Blood Updated: 09/05/20 1401     Blood Culture, Routine Gram stain oksana bottle: Gram positive cocci in clusters resembling Staph       Results called to and read back by: Rossana Garza RN 09/03/2020  10:12      Gram stain aer bottle: Gram positive cocci in clusters resembling Staph       Positive results previously called      METHICILLIN RESISTANT STAPHYLOCOCCUS AUREUS  ID consult  required at [facility].[place] [facility] and [facility] locations.      Narrative:      right median cubital  right median
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
## 2020-09-13 PROBLEM — G93.41 ACUTE METABOLIC ENCEPHALOPATHY: Status: ACTIVE | Noted: 2020-09-13

## 2020-09-13 PROBLEM — A41.02 SEPSIS DUE TO METHICILLIN RESISTANT STAPHYLOCOCCUS AUREUS: Status: RESOLVED | Noted: 2020-08-30 | Resolved: 2020-09-13

## 2020-09-13 PROBLEM — J18.9 HOSPITAL-ACQUIRED PNEUMONIA: Status: ACTIVE | Noted: 2020-09-13

## 2020-09-13 PROBLEM — J96.21 ACUTE ON CHRONIC RESPIRATORY FAILURE WITH HYPOXIA: Status: ACTIVE | Noted: 2020-09-13

## 2020-09-13 LAB
ALBUMIN SERPL BCP-MCNC: 1.4 G/DL (ref 3.5–5.2)
ALBUMIN SERPL BCP-MCNC: 1.5 G/DL (ref 3.5–5.2)
ALLENS TEST: ABNORMAL
ALLENS TEST: ABNORMAL
ALP SERPL-CCNC: 255 U/L (ref 55–135)
ALP SERPL-CCNC: 262 U/L (ref 55–135)
ALT SERPL W/O P-5'-P-CCNC: 50 U/L (ref 10–44)
ALT SERPL W/O P-5'-P-CCNC: 53 U/L (ref 10–44)
ANION GAP SERPL CALC-SCNC: 17 MMOL/L (ref 8–16)
ANION GAP SERPL CALC-SCNC: 17 MMOL/L (ref 8–16)
ANION GAP SERPL CALC-SCNC: 18 MMOL/L (ref 8–16)
ANION GAP SERPL CALC-SCNC: 18 MMOL/L (ref 8–16)
ANION GAP SERPL CALC-SCNC: 19 MMOL/L (ref 8–16)
ANION GAP SERPL CALC-SCNC: 21 MMOL/L (ref 8–16)
ANISOCYTOSIS BLD QL SMEAR: SLIGHT
AST SERPL-CCNC: 35 U/L (ref 10–40)
AST SERPL-CCNC: 35 U/L (ref 10–40)
BACTERIA BLD CULT: NORMAL
BACTERIA BLD CULT: NORMAL
BASOPHILS # BLD AUTO: 0.08 K/UL (ref 0–0.2)
BASOPHILS NFR BLD: 0.2 % (ref 0–1.9)
BILIRUB SERPL-MCNC: 0.4 MG/DL (ref 0.1–1)
BILIRUB SERPL-MCNC: 0.5 MG/DL (ref 0.1–1)
BNP SERPL-MCNC: 351 PG/ML (ref 0–99)
BUN SERPL-MCNC: 103 MG/DL (ref 8–23)
BUN SERPL-MCNC: 106 MG/DL (ref 8–23)
BUN SERPL-MCNC: 108 MG/DL (ref 8–23)
BUN SERPL-MCNC: 109 MG/DL (ref 8–23)
BUN SERPL-MCNC: 112 MG/DL (ref 8–23)
BUN SERPL-MCNC: 98 MG/DL (ref 8–23)
CALCIUM SERPL-MCNC: 7.4 MG/DL (ref 8.7–10.5)
CALCIUM SERPL-MCNC: 8 MG/DL (ref 8.7–10.5)
CALCIUM SERPL-MCNC: 8 MG/DL (ref 8.7–10.5)
CALCIUM SERPL-MCNC: 8.2 MG/DL (ref 8.7–10.5)
CALCIUM SERPL-MCNC: 8.3 MG/DL (ref 8.7–10.5)
CALCIUM SERPL-MCNC: 8.3 MG/DL (ref 8.7–10.5)
CHLORIDE SERPL-SCNC: 84 MMOL/L (ref 95–110)
CHLORIDE SERPL-SCNC: 93 MMOL/L (ref 95–110)
CHLORIDE SERPL-SCNC: 93 MMOL/L (ref 95–110)
CHLORIDE SERPL-SCNC: 94 MMOL/L (ref 95–110)
CO2 SERPL-SCNC: 16 MMOL/L (ref 23–29)
CO2 SERPL-SCNC: 17 MMOL/L (ref 23–29)
CO2 SERPL-SCNC: 17 MMOL/L (ref 23–29)
CO2 SERPL-SCNC: 18 MMOL/L (ref 23–29)
CO2 SERPL-SCNC: 18 MMOL/L (ref 23–29)
CO2 SERPL-SCNC: 22 MMOL/L (ref 23–29)
CREAT SERPL-MCNC: 6.1 MG/DL (ref 0.5–1.4)
CREAT SERPL-MCNC: 6.2 MG/DL (ref 0.5–1.4)
CREAT SERPL-MCNC: 6.5 MG/DL (ref 0.5–1.4)
CREAT SERPL-MCNC: 6.7 MG/DL (ref 0.5–1.4)
CREAT SERPL-MCNC: 6.7 MG/DL (ref 0.5–1.4)
CREAT SERPL-MCNC: 6.8 MG/DL (ref 0.5–1.4)
CRP SERPL-MCNC: 272.9 MG/L (ref 0–8.2)
DELSYS: ABNORMAL
DELSYS: ABNORMAL
DIFFERENTIAL METHOD: ABNORMAL
EOSINOPHIL # BLD AUTO: 0 K/UL (ref 0–0.5)
EOSINOPHIL NFR BLD: 0 % (ref 0–8)
ERYTHROCYTE [DISTWIDTH] IN BLOOD BY AUTOMATED COUNT: 16.2 % (ref 11.5–14.5)
EST. GFR  (AFRICAN AMERICAN): 10.2 ML/MIN/1.73 M^2
EST. GFR  (AFRICAN AMERICAN): 10.4 ML/MIN/1.73 M^2
EST. GFR  (AFRICAN AMERICAN): 9.1 ML/MIN/1.73 M^2
EST. GFR  (AFRICAN AMERICAN): 9.2 ML/MIN/1.73 M^2
EST. GFR  (AFRICAN AMERICAN): 9.2 ML/MIN/1.73 M^2
EST. GFR  (AFRICAN AMERICAN): 9.6 ML/MIN/1.73 M^2
EST. GFR  (NON AFRICAN AMERICAN): 7.9 ML/MIN/1.73 M^2
EST. GFR  (NON AFRICAN AMERICAN): 8 ML/MIN/1.73 M^2
EST. GFR  (NON AFRICAN AMERICAN): 8 ML/MIN/1.73 M^2
EST. GFR  (NON AFRICAN AMERICAN): 8.3 ML/MIN/1.73 M^2
EST. GFR  (NON AFRICAN AMERICAN): 8.8 ML/MIN/1.73 M^2
EST. GFR  (NON AFRICAN AMERICAN): 9 ML/MIN/1.73 M^2
FIO2: 100
FIO2: 100
FLOW: 15
FLOW: 15
GIANT PLATELETS BLD QL SMEAR: PRESENT
GLUCOSE SERPL-MCNC: 140 MG/DL (ref 70–110)
GLUCOSE SERPL-MCNC: 238 MG/DL (ref 70–110)
GLUCOSE SERPL-MCNC: 289 MG/DL (ref 70–110)
GLUCOSE SERPL-MCNC: 305 MG/DL (ref 70–110)
GLUCOSE SERPL-MCNC: 312 MG/DL (ref 70–110)
GLUCOSE SERPL-MCNC: 692 MG/DL (ref 70–110)
HCO3 UR-SCNC: 21.1 MMOL/L (ref 24–28)
HCO3 UR-SCNC: 23.4 MMOL/L (ref 24–28)
HCT VFR BLD AUTO: 32 % (ref 40–54)
HGB BLD-MCNC: 9.8 G/DL (ref 14–18)
IMM GRANULOCYTES # BLD AUTO: 1.42 K/UL (ref 0–0.04)
IMM GRANULOCYTES NFR BLD AUTO: 3.5 % (ref 0–0.5)
LYMPHOCYTES # BLD AUTO: 0.8 K/UL (ref 1–4.8)
LYMPHOCYTES NFR BLD: 2.1 % (ref 18–48)
MAGNESIUM SERPL-MCNC: 2.3 MG/DL (ref 1.6–2.6)
MCH RBC QN AUTO: 26.8 PG (ref 27–31)
MCHC RBC AUTO-ENTMCNC: 30.6 G/DL (ref 32–36)
MCV RBC AUTO: 87 FL (ref 82–98)
MODE: ABNORMAL
MODE: ABNORMAL
MONOCYTES # BLD AUTO: 2.2 K/UL (ref 0.3–1)
MONOCYTES NFR BLD: 5.5 % (ref 4–15)
NEUTROPHILS # BLD AUTO: 35.6 K/UL (ref 1.8–7.7)
NEUTROPHILS NFR BLD: 88.7 % (ref 38–73)
NRBC BLD-RTO: 0 /100 WBC
PCO2 BLDA: 39.3 MMHG (ref 35–45)
PCO2 BLDA: 48.9 MMHG (ref 35–45)
PH SMN: 7.29 [PH] (ref 7.35–7.45)
PH SMN: 7.34 [PH] (ref 7.35–7.45)
PHOSPHATE SERPL-MCNC: 10.8 MG/DL (ref 2.7–4.5)
PLATELET # BLD AUTO: 660 K/UL (ref 150–350)
PLATELET BLD QL SMEAR: ABNORMAL
PMV BLD AUTO: 10.5 FL (ref 9.2–12.9)
PO2 BLDA: 104 MMHG (ref 80–100)
PO2 BLDA: 33 MMHG (ref 40–60)
POC BE: -3 MMOL/L
POC BE: -5 MMOL/L
POC SATURATED O2: 57 % (ref 95–100)
POC SATURATED O2: 98 % (ref 95–100)
POC TCO2: 22 MMOL/L (ref 23–27)
POC TCO2: 25 MMOL/L (ref 24–29)
POCT GLUCOSE: 110 MG/DL (ref 70–110)
POCT GLUCOSE: 128 MG/DL (ref 70–110)
POCT GLUCOSE: 158 MG/DL (ref 70–110)
POCT GLUCOSE: 205 MG/DL (ref 70–110)
POCT GLUCOSE: 242 MG/DL (ref 70–110)
POCT GLUCOSE: 264 MG/DL (ref 70–110)
POCT GLUCOSE: 265 MG/DL (ref 70–110)
POCT GLUCOSE: 275 MG/DL (ref 70–110)
POCT GLUCOSE: 281 MG/DL (ref 70–110)
POCT GLUCOSE: 284 MG/DL (ref 70–110)
POCT GLUCOSE: 285 MG/DL (ref 70–110)
POCT GLUCOSE: 288 MG/DL (ref 70–110)
POCT GLUCOSE: 306 MG/DL (ref 70–110)
POCT GLUCOSE: 319 MG/DL (ref 70–110)
POCT GLUCOSE: 322 MG/DL (ref 70–110)
POCT GLUCOSE: 325 MG/DL (ref 70–110)
POCT GLUCOSE: 330 MG/DL (ref 70–110)
POCT GLUCOSE: 334 MG/DL (ref 70–110)
POCT GLUCOSE: 335 MG/DL (ref 70–110)
POCT GLUCOSE: 346 MG/DL (ref 70–110)
POCT GLUCOSE: 81 MG/DL (ref 70–110)
POCT GLUCOSE: 98 MG/DL (ref 70–110)
POCT GLUCOSE: >500 MG/DL (ref 70–110)
POTASSIUM SERPL-SCNC: 4.6 MMOL/L (ref 3.5–5.1)
POTASSIUM SERPL-SCNC: 5 MMOL/L (ref 3.5–5.1)
POTASSIUM SERPL-SCNC: 5 MMOL/L (ref 3.5–5.1)
POTASSIUM SERPL-SCNC: 5.2 MMOL/L (ref 3.5–5.1)
POTASSIUM SERPL-SCNC: 5.3 MMOL/L (ref 3.5–5.1)
POTASSIUM SERPL-SCNC: 5.4 MMOL/L (ref 3.5–5.1)
PROT SERPL-MCNC: 7.5 G/DL (ref 6–8.4)
PROT SERPL-MCNC: 7.9 G/DL (ref 6–8.4)
RBC # BLD AUTO: 3.66 M/UL (ref 4.6–6.2)
SAMPLE: ABNORMAL
SAMPLE: ABNORMAL
SITE: ABNORMAL
SITE: ABNORMAL
SODIUM SERPL-SCNC: 118 MMOL/L (ref 136–145)
SODIUM SERPL-SCNC: 129 MMOL/L (ref 136–145)
SODIUM SERPL-SCNC: 129 MMOL/L (ref 136–145)
SODIUM SERPL-SCNC: 130 MMOL/L (ref 136–145)
SODIUM SERPL-SCNC: 131 MMOL/L (ref 136–145)
SODIUM SERPL-SCNC: 133 MMOL/L (ref 136–145)
SP02: 100
SP02: 100
VANCOMYCIN SERPL-MCNC: 14.6 UG/ML
WBC # BLD AUTO: 40.13 K/UL (ref 3.9–12.7)
WBC TOXIC VACUOLES BLD QL SMEAR: PRESENT

## 2020-09-13 PROCEDURE — 83880 ASSAY OF NATRIURETIC PEPTIDE: CPT | Mod: HCNC

## 2020-09-13 PROCEDURE — 80048 BASIC METABOLIC PNL TOTAL CA: CPT | Mod: 91,HCNC

## 2020-09-13 PROCEDURE — 27000221 HC OXYGEN, UP TO 24 HOURS: Mod: HCNC

## 2020-09-13 PROCEDURE — 63600175 PHARM REV CODE 636 W HCPCS: Mod: HCNC | Performed by: HOSPITALIST

## 2020-09-13 PROCEDURE — 80053 COMPREHEN METABOLIC PANEL: CPT | Mod: 91,HCNC

## 2020-09-13 PROCEDURE — 63600175 PHARM REV CODE 636 W HCPCS: Mod: HCNC | Performed by: INTERNAL MEDICINE

## 2020-09-13 PROCEDURE — 25000003 PHARM REV CODE 250: Mod: HCNC | Performed by: INTERNAL MEDICINE

## 2020-09-13 PROCEDURE — 90945 DIALYSIS ONE EVALUATION: CPT | Mod: HCNC

## 2020-09-13 PROCEDURE — 82803 BLOOD GASES ANY COMBINATION: CPT | Mod: HCNC

## 2020-09-13 PROCEDURE — 83735 ASSAY OF MAGNESIUM: CPT | Mod: HCNC

## 2020-09-13 PROCEDURE — 63600175 PHARM REV CODE 636 W HCPCS: Mod: HCNC | Performed by: STUDENT IN AN ORGANIZED HEALTH CARE EDUCATION/TRAINING PROGRAM

## 2020-09-13 PROCEDURE — 84100 ASSAY OF PHOSPHORUS: CPT | Mod: HCNC

## 2020-09-13 PROCEDURE — 25000003 PHARM REV CODE 250: Mod: HCNC

## 2020-09-13 PROCEDURE — 25000003 PHARM REV CODE 250: Mod: HCNC | Performed by: STUDENT IN AN ORGANIZED HEALTH CARE EDUCATION/TRAINING PROGRAM

## 2020-09-13 PROCEDURE — 20000000 HC ICU ROOM: Mod: HCNC

## 2020-09-13 PROCEDURE — 86140 C-REACTIVE PROTEIN: CPT | Mod: HCNC

## 2020-09-13 PROCEDURE — 80048 BASIC METABOLIC PNL TOTAL CA: CPT | Mod: HCNC

## 2020-09-13 PROCEDURE — 80202 ASSAY OF VANCOMYCIN: CPT | Mod: HCNC

## 2020-09-13 PROCEDURE — 80053 COMPREHEN METABOLIC PANEL: CPT | Mod: HCNC

## 2020-09-13 PROCEDURE — 85025 COMPLETE CBC W/AUTO DIFF WBC: CPT | Mod: HCNC

## 2020-09-13 PROCEDURE — 99232 PR SUBSEQUENT HOSPITAL CARE,LEVL II: ICD-10-PCS | Mod: HCNC,,, | Performed by: INTERNAL MEDICINE

## 2020-09-13 PROCEDURE — 99232 SBSQ HOSP IP/OBS MODERATE 35: CPT | Mod: HCNC,,, | Performed by: INTERNAL MEDICINE

## 2020-09-13 PROCEDURE — 36415 COLL VENOUS BLD VENIPUNCTURE: CPT | Mod: HCNC

## 2020-09-13 PROCEDURE — 94761 N-INVAS EAR/PLS OXIMETRY MLT: CPT | Mod: HCNC

## 2020-09-13 PROCEDURE — 99900035 HC TECH TIME PER 15 MIN (STAT): Mod: HCNC

## 2020-09-13 PROCEDURE — 37799 UNLISTED PX VASCULAR SURGERY: CPT | Mod: HCNC

## 2020-09-13 RX ORDER — FLUDROCORTISONE ACETATE 0.1 MG/1
100 TABLET ORAL DAILY
Status: DISCONTINUED | OUTPATIENT
Start: 2020-09-13 | End: 2020-09-16

## 2020-09-13 RX ORDER — PHENYLEPHRINE HCL IN 0.9% NACL 1 MG/10 ML
100 SYRINGE (ML) INTRAVENOUS ONCE
Status: DISCONTINUED | OUTPATIENT
Start: 2020-09-13 | End: 2020-09-15

## 2020-09-13 RX ORDER — NOREPINEPHRINE BITARTRATE/D5W 4MG/250ML
PLASTIC BAG, INJECTION (ML) INTRAVENOUS
Status: COMPLETED
Start: 2020-09-13 | End: 2020-09-13

## 2020-09-13 RX ORDER — PHENYLEPHRINE HCL IN 0.9% NACL 1 MG/10 ML
SYRINGE (ML) INTRAVENOUS
Status: DISPENSED
Start: 2020-09-13 | End: 2020-09-13

## 2020-09-13 RX ORDER — HYDROCODONE BITARTRATE AND ACETAMINOPHEN 500; 5 MG/1; MG/1
TABLET ORAL CONTINUOUS
Status: ACTIVE | OUTPATIENT
Start: 2020-09-13 | End: 2020-09-14

## 2020-09-13 RX ORDER — FUROSEMIDE 10 MG/ML
120 INJECTION INTRAMUSCULAR; INTRAVENOUS ONCE
Status: COMPLETED | OUTPATIENT
Start: 2020-09-13 | End: 2020-09-13

## 2020-09-13 RX ORDER — VASOPRESSIN 20 [USP'U]/ML
INJECTION, SOLUTION INTRAMUSCULAR; SUBCUTANEOUS
Status: COMPLETED
Start: 2020-09-13 | End: 2020-09-13

## 2020-09-13 RX ORDER — MAGNESIUM SULFATE HEPTAHYDRATE 40 MG/ML
2 INJECTION, SOLUTION INTRAVENOUS
Status: ACTIVE | OUTPATIENT
Start: 2020-09-13 | End: 2020-09-14

## 2020-09-13 RX ORDER — CEFEPIME HYDROCHLORIDE 1 G/1
1 INJECTION, POWDER, FOR SOLUTION INTRAMUSCULAR; INTRAVENOUS
Status: DISCONTINUED | OUTPATIENT
Start: 2020-09-13 | End: 2020-09-14

## 2020-09-13 RX ORDER — INSULIN ASPART 100 [IU]/ML
5 INJECTION, SOLUTION INTRAVENOUS; SUBCUTANEOUS ONCE
Status: COMPLETED | OUTPATIENT
Start: 2020-09-13 | End: 2020-09-13

## 2020-09-13 RX ADMIN — HEPARIN SODIUM 5000 UNITS: 5000 INJECTION INTRAVENOUS; SUBCUTANEOUS at 05:09

## 2020-09-13 RX ADMIN — FUROSEMIDE 120 MG: 10 INJECTION, SOLUTION INTRAMUSCULAR; INTRAVENOUS at 08:09

## 2020-09-13 RX ADMIN — HYDROCORTISONE SODIUM SUCCINATE 100 MG: 100 INJECTION, POWDER, FOR SOLUTION INTRAMUSCULAR; INTRAVENOUS at 02:09

## 2020-09-13 RX ADMIN — LEVOTHYROXINE SODIUM 75 MCG: 25 TABLET ORAL at 06:09

## 2020-09-13 RX ADMIN — Medication 0.02 MCG/KG/MIN: at 04:09

## 2020-09-13 RX ADMIN — HYPROMELLOSE 2910 1 DROP: 5 SOLUTION OPHTHALMIC at 08:09

## 2020-09-13 RX ADMIN — Medication 0.22 MCG/KG/MIN: at 12:09

## 2020-09-13 RX ADMIN — VASOPRESSIN 0.04 UNITS/MIN: 20 INJECTION INTRAVENOUS at 09:09

## 2020-09-13 RX ADMIN — SODIUM CHLORIDE 5 UNITS/HR  (ORDERING DOSE ONLY,MUST KEEP AS DOSE RANGE.ONLY CHANGE IF INITIAL DOSE EXCEEDS SUGGESTED RANGE): 9 INJECTION, SOLUTION INTRAVENOUS at 04:09

## 2020-09-13 RX ADMIN — NOREPINEPHRINE BITARTRATE 0.22 MCG/KG/MIN: 1 INJECTION, SOLUTION, CONCENTRATE INTRAVENOUS at 01:09

## 2020-09-13 RX ADMIN — Medication 4000 MCG: at 08:09

## 2020-09-13 RX ADMIN — HEPARIN SODIUM 5000 UNITS: 5000 INJECTION INTRAVENOUS; SUBCUTANEOUS at 10:09

## 2020-09-13 RX ADMIN — SODIUM CHLORIDE 10 UNITS/HR  (ORDERING DOSE ONLY,MUST KEEP AS DOSE RANGE.ONLY CHANGE IF INITIAL DOSE EXCEEDS SUGGESTED RANGE): 9 INJECTION, SOLUTION INTRAVENOUS at 01:09

## 2020-09-13 RX ADMIN — INSULIN ASPART 5 UNITS: 100 INJECTION, SOLUTION INTRAVENOUS; SUBCUTANEOUS at 04:09

## 2020-09-13 RX ADMIN — NOREPINEPHRINE BITARTRATE 0.2 MCG/KG/MIN: 1 INJECTION, SOLUTION, CONCENTRATE INTRAVENOUS at 10:09

## 2020-09-13 RX ADMIN — HYPROMELLOSE 2910 1 DROP: 5 SOLUTION OPHTHALMIC at 03:09

## 2020-09-13 RX ADMIN — CEFEPIME 1 G: 1 INJECTION, POWDER, FOR SOLUTION INTRAMUSCULAR; INTRAVENOUS at 02:09

## 2020-09-13 RX ADMIN — ALTEPLASE 2 MG: 2.2 INJECTION, POWDER, LYOPHILIZED, FOR SOLUTION INTRAVENOUS at 06:09

## 2020-09-13 RX ADMIN — VASOPRESSIN 20 UNITS: 20 INJECTION INTRAVENOUS at 08:09

## 2020-09-13 RX ADMIN — SODIUM CHLORIDE: 0.9 INJECTION, SOLUTION INTRAVENOUS at 05:09

## 2020-09-13 RX ADMIN — VASOPRESSIN 0.04 UNITS/MIN: 20 INJECTION INTRAVENOUS at 08:09

## 2020-09-13 RX ADMIN — HEPARIN SODIUM 5000 UNITS: 5000 INJECTION INTRAVENOUS; SUBCUTANEOUS at 02:09

## 2020-09-13 RX ADMIN — HYDROCORTISONE SODIUM SUCCINATE 100 MG: 100 INJECTION, POWDER, FOR SOLUTION INTRAMUSCULAR; INTRAVENOUS at 10:09

## 2020-09-13 RX ADMIN — HYPROMELLOSE 2910 1 DROP: 5 SOLUTION OPHTHALMIC at 10:09

## 2020-09-13 RX ADMIN — VANCOMYCIN HYDROCHLORIDE 750 MG: 750 INJECTION, POWDER, LYOPHILIZED, FOR SOLUTION INTRAVENOUS at 01:09

## 2020-09-13 RX ADMIN — SODIUM CHLORIDE: 0.9 INJECTION, SOLUTION INTRAVENOUS at 11:09

## 2020-09-13 RX ADMIN — VASOPRESSIN 0.04 UNITS/MIN: 20 INJECTION INTRAVENOUS at 01:09

## 2020-09-13 RX ADMIN — FLUTICASONE PROPIONATE 50 MCG: 50 SPRAY, METERED NASAL at 08:09

## 2020-09-13 NOTE — PROGRESS NOTES
Pharmacokinetic Assessment Follow Up: IV Vancomycin  · Patient with worsening NANETTE, nephro following, starting continuous SLED today  · 9/13 random vancomycin level resulted at 14.6 mcg/mL  · Goal 15-20 mcg/mL  · Administer vancomycin 750 mg x1  · Collect random vancomycin level on 9/14 with AM labs  · Re-dose based on level and renal function     Drug levels (last 3 results):  Recent Labs   Lab Result Units 09/11/20  1835 09/12/20 0252 09/13/20  0810   Vancomycin, Random ug/mL 23.0 21.2 14.6       Pharmacy will continue to follow and monitor vancomycin.    Please contact pharmacy at extension [phone] for questions regarding this assessment.    Thank you for the consult,   Steph Lynn       Patient brief summary:  Ed Patton is a 65 y.o. male initiated on antimicrobial therapy with IV Vancomycin for treatment of bacteremia    Drug Allergies:   Review of patient's allergies indicates:   Allergen Reactions    Vicodin [hydrocodone-acetaminophen] Itching       Actual Body Weight:   115 kg    Renal Function:   Estimated Creatinine Clearance: 15.7 mL/min (A) (based on SCr of 6.7 mg/dL (H)).,     Dialysis Method (if applicable):  SLED    CBC (last 72 hours):  Recent Labs   Lab Result Units 09/10/20  1513 09/11/20  0731 09/12/20 0252 09/12/20 2015 09/13/20  0156   WBC K/uL  --  40.90* 46.04* 42.74* 40.13*   Hemoglobin g/dL 9.1* 9.1* 9.4* 9.7* 9.8*   Hematocrit % 29.8* 31.4* 30.7* 31.9* 32.0*   Platelets K/uL  --  483* 593* 625* 660*   Gran% %  --  88.2* 90.9* 88.7* 88.7*   Lymph% %  --  2.3* 1.7* 2.3* 2.1*   Mono% %  --  6.8 3.6* 4.8 5.5   Eosinophil% %  --  0.1 0.0 0.0 0.0   Basophil% %  --  0.2 0.2 0.2 0.2   Differential Method   --  Automated Automated Automated Automated       Metabolic Panel (last 72 hours):  Recent Labs   Lab Result Units 09/11/20  0731 09/12/20  0252 09/12/20 2015 09/13/20  0156 09/13/20  0635 09/13/20  0810   Sodium mmol/L 131* 132* 131* 133* 118* 129*   Potassium mmol/L 4.4 4.4 4.8 5.0 4.6  5.0   Chloride mmol/L 94* 92* 93* 93* 84* 94*   CO2 mmol/L 23 20* 22* 22* 17* 18*   Glucose mg/dL 142* 209* 307* 312* 692* 305*   BUN, Bld mg/dL 62* 80* 95* 103* 98* 106*   Creatinine mg/dL 5.1* 5.8* 6.4* 6.7* 6.1* 6.7*   Albumin g/dL 1.6* 1.5* 1.5* 1.5*  --   --    Total Bilirubin mg/dL  --   --  0.5 0.5  --   --    Alkaline Phosphatase U/L  --   --  269* 255*  --   --    AST U/L  --   --  42* 35  --   --    ALT U/L  --   --  58* 53*  --   --    Magnesium mg/dL 2.1 2.3  --  2.3  --   --    Phosphorus mg/dL 7.0* 8.5*  --  10.8*  --   --        Vancomycin Administrations:  vancomycin given in the last 96 hours                   vancomycin 25 mg/mL oral solution 125 mg (mg) 125 mg Given 09/12/20 1903     125 mg Given  1320     125 mg Given  0628     125 mg Given  0205     125 mg Given 09/11/20 2052    vancomycin 1.75 g in 5 % dextrose 500 mL IVPB (mg) 1,750 mg New Bag 09/10/20 1720                Microbiologic Results:  Microbiology Results (last 7 days)     Procedure Component Value Units Date/Time    Blood culture [[id number]] Collected: 09/12/20 2014    Order Status: Completed Specimen: Blood from Peripheral, Forearm, Right Updated: 09/13/20 1115     Blood Culture, Routine No Growth to date    Blood culture [[id number]] Collected: 09/12/20 2015    Order Status: Completed Specimen: Blood from Peripheral, Antecubital, Right Updated: 09/13/20 1115     Blood Culture, Routine No Growth to date    Blood culture [[id number]] Collected: 09/09/20 0303    Order Status: Completed Specimen: Blood Updated: 09/13/20 0812     Blood Culture, Routine No growth to date      No Growth to date      No Growth to date      No Growth to date    Blood culture [[id number]] Collected: 09/10/20 0459    Order Status: Completed Specimen: Blood Updated: 09/13/20 0812     Blood Culture, Routine No growth to date      No Growth to date      No Growth to date    Blood culture [[id number]] Collected: 09/08/20 1247    Order Status: Completed Specimen:  Blood from Antecubital, Right Arm Updated: 09/12/20 1412     Blood Culture, Routine No Growth to date      No Growth to date      No Growth to date      No Growth to date      No Growth to date    Narrative:      Collection has been rescheduled by RB8 at 09/08/2020 11:43 Reason:   Patient unavailable,nurse Mckay #[id number] was notified  Collection has been rescheduled by RB8 at 09/08/2020 11:43 Reason:   Patient unavailable,nurse Mckay #[id number] was notified    Blood culture [[id number]] Collected: 09/08/20 1247    Order Status: Completed Specimen: Blood from Antecubital, Right Hand Updated: 09/12/20 1412     Blood Culture, Routine No Growth to date      No Growth to date      No Growth to date      No Growth to date      No Growth to date    Narrative:      Collection has been rescheduled by RB8 at 09/08/2020 11:43 Reason:   Patient unavailable,nurse Mckay #[id number] was notified  Collection has been rescheduled by RB8 at 09/08/2020 11:43 Reason:   Patient unavailable,nurse Mckay #[id number] was notified    Blood culture [[id number]] Collected: 09/09/20 1040    Order Status: Completed Specimen: Blood Updated: 09/12/20 1412     Blood Culture, Routine No growth to date      No Growth to date      No Growth to date    Blood culture [[id number]] Collected: 09/07/20 0733    Order Status: Completed Specimen: Blood from Antecubital, Right Updated: 09/12/20 0822     Blood Culture, Routine No growth after 5 days.    Clostridium difficile EIA [[id number]] Collected: 09/11/20 1734    Order Status: Completed Specimen: Stool Updated: 09/11/20 2258     C. diff Antigen Negative     C difficile Toxins A+B, EIA Negative     Comment: Testing not recommended for children <24 months old.       Aerobic culture [[id number]] Collected: 09/07/20 1721    Order Status: Completed Specimen: Body Fluid from Knee, Right Updated: 09/11/20 0754     Aerobic Bacterial Culture No growth    Narrative:      1) Right Knee Joint Fluid    Aerobic culture [[id number]]  Collected: 09/07/20 1721    Order Status: Completed Specimen: Body Fluid from Knee, Right Updated: 09/11/20 0754     Aerobic Bacterial Culture No growth    Narrative:      2) Right Knee Joint Fluid    Culture, Anaerobe [[id number]] Collected: 09/07/20 1721    Order Status: Completed Specimen: Body Fluid from Knee, Right Updated: 09/11/20 0749     Anaerobic Culture Culture in progress    Narrative:      1) Right Knee Joint Fluid    Blood culture [[id number]]  (Abnormal)  (Susceptibility) Collected: 09/08/20 0408    Order Status: Completed Specimen: Blood from Antecubital, Right Arm Updated: 09/10/20 1601     Blood Culture, Routine Gram stain aer bottle: Gram positive cocci in clusters resembling Staph      Results called to and read back by:Jany Santiago RN 09/08/2020  22:29      METHICILLIN RESISTANT STAPHYLOCOCCUS AUREUS  ID consult required at [facility] locations.      Blood culture [[id number]]  (Abnormal) Collected: 09/06/20 0517    Order Status: Completed Specimen: Blood Updated: 09/10/20 1538     Blood Culture, Routine Gram stain oksana bottle: Gram positive cocci in clusters resembling Staph       Results called to and read back by: Mckay Cummings RN 09/07/2020  11:37      METHICILLIN RESISTANT STAPHYLOCOCCUS AUREUS  ID consult required at [facility].  For susceptibility see order #[id number]      Culture, Anaerobe [[id number]] Collected: 09/07/20 1721    Order Status: Completed Specimen: Body Fluid from Knee, Right Updated: 09/10/20 0924     Anaerobic Culture Culture in progress    Narrative:      2) Right Knee Joint Fluid    Fungus culture [[id number]] Collected: 09/07/20 1721    Order Status: Completed Specimen: Body Fluid from Knee, Right Updated: 09/09/20 1407     Fungus (Mycology) Culture Culture in progress    Narrative:      1) Right Knee Joint Fluid    Fungus culture [[id number]] Collected: 09/07/20 1721    Order Status: Completed Specimen: Body Fluid from  Knee, Right Updated: 09/09/20 1406     Fungus (Mycology) Culture Culture in progress    Narrative:      2) Right Knee Joint Fluid    Fungus culture [[id number]] Collected: 09/03/20 1647    Order Status: Completed Specimen: Joint Fluid from Knee, Right Updated: 09/09/20 1346     Fungus (Mycology) Culture Culture in progress    AFB Culture & Smear [[id number]] Collected: 09/07/20 1721    Order Status: Completed Specimen: Body Fluid from Knee, Right Updated: 09/08/20 2127     AFB Culture & Smear Culture in progress     AFB CULTURE STAIN No acid fast bacilli seen.    Narrative:      1) Right Knee Joint Fluid    AFB Culture & Smear [[id number]] Collected: 09/07/20 1721    Order Status: Completed Specimen: Body Fluid from Knee, Right Updated: 09/08/20 2127     AFB Culture & Smear Culture in progress     AFB CULTURE STAIN No acid fast bacilli seen.    Narrative:      2) Right Knee Joint Fluid    Blood culture [[id number]]  (Abnormal)  (Susceptibility) Collected: 09/05/20 1115    Order Status: Completed Specimen: Blood from Peripheral, Right Hand Updated: 09/08/20 0820     Blood Culture, Routine Gram stain aer bottle: Gram positive cocci in clusters resembling Staph       Results called to and read back by: Shalini Pena RN  09/06/2020  11:17      METHICILLIN RESISTANT STAPHYLOCOCCUS AUREUS  ID consult required at [facility].[place] and [place] locations.      Gram stain [[id number]] Collected: 09/07/20 1721    Order Status: Completed Specimen: Body Fluid from Knee, Right Updated: 09/08/20 0003     Gram Stain Result No WBC's      No organisms seen    Narrative:      2) Right Knee Joint Fluid    Gram stain [[id number]] Collected: 09/07/20 1721    Order Status: Completed Specimen: Body Fluid from Knee, Right Updated: 09/07/20 2245     Gram Stain Result Rare WBC's      No organisms seen    Narrative:      1) Right Knee Joint Fluid    Aerobic culture [[id number]]  (Abnormal)  (Susceptibility) Collected: 09/03/20 1647    Order Status:  Completed Specimen: Joint Fluid from Knee, Right Updated: 09/07/20 1049     Aerobic Bacterial Culture METHICILLIN RESISTANT STAPHYLOCOCCUS AUREUS  Few      Culture, Anaerobe [[id number]] Collected: 09/03/20 1647    Order Status: Completed Specimen: Joint Fluid from Knee, Right Updated: 09/07/20 1032     Anaerobic Culture No anaerobes isolated

## 2020-09-13 NOTE — ASSESSMENT & PLAN NOTE
-Admission notable for progressive NANETTE since approximately 09/08 with worsening Cr associated with increased anion gap metabolic acidosis.   -Status post evaluation by nephrology with concern for ATN based on urine microscopy showing scant normal red blood cells without any dysmorphic shapes evident and numerous muddy brown casts.     Plan:   -Trending renal function and monitoring for urgent indications for HD.   -Strict I/O's and daily weights, if possible.   -Renally dose all medications.

## 2020-09-13 NOTE — EICU
Jan for emergent Trialysis and A-line placement. Dr Mccain and Dr Valencia at bedside. Privileges/labs/allergies/consent verified. Pt hypotensive with MAPs low 50s-60s. Levo gtt infusing at 0.34mcg/kg/min, Vaso gtt initiated at 0.04units/min at 0752. Lasix and Tj also ordered. See LDA/timeout flowsheets for additional documentation.    0753: Left radial A-line successfully inserted.    0815: R IJ Trialysis cath successfully inserted.

## 2020-09-13 NOTE — PLAN OF CARE
Patient evaluated today with the resident team and the plan of care was discussed in detail.      65 year old man with history of CHF (EF of 20%), COPD on homeO2, and poorly controlled diabetes admitted with a DM foot infection and was found to have a MRSA bacteremia.    · MRSA bacteremia: blood cultures from 9/8/20 growing MRSA. ID following.  Continue cefepime and vancomycin.    · Septic joint, Right arm I & D  · NANETTE: likely ATN. Poor urine output in the last 24 hours.  Nephrology consulted. No plans for HD at this time.   · Hypotension: septic shock:  Currently on norepinephrine 0.22 mcg/kg/min and vasopressin, as well as stress dose steroids.  Check svO2.  · DKA: continue insulin infusion.  Fluid resuscitation has been limited by patient's heart failure and respiratory issues.   · Acute hypoxemic respiratory faiulre: Currently on a NRB. ABG shows 7.34/39/104.  Check xray with vascular congestions.  Low threshold to intubate.   · Pseudo- hyponatremia.  · Metabolic encephalopathy: likely 2/2 sepsis       Critical Care Time: 35 minutes  Critical care was time spent personally by me on the following activities: evaluating this patient's organ dysfunction, development of treatment plan, discussing treatment plan with patient or surrogate and bedside caregivers, discussions with consultants, evaluation of patient's response to treatment, examination of patient, ordering and performing treatments and interventions, ordering and review of laboratory studies, ordering and review of radiographic studies, re-evaluation of patient's condition. This critical care time did not overlap with that of any other provider or involve time for any procedures.

## 2020-09-13 NOTE — ASSESSMENT & PLAN NOTE
-Based on PFT's from 05/2015 showing mild (small airways) obstruction, airflow not improved after bronchodilator, and moderate restriction.    Plan:   -Continue home BREO with duo-nebulizers PRN.   -Continue home supplemental oxygen requirements.

## 2020-09-13 NOTE — PROCEDURES
"Ed Patton is a 65 y.o. male patient.    Temp: 97.6 °F (36.4 °C) (09/13/20 0700)  Pulse: (!) 113 (09/13/20 0700)  Resp: 14 (09/13/20 0700)  BP: (!) 80/59 (09/13/20 0700)  SpO2: (!) 92 % (09/13/20 0700)  Weight: 115 kg (253 lb 8.5 oz) (09/11/20 0400)  Height: 6' 4" (193 cm) (09/01/20 1521)       Central Line    Date/Time: 9/13/2020 9:04 AM  Performed by: Junito Mccain MD  Authorized by: Junito Mccain MD     Location procedure was performed:  [facility] CRITICAL CARE MEDICINE  Pre-operative diagnosis:  Septic shock  Post-operative diagnosis:  Septic shock  Consent Done ?:  Emergent Situation  Time out complete?: Verified correct patient, procedure, equipment, staff, and site/side    Indications:  Med administration, hemodialysis and vascular access  Anesthesia:  Local infiltration  Local anesthetic:  Lidocaine 1% without epinephrine  Preparation:  Skin prepped with ChloraPrep  Skin prep agent dried: Skin prep agent completely dried prior to procedure    Sterile barriers: All five maximal sterile barriers used - gloves, gown, cap, mask and large sterile sheet    Hand hygiene: Hand hygiene performed immediately prior to central venous catheter insertion    Location:  Right internal jugular  Catheter type:  Triple lumen  Catheter size:  12 Fr  Inserted Catheter Length (cm):  16  Ultrasound guidance: Yes    Vessel Caliber:  Large   patent  Comprressibility:  Normal  Needle advanced into vessel with real time ultrasound guidance.    Guidewire confirmed in vessel.    Steril sheath on probe.    Manometry: Yes    Number of attempts:  1  Securement:  Line sutured, chlorhexidine patch, sterile dressing applied and blood return through all ports  Technical Procedures Used:  Seldinger  Complications: No    Estimated blood loss (mL):  5  Specimens: No    Implants: No    XRay:  Placement verified by x-ray, no pneumothorax on x-ray, tip termination and successful placement  Adverse Events:  None        Junito MALONE" Parent  9/13/2020

## 2020-09-13 NOTE — SUBJECTIVE & OBJECTIVE
Past Medical History:   Diagnosis Date    CHF (congestive heart failure)     COPD (chronic obstructive pulmonary disease)     Coronary artery disease     Diabetes mellitus     Diabetes mellitus type II     DM (diabetes mellitus) type II uncontrolled with renal manifestation 2013    Hyperlipidemia     Hypertension     Postablative hypothyroidism 2018       Past Surgical History:   Procedure Laterality Date    APPENDECTOMY      ARTHROSCOPY OF KNEE Right 2020    Procedure: ARTHROSCOPY, KNEE, RIGHT ;  Surgeon: You Bhatia MD;  Location: [address];  Service: Orthopedics;  Laterality: Right;    CHOLECYSTECTOMY      CORONARY ANGIOPLASTY WITH STENT PLACEMENT      TONSILLECTOMY      TRANSESOPHAGEAL ECHOCARDIOGRAPHY N/A 2020    Procedure: ECHOCARDIOGRAM, TRANSESOPHAGEAL;  Surgeon: [facility] Diagnostic Provider;  Location: [facility] EP LAB;  Service: Anesthesiology;  Laterality: N/A;    TRANSESOPHAGEAL ECHOCARDIOGRAPHY N/A 9/3/2020    Procedure: ECHOCARDIOGRAM, TRANSESOPHAGEAL;  Surgeon: [facility] Diagnostic Provider;  Location: [facility] EP LAB;  Service: Anesthesiology;  Laterality: N/A;       Review of patient's allergies indicates:   Allergen Reactions    Vicodin [hydrocodone-acetaminophen] Itching       Family History     Problem Relation (Age of Onset)    Heart disease Mother    Hypertension Son    No Known Problems Father, Sister, Son, Son        Tobacco Use    Smoking status: Former Smoker     Packs/day: 0.01     Years: 3.00     Pack years: 0.03     Types: Cigarettes     Quit date: 1995     Years since quittin.3    Smokeless tobacco: Never Used   Substance and Sexual Activity    Alcohol use: No    Drug use: No    Sexual activity: Never     Comment:  with 1 son      Review of Systems   Constitutional: Positive for fatigue. Negative for appetite change, chills and fever.   Eyes: Negative for photophobia and visual disturbance.   Respiratory: Negative for cough and shortness  of breath.    Cardiovascular: Negative for chest pain and palpitations.   Gastrointestinal: Negative for abdominal pain, diarrhea, nausea and vomiting.   Genitourinary: Negative for difficulty urinating.   Musculoskeletal: Positive for gait problem. Negative for back pain, myalgias and neck pain.   Skin: Negative for pallor and rash.   Neurological: Positive for weakness and numbness. Negative for light-headedness and headaches.     Objective:     Vital Signs (Most Recent):  Temp: 97.4 °F (36.3 °C) (09/12/20 1815)  Pulse: (!) 114 (09/12/20 1941)  Resp: 14 (09/12/20 1941)  BP: 112/63 (09/12/20 1815)  SpO2: 97 % (09/12/20 1941) Vital Signs (24h Range):  Temp:  [97.3 °F (36.3 °C)-98.7 °F (37.1 °C)] 97.4 °F (36.3 °C)  Pulse:  [114-120] 114  Resp:  [14-22] 14  SpO2:  [93 %-97 %] 97 %  BP: ()/(51-63) 112/63   Weight: 115 kg (253 lb 8.5 oz)  Body mass index is 30.86 kg/m².      Intake/Output Summary (Last 24 hours) at 9/12/2020 2053  Last data filed at 9/12/2020 1800  Gross per 24 hour   Intake 500 ml   Output 600 ml   Net -100 ml       Physical Exam  Vitals signs reviewed.   Constitutional:       General: He is not in acute distress.     Appearance: Normal appearance. He is normal weight. He is not diaphoretic.   HENT:      Head: Normocephalic and atraumatic.      Nose: Nose normal.   Eyes:      Extraocular Movements: Extraocular movements intact.   Neck:      Musculoskeletal: Normal range of motion and neck supple. No neck rigidity or muscular tenderness.   Cardiovascular:      Rate and Rhythm: Regular rhythm. Tachycardia present.      Heart sounds: No murmur. No gallop.    Pulmonary:      Effort: Pulmonary effort is normal. No respiratory distress.      Comments: On 2L O2  Abdominal:      Palpations: Abdomen is soft. There is no mass.      Comments: Did not palpate organomegaly   Musculoskeletal:         General: Tenderness (right knee) present.      Comments: Cooling brace applied to right knee.     Lymphadenopathy:      Cervical: No cervical adenopathy.   Skin:     General: Skin is warm and dry.   Neurological:      General: No focal deficit present.      Mental Status: He is alert and oriented to person, place, and time.   Psychiatric:         Mood and Affect: Mood normal.         Behavior: Behavior normal.         Vents:     Lines/Drains/Airways     Drain                 Urethral Catheter 09/09/20 1720 16 Fr. 3 days          Peripheral Intravenous Line                 Peripheral IV - Single Lumen 09/10/20 1455 20 G;1 3/4 in Left;Anterior Forearm 2 days         Peripheral IV - Single Lumen 09/10/20 1600 20 G Right;Posterior Forearm 2 days              Significant Labs:    CBC/Anemia Profile:  Recent Labs   Lab 09/11/20 0731 09/12/20 0252 09/12/20 2015   WBC 40.90* 46.04* 42.74*   HGB 9.1* 9.4* 9.7*   HCT 31.4* 30.7* 31.9*   * 593* 625*   MCV 91 88 88   RDW 16.1* 15.9* 15.9*        Chemistries:  Recent Labs   Lab 09/11/20 0731 09/12/20 0252   * 132*   K 4.4 4.4   CL 94* 92*   CO2 23 20*   BUN 62* 80*   CREATININE 5.1* 5.8*   CALCIUM 8.1* 8.2*   ALBUMIN 1.6* 1.5*   MG 2.1 2.3   PHOS 7.0* 8.5*     Significant Imaging: I have reviewed all pertinent imaging results/findings within the past 24 hours.

## 2020-09-13 NOTE — PROGRESS NOTES
Ed Patton is a 65 y.o. male patient.  Pt in ICU- noted to have high blood sugars and possible DKA  Scheduled Meds:   artificial tears  1 drop Both Eyes TID    aspirin  81 mg Oral Daily    ceFEPime (MAXIPIME) IVPB  1 g Intravenous Q24H    fluticasone furoate-vilanteroL  1 puff Inhalation Daily    fluticasone propionate  1 spray Each Nostril Daily    heparin (porcine)  5,000 Units Subcutaneous Q8H    levothyroxine  75 mcg Oral Before breakfast    phenylephrine HCl in 0.9% NaCl  100 mcg Intravenous Once    sodium chloride 0.9%  500 mL Intravenous Once    tamsulosin  0.4 mg Oral QHS       Review of patient's allergies indicates:   Allergen Reactions    Vicodin [hydrocodone-acetaminophen] Itching       Past Medical History:   Diagnosis Date    CHF (congestive heart failure)     COPD (chronic obstructive pulmonary disease)     Coronary artery disease     Diabetes mellitus     Diabetes mellitus type II     DM (diabetes mellitus) type II uncontrolled with renal manifestation 9/4/2013    Hyperlipidemia     Hypertension     Postablative hypothyroidism 12/6/2018     Past Surgical History:   Procedure Laterality Date    APPENDECTOMY      ARTHROSCOPY OF KNEE Right 9/7/2020    Procedure: ARTHROSCOPY, KNEE, RIGHT ;  Surgeon: You Bhatia MD;  Location: [facility] OR [address] [place] [address];  Service: Orthopedics;  Laterality: Right;    CHOLECYSTECTOMY      CORONARY ANGIOPLASTY WITH STENT PLACEMENT      TONSILLECTOMY      TRANSESOPHAGEAL ECHOCARDIOGRAPHY N/A 9/4/2020    Procedure: ECHOCARDIOGRAM, TRANSESOPHAGEAL;  Surgeon: [facility] Diagnostic Provider;  Location: [facility] EP LAB;  Service: Anesthesiology;  Laterality: N/A;    TRANSESOPHAGEAL ECHOCARDIOGRAPHY N/A 9/3/2020    Procedure: ECHOCARDIOGRAM, TRANSESOPHAGEAL;  Surgeon: [facility] Diagnostic Provider;  Location: [facility] EP LAB;  Service: Anesthesiology;  Laterality: N/A;      reports that he quit smoking about 25 years ago. His smoking use included cigarettes. He has a 0.03  pack-year smoking history. He has never used smokeless tobacco. He reports that he does not drink alcohol or use drugs.   Family History   Problem Relation Age of Onset    Heart disease Mother     No Known Problems Father     No Known Problems Sister     Hypertension Son     No Known Problems Son     No Known Problems Son           Vital Signs Range (Last 24H):  Temp:  [97.3 °F (36.3 °C)-97.8 °F (36.6 °C)]   Pulse:  [106-120]   Resp:  [12-25]   BP: ()/(45-73)   SpO2:  [88 %-99 %]   Arterial Line BP: (80-98)/(54-59)     I & O (Last 24H):    Intake/Output Summary (Last 24 hours) at 9/13/2020 1106  Last data filed at 9/13/2020 1000  Gross per 24 hour   Intake 1108.32 ml   Output 650 ml   Net 458.32 ml           Physical Exam:  Vitals signs reviewed.   Constitutional:       General: He is not in acute distress.     Appearance: Normal appearance. He is normal weight. He is not diaphoretic.   HENT:      Head: Normocephalic and atraumatic.      Right Ear: External ear normal.      Left Ear: External ear normal.      Nose: Nose normal. No congestion or rhinorrhea.   Eyes:      General:         Right eye: No discharge.         Left eye: No discharge.      Extraocular Movements: Extraocular movements intact.   Neck:      Musculoskeletal: Normal range of motion and neck supple. No neck rigidity or muscular tenderness.   Cardiovascular:      Rate and Rhythm: Normal rate and regular rhythm.      Heart sounds: No murmur.      Comments: + JVD   Pulmonary:      Effort: Pulmonary effort is normal. No respiratory distress.      Breath sounds: No wheezing.      Comments: On 2 L/m oxygen  Chest:      Chest wall: No tenderness.   Abdominal:      General: There is distension.      Palpations: Abdomen is soft.      Tenderness: There is no abdominal tenderness. There is no guarding.   Musculoskeletal: Normal range of motion.         General: Tenderness (right knee) present.      Right lower leg: Edema (1+) present.      Left  lower leg: Edema (1+) present.      Comments: Cooling brace applied to right knee    Skin:     General: Skin is warm and dry.      Findings: Lesion (left foot) present.   Neurological:      Mental Status: He is alert and oriented to person, place, and time.      Sensory: No sensory deficit.      Gait: Gait normal.      Laboratory:  CBC:   Recent Labs   Lab 09/13/20  0156   WBC 40.13*   RBC 3.66*   HGB 9.8*   HCT 32.0*   *   MCV 87   MCH 26.8*   MCHC 30.6*     BMP:   Recent Labs   Lab 09/13/20  0156  09/13/20  0810   *   < > 305*   *   < > 129*   K 5.0   < > 5.0   CL 93*   < > 94*   CO2 22*   < > 18*   *   < > 106*   CREATININE 6.7*   < > 6.7*   CALCIUM 8.3*   < > 8.3*   MG 2.3  --   --     < > = values in this interval not displayed.     ABGs: No results for input(s): PH, PCO2, PO2, HCO3, POCSATURATED, BE in the last 168 hours.      Diagnostic Results:      NANETTE (acute kidney injury)  Mr. Patton is a 66 yo male with IDDM2, chronic hypoxemic resp failure on 2L of oxygen at home, and CHF (25% EF) who presented with recurrent falls and sepsis on 8/30 with septicemia with source likely from diabetic ulcer. Blood cultures have been persistently positive for MRSA. IE negative on LUKAS. Septic arthritis to right knee with I&D performed with ortho on 9/7. Initial management of septicemia was with ciprofloxacin and Vancomycin which was supratherapeutic to 26.5 on 9/2 prompting switch to a pulse dose regimen. Due to persistent bacteremia ID changed antibiotics to ceftaroline and daptomycin Cr on admission was 1.5 at admit up from the baseline (1.2-1.5). On 9/9 Cr level had a sudden increased to 3. Nephrology was consulted for NANETTE.      Over 24-48 hours prior to rise in creatinine Mr. Patton had blood pressures in the 80-90s/50s and Maps in the low 60s. Hypotension could have resulted in ischemic ATN. Also, was retaining urine and had improved urine output after a toure was placed. Urine microscopy: scant  normal red blood cells without any dysmorphic shapes evident and numerous muddy brown casts. Given the casts in the urine and the urinary retention it is likely a mixed pre-renal and a post-renal kidney injury. Following hypotension overnight and fluid resuscitation patient has become oliguric and Cr has continued to rise suggesting worsening ATN. He will need to be watched closely to monitor for requirements of dialysis.         Reccomendations:  - Maintain MAPS >65 to prevent further renal injury. May require pressors given limited ability to provide fluids in setting of heart failure     Continue to monitor labs  Making urine  No need for HD at this time  Need to monitor blood sugar  Had some pseudohyponatremia      Yomi Craig  9/13/2020

## 2020-09-13 NOTE — PROGRESS NOTES
CRRT rinsed back due to high arterial pressure. Lines reversed with no success. Cathflo to be instilled. Will restart after cathflo dwell.

## 2020-09-13 NOTE — PLAN OF CARE
Problem: Fall Injury Risk  Goal: Absence of Fall and Fall-Related Injury  Outcome: Ongoing, Progressing  Intervention: Identify and Manage Contributors to Fall Injury Risk  Flowsheets (Taken 9/13/2020 0546)  Medication Review/Management:   medications reviewed   high risk medications identified  Intervention: Promote Injury-Free Environment  Flowsheets (Taken 9/13/2020 0546)  Safety Promotion/Fall Prevention:   Fall Risk reviewed with patient/family   Fall Risk signage in place   lighting adjusted   medications reviewed   pulse ox     Problem: Adult Inpatient Plan of Care  Goal: Plan of Care Review  Outcome: Ongoing, Progressing  Flowsheets (Taken 9/13/2020 0546)  Plan of Care Reviewed With: patient  Goal: Patient-Specific Goal (Individualization)  Outcome: Ongoing, Progressing  Flowsheets (Taken 9/13/2020 0546)  Individualized Care Needs: pain control  Anxieties, Fears or Concerns: None  Goal: Absence of Hospital-Acquired Illness or Injury  Outcome: Ongoing, Progressing  Intervention: Identify and Manage Fall Risk  Flowsheets (Taken 9/13/2020 0546)  Safety Promotion/Fall Prevention:   Fall Risk reviewed with patient/family   Fall Risk signage in place   lighting adjusted   medications reviewed   pulse ox  Intervention: Prevent VTE (venous thromboembolism)  Flowsheets (Taken 9/13/2020 0546)  VTE Prevention/Management: remove, assess skin and reapply sequential compression device  Goal: Optimal Comfort and Wellbeing  Outcome: Ongoing, Progressing  Intervention: Provide Person-Centered Care  Flowsheets (Taken 9/13/2020 0546)  Trust Relationship/Rapport:   care explained   questions answered   questions encouraged   reassurance provided   thoughts/feelings acknowledged  Goal: Readiness for Transition of Care  Outcome: Ongoing, Progressing  Goal: Rounds/Family Conference  Outcome: Ongoing, Progressing     Problem: Diabetes Comorbidity  Goal: Blood Glucose Level Within Desired Range  Outcome: Ongoing,  Progressing  Intervention: Maintain Glycemic Control  Flowsheets (Taken 9/13/2020 0546)  Glycemic Management:   blood glucose monitoring   insulin infusion initiated     Problem: Adjustment to Illness (Sepsis/Septic Shock)  Goal: Optimal Coping  Outcome: Ongoing, Progressing  Intervention: Optimize Psychosocial Adjustment to Illness  Flowsheets (Taken 9/13/2020 0546)  Supportive Measures: active listening utilized     Problem: Bleeding (Sepsis/Septic Shock)  Goal: Absence of Bleeding  Outcome: Ongoing, Progressing  Intervention: Minimize Bleeding Risk  Flowsheets (Taken 9/13/2020 0546)  Bleeding Precautions:   blood pressure closely monitored   coagulation study results reviewed   monitored for signs of bleeding     Problem: Glycemic Control Impaired (Sepsis/Septic Shock)  Goal: Blood Glucose Level Within Desired Range  Outcome: Ongoing, Progressing  Intervention: Optimize Glycemic Control  Flowsheets (Taken 9/13/2020 0546)  Glycemic Management:   blood glucose monitoring   insulin infusion initiated     Problem: Hemodynamic Instability (Sepsis/Septic Shock)  Goal: Effective Tissue Perfusion  Outcome: Ongoing, Progressing     Problem: Infection (Sepsis/Septic Shock)  Goal: Absence of Infection Signs/Symptoms  Outcome: Ongoing, Progressing  Intervention: Prevent or Manage Infection Progression  Flowsheets (Taken 9/13/2020 0546)  Fever Reduction/Comfort Measures: lightweight bedding  Isolation Precautions: contact precautions maintained     Problem: Nutrition Impaired (Sepsis/Septic Shock)  Goal: Optimal Nutrition Intake  Outcome: Ongoing, Progressing     Problem: Respiratory Compromise (Sepsis/Septic Shock)  Goal: Effective Oxygenation and Ventilation  Outcome: Ongoing, Progressing  Intervention: Optimize Oxygenation and Ventilation  Flowsheets (Taken 9/13/2020 0546)  Airway/Ventilation Management: airway patency maintained  Head of Bed (HOB): HOB at 30-45 degrees     Problem: Skin Injury Risk Increased  Goal: Skin  Health and Integrity  Outcome: Ongoing, Progressing  Intervention: Optimize Skin Protection  Flowsheets (Taken 9/13/2020 0544)  Pressure Reduction Techniques: frequent weight shift encouraged  Skin Protection:   adhesive use limited   electrode sites changed   incontinence pads utilized   pulse oximeter probe site changed   tubing/devices free from skin contact   transparent dressing maintained  Head of Bed (HOB): HOB at 30-45 degrees     Problem: Wound  Goal: Optimal Wound Healing  Outcome: Ongoing, Progressing     Problem: Electrolyte Imbalance (Acute Kidney Injury/Impairment)  Goal: Serum Electrolyte Balance  Outcome: Ongoing, Progressing     Problem: Fluid Imbalance (Acute Kidney Injury/Impairment)  Goal: Optimal Fluid Balance  Outcome: Ongoing, Progressing     Problem: Hematologic Alteration (Acute Kidney Injury/Impairment)  Goal: Hemoglobin, Hematocrit and Platelets Within Normal Range  Outcome: Ongoing, Progressing     Problem: Oral Intake Inadequate (Acute Kidney Injury/Impairment)  Goal: Optimal Nutrition Intake  Outcome: Ongoing, Progressing     Problem: Renal Function Impairment (Acute Kidney Injury/Impairment)  Goal: Effective Renal Function  Outcome: Ongoing, Progressing     Problem: Infection  Goal: Infection Symptom Resolution  Outcome: Ongoing, Progressing

## 2020-09-13 NOTE — NURSING
Pt Bp was 70/51 at approximately 1615, tech notified OC, nurse called rapid and primary to inform the that patient is hypotensive and tachycardic.  Placed order to address condition (received 500ml bolus of ns bp increased to 74/68, patient will be transferred to [id number] ICU, nurse called report to Liz ELLIS ICU nurse, patient was transferred with out incident.  All multidose medication was sent as well.

## 2020-09-13 NOTE — ASSESSMENT & PLAN NOTE
-Most recent ECHO in 09/2020 with EF of 25%.   -Unclear is ischemic vs non-ischemic.   -Home regimen: Carvedilol, Lasix 80 mg, and Lisinopril.   -No evidence of volume overload on exam.   -Associated with chronic hypoxemic respiratory failure requiring 2L nasal cannula.     Plan:   -Holding home regimen with shock on ICU admission.   -Continue supplemental oxygen requirements.   -Strict I/O's and daily weights.   -Cardiac diet ordered.

## 2020-09-13 NOTE — NURSING
MAP in 50's, CCS team aware and wanting to start Levo. CCS reminded that patient only has PIV's, and team still wanted to start.

## 2020-09-13 NOTE — H&P
[facility]  Critical Care Medicine  History & Physical    Patient Name: Ed Patton  MRN: [id number]  Admission Date: 8/30/2020  Hospital Length of Stay: 13 days  Code Status: Full Code  Attending Physician: Yvette Quintanilla MD   Primary Care Provider: Qiana Chow MD   Principal Problem: Septic shock    Subjective:     HPI:  Mr. Ed Patton is a 65 year old male for whom MICU was consulted for hypotension. He has a PMH significant for HFrEF and COPD with chronic respiratory failure (on 2L home oxygen), T2DM with CKD III, and iron deficiency anemia. History obtained from chart review and speaking with patient. See hospital course below for detail regarding care and course of this patient.     Hospital/ICU Course:  Mr. Ed Patton was admitted to hospital medicine on 08/30 for management of a left-sided diabetic foot infection that was precipitated by an undetected punction wound after stepping on a tack/nail. His presentation was notable for leukocytosis with elevated inflammatory markers, however MRI of left foot only showed cellulitis of the anterior and lateral aspect of foot. Podiatry was consulted with recommendations for IV antibiotics; he was initiated on Ciprofloxacin and Vancomycin on admission. However, blood cultures from admission resulted positive for MRSA with wound cultures from left foot also growing MRSA with Proteus mirabilis. By 09/03 cultures remained positive despite Vancomycin, and patient was noted to develop right knee pain with swelling. Orthopedics was consulted and patient underwent right knee joint aspiration with cultures also positive for MRSA. LUKAS on 09/04 showed known HFrEF, but was negative for endocarditis. MRI of lumbar spine on 09/08 was negative for abscess. By 09/06, blood cultures continued to remain positive, and he underwent I&D of right arm abscess on 09/08 with cultures also positive for MRSA. For persistent MRSA bacteremia, he was  transitioned to Daptomycin and Ceftaroline, however this regimen was discontinued by 09/10 due to concern for developing rhabdomylosis. By 09/09, he was noted to develop a progressively worsening leukocytosis and NANETTE. Nephrology was consulted on 09/09 with suspicion for ATN. Hematology was consulted on 09/12 for persistent leukocytosis as likely reactive from bacteremia. On 09/10, patient began developing intermitent hypotension prompting broadening of antibiotics to Cefepime and Vancomycin. By 09/12, renal function continued to worsen in addition to hypotension prompting transfer to ICU for vasopressor support and possible HD.      Past Medical History:   Diagnosis Date    CHF (congestive heart failure)     COPD (chronic obstructive pulmonary disease)     Coronary artery disease     Diabetes mellitus     Diabetes mellitus type II     DM (diabetes mellitus) type II uncontrolled with renal manifestation 9/4/2013    Hyperlipidemia     Hypertension     Postablative hypothyroidism 12/6/2018       Past Surgical History:   Procedure Laterality Date    APPENDECTOMY      ARTHROSCOPY OF KNEE Right 9/7/2020    Procedure: ARTHROSCOPY, KNEE, RIGHT ;  Surgeon: You Bhatia MD;  Location: [facility] OR [address] [place] [address];  Service: Orthopedics;  Laterality: Right;    CHOLECYSTECTOMY      CORONARY ANGIOPLASTY WITH STENT PLACEMENT      TONSILLECTOMY      TRANSESOPHAGEAL ECHOCARDIOGRAPHY N/A 9/4/2020    Procedure: ECHOCARDIOGRAM, TRANSESOPHAGEAL;  Surgeon: [facility] Diagnostic Provider;  Location: [facility] EP LAB;  Service: Anesthesiology;  Laterality: N/A;    TRANSESOPHAGEAL ECHOCARDIOGRAPHY N/A 9/3/2020    Procedure: ECHOCARDIOGRAM, TRANSESOPHAGEAL;  Surgeon: [facility] Diagnostic Provider;  Location: [facility] EP LAB;  Service: Anesthesiology;  Laterality: N/A;       Review of patient's allergies indicates:   Allergen Reactions    Vicodin [hydrocodone-acetaminophen] Itching       Family History     Problem Relation (Age of Onset)    Heart disease  Mother    Hypertension Son    No Known Problems Father, Sister, Son, Son        Tobacco Use    Smoking status: Former Smoker     Packs/day: 0.01     Years: 3.00     Pack years: 0.03     Types: Cigarettes     Quit date: 1995     Years since quittin.3    Smokeless tobacco: Never Used   Substance and Sexual Activity    Alcohol use: No    Drug use: No    Sexual activity: Never     Comment:  with 1 son      Review of Systems   Constitutional: Positive for fatigue. Negative for appetite change, chills and fever.   Eyes: Negative for photophobia and visual disturbance.   Respiratory: Negative for cough and shortness of breath.    Cardiovascular: Negative for chest pain and palpitations.   Gastrointestinal: Negative for abdominal pain, diarrhea, nausea and vomiting.   Genitourinary: Negative for difficulty urinating.   Musculoskeletal: Positive for gait problem. Negative for back pain, myalgias and neck pain.   Skin: Negative for pallor and rash.   Neurological: Positive for weakness and numbness. Negative for light-headedness and headaches.     Objective:     Vital Signs (Most Recent):  Temp: 97.4 °F (36.3 °C) (20)  Pulse: (!) 114 (20)  Resp: 14 (20)  BP: 112/63 (20)  SpO2: 97 % (20) Vital Signs (24h Range):  Temp:  [97.3 °F (36.3 °C)-98.7 °F (37.1 °C)] 97.4 °F (36.3 °C)  Pulse:  [114-120] 114  Resp:  [14-22] 14  SpO2:  [93 %-97 %] 97 %  BP: ()/(51-63) 112/63   Weight: 115 kg (253 lb 8.5 oz)  Body mass index is 30.86 kg/m².      Intake/Output Summary (Last 24 hours) at 2020  Last data filed at 2020 1800  Gross per 24 hour   Intake 500 ml   Output 600 ml   Net -100 ml       Physical Exam  Vitals signs reviewed.   Constitutional:       General: He is not in acute distress.     Appearance: Normal appearance. He is normal weight. He is not diaphoretic.   HENT:      Head: Normocephalic and atraumatic.      Nose: Nose normal.    Eyes:      Extraocular Movements: Extraocular movements intact.   Neck:      Musculoskeletal: Normal range of motion and neck supple. No neck rigidity or muscular tenderness.   Cardiovascular:      Rate and Rhythm: Regular rhythm. Tachycardia present.      Heart sounds: No murmur. No gallop.    Pulmonary:      Effort: Pulmonary effort is normal. No respiratory distress.      Comments: On 2L O2  Abdominal:      Palpations: Abdomen is soft. There is no mass.      Comments: Did not palpate organomegaly   Musculoskeletal:         General: Tenderness (right knee) present.      Comments: Cooling brace applied to right knee.    Lymphadenopathy:      Cervical: No cervical adenopathy.   Skin:     General: Skin is warm and dry.   Neurological:      General: No focal deficit present.      Mental Status: He is alert and oriented to person, place, and time.   Psychiatric:         Mood and Affect: Mood normal.         Behavior: Behavior normal.         Vents:     Lines/Drains/Airways     Drain                 Urethral Catheter 09/09/20 1720 16 Fr. 3 days          Peripheral Intravenous Line                 Peripheral IV - Single Lumen 09/10/20 1455 20 G;1 3/4 in Left;Anterior Forearm 2 days         Peripheral IV - Single Lumen 09/10/20 1600 20 G Right;Posterior Forearm 2 days              Significant Labs:    CBC/Anemia Profile:  Recent Labs   Lab 09/11/20  0731 09/12/20 0252 09/12/20 2015   WBC 40.90* 46.04* 42.74*   HGB 9.1* 9.4* 9.7*   HCT 31.4* 30.7* 31.9*   * 593* 625*   MCV 91 88 88   RDW 16.1* 15.9* 15.9*        Chemistries:  Recent Labs   Lab 09/11/20  0731 09/12/20 0252   * 132*   K 4.4 4.4   CL 94* 92*   CO2 23 20*   BUN 62* 80*   CREATININE 5.1* 5.8*   CALCIUM 8.1* 8.2*   ALBUMIN 1.6* 1.5*   MG 2.1 2.3   PHOS 7.0* 8.5*     Significant Imaging: I have reviewed all pertinent imaging results/findings within the past 24 hours.    Assessment/Plan:     Pulmonary  COPD mixed type  -Based on PFT's from  05/2015 showing mild (small airways) obstruction, airflow not improved after bronchodilator, and moderate restriction.    Plan:   -Continue home BREO with duo-nebulizers PRN.   -Continue home supplemental oxygen requirements.     Chronic respiratory failure with hypoxia, on home oxygen therapy  -See assessment for COPD and HFrEF.     Cardiac/Vascular  Hyperlipidemia  Plan:   -Holding home Statin with concern for rhabdomyolysis earlier in admission.     Chronic systolic congestive heart failure  -Most recent ECHO in 09/2020 with EF of 25%.   -Unclear is ischemic vs non-ischemic.   -Home regimen: Carvedilol, Lasix 80 mg, and Lisinopril.   -No evidence of volume overload on exam.   -Associated with chronic hypoxemic respiratory failure requiring 2L nasal cannula.     Plan:   -Holding home regimen with shock on ICU admission.   -Continue supplemental oxygen requirements.   -Strict I/O's and daily weights.   -Cardiac diet ordered.     Renal/  Increased anion gap metabolic acidosis  -See assessment for T2DM.     NANETTE (acute kidney injury)  -Admission notable for progressive NANETTE since approximately 09/08 with worsening Cr associated with increased anion gap metabolic acidosis.   -Status post evaluation by nephrology with concern for ATN based on urine microscopy showing scant normal red blood cells without any dysmorphic shapes evident and numerous muddy brown casts.     Plan:   -Trending renal function and monitoring for urgent indications for HD.   -Strict I/O's and daily weights, if possible.   -Renally dose all medications.     ID  * Septic shock  This is a 65 year old  male with PMH significant for HFrEF and COPD with chronic respiratory failure (on 2L home oxygen), T2DM with CKD III, and iron deficiency anemia who originally presented to [facility] on 08/30 with cellulitis of left foot with concern for diabetic foot infection with subsequent development of MRSA bacteremia attributed to septic arthritis  of right knee and elbow. He is status post drainage and coverage for MRSA since that time. His work-up for other etiologies of MRSA bacteremia have included negative LUKAS and MRI of lumbar spine looking for endocarditis and spinal abscess, respectively. Stool studies for C.diff on 09/11 were negative. His hospital course has been associated with worsening hypotension and leukocytosis since 09/10 prompting ICU admission on 09/12 for initiation of vasopressor support.     Plan:   -Continue broad spectrum coverage with Vancomycin and Cefepime; pharmacy to dose Vancomycin.   -Follow-up CT chest, abdomen, and pelvis looking for other etiologies of infection.   -Follow-up repeat blood cultures.   -Follow-up repeat UA.   -Vasopressor support for goal MAP >65.   -Trending WBC count daily.     Septic arthritis of knee, right  -See assessment for shock.     Staphylococcal arthritis of right knee  -See assessment for shock.     Bacteremia due to methicillin resistant Staphylococcus aureus  -See assessment for shock.     Sepsis due to methicillin resistant Staphylococcus aureus  -See assessment for shock.       Endocrine  Diabetic ulcer of left foot associated with type 2 diabetes mellitus, with fat layer exposed  -See assessment for shock.     Postablative hypothyroidism  Plan:   -Continue home SYNTHROID.     Type 2 diabetes mellitus with ketoacidosis without coma, with long-term current use of insulin  -See assessment for T2DM with polyneuropathy.     Type 2 diabetes mellitus with diabetic polyneuropathy, with long-term current use of insulin  -Most recent A1c 9.5 in 08/2020.   -Home regimen: NPH 45U AM and 35U PM.   -Complicated by CKD III and polyneuropathy with diabetic foot ulcer.   -ICU admission on 09/12 notable for increased anion gap metabolic acidosis and elevated beta-hydroxybutyrate concern for DKA possible precipitated from withholding insulin versus worsening of underlying infection.     Plan:   -Initiation of  insulin drip per DKA algorithm with Q1H glucose checks.   -Trending BMP's Q4H; monitor for hyperkalemia.   -Holding off on continuous IVF given significantly reduced EF.   -Sugar free bariatric clear liquid diet ordered.   -Holding home Gabapentin with concern for hypotension.   -Hypoglycemia protocol ordered.     Type 2 diabetes mellitus with stage 3 chronic kidney disease, with long-term current use of insulin  -See assessment for NANETTE.         Critical Care Daily Checklist:    A: Awake: RASS Goal/Actual Goal: RASS Goal: 0-->alert and calm  Actual:     B: Spontaneous Breathing Trial Performed?     C: SAT & SBT Coordinated?  Not applicable                      D: Delirium: CAM-ICU Overall CAM-ICU: Negative   E: Early Mobility Performed? No   F: Feeding Goal: Goals: Adequate PO intake to meet > 75% EEN/EPN by RD follow up  Status: Nutrition Goal Status: progressing towards goal   Current Diet Order   Procedures    Diet Clear liquid (no sugar)/Bariatric      AS: Analgesia/Sedation None ordered.    T: Thromboembolic Prophylaxis Heparin   H: HOB > 300 Yes   U: Stress Ulcer Prophylaxis (if needed) Not applicable.    G: Glucose Control Insulin drip.    B: Bowel Function Stool Occurrence: 2   I: Indwelling Catheter (Lines & Newell) Necessity Newell for monitoring I/O's.    D: De-escalation of Antimicrobials/Pharmacotherapies Cefepime and Vancomycin    Plan for the day/ETD Initiation of vasopressor support.   Initiation of DKA treatment.   Continued infectious work-up.     Code Status:  Family/Goals of Care: Full Code         Critical secondary to Patient has a condition that poses threat to life and bodily function: Shock     Critical care was time spent personally by me on the following activities: development of treatment plan with patient or surrogate and bedside caregivers, discussions with consultants, evaluation of patient's response to treatment, examination of patient, ordering and performing treatments and  interventions, ordering and review of laboratory studies, ordering and review of radiographic studies, pulse oximetry, re-evaluation of patient's condition. This critical care time did not overlap with that of any other provider or involve time for any procedures.     Junito Mccain MD  Critical Care Medicine  [facility]

## 2020-09-13 NOTE — ASSESSMENT & PLAN NOTE
This is a 65 year old  male with PMH significant for HFrEF and COPD with chronic respiratory failure (on 2L home oxygen), T2DM with CKD III, and iron deficiency anemia who originally presented to [facility] on 08/30 with cellulitis of left foot with concern for diabetic foot infection with subsequent development of MRSA bacteremia attributed to septic arthritis of right knee and elbow. He is status post drainage and coverage for MRSA since that time. His work-up for other etiologies of MRSA bacteremia have included negative LUKAS and MRI of lumbar spine looking for endocarditis and spinal abscess, respectively. Stool studies for C.diff on 09/11 were negative. His hospital course has been associated with worsening hypotension and leukocytosis since 09/10 prompting ICU admission on 09/12 for initiation of vasopressor support.     Plan:   -Continue broad spectrum coverage with Vancomycin and Cefepime; pharmacy to dose Vancomycin.   -Follow-up CT chest, abdomen, and pelvis looking for other etiologies of infection.   -Follow-up repeat blood cultures.   -Follow-up repeat UA.   -Vasopressor support for goal MAP >65.   -Trending WBC count daily.

## 2020-09-13 NOTE — ASSESSMENT & PLAN NOTE
65 y.o. male s/p R knee scope I&D 9/7/20    VS:  Levo, vaso, 3LNC, tachycardia  Nerve block:  none  PT/OT:  WBAT  DVT PPx:  lovenox per primary  Cultures:  MRSA from R knee aspiration 9/3/20; NGTD intraop cx; BCx 9/8 positive, 9/9 NGTD  Abx:  Vanc, cefepime per ID  Labs: Hb 9 , WBC 37  Drain:  none  Newell:  none    Care per MICU

## 2020-09-13 NOTE — ASSESSMENT & PLAN NOTE
-Most recent A1c 9.5 in 08/2020.   -Home regimen: NPH 45U AM and 35U PM.   -Complicated by CKD III and polyneuropathy with diabetic foot ulcer.   -ICU admission on 09/12 notable for increased anion gap metabolic acidosis and elevated beta-hydroxybutyrate concern for DKA possible precipitated from withholding insulin versus worsening of underlying infection.     Plan:   -Initiation of insulin drip per DKA algorithm with Q1H glucose checks.   -Trending BMP's Q4H; monitor for hyperkalemia.   -Holding off on continuous IVF given significantly reduced EF.   -Sugar free bariatric clear liquid diet ordered.   -Holding home Gabapentin with concern for hypotension.   -Hypoglycemia protocol ordered.

## 2020-09-13 NOTE — PROGRESS NOTES
"  [facility] Hospital Medicine    Patient Name: Ed Patton  MRN: [id number]  Patient Class: IP- Inpatient   Admission Date: 8/30/2020  Length of Stay: 13 days  Attending Physician: Yvette Quintanilla MD  Primary Care Provider: Qiana Chow MD    [facility] Medicine Team: [facility] HOSP MED A Jacqueline Curran MD        Subjective:     Admission CC:   Chief Complaint   Patient presents with    Frequent Falls     frequent falls, weakness, "stepped on a tack" left leg now swollen.     Leg Swelling     Follow up visit for: Shock, unspecified    Interval History / Events Overnight:   Persistent hypotension. Cortisol 25 + and dexamethasone trial did not improve BP. Broadened ABX to included cefepime and oral vancomycin (patient reports no further diarrhea). Concern for occult infection not currently identified. Discussed with Infectious disease- check ct scans although will be limited by lack of contrast.       Data reviewed 9/12/2020:       Review of Systems   Constitutional: Negative for fever.   Respiratory: Negative for shortness of breath.    Gastrointestinal: Negative for abdominal pain, nausea and vomiting.   Psychiatric/Behavioral: Positive for confusion.     Objective:     Vital Signs (Most Recent):  Temp: 97.4 °F (36.3 °C) (09/12/20 1815)  Pulse: (!) 117 (09/12/20 1815)  Resp: 18 (09/12/20 1824)  BP: 112/63 (09/12/20 1815)  SpO2: 97 % (09/12/20 1815) Vital Signs (24h Range):  Temp:  [97.3 °F (36.3 °C)-98.7 °F (37.1 °C)] 97.4 °F (36.3 °C)  Pulse:  [117-120] 117  Resp:  [15-22] 18  SpO2:  [93 %-97 %] 97 %  BP: ()/(51-63) 112/63     Weight: 115 kg (253 lb 8.5 oz)  Body mass index is 30.86 kg/m².    Intake/Output Summary (Last 24 hours) at 9/12/2020 1921  Last data filed at 9/12/2020 1800  Gross per 24 hour   Intake 500 ml   Output 600 ml   Net -100 ml      Physical Exam  Constitutional:       General: He is not in acute distress.     Appearance: Normal appearance. He is not diaphoretic. "   HENT:      Head: Normocephalic and atraumatic.   Eyes:      General: Lids are normal. No scleral icterus.        Right eye: No discharge.         Left eye: No discharge.      Conjunctiva/sclera: Conjunctivae normal.   Neck:      Musculoskeletal: No neck rigidity.      Trachea: Phonation normal.   Cardiovascular:      Rate and Rhythm: Normal rate and regular rhythm.   Pulmonary:      Effort: Pulmonary effort is normal. No tachypnea, accessory muscle usage or respiratory distress.      Breath sounds: Examination of the right-lower field reveals decreased breath sounds. Examination of the left-lower field reveals decreased breath sounds. Decreased breath sounds present. No wheezing.   Abdominal:      General: Bowel sounds are normal. There is distension.      Palpations: Abdomen is soft.      Tenderness: There is no abdominal tenderness.   Musculoskeletal:      Right knee: He exhibits swelling.      Right lower leg: Edema present.      Left lower leg: Edema present.   Neurological:      Mental Status: He is alert. He is disoriented.      Cranial Nerves: Cranial nerves are intact.      Motor: Motor function is intact.   Psychiatric:         Attention and Perception: He is inattentive.         Mood and Affect: Affect normal.         Behavior: Behavior is cooperative.         Cognition and Memory: He exhibits impaired recent memory.         Significant Labs:   Recent Labs   Lab 09/10/20  0459 09/10/20  1513 09/11/20 0731 09/12/20  0252   WBC 26.74*  --  40.90* 46.04*   HGB 9.1* 9.1* 9.1* 9.4*   HCT 30.3* 29.8* 31.4* 30.7*   *  --  483* 593*     Recent Labs   Lab 09/10/20  0459 09/11/20  0731 09/12/20  0252   GRAN 84.7*  22.7* 88.2*  36.0* 90.9*  41.8*   LYMPH 3.9*  1.1 2.3*  0.9* 1.7*  0.8*   MONO 7.2  1.9* 6.8  2.8* 3.6*  1.7*   EOS 0.7* 0.1 0.0     Recent Labs   Lab 09/10/20  0459 09/11/20  0731 09/12/20  0252   * 131* 132*   K 3.9 4.4 4.4   CL 97 94* 92*   CO2 25 23 20*   BUN 51* 62* 80*    CREATININE 3.7* 5.1* 5.8*    142* 209*   CALCIUM 8.4* 8.1* 8.2*   ALBUMIN 1.6* 1.6* 1.5*   MG 2.0 2.1 2.3   PHOS 5.7* 7.0* 8.5*     Recent Labs   Lab 09/08/20  0408 09/10/20  0459 09/12/20  0252   .2* 173.0* 283.2*     Recent Labs   Lab 09/08/20  0408 09/09/20  0303 09/10/20  0459 09/11/20  0731    1142* 1240* 717*   BNP  --   --  216*  --      Recent Labs   Lab 08/30/20  1532 09/03/20  0410 09/03/20  1538 09/10/20  1514   PROCAL  --   --  0.13 0.26*   LACTATE 1.4  --   --  1.2   SEDRATE  --  80*  --   --      SARS-CoV2 (COVID-19) Qualitative PCR (no units)   Date Value   09/10/2020 Not Detected   09/03/2020 Not Detected     SARS-CoV-2 RNA, Amplification, Qual (no units)   Date Value   09/07/2020 Negative   08/30/2020 Negative     Recent Labs   Lab 08/31/20  0434   HGBA1C 9.5*     Recent Labs   Lab 09/12/20  0744 09/12/20  1709 09/12/20  1811   POCTGLUCOSE 263* 296* 274*     Microbiology Results (last 7 days)     Procedure Component Value Units Date/Time    Blood culture [[id number]]     Order Status: Sent Specimen: Blood     Blood culture [[id number]]     Order Status: Sent Specimen: Blood     Blood culture [[id number]] Collected: 09/08/20 1247    Order Status: Completed Specimen: Blood from Antecubital, Right Arm Updated: 09/12/20 1412     Blood Culture, Routine No Growth to date      No Growth to date      No Growth to date      No Growth to date      No Growth to date    Narrative:      Collection has been rescheduled by RB8 at 09/08/2020 11:43 Reason:   Patient unavailable,nurse Mckay #[id number] was notified  Collection has been rescheduled by RB8 at 09/08/2020 11:43 Reason:   Patient unavailable,nurse Mckay #[id number] was notified    Blood culture [[id number]] Collected: 09/08/20 1247    Order Status: Completed Specimen: Blood from Antecubital, Right Hand Updated: 09/12/20 1412     Blood Culture, Routine No Growth to date      No Growth to date      No Growth to date      No Growth to date      No  Growth to date    Narrative:      Collection has been rescheduled by RB8 at 09/08/2020 11:43 Reason:   Patient unavailable,nurse Mckay #[id number] was notified  Collection has been rescheduled by RB8 at 09/08/2020 11:43 Reason:   Patient unavailable,nurse Mckay #[id number] was notified    Blood culture [[id number]] Collected: 09/09/20 1040    Order Status: Completed Specimen: Blood Updated: 09/12/20 1412     Blood Culture, Routine No growth to date      No Growth to date      No Growth to date    Blood culture [[id number]] Collected: 09/07/20 0733    Order Status: Completed Specimen: Blood from Antecubital, Right Updated: 09/12/20 0822     Blood Culture, Routine No growth after 5 days.    Blood culture [[id number]] Collected: 09/10/20 0459    Order Status: Completed Specimen: Blood Updated: 09/12/20 0812     Blood Culture, Routine No growth to date      No Growth to date    Blood culture [[id number]] Collected: 09/09/20 0303    Order Status: Completed Specimen: Blood Updated: 09/12/20 0812     Blood Culture, Routine No growth to date      No Growth to date      No Growth to date    Clostridium difficile EIA [[id number]] Collected: 09/11/20 1734    Order Status: Completed Specimen: Stool Updated: 09/11/20 2258     C. diff Antigen Negative     C difficile Toxins A+B, EIA Negative     Comment: Testing not recommended for children <24 months old.       Aerobic culture [[id number]] Collected: 09/07/20 1721    Order Status: Completed Specimen: Body Fluid from Knee, Right Updated: 09/11/20 0754     Aerobic Bacterial Culture No growth    Narrative:      1) Right Knee Joint Fluid    Aerobic culture [[id number]] Collected: 09/07/20 1721    Order Status: Completed Specimen: Body Fluid from Knee, Right Updated: 09/11/20 0754     Aerobic Bacterial Culture No growth    Narrative:      2) Right Knee Joint Fluid    Culture, Anaerobe [[id number]] Collected: 09/07/20 1721    Order Status: Completed Specimen: Body Fluid from Knee, Right Updated:  09/11/20 0749     Anaerobic Culture Culture in progress    Narrative:      1) Right Knee Joint Fluid    Blood culture [[id number]]  (Abnormal)  (Susceptibility) Collected: 09/08/20 0408    Order Status: Completed Specimen: Blood from Antecubital, Right Arm Updated: 09/10/20 1601     Blood Culture, Routine Gram stain aer bottle: Gram positive cocci in clusters resembling Staph      Results called to and read back by:Jany Santiago RN 09/08/2020  22:29      METHICILLIN RESISTANT STAPHYLOCOCCUS AUREUS  ID consult required at [facility] and [facility] locations.      Blood culture [[id number]]  (Abnormal) Collected: 09/06/20 0517    Order Status: Completed Specimen: Blood Updated: 09/10/20 1538     Blood Culture, Routine Gram stain oksana bottle: Gram positive cocci in clusters resembling Staph       Results called to and read back by: Mckay Cummings RN 09/07/2020  11:37      METHICILLIN RESISTANT STAPHYLOCOCCUS AUREUS  ID consult required at [facility] and [facility].  For susceptibility see order #[id number]      Culture, Anaerobe [[id number]] Collected: 09/07/20 1721    Order Status: Completed Specimen: Body Fluid from Knee, Right Updated: 09/10/20 0924     Anaerobic Culture Culture in progress    Narrative:      2) Right Knee Joint Fluid    Fungus culture [[id number]] Collected: 09/07/20 1721    Order Status: Completed Specimen: Body Fluid from Knee, Right Updated: 09/09/20 1407     Fungus (Mycology) Culture Culture in progress    Narrative:      1) Right Knee Joint Fluid    Fungus culture [[id number]] Collected: 09/07/20 1721    Order Status: Completed Specimen: Body Fluid from Knee, Right Updated: 09/09/20 1406     Fungus (Mycology) Culture Culture in progress    Narrative:      2) Right Knee Joint Fluid    Fungus culture [[id number]] Collected: 09/03/20 1647    Order Status: Completed Specimen: Joint Fluid from Knee, Right Updated: 09/09/20 1346     Fungus (Mycology) Culture Culture in progress    AFB  Culture & Smear [[id number]] Collected: 09/07/20 1721    Order Status: Completed Specimen: Body Fluid from Knee, Right Updated: 09/08/20 2127     AFB Culture & Smear Culture in progress     AFB CULTURE STAIN No acid fast bacilli seen.    Narrative:      1) Right Knee Joint Fluid    AFB Culture & Smear [[id number]] Collected: 09/07/20 1721    Order Status: Completed Specimen: Body Fluid from Knee, Right Updated: 09/08/20 2127     AFB Culture & Smear Culture in progress     AFB CULTURE STAIN No acid fast bacilli seen.    Narrative:      2) Right Knee Joint Fluid    Blood culture [[id number]]  (Abnormal)  (Susceptibility) Collected: 09/05/20 1115    Order Status: Completed Specimen: Blood from Peripheral, Right Hand Updated: 09/08/20 0820     Blood Culture, Routine Gram stain aer bottle: Gram positive cocci in clusters resembling Staph       Results called to and read back by: Shalini Pena RN  09/06/2020  11:17      METHICILLIN RESISTANT STAPHYLOCOCCUS AUREUS  ID consult required at [facility] and [place] locations.      Gram stain [[id number]] Collected: 09/07/20 1721    Order Status: Completed Specimen: Body Fluid from Knee, Right Updated: 09/08/20 0003     Gram Stain Result No WBC's      No organisms seen    Narrative:      2) Right Knee Joint Fluid    Gram stain [[id number]] Collected: 09/07/20 1721    Order Status: Completed Specimen: Body Fluid from Knee, Right Updated: 09/07/20 2245     Gram Stain Result Rare WBC's      No organisms seen    Narrative:      1) Right Knee Joint Fluid    Aerobic culture [[id number]]  (Abnormal)  (Susceptibility) Collected: 09/03/20 1647    Order Status: Completed Specimen: Joint Fluid from Knee, Right Updated: 09/07/20 1049     Aerobic Bacterial Culture METHICILLIN RESISTANT STAPHYLOCOCCUS AUREUS  Few      Culture, Anaerobe [[id number]] Collected: 09/03/20 1647    Order Status: Completed Specimen: Joint Fluid from Knee, Right Updated: 09/07/20 1032     Anaerobic Culture No  anaerobes isolated            Significant Imaging:     Assessment/Plan:      Active Diagnoses:    Diagnosis Date Noted POA    PRINCIPAL PROBLEM:  Shock, unspecified [R57.9] 09/12/2020 No    Septic shock [A41.9, R65.21] 09/12/2020 Yes    Increased anion gap metabolic acidosis [E87.2] 09/12/2020 No    Endocarditis [I38]  Yes    Debility [R53.81]  Yes    NANETTE (acute kidney injury) [N17.9] 09/09/2020 No    Urinary retention [R33.9] 09/09/2020 Yes    Septic arthritis of knee, right [M00.9] 09/07/2020 Yes    Staphylococcal arthritis of right knee [M00.061] 09/05/2020 Yes    Diabetic ulcer of left foot associated with type 2 diabetes mellitus, with fat layer exposed [E11.621, L97.522] 08/31/2020 Yes    Cellulitis of left lower extremity [L03.116] 08/30/2020 Yes    Diabetic foot infection [E11.628, L08.9] 08/30/2020 Yes    Sepsis due to methicillin resistant Staphylococcus aureus [A41.02] 08/30/2020 Yes    Bacteremia due to methicillin resistant Staphylococcus aureus [R78.81] 08/30/2020 Yes    COPD mixed type [J44.9] 10/15/2019 Yes     Chronic    Postablative hypothyroidism [E89.0] 12/06/2018 Yes     Chronic    Type 2 diabetes mellitus with hyperglycemia, with long-term current use of insulin [E11.65, Z79.4] 10/09/2017 Not Applicable    Chronic respiratory failure with hypoxia, on home oxygen therapy [J96.11, Z99.81] 10/09/2017 Not Applicable     Chronic    Diabetic polyneuropathy associated with type 2 diabetes mellitus [E11.42] 08/25/2015 Yes     Chronic    Hyperlipidemia [E78.5] 08/25/2015 Yes    Type 2 diabetes mellitus with stage 3 chronic kidney disease, with long-term current use of insulin [E11.22, N18.3, Z79.4] 12/23/2014 Not Applicable    Chronic systolic congestive heart failure [I50.22] 05/07/2013 Yes     Chronic      Problems Resolved During this Admission:       Overview / ICU Course:    Ed Patton is a 65 y.o. male admitted for Shock, unspecified.    Inpatient Medications Prescribed  for Management of Current Problems:     Scheduled Meds:    artificial tears  1 drop Both Eyes TID    aspirin  81 mg Oral Daily    ceFEPime (MAXIPIME) IVPB  1 g Intravenous Q24H    fluticasone furoate-vilanteroL  1 puff Inhalation Daily    fluticasone propionate  1 spray Each Nostril Daily    heparin (porcine)  5,000 Units Subcutaneous Q8H    levothyroxine  75 mcg Oral Before breakfast    tamsulosin  0.4 mg Oral QHS    vancomycin  125 mg Oral Q6H     Continuous Infusions:    norepinephrine bitartrate-D5W       As Needed: acetaminophen, albuterol-ipratropium, calcium carbonate, dextrose 50%, dextrose 50%, gabapentin, glucagon (human recombinant), glucose, glucose, insulin aspart U-100, melatonin, ondansetron, oxyCODONE, polyethylene glycol, sodium chloride 0.9%, sodium chloride 0.9%, DIPH,PERTUS (ADACEL),TETANUS PF VAC (ADULT), Pharmacy to dose Vancomycin consult **AND** vancomycin - pharmacy to dose      Assessment and Plan by Problem    Diabetic foot infection  Bacteremia due to methicillin resistant Staphylococcus aureus  Diabetic ulcer of left foot associated with type 2 diabetes mellitus, with fat layer exposed  Right knee pain / septic arthritis  Appreciate Infectious Disease, Orthopedic Surgery. Treating with antibiotics per ID.   Arthrocentesis and cultures suggest septic knee. LUKAS to evaluate for endocarditis negative for vegetations.  Plan for I and D of the knee 9/7/2020.  Per ID: Antibiotics changed to daptomycin and ceftaroline combination therapy for 'persistent MRSA bacteremia'  -- Monitor CPK and CBC (ceftaroline can lead to leucopenia)  -- MRI lumbar spine w/wo contrast ordered  -- NM WB WBC scan for inflammatory localization pending  -- Repeat blood cultures until neagtive  -- PICC line after clearance of bacteremia, anticipate prolonged IV antibiotics on discharge.  -- ID recommends: MRI Lumbar spine/L psoas to r/o abscess. Broaden coverage to dapto/ceftaroline. Baseline CK obtained. Renal  dose antibiotics.  -- s/p R knee scope I&D 9/7/20. PT/OT:  WBAT  -- Per ID: Blood cultures seem to be clearing since I&D of knee / adequate source control. Podiatry considering need for debridement. Await repeat blood cultures to be negative x 72h before placing PICC line.  -GINA's ordered by podiatry but may need vascular surgery evaluation, unfortunately he is not a candidate for contrasted studies due to NANETTE  -- patient with low BP 9/10/2020 -suspect is due to BP med but must rule out infectious cause, held BP meds, small fluid bolus today, checked cortisol 25 + , trial dexamethasone not helpful, check c dif and start treatment. Discussed with ID- assessed renal ultrasound without findings. Broadened abx. Concern for sepsis.      Sepsis due to DM wound, UTI, bacteremia  Due to Proteus and MRSA. Continued management with antibiotics.  Resolved.  Follow up on ID recommendations.  Discontinued Ciprofloxacine (completed 7 days, was administered for diabetic foot wound growing Proteus and urine growing Klebse[place])  -Foot wound Dressing changed 9/9 by Podiatry; plan to consider repeating debridement soon.      Cellulitis of left lower extremity  Continued antibiotics.  Resolved     COPD mixed type  Continue home fluticasone-vilanterol, albuterol nebulizer.     Postablative hypothyroidism  Continue home levothyroxine.     Chronic respiratory failure with hypoxia, on home oxygen therapy  On 2 L nasal cannula chronically due to combination of COPD and CHF; titrate as needed.     Uncontrolled type 2 diabetes mellitus with hyperglycemia, with long-term current use of insulin  Diabetic polyneuropathy associated with type 2 diabetes mellitus  Takes insulin NPH-insulin regular 70/30 45 units before breakfast and 35 units before dinner.   Giving insulin detemir 29 units daily and insulin aspart 14 units with meals and correction dose scale. Monitor BGs.  Increased basal insulin slightly 9/8. BGs improved.  Due to increase in  sCr, insulin regimen reduced 9/9 and 9/10. Monitor for hyperglycemia/hypoglycemia.     Chronic systolic congestive heart failure  Continue home carvedilol, lisinopril. Lasix on admit.  EF 25%  Held Lasix 9/8 and 9/9 due to sCr increase after being NPO 9/7  Lisinopril held 9/9 due to sCr of 3; parameters placed on Coreg  -hesitant with IVF    Acute kidney injury  Urinary retention - toure in place  - Patient has been NPO for procedures intermittently for the past few days while on Lasix b.i.d.  Lasix held 9/8 and 9/9. He received a normal saline bolus overnight 9/9 and a little more fluid 9/9 before found to be anuric for the day. Bladder scan showed >500 mL. He now has a Toure. ACEi held for now.  - Per Nephrology: Given hypotension and simultaneous volume overload reccomend IVF as small volume boluses rather than continuous IVF to maintain MAPS >65.   Repeat UA and Urine culture.  Strict I/Os. Daily weights. C3/C4 levels.  - progressively worsening.     Consult critical care. Hypotension with signficant leukocytosis.       Diet: Diet Adult Regular (IDDSI Level 7)  GI Prophylaxis: Not indicated  Significant LDAs:   IV Access Type: Peripheral  Urinary Catheter Indication if present: Patient Does Not Have Urinary Catheter  Other Lines/Tubes/Drains:    Goals of Care:    Previous admission:  4/15/16  Likely prognosis:  Fair  Code Status: Full Code  Comfort Only: No  Hospice: No  Goals at discharge: remain at home    Discharge Planning   EDY: 9/15/2020     Code Status: Full Code   Is the patient medically ready for discharge?: No    Reason for patient still in hospital (select all that apply): Patient unstable, Patient trending condition and Treatment  Discharge Plan A: SNF  Discharge Delays: None known at this time    VTE Risk Mitigation (From admission, onward)         Ordered     heparin (porcine) injection 5,000 Units  Every 8 hours      09/11/20 0943     IP VTE HIGH RISK PATIENT  Once      08/30/20 2004     Place  sequential compression device  Until discontinued      08/30/20 3182              High Risk Conditions:    Patient has a condition that poses threat to life and bodily function: Acute Renal Failure     Patient will be transferred to a [facility] Hospital medicine service.     Jacqueline Curran MD  Department of Hospital Medicine   [facility]

## 2020-09-13 NOTE — HOSPITAL COURSE
Mr. Ed Patton was admitted to hospitals medicine on 08/30 for management of a left-sided diabetic foot infection that was precipitated by an undetected punction wound after stepping on a tack/nail. His presentation was notable for leukocytosis with elevated inflammatory markers, however MRI of left foot only showed cellulitis of the anterior and lateral aspect of foot. Podiatry was consulted with recommendations for IV antibiotics; he was initiated on Ciprofloxacin and Vancomycin on admission. However, blood cultures from admission resulted positive for MRSA with wound cultures from left foot also growing MRSA with Proteus mirabilis. By 09/03 cultures remained positive despite Vancomycin, and patient was noted to develop right knee pain with swelling. Orthopedics was consulted and patient underwent right knee joint aspiration with cultures also positive for MRSA. LUKAS on 09/04 showed known HFrEF, but was negative for endocarditis. MRI of lumbar spine on 09/08 was negative for abscess. By 09/06, blood cultures continued to remain positive, and he underwent I&D of right arm abscess on 09/08 with cultures also positive for MRSA. For persistent MRSA bacteremia, he was transitioned to Daptomycin and Ceftaroline, however this regimen was discontinued by 09/10 due to concern for developing rhabdomylosis. By 09/09, he was noted to develop a progressively worsening leukocytosis and NANETTE. Nephrology was consulted on 09/09 with suspicion for ATN. Hematology was consulted on 09/12 for persistent leukocytosis as likely reactive from bacteremia. On 09/10, patient began developing intermitent hypotension prompting broadening of antibiotics to Cefepime and Vancomycin. By 09/12, renal function continued to worsen in addition to hypotension prompting transfer to ICU for vasopressor support and possible HD. Patient found to be encephalopathic, central line placed and started on CRRT overnight on 9/14 with improvement in mental  status, but remained encephalopathic. CT head without any acute intracranial process. Vasopressin off AM of 9/15 but remains on levophed, CT abdomen with concern for potential cholecystitis and possible atelectasis with superimposed pneumonia.

## 2020-09-13 NOTE — SUBJECTIVE & OBJECTIVE
Principal Problem:Septic shock    Principal Orthopedic Problem: R knee septic arthritis    Interval History: Patient seen and examined at bedside.  No acute events overnight.  Pain controlled.  Remains on pressors.      Review of patient's allergies indicates:   Allergen Reactions    Vicodin [hydrocodone-acetaminophen] Itching       Current Facility-Administered Medications   Medication    acetaminophen tablet 1,000 mg    albuterol-ipratropium 2.5 mg-0.5 mg/3 mL nebulizer solution 3 mL    artificial tears 0.5 % ophthalmic solution 1 drop    aspirin EC tablet 81 mg    calcium carbonate 200 mg calcium (500 mg) chewable tablet 500 mg    ceFEPIme injection 1 g    dextrose 10 % infusion    dextrose 10 % infusion    dextrose 50% injection 12.5 g    dextrose 50% injection 25 g    fluticasone furoate-vilanteroL 200-25 mcg/dose diskus inhaler 1 puff    fluticasone propionate 50 mcg/actuation nasal spray 50 mcg    glucagon (human recombinant) injection 1 mg    glucose chewable tablet 16 g    glucose chewable tablet 24 g    heparin (porcine) injection 5,000 Units    insulin regular 100 Units in sodium chloride 0.9% 100 mL infusion    iohexol (OMNIPAQUE) oral solution 15 mL    lactated ringers bolus 500 mL    levothyroxine tablet 75 mcg    melatonin tablet 6 mg    norepinephrine 4 mg in dextrose 5% 250 mL infusion (premix) (titrating)    ondansetron injection 4 mg    oxyCODONE immediate release tablet 5 mg    polyethylene glycol packet 17 g    sodium chloride 0.9% flush 10 mL    tamsulosin 24 hr capsule 0.4 mg    Tdap vaccine injection 0.5 mL    vancomycin - pharmacy to dose     Objective:     Vital Signs (Most Recent):  Temp: 97.6 °F (36.4 °C) (09/13/20 0400)  Pulse: (!) 113 (09/13/20 0700)  Resp: 14 (09/13/20 0700)  BP: (!) 80/59 (09/13/20 0700)  SpO2: (!) 92 % (09/13/20 0700) Vital Signs (24h Range):  Temp:  [97.3 °F (36.3 °C)-98.7 °F (37.1 °C)] 97.6 °F (36.4 °C)  Pulse:  [106-120] 113  Resp:   "[12-25] 14  SpO2:  [88 %-99 %] 92 %  BP: ()/(45-73) 80/59     Weight: 115 kg (253 lb 8.5 oz)  Height: 6' 4" (193 cm)  Body mass index is 30.86 kg/m².        Ortho/SPM Exam    NAD  No increased WOB  Dressings c/d/i  No knee effusion  SILT T/SP/DP  Motor intact T/DP  WWP extremities    Significant Labs:   CBC:   Recent Labs   Lab 09/12/20 0252 09/12/20 2015 09/13/20 0156   WBC 46.04* 42.74* 40.13*   HGB 9.4* 9.7* 9.8*   HCT 30.7* 31.9* 32.0*   * 625* 660*     CMP:   Recent Labs   Lab 09/12/20 0252 09/12/20 2015 09/13/20 0156   * 131* 133*   K 4.4 4.8 5.0   CL 92* 93* 93*   CO2 20* 22* 22*   * 307* 312*   BUN 80* 95* 103*   CREATININE 5.8* 6.4* 6.7*   CALCIUM 8.2* 8.5* 8.3*   PROT  --  7.6 7.5   ALBUMIN 1.5* 1.5* 1.5*   BILITOT  --  0.5 0.5   ALKPHOS  --  269* 255*   AST  --  42* 35   ALT  --  58* 53*   ANIONGAP 20* 16 18*   EGFRNONAA 9.5* 8.5* 8.0*     All pertinent labs within the past 24 hours have been reviewed.    Significant Imaging: I have reviewed all pertinent imaging results/findings.  "

## 2020-09-13 NOTE — NURSING
Notified CCS of patient increased O2 demands, C/O Nausea and remains hypotensive. Will hold off on CT abd/chest for now.

## 2020-09-14 PROBLEM — A41.02 SEPSIS DUE TO METHICILLIN RESISTANT STAPHYLOCOCCUS AUREUS (MRSA): Status: ACTIVE | Noted: 2020-08-30

## 2020-09-14 LAB
ALBUMIN SERPL BCP-MCNC: 1.1 G/DL (ref 3.5–5.2)
ALBUMIN SERPL BCP-MCNC: 1.1 G/DL (ref 3.5–5.2)
ALBUMIN SERPL BCP-MCNC: 1.4 G/DL (ref 3.5–5.2)
ALLENS TEST: ABNORMAL
ALP SERPL-CCNC: 68 U/L (ref 55–135)
ALT SERPL W/O P-5'-P-CCNC: 70 U/L (ref 10–44)
ANION GAP SERPL CALC-SCNC: 14 MMOL/L (ref 8–16)
ANION GAP SERPL CALC-SCNC: 15 MMOL/L (ref 8–16)
ANION GAP SERPL CALC-SCNC: 6 MMOL/L (ref 8–16)
ANION GAP SERPL CALC-SCNC: 6 MMOL/L (ref 8–16)
ANISOCYTOSIS BLD QL SMEAR: SLIGHT
ASCENDING AORTA: 2.87 CM
AST SERPL-CCNC: 25 U/L (ref 10–40)
AV INDEX (PROSTH): 0.61
AV MEAN GRADIENT: 3 MMHG
AV PEAK GRADIENT: 4 MMHG
AV VALVE AREA: 2.44 CM2
AV VELOCITY RATIO: 0.67
BACTERIA BLD CULT: NORMAL
BACTERIA BLD CULT: NORMAL
BACTERIA SPEC ANAEROBE CULT: NORMAL
BASOPHILS # BLD AUTO: 0.07 K/UL (ref 0–0.2)
BASOPHILS NFR BLD: 0.2 % (ref 0–1.9)
BILIRUB SERPL-MCNC: 0.3 MG/DL (ref 0.1–1)
BSA FOR ECHO PROCEDURE: 2.48 M2
BUN SERPL-MCNC: 11 MG/DL (ref 8–23)
BUN SERPL-MCNC: 11 MG/DL (ref 8–23)
BUN SERPL-MCNC: 20 MG/DL (ref 8–23)
BUN SERPL-MCNC: 27 MG/DL (ref 8–23)
BUN SERPL-MCNC: 32 MG/DL (ref 8–23)
BUN SERPL-MCNC: 55 MG/DL (ref 8–23)
BUN SERPL-MCNC: 81 MG/DL (ref 8–23)
BUN SERPL-MCNC: 81 MG/DL (ref 8–23)
BURR CELLS BLD QL SMEAR: ABNORMAL
CA-I BLDV-SCNC: 0.99 MMOL/L (ref 1.06–1.42)
CA-I BLDV-SCNC: 0.99 MMOL/L (ref 1.06–1.42)
CA-I BLDV-SCNC: 1.11 MMOL/L (ref 1.06–1.42)
CALCIUM SERPL-MCNC: 7 MG/DL (ref 8.7–10.5)
CALCIUM SERPL-MCNC: 7 MG/DL (ref 8.7–10.5)
CALCIUM SERPL-MCNC: 8 MG/DL (ref 8.7–10.5)
CALCIUM SERPL-MCNC: 8 MG/DL (ref 8.7–10.5)
CALCIUM SERPL-MCNC: 8.5 MG/DL (ref 8.7–10.5)
CALCIUM SERPL-MCNC: 9.1 MG/DL (ref 8.7–10.5)
CALCIUM SERPL-MCNC: 9.2 MG/DL (ref 8.7–10.5)
CALCIUM SERPL-MCNC: 9.7 MG/DL (ref 8.7–10.5)
CHLORIDE SERPL-SCNC: 104 MMOL/L (ref 95–110)
CHLORIDE SERPL-SCNC: 104 MMOL/L (ref 95–110)
CHLORIDE SERPL-SCNC: 96 MMOL/L (ref 95–110)
CHLORIDE SERPL-SCNC: 97 MMOL/L (ref 95–110)
CHLORIDE SERPL-SCNC: 98 MMOL/L (ref 95–110)
CHLORIDE SERPL-SCNC: 98 MMOL/L (ref 95–110)
CO2 SERPL-SCNC: 22 MMOL/L (ref 23–29)
CO2 SERPL-SCNC: 22 MMOL/L (ref 23–29)
CO2 SERPL-SCNC: 23 MMOL/L (ref 23–29)
CO2 SERPL-SCNC: 26 MMOL/L (ref 23–29)
CO2 SERPL-SCNC: 26 MMOL/L (ref 23–29)
CO2 SERPL-SCNC: 27 MMOL/L (ref 23–29)
CO2 SERPL-SCNC: 28 MMOL/L (ref 23–29)
CO2 SERPL-SCNC: 28 MMOL/L (ref 23–29)
CREAT SERPL-MCNC: 0.6 MG/DL (ref 0.5–1.4)
CREAT SERPL-MCNC: 0.6 MG/DL (ref 0.5–1.4)
CREAT SERPL-MCNC: 1.7 MG/DL (ref 0.5–1.4)
CREAT SERPL-MCNC: 2.2 MG/DL (ref 0.5–1.4)
CREAT SERPL-MCNC: 2.4 MG/DL (ref 0.5–1.4)
CREAT SERPL-MCNC: 3.6 MG/DL (ref 0.5–1.4)
CREAT SERPL-MCNC: 4.5 MG/DL (ref 0.5–1.4)
CREAT SERPL-MCNC: 4.5 MG/DL (ref 0.5–1.4)
CRP SERPL-MCNC: 47.2 MG/L (ref 0–8.2)
CV ECHO LV RWT: 0.46 CM
DELSYS: ABNORMAL
DIFFERENTIAL METHOD: ABNORMAL
DOP CALC AO PEAK VEL: 1.04 M/S
DOP CALC AO VTI: 16.48 CM
DOP CALC LVOT AREA: 4 CM2
DOP CALC LVOT DIAMETER: 2.26 CM
DOP CALC LVOT PEAK VEL: 0.7 M/S
DOP CALC LVOT STROKE VOLUME: 40.13 CM3
DOP CALCLVOT PEAK VEL VTI: 10.01 CM
E WAVE DECELERATION TIME: 175.8 MSEC
E/A RATIO: 3.03
E/E' RATIO: 8.96 M/S
ECHO LV POSTERIOR WALL: 1.25 CM (ref 0.6–1.1)
EOSINOPHIL # BLD AUTO: 0 K/UL (ref 0–0.5)
EOSINOPHIL NFR BLD: 0 % (ref 0–8)
ERYTHROCYTE [DISTWIDTH] IN BLOOD BY AUTOMATED COUNT: 15.9 % (ref 11.5–14.5)
EST. GFR  (AFRICAN AMERICAN): 15 ML/MIN/1.73 M^2
EST. GFR  (AFRICAN AMERICAN): 15 ML/MIN/1.73 M^2
EST. GFR  (AFRICAN AMERICAN): 19.6 ML/MIN/1.73 M^2
EST. GFR  (AFRICAN AMERICAN): 32 ML/MIN/1.73 M^2
EST. GFR  (AFRICAN AMERICAN): 35.5 ML/MIN/1.73 M^2
EST. GFR  (AFRICAN AMERICAN): 48.5 ML/MIN/1.73 M^2
EST. GFR  (AFRICAN AMERICAN): >60 ML/MIN/1.73 M^2
EST. GFR  (AFRICAN AMERICAN): >60 ML/MIN/1.73 M^2
EST. GFR  (NON AFRICAN AMERICAN): 12.9 ML/MIN/1.73 M^2
EST. GFR  (NON AFRICAN AMERICAN): 12.9 ML/MIN/1.73 M^2
EST. GFR  (NON AFRICAN AMERICAN): 16.9 ML/MIN/1.73 M^2
EST. GFR  (NON AFRICAN AMERICAN): 27.7 ML/MIN/1.73 M^2
EST. GFR  (NON AFRICAN AMERICAN): 30.7 ML/MIN/1.73 M^2
EST. GFR  (NON AFRICAN AMERICAN): 42 ML/MIN/1.73 M^2
EST. GFR  (NON AFRICAN AMERICAN): >60 ML/MIN/1.73 M^2
EST. GFR  (NON AFRICAN AMERICAN): >60 ML/MIN/1.73 M^2
FLOW: 4
FRACTIONAL SHORTENING: 15 % (ref 28–44)
GLUCOSE SERPL-MCNC: 150 MG/DL (ref 70–110)
GLUCOSE SERPL-MCNC: 174 MG/DL (ref 70–110)
GLUCOSE SERPL-MCNC: 174 MG/DL (ref 70–110)
GLUCOSE SERPL-MCNC: 228 MG/DL (ref 70–110)
GLUCOSE SERPL-MCNC: 244 MG/DL (ref 70–110)
GLUCOSE SERPL-MCNC: 253 MG/DL (ref 70–110)
GLUCOSE SERPL-MCNC: 78 MG/DL (ref 70–110)
GLUCOSE SERPL-MCNC: 78 MG/DL (ref 70–110)
HCO3 UR-SCNC: 29.2 MMOL/L (ref 24–28)
HCT VFR BLD AUTO: 28.9 % (ref 40–54)
HGB BLD-MCNC: 9 G/DL (ref 14–18)
HYPOCHROMIA BLD QL SMEAR: ABNORMAL
IMM GRANULOCYTES # BLD AUTO: 0.62 K/UL (ref 0–0.04)
IMM GRANULOCYTES NFR BLD AUTO: 1.7 % (ref 0–0.5)
INTERVENTRICULAR SEPTUM: 1.2 CM (ref 0.6–1.1)
LA MAJOR: 6.35 CM
LA MINOR: 6.25 CM
LA WIDTH: 3.71 CM
LEFT ATRIUM SIZE: 3.88 CM
LEFT ATRIUM VOLUME INDEX: 31.5 ML/M2
LEFT ATRIUM VOLUME: 77.08 CM3
LEFT INTERNAL DIMENSION IN SYSTOLE: 4.6 CM (ref 2.1–4)
LEFT VENTRICLE DIASTOLIC VOLUME INDEX: 57.67 ML/M2
LEFT VENTRICLE DIASTOLIC VOLUME: 141.2 ML
LEFT VENTRICLE MASS INDEX: 111 G/M2
LEFT VENTRICLE SYSTOLIC VOLUME INDEX: 39.7 ML/M2
LEFT VENTRICLE SYSTOLIC VOLUME: 97.17 ML
LEFT VENTRICULAR INTERNAL DIMENSION IN DIASTOLE: 5.4 CM (ref 3.5–6)
LEFT VENTRICULAR MASS: 272.06 G
LV LATERAL E/E' RATIO: 6.44 M/S
LV SEPTAL E/E' RATIO: 14.71 M/S
LYMPHOCYTES # BLD AUTO: 0.7 K/UL (ref 1–4.8)
LYMPHOCYTES NFR BLD: 1.9 % (ref 18–48)
MAGNESIUM SERPL-MCNC: 1.8 MG/DL (ref 1.6–2.6)
MAGNESIUM SERPL-MCNC: 1.8 MG/DL (ref 1.6–2.6)
MAGNESIUM SERPL-MCNC: 2.2 MG/DL (ref 1.6–2.6)
MAGNESIUM SERPL-MCNC: 2.4 MG/DL (ref 1.6–2.6)
MAGNESIUM SERPL-MCNC: 2.5 MG/DL (ref 1.6–2.6)
MCH RBC QN AUTO: 26.4 PG (ref 27–31)
MCHC RBC AUTO-ENTMCNC: 31.1 G/DL (ref 32–36)
MCV RBC AUTO: 85 FL (ref 82–98)
MODE: ABNORMAL
MONOCYTES # BLD AUTO: 1.9 K/UL (ref 0.3–1)
MONOCYTES NFR BLD: 5.1 % (ref 4–15)
MV PEAK A VEL: 0.34 M/S
MV PEAK E VEL: 1.03 M/S
MV STENOSIS PRESSURE HALF TIME: 50.98 MS
MV VALVE AREA P 1/2 METHOD: 4.32 CM2
NEUTROPHILS # BLD AUTO: 33.6 K/UL (ref 1.8–7.7)
NEUTROPHILS NFR BLD: 91.1 % (ref 38–73)
NRBC BLD-RTO: 0 /100 WBC
OVALOCYTES BLD QL SMEAR: ABNORMAL
PCO2 BLDA: 44.7 MMHG (ref 35–45)
PH SMN: 7.42 [PH] (ref 7.35–7.45)
PHOSPHATE SERPL-MCNC: 2.2 MG/DL (ref 2.7–4.5)
PHOSPHATE SERPL-MCNC: 4.7 MG/DL (ref 2.7–4.5)
PHOSPHATE SERPL-MCNC: 5.6 MG/DL (ref 2.7–4.5)
PHOSPHATE SERPL-MCNC: 6.6 MG/DL (ref 2.7–4.5)
PISA TR MAX VEL: 3.22 M/S
PLATELET # BLD AUTO: 566 K/UL (ref 150–350)
PMV BLD AUTO: 10.5 FL (ref 9.2–12.9)
PO2 BLDA: 83 MMHG (ref 80–100)
POC BE: 5 MMOL/L
POC SATURATED O2: 96 % (ref 95–100)
POC TCO2: 31 MMOL/L (ref 23–27)
POCT GLUCOSE: 125 MG/DL (ref 70–110)
POCT GLUCOSE: 148 MG/DL (ref 70–110)
POCT GLUCOSE: 149 MG/DL (ref 70–110)
POCT GLUCOSE: 158 MG/DL (ref 70–110)
POCT GLUCOSE: 163 MG/DL (ref 70–110)
POCT GLUCOSE: 177 MG/DL (ref 70–110)
POCT GLUCOSE: 184 MG/DL (ref 70–110)
POCT GLUCOSE: 196 MG/DL (ref 70–110)
POCT GLUCOSE: 215 MG/DL (ref 70–110)
POCT GLUCOSE: 221 MG/DL (ref 70–110)
POCT GLUCOSE: 240 MG/DL (ref 70–110)
POCT GLUCOSE: 247 MG/DL (ref 70–110)
POCT GLUCOSE: 248 MG/DL (ref 70–110)
POCT GLUCOSE: 253 MG/DL (ref 70–110)
POCT GLUCOSE: 262 MG/DL (ref 70–110)
POCT GLUCOSE: 262 MG/DL (ref 70–110)
POCT GLUCOSE: 264 MG/DL (ref 70–110)
POCT GLUCOSE: 268 MG/DL (ref 70–110)
POCT GLUCOSE: 269 MG/DL (ref 70–110)
POCT GLUCOSE: 67 MG/DL (ref 70–110)
POCT GLUCOSE: 79 MG/DL (ref 70–110)
POCT GLUCOSE: 86 MG/DL (ref 70–110)
POCT GLUCOSE: 98 MG/DL (ref 70–110)
POIKILOCYTOSIS BLD QL SMEAR: SLIGHT
POLYCHROMASIA BLD QL SMEAR: ABNORMAL
POTASSIUM SERPL-SCNC: 4.2 MMOL/L (ref 3.5–5.1)
POTASSIUM SERPL-SCNC: 4.2 MMOL/L (ref 3.5–5.1)
POTASSIUM SERPL-SCNC: 4.3 MMOL/L (ref 3.5–5.1)
POTASSIUM SERPL-SCNC: 4.4 MMOL/L (ref 3.5–5.1)
POTASSIUM SERPL-SCNC: 4.5 MMOL/L (ref 3.5–5.1)
POTASSIUM SERPL-SCNC: 4.5 MMOL/L (ref 3.5–5.1)
POTASSIUM SERPL-SCNC: 4.6 MMOL/L (ref 3.5–5.1)
POTASSIUM SERPL-SCNC: 4.6 MMOL/L (ref 3.5–5.1)
PROT SERPL-MCNC: 4.1 G/DL (ref 6–8.4)
RA MAJOR: 5.99 CM
RA PRESSURE: 3 MMHG
RA WIDTH: 3.62 CM
RBC # BLD AUTO: 3.41 M/UL (ref 4.6–6.2)
RIGHT VENTRICULAR END-DIASTOLIC DIMENSION: 3.77 CM
RV TISSUE DOPPLER FREE WALL SYSTOLIC VELOCITY 1 (APICAL 4 CHAMBER VIEW): 11.72 CM/S
SAMPLE: ABNORMAL
SINUS: 2.92 CM
SITE: ABNORMAL
SODIUM SERPL-SCNC: 134 MMOL/L (ref 136–145)
SODIUM SERPL-SCNC: 134 MMOL/L (ref 136–145)
SODIUM SERPL-SCNC: 136 MMOL/L (ref 136–145)
SODIUM SERPL-SCNC: 137 MMOL/L (ref 136–145)
SODIUM SERPL-SCNC: 138 MMOL/L (ref 136–145)
SODIUM SERPL-SCNC: 139 MMOL/L (ref 136–145)
STJ: 2.83 CM
TDI LATERAL: 0.16 M/S
TDI SEPTAL: 0.07 M/S
TDI: 0.12 M/S
TR MAX PG: 41 MMHG
TRICUSPID ANNULAR PLANE SYSTOLIC EXCURSION: 1.39 CM
TV REST PULMONARY ARTERY PRESSURE: 44 MMHG
VANCOMYCIN SERPL-MCNC: 24 UG/ML
WBC # BLD AUTO: 36.95 K/UL (ref 3.9–12.7)

## 2020-09-14 PROCEDURE — 83735 ASSAY OF MAGNESIUM: CPT | Mod: 91,HCNC

## 2020-09-14 PROCEDURE — 94761 N-INVAS EAR/PLS OXIMETRY MLT: CPT | Mod: HCNC

## 2020-09-14 PROCEDURE — 25000003 PHARM REV CODE 250: Mod: HCNC | Performed by: STUDENT IN AN ORGANIZED HEALTH CARE EDUCATION/TRAINING PROGRAM

## 2020-09-14 PROCEDURE — S5010 5% DEXTROSE AND 0.45% SALINE: HCPCS | Mod: HCNC | Performed by: STUDENT IN AN ORGANIZED HEALTH CARE EDUCATION/TRAINING PROGRAM

## 2020-09-14 PROCEDURE — 80100008 HC CRRT DAILY MAINTENANCE: Mod: HCNC

## 2020-09-14 PROCEDURE — 99233 SBSQ HOSP IP/OBS HIGH 50: CPT | Mod: HCNC,,, | Performed by: INTERNAL MEDICINE

## 2020-09-14 PROCEDURE — 86140 C-REACTIVE PROTEIN: CPT | Mod: HCNC

## 2020-09-14 PROCEDURE — 80048 BASIC METABOLIC PNL TOTAL CA: CPT | Mod: 91,HCNC

## 2020-09-14 PROCEDURE — 80069 RENAL FUNCTION PANEL: CPT | Mod: 91,HCNC

## 2020-09-14 PROCEDURE — 99291 CRITICAL CARE FIRST HOUR: CPT | Mod: HCNC,GC,, | Performed by: INTERNAL MEDICINE

## 2020-09-14 PROCEDURE — 99233 PR SUBSEQUENT HOSPITAL CARE,LEVL III: ICD-10-PCS | Mod: HCNC,GC,, | Performed by: INTERNAL MEDICINE

## 2020-09-14 PROCEDURE — 25000003 PHARM REV CODE 250: Mod: HCNC | Performed by: INTERNAL MEDICINE

## 2020-09-14 PROCEDURE — 82803 BLOOD GASES ANY COMBINATION: CPT | Mod: HCNC

## 2020-09-14 PROCEDURE — 90945 DIALYSIS ONE EVALUATION: CPT | Mod: HCNC

## 2020-09-14 PROCEDURE — 20000000 HC ICU ROOM: Mod: HCNC

## 2020-09-14 PROCEDURE — 85025 COMPLETE CBC W/AUTO DIFF WBC: CPT | Mod: HCNC

## 2020-09-14 PROCEDURE — 63600175 PHARM REV CODE 636 W HCPCS: Mod: HCNC | Performed by: STUDENT IN AN ORGANIZED HEALTH CARE EDUCATION/TRAINING PROGRAM

## 2020-09-14 PROCEDURE — 81270 JAK2 GENE: CPT | Mod: HCNC

## 2020-09-14 PROCEDURE — 63600175 PHARM REV CODE 636 W HCPCS: Mod: HCNC | Performed by: NURSE PRACTITIONER

## 2020-09-14 PROCEDURE — 80069 RENAL FUNCTION PANEL: CPT | Mod: HCNC

## 2020-09-14 PROCEDURE — 99233 PR SUBSEQUENT HOSPITAL CARE,LEVL III: ICD-10-PCS | Mod: HCNC,,, | Performed by: INTERNAL MEDICINE

## 2020-09-14 PROCEDURE — 99233 SBSQ HOSP IP/OBS HIGH 50: CPT | Mod: HCNC,GC,, | Performed by: INTERNAL MEDICINE

## 2020-09-14 PROCEDURE — 99291 PR CRITICAL CARE, E/M 30-74 MINUTES: ICD-10-PCS | Mod: HCNC,GC,, | Performed by: INTERNAL MEDICINE

## 2020-09-14 PROCEDURE — 25000003 PHARM REV CODE 250: Mod: HCNC | Performed by: NURSE PRACTITIONER

## 2020-09-14 PROCEDURE — 27000221 HC OXYGEN, UP TO 24 HOURS: Mod: HCNC

## 2020-09-14 PROCEDURE — 80048 BASIC METABOLIC PNL TOTAL CA: CPT | Mod: HCNC

## 2020-09-14 PROCEDURE — 63600175 PHARM REV CODE 636 W HCPCS: Mod: HCNC | Performed by: HOSPITALIST

## 2020-09-14 PROCEDURE — 99900035 HC TECH TIME PER 15 MIN (STAT): Mod: HCNC

## 2020-09-14 PROCEDURE — 83735 ASSAY OF MAGNESIUM: CPT | Mod: HCNC

## 2020-09-14 PROCEDURE — 80202 ASSAY OF VANCOMYCIN: CPT | Mod: HCNC

## 2020-09-14 PROCEDURE — 63600175 PHARM REV CODE 636 W HCPCS: Mod: HCNC | Performed by: INTERNAL MEDICINE

## 2020-09-14 PROCEDURE — 37799 UNLISTED PX VASCULAR SURGERY: CPT | Mod: HCNC

## 2020-09-14 PROCEDURE — 82330 ASSAY OF CALCIUM: CPT | Mod: HCNC

## 2020-09-14 PROCEDURE — 81207 BCR/ABL1 GENE MINOR BP: CPT | Mod: HCNC

## 2020-09-14 PROCEDURE — 80053 COMPREHEN METABOLIC PANEL: CPT | Mod: HCNC

## 2020-09-14 RX ORDER — INSULIN ASPART 100 [IU]/ML
0-5 INJECTION, SOLUTION INTRAVENOUS; SUBCUTANEOUS
Status: DISCONTINUED | OUTPATIENT
Start: 2020-09-14 | End: 2020-09-14

## 2020-09-14 RX ORDER — CEFEPIME HYDROCHLORIDE 2 G/1
2 INJECTION, POWDER, FOR SOLUTION INTRAVENOUS
Status: DISCONTINUED | OUTPATIENT
Start: 2020-09-15 | End: 2020-09-14

## 2020-09-14 RX ORDER — LEVOFLOXACIN 750 MG/1
750 TABLET ORAL EVERY OTHER DAY
Status: DISCONTINUED | OUTPATIENT
Start: 2020-09-14 | End: 2020-09-26

## 2020-09-14 RX ORDER — INSULIN ASPART 100 [IU]/ML
10 INJECTION, SOLUTION INTRAVENOUS; SUBCUTANEOUS
Status: DISCONTINUED | OUTPATIENT
Start: 2020-09-14 | End: 2020-09-14

## 2020-09-14 RX ORDER — DEXTROSE MONOHYDRATE AND SODIUM CHLORIDE 5; .45 G/100ML; G/100ML
INJECTION, SOLUTION INTRAVENOUS CONTINUOUS
Status: DISCONTINUED | OUTPATIENT
Start: 2020-09-14 | End: 2020-09-15

## 2020-09-14 RX ADMIN — CALCIUM GLUCONATE 3000 MG: 98 INJECTION, SOLUTION INTRAVENOUS at 04:09

## 2020-09-14 RX ADMIN — HYDROCORTISONE SODIUM SUCCINATE 100 MG: 100 INJECTION, POWDER, FOR SOLUTION INTRAMUSCULAR; INTRAVENOUS at 02:09

## 2020-09-14 RX ADMIN — HYPROMELLOSE 2910 1 DROP: 5 SOLUTION OPHTHALMIC at 08:09

## 2020-09-14 RX ADMIN — DEXTROSE 1000 ML: 10 SOLUTION INTRAVENOUS at 01:09

## 2020-09-14 RX ADMIN — SODIUM PHOSPHATE, MONOBASIC, MONOHYDRATE 30 MMOL: 276; 142 INJECTION, SOLUTION INTRAVENOUS at 11:09

## 2020-09-14 RX ADMIN — FLUDROCORTISONE ACETATE 100 MCG: 0.1 TABLET ORAL at 09:09

## 2020-09-14 RX ADMIN — HYDROCORTISONE SODIUM SUCCINATE 100 MG: 100 INJECTION, POWDER, FOR SOLUTION INTRAMUSCULAR; INTRAVENOUS at 10:09

## 2020-09-14 RX ADMIN — HEPARIN SODIUM 5000 UNITS: 5000 INJECTION INTRAVENOUS; SUBCUTANEOUS at 02:09

## 2020-09-14 RX ADMIN — VASOPRESSIN 0.04 UNITS/MIN: 20 INJECTION INTRAVENOUS at 10:09

## 2020-09-14 RX ADMIN — CEFEPIME 1 G: 1 INJECTION, POWDER, FOR SOLUTION INTRAMUSCULAR; INTRAVENOUS at 02:09

## 2020-09-14 RX ADMIN — HYPROMELLOSE 2910 1 DROP: 5 SOLUTION OPHTHALMIC at 03:09

## 2020-09-14 RX ADMIN — NOREPINEPHRINE BITARTRATE 0.18 MCG/KG/MIN: 1 INJECTION, SOLUTION, CONCENTRATE INTRAVENOUS at 04:09

## 2020-09-14 RX ADMIN — HYPROMELLOSE 2910 1 DROP: 5 SOLUTION OPHTHALMIC at 09:09

## 2020-09-14 RX ADMIN — LEVOTHYROXINE SODIUM 75 MCG: 25 TABLET ORAL at 05:09

## 2020-09-14 RX ADMIN — VASOPRESSIN 0.04 UNITS/MIN: 20 INJECTION INTRAVENOUS at 07:09

## 2020-09-14 RX ADMIN — DEXTROSE MONOHYDRATE 12.5 G: 25 INJECTION, SOLUTION INTRAVENOUS at 01:09

## 2020-09-14 RX ADMIN — DEXTROSE AND SODIUM CHLORIDE: 5; .45 INJECTION, SOLUTION INTRAVENOUS at 02:09

## 2020-09-14 RX ADMIN — FLUTICASONE PROPIONATE 50 MCG: 50 SPRAY, METERED NASAL at 09:09

## 2020-09-14 RX ADMIN — HEPARIN SODIUM 5000 UNITS: 5000 INJECTION INTRAVENOUS; SUBCUTANEOUS at 10:09

## 2020-09-14 RX ADMIN — LEVOFLOXACIN 750 MG: 750 TABLET, FILM COATED ORAL at 08:09

## 2020-09-14 RX ADMIN — CEFEPIME 1 G: 1 INJECTION, POWDER, FOR SOLUTION INTRAMUSCULAR; INTRAVENOUS at 03:09

## 2020-09-14 RX ADMIN — HYDROCORTISONE SODIUM SUCCINATE 100 MG: 100 INJECTION, POWDER, FOR SOLUTION INTRAMUSCULAR; INTRAVENOUS at 05:09

## 2020-09-14 RX ADMIN — DEXTROSE MONOHYDRATE, SODIUM CITRATE, AND CITRIC ACID MONOHYDRATE: 2.45; 2.2; .8 INJECTION, SOLUTION INTRAVENOUS at 05:09

## 2020-09-14 RX ADMIN — CALCIUM GLUCONATE 3000 MG: 98 INJECTION, SOLUTION INTRAVENOUS at 05:09

## 2020-09-14 RX ADMIN — OXYCODONE 5 MG: 5 TABLET ORAL at 09:09

## 2020-09-14 RX ADMIN — HEPARIN SODIUM 5000 UNITS: 5000 INJECTION INTRAVENOUS; SUBCUTANEOUS at 05:09

## 2020-09-14 RX ADMIN — VASOPRESSIN 0.04 UNITS/MIN: 20 INJECTION INTRAVENOUS at 12:09

## 2020-09-14 RX ADMIN — CALCIUM GLUCONATE 3000 MG: 98 INJECTION, SOLUTION INTRAVENOUS at 11:09

## 2020-09-14 NOTE — ASSESSMENT & PLAN NOTE
-Likely multifactorial from uremia vs DKA vs worsening shock with cerebral hypoperfusion vs sepsis.   -Per speaking with wife, she describes a likely baseline history of developing dementia prior to hospitalization.     Plan:   -Continue supportive management for multifactorial issues.   -Closely monitor respiratory status with concern for needing intubation for airway protection.  - Unclear etiology at this point, uremic encephalopathy should have cleared with CRRT. Could be from sepsis  - CT Head - follow up

## 2020-09-14 NOTE — PT/OT/SLP DISCHARGE
Physical Therapy Discharge Summary    Name: Ed Patton  MRN: [id number]   Principal Problem: Septic shock     Patient Discharged from acute Physical Therapy on 20.  Please refer to prior PT noted date on 20 for functional status.     Assessment:     Patient transferred to lower level of care secondary to hypotension and worsening renal function. Patient is discharged from acute PT services at this time. Please reconsult as appropriate.     Objective:     GOALS:   Multidisciplinary Problems     Physical Therapy Goals        Problem: Physical Therapy Goal    Goal Priority Disciplines Outcome Goal Variances Interventions   Physical Therapy Goal     PT, PT/OT Ongoing, Progressing     Description: Goals to be met by: 9/15/20    Patient will increase functional independence with mobility by performin. Supine to sit with Stand-by Assistance - Not met  2. Sit to supine with Stand-by Assistance - Not met  3. Sit to stand transfer with Stand-by Assistance with RW - Not met  4. Gait  x 50 feet with Stand-by Assistance using Rolling Walker and maintenance of weight bearing status - Not met  5.  Patient will demonstrate independence with a home exercise program - Not met  6. Patient's balance will be GOOD: Needs SUPERVISION only during gait and able to self right with moderate LOB - Not met                   Reasons for Discontinuation of Therapy Services  Transfer to alternate level of care.      Plan:     Patient Discharged to: ICU.    GRAYSON DELACRUZ, PT  2020

## 2020-09-14 NOTE — PLAN OF CARE
CMICU DAILY GOALS       A: Awake    RASS: Goal - RASS Goal: 0-->alert and calm  Actual - RASS (Jimenez Agitation-Sedation Scale): -1-->drowsy   Restraint necessity:    B: Breath   SBT: Not intubated   C: Coordinate A & B, analgesics/sedatives   Pain: managed    SAT: Not intubated  D: Delirium   CAM-ICU: Overall CAM-ICU: Negative  E: Early(intubated/ Progressive (non-intubated) Mobility   MOVE Screen: Fail   Activity: Activity Management: patient unable to perform activities  FAS: Feeding/Nutrition   Diet order: Diet/Nutrition Received: clear liquid,   Fluid restriction:    T: Thrombus   DVT prophylaxis: VTE Required Core Measure: Pharmacological prophylaxis initiated/maintained  H: HOB Elevation   Head of Bed (HOB): HOB at 30 degrees  U: Ulcer Prophylaxis   GI: yes  G: Glucose control   managed Glycemic Management: blood glucose monitoring  S: Skin   Bundle compliance: yes   Bathing/Skin Care: bath, chlorhexidine, bath, complete, dressed/undressed, incontinence care, linen changed Date: [unfilled]  B: Bowel Function   diarrhea   I: Indwelling Catheters   Newell necessity:      Urethral Catheter 09/09/20 1720 16 Fr.-Reason for Continuing Urinary Catheterization: Critically ill in ICU requiring intensive monitoring   CVC necessity: Yes   IPAD offered: No  D: De-escalation Antibx   Yes  Plan for the day     Patient on 0.28 peripheral levo this am. A-line and trialysis emergently placed at 0800. Scant urine output today despite 120 mg lasix IV. Nephrology consulted, CRRT started this afternoon. Line clotted off, cathflo instilling this evening. Vaso started. Plan to restart CRRT this evening.     Family/Goals of care/Code Status   Code Status: Full Code     No acute events throughout day, VS and assessment per flow sheet, patient progressing towards goals as tolerated, plan of care reviewed with Ed Patton and family, all concerns addressed, will continue to monitor.

## 2020-09-14 NOTE — SUBJECTIVE & OBJECTIVE
Interval History: NAEON. Patient remains on CRRT. Pressor requirements up to 0.15 levo and vaso at 0.04. Temp pf 99.3 noted this AM.     Review of Systems   Unable to perform ROS: Mental status change     Objective:     Vital Signs (Most Recent):  Temp: 98.7 °F (37.1 °C) (09/14/20 1100)  Pulse: (!) 125 (09/14/20 1100)  Resp: (!) 24 (09/14/20 1100)  BP: (!) 104/58 (09/14/20 1000)  SpO2: (!) 93 % (09/14/20 1100) Vital Signs (24h Range):  Temp:  [96.4 °F (35.8 °C)-99.3 °F (37.4 °C)] 98.7 °F (37.1 °C)  Pulse:  [109-128] 125  Resp:  [14-27] 24  SpO2:  [91 %-100 %] 93 %  BP: ()/(58) 104/58  Arterial Line BP: ()/(48-73) 100/48     Weight: 114.8 kg (253 lb)  Body mass index is 30.8 kg/m².    Estimated Creatinine Clearance: 29.1 mL/min (A) (based on SCr of 3.6 mg/dL (H)).    Physical Exam  Constitutional:       Appearance: Normal appearance.   HENT:      Head: Normocephalic.      Mouth/Throat:      Mouth: Mucous membranes are moist.   Eyes:      Extraocular Movements: Extraocular movements intact.   Neck:      Musculoskeletal: Normal range of motion.      Comments: Right IJ CVC in place. No tenderness or erythema noted on surrounding skin  Cardiovascular:      Rate and Rhythm: Regular rhythm. Tachycardia present.      Heart sounds: No murmur.   Pulmonary:      Effort: Pulmonary effort is normal. No respiratory distress.      Breath sounds: Normal breath sounds. No wheezing.   Abdominal:      General: There is no distension.      Palpations: Abdomen is soft.      Tenderness: There is no abdominal tenderness.   Musculoskeletal:      Comments: Stage 2 diabetic foot ulcer measuring 1cm noted on Left 1st phalanx. Left leg is wrapped in Kerlix bandage.    Neurological:      Mental Status: He is alert.      Comments:  Appears somnolent but easily arousable to touch and voice.    Psychiatric:      Comments: Unable to assess due to mental status          Significant Labs:   Blood Culture:   Recent Labs   Lab  09/09/20  0303 09/09/20  1040 09/10/20  0459 09/12/20 2014 09/12/20 2015   LABBLOO No growth after 5 days. No growth to date  No Growth to date  No Growth to date  No Growth to date No growth to date  No Growth to date  No Growth to date  No Growth to date No Growth to date  No Growth to date No Growth to date  No Growth to date     CBC:   Recent Labs   Lab 09/12/20 2015 09/13/20 0156 09/14/20 0414   WBC 42.74* 40.13* 36.95*   HGB 9.7* 9.8* 9.0*   HCT 31.9* 32.0* 28.9*   * 660* 566*     CMP:   Recent Labs   Lab 09/13/20 0156 09/13/20 2058 09/14/20 0000 09/14/20 0414 09/14/20  0808   *   < > 131* 134*  134* 138  138 136   K 5.0   < > 5.2* 4.6  4.6 4.2  4.2 4.5   CL 93*   < > 94* 97  97 104  104 98   CO2 22*   < > 18* 22*  22* 28  28 23   *   < > 140* 78  78 174*  174* 150*   *   < > 108* 81*  81* 11  11 55*   CREATININE 6.7*   < > 6.2* 4.5*  4.5* 0.6  0.6 3.6*   CALCIUM 8.3*   < > 8.0* 8.0*  8.0* 7.0*  7.0* 8.5*   PROT 7.5  --  7.9  --  4.1*  --    ALBUMIN 1.5*  --  1.4* 1.4* 1.1*  1.1*  --    BILITOT 0.5  --  0.4  --  0.3  --    ALKPHOS 255*  --  262*  --  68  --    AST 35  --  35  --  25  --    ALT 53*  --  50*  --  70*  --    ANIONGAP 18*   < > 19* 15  15 6*  6* 15   EGFRNONAA 8.0*   < > 8.8* 12.9*  12.9* >60.0  >60.0 16.9*    < > = values in this interval not displayed.     Urine Culture:   Recent Labs   Lab 08/30/20  1633   LABURIN KLEBSIELLA PNEUMONIAE  > 100,000 cfu/ml  *     Urine Studies:   Recent Labs   Lab 09/09/20  1719   COLORU Preeti   APPEARANCEUA Hazy*   PHUR 5.0   SPECGRAV 1.020   PROTEINUA Negative   GLUCUA Negative   KETONESU Negative   BILIRUBINUA Negative   OCCULTUA Negative   NITRITE Negative   LEUKOCYTESUR Trace*   RBCUA 1   WBCUA 4   BACTERIA Few*   SQUAMEPITHEL 0   HYALINECASTS 7*     Wound Culture:   Recent Labs   Lab 08/31/20  1754 09/03/20  1647 09/07/20  1721   LABAERO PROTEUS MIRABILIS  Moderate  *  METHICILLIN RESISTANT  STAPHYLOCOCCUS AUREUS  Moderate  * METHICILLIN RESISTANT STAPHYLOCOCCUS AUREUS  Few  * No growth  No growth       Significant Imaging: I have reviewed all pertinent imaging results/findings within the past 24 hours.

## 2020-09-14 NOTE — PROGRESS NOTES
[facility]  Orthopedics  Progress Note    Patient Name: Ed Patton  MRN: [id number]  Admission Date: 8/30/2020  Hospital Length of Stay: 15 days  Attending Provider: Mk Jolly MD  Primary Care Provider: Qiana Chow MD  Follow-up For: Procedure(s) (LRB):  ARTHROSCOPY, KNEE, RIGHT  (Right)    Post-Operative Day: 7 Days Post-Op  Subjective:     Principal Problem:Septic shock    Principal Orthopedic Problem: R knee septic arthritis    Interval History: Patient seen and examined at bedside.  No acute events overnight.  Pain controlled.  Remains on pressors.      Review of patient's allergies indicates:   Allergen Reactions    Vicodin [hydrocodone-acetaminophen] Itching       Current Facility-Administered Medications   Medication    acetaminophen tablet 1,000 mg    albuterol-ipratropium 2.5 mg-0.5 mg/3 mL nebulizer solution 3 mL    artificial tears 0.5 % ophthalmic solution 1 drop    aspirin EC tablet 81 mg    calcium carbonate 200 mg calcium (500 mg) chewable tablet 500 mg    ceFEPIme injection 1 g    dextrose 10 % infusion    dextrose 10 % infusion    dextrose 50% injection 12.5 g    dextrose 50% injection 25 g    fluticasone furoate-vilanteroL 200-25 mcg/dose diskus inhaler 1 puff    fluticasone propionate 50 mcg/actuation nasal spray 50 mcg    glucagon (human recombinant) injection 1 mg    glucose chewable tablet 16 g    glucose chewable tablet 24 g    heparin (porcine) injection 5,000 Units    insulin regular 100 Units in sodium chloride 0.9% 100 mL infusion    iohexol (OMNIPAQUE) oral solution 15 mL    lactated ringers bolus 500 mL    levothyroxine tablet 75 mcg    melatonin tablet 6 mg    norepinephrine 4 mg in dextrose 5% 250 mL infusion (premix) (titrating)    ondansetron injection 4 mg    oxyCODONE immediate release tablet 5 mg    polyethylene glycol packet 17 g    sodium chloride 0.9% flush 10 mL    tamsulosin 24 hr capsule 0.4 mg    Tdap  "vaccine injection 0.5 mL    vancomycin - pharmacy to dose     Objective:     Vital Signs (Most Recent):  Temp: 97.6 °F (36.4 °C) (09/13/20 0400)  Pulse: (!) 113 (09/13/20 0700)  Resp: 14 (09/13/20 0700)  BP: (!) 80/59 (09/13/20 0700)  SpO2: (!) 92 % (09/13/20 0700) Vital Signs (24h Range):  Temp:  [97.3 °F (36.3 °C)-98.7 °F (37.1 °C)] 97.6 °F (36.4 °C)  Pulse:  [106-120] 113  Resp:  [12-25] 14  SpO2:  [88 %-99 %] 92 %  BP: ()/(45-73) 80/59     Weight: 115 kg (253 lb 8.5 oz)  Height: 6' 4" (193 cm)  Body mass index is 30.86 kg/m².        Ortho/SPM Exam    NAD  No increased WOB  Dressings c/d/i  No knee effusion  SILT T/SP/DP  Motor intact T/DP  WWP extremities    Significant Labs:   CBC:   Recent Labs   Lab 09/12/20 0252 09/12/20 2015 09/13/20  0156   WBC 46.04* 42.74* 40.13*   HGB 9.4* 9.7* 9.8*   HCT 30.7* 31.9* 32.0*   * 625* 660*     CMP:   Recent Labs   Lab 09/12/20 0252 09/12/20 2015 09/13/20  0156   * 131* 133*   K 4.4 4.8 5.0   CL 92* 93* 93*   CO2 20* 22* 22*   * 307* 312*   BUN 80* 95* 103*   CREATININE 5.8* 6.4* 6.7*   CALCIUM 8.2* 8.5* 8.3*   PROT  --  7.6 7.5   ALBUMIN 1.5* 1.5* 1.5*   BILITOT  --  0.5 0.5   ALKPHOS  --  269* 255*   AST  --  42* 35   ALT  --  58* 53*   ANIONGAP 20* 16 18*   EGFRNONAA 9.5* 8.5* 8.0*     All pertinent labs within the past 24 hours have been reviewed.    Significant Imaging: I have reviewed all pertinent imaging results/findings.    Assessment/Plan:     Septic arthritis of knee, right  65 y.o. male s/p R knee scope I&D 9/7/20    VS:  Levo, vaso, 3LNC, tachycardia  Nerve block:  none  PT/OT:  WBAT  DVT PPx:  lovenox per primary  Cultures:  MRSA from R knee aspiration 9/3/20; NGTD intraop cx; BCx 9/8 positive, 9/9 NGTD  Abx:  Vanc, cefepime per ID  Labs: Hb 9 , WBC 37  Drain:  none  Newell:  none    Care per MICU          Durga Walker MD  Orthopedics  [facility]  "

## 2020-09-14 NOTE — PLAN OF CARE
Patient not medically stable for stepdown at this time. Patient continues to require [facility] service for:   Pressors    CM remains available for any further patient/family needs or concerns.        09/14/20 1157   Discharge Reassessment   Assessment Type Discharge Planning Reassessment   Do you have any problems affording any of your prescribed medications? No   Discharge Plan A Skilled Nursing Facility   Discharge Plan B Long-term acute care facility (LTAC)   DME Needed Upon Discharge  other (see comments)  (TBD)   Anticipated Discharge Disposition SNF   Can the patient/caregiver answer the patient profile reliably? Yes, cognitively intact   Post-Acute Status   Post-Acute Authorization Placement   Post-Acute Placement Status Awaiting Internal Medical Clearance   Discharge Delays (!) Change in Medical Condition       Collins Ch MSN, RN-BC  Critical Care-   Ext. [phone]

## 2020-09-14 NOTE — ASSESSMENT & PLAN NOTE
-Most recent A1c 9.5 in 08/2020.   -Home regimen: NPH 45U AM and 35U PM.   -Complicated by CKD III and polyneuropathy with diabetic foot ulcer.   -ICU admission on 09/12 notable for increased anion gap metabolic acidosis and elevated beta-hydroxybutyrate concern for DKA possible precipitated from withholding insulin versus worsening of underlying infection.     Plan:   - Continue Insulin gtt at 0.1. Plan was to transition to sq insulin later today but AG increased to 15 now with BG in 260s. Will hold off on transitioning today.   -Trending BMP's Q6H; monitor for hyperkalemia.   - Holding home Gabapentin with concern for hypotension.   - Hypoglycemia protocol ordered.

## 2020-09-14 NOTE — PROGRESS NOTES
Dr. Espinosa notified of repeat BG 86, insulin gtt currently infusing @ 0.1, D10 @ 15ml/hr. Per MD, stop insulin and D10 infusion; will place order to start D5 1/2NS.

## 2020-09-14 NOTE — PLAN OF CARE
Pt awake but drowsy; disoriented x4. Oxygenating with 4L O2 via NC. Levophed, vasopressin, and insulin gtts infusing per MD order. CRRT continuing at this time with no acute events; UF increased during shift. Plan to complete CT head, chest, abdomen, and pelvis. No acute events throughout day, VS and assessment per flow sheet, patient progressing towards goals as tolerated, plan of care reviewed with Ed Patton and family, all concerns addressed, will continue to monitor.    Problem: Adult Inpatient Plan of Care  Goal: Optimal Comfort and Wellbeing  Outcome: Ongoing, Progressing  Goal: Rounds/Family Conference  Outcome: Ongoing, Progressing     Problem: Diabetes Comorbidity  Goal: Blood Glucose Level Within Desired Range  Outcome: Ongoing, Progressing     Problem: Bleeding (Sepsis/Septic Shock)  Goal: Absence of Bleeding  Outcome: Ongoing, Progressing     Problem: Glycemic Control Impaired (Sepsis/Septic Shock)  Goal: Blood Glucose Level Within Desired Range  Outcome: Ongoing, Progressing     CMICU DAILY GOALS     A: Awake    RASS: Goal - RASS Goal: 0-->alert and calm  Actual - RASS (Jimenez Agitation-Sedation Scale): -2-->light sedation   Restraint necessity: No  B: Breath   SBT: Not intubated   C: Coordinate A & B, analgesics/sedatives   Pain: managed    SAT: Not intubated  D: Delirium   CAM-ICU: Overall CAM-ICU: Positive  E: Early(intubated/ Progressive (non-intubated) Mobility   MOVE Screen: Fail   Activity: Activity Management: patient unable to perform activities  FAS: Feeding/Nutrition   Diet order: Diet/Nutrition Received: NPO, tube feeding,   Fluid restriction:    T: Thrombus   DVT prophylaxis: VTE Required Core Measure: (TEDs) Compression stocking therapy initiated/maintained  H: HOB Elevation   Head of Bed (HOB): HOB at 30 degrees  U: Ulcer Prophylaxis   GI: yes  G: Glucose control   managed Glycemic Management: blood glucose monitoring  S: Skin   Bundle compliance: yes   Bathing/Skin Care: bath,  chlorhexidine, bath, complete, dressed/undressed, incontinence care, linen changed Date: 9/14/20 am bath complete  B: Bowel Function   diarrhea   I: Indwelling Catheters   Newell necessity:      Urethral Catheter 09/09/20 1720 16 Fr.-Reason for Continuing Urinary Catheterization: Critically ill in ICU requiring intensive monitoring   CVC necessity: Yes   IPAD offered: Not appropriate  D: De-escalation Antibx   Abx per MAR  Plan for the day   Optimize mental status; continue CRRT; wean pressors as tolerant  Family/Goals of care/Code Status   Code Status: Full Code

## 2020-09-14 NOTE — SUBJECTIVE & OBJECTIVE
Interval History/Significant Events: No events overnight. Encephalopathic,  cc last 24 hrs, net -1.8 L since admission, also on CRRT.     Review of Systems   Unable to perform ROS: Mental status change     Objective:     Vital Signs (Most Recent):  Temp: 99.3 °F (37.4 °C) (09/14/20 0700)  Pulse: (!) 127 (09/14/20 1000)  Resp: 19 (09/14/20 1000)  BP: (!) 104/58 (09/14/20 1000)  SpO2: (!) 94 % (09/14/20 1000) Vital Signs (24h Range):  Temp:  [96.4 °F (35.8 °C)-99.3 °F (37.4 °C)] 99.3 °F (37.4 °C)  Pulse:  [109-128] 127  Resp:  [14-27] 19  SpO2:  [91 %-100 %] 94 %  BP: ()/(58) 104/58  Arterial Line BP: ()/(50-73) 109/55   Weight: 114.8 kg (253 lb)  Body mass index is 30.8 kg/m².      Intake/Output Summary (Last 24 hours) at 9/14/2020 1121  Last data filed at 9/14/2020 1000  Gross per 24 hour   Intake 3738.88 ml   Output 3097 ml   Net 641.88 ml       Physical Exam  Constitutional:       Appearance: Normal appearance. He is overweight. He is ill-appearing.      Interventions: Nasal cannula in place.   Eyes:      General: No scleral icterus.     Conjunctiva/sclera: Conjunctivae normal.      Pupils: Pupils are equal, round, and reactive to light.   Neck:      Comments: Central line inserted in right IJ with clean, dry, and intact dressing.   Cardiovascular:      Rate and Rhythm: Regular rhythm. Tachycardia present.      Pulses: Normal pulses.      Heart sounds: Normal heart sounds. No murmur.   Pulmonary:      Effort: Pulmonary effort is normal. No respiratory distress.      Breath sounds: Examination of the right-lower field reveals decreased breath sounds. Examination of the left-lower field reveals decreased breath sounds. Decreased breath sounds and rales (b/l basilar rales) present. No wheezing or rhonchi.   Abdominal:      General: Bowel sounds are normal. There is no distension.      Palpations: Abdomen is soft.      Tenderness: There is no abdominal tenderness.   Musculoskeletal:      Comments:  There is edema of the bilateral lower extremities.   Lymphadenopathy:      Cervical: No cervical adenopathy.   Skin:     General: Skin is warm and dry.      Findings: No bruising or rash.   Neurological:      Mental Status: He is lethargic and disoriented.      Comments: Following commands         Vents:     Lines/Drains/Airways     Central Venous Catheter Line            Trialysis (Dialysis) Catheter 09/13/20 0807 right internal jugular 1 day          Drain                 Urethral Catheter 09/09/20 1720 16 Fr. 4 days         NG/OG Tube 09/14/20 0500 Lengby sump 14 Fr. Left nostril less than 1 day          Arterial Line            Arterial Line 09/13/20 0753 Left Radial 1 day          Peripheral Intravenous Line                 Peripheral IV - Single Lumen 09/10/20 1455 20 G;1 3/4 in Left;Anterior Forearm 3 days         Peripheral IV - Single Lumen 09/10/20 1600 20 G Right;Posterior Forearm 3 days         Peripheral IV - Single Lumen 09/13/20 0600 20 G Right Antecubital 1 day              Significant Labs:    CBC/Anemia Profile:  Recent Labs   Lab 09/12/20 2015 09/13/20  0156 09/14/20  0414   WBC 42.74* 40.13* 36.95*   HGB 9.7* 9.8* 9.0*   HCT 31.9* 32.0* 28.9*   * 660* 566*   MCV 88 87 85   RDW 15.9* 16.2* 15.9*        Chemistries:  Recent Labs   Lab 09/13/20 0156 09/13/20 2058 09/14/20  0000 09/14/20  0414 09/14/20  0808   *   < > 131* 134*  134* 138  138 136   K 5.0   < > 5.2* 4.6  4.6 4.2  4.2 4.5   CL 93*   < > 94* 97  97 104  104 98   CO2 22*   < > 18* 22*  22* 28  28 23   *   < > 108* 81*  81* 11  11 55*   CREATININE 6.7*   < > 6.2* 4.5*  4.5* 0.6  0.6 3.6*   CALCIUM 8.3*   < > 8.0* 8.0*  8.0* 7.0*  7.0* 8.5*   ALBUMIN 1.5*  --  1.4* 1.4* 1.1*  1.1*  --    PROT 7.5  --  7.9  --  4.1*  --    BILITOT 0.5  --  0.4  --  0.3  --    ALKPHOS 255*  --  262*  --  68  --    ALT 53*  --  50*  --  70*  --    AST 35  --  35  --  25  --    MG 2.3  --   --  2.2 1.8  1.8  --    PHOS  10.8*  --   --  6.6* 2.2*  --     < > = values in this interval not displayed.       All pertinent labs within the past 24 hours have been reviewed.    Significant Imaging:  I have reviewed and interpreted all pertinent imaging results/findings within the past 24 hours.

## 2020-09-14 NOTE — PLAN OF CARE
Patient's Levophed was able to be titrated down. CRRT started, the clotted, then restarted with Citrate. Insulin dtt off for hypoglycemia, then restarted. NG placed to initiate tube feedings in the setting of his somnolence.   Problem: Adjustment to Illness (Sepsis/Septic Shock)  Goal: Optimal Coping  Outcome: Ongoing, Progressing     Problem: Bleeding (Sepsis/Septic Shock)  Goal: Absence of Bleeding  Outcome: Ongoing, Progressing     Problem: Diabetes Comorbidity  Goal: Blood Glucose Level Within Desired Range  Outcome: Ongoing, Not Progressing     Problem: Fall Injury Risk  Goal: Absence of Fall and Fall-Related Injury  Outcome: Met     Problem: Adult Inpatient Plan of Care  Goal: Plan of Care Review  Outcome: Met  Goal: Patient-Specific Goal (Individualization)  Outcome: Met  Goal: Absence of Hospital-Acquired Illness or Injury  Outcome: Met

## 2020-09-14 NOTE — H&P
[facility]  Critical Care Medicine  History & Physical    Patient Name: Ed Patton  MRN: [id number]  Admission Date: 8/30/2020  Hospital Length of Stay: 14 days  Code Status: Full Code  Attending Physician: Yvette Quintanilla MD   Primary Care Provider: Qiana Chow MD   Principal Problem: Septic shock    Subjective:     HPI:  Mr. Ed Patton is a 65 year old male for whom MICU was consulted for hypotension. He has a PMH significant for HFrEF and COPD with chronic respiratory failure (on 2L home oxygen), T2DM with CKD III, and iron deficiency anemia. History obtained from chart review and speaking with patient. See hospital course below for detail regarding care and course of this patient.     Hospital/ICU Course:  Mr. Ed Patton was admitted to hospital medicine on 08/30 for management of a left-sided diabetic foot infection that was precipitated by an undetected punction wound after stepping on a tack/nail. His presentation was notable for leukocytosis with elevated inflammatory markers, however MRI of left foot only showed cellulitis of the anterior and lateral aspect of foot. Podiatry was consulted with recommendations for IV antibiotics; he was initiated on Ciprofloxacin and Vancomycin on admission. However, blood cultures from admission resulted positive for MRSA with wound cultures from left foot also growing MRSA with Proteus mirabilis. By 09/03 cultures remained positive despite Vancomycin, and patient was noted to develop right knee pain with swelling. Orthopedics was consulted and patient underwent right knee joint aspiration with cultures also positive for MRSA. LUKAS on 09/04 showed known HFrEF, but was negative for endocarditis. MRI of lumbar spine on 09/08 was negative for abscess. By 09/06, blood cultures continued to remain positive, and he underwent I&D of right arm abscess on 09/08 with cultures also positive for MRSA. For persistent MRSA bacteremia, he was  transitioned to Daptomycin and Ceftaroline, however this regimen was discontinued by 09/10 due to concern for developing rhabdomylosis. By 09/09, he was noted to develop a progressively worsening leukocytosis and NANETTE. Nephrology was consulted on 09/09 with suspicion for ATN. Hematology was consulted on 09/12 for persistent leukocytosis as likely reactive from bacteremia. On 09/10, patient began developing intermitent hypotension prompting broadening of antibiotics to Cefepime and Vancomycin. By 09/12, renal function continued to worsen in addition to hypotension prompting transfer to ICU for vasopressor support and possible HD.      Interval History/Significant Events: Last night on admission to ICU patient was initiated on vasopressor support in order to achieve MAP goals. His labs on admission were also notable for DKA with insulin drip initiated; judicious IVF were given with concern for risk of volume overload with poor EF and declining renal function. This morning, patient developed rapid decline in MAP's prompting placement of central and arterial lines. He was also noted to have worsening encephalopathy with increasing supplemental oxygen requirements. Nephrology recommending initiation of CRRT. Infectious work-up continues.     Review of Systems   Unable to perform ROS: Mental status change     Objective:     Vital Signs (Most Recent):  Temp: 97.5 °F (36.4 °C) (09/13/20 2000)  Pulse: (!) 121 (09/13/20 2045)  Resp: 20 (09/13/20 2045)  BP: (!) 94/54 (09/13/20 0800)  SpO2: 100 % (09/13/20 2045) Vital Signs (24h Range):  Temp:  [97.5 °F (36.4 °C)-98 °F (36.7 °C)] 97.5 °F (36.4 °C)  Pulse:  [106-122] 121  Resp:  [14-21] 20  SpO2:  [89 %-100 %] 100 %  BP: ()/(45-73) 94/54  Arterial Line BP: ()/(54-71) 114/71   Weight: 115 kg (253 lb 8.5 oz)  Body mass index is 30.86 kg/m².      Intake/Output Summary (Last 24 hours) at 9/13/2020 2112  Last data filed at 9/13/2020 2100  Gross per 24 hour   Intake 2337.73  ml   Output 655 ml   Net 1682.73 ml       Physical Exam  Constitutional:       Appearance: Normal appearance. He is overweight. He is ill-appearing.      Interventions: Nasal cannula in place.   Eyes:      Conjunctiva/sclera: Conjunctivae normal.      Pupils: Pupils are equal, round, and reactive to light.   Neck:      Comments: Central line inserted in right IJ with clean, dry, and intact dressing.   Cardiovascular:      Rate and Rhythm: Regular rhythm. Tachycardia present.      Pulses: Normal pulses.      Heart sounds: Normal heart sounds.   Pulmonary:      Effort: Pulmonary effort is normal. No respiratory distress.      Breath sounds: Examination of the right-lower field reveals decreased breath sounds. Examination of the left-lower field reveals decreased breath sounds. Decreased breath sounds present.   Abdominal:      General: Bowel sounds are normal. There is no distension.      Palpations: Abdomen is soft.      Tenderness: There is no abdominal tenderness.   Musculoskeletal:      Comments: There is edema of the bilateral lower extremities. Right distal lower extremity placed in boot.    Skin:     General: Skin is warm and dry.      Findings: No bruising or rash.   Neurological:      Mental Status: He is lethargic.         Vents:     Lines/Drains/Airways     Central Venous Catheter Line            Trialysis (Dialysis) Catheter 09/13/20 0807 right internal jugular less than 1 day          Drain                 Urethral Catheter 09/09/20 1720 16 Fr. 4 days          Arterial Line            Arterial Line 09/13/20 0753 Left Radial less than 1 day          Peripheral Intravenous Line                 Peripheral IV - Single Lumen 09/10/20 1455 20 G;1 3/4 in Left;Anterior Forearm 3 days         Peripheral IV - Single Lumen 09/10/20 1600 20 G Right;Posterior Forearm 3 days         Peripheral IV - Single Lumen 09/13/20 0600 20 G Right Antecubital less than 1 day              Significant Labs:    CBC/Anemia  Profile:  Recent Labs   Lab 09/12/20 0252 09/12/20 2015 09/13/20  0156   WBC 46.04* 42.74* 40.13*   HGB 9.4* 9.7* 9.8*   HCT 30.7* 31.9* 32.0*   * 625* 660*   MCV 88 88 87   RDW 15.9* 15.9* 16.2*        Chemistries:  Recent Labs   Lab 09/12/20 0252 09/12/20 2015 09/13/20  0156  09/13/20  0810 09/13/20  1152 09/13/20  1701   * 131* 133*   < > 129* 129* 130*   K 4.4 4.8 5.0   < > 5.0 5.4* 5.3*   CL 92* 93* 93*   < > 94* 94* 93*   CO2 20* 22* 22*   < > 18* 17* 16*   BUN 80* 95* 103*   < > 106* 109* 112*   CREATININE 5.8* 6.4* 6.7*   < > 6.7* 6.8* 6.5*   CALCIUM 8.2* 8.5* 8.3*   < > 8.3* 8.2* 8.0*   ALBUMIN 1.5* 1.5* 1.5*  --   --   --   --    PROT  --  7.6 7.5  --   --   --   --    BILITOT  --  0.5 0.5  --   --   --   --    ALKPHOS  --  269* 255*  --   --   --   --    ALT  --  58* 53*  --   --   --   --    AST  --  42* 35  --   --   --   --    MG 2.3  --  2.3  --   --   --   --    PHOS 8.5*  --  10.8*  --   --   --   --     < > = values in this interval not displayed.       Significant Imaging:  I have reviewed all pertinent imaging results/findings within the past 24 hours.    Assessment/Plan:     Neuro  Acute metabolic encephalopathy  -Likely multifactorial from uremia vs DKA vs worsening shock with cerebral hypoperfusion vs sepsis.   -Per speaking with wife, she describes a likely baseline history of developing dementia prior to hospitalization.     Plan:   -Continue supportive management for multifactorial issues.   -Closely monitor respiratory status with concern for needing intubation for airway protection.     Pulmonary  Acute on chronic respiratory failure with hypoxia  -History of mixed COPD (based on PFT's from 05/2015 showing mild (small airways) obstruction, airflow not improved after bronchodilator, and moderate restriction) and HFrEF with chronic respiratory failure on 2L home oxygen.   -ICU admission notable for progressive increase in supplemental oxygen requirements.   -Work-up for  underlying etiology of acute on chronic respiratory failure include CXR showing suspected HAP and concern for possible progression of CHF.     Plan:   -Continue supplemental oxygen with goal saturations >88%; given encephalopathy, low threshold to intubated, however may be difficult to manage given underlying CHF and pulmonary hypertension.   -Repeat ECHO ordered.   -Holding home CHF regimen with shock on ICU admission.   -Nephrology following for CRRT for attempted volume optimization.   -Continue home BREO with duo-nebulizers PRN.   -Strict I/O's and daily weights.     Hospital-acquired pneumonia  -See assessment for shock.     COPD mixed type  -Based on PFT's from 05/2015 showing mild (small airways) obstruction, airflow not improved after bronchodilator, and moderate restriction.    Plan:   -See assessment for respiratory failure. .     Cardiac/Vascular  Hyperlipidemia  Plan:   -Holding home Statin with concern for rhabdomyolysis earlier in admission.     Chronic systolic congestive heart failure  -Most recent ECHO in 09/2020 with EF of 25%.   -Unclear is ischemic vs non-ischemic.   -Home regimen: Carvedilol, Lasix 80 mg, and Lisinopril.   -Exam notable for bilateral lower extremity edema and JVD.   -Associated with chronic hypoxemic respiratory failure requiring 2L nasal cannula, but noted to develop worsening oxygen requirements on ICU admission that were likely multifactorial from suspected hospital acquired pneumonia versus declining urine output with pre-disposition to volume overload. .     Plan:   -See assessment for respiratory failure.     Renal/  Increased anion gap metabolic acidosis  -See assessment for T2DM with DKA.     NANETTE (acute kidney injury)  -Admission notable for progressive NANETTE since approximately 09/08 with worsening Cr associated with increased anion gap metabolic acidosis.   -Status post evaluation by nephrology with concern for ATN based on urine microscopy showing scant normal red  blood cells without any dysmorphic shapes evident and numerous muddy brown casts.   -Admission to ICU notable for continued decline in renal function with eventual initiation of CRRT on 09/13.   -Etiology of continued decline in renal function is likely multifactorial from shock vs progression of CHF vs DKA vs medication side-effect.     Plan:   -Nephrology following for CRRT management.   -Trending renal function Q4H while treating DKA.   -Trialysis line place on 09/13 for HD access.   -Strict I/O's and daily weights, if possible.   -Renally dose all medications.     ID  * Septic shock  This is a 65 year old  male with PMH significant for HFrEF and COPD with chronic respiratory failure (on 2L home oxygen), T2DM with CKD III, and iron deficiency anemia who originally presented to [facility] on 08/30 with cellulitis of left foot with concern for diabetic foot infection with subsequent development of MRSA bacteremia attributed to septic arthritis of right knee and elbow. He is status post drainage and coverage for MRSA since that time. His work-up for other etiologies of MRSA bacteremia have included negative LUKAS and MRI of lumbar spine looking for endocarditis and spinal abscess, respectively. Stool studies for C.diff on 09/11 were negative. His hospital course has been associated with worsening hypotension and leukocytosis since 09/10 prompting ICU admission on 09/12 for initiation of vasopressor support. Infectious work-up thus far on ICU admission concern for likely right lower lobe HAP.     Plan:   -Continue broad spectrum coverage with Vancomycin and Cefepime; pharmacy to dose Vancomycin.   -Follow-up CT chest, abdomen, and pelvis looking for other etiologies of infection, however clinical stability and airway protection will have to be prioritize prior to obtaining.   -Follow-up repeat blood cultures.   -Follow-up repeat UA.   -Vasopressor support for goal MAP >65.   -Initiate stress dose steroids.    -Trending WBC count daily.     Septic arthritis of knee, right  -See assessment for shock.     Staphylococcal arthritis of right knee  -See assessment for shock.     Bacteremia due to methicillin resistant Staphylococcus aureus  -See assessment for shock.     Endocrine  Diabetic ulcer of left foot associated with type 2 diabetes mellitus, with fat layer exposed  -See assessment for shock.     Postablative hypothyroidism  Plan:   -Continue home SYNTHROID.     Type 2 diabetes mellitus with ketoacidosis without coma, with long-term current use of insulin  -Most recent A1c 9.5 in 08/2020.   -Home regimen: NPH 45U AM and 35U PM.   -Complicated by CKD III and polyneuropathy with diabetic foot ulcer.   -ICU admission on 09/12 notable for increased anion gap metabolic acidosis and elevated beta-hydroxybutyrate concern for DKA possible precipitated from withholding insulin versus worsening of underlying infection.     Plan:   -Initiation of insulin drip per DKA algorithm with Q1H glucose checks.   -Trending BMP's Q4H; monitor for hyperkalemia.   -Holding off on continuous IVF given significantly reduced EF.   -Sugar free bariatric clear liquid diet ordered.   -Holding home Gabapentin with concern for hypotension.   -Hypoglycemia protocol ordered.     Type 2 diabetes mellitus with diabetic polyneuropathy, with long-term current use of insulin  -Most recent A1c 9.5 in 08/2020.   -Home regimen: NPH 45U AM and 35U PM.   -Complicated by CKD III and polyneuropathy with diabetic foot ulcer.   -ICU admission on 09/12 notable for increased anion gap metabolic acidosis and elevated beta-hydroxybutyrate concern for DKA possible precipitated from withholding insulin versus worsening of underlying infection.     Plan:   -Initiation of insulin drip per DKA algorithm with Q1H glucose checks.   -Trending BMP's Q4H; monitor for hyperkalemia.   -Holding off on continuous IVF given significantly reduced EF.   -Sugar free bariatric clear  liquid diet ordered.   -Holding home Gabapentin with concern for hypotension.   -Hypoglycemia protocol ordered.     Type 2 diabetes mellitus with stage 3 chronic kidney disease, with long-term current use of insulin  -See assessment for NANETTE.        Critical secondary to Patient has a condition that poses threat to life and bodily function: Severe Respiratory Distress, Acute Renal Failure and Septic Shock     Critical care was time spent personally by me on the following activities: development of treatment plan with patient or surrogate and bedside caregivers, discussions with consultants, evaluation of patient's response to treatment, examination of patient, ordering and performing treatments and interventions, ordering and review of laboratory studies, ordering and review of radiographic studies, pulse oximetry, re-evaluation of patient's condition. This critical care time did not overlap with that of any other provider or involve time for any procedures.     Junito Mccain MD  Critical Care Medicine  [facility]

## 2020-09-14 NOTE — ASSESSMENT & PLAN NOTE
-History of mixed COPD (based on PFT's from 05/2015 showing mild (small airways) obstruction, airflow not improved after bronchodilator, and moderate restriction) and HFrEF with chronic respiratory failure on 2L home oxygen.   -ICU admission notable for progressive increase in supplemental oxygen requirements.   -Work-up for underlying etiology of acute on chronic respiratory failure include CXR showing suspected HAP and concern for possible progression of CHF.     Plan:   -Continue supplemental oxygen with goal saturations >88%; given encephalopathy, low threshold to intubated, however may be difficult to manage given underlying CHF and pulmonary hypertension.   -Repeat ECHO ordered.   -Holding home CHF regimen with shock on ICU admission.   -Nephrology following for CRRT for attempted volume optimization.   -Continue home BREO with duo-nebulizers PRN.   -Strict I/O's and daily weights.

## 2020-09-14 NOTE — ASSESSMENT & PLAN NOTE
-Likely multifactorial from uremia vs DKA vs worsening shock with cerebral hypoperfusion vs sepsis.   -Per speaking with wife, she describes a likely baseline history of developing dementia prior to hospitalization.     Plan:   -Continue supportive management for multifactorial issues.   -Closely monitor respiratory status with concern for needing intubation for airway protection.

## 2020-09-14 NOTE — ASSESSMENT & PLAN NOTE
-Most recent ECHO in 09/2020 with EF of 25%.   -Unclear is ischemic vs non-ischemic.   -Home regimen: Carvedilol, Lasix 80 mg, and Lisinopril.   -Exam notable for bilateral lower extremity edema and JVD.   -Associated with chronic hypoxemic respiratory failure requiring 2L nasal cannula, but noted to develop worsening oxygen requirements on ICU admission that were likely multifactorial from suspected hospital acquired pneumonia versus declining urine output with pre-disposition to volume overload. .     TTE: 9/14  EF ranging in 25- 35% for all last 3 echos now  Moderate biventricular systolic dysfunction  LVDD    Plan:   -See assessment for respiratory failure.

## 2020-09-14 NOTE — ASSESSMENT & PLAN NOTE
-Most recent ECHO in 09/2020 with EF of 25%.   -Unclear is ischemic vs non-ischemic.   -Home regimen: Carvedilol, Lasix 80 mg, and Lisinopril.   -Exam notable for bilateral lower extremity edema and JVD.   -Associated with chronic hypoxemic respiratory failure requiring 2L nasal cannula, but noted to develop worsening oxygen requirements on ICU admission that were likely multifactorial from suspected hospital acquired pneumonia versus declining urine output with pre-disposition to volume overload. .     Plan:   -See assessment for respiratory failure.

## 2020-09-14 NOTE — ASSESSMENT & PLAN NOTE
MRSA septicemia. Still tachycardic, SpO2 reached 92%, renal function continues to worsen. WBCs not improving. ESR and CRP elevated, RF WNL. Vancomycin was supratherapeutic, switched to pulse dose regimen. Source of bacteremia unclear, may be left foot wound or a septic process at the right knee joint. Right knee now clinically suspicious for septic arthritis (see physical exam). Left foot wound growing Proteus and MRSA, urine growing Klebsiella. TTE & LUKAS is negative. Dapto/ceftaroline switched back to vanc due to worsening renal function and early signs of rhabdo. Course complicated by worsening leukocytosis (C Diff neg)     Blood cultures: MRSA  R knee fluid culture: Staph aureus  Left foot wound: Proteus, MRSA  Source Control: I&D of Septic R knee on 9/7/20.  CXR with right lower lobe opacities concerning for developing pneumonia     Recommendations:   -- Discontinue cefepime. Start oral levofloxacin, renally dosed.   -- Check CK level and if <4 times upper limit of normal, switch from IV vancomycin to IV daptomycin. If CK level >4 times upper limit of normal, continue IV vancomycin.   -- Obtain non contrast CT Chest Abd-Pelvis to evaluate for potential abscess.  -- F/u ABIs  -- Repeat Blood cultures 09/09, 09/10, 09/12 have been negative  -- If no other possible source found, patient will need to complete 4 weeks of antibiotic therapy from date of clearance 9/9

## 2020-09-14 NOTE — ASSESSMENT & PLAN NOTE
This is a 65 year old  male with PMH significant for HFrEF and COPD with chronic respiratory failure (on 2L home oxygen), T2DM with CKD III, and iron deficiency anemia who originally presented to [facility] on 08/30 with cellulitis of left foot with concern for diabetic foot infection with subsequent development of MRSA bacteremia attributed to septic arthritis of right knee and elbow. He is status post drainage and coverage for MRSA since that time. His work-up for other etiologies of MRSA bacteremia have included negative LUKAS and MRI of lumbar spine looking for endocarditis and spinal abscess, respectively. Stool studies for C.diff on 09/11 were negative. His hospital course has been associated with worsening hypotension and leukocytosis since 09/10 prompting ICU admission on 09/12 for initiation of vasopressor support. Infectious work-up thus far on ICU admission concern for likely right lower lobe HAP.     Plan:   -Continue broad spectrum coverage with Vancomycin and Cefepime; pharmacy to dose Vancomycin.   -Follow-up CT chest, abdomen, and pelvis looking for other etiologies of infection, however clinical stability and airway protection will have to be prioritize prior to obtaining.   -Follow-up repeat blood cultures.   -Follow-up repeat UA.   -Vasopressor support for goal MAP >65.   -Initiate stress dose steroids.   -Trending WBC count daily.

## 2020-09-14 NOTE — ASSESSMENT & PLAN NOTE
-Based on PFT's from 05/2015 showing mild (small airways) obstruction, airflow not improved after bronchodilator, and moderate restriction.    Plan:   -See assessment for respiratory failure. .

## 2020-09-14 NOTE — PT/OT/SLP DISCHARGE
Occupational Therapy Discharge Summary    Ed Patton  MRN: [id number]   Principal Problem: Septic shock      Patient Discharged from acute Occupational Therapy on 9/12/2020.  Please refer to prior OT note dated 9/11/2020 for functional status.    Assessment:      Patient has not met goals. Patient transferred to alternate level of care.     Objective:     GOALS:   Multidisciplinary Problems     Occupational Therapy Goals        Problem: Occupational Therapy Goal    Goal Priority Disciplines Outcome Interventions   Occupational Therapy Goal     OT, PT/OT Ongoing, Progressing    Description: Goals to be met by:10/01/2020    Patient will increase functional independence with ADLs by performing:    UE Dressing with [place].  LE Dressing with Stand-by Assistance.  Grooming while seated at sink with [place].  Toileting from bedside commode with Supervision for hygiene and clothing management.   Bathing from  sitting at sink with Set-up Assistance.  Toilet transfer to bedside commode with Stand-by Assistance, accurate adherence to NWB precautions LLE.                     Reasons for Discontinuation of Therapy Services  Transfer to alternate level of care.      Plan:     Patient Discharged to: In house to ICU. Need new therapy orders to re-establish POC.    ELISEO Stokes  9/14/2020

## 2020-09-14 NOTE — CLINICAL REVIEW
Hematology Brief Follow-up Note    Patient is a 65 year old man admitted for diabetic foot infection and right knee septic arthritis, found to have MRSA bacteremia and worsening renal function. Labs notable for leukocytosis with WBC as high as 46.     Leukocytosis likely reactive leukocytosis (leukemoid reaction) given that on CBC, there is high percentage and number of granulocytes and patient has on going MRSA bacteremia with R knee septic arthritis. Peripheral smear reviewed - showed numerous granulocytes but no concerning morphological changes.         Assessment/Plan:    Thrombocytosis  - potentially secondary to infection   - can check ferritin as outpatient, as not likely to replace here with all of current infections  - check an MPN panel, given chronic elevation in counts      Leukocytosis  - Continue antibiotics as recommended by ID to treat bacteremia, septic arthritis, left diabetic foot infection  - can follow up CT C/A/P if completed   - check BCR/ABL testing      If molecular tests above are negative, given neutrophilic leukocytosis and thrombocytosis, would agree with both of these being reactive. Will follow peripherally. Please contact Hematology with any other questions.    Xenia Malin MD  Hematology/Oncology Fellow, PGY VI  [facility]

## 2020-09-14 NOTE — ASSESSMENT & PLAN NOTE
This is a 65 year old  male with PMH significant for HFrEF and COPD with chronic respiratory failure (on 2L home oxygen), T2DM with CKD III, and iron deficiency anemia who originally presented to [facility] on 08/30 with cellulitis of left foot with concern for diabetic foot infection with subsequent development of MRSA bacteremia attributed to septic arthritis of right knee and elbow. He is status post drainage and coverage for MRSA since that time. His work-up for other etiologies of MRSA bacteremia have included negative LUKAS and MRI of lumbar spine looking for endocarditis and spinal abscess, respectively. Stool studies for C.diff on 09/11 were negative. His hospital course has been associated with worsening hypotension and leukocytosis since 09/10 prompting ICU admission on 09/12 for initiation of vasopressor support. Infectious work-up thus far on ICU admission concern for likely right lower lobe HAP.       Plan:   - Continue broad spectrum coverage with Vancomycin and Cefepime; pharmacy to dose Vancomycin. Will consider switching cefepime to meropenem if pressor requirements increase  - Will prioritize getting the CT chest, abdomen, and pelvis today looking for other etiologies of infection  - Follow-up repeat blood cultures. NGSF  - Follow-up repeat UA.   - Vasopressor support for goal MAP >65.   - Continue stress dose steroids.   - Trending WBC count daily.

## 2020-09-14 NOTE — ASSESSMENT & PLAN NOTE
-Admission notable for progressive NANETTE since approximately 09/08 with worsening Cr associated with increased anion gap metabolic acidosis.   -Status post evaluation by nephrology with concern for ATN based on urine microscopy showing scant normal red blood cells without any dysmorphic shapes evident and numerous muddy brown casts.   -Admission to ICU notable for continued decline in renal function with eventual initiation of CRRT on 09/13.   -Etiology of continued decline in renal function is likely multifactorial from shock vs progression of CHF vs DKA vs medication side-effect.     Plan:   -Nephrology following for CRRT management.   -Trending renal function Q6H while treating DKA.   -Trialysis line place on 09/13 for HD access.   -Strict I/O's and daily weights, if possible.   -Renally dose all medications.

## 2020-09-14 NOTE — ASSESSMENT & PLAN NOTE
-Most recent A1c 9.5 in 08/2020.   -Home regimen: NPH 45U AM and 35U PM.   -Complicated by CKD III and polyneuropathy with diabetic foot ulcer.   -ICU admission on 09/12 notable for increased anion gap metabolic acidosis and elevated beta-hydroxybutyrate concern for DKA possible precipitated from withholding insulin versus worsening of underlying infection.     Plan:   - Insulin drip with Q1H glucose checks.   -Trending BMP's Q4H; monitor for hyperkalemia.   -Holding off on continuous IVF given significantly reduced EF.   -Sugar free bariatric clear liquid diet ordered.   -Holding home Gabapentin with concern for hypotension.   -Hypoglycemia protocol ordered.

## 2020-09-14 NOTE — PROGRESS NOTES
Pharmacokinetic Assessment Follow Up: IV Vancomycin    Vancomycin Regimen Assessment and Plan:    - Patient on continuous SLED per nephrology due to worsening NANETTE  - 9/14 random vancomycin level resulted at 24.0 mcg/mL  - Goal 15-20 mcg/mL  - No vancomycin will be administered today since level is supra-therapeutic  - Next random vancomycin level will be drawn on 9/15 with AM labs  - Re-dose based on level and renal function    Drug levels (last 3 results):  Recent Labs   Lab Result Units 09/12/20  0252 09/13/20  0810 09/14/20  0414   Vancomycin, Random ug/mL 21.2 14.6 24.0       Pharmacy will continue to follow and monitor vancomycin.    Please contact pharmacy at extension [phone] for questions regarding this assessment.    Thank you for the consult,   Faina Riley       Patient brief summary:  Ed Patton is a 65 y.o. male initiated on antimicrobial therapy with IV Vancomycin for treatment of bone/joint infection/bacteremia.    The patient's current regimen is pulse dosing.    Drug Allergies:   Review of patient's allergies indicates:   Allergen Reactions    Vicodin [hydrocodone-acetaminophen] Itching       Actual Body Weight:   114.8 kg    Renal Function:   Estimated Creatinine Clearance: 29.1 mL/min (A) (based on SCr of 3.6 mg/dL (H)).     Dialysis Method (if applicable):  SLED

## 2020-09-14 NOTE — SUBJECTIVE & OBJECTIVE
Interval History/Significant Events: Last night on admission to ICU patient was initiated on vasopressor support in order to achieve MAP goals. His labs on admission were also notable for DKA with insulin drip initiated; judicious IVF were given with concern for risk of volume overload with poor EF and declining renal function. This morning, patient developed rapid decline in MAP's prompting placement of central and arterial lines. He was also noted to have worsening encephalopathy with increasing supplemental oxygen requirements. Nephrology recommending initiation of CRRT. Infectious work-up continues.     Review of Systems   Unable to perform ROS: Mental status change     Objective:     Vital Signs (Most Recent):  Temp: 97.5 °F (36.4 °C) (09/13/20 2000)  Pulse: (!) 121 (09/13/20 2045)  Resp: 20 (09/13/20 2045)  BP: (!) 94/54 (09/13/20 0800)  SpO2: 100 % (09/13/20 2045) Vital Signs (24h Range):  Temp:  [97.5 °F (36.4 °C)-98 °F (36.7 °C)] 97.5 °F (36.4 °C)  Pulse:  [106-122] 121  Resp:  [14-21] 20  SpO2:  [89 %-100 %] 100 %  BP: ()/(45-73) 94/54  Arterial Line BP: ()/(54-71) 114/71   Weight: 115 kg (253 lb 8.5 oz)  Body mass index is 30.86 kg/m².      Intake/Output Summary (Last 24 hours) at 9/13/2020 2112  Last data filed at 9/13/2020 2100  Gross per 24 hour   Intake 2337.73 ml   Output 655 ml   Net 1682.73 ml       Physical Exam  Constitutional:       Appearance: Normal appearance. He is overweight. He is ill-appearing.      Interventions: Nasal cannula in place.   Eyes:      Conjunctiva/sclera: Conjunctivae normal.      Pupils: Pupils are equal, round, and reactive to light.   Neck:      Comments: Central line inserted in right IJ with clean, dry, and intact dressing.   Cardiovascular:      Rate and Rhythm: Regular rhythm. Tachycardia present.      Pulses: Normal pulses.      Heart sounds: Normal heart sounds.   Pulmonary:      Effort: Pulmonary effort is normal. No respiratory distress.      Breath  sounds: Examination of the right-lower field reveals decreased breath sounds. Examination of the left-lower field reveals decreased breath sounds. Decreased breath sounds present.   Abdominal:      General: Bowel sounds are normal. There is no distension.      Palpations: Abdomen is soft.      Tenderness: There is no abdominal tenderness.   Musculoskeletal:      Comments: There is edema of the bilateral lower extremities. Right distal lower extremity placed in boot.    Skin:     General: Skin is warm and dry.      Findings: No bruising or rash.   Neurological:      Mental Status: He is lethargic.         Vents:     Lines/Drains/Airways     Central Venous Catheter Line            Trialysis (Dialysis) Catheter 09/13/20 0807 right internal jugular less than 1 day          Drain                 Urethral Catheter 09/09/20 1720 16 Fr. 4 days          Arterial Line            Arterial Line 09/13/20 0753 Left Radial less than 1 day          Peripheral Intravenous Line                 Peripheral IV - Single Lumen 09/10/20 1455 20 G;1 3/4 in Left;Anterior Forearm 3 days         Peripheral IV - Single Lumen 09/10/20 1600 20 G Right;Posterior Forearm 3 days         Peripheral IV - Single Lumen 09/13/20 0600 20 G Right Antecubital less than 1 day              Significant Labs:    CBC/Anemia Profile:  Recent Labs   Lab 09/12/20  0252 09/12/20 2015 09/13/20  0156   WBC 46.04* 42.74* 40.13*   HGB 9.4* 9.7* 9.8*   HCT 30.7* 31.9* 32.0*   * 625* 660*   MCV 88 88 87   RDW 15.9* 15.9* 16.2*        Chemistries:  Recent Labs   Lab 09/12/20  0252 09/12/20 2015 09/13/20  0156  09/13/20  0810 09/13/20  1152 09/13/20  1701   * 131* 133*   < > 129* 129* 130*   K 4.4 4.8 5.0   < > 5.0 5.4* 5.3*   CL 92* 93* 93*   < > 94* 94* 93*   CO2 20* 22* 22*   < > 18* 17* 16*   BUN 80* 95* 103*   < > 106* 109* 112*   CREATININE 5.8* 6.4* 6.7*   < > 6.7* 6.8* 6.5*   CALCIUM 8.2* 8.5* 8.3*   < > 8.3* 8.2* 8.0*   ALBUMIN 1.5* 1.5* 1.5*  --    --   --   --    PROT  --  7.6 7.5  --   --   --   --    BILITOT  --  0.5 0.5  --   --   --   --    ALKPHOS  --  269* 255*  --   --   --   --    ALT  --  58* 53*  --   --   --   --    AST  --  42* 35  --   --   --   --    MG 2.3  --  2.3  --   --   --   --    PHOS 8.5*  --  10.8*  --   --   --   --     < > = values in this interval not displayed.       Significant Imaging:  I have reviewed all pertinent imaging results/findings within the past 24 hours.

## 2020-09-14 NOTE — PROGRESS NOTES
[facility]  Critical Care Medicine  Progress Note    Patient Name: Ed Patton  MRN: [id number]  Admission Date: 8/30/2020  Hospital Length of Stay: 15 days  Code Status: Full Code  Attending Provider: Mk Jolly MD  Primary Care Provider: Qiana Chow MD   Principal Problem: Septic shock    Subjective:     HPI:  Mr. Ed Patton is a 65 year old male for whom MICU was consulted for hypotension. He has a PMH significant for HFrEF and COPD with chronic respiratory failure (on 2L home oxygen), T2DM with CKD III, and iron deficiency anemia. History obtained from chart review and speaking with patient. See hospital course below for detail regarding care and course of this patient.     Hospital/ICU Course:  Mr. Ed Patton was admitted to hospital medicine on 08/30 for management of a left-sided diabetic foot infection that was precipitated by an undetected punction wound after stepping on a tack/nail. His presentation was notable for leukocytosis with elevated inflammatory markers, however MRI of left foot only showed cellulitis of the anterior and lateral aspect of foot. Podiatry was consulted with recommendations for IV antibiotics; he was initiated on Ciprofloxacin and Vancomycin on admission. However, blood cultures from admission resulted positive for MRSA with wound cultures from left foot also growing MRSA with Proteus mirabilis. By 09/03 cultures remained positive despite Vancomycin, and patient was noted to develop right knee pain with swelling. Orthopedics was consulted and patient underwent right knee joint aspiration with cultures also positive for MRSA. LUKAS on 09/04 showed known HFrEF, but was negative for endocarditis. MRI of lumbar spine on 09/08 was negative for abscess. By 09/06, blood cultures continued to remain positive, and he underwent I&D of right arm abscess on 09/08 with cultures also positive for MRSA. For persistent MRSA bacteremia, he was  transitioned to Daptomycin and Ceftaroline, however this regimen was discontinued by 09/10 due to concern for developing rhabdomylosis. By 09/09, he was noted to develop a progressively worsening leukocytosis and NANETTE. Nephrology was consulted on 09/09 with suspicion for ATN. Hematology was consulted on 09/12 for persistent leukocytosis as likely reactive from bacteremia. On 09/10, patient began developing intermitent hypotension prompting broadening of antibiotics to Cefepime and Vancomycin. By 09/12, renal function continued to worsen in addition to hypotension prompting transfer to ICU for vasopressor support and possible HD.     Interval History/Significant Events: No events overnight. Encephalopathic,  cc last 24 hrs, net -1.8 L since admission, also on CRRT.     Review of Systems   Unable to perform ROS: Mental status change     Objective:     Vital Signs (Most Recent):  Temp: 99.3 °F (37.4 °C) (09/14/20 0700)  Pulse: (!) 127 (09/14/20 1000)  Resp: 19 (09/14/20 1000)  BP: (!) 104/58 (09/14/20 1000)  SpO2: (!) 94 % (09/14/20 1000) Vital Signs (24h Range):  Temp:  [96.4 °F (35.8 °C)-99.3 °F (37.4 °C)] 99.3 °F (37.4 °C)  Pulse:  [109-128] 127  Resp:  [14-27] 19  SpO2:  [91 %-100 %] 94 %  BP: ()/(58) 104/58  Arterial Line BP: ()/(50-73) 109/55   Weight: 114.8 kg (253 lb)  Body mass index is 30.8 kg/m².      Intake/Output Summary (Last 24 hours) at 9/14/2020 1121  Last data filed at 9/14/2020 1000  Gross per 24 hour   Intake 3738.88 ml   Output 3097 ml   Net 641.88 ml       Physical Exam  Constitutional:       Appearance: Normal appearance. He is overweight. He is ill-appearing.      Interventions: Nasal cannula in place.   Eyes:      General: No scleral icterus.     Conjunctiva/sclera: Conjunctivae normal.      Pupils: Pupils are equal, round, and reactive to light.   Neck:      Comments: Central line inserted in right IJ with clean, dry, and intact dressing.   Cardiovascular:      Rate and  Rhythm: Regular rhythm. Tachycardia present.      Pulses: Normal pulses.      Heart sounds: Normal heart sounds. No murmur.   Pulmonary:      Effort: Pulmonary effort is normal. No respiratory distress.      Breath sounds: Examination of the right-lower field reveals decreased breath sounds. Examination of the left-lower field reveals decreased breath sounds. Decreased breath sounds and rales (b/l basilar rales) present. No wheezing or rhonchi.   Abdominal:      General: Bowel sounds are normal. There is no distension.      Palpations: Abdomen is soft.      Tenderness: There is no abdominal tenderness.   Musculoskeletal:      Comments: There is edema of the bilateral lower extremities.   Lymphadenopathy:      Cervical: No cervical adenopathy.   Skin:     General: Skin is warm and dry.      Findings: No bruising or rash.   Neurological:      Mental Status: He is lethargic and disoriented.      Comments: Following commands         Vents:     Lines/Drains/Airways     Central Venous Catheter Line            Trialysis (Dialysis) Catheter 09/13/20 0807 right internal jugular 1 day          Drain                 Urethral Catheter 09/09/20 1720 16 Fr. 4 days         NG/OG Tube 09/14/20 0500 Wagoner sump 14 Fr. Left nostril less than 1 day          Arterial Line            Arterial Line 09/13/20 0753 Left Radial 1 day          Peripheral Intravenous Line                 Peripheral IV - Single Lumen 09/10/20 1455 20 G;1 3/4 in Left;Anterior Forearm 3 days         Peripheral IV - Single Lumen 09/10/20 1600 20 G Right;Posterior Forearm 3 days         Peripheral IV - Single Lumen 09/13/20 0600 20 G Right Antecubital 1 day              Significant Labs:    CBC/Anemia Profile:  Recent Labs   Lab 09/12/20 2015 09/13/20 0156 09/14/20  0414   WBC 42.74* 40.13* 36.95*   HGB 9.7* 9.8* 9.0*   HCT 31.9* 32.0* 28.9*   * 660* 566*   MCV 88 87 85   RDW 15.9* 16.2* 15.9*        Chemistries:  Recent Labs   Lab 09/13/20  0156   09/13/20 2058 09/14/20  0000 09/14/20  0414 09/14/20  0808   *   < > 131* 134*  134* 138  138 136   K 5.0   < > 5.2* 4.6  4.6 4.2  4.2 4.5   CL 93*   < > 94* 97  97 104  104 98   CO2 22*   < > 18* 22*  22* 28  28 23   *   < > 108* 81*  81* 11  11 55*   CREATININE 6.7*   < > 6.2* 4.5*  4.5* 0.6  0.6 3.6*   CALCIUM 8.3*   < > 8.0* 8.0*  8.0* 7.0*  7.0* 8.5*   ALBUMIN 1.5*  --  1.4* 1.4* 1.1*  1.1*  --    PROT 7.5  --  7.9  --  4.1*  --    BILITOT 0.5  --  0.4  --  0.3  --    ALKPHOS 255*  --  262*  --  68  --    ALT 53*  --  50*  --  70*  --    AST 35  --  35  --  25  --    MG 2.3  --   --  2.2 1.8  1.8  --    PHOS 10.8*  --   --  6.6* 2.2*  --     < > = values in this interval not displayed.       All pertinent labs within the past 24 hours have been reviewed.    Significant Imaging:  I have reviewed and interpreted all pertinent imaging results/findings within the past 24 hours.      ABG  Recent Labs   Lab 09/13/20  1725   PH 7.287*   PO2 33*   PCO2 48.9*   HCO3 23.4*   BE -3     Assessment/Plan:     Neuro  Acute metabolic encephalopathy  -Likely multifactorial from uremia vs DKA vs worsening shock with cerebral hypoperfusion vs sepsis.   -Per speaking with wife, she describes a likely baseline history of developing dementia prior to hospitalization.     Plan:   -Continue supportive management for multifactorial issues.   -Closely monitor respiratory status with concern for needing intubation for airway protection.  - Unclear etiology at this point, uremic encephalopathy should have cleared with CRRT. Could be from sepsis  - CT Head - follow up    Pulmonary  Acute on chronic respiratory failure with hypoxia  -History of mixed COPD (based on PFT's from 05/2015 showing mild (small airways) obstruction, airflow not improved after bronchodilator, and moderate restriction) and HFrEF with chronic respiratory failure on 2L home oxygen.   -ICU admission notable for progressive increase in  supplemental oxygen requirements.   -Work-up for underlying etiology of acute on chronic respiratory failure include CXR showing suspected HAP and concern for possible progression of CHF.     Plan:   - Continue supplemental oxygen with goal saturations >88%; given encephalopathy, low threshold to intubated, however may be difficult to manage given underlying CHF and pulmonary hypertension.   - Repeat ECHO unchanged   - Holding home CHF regimen with shock on ICU admission.   - Nephrology following for CRRT for attempted volume optimization.   - Continue home BREO with duo-nebulizers PRN.   - Strict I/O's and daily weights.     Hospital-acquired pneumonia  -See assessment for shock.     COPD mixed type  -Based on PFT's from 05/2015 showing mild (small airways) obstruction, airflow not improved after bronchodilator, and moderate restriction.    Plan:   -See assessment for respiratory failure. .     Cardiac/Vascular  Hyperlipidemia  Plan:   -Holding home Statin with concern for rhabdomyolysis earlier in admission.     Chronic systolic congestive heart failure  -Most recent ECHO in 09/2020 with EF of 25%.   -Unclear is ischemic vs non-ischemic.   -Home regimen: Carvedilol, Lasix 80 mg, and Lisinopril.   -Exam notable for bilateral lower extremity edema and JVD.   -Associated with chronic hypoxemic respiratory failure requiring 2L nasal cannula, but noted to develop worsening oxygen requirements on ICU admission that were likely multifactorial from suspected hospital acquired pneumonia versus declining urine output with pre-disposition to volume overload. .     TTE: 9/14  EF ranging in 25- 35% for all last 3 echos now  Moderate biventricular systolic dysfunction  LVDD    Plan:   -See assessment for respiratory failure.     Renal/  Increased anion gap metabolic acidosis  -See assessment for T2DM with DKA.     NANETTE (acute kidney injury)  -Admission notable for progressive NANETTE since approximately 09/08 with worsening Cr  associated with increased anion gap metabolic acidosis.   -Status post evaluation by nephrology with concern for ATN based on urine microscopy showing scant normal red blood cells without any dysmorphic shapes evident and numerous muddy brown casts.   -Admission to ICU notable for continued decline in renal function with eventual initiation of CRRT on 09/13.   -Etiology of continued decline in renal function is likely multifactorial from shock vs progression of CHF vs DKA vs medication side-effect.     Plan:   -Nephrology following for CRRT management.   -Trending renal function Q6H while treating DKA.   -Trialysis line place on 09/13 for HD access.   -Strict I/O's and daily weights, if possible.   -Renally dose all medications.     ID  * Septic shock  This is a 65 year old  male with PMH significant for HFrEF and COPD with chronic respiratory failure (on 2L home oxygen), T2DM with CKD III, and iron deficiency anemia who originally presented to [facility] on 08/30 with cellulitis of left foot with concern for diabetic foot infection with subsequent development of MRSA bacteremia attributed to septic arthritis of right knee and elbow. He is status post drainage and coverage for MRSA since that time. His work-up for other etiologies of MRSA bacteremia have included negative LUKAS and MRI of lumbar spine looking for endocarditis and spinal abscess, respectively. Stool studies for C.diff on 09/11 were negative. His hospital course has been associated with worsening hypotension and leukocytosis since 09/10 prompting ICU admission on 09/12 for initiation of vasopressor support. Infectious work-up thus far on ICU admission concern for likely right lower lobe HAP.       Plan:   - Continue broad spectrum coverage with Vancomycin and Cefepime; pharmacy to dose Vancomycin. Will consider switching cefepime to meropenem if pressor requirements increase  - Will prioritize getting the CT chest, abdomen, and pelvis today  looking for other etiologies of infection  - Follow-up repeat blood cultures. NGSF  - Follow-up repeat UA.   - Vasopressor support for goal MAP >65.   - Continue stress dose steroids.   - Trending WBC count daily.     Septic arthritis of knee, right  -See assessment for shock.     Staphylococcal arthritis of right knee  -See assessment for shock.     Sepsis due to methicillin resistant Staphylococcus aureus (MRSA)  -See assessment for shock.     Endocrine  Diabetic ulcer of left foot associated with type 2 diabetes mellitus, with fat layer exposed  -See assessment for shock.     Postablative hypothyroidism  Plan:   -Continue home SYNTHROID.     Type 2 diabetes mellitus with ketoacidosis without coma, with long-term current use of insulin  -Most recent A1c 9.5 in 08/2020.   -Home regimen: NPH 45U AM and 35U PM.   -Complicated by CKD III and polyneuropathy with diabetic foot ulcer.   -ICU admission on 09/12 notable for increased anion gap metabolic acidosis and elevated beta-hydroxybutyrate concern for DKA possible precipitated from withholding insulin versus worsening of underlying infection.     Plan:   - Insulin drip with Q1H glucose checks.   -Trending BMP's Q4H; monitor for hyperkalemia.   -Holding off on continuous IVF given significantly reduced EF.   -Sugar free bariatric clear liquid diet ordered.   -Holding home Gabapentin with concern for hypotension.   -Hypoglycemia protocol ordered.     Type 2 diabetes mellitus with diabetic polyneuropathy, with long-term current use of insulin  -Most recent A1c 9.5 in 08/2020.   -Home regimen: NPH 45U AM and 35U PM.   -Complicated by CKD III and polyneuropathy with diabetic foot ulcer.   -ICU admission on 09/12 notable for increased anion gap metabolic acidosis and elevated beta-hydroxybutyrate concern for DKA possible precipitated from withholding insulin versus worsening of underlying infection.     Plan:   - Continue Insulin gtt at 0.1. Plan was to transition to sq  insulin later today but AG increased to 15 now with BG in 260s. Will hold off on transitioning today.   -Trending BMP's Q6H; monitor for hyperkalemia.   - Holding home Gabapentin with concern for hypotension.   - Hypoglycemia protocol ordered.     Type 2 diabetes mellitus with stage 3 chronic kidney disease, with long-term current use of insulin  -See assessment for NANETTE.        Critical Care Daily Checklist:    A: Awake: RASS Goal/Actual Goal: RASS Goal: 0-->alert and calm  Actual: Jimenez Agitation Sedation Scale (RASS): Light sedation   B: Spontaneous Breathing Trial Performed?     C: SAT & SBT Coordinated?  NA                     D: Delirium: CAM-ICU Overall CAM-ICU: Positive   E: Early Mobility Performed? No   F: Feeding Goal: Goals: Adequate PO intake to meet > 75% EEN/EPN by RD follow up  Status: Nutrition Goal Status: progressing towards goal   Current Diet Order   Procedures    Diet Clear liquid (no sugar)/Bariatric      AS: Analgesia/Sedation NA   T: Thromboembolic Prophylaxis Yes   H: HOB > 300 Yes   U: Stress Ulcer Prophylaxis (if needed) NA   G: Glucose Control Insulin gtt   B: Bowel Function Stool Occurrence: 1   I: Indwelling Catheter (Lines & Newell) Necessity Yes   D: De-escalation of Antimicrobials/Pharmacotherapies No    Plan for the day/ETD Imaging    Code Status:  Family/Goals of Care: Full Code         Critical secondary to Patient has a condition that poses threat to life and bodily function: Acute Renal Failure and Septic shock      Critical care was time spent personally by me on the following activities: development of treatment plan with patient or surrogate and bedside caregivers, discussions with consultants, evaluation of patient's response to treatment, examination of patient, ordering and performing treatments and interventions, ordering and review of laboratory studies, ordering and review of radiographic studies, pulse oximetry, re-evaluation of patient's condition. This critical  care time did not overlap with that of any other provider or involve time for any procedures.     Shabbir Quezada MD  Critical Care Medicine  [facility]

## 2020-09-14 NOTE — PROGRESS NOTES
[facility]  Infectious Disease  Progress Note    Patient Name: Ed Patton  MRN: [id number]  Admission Date: 8/30/2020  Length of Stay: 15 days  Attending Physician: Mk Jolly MD  Primary Care Provider: Qiana Chow MD    Isolation Status: Contact  Assessment/Plan:      Sepsis due to methicillin resistant Staphylococcus aureus (MRSA)  MRSA septicemia. Still tachycardic, SpO2 reached 92%, renal function continues to worsen. WBCs not improving. ESR and CRP elevated, RF WNL. Vancomycin was supratherapeutic, switched to pulse dose regimen. Source of bacteremia unclear, may be left foot wound or a septic process at the right knee joint. Right knee now clinically suspicious for septic arthritis (see physical exam). Left foot wound growing Proteus and MRSA, urine growing Klebsiella. TTE & LUKAS is negative. Dapto/ceftaroline switched back to vanc due to worsening renal function and early signs of rhabdo. Course complicated by worsening leukocytosis (C Diff neg)     Blood cultures: MRSA  R knee fluid culture: Staph aureus  Left foot wound: Proteus, MRSA  Source Control: I&D of Septic R knee on 9/7/20.  CXR with right lower lobe opacities concerning for developing pneumonia     Recommendations:   -- Discontinue cefepime. Start oral levofloxacin, renally dosed.   -- Check CK level and if <4 times upper limit of normal, switch from IV vancomycin to IV daptomycin. If CK level >4 times upper limit of normal, continue IV vancomycin.   -- Obtain non contrast CT Chest Abd-Pelvis to evaluate for potential abscess.  -- F/u ABIs  -- Repeat Blood cultures 09/09, 09/10, 09/12 have been negative  -- If no other possible source found, patient will need to complete 4 weeks of antibiotic therapy from date of clearance 9/9    Diabetic foot infection  See Sepsis        Thank you for your consult. I will follow-up with patient. Please contact us if you have any additional questions.    Caitlyn Yeung  MD  Infectious Disease  [facility]    Subjective:     Principal Problem:Septic shock    HPI: 66 yo M with Hx of DM2 (on insulin), chronic hypoxemic respiratory failure (on O2 at home), chronic systolic heart failure presented to ED after recurrent falls 7 and 8 days ago. He has a history of falls but reports that his tendency to fall has worsened recently, resulting in injury to his lower back and right thigh. After his fall last Tuesday he looked at his left foot and noticed a wound, denies pain or drainage related to the wound, he admits to not regularly checking his feet and does not know how long the wound has been present. He believes the wound is related to a tack that he previously found imbedded in the bottom of a slipper. He had previously seen Ang Lugo DPM for diabetic foot care but has not seen her this year due to fear of the coronavirus pandemic. He has been getting home health services including physical therapy. No subjective change as of today, still denies pain and drainage related to the wound. Denies F/C/N/V.  Interval History: NAEON. Patient remains on CRRT. Pressor requirements up to 0.15 levo and vaso at 0.04. Temp pf 99.3 noted this AM.     Review of Systems   Unable to perform ROS: Mental status change     Objective:     Vital Signs (Most Recent):  Temp: 98.7 °F (37.1 °C) (09/14/20 1100)  Pulse: (!) 125 (09/14/20 1100)  Resp: (!) 24 (09/14/20 1100)  BP: (!) 104/58 (09/14/20 1000)  SpO2: (!) 93 % (09/14/20 1100) Vital Signs (24h Range):  Temp:  [96.4 °F (35.8 °C)-99.3 °F (37.4 °C)] 98.7 °F (37.1 °C)  Pulse:  [109-128] 125  Resp:  [14-27] 24  SpO2:  [91 %-100 %] 93 %  BP: ()/(58) 104/58  Arterial Line BP: ()/(48-73) 100/48     Weight: 114.8 kg (253 lb)  Body mass index is 30.8 kg/m².    Estimated Creatinine Clearance: 29.1 mL/min (A) (based on SCr of 3.6 mg/dL (H)).    Physical Exam  Constitutional:       Appearance: Normal appearance.   HENT:      Head:  Normocephalic.      Mouth/Throat:      Mouth: Mucous membranes are moist.   Eyes:      Extraocular Movements: Extraocular movements intact.   Neck:      Musculoskeletal: Normal range of motion.      Comments: Right IJ CVC in place. No tenderness or erythema noted on surrounding skin  Cardiovascular:      Rate and Rhythm: Regular rhythm. Tachycardia present.      Heart sounds: No murmur.   Pulmonary:      Effort: Pulmonary effort is normal. No respiratory distress.      Breath sounds: Normal breath sounds. No wheezing.   Abdominal:      General: There is no distension.      Palpations: Abdomen is soft.      Tenderness: There is no abdominal tenderness.   Musculoskeletal:      Comments: Stage 2 diabetic foot ulcer measuring 1cm noted on Left 1st phalanx. Left leg is wrapped in Kerlix bandage.    Neurological:      Mental Status: He is alert.      Comments:  Appears somnolent but easily arousable to touch and voice.    Psychiatric:      Comments: Unable to assess due to mental status          Significant Labs:   Blood Culture:   Recent Labs   Lab 09/09/20  0303 09/09/20  1040 09/10/20  0459 09/12/20 2014 09/12/20 2015   LABBLOO No growth after 5 days. No growth to date  No Growth to date  No Growth to date  No Growth to date No growth to date  No Growth to date  No Growth to date  No Growth to date No Growth to date  No Growth to date No Growth to date  No Growth to date     CBC:   Recent Labs   Lab 09/12/20 2015 09/13/20  0156 09/14/20  0414   WBC 42.74* 40.13* 36.95*   HGB 9.7* 9.8* 9.0*   HCT 31.9* 32.0* 28.9*   * 660* 566*     CMP:   Recent Labs   Lab 09/13/20  0156  09/13/20 2058 09/14/20  0000 09/14/20  0414 09/14/20  0808   *   < > 131* 134*  134* 138  138 136   K 5.0   < > 5.2* 4.6  4.6 4.2  4.2 4.5   CL 93*   < > 94* 97  97 104  104 98   CO2 22*   < > 18* 22*  22* 28  28 23   *   < > 140* 78  78 174*  174* 150*   *   < > 108* 81*  81* 11  11 55*   CREATININE  6.7*   < > 6.2* 4.5*  4.5* 0.6  0.6 3.6*   CALCIUM 8.3*   < > 8.0* 8.0*  8.0* 7.0*  7.0* 8.5*   PROT 7.5  --  7.9  --  4.1*  --    ALBUMIN 1.5*  --  1.4* 1.4* 1.1*  1.1*  --    BILITOT 0.5  --  0.4  --  0.3  --    ALKPHOS 255*  --  262*  --  68  --    AST 35  --  35  --  25  --    ALT 53*  --  50*  --  70*  --    ANIONGAP 18*   < > 19* 15  15 6*  6* 15   EGFRNONAA 8.0*   < > 8.8* 12.9*  12.9* >60.0  >60.0 16.9*    < > = values in this interval not displayed.     Urine Culture:   Recent Labs   Lab 08/30/20  1633   LABURIN KLEBSIELLA PNEUMONIAE  > 100,000 cfu/ml  *     Urine Studies:   Recent Labs   Lab 09/09/20  1719   COLORU Preeti   APPEARANCEUA Hazy*   PHUR 5.0   SPECGRAV 1.020   PROTEINUA Negative   GLUCUA Negative   KETONESU Negative   BILIRUBINUA Negative   OCCULTUA Negative   NITRITE Negative   LEUKOCYTESUR Trace*   RBCUA 1   WBCUA 4   BACTERIA Few*   SQUAMEPITHEL 0   HYALINECASTS 7*     Wound Culture:   Recent Labs   Lab 08/31/20  1754 09/03/20  1647 09/07/20  1721   LABAERO PROTEUS MIRABILIS  Moderate  *  METHICILLIN RESISTANT STAPHYLOCOCCUS AUREUS  Moderate  * METHICILLIN RESISTANT STAPHYLOCOCCUS AUREUS  Few  * No growth  No growth       Significant Imaging: I have reviewed all pertinent imaging results/findings within the past 24 hours.

## 2020-09-14 NOTE — ASSESSMENT & PLAN NOTE
-History of mixed COPD (based on PFT's from 05/2015 showing mild (small airways) obstruction, airflow not improved after bronchodilator, and moderate restriction) and HFrEF with chronic respiratory failure on 2L home oxygen.   -ICU admission notable for progressive increase in supplemental oxygen requirements.   -Work-up for underlying etiology of acute on chronic respiratory failure include CXR showing suspected HAP and concern for possible progression of CHF.     Plan:   - Continue supplemental oxygen with goal saturations >88%; given encephalopathy, low threshold to intubated, however may be difficult to manage given underlying CHF and pulmonary hypertension.   - Repeat ECHO unchanged   - Holding home CHF regimen with shock on ICU admission.   - Nephrology following for CRRT for attempted volume optimization.   - Continue home BREO with duo-nebulizers PRN.   - Strict I/O's and daily weights.

## 2020-09-15 ENCOUNTER — TELEPHONE (OUTPATIENT)
Dept: ADMINISTRATIVE | Facility: OTHER | Age: 65
End: 2020-09-15

## 2020-09-15 LAB
ALBUMIN SERPL BCP-MCNC: 1.4 G/DL (ref 3.5–5.2)
ALBUMIN SERPL BCP-MCNC: 1.4 G/DL (ref 3.5–5.2)
ALBUMIN SERPL BCP-MCNC: 1.5 G/DL (ref 3.5–5.2)
ALP SERPL-CCNC: 209 U/L (ref 55–135)
ALT SERPL W/O P-5'-P-CCNC: 44 U/L (ref 10–44)
ANION GAP SERPL CALC-SCNC: 11 MMOL/L (ref 8–16)
ANION GAP SERPL CALC-SCNC: 12 MMOL/L (ref 8–16)
ANION GAP SERPL CALC-SCNC: 12 MMOL/L (ref 8–16)
ANION GAP SERPL CALC-SCNC: 16 MMOL/L (ref 8–16)
ANISOCYTOSIS BLD QL SMEAR: SLIGHT
AST SERPL-CCNC: 50 U/L (ref 10–40)
BACTERIA BLD CULT: NORMAL
BASOPHILS # BLD AUTO: 0.17 K/UL (ref 0–0.2)
BASOPHILS NFR BLD: 0.5 % (ref 0–1.9)
BILIRUB SERPL-MCNC: 0.5 MG/DL (ref 0.1–1)
BUN SERPL-MCNC: 10 MG/DL (ref 8–23)
BUN SERPL-MCNC: 12 MG/DL (ref 8–23)
BUN SERPL-MCNC: 13 MG/DL (ref 8–23)
BUN SERPL-MCNC: 17 MG/DL (ref 8–23)
BUN SERPL-MCNC: 19 MG/DL (ref 8–23)
BUN SERPL-MCNC: 20 MG/DL (ref 8–23)
BUN SERPL-MCNC: 21 MG/DL (ref 8–23)
BURR CELLS BLD QL SMEAR: ABNORMAL
CA-I BLDV-SCNC: 1.14 MMOL/L (ref 1.06–1.42)
CA-I BLDV-SCNC: 1.19 MMOL/L (ref 1.06–1.42)
CA-I BLDV-SCNC: 1.21 MMOL/L (ref 1.06–1.42)
CALCIUM SERPL-MCNC: 10 MG/DL (ref 8.7–10.5)
CALCIUM SERPL-MCNC: 9.5 MG/DL (ref 8.7–10.5)
CALCIUM SERPL-MCNC: 9.7 MG/DL (ref 8.7–10.5)
CALCIUM SERPL-MCNC: 9.7 MG/DL (ref 8.7–10.5)
CALCIUM SERPL-MCNC: 9.8 MG/DL (ref 8.7–10.5)
CALCIUM SERPL-MCNC: 9.8 MG/DL (ref 8.7–10.5)
CALCIUM SERPL-MCNC: 9.9 MG/DL (ref 8.7–10.5)
CHLORIDE SERPL-SCNC: 100 MMOL/L (ref 95–110)
CHLORIDE SERPL-SCNC: 100 MMOL/L (ref 95–110)
CHLORIDE SERPL-SCNC: 104 MMOL/L (ref 95–110)
CHLORIDE SERPL-SCNC: 105 MMOL/L (ref 95–110)
CHLORIDE SERPL-SCNC: 105 MMOL/L (ref 95–110)
CHLORIDE SERPL-SCNC: 99 MMOL/L (ref 95–110)
CHLORIDE SERPL-SCNC: 99 MMOL/L (ref 95–110)
CK SERPL-CCNC: 91 U/L (ref 20–200)
CO2 SERPL-SCNC: 24 MMOL/L (ref 23–29)
CO2 SERPL-SCNC: 25 MMOL/L (ref 23–29)
CO2 SERPL-SCNC: 26 MMOL/L (ref 23–29)
CO2 SERPL-SCNC: 26 MMOL/L (ref 23–29)
CREAT SERPL-MCNC: 1.1 MG/DL (ref 0.5–1.4)
CREAT SERPL-MCNC: 1.1 MG/DL (ref 0.5–1.4)
CREAT SERPL-MCNC: 1.3 MG/DL (ref 0.5–1.4)
CREAT SERPL-MCNC: 1.7 MG/DL (ref 0.5–1.4)
CREAT SERPL-MCNC: 1.8 MG/DL (ref 0.5–1.4)
CREAT SERPL-MCNC: 1.8 MG/DL (ref 0.5–1.4)
CREAT SERPL-MCNC: 1.9 MG/DL (ref 0.5–1.4)
DIFFERENTIAL METHOD: ABNORMAL
EOSINOPHIL # BLD AUTO: 0 K/UL (ref 0–0.5)
EOSINOPHIL NFR BLD: 0 % (ref 0–8)
ERYTHROCYTE [DISTWIDTH] IN BLOOD BY AUTOMATED COUNT: 16 % (ref 11.5–14.5)
EST. GFR  (AFRICAN AMERICAN): 42.4 ML/MIN/1.73 M^2
EST. GFR  (AFRICAN AMERICAN): 45.3 ML/MIN/1.73 M^2
EST. GFR  (AFRICAN AMERICAN): 45.3 ML/MIN/1.73 M^2
EST. GFR  (AFRICAN AMERICAN): 48.5 ML/MIN/1.73 M^2
EST. GFR  (AFRICAN AMERICAN): >60 ML/MIN/1.73 M^2
EST. GFR  (NON AFRICAN AMERICAN): 36.7 ML/MIN/1.73 M^2
EST. GFR  (NON AFRICAN AMERICAN): 39.2 ML/MIN/1.73 M^2
EST. GFR  (NON AFRICAN AMERICAN): 39.2 ML/MIN/1.73 M^2
EST. GFR  (NON AFRICAN AMERICAN): 42 ML/MIN/1.73 M^2
EST. GFR  (NON AFRICAN AMERICAN): 58.1 ML/MIN/1.73 M^2
EST. GFR  (NON AFRICAN AMERICAN): >60 ML/MIN/1.73 M^2
EST. GFR  (NON AFRICAN AMERICAN): >60 ML/MIN/1.73 M^2
GLUCOSE SERPL-MCNC: 181 MG/DL (ref 70–110)
GLUCOSE SERPL-MCNC: 183 MG/DL (ref 70–110)
GLUCOSE SERPL-MCNC: 190 MG/DL (ref 70–110)
GLUCOSE SERPL-MCNC: 254 MG/DL (ref 70–110)
GLUCOSE SERPL-MCNC: 285 MG/DL (ref 70–110)
GLUCOSE SERPL-MCNC: 286 MG/DL (ref 70–110)
GLUCOSE SERPL-MCNC: 293 MG/DL (ref 70–110)
HCT VFR BLD AUTO: 27.1 % (ref 40–54)
HGB BLD-MCNC: 8.1 G/DL (ref 14–18)
HYPOCHROMIA BLD QL SMEAR: ABNORMAL
IMM GRANULOCYTES # BLD AUTO: 1.57 K/UL (ref 0–0.04)
IMM GRANULOCYTES NFR BLD AUTO: 4.4 % (ref 0–0.5)
LYMPHOCYTES # BLD AUTO: 1.5 K/UL (ref 1–4.8)
LYMPHOCYTES NFR BLD: 4.2 % (ref 18–48)
MAGNESIUM SERPL-MCNC: 2.5 MG/DL (ref 1.6–2.6)
MAGNESIUM SERPL-MCNC: 2.5 MG/DL (ref 1.6–2.6)
MCH RBC QN AUTO: 26.1 PG (ref 27–31)
MCHC RBC AUTO-ENTMCNC: 29.9 G/DL (ref 32–36)
MCV RBC AUTO: 87 FL (ref 82–98)
MONOCYTES # BLD AUTO: 2.4 K/UL (ref 0.3–1)
MONOCYTES NFR BLD: 6.7 % (ref 4–15)
NEUTROPHILS # BLD AUTO: 29.8 K/UL (ref 1.8–7.7)
NEUTROPHILS NFR BLD: 84.2 % (ref 38–73)
NRBC BLD-RTO: 0 /100 WBC
OVALOCYTES BLD QL SMEAR: ABNORMAL
PHOSPHATE SERPL-MCNC: 2.4 MG/DL (ref 2.7–4.5)
PHOSPHATE SERPL-MCNC: 4.5 MG/DL (ref 2.7–4.5)
PLATELET # BLD AUTO: 447 K/UL (ref 150–350)
PMV BLD AUTO: 10.3 FL (ref 9.2–12.9)
POCT GLUCOSE: 184 MG/DL (ref 70–110)
POCT GLUCOSE: 191 MG/DL (ref 70–110)
POCT GLUCOSE: 191 MG/DL (ref 70–110)
POCT GLUCOSE: 192 MG/DL (ref 70–110)
POCT GLUCOSE: 192 MG/DL (ref 70–110)
POCT GLUCOSE: 195 MG/DL (ref 70–110)
POCT GLUCOSE: 197 MG/DL (ref 70–110)
POCT GLUCOSE: 203 MG/DL (ref 70–110)
POCT GLUCOSE: 204 MG/DL (ref 70–110)
POCT GLUCOSE: 215 MG/DL (ref 70–110)
POCT GLUCOSE: 236 MG/DL (ref 70–110)
POCT GLUCOSE: 242 MG/DL (ref 70–110)
POCT GLUCOSE: 268 MG/DL (ref 70–110)
POCT GLUCOSE: 271 MG/DL (ref 70–110)
POCT GLUCOSE: 272 MG/DL (ref 70–110)
POCT GLUCOSE: 275 MG/DL (ref 70–110)
POCT GLUCOSE: 285 MG/DL (ref 70–110)
POCT GLUCOSE: 292 MG/DL (ref 70–110)
POCT GLUCOSE: 301 MG/DL (ref 70–110)
POIKILOCYTOSIS BLD QL SMEAR: SLIGHT
POLYCHROMASIA BLD QL SMEAR: ABNORMAL
POTASSIUM SERPL-SCNC: 4.2 MMOL/L (ref 3.5–5.1)
POTASSIUM SERPL-SCNC: 4.3 MMOL/L (ref 3.5–5.1)
POTASSIUM SERPL-SCNC: 4.4 MMOL/L (ref 3.5–5.1)
POTASSIUM SERPL-SCNC: 4.4 MMOL/L (ref 3.5–5.1)
POTASSIUM SERPL-SCNC: 4.5 MMOL/L (ref 3.5–5.1)
PROT SERPL-MCNC: 7.2 G/DL (ref 6–8.4)
RBC # BLD AUTO: 3.1 M/UL (ref 4.6–6.2)
SODIUM SERPL-SCNC: 139 MMOL/L (ref 136–145)
SODIUM SERPL-SCNC: 139 MMOL/L (ref 136–145)
SODIUM SERPL-SCNC: 140 MMOL/L (ref 136–145)
SODIUM SERPL-SCNC: 141 MMOL/L (ref 136–145)
SODIUM SERPL-SCNC: 141 MMOL/L (ref 136–145)
SODIUM SERPL-SCNC: 142 MMOL/L (ref 136–145)
SODIUM SERPL-SCNC: 142 MMOL/L (ref 136–145)
VANCOMYCIN SERPL-MCNC: 15.3 UG/ML
WBC # BLD AUTO: 35.44 K/UL (ref 3.9–12.7)

## 2020-09-15 PROCEDURE — 25000003 PHARM REV CODE 250: Mod: HCNC | Performed by: STUDENT IN AN ORGANIZED HEALTH CARE EDUCATION/TRAINING PROGRAM

## 2020-09-15 PROCEDURE — 63600175 PHARM REV CODE 636 W HCPCS: Mod: HCNC | Performed by: STUDENT IN AN ORGANIZED HEALTH CARE EDUCATION/TRAINING PROGRAM

## 2020-09-15 PROCEDURE — 82330 ASSAY OF CALCIUM: CPT | Mod: HCNC

## 2020-09-15 PROCEDURE — 85025 COMPLETE CBC W/AUTO DIFF WBC: CPT | Mod: HCNC

## 2020-09-15 PROCEDURE — 83735 ASSAY OF MAGNESIUM: CPT | Mod: 91,HCNC

## 2020-09-15 PROCEDURE — 90945 DIALYSIS ONE EVALUATION: CPT | Mod: HCNC

## 2020-09-15 PROCEDURE — 63600175 PHARM REV CODE 636 W HCPCS: Mod: HCNC

## 2020-09-15 PROCEDURE — 99233 PR SUBSEQUENT HOSPITAL CARE,LEVL III: ICD-10-PCS | Mod: HCNC,GC,, | Performed by: INTERNAL MEDICINE

## 2020-09-15 PROCEDURE — 82330 ASSAY OF CALCIUM: CPT | Mod: 91,HCNC

## 2020-09-15 PROCEDURE — 25000003 PHARM REV CODE 250: Mod: HCNC | Performed by: NURSE PRACTITIONER

## 2020-09-15 PROCEDURE — 99233 SBSQ HOSP IP/OBS HIGH 50: CPT | Mod: HCNC,GC,, | Performed by: INTERNAL MEDICINE

## 2020-09-15 PROCEDURE — 80048 BASIC METABOLIC PNL TOTAL CA: CPT | Mod: 91,HCNC

## 2020-09-15 PROCEDURE — 25500020 PHARM REV CODE 255: Mod: HCNC | Performed by: STUDENT IN AN ORGANIZED HEALTH CARE EDUCATION/TRAINING PROGRAM

## 2020-09-15 PROCEDURE — 80069 RENAL FUNCTION PANEL: CPT | Mod: HCNC

## 2020-09-15 PROCEDURE — 80053 COMPREHEN METABOLIC PANEL: CPT | Mod: HCNC

## 2020-09-15 PROCEDURE — 80202 ASSAY OF VANCOMYCIN: CPT | Mod: HCNC

## 2020-09-15 PROCEDURE — 83735 ASSAY OF MAGNESIUM: CPT | Mod: HCNC

## 2020-09-15 PROCEDURE — 63600175 PHARM REV CODE 636 W HCPCS: Mod: HCNC | Performed by: NURSE PRACTITIONER

## 2020-09-15 PROCEDURE — 80100008 HC CRRT DAILY MAINTENANCE: Mod: HCNC

## 2020-09-15 PROCEDURE — 20000000 HC ICU ROOM: Mod: HCNC

## 2020-09-15 PROCEDURE — 94640 AIRWAY INHALATION TREATMENT: CPT | Mod: HCNC

## 2020-09-15 PROCEDURE — 99232 PR SUBSEQUENT HOSPITAL CARE,LEVL II: ICD-10-PCS | Mod: HCNC,,, | Performed by: INTERNAL MEDICINE

## 2020-09-15 PROCEDURE — 82550 ASSAY OF CK (CPK): CPT | Mod: HCNC

## 2020-09-15 PROCEDURE — 99232 SBSQ HOSP IP/OBS MODERATE 35: CPT | Mod: HCNC,,, | Performed by: INTERNAL MEDICINE

## 2020-09-15 PROCEDURE — 63600175 PHARM REV CODE 636 W HCPCS: Mod: HCNC | Performed by: HOSPITALIST

## 2020-09-15 PROCEDURE — 94761 N-INVAS EAR/PLS OXIMETRY MLT: CPT | Mod: HCNC

## 2020-09-15 PROCEDURE — 63600175 PHARM REV CODE 636 W HCPCS: Mod: JG,HCNC | Performed by: STUDENT IN AN ORGANIZED HEALTH CARE EDUCATION/TRAINING PROGRAM

## 2020-09-15 PROCEDURE — 80048 BASIC METABOLIC PNL TOTAL CA: CPT | Mod: HCNC

## 2020-09-15 RX ORDER — MAGNESIUM SULFATE HEPTAHYDRATE 40 MG/ML
2 INJECTION, SOLUTION INTRAVENOUS
Status: ACTIVE | OUTPATIENT
Start: 2020-09-16 | End: 2020-09-16

## 2020-09-15 RX ORDER — MORPHINE SULFATE 10 MG/ML
4 INJECTION, SOLUTION INTRAMUSCULAR; INTRAVENOUS ONCE
Status: DISCONTINUED | OUTPATIENT
Start: 2020-09-15 | End: 2020-09-15

## 2020-09-15 RX ORDER — MORPHINE SULFATE 2 MG/ML
INJECTION, SOLUTION INTRAMUSCULAR; INTRAVENOUS
Status: COMPLETED
Start: 2020-09-15 | End: 2020-09-15

## 2020-09-15 RX ORDER — HYDROCODONE BITARTRATE AND ACETAMINOPHEN 500; 5 MG/1; MG/1
TABLET ORAL CONTINUOUS
Status: ACTIVE | OUTPATIENT
Start: 2020-09-16 | End: 2020-09-16

## 2020-09-15 RX ORDER — MORPHINE SULFATE 2 MG/ML
4 INJECTION, SOLUTION INTRAMUSCULAR; INTRAVENOUS ONCE
Status: COMPLETED | OUTPATIENT
Start: 2020-09-15 | End: 2020-09-15

## 2020-09-15 RX ADMIN — HEPARIN SODIUM 5000 UNITS: 5000 INJECTION INTRAVENOUS; SUBCUTANEOUS at 10:09

## 2020-09-15 RX ADMIN — HYPROMELLOSE 2910 1 DROP: 5 SOLUTION OPHTHALMIC at 02:09

## 2020-09-15 RX ADMIN — FLUDROCORTISONE ACETATE 100 MCG: 0.1 TABLET ORAL at 09:09

## 2020-09-15 RX ADMIN — IOHEXOL 15 ML: 350 INJECTION, SOLUTION INTRAVENOUS at 01:09

## 2020-09-15 RX ADMIN — NOREPINEPHRINE BITARTRATE 0.1 MCG/KG/MIN: 1 INJECTION, SOLUTION, CONCENTRATE INTRAVENOUS at 10:09

## 2020-09-15 RX ADMIN — DAPTOMYCIN 1050 MG: 350 INJECTION, POWDER, LYOPHILIZED, FOR SOLUTION INTRAVENOUS at 11:09

## 2020-09-15 RX ADMIN — HEPARIN SODIUM 5000 UNITS: 5000 INJECTION INTRAVENOUS; SUBCUTANEOUS at 02:09

## 2020-09-15 RX ADMIN — HYDROCORTISONE SODIUM SUCCINATE 100 MG: 100 INJECTION, POWDER, FOR SOLUTION INTRAMUSCULAR; INTRAVENOUS at 10:09

## 2020-09-15 RX ADMIN — LEVOTHYROXINE SODIUM 75 MCG: 25 TABLET ORAL at 05:09

## 2020-09-15 RX ADMIN — FLUTICASONE FUROATE AND VILANTEROL TRIFENATATE 1 PUFF: 200; 25 POWDER RESPIRATORY (INHALATION) at 09:09

## 2020-09-15 RX ADMIN — HYDROCORTISONE SODIUM SUCCINATE 100 MG: 100 INJECTION, POWDER, FOR SOLUTION INTRAMUSCULAR; INTRAVENOUS at 05:09

## 2020-09-15 RX ADMIN — HEPARIN SODIUM 5000 UNITS: 5000 INJECTION INTRAVENOUS; SUBCUTANEOUS at 05:09

## 2020-09-15 RX ADMIN — FLUTICASONE PROPIONATE 50 MCG: 50 SPRAY, METERED NASAL at 09:09

## 2020-09-15 RX ADMIN — IOHEXOL 15 ML: 350 INJECTION, SOLUTION INTRAVENOUS at 12:09

## 2020-09-15 RX ADMIN — CALCIUM GLUCONATE 3000 MG: 98 INJECTION, SOLUTION INTRAVENOUS at 01:09

## 2020-09-15 RX ADMIN — MORPHINE SULFATE 4 MG: 2 INJECTION, SOLUTION INTRAMUSCULAR; INTRAVENOUS at 09:09

## 2020-09-15 RX ADMIN — HYPROMELLOSE 2910 1 DROP: 5 SOLUTION OPHTHALMIC at 09:09

## 2020-09-15 RX ADMIN — SODIUM CHLORIDE 4.55 UNITS/HR  (ORDERING DOSE ONLY,MUST KEEP AS DOSE RANGE.ONLY CHANGE IF INITIAL DOSE EXCEEDS SUGGESTED RANGE): 9 INJECTION, SOLUTION INTRAVENOUS at 06:09

## 2020-09-15 RX ADMIN — HYPROMELLOSE 2910 1 DROP: 5 SOLUTION OPHTHALMIC at 10:09

## 2020-09-15 RX ADMIN — HYDROCORTISONE SODIUM SUCCINATE 100 MG: 100 INJECTION, POWDER, FOR SOLUTION INTRAMUSCULAR; INTRAVENOUS at 02:09

## 2020-09-15 RX ADMIN — OXYCODONE 5 MG: 5 TABLET ORAL at 09:09

## 2020-09-15 NOTE — NURSING
CMICU DAILY GOALS       A: Awake    RASS: Goal - RASS Goal: 0-->alert and calm  Actual - RASS (Jimenez Agitation-Sedation Scale): 0-->alert and calm   Restraint necessity:    B: Breath   SBT: Not intubated   C: Coordinate A & B, analgesics/sedatives   Pain: managed    SAT: Not intubated  D: Delirium   CAM-ICU: Overall CAM-ICU: Negative  E: Early(intubated/ Progressive (non-intubated) Mobility   MOVE Screen: Fail   Activity: Activity Management: patient unable to perform activities  FAS: Feeding/Nutrition   Diet order: Diet/Nutrition Received: NPO, tube feeding,   Fluid restriction:    T: Thrombus   DVT prophylaxis: VTE Required Core Measure: (TEDs) Compression stocking therapy initiated/maintained, Pharmacological prophylaxis initiated/maintained  H: HOB Elevation   Head of Bed (HOB): HOB at 30-45 degrees  U: Ulcer Prophylaxis   GI: yes  G: Glucose control   managed Glycemic Management: blood glucose monitoring  S: Skin   Bundle compliance: yes   Bathing/Skin Care: linen changed Date: bath due 9/15 AM shift   B: Bowel Function   diarrhea   I: Indwelling Catheters   Newell necessity:      Urethral Catheter 09/09/20 1720 16 Fr.-Reason for Continuing Urinary Catheterization: Critically ill in ICU requiring intensive monitoring   CVC necessity: Yes   IPAD offered: No  D: De-escalation Antibx   Yes  Plan for the day   Continue to wean pressor support. Monitor mental status. Vaso weaned off. Current gtts: Levo and Insulin. TF rate increased to 20. CRRT remains in place.   Family/Goals of care/Code Status   Code Status: Full Code     No acute events throughout day, VS and assessment per flow sheet, patient progressing towards goals as tolerated, plan of care reviewed with Ed Patton and family, all concerns addressed, will continue to monitor.

## 2020-09-15 NOTE — PROGRESS NOTES
[facility]  Nephrology  Progress Note    Patient Name: Ed Patton  MRN: [id number]  Admission Date: 8/30/2020  Hospital Length of Stay: 15 days  Attending Provider: Mk Jolly MD   Primary Care Physician: Qiana Chow MD  Principal Problem:Septic shock    Subjective:     HPI: Mr. Patton is a 64yo M with insulin dependent T2DM, chronic hypoxemic resp. failure (on 2L oxygen at home), and CHF (EF 25%). He presented to the ED for recurrent falls and sepsis from an infected diabetic ulcer from stepping on a tack 8/30. Wound cultures from foot positive for proteus and MRSA 8/31. Urine culture 8/30 positive for klebsiella. Blood cultures have been positive for MRSA repeatedly from 8/30 to 9/8. He also developed septic arthritis of the right knee likely from seeding secondary to septicemia positive for MRSA 9/3. LUKAS was negative for IE 9/4. Initial management of septicemia was with ciprofloxacin and Vancomycin which was supratherapeutic to 26.5 on 9/2 prompting switch to a pulse dose regimen. Due to persistent bacteremia ID changed antibiotics to ceftaroline and daptomycin on 9/7 I&D of right knee performed by ortho on 9/7. Cr on admission was 1.5 at admit up from the baseline (1.2-1.5). On 9/9 Cr level had a sudden increased to 3. Nephrology was consulted for NANETTE.     Interval History:   Issue with crrt and blood flow overnight. Now on citrate. Needs more volume removal. Increase uf to 300-400.    Review of patient's allergies indicates:   Allergen Reactions    Vicodin [hydrocodone-acetaminophen] Itching     Current Facility-Administered Medications   Medication Frequency    acetaminophen tablet 1,000 mg Q8H PRN    albuterol-ipratropium 2.5 mg-0.5 mg/3 mL nebulizer solution 3 mL Q4H PRN    artificial tears 0.5 % ophthalmic solution 1 drop TID    calcium carbonate 200 mg calcium (500 mg) chewable tablet 500 mg BID PRN    calcium gluconate 3,000 mg in dextrose 5 % 100 mL IVPB  Continuous    dextrose 10 % infusion PRN    dextrose 10 % infusion PRN    dextrose 5 % and 0.45 % NaCl infusion Continuous    dextrose 50% injection 12.5 g PRN    dextrose 50% injection 25 g PRN    dextrose-sod citrate-citric ac 2.45-2.2 gram- 800 mg/100 mL Soln Continuous    fludrocortisone tablet 100 mcg Daily    fluticasone furoate-vilanteroL 200-25 mcg/dose diskus inhaler 1 puff Daily    fluticasone propionate 50 mcg/actuation nasal spray 50 mcg Daily    glucagon (human recombinant) injection 1 mg PRN    glucose chewable tablet 16 g PRN    glucose chewable tablet 24 g PRN    heparin (porcine) injection 5,000 Units Q8H    hydrocortisone sodium succinate injection 100 mg Q8H    insulin regular 100 Units in sodium chloride 0.9% 100 mL infusion Continuous    iohexol (OMNIPAQUE) oral solution 15 mL PRN    levoFLOXacin tablet 750 mg Every other day    levothyroxine tablet 75 mcg Before breakfast    melatonin tablet 6 mg Nightly PRN    norepinephrine 16 mg in dextrose 5 % 250 mL infusion Continuous    ondansetron injection 4 mg Q8H PRN    oxyCODONE immediate release tablet 5 mg Q8H PRN    phenylephrine HCl in 0.9% NaCl 1 mg/10 mL (100 mcg/mL) syringe 100 mcg Once    polyethylene glycol packet 17 g BID PRN    sodium chloride 0.9% bolus 500 mL Once    sodium chloride 0.9% flush 10 mL PRN    Tdap vaccine injection 0.5 mL vaccine x 1 dose    vancomycin - pharmacy to dose pharmacy to manage frequency    vasopressin (PITRESSIN) 0.2 Units/mL in dextrose 5 % 100 mL infusion Continuous       Objective:     Vital Signs (Most Recent):  Temp: 98.7 °F (37.1 °C) (09/14/20 1500)  Pulse: (!) 126 (09/14/20 1900)  Resp: (!) 22 (09/14/20 1900)  BP: (!) 104/58 (09/14/20 1000)  SpO2: 98 % (09/14/20 1900)  O2 Device (Oxygen Therapy): nasal cannula (09/14/20 1900) Vital Signs (24h Range):  Temp:  [96.4 °F (35.8 °C)-99.3 °F (37.4 °C)] 98.7 °F (37.1 °C)  Pulse:  [121-130] 126  Resp:  [14-29] 22  SpO2:  [90 %-100 %]  98 %  BP: ()/(58) 104/58  Arterial Line BP: ()/(48-73) 133/60     Weight: 114.8 kg (253 lb) (09/14/20 0749)  Body mass index is 30.8 kg/m².  Body surface area is 2.48 meters squared.    I/O last 3 completed shifts:  In: 6593.1 [I.V.:5793.1; NG/GT:100; IV Piggyback:700]  Out: 5857 [Urine:1090; Other:4767]    Physical Exam  Vitals signs and nursing note reviewed.   Constitutional:       General: He is not in acute distress.     Appearance: He is well-developed. He is ill-appearing. He is not diaphoretic.   HENT:      Head: Normocephalic and atraumatic.      Right Ear: External ear normal.      Left Ear: External ear normal.      Mouth/Throat:      Pharynx: No oropharyngeal exudate.   Eyes:      General: No scleral icterus.        Right eye: No discharge.         Left eye: No discharge.      Conjunctiva/sclera: Conjunctivae normal.      Pupils: Pupils are equal, round, and reactive to light.   Neck:      Musculoskeletal: Normal range of motion.      Thyroid: No thyromegaly.      Trachea: No tracheal deviation.      Comments: [facility]  Cardiovascular:      Rate and Rhythm: Tachycardia present.   Pulmonary:      Effort: Pulmonary effort is normal. No respiratory distress.      Breath sounds: Normal breath sounds. No stridor. No wheezing or rales.      Comments: Poor air movement  Abdominal:      General: Bowel sounds are normal. There is no distension.      Palpations: Abdomen is soft.      Tenderness: There is no abdominal tenderness. There is no guarding.   Musculoskeletal:         General: No tenderness or deformity.      Right lower leg: Edema present.      Left lower leg: Edema present.   Lymphadenopathy:      Cervical: No cervical adenopathy.   Skin:     Coloration: Skin is not pale.      Findings: No erythema or rash.   Neurological:      General: No focal deficit present.         Significant Labs:  All labs within the past 24 hours have been reviewed.     Significant Imaging:  Labs:  Reviewed    Assessment/Plan:     * Septic shock  -cause of nanette    Increased anion gap metabolic acidosis  -secondary to renal failure and septic shock  -on crrt    NANETTE (acute kidney injury)  -non oliguric stage 3 nanette with ischemic atn likely secondary to hypotension from septic shock  -no on multiple drip and getting excess volume  -increased uf on SLED with foal 2.5-3.5 liters removal over 24 hours      Chente Reid MD  Nephrology  [facility]

## 2020-09-15 NOTE — SUBJECTIVE & OBJECTIVE
Principal Problem:Septic shock    Principal Orthopedic Problem: R knee septic arthritis    Interval History: Patient seen and examined at bedside.  No acute events overnight.  Pain controlled.  Remains on pressors, SLED.      Review of patient's allergies indicates:   Allergen Reactions    Vicodin [hydrocodone-acetaminophen] Itching       Current Facility-Administered Medications   Medication    acetaminophen tablet 1,000 mg    albuterol-ipratropium 2.5 mg-0.5 mg/3 mL nebulizer solution 3 mL    artificial tears 0.5 % ophthalmic solution 1 drop    calcium carbonate 200 mg calcium (500 mg) chewable tablet 500 mg    calcium gluconate 3,000 mg in dextrose 5 % 100 mL IVPB    dextrose 10 % infusion    dextrose 10 % infusion    dextrose 5 % and 0.45 % NaCl infusion    dextrose 50% injection 12.5 g    dextrose 50% injection 25 g    fludrocortisone tablet 100 mcg    fluticasone furoate-vilanteroL 200-25 mcg/dose diskus inhaler 1 puff    fluticasone propionate 50 mcg/actuation nasal spray 50 mcg    glucagon (human recombinant) injection 1 mg    glucose chewable tablet 16 g    glucose chewable tablet 24 g    heparin (porcine) injection 5,000 Units    hydrocortisone sodium succinate injection 100 mg    insulin regular 100 Units in sodium chloride 0.9% 100 mL infusion    levoFLOXacin tablet 750 mg    levothyroxine tablet 75 mcg    melatonin tablet 6 mg    norepinephrine 16 mg in dextrose 5 % 250 mL infusion    ondansetron injection 4 mg    oxyCODONE immediate release tablet 5 mg    polyethylene glycol packet 17 g    sodium chloride 0.9% flush 10 mL    Tdap vaccine injection 0.5 mL    vancomycin - pharmacy to dose    vasopressin (PITRESSIN) 0.2 Units/mL in dextrose 5 % 100 mL infusion     Objective:     Vital Signs (Most Recent):  Temp: 98.1 °F (36.7 °C) (09/14/20 2300)  Pulse: (!) 127 (09/15/20 0600)  Resp: 14 (09/15/20 0600)  BP: (!) 104/58 (09/14/20 1000)  SpO2: 100 % (09/15/20 0600) Vital Signs  "(24h Range):  Temp:  [98.1 °F (36.7 °C)-99.3 °F (37.4 °C)] 98.1 °F (36.7 °C)  Pulse:  [123-130] 127  Resp:  [14-29] 14  SpO2:  [90 %-100 %] 100 %  BP: ()/(58) 104/58  Arterial Line BP: ()/(48-68) 111/57     Weight: 114.8 kg (253 lb)  Height: 6' 4" (193 cm)  Body mass index is 30.8 kg/m².        Ortho/SPM Exam    NAD  No increased WOB  Dressings c/d/i  No knee effusion  SILT T/SP/DP  Motor intact T/DP  WWP extremities    Significant Labs:   CBC:   Recent Labs   Lab 09/14/20  0414 09/15/20  0401   WBC 36.95* 35.44*   HGB 9.0* 8.1*   HCT 28.9* 27.1*   * 447*     CMP:   Recent Labs   Lab 09/13/20 2058 09/14/20  0414  09/14/20  2145  09/15/20  0343 09/15/20  0401 09/15/20  0505   *   < > 138  138   < > 139   < > 139 140 139   K 5.2*   < > 4.2  4.2   < > 4.4   < > 4.3 4.4 4.2   CL 94*   < > 104  104   < > 98   < > 99 100 99   CO2 18*   < > 28  28   < > 26   < > 24 24 24   *   < > 174*  174*   < > 253*   < > 286* 285* 293*   *   < > 11  11   < > 20   < > 20 19 21   CREATININE 6.2*   < > 0.6  0.6   < > 1.7*   < > 1.8* 1.8* 1.9*   CALCIUM 8.0*   < > 7.0*  7.0*   < > 9.7   < > 9.8 9.7 9.5   PROT 7.9  --  4.1*  --   --   --  7.2  --   --    ALBUMIN 1.4*   < > 1.1*  1.1*   < > 1.4*  --  1.5* 1.4*  --    BILITOT 0.4  --  0.3  --   --   --  0.5  --   --    ALKPHOS 262*  --  68  --   --   --  209*  --   --    AST 35  --  25  --   --   --  50*  --   --    ALT 50*  --  70*  --   --   --  44  --   --    ANIONGAP 19*   < > 6*  6*   < > 15   < > 16 16 16   EGFRNONAA 8.8*   < > >60.0  >60.0   < > 42.0*   < > 39.2* 39.2* 36.7*    < > = values in this interval not displayed.     All pertinent labs within the past 24 hours have been reviewed.    Significant Imaging: I have reviewed all pertinent imaging results/findings.  "

## 2020-09-15 NOTE — SUBJECTIVE & OBJECTIVE
Interval History: NAEON. Patient appears a little alert today. Still pleasantly confused. Pressor requirements are decreasing (only on 0.01 of levo). CT chest/abd/pelvis was obtained yesterday, pending read.     Review of Systems   Unable to perform ROS: Mental status change     Objective:     Vital Signs (Most Recent):  Temp: 98.4 °F (36.9 °C) (09/15/20 0700)  Pulse: (!) 128 (09/15/20 1000)  Resp: 13 (09/15/20 1000)  BP: (!) 104/58 (09/14/20 1000)  SpO2: 100 % (09/15/20 1000) Vital Signs (24h Range):  Temp:  [98.1 °F (36.7 °C)-98.7 °F (37.1 °C)] 98.4 °F (36.9 °C)  Pulse:  [123-130] 128  Resp:  [13-29] 13  SpO2:  [90 %-100 %] 100 %  Arterial Line BP: (100-143)/(48-68) 131/64     Weight: 114.8 kg (253 lb)  Body mass index is 30.8 kg/m².    Estimated Creatinine Clearance: 55.2 mL/min (A) (based on SCr of 1.9 mg/dL (H)).    Physical Exam  Constitutional:       Appearance: Normal appearance.   HENT:      Head: Normocephalic.      Mouth/Throat:      Mouth: Mucous membranes are moist.   Eyes:      Extraocular Movements: Extraocular movements intact.   Neck:      Musculoskeletal: Normal range of motion.      Comments: Right IJ CVC in place. No tenderness or erythema noted on surrounding skin  Cardiovascular:      Rate and Rhythm: Regular rhythm. Tachycardia present.      Heart sounds: No murmur.   Pulmonary:      Effort: Pulmonary effort is normal. No respiratory distress.      Breath sounds: Normal breath sounds. No wheezing.   Abdominal:      General: There is no distension.      Palpations: Abdomen is soft.      Tenderness: There is no abdominal tenderness.   Musculoskeletal:      Comments: Stage 2 diabetic foot ulcer measuring 1cm noted on Left 1st phalanx. Left leg is wrapped in Kerlix bandage.    Neurological:      Mental Status: He is alert.      Comments:  Appears somnolent but easily arousable to touch and voice.    Psychiatric:      Comments: Unable to assess due to mental status          Significant Labs:    Blood Culture:   Recent Labs   Lab 09/09/20  0303 09/09/20  1040 09/10/20  0459 09/12/20 2014 09/12/20 2015   LABBLOO No growth after 5 days. No growth after 5 days. No growth after 5 days. No Growth to date  No Growth to date  No Growth to date No Growth to date  No Growth to date  No Growth to date     CBC:   Recent Labs   Lab 09/14/20  0414 09/15/20  0401   WBC 36.95* 35.44*   HGB 9.0* 8.1*   HCT 28.9* 27.1*   * 447*     CMP:   Recent Labs   Lab 09/13/20 2058 09/14/20  0414  09/14/20  2145  09/15/20  0343 09/15/20  0401 09/15/20  0505   *   < > 138  138   < > 139   < > 139 140 139   K 5.2*   < > 4.2  4.2   < > 4.4   < > 4.3 4.4 4.2   CL 94*   < > 104  104   < > 98   < > 99 100 99   CO2 18*   < > 28  28   < > 26   < > 24 24 24   *   < > 174*  174*   < > 253*   < > 286* 285* 293*   *   < > 11  11   < > 20   < > 20 19 21   CREATININE 6.2*   < > 0.6  0.6   < > 1.7*   < > 1.8* 1.8* 1.9*   CALCIUM 8.0*   < > 7.0*  7.0*   < > 9.7   < > 9.8 9.7 9.5   PROT 7.9  --  4.1*  --   --   --  7.2  --   --    ALBUMIN 1.4*   < > 1.1*  1.1*   < > 1.4*  --  1.5* 1.4*  --    BILITOT 0.4  --  0.3  --   --   --  0.5  --   --    ALKPHOS 262*  --  68  --   --   --  209*  --   --    AST 35  --  25  --   --   --  50*  --   --    ALT 50*  --  70*  --   --   --  44  --   --    ANIONGAP 19*   < > 6*  6*   < > 15   < > 16 16 16   EGFRNONAA 8.8*   < > >60.0  >60.0   < > 42.0*   < > 39.2* 39.2* 36.7*    < > = values in this interval not displayed.     Wound Culture:   Recent Labs   Lab 08/31/20  1754 09/03/20  1647 09/07/20  1721   LABAERO PROTEUS MIRABILIS  Moderate  *  METHICILLIN RESISTANT STAPHYLOCOCCUS AUREUS  Moderate  * METHICILLIN RESISTANT STAPHYLOCOCCUS AUREUS  Few  * No growth  No growth       Significant Imaging: CT Chest/ Abd/Pelvis 09/15:     Distended gallbladder with probable biliary sludge and cholelithiasis.  Trace pericholecystic free fluid and small volume of pelvic free fluid.   These findings are nonspecific, however concerning for acute cholecystitis.     Chronic fibrotic changes and atelectasis involving the right lung base, slightly worse compared to CT thorax dated 03/23/2012, superimposed infection not exclude.  Trace right pleural fluid, pleural thickening and pleural base calcifications.  Two pulmonary nodules, likely present on prior exam dated 03/23/2012.  Such stability over time suggestive of benign etiology.     Prominent hilar and mediastinal lymph nodes, similar to prior exam dated 03/23/2012.     Bilateral renal calcifications which could represent vascular calcifications and nonobstructing nephrolithiasis.

## 2020-09-15 NOTE — ASSESSMENT & PLAN NOTE
-non oliguric stage 3 bernadette with ischemic atn likely secondary to hypotension from septic shock  -now on multiple drip and getting excess volume  -increased uf on SLED last night able to net -1.6L  -hold sled today and scuf tonight

## 2020-09-15 NOTE — PROGRESS NOTES
[facility]  Orthopedics  Progress Note    Patient Name: Ed Patton  MRN: [id number]  Admission Date: 8/30/2020  Hospital Length of Stay: 16 days  Attending Provider: Mk Jolly MD  Primary Care Provider: Qiana Chow MD  Follow-up For: Procedure(s) (LRB):  ARTHROSCOPY, KNEE, RIGHT  (Right)    Post-Operative Day: 8 Days Post-Op  Subjective:     Principal Problem:Septic shock    Principal Orthopedic Problem: R knee septic arthritis    Interval History: Patient seen and examined at bedside.  No acute events overnight.  Pain controlled.  Remains on pressors, SLED.      Review of patient's allergies indicates:   Allergen Reactions    Vicodin [hydrocodone-acetaminophen] Itching       Current Facility-Administered Medications   Medication    acetaminophen tablet 1,000 mg    albuterol-ipratropium 2.5 mg-0.5 mg/3 mL nebulizer solution 3 mL    artificial tears 0.5 % ophthalmic solution 1 drop    calcium carbonate 200 mg calcium (500 mg) chewable tablet 500 mg    calcium gluconate 3,000 mg in dextrose 5 % 100 mL IVPB    dextrose 10 % infusion    dextrose 10 % infusion    dextrose 5 % and 0.45 % NaCl infusion    dextrose 50% injection 12.5 g    dextrose 50% injection 25 g    fludrocortisone tablet 100 mcg    fluticasone furoate-vilanteroL 200-25 mcg/dose diskus inhaler 1 puff    fluticasone propionate 50 mcg/actuation nasal spray 50 mcg    glucagon (human recombinant) injection 1 mg    glucose chewable tablet 16 g    glucose chewable tablet 24 g    heparin (porcine) injection 5,000 Units    hydrocortisone sodium succinate injection 100 mg    insulin regular 100 Units in sodium chloride 0.9% 100 mL infusion    levoFLOXacin tablet 750 mg    levothyroxine tablet 75 mcg    melatonin tablet 6 mg    norepinephrine 16 mg in dextrose 5 % 250 mL infusion    ondansetron injection 4 mg    oxyCODONE immediate release tablet 5 mg    polyethylene glycol packet 17 g     "sodium chloride 0.9% flush 10 mL    Tdap vaccine injection 0.5 mL    vancomycin - pharmacy to dose    vasopressin (PITRESSIN) 0.2 Units/mL in dextrose 5 % 100 mL infusion     Objective:     Vital Signs (Most Recent):  Temp: 98.1 °F (36.7 °C) (09/14/20 2300)  Pulse: (!) 127 (09/15/20 0600)  Resp: 14 (09/15/20 0600)  BP: (!) 104/58 (09/14/20 1000)  SpO2: 100 % (09/15/20 0600) Vital Signs (24h Range):  Temp:  [98.1 °F (36.7 °C)-99.3 °F (37.4 °C)] 98.1 °F (36.7 °C)  Pulse:  [123-130] 127  Resp:  [14-29] 14  SpO2:  [90 %-100 %] 100 %  BP: ()/(58) 104/58  Arterial Line BP: ()/(48-68) 111/57     Weight: 114.8 kg (253 lb)  Height: 6' 4" (193 cm)  Body mass index is 30.8 kg/m².        Ortho/SPM Exam    NAD  No increased WOB  Dressings c/d/i  No knee effusion  SILT T/SP/DP  Motor intact T/DP  WWP extremities    Significant Labs:   CBC:   Recent Labs   Lab 09/14/20  0414 09/15/20  0401   WBC 36.95* 35.44*   HGB 9.0* 8.1*   HCT 28.9* 27.1*   * 447*     CMP:   Recent Labs   Lab 09/13/20 2058 09/14/20  0414  09/14/20  2145  09/15/20  0343 09/15/20  0401 09/15/20  0505   *   < > 138  138   < > 139   < > 139 140 139   K 5.2*   < > 4.2  4.2   < > 4.4   < > 4.3 4.4 4.2   CL 94*   < > 104  104   < > 98   < > 99 100 99   CO2 18*   < > 28  28   < > 26   < > 24 24 24   *   < > 174*  174*   < > 253*   < > 286* 285* 293*   *   < > 11  11   < > 20   < > 20 19 21   CREATININE 6.2*   < > 0.6  0.6   < > 1.7*   < > 1.8* 1.8* 1.9*   CALCIUM 8.0*   < > 7.0*  7.0*   < > 9.7   < > 9.8 9.7 9.5   PROT 7.9  --  4.1*  --   --   --  7.2  --   --    ALBUMIN 1.4*   < > 1.1*  1.1*   < > 1.4*  --  1.5* 1.4*  --    BILITOT 0.4  --  0.3  --   --   --  0.5  --   --    ALKPHOS 262*  --  68  --   --   --  209*  --   --    AST 35  --  25  --   --   --  50*  --   --    ALT 50*  --  70*  --   --   --  44  --   --    ANIONGAP 19*   < > 6*  6*   < > 15   < > 16 16 16   EGFRNONAA 8.8*   < > >60.0  >60.0   < > " 42.0*   < > 39.2* 39.2* 36.7*    < > = values in this interval not displayed.     All pertinent labs within the past 24 hours have been reviewed.    Significant Imaging: I have reviewed all pertinent imaging results/findings.    Assessment/Plan:     Septic arthritis of knee, right  65 y.o. male s/p R knee scope I&D 9/7/20    VS:  Levo, vaso, 4LNC, tachycardia  Nerve block:  none  PT/OT:  WBAT  DVT PPx:  lovenox per primary  Cultures:  MRSA from R knee aspiration 9/3/20; NGTD intraop cx; BCx 9/8 positive, 9/9 NGTD  Abx:  Vanc, levaquin per ID  Labs: Hb 8.1,  , WBC 35  Drain:  none  Newell:  none    Care per MICU          Durga Walker MD  Orthopedics  [facility]

## 2020-09-15 NOTE — SUBJECTIVE & OBJECTIVE
No current facility-administered medications on file prior to encounter.      Current Outpatient Medications on File Prior to Encounter   Medication Sig    ACCU-CHEK FASTCLIX LANCET DRUM Misc TEST FOUR TIMES DAILY (Patient taking differently: Test twice daily)    ACCU-CHEK SMARTVIEW TEST STRIP Strp TEST FOUR TIMES DAILY (Patient taking differently: Test twice daily)    albuterol (PROVENTIL) 2.5 mg /3 mL (0.083 %) nebulizer solution INHALE THE CONTENTS OF 1 VIAL VIA NEBULIZER EVERY 4 HOURS AS NEEDED FOR WHEEZING. (Patient taking differently: INHALE THE CONTENTS OF 1 VIAL VIA NEBULIZER TWICE DAILY)    albuterol (PROVENTIL/VENTOLIN HFA) 90 mcg/actuation inhaler Inhale 2 puffs into the lungs every 6 (six) hours as needed for Wheezing or Shortness of Breath. Rescue    aspirin (ECOTRIN) 81 MG EC tablet Take 1 tablet (81 mg total) by mouth once daily.    atorvastatin (LIPITOR) 40 MG tablet Take 1 tablet (40 mg total) by mouth once daily.    carvedilol (COREG) 25 MG tablet Take 1 tablet (25 mg total) by mouth 2 (two) times daily.    cholecalciferol, vitamin D3, (VITAMIN D3) 1,000 unit capsule Take 1 capsule (1,000 Units total) by mouth once daily. (Patient taking differently: Take 2,000 Units by mouth once daily. )    fluticasone furoate-vilanterol (BREO ELLIPTA) 200-25 mcg/dose DsDv diskus inhaler INHALE 1 PUFF INTO THE LUNGS ONCE DAILY. (CONTROLLER)    fluticasone propionate (FLONASE) 50 mcg/actuation nasal spray USE 1 SPRAY IN EACH NOSTRIL ONE TIME DAILY    furosemide (LASIX) 80 MG tablet TAKE 1 TABLET BY MOUTH IN THE MORNING  AND  1 TABLET BY MOUTH DAILY  AT  3- TO 4PM    gabapentin (NEURONTIN) 100 MG capsule Take 1 capsule (100 mg total) by mouth 3 (three) times daily as needed (pain).    insulin NPH-insulin regular, 70/30, (NOVOLIN 70/30 U-100 INSULIN) 100 unit/mL (70-30) injection Inject 45 Units into the skin before breakfast AND 35 Units before dinner. [id number] MINUTES BEFORE BREAKFAST AND  DINNER. (Patient taking differently: Inject 40 Units into the skin before breakfast AND 20 Units before dinner.)    insulin syringe-needle U-100 (INSULIN SYRINGE) 1 mL 30 gauge x 5/16 Syrg 1 each by Misc.(Non-Drug; Combo Route) route 2 (two) times daily. Use twice daily as directed with insulin vials.    levothyroxine (SYNTHROID) 75 MCG tablet Take 1 tablet (75 mcg total) by mouth once daily.    lisinopril (PRINIVIL,ZESTRIL) 40 MG tablet Take 1 tablet (40 mg total) by mouth once daily.    ACCU-CHEK RAMIREZ Misc USE AS DIRECTED    nitroGLYCERIN (NITROSTAT) 0.4 MG SL tablet PLACE 1 TABLET UNDER THE TONGUE EVERY 5  MINUTES AS NEEDED FOR CHEST PAIN       Review of patient's allergies indicates:   Allergen Reactions    Vicodin [hydrocodone-acetaminophen] Itching       Past Medical History:   Diagnosis Date    CHF (congestive heart failure)     COPD (chronic obstructive pulmonary disease)     Coronary artery disease     Diabetes mellitus     Diabetes mellitus type II     DM (diabetes mellitus) type II uncontrolled with renal manifestation 9/4/2013    Hyperlipidemia     Hypertension     Postablative hypothyroidism 12/6/2018     Past Surgical History:   Procedure Laterality Date    APPENDECTOMY      ARTHROSCOPY OF KNEE Right 9/7/2020    Procedure: ARTHROSCOPY, KNEE, RIGHT ;  Surgeon: You hBatia MD;  Location: [facility] OR [address] [place] [address];  Service: Orthopedics;  Laterality: Right;    CHOLECYSTECTOMY      CORONARY ANGIOPLASTY WITH STENT PLACEMENT      TONSILLECTOMY      TRANSESOPHAGEAL ECHOCARDIOGRAPHY N/A 9/4/2020    Procedure: ECHOCARDIOGRAM, TRANSESOPHAGEAL;  Surgeon: [facility] Diagnostic Provider;  Location: [facility] EP LAB;  Service: Anesthesiology;  Laterality: N/A;    TRANSESOPHAGEAL ECHOCARDIOGRAPHY N/A 9/3/2020    Procedure: ECHOCARDIOGRAM, TRANSESOPHAGEAL;  Surgeon: [facility] Diagnostic Provider;  Location: [facility] EP LAB;  Service: Anesthesiology;  Laterality: N/A;     Family History     Problem Relation (Age of Onset)     Heart disease Mother    Hypertension Son    No Known Problems Father, Sister, Son, Son        Tobacco Use    Smoking status: Former Smoker     Packs/day: 0.01     Years: 3.00     Pack years: 0.03     Types: Cigarettes     Quit date: 1995     Years since quittin.3    Smokeless tobacco: Never Used   Substance and Sexual Activity    Alcohol use: No    Drug use: No    Sexual activity: Never     Comment:  with 1 son     Review of Systems   Unable to perform ROS: Patient nonverbal     Objective:     Vital Signs (Most Recent):  Temp: 97.9 °F (36.6 °C) (09/15/20 1500)  Pulse: (!) 129 (09/15/20 1600)  Resp: 12 (09/15/20 1600)  BP: 117/68 (09/15/20 1200)  SpO2: 99 % (09/15/20 1600) Vital Signs (24h Range):  Temp:  [97.9 °F (36.6 °C)-98.4 °F (36.9 °C)] 97.9 °F (36.6 °C)  Pulse:  [123-130] 129  Resp:  [-] 12  SpO2:  [94 %-100 %] 99 %  BP: (117)/(68) 117/68  Arterial Line BP: ()/(55-71) 117/66     Weight: 114.8 kg (253 lb)  Body mass index is 30.8 kg/m².    Physical Exam  Constitutional:       Comments: Alert and non-verbal   Cardiovascular:      Rate and Rhythm: Tachycardia present.   Pulmonary:      Effort: No respiratory distress.   Abdominal:      General: There is no distension.      Palpations: Abdomen is soft.      Tenderness: There is no abdominal tenderness.   Skin:     General: Skin is warm.         Significant Labs:  CBC:   Recent Labs   Lab 09/15/20  0401   WBC 35.44*   RBC 3.10*   HGB 8.1*   HCT 27.1*   *   MCV 87   MCH 26.1*   MCHC 29.9*     CMP:   Recent Labs   Lab 09/15/20  0343  09/15/20  1357   *   < > 181*   CALCIUM 9.8   < > 9.9   ALBUMIN 1.5*   < > 1.4*   PROT 7.2  --   --       < > 141   K 4.3   < > 4.2   CO2 24   < > 24   CL 99   < > 105   BUN 20   < > 10   CREATININE 1.8*   < > 1.1   ALKPHOS 209*  --   --    ALT 44  --   --    AST 50*  --   --    BILITOT 0.5  --   --     < > = values in this interval not displayed.       Significant  Diagnostics:  EXAMINATION:  CT CHEST ABDOMEN PELVIS WITHOUT CONTRAST(XPD)     CLINICAL HISTORY:  concern for occult abscess, NANETTE preventing contrast;     TECHNIQUE:  Axial images of the chest, abdomen, and pelvis were acquired without the use of IV.  30 cc oral Omnipaque was given. Coronal and sagittal reconstructions were also obtained     COMPARISON:  CT chest with contrast 03/23/2012.     FINDINGS:  The patient's arms are in the field of view resulting in beam hardening artifact.     Thoracic soft tissues: Normal thyroid. Right-sided central venous catheter with the tip in the SVC.     Aorta: Calcific atherosclerosis of the aorta and its branches.  No aneurysmal dilatation.  Dense calcific atherosclerosis of the bilateral common femoral arteries with high-grade stenosis of the right.     Heart: Enlarged in size.  Coronary artery calcifications.  Small pericardial fluid.     Denita/Mediastinum: Multiple prominent hilar and mediastinal lymph nodes measuring up to 1.1 cm in the right upper paratracheal station.  These appears similar to prior exam dated 03/23/2012.     Lungs: Trachea and bronchi are patent.     Trace right pleural fluid with pleural thickening and pleural based calcifications likely related to chronic changes from prior empyema, hemothorax or postprocedural.  Mild pleural thickening on the left side.  Subsegmental consolidative opacities, bronchiectasis and volume loss predominate involving the right lung base.  These findings favored to represent atelectatic changes, however superimposed infection not excluded.  Bandlike atelectatic changes in the left lower lobe.     0.7 cm ground-glass nodule in the left upper lobe (axial series 13 image 32), and 0.4 cm pulmonary nodule within the right upper lobe (series 13, image 28) both nodules were likely present on CT dated 03/23/2012.     Liver: Normal in size and contour.  No focal hepatic lesion.     Gallbladder: The gallbladder is distended with layering  hyperdense material which could represent biliary sludge and small gallstones.  Pericholecystic free fluid tracking to the pelvis.  These findings are concerning for acute cholecystitis.     Bile Ducts: No evidence of dilated ducts.     Pancreas: Fatty atrophy of the pancreas.  No peripancreatic fat stranding or pancreatic masses.     Spleen: Unremarkable.     Stomach and duodenum: Nasogastric tube in place.  The stomach is filled with orally ingested contrast.     Adrenals: Unremarkable.     Kidneys/ Ureters: Bilateral nonspecific perinephric fat stranding.  Bilateral renal calcifications which could represent vascular calcifications and nonobstructing nephrolithiasis.  Dense calcific atherosclerosis involving the bilateral renal arteries origin.     Bladder: The urinary bladder is decompressed with a Newlel catheter.     Reproductive organs: Unremarkable.     Bowel/Mesentery: Small bowel is normal in caliber with no evidence of obstruction.  No evidence of inflammation or wall thickening.  Colon demonstrates no focal wall thickening.     Lymph nodes: No lymphadenapathy.     Abdominal wall:  Generalized body wall edema.  Healed mid abdominal surgical scar.     Vasculature: No aneurysm. No significant calcific atherosclerosis.     Bones: No acute fracture. No suspicious osseous lesions.  Prominent degenerative changes of the lower cervical and lower lumbar spine.     Impression:     Distended gallbladder with probable biliary sludge and cholelithiasis.  Trace pericholecystic free fluid and small volume of pelvic free fluid.  These findings are nonspecific, however concerning for acute cholecystitis.     Chronic fibrotic changes and atelectasis involving the right lung base, slightly worse compared to CT thorax dated 03/23/2012, superimposed infection not exclude.  Trace right pleural fluid, pleural thickening and pleural base calcifications.  Two pulmonary nodules, likely present on prior exam dated 03/23/2012.  Such  stability over time suggestive of benign etiology.     Prominent hilar and mediastinal lymph nodes, similar to prior exam dated 03/23/2012.     Bilateral renal calcifications which could represent vascular calcifications and nonobstructing nephrolithiasis.

## 2020-09-15 NOTE — SUBJECTIVE & OBJECTIVE
Interval History:   Still disoriented. Presser requirement has decreased. Net negative 1.6 liters. Plan for holding sled today and doing scuff overnight.    Review of patient's allergies indicates:   Allergen Reactions    Vicodin [hydrocodone-acetaminophen] Itching     Current Facility-Administered Medications   Medication Frequency    acetaminophen tablet 1,000 mg Q8H PRN    albuterol-ipratropium 2.5 mg-0.5 mg/3 mL nebulizer solution 3 mL Q4H PRN    artificial tears 0.5 % ophthalmic solution 1 drop TID    calcium carbonate 200 mg calcium (500 mg) chewable tablet 500 mg BID PRN    calcium gluconate 3,000 mg in dextrose 5 % 100 mL IVPB Continuous    DAPTOmycin (CUBICIN) 1,050 mg in sodium chloride 0.9% IVPB Q24H    dextrose 10 % infusion PRN    dextrose 10 % infusion PRN    dextrose 50% injection 12.5 g PRN    dextrose 50% injection 25 g PRN    fludrocortisone tablet 100 mcg Daily    fluticasone furoate-vilanteroL 200-25 mcg/dose diskus inhaler 1 puff Daily    fluticasone propionate 50 mcg/actuation nasal spray 50 mcg Daily    glucagon (human recombinant) injection 1 mg PRN    glucose chewable tablet 16 g PRN    glucose chewable tablet 24 g PRN    heparin (porcine) injection 5,000 Units Q8H    hydrocortisone sodium succinate injection 100 mg Q8H    insulin regular 100 Units in sodium chloride 0.9% 100 mL infusion Continuous    levoFLOXacin tablet 750 mg Every other day    levothyroxine tablet 75 mcg Before breakfast    melatonin tablet 6 mg Nightly PRN    norepinephrine 16 mg in dextrose 5 % 250 mL infusion Continuous    ondansetron injection 4 mg Q8H PRN    oxyCODONE immediate release tablet 5 mg Q8H PRN    polyethylene glycol packet 17 g BID PRN    sodium chloride 0.9% flush 10 mL PRN    Tdap vaccine injection 0.5 mL vaccine x 1 dose       Objective:     Vital Signs (Most Recent):  Temp: 97.9 °F (36.6 °C) (09/15/20 1500)  Pulse: (!) 128 (09/15/20 1700)  Resp: 13 (09/15/20 1700)  BP:  117/68 (09/15/20 1200)  SpO2: 99 % (09/15/20 1700)  O2 Device (Oxygen Therapy): nasal cannula (09/15/20 1700) Vital Signs (24h Range):  Temp:  [97.9 °F (36.6 °C)-98.4 °F (36.9 °C)] 97.9 °F (36.6 °C)  Pulse:  [123-130] 128  Resp:  [12-22] 13  SpO2:  [94 %-100 %] 99 %  BP: (117)/(68) 117/68  Arterial Line BP: ()/(55-71) 138/65     Weight: 114.8 kg (253 lb) (09/14/20 0749)  Body mass index is 30.8 kg/m².  Body surface area is 2.48 meters squared.    I/O last 3 completed shifts:  In: 7154.2 [I.V.:6314.2; NG/GT:290; IV Piggyback:550]  Out: 8980 [Urine:1075; Other:7905]    Physical Exam  Vitals signs and nursing note reviewed.   Constitutional:       General: He is not in acute distress.     Appearance: He is well-developed. He is ill-appearing. He is not diaphoretic.   HENT:      Head: Normocephalic and atraumatic.      Right Ear: External ear normal.      Left Ear: External ear normal.      Mouth/Throat:      Pharynx: No oropharyngeal exudate.   Eyes:      General: No scleral icterus.        Right eye: No discharge.         Left eye: No discharge.      Conjunctiva/sclera: Conjunctivae normal.      Pupils: Pupils are equal, round, and reactive to light.   Neck:      Musculoskeletal: Normal range of motion.      Thyroid: No thyromegaly.      Trachea: No tracheal deviation.      Comments: rij  Cardiovascular:      Rate and Rhythm: Tachycardia present.   Pulmonary:      Effort: Pulmonary effort is normal. No respiratory distress.      Breath sounds: Normal breath sounds. No stridor. No wheezing or rales.      Comments: Poor air movement  Abdominal:      General: Bowel sounds are normal. There is no distension.      Palpations: Abdomen is soft.      Tenderness: There is no abdominal tenderness. There is no guarding.   Musculoskeletal:         General: No tenderness or deformity.      Right lower leg: Edema present.      Left lower leg: Edema present.   Lymphadenopathy:      Cervical: No cervical adenopathy.   Skin:      Coloration: Skin is not pale.      Findings: No erythema or rash.   Neurological:      General: No focal deficit present.         Significant Labs:  All labs within the past 24 hours have been reviewed.     Significant Imaging:  Labs: Reviewed

## 2020-09-15 NOTE — PROGRESS NOTES
[facility]  Critical Care Medicine  Progress Note    Patient Name: Ed Patton  MRN: [id number]  Admission Date: 8/30/2020  Hospital Length of Stay: 16 days  Code Status: Full Code  Attending Provider: Mk Jolly MD  Primary Care Provider: Qiana Chow MD   Principal Problem: Septic shock    Subjective:     HPI:  Mr. Ed Patton is a 65 year old male for whom MICU was consulted for hypotension. He has a PMH significant for HFrEF and COPD with chronic respiratory failure (on 2L home oxygen), T2DM with CKD III, and iron deficiency anemia. History obtained from chart review and speaking with patient. See hospital course below for detail regarding care and course of this patient.     Hospital/ICU Course:  Mr. Ed Patton was admitted to hospital medicine on 08/30 for management of a left-sided diabetic foot infection that was precipitated by an undetected punction wound after stepping on a tack/nail. His presentation was notable for leukocytosis with elevated inflammatory markers, however MRI of left foot only showed cellulitis of the anterior and lateral aspect of foot. Podiatry was consulted with recommendations for IV antibiotics; he was initiated on Ciprofloxacin and Vancomycin on admission. However, blood cultures from admission resulted positive for MRSA with wound cultures from left foot also growing MRSA with Proteus mirabilis. By 09/03 cultures remained positive despite Vancomycin, and patient was noted to develop right knee pain with swelling. Orthopedics was consulted and patient underwent right knee joint aspiration with cultures also positive for MRSA. LUKAS on 09/04 showed known HFrEF, but was negative for endocarditis. MRI of lumbar spine on 09/08 was negative for abscess. By 09/06, blood cultures continued to remain positive, and he underwent I&D of right arm abscess on 09/08 with cultures also positive for MRSA. For persistent MRSA bacteremia, he was  transitioned to Daptomycin and Ceftaroline, however this regimen was discontinued by 09/10 due to concern for developing rhabdomylosis. By 09/09, he was noted to develop a progressively worsening leukocytosis and NANETTE. Nephrology was consulted on 09/09 with suspicion for ATN. Hematology was consulted on 09/12 for persistent leukocytosis as likely reactive from bacteremia. On 09/10, patient began developing intermitent hypotension prompting broadening of antibiotics to Cefepime and Vancomycin. By 09/12, renal function continued to worsen in addition to hypotension prompting transfer to ICU for vasopressor support and possible HD. Patient found to be encephalopathic, central line placed and started on CRRT overnight on 9/14 with improvement in mental status, but remained encephalopathic. CT head without any acute intracranial process. Vasopressin off AM of 9/15 but remains on levophed, CT abdomen with concern for potential cholecystitis and possible atelectasis with superimposed pneumonia.    Interval History/Significant Events: No acute events overnight. Patient tolerated CRRT. Encephalopathy improved but still not at baseline. Remains on pressor support, vaso turned off and levophed weaning down.    Review of Systems   Unable to perform ROS: Mental status change     Objective:     Vital Signs (Most Recent):  Temp: 97.9 °F (36.6 °C) (09/15/20 1500)  Pulse: (!) 129 (09/15/20 1600)  Resp: 12 (09/15/20 1600)  BP: 117/68 (09/15/20 1200)  SpO2: 99 % (09/15/20 1600) Vital Signs (24h Range):  Temp:  [97.9 °F (36.6 °C)-98.4 °F (36.9 °C)] 97.9 °F (36.6 °C)  Pulse:  [123-130] 129  Resp:  [12-27] 12  SpO2:  [94 %-100 %] 99 %  BP: (117)/(68) 117/68  Arterial Line BP: ()/(55-71) 117/66   Weight: 114.8 kg (253 lb)  Body mass index is 30.8 kg/m².      Intake/Output Summary (Last 24 hours) at 9/15/2020 1651  Last data filed at 9/15/2020 1628  Gross per 24 hour   Intake 5003.7 ml   Output 7574 ml   Net -2570.3 ml       Physical  Exam  Constitutional:       General: He is not in acute distress.     Appearance: Normal appearance. He is well-developed and overweight. He is ill-appearing.      Interventions: Nasal cannula in place.   HENT:      Head: Normocephalic and atraumatic.   Eyes:      General: No scleral icterus.     Conjunctiva/sclera: Conjunctivae normal.      Pupils: Pupils are equal, round, and reactive to light.   Neck:      Musculoskeletal: Normal range of motion and neck supple.      Comments: Central line inserted in right IJ with clean, dry, and intact dressing.   Cardiovascular:      Rate and Rhythm: Regular rhythm. Tachycardia present.      Pulses: Normal pulses.      Heart sounds: Normal heart sounds. No murmur. No friction rub. No gallop.    Pulmonary:      Effort: Pulmonary effort is normal. No respiratory distress.      Breath sounds: Examination of the right-lower field reveals decreased breath sounds. Examination of the left-lower field reveals decreased breath sounds. Decreased breath sounds and rales (b/l basilar rales) present. No wheezing or rhonchi.   Abdominal:      General: Bowel sounds are normal. There is no distension.      Palpations: Abdomen is soft. There is no mass.      Tenderness: There is no abdominal tenderness. There is no guarding.   Musculoskeletal: Normal range of motion.      Comments: There is edema of the bilateral lower extremities.   Lymphadenopathy:      Cervical: No cervical adenopathy.   Skin:     General: Skin is warm and dry.      Findings: No bruising or rash.   Neurological:      Mental Status: He is lethargic and disoriented.         Vents:     Lines/Drains/Airways     Central Venous Catheter Line            Trialysis (Dialysis) Catheter 09/13/20 0807 right internal jugular 2 days          Drain                 Urethral Catheter 09/09/20 1720 16 Fr. 5 days         NG/OG Tube 09/14/20 0500 Jasper sump 14 Fr. Left nostril 1 day          Arterial Line            Arterial Line 09/13/20 0753  Left Radial 2 days          Peripheral Intravenous Line                 Peripheral IV - Single Lumen 09/10/20 1455 20 G;1 3/4 in Left;Anterior Forearm 5 days         Peripheral IV - Single Lumen 09/13/20 0600 20 G Right Antecubital 2 days              Significant Labs:    CBC/Anemia Profile:  Recent Labs   Lab 09/14/20  0414 09/15/20  0401   WBC 36.95* 35.44*   HGB 9.0* 8.1*   HCT 28.9* 27.1*   * 447*   MCV 85 87   RDW 15.9* 16.0*        Chemistries:  Recent Labs   Lab 09/13/20 2058  09/14/20  0414  09/14/20  2145  09/15/20  0343 09/15/20  0401 09/15/20  0505 09/15/20  1238 09/15/20  1357   *   < > 138  138   < > 139   < > 139 140 139 142 141   K 5.2*   < > 4.2  4.2   < > 4.4   < > 4.3 4.4 4.2 4.2 4.2   CL 94*   < > 104  104   < > 98   < > 99 100 99 104 105   CO2 18*   < > 28  28   < > 26   < > 24 24 24 26 24   *   < > 11  11   < > 20   < > 20 19 21 12 10   CREATININE 6.2*   < > 0.6  0.6   < > 1.7*   < > 1.8* 1.8* 1.9* 1.1 1.1   CALCIUM 8.0*   < > 7.0*  7.0*   < > 9.7   < > 9.8 9.7 9.5 9.8 9.9   ALBUMIN 1.4*   < > 1.1*  1.1*   < > 1.4*  --  1.5* 1.4*  --   --  1.4*   PROT 7.9  --  4.1*  --   --   --  7.2  --   --   --   --    BILITOT 0.4  --  0.3  --   --   --  0.5  --   --   --   --    ALKPHOS 262*  --  68  --   --   --  209*  --   --   --   --    ALT 50*  --  70*  --   --   --  44  --   --   --   --    AST 35  --  25  --   --   --  50*  --   --   --   --    MG  --    < > 1.8  1.8   < > 2.5  --   --  2.5  --   --  2.5   PHOS  --    < > 2.2*   < > 4.7*  --   --  4.5  --   --  2.4*    < > = values in this interval not displayed.       CMP:   Recent Labs   Lab 09/13/20 2058  09/14/20  0414  09/15/20  0343 09/15/20  0401 09/15/20  0505 09/15/20  1238 09/15/20  1357   *   < > 138  138   < > 139 140 139 142 141   K 5.2*   < > 4.2  4.2   < > 4.3 4.4 4.2 4.2 4.2   CL 94*   < > 104  104   < > 99 100 99 104 105   CO2 18*   < > 28  28   < > 24 24 24 26 24   *   < > 174*  174*    < > 286* 285* 293* 190* 181*   *   < > 11  11   < > 20 19 21 12 10   CREATININE 6.2*   < > 0.6  0.6   < > 1.8* 1.8* 1.9* 1.1 1.1   CALCIUM 8.0*   < > 7.0*  7.0*   < > 9.8 9.7 9.5 9.8 9.9   PROT 7.9  --  4.1*  --  7.2  --   --   --   --    ALBUMIN 1.4*   < > 1.1*  1.1*   < > 1.5* 1.4*  --   --  1.4*   BILITOT 0.4  --  0.3  --  0.5  --   --   --   --    ALKPHOS 262*  --  68  --  209*  --   --   --   --    AST 35  --  25  --  50*  --   --   --   --    ALT 50*  --  70*  --  44  --   --   --   --    ANIONGAP 19*   < > 6*  6*   < > 16 16 16 12 12   EGFRNONAA 8.8*   < > >60.0  >60.0   < > 39.2* 39.2* 36.7* >60.0 >60.0    < > = values in this interval not displayed.     All pertinent labs within the past 24 hours have been reviewed.    Significant Imaging:  I have reviewed all pertinent imaging results/findings within the past 24 hours.      ABG  Recent Labs   Lab 09/14/20 2037   PH 7.423   PO2 83   PCO2 44.7   HCO3 29.2*   BE 5     Assessment/Plan:     Neuro  Acute metabolic encephalopathy  -Likely multifactorial from uremia vs DKA vs worsening shock with cerebral hypoperfusion vs sepsis.   -Per speaking with wife, she describes a likely baseline history of developing dementia prior to hospitalization.     Plan:   -Continue supportive management for multifactorial issues.   -Closely monitor respiratory status with concern for needing intubation for airway protection.  - Unclear etiology at this point, uremic encephalopathy should have cleared with CRRT. Could be from sepsis +/- neurotoxicity for cefipime      Pulmonary  Acute on chronic respiratory failure with hypoxia  -History of mixed COPD (based on PFT's from 05/2015 showing mild (small airways) obstruction, airflow not improved after bronchodilator, and moderate restriction) and HFrEF with chronic respiratory failure on 2L home oxygen.   -ICU admission notable for progressive increase in supplemental oxygen requirements.   -Work-up for underlying  etiology of acute on chronic respiratory failure include CXR showing suspected HAP and concern for possible progression of CHF.     Plan:   - Continue supplemental oxygen with goal saturations >88%; given encephalopathy, low threshold to intubated, however may be difficult to manage given underlying CHF and pulmonary hypertension.   - Repeat ECHO unchanged   - Holding home CHF regimen with shock on ICU admission.   - Nephrology following for CRRT for attempted volume optimization.   - Continue home BREO with duo-nebulizers PRN.   - Strict I/O's and daily weights.     Hospital-acquired pneumonia  -See assessment for shock.     COPD mixed type  -Based on PFT's from 05/2015 showing mild (small airways) obstruction, airflow not improved after bronchodilator, and moderate restriction.    Plan:   -See assessment for respiratory failure. .     Cardiac/Vascular  Hyperlipidemia  Plan:   -Holding home Statin with concern for rhabdomyolysis earlier in admission.     Chronic systolic congestive heart failure  -Most recent ECHO in 09/2020 with EF of 25%.   -Unclear is ischemic vs non-ischemic.   -Home regimen: Carvedilol, Lasix 80 mg, and Lisinopril.   -Exam notable for bilateral lower extremity edema and JVD.   -Associated with chronic hypoxemic respiratory failure requiring 2L nasal cannula, but noted to develop worsening oxygen requirements on ICU admission that were likely multifactorial from suspected hospital acquired pneumonia versus declining urine output with pre-disposition to volume overload. .     TTE: 9/14  EF ranging in 25- 35% for all last 3 echos now  Moderate biventricular systolic dysfunction  LVDD    Plan:   -See assessment for respiratory failure.     Renal/  Increased anion gap metabolic acidosis  -See assessment for T2DM with DKA.     NANETTE (acute kidney injury)  -Admission notable for progressive NANETTE since approximately 09/08 with worsening Cr associated with increased anion gap metabolic acidosis.    -Status post evaluation by nephrology with concern for ATN based on urine microscopy showing scant normal red blood cells without any dysmorphic shapes evident and numerous muddy brown casts.   -Admission to ICU notable for continued decline in renal function with eventual initiation of CRRT on 09/13.   -Etiology of continued decline in renal function is likely multifactorial from shock vs progression of CHF vs DKA vs medication side-effect.     Plan:   -Nephrology following for CRRT management.   -Trending renal function Q6H while treating DKA.   -Trialysis line place on 09/13 for HD access.   -Strict I/O's and daily weights, if possible.   -Renally dose all medications.     ID  * Septic shock  This is a 65 year old  male with PMH significant for HFrEF and COPD with chronic respiratory failure (on 2L home oxygen), T2DM with CKD III, and iron deficiency anemia who originally presented to [facility] on 08/30 with cellulitis of left foot with concern for diabetic foot infection with subsequent development of MRSA bacteremia attributed to septic arthritis of right knee and elbow. He is status post drainage and coverage for MRSA since that time. His work-up for other etiologies of MRSA bacteremia have included negative LUKAS and MRI of lumbar spine looking for endocarditis and spinal abscess, respectively. Stool studies for C.diff on 09/11 were negative. His hospital course has been associated with worsening hypotension and leukocytosis since 09/10 prompting ICU admission on 09/12 for initiation of vasopressor support. Infectious work-up thus far on ICU admission concern for likely right lower lobe HAP.     CT abdomen pelvis on 9/15 with gallbladder dilation, sludge, and trace pericholic fluid collection. Exam not consistent with cholecystitis but given persistent shock potential source. Also with potential right lower lobe atelectasis with overlying infection    Plan:   - General surgery consulted, EILEEN  scan pending to rule out acute cholecystitis  - Continue broad spectrum coverage with Vancomycin and Cefepime; pharmacy to dose Vancomycin.   - Follow-up repeat blood cultures. NGSF  - Follow-up repeat UA.   - Vasopressor support for goal MAP >65.   - Continue stress dose steroids.   - Trending WBC count daily.     Septic arthritis of knee, right  -See assessment for shock.     Staphylococcal arthritis of right knee  -See assessment for shock.     Sepsis due to methicillin resistant Staphylococcus aureus (MRSA)  -See assessment for shock.     Endocrine  Diabetic ulcer of left foot associated with type 2 diabetes mellitus, with fat layer exposed  -See assessment for shock.     Postablative hypothyroidism  Plan:   -Continue home SYNTHROID.     Type 2 diabetes mellitus with ketoacidosis without coma, with long-term current use of insulin  -Most recent A1c 9.5 in 08/2020.   -Home regimen: NPH 45U AM and 35U PM.   -Complicated by CKD III and polyneuropathy with diabetic foot ulcer.   -ICU admission on 09/12 notable for increased anion gap metabolic acidosis and elevated beta-hydroxybutyrate concern for DKA possible precipitated from withholding insulin versus worsening of underlying infection.     Plan:   - Insulin drip with Q1H glucose checks.   -Trending BMP's Q4H; monitor for hyperkalemia.   -Holding off on continuous IVF given significantly reduced EF.   -Sugar free bariatric clear liquid diet ordered.   -Holding home Gabapentin with concern for hypotension.   -Hypoglycemia protocol ordered.     Type 2 diabetes mellitus with diabetic polyneuropathy, with long-term current use of insulin  -Most recent A1c 9.5 in 08/2020.   -Home regimen: NPH 45U AM and 35U PM.   -Complicated by CKD III and polyneuropathy with diabetic foot ulcer.   -ICU admission on 09/12 notable for increased anion gap metabolic acidosis and elevated beta-hydroxybutyrate concern for DKA possible precipitated from withholding insulin versus worsening  of underlying infection.     Plan:   - Continue Insulin gtt, will transition off tomorrow  -Trending BMP's Q6H; monitor for hyperkalemia.   - Holding home Gabapentin with concern for hypotension.   - Hypoglycemia protocol ordered.     Type 2 diabetes mellitus with stage 3 chronic kidney disease, with long-term current use of insulin  -See assessment for NANETTE.        Critical Care Daily Checklist:    A: Awake: RASS Goal/Actual Goal: RASS Goal: 0-->alert and calm  Actual: Jimenez Agitation Sedation Scale (RASS): Drowsy   B: Spontaneous Breathing Trial Performed?     C: SAT & SBT Coordinated?  N/A                      D: Delirium: CAM-ICU Overall CAM-ICU: Positive   E: Early Mobility Performed? Yes   F: Feeding Goal: Goals: Adequate PO intake to meet > 75% EEN/EPN by RD follow up  Status: Nutrition Goal Status: progressing towards goal   Current Diet Order   No orders of the defined types were placed in this encounter.      AS: Analgesia/Sedation N/A   T: Thromboembolic Prophylaxis Heparin   H: HOB > 300 Yes   U: Stress Ulcer Prophylaxis (if needed) Famotidine   G: Glucose Control Insulin gtt   B: Bowel Function Stool Occurrence: 1   I: Indwelling Catheter (Lines & Newell) Necessity RIJ for dialysis   D: De-escalation of Antimicrobials/Pharmacotherapies Continuing broad spectrum    Plan for the day/ETD Evaluation for cholecystitis    Code Status:  Family/Goals of Care: Full Code         Critical secondary to Patient has a condition that poses threat to life and bodily function: Septic Shock      Critical care was time spent personally by me on the following activities: development of treatment plan with patient or surrogate and bedside caregivers, discussions with consultants, evaluation of patient's response to treatment, examination of patient, ordering and performing treatments and interventions, ordering and review of laboratory studies, ordering and review of radiographic studies, pulse oximetry, re-evaluation of  patient's condition. This critical care time did not overlap with that of any other provider or involve time for any procedures.     Lev Nogueira MD  Critical Care Medicine  [facility]

## 2020-09-15 NOTE — ASSESSMENT & PLAN NOTE
Surgery consulted due to cholecystitis being potential cause of ongoing sepsis    Would confirm with HIDA scan    If positive, recommend IR consult for cholecystostomy tube placement    Not candidate for cholecystectomy at this time

## 2020-09-15 NOTE — ASSESSMENT & PLAN NOTE
-Most recent A1c 9.5 in 08/2020.   -Home regimen: NPH 45U AM and 35U PM.   -Complicated by CKD III and polyneuropathy with diabetic foot ulcer.   -ICU admission on 09/12 notable for increased anion gap metabolic acidosis and elevated beta-hydroxybutyrate concern for DKA possible precipitated from withholding insulin versus worsening of underlying infection.     Plan:   - Continue Insulin gtt, will transition off tomorrow  -Trending BMP's Q6H; monitor for hyperkalemia.   - Holding home Gabapentin with concern for hypotension.   - Hypoglycemia protocol ordered.

## 2020-09-15 NOTE — PROGRESS NOTES
[facility]  Nephrology  Progress Note    Patient Name: Ed Patton  MRN: [id number]  Admission Date: 8/30/2020  Hospital Length of Stay: 16 days  Attending Provider: Mk Jolly MD   Primary Care Physician: Qiana Chow MD  Principal Problem:Septic shock    Subjective:     HPI: Mr. Patton is a 66yo M with insulin dependent T2DM, chronic hypoxemic resp. failure (on 2L oxygen at home), and CHF (EF 25%). He presented to the ED for recurrent falls and sepsis from an infected diabetic ulcer from stepping on a tack 8/30. Wound cultures from foot positive for proteus and MRSA 8/31. Urine culture 8/30 positive for klebsiella. Blood cultures have been positive for MRSA repeatedly from 8/30 to 9/8. He also developed septic arthritis of the right knee likely from seeding secondary to septicemia positive for MRSA 9/3. LUKAS was negative for IE 9/4. Initial management of septicemia was with ciprofloxacin and Vancomycin which was supratherapeutic to 26.5 on 9/2 prompting switch to a pulse dose regimen. Due to persistent bacteremia ID changed antibiotics to ceftaroline and daptomycin on 9/7 I&D of right knee performed by ortho on 9/7. Cr on admission was 1.5 at admit up from the baseline (1.2-1.5). On 9/9 Cr level had a sudden increased to 3. Nephrology was consulted for NANETTE.     Interval History:   Still disoriented. Presser requirement has decreased. Net negative 1.6 liters. Plan for holding sled today and doing scuff overnight.    Review of patient's allergies indicates:   Allergen Reactions    Vicodin [hydrocodone-acetaminophen] Itching     Current Facility-Administered Medications   Medication Frequency    acetaminophen tablet 1,000 mg Q8H PRN    albuterol-ipratropium 2.5 mg-0.5 mg/3 mL nebulizer solution 3 mL Q4H PRN    artificial tears 0.5 % ophthalmic solution 1 drop TID    calcium carbonate 200 mg calcium (500 mg) chewable tablet 500 mg BID PRN    calcium gluconate 3,000 mg in  dextrose 5 % 100 mL IVPB Continuous    DAPTOmycin (CUBICIN) 1,050 mg in sodium chloride 0.9% IVPB Q24H    dextrose 10 % infusion PRN    dextrose 10 % infusion PRN    dextrose 50% injection 12.5 g PRN    dextrose 50% injection 25 g PRN    fludrocortisone tablet 100 mcg Daily    fluticasone furoate-vilanteroL 200-25 mcg/dose diskus inhaler 1 puff Daily    fluticasone propionate 50 mcg/actuation nasal spray 50 mcg Daily    glucagon (human recombinant) injection 1 mg PRN    glucose chewable tablet 16 g PRN    glucose chewable tablet 24 g PRN    heparin (porcine) injection 5,000 Units Q8H    hydrocortisone sodium succinate injection 100 mg Q8H    insulin regular 100 Units in sodium chloride 0.9% 100 mL infusion Continuous    levoFLOXacin tablet 750 mg Every other day    levothyroxine tablet 75 mcg Before breakfast    melatonin tablet 6 mg Nightly PRN    norepinephrine 16 mg in dextrose 5 % 250 mL infusion Continuous    ondansetron injection 4 mg Q8H PRN    oxyCODONE immediate release tablet 5 mg Q8H PRN    polyethylene glycol packet 17 g BID PRN    sodium chloride 0.9% flush 10 mL PRN    Tdap vaccine injection 0.5 mL vaccine x 1 dose       Objective:     Vital Signs (Most Recent):  Temp: 97.9 °F (36.6 °C) (09/15/20 1500)  Pulse: (!) 128 (09/15/20 1700)  Resp: 13 (09/15/20 1700)  BP: 117/68 (09/15/20 1200)  SpO2: 99 % (09/15/20 1700)  O2 Device (Oxygen Therapy): nasal cannula (09/15/20 1700) Vital Signs (24h Range):  Temp:  [97.9 °F (36.6 °C)-98.4 °F (36.9 °C)] 97.9 °F (36.6 °C)  Pulse:  [123-130] 128  Resp:  [12-22] 13  SpO2:  [94 %-100 %] 99 %  BP: (117)/(68) 117/68  Arterial Line BP: ()/(55-71) 138/65     Weight: 114.8 kg (253 lb) (09/14/20 0749)  Body mass index is 30.8 kg/m².  Body surface area is 2.48 meters squared.    I/O last 3 completed shifts:  In: 7154.2 [I.V.:6314.2; NG/GT:290; IV Piggyback:550]  Out: 8980 [Urine:1075; Other:7905]    Physical Exam  Vitals signs and nursing note  reviewed.   Constitutional:       General: He is not in acute distress.     Appearance: He is well-developed. He is ill-appearing. He is not diaphoretic.   HENT:      Head: Normocephalic and atraumatic.      Right Ear: External ear normal.      Left Ear: External ear normal.      Mouth/Throat:      Pharynx: No oropharyngeal exudate.   Eyes:      General: No scleral icterus.        Right eye: No discharge.         Left eye: No discharge.      Conjunctiva/sclera: Conjunctivae normal.      Pupils: Pupils are equal, round, and reactive to light.   Neck:      Musculoskeletal: Normal range of motion.      Thyroid: No thyromegaly.      Trachea: No tracheal deviation.      Comments: [facility]  Cardiovascular:      Rate and Rhythm: Tachycardia present.   Pulmonary:      Effort: Pulmonary effort is normal. No respiratory distress.      Breath sounds: Normal breath sounds. No stridor. No wheezing or rales.      Comments: Poor air movement  Abdominal:      General: Bowel sounds are normal. There is no distension.      Palpations: Abdomen is soft.      Tenderness: There is no abdominal tenderness. There is no guarding.   Musculoskeletal:         General: No tenderness or deformity.      Right lower leg: Edema present.      Left lower leg: Edema present.   Lymphadenopathy:      Cervical: No cervical adenopathy.   Skin:     Coloration: Skin is not pale.      Findings: No erythema or rash.   Neurological:      General: No focal deficit present.         Significant Labs:  All labs within the past 24 hours have been reviewed.     Significant Imaging:  Labs: Reviewed    Assessment/Plan:     * Septic shock  -cause of nanette    Increased anion gap metabolic acidosis  -secondary to renal failure and septic shock  -on crrt    NANETTE (acute kidney injury)  -non oliguric stage 3 nanette with ischemic atn likely secondary to hypotension from septic shock  -now on multiple drip and getting excess volume  -increased uf on SLED last night able to net  -1.6L  -hold dorian raymond and quinton Reid MD  Nephrology  [facility]

## 2020-09-15 NOTE — ASSESSMENT & PLAN NOTE
MRSA septicemia. Still tachycardic, SpO2 reached 92%, renal function continues to worsen. WBCs not improving. ESR and CRP elevated, RF WNL. Vancomycin was supratherapeutic, switched to pulse dose regimen. Source of bacteremia unclear, may be left foot wound or a septic process at the right knee joint. Right knee now clinically suspicious for septic arthritis (see physical exam). Left foot wound growing Proteus and MRSA, urine growing Klebsiella. TTE & LUKAS is negative. Dapto/ceftaroline switched back to vanc due to worsening renal function and early signs of rhabdo. Course complicated by worsening leukocytosis (C Diff neg)     Blood cultures: MRSA  R knee fluid culture: Staph aureus  Left foot wound: Proteus, MRSA  Source Control: I&D of Septic R knee on 9/7/20.      Repeat Blood cultures 09/09, 09/10, 09/12 have been negative  CXR with right lower lobe opacities concerning for developing pneumonia  CT Chest/Abdomen/Pelvis: Opacities in Right lower lobe concerning atelectic vs superimposed infection. Revealed findings concerning for acute cholecystitis. No identification of abscess.      Recommendations:   -- Continue oral levofloxacin (renally dosed) and continue IV Daptomycin. Avoid Beta lactam antibiotics to limit renal damage.   -- Consider HIDAA scan to evaluate for cholecystitis changes seen on CT C/A/P  -- Also consider respiratory culture given chest imaging finding concerning for  consolidation in Right lower lobe.

## 2020-09-15 NOTE — ASSESSMENT & PLAN NOTE
65 y.o. male s/p R knee scope I&D 9/7/20    VS:  Levo, vaso, 4LNC, tachycardia  Nerve block:  none  PT/OT:  WBAT  DVT PPx:  lovenox per primary  Cultures:  MRSA from R knee aspiration 9/3/20; NGTD intraop cx; BCx 9/8 positive, 9/9 NGTD  Abx:  Vanc, levaquin per ID  Labs: Hb 8.1,  , WBC 35  Drain:  none  Newell:  none    Care per MICU

## 2020-09-15 NOTE — PLAN OF CARE
Pt awake but drowsy; disoriented x4. Oxygenating with 3L O2 via NC. Levophed and insulin gtts infusing per MD order. CRRT during shift; stopped at 1630. Plan to complete NM hepatobiliary scan tonight at 2000. No acute events throughout day, VS and assessment per flow sheet, patient progressing towards goals as tolerated, plan of care reviewed with Ed Patton and family, all concerns addressed, will continue to monitor.    Problem: Adult Inpatient Plan of Care  Goal: Optimal Comfort and Wellbeing  Outcome: Ongoing, Progressing  Goal: Rounds/Family Conference  Outcome: Ongoing, Progressing     Problem: Diabetes Comorbidity  Goal: Blood Glucose Level Within Desired Range  Outcome: Ongoing, Progressing     Problem: Bleeding (Sepsis/Septic Shock)  Goal: Absence of Bleeding  Outcome: Ongoing, Progressing     Problem: Glycemic Control Impaired (Sepsis/Septic Shock)  Goal: Blood Glucose Level Within Desired Range  Outcome: Ongoing, Progressing     Problem: Hemodynamic Instability (Sepsis/Septic Shock)  Goal: Effective Tissue Perfusion  Outcome: Ongoing, Progressing     CMICU DAILY GOALS     A: Awake    RASS: Goal - RASS Goal: 0-->alert and calm  Actual - RASS (Jimenez Agitation-Sedation Scale): -1-->drowsy   Restraint necessity: No  B: Breath   SBT: Not intubated   C: Coordinate A & B, analgesics/sedatives   Pain: managed    SAT: Not intubated  D: Delirium   CAM-ICU: Overall CAM-ICU: Positive  E: Early(intubated/ Progressive (non-intubated) Mobility   MOVE Screen: Pass   Activity: Activity Management: patient unable to perform activities  FAS: Feeding/Nutrition   Diet order: Diet/Nutrition Received: NPO, tube feeding,   Fluid restriction:    T: Thrombus   DVT prophylaxis: VTE Required Core Measure: (TEDs) Compression stocking therapy initiated/maintained  H: HOB Elevation   Head of Bed (HOB): HOB at 30 degrees  U: Ulcer Prophylaxis   GI: yes  G: Glucose control   managed Glycemic Management: blood glucose  monitoring  S: Skin   Bundle compliance: yes   Bathing/Skin Care: incontinence care Date: 9/14/20 pm partial bath complete  B: Bowel Function   diarrhea   I: Indwelling Catheters   Newell necessity:      Urethral Catheter 09/09/20 1720 16 Fr.-Reason for Continuing Urinary Catheterization: Critically ill in ICU requiring intensive monitoring   CVC necessity: Yes   IPAD offered: Not appropriate  D: De-escalation Antibx   Abx per MAR  Plan for the day   Continue CRRT; wean pressors as tolerant; optimize mental status  Family/Goals of care/Code Status   Code Status: Full Code

## 2020-09-15 NOTE — SUBJECTIVE & OBJECTIVE
Interval History/Significant Events: No acute events overnight. Patient tolerated CRRT. Encephalopathy improved but still not at baseline. Remains on pressor support, vaso turned off and levophed weaning down.    Review of Systems   Unable to perform ROS: Mental status change     Objective:     Vital Signs (Most Recent):  Temp: 97.9 °F (36.6 °C) (09/15/20 1500)  Pulse: (!) 129 (09/15/20 1600)  Resp: 12 (09/15/20 1600)  BP: 117/68 (09/15/20 1200)  SpO2: 99 % (09/15/20 1600) Vital Signs (24h Range):  Temp:  [97.9 °F (36.6 °C)-98.4 °F (36.9 °C)] 97.9 °F (36.6 °C)  Pulse:  [123-130] 129  Resp:  [12-27] 12  SpO2:  [94 %-100 %] 99 %  BP: (117)/(68) 117/68  Arterial Line BP: ()/(55-71) 117/66   Weight: 114.8 kg (253 lb)  Body mass index is 30.8 kg/m².      Intake/Output Summary (Last 24 hours) at 9/15/2020 1651  Last data filed at 9/15/2020 1628  Gross per 24 hour   Intake 5003.7 ml   Output 7574 ml   Net -2570.3 ml       Physical Exam  Constitutional:       General: He is not in acute distress.     Appearance: Normal appearance. He is well-developed and overweight. He is ill-appearing.      Interventions: Nasal cannula in place.   HENT:      Head: Normocephalic and atraumatic.   Eyes:      General: No scleral icterus.     Conjunctiva/sclera: Conjunctivae normal.      Pupils: Pupils are equal, round, and reactive to light.   Neck:      Musculoskeletal: Normal range of motion and neck supple.      Comments: Central line inserted in right IJ with clean, dry, and intact dressing.   Cardiovascular:      Rate and Rhythm: Regular rhythm. Tachycardia present.      Pulses: Normal pulses.      Heart sounds: Normal heart sounds. No murmur. No friction rub. No gallop.    Pulmonary:      Effort: Pulmonary effort is normal. No respiratory distress.      Breath sounds: Examination of the right-lower field reveals decreased breath sounds. Examination of the left-lower field reveals decreased breath sounds. Decreased breath sounds  and rales (b/l basilar rales) present. No wheezing or rhonchi.   Abdominal:      General: Bowel sounds are normal. There is no distension.      Palpations: Abdomen is soft. There is no mass.      Tenderness: There is no abdominal tenderness. There is no guarding.   Musculoskeletal: Normal range of motion.      Comments: There is edema of the bilateral lower extremities.   Lymphadenopathy:      Cervical: No cervical adenopathy.   Skin:     General: Skin is warm and dry.      Findings: No bruising or rash.   Neurological:      Mental Status: He is lethargic and disoriented.         Vents:     Lines/Drains/Airways     Central Venous Catheter Line            Trialysis (Dialysis) Catheter 09/13/20 0807 right internal jugular 2 days          Drain                 Urethral Catheter 09/09/20 1720 16 Fr. 5 days         NG/OG Tube 09/14/20 0500 [place] sump 14 Fr. Left nostril 1 day          Arterial Line            Arterial Line 09/13/20 0753 Left Radial 2 days          Peripheral Intravenous Line                 Peripheral IV - Single Lumen 09/10/20 1455 20 G;1 3/4 in Left;Anterior Forearm 5 days         Peripheral IV - Single Lumen 09/13/20 0600 20 G Right Antecubital 2 days              Significant Labs:    CBC/Anemia Profile:  Recent Labs   Lab 09/14/20  0414 09/15/20  0401   WBC 36.95* 35.44*   HGB 9.0* 8.1*   HCT 28.9* 27.1*   * 447*   MCV 85 87   RDW 15.9* 16.0*        Chemistries:  Recent Labs   Lab 09/13/20  2058  09/14/20  0414  09/14/20  2145  09/15/20  0343 09/15/20  0401 09/15/20  0505 09/15/20  1238 09/15/20  1357   *   < > 138  138   < > 139   < > 139 140 139 142 141   K 5.2*   < > 4.2  4.2   < > 4.4   < > 4.3 4.4 4.2 4.2 4.2   CL 94*   < > 104  104   < > 98   < > 99 100 99 104 105   CO2 18*   < > 28  28   < > 26   < > 24 24 24 26 24   *   < > 11  11   < > 20   < > 20 19 21 12 10   CREATININE 6.2*   < > 0.6  0.6   < > 1.7*   < > 1.8* 1.8* 1.9* 1.1 1.1   CALCIUM 8.0*   < > 7.0*  7.0*    < > 9.7   < > 9.8 9.7 9.5 9.8 9.9   ALBUMIN 1.4*   < > 1.1*  1.1*   < > 1.4*  --  1.5* 1.4*  --   --  1.4*   PROT 7.9  --  4.1*  --   --   --  7.2  --   --   --   --    BILITOT 0.4  --  0.3  --   --   --  0.5  --   --   --   --    ALKPHOS 262*  --  68  --   --   --  209*  --   --   --   --    ALT 50*  --  70*  --   --   --  44  --   --   --   --    AST 35  --  25  --   --   --  50*  --   --   --   --    MG  --    < > 1.8  1.8   < > 2.5  --   --  2.5  --   --  2.5   PHOS  --    < > 2.2*   < > 4.7*  --   --  4.5  --   --  2.4*    < > = values in this interval not displayed.       CMP:   Recent Labs   Lab 09/13/20 2058 09/14/20  0414  09/15/20  0343 09/15/20  0401 09/15/20  0505 09/15/20  1238 09/15/20  1357   *   < > 138  138   < > 139 140 139 142 141   K 5.2*   < > 4.2  4.2   < > 4.3 4.4 4.2 4.2 4.2   CL 94*   < > 104  104   < > 99 100 99 104 105   CO2 18*   < > 28  28   < > 24 24 24 26 24   *   < > 174*  174*   < > 286* 285* 293* 190* 181*   *   < > 11  11   < > 20 19 21 12 10   CREATININE 6.2*   < > 0.6  0.6   < > 1.8* 1.8* 1.9* 1.1 1.1   CALCIUM 8.0*   < > 7.0*  7.0*   < > 9.8 9.7 9.5 9.8 9.9   PROT 7.9  --  4.1*  --  7.2  --   --   --   --    ALBUMIN 1.4*   < > 1.1*  1.1*   < > 1.5* 1.4*  --   --  1.4*   BILITOT 0.4  --  0.3  --  0.5  --   --   --   --    ALKPHOS 262*  --  68  --  209*  --   --   --   --    AST 35  --  25  --  50*  --   --   --   --    ALT 50*  --  70*  --  44  --   --   --   --    ANIONGAP 19*   < > 6*  6*   < > 16 16 16 12 12   EGFRNONAA 8.8*   < > >60.0  >60.0   < > 39.2* 39.2* 36.7* >60.0 >60.0    < > = values in this interval not displayed.     All pertinent labs within the past 24 hours have been reviewed.    Significant Imaging:  I have reviewed all pertinent imaging results/findings within the past 24 hours.

## 2020-09-15 NOTE — ASSESSMENT & PLAN NOTE
This is a 65 year old  male with PMH significant for HFrEF and COPD with chronic respiratory failure (on 2L home oxygen), T2DM with CKD III, and iron deficiency anemia who originally presented to [facility] on 08/30 with cellulitis of left foot with concern for diabetic foot infection with subsequent development of MRSA bacteremia attributed to septic arthritis of right knee and elbow. He is status post drainage and coverage for MRSA since that time. His work-up for other etiologies of MRSA bacteremia have included negative LUKAS and MRI of lumbar spine looking for endocarditis and spinal abscess, respectively. Stool studies for C.diff on 09/11 were negative. His hospital course has been associated with worsening hypotension and leukocytosis since 09/10 prompting ICU admission on 09/12 for initiation of vasopressor support. Infectious work-up thus far on ICU admission concern for likely right lower lobe HAP.     CT abdomen pelvis on 9/15 with gallbladder dilation, sludge, and trace pericholic fluid collection. Exam not consistent with cholecystitis but given persistent shock potential source. Also with potential right lower lobe atelectasis with overlying infection    Plan:   - General surgery consulted, HIDA scan pending to rule out acute cholecystitis  - Continue broad spectrum coverage with Vancomycin and Cefepime; pharmacy to dose Vancomycin.   - Follow-up repeat blood cultures. NGSF  - Follow-up repeat UA.   - Vasopressor support for goal MAP >65.   - Continue stress dose steroids.   - Trending WBC count daily.

## 2020-09-15 NOTE — HPI
Ed Patton is a 65 y.o. M who is currently admitted to the MICU in septic shock on pressors and CRRT. He has MRSA bacteremia from a septic R knee for which orthopedic surgery is following.  He has not improved his leukocytosis and is still requiring pressors so a CT chest/abdomen/pelvis was obtained to rule out further sources of sepsis.  He had a distended gallbladder with stones so surgery was consulted.  He is non-verbal.

## 2020-09-15 NOTE — PROGRESS NOTES
[facility]  Infectious Disease  Progress Note    Patient Name: Ed Patton  MRN: [id number]  Admission Date: 8/30/2020  Length of Stay: 16 days  Attending Physician: Mk Jolly MD  Primary Care Provider: Qiana Chow MD    Isolation Status: Contact  Assessment/Plan:      Sepsis due to methicillin resistant Staphylococcus aureus (MRSA)  MRSA septicemia. Still tachycardic, SpO2 reached 92%, renal function continues to worsen. WBCs not improving. ESR and CRP elevated, RF WNL. Vancomycin was supratherapeutic, switched to pulse dose regimen. Source of bacteremia unclear, may be left foot wound or a septic process at the right knee joint. Right knee now clinically suspicious for septic arthritis (see physical exam). Left foot wound growing Proteus and MRSA, urine growing Klebsiella. TTE & LUKAS is negative. Dapto/ceftaroline switched back to vanc due to worsening renal function and early signs of rhabdo. Course complicated by worsening leukocytosis (C Diff neg)     Blood cultures: MRSA  R knee fluid culture: Staph aureus  Left foot wound: Proteus, MRSA  Source Control: I&D of Septic R knee on 9/7/20.      Repeat Blood cultures 09/09, 09/10, 09/12 have been negative  CXR with right lower lobe opacities concerning for developing pneumonia  CT Chest/Abdomen/Pelvis: Opacities in Right lower lobe concerning atelectic vs superimposed infection. Revealed findings concerning for acute cholecystitis. No identification of abscess.      Recommendations:   -- Continue oral levofloxacin (renally dosed) and continue IV Daptomycin. Avoid Beta lactam antibiotics to limit renal damage.   -- Consider HIDAA scan to evaluate for cholecystitis changes seen on CT C/A/P  -- Also consider respiratory culture given chest imaging finding concerning for  consolidation in Right lower lobe.         Thank you for your consult. I will follow-up with patient. Please contact us if you have any additional  questions.    Caitlyn Yeung MD  Infectious Disease  [facility]    Subjective:     Principal Problem:Septic shock    HPI: 66 yo M with Hx of DM2 (on insulin), chronic hypoxemic respiratory failure (on O2 at home), chronic systolic heart failure presented to ED after recurrent falls 7 and 8 days ago. He has a history of falls but reports that his tendency to fall has worsened recently, resulting in injury to his lower back and right thigh. After his fall last Tuesday he looked at his left foot and noticed a wound, denies pain or drainage related to the wound, he admits to not regularly checking his feet and does not know how long the wound has been present. He believes the wound is related to a tack that he previously found imbedded in the bottom of a slipper. He had previously seen Ang Lugo DPM for diabetic foot care but has not seen her this year due to fear of the coronavirus pandemic. He has been getting home health services including physical therapy. No subjective change as of today, still denies pain and drainage related to the wound. Denies F/C/N/V.  Interval History: NAEON. Patient appears a little alert today. Still pleasantly confused. Pressor requirements are decreasing (only on 0.01 of levo). CT chest/abd/pelvis was obtained yesterday, pending read.     Review of Systems   Unable to perform ROS: Mental status change     Objective:     Vital Signs (Most Recent):  Temp: 98.4 °F (36.9 °C) (09/15/20 0700)  Pulse: (!) 128 (09/15/20 1000)  Resp: 13 (09/15/20 1000)  BP: (!) 104/58 (09/14/20 1000)  SpO2: 100 % (09/15/20 1000) Vital Signs (24h Range):  Temp:  [98.1 °F (36.7 °C)-98.7 °F (37.1 °C)] 98.4 °F (36.9 °C)  Pulse:  [123-130] 128  Resp:  [13-29] 13  SpO2:  [90 %-100 %] 100 %  Arterial Line BP: (100-143)/(48-68) 131/64     Weight: 114.8 kg (253 lb)  Body mass index is 30.8 kg/m².    Estimated Creatinine Clearance: 55.2 mL/min (A) (based on SCr of 1.9 mg/dL (H)).    Physical  Exam  Constitutional:       Appearance: Normal appearance.   HENT:      Head: Normocephalic.      Mouth/Throat:      Mouth: Mucous membranes are moist.   Eyes:      Extraocular Movements: Extraocular movements intact.   Neck:      Musculoskeletal: Normal range of motion.      Comments: Right IJ CVC in place. No tenderness or erythema noted on surrounding skin  Cardiovascular:      Rate and Rhythm: Regular rhythm. Tachycardia present.      Heart sounds: No murmur.   Pulmonary:      Effort: Pulmonary effort is normal. No respiratory distress.      Breath sounds: Normal breath sounds. No wheezing.   Abdominal:      General: There is no distension.      Palpations: Abdomen is soft.      Tenderness: There is no abdominal tenderness.   Musculoskeletal:      Comments: Stage 2 diabetic foot ulcer measuring 1cm noted on Left 1st phalanx. Left leg is wrapped in Kerlix bandage.    Neurological:      Mental Status: He is alert.      Comments:  Appears somnolent but easily arousable to touch and voice.    Psychiatric:      Comments: Unable to assess due to mental status          Significant Labs:   Blood Culture:   Recent Labs   Lab 09/09/20  0303 09/09/20  1040 09/10/20  0459 09/12/20 2014 09/12/20 2015   LABBLOO No growth after 5 days. No growth after 5 days. No growth after 5 days. No Growth to date  No Growth to date  No Growth to date No Growth to date  No Growth to date  No Growth to date     CBC:   Recent Labs   Lab 09/14/20  0414 09/15/20  0401   WBC 36.95* 35.44*   HGB 9.0* 8.1*   HCT 28.9* 27.1*   * 447*     CMP:   Recent Labs   Lab 09/13/20 2058  09/14/20  0414  09/14/20  2145  09/15/20  0343 09/15/20  0401 09/15/20  0505   *   < > 138  138   < > 139   < > 139 140 139   K 5.2*   < > 4.2  4.2   < > 4.4   < > 4.3 4.4 4.2   CL 94*   < > 104  104   < > 98   < > 99 100 99   CO2 18*   < > 28  28   < > 26   < > 24 24 24   *   < > 174*  174*   < > 253*   < > 286* 285* 293*   *   < > 11   11   < > 20   < > 20 19 21   CREATININE 6.2*   < > 0.6  0.6   < > 1.7*   < > 1.8* 1.8* 1.9*   CALCIUM 8.0*   < > 7.0*  7.0*   < > 9.7   < > 9.8 9.7 9.5   PROT 7.9  --  4.1*  --   --   --  7.2  --   --    ALBUMIN 1.4*   < > 1.1*  1.1*   < > 1.4*  --  1.5* 1.4*  --    BILITOT 0.4  --  0.3  --   --   --  0.5  --   --    ALKPHOS 262*  --  68  --   --   --  209*  --   --    AST 35  --  25  --   --   --  50*  --   --    ALT 50*  --  70*  --   --   --  44  --   --    ANIONGAP 19*   < > 6*  6*   < > 15   < > 16 16 16   EGFRNONAA 8.8*   < > >60.0  >60.0   < > 42.0*   < > 39.2* 39.2* 36.7*    < > = values in this interval not displayed.     Wound Culture:   Recent Labs   Lab 08/31/20  1754 09/03/20  1647 09/07/20  1721   LABAERO PROTEUS MIRABILIS  Moderate  *  METHICILLIN RESISTANT STAPHYLOCOCCUS AUREUS  Moderate  * METHICILLIN RESISTANT STAPHYLOCOCCUS AUREUS  Few  * No growth  No growth       Significant Imaging: CT Chest/ Abd/Pelvis 09/15:     Distended gallbladder with probable biliary sludge and cholelithiasis.  Trace pericholecystic free fluid and small volume of pelvic free fluid.  These findings are nonspecific, however concerning for acute cholecystitis.     Chronic fibrotic changes and atelectasis involving the right lung base, slightly worse compared to CT thorax dated 03/23/2012, superimposed infection not exclude.  Trace right pleural fluid, pleural thickening and pleural base calcifications.  Two pulmonary nodules, likely present on prior exam dated 03/23/2012.  Such stability over time suggestive of benign etiology.     Prominent hilar and mediastinal lymph nodes, similar to prior exam dated 03/23/2012.     Bilateral renal calcifications which could represent vascular calcifications and nonobstructing nephrolithiasis.

## 2020-09-15 NOTE — CONSULTS
[facility]  General Surgery  Consult Note    Patient Name: Ed Patton  MRN: [id number]  Code Status: Full Code  Admission Date: 8/30/2020  Hospital Length of Stay: 16 days  Attending Physician: Mk Jolly MD  Primary Care Provider: Qiana Chow MD    Patient information was obtained from past medical records and ER records.     Inpatient consult to General Surgery  Consult performed by: Jarad Multani MD  Consult ordered by: Lev Nogueira MD        Subjective:     Principal Problem: Septic shock    History of Present Illness: Ed Patton is a 65 y.o. M who is currently admitted to the MICU in septic shock on pressors and CRRT. He has MRSA bacteremia from a septic R knee for which orthopedic surgery is following.  He has not improved his leukocytosis and is still requiring pressors so a CT chest/abdomen/pelvis was obtained to rule out further sources of sepsis.  He had a distended gallbladder with stones so surgery was consulted.  He is non-verbal.      No current facility-administered medications on file prior to encounter.      Current Outpatient Medications on File Prior to Encounter   Medication Sig    ACCU-CHEK FASTCLIX LANCET DRUM Misc TEST FOUR TIMES DAILY (Patient taking differently: Test twice daily)    ACCU-CHEK SMARTVIEW TEST STRIP Strp TEST FOUR TIMES DAILY (Patient taking differently: Test twice daily)    albuterol (PROVENTIL) 2.5 mg /3 mL (0.083 %) nebulizer solution INHALE THE CONTENTS OF 1 VIAL VIA NEBULIZER EVERY 4 HOURS AS NEEDED FOR WHEEZING. (Patient taking differently: INHALE THE CONTENTS OF 1 VIAL VIA NEBULIZER TWICE DAILY)    albuterol (PROVENTIL/VENTOLIN HFA) 90 mcg/actuation inhaler Inhale 2 puffs into the lungs every 6 (six) hours as needed for Wheezing or Shortness of Breath. Rescue    aspirin (ECOTRIN) 81 MG EC tablet Take 1 tablet (81 mg total) by mouth once daily.    atorvastatin (LIPITOR) 40 MG tablet Take 1 tablet (40 mg  total) by mouth once daily.    carvedilol (COREG) 25 MG tablet Take 1 tablet (25 mg total) by mouth 2 (two) times daily.    cholecalciferol, vitamin D3, (VITAMIN D3) 1,000 unit capsule Take 1 capsule (1,000 Units total) by mouth once daily. (Patient taking differently: Take 2,000 Units by mouth once daily. )    fluticasone furoate-vilanterol (BREO ELLIPTA) 200-25 mcg/dose DsDv diskus inhaler INHALE 1 PUFF INTO THE LUNGS ONCE DAILY. (CONTROLLER)    fluticasone propionate (FLONASE) 50 mcg/actuation nasal spray USE 1 SPRAY IN EACH NOSTRIL ONE TIME DAILY    furosemide (LASIX) 80 MG tablet TAKE 1 TABLET BY MOUTH IN THE MORNING  AND  1 TABLET BY MOUTH DAILY  AT  3- TO 4PM    gabapentin (NEURONTIN) 100 MG capsule Take 1 capsule (100 mg total) by mouth 3 (three) times daily as needed (pain).    insulin NPH-insulin regular, 70/30, (NOVOLIN 70/30 U-100 INSULIN) 100 unit/mL (70-30) injection Inject 45 Units into the skin before breakfast AND 35 Units before dinner. [id number] MINUTES BEFORE BREAKFAST AND DINNER. (Patient taking differently: Inject 40 Units into the skin before breakfast AND 20 Units before dinner.)    insulin syringe-needle U-100 (INSULIN SYRINGE) 1 mL 30 gauge x 5/16 Syrg 1 each by Misc.(Non-Drug; Combo Route) route 2 (two) times daily. Use twice daily as directed with insulin vials.    levothyroxine (SYNTHROID) 75 MCG tablet Take 1 tablet (75 mcg total) by mouth once daily.    lisinopril (PRINIVIL,ZESTRIL) 40 MG tablet Take 1 tablet (40 mg total) by mouth once daily.    ACCU-CHEK RAMIREZ Misc USE AS DIRECTED    nitroGLYCERIN (NITROSTAT) 0.4 MG SL tablet PLACE 1 TABLET UNDER THE TONGUE EVERY 5  MINUTES AS NEEDED FOR CHEST PAIN       Review of patient's allergies indicates:   Allergen Reactions    Vicodin [hydrocodone-acetaminophen] Itching       Past Medical History:   Diagnosis Date    CHF (congestive heart failure)     COPD (chronic obstructive pulmonary disease)     Coronary artery  disease     Diabetes mellitus     Diabetes mellitus type II     DM (diabetes mellitus) type II uncontrolled with renal manifestation 2013    Hyperlipidemia     Hypertension     Postablative hypothyroidism 2018     Past Surgical History:   Procedure Laterality Date    APPENDECTOMY      ARTHROSCOPY OF KNEE Right 2020    Procedure: ARTHROSCOPY, KNEE, RIGHT ;  Surgeon: You Bhatia MD;  Location: [facility] OR [address] [place] [address];  Service: Orthopedics;  Laterality: Right;    CHOLECYSTECTOMY      CORONARY ANGIOPLASTY WITH STENT PLACEMENT      TONSILLECTOMY      TRANSESOPHAGEAL ECHOCARDIOGRAPHY N/A 2020    Procedure: ECHOCARDIOGRAM, TRANSESOPHAGEAL;  Surgeon: [facility] Diagnostic Provider;  Location: [facility] EP LAB;  Service: Anesthesiology;  Laterality: N/A;    TRANSESOPHAGEAL ECHOCARDIOGRAPHY N/A 9/3/2020    Procedure: ECHOCARDIOGRAM, TRANSESOPHAGEAL;  Surgeon: [facility] Diagnostic Provider;  Location: [facility] EP LAB;  Service: Anesthesiology;  Laterality: N/A;     Family History     Problem Relation (Age of Onset)    Heart disease Mother    Hypertension Son    No Known Problems Father, Sister, Son, Son        Tobacco Use    Smoking status: Former Smoker     Packs/day: 0.01     Years: 3.00     Pack years: 0.03     Types: Cigarettes     Quit date: 1995     Years since quittin.3    Smokeless tobacco: Never Used   Substance and Sexual Activity    Alcohol use: No    Drug use: No    Sexual activity: Never     Comment:  with 1 son     Review of Systems   Unable to perform ROS: Patient nonverbal     Objective:     Vital Signs (Most Recent):  Temp: 97.9 °F (36.6 °C) (09/15/20 1500)  Pulse: (!) 129 (09/15/20 1600)  Resp: 12 (09/15/20 1600)  BP: 117/68 (09/15/20 1200)  SpO2: 99 % (09/15/20 1600) Vital Signs (24h Range):  Temp:  [97.9 °F (36.6 °C)-98.4 °F (36.9 °C)] 97.9 °F (36.6 °C)  Pulse:  [123-130] 129  Resp:  [-] 12  SpO2:  [94 %-100 %] 99 %  BP: (117)/(68) 117/68  Arterial Line BP: ()/(55-71)  117/66     Weight: 114.8 kg (253 lb)  Body mass index is 30.8 kg/m².    Physical Exam  Constitutional:       Comments: Alert and non-verbal   Cardiovascular:      Rate and Rhythm: Tachycardia present.   Pulmonary:      Effort: No respiratory distress.   Abdominal:      General: There is no distension.      Palpations: Abdomen is soft.      Tenderness: There is no abdominal tenderness.   Skin:     General: Skin is warm.         Significant Labs:  CBC:   Recent Labs   Lab 09/15/20  0401   WBC 35.44*   RBC 3.10*   HGB 8.1*   HCT 27.1*   *   MCV 87   MCH 26.1*   MCHC 29.9*     CMP:   Recent Labs   Lab 09/15/20  0343  09/15/20  1357   *   < > 181*   CALCIUM 9.8   < > 9.9   ALBUMIN 1.5*   < > 1.4*   PROT 7.2  --   --       < > 141   K 4.3   < > 4.2   CO2 24   < > 24   CL 99   < > 105   BUN 20   < > 10   CREATININE 1.8*   < > 1.1   ALKPHOS 209*  --   --    ALT 44  --   --    AST 50*  --   --    BILITOT 0.5  --   --     < > = values in this interval not displayed.       Significant Diagnostics:  EXAMINATION:  CT CHEST ABDOMEN PELVIS WITHOUT CONTRAST(XPD)     CLINICAL HISTORY:  concern for occult abscess, NANETTE preventing contrast;     TECHNIQUE:  Axial images of the chest, abdomen, and pelvis were acquired without the use of IV.  30 cc oral Omnipaque was given. Coronal and sagittal reconstructions were also obtained     COMPARISON:  CT chest with contrast 03/23/2012.     FINDINGS:  The patient's arms are in the field of view resulting in beam hardening artifact.     Thoracic soft tissues: Normal thyroid. Right-sided central venous catheter with the tip in the SVC.     Aorta: Calcific atherosclerosis of the aorta and its branches.  No aneurysmal dilatation.  Dense calcific atherosclerosis of the bilateral common femoral arteries with high-grade stenosis of the right.     Heart: Enlarged in size.  Coronary artery calcifications.  Small pericardial fluid.     Denita/Mediastinum: Multiple prominent hilar and  mediastinal lymph nodes measuring up to 1.1 cm in the right upper paratracheal station.  These appears similar to prior exam dated 03/23/2012.     Lungs: Trachea and bronchi are patent.     Trace right pleural fluid with pleural thickening and pleural based calcifications likely related to chronic changes from prior empyema, hemothorax or postprocedural.  Mild pleural thickening on the left side.  Subsegmental consolidative opacities, bronchiectasis and volume loss predominate involving the right lung base.  These findings favored to represent atelectatic changes, however superimposed infection not excluded.  Bandlike atelectatic changes in the left lower lobe.     0.7 cm ground-glass nodule in the left upper lobe (axial series 13 image 32), and 0.4 cm pulmonary nodule within the right upper lobe (series 13, image 28) both nodules were likely present on CT dated 03/23/2012.     Liver: Normal in size and contour.  No focal hepatic lesion.     Gallbladder: The gallbladder is distended with layering hyperdense material which could represent biliary sludge and small gallstones.  Pericholecystic free fluid tracking to the pelvis.  These findings are concerning for acute cholecystitis.     Bile Ducts: No evidence of dilated ducts.     Pancreas: Fatty atrophy of the pancreas.  No peripancreatic fat stranding or pancreatic masses.     Spleen: Unremarkable.     Stomach and duodenum: Nasogastric tube in place.  The stomach is filled with orally ingested contrast.     Adrenals: Unremarkable.     Kidneys/ Ureters: Bilateral nonspecific perinephric fat stranding.  Bilateral renal calcifications which could represent vascular calcifications and nonobstructing nephrolithiasis.  Dense calcific atherosclerosis involving the bilateral renal arteries origin.     Bladder: The urinary bladder is decompressed with a Newell catheter.     Reproductive organs: Unremarkable.     Bowel/Mesentery: Small bowel is normal in caliber with no  evidence of obstruction.  No evidence of inflammation or wall thickening.  Colon demonstrates no focal wall thickening.     Lymph nodes: No lymphadenapathy.     Abdominal wall:  Generalized body wall edema.  Healed mid abdominal surgical scar.     Vasculature: No aneurysm. No significant calcific atherosclerosis.     Bones: No acute fracture. No suspicious osseous lesions.  Prominent degenerative changes of the lower cervical and lower lumbar spine.     Impression:     Distended gallbladder with probable biliary sludge and cholelithiasis.  Trace pericholecystic free fluid and small volume of pelvic free fluid.  These findings are nonspecific, however concerning for acute cholecystitis.     Chronic fibrotic changes and atelectasis involving the right lung base, slightly worse compared to CT thorax dated 03/23/2012, superimposed infection not exclude.  Trace right pleural fluid, pleural thickening and pleural base calcifications.  Two pulmonary nodules, likely present on prior exam dated 03/23/2012.  Such stability over time suggestive of benign etiology.     Prominent hilar and mediastinal lymph nodes, similar to prior exam dated 03/23/2012.     Bilateral renal calcifications which could represent vascular calcifications and nonobstructing nephrolithiasis.    Assessment/Plan:     * Septic shock  Surgery consulted due to cholecystitis being potential cause of ongoing sepsis    Would confirm with HIDA scan    If positive, recommend IR consult for cholecystostomy tube placement    Not candidate for cholecystectomy at this time      VTE Risk Mitigation (From admission, onward)         Ordered     heparin (porcine) injection 5,000 Units  Every 8 hours      09/11/20 0943     IP VTE HIGH RISK PATIENT  Once      08/30/20 2004     Place sequential compression device  Until discontinued      08/30/20 3724                Thank you for your consult. I will follow-up with patient. Please contact us if you have any additional  questions.    Jarad Multani MD  General Surgery  [facility]

## 2020-09-15 NOTE — PROGRESS NOTES
Pharmacokinetic Sign-off: IV Vancomycin    Therapy with vancomycin complete and/or consult discontinued by provider.  Pharmacy will sign off, please re-consult as needed.    Maki Gonzalez, PharmD  EXT [id number]

## 2020-09-15 NOTE — ASSESSMENT & PLAN NOTE
-Likely multifactorial from uremia vs DKA vs worsening shock with cerebral hypoperfusion vs sepsis.   -Per speaking with wife, she describes a likely baseline history of developing dementia prior to hospitalization.     Plan:   -Continue supportive management for multifactorial issues.   -Closely monitor respiratory status with concern for needing intubation for airway protection.  - Unclear etiology at this point, uremic encephalopathy should have cleared with CRRT. Could be from sepsis +/- neurotoxicity for cefipime

## 2020-09-15 NOTE — SUBJECTIVE & OBJECTIVE
Interval History:   Issue with crrt and blood flow overnight. Now on citrate. Needs more volume removal. Increase uf to 300-400.    Review of patient's allergies indicates:   Allergen Reactions    Vicodin [hydrocodone-acetaminophen] Itching     Current Facility-Administered Medications   Medication Frequency    acetaminophen tablet 1,000 mg Q8H PRN    albuterol-ipratropium 2.5 mg-0.5 mg/3 mL nebulizer solution 3 mL Q4H PRN    artificial tears 0.5 % ophthalmic solution 1 drop TID    calcium carbonate 200 mg calcium (500 mg) chewable tablet 500 mg BID PRN    calcium gluconate 3,000 mg in dextrose 5 % 100 mL IVPB Continuous    dextrose 10 % infusion PRN    dextrose 10 % infusion PRN    dextrose 5 % and 0.45 % NaCl infusion Continuous    dextrose 50% injection 12.5 g PRN    dextrose 50% injection 25 g PRN    dextrose-sod citrate-citric ac 2.45-2.2 gram- 800 mg/100 mL Soln Continuous    fludrocortisone tablet 100 mcg Daily    fluticasone furoate-vilanteroL 200-25 mcg/dose diskus inhaler 1 puff Daily    fluticasone propionate 50 mcg/actuation nasal spray 50 mcg Daily    glucagon (human recombinant) injection 1 mg PRN    glucose chewable tablet 16 g PRN    glucose chewable tablet 24 g PRN    heparin (porcine) injection 5,000 Units Q8H    hydrocortisone sodium succinate injection 100 mg Q8H    insulin regular 100 Units in sodium chloride 0.9% 100 mL infusion Continuous    iohexol (OMNIPAQUE) oral solution 15 mL PRN    levoFLOXacin tablet 750 mg Every other day    levothyroxine tablet 75 mcg Before breakfast    melatonin tablet 6 mg Nightly PRN    norepinephrine 16 mg in dextrose 5 % 250 mL infusion Continuous    ondansetron injection 4 mg Q8H PRN    oxyCODONE immediate release tablet 5 mg Q8H PRN    phenylephrine HCl in 0.9% NaCl 1 mg/10 mL (100 mcg/mL) syringe 100 mcg Once    polyethylene glycol packet 17 g BID PRN    sodium chloride 0.9% bolus 500 mL Once    sodium chloride 0.9% flush 10 mL  PRN    Tdap vaccine injection 0.5 mL vaccine x 1 dose    vancomycin - pharmacy to dose pharmacy to manage frequency    vasopressin (PITRESSIN) 0.2 Units/mL in dextrose 5 % 100 mL infusion Continuous       Objective:     Vital Signs (Most Recent):  Temp: 98.7 °F (37.1 °C) (09/14/20 1500)  Pulse: (!) 126 (09/14/20 1900)  Resp: (!) 22 (09/14/20 1900)  BP: (!) 104/58 (09/14/20 1000)  SpO2: 98 % (09/14/20 1900)  O2 Device (Oxygen Therapy): nasal cannula (09/14/20 1900) Vital Signs (24h Range):  Temp:  [96.4 °F (35.8 °C)-99.3 °F (37.4 °C)] 98.7 °F (37.1 °C)  Pulse:  [121-130] 126  Resp:  [14-29] 22  SpO2:  [90 %-100 %] 98 %  BP: ()/(58) 104/58  Arterial Line BP: ()/(48-73) 133/60     Weight: 114.8 kg (253 lb) (09/14/20 0749)  Body mass index is 30.8 kg/m².  Body surface area is 2.48 meters squared.    I/O last 3 completed shifts:  In: 6593.1 [I.V.:5793.1; NG/GT:100; IV Piggyback:700]  Out: 5857 [Urine:1090; Other:4767]    Physical Exam  Vitals signs and nursing note reviewed.   Constitutional:       General: He is not in acute distress.     Appearance: He is well-developed. He is ill-appearing. He is not diaphoretic.   HENT:      Head: Normocephalic and atraumatic.      Right Ear: External ear normal.      Left Ear: External ear normal.      Mouth/Throat:      Pharynx: No oropharyngeal exudate.   Eyes:      General: No scleral icterus.        Right eye: No discharge.         Left eye: No discharge.      Conjunctiva/sclera: Conjunctivae normal.      Pupils: Pupils are equal, round, and reactive to light.   Neck:      Musculoskeletal: Normal range of motion.      Thyroid: No thyromegaly.      Trachea: No tracheal deviation.      Comments: [facility]  Cardiovascular:      Rate and Rhythm: Tachycardia present.   Pulmonary:      Effort: Pulmonary effort is normal. No respiratory distress.      Breath sounds: Normal breath sounds. No stridor. No wheezing or rales.      Comments: Poor air movement  Abdominal:       General: Bowel sounds are normal. There is no distension.      Palpations: Abdomen is soft.      Tenderness: There is no abdominal tenderness. There is no guarding.   Musculoskeletal:         General: No tenderness or deformity.      Right lower leg: Edema present.      Left lower leg: Edema present.   Lymphadenopathy:      Cervical: No cervical adenopathy.   Skin:     Coloration: Skin is not pale.      Findings: No erythema or rash.   Neurological:      General: No focal deficit present.         Significant Labs:  All labs within the past 24 hours have been reviewed.     Significant Imaging:  Labs: Reviewed

## 2020-09-15 NOTE — ASSESSMENT & PLAN NOTE
-non oliguric stage 3 bernadette with ischemic atn likely secondary to hypotension from septic shock  -no on multiple drip and getting excess volume  -increased uf on SLED with foal 2.5-3.5 liters removal over 24 hours

## 2020-09-16 LAB
ALBUMIN SERPL BCP-MCNC: 1.5 G/DL (ref 3.5–5.2)
ALP SERPL-CCNC: 197 U/L (ref 55–135)
ALT SERPL W/O P-5'-P-CCNC: 41 U/L (ref 10–44)
ANION GAP SERPL CALC-SCNC: 10 MMOL/L (ref 8–16)
ANION GAP SERPL CALC-SCNC: 10 MMOL/L (ref 8–16)
ANION GAP SERPL CALC-SCNC: 11 MMOL/L (ref 8–16)
ANION GAP SERPL CALC-SCNC: 11 MMOL/L (ref 8–16)
ANION GAP SERPL CALC-SCNC: 12 MMOL/L (ref 8–16)
ANISOCYTOSIS BLD QL SMEAR: SLIGHT
ANISOCYTOSIS BLD QL SMEAR: SLIGHT
APPEARANCE FLD: NORMAL
APTT BLDCRRT: 26.9 SEC (ref 21–32)
APTT BLDCRRT: 26.9 SEC (ref 21–32)
AST SERPL-CCNC: 49 U/L (ref 10–40)
BACTERIA SPEC ANAEROBE CULT: NORMAL
BASOPHILS # BLD AUTO: 0.09 K/UL (ref 0–0.2)
BASOPHILS # BLD AUTO: 0.09 K/UL (ref 0–0.2)
BASOPHILS NFR BLD: 0.3 % (ref 0–1.9)
BASOPHILS NFR BLD: 0.3 % (ref 0–1.9)
BILIRUB SERPL-MCNC: 0.4 MG/DL (ref 0.1–1)
BODY FLD TYPE: NORMAL
BUN SERPL-MCNC: 17 MG/DL (ref 8–23)
BUN SERPL-MCNC: 19 MG/DL (ref 8–23)
BUN SERPL-MCNC: 24 MG/DL (ref 8–23)
BUN SERPL-MCNC: 28 MG/DL (ref 8–23)
BUN SERPL-MCNC: 34 MG/DL (ref 8–23)
CA-I BLDV-SCNC: 1.23 MMOL/L (ref 1.06–1.42)
CA-I BLDV-SCNC: 1.28 MMOL/L (ref 1.06–1.42)
CA-I BLDV-SCNC: 1.32 MMOL/L (ref 1.06–1.42)
CALCIUM SERPL-MCNC: 8.8 MG/DL (ref 8.7–10.5)
CALCIUM SERPL-MCNC: 9 MG/DL (ref 8.7–10.5)
CALCIUM SERPL-MCNC: 9.3 MG/DL (ref 8.7–10.5)
CALCIUM SERPL-MCNC: 9.5 MG/DL (ref 8.7–10.5)
CALCIUM SERPL-MCNC: 9.8 MG/DL (ref 8.7–10.5)
CHLORIDE SERPL-SCNC: 103 MMOL/L (ref 95–110)
CHLORIDE SERPL-SCNC: 103 MMOL/L (ref 95–110)
CHLORIDE SERPL-SCNC: 104 MMOL/L (ref 95–110)
CO2 SERPL-SCNC: 26 MMOL/L (ref 23–29)
CO2 SERPL-SCNC: 26 MMOL/L (ref 23–29)
CO2 SERPL-SCNC: 27 MMOL/L (ref 23–29)
CO2 SERPL-SCNC: 27 MMOL/L (ref 23–29)
CO2 SERPL-SCNC: 28 MMOL/L (ref 23–29)
COLOR FLD: NORMAL
CREAT SERPL-MCNC: 1.6 MG/DL (ref 0.5–1.4)
CREAT SERPL-MCNC: 1.8 MG/DL (ref 0.5–1.4)
CREAT SERPL-MCNC: 1.9 MG/DL (ref 0.5–1.4)
CREAT SERPL-MCNC: 2 MG/DL (ref 0.5–1.4)
CREAT SERPL-MCNC: 2.3 MG/DL (ref 0.5–1.4)
CRP SERPL-MCNC: 144.7 MG/L (ref 0–8.2)
DIFFERENTIAL METHOD: ABNORMAL
DIFFERENTIAL METHOD: ABNORMAL
EOSINOPHIL # BLD AUTO: 0.1 K/UL (ref 0–0.5)
EOSINOPHIL # BLD AUTO: 0.1 K/UL (ref 0–0.5)
EOSINOPHIL NFR BLD: 0.1 % (ref 0–8)
EOSINOPHIL NFR BLD: 0.1 % (ref 0–8)
ERYTHROCYTE [DISTWIDTH] IN BLOOD BY AUTOMATED COUNT: 15.9 % (ref 11.5–14.5)
ERYTHROCYTE [DISTWIDTH] IN BLOOD BY AUTOMATED COUNT: 15.9 % (ref 11.5–14.5)
EST. GFR  (AFRICAN AMERICAN): 33.7 ML/MIN/1.73 M^2
EST. GFR  (AFRICAN AMERICAN): 39.9 ML/MIN/1.73 M^2
EST. GFR  (AFRICAN AMERICAN): 42.4 ML/MIN/1.73 M^2
EST. GFR  (AFRICAN AMERICAN): 45.3 ML/MIN/1.73 M^2
EST. GFR  (AFRICAN AMERICAN): 52.2 ML/MIN/1.73 M^2
EST. GFR  (NON AFRICAN AMERICAN): 29.1 ML/MIN/1.73 M^2
EST. GFR  (NON AFRICAN AMERICAN): 34.5 ML/MIN/1.73 M^2
EST. GFR  (NON AFRICAN AMERICAN): 36.7 ML/MIN/1.73 M^2
EST. GFR  (NON AFRICAN AMERICAN): 39.2 ML/MIN/1.73 M^2
EST. GFR  (NON AFRICAN AMERICAN): 45.2 ML/MIN/1.73 M^2
GLUCOSE SERPL-MCNC: 182 MG/DL (ref 70–110)
GLUCOSE SERPL-MCNC: 188 MG/DL (ref 70–110)
GLUCOSE SERPL-MCNC: 205 MG/DL (ref 70–110)
GLUCOSE SERPL-MCNC: 231 MG/DL (ref 70–110)
GLUCOSE SERPL-MCNC: 269 MG/DL (ref 70–110)
GRAM STN SPEC: NORMAL
GRAM STN SPEC: NORMAL
HCT VFR BLD AUTO: 28.8 % (ref 40–54)
HCT VFR BLD AUTO: 28.8 % (ref 40–54)
HGB BLD-MCNC: 8.5 G/DL (ref 14–18)
HGB BLD-MCNC: 8.5 G/DL (ref 14–18)
HYPOCHROMIA BLD QL SMEAR: ABNORMAL
HYPOCHROMIA BLD QL SMEAR: ABNORMAL
IMM GRANULOCYTES # BLD AUTO: 1.51 K/UL (ref 0–0.04)
IMM GRANULOCYTES # BLD AUTO: 1.51 K/UL (ref 0–0.04)
IMM GRANULOCYTES NFR BLD AUTO: 4.5 % (ref 0–0.5)
IMM GRANULOCYTES NFR BLD AUTO: 4.5 % (ref 0–0.5)
INR PPP: 1.1 (ref 0.8–1.2)
LYMPHOCYTES # BLD AUTO: 1.1 K/UL (ref 1–4.8)
LYMPHOCYTES # BLD AUTO: 1.1 K/UL (ref 1–4.8)
LYMPHOCYTES NFR BLD: 3.3 % (ref 18–48)
LYMPHOCYTES NFR BLD: 3.3 % (ref 18–48)
MAGNESIUM SERPL-MCNC: 2.5 MG/DL (ref 1.6–2.6)
MCH RBC QN AUTO: 26.6 PG (ref 27–31)
MCH RBC QN AUTO: 26.6 PG (ref 27–31)
MCHC RBC AUTO-ENTMCNC: 29.5 G/DL (ref 32–36)
MCHC RBC AUTO-ENTMCNC: 29.5 G/DL (ref 32–36)
MCV RBC AUTO: 90 FL (ref 82–98)
MCV RBC AUTO: 90 FL (ref 82–98)
MONOCYTES # BLD AUTO: 2.2 K/UL (ref 0.3–1)
MONOCYTES # BLD AUTO: 2.2 K/UL (ref 0.3–1)
MONOCYTES NFR BLD: 6.5 % (ref 4–15)
MONOCYTES NFR BLD: 6.5 % (ref 4–15)
NEUTROPHILS # BLD AUTO: 28.5 K/UL (ref 1.8–7.7)
NEUTROPHILS # BLD AUTO: 28.5 K/UL (ref 1.8–7.7)
NEUTROPHILS NFR BLD: 85.3 % (ref 38–73)
NEUTROPHILS NFR BLD: 85.3 % (ref 38–73)
NRBC BLD-RTO: 0 /100 WBC
NRBC BLD-RTO: 0 /100 WBC
PLATELET # BLD AUTO: 431 K/UL (ref 150–350)
PLATELET # BLD AUTO: 431 K/UL (ref 150–350)
PLATELET BLD QL SMEAR: ABNORMAL
PLATELET BLD QL SMEAR: ABNORMAL
PMV BLD AUTO: 10.3 FL (ref 9.2–12.9)
PMV BLD AUTO: 10.3 FL (ref 9.2–12.9)
POCT GLUCOSE: 195 MG/DL (ref 70–110)
POCT GLUCOSE: 196 MG/DL (ref 70–110)
POCT GLUCOSE: 196 MG/DL (ref 70–110)
POCT GLUCOSE: 210 MG/DL (ref 70–110)
POCT GLUCOSE: 212 MG/DL (ref 70–110)
POCT GLUCOSE: 213 MG/DL (ref 70–110)
POCT GLUCOSE: 215 MG/DL (ref 70–110)
POCT GLUCOSE: 221 MG/DL (ref 70–110)
POCT GLUCOSE: 224 MG/DL (ref 70–110)
POCT GLUCOSE: 234 MG/DL (ref 70–110)
POCT GLUCOSE: 247 MG/DL (ref 70–110)
POCT GLUCOSE: 255 MG/DL (ref 70–110)
POCT GLUCOSE: 262 MG/DL (ref 70–110)
POCT GLUCOSE: 264 MG/DL (ref 70–110)
POCT GLUCOSE: 266 MG/DL (ref 70–110)
POIKILOCYTOSIS BLD QL SMEAR: SLIGHT
POIKILOCYTOSIS BLD QL SMEAR: SLIGHT
POLYCHROMASIA BLD QL SMEAR: ABNORMAL
POLYCHROMASIA BLD QL SMEAR: ABNORMAL
POTASSIUM SERPL-SCNC: 4 MMOL/L (ref 3.5–5.1)
POTASSIUM SERPL-SCNC: 4.1 MMOL/L (ref 3.5–5.1)
POTASSIUM SERPL-SCNC: 4.2 MMOL/L (ref 3.5–5.1)
POTASSIUM SERPL-SCNC: 4.4 MMOL/L (ref 3.5–5.1)
POTASSIUM SERPL-SCNC: 4.5 MMOL/L (ref 3.5–5.1)
PROT SERPL-MCNC: 7.2 G/DL (ref 6–8.4)
PROTHROMBIN TIME: 11.9 SEC (ref 9–12.5)
RBC # BLD AUTO: 3.2 M/UL (ref 4.6–6.2)
RBC # BLD AUTO: 3.2 M/UL (ref 4.6–6.2)
SCHISTOCYTES BLD QL SMEAR: ABNORMAL
SCHISTOCYTES BLD QL SMEAR: ABNORMAL
SCHISTOCYTES BLD QL SMEAR: PRESENT
SCHISTOCYTES BLD QL SMEAR: PRESENT
SODIUM SERPL-SCNC: 140 MMOL/L (ref 136–145)
SODIUM SERPL-SCNC: 141 MMOL/L (ref 136–145)
SODIUM SERPL-SCNC: 141 MMOL/L (ref 136–145)
SODIUM SERPL-SCNC: 142 MMOL/L (ref 136–145)
SODIUM SERPL-SCNC: 142 MMOL/L (ref 136–145)
WBC # BLD AUTO: 33.39 K/UL (ref 3.9–12.7)
WBC # BLD AUTO: 33.39 K/UL (ref 3.9–12.7)
WBC # FLD: NORMAL /CU MM

## 2020-09-16 PROCEDURE — 87205 SMEAR GRAM STAIN: CPT | Mod: HCNC

## 2020-09-16 PROCEDURE — 86140 C-REACTIVE PROTEIN: CPT | Mod: HCNC

## 2020-09-16 PROCEDURE — 89051 BODY FLUID CELL COUNT: CPT | Mod: HCNC

## 2020-09-16 PROCEDURE — 87070 CULTURE OTHR SPECIMN AEROBIC: CPT | Mod: HCNC

## 2020-09-16 PROCEDURE — 20000000 HC ICU ROOM: Mod: HCNC

## 2020-09-16 PROCEDURE — 87075 CULTR BACTERIA EXCEPT BLOOD: CPT | Mod: HCNC

## 2020-09-16 PROCEDURE — 99233 PR SUBSEQUENT HOSPITAL CARE,LEVL III: ICD-10-PCS | Mod: HCNC,GC,, | Performed by: INTERNAL MEDICINE

## 2020-09-16 PROCEDURE — 63600175 PHARM REV CODE 636 W HCPCS: Mod: HCNC | Performed by: STUDENT IN AN ORGANIZED HEALTH CARE EDUCATION/TRAINING PROGRAM

## 2020-09-16 PROCEDURE — 25000003 PHARM REV CODE 250: Mod: HCNC | Performed by: STUDENT IN AN ORGANIZED HEALTH CARE EDUCATION/TRAINING PROGRAM

## 2020-09-16 PROCEDURE — 82330 ASSAY OF CALCIUM: CPT | Mod: 91,HCNC

## 2020-09-16 PROCEDURE — 25500020 PHARM REV CODE 255: Mod: HCNC | Performed by: INTERNAL MEDICINE

## 2020-09-16 PROCEDURE — 80048 BASIC METABOLIC PNL TOTAL CA: CPT | Mod: 91,HCNC

## 2020-09-16 PROCEDURE — 63600175 PHARM REV CODE 636 W HCPCS: Mod: JG,HCNC | Performed by: STUDENT IN AN ORGANIZED HEALTH CARE EDUCATION/TRAINING PROGRAM

## 2020-09-16 PROCEDURE — 80100008 HC CRRT DAILY MAINTENANCE: Mod: HCNC

## 2020-09-16 PROCEDURE — 63600175 PHARM REV CODE 636 W HCPCS: Mod: HCNC | Performed by: HOSPITALIST

## 2020-09-16 PROCEDURE — 90945 DIALYSIS ONE EVALUATION: CPT | Mod: HCNC

## 2020-09-16 PROCEDURE — 83735 ASSAY OF MAGNESIUM: CPT | Mod: HCNC

## 2020-09-16 PROCEDURE — 99223 PR INITIAL HOSPITAL CARE,LEVL III: ICD-10-PCS | Mod: HCNC,,, | Performed by: INTERNAL MEDICINE

## 2020-09-16 PROCEDURE — 85610 PROTHROMBIN TIME: CPT | Mod: HCNC

## 2020-09-16 PROCEDURE — 63600175 PHARM REV CODE 636 W HCPCS: Mod: HCNC | Performed by: RADIOLOGY

## 2020-09-16 PROCEDURE — 85025 COMPLETE CBC W/AUTO DIFF WBC: CPT | Mod: HCNC

## 2020-09-16 PROCEDURE — 87102 FUNGUS ISOLATION CULTURE: CPT | Mod: HCNC

## 2020-09-16 PROCEDURE — 80053 COMPREHEN METABOLIC PANEL: CPT | Mod: HCNC

## 2020-09-16 PROCEDURE — 99223 1ST HOSP IP/OBS HIGH 75: CPT | Mod: HCNC,,, | Performed by: INTERNAL MEDICINE

## 2020-09-16 PROCEDURE — 94761 N-INVAS EAR/PLS OXIMETRY MLT: CPT | Mod: HCNC

## 2020-09-16 PROCEDURE — 82330 ASSAY OF CALCIUM: CPT | Mod: HCNC

## 2020-09-16 PROCEDURE — 85730 THROMBOPLASTIN TIME PARTIAL: CPT | Mod: HCNC

## 2020-09-16 PROCEDURE — 99233 SBSQ HOSP IP/OBS HIGH 50: CPT | Mod: HCNC,GC,, | Performed by: INTERNAL MEDICINE

## 2020-09-16 PROCEDURE — 27000221 HC OXYGEN, UP TO 24 HOURS: Mod: HCNC

## 2020-09-16 PROCEDURE — 80048 BASIC METABOLIC PNL TOTAL CA: CPT | Mod: HCNC

## 2020-09-16 RX ORDER — FUROSEMIDE 10 MG/ML
80 INJECTION INTRAMUSCULAR; INTRAVENOUS ONCE
Status: COMPLETED | OUTPATIENT
Start: 2020-09-16 | End: 2020-09-16

## 2020-09-16 RX ORDER — HEPARIN SODIUM,PORCINE/D5W 25000/250
12 INTRAVENOUS SOLUTION INTRAVENOUS CONTINUOUS
Status: DISCONTINUED | OUTPATIENT
Start: 2020-09-16 | End: 2020-09-16

## 2020-09-16 RX ORDER — FENTANYL CITRATE 50 UG/ML
INJECTION, SOLUTION INTRAMUSCULAR; INTRAVENOUS CODE/TRAUMA/SEDATION MEDICATION
Status: COMPLETED | OUTPATIENT
Start: 2020-09-16 | End: 2020-09-16

## 2020-09-16 RX ORDER — MIDAZOLAM HYDROCHLORIDE 1 MG/ML
INJECTION INTRAMUSCULAR; INTRAVENOUS CODE/TRAUMA/SEDATION MEDICATION
Status: COMPLETED | OUTPATIENT
Start: 2020-09-16 | End: 2020-09-16

## 2020-09-16 RX ADMIN — ACETAMINOPHEN 1000 MG: 500 TABLET ORAL at 03:09

## 2020-09-16 RX ADMIN — HEPARIN SODIUM 5000 UNITS: 5000 INJECTION INTRAVENOUS; SUBCUTANEOUS at 02:09

## 2020-09-16 RX ADMIN — DAPTOMYCIN 1050 MG: 350 INJECTION, POWDER, LYOPHILIZED, FOR SOLUTION INTRAVENOUS at 12:09

## 2020-09-16 RX ADMIN — LEVOTHYROXINE SODIUM 75 MCG: 25 TABLET ORAL at 05:09

## 2020-09-16 RX ADMIN — FENTANYL CITRATE 50 MCG: 50 INJECTION, SOLUTION INTRAMUSCULAR; INTRAVENOUS at 03:09

## 2020-09-16 RX ADMIN — IOHEXOL 30 ML: 300 INJECTION, SOLUTION INTRAVENOUS at 04:09

## 2020-09-16 RX ADMIN — ACETAMINOPHEN 1000 MG: 500 TABLET ORAL at 09:09

## 2020-09-16 RX ADMIN — HYPROMELLOSE 2910 1 DROP: 5 SOLUTION OPHTHALMIC at 09:09

## 2020-09-16 RX ADMIN — MIDAZOLAM HYDROCHLORIDE 1 MG: 1 INJECTION, SOLUTION INTRAMUSCULAR; INTRAVENOUS at 04:09

## 2020-09-16 RX ADMIN — SODIUM CHLORIDE 2.1 UNITS/HR  (ORDERING DOSE ONLY,MUST KEEP AS DOSE RANGE.ONLY CHANGE IF INITIAL DOSE EXCEEDS SUGGESTED RANGE): 9 INJECTION, SOLUTION INTRAVENOUS at 05:09

## 2020-09-16 RX ADMIN — HYPROMELLOSE 2910 1 DROP: 5 SOLUTION OPHTHALMIC at 08:09

## 2020-09-16 RX ADMIN — SODIUM CHLORIDE: 0.9 INJECTION, SOLUTION INTRAVENOUS at 12:09

## 2020-09-16 RX ADMIN — FLUTICASONE PROPIONATE 50 MCG: 50 SPRAY, METERED NASAL at 08:09

## 2020-09-16 RX ADMIN — HYDROCORTISONE SODIUM SUCCINATE 100 MG: 100 INJECTION, POWDER, FOR SOLUTION INTRAMUSCULAR; INTRAVENOUS at 05:09

## 2020-09-16 RX ADMIN — MIDAZOLAM HYDROCHLORIDE 1 MG: 1 INJECTION, SOLUTION INTRAMUSCULAR; INTRAVENOUS at 03:09

## 2020-09-16 RX ADMIN — LEVOFLOXACIN 750 MG: 750 TABLET, FILM COATED ORAL at 08:09

## 2020-09-16 RX ADMIN — FUROSEMIDE 80 MG: 10 INJECTION, SOLUTION INTRAMUSCULAR; INTRAVENOUS at 12:09

## 2020-09-16 RX ADMIN — HEPARIN SODIUM 5000 UNITS: 5000 INJECTION INTRAVENOUS; SUBCUTANEOUS at 10:09

## 2020-09-16 NOTE — ASSESSMENT & PLAN NOTE
-Likely multifactorial from uremia vs DKA vs worsening shock with cerebral hypoperfusion vs sepsis.   -Per speaking with wife, she describes a likely baseline history of developing dementia prior to hospitalization.     Plan:   -Continue supportive management for multifactorial issues.   -Closely monitor respiratory status with concern for needing intubation for airway protection.  - Unclear etiology at this point, uremic encephalopathy should have cleared with CRRT. Could be from sepsis +/- neurotoxicity for cefipime  - Slow gradual improvement

## 2020-09-16 NOTE — ASSESSMENT & PLAN NOTE
MRSA septicemia. Still tachycardic, SpO2 reached 92%, renal function continues to worsen. WBCs not improving. ESR and CRP elevated, RF WNL. Vancomycin was supratherapeutic, switched to pulse dose regimen. Source of bacteremia unclear, may be left foot wound or a septic process at the right knee joint. Right knee now clinically suspicious for septic arthritis (see physical exam). Left foot wound growing Proteus and MRSA, urine growing Klebsiella. TTE & LUKAS is negative. Dapto/ceftaroline switched back to vanc due to worsening renal function and early signs of rhabdo. Course complicated by worsening leukocytosis (C Diff neg)     Blood cultures: MRSA  R knee fluid culture: Staph aureus  Left foot wound: Proteus, MRSA  Source Control: I&D of Septic R knee on 9/7/20.      Repeat Blood cultures 09/09, 09/10, 09/12 have been negative  CXR with right lower lobe opacities concerning for developing pneumonia  CT Chest/Abdomen/Pelvis: Opacities in Right lower lobe concerning for atelectis and possible superimposed infection. Revealed findings concerning for acute cholecystitis. No identification of abscess.      Recommendations:   -- Continue oral levofloxacin (renally dosed) and continue IV Daptomycin. Avoid Beta lactam antibiotics to limit renal damage.   -- HIDA scan positive for cholecystitis. Pending cholecystotomy drain by IR given poor candidacy for cholecystectomy. Please obtain cultures.  -- Concern for RLL pneumonia on imaging. Respiratory culture pending.

## 2020-09-16 NOTE — PHYSICIAN QUERY
"PT Name: Ed Patton  MR #: [id number]    Physician Query Form - Heart  Condition Clarification     CDS/: Dominguez Alicia RN              Contact information:   [email or web address]      This form is a permanent document in the medical record.     Query Date: September 16, 2020    By submitting this query, we are merely seeking further clarification of documentation. Please utilize your independent clinical judgment when addressing the question(s) below.    The medical record contains the following   Indicators     Supporting Clinical Findings Location in Medical Record   x BNP 9/10 BNP= 216  Labs     EF     x Radiology findings The cardiac silhouette at the upper limits of normal in size, similar to the prior exam.    Stable enlargement cardiomediastinal contour..    Heart: Enlarged in size...Small pericardial fluid... 9/10 CXR      9/13 CXR    9/15 CT Chest      x Echo Results · Moderately decreased left ventricular systolic function. The estimated ejection fraction is 35%.    · Moderately decreased left ventricular systolic function. The estimated ejection fraction is 35%.  · Left ventricular diastolic dysfunction. 9/1 TTE        9/14 TTE    "Ascites" documented      "SOB" or "HERNADEZ" documented      "Hypoxia" documented     x Heart Failure documented Chronic HFrEF...Chronic systolic congestive heart failure   9/15 PN, Critical care medicine, attestation/ note    "Edema" documented     x Diuretics/Meds Furosemide, IV, 120mg; 9/13  Furosemide, IV, 40 mg; 8/30  Esmolol, injection; 9/7 (2X)  Lisinopril, oral; 9/6-9/8  Carvedilol, oral; 9/9-9/11 MAR  "  "  "  '    Treatment:      Other:      Heart failure (HF) can be acute, chronic or both. It is generally further specificed as systolic, diastolic, or combined. Lastly, it is important to identify an underlying etiology if known or suspected.     Common clues to acute exacerbation:  Rapidly progressive symptoms (w/in 2 weeks of presentation), using IV " diuretics to treat, using supplemental O2, pulmonary edema on Xray, MI w/in 4 weeks, and/or BNP >500    Systolic Heart Failure: is defined as chart documentation of a left ventricular ejection fraction (LVEF) less than 40%     Diastolic Heart Failure: is defined as a left ventricular ejection fraction (LVEF) greater than 40%   +      Evidence of diastolic dysfunction on echocardiography OR    Right heart catheterization wedge pressure above 12 mm Hg OR    Left heart catheterization left ventricular end diastolic pressure 18 mm Hg or above.    References: *American Heart Association    The clinical guidelines noted below are only system guidelines, and do not replace the providers clinical judgment.     Provider, please specify the diagnosis associated with above clinical findings    Provider, please clarify type and acuity of CHF this encounter related to the current TTE -- associated with the above clinical findings:     [   ] Chronic Systolic Heart Failure - Pre-existing systolic HF diagnosis.  EF < 40%  without  acute HF symptoms documented      [ X  ] Acute on Chronic Combined Systolic and Diastolic Heart Failure        [   ] Other Type of Heart Failure (please specify type):     [   ] Other (please specify):______________________     [  ] Clinically Undetermined                           Please document in your progress notes daily for the duration of treatment until resolved and include in your discharge summary.

## 2020-09-16 NOTE — ASSESSMENT & PLAN NOTE
This is a 65 year old  male with PMH significant for HFrEF and COPD with chronic respiratory failure (on 2L home oxygen), T2DM with CKD III, and iron deficiency anemia who originally presented to [facility] on 08/30 with cellulitis of left foot with concern for diabetic foot infection with subsequent development of MRSA bacteremia attributed to septic arthritis of right knee and elbow. He is status post drainage and coverage for MRSA since that time. His work-up for other etiologies of MRSA bacteremia have included negative LUKAS and MRI of lumbar spine looking for endocarditis and spinal abscess, respectively. Stool studies for C.diff on 09/11 were negative. His hospital course has been associated with worsening hypotension and leukocytosis since 09/10 prompting ICU admission on 09/12 for initiation of vasopressor support. Infectious work-up thus far on ICU admission concern for likely right lower lobe HAP.     CT abdomen pelvis on 9/15 with gallbladder dilation, sludge, and trace pericholic fluid collection. Exam not consistent with cholecystitis but given persistent shock potential source. Also with potential right lower lobe atelectasis with overlying infection    - Patient off pressors now    Plan:   - HIDA+ cholecystitis. Gen surg feels patient not a good surgical candidate. Recommend IR cholecystostomy drain placement. IR planning on procedure today.   - Will talk to Nephrology to plan an HD trial once patient gets drain and is still off pressors.   - Continue broad spectrum coverage with Daptomycin (q24) and Levoflox (q48). Check weekly CPK for daptomycin.    - Vasopressor support for goal MAP >65.   - Continue stress dose steroids.   - Trending WBC count daily.

## 2020-09-16 NOTE — SUBJECTIVE & OBJECTIVE
Interval History: NAEON. Afebrile. Patient appears a little alert tody but unable to answer questions. HIDA scan positive for cholecystitis.     Review of Systems   Unable to perform ROS: Mental status change     Objective:     Vital Signs (Most Recent):  Temp: 98.6 °F (37 °C) (09/16/20 0301)  Pulse: (!) 129 (09/16/20 1002)  Resp: 12 (09/16/20 1002)  BP: 133/88 (09/15/20 2300)  SpO2: 95 % (09/16/20 1002) Vital Signs (24h Range):  Temp:  [97.8 °F (36.6 °C)-98.7 °F (37.1 °C)] 98.6 °F (37 °C)  Pulse:  [127-129] 129  Resp:  [11-25] 12  SpO2:  [93 %-100 %] 95 %  BP: (133)/(88) 133/88  Arterial Line BP: (117-155)/(59-78) 132/71     Weight: 114.8 kg (253 lb)  Body mass index is 30.8 kg/m².    Estimated Creatinine Clearance: 52.4 mL/min (A) (based on SCr of 2 mg/dL (H)).    Physical Exam  Constitutional:       Appearance: Normal appearance.   HENT:      Head: Normocephalic.      Mouth/Throat:      Mouth: Mucous membranes are moist.   Eyes:      Extraocular Movements: Extraocular movements intact.   Neck:      Musculoskeletal: Normal range of motion.      Comments: Right IJ CVC in place. No tenderness or erythema noted on surrounding skin  Cardiovascular:      Rate and Rhythm: Regular rhythm. Tachycardia present.      Heart sounds: No murmur.   Pulmonary:      Effort: Pulmonary effort is normal. No respiratory distress.      Breath sounds: Normal breath sounds. No wheezing.   Abdominal:      General: There is no distension.      Palpations: Abdomen is soft.      Tenderness: There is no abdominal tenderness.   Musculoskeletal:      Comments: Stage 2 diabetic foot ulcer measuring 1cm noted on Left 1st phalanx.    Neurological:      Mental Status: He is alert.      Comments: Alert. Appears confused. Unable to answer questions   Psychiatric:      Comments: Unable to assess due to mental status          Significant Labs:   Blood Culture:   Recent Labs   Lab 09/09/20  0303 09/09/20  1040 09/10/20  0459 09/12/20 2014  09/12/20  2015   LABBLOO No growth after 5 days. No growth after 5 days. No growth after 5 days. No Growth to date  No Growth to date  No Growth to date  No Growth to date No Growth to date  No Growth to date  No Growth to date  No Growth to date     CBC:   Recent Labs   Lab 09/15/20  0401 09/16/20  0323   WBC 35.44* 33.39*  33.39*   HGB 8.1* 8.5*  8.5*   HCT 27.1* 28.8*  28.8*   * 431*  431*     CMP:   Recent Labs   Lab 09/15/20  0343 09/15/20  0401  09/15/20  1357  09/16/20  0323 09/16/20  0328 09/16/20  1224    140   < > 141   < > 141 142 140   K 4.3 4.4   < > 4.2   < > 4.1 4.4 4.5   CL 99 100   < > 105   < > 104 104 103   CO2 24 24   < > 24   < > 27 28 26   * 285*   < > 181*   < > 188* 205* 231*   BUN 20 19   < > 10   < > 19 24* 28*   CREATININE 1.8* 1.8*   < > 1.1   < > 1.8* 1.9* 2.0*   CALCIUM 9.8 9.7   < > 9.9   < > 9.5 9.3 9.0   PROT 7.2  --   --   --   --  7.2  --   --    ALBUMIN 1.5* 1.4*  --  1.4*  --  1.5*  --   --    BILITOT 0.5  --   --   --   --  0.4  --   --    ALKPHOS 209*  --   --   --   --  197*  --   --    AST 50*  --   --   --   --  49*  --   --    ALT 44  --   --   --   --  41  --   --    ANIONGAP 16 16   < > 12   < > 10 10 11   EGFRNONAA 39.2* 39.2*   < > >60.0   < > 39.2* 36.7* 34.5*    < > = values in this interval not displayed.     Respiratory Culture: No results for input(s): GSRESP, RESPIRATORYC in the last 4320 hours.  Urine Culture:   Recent Labs   Lab 08/30/20  1633   LABURIN KLEBSIELLA PNEUMONIAE  > 100,000 cfu/ml  *     Urine Studies:   Recent Labs   Lab 09/09/20  1719   COLORU Preeti   APPEARANCEUA Hazy*   PHUR 5.0   SPECGRAV 1.020   PROTEINUA Negative   GLUCUA Negative   KETONESU Negative   BILIRUBINUA Negative   OCCULTUA Negative   NITRITE Negative   LEUKOCYTESUR Trace*   RBCUA 1   WBCUA 4   BACTERIA Few*   SQUAMEPITHEL 0   HYALINECASTS 7*       Significant Imaging: I have reviewed all pertinent imaging results/findings within the past 24 hours.

## 2020-09-16 NOTE — PHYSICIAN QUERY
"PT Name: Ed Patton  MR #: [id number]     PNEUMONIA CLARIFICATION     CDS/: Dominguez Alicia RN             Contact information:   [email or web address]      This form is a permanent document in the medical record.    Query Date: September 16, 2020    By submitting this query, we are merely seeking further clarification of documentation.  Please utilize your independent clinical judgment when addressing the question(s) below.    The Medical Record contains the following:   Indicators   Supporting Clinical Findings Location in Medical Record   x Pneumonia documented Hospital-acquired pneumonia  -See assessment for shock. ...ICU admission concern for likely right lower lobe HAP.     9/13 HP, Critical care medicine (CCM)   x Chest X-Ray Findings suggesting mild pulmonary edema/CHF pattern without focal consolidation.  Interstitial pneumonia not excluded....    ....suggesting pulmonary edema secondary to congestive heart failure.  Pneumonia cannot be excluded.  Probable right effusion.  No pneumothorax..    8/30 CXR        9/10 CXR   x PaO2    PaCO2     O2 sat ......Currently on a NRB. ABG shows 7.34/39/104    9/13@1725  VBG: pH: 7.287/ 48/ 33   9/13, PN, CCM    Point of Care     x Cultures  Blood culture: MRSA positive from 8/30- 9/8    (No respiratory specimen in micro)   8/30 micro   x Treatment  fluticasone furoate-vilanteroL 200-25 mcg/dose diskus inhaler 1 puff ; 9/15    fluticasone propionate 50 mcg/actuation nasal spray 50 mcg;  9/14-16    levofloxacin, Oral; given 9/14, 9/16- Indications of Use: Lower Respiratory Infections    MAR    "    "   x Supplemental O2 9/12@ 1501:   sats 93% on 5LNC  9/12@2301:   sats 92% on 5LNC  9/13@0701:   sats 98% on NRB at 15L  9/13@1501:    sats 91% on NRB at 15L Vitals /Oxygenation- flow sheet     Dysphagia/Swallow study     x Other 9/16 WBC: 33.39    9/3 Procalcitonin: 0.13  9/10 Procalcitonin: 0.26 Labs    Labs  "       Provider, please further specify the " pneumonia diagnosis:  (please specify POA below)    [   ] Bacterial, gram negative organism Pneumonia   [   ] Bacterial, gram positive organism Pneumonia   [   ] MRSA (Methicillin-resistant Staphylococcus aureus) Pneumonia   [ X  ] Unspecified Pneumonia   [   ] Other type of pneumonia (please specify): ________   [  ] Clinically undetermined       Present on admission (POA) status:   [   ] Yes (Y)            [   ] Clinically Undetermined (W)  [   ] No (N)             [   ] Documentation insufficient to determine if condition is POA (U)       Please document in your progress notes daily for the duration of treatment, until resolved, and include in your discharge summary.

## 2020-09-16 NOTE — H&P
Inpatient Radiology Pre-procedure Note    History of Present Illness:  Ed Patton is a 65 y.o. male admitted with acute cholecystitis and septic shock with MRSA complicated by NANETTE requiring CRRT. He is not a candidate for cholecystectomy at this time per general surgery evaluation.  He presents to IR for cholecystostomy.     Admission H&P reviewed.  Past Medical History:   Diagnosis Date    CHF (congestive heart failure)     COPD (chronic obstructive pulmonary disease)     Coronary artery disease     Diabetes mellitus     Diabetes mellitus type II     DM (diabetes mellitus) type II uncontrolled with renal manifestation 9/4/2013    Hyperlipidemia     Hypertension     Postablative hypothyroidism 12/6/2018     Past Surgical History:   Procedure Laterality Date    APPENDECTOMY      ARTHROSCOPY OF KNEE Right 9/7/2020    Procedure: ARTHROSCOPY, KNEE, RIGHT ;  Surgeon: You Bhatia MD;  Location: [facility] OR [address] [place] [address];  Service: Orthopedics;  Laterality: Right;    CHOLECYSTECTOMY      CORONARY ANGIOPLASTY WITH STENT PLACEMENT      TONSILLECTOMY      TRANSESOPHAGEAL ECHOCARDIOGRAPHY N/A 9/4/2020    Procedure: ECHOCARDIOGRAM, TRANSESOPHAGEAL;  Surgeon: [facility] Diagnostic Provider;  Location: [facility] EP LAB;  Service: Anesthesiology;  Laterality: N/A;    TRANSESOPHAGEAL ECHOCARDIOGRAPHY N/A 9/3/2020    Procedure: ECHOCARDIOGRAM, TRANSESOPHAGEAL;  Surgeon: [facility] Diagnostic Provider;  Location: [facility] EP LAB;  Service: Anesthesiology;  Laterality: N/A;       Review of Systems:   As documented in primary team H&P    Home Meds:   Prior to Admission medications    Medication Sig Start Date End Date Taking? Authorizing Provider   ACCU-CHEK FASTCLIX LANCET DRUM Misc TEST FOUR TIMES DAILY  Patient taking differently: Test twice daily 12/10/18  Yes Marie Stroud MD   ACCU-CHEK SMARTVIEW TEST STRIP Strp TEST FOUR TIMES DAILY  Patient taking differently: Test twice daily 12/10/18  Yes Marie Stroud MD    albuterol (PROVENTIL) 2.5 mg /3 mL (0.083 %) nebulizer solution INHALE THE CONTENTS OF 1 VIAL VIA NEBULIZER EVERY 4 HOURS AS NEEDED FOR WHEEZING.  Patient taking differently: INHALE THE CONTENTS OF 1 VIAL VIA NEBULIZER TWICE DAILY 5/1/18  Yes Qiana Chow MD   albuterol (PROVENTIL/VENTOLIN HFA) 90 mcg/actuation inhaler Inhale 2 puffs into the lungs every 6 (six) hours as needed for Wheezing or Shortness of Breath. Rescue   Yes Historical Provider   aspirin (ECOTRIN) 81 MG EC tablet Take 1 tablet (81 mg total) by mouth once daily. 10/15/19 10/14/20 Yes Qiana Chow MD   atorvastatin (LIPITOR) 40 MG tablet Take 1 tablet (40 mg total) by mouth once daily. 1/22/20 1/21/21 Yes Qiana Chow MD   carvedilol (COREG) 25 MG tablet Take 1 tablet (25 mg total) by mouth 2 (two) times daily. 1/22/20  Yes Qiana Chow MD   cholecalciferol, vitamin D3, (VITAMIN D3) 1,000 unit capsule Take 1 capsule (1,000 Units total) by mouth once daily.  Patient taking differently: Take 2,000 Units by mouth once daily.  10/15/19  Yes Qiana Chow MD   fluticasone furoate-vilanterol (BREO ELLIPTA) 200-25 mcg/dose DsDv diskus inhaler INHALE 1 PUFF INTO THE LUNGS ONCE DAILY. (CONTROLLER) 10/17/19  Yes Qiana Chow MD   fluticasone propionate (FLONASE) 50 mcg/actuation nasal spray USE 1 SPRAY IN EACH NOSTRIL ONE TIME DAILY 1/22/20  Yes Qiana Chow MD   furosemide (LASIX) 80 MG tablet TAKE 1 TABLET BY MOUTH IN THE MORNING  AND  1 TABLET BY MOUTH DAILY  AT  3- TO 4PM 1/22/20  Yes Qiana Chow MD   gabapentin (NEURONTIN) 100 MG capsule Take 1 capsule (100 mg total) by mouth 3 (three) times daily as needed (pain). 4/15/20  Yes Qiana Chow MD   insulin NPH-insulin regular, 70/30, (NOVOLIN 70/30 U-100 INSULIN) 100 unit/mL (70-30) injection Inject 45 Units into the skin before breakfast AND 35 Units before dinner. [id number] MINUTES BEFORE BREAKFAST AND  DINNER.  Patient taking differently: Inject 40 Units into the skin before breakfast AND 20 Units before dinner. 10/22/19  Yes Qiana Chow MD   insulin syringe-needle U-100 (INSULIN SYRINGE) 1 mL 30 gauge x 5/16 Syrg 1 each by Misc.(Non-Drug; Combo Route) route 2 (two) times daily. Use twice daily as directed with insulin vials. 12/10/18  Yes Qiana Chow MD   levothyroxine (SYNTHROID) 75 MCG tablet Take 1 tablet (75 mcg total) by mouth once daily. 1/22/20 1/21/21 Yes Qiana Chow MD   lisinopril (PRINIVIL,ZESTRIL) 40 MG tablet Take 1 tablet (40 mg total) by mouth once daily. 1/22/20  Yes Qiana Chow MD   [facility] USE AS DIRECTED 12/10/18   Marie Stroud MD   nitroGLYCERIN (NITROSTAT) 0.4 MG SL tablet PLACE 1 TABLET UNDER THE TONGUE EVERY 5  MINUTES AS NEEDED FOR CHEST PAIN 1/22/20   Qiana Chow MD     Scheduled Meds:    artificial tears  1 drop Both Eyes TID    DAPTOmycin (CUBICIN)  IV  1,050 mg Intravenous Q24H    fludrocortisone  100 mcg Oral Daily    fluticasone furoate-vilanteroL  1 puff Inhalation Daily    fluticasone propionate  1 spray Each Nostril Daily    heparin (porcine)  5,000 Units Subcutaneous Q8H    hydrocortisone sodium succinate  100 mg Intravenous Q8H    levoFLOXacin  750 mg Oral Every other day    levothyroxine  75 mcg Oral Before breakfast     Continuous Infusions:    sodium chloride 0.9% Stopped (09/16/20 0210)    calcium gluconate IVPB 3,000 mg (09/15/20 1346)    insulin (HUMAN R) infusion (adults) 1.6 Units/hr  (Ordering Dose only,must keep as dose range.Only change if initial dose exceeds suggested range) (09/16/20 0600)    norepinephrine bitartrate-D5W Stopped (09/15/20 1736)     PRN Meds:acetaminophen, albuterol-ipratropium, calcium carbonate, dextrose 10 % in water (D10W), dextrose 10 % in water (D10W), dextrose 50%, dextrose 50%, glucagon (human recombinant), glucose, glucose, magnesium sulfate IVPB,  melatonin, ondansetron, oxyCODONE, polyethylene glycol, sodium chloride 0.9%, sodium phosphate IVPB, sodium phosphate IVPB, sodium phosphate IVPB, DIPH,PERTUS (ADACEL),TETANUS PF VAC (ADULT)  Anticoagulants/Antiplatelets: no anticoagulation    Allergies:   Review of patient's allergies indicates:   Allergen Reactions    Vicodin [hydrocodone-acetaminophen] Itching     Sedation Hx: have not been any systemic reactions    Labs:  Recent Labs   Lab 09/16/20 0323   INR 1.1       Recent Labs   Lab 09/16/20 0323   WBC 33.39*  33.39*   HGB 8.5*  8.5*   HCT 28.8*  28.8*   MCV 90  90   *  431*      Recent Labs   Lab 09/16/20 0323 09/16/20 0328   * 205*    142   K 4.1 4.4    104   CO2 27 28   BUN 19 24*   CREATININE 1.8* 1.9*   CALCIUM 9.5 9.3   MG  --  2.5   ALT 41  --    AST 49*  --    ALBUMIN 1.5*  --    BILITOT 0.4  --          Vitals:  Temp: 98.6 °F (37 °C) (09/16/20 0301)  Pulse: (!) 128 (09/16/20 0852)  Resp: 16 (09/16/20 0852)  BP: 133/88 (09/15/20 2300)  SpO2: 95 % (09/16/20 0500)     Physical Exam:  ASA: 3  Mallampati: 3    General: no acute distress, calm  Mental Status: Alert but altered. Not oriented to person, place and time and not following commands  HEENT: normocephalic, atraumatic  Chest: unlabored breathing  Heart: regular heart rate  Abdomen: nondistended, nontender  Extremity: moves all extremities    Plan:  Cholecystostomy tentatively planned for today. Phone consent obtained from patient's wife. At this time, MICU team unsure if pt needs tunneled central venous access for HD or abx after discharge.  Plan discussed with Dr. LEXY Castañeda.     Sedation Plan: moderate    Nupur Bhakta MD  PGY-3  Pager: [phone]

## 2020-09-16 NOTE — ASSESSMENT & PLAN NOTE
-MRSA bacteremia likely secondary to septic arthritis seeded from foot wound  -cause of bernadette  -on abx managed by ID

## 2020-09-16 NOTE — PROCEDURES
Radiology Post-Procedure Note    Pre Op Diagnosis: Acute cholecystitis    Post Op Diagnosis: Same    Procedure: Cholecystostomy    Procedure performed by: Levy Yan MD    Written Informed Consent Obtained: Yes  Specimen Removed: YES 30 cc of bile  Estimated Blood Loss: Minimal    Findings:   Under US and fluoro guidance, an 8 Fr APDL was placed in the gallbladder. Cystic duct and CBD patent. No filling defects in the CBD.    Patient tolerated procedure well.    Levy Yan M.D.  Diagnostic and Interventional Radiologist  Department of Radiology  Pager: [phone]

## 2020-09-16 NOTE — PLAN OF CARE
CMICU DAILY GOALS       A: Awake    RASS: Goal - RASS Goal: 0-->alert and calm  Actual - RASS (Jimenez Agitation-Sedation Scale): -1-->drowsy   Restraint necessity:    B: Breath   SBT: Not intubated   C: Coordinate A & B, analgesics/sedatives   Pain: managed    SAT: Not intubated  D: Delirium   CAM-ICU: Overall CAM-ICU: Positive  E: Early(intubated/ Progressive (non-intubated) Mobility   MOVE Screen:      Activity: Activity Management: patient unable to perform activities  FAS: Feeding/Nutrition   Diet order: Diet/Nutrition Received: NPO,   Fluid restriction:    T: Thrombus   DVT prophylaxis: VTE Required Core Measure: (P) Pharmacological prophylaxis initiated/maintained  H: HOB Elevation   Head of Bed (HOB): HOB at 30 degrees  U: Ulcer Prophylaxis   GI: yes  G: Glucose control   managed Glycemic Management: blood glucose monitoring, insulin infusion adjusted(glucose monitoring q1 hour)  S: Skin   Bundle compliance: yes   Bathing/Skin Care: bath, chlorhexidine, bath, complete, dressed/undressed, incontinence care, linen changed Date: 9/16/20    B: Bowel Function    yes  I: Indwelling Catheters   Newell necessity:      Urethral Catheter 09/09/20 1720 16 Fr.-Reason for Continuing Urinary Catheterization: Critically ill in ICU requiring intensive monitoring   CVC necessity: Yes   IPAD offered: Not appropriate  D: De-escalation Antibx   No  Plan for the day   IR procedure to place drain for gallbladder, insulin dosing accuchecks q1hr , monitor electrolytes, heparin drip if IR is postponed and if pt is to be restarted on CRRT.  Family/Goals of care/Code Status   Code Status: Full Code     No acute events throughout day, VS and assessment per flow sheet, patient progressing towards goals as tolerated, plan of care reviewed with Ed Patton and family, all concerns addressed, will continue to monitor.

## 2020-09-16 NOTE — PROGRESS NOTES
[facility]  Infectious Disease  Progress Note    Patient Name: Ed Patton  MRN: [id number]  Admission Date: 8/30/2020  Length of Stay: 17 days  Attending Physician: Mk Jolly MD  Primary Care Provider: Qiana Chow MD    Isolation Status: Contact  Assessment/Plan:      Sepsis due to methicillin resistant Staphylococcus aureus (MRSA)  MRSA septicemia. Still tachycardic, SpO2 reached 92%, renal function continues to worsen. WBCs not improving. ESR and CRP elevated, RF WNL. Vancomycin was supratherapeutic, switched to pulse dose regimen. Source of bacteremia unclear, may be left foot wound or a septic process at the right knee joint. Right knee now clinically suspicious for septic arthritis (see physical exam). Left foot wound growing Proteus and MRSA, urine growing Klebsiella. TTE & LUKAS is negative. Dapto/ceftaroline switched back to vanc due to worsening renal function and early signs of rhabdo. Course complicated by worsening leukocytosis (C Diff neg)     Blood cultures: MRSA  R knee fluid culture: Staph aureus  Left foot wound: Proteus, MRSA  Source Control: I&D of Septic R knee on 9/7/20.      Repeat Blood cultures 09/09, 09/10, 09/12 have been negative  CXR with right lower lobe opacities concerning for developing pneumonia  CT Chest/Abdomen/Pelvis: Opacities in Right lower lobe concerning for atelectis and possible superimposed infection. Revealed findings concerning for acute cholecystitis. No identification of abscess.      Recommendations:   -- Continue oral levofloxacin (renally dosed) and continue IV Daptomycin. Avoid Beta lactam antibiotics to limit renal damage.   -- HIDA scan positive for cholecystitis. Pending cholecystotomy drain by IR given poor candidacy for cholecystectomy. Please obtain cultures.  -- Concern for RLL pneumonia on imaging. Respiratory culture pending.          Thank you for your consult. I will follow-up with patient. Please contact us if  you have any additional questions.    Caitlyn Yeung MD  Infectious Disease  [facility]    Subjective:     Principal Problem:Septic shock    HPI: 66 yo M with Hx of DM2 (on insulin), chronic hypoxemic respiratory failure (on O2 at home), chronic systolic heart failure presented to ED after recurrent falls 7 and 8 days ago. He has a history of falls but reports that his tendency to fall has worsened recently, resulting in injury to his lower back and right thigh. After his fall last Tuesday he looked at his left foot and noticed a wound, denies pain or drainage related to the wound, he admits to not regularly checking his feet and does not know how long the wound has been present. He believes the wound is related to a tack that he previously found imbedded in the bottom of a slipper. He had previously seen Ang Lugo DPM for diabetic foot care but has not seen her this year due to fear of the coronavirus pandemic. He has been getting home health services including physical therapy. No subjective change as of today, still denies pain and drainage related to the wound. Denies F/C/N/V.  Interval History: NAEON. Afebrile. Patient appears a little alert tody but unable to answer questions. HIDA scan positive for cholecystitis.     Review of Systems   Unable to perform ROS: Mental status change     Objective:     Vital Signs (Most Recent):  Temp: 98.6 °F (37 °C) (09/16/20 0301)  Pulse: (!) 129 (09/16/20 1002)  Resp: 12 (09/16/20 1002)  BP: 133/88 (09/15/20 2300)  SpO2: 95 % (09/16/20 1002) Vital Signs (24h Range):  Temp:  [97.8 °F (36.6 °C)-98.7 °F (37.1 °C)] 98.6 °F (37 °C)  Pulse:  [127-129] 129  Resp:  [11-25] 12  SpO2:  [93 %-100 %] 95 %  BP: (133)/(88) 133/88  Arterial Line BP: (117-155)/(59-78) 132/71     Weight: 114.8 kg (253 lb)  Body mass index is 30.8 kg/m².    Estimated Creatinine Clearance: 52.4 mL/min (A) (based on SCr of 2 mg/dL (H)).    Physical Exam  Constitutional:       Appearance:  Normal appearance.   HENT:      Head: Normocephalic.      Mouth/Throat:      Mouth: Mucous membranes are moist.   Eyes:      Extraocular Movements: Extraocular movements intact.   Neck:      Musculoskeletal: Normal range of motion.      Comments: Right IJ CVC in place. No tenderness or erythema noted on surrounding skin  Cardiovascular:      Rate and Rhythm: Regular rhythm. Tachycardia present.      Heart sounds: No murmur.   Pulmonary:      Effort: Pulmonary effort is normal. No respiratory distress.      Breath sounds: Normal breath sounds. No wheezing.   Abdominal:      General: There is no distension.      Palpations: Abdomen is soft.      Tenderness: There is no abdominal tenderness.   Musculoskeletal:      Comments: Stage 2 diabetic foot ulcer measuring 1cm noted on Left 1st phalanx.    Neurological:      Mental Status: He is alert.      Comments: Alert. Appears confused. Unable to answer questions   Psychiatric:      Comments: Unable to assess due to mental status          Significant Labs:   Blood Culture:   Recent Labs   Lab 09/09/20  0303 09/09/20  1040 09/10/20  0459 09/12/20 2014 09/12/20 2015   LABBLOO No growth after 5 days. No growth after 5 days. No growth after 5 days. No Growth to date  No Growth to date  No Growth to date  No Growth to date No Growth to date  No Growth to date  No Growth to date  No Growth to date     CBC:   Recent Labs   Lab 09/15/20  0401 09/16/20  0323   WBC 35.44* 33.39*  33.39*   HGB 8.1* 8.5*  8.5*   HCT 27.1* 28.8*  28.8*   * 431*  431*     CMP:   Recent Labs   Lab 09/15/20  0343 09/15/20  0401  09/15/20  1357  09/16/20  0323 09/16/20  0328 09/16/20  1224    140   < > 141   < > 141 142 140   K 4.3 4.4   < > 4.2   < > 4.1 4.4 4.5   CL 99 100   < > 105   < > 104 104 103   CO2 24 24   < > 24   < > 27 28 26   * 285*   < > 181*   < > 188* 205* 231*   BUN 20 19   < > 10   < > 19 24* 28*   CREATININE 1.8* 1.8*   < > 1.1   < > 1.8* 1.9* 2.0*    CALCIUM 9.8 9.7   < > 9.9   < > 9.5 9.3 9.0   PROT 7.2  --   --   --   --  7.2  --   --    ALBUMIN 1.5* 1.4*  --  1.4*  --  1.5*  --   --    BILITOT 0.5  --   --   --   --  0.4  --   --    ALKPHOS 209*  --   --   --   --  197*  --   --    AST 50*  --   --   --   --  49*  --   --    ALT 44  --   --   --   --  41  --   --    ANIONGAP 16 16   < > 12   < > 10 10 11   EGFRNONAA 39.2* 39.2*   < > >60.0   < > 39.2* 36.7* 34.5*    < > = values in this interval not displayed.     Respiratory Culture: No results for input(s): GSRESP, RESPIRATORYC in the last 4320 hours.  Urine Culture:   Recent Labs   Lab 08/30/20  1633   LABURIN KLEBSIELLA PNEUMONIAE  > 100,000 cfu/ml  *     Urine Studies:   Recent Labs   Lab 09/09/20  1719   COLORU Preeti   APPEARANCEUA Hazy*   PHUR 5.0   SPECGRAV 1.020   PROTEINUA Negative   GLUCUA Negative   KETONESU Negative   BILIRUBINUA Negative   OCCULTUA Negative   NITRITE Negative   LEUKOCYTESUR Trace*   RBCUA 1   WBCUA 4   BACTERIA Few*   SQUAMEPITHEL 0   HYALINECASTS 7*       Significant Imaging: I have reviewed all pertinent imaging results/findings within the past 24 hours.

## 2020-09-16 NOTE — PLAN OF CARE
Pt arrived to IR procedure room 189 for Yesika tube placement, accompanied by CMICU RN Laila, allergies reviewed, preparing for procedure

## 2020-09-16 NOTE — ASSESSMENT & PLAN NOTE
-initially non oliguric stage 3 bernadette with ischemic atn likely secondary to hypotension from septic shock  -now oliguric  -with change to scuf patient will require less electrolyte replacement and now that pressers are off, plan to hold any rrt overnight

## 2020-09-16 NOTE — PROGRESS NOTES
[facility]  Nephrology  Progress Note    Patient Name: Ed Patton  MRN: [id number]  Admission Date: 8/30/2020  Hospital Length of Stay: 17 days  Attending Provider: Mk Jolly MD   Primary Care Physician: Qiana Chow MD  Principal Problem:Septic shock    Subjective:     HPI: Mr. Patton is a 66yo M with insulin dependent T2DM, chronic hypoxemic resp. failure (on 2L oxygen at home), and CHF (EF 25%). He presented to the ED for recurrent falls and sepsis from an infected diabetic ulcer from stepping on a tack 8/30. Wound cultures from foot positive for proteus and MRSA 8/31. Urine culture 8/30 positive for klebsiella. Blood cultures have been positive for MRSA repeatedly from 8/30 to 9/8. He also developed septic arthritis of the right knee likely from seeding secondary to septicemia positive for MRSA 9/3. LUKAS was negative for IE 9/4. Initial management of septicemia was with ciprofloxacin and Vancomycin which was supratherapeutic to 26.5 on 9/2 prompting switch to a pulse dose regimen. Due to persistent bacteremia ID changed antibiotics to ceftaroline and daptomycin on 9/7 I&D of right knee performed by ortho on 9/7. Cr on admission was 1.5 at admit up from the baseline (1.2-1.5). On 9/9 Cr level had a sudden increased to 3. Nephrology was consulted for NANETTE.     Interval History:   Net negative 1.3 liters and not significant amount of fluid not associated with sled given to patient. With fluid removal patient has been weaned off pressers and is on 2L NC.    Review of patient's allergies indicates:   Allergen Reactions    Vicodin [hydrocodone-acetaminophen] Itching     Current Facility-Administered Medications   Medication Frequency    0.9%  NaCl infusion (CRRT USE ONLY) Continuous    acetaminophen tablet 1,000 mg Q8H PRN    albuterol-ipratropium 2.5 mg-0.5 mg/3 mL nebulizer solution 3 mL Q4H PRN    artificial tears 0.5 % ophthalmic solution 1 drop TID    calcium  carbonate 200 mg calcium (500 mg) chewable tablet 500 mg BID PRN    calcium gluconate 3,000 mg in dextrose 5 % 100 mL IVPB Continuous    [START ON 9/18/2020] DAPTOmycin (CUBICIN) 1,050 mg in sodium chloride 0.9% IVPB Q48H    dextrose 10 % infusion PRN    dextrose 10 % infusion PRN    dextrose 50% injection 12.5 g PRN    dextrose 50% injection 25 g PRN    fluticasone furoate-vilanteroL 200-25 mcg/dose diskus inhaler 1 puff Daily    fluticasone propionate 50 mcg/actuation nasal spray 50 mcg Daily    glucagon (human recombinant) injection 1 mg PRN    glucose chewable tablet 16 g PRN    glucose chewable tablet 24 g PRN    heparin (porcine) injection 5,000 Units Q8H    insulin regular 100 Units in sodium chloride 0.9% 100 mL infusion Continuous    levoFLOXacin tablet 750 mg Every other day    levothyroxine tablet 75 mcg Before breakfast    magnesium sulfate 2g in water 50mL IVPB (premix) PRN    melatonin tablet 6 mg Nightly PRN    ondansetron injection 4 mg Q8H PRN    oxyCODONE immediate release tablet 5 mg Q8H PRN    polyethylene glycol packet 17 g BID PRN    sodium chloride 0.9% flush 10 mL PRN    sodium phosphate 20.01 mmol in dextrose 5 % 250 mL IVPB PRN    sodium phosphate 30 mmol in dextrose 5 % 250 mL IVPB PRN    sodium phosphate 39.99 mmol in dextrose 5 % 250 mL IVPB PRN    Tdap vaccine injection 0.5 mL vaccine x 1 dose       Objective:     Vital Signs (Most Recent):  Temp: 97.3 °F (36.3 °C) (09/16/20 1500)  Pulse: (!) 129 (09/16/20 1800)  Resp: 11 (09/16/20 1800)  BP: 136/86 (09/16/20 1800)  SpO2: 98 % (09/16/20 1800)  O2 Device (Oxygen Therapy): nasal cannula (09/16/20 1800) Vital Signs (24h Range):  Temp:  [97.3 °F (36.3 °C)-98.7 °F (37.1 °C)] 97.3 °F (36.3 °C)  Pulse:  [127-130] 129  Resp:  [9-25] 11  SpO2:  [93 %-100 %] 98 %  BP: (111-154)/(74-92) 136/86  Arterial Line BP: ()/() 98/87     Weight: 114.8 kg (253 lb) (09/14/20 0749)  Body mass index is 30.8 kg/m².  Body  surface area is 2.48 meters squared.    I/O last 3 completed shifts:  In: 4889.7 [I.V.:4259.7; NG/GT:380; IV Piggyback:250]  Out: 7714 [Urine:307; Drains:400; Other:7007]    Physical Exam  Vitals signs and nursing note reviewed.   Constitutional:       General: He is not in acute distress.     Appearance: He is well-developed. He is obese. He is ill-appearing and toxic-appearing. He is not diaphoretic.   HENT:      Head: Normocephalic and atraumatic.      Right Ear: External ear normal.      Left Ear: External ear normal.      Mouth/Throat:      Pharynx: No oropharyngeal exudate.   Eyes:      General: No scleral icterus.        Right eye: No discharge.         Left eye: No discharge.      Conjunctiva/sclera: Conjunctivae normal.      Pupils: Pupils are equal, round, and reactive to light.   Neck:      Musculoskeletal: Normal range of motion.      Thyroid: No thyromegaly.      Trachea: No tracheal deviation.      Comments: [facility]  Cardiovascular:      Rate and Rhythm: Tachycardia present.   Pulmonary:      Effort: Pulmonary effort is normal. No respiratory distress.      Breath sounds: Normal breath sounds. No stridor. No wheezing or rales.      Comments: Poor air movement  Abdominal:      General: Bowel sounds are normal. There is no distension.      Palpations: Abdomen is soft.      Tenderness: There is no abdominal tenderness. There is no guarding.   Musculoskeletal:         General: No tenderness or deformity.      Right lower leg: Edema present.      Left lower leg: Edema present.   Lymphadenopathy:      Cervical: No cervical adenopathy.   Skin:     Coloration: Skin is not pale.      Findings: No erythema or rash.   Neurological:      General: No focal deficit present.         Significant Labs:  All labs within the past 24 hours have been reviewed.     Significant Imaging:  Labs: Reviewed    Assessment/Plan:     * Septic shock  -MRSA bacteremia likely secondary to septic arthritis seeded from foot wound  -cause of  nanette  -on abx managed by ID    NANETTE (acute kidney injury)  -initially non oliguric stage 3 nanette with ischemic atn likely secondary to hypotension from septic shock  -now oliguric  -with change to scuf patient will require less electrolyte replacement and now that pressers are off, plan to hold any rrt overnight    Increased anion gap metabolic acidosis  -now improved  -secondary to renal failure, septic shock, and dka  -on rrt      Chente Reid MD  Nephrology  [facility]

## 2020-09-16 NOTE — ASSESSMENT & PLAN NOTE
-Most recent A1c 9.5 in 08/2020.   -Home regimen: NPH 45U AM and 35U PM.   -Complicated by CKD III and polyneuropathy with diabetic foot ulcer.   -ICU admission on 09/12 notable for increased anion gap metabolic acidosis and elevated beta-hydroxybutyrate concern for DKA possible precipitated from withholding insulin versus worsening of underlying infection.   - AG closed    Plan:   - Will transition off insulin gtt to sq insulin once on tube feeds after IR procedure  -Trending BMP's Q8H; monitor for hyperkalemia.   - Holding home Gabapentin with concern for hypotension.   - Hypoglycemia protocol ordered.

## 2020-09-16 NOTE — ASSESSMENT & PLAN NOTE
-Most recent A1c 9.5 in 08/2020.   -Home regimen: NPH 45U AM and 35U PM.   -Complicated by CKD III and polyneuropathy with diabetic foot ulcer.   -ICU admission on 09/12 notable for increased anion gap metabolic acidosis and elevated beta-hydroxybutyrate concern for DKA possible precipitated from withholding insulin versus worsening of underlying infection.     Plan:   - Insulin drip with Q2H glucose checks. Transition to sub q today   -Trending BMP's Q8H; monitor for hyperkalemia.   -Holding off on continuous IVF given significantly reduced EF.   -Holding home Gabapentin with concern for hypotension.   -Hypoglycemia protocol ordered.

## 2020-09-16 NOTE — CARE UPDATE
Called patient's wife to update. All questions answered.    Lev Nogueira MD  Internal Medicine PGY-3  Service: Critical Care Medicine   [facility], [place]  Pager: [phone]  Cell: [phone]

## 2020-09-16 NOTE — SUBJECTIVE & OBJECTIVE
Interval History/Significant Events: No acute events overnight. Held CRRT overnight. Encephalopathy progressively improving but still not at baseline. Weaned off pressor support. HIDA+ cholecystitis, not a surgical candidate per gen surg, plan on IR drain placement today.     Review of Systems   Unable to perform ROS: Mental status change     Objective:     Vital Signs (Most Recent):  Temp: 98.6 °F (37 °C) (09/16/20 0301)  Pulse: (!) 129 (09/16/20 1002)  Resp: 12 (09/16/20 1002)  BP: 133/88 (09/15/20 2300)  SpO2: 95 % (09/16/20 1002) Vital Signs (24h Range):  Temp:  [97.8 °F (36.6 °C)-98.7 °F (37.1 °C)] 98.6 °F (37 °C)  Pulse:  [127-129] 129  Resp:  [11-25] 12  SpO2:  [93 %-100 %] 95 %  BP: (117-133)/(68-88) 133/88  Arterial Line BP: ()/(58-78) 132/71   Weight: 114.8 kg (253 lb)  Body mass index is 30.8 kg/m².      Intake/Output Summary (Last 24 hours) at 9/16/2020 1022  Last data filed at 9/16/2020 0600  Gross per 24 hour   Intake 2192.72 ml   Output 3069 ml   Net -876.28 ml       Physical Exam  Constitutional:       General: He is not in acute distress.     Appearance: Normal appearance. He is well-developed and overweight. He is ill-appearing.      Interventions: Nasal cannula in place.   HENT:      Head: Normocephalic and atraumatic.   Eyes:      General: No scleral icterus.     Conjunctiva/sclera: Conjunctivae normal.      Pupils: Pupils are equal, round, and reactive to light.   Neck:      Musculoskeletal: Normal range of motion and neck supple.      Comments: Central line inserted in right IJ with clean, dry, and intact dressing.   Cardiovascular:      Rate and Rhythm: Regular rhythm. Tachycardia present.      Pulses: Normal pulses.      Heart sounds: Normal heart sounds. No murmur. No friction rub. No gallop.    Pulmonary:      Effort: Pulmonary effort is normal. No respiratory distress.      Breath sounds: Examination of the right-lower field reveals decreased breath sounds. Examination of the  left-lower field reveals decreased breath sounds. Decreased breath sounds present. No wheezing.      Comments: Clear to auscultation b/l anteriorly  Abdominal:      General: Bowel sounds are normal. There is no distension.      Palpations: Abdomen is soft. There is no mass.      Tenderness: There is no abdominal tenderness. There is no guarding.   Musculoskeletal:      Comments: There is 3+ pitting edema of the bilateral lower extremities till knees.  Normal passive range of motion   Lymphadenopathy:      Cervical: No cervical adenopathy.   Skin:     General: Skin is warm and dry.      Findings: No bruising or rash.   Neurological:      Mental Status: He is lethargic and disoriented.      Comments: Follow commands, tracks examiner, does not respond to questions, not oriented to time place person         Vents:     Lines/Drains/Airways     Central Venous Catheter Line            Trialysis (Dialysis) Catheter 09/13/20 0807 right internal jugular 3 days          Drain                 Urethral Catheter 09/09/20 1720 16 Fr. 6 days         NG/OG Tube 09/14/20 0500 Ware sump 14 Fr. Left nostril 2 days          Arterial Line            Arterial Line 09/13/20 0753 Left Radial 3 days          Peripheral Intravenous Line                 Peripheral IV - Single Lumen 09/13/20 0600 20 G Right Antecubital 3 days         Peripheral IV - Single Lumen 09/15/20 1715 22 G Right;Lateral Antecubital less than 1 day              Significant Labs:    CBC/Anemia Profile:  Recent Labs   Lab 09/15/20  0401 09/16/20  0323   WBC 35.44* 33.39*  33.39*   HGB 8.1* 8.5*  8.5*   HCT 27.1* 28.8*  28.8*   * 431*  431*   MCV 87 90  90   RDW 16.0* 15.9*  15.9*        Chemistries:  Recent Labs   Lab 09/14/20  2145  09/15/20  0343 09/15/20  0401  09/15/20  1357  09/16/20  0008 09/16/20  0323 09/16/20  0328      < > 139 140   < > 141   < > 141 141 142   K 4.4   < > 4.3 4.4   < > 4.2   < > 4.0 4.1 4.4   CL 98   < > 99 100   < > 105   < >  103 104 104   CO2 26   < > 24 24   < > 24   < > 27 27 28   BUN 20   < > 20 19   < > 10   < > 17 19 24*   CREATININE 1.7*   < > 1.8* 1.8*   < > 1.1   < > 1.6* 1.8* 1.9*   CALCIUM 9.7   < > 9.8 9.7   < > 9.9   < > 9.8 9.5 9.3   ALBUMIN 1.4*  --  1.5* 1.4*  --  1.4*  --   --  1.5*  --    PROT  --   --  7.2  --   --   --   --   --  7.2  --    BILITOT  --   --  0.5  --   --   --   --   --  0.4  --    ALKPHOS  --   --  209*  --   --   --   --   --  197*  --    ALT  --   --  44  --   --   --   --   --  41  --    AST  --   --  50*  --   --   --   --   --  49*  --    MG 2.5  --   --  2.5  --  2.5  --   --   --  2.5   PHOS 4.7*  --   --  4.5  --  2.4*  --   --   --   --     < > = values in this interval not displayed.       All pertinent labs within the past 24 hours have been reviewed.    Significant Imaging:  I have reviewed and interpreted all pertinent imaging results/findings within the past 24 hours.

## 2020-09-16 NOTE — ASSESSMENT & PLAN NOTE
-History of mixed COPD (based on PFT's from 05/2015 showing mild (small airways) obstruction, airflow not improved after bronchodilator, and moderate restriction) and HFrEF with chronic respiratory failure on 2L home oxygen.   -ICU admission notable for progressive increase in supplemental oxygen requirements.   -Work-up for underlying etiology of acute on chronic respiratory failure include CXR showing concern for possible progression of CHF vs HAP (not entirely convinced that CXR showed consolidation)  - Breathing back to baseline now to 2L NC  - Needs more volume removal    Plan:   - Continue supplemental oxygen with goal saturations >88%; Encephalopathy improving   - Repeat ECHO unchanged.   - Holding home CHF regimen with shock on ICU admission.   - Continue home BREO with duo-nebulizers PRN.   - Nephrology following for CRRT for attempted volume optimization. Held off CRRT last night. To resume once patient able to get heparin after IR procedure as patient needs heparin due to clotting issues with dialysis lines.   - Strict I/O's and daily weights.

## 2020-09-16 NOTE — SUBJECTIVE & OBJECTIVE
Interval History:   Net negative 1.3 liters and not significant amount of fluid not associated with sled given to patient. With fluid removal patient has been weaned off pressers and is on 2L NC.    Review of patient's allergies indicates:   Allergen Reactions    Vicodin [hydrocodone-acetaminophen] Itching     Current Facility-Administered Medications   Medication Frequency    0.9%  NaCl infusion (CRRT USE ONLY) Continuous    acetaminophen tablet 1,000 mg Q8H PRN    albuterol-ipratropium 2.5 mg-0.5 mg/3 mL nebulizer solution 3 mL Q4H PRN    artificial tears 0.5 % ophthalmic solution 1 drop TID    calcium carbonate 200 mg calcium (500 mg) chewable tablet 500 mg BID PRN    calcium gluconate 3,000 mg in dextrose 5 % 100 mL IVPB Continuous    [START ON 9/18/2020] DAPTOmycin (CUBICIN) 1,050 mg in sodium chloride 0.9% IVPB Q48H    dextrose 10 % infusion PRN    dextrose 10 % infusion PRN    dextrose 50% injection 12.5 g PRN    dextrose 50% injection 25 g PRN    fluticasone furoate-vilanteroL 200-25 mcg/dose diskus inhaler 1 puff Daily    fluticasone propionate 50 mcg/actuation nasal spray 50 mcg Daily    glucagon (human recombinant) injection 1 mg PRN    glucose chewable tablet 16 g PRN    glucose chewable tablet 24 g PRN    heparin (porcine) injection 5,000 Units Q8H    insulin regular 100 Units in sodium chloride 0.9% 100 mL infusion Continuous    levoFLOXacin tablet 750 mg Every other day    levothyroxine tablet 75 mcg Before breakfast    magnesium sulfate 2g in water 50mL IVPB (premix) PRN    melatonin tablet 6 mg Nightly PRN    ondansetron injection 4 mg Q8H PRN    oxyCODONE immediate release tablet 5 mg Q8H PRN    polyethylene glycol packet 17 g BID PRN    sodium chloride 0.9% flush 10 mL PRN    sodium phosphate 20.01 mmol in dextrose 5 % 250 mL IVPB PRN    sodium phosphate 30 mmol in dextrose 5 % 250 mL IVPB PRN    sodium phosphate 39.99 mmol in dextrose 5 % 250 mL IVPB PRN    Tdap  vaccine injection 0.5 mL vaccine x 1 dose       Objective:     Vital Signs (Most Recent):  Temp: 97.3 °F (36.3 °C) (09/16/20 1500)  Pulse: (!) 129 (09/16/20 1800)  Resp: 11 (09/16/20 1800)  BP: 136/86 (09/16/20 1800)  SpO2: 98 % (09/16/20 1800)  O2 Device (Oxygen Therapy): nasal cannula (09/16/20 1800) Vital Signs (24h Range):  Temp:  [97.3 °F (36.3 °C)-98.7 °F (37.1 °C)] 97.3 °F (36.3 °C)  Pulse:  [127-130] 129  Resp:  [9-25] 11  SpO2:  [93 %-100 %] 98 %  BP: (111-154)/(74-92) 136/86  Arterial Line BP: ()/() 98/87     Weight: 114.8 kg (253 lb) (09/14/20 0749)  Body mass index is 30.8 kg/m².  Body surface area is 2.48 meters squared.    I/O last 3 completed shifts:  In: 4889.7 [I.V.:4259.7; NG/GT:380; IV Piggyback:250]  Out: 7714 [Urine:307; Drains:400; Other:7007]    Physical Exam  Vitals signs and nursing note reviewed.   Constitutional:       General: He is not in acute distress.     Appearance: He is well-developed. He is obese. He is ill-appearing and toxic-appearing. He is not diaphoretic.   HENT:      Head: Normocephalic and atraumatic.      Right Ear: External ear normal.      Left Ear: External ear normal.      Mouth/Throat:      Pharynx: No oropharyngeal exudate.   Eyes:      General: No scleral icterus.        Right eye: No discharge.         Left eye: No discharge.      Conjunctiva/sclera: Conjunctivae normal.      Pupils: Pupils are equal, round, and reactive to light.   Neck:      Musculoskeletal: Normal range of motion.      Thyroid: No thyromegaly.      Trachea: No tracheal deviation.      Comments: [facility]  Cardiovascular:      Rate and Rhythm: Tachycardia present.   Pulmonary:      Effort: Pulmonary effort is normal. No respiratory distress.      Breath sounds: Normal breath sounds. No stridor. No wheezing or rales.      Comments: Poor air movement  Abdominal:      General: Bowel sounds are normal. There is no distension.      Palpations: Abdomen is soft.      Tenderness: There is no  abdominal tenderness. There is no guarding.   Musculoskeletal:         General: No tenderness or deformity.      Right lower leg: Edema present.      Left lower leg: Edema present.   Lymphadenopathy:      Cervical: No cervical adenopathy.   Skin:     Coloration: Skin is not pale.      Findings: No erythema or rash.   Neurological:      General: No focal deficit present.         Significant Labs:  All labs within the past 24 hours have been reviewed.     Significant Imaging:  Labs: Reviewed

## 2020-09-16 NOTE — PROGRESS NOTES
Procedure complete, RUQ t tube dressing cdi without bleeding or hematoma, specimen collected and sent for ordered labs, [facility] RN at bedside, bedside handoff provided, pt to be escorted back to [facility] per bed, monitored and under care of [facility] RN

## 2020-09-16 NOTE — CONSULTS
Inpatient Radiology Consult Note    History of Present Illness:  Ed Patton is a 65 y.o. male admitted with acute cholecystitis and septic shock with MRSA complicated by NANETTE requiring CRRT. He is not a candidate for cholecystectomy at this time per general surgery evaluation. IR consulted for cholecsystostomy tube.    Plan: Cholecystostomy tube placement today.  Keep NPO and hold anticoagulation. See IR H&P from today for additional details.  I discussed possible need for tunneled central venous access with the MICU team. They are unsure at this time if he will need long term access for HD or abx at discharge.     Sedation Plan: moderate    Nupur Bhakta MD  Department of Radiology, PGY-3  Pager: [phone]

## 2020-09-16 NOTE — PROGRESS NOTES
[facility]  Critical Care Medicine  Progress Note    Patient Name: Ed Patton  MRN: [id number]  Admission Date: 8/30/2020  Hospital Length of Stay: 17 days  Code Status: Full Code  Attending Provider: Mk Jolly MD  Primary Care Provider: Qiana Chow MD   Principal Problem: Septic shock    Subjective:     HPI:  Mr. Ed Patton is a 65 year old male for whom MICU was consulted for hypotension. He has a PMH significant for HFrEF and COPD with chronic respiratory failure (on 2L home oxygen), T2DM with CKD III, and iron deficiency anemia. History obtained from chart review and speaking with patient. See hospital course below for detail regarding care and course of this patient.     Hospital/ICU Course:  Mr. Ed Patton was admitted to hospital medicine on 08/30 for management of a left-sided diabetic foot infection that was precipitated by an undetected punction wound after stepping on a tack/nail. His presentation was notable for leukocytosis with elevated inflammatory markers, however MRI of left foot only showed cellulitis of the anterior and lateral aspect of foot. Podiatry was consulted with recommendations for IV antibiotics; he was initiated on Ciprofloxacin and Vancomycin on admission. However, blood cultures from admission resulted positive for MRSA with wound cultures from left foot also growing MRSA with Proteus mirabilis. By 09/03 cultures remained positive despite Vancomycin, and patient was noted to develop right knee pain with swelling. Orthopedics was consulted and patient underwent right knee joint aspiration with cultures also positive for MRSA. LUKAS on 09/04 showed known HFrEF, but was negative for endocarditis. MRI of lumbar spine on 09/08 was negative for abscess. By 09/06, blood cultures continued to remain positive, and he underwent I&D of right arm abscess on 09/08 with cultures also positive for MRSA. For persistent MRSA bacteremia, he was  transitioned to Daptomycin and Ceftaroline, however this regimen was discontinued by 09/10 due to concern for developing rhabdomylosis. By 09/09, he was noted to develop a progressively worsening leukocytosis and NANETTE. Nephrology was consulted on 09/09 with suspicion for ATN. Hematology was consulted on 09/12 for persistent leukocytosis as likely reactive from bacteremia. On 09/10, patient began developing intermitent hypotension prompting broadening of antibiotics to Cefepime and Vancomycin. By 09/12, renal function continued to worsen in addition to hypotension prompting transfer to ICU for vasopressor support and possible HD. Patient found to be encephalopathic, central line placed and started on CRRT overnight on 9/14 with improvement in mental status, but remained encephalopathic. CT head without any acute intracranial process. Vasopressin off AM of 9/15 but remains on levophed, CT abdomen with concern for potential cholecystitis and possible atelectasis with superimposed pneumonia.    Interval History/Significant Events: No acute events overnight. Held CRRT overnight. Encephalopathy progressively improving but still not at baseline. Weaned off pressor support. HIDA+ cholecystitis, not a surgical candidate per gen surg, plan on IR drain placement today.     Review of Systems   Unable to perform ROS: Mental status change     Objective:     Vital Signs (Most Recent):  Temp: 98.6 °F (37 °C) (09/16/20 0301)  Pulse: (!) 129 (09/16/20 1002)  Resp: 12 (09/16/20 1002)  BP: 133/88 (09/15/20 2300)  SpO2: 95 % (09/16/20 1002) Vital Signs (24h Range):  Temp:  [97.8 °F (36.6 °C)-98.7 °F (37.1 °C)] 98.6 °F (37 °C)  Pulse:  [127-129] 129  Resp:  [11-25] 12  SpO2:  [93 %-100 %] 95 %  BP: (117-133)/(68-88) 133/88  Arterial Line BP: ()/(58-78) 132/71   Weight: 114.8 kg (253 lb)  Body mass index is 30.8 kg/m².      Intake/Output Summary (Last 24 hours) at 9/16/2020 1022  Last data filed at 9/16/2020 0600  Gross per 24 hour    Intake 2192.72 ml   Output 3069 ml   Net -876.28 ml       Physical Exam  Constitutional:       General: He is not in acute distress.     Appearance: Normal appearance. He is well-developed and overweight. He is ill-appearing.      Interventions: Nasal cannula in place.   HENT:      Head: Normocephalic and atraumatic.   Eyes:      General: No scleral icterus.     Conjunctiva/sclera: Conjunctivae normal.      Pupils: Pupils are equal, round, and reactive to light.   Neck:      Musculoskeletal: Normal range of motion and neck supple.      Comments: Central line inserted in right IJ with clean, dry, and intact dressing.   Cardiovascular:      Rate and Rhythm: Regular rhythm. Tachycardia present.      Pulses: Normal pulses.      Heart sounds: Normal heart sounds. No murmur. No friction rub. No gallop.    Pulmonary:      Effort: Pulmonary effort is normal. No respiratory distress.      Breath sounds: Examination of the right-lower field reveals decreased breath sounds. Examination of the left-lower field reveals decreased breath sounds. Decreased breath sounds present. No wheezing.      Comments: Clear to auscultation b/l anteriorly  Abdominal:      General: Bowel sounds are normal. There is no distension.      Palpations: Abdomen is soft. There is no mass.      Tenderness: There is no abdominal tenderness. There is no guarding.   Musculoskeletal:      Comments: There is 3+ pitting edema of the bilateral lower extremities till knees.  Normal passive range of motion   Lymphadenopathy:      Cervical: No cervical adenopathy.   Skin:     General: Skin is warm and dry.      Findings: No bruising or rash.   Neurological:      Mental Status: He is lethargic and disoriented.      Comments: Follow commands, tracks examiner, does not respond to questions, not oriented to time place person         Vents:     Lines/Drains/Airways     Central Venous Catheter Line            Trialysis (Dialysis) Catheter 09/13/20 0807 right internal  jugular 3 days          Drain                 Urethral Catheter 09/09/20 1720 16 Fr. 6 days         NG/OG Tube 09/14/20 0500 Pray sump 14 Fr. Left nostril 2 days          Arterial Line            Arterial Line 09/13/20 0753 Left Radial 3 days          Peripheral Intravenous Line                 Peripheral IV - Single Lumen 09/13/20 0600 20 G Right Antecubital 3 days         Peripheral IV - Single Lumen 09/15/20 1715 22 G Right;Lateral Antecubital less than 1 day              Significant Labs:    CBC/Anemia Profile:  Recent Labs   Lab 09/15/20  0401 09/16/20  0323   WBC 35.44* 33.39*  33.39*   HGB 8.1* 8.5*  8.5*   HCT 27.1* 28.8*  28.8*   * 431*  431*   MCV 87 90  90   RDW 16.0* 15.9*  15.9*        Chemistries:  Recent Labs   Lab 09/14/20  2145  09/15/20  0343 09/15/20  0401  09/15/20  1357  09/16/20  0008 09/16/20  0323 09/16/20  0328      < > 139 140   < > 141   < > 141 141 142   K 4.4   < > 4.3 4.4   < > 4.2   < > 4.0 4.1 4.4   CL 98   < > 99 100   < > 105   < > 103 104 104   CO2 26   < > 24 24   < > 24   < > 27 27 28   BUN 20   < > 20 19   < > 10   < > 17 19 24*   CREATININE 1.7*   < > 1.8* 1.8*   < > 1.1   < > 1.6* 1.8* 1.9*   CALCIUM 9.7   < > 9.8 9.7   < > 9.9   < > 9.8 9.5 9.3   ALBUMIN 1.4*  --  1.5* 1.4*  --  1.4*  --   --  1.5*  --    PROT  --   --  7.2  --   --   --   --   --  7.2  --    BILITOT  --   --  0.5  --   --   --   --   --  0.4  --    ALKPHOS  --   --  209*  --   --   --   --   --  197*  --    ALT  --   --  44  --   --   --   --   --  41  --    AST  --   --  50*  --   --   --   --   --  49*  --    MG 2.5  --   --  2.5  --  2.5  --   --   --  2.5   PHOS 4.7*  --   --  4.5  --  2.4*  --   --   --   --     < > = values in this interval not displayed.       All pertinent labs within the past 24 hours have been reviewed.    Significant Imaging:  I have reviewed and interpreted all pertinent imaging results/findings within the past 24 hours.      ABG  Recent Labs   Lab  09/14/20 2037   PH 7.423   PO2 83   PCO2 44.7   HCO3 29.2*   BE 5     Assessment/Plan:     Neuro  Acute metabolic encephalopathy  -Likely multifactorial from uremia vs DKA vs worsening shock with cerebral hypoperfusion vs sepsis.   -Per speaking with wife, she describes a likely baseline history of developing dementia prior to hospitalization.     Plan:   -Continue supportive management for multifactorial issues.   -Closely monitor respiratory status with concern for needing intubation for airway protection.  - Unclear etiology at this point, uremic encephalopathy should have cleared with CRRT. Could be from sepsis +/- neurotoxicity for cefipime  - Slow gradual improvement      Pulmonary  Acute on chronic respiratory failure with hypoxia  -History of mixed COPD (based on PFT's from 05/2015 showing mild (small airways) obstruction, airflow not improved after bronchodilator, and moderate restriction) and HFrEF with chronic respiratory failure on 2L home oxygen.   -ICU admission notable for progressive increase in supplemental oxygen requirements.   -Work-up for underlying etiology of acute on chronic respiratory failure include CXR showing concern for possible progression of CHF vs HAP (not entirely convinced that CXR showed consolidation)  - Breathing back to baseline now to 2L NC  - Needs more volume removal    Plan:   - Continue supplemental oxygen with goal saturations >88%; Encephalopathy improving   - Repeat ECHO unchanged.   - Holding home CHF regimen with shock on ICU admission.   - Continue home BREO with duo-nebulizers PRN.   - Nephrology following for CRRT for attempted volume optimization. Held off CRRT last night. To resume once patient able to get heparin after IR procedure as patient needs heparin due to clotting issues with dialysis lines.   - Strict I/O's and daily weights.     Hospital-acquired pneumonia  -See assessment for shock.     COPD mixed type  -Based on PFT's from 05/2015 showing mild  (small airways) obstruction, airflow not improved after bronchodilator, and moderate restriction.    Plan:   -See assessment for respiratory failure. .     Cardiac/Vascular  Hyperlipidemia  Plan:   -Holding home Statin with concern for rhabdomyolysis earlier in admission.     Chronic systolic congestive heart failure  -Most recent ECHO in 09/2020 with EF of 25%.   -Unclear is ischemic vs non-ischemic.   -Home regimen: Carvedilol, Lasix 80 mg, and Lisinopril.   -Exam notable for bilateral lower extremity edema and JVD.   -Associated with chronic hypoxemic respiratory failure requiring 2L nasal cannula, but noted to develop worsening oxygen requirements on ICU admission that were likely multifactorial from suspected hospital acquired pneumonia versus declining urine output with pre-disposition to volume overload. .     TTE: 9/14  EF ranging in 25- 35% for all last 3 echos now  Moderate biventricular systolic dysfunction  LVDD    Plan:   -See assessment for respiratory failure.     Renal/  Increased anion gap metabolic acidosis  -See assessment for T2DM with DKA.     NANETTE (acute kidney injury)  -Admission notable for progressive NANETTE since approximately 09/08 with worsening Cr associated with increased anion gap metabolic acidosis.   -Status post evaluation by nephrology with concern for ATN based on urine microscopy showing scant normal red blood cells without any dysmorphic shapes evident and numerous muddy brown casts.   -Admission to ICU notable for continued decline in renal function with eventual initiation of CRRT on 09/13.   -Etiology of continued decline in renal function is likely multifactorial from shock vs progression of CHF vs DKA vs medication side-effect.   ~230 cc/24 hr, net -1.4L yesterday    Plan:   - Lasix trial today  - Nephrology following for CRRT management.   - Daily BMP   -Trialysis line place on 09/13 for HD access.   -Strict I/O's and daily weights, if possible.   -Renally dose all  medications.     ID  * Septic shock  This is a 65 year old  male with PMH significant for HFrEF and COPD with chronic respiratory failure (on 2L home oxygen), T2DM with CKD III, and iron deficiency anemia who originally presented to [facility] on 08/30 with cellulitis of left foot with concern for diabetic foot infection with subsequent development of MRSA bacteremia attributed to septic arthritis of right knee and elbow. He is status post drainage and coverage for MRSA since that time. His work-up for other etiologies of MRSA bacteremia have included negative LUKAS and MRI of lumbar spine looking for endocarditis and spinal abscess, respectively. Stool studies for C.diff on 09/11 were negative. His hospital course has been associated with worsening hypotension and leukocytosis since 09/10 prompting ICU admission on 09/12 for initiation of vasopressor support. Infectious work-up thus far on ICU admission concern for likely right lower lobe HAP.     CT abdomen pelvis on 9/15 with gallbladder dilation, sludge, and trace pericholic fluid collection. Exam not consistent with cholecystitis but given persistent shock potential source. Also with potential right lower lobe atelectasis with overlying infection    - Patient off pressors now    Plan:   - HIDA+ cholecystitis. Gen surg feels patient not a good surgical candidate. Recommend IR cholecystostomy drain placement. IR planning on procedure today.   - Will talk to Nephrology to plan an HD trial once patient gets drain and is still off pressors.   - Continue broad spectrum coverage with Daptomycin (q24) and Levoflox (q48). Check weekly CPK for daptomycin.    - Vasopressor support for goal MAP >65.   - Continue stress dose steroids.   - Trending WBC count daily.     Septic arthritis of knee, right  -See assessment for shock.     Staphylococcal arthritis of right knee  -See assessment for shock.     Sepsis due to methicillin resistant Staphylococcus aureus  (MRSA)  -See assessment for shock.     Endocrine  Diabetic ulcer of left foot associated with type 2 diabetes mellitus, with fat layer exposed  -See assessment for shock.     Postablative hypothyroidism  Plan:   -Continue home SYNTHROID.     Type 2 diabetes mellitus with ketoacidosis without coma, with long-term current use of insulin  -Most recent A1c 9.5 in 08/2020.   -Home regimen: NPH 45U AM and 35U PM.   -Complicated by CKD III and polyneuropathy with diabetic foot ulcer.   -ICU admission on 09/12 notable for increased anion gap metabolic acidosis and elevated beta-hydroxybutyrate concern for DKA possible precipitated from withholding insulin versus worsening of underlying infection.     Plan:   - Insulin drip with Q2H glucose checks. Transition to sub q today   -Trending BMP's Q8H; monitor for hyperkalemia.   -Holding off on continuous IVF given significantly reduced EF.   -Holding home Gabapentin with concern for hypotension.   -Hypoglycemia protocol ordered.     Type 2 diabetes mellitus with diabetic polyneuropathy, with long-term current use of insulin  -Most recent A1c 9.5 in 08/2020.   -Home regimen: NPH 45U AM and 35U PM.   -Complicated by CKD III and polyneuropathy with diabetic foot ulcer.   -ICU admission on 09/12 notable for increased anion gap metabolic acidosis and elevated beta-hydroxybutyrate concern for DKA possible precipitated from withholding insulin versus worsening of underlying infection.   - AG closed    Plan:   - Will transition off insulin gtt to sq insulin once on tube feeds after IR procedure  -Trending BMP's Q8H; monitor for hyperkalemia.   - Holding home Gabapentin with concern for hypotension.   - Hypoglycemia protocol ordered.     Type 2 diabetes mellitus with stage 3 chronic kidney disease, with long-term current use of insulin  -See assessment for NANETTE.      Critical Care Daily Checklist:    A: Awake: RASS Goal/Actual Goal: RASS Goal: 0-->alert and calm  Actual: Tony  Agitation Sedation Scale (RASS): Alert and calm   B: Spontaneous Breathing Trial Performed?     C: SAT & SBT Coordinated?  NA                      D: Delirium: CAM-ICU Overall CAM-ICU: Negative   E: Early Mobility Performed? No   F: Feeding Goal: Goals: Adequate PO intake to meet > 75% EEN/EPN by RD follow up  Status: Nutrition Goal Status: progressing towards goal   Current Diet Order   No orders of the defined types were placed in this encounter.      AS: Analgesia/Sedation NA   T: Thromboembolic Prophylaxis Off Heparin for procedure   H: HOB > 300 Yes   U: Stress Ulcer Prophylaxis (if needed) No   G: Glucose Control Yes   B: Bowel Function Stool Occurrence: 1   I: Indwelling Catheter (Lines & Newell) Necessity Yes   D: De-escalation of Antimicrobials/Pharmacotherapies No    Plan for the day/ETD Cholecystostomy drain placement, HD trial    Code Status:  Family/Goals of Care: Full Code         Critical secondary to Patient has a condition that poses threat to life and bodily function: Acute Renal Failure and Septic Shock      Critical care was time spent personally by me on the following activities: development of treatment plan with patient or surrogate and bedside caregivers, discussions with consultants, evaluation of patient's response to treatment, examination of patient, ordering and performing treatments and interventions, ordering and review of laboratory studies, ordering and review of radiographic studies, pulse oximetry, re-evaluation of patient's condition. This critical care time did not overlap with that of any other provider or involve time for any procedures.     Shabbir Quezada MD  Critical Care Medicine  [facility]

## 2020-09-16 NOTE — ASSESSMENT & PLAN NOTE
-Admission notable for progressive NANETTE since approximately 09/08 with worsening Cr associated with increased anion gap metabolic acidosis.   -Status post evaluation by nephrology with concern for ATN based on urine microscopy showing scant normal red blood cells without any dysmorphic shapes evident and numerous muddy brown casts.   -Admission to ICU notable for continued decline in renal function with eventual initiation of CRRT on 09/13.   -Etiology of continued decline in renal function is likely multifactorial from shock vs progression of CHF vs DKA vs medication side-effect.   ~230 cc/24 hr, net -1.4L yesterday    Plan:   - Lasix trial today  - Nephrology following for CRRT management.   - Daily BMP   -Trialysis line place on 09/13 for HD access.   -Strict I/O's and daily weights, if possible.   -Renally dose all medications.

## 2020-09-17 ENCOUNTER — TELEPHONE (OUTPATIENT)
Dept: PRIMARY CARE CLINIC | Facility: CLINIC | Age: 65
End: 2020-09-17

## 2020-09-17 PROBLEM — B95.62 MRSA BACTEREMIA: Status: ACTIVE | Noted: 2020-09-17

## 2020-09-17 PROBLEM — R78.81 MRSA BACTEREMIA: Status: ACTIVE | Noted: 2020-09-17

## 2020-09-17 LAB
ALBUMIN SERPL BCP-MCNC: 1.5 G/DL (ref 3.5–5.2)
ALP SERPL-CCNC: 184 U/L (ref 55–135)
ALT SERPL W/O P-5'-P-CCNC: 42 U/L (ref 10–44)
ANION GAP SERPL CALC-SCNC: 10 MMOL/L (ref 8–16)
ANION GAP SERPL CALC-SCNC: 10 MMOL/L (ref 8–16)
ANION GAP SERPL CALC-SCNC: 9 MMOL/L (ref 8–16)
ANION GAP SERPL CALC-SCNC: 9 MMOL/L (ref 8–16)
ANISOCYTOSIS BLD QL SMEAR: SLIGHT
ANISOCYTOSIS BLD QL SMEAR: SLIGHT
AST SERPL-CCNC: 45 U/L (ref 10–40)
BASOPHILS # BLD AUTO: ABNORMAL K/UL (ref 0–0.2)
BASOPHILS # BLD AUTO: ABNORMAL K/UL (ref 0–0.2)
BASOPHILS NFR BLD: 0 % (ref 0–1.9)
BASOPHILS NFR BLD: 0 % (ref 0–1.9)
BILIRUB SERPL-MCNC: 0.4 MG/DL (ref 0.1–1)
BUN SERPL-MCNC: 41 MG/DL (ref 8–23)
BUN SERPL-MCNC: 41 MG/DL (ref 8–23)
BUN SERPL-MCNC: 46 MG/DL (ref 8–23)
BUN SERPL-MCNC: 48 MG/DL (ref 8–23)
CA-I BLDV-SCNC: 1.09 MMOL/L (ref 1.06–1.42)
CA-I BLDV-SCNC: 1.15 MMOL/L (ref 1.06–1.42)
CA-I BLDV-SCNC: 1.26 MMOL/L (ref 1.06–1.42)
CALCIUM SERPL-MCNC: 8.4 MG/DL (ref 8.7–10.5)
CALCIUM SERPL-MCNC: 8.7 MG/DL (ref 8.7–10.5)
CALCIUM SERPL-MCNC: 9 MG/DL (ref 8.7–10.5)
CALCIUM SERPL-MCNC: 9 MG/DL (ref 8.7–10.5)
CHLORIDE SERPL-SCNC: 102 MMOL/L (ref 95–110)
CHLORIDE SERPL-SCNC: 103 MMOL/L (ref 95–110)
CHLORIDE SERPL-SCNC: 103 MMOL/L (ref 95–110)
CHLORIDE SERPL-SCNC: 104 MMOL/L (ref 95–110)
CO2 SERPL-SCNC: 25 MMOL/L (ref 23–29)
CO2 SERPL-SCNC: 26 MMOL/L (ref 23–29)
CO2 SERPL-SCNC: 29 MMOL/L (ref 23–29)
CO2 SERPL-SCNC: 29 MMOL/L (ref 23–29)
CREAT SERPL-MCNC: 2.5 MG/DL (ref 0.5–1.4)
CREAT SERPL-MCNC: 2.6 MG/DL (ref 0.5–1.4)
DIFFERENTIAL METHOD: ABNORMAL
DIFFERENTIAL METHOD: ABNORMAL
EOSINOPHIL # BLD AUTO: ABNORMAL K/UL (ref 0–0.5)
EOSINOPHIL # BLD AUTO: ABNORMAL K/UL (ref 0–0.5)
EOSINOPHIL NFR BLD: 1 % (ref 0–8)
EOSINOPHIL NFR BLD: 1 % (ref 0–8)
ERYTHROCYTE [DISTWIDTH] IN BLOOD BY AUTOMATED COUNT: 16.1 % (ref 11.5–14.5)
ERYTHROCYTE [DISTWIDTH] IN BLOOD BY AUTOMATED COUNT: 16.1 % (ref 11.5–14.5)
EST. GFR  (AFRICAN AMERICAN): 29 ML/MIN/1.73 M^2
EST. GFR  (AFRICAN AMERICAN): 30.4 ML/MIN/1.73 M^2
EST. GFR  (NON AFRICAN AMERICAN): 25.1 ML/MIN/1.73 M^2
EST. GFR  (NON AFRICAN AMERICAN): 26.3 ML/MIN/1.73 M^2
GLUCOSE SERPL-MCNC: 232 MG/DL (ref 70–110)
GLUCOSE SERPL-MCNC: 232 MG/DL (ref 70–110)
GLUCOSE SERPL-MCNC: 267 MG/DL (ref 70–110)
GLUCOSE SERPL-MCNC: 287 MG/DL (ref 70–110)
HCT VFR BLD AUTO: 29.5 % (ref 40–54)
HCT VFR BLD AUTO: 29.5 % (ref 40–54)
HGB BLD-MCNC: 8.6 G/DL (ref 14–18)
HGB BLD-MCNC: 8.6 G/DL (ref 14–18)
HYPOCHROMIA BLD QL SMEAR: ABNORMAL
HYPOCHROMIA BLD QL SMEAR: ABNORMAL
IMM GRANULOCYTES # BLD AUTO: ABNORMAL K/UL (ref 0–0.04)
IMM GRANULOCYTES # BLD AUTO: ABNORMAL K/UL (ref 0–0.04)
IMM GRANULOCYTES NFR BLD AUTO: ABNORMAL % (ref 0–0.5)
IMM GRANULOCYTES NFR BLD AUTO: ABNORMAL % (ref 0–0.5)
LYMPHOCYTES # BLD AUTO: ABNORMAL K/UL (ref 1–4.8)
LYMPHOCYTES # BLD AUTO: ABNORMAL K/UL (ref 1–4.8)
LYMPHOCYTES NFR BLD: 7 % (ref 18–48)
LYMPHOCYTES NFR BLD: 7 % (ref 18–48)
MAGNESIUM SERPL-MCNC: 2.4 MG/DL (ref 1.6–2.6)
MCH RBC QN AUTO: 26.5 PG (ref 27–31)
MCH RBC QN AUTO: 26.5 PG (ref 27–31)
MCHC RBC AUTO-ENTMCNC: 29.2 G/DL (ref 32–36)
MCHC RBC AUTO-ENTMCNC: 29.2 G/DL (ref 32–36)
MCV RBC AUTO: 91 FL (ref 82–98)
MCV RBC AUTO: 91 FL (ref 82–98)
METAMYELOCYTES NFR BLD MANUAL: 1 %
METAMYELOCYTES NFR BLD MANUAL: 1 %
MONOCYTES # BLD AUTO: ABNORMAL K/UL (ref 0.3–1)
MONOCYTES # BLD AUTO: ABNORMAL K/UL (ref 0.3–1)
MONOCYTES NFR BLD: 5 % (ref 4–15)
MONOCYTES NFR BLD: 5 % (ref 4–15)
MYELOCYTES NFR BLD MANUAL: 3 %
MYELOCYTES NFR BLD MANUAL: 3 %
NEUTROPHILS NFR BLD: 83 % (ref 38–73)
NEUTROPHILS NFR BLD: 83 % (ref 38–73)
NRBC BLD-RTO: 0 /100 WBC
NRBC BLD-RTO: 0 /100 WBC
OVALOCYTES BLD QL SMEAR: ABNORMAL
OVALOCYTES BLD QL SMEAR: ABNORMAL
PLATELET # BLD AUTO: 436 K/UL (ref 150–350)
PLATELET # BLD AUTO: 436 K/UL (ref 150–350)
PLATELET BLD QL SMEAR: ABNORMAL
PLATELET BLD QL SMEAR: ABNORMAL
PMV BLD AUTO: 10.4 FL (ref 9.2–12.9)
PMV BLD AUTO: 10.4 FL (ref 9.2–12.9)
POCT GLUCOSE: 207 MG/DL (ref 70–110)
POCT GLUCOSE: 230 MG/DL (ref 70–110)
POCT GLUCOSE: 237 MG/DL (ref 70–110)
POCT GLUCOSE: 238 MG/DL (ref 70–110)
POCT GLUCOSE: 270 MG/DL (ref 70–110)
POCT GLUCOSE: 271 MG/DL (ref 70–110)
POCT GLUCOSE: 274 MG/DL (ref 70–110)
POCT GLUCOSE: 279 MG/DL (ref 70–110)
POCT GLUCOSE: 282 MG/DL (ref 70–110)
POCT GLUCOSE: 286 MG/DL (ref 70–110)
POCT GLUCOSE: 297 MG/DL (ref 70–110)
POCT GLUCOSE: 307 MG/DL (ref 70–110)
POCT GLUCOSE: 344 MG/DL (ref 70–110)
POCT GLUCOSE: 360 MG/DL (ref 70–110)
POIKILOCYTOSIS BLD QL SMEAR: SLIGHT
POIKILOCYTOSIS BLD QL SMEAR: SLIGHT
POLYCHROMASIA BLD QL SMEAR: ABNORMAL
POLYCHROMASIA BLD QL SMEAR: ABNORMAL
POTASSIUM SERPL-SCNC: 3.6 MMOL/L (ref 3.5–5.1)
POTASSIUM SERPL-SCNC: 3.6 MMOL/L (ref 3.5–5.1)
POTASSIUM SERPL-SCNC: 3.7 MMOL/L (ref 3.5–5.1)
POTASSIUM SERPL-SCNC: 3.7 MMOL/L (ref 3.5–5.1)
PROT SERPL-MCNC: 7.1 G/DL (ref 6–8.4)
RBC # BLD AUTO: 3.25 M/UL (ref 4.6–6.2)
RBC # BLD AUTO: 3.25 M/UL (ref 4.6–6.2)
SCHISTOCYTES BLD QL SMEAR: ABNORMAL
SCHISTOCYTES BLD QL SMEAR: ABNORMAL
SCHISTOCYTES BLD QL SMEAR: PRESENT
SCHISTOCYTES BLD QL SMEAR: PRESENT
SODIUM SERPL-SCNC: 137 MMOL/L (ref 136–145)
SODIUM SERPL-SCNC: 138 MMOL/L (ref 136–145)
SODIUM SERPL-SCNC: 142 MMOL/L (ref 136–145)
SODIUM SERPL-SCNC: 142 MMOL/L (ref 136–145)
WBC # BLD AUTO: 24.69 K/UL (ref 3.9–12.7)
WBC # BLD AUTO: 24.69 K/UL (ref 3.9–12.7)

## 2020-09-17 PROCEDURE — 99233 SBSQ HOSP IP/OBS HIGH 50: CPT | Mod: HCNC,GC,, | Performed by: INTERNAL MEDICINE

## 2020-09-17 PROCEDURE — 99233 PR SUBSEQUENT HOSPITAL CARE,LEVL III: ICD-10-PCS | Mod: HCNC,,, | Performed by: INTERNAL MEDICINE

## 2020-09-17 PROCEDURE — 99233 SBSQ HOSP IP/OBS HIGH 50: CPT | Mod: HCNC,,, | Performed by: INTERNAL MEDICINE

## 2020-09-17 PROCEDURE — 83735 ASSAY OF MAGNESIUM: CPT | Mod: HCNC

## 2020-09-17 PROCEDURE — 92610 EVALUATE SWALLOWING FUNCTION: CPT | Mod: HCNC

## 2020-09-17 PROCEDURE — 25000003 PHARM REV CODE 250: Mod: HCNC | Performed by: STUDENT IN AN ORGANIZED HEALTH CARE EDUCATION/TRAINING PROGRAM

## 2020-09-17 PROCEDURE — 63600175 PHARM REV CODE 636 W HCPCS: Mod: HCNC | Performed by: HOSPITALIST

## 2020-09-17 PROCEDURE — 97803 MED NUTRITION INDIV SUBSEQ: CPT | Mod: HCNC

## 2020-09-17 PROCEDURE — 94640 AIRWAY INHALATION TREATMENT: CPT | Mod: HCNC

## 2020-09-17 PROCEDURE — 20600001 HC STEP DOWN PRIVATE ROOM: Mod: HCNC

## 2020-09-17 PROCEDURE — 85027 COMPLETE CBC AUTOMATED: CPT | Mod: HCNC

## 2020-09-17 PROCEDURE — 80053 COMPREHEN METABOLIC PANEL: CPT | Mod: HCNC

## 2020-09-17 PROCEDURE — 97535 SELF CARE MNGMENT TRAINING: CPT | Mod: HCNC

## 2020-09-17 PROCEDURE — 85007 BL SMEAR W/DIFF WBC COUNT: CPT | Mod: HCNC

## 2020-09-17 PROCEDURE — 99233 PR SUBSEQUENT HOSPITAL CARE,LEVL III: ICD-10-PCS | Mod: HCNC,GC,, | Performed by: INTERNAL MEDICINE

## 2020-09-17 PROCEDURE — 80048 BASIC METABOLIC PNL TOTAL CA: CPT | Mod: HCNC

## 2020-09-17 PROCEDURE — 82330 ASSAY OF CALCIUM: CPT | Mod: HCNC

## 2020-09-17 PROCEDURE — 82330 ASSAY OF CALCIUM: CPT | Mod: 91,HCNC

## 2020-09-17 RX ADMIN — FLUTICASONE FUROATE AND VILANTEROL TRIFENATATE 1 PUFF: 200; 25 POWDER RESPIRATORY (INHALATION) at 08:09

## 2020-09-17 RX ADMIN — HEPARIN SODIUM 5000 UNITS: 5000 INJECTION INTRAVENOUS; SUBCUTANEOUS at 08:09

## 2020-09-17 RX ADMIN — HEPARIN SODIUM 5000 UNITS: 5000 INJECTION INTRAVENOUS; SUBCUTANEOUS at 01:09

## 2020-09-17 RX ADMIN — HYPROMELLOSE 2910 1 DROP: 5 SOLUTION OPHTHALMIC at 03:09

## 2020-09-17 RX ADMIN — HEPARIN SODIUM 5000 UNITS: 5000 INJECTION INTRAVENOUS; SUBCUTANEOUS at 06:09

## 2020-09-17 RX ADMIN — LEVOTHYROXINE SODIUM 75 MCG: 25 TABLET ORAL at 06:09

## 2020-09-17 RX ADMIN — HYPROMELLOSE 2910 1 DROP: 5 SOLUTION OPHTHALMIC at 08:09

## 2020-09-17 RX ADMIN — ACETAMINOPHEN 1000 MG: 500 TABLET ORAL at 07:09

## 2020-09-17 RX ADMIN — FLUTICASONE PROPIONATE 50 MCG: 50 SPRAY, METERED NASAL at 08:09

## 2020-09-17 NOTE — ASSESSMENT & PLAN NOTE
-initially non oliguric stage 3 bernadette with ischemic atn likely secondary to hypotension from septic shock  -was oliguric requiring rrt last 9/15  -now oliguria improved   -no acute indication for hd and patient increasing urine output   -if volume removal is desired can use 80mg furosemide q8h

## 2020-09-17 NOTE — SUBJECTIVE & OBJECTIVE
Interval History: NAEON. Patient tolerated cholecystotomy tube placement. Afebrile. Leukocytosis improving. Appears more alert today, but not answering questions.     Review of Systems   Unable to perform ROS: Mental status change     Objective:     Vital Signs (Most Recent):  Temp: 98.6 °F (37 °C) (09/17/20 1100)  Pulse: (!) 129 (09/17/20 1200)  Resp: 10 (09/17/20 1200)  BP: 134/84 (09/17/20 1200)  SpO2: 97 % (09/17/20 1200) Vital Signs (24h Range):  Temp:  [97.3 °F (36.3 °C)-98.6 °F (37 °C)] 98.6 °F (37 °C)  Pulse:  [128-131] 129  Resp:  [10-27] 10  SpO2:  [89 %-100 %] 97 %  BP: (111-166)/(67-94) 134/84  Arterial Line BP: ()/() 98/87     Weight: 114.8 kg (253 lb 1.4 oz)  Body mass index is 30.81 kg/m².    Estimated Creatinine Clearance: 41.9 mL/min (A) (based on SCr of 2.5 mg/dL (H)).    Physical Exam  Constitutional:       Appearance: Normal appearance.   HENT:      Head: Normocephalic.      Mouth/Throat:      Mouth: Mucous membranes are moist.   Eyes:      Extraocular Movements: Extraocular movements intact.   Neck:      Musculoskeletal: Normal range of motion.      Comments: Right IJ CVC in place. No tenderness or erythema noted on surrounding skin  Cardiovascular:      Rate and Rhythm: Regular rhythm. Tachycardia present.      Heart sounds: No murmur.   Pulmonary:      Effort: Pulmonary effort is normal. No respiratory distress.      Breath sounds: Normal breath sounds. No wheezing.   Abdominal:      General: There is no distension.      Palpations: Abdomen is soft.      Tenderness: There is no abdominal tenderness.   Musculoskeletal:      Comments: Stage 2 diabetic foot ulcer measuring 1cm noted on Left 1st phalanx.    Neurological:      Mental Status: He is alert.      Comments: Alert. Appears confused. Unable to answer questions   Psychiatric:      Comments: Unable to assess due to mental status          Significant Labs:   Blood Culture:   Recent Labs   Lab 09/09/20  0303 09/09/20  1040  09/10/20  0459 09/12/20 2014 09/12/20 2015   LABBLOO No growth after 5 days. No growth after 5 days. No growth after 5 days. No Growth to date  No Growth to date  No Growth to date  No Growth to date  No Growth to date No Growth to date  No Growth to date  No Growth to date  No Growth to date  No Growth to date     CBC:   Recent Labs   Lab 09/16/20  0323 09/17/20  0246   WBC 33.39*  33.39* 24.69*  24.69*   HGB 8.5*  8.5* 8.6*  8.6*   HCT 28.8*  28.8* 29.5*  29.5*   *  431* 436*  436*     CMP:   Recent Labs   Lab 09/15/20  1357  09/16/20  0323  09/16/20  1224 09/16/20  1804 09/17/20  0242      < > 141   < > 140 142 142  142   K 4.2   < > 4.1   < > 4.5 4.2 3.6  3.6      < > 104   < > 103 104 103  103   CO2 24   < > 27   < > 26 26 29  29   *   < > 188*   < > 231* 269* 232*  232*   BUN 10   < > 19   < > 28* 34* 41*  41*   CREATININE 1.1   < > 1.8*   < > 2.0* 2.3* 2.5*  2.5*   CALCIUM 9.9   < > 9.5   < > 9.0 8.8 9.0  9.0   PROT  --   --  7.2  --   --   --  7.1   ALBUMIN 1.4*  --  1.5*  --   --   --  1.5*   BILITOT  --   --  0.4  --   --   --  0.4   ALKPHOS  --   --  197*  --   --   --  184*   AST  --   --  49*  --   --   --  45*   ALT  --   --  41  --   --   --  42   ANIONGAP 12   < > 10   < > 11 12 10  10   EGFRNONAA >60.0   < > 39.2*   < > 34.5* 29.1* 26.3*  26.3*    < > = values in this interval not displayed.     Respiratory Culture: No results for input(s): GSRESP, RESPIRATORYC in the last 4320 hours.    Significant Imaging: I have reviewed all pertinent imaging results/findings within the past 24 hours.

## 2020-09-17 NOTE — PT/OT/SLP EVAL
Speech Language Pathology Evaluation  Bedside Swallow    Patient Name:  Ed Patton   MRN:  [id number]  Admitting Diagnosis: Septic shock    Recommendations:                 General Recommendations:  monitor diet tolerance; assess for readiness for further diet advancement  Diet recommendations:  Mechanical soft, Thin   Aspiration Precautions: 1 bite/sip at a time, Alternating bites/sips, Assistance with meals, Avoid talking while eating, Eliminate distractions, Feed only when awake/alert, HOB to 90 degrees, Meds whole 1 at a time, Monitor for s/s of aspiration, Small bites/sips and Strict aspiration precautions   General Precautions: Standard, aspiration, fall  Communication strategies:  go to room if call light pushed    History:     Past Medical History:   Diagnosis Date    CHF (congestive heart failure)     COPD (chronic obstructive pulmonary disease)     Coronary artery disease     Diabetes mellitus     Diabetes mellitus type II     DM (diabetes mellitus) type II uncontrolled with renal manifestation 9/4/2013    Hyperlipidemia     Hypertension     Postablative hypothyroidism 12/6/2018       Past Surgical History:   Procedure Laterality Date    APPENDECTOMY      ARTHROSCOPY OF KNEE Right 9/7/2020    Procedure: ARTHROSCOPY, KNEE, RIGHT ;  Surgeon: You Bhatia MD;  Location: [address];  Service: Orthopedics;  Laterality: Right;    CHOLECYSTECTOMY      CORONARY ANGIOPLASTY WITH STENT PLACEMENT      TONSILLECTOMY      TRANSESOPHAGEAL ECHOCARDIOGRAPHY N/A 9/4/2020    Procedure: ECHOCARDIOGRAM, TRANSESOPHAGEAL;  Surgeon: [facility] Diagnostic Provider;  Location: [facility];  Service: Anesthesiology;  Laterality: N/A;    TRANSESOPHAGEAL ECHOCARDIOGRAPHY N/A 9/3/2020    Procedure: ECHOCARDIOGRAM, TRANSESOPHAGEAL;  Surgeon: [facility] Diagnostic Provider;  Location: [facility];  Service: Anesthesiology;  Laterality: N/A;     HPI:  Mr. Ed Patton is a 65 year old male for whom MICU was consulted for  hypotension. He has a PMH significant for HFrEF and COPD with chronic respiratory failure (on 2L home oxygen), T2DM with CKD III, and iron deficiency anemia. History obtained from chart review and speaking with patient. See hospital course below for detail regarding care and course of this patient.      Hospital/ICU Course:  Mr. Ed Patton was admitted to [facility] medicine on 08/30 for management of a left-sided diabetic foot infection that was precipitated by an undetected punction wound after stepping on a tack/nail. His presentation was notable for leukocytosis with elevated inflammatory markers, however MRI of left foot only showed cellulitis of the anterior and lateral aspect of foot. Podiatry was consulted with recommendations for IV antibiotics; he was initiated on Ciprofloxacin and Vancomycin on admission. However, blood cultures from admission resulted positive for MRSA with wound cultures from left foot also growing MRSA with Proteus mirabilis. By 09/03 cultures remained positive despite Vancomycin, and patient was noted to develop right knee pain with swelling. Orthopedics was consulted and patient underwent right knee joint aspiration with cultures also positive for MRSA. LUKAS on 09/04 showed known HFrEF, but was negative for endocarditis. MRI of lumbar spine on 09/08 was negative for abscess. By 09/06, blood cultures continued to remain positive, and he underwent I&D of right arm abscess on 09/08 with cultures also positive for MRSA. For persistent MRSA bacteremia, he was transitioned to Daptomycin and Ceftaroline, however this regimen was discontinued by 09/10 due to concern for developing rhabdomylosis. By 09/09, he was noted to develop a progressively worsening leukocytosis and NANETTE. Nephrology was consulted on 09/09 with suspicion for ATN. Hematology was consulted on 09/12 for persistent leukocytosis as likely reactive from bacteremia. On 09/10, patient began developing intermitent hypotension  "prompting broadening of antibiotics to Cefepime and Vancomycin. By 09/12, renal function continued to worsen in addition to hypotension prompting transfer to ICU for vasopressor support and possible HD.      Interval History/Significant Events: Last night on admission to ICU patient was initiated on vasopressor support in order to achieve MAP goals. His labs on admission were also notable for DKA with insulin drip initiated; judicious IVF were given with concern for risk of volume overload with poor EF and declining renal function. This morning, patient developed rapid decline in MAP's prompting placement of central and arterial lines. He was also noted to have worsening encephalopathy with increasing supplemental oxygen requirements. Nephrology recommending initiation of CRRT. Infectious work-up continues.     Prior Intubation HX:  9/7/20 at 1638 - 1729    Modified Barium Swallow: none on file    Chest X-Rays: 9/13/20: FINDINGS:  Tip of right internal jugular approach Trialysis catheter appears at or near cavoatrial junction.  Stable enlargement cardiomediastinal contour.  EKG leads overlie the chest.  Indistinctness of the pulmonary vasculature and bilateral mid lung zone opacities may be on the basis of pulmonary edema.  A small right pleural effusion is suggested.  No definite pneumothorax.  No acute findings in the visualized upper abdomen.    Prior diet: currently NPO with NGT    Subjective     "I just want some water."    Pain/Comfort:  · Pain Rating 1: 0/10    Objective:     Oral Musculature Evaluation  · Oral Musculature: WFL  · Dentition: edentulous(pt reports having dentures at home)  · Secretion Management: adequate  · Mucosal Quality: adequate  · Mandibular Strength and Mobility: WFL  · Oral Labial Strength and Mobility: WFL  · Lingual Strength and Mobility: WFL  · Buccal Strength and Mobility: WFL  · Volitional Cough: adequate  · Volitional Swallow: elicited  · Voice Prior to PO Intake: dry, " hoarse    Bedside Swallow Eval:   Consistencies Assessed:  · Nectar thick liquids tsp x 1, cup sip x 1, multiple straw sips (>6oz thin water)  · Puree 1/2 tsp x 1, full tsp x 2  · Solids 1/5 cracker x 2     Oral Phase:   · Prolonged mastication for hard dry solid without dentures in place  · Slow oral transit time for hard dry solid without dentures in place    Pharyngeal Phase:   · no overt clinical signs/symptoms of aspiration  · no overt clinical signs/symptoms of pharyngeal dysphagia    Compensatory Strategies  · None    Treatment: Education provided to pt regarding role of SLP, reason for swallowing assessment, aspiration, overt s/s of aspiration, importance of reducing risks of aspiration, diet recommendations, aspiration precautions, and plan to follow up to ensure diet tolerance and assess for readiness for further diet advancement.  Pt's full understanding and carryover likely limited by cognitive limitations.     Assessment:     Ed Patton is a 65 y.o. male with an SLP diagnosis of mild Dysphagia.     Goals:   Multidisciplinary Problems     SLP Goals        Problem: SLP Goal    Goal Priority Disciplines Outcome   SLP Goal     SLP Ongoing, Progressing   Description: Speech Language Pathology Goals  Goals expected to be met by 9/24:  1. Pt will tolerate least restrictive diet without s/s of aspiration.                            Plan:     · Patient to be seen:  4 x/week   · Plan of Care expires:  10/17/20  · Plan of Care reviewed with:      · SLP Follow-Up:  Yes       Discharge recommendations:  (tbd for further ST needs post d/c)     Time Tracking:     SLP Treatment Date:   09/17/20  Speech Start Time:  1427  Speech Stop Time:  1444     Speech Total Time (min):  17 min    Billable Minutes: Eval Swallow and Oral Function 9 and Seld Care/Home Management Training 8    DAEN Lara, CCC-SLP  09/17/2020     DANE Lara, CCC-SLP  Speech Language Pathologist  [phone]  9/17/2020

## 2020-09-17 NOTE — TELEPHONE ENCOUNTER
----- Message from Domi Maradiaga sent at 9/17/2020  3:37 PM CDT -----  Regarding: Patient Hospital Update  Mr. Ward is currently in ICU.    Per chart review, patient admitted for septic shock. Source unknown, however early admission labs are positive for r.knee septic arthritis (S. Aureus), L. Foot wound (proteus and MRSA), blood cultures (MRSA), urine (klebsiella).  Patient had worsening renal function, leukocytosis, ESR/CRP chronically elevated, early signs of rhabdomyolysis, and hypotension that required ICU transfer on 9/12.     Physicians became concerned about cholecystitis and superimposed pneumonia with encephalopathy. Per surgery, he is not a surgical candidate so recommended IR drain, placed today. Encephalopathy is slowly improving, he was weaned from vasopressors overnight. WBC down trending.     Nephrology recommends restarting CRRT once stable from IR procedure.

## 2020-09-17 NOTE — PLAN OF CARE
Recommendations     1. As tolerated, increase TF rate (of Glucerna 1.5) to 65 mL/hr to provide 2340 kcals, 129 g of protein, 1184 mL fluid.   2. ADAT to Diabetic, per MD/SLP discretion.   3. RD to monitor & follow-up.     Goals: Meet % EEN, EPN by RD f/u date  Nutrition Goal Status: new  Communication of RD Recs: reviewed with RN

## 2020-09-17 NOTE — CONSULTS
Wound care consulted for right knee and left foot-(followed by podiatry)  PMH:  HFrEF, COPD with chronic respiratory failure, DM2, CKDIII and iron deficiency anemia.   Assessment:  The right knee has stable sutures to 3 locations, no drainage, no erythema.    The left foot -- orders placed by Podiatry for treatment of left foot.  Please consult podiatry to follow-up  Recommendations  Consult Orthopedics for follow-up of  removal of right knee sutures  Consult Podiatry for follow-up of left foot.   Bed extender ordered for bed.   Nursing to continue care, wound care signing off  B. Negar Sainz RN, CWCN  [id number]    Right knee  sutures

## 2020-09-17 NOTE — PLAN OF CARE
CMICU DAILY GOALS       A: Awake    RASS: Goal - RASS Goal: 0-->alert and calm  Actual - RASS (Jimenez Agitation-Sedation Scale): -1-->drowsy   Restraint necessity:    B: Breath   SBT: Not intubated   C: Coordinate A & B, analgesics/sedatives   Pain: managed    SAT: Not intubated  D: Delirium   CAM-ICU: Overall CAM-ICU: Negative  E: Early(intubated/ Progressive (non-intubated) Mobility   MOVE Screen: Fail   Activity: Activity Management: patient unable to perform activities  FAS: Feeding/Nutrition   Diet order: Diet/Nutrition Received: NPO,   Fluid restriction:    T: Thrombus   DVT prophylaxis: VTE Required Core Measure: Pharmacological prophylaxis initiated/maintained  H: HOB Elevation   Head of Bed (HOB): HOB at 30-45 degrees  U: Ulcer Prophylaxis   GI: yes  G: Glucose control   managed Glycemic Management: blood glucose monitoring  S: Skin   Bundle compliance: yes   Bathing/Skin Care: bath, chlorhexidine, bath, complete, linen changed Date: 9/16/20  B: Bowel Function   no issues   I: Indwelling Catheters   Newell necessity:      Urethral Catheter 09/09/20 1720 16 Fr.-Reason for Continuing Urinary Catheterization: Critically ill in ICU requiring intensive monitoring   CVC necessity: Yes   IPAD offered: No  D: De-escalation Antibx   Yes  Plan for the day   Drain for gallbladder  Family/Goals of care/Code Status   Code Status: Full Code     No acute events throughout day, VS and assessment per flow sheet, patient progressing towards goals as tolerated, plan of care reviewed with Ed Patton and family, all concerns addressed, will continue to monitor.

## 2020-09-17 NOTE — NURSING
"Patient became more alert and responsive as the night progressed.  He is talking and states his throat hurts.  He has asked for water and ice and states "I'm so hungry".  Got patient a cup of chicken broth to drink.  He stated "that feels good". Patients arms are a bit shaky so he was an assist x1.  Placed cup of ice and cup of broth on bedside table.  Patient was later able to  the cup and drink from it.  He drank about 240 ml of both water and broth.  "

## 2020-09-17 NOTE — PROGRESS NOTES
[facility]  Infectious Disease  Progress Note    Patient Name: Ed Patton  MRN: [id number]  Admission Date: 8/30/2020  Length of Stay: 18 days  Attending Physician: Mk Jolly MD  Primary Care Provider: Qiana Chow MD    Isolation Status: Contact  Assessment/Plan:      Sepsis due to methicillin resistant Staphylococcus aureus (MRSA)  MRSA septicemia. Still tachycardic, SpO2 reached 92%, renal function continues to worsen. WBCs not improving. ESR and CRP elevated, RF WNL. Vancomycin was supratherapeutic, switched to pulse dose regimen. Source of bacteremia unclear, may be left foot wound or a septic process at the right knee joint. Right knee now clinically suspicious for septic arthritis (see physical exam). Left foot wound growing Proteus and MRSA, urine growing Klebsiella. TTE & LUKAS is negative. Dapto/ceftaroline switched back to vanc due to worsening renal function and early signs of rhabdo. Course complicated by worsening leukocytosis (C Diff neg)     Blood cultures: MRSA  R knee fluid culture: Staph aureus  Left foot wound: Proteus, MRSA  Source Control: I&D of Septic R knee on 9/7/20.      Repeat Blood cultures 09/09, 09/10, 09/12 have been negative  CXR with right lower lobe opacities concerning for developing pneumonia  CT Chest/Abdomen/Pelvis: Opacities in Right lower lobe concerning for atelectis and possible superimposed infection. Revealed findings concerning for acute cholecystitis. No identification of abscess.   S/p cholecystotomy drain placement.     Recommendations:   -- Continue oral levofloxacin (renally dosed). Will plan for 14 day course after source control with cholecystotomy tube placement.  -- Will f/u biliary fluid cultures   -- Continue IV Daptomycin for 4 weeks course. Avoid Beta lactam antibiotics to limit renal damage.   --  Pending respiratory culture collection.      Thank you for your consult. I will follow-up with patient. Please contact us  if you have any additional questions.    Caitlyn Yeung MD  Infectious Disease  [facility]    Subjective:     Principal Problem:Septic shock    HPI: 64 yo M with Hx of DM2 (on insulin), chronic hypoxemic respiratory failure (on O2 at home), chronic systolic heart failure presented to ED after recurrent falls 7 and 8 days ago. He has a history of falls but reports that his tendency to fall has worsened recently, resulting in injury to his lower back and right thigh. After his fall last Tuesday he looked at his left foot and noticed a wound, denies pain or drainage related to the wound, he admits to not regularly checking his feet and does not know how long the wound has been present. He believes the wound is related to a tack that he previously found imbedded in the bottom of a slipper. He had previously seen Ang Lugo DPM for diabetic foot care but has not seen her this year due to fear of the coronavirus pandemic. He has been getting home health services including physical therapy. No subjective change as of today, still denies pain and drainage related to the wound. Denies F/C/N/V.  Interval History: NAEON. Patient tolerated cholecystotomy tube placement. Afebrile. Leukocytosis improving. Appears more alert today, but not answering questions.     Review of Systems   Unable to perform ROS: Mental status change     Objective:     Vital Signs (Most Recent):  Temp: 98.6 °F (37 °C) (09/17/20 1100)  Pulse: (!) 129 (09/17/20 1200)  Resp: 10 (09/17/20 1200)  BP: 134/84 (09/17/20 1200)  SpO2: 97 % (09/17/20 1200) Vital Signs (24h Range):  Temp:  [97.3 °F (36.3 °C)-98.6 °F (37 °C)] 98.6 °F (37 °C)  Pulse:  [128-131] 129  Resp:  [10-27] 10  SpO2:  [89 %-100 %] 97 %  BP: (111-166)/(67-94) 134/84  Arterial Line BP: ()/() 98/87     Weight: 114.8 kg (253 lb 1.4 oz)  Body mass index is 30.81 kg/m².    Estimated Creatinine Clearance: 41.9 mL/min (A) (based on SCr of 2.5 mg/dL (H)).    Physical  Exam  Constitutional:       Appearance: Normal appearance.   HENT:      Head: Normocephalic.      Mouth/Throat:      Mouth: Mucous membranes are moist.   Eyes:      Extraocular Movements: Extraocular movements intact.   Neck:      Musculoskeletal: Normal range of motion.      Comments: Right IJ CVC in place. No tenderness or erythema noted on surrounding skin  Cardiovascular:      Rate and Rhythm: Regular rhythm. Tachycardia present.      Heart sounds: No murmur.   Pulmonary:      Effort: Pulmonary effort is normal. No respiratory distress.      Breath sounds: Normal breath sounds. No wheezing.   Abdominal:      General: There is no distension.      Palpations: Abdomen is soft.      Tenderness: There is no abdominal tenderness.   Musculoskeletal:      Comments: Stage 2 diabetic foot ulcer measuring 1cm noted on Left 1st phalanx.    Neurological:      Mental Status: He is alert.      Comments: Alert. Appears confused. Unable to answer questions   Psychiatric:      Comments: Unable to assess due to mental status          Significant Labs:   Blood Culture:   Recent Labs   Lab 09/09/20  0303 09/09/20  1040 09/10/20  0459 09/12/20 2014 09/12/20 2015   LABBLOO No growth after 5 days. No growth after 5 days. No growth after 5 days. No Growth to date  No Growth to date  No Growth to date  No Growth to date  No Growth to date No Growth to date  No Growth to date  No Growth to date  No Growth to date  No Growth to date     CBC:   Recent Labs   Lab 09/16/20  0323 09/17/20  0246   WBC 33.39*  33.39* 24.69*  24.69*   HGB 8.5*  8.5* 8.6*  8.6*   HCT 28.8*  28.8* 29.5*  29.5*   *  431* 436*  436*     CMP:   Recent Labs   Lab 09/15/20  1357  09/16/20  0323  09/16/20  1224 09/16/20  1804 09/17/20  0242      < > 141   < > 140 142 142  142   K 4.2   < > 4.1   < > 4.5 4.2 3.6  3.6      < > 104   < > 103 104 103  103   CO2 24   < > 27   < > 26 26 29  29   *   < > 188*   < > 231* 269* 232*   232*   BUN 10   < > 19   < > 28* 34* 41*  41*   CREATININE 1.1   < > 1.8*   < > 2.0* 2.3* 2.5*  2.5*   CALCIUM 9.9   < > 9.5   < > 9.0 8.8 9.0  9.0   PROT  --   --  7.2  --   --   --  7.1   ALBUMIN 1.4*  --  1.5*  --   --   --  1.5*   BILITOT  --   --  0.4  --   --   --  0.4   ALKPHOS  --   --  197*  --   --   --  184*   AST  --   --  49*  --   --   --  45*   ALT  --   --  41  --   --   --  42   ANIONGAP 12   < > 10   < > 11 12 10  10   EGFRNONAA >60.0   < > 39.2*   < > 34.5* 29.1* 26.3*  26.3*    < > = values in this interval not displayed.     Respiratory Culture: No results for input(s): GSRESP, RESPIRATORYC in the last 4320 hours.    Significant Imaging: I have reviewed all pertinent imaging results/findings within the past 24 hours.

## 2020-09-17 NOTE — PLAN OF CARE
Problem: SLP Goal  Goal: SLP Goal  Description: Speech Language Pathology Goals  Goals expected to be met by 9/24:  1. Pt will tolerate least restrictive diet without s/s of aspiration.           Outcome: Ongoing, Progressing  Bedside swallow study completed. Pt appears safe to initiate mechanical soft diet and thin liquids at this time. Potential for further diet advancement in time.    DANE Lara, CCC-SLP  Speech Language Pathologist  [phone]  9/17/2020

## 2020-09-17 NOTE — ASSESSMENT & PLAN NOTE
MRSA septicemia. Still tachycardic, SpO2 reached 92%, renal function continues to worsen. WBCs not improving. ESR and CRP elevated, RF WNL. Vancomycin was supratherapeutic, switched to pulse dose regimen. Source of bacteremia unclear, may be left foot wound or a septic process at the right knee joint. Right knee now clinically suspicious for septic arthritis (see physical exam). Left foot wound growing Proteus and MRSA, urine growing Klebsiella. TTE & LUKAS is negative. Dapto/ceftaroline switched back to vanc due to worsening renal function and early signs of rhabdo. Course complicated by worsening leukocytosis (C Diff neg)     Blood cultures: MRSA  R knee fluid culture: Staph aureus  Left foot wound: Proteus, MRSA  Source Control: I&D of Septic R knee on 9/7/20.      Repeat Blood cultures 09/09, 09/10, 09/12 have been negative  CXR with right lower lobe opacities concerning for developing pneumonia  CT Chest/Abdomen/Pelvis: Opacities in Right lower lobe concerning for atelectis and possible superimposed infection. Revealed findings concerning for acute cholecystitis. No identification of abscess.   S/p cholecystotomy drain placement.     Recommendations:   -- Continue oral levofloxacin (renally dosed). Will plan for 14 day course after source control with cholecystotomy tube placement.  -- F/u biliary fluid cultures   -- Continue IV Daptomycin for 4 weeks course. Avoid Beta lactam antibiotics to limit renal damage.   --  Pending respiratory culture collection.

## 2020-09-17 NOTE — PLAN OF CARE
CMICU DAILY GOALS       A: Awake    RASS: Goal - RASS Goal: 0-->alert and calm  Actual - RASS (Jimenez Agitation-Sedation Scale): 0-->alert and calm   Restraint necessity:    B: Breath   SBT: Not intubated   C: Coordinate A & B, analgesics/sedatives   Pain: managed    SAT: Not intubated  D: Delirium   CAM-ICU: Overall CAM-ICU: Negative  E: Early(intubated/ Progressive (non-intubated) Mobility   MOVE Screen: Pass   Activity: Activity Management: patient unable to perform activities  FAS: Feeding/Nutrition   Diet order: Diet/Nutrition Received: tube feeding,   Fluid restriction:    T: Thrombus   DVT prophylaxis: VTE Required Core Measure: Pharmacological prophylaxis initiated/maintained  H: HOB Elevation   Head of Bed (HOB): HOB at 30-45 degrees  U: Ulcer Prophylaxis   GI: yes  G: Glucose control   uncontrolled Glycemic Management: insulin infusion adjusted  S: Skin   Bundle compliance: yes   Bathing/Skin Care: bath, complete, bath, chlorhexidine, incontinence care Date: 9/17/20  B: Bowel Function   no issues   I: Indwelling Catheters   Newell necessity:      Urethral Catheter 09/09/20 1720 16 Fr.-Reason for Continuing Urinary Catheterization: Critically ill in ICU requiring intensive monitoring   CVC necessity: Yes   IPAD offered: No  D: De-escalation Antibx   Yes  Plan for the day   Step down off of unit  Family/Goals of care/Code Status   Code Status: Full Code     No acute events throughout day, VS and assessment per flow sheet, patient progressing towards goals as tolerated, plan of care reviewed with Ed Patton and family, all concerns addressed, will continue to monitor.;

## 2020-09-17 NOTE — PROGRESS NOTES
[facility]  Nephrology  Progress Note    Patient Name: Ed Patton  MRN: [id number]  Admission Date: 8/30/2020  Hospital Length of Stay: 18 days  Attending Provider: Mk Jolly MD   Primary Care Physician: Qiana Chow MD  Principal Problem:Septic shock    Subjective:     HPI: Mr. Patton is a 64yo M with insulin dependent T2DM, chronic hypoxemic resp. failure (on 2L oxygen at home), and CHF (EF 25%). He presented to the ED for recurrent falls and sepsis from an infected diabetic ulcer from stepping on a tack 8/30. Wound cultures from foot positive for proteus and MRSA 8/31. Urine culture 8/30 positive for klebsiella. Blood cultures have been positive for MRSA repeatedly from 8/30 to 9/8. He also developed septic arthritis of the right knee likely from seeding secondary to septicemia positive for MRSA 9/3. LUKAS was negative for IE 9/4. Initial management of septicemia was with ciprofloxacin and Vancomycin which was supratherapeutic to 26.5 on 9/2 prompting switch to a pulse dose regimen. Due to persistent bacteremia ID changed antibiotics to ceftaroline and daptomycin on 9/7 I&D of right knee performed by ortho on 9/7. Cr on admission was 1.5 at admit up from the baseline (1.2-1.5). On 9/9 Cr level had a sudden increased to 3. Nephrology was consulted for NANETTE.     Interval History:   Still not on pressers. Increasing urine output. Bun and creat with minimal increase. No signs of uremia.    Review of patient's allergies indicates:   Allergen Reactions    Vicodin [hydrocodone-acetaminophen] Itching     Current Facility-Administered Medications   Medication Frequency    acetaminophen tablet 1,000 mg Q8H PRN    albuterol-ipratropium 2.5 mg-0.5 mg/3 mL nebulizer solution 3 mL Q4H PRN    artificial tears 0.5 % ophthalmic solution 1 drop TID    calcium carbonate 200 mg calcium (500 mg) chewable tablet 500 mg BID PRN    calcium gluconate 3,000 mg in dextrose 5 % 100 mL IVPB  Continuous    [START ON 9/18/2020] DAPTOmycin (CUBICIN) 1,050 mg in sodium chloride 0.9% IVPB Q48H    dextrose 10 % infusion PRN    dextrose 10 % infusion PRN    dextrose 50% injection 12.5 g PRN    dextrose 50% injection 25 g PRN    fluticasone furoate-vilanteroL 200-25 mcg/dose diskus inhaler 1 puff Daily    fluticasone propionate 50 mcg/actuation nasal spray 50 mcg Daily    glucagon (human recombinant) injection 1 mg PRN    glucose chewable tablet 16 g PRN    glucose chewable tablet 24 g PRN    heparin (porcine) injection 5,000 Units Q8H    insulin regular 100 Units in sodium chloride 0.9% 100 mL infusion Continuous    levoFLOXacin tablet 750 mg Every other day    levothyroxine tablet 75 mcg Before breakfast    melatonin tablet 6 mg Nightly PRN    ondansetron injection 4 mg Q8H PRN    oxyCODONE immediate release tablet 5 mg Q8H PRN    polyethylene glycol packet 17 g BID PRN    sodium chloride 0.9% flush 10 mL PRN    Tdap vaccine injection 0.5 mL vaccine x 1 dose       Objective:     Vital Signs (Most Recent):  Temp: 98.9 °F (37.2 °C) (09/17/20 1500)  Pulse: (!) 130 (09/17/20 1800)  Resp: 12 (09/17/20 1800)  BP: 126/77 (09/17/20 1800)  SpO2: (!) 94 % (09/17/20 1800)  O2 Device (Oxygen Therapy): nasal cannula (09/17/20 1800) Vital Signs (24h Range):  Temp:  [97.5 °F (36.4 °C)-98.9 °F (37.2 °C)] 98.9 °F (37.2 °C)  Pulse:  [129-131] 130  Resp:  [10-27] 12  SpO2:  [89 %-100 %] 94 %  BP: (111-166)/(67-94) 126/77     Weight: 114.8 kg (253 lb 1.4 oz) (09/17/20 0900)  Body mass index is 30.81 kg/m².  Body surface area is 2.48 meters squared.    I/O last 3 completed shifts:  In: 1855.3 [P.O.:720; I.V.:560.3; NG/GT:575]  Out: 2104 [Urine:724; Other:1380]    Physical Exam  Vitals signs and nursing note reviewed.   Constitutional:       General: He is not in acute distress.     Appearance: He is well-developed. He is obese. He is ill-appearing and toxic-appearing. He is not diaphoretic.   HENT:      Head:  Normocephalic and atraumatic.      Right Ear: External ear normal.      Left Ear: External ear normal.      Mouth/Throat:      Pharynx: No oropharyngeal exudate.   Eyes:      General: No scleral icterus.        Right eye: No discharge.         Left eye: No discharge.      Conjunctiva/sclera: Conjunctivae normal.      Pupils: Pupils are equal, round, and reactive to light.   Neck:      Musculoskeletal: Normal range of motion.      Thyroid: No thyromegaly.      Trachea: No tracheal deviation.      Comments: [facility]  Cardiovascular:      Rate and Rhythm: Tachycardia present.   Pulmonary:      Effort: Pulmonary effort is normal. No respiratory distress.      Breath sounds: Normal breath sounds. No stridor. No wheezing or rales.      Comments: Poor air movement  Abdominal:      General: Bowel sounds are normal. There is no distension.      Palpations: Abdomen is soft.      Tenderness: There is no abdominal tenderness. There is no guarding.   Musculoskeletal:         General: No tenderness or deformity.      Right lower leg: Edema present.      Left lower leg: Edema present.   Lymphadenopathy:      Cervical: No cervical adenopathy.   Skin:     Coloration: Skin is not pale.      Findings: No erythema or rash.   Neurological:      General: No focal deficit present.         Significant Labs:  All labs within the past 24 hours have been reviewed.     Significant Imaging:  Labs: Reviewed  images reviewed    Assessment/Plan:     * Septic shock  -MRSA bacteremia likely secondary to septic arthritis seeded from foot wound  -cause of nanette  -on abx managed by ID    NANETTE (acute kidney injury)  -initially non oliguric stage 3 nanette with ischemic atn likely secondary to hypotension from septic shock  -was oliguric requiring rrt last 9/15  -now oliguria improved   -no acute indication for hd and patient increasing urine output   -if volume removal is desired can use 80mg furosemide q8h  -if you want to transfer patient out of icu we feel  patient could tolerate intermittent HD, he just doesn't need it as of now    Increased anion gap metabolic acidosis  -now improved  -secondary to renal failure, septic shock, and dka      Chente Reid MD  Nephrology  [facility]

## 2020-09-17 NOTE — ASSESSMENT & PLAN NOTE
MRSA septicemia. Still tachycardic, SpO2 reached 92%, renal function continues to worsen. WBCs not improving. ESR and CRP elevated, RF WNL. Vancomycin was supratherapeutic, switched to pulse dose regimen. Source of bacteremia unclear, may be left foot wound or a septic process at the right knee joint. Right knee now clinically suspicious for septic arthritis (see physical exam). Left foot wound growing Proteus and MRSA, urine growing Klebsiella. TTE & LUKAS is negative. Dapto/ceftaroline switched back to vanc due to worsening renal function and early signs of rhabdo. Course complicated by worsening leukocytosis (C Diff neg)     Blood cultures: MRSA  R knee fluid culture: Staph aureus  Left foot wound: Proteus, MRSA  Source Control: I&D of Septic R knee on 9/7/20.      Repeat Blood cultures 09/09, 09/10, 09/12 have been negative  CXR with right lower lobe opacities concerning for developing pneumonia  CT Chest/Abdomen/Pelvis: Opacities in Right lower lobe concerning for atelectis and possible superimposed infection. Revealed findings concerning for acute cholecystitis. No identification of abscess.   S/p cholecystotomy drain placement.     Recommendations:   -- Continue oral levofloxacin (renally dosed). Will plan for 14 day course after source control with cholecystotomy tube placement.  -- Will f/u biliary fluid cultures   -- Continue IV Daptomycin for 4 weeks course. Avoid Beta lactam antibiotics to limit renal damage.   --  Pending respiratory culture collection.

## 2020-09-17 NOTE — PROGRESS NOTES
General Surgery Progress Note    Patient received cholecystostomy tube yesterday with over 700ml output.     Patient can follow up in General Surgery clinic in 6 weeks for consideration of drain removal.    Surgery will sign off, please call with questions.    Ashlee Monique MD, PGY-5  General Surgery  [phone]

## 2020-09-17 NOTE — SUBJECTIVE & OBJECTIVE
Interval History:   Still not on pressers. Increasing urine output. Bun and creat with minimal increase. No signs of uremia.    Review of patient's allergies indicates:   Allergen Reactions    Vicodin [hydrocodone-acetaminophen] Itching     Current Facility-Administered Medications   Medication Frequency    acetaminophen tablet 1,000 mg Q8H PRN    albuterol-ipratropium 2.5 mg-0.5 mg/3 mL nebulizer solution 3 mL Q4H PRN    artificial tears 0.5 % ophthalmic solution 1 drop TID    calcium carbonate 200 mg calcium (500 mg) chewable tablet 500 mg BID PRN    calcium gluconate 3,000 mg in dextrose 5 % 100 mL IVPB Continuous    [START ON 9/18/2020] DAPTOmycin (CUBICIN) 1,050 mg in sodium chloride 0.9% IVPB Q48H    dextrose 10 % infusion PRN    dextrose 10 % infusion PRN    dextrose 50% injection 12.5 g PRN    dextrose 50% injection 25 g PRN    fluticasone furoate-vilanteroL 200-25 mcg/dose diskus inhaler 1 puff Daily    fluticasone propionate 50 mcg/actuation nasal spray 50 mcg Daily    glucagon (human recombinant) injection 1 mg PRN    glucose chewable tablet 16 g PRN    glucose chewable tablet 24 g PRN    heparin (porcine) injection 5,000 Units Q8H    insulin regular 100 Units in sodium chloride 0.9% 100 mL infusion Continuous    levoFLOXacin tablet 750 mg Every other day    levothyroxine tablet 75 mcg Before breakfast    melatonin tablet 6 mg Nightly PRN    ondansetron injection 4 mg Q8H PRN    oxyCODONE immediate release tablet 5 mg Q8H PRN    polyethylene glycol packet 17 g BID PRN    sodium chloride 0.9% flush 10 mL PRN    Tdap vaccine injection 0.5 mL vaccine x 1 dose       Objective:     Vital Signs (Most Recent):  Temp: 98.9 °F (37.2 °C) (09/17/20 1500)  Pulse: (!) 130 (09/17/20 1800)  Resp: 12 (09/17/20 1800)  BP: 126/77 (09/17/20 1800)  SpO2: (!) 94 % (09/17/20 1800)  O2 Device (Oxygen Therapy): nasal cannula (09/17/20 1800) Vital Signs (24h Range):  Temp:  [97.5 °F (36.4 °C)-98.9 °F  (37.2 °C)] 98.9 °F (37.2 °C)  Pulse:  [129-131] 130  Resp:  [10-27] 12  SpO2:  [89 %-100 %] 94 %  BP: (111-166)/(67-94) 126/77     Weight: 114.8 kg (253 lb 1.4 oz) (09/17/20 0900)  Body mass index is 30.81 kg/m².  Body surface area is 2.48 meters squared.    I/O last 3 completed shifts:  In: 1855.3 [P.O.:720; I.V.:560.3; NG/GT:575]  Out: 2104 [Urine:724; Other:1380]    Physical Exam  Vitals signs and nursing note reviewed.   Constitutional:       General: He is not in acute distress.     Appearance: He is well-developed. He is obese. He is ill-appearing and toxic-appearing. He is not diaphoretic.   HENT:      Head: Normocephalic and atraumatic.      Right Ear: External ear normal.      Left Ear: External ear normal.      Mouth/Throat:      Pharynx: No oropharyngeal exudate.   Eyes:      General: No scleral icterus.        Right eye: No discharge.         Left eye: No discharge.      Conjunctiva/sclera: Conjunctivae normal.      Pupils: Pupils are equal, round, and reactive to light.   Neck:      Musculoskeletal: Normal range of motion.      Thyroid: No thyromegaly.      Trachea: No tracheal deviation.      Comments: [facility]  Cardiovascular:      Rate and Rhythm: Tachycardia present.   Pulmonary:      Effort: Pulmonary effort is normal. No respiratory distress.      Breath sounds: Normal breath sounds. No stridor. No wheezing or rales.      Comments: Poor air movement  Abdominal:      General: Bowel sounds are normal. There is no distension.      Palpations: Abdomen is soft.      Tenderness: There is no abdominal tenderness. There is no guarding.   Musculoskeletal:         General: No tenderness or deformity.      Right lower leg: Edema present.      Left lower leg: Edema present.   Lymphadenopathy:      Cervical: No cervical adenopathy.   Skin:     Coloration: Skin is not pale.      Findings: No erythema or rash.   Neurological:      General: No focal deficit present.         Significant Labs:  All labs within the past  24 hours have been reviewed.     Significant Imaging:  Labs: Reviewed  images reviewed

## 2020-09-17 NOTE — RESIDENT HANDOFF
Handoff     Primary Team: Networked reference to record PCT  Room Number: 6077/6077 A     Patient Name: Ed Patton MRN: [id number]     Date of Birth: [birth year] Allergies: Vicodin [hydrocodone-acetaminophen]     Age: 65 y.o. Admit Date: 8/30/2020     Sex: male  BMI: Body mass index is 30.81 kg/m².     Code Status: Full Code        Illness Level (current clinical status): Watcher - No    Reason for Admission: Septic shock    Brief HPI (pertinent PMH and diagnosis or differential diagnosis): 65M with DM c/b diabetic foot wound admitted for concerns of OM. Negative MRI. Wound grew MRSA and proteus. MRSA bacteremia, tried vancomycin initially. Right knee septic s/p I&D, stepped up to ICU for septic shock requiring pressors and encephalopathic with worsening chronic respiratory failure with hypoxia. Started on CRRT for NANETTE. Found to have cholecystitis on CT CAP s/p IR guided drain placement. Also in DKA on insulin gtt.    Now on Dapto-levoflox, ID following. Blood cultures cleared MRSA 9/8. Off pressors for ~36-48 hours now. Off CRRT for the same time. Nephro not planning dialysis at this point. Anion gap closed for DKA but still requiring Insulin gtt at low dose.    Procedure Date: Trialysis placed 9/13    Hospital Course (updated, brief assessment by system or problem, significant events): See above in HPI    Tasks (specific, using if-then statements):   1. Septic shock: Continue Dapto-levoflox and follow up with ID for final recs. Weekly CPK for Dapto. Clarify with ID about levoflox (low suspicion for pneumonia overall based on CXR, looked more volume overload and not consolidation) and if it needs to be switched. Also, follow up on Culture results from biliary drain.  2. Hyperglycemia - still on Insulin gtt. Difficult to wean off. AG has closed so appropriate for step down bed for a day or two till transitioned to subq insulin.  3. NANETTE: CRRT while in ICU, now off pressors and CRRT for 2 days. Follow up nephrology recs  when available. Responded briefly to 80mg lasix yesterday. Can continue lasix if UOP low, will defer to hosp medicine team and nephrology for further management.   4. Nutrition- Has OG tube. Needs bedside swallow and a formal SLP eval to resume eating. Seems like he will pass it.   5. Debility: PT/OT  Needs IR, Surgery, Ortho and IM follow up on discharge    Contingency Plan (special circumstances anticipated and plan): See tasks    Estimated Discharge Date: TBD    Discharge Disposition: TBD    Mentored By: Dr Jolly

## 2020-09-17 NOTE — PROGRESS NOTES
"  [facility]  Adult Nutrition  Consult Note    SUMMARY     Recommendations    1. As tolerated, increase TF rate (of Glucerna 1.5) to 65 mL/hr to provide 2340 kcals, 129 g of protein, 1184 mL fluid.   2. ADAT to Diabetic, per MD/SLP discretion.   3. RD to monitor & follow-up.    Goals: Meet % EEN, EPN by RD f/u date  Nutrition Goal Status: new  Communication of RD Recs: reviewed with RN    Reason for Assessment    Reason For Assessment: RD follow-up  Diagnosis: diabetes diagnosis/complications(diabetic foot infection)  Relevant Medical History: DM2, CHF, DCM, CAD, COPD, HLD  Interdisciplinary Rounds: did not attend    General Information Comments: Pt NPO, tolerating enteral nutrition via NGT. Per RN note, pt drank & tolerated chicken broth overnight. S/p galbladder procedure. Pt with good appetite, no wt changes PTA. -250# > 1 year. NFPE not warranted, pt with no indicators of malnutrition at this time. Diabetic diet education complete 9/4.  Nutrition Discharge Planning: Unable to determine    Nutrition/Diet History    Spiritual, Cultural Beliefs, Orthodoxy Practices, Values that Affect Care: no  Factors Affecting Nutritional Intake: NPO    Anthropometrics    Temp: 98.2 °F (36.8 °C)  Height Method: Stated  Height: 6' 4" (193 cm)  Height (inches): 76 in  Weight Method: Bed Scale  Weight: 114.8 kg (253 lb 1.4 oz)  Weight (lb): 253.09 lb  Ideal Body Weight (IBW), Male: 202 lb  % Ideal Body Weight, Male (lb): 125.29 %  BMI (Calculated): 30.8  BMI Grade: 30 - 34.9- obesity - grade I    Lab/Procedures/Meds    Pertinent Labs Reviewed: reviewed  Pertinent Labs Comments: BUN 41, Creat 2.5, GFR 30.4  Pertinent Medications Reviewed: reviewed  Pertinent Medications Comments: Insulin    Estimated/Assessed Needs    Weight Used For Calorie Calculations: 114.8 kg (253 lb 1.4 oz)     Energy Calorie Requirements (kcal): 2454 kcal/d  Energy Need Method: [place]-St Jeor((x1.2))     Protein Requirements: " 115-138 g/d (1-1.2 g/kg)  Weight Used For Protein Calculations: 114.8 kg (253 lb 1.4 oz)     Fluid Requirements (mL): per MD or 1 mL/kcal  RDA Method (mL): 2454     CHO Requirement: 315g/d    Nutrition Prescription Ordered    Current Diet Order: NPO  Current Nutrition Support Formula Ordered: Glucerna 1.5  Current Nutrition Support Rate Ordered: 30 mL/hr    Evaluation of Received Nutrient/Fluid Intake    Enteral Calories (kcal): 1080  Enteral Protein (gm): 59  Enteral (Free Water) Fluid (mL): 546    % Kcal Needs: 44%  % Protein Needs: 51%    Energy Calories Required: not meeting needs  Protein Required: not meeting needs  Fluid Required: other (see comments)(Per MD or 1 mL/kcal)    Comments: LBM: 9/15    Tolerance: tolerating    Nutrition Risk    Level of Risk/Frequency of Follow-up: (2x/week)     Assessment and Plan    Nutrition Problem  Inadequate energy intake     Related to (etiology):   Decreased ability to consume sufficient energy     Signs and Symptoms (as evidenced by):   NPO with no alternate means of nutrition at this time     Interventions (treatment strategy):  Collaboration with other providers     Nutrition Diagnosis Status:   Resolved     Monitor and Evaluation    Food and Nutrient Intake: energy intake, food and beverage intake, enteral nutrition intake  Food and Nutrient Adminstration: diet order, enteral and parenteral nutrition administration  Knowledge/Beliefs/Attitudes: food and nutrition knowledge/skill  Physical Activity and Function: nutrition-related ADLs and IADLs  Anthropometric Measurements: weight, weight change  Biochemical Data, Medical Tests and Procedures: lipid profile, inflammatory profile, glucose/endocrine profile, gastrointestinal profile, electrolyte and renal panel  Nutrition-Focused Physical Findings: overall appearance     Nutrition Follow-Up    RD Follow-up?: Yes

## 2020-09-18 PROBLEM — E11.10 TYPE 2 DIABETES MELLITUS WITH KETOACIDOSIS WITHOUT COMA, WITH LONG-TERM CURRENT USE OF INSULIN: Status: RESOLVED | Noted: 2017-10-09 | Resolved: 2020-09-18

## 2020-09-18 PROBLEM — N18.30 ACUTE RENAL FAILURE WITH ACUTE TUBULAR NECROSIS SUPERIMPOSED ON STAGE 3 CHRONIC KIDNEY DISEASE: Status: ACTIVE | Noted: 2020-09-09

## 2020-09-18 PROBLEM — A41.02 SEPTIC SHOCK DUE TO METHICILLIN RESISTANT STAPHYLOCOCCUS AUREUS: Status: ACTIVE | Noted: 2020-09-12

## 2020-09-18 PROBLEM — N17.0 ACUTE RENAL FAILURE WITH ACUTE TUBULAR NECROSIS SUPERIMPOSED ON STAGE 3 CHRONIC KIDNEY DISEASE: Status: ACTIVE | Noted: 2020-09-09

## 2020-09-18 PROBLEM — Z79.4 TYPE 2 DIABETES MELLITUS WITH KETOACIDOSIS WITHOUT COMA, WITH LONG-TERM CURRENT USE OF INSULIN: Status: RESOLVED | Noted: 2017-10-09 | Resolved: 2020-09-18

## 2020-09-18 LAB
ANION GAP SERPL CALC-SCNC: 11 MMOL/L (ref 8–16)
ANION GAP SERPL CALC-SCNC: 12 MMOL/L (ref 8–16)
ANION GAP SERPL CALC-SCNC: 12 MMOL/L (ref 8–16)
ANISOCYTOSIS BLD QL SMEAR: SLIGHT
BACTERIA BLD CULT: NORMAL
BACTERIA BLD CULT: NORMAL
BASO STIPL BLD QL SMEAR: ABNORMAL
BASOPHILS NFR BLD: 0 % (ref 0–1.9)
BUN SERPL-MCNC: 47 MG/DL (ref 8–23)
BUN SERPL-MCNC: 48 MG/DL (ref 8–23)
BUN SERPL-MCNC: 50 MG/DL (ref 8–23)
CA-I BLDV-SCNC: 1.09 MMOL/L (ref 1.06–1.42)
CA-I BLDV-SCNC: 1.1 MMOL/L (ref 1.06–1.42)
CALCIUM SERPL-MCNC: 8.1 MG/DL (ref 8.7–10.5)
CALCIUM SERPL-MCNC: 8.4 MG/DL (ref 8.7–10.5)
CALCIUM SERPL-MCNC: 8.4 MG/DL (ref 8.7–10.5)
CHLORIDE SERPL-SCNC: 98 MMOL/L (ref 95–110)
CHLORIDE SERPL-SCNC: 99 MMOL/L (ref 95–110)
CHLORIDE SERPL-SCNC: 99 MMOL/L (ref 95–110)
CO2 SERPL-SCNC: 24 MMOL/L (ref 23–29)
CO2 SERPL-SCNC: 24 MMOL/L (ref 23–29)
CO2 SERPL-SCNC: 25 MMOL/L (ref 23–29)
CREAT SERPL-MCNC: 2.6 MG/DL (ref 0.5–1.4)
CRP SERPL-MCNC: 65.1 MG/L (ref 0–8.2)
DIFFERENTIAL METHOD: ABNORMAL
EOSINOPHIL NFR BLD: 2 % (ref 0–8)
ERYTHROCYTE [DISTWIDTH] IN BLOOD BY AUTOMATED COUNT: 16.3 % (ref 11.5–14.5)
EST. GFR  (AFRICAN AMERICAN): 29 ML/MIN/1.73 M^2
EST. GFR  (NON AFRICAN AMERICAN): 25.1 ML/MIN/1.73 M^2
GLUCOSE SERPL-MCNC: 236 MG/DL (ref 70–110)
GLUCOSE SERPL-MCNC: 243 MG/DL (ref 70–110)
GLUCOSE SERPL-MCNC: 245 MG/DL (ref 70–110)
HCT VFR BLD AUTO: 30 % (ref 40–54)
HGB BLD-MCNC: 8.9 G/DL (ref 14–18)
IMM GRANULOCYTES # BLD AUTO: ABNORMAL K/UL (ref 0–0.04)
IMM GRANULOCYTES NFR BLD AUTO: ABNORMAL % (ref 0–0.5)
LYMPHOCYTES NFR BLD: 9 % (ref 18–48)
MAGNESIUM SERPL-MCNC: 2.1 MG/DL (ref 1.6–2.6)
MCH RBC QN AUTO: 26.7 PG (ref 27–31)
MCHC RBC AUTO-ENTMCNC: 29.7 G/DL (ref 32–36)
MCV RBC AUTO: 90 FL (ref 82–98)
MONOCYTES NFR BLD: 3 % (ref 4–15)
MYELOCYTES NFR BLD MANUAL: 1 %
NEUTROPHILS NFR BLD: 85 % (ref 38–73)
NRBC BLD-RTO: 0 /100 WBC
PLATELET # BLD AUTO: 386 K/UL (ref 150–350)
PLATELET BLD QL SMEAR: ABNORMAL
PMV BLD AUTO: 10.4 FL (ref 9.2–12.9)
POCT GLUCOSE: 198 MG/DL (ref 70–110)
POCT GLUCOSE: 233 MG/DL (ref 70–110)
POCT GLUCOSE: 248 MG/DL (ref 70–110)
POCT GLUCOSE: 255 MG/DL (ref 70–110)
POCT GLUCOSE: 260 MG/DL (ref 70–110)
POCT GLUCOSE: 268 MG/DL (ref 70–110)
POCT GLUCOSE: 281 MG/DL (ref 70–110)
POCT GLUCOSE: 287 MG/DL (ref 70–110)
POCT GLUCOSE: 293 MG/DL (ref 70–110)
POCT GLUCOSE: 296 MG/DL (ref 70–110)
POCT GLUCOSE: 302 MG/DL (ref 70–110)
POCT GLUCOSE: 303 MG/DL (ref 70–110)
POCT GLUCOSE: 316 MG/DL (ref 70–110)
POCT GLUCOSE: 316 MG/DL (ref 70–110)
POLYCHROMASIA BLD QL SMEAR: ABNORMAL
POTASSIUM SERPL-SCNC: 4.1 MMOL/L (ref 3.5–5.1)
POTASSIUM SERPL-SCNC: 4.2 MMOL/L (ref 3.5–5.1)
POTASSIUM SERPL-SCNC: 4.7 MMOL/L (ref 3.5–5.1)
RBC # BLD AUTO: 3.33 M/UL (ref 4.6–6.2)
SODIUM SERPL-SCNC: 134 MMOL/L (ref 136–145)
SODIUM SERPL-SCNC: 135 MMOL/L (ref 136–145)
SODIUM SERPL-SCNC: 135 MMOL/L (ref 136–145)
TOXIC GRANULES BLD QL SMEAR: PRESENT
WBC # BLD AUTO: 18.44 K/UL (ref 3.9–12.7)

## 2020-09-18 PROCEDURE — 63600175 PHARM REV CODE 636 W HCPCS: Mod: JG,HCNC | Performed by: STUDENT IN AN ORGANIZED HEALTH CARE EDUCATION/TRAINING PROGRAM

## 2020-09-18 PROCEDURE — 99232 PR SUBSEQUENT HOSPITAL CARE,LEVL II: ICD-10-PCS | Mod: HCNC,GC,, | Performed by: INTERNAL MEDICINE

## 2020-09-18 PROCEDURE — 94761 N-INVAS EAR/PLS OXIMETRY MLT: CPT | Mod: HCNC

## 2020-09-18 PROCEDURE — 99233 SBSQ HOSP IP/OBS HIGH 50: CPT | Mod: HCNC,,, | Performed by: HOSPITALIST

## 2020-09-18 PROCEDURE — 97530 THERAPEUTIC ACTIVITIES: CPT | Mod: HCNC

## 2020-09-18 PROCEDURE — 99222 1ST HOSP IP/OBS MODERATE 55: CPT | Mod: HCNC,,, | Performed by: NURSE PRACTITIONER

## 2020-09-18 PROCEDURE — 99233 PR SUBSEQUENT HOSPITAL CARE,LEVL III: ICD-10-PCS | Mod: HCNC,,, | Performed by: INTERNAL MEDICINE

## 2020-09-18 PROCEDURE — 82330 ASSAY OF CALCIUM: CPT | Mod: 91,HCNC

## 2020-09-18 PROCEDURE — 85027 COMPLETE CBC AUTOMATED: CPT | Mod: HCNC

## 2020-09-18 PROCEDURE — 20600001 HC STEP DOWN PRIVATE ROOM: Mod: HCNC

## 2020-09-18 PROCEDURE — 97164 PT RE-EVAL EST PLAN CARE: CPT | Mod: HCNC

## 2020-09-18 PROCEDURE — 63600175 PHARM REV CODE 636 W HCPCS: Mod: HCNC | Performed by: STUDENT IN AN ORGANIZED HEALTH CARE EDUCATION/TRAINING PROGRAM

## 2020-09-18 PROCEDURE — 99233 PR SUBSEQUENT HOSPITAL CARE,LEVL III: ICD-10-PCS | Mod: HCNC,,, | Performed by: HOSPITALIST

## 2020-09-18 PROCEDURE — 92526 ORAL FUNCTION THERAPY: CPT | Mod: HCNC

## 2020-09-18 PROCEDURE — 25000003 PHARM REV CODE 250: Mod: HCNC | Performed by: STUDENT IN AN ORGANIZED HEALTH CARE EDUCATION/TRAINING PROGRAM

## 2020-09-18 PROCEDURE — 97168 OT RE-EVAL EST PLAN CARE: CPT | Mod: HCNC

## 2020-09-18 PROCEDURE — 63600175 PHARM REV CODE 636 W HCPCS: Mod: HCNC | Performed by: NURSE PRACTITIONER

## 2020-09-18 PROCEDURE — 99222 PR INITIAL HOSPITAL CARE,LEVL II: ICD-10-PCS | Mod: HCNC,,, | Performed by: NURSE PRACTITIONER

## 2020-09-18 PROCEDURE — 63600175 PHARM REV CODE 636 W HCPCS: Mod: HCNC | Performed by: HOSPITALIST

## 2020-09-18 PROCEDURE — 80048 BASIC METABOLIC PNL TOTAL CA: CPT | Mod: 91,HCNC

## 2020-09-18 PROCEDURE — 85007 BL SMEAR W/DIFF WBC COUNT: CPT | Mod: HCNC

## 2020-09-18 PROCEDURE — 83735 ASSAY OF MAGNESIUM: CPT | Mod: HCNC

## 2020-09-18 PROCEDURE — 99232 SBSQ HOSP IP/OBS MODERATE 35: CPT | Mod: HCNC,GC,, | Performed by: INTERNAL MEDICINE

## 2020-09-18 PROCEDURE — 86140 C-REACTIVE PROTEIN: CPT | Mod: HCNC

## 2020-09-18 PROCEDURE — 99233 SBSQ HOSP IP/OBS HIGH 50: CPT | Mod: HCNC,,, | Performed by: INTERNAL MEDICINE

## 2020-09-18 PROCEDURE — 27000221 HC OXYGEN, UP TO 24 HOURS: Mod: HCNC

## 2020-09-18 PROCEDURE — 36415 COLL VENOUS BLD VENIPUNCTURE: CPT | Mod: HCNC

## 2020-09-18 RX ORDER — IBUPROFEN 200 MG
24 TABLET ORAL
Status: DISCONTINUED | OUTPATIENT
Start: 2020-09-18 | End: 2020-09-21

## 2020-09-18 RX ORDER — INSULIN ASPART 100 [IU]/ML
0-5 INJECTION, SOLUTION INTRAVENOUS; SUBCUTANEOUS
Status: DISCONTINUED | OUTPATIENT
Start: 2020-09-18 | End: 2020-09-21

## 2020-09-18 RX ORDER — INSULIN ASPART 100 [IU]/ML
5-10 INJECTION, SOLUTION INTRAVENOUS; SUBCUTANEOUS
Status: DISCONTINUED | OUTPATIENT
Start: 2020-09-18 | End: 2020-09-21

## 2020-09-18 RX ORDER — GLUCAGON 1 MG
1 KIT INJECTION
Status: DISCONTINUED | OUTPATIENT
Start: 2020-09-18 | End: 2020-09-21

## 2020-09-18 RX ORDER — IBUPROFEN 200 MG
16 TABLET ORAL
Status: DISCONTINUED | OUTPATIENT
Start: 2020-09-18 | End: 2020-09-21

## 2020-09-18 RX ADMIN — HYPROMELLOSE 2910 1 DROP: 5 SOLUTION OPHTHALMIC at 08:09

## 2020-09-18 RX ADMIN — INSULIN ASPART 3 UNITS: 100 INJECTION, SOLUTION INTRAVENOUS; SUBCUTANEOUS at 04:09

## 2020-09-18 RX ADMIN — HEPARIN SODIUM 5000 UNITS: 5000 INJECTION INTRAVENOUS; SUBCUTANEOUS at 09:09

## 2020-09-18 RX ADMIN — DAPTOMYCIN 1050 MG: 350 INJECTION, POWDER, LYOPHILIZED, FOR SOLUTION INTRAVENOUS at 11:09

## 2020-09-18 RX ADMIN — OXYCODONE 5 MG: 5 TABLET ORAL at 11:09

## 2020-09-18 RX ADMIN — INSULIN ASPART 3 UNITS: 100 INJECTION, SOLUTION INTRAVENOUS; SUBCUTANEOUS at 09:09

## 2020-09-18 RX ADMIN — LEVOFLOXACIN 750 MG: 750 TABLET, FILM COATED ORAL at 08:09

## 2020-09-18 RX ADMIN — OXYCODONE 5 MG: 5 TABLET ORAL at 01:09

## 2020-09-18 RX ADMIN — INSULIN ASPART 5 UNITS: 100 INJECTION, SOLUTION INTRAVENOUS; SUBCUTANEOUS at 04:09

## 2020-09-18 RX ADMIN — FLUTICASONE PROPIONATE 50 MCG: 50 SPRAY, METERED NASAL at 08:09

## 2020-09-18 RX ADMIN — HYPROMELLOSE 2910 1 DROP: 5 SOLUTION OPHTHALMIC at 03:09

## 2020-09-18 RX ADMIN — HEPARIN SODIUM 5000 UNITS: 5000 INJECTION INTRAVENOUS; SUBCUTANEOUS at 01:09

## 2020-09-18 RX ADMIN — HYPROMELLOSE 2910 1 DROP: 5 SOLUTION OPHTHALMIC at 09:09

## 2020-09-18 RX ADMIN — LEVOTHYROXINE SODIUM 75 MCG: 25 TABLET ORAL at 05:09

## 2020-09-18 RX ADMIN — HEPARIN SODIUM 5000 UNITS: 5000 INJECTION INTRAVENOUS; SUBCUTANEOUS at 05:09

## 2020-09-18 RX ADMIN — INSULIN ASPART 2 UNITS: 100 INJECTION, SOLUTION INTRAVENOUS; SUBCUTANEOUS at 11:09

## 2020-09-18 RX ADMIN — INSULIN ASPART 5 UNITS: 100 INJECTION, SOLUTION INTRAVENOUS; SUBCUTANEOUS at 11:09

## 2020-09-18 RX ADMIN — FLUTICASONE FUROATE AND VILANTEROL TRIFENATATE 1 PUFF: 200; 25 POWDER RESPIRATORY (INHALATION) at 08:09

## 2020-09-18 NOTE — PT/OT/SLP RE-EVAL
Physical Therapy Re-evaluation    Patient Name:  Ed Patton   MRN:  [id number]    Recommendations:     Discharge Recommendations:  nursing facility, skilled   Discharge Equipment Recommendations: (TBD)   Barriers to discharge: decreased functional mobility    Assessment:     Ed Patton is a 65 y.o. male admitted with a medical diagnosis of Septic shock due to methicillin resistant Staphylococcus aureus.  He presents with the following impairments/functional limitations:  weakness, impaired endurance, impaired functional mobilty, gait instability, impaired balance, impaired cognition, decreased coordination, decreased lower extremity function, decreased ROM, impaired cardiopulmonary response to activity, decreased safety awareness. Pt did not tolerate well due to delayed and non-consistent responses to cues and commands. He had difficulty with following commands. Total Ax2 for bed mobility. Max A for sitting EOB for ~15 mins while performing functional activities. Gait not assessed due to fatigue and pt requesting to lie down. His mobility is being limited due to decreased activity tolerance, decreased endurance, and pain. Upon d/c, PT recommends skilled nursing facility to address the above deficits and help him reach his maximum level of independent mobility. He is a good candidate due to his prior level of function and potential to progress.    Rehab Prognosis:  Good; patient would benefit from acute skilled PT services to address these deficits and reach maximum level of function.      Recent Surgery: Procedure(s) (LRB):  ARTHROSCOPY, KNEE, RIGHT  (Right) 11 Days Post-Op    Plan:     During this hospitalization, patient to be seen 4 x/week to address the above listed problems via gait training, therapeutic activities, therapeutic exercises, neuromuscular re-education  · Plan of Care Expires:  10/18/20   Plan of Care Reviewed with: patient, spouse    Subjective     Communicated with RN prior to session.   Patient found HOB elevated with blood pressure cuff, telemetry, bowel management system, oxygen, pressure relief boots, pulse ox (continuous) upon PT entry to room, agreeable to evaluation.      Chief Complaint: fatigue  Patient comments/goals: to return home  Pain/Comfort:  · Pain Rating 1: 0/10    Patients cultural, spiritual, [employer]ist conflicts given the current situation: no      Objective:     Patient found with: blood pressure cuff, telemetry, bowel management system, oxygen, pressure relief boots, pulse ox (continuous)     General Precautions: Standard, fall, special contact   Orthopedic Precautions:LLE weight bearing as tolerated   Braces: N/A     Exams:    Cognitive Exam  Patient is A&O x3 (did not know current date) and follows 75% of one -step commands    Postural Exam Patient presented with the following abnormalities:    -       Rounded shoulders  -       Forward head  -       Kyphosis  -       Posterior pelvic tilt   Sensation    -       Light touch intact   Skin Integrity/Edema     -       Skin integrity: visibly intact  -       Edema: bilateral   R LE ROM WNL  Knee extension: (-)10-15 degrees   R LE Strength 3/5 hip flexion  3+/5 knee ext/flex, and ankle DF/PF   L LE ROM WNL   L LE Strength  3/5 hip flexion  3+/5 knee ext/flex  0/5 ankle DF/PF       Balance   Static Sitting Max A   Dynamic Sitting Max A   Static Standing NT   Dynamic Standing       NT         Functional Mobility:    Bed Mobility  Rolling to L: total Ax2, HOB elevated  Supine to Sit on the L side:  total Ax2, HOB elevated  Sit to supine: total Ax2  Scoot to HOB in supine: total Ax2  Scoot to EOB in sitting: max Ax2   Transfers Scoot EOB towards HOB: max Ax2, 2 trials         AM-PAC 6 CLICK MOBILITY  Total Score:8       Therapeutic Activities and Exercises:   -Pt safe to t/f with therapy and/or Medi-chair.  -Educated pt on safety with mobility and continuing to be mobile to prevent deconditioning.   -Discussed goals and roles of  acute care physical therapy. Discussed POC. Pt and wife expressed understanding and agreement.     Sitting Balance:   - ~15 mins, max A  - posture: kyphotic, posterior lean  - constant cues for eyes/head up, core engagement, use UEs on bed for support, anterior weight shift      Patient left HOB elevated with all lines intact, call button in reach, bed alarm on and wife present.    GOALS:   Multidisciplinary Problems     Physical Therapy Goals        Problem: Physical Therapy Goal    Goal Priority Disciplines Outcome Goal Variances Interventions   Physical Therapy Goal     PT, PT/OT Ongoing, Progressing     Description: Goals to be met by: 20    Patient will increase functional independence with mobility by performin. Supine to sit with moderate assitance.   2. Sit to supine with moderate assistance  3. Sit to stand transfer with moderate assistance.  4. Gait  x 10 feet with Minimal Assistance using LRAD and maintenance of weight bearing status   5. Lower extremity exercise program x10 with assistance as needed.   6. Sit for 10 minutes with Minimal Assistance while reaching out of JESIKA in all planes to perform functional activities.                    History:     Past Medical History:   Diagnosis Date    CHF (congestive heart failure)     COPD (chronic obstructive pulmonary disease)     Coronary artery disease     Diabetes mellitus     Diabetes mellitus type II     DM (diabetes mellitus) type II uncontrolled with renal manifestation 2013    Hyperlipidemia     Hypertension     Postablative hypothyroidism 2018       Past Surgical History:   Procedure Laterality Date    APPENDECTOMY      ARTHROSCOPY OF KNEE Right 2020    Procedure: ARTHROSCOPY, KNEE, RIGHT ;  Surgeon: You Bhatia MD;  Location: [facility] OR [address] [place] [address];  Service: Orthopedics;  Laterality: Right;    CHOLECYSTECTOMY      CORONARY ANGIOPLASTY WITH STENT PLACEMENT      TONSILLECTOMY      TRANSESOPHAGEAL ECHOCARDIOGRAPHY  N/A 9/4/2020    Procedure: ECHOCARDIOGRAM, TRANSESOPHAGEAL;  Surgeon: [facility] Diagnostic Provider;  Location: [facility] EP LAB;  Service: Anesthesiology;  Laterality: N/A;    TRANSESOPHAGEAL ECHOCARDIOGRAPHY N/A 9/3/2020    Procedure: ECHOCARDIOGRAM, TRANSESOPHAGEAL;  Surgeon: [facility] Diagnostic Provider;  Location: [facility] EP LAB;  Service: Anesthesiology;  Laterality: N/A;       Time Tracking:     PT Received On: 09/18/20  PT Start Time: 1025     PT Stop Time: 1056  PT Total Time (min): 31 min     Billable Minutes: Evaluation 15 and Therapeutic Activity 16 (co-eval with OT)      Haley Mejia, DARNELL  09/18/2020

## 2020-09-18 NOTE — ASSESSMENT & PLAN NOTE
-History of mixed COPD (based on PFT's from 05/2015 showing mild (small airways) obstruction, airflow not improved after bronchodilator, and moderate restriction) and HFrEF with chronic respiratory failure on 2L home oxygen.   -ICU admission notable for progressive increase in supplemental oxygen requirements.   -Work-up for underlying etiology of acute on chronic respiratory failure include CXR showing concern for possible progression of CHF vs HAP (not entirely convinced that CXR showed consolidation)  - Breathing back to baseline now to 2L NC      Plan:   - Continue supplemental oxygen with goal saturations >88%;   - Repeat ECHO unchanged.   - Holding home CHF regimen with shock on ICU admission.   - Continue home BREO with duo-nebulizers PRN.   - Nephrology following for CRRT for attempted volume optimization.No plans for dialysis per nephrology  - Strict I/O's and daily weights.

## 2020-09-18 NOTE — ASSESSMENT & PLAN NOTE
-Likely multifactorial from uremia vs DKA vs worsening shock with cerebral hypoperfusion vs sepsis.   -Per speaking with wife, she describes a likely baseline history of developing dementia prior to hospitalization.     Plan:   -Continue supportive management for multifactorial issues.   -Closely monitor respiratory status with concern for needing intubation for airway protection.  - Unclear etiology at this point, uremic encephalopathy should have cleared with CRRT. Could be from sepsis +/- neurotoxicity for cefipime  - Improved , back to baseline now  - Stepped down to hospital medicine

## 2020-09-18 NOTE — PLAN OF CARE
Problem: Physical Therapy Goal  Goal: Physical Therapy Goal  Description: Goals to be met by: 20    Patient will increase functional independence with mobility by performin. Supine to sit with moderate assitance.   2. Sit to supine with moderate assistance  3. Sit to stand transfer with moderate assistance.  4. Gait  x 10 feet with Minimal Assistance using LRAD and maintenance of weight bearing status   5. Lower extremity exercise program x10 with assistance as needed.   6. Sit for 10 minutes with Minimal Assistance while reaching out of JESIKA in all planes to perform functional activities.   Outcome: Ongoing, Progressing    Re-eval complete. Goals established based on patient's current level of function. Initiate POC.     DARNELL Smith  2020

## 2020-09-18 NOTE — ASSESSMENT & PLAN NOTE
BG goal 140 - 180     - Start  transition IV insulin infusion with stepdown parameters. Initial rate starting at 1.2 units/hr. 0.5 u/kg/d dosing.   - Start Novolog 5-10 units TIDWM. Basal/Prandial physiologic matching.    - Low Dose SQ Insulin Correction Scale. Given kidney function.   - BG Monitoring //0200    ADDENDUM 3:34 PM   - BG remains above goal.    - Increase  transition IV insulin infusion with stepdown parameters. Initial rate starting at 1.5 units/hr. aprox 25% dosing increase.     ** Please call Endocrine for any BG related issues **  ** Please notify Endocrine for any change and/or advance in diet**    Discharge Planning:   TBD. Please notify endocrinology prior to discharge.

## 2020-09-18 NOTE — ASSESSMENT & PLAN NOTE
65 y.o M with PMHx of Type 2 DM, chronic hypoxemic respiratory failure on 2 L O2 via NC, chronic systolic heart failure. Tachycardic, hypotensive, still having transient hypoxia. WBCs now 40.90. No signs of deep foot infection on X ray, MRI, or nuclear medicine scan. Foot wound superficial and stable and likely not the cause of patient's persistent septicemia. Left ankle peak systolic velocity 35 cm/s which is concerning for risk of nonhealing. ABIs ordered, awaiting results.     Plan:  -No surgical intervention from podiatry warranted at this time.   -Recommend consulting vascular surgery based on 8/31 arterial ultrasound and delayed healing of left foot wound. PVR waveforms suggests PAD with TBI of 0  -Antibiotic management per ID.  -Dressing changed today.    -Continue local wound care.   -Rest of care per primary  -Podiatry will continue to follow    DC Instructions:  Patient is to follow up with podiatry within 10 days of discharge.  Podiatry will arrange an appt. Home health/facility to change dressings q MWF as follows: rinse left foot wound with saline, paint wound with betadine and dress foot with 4x4 gauze, kerlix, secure with tape.

## 2020-09-18 NOTE — PLAN OF CARE
09/18/20 1528   Discharge Reassessment   Assessment Type Discharge Planning Reassessment   Discharge Plan A Skilled Nursing Facility   Discharge Plan B Home Health   DME Needed Upon Discharge  other (see comments)  (TBD)   Anticipated Discharge Disposition SNF   Post-Acute Status   Post-Acute Authorization Placement   Post-Acute Placement Status Awaiting Internal Medical Clearance

## 2020-09-18 NOTE — PROGRESS NOTES
[facility]  Critical Care Medicine  Progress Note    Patient Name: Ed Patton  MRN: [id number]  Admission Date: 8/30/2020  Hospital Length of Stay: 18 days  Code Status: Full Code  Attending Provider: Thomas Reed MD  Primary Care Provider: Qiana Chow MD   Principal Problem: Septic shock    Subjective:     HPI:  Mr. Ed Patton is a 65 year old male for whom MICU was consulted for hypotension. He has a PMH significant for HFrEF and COPD with chronic respiratory failure (on 2L home oxygen), T2DM with CKD III, and iron deficiency anemia. History obtained from chart review and speaking with patient. See hospital course below for detail regarding care and course of this patient.     Hospital/ICU Course:  Mr. Ed Patton was admitted to hospital medicine on 08/30 for management of a left-sided diabetic foot infection that was precipitated by an undetected punction wound after stepping on a tack/nail. His presentation was notable for leukocytosis with elevated inflammatory markers, however MRI of left foot only showed cellulitis of the anterior and lateral aspect of foot. Podiatry was consulted with recommendations for IV antibiotics; he was initiated on Ciprofloxacin and Vancomycin on admission. However, blood cultures from admission resulted positive for MRSA with wound cultures from left foot also growing MRSA with Proteus mirabilis. By 09/03 cultures remained positive despite Vancomycin, and patient was noted to develop right knee pain with swelling. Orthopedics was consulted and patient underwent right knee joint aspiration with cultures also positive for MRSA. LUKAS on 09/04 showed known HFrEF, but was negative for endocarditis. MRI of lumbar spine on 09/08 was negative for abscess. By 09/06, blood cultures continued to remain positive, and he underwent I&D of right arm abscess on 09/08 with cultures also positive for MRSA. For persistent MRSA bacteremia, he was  transitioned to Daptomycin and Ceftaroline, however this regimen was discontinued by 09/10 due to concern for developing rhabdomylosis. By 09/09, he was noted to develop a progressively worsening leukocytosis and NANETTE. Nephrology was consulted on 09/09 with suspicion for ATN. Hematology was consulted on 09/12 for persistent leukocytosis as likely reactive from bacteremia. On 09/10, patient began developing intermitent hypotension prompting broadening of antibiotics to Cefepime and Vancomycin. By 09/12, renal function continued to worsen in addition to hypotension prompting transfer to ICU for vasopressor support and possible HD. Patient found to be encephalopathic, central line placed and started on CRRT overnight on 9/14 with improvement in mental status, but remained encephalopathic. CT head without any acute intracranial process. Vasopressin off AM of 9/15 but remains on levophed, CT abdomen with concern for potential cholecystitis and possible atelectasis with superimposed pneumonia.    Interval History/Significant Events: NAEON  Alert and oriented this morning  Discussed with nephrology, no plans for dialysis. Patient to be stepped down to hospital medicine team    Review of Systems   Unable to perform ROS: Acuity of condition     Objective:     Vital Signs (Most Recent):  Temp: 97.7 °F (36.5 °C) (09/17/20 2019)  Pulse: (!) 130 (09/17/20 2019)  Resp: 18 (09/17/20 2019)  BP: (!) 146/87 (09/17/20 2019)  SpO2: (!) 94 % (09/17/20 1800) Vital Signs (24h Range):  Temp:  [97.7 °F (36.5 °C)-98.9 °F (37.2 °C)] 97.7 °F (36.5 °C)  Pulse:  [129-131] 130  Resp:  [10-27] 18  SpO2:  [89 %-100 %] 94 %  BP: (111-166)/(67-94) 146/87   Weight: 114.8 kg (253 lb 1.4 oz)  Body mass index is 30.81 kg/m².      Intake/Output Summary (Last 24 hours) at 9/17/2020 2107  Last data filed at 9/17/2020 1800  Gross per 24 hour   Intake 2005.15 ml   Output 610 ml   Net 1395.15 ml       Physical Exam  Constitutional:       General: He is not in  acute distress.     Appearance: Normal appearance. He is well-developed and overweight. He is ill-appearing.      Interventions: Nasal cannula in place.   HENT:      Head: Normocephalic and atraumatic.   Eyes:      General: No scleral icterus.     Conjunctiva/sclera: Conjunctivae normal.      Pupils: Pupils are equal, round, and reactive to light.   Neck:      Musculoskeletal: Normal range of motion and neck supple.      Comments: Central line inserted in right IJ with clean, dry, and intact dressing.   Cardiovascular:      Rate and Rhythm: Regular rhythm. Tachycardia present.      Pulses: Normal pulses.      Heart sounds: Normal heart sounds. No murmur. No friction rub. No gallop.    Pulmonary:      Effort: Pulmonary effort is normal. No respiratory distress.      Breath sounds: Examination of the right-lower field reveals decreased breath sounds. Examination of the left-lower field reveals decreased breath sounds. Decreased breath sounds present. No wheezing.      Comments: Clear to auscultation b/l anteriorly  Abdominal:      General: Bowel sounds are normal. There is no distension.      Palpations: Abdomen is soft. There is no mass.      Tenderness: There is no abdominal tenderness. There is no guarding.   Musculoskeletal:      Comments: There is 3+ pitting edema of the bilateral lower extremities till knees.  Normal passive range of motion   Lymphadenopathy:      Cervical: No cervical adenopathy.   Skin:     General: Skin is warm and dry.      Findings: No bruising or rash.   Neurological:      General: No focal deficit present.      Mental Status: He is oriented to person, place, and time. He is lethargic.      Cranial Nerves: No cranial nerve deficit.      Motor: No weakness.         Vents:     Lines/Drains/Airways     Central Venous Catheter Line            Trialysis (Dialysis) Catheter 09/13/20 0807 right internal jugular 4 days          Drain                 Urethral Catheter 09/09/20 1720 16 Fr. 8 days          NG/OG Tube 09/14/20 0500 Jennifer sump 14 Fr. Left nostril 3 days         Drain/Device  09/16/20 1606 Right upper abdomen t-tube 1 day              Significant Labs:    CBC/Anemia Profile:  Recent Labs   Lab 09/16/20 0323 09/17/20  0246   WBC 33.39*  33.39* 24.69*  24.69*   HGB 8.5*  8.5* 8.6*  8.6*   HCT 28.8*  28.8* 29.5*  29.5*   *  431* 436*  436*   MCV 90  90 91  91   RDW 15.9*  15.9* 16.1*  16.1*        Chemistries:  Recent Labs   Lab 09/16/20 0323 09/16/20  0328 09/17/20  0242 09/17/20  1250 09/17/20  1700    142   < > 142  142 138 137   K 4.1 4.4   < > 3.6  3.6 3.7 3.7    104   < > 103  103 104 102   CO2 27 28   < > 29  29 25 26   BUN 19 24*   < > 41*  41* 46* 48*   CREATININE 1.8* 1.9*   < > 2.5*  2.5* 2.5* 2.6*   CALCIUM 9.5 9.3   < > 9.0  9.0 8.7 8.4*   ALBUMIN 1.5*  --   --  1.5*  --   --    PROT 7.2  --   --  7.1  --   --    BILITOT 0.4  --   --  0.4  --   --    ALKPHOS 197*  --   --  184*  --   --    ALT 41  --   --  42  --   --    AST 49*  --   --  45*  --   --    MG  --  2.5  --  2.4  --   --     < > = values in this interval not displayed.       All pertinent labs within the past 24 hours have been reviewed.    Significant Imaging:  None      ABG  Recent Labs   Lab 09/14/20 2037   PH 7.423   PO2 83   PCO2 44.7   HCO3 29.2*   BE 5     Assessment/Plan:     Neuro  Acute metabolic encephalopathy  -Likely multifactorial from uremia vs DKA vs worsening shock with cerebral hypoperfusion vs sepsis.   -Per speaking with wife, she describes a likely baseline history of developing dementia prior to hospitalization.     Plan:   -Continue supportive management for multifactorial issues.   -Closely monitor respiratory status with concern for needing intubation for airway protection.  - Unclear etiology at this point, uremic encephalopathy should have cleared with CRRT. Could be from sepsis +/- neurotoxicity for cefipime  - Improved , back to baseline now  - Stepped down  to [facility] medicine      Pulmonary  Acute on chronic respiratory failure with hypoxia  -History of mixed COPD (based on PFT's from 05/2015 showing mild (small airways) obstruction, airflow not improved after bronchodilator, and moderate restriction) and HFrEF with chronic respiratory failure on 2L home oxygen.   -ICU admission notable for progressive increase in supplemental oxygen requirements.   -Work-up for underlying etiology of acute on chronic respiratory failure include CXR showing concern for possible progression of CHF vs HAP (not entirely convinced that CXR showed consolidation)  - Breathing back to baseline now to 2L NC      Plan:   - Continue supplemental oxygen with goal saturations >88%;   - Repeat ECHO unchanged.   - Holding home CHF regimen with shock on ICU admission.   - Continue home BREO with duo-nebulizers PRN.   - Nephrology following for CRRT for attempted volume optimization.No plans for dialysis per nephrology  - Strict I/O's and daily weights.     Hospital-acquired pneumonia  -See assessment for shock.     COPD mixed type  -Based on PFT's from 05/2015 showing mild (small airways) obstruction, airflow not improved after bronchodilator, and moderate restriction.    Plan:   -See assessment for respiratory failure. .     Cardiac/Vascular  Hyperlipidemia  Plan:   -Holding home Statin with concern for rhabdomyolysis earlier in admission.     Chronic systolic congestive heart failure  -Most recent ECHO in 09/2020 with EF of 25%.   -Unclear is ischemic vs non-ischemic.   -Home regimen: Carvedilol, Lasix 80 mg, and Lisinopril.   -Exam notable for bilateral lower extremity edema and JVD.   -Associated with chronic hypoxemic respiratory failure requiring 2L nasal cannula, but noted to develop worsening oxygen requirements on ICU admission that were likely multifactorial from suspected hospital acquired pneumonia versus declining urine output with pre-disposition to volume overload. .     TTE:  9/14  EF ranging in 25- 35% for all last 3 echos now  Moderate biventricular systolic dysfunction  LVDD    Plan:   -See assessment for respiratory failure.     Renal/  Increased anion gap metabolic acidosis  -See assessment for T2DM with DKA.     NANETTE (acute kidney injury)  -Admission notable for progressive NANETTE since approximately 09/08 with worsening Cr associated with increased anion gap metabolic acidosis.   -Status post evaluation by nephrology with concern for ATN based on urine microscopy showing scant normal red blood cells without any dysmorphic shapes evident and numerous muddy brown casts.   -Admission to ICU notable for continued decline in renal function with eventual initiation of CRRT on 09/13.   -Etiology of continued decline in renal function is likely multifactorial from shock vs progression of CHF vs DKA vs medication side-effect.   Responded to lasix minimally    Plan:   - Stepped down to hosp med. No plans for dialysis per nephrology. Can consider higher dose lasix trials  - Nephrology following   - Daily BMP   -Trialysis line place on 09/13 for HD access.   -Strict I/O's and daily weights, if possible.   -Renally dose all medications.     ID  * Septic shock  This is a 65 year old  male with PMH significant for HFrEF and COPD with chronic respiratory failure (on 2L home oxygen), T2DM with CKD III, and iron deficiency anemia who originally presented to [facility] on 08/30 with cellulitis of left foot with concern for diabetic foot infection with subsequent development of MRSA bacteremia attributed to septic arthritis of right knee and elbow. He is status post drainage and coverage for MRSA since that time. His work-up for other etiologies of MRSA bacteremia have included negative LUKAS and MRI of lumbar spine looking for endocarditis and spinal abscess, respectively. Stool studies for C.diff on 09/11 were negative. His hospital course has been associated with worsening hypotension and  leukocytosis since 09/10 prompting ICU admission on 09/12 for initiation of vasopressor support. Infectious work-up thus far on ICU admission concern for likely right lower lobe HAP.     CT abdomen pelvis on 9/15 with gallbladder dilation, sludge, and trace pericholic fluid collection. Exam not consistent with cholecystitis but given persistent shock potential source. Also with potential right lower lobe atelectasis with overlying infection    - Patient off pressors now    Plan:   - HIDA+ cholecystitis. Gen surg feels patient not a good surgical candidate. Recommend IR cholecystostomy drain placement. Drain placed by IR 9/16  - Off pressors for >48 hrs now  - Continue broad spectrum coverage with Daptomycin (q24) and Levoflox (q48). Check weekly CPK for daptomycin.  Follow ID recs  - completed stress steroids  - Trending WBC count daily.     Septic arthritis of knee, right  -See assessment for shock.     Staphylococcal arthritis of right knee  -See assessment for shock.     Sepsis due to methicillin resistant Staphylococcus aureus (MRSA)  -See assessment for shock.     Endocrine  Diabetic ulcer of left foot associated with type 2 diabetes mellitus, with fat layer exposed  -See assessment for shock.     Postablative hypothyroidism  Plan:   -Continue home SYNTHROID.     Type 2 diabetes mellitus with ketoacidosis without coma, with long-term current use of insulin  -Most recent A1c 9.5 in 08/2020.   -Home regimen: NPH 45U AM and 35U PM.   -Complicated by CKD III and polyneuropathy with diabetic foot ulcer.   -ICU admission on 09/12 notable for increased anion gap metabolic acidosis and elevated beta-hydroxybutyrate concern for DKA possible precipitated from withholding insulin versus worsening of underlying infection.     Plan:   - Insulin drip with Q2H glucose checks. Transition to sub q today   -Trending BMP's Q8H; monitor for hyperkalemia.   -Holding off on continuous IVF given significantly reduced EF.   -Holding  home Gabapentin with concern for hypotension.   -Hypoglycemia protocol ordered.     Type 2 diabetes mellitus with diabetic polyneuropathy, with long-term current use of insulin  -Most recent A1c 9.5 in 08/2020.   -Home regimen: NPH 45U AM and 35U PM.   -Complicated by CKD III and polyneuropathy with diabetic foot ulcer.   -ICU admission on 09/12 notable for increased anion gap metabolic acidosis and elevated beta-hydroxybutyrate concern for DKA possible precipitated from withholding insulin versus worsening of underlying infection.   - AG closed    Plan:   - Will transition off insulin gtt once requirements more stable.   - Holding home Gabapentin with concern for hypotension.   - Hypoglycemia protocol ordered.     Type 2 diabetes mellitus with stage 3 chronic kidney disease, with long-term current use of insulin  -See assessment for NANETTE.        Critical Care Daily Checklist:    A: Awake: RASS Goal/Actual Goal: RASS Goal: 0-->alert and calm  Actual: Jimenez Agitation Sedation Scale (RASS): Alert and calm   B: Spontaneous Breathing Trial Performed?     C: SAT & SBT Coordinated?  NA                      D: Delirium: CAM-ICU Overall CAM-ICU: Negative   E: Early Mobility Performed? Yes   F: Feeding Goal: Goals: Meet % EEN, EPN by RD f/u date  Status: Nutrition Goal Status: new   Current Diet Order   Procedures    Diet Dysphagia Mechanical Soft (IDDSI Level 5)      AS: Analgesia/Sedation NA   T: Thromboembolic Prophylaxis yes   H: HOB > 300 Yes   U: Stress Ulcer Prophylaxis (if needed) NA   G: Glucose Control Yes   B: Bowel Function Stool Occurrence: 1   I: Indwelling Catheter (Lines & Newell) Necessity Yes   D: De-escalation of Antimicrobials/Pharmacotherapies No    Plan for the day/ETD Step down    Code Status:  Family/Goals of Care: Full Code         Critical secondary to Patient has a condition that poses threat to life and bodily function: Acute Renal Failure      Critical care was time spent personally by me  on the following activities: development of treatment plan with patient or surrogate and bedside caregivers, discussions with consultants, evaluation of patient's response to treatment, examination of patient, ordering and performing treatments and interventions, ordering and review of laboratory studies, ordering and review of radiographic studies, pulse oximetry, re-evaluation of patient's condition. This critical care time did not overlap with that of any other provider or involve time for any procedures.     Shabbir Quezada MD  Critical Care Medicine  [facility]

## 2020-09-18 NOTE — SUBJECTIVE & OBJECTIVE
Subjective:     Interval History:   Leukocytosis downtrending. Stil tachycardic. Patient denies any pedal or calf pain. Denies N/V/F/C.     Scheduled Meds:   artificial tears  1 drop Both Eyes TID    DAPTOmycin (CUBICIN)  IV  1,050 mg Intravenous Q24H    fluticasone furoate-vilanteroL  1 puff Inhalation Daily    fluticasone propionate  1 spray Each Nostril Daily    heparin (porcine)  5,000 Units Subcutaneous Q8H    insulin aspart U-100  5-10 Units Subcutaneous TIDWM    levoFLOXacin  750 mg Oral Every other day    levothyroxine  75 mcg Oral Before breakfast     Continuous Infusions:   insulin (HUMAN R) infusion (adults) 1.2 Units/hr (09/18/20 1030)     PRN Meds:acetaminophen, albuterol-ipratropium, calcium carbonate, dextrose 50%, dextrose 50%, glucagon (human recombinant), glucose, glucose, insulin aspart U-100, melatonin, ondansetron, oxyCODONE, polyethylene glycol, DIPH,PERTUS (ADACEL),TETANUS PF VAC (ADULT)    Review of Systems   Constitutional: Positive for activity change. Negative for appetite change, chills and fever.   HENT: Negative for congestion.    Respiratory: Negative for cough and shortness of breath.    Gastrointestinal: Negative for nausea and vomiting.   Musculoskeletal: Positive for arthralgias, back pain and myalgias.   Skin: Positive for wound. Negative for color change.   Neurological: Positive for weakness. Negative for numbness.   Psychiatric/Behavioral: Negative for behavioral problems and confusion.     Objective:     Vital Signs (Most Recent):  Temp: 98 °F (36.7 °C) (09/18/20 1122)  Pulse: (!) 129 (09/18/20 1122)  Resp: 14 (09/18/20 1325)  BP: (!) 102/59 (09/18/20 1122)  SpO2: 97 % (09/18/20 1122) Vital Signs (24h Range):  Temp:  [97.4 °F (36.3 °C)-98.9 °F (37.2 °C)] 98 °F (36.7 °C)  Pulse:  [128-131] 129  Resp:  [12-21] 14  SpO2:  [89 %-100 %] 97 %  BP: (102-146)/(59-88) 102/59     Weight: 114.8 kg (253 lb 1.4 oz)  Body mass index is 30.81 kg/m².    Foot  Exam    General  Orientation: alert and oriented to person, place, and time   Affect: appropriate       Right Foot/Ankle     Inspection and Palpation  Ecchymosis: none  Tenderness: none   Swelling: none   Skin Exam: skin intact;     Neurovascular  Dorsalis pedis: absent  Posterior tibial: absent  Saphenous nerve sensation: diminished  Tibial nerve sensation: diminished  Superficial peroneal nerve sensation: diminished  Deep peroneal nerve sensation: diminished  Sural nerve sensation: diminished      Left Foot/Ankle      Inspection and Palpation  Ecchymosis: none  Tenderness: (Periwound)  Swelling: none   Skin Exam: dry skin, skin changes and ulcer; no drainage     Neurovascular  Dorsalis pedis: absent  Posterior tibial: absent  Saphenous nerve sensation: diminished  Tibial nerve sensation: diminished  Superficial peroneal nerve sensation: diminished  Deep peroneal nerve sensation: diminished  Sural nerve sensation: diminished            Laboratory:  CBC:   Recent Labs   Lab 09/18/20  0826   WBC 18.44*   RBC 3.33*   HGB 8.9*   HCT 30.0*   *   MCV 90   MCH 26.7*   MCHC 29.7*     CRP:   Recent Labs   Lab 09/18/20  0826   CRP 65.1*     ESR: No results for input(s): SEDRATE in the last 168 hours.  Wound Cultures:   Recent Labs   Lab 08/31/20  1754 09/03/20  1647 09/07/20  1721 09/16/20  1612   LABAERO PROTEUS MIRABILIS  Moderate  *  METHICILLIN RESISTANT STAPHYLOCOCCUS AUREUS  Moderate  * METHICILLIN RESISTANT STAPHYLOCOCCUS AUREUS  Few  * No growth  No growth No growth     Microbiology Results (last 7 days)     Procedure Component Value Units Date/Time    Blood culture [[id number]] Collected: 09/12/20 2014    Order Status: Completed Specimen: Blood from Peripheral, Forearm, Right Updated: 09/18/20 0612     Blood Culture, Routine No growth after 5 days.    Blood culture [[id number]] Collected: 09/12/20 2015    Order Status: Completed Specimen: Blood from Peripheral, Antecubital, Right Updated: 09/18/20 0612      Blood Culture, Routine No growth after 5 days.    Aerobic culture [[id number]] Collected: 09/16/20 1612    Order Status: Completed Specimen: Body Fluid from Gallbladder Updated: 09/17/20 1005     Aerobic Bacterial Culture No growth    Fungus culture [[id number]] Collected: 09/16/20 1612    Order Status: Completed Specimen: Body Fluid from Gallbladder Updated: 09/17/20 0908     Fungus (Mycology) Culture Culture in progress    Fungus culture [[id number]] Collected: 09/03/20 1647    Order Status: Completed Specimen: Joint Fluid from Knee, Right Updated: 09/17/20 0726     Fungus (Mycology) Culture Culture in progress      No fungus isolated after 2 weeks    Culture, Anaerobe [[id number]] Collected: 09/16/20 1612    Order Status: Completed Specimen: Body Fluid from Gallbladder Updated: 09/17/20 0652     Anaerobic Culture Culture in progress    Gram stain [[id number]] Collected: 09/16/20 1612    Order Status: Completed Specimen: Body Fluid from Gallbladder Updated: 09/16/20 2213     Gram Stain Result Rare WBC's      No organisms seen    Culture, Anaerobe [[id number]]     Order Status: No result Specimen: Body Fluid from Gallbladder     Aerobic culture [[id number]]     Order Status: No result Specimen: Body Fluid from Gallbladder     Gram stain [[id number]]     Order Status: No result Specimen: Body Fluid from Gallbladder     Culture, Anaerobe [[id number]] Collected: 09/07/20 1721    Order Status: Completed Specimen: Body Fluid from Knee, Right Updated: 09/16/20 0748     Anaerobic Culture No anaerobes isolated    Narrative:      2) Right Knee Joint Fluid    Culture, Respiratory with Gram Stain [[id number]]     Order Status: No result Specimen: Respiratory     Blood culture [[id number]] Collected: 09/10/20 0459    Order Status: Completed Specimen: Blood Updated: 09/15/20 0812     Blood Culture, Routine No growth after 5 days.    Blood culture [[id number]] Collected: 09/09/20 1040    Order Status: Completed Specimen: Blood Updated:  09/14/20 1412     Blood Culture, Routine No growth after 5 days.    Blood culture [[id number]] Collected: 09/09/20 0303    Order Status: Completed Specimen: Blood Updated: 09/14/20 0812     Blood Culture, Routine No growth after 5 days.    Culture, Anaerobe [[id number]] Collected: 09/07/20 1721    Order Status: Completed Specimen: Body Fluid from Knee, Right Updated: 09/14/20 0634     Anaerobic Culture No anaerobes isolated    Narrative:      1) Right Knee Joint Fluid    Blood culture [[id number]] Collected: 09/08/20 1247    Order Status: Completed Specimen: Blood from Antecubital, Right Hand Updated: 09/13/20 1412     Blood Culture, Routine No growth after 5 days.    Narrative:      Collection has been rescheduled by RB8 at 09/08/2020 11:43 Reason:   Patient unavailable,nurse Mckay #[id number] was notified  Collection has been rescheduled by RB8 at 09/08/2020 11:43 Reason:   Patient unavailable,nurse Mckay #[id number] was notified    Blood culture [[id number]] Collected: 09/08/20 1247    Order Status: Completed Specimen: Blood from Antecubital, Right Arm Updated: 09/13/20 1412     Blood Culture, Routine No growth after 5 days.    Narrative:      Collection has been rescheduled by RB8 at 09/08/2020 11:43 Reason:   Patient unavailable,nurse Mckay #[id number] was notified  Collection has been rescheduled by RB8 at 09/08/2020 11:43 Reason:   Patient unavailable,nurse Mckay #[id number] was notified    Blood culture [[id number]] Collected: 09/07/20 0733    Order Status: Completed Specimen: Blood from Antecubital, Right Updated: 09/12/20 0822     Blood Culture, Routine No growth after 5 days.    Clostridium difficile EIA [[id number]] Collected: 09/11/20 1734    Order Status: Completed Specimen: Stool Updated: 09/11/20 2258     C. diff Antigen Negative     C difficile Toxins A+B, EIA Negative     Comment: Testing not recommended for children <24 months old.           Specimen (12h ago, onward)    None          Diagnostic Results:  I have reviewed  all pertinent imaging results/findings within the past 24 hours.    Clinical Findings:  Left plantar foot ulcer measuring 3.2x1.4x0.7 cm, fibronecrotic wound base, scant serous drainage, no undermining, no erythema, no edema, no fluctuance or crepitus. No fluid could be expressed on manual compression. Mild periwound tenderness. Appears stable overall. Not clinically infected.

## 2020-09-18 NOTE — PT/OT/SLP RE-EVAL
Occupational Therapy   Re-evaluation/ Treatment     Name: Ed Patton  MRN: [id number]  Admitting Diagnosis:  Septic shock due to methicillin resistant Staphylococcus aureus 11 Days Post-Op    Recommendations:     Discharge Recommendations: nursing facility, skilled  Discharge Equipment Recommendations:  (TBD pending progress)  Barriers to discharge:  Decreased caregiver support    Assessment:     Ed Patton is a 65 y.o. male with a medical diagnosis of Septic shock due to methicillin resistant Staphylococcus aureus.  He presents with the following performance deficits affecting function are weakness, impaired endurance, decreased ROM, impaired cognition, impaired self care skills, impaired functional mobilty, decreased safety awareness, impaired cardiopulmonary response to activity, impaired balance, decreased lower extremity function, decreased upper extremity function, impaired coordination. Re-evaluation complete this date. Pt presents to OT requiring total A of 2 persons for bed mobility and CGA-max A for sitting balance EOB. Pt with delayed responses requiring increased time to follow simple commands. Pt with minimal verbalizations throughout the session. Pt tachycardic at rest with HR reading 120; HR reached 130 once EOB where it remained during the rest of the session. Per trends, pt has been tachycardic all morning. Pt's wife present and active in POC. Pt will benefit from SNF after discharge from acute services in order to continue to build strength and endurance to help pt maximize independence and progress towards PLOF. Pt will continue to benefit from skilled OT services during this hospital admit to continue to improve ADL performance and transferring ability.    Rehab Prognosis:  Good; patient would benefit from acute skilled OT services to address these deficits and reach maximum level of function.       Plan:     Patient to be seen 3 x/week to address the above listed problems via  self-care/home management, therapeutic activities, therapeutic exercises, neuromuscular re-education  · Plan of Care Expires: 10/08/20  · Plan of Care Reviewed with: patient, spouse    Subjective     Chief Complaint: None  Patient/Family stated goals: Pt agreeable to OT/PT re-evaluation and OT POC.   Communicated with: RN prior to session.  Pain/Comfort:  · Pain Rating 1: 0/10  · Pain Rating Post-Intervention 1: (pain in BLE during bed mobility)    Objective:     Patient found HOB elevated with: blood pressure cuff, telemetry, peripheral IV, bowel management system, oxygen, pressure relief boots, pulse ox (continuous) upon OT entry to room.    General Precautions: Standard, fall, special contact, dental soft   Orthopedic Precautions:LLE weight bearing as tolerated   Braces: N/A     Occupational Performance:    Bed Mobility:    · Patient completed Rolling/Turning to Left with  total assistance and 2 persons  · Patient completed Scooting/Bridging with total assistance and 2 persons  · x2 L lateral side scoots performed while seated with max A of 2 persons for posterior positioning in bed.   · Patient completed Supine to Sit with total assistance and 2 persons for trunk elevation and BLE placement EOB; pain reported   · Patient completed Sit to Supine with total assistance and 2 persons for trunk and BLE placement into bed     Functional Mobility/Transfers:  · NT due to decreased activity tolerance seated EOB     Activities of Daily Living:  · Lower Body Dressing: total assistance to don socks in supine   · Toileting: dependence with toure and BMS for voiding     Cognitive/Visual Perceptual:  Cognitive/Psychosocial Skills:     -       Oriented to: Person, Place and Situation   -       Follows Commands/attention:Easily distracted and Follows one-step commands  -       Communication: Clear; minimal verbalizations  -       Memory: Poor immediate recall  -       Safety awareness/insight to disability: impaired   -        Mood/Affect/Coping skills/emotional control: Lethargic  Visual/Perceptual:      -no defs noted     Physical Exam:  Balance:    -       EOB: 10 mins: CGA- Max A upon onset of fatigue and during BLE ROM  Postural examination/scapula alignment:    -       Rounded shoulders  -       Forward head  -       Kyphosis  Skin integrity: Dry  Edema:  Mild B/L knees  Sensation:    -       Intact  Dominant hand:    -       right  Upper Extremity Range of Motion:     -       Right Upper Extremity: WFL  -       Left Upper Extremity: WFL with AA  Upper Extremity Strength:    -       Right Upper Extremity: 3+/5  -       Left Upper Extremity: 3+/5   Strength:    -       Right Upper Extremity: WFL  -       Left Upper Extremity: WFL  Fine Motor Coordination: Intact    AMPAC 6 Click:  AMPAC Total Score: 9    Treatment & Education:  Education:  - Role of OT/ OT POC  - Self care safety/ independence  - Bed mobility safety  - Pursed lip breathing  - Importance of sitting EOB during functional tasks  - NMR: Postural control addressed EOB by proving verbal cues and posterior facilitation to improve upright sitting balance in preparation of completion of occupations of choice.  - Vitals monitored throughout the session; impaired cardiopulmonary response to ax noted.    Patient left right sidelying with all lines intact, call button in reach, bed alarm on, RN notified and wife present    GOALS:   Multidisciplinary Problems     Occupational Therapy Goals        Problem: Occupational Therapy Goal    Goal Priority Disciplines Outcome Interventions   Occupational Therapy Goal     OT, PT/OT Ongoing, Progressing    Description: Goals to be met by:10/01/2020  Frannie thomas on 9/18    Patient will increase functional independence with ADLs by performing:    UE Dressing with [place].  LE Dressing with Stand-by Assistance.  Grooming while seated at sink with [place].  Toileting from bedside commode with Supervision for hygiene and clothing  management.   Bathing from  sitting at sink with Set-up Assistance.  Toilet transfer to bedside commode with Stand-by Assistance, accurate adherence to NWB precautions LLE.    Goals to be met by: 10/18/20    Patient will increase functional independence with ADLs by performing:    Feeding with Set-up Assistance.  Grooming while EOB with Minimal Assistance.  Toileting from bedside commode with Moderate Assistance for hygiene and clothing management.   Supine to sit with Moderate Assistance to increased bed mobility independence.   Stand pivot transfers with Moderate Assistance and maintaining weight-bearing precaution(s) to reach bedside chair.  Toilet transfer to bedside commode with Moderate Assistance and maintaining weight-bearing precaution(s).  Upper extremity exercise program x15 reps per handout, with supervision to improve BUE function to increase independence in daily occupations.                     History:     Past Medical History:   Diagnosis Date    CHF (congestive heart failure)     COPD (chronic obstructive pulmonary disease)     Coronary artery disease     Diabetes mellitus     Diabetes mellitus type II     DM (diabetes mellitus) type II uncontrolled with renal manifestation 9/4/2013    Hyperlipidemia     Hypertension     Postablative hypothyroidism 12/6/2018       Past Surgical History:   Procedure Laterality Date    APPENDECTOMY      ARTHROSCOPY OF KNEE Right 9/7/2020    Procedure: ARTHROSCOPY, KNEE, RIGHT ;  Surgeon: You Bhatia MD;  Location: [facility] OR [address] [place] [address];  Service: Orthopedics;  Laterality: Right;    CHOLECYSTECTOMY      CORONARY ANGIOPLASTY WITH STENT PLACEMENT      TONSILLECTOMY      TRANSESOPHAGEAL ECHOCARDIOGRAPHY N/A 9/4/2020    Procedure: ECHOCARDIOGRAM, TRANSESOPHAGEAL;  Surgeon: Dorian Diagnostic Provider;  Location: [facility] EP LAB;  Service: Anesthesiology;  Laterality: N/A;    TRANSESOPHAGEAL ECHOCARDIOGRAPHY N/A 9/3/2020    Procedure: ECHOCARDIOGRAM,  TRANSESOPHAGEAL;  Surgeon: Dorian Diagnostic Provider;  Location: [facility] LAB;  Service: Anesthesiology;  Laterality: N/A;       Time Tracking:     OT Date of Treatment: 09/18/20  OT Start Time: 1024  OT Stop Time: 1057  OT Total Time (min): 33 min co eval with PT     Billable Minutes:Re-eval 10  Therapeutic Activity 13  Neuromuscular Re-education 10    Brenna Gonzalez, OT  9/18/2020

## 2020-09-18 NOTE — PLAN OF CARE
Problem: Occupational Therapy Goal  Goal: Occupational Therapy Goal  Description: Goals to be met by:10/01/2020  Re eval on 9/18    Patient will increase functional independence with ADLs by performing:    UE Dressing with [place].  LE Dressing with Stand-by Assistance.  Grooming while seated at sink with [place].  Toileting from bedside commode with Supervision for hygiene and clothing management.   Bathing from  sitting at sink with Set-up Assistance.  Toilet transfer to bedside commode with Stand-by Assistance, accurate adherence to NWB precautions LLE.    Goals to be met by: 10/18/20    Patient will increase functional independence with ADLs by performing:    Feeding with Set-up Assistance.  Grooming while EOB with Minimal Assistance.  Toileting from bedside commode with Moderate Assistance for hygiene and clothing management.   Supine to sit with Moderate Assistance to increased bed mobility independence.   Stand pivot transfers with Moderate Assistance and maintaining weight-bearing precaution(s) to reach bedside chair.  Toilet transfer to bedside commode with Moderate Assistance and maintaining weight-bearing precaution(s).  Upper extremity exercise program x15 reps per handout, with supervision to improve BUE function to increase independence in daily occupations.    Outcome: Ongoing, Progressing       Re evaluation complete s/p stepping down to the unit. OT recommending skilled nursing facility post acuter.     Brenna Gonzalez OTR/L  9/18/20

## 2020-09-18 NOTE — SUBJECTIVE & OBJECTIVE
Interval History/Significant Events: NAEON  Alert and oriented this morning  Discussed with nephrology, no plans for dialysis. Patient to be stepped down to hospital medicine team    Review of Systems   Unable to perform ROS: Acuity of condition     Objective:     Vital Signs (Most Recent):  Temp: 97.7 °F (36.5 °C) (09/17/20 2019)  Pulse: (!) 130 (09/17/20 2019)  Resp: 18 (09/17/20 2019)  BP: (!) 146/87 (09/17/20 2019)  SpO2: (!) 94 % (09/17/20 1800) Vital Signs (24h Range):  Temp:  [97.7 °F (36.5 °C)-98.9 °F (37.2 °C)] 97.7 °F (36.5 °C)  Pulse:  [129-131] 130  Resp:  [10-27] 18  SpO2:  [89 %-100 %] 94 %  BP: (111-166)/(67-94) 146/87   Weight: 114.8 kg (253 lb 1.4 oz)  Body mass index is 30.81 kg/m².      Intake/Output Summary (Last 24 hours) at 9/17/2020 2107  Last data filed at 9/17/2020 1800  Gross per 24 hour   Intake 2005.15 ml   Output 610 ml   Net 1395.15 ml       Physical Exam  Constitutional:       General: He is not in acute distress.     Appearance: Normal appearance. He is well-developed and overweight. He is ill-appearing.      Interventions: Nasal cannula in place.   HENT:      Head: Normocephalic and atraumatic.   Eyes:      General: No scleral icterus.     Conjunctiva/sclera: Conjunctivae normal.      Pupils: Pupils are equal, round, and reactive to light.   Neck:      Musculoskeletal: Normal range of motion and neck supple.      Comments: Central line inserted in right IJ with clean, dry, and intact dressing.   Cardiovascular:      Rate and Rhythm: Regular rhythm. Tachycardia present.      Pulses: Normal pulses.      Heart sounds: Normal heart sounds. No murmur. No friction rub. No gallop.    Pulmonary:      Effort: Pulmonary effort is normal. No respiratory distress.      Breath sounds: Examination of the right-lower field reveals decreased breath sounds. Examination of the left-lower field reveals decreased breath sounds. Decreased breath sounds present. No wheezing.      Comments: Clear to  auscultation b/l anteriorly  Abdominal:      General: Bowel sounds are normal. There is no distension.      Palpations: Abdomen is soft. There is no mass.      Tenderness: There is no abdominal tenderness. There is no guarding.   Musculoskeletal:      Comments: There is 3+ pitting edema of the bilateral lower extremities till knees.  Normal passive range of motion   Lymphadenopathy:      Cervical: No cervical adenopathy.   Skin:     General: Skin is warm and dry.      Findings: No bruising or rash.   Neurological:      General: No focal deficit present.      Mental Status: He is oriented to person, place, and time. He is lethargic.      Cranial Nerves: No cranial nerve deficit.      Motor: No weakness.         Vents:     Lines/Drains/Airways     Central Venous Catheter Line            [facility] (Dialysis) Catheter 09/13/20 0807 right internal jugular 4 days          Drain                 Urethral Catheter 09/09/20 1720 16 Fr. 8 days         NG/OG Tube 09/14/20 0500 [place] sump 14 Fr. Left nostril 3 days         Drain/Device  09/16/20 1606 Right upper abdomen t-tube 1 day              Significant Labs:    CBC/Anemia Profile:  Recent Labs   Lab 09/16/20  0323 09/17/20  0246   WBC 33.39*  33.39* 24.69*  24.69*   HGB 8.5*  8.5* 8.6*  8.6*   HCT 28.8*  28.8* 29.5*  29.5*   *  431* 436*  436*   MCV 90  90 91  91   RDW 15.9*  15.9* 16.1*  16.1*        Chemistries:  Recent Labs   Lab 09/16/20  0323 09/16/20  0328  09/17/20  0242 09/17/20  1250 09/17/20  1700    142   < > 142  142 138 137   K 4.1 4.4   < > 3.6  3.6 3.7 3.7    104   < > 103  103 104 102   CO2 27 28   < > 29  29 25 26   BUN 19 24*   < > 41*  41* 46* 48*   CREATININE 1.8* 1.9*   < > 2.5*  2.5* 2.5* 2.6*   CALCIUM 9.5 9.3   < > 9.0  9.0 8.7 8.4*   ALBUMIN 1.5*  --   --  1.5*  --   --    PROT 7.2  --   --  7.1  --   --    BILITOT 0.4  --   --  0.4  --   --    ALKPHOS 197*  --   --  184*  --   --    ALT 41  --   --  42  --   --     AST 49*  --   --  45*  --   --    MG  --  2.5  --  2.4  --   --     < > = values in this interval not displayed.       All pertinent labs within the past 24 hours have been reviewed.    Significant Imaging:  None

## 2020-09-18 NOTE — SUBJECTIVE & OBJECTIVE
Interval HPI:   Overnight events:   BG is above goal at time of consult. Spoke with primary care team and discussed insulin POC via telephone.   Diet Dysphagia Mechanical Soft (IDDSI Level 5)  11 Days Post-Op    Eating:   <25%  Nausea: No  Hypoglycemia and intervention: No  Fever: No  TPN and/or TF: No  If yes, type of TF/TPN and rate: None    PMH, PSH, FH, SH updated and reviewed       Review of Systems  Constitutional: Negative for weight changes.  Eyes:Positive for visual disturbance. Complains of blurry vision to right eye  Respiratory: Negative for cough.   Cardiovascular: Negative for chest pain.  Gastrointestinal: Negative for nausea.  Endocrine: Positive for polyuria, polydipsia.  Musculoskeletal: Negative for back pain.  Skin: Negative for rash.  Neurological: Negative for syncope.  Psychiatric/Behavioral: Negative for depression.    Current Medications and/or Treatments Impacting Glycemic Control  Immunotherapy:    Immunosuppressants     None        Steroids:   Hormones (From admission, onward)    Start     Stop Route Frequency Ordered    08/30/20 2059  melatonin tablet 6 mg      -- Oral Nightly PRN 08/30/20 2004        Pressors:    Autonomic Drugs (From admission, onward)    None        Hyperglycemia/Diabetes Medications:   Antihyperglycemics (From admission, onward)    Start     Stop Route Frequency Ordered    09/18/20 1130  insulin aspart U-100 pen 5-10 Units      -- SubQ 3 times daily with meals 09/18/20 0929 09/18/20 1030  insulin regular 100 Units in sodium chloride 0.9% 100 mL infusion      -- IV Continuous 09/18/20 0929 09/18/20 1028  insulin aspart U-100 pen 0-5 Units      -- SubQ As needed (PRN) 09/18/20 0929             PHYSICAL EXAMINATION:  Vitals:    09/18/20 0806   BP:    Pulse: (!) 130   Resp: 16   Temp:      Body mass index is 30.81 kg/m².    Physical Exam   Constitutional: Well developed, well nourished, NAD.  ENT:  normal hearing.  Neck: Supple; trachea midline;   Cardiovascular:  Normal heart sounds, no LE edema. DP +2 bilaterally.  Lungs: Normal effort; lungs anterior bilaterally clear to auscultation.  Abdomen: Soft, obese, no masses, no hernias.  MS: No clubbing or cyanosis of nails noted;  Skin: No rashes, lesions, or ulcers; no nodules. Injection sites are ok. No lipo hypertropthy or atrophy. Wound dressing noted to left foot. Please see wound care notes for additional details. Dressing is dry and intact at time of consult.   Psychiatric: Good judgement and insight; normal mood and affect.  Neurological: Cranial nerves are grossly intact.  Foot: nails in good condition, no amputations noted.

## 2020-09-18 NOTE — ASSESSMENT & PLAN NOTE
The clearance of many drugs, including antiepileptic, anticoagulant, hypnotic, and opioid drugs, is decreased in hypothyroidism. Thus, drug toxicity may occur if drug dose is not reduced. Thyroid disease may also affect lipid profiles which may then cause increase in insulin resistance.     Recommend patient continue Levothyroxine 75 mcg

## 2020-09-18 NOTE — ASSESSMENT & PLAN NOTE
-initially non oliguric stage 3 bernadette with ischemic atn likely secondary to hypotension from septic shock  -was oliguric requiring rrt last 9/15  -now oliguria improved   -continues to have good urine output and bun/cr are stabilizing   -no acute indication for hd and patient increasing urine output   -if volume removal is desired can use 80mg furosemide q8h

## 2020-09-18 NOTE — SUBJECTIVE & OBJECTIVE
Interval History:   Continued improvement in clinical status. Stabilization of cr and bun.    Review of patient's allergies indicates:   Allergen Reactions    Vicodin [hydrocodone-acetaminophen] Itching     Current Facility-Administered Medications   Medication Frequency    acetaminophen tablet 1,000 mg Q8H PRN    albuterol-ipratropium 2.5 mg-0.5 mg/3 mL nebulizer solution 3 mL Q4H PRN    artificial tears 0.5 % ophthalmic solution 1 drop TID    calcium carbonate 200 mg calcium (500 mg) chewable tablet 500 mg BID PRN    DAPTOmycin (CUBICIN) 1,050 mg in sodium chloride 0.9% IVPB Q24H    dextrose 50% injection 12.5 g PRN    dextrose 50% injection 25 g PRN    fluticasone furoate-vilanteroL 200-25 mcg/dose diskus inhaler 1 puff Daily    fluticasone propionate 50 mcg/actuation nasal spray 50 mcg Daily    glucagon (human recombinant) injection 1 mg PRN    glucose chewable tablet 16 g PRN    glucose chewable tablet 24 g PRN    heparin (porcine) injection 5,000 Units Q8H    insulin aspart U-100 pen 0-5 Units PRN    insulin aspart U-100 pen 5-10 Units TIDWM    insulin regular 100 Units in sodium chloride 0.9% 100 mL infusion Continuous    levoFLOXacin tablet 750 mg Every other day    levothyroxine tablet 75 mcg Before breakfast    melatonin tablet 6 mg Nightly PRN    ondansetron injection 4 mg Q8H PRN    oxyCODONE immediate release tablet 5 mg Q8H PRN    polyethylene glycol packet 17 g BID PRN    Tdap vaccine injection 0.5 mL vaccine x 1 dose       Objective:     Vital Signs (Most Recent):  Temp: 97.9 °F (36.6 °C) (09/18/20 1603)  Pulse: (!) 128 (09/18/20 1603)  Resp: 14 (09/18/20 1603)  BP: 113/74 (09/18/20 1603)  SpO2: 100 % (09/18/20 1603)  O2 Device (Oxygen Therapy): nasal cannula (09/18/20 1603) Vital Signs (24h Range):  Temp:  [97.4 °F (36.3 °C)-98.4 °F (36.9 °C)] 97.9 °F (36.6 °C)  Pulse:  [128-131] 128  Resp:  [12-18] 14  SpO2:  [89 %-100 %] 100 %  BP: (102-146)/(59-87) 113/74     Weight: 114.8  kg (253 lb 1.4 oz) (09/18/20 0400)  Body mass index is 30.81 kg/m².  Body surface area is 2.48 meters squared.    I/O last 3 completed shifts:  In: 2045.2 [P.O.:1440; I.V.:65.2; NG/GT:540]  Out: 1180 [Urine:1075; Drains:35; Other:70]    Physical Exam  Vitals signs and nursing note reviewed.   Constitutional:       General: He is not in acute distress.     Appearance: He is well-developed. He is obese. He is ill-appearing. He is not diaphoretic.   HENT:      Head: Normocephalic and atraumatic.      Right Ear: External ear normal.      Left Ear: External ear normal.      Mouth/Throat:      Pharynx: No oropharyngeal exudate.   Eyes:      General: No scleral icterus.        Right eye: No discharge.         Left eye: No discharge.      Conjunctiva/sclera: Conjunctivae normal.      Pupils: Pupils are equal, round, and reactive to light.   Neck:      Musculoskeletal: Normal range of motion.      Thyroid: No thyromegaly.      Trachea: No tracheal deviation.      Comments: [facility]  Cardiovascular:      Rate and Rhythm: Tachycardia present.   Pulmonary:      Effort: Pulmonary effort is normal. No respiratory distress.      Breath sounds: Normal breath sounds. No stridor. No wheezing or rales.   Abdominal:      General: Bowel sounds are normal. There is no distension.      Palpations: Abdomen is soft.      Tenderness: There is no abdominal tenderness. There is no guarding.   Musculoskeletal:         General: No tenderness or deformity.      Right lower leg: Edema present.      Left lower leg: Edema present.   Lymphadenopathy:      Cervical: No cervical adenopathy.   Skin:     Coloration: Skin is not pale.      Findings: No erythema or rash.   Neurological:      General: No focal deficit present.         Significant Labs:  All labs within the past 24 hours have been reviewed.     Significant Imaging:  Labs: Reviewed

## 2020-09-18 NOTE — ASSESSMENT & PLAN NOTE
This is a 65 year old  male with PMH significant for HFrEF and COPD with chronic respiratory failure (on 2L home oxygen), T2DM with CKD III, and iron deficiency anemia who originally presented to [facility] on 08/30 with cellulitis of left foot with concern for diabetic foot infection with subsequent development of MRSA bacteremia attributed to septic arthritis of right knee and elbow. He is status post drainage and coverage for MRSA since that time. His work-up for other etiologies of MRSA bacteremia have included negative LUKAS and MRI of lumbar spine looking for endocarditis and spinal abscess, respectively. Stool studies for C.diff on 09/11 were negative. His hospital course has been associated with worsening hypotension and leukocytosis since 09/10 prompting ICU admission on 09/12 for initiation of vasopressor support. Infectious work-up thus far on ICU admission concern for likely right lower lobe HAP.     CT abdomen pelvis on 9/15 with gallbladder dilation, sludge, and trace pericholic fluid collection. Exam not consistent with cholecystitis but given persistent shock potential source. Also with potential right lower lobe atelectasis with overlying infection    - Patient off pressors now    Plan:   - HIDA+ cholecystitis. Gen surg feels patient not a good surgical candidate. Recommend IR cholecystostomy drain placement. Drain placed by IR 9/16  - Off pressors for >48 hrs now  - Continue broad spectrum coverage with Daptomycin (q24) and Levoflox (q48). Check weekly CPK for daptomycin.  Follow ID recs  - completed stress steroids  - Trending WBC count daily.

## 2020-09-18 NOTE — PROGRESS NOTES
[facility]  Infectious Disease  Progress Note    Patient Name: Ed Patton  MRN: [id number]  Admission Date: 8/30/2020  Length of Stay: 19 days  Attending Physician: Thomas Reed MD  Primary Care Provider: Qiana Chow MD    Isolation Status: Contact  Assessment/Plan:      * Septic shock due to methicillin resistant Staphylococcus aureus  MRSA septicemia. Still tachycardic, SpO2 reached 92%, renal function continues to worsen. WBCs not improving. ESR and CRP elevated, RF WNL. Vancomycin was supratherapeutic, switched to pulse dose regimen. Source of bacteremia unclear, may be left foot wound or a septic process at the right knee joint. Right knee now clinically suspicious for septic arthritis (see physical exam). Left foot wound growing Proteus and MRSA, urine growing Klebsiella. TTE & LUKAS is negative. Dapto/ceftaroline switched back to vanc due to worsening renal function and early signs of rhabdo. Course complicated by worsening leukocytosis (C Diff neg)     Blood cultures: MRSA  R knee fluid culture: Staph aureus  Left foot wound: Proteus, MRSA  Source Control: I&D of Septic R knee on 9/7/20.      Repeat Blood cultures 09/09, 09/10, 09/12 have been negative  CXR with right lower lobe opacities concerning for developing pneumonia  CT Chest/Abdomen/Pelvis: Opacities in Right lower lobe concerning for atelectis and possible superimposed infection. Revealed findings concerning for acute cholecystitis. No identification of abscess.   S/p cholecystotomy drain placement 09/16. Cultures so far have been negative, anerobic and fungus culture pending.    Recommendations:   -- Continue oral levofloxacin 750mg q48hrs (renally dosed). Will plan for 14 day course after source control with cholecystotomy tube placement. End date of therapy: 09/30/2020  -- Continue IV Daptomycin 1050mg q24hrs for 4 week course post blood culture clearance. End date of therapy: 10/07/2020  -- Follow up in ID  clinic. Outpatient Infectious Diseases clinic follow up will be arranged and found in patient calendar.   -- Weekly outpatient laboratory on Monday or Tuesday while on IV antibiotics: CBC, CMP, CK. Fax laboratory results to [facility] at [phone] with attn: Mikie Liang        Thank you for your consult. I will sign off. Please contact us if you have any additional questions.    Caitlyn Yeung MD  Infectious Disease  [facility]    Subjective:     Principal Problem:Septic shock due to methicillin resistant Staphylococcus aureus    HPI: 66 yo M with Hx of DM2 (on insulin), chronic hypoxemic respiratory failure (on O2 at home), chronic systolic heart failure presented to ED after recurrent falls 7 and 8 days ago. He has a history of falls but reports that his tendency to fall has worsened recently, resulting in injury to his lower back and right thigh. After his fall last Tuesday he looked at his left foot and noticed a wound, denies pain or drainage related to the wound, he admits to not regularly checking his feet and does not know how long the wound has been present. He believes the wound is related to a tack that he previously found imbedded in the bottom of a slipper. He had previously seen Ang Lugo DPM for diabetic foot care but has not seen her this year due to fear of the coronavirus pandemic. He has been getting home health services including physical therapy. No subjective change as of today, still denies pain and drainage related to the wound. Denies F/C/N/V.  Interval History: NAEON. Patient was transferred from ICU to stepdown unit. More alert and conversant today, with wife at bedside.     Review of Systems   Unable to perform ROS: Mental status change     Objective:     Vital Signs (Most Recent):  Temp: 97.9 °F (36.6 °C) (09/18/20 0802)  Pulse: (!) 130 (09/18/20 0806)  Resp: 16 (09/18/20 0806)  BP: 118/78 (09/18/20 0802)  SpO2: 100 % (09/18/20 0802) Vital Signs (24h  Range):  Temp:  [97.4 °F (36.3 °C)-98.9 °F (37.2 °C)] 97.9 °F (36.6 °C)  Pulse:  [128-131] 130  Resp:  [10-21] 16  SpO2:  [94 %-100 %] 100 %  BP: (113-146)/(72-88) 118/78     Weight: 114.8 kg (253 lb 1.4 oz)  Body mass index is 30.81 kg/m².    Estimated Creatinine Clearance: 40.3 mL/min (A) (based on SCr of 2.6 mg/dL (H)).    Physical Exam  Constitutional:       Appearance: Normal appearance.   HENT:      Head: Normocephalic.      Mouth/Throat:      Mouth: Mucous membranes are moist.   Eyes:      Extraocular Movements: Extraocular movements intact.   Neck:      Musculoskeletal: Normal range of motion.      Comments: Right IJ CVC in place. No tenderness or erythema noted on surrounding skin  Cardiovascular:      Rate and Rhythm: Regular rhythm. Tachycardia present.      Heart sounds: No murmur.   Pulmonary:      Effort: Pulmonary effort is normal. No respiratory distress.      Breath sounds: Normal breath sounds. No wheezing.   Abdominal:      General: There is no distension.      Palpations: Abdomen is soft.      Tenderness: There is no abdominal tenderness.      Comments: Cholecystotomy tube in place draining clear bilious fluid   Musculoskeletal:      Comments: Stage 2 diabetic foot ulcer measuring 1cm noted on Left 1st phalanx.    Neurological:      Mental Status: He is alert.      Comments: Alert. Appears confused. Unable to answer questions   Psychiatric:      Comments: Unable to assess due to mental status          Significant Labs:   Blood Culture:   Recent Labs   Lab 09/09/20  0303 09/09/20  1040 09/10/20  0459 09/12/20 2014 09/12/20 2015   LABBLOO No growth after 5 days. No growth after 5 days. No growth after 5 days. No growth after 5 days. No growth after 5 days.     CBC:   Recent Labs   Lab 09/17/20  0246 09/18/20  0826   WBC 24.69*  24.69* 18.44*   HGB 8.6*  8.6* 8.9*   HCT 29.5*  29.5* 30.0*   *  436* 386*     CMP:   Recent Labs   Lab 09/17/20  0242 09/17/20  1250 09/17/20  1700  09/18/20  0826     142 138 137 135*   K 3.6  3.6 3.7 3.7 4.1     103 104 102 99   CO2 29  29 25 26 24   *  232* 267* 287* 236*   BUN 41*  41* 46* 48* 47*   CREATININE 2.5*  2.5* 2.5* 2.6* 2.6*   CALCIUM 9.0  9.0 8.7 8.4* 8.4*   PROT 7.1  --   --   --    ALBUMIN 1.5*  --   --   --    BILITOT 0.4  --   --   --    ALKPHOS 184*  --   --   --    AST 45*  --   --   --    ALT 42  --   --   --    ANIONGAP 10  10 9 9 12   EGFRNONAA 26.3*  26.3* 26.3* 25.1* 25.1*     Fungus Culture (Blood or Bone Marrow): No results for input(s): FUNGUSCULTUR in the last 4320 hours.  Respiratory Culture: No results for input(s): GSRESP, RESPIRATORYC in the last 4320 hours.    Significant Imaging: I have reviewed all pertinent imaging results/findings within the past 24 hours.

## 2020-09-18 NOTE — CARE UPDATE
Rapid Response Nurse Chart Check     Chart check completed, abnormal VS noted. HR remains elevated at 130bpm. Recent s/d from CMICU. BP stable. Please call [phone] for further concerns or assistance.

## 2020-09-18 NOTE — ASSESSMENT & PLAN NOTE
-Most recent A1c 9.5 in 08/2020.   -Home regimen: NPH 45U AM and 35U PM.   -Complicated by CKD III and polyneuropathy with diabetic foot ulcer.   -ICU admission on 09/12 notable for increased anion gap metabolic acidosis and elevated beta-hydroxybutyrate concern for DKA possible precipitated from withholding insulin versus worsening of underlying infection.   - AG closed    Plan:   - Will transition off insulin gtt once requirements more stable.   - Holding home Gabapentin with concern for hypotension.   - Hypoglycemia protocol ordered.

## 2020-09-18 NOTE — ASSESSMENT & PLAN NOTE
-Admission notable for progressive NANETTE since approximately 09/08 with worsening Cr associated with increased anion gap metabolic acidosis.   -Status post evaluation by nephrology with concern for ATN based on urine microscopy showing scant normal red blood cells without any dysmorphic shapes evident and numerous muddy brown casts.   -Admission to ICU notable for continued decline in renal function with eventual initiation of CRRT on 09/13.   -Etiology of continued decline in renal function is likely multifactorial from shock vs progression of CHF vs DKA vs medication side-effect.   Responded to lasix minimally    Plan:   - Stepped down to hosp med. No plans for dialysis per nephrology. Can consider higher dose lasix trials  - Nephrology following   - Daily BMP   -Trialysis line place on 09/13 for HD access.   -Strict I/O's and daily weights, if possible.   -Renally dose all medications.

## 2020-09-18 NOTE — PROGRESS NOTES
[facility]  Podiatry  Progress Note    Patient Name: Ed Patton  MRN: [id number]  Admission Date: 8/30/2020  Hospital Length of Stay: 19 days  Attending Physician: Thomas Reed MD  Primary Care Provider: Qiana Chow MD     Subjective:     Interval History:   Leukocytosis downtrending. Stil tachycardic. Patient denies any pedal or calf pain. Denies N/V/F/C.     Scheduled Meds:   artificial tears  1 drop Both Eyes TID    DAPTOmycin (CUBICIN)  IV  1,050 mg Intravenous Q24H    fluticasone furoate-vilanteroL  1 puff Inhalation Daily    fluticasone propionate  1 spray Each Nostril Daily    heparin (porcine)  5,000 Units Subcutaneous Q8H    insulin aspart U-100  5-10 Units Subcutaneous TIDWM    levoFLOXacin  750 mg Oral Every other day    levothyroxine  75 mcg Oral Before breakfast     Continuous Infusions:   insulin (HUMAN R) infusion (adults) 1.2 Units/hr (09/18/20 1030)     PRN Meds:acetaminophen, albuterol-ipratropium, calcium carbonate, dextrose 50%, dextrose 50%, glucagon (human recombinant), glucose, glucose, insulin aspart U-100, melatonin, ondansetron, oxyCODONE, polyethylene glycol, DIPH,PERTUS (ADACEL),TETANUS PF VAC (ADULT)    Review of Systems   Constitutional: Positive for activity change. Negative for appetite change, chills and fever.   HENT: Negative for congestion.    Respiratory: Negative for cough and shortness of breath.    Gastrointestinal: Negative for nausea and vomiting.   Musculoskeletal: Positive for arthralgias, back pain and myalgias.   Skin: Positive for wound. Negative for color change.   Neurological: Positive for weakness. Negative for numbness.   Psychiatric/Behavioral: Negative for behavioral problems and confusion.     Objective:     Vital Signs (Most Recent):  Temp: 98 °F (36.7 °C) (09/18/20 1122)  Pulse: (!) 129 (09/18/20 1122)  Resp: 14 (09/18/20 1325)  BP: (!) 102/59 (09/18/20 1122)  SpO2: 97 % (09/18/20 1122) Vital Signs (24h  Range):  Temp:  [97.4 °F (36.3 °C)-98.9 °F (37.2 °C)] 98 °F (36.7 °C)  Pulse:  [128-131] 129  Resp:  [12-21] 14  SpO2:  [89 %-100 %] 97 %  BP: (102-146)/(59-88) 102/59     Weight: 114.8 kg (253 lb 1.4 oz)  Body mass index is 30.81 kg/m².    Foot Exam    General  Orientation: alert and oriented to person, place, and time   Affect: appropriate       Right Foot/Ankle     Inspection and Palpation  Ecchymosis: none  Tenderness: none   Swelling: none   Skin Exam: skin intact;     Neurovascular  Dorsalis pedis: absent  Posterior tibial: absent  Saphenous nerve sensation: diminished  Tibial nerve sensation: diminished  Superficial peroneal nerve sensation: diminished  Deep peroneal nerve sensation: diminished  Sural nerve sensation: diminished      Left Foot/Ankle      Inspection and Palpation  Ecchymosis: none  Tenderness: (Periwound)  Swelling: none   Skin Exam: dry skin, skin changes and ulcer; no drainage     Neurovascular  Dorsalis pedis: absent  Posterior tibial: absent  Saphenous nerve sensation: diminished  Tibial nerve sensation: diminished  Superficial peroneal nerve sensation: diminished  Deep peroneal nerve sensation: diminished  Sural nerve sensation: diminished            Laboratory:  CBC:   Recent Labs   Lab 09/18/20  0826   WBC 18.44*   RBC 3.33*   HGB 8.9*   HCT 30.0*   *   MCV 90   MCH 26.7*   MCHC 29.7*     CRP:   Recent Labs   Lab 09/18/20  0826   CRP 65.1*     ESR: No results for input(s): SEDRATE in the last 168 hours.  Wound Cultures:   Recent Labs   Lab 08/31/20  1754 09/03/20  1647 09/07/20  1721 09/16/20  1612   LABAERO PROTEUS MIRABILIS  Moderate  *  METHICILLIN RESISTANT STAPHYLOCOCCUS AUREUS  Moderate  * METHICILLIN RESISTANT STAPHYLOCOCCUS AUREUS  Few  * No growth  No growth No growth     Microbiology Results (last 7 days)     Procedure Component Value Units Date/Time    Blood culture [[id number]] Collected: 09/12/20 2014    Order Status: Completed Specimen: Blood from Peripheral,  Forearm, Right Updated: 09/18/20 0612     Blood Culture, Routine No growth after 5 days.    Blood culture [[id number]] Collected: 09/12/20 2015    Order Status: Completed Specimen: Blood from Peripheral, Antecubital, Right Updated: 09/18/20 0612     Blood Culture, Routine No growth after 5 days.    Aerobic culture [[id number]] Collected: 09/16/20 1612    Order Status: Completed Specimen: Body Fluid from Gallbladder Updated: 09/17/20 1005     Aerobic Bacterial Culture No growth    Fungus culture [[id number]] Collected: 09/16/20 1612    Order Status: Completed Specimen: Body Fluid from Gallbladder Updated: 09/17/20 0908     Fungus (Mycology) Culture Culture in progress    Fungus culture [[id number]] Collected: 09/03/20 1647    Order Status: Completed Specimen: Joint Fluid from Knee, Right Updated: 09/17/20 0726     Fungus (Mycology) Culture Culture in progress      No fungus isolated after 2 weeks    Culture, Anaerobe [[id number]] Collected: 09/16/20 1612    Order Status: Completed Specimen: Body Fluid from Gallbladder Updated: 09/17/20 0652     Anaerobic Culture Culture in progress    Gram stain [[id number]] Collected: 09/16/20 1612    Order Status: Completed Specimen: Body Fluid from Gallbladder Updated: 09/16/20 2213     Gram Stain Result Rare WBC's      No organisms seen    Culture, Anaerobe [[id number]]     Order Status: No result Specimen: Body Fluid from Gallbladder     Aerobic culture [[id number]]     Order Status: No result Specimen: Body Fluid from Gallbladder     Gram stain [[id number]]     Order Status: No result Specimen: Body Fluid from Gallbladder     Culture, Anaerobe [[id number]] Collected: 09/07/20 1721    Order Status: Completed Specimen: Body Fluid from Knee, Right Updated: 09/16/20 0748     Anaerobic Culture No anaerobes isolated    Narrative:      2) Right Knee Joint Fluid    Culture, Respiratory with Gram Stain [[id number]]     Order Status: No result Specimen: Respiratory     Blood culture [[id number]]  Collected: 09/10/20 0459    Order Status: Completed Specimen: Blood Updated: 09/15/20 0812     Blood Culture, Routine No growth after 5 days.    Blood culture [[id number]] Collected: 09/09/20 1040    Order Status: Completed Specimen: Blood Updated: 09/14/20 1412     Blood Culture, Routine No growth after 5 days.    Blood culture [[id number]] Collected: 09/09/20 0303    Order Status: Completed Specimen: Blood Updated: 09/14/20 0812     Blood Culture, Routine No growth after 5 days.    Culture, Anaerobe [[id number]] Collected: 09/07/20 1721    Order Status: Completed Specimen: Body Fluid from Knee, Right Updated: 09/14/20 0634     Anaerobic Culture No anaerobes isolated    Narrative:      1) Right Knee Joint Fluid    Blood culture [[id number]] Collected: 09/08/20 1247    Order Status: Completed Specimen: Blood from Antecubital, Right Hand Updated: 09/13/20 1412     Blood Culture, Routine No growth after 5 days.    Narrative:      Collection has been rescheduled by RB8 at 09/08/2020 11:43 Reason:   Patient unavailable,nurse Mckay #[id number] was notified  Collection has been rescheduled by RB8 at 09/08/2020 11:43 Reason:   Patient unavailable,nurse Mckay #[id number] was notified    Blood culture [[id number]] Collected: 09/08/20 1247    Order Status: Completed Specimen: Blood from Antecubital, Right Arm Updated: 09/13/20 1412     Blood Culture, Routine No growth after 5 days.    Narrative:      Collection has been rescheduled by RB8 at 09/08/2020 11:43 Reason:   Patient unavailable,nurse Mckay #[id number] was notified  Collection has been rescheduled by RB8 at 09/08/2020 11:43 Reason:   Patient unavailable,nurse Mckay #[id number] was notified    Blood culture [[id number]] Collected: 09/07/20 0733    Order Status: Completed Specimen: Blood from Antecubital, Right Updated: 09/12/20 0822     Blood Culture, Routine No growth after 5 days.    Clostridium difficile EIA [[id number]] Collected: 09/11/20 1734    Order Status: Completed Specimen: Stool  Updated: 09/11/20 3562     C. diff Antigen Negative     C difficile Toxins A+B, EIA Negative     Comment: Testing not recommended for children <24 months old.           Specimen (12h ago, onward)    None          Diagnostic Results:  I have reviewed all pertinent imaging results/findings within the past 24 hours.    Clinical Findings:  Left plantar foot ulcer measuring 3.2x1.4x0.7 cm, fibronecrotic wound base, scant serous drainage, no undermining, no erythema, no edema, no fluctuance or crepitus. No fluid could be expressed on manual compression. Mild periwound tenderness. Appears stable overall. Not clinically infected.           Assessment/Plan:     Diabetic ulcer of left foot associated with type 2 diabetes mellitus, with fat layer exposed  65 y.o M with PMHx of Type 2 DM, chronic hypoxemic respiratory failure on 2 L O2 via NC, chronic systolic heart failure. Tachycardic, hypotensive, still having transient hypoxia. WBCs now 40.90. No signs of deep foot infection on X ray, MRI, or nuclear medicine scan. Foot wound superficial and stable and likely not the cause of patient's persistent septicemia. Left ankle peak systolic velocity 35 cm/s which is concerning for risk of nonhealing. ABIs ordered, awaiting results.     Plan:  -No surgical intervention from podiatry warranted at this time.   -Recommend consulting vascular surgery based on 8/31 arterial ultrasound and delayed healing of left foot wound. PVR waveforms suggests PAD with TBI of 0  -Antibiotic management per ID.  -Dressing changed today.    -Continue local wound care.   -Patient to remain in zflex offloading boots while stationary to prevent decubitus heel ulcers.   -Rest of care per primary  -Podiatry will continue to follow    DC Instructions:  Patient is to follow up with podiatry within 10 days of discharge.  Podiatry will arrange an appt. Home health/facility to change dressings q MWF as follows: rinse left foot wound with saline, paint wound with  betadine and dress foot with 4x4 gauze, kerlix, secure with tape.              Nita Faulkner DPM  [facility]  Podiatry PGY2  Pager: [phone]  Spectra:[phone]

## 2020-09-18 NOTE — ASSESSMENT & PLAN NOTE
MRSA septicemia. Still tachycardic, SpO2 reached 92%, renal function continues to worsen. WBCs not improving. ESR and CRP elevated, RF WNL. Vancomycin was supratherapeutic, switched to pulse dose regimen. Source of bacteremia unclear, may be left foot wound or a septic process at the right knee joint. Right knee now clinically suspicious for septic arthritis (see physical exam). Left foot wound growing Proteus and MRSA, urine growing Klebsiella. TTE & LUKAS is negative. Dapto/ceftaroline switched back to vanc due to worsening renal function and early signs of rhabdo. Course complicated by worsening leukocytosis (C Diff neg)     Blood cultures: MRSA  R knee fluid culture: Staph aureus  Left foot wound: Proteus, MRSA  Source Control: I&D of Septic R knee on 9/7/20.      Repeat Blood cultures 09/09, 09/10, 09/12 have been negative  CXR with right lower lobe opacities concerning for developing pneumonia  CT Chest/Abdomen/Pelvis: Opacities in Right lower lobe concerning for atelectis and possible superimposed infection. Revealed findings concerning for acute cholecystitis. No identification of abscess.   S/p cholecystotomy drain placement 09/16.    Recommendations:   -- Continue oral levofloxacin (renally dosed). Will plan for 14 day course after source control with cholecystotomy tube placement. End date of therapy: 09/30/2020  -- Continue IV Daptomycin for 4 week course post culture clearance. End date of therapy: 10/07/2020           -- Follow up in ID clinic. Outpatient Infectious Diseases clinic follow up will be  arranged and found in patient calendar.            -- Weekly outpatient laboratory on Monday or Tuesday while on IV antibiotics:  CBC, CMP, CK. Fax laboratory results to [facility] at [phone]  with attn: Mikie Liang

## 2020-09-18 NOTE — SUBJECTIVE & OBJECTIVE
Interval History: NAEON. Patient was transferred from ICU to stepdown unit. More alert and conversant today, with wife at bedside.     Review of Systems   Unable to perform ROS: Mental status change     Objective:     Vital Signs (Most Recent):  Temp: 97.9 °F (36.6 °C) (09/18/20 0802)  Pulse: (!) 130 (09/18/20 0806)  Resp: 16 (09/18/20 0806)  BP: 118/78 (09/18/20 0802)  SpO2: 100 % (09/18/20 0802) Vital Signs (24h Range):  Temp:  [97.4 °F (36.3 °C)-98.9 °F (37.2 °C)] 97.9 °F (36.6 °C)  Pulse:  [128-131] 130  Resp:  [10-21] 16  SpO2:  [94 %-100 %] 100 %  BP: (113-146)/(72-88) 118/78     Weight: 114.8 kg (253 lb 1.4 oz)  Body mass index is 30.81 kg/m².    Estimated Creatinine Clearance: 40.3 mL/min (A) (based on SCr of 2.6 mg/dL (H)).    Physical Exam  Constitutional:       Appearance: Normal appearance.   HENT:      Head: Normocephalic.      Mouth/Throat:      Mouth: Mucous membranes are moist.   Eyes:      Extraocular Movements: Extraocular movements intact.   Neck:      Musculoskeletal: Normal range of motion.      Comments: Right IJ CVC in place. No tenderness or erythema noted on surrounding skin  Cardiovascular:      Rate and Rhythm: Regular rhythm. Tachycardia present.      Heart sounds: No murmur.   Pulmonary:      Effort: Pulmonary effort is normal. No respiratory distress.      Breath sounds: Normal breath sounds. No wheezing.   Abdominal:      General: There is no distension.      Palpations: Abdomen is soft.      Tenderness: There is no abdominal tenderness.      Comments: Cholecystotomy tube in place draining clear bilious fluid   Musculoskeletal:      Comments: Stage 2 diabetic foot ulcer measuring 1cm noted on Left 1st phalanx.    Neurological:      Mental Status: He is alert.      Comments: Alert. Appears confused. Unable to answer questions   Psychiatric:      Comments: Unable to assess due to mental status          Significant Labs:   Blood Culture:   Recent Labs   Lab 09/09/20  0303 09/09/20  1040  09/10/20  0459 09/12/20 2014 09/12/20 2015   LABBLOO No growth after 5 days. No growth after 5 days. No growth after 5 days. No growth after 5 days. No growth after 5 days.     CBC:   Recent Labs   Lab 09/17/20  0246 09/18/20  0826   WBC 24.69*  24.69* 18.44*   HGB 8.6*  8.6* 8.9*   HCT 29.5*  29.5* 30.0*   *  436* 386*     CMP:   Recent Labs   Lab 09/17/20  0242 09/17/20  1250 09/17/20  1700 09/18/20  0826     142 138 137 135*   K 3.6  3.6 3.7 3.7 4.1     103 104 102 99   CO2 29  29 25 26 24   *  232* 267* 287* 236*   BUN 41*  41* 46* 48* 47*   CREATININE 2.5*  2.5* 2.5* 2.6* 2.6*   CALCIUM 9.0  9.0 8.7 8.4* 8.4*   PROT 7.1  --   --   --    ALBUMIN 1.5*  --   --   --    BILITOT 0.4  --   --   --    ALKPHOS 184*  --   --   --    AST 45*  --   --   --    ALT 42  --   --   --    ANIONGAP 10  10 9 9 12   EGFRNONAA 26.3*  26.3* 26.3* 25.1* 25.1*     Fungus Culture (Blood or Bone Marrow): No results for input(s): FUNGUSCULTUR in the last 4320 hours.  Respiratory Culture: No results for input(s): GSRESP, RESPIRATORYC in the last 4320 hours.    Significant Imaging: I have reviewed all pertinent imaging results/findings within the past 24 hours.

## 2020-09-18 NOTE — PLAN OF CARE
Infectious Disease Plan of Care Note    Infection:   MRSA bacteremia c/b R knee septic arthritis s/p arthroscopy 9/7  Acute cholecystitis s/p silvia tube placement 9/16    Discharge antibiotics:   Daptomycin IV 1050mg q24 hours as long as Crcl >30, if not then change to every 48 hrs  Levofloxacin 750mg every 48 hrs  If he is again started on dialysis please reach out to us to help with dosing    End date of IV antibiotics:  Daptomycin 10/7/2020  Levofloxacin 9/30/2020    F/u cultures from gallbladder. Please call back if positive.    Weekly outpatient laboratory on Monday or Tuesday while on IV antibiotics.    CBC   CMP   ESR and CRP   CPK    Fax laboratory results to [facility] at [phone] with attn: Mikie Liang    Outpatient Infectious Diseases clinic follow up will be arranged and found in patient calendar.    Prior to discharge, please ensure the patient's follow-up has been scheduled.  If there is still no follow-up scheduled in Infectious Diseases clinic, please send an EPIC message to Samantha Bose in Infectious Diseases.

## 2020-09-18 NOTE — CONSULTS
[facility]  Endocrinology  Diabetes Consult Note    Consult Requested by: Thomas Reed MD   Reason for admit: Septic shock due to methicillin resistant Staphylococcus aureus    HISTORY OF PRESENT ILLNESS:  Reason for Consult: Management of T2DM, Hyperglycemia     Surgical Procedure and Date: n/a    Diabetes diagnosis year: 2012    Home Diabetes Medications:    Novolin 70/30 u-100   40 units in AM with breakfast.    20 units in PM with dinner.     How often checking glucose at home? 1-3 x day   BG readings on regimen: >200  Hypoglycemia on the regimen?  No  Missed doses on regimen?  No    Diabetes Complications include:     Hyperglycemia, Foot ulcer   and Other skin ulcer    Complicating diabetes co morbidities:   CHF, CAD, HTN, HLD      HPI:   Patient is a 65 y.o. male with a diagnosis of acute cholecystitis and septic shock with MRSA complicated by NANETTE requiring CRRT. Endocrinology consulted to manage DM2 during current admission to [facility] [place] [facility].     Lab Results   Component Value Date    HGBA1C 9.5 (H) 08/31/2020       Interval HPI:   Overnight events:   BG is above goal at time of consult. Spoke with primary care team and discussed insulin POC via telephone.   Diet Dysphagia Mechanical Soft (IDDSI Level 5)  11 Days Post-Op    Eating:   <25%  Nausea: No  Hypoglycemia and intervention: No  Fever: No  TPN and/or TF: No  If yes, type of TF/TPN and rate: None    PMH, PSH, FH, SH updated and reviewed       Review of Systems  Constitutional: Negative for weight changes.  Eyes:Positive for visual disturbance. Complains of blurry vision to right eye  Respiratory: Negative for cough.   Cardiovascular: Negative for chest pain.  Gastrointestinal: Negative for nausea.  Endocrine: Positive for polyuria, polydipsia.  Musculoskeletal: Negative for back pain.  Skin: Negative for rash.  Neurological: Negative for syncope.  Psychiatric/Behavioral: Negative for depression.    Current Medications and/or Treatments  Impacting Glycemic Control  Immunotherapy:    Immunosuppressants     None        Steroids:   Hormones (From admission, onward)    Start     Stop Route Frequency Ordered    08/30/20 2059  melatonin tablet 6 mg      -- Oral Nightly PRN 08/30/20 2004        Pressors:    Autonomic Drugs (From admission, onward)    None        Hyperglycemia/Diabetes Medications:   Antihyperglycemics (From admission, onward)    Start     Stop Route Frequency Ordered    09/18/20 1130  insulin aspart U-100 pen 5-10 Units      -- SubQ 3 times daily with meals 09/18/20 0929 09/18/20 1030  insulin regular 100 Units in sodium chloride 0.9% 100 mL infusion      -- IV Continuous 09/18/20 0929 09/18/20 1028  insulin aspart U-100 pen 0-5 Units      -- SubQ As needed (PRN) 09/18/20 0929             PHYSICAL EXAMINATION:  Vitals:    09/18/20 0806   BP:    Pulse: (!) 130   Resp: 16   Temp:      Body mass index is 30.81 kg/m².    Physical Exam   Constitutional: Well developed, well nourished, NAD.  ENT:  normal hearing.  Neck: Supple; trachea midline;   Cardiovascular: Normal heart sounds, no LE edema. DP +2 bilaterally.  Lungs: Normal effort; lungs anterior bilaterally clear to auscultation.  Abdomen: Soft, obese, no masses, no hernias.  MS: No clubbing or cyanosis of nails noted;  Skin: No rashes, lesions, or ulcers; no nodules. Injection sites are ok. No lipo hypertropthy or atrophy. Wound dressing noted to left foot. Please see wound care notes for additional details. Dressing is dry and intact at time of consult.   Psychiatric: Good judgement and insight; normal mood and affect.  Neurological: Cranial nerves are grossly intact.  Foot: nails in good condition, no amputations noted.        Labs Reviewed and Include   Recent Labs   Lab 09/18/20  1231   *   CALCIUM 8.4*   *   K 4.2   CO2 25   CL 99   BUN 48*   CREATININE 2.6*     Lab Results   Component Value Date    WBC 18.44 (H) 09/18/2020    HGB 8.9 (L) 09/18/2020    HCT 30.0 (L)  09/18/2020    MCV 90 09/18/2020     (H) 09/18/2020     Recent Labs   Lab 09/12/20  0252   TSH 7.140*   FREET4 0.50*     Lab Results   Component Value Date    HGBA1C 9.5 (H) 08/31/2020       Nutritional status:   Body mass index is 30.81 kg/m².  Lab Results   Component Value Date    ALBUMIN 1.5 (L) 09/17/2020    ALBUMIN 1.5 (L) 09/16/2020    ALBUMIN 1.4 (L) 09/15/2020     No results found for: PREALBUMIN    Estimated Creatinine Clearance: 40.3 mL/min (A) (based on SCr of 2.6 mg/dL (H)).    Accu-Checks  Recent Labs     09/18/20  0117 09/18/20  0216 09/18/20  0322 09/18/20  0436 09/18/20  0522 09/18/20  0617 09/18/20  0709 09/18/20  0802 09/18/20  0907 09/18/20  1118   POCTGLUCOSE 248* 260* 293* 287* 303* 302* 296* 316* 255* 233*        ASSESSMENT and PLAN    * Septic shock due to methicillin resistant Staphylococcus aureus  Managed per primary team  Avoid hypoglycemia    Optimize BG control to improve wound healing        Type 2 diabetes mellitus with stage 3 chronic kidney disease, with long-term current use of insulin  BG goal 140 - 180     - Start  transition IV insulin infusion with stepdown parameters. Initial rate starting at 1.2 units/hr. 0.5 u/kg/d dosing.   - Start Novolog 5-10 units TIDWM. Basal/Prandial physiologic matching.    - Low Dose SQ Insulin Correction Scale. Given kidney function.   - BG Monitoring /HS/0200    ADDENDUM 3:34 PM   - BG remains above goal.    - Increase  transition IV insulin infusion with stepdown parameters. Initial rate starting at 1.5 units/hr. aprox 25% dosing increase.     ** Please call Endocrine for any BG related issues **  ** Please notify Endocrine for any change and/or advance in diet**    Discharge Planning:   TBD. Please notify endocrinology prior to discharge.          Postablative hypothyroidism  The clearance of many drugs, including antiepileptic, anticoagulant, hypnotic, and opioid drugs, is decreased in hypothyroidism. Thus, drug toxicity may occur if drug  dose is not reduced. Thyroid disease may also affect lipid profiles which may then cause increase in insulin resistance.     Recommend patient continue Levothyroxine 75 mcg           Plan discussed with patient at bedside.     Luke Rojas NP  Endocrinology  [facility]

## 2020-09-18 NOTE — PLAN OF CARE
Pt in bed watching tv, resp even and unlabored, no SOB noted, tolerated meds well, no adverse reaction noted, no acute changes in status

## 2020-09-18 NOTE — HPI
Reason for Consult: Management of T2DM, Hyperglycemia     Surgical Procedure and Date: n/a    Diabetes diagnosis year: 2012    Home Diabetes Medications:    Novolin 70/30 u-100   40 units in AM with breakfast.    20 units in PM with dinner.     How often checking glucose at home? 1-3 x day   BG readings on regimen: >200  Hypoglycemia on the regimen?  No  Missed doses on regimen?  No    Diabetes Complications include:     Hyperglycemia, Foot ulcer   and Other skin ulcer    Complicating diabetes co morbidities:   CHF, CAD, HTN, HLD      HPI:   Patient is a 65 y.o. male with a diagnosis of acute cholecystitis and septic shock with MRSA complicated by NANETTE requiring CRRT. Endocrinology consulted to manage DM2 [place] [facility]     Lab Results   Component Value Date    HGBA1C 9.5 (H) 08/31/2020

## 2020-09-18 NOTE — ASSESSMENT & PLAN NOTE
MRSA septicemia. Still tachycardic, SpO2 reached 92%, renal function continues to worsen. WBCs not improving. ESR and CRP elevated, RF WNL. Vancomycin was supratherapeutic, switched to pulse dose regimen. Source of bacteremia unclear, may be left foot wound or a septic process at the right knee joint. Right knee now clinically suspicious for septic arthritis (see physical exam). Left foot wound growing Proteus and MRSA, urine growing Klebsiella. TTE & LUKAS is negative. Dapto/ceftaroline switched back to vanc due to worsening renal function and early signs of rhabdo. Course complicated by worsening leukocytosis (C Diff neg)     Blood cultures: MRSA  R knee fluid culture: Staph aureus  Left foot wound: Proteus, MRSA  Source Control: I&D of Septic R knee on 9/7/20.      Repeat Blood cultures 09/09, 09/10, 09/12 have been negative  CXR with right lower lobe opacities concerning for developing pneumonia  CT Chest/Abdomen/Pelvis: Opacities in Right lower lobe concerning for atelectis and possible superimposed infection. Revealed findings concerning for acute cholecystitis. No identification of abscess.   S/p cholecystotomy drain placement 09/16. Cultures so far have been negative, anerobic and fungus culture pending.    Recommendations:   -- Continue oral levofloxacin 750mg q48hrs (renally dosed). Will plan for 14 day course after source control with cholecystotomy tube placement. End date of therapy: 09/30/2020  -- Continue IV Daptomycin 1050mg q24hrs for 4 week course post blood culture clearance. End date of therapy: 10/07/2020  -- Follow up in ID clinic. Outpatient Infectious Diseases clinic follow up will be arranged and found in patient calendar.   -- Weekly outpatient laboratory on Monday or Tuesday while on IV antibiotics: CBC, CMP, CK. Fax laboratory results to [facility] at [phone] with attn: Mikie Liang

## 2020-09-18 NOTE — PLAN OF CARE
Problem: SLP Goal  Goal: SLP Goal  Description: Speech Language Pathology Goals  Goals expected to be met by 9/24:  1. Pt will tolerate diet of dental soft solids and thin liquids with no overt signs of airway compromise.     Outcome: Ongoing, Progressing     POC updated. ST rec advancement of diet to dysphagia soft solids and thin liquids. ST to continue to monitor.    Ela Tiwari, AKOSUA  9/18/2020

## 2020-09-18 NOTE — PT/OT/SLP PROGRESS
"Speech Language Pathology Treatment    Patient Name:  Ed Patton   MRN:  [id number]  Admitting Diagnosis: Septic shock due to methicillin resistant Staphylococcus aureus    Recommendations:                 General Recommendations:  Dysphagia therapy to monitor diet tolerance  Diet recommendations:  Dental Soft, Liquid Diet Level: Thin   Aspiration Precautions: No crumbly consistencies (no ground sausage, rice, etc), 1 bite/sip at a time, Alternating bites/sips, HOB to 90 degrees, Remain upright 30 minutes post meal and Small bites/sips   General Precautions: Standard, fall, dental soft, special contact  Communication strategies:  go to room if call light pushed    Subjective     Pt alert and awake with MD present upon SLP entry. Spouse at bedside with reports of prolonged mastication  "I noticed he's just been chewing and chewing the ground sausage and it hasn't gone anywhere, but he has always taken a long time to eat."    Objective:     Has the patient been evaluated by SLP for swallowing?   Yes  Keep patient NPO? No   Current Respiratory Status: nasal cannula      Pt seen for ongoing dysphagia therapy to monitor tolerance with diet. Alertness maintained t/o session with no cueing required to attend to tasks. Pt observed completing orientation questions with MD upon entry, orienting to self and place, not date. Prolonged mastication appreciated with grounded mechanical soft sausage from breakfast tray (administered prior to entry). Spouse reported ~5 min of mastication and difficulty with dry, grounded meats. SLP instructed pt to expectorate bolus 2/2 pt difficulty and prolonged mastication. Pt tolerated other mechanical soft solids x3 (1/2 banana) with timely mastication, adequate clearance, and essentially WFL oral phase. Thin liquids via straw x3 tolerated with adequate oral transit and no signs of airway compromise c/b no cough, throat clear, or change in vocal quality. ST recommending advancement of diet to " dental soft solids given pt difficulty with managing ground/crumbly textures of mechanical soft solid diet. Discussed with pt and spouse to avoid dry, crumbly textures. Skilled education provided regarding swallow precautions, updated diet recommendations, and ongoing ST plan of care. Pt and spouse verbalized understanding and are in agreement with plan. ST to continue to monitor.    Assessment:     Ed Patton is a 65 y.o. male with an SLP diagnosis of mild oral Dysphagia.      Goals:   Multidisciplinary Problems     SLP Goals        Problem: SLP Goal    Goal Priority Disciplines Outcome   SLP Goal     SLP Ongoing, Progressing   Description: Speech Language Pathology Goals  Goals expected to be met by 9/24:  1. Pt will tolerate diet of dental soft solids and thin liquids with no overt signs of airway compromise.                          Plan:     · Patient to be seen:  4 x/week   · Plan of Care expires:  10/17/20  · Plan of Care reviewed with:  patient, spouse   · SLP Follow-Up:  Yes       Discharge recommendations:  other (see comments)(tbd)   Barriers to Discharge:  None    Time Tracking:     SLP Treatment Date:   09/18/20  Speech Start Time:  0952  Speech Stop Time:  1005     Speech Total Time (min):  13 min    Billable Minutes: Treatment Swallowing Dysfunction 13    AKOSUA Das  09/18/2020

## 2020-09-18 NOTE — PLAN OF CARE
Problem: Adult Inpatient Plan of Care  Goal: Optimal Comfort and Wellbeing  Outcome: Ongoing, Progressing  Plan of care was reviewed with the pt. AAOx3. Vitals were stable. Cardiac and glucose monitoring was done. Insulin gtt was changed and the rate was set to 1.5 with sliding scale.  Pain management was reviewed with the pt. PRN pain med was given. Wound care was done by podiatry. Speech and PT/OT was able to work with the pt. Safety and Contact precautions were in placed.

## 2020-09-19 LAB
ALBUMIN SERPL BCP-MCNC: 1.7 G/DL (ref 3.5–5.2)
ALP SERPL-CCNC: 140 U/L (ref 55–135)
ALT SERPL W/O P-5'-P-CCNC: 49 U/L (ref 10–44)
ANION GAP SERPL CALC-SCNC: 11 MMOL/L (ref 8–16)
ANION GAP SERPL CALC-SCNC: 8 MMOL/L (ref 8–16)
ANION GAP SERPL CALC-SCNC: 9 MMOL/L (ref 8–16)
ANISOCYTOSIS BLD QL SMEAR: SLIGHT
AST SERPL-CCNC: 55 U/L (ref 10–40)
BACTERIA SPEC AEROBE CULT: NO GROWTH
BASOPHILS # BLD AUTO: ABNORMAL K/UL (ref 0–0.2)
BASOPHILS NFR BLD: 0 % (ref 0–1.9)
BILIRUB SERPL-MCNC: 0.5 MG/DL (ref 0.1–1)
BUN SERPL-MCNC: 47 MG/DL (ref 8–23)
BURR CELLS BLD QL SMEAR: ABNORMAL
CA-I BLDV-SCNC: 1.16 MMOL/L (ref 1.06–1.42)
CALCIUM SERPL-MCNC: 8.2 MG/DL (ref 8.7–10.5)
CALCIUM SERPL-MCNC: 8.3 MG/DL (ref 8.7–10.5)
CALCIUM SERPL-MCNC: 8.3 MG/DL (ref 8.7–10.5)
CHLORIDE SERPL-SCNC: 98 MMOL/L (ref 95–110)
CK SERPL-CCNC: 300 U/L (ref 20–200)
CO2 SERPL-SCNC: 26 MMOL/L (ref 23–29)
CO2 SERPL-SCNC: 27 MMOL/L (ref 23–29)
CO2 SERPL-SCNC: 28 MMOL/L (ref 23–29)
CREAT SERPL-MCNC: 2.5 MG/DL (ref 0.5–1.4)
CREAT SERPL-MCNC: 2.6 MG/DL (ref 0.5–1.4)
CREAT SERPL-MCNC: 2.6 MG/DL (ref 0.5–1.4)
DIFFERENTIAL METHOD: ABNORMAL
EOSINOPHIL # BLD AUTO: ABNORMAL K/UL (ref 0–0.5)
EOSINOPHIL NFR BLD: 1 % (ref 0–8)
ERYTHROCYTE [DISTWIDTH] IN BLOOD BY AUTOMATED COUNT: 16.4 % (ref 11.5–14.5)
EST. GFR  (AFRICAN AMERICAN): 29 ML/MIN/1.73 M^2
EST. GFR  (AFRICAN AMERICAN): 29 ML/MIN/1.73 M^2
EST. GFR  (AFRICAN AMERICAN): 30.4 ML/MIN/1.73 M^2
EST. GFR  (NON AFRICAN AMERICAN): 25.1 ML/MIN/1.73 M^2
EST. GFR  (NON AFRICAN AMERICAN): 25.1 ML/MIN/1.73 M^2
EST. GFR  (NON AFRICAN AMERICAN): 26.3 ML/MIN/1.73 M^2
GLUCOSE SERPL-MCNC: 211 MG/DL (ref 70–110)
GLUCOSE SERPL-MCNC: 214 MG/DL (ref 70–110)
GLUCOSE SERPL-MCNC: 233 MG/DL (ref 70–110)
HCT VFR BLD AUTO: 29.9 % (ref 40–54)
HGB BLD-MCNC: 8.7 G/DL (ref 14–18)
HYPOCHROMIA BLD QL SMEAR: ABNORMAL
IMM GRANULOCYTES # BLD AUTO: ABNORMAL K/UL (ref 0–0.04)
IMM GRANULOCYTES NFR BLD AUTO: ABNORMAL % (ref 0–0.5)
LYMPHOCYTES # BLD AUTO: ABNORMAL K/UL (ref 1–4.8)
LYMPHOCYTES NFR BLD: 2 % (ref 18–48)
MAGNESIUM SERPL-MCNC: 2 MG/DL (ref 1.6–2.6)
MCH RBC QN AUTO: 26.6 PG (ref 27–31)
MCHC RBC AUTO-ENTMCNC: 29.1 G/DL (ref 32–36)
MCV RBC AUTO: 91 FL (ref 82–98)
METAMYELOCYTES NFR BLD MANUAL: 2 %
MONOCYTES # BLD AUTO: ABNORMAL K/UL (ref 0.3–1)
MONOCYTES NFR BLD: 4 % (ref 4–15)
MYELOCYTES NFR BLD MANUAL: 1 %
NEUTROPHILS NFR BLD: 87 % (ref 38–73)
NEUTS BAND NFR BLD MANUAL: 3 %
NRBC BLD-RTO: 0 /100 WBC
OVALOCYTES BLD QL SMEAR: ABNORMAL
PLATELET # BLD AUTO: 378 K/UL (ref 150–350)
PMV BLD AUTO: 10.8 FL (ref 9.2–12.9)
POCT GLUCOSE: 190 MG/DL (ref 70–110)
POCT GLUCOSE: 193 MG/DL (ref 70–110)
POCT GLUCOSE: 207 MG/DL (ref 70–110)
POCT GLUCOSE: 209 MG/DL (ref 70–110)
POCT GLUCOSE: 225 MG/DL (ref 70–110)
POIKILOCYTOSIS BLD QL SMEAR: SLIGHT
POLYCHROMASIA BLD QL SMEAR: ABNORMAL
POTASSIUM SERPL-SCNC: 4 MMOL/L (ref 3.5–5.1)
POTASSIUM SERPL-SCNC: 4.1 MMOL/L (ref 3.5–5.1)
POTASSIUM SERPL-SCNC: 4.1 MMOL/L (ref 3.5–5.1)
PROT SERPL-MCNC: 6.9 G/DL (ref 6–8.4)
RBC # BLD AUTO: 3.27 M/UL (ref 4.6–6.2)
SODIUM SERPL-SCNC: 133 MMOL/L (ref 136–145)
SODIUM SERPL-SCNC: 134 MMOL/L (ref 136–145)
SODIUM SERPL-SCNC: 136 MMOL/L (ref 136–145)
WBC # BLD AUTO: 23.14 K/UL (ref 3.9–12.7)

## 2020-09-19 PROCEDURE — 80053 COMPREHEN METABOLIC PANEL: CPT | Mod: HCNC

## 2020-09-19 PROCEDURE — 27000221 HC OXYGEN, UP TO 24 HOURS: Mod: HCNC

## 2020-09-19 PROCEDURE — 99232 SBSQ HOSP IP/OBS MODERATE 35: CPT | Mod: HCNC,,, | Performed by: NURSE PRACTITIONER

## 2020-09-19 PROCEDURE — 85007 BL SMEAR W/DIFF WBC COUNT: CPT | Mod: HCNC

## 2020-09-19 PROCEDURE — 63600175 PHARM REV CODE 636 W HCPCS: Mod: JG,HCNC | Performed by: STUDENT IN AN ORGANIZED HEALTH CARE EDUCATION/TRAINING PROGRAM

## 2020-09-19 PROCEDURE — 82330 ASSAY OF CALCIUM: CPT | Mod: 91,HCNC

## 2020-09-19 PROCEDURE — 83735 ASSAY OF MAGNESIUM: CPT | Mod: HCNC

## 2020-09-19 PROCEDURE — 99232 PR SUBSEQUENT HOSPITAL CARE,LEVL II: ICD-10-PCS | Mod: HCNC,,, | Performed by: HOSPITALIST

## 2020-09-19 PROCEDURE — 80048 BASIC METABOLIC PNL TOTAL CA: CPT | Mod: HCNC

## 2020-09-19 PROCEDURE — 82550 ASSAY OF CK (CPK): CPT | Mod: HCNC

## 2020-09-19 PROCEDURE — 99232 SBSQ HOSP IP/OBS MODERATE 35: CPT | Mod: HCNC,,, | Performed by: HOSPITALIST

## 2020-09-19 PROCEDURE — 80048 BASIC METABOLIC PNL TOTAL CA: CPT | Mod: 91,HCNC

## 2020-09-19 PROCEDURE — 25000003 PHARM REV CODE 250: Mod: HCNC | Performed by: STUDENT IN AN ORGANIZED HEALTH CARE EDUCATION/TRAINING PROGRAM

## 2020-09-19 PROCEDURE — 99232 PR SUBSEQUENT HOSPITAL CARE,LEVL II: ICD-10-PCS | Mod: HCNC,,, | Performed by: NURSE PRACTITIONER

## 2020-09-19 PROCEDURE — 63600175 PHARM REV CODE 636 W HCPCS: Mod: HCNC | Performed by: HOSPITALIST

## 2020-09-19 PROCEDURE — 36415 COLL VENOUS BLD VENIPUNCTURE: CPT | Mod: HCNC

## 2020-09-19 PROCEDURE — 82330 ASSAY OF CALCIUM: CPT | Mod: HCNC

## 2020-09-19 PROCEDURE — 20600001 HC STEP DOWN PRIVATE ROOM: Mod: HCNC

## 2020-09-19 PROCEDURE — 85027 COMPLETE CBC AUTOMATED: CPT | Mod: HCNC

## 2020-09-19 PROCEDURE — 94761 N-INVAS EAR/PLS OXIMETRY MLT: CPT | Mod: HCNC

## 2020-09-19 RX ADMIN — HYPROMELLOSE 2910 1 DROP: 5 SOLUTION OPHTHALMIC at 08:09

## 2020-09-19 RX ADMIN — HEPARIN SODIUM 5000 UNITS: 5000 INJECTION INTRAVENOUS; SUBCUTANEOUS at 06:09

## 2020-09-19 RX ADMIN — OXYCODONE 5 MG: 5 TABLET ORAL at 08:09

## 2020-09-19 RX ADMIN — INSULIN ASPART 1 UNITS: 100 INJECTION, SOLUTION INTRAVENOUS; SUBCUTANEOUS at 11:09

## 2020-09-19 RX ADMIN — INSULIN ASPART 2 UNITS: 100 INJECTION, SOLUTION INTRAVENOUS; SUBCUTANEOUS at 09:09

## 2020-09-19 RX ADMIN — INSULIN ASPART 5 UNITS: 100 INJECTION, SOLUTION INTRAVENOUS; SUBCUTANEOUS at 08:09

## 2020-09-19 RX ADMIN — INSULIN ASPART 2 UNITS: 100 INJECTION, SOLUTION INTRAVENOUS; SUBCUTANEOUS at 08:09

## 2020-09-19 RX ADMIN — HYPROMELLOSE 2910 1 DROP: 5 SOLUTION OPHTHALMIC at 02:09

## 2020-09-19 RX ADMIN — FLUTICASONE PROPIONATE 50 MCG: 50 SPRAY, METERED NASAL at 08:09

## 2020-09-19 RX ADMIN — OXYCODONE 5 MG: 5 TABLET ORAL at 11:09

## 2020-09-19 RX ADMIN — FLUTICASONE FUROATE AND VILANTEROL TRIFENATATE 1 PUFF: 200; 25 POWDER RESPIRATORY (INHALATION) at 08:09

## 2020-09-19 RX ADMIN — INSULIN ASPART 5 UNITS: 100 INJECTION, SOLUTION INTRAVENOUS; SUBCUTANEOUS at 11:09

## 2020-09-19 RX ADMIN — INSULIN ASPART 5 UNITS: 100 INJECTION, SOLUTION INTRAVENOUS; SUBCUTANEOUS at 04:09

## 2020-09-19 RX ADMIN — ACETAMINOPHEN 1000 MG: 500 TABLET ORAL at 05:09

## 2020-09-19 RX ADMIN — INSULIN ASPART 2 UNITS: 100 INJECTION, SOLUTION INTRAVENOUS; SUBCUTANEOUS at 04:09

## 2020-09-19 RX ADMIN — DAPTOMYCIN 1050 MG: 350 INJECTION, POWDER, LYOPHILIZED, FOR SOLUTION INTRAVENOUS at 11:09

## 2020-09-19 RX ADMIN — LEVOTHYROXINE SODIUM 75 MCG: 25 TABLET ORAL at 05:09

## 2020-09-19 RX ADMIN — HEPARIN SODIUM 5000 UNITS: 5000 INJECTION INTRAVENOUS; SUBCUTANEOUS at 09:09

## 2020-09-19 RX ADMIN — HYPROMELLOSE 2910 1 DROP: 5 SOLUTION OPHTHALMIC at 11:09

## 2020-09-19 RX ADMIN — HEPARIN SODIUM 5000 UNITS: 5000 INJECTION INTRAVENOUS; SUBCUTANEOUS at 02:09

## 2020-09-19 NOTE — SUBJECTIVE & OBJECTIVE
"Interval HPI:   Overnight events:   BG has trended downward, but is still slightly above goal.   Diet Dysphagia Mechanical Soft (IDDSI Level 5)  12 Days Post-Op    Eatin% - 100%  Nausea: No  Hypoglycemia and intervention: No  Fever: No  TPN and/or TF: No  If yes, type of TF/TPN and rate: None    /81 (BP Location: Left arm, Patient Position: Lying)   Pulse (!) 128   Temp 97.9 °F (36.6 °C) (Oral)   Resp 20   Ht 6' 4" (1.93 m)   Wt 115.3 kg (254 lb 3.1 oz)   SpO2 100%   BMI 30.94 kg/m²     Labs Reviewed and Include    Recent Labs   Lab 20  0605   *  211*   CALCIUM 8.3*  8.3*   ALBUMIN 1.7*   PROT 6.9   *  133*   K 4.1  4.1   CO2 28  26   CL 98  98   BUN 47*  47*   CREATININE 2.6*  2.5*   ALKPHOS 140*   ALT 49*   AST 55*   BILITOT 0.5     Lab Results   Component Value Date    WBC 23.14 (H) 2020    HGB 8.7 (L) 2020    HCT 29.9 (L) 2020    MCV 91 2020     (H) 2020     No results for input(s): TSH, FREET4 in the last 168 hours.  Lab Results   Component Value Date    HGBA1C 9.5 (H) 2020       Nutritional status:   Body mass index is 30.94 kg/m².  Lab Results   Component Value Date    ALBUMIN 1.7 (L) 2020    ALBUMIN 1.5 (L) 2020    ALBUMIN 1.5 (L) 2020     No results found for: PREALBUMIN    Estimated Creatinine Clearance: 40.4 mL/min (A) (based on SCr of 2.6 mg/dL (H)).    Accu-Checks  Recent Labs     20  0617 20  0709 20  0802 20  0907 20  1118 20  1605 20  2103 20  2346 20  0223 20  0742   POCTGLUCOSE 302* 296* 316* 255* 233* 281* 268* 198* 190* 207*       Current Medications and/or Treatments Impacting Glycemic Control  Immunotherapy:    Immunosuppressants     None        Steroids:   Hormones (From admission, onward)    Start     Stop Route Frequency Ordered    20  melatonin tablet 6 mg      -- Oral Nightly PRN 20 2004        Pressors:  "   Autonomic Drugs (From admission, onward)    None        Hyperglycemia/Diabetes Medications:   Antihyperglycemics (From admission, onward)    Start     Stop Route Frequency Ordered    09/18/20 1130  insulin aspart U-100 pen 5-10 Units      -- SubQ 3 times daily with meals 09/18/20 0929 09/18/20 1030  insulin regular 100 Units in sodium chloride 0.9% 100 mL infusion      -- IV Continuous 09/18/20 0929 09/18/20 1028  insulin aspart U-100 pen 0-5 Units      -- SubQ As needed (PRN) 09/18/20 0929

## 2020-09-19 NOTE — ASSESSMENT & PLAN NOTE
BG goal 140 - 180     - Rewrtie  transition IV insulin infusion with stepdown parameters. Initial rate starting at 1.8 units/hr.   - Start Novolog 5-10 units TIDWM. Basal/Prandial physiologic matching.    - Low Dose SQ Insulin Correction Scale. Given kidney function.   - BG Monitoring //0200     ADDENDUM 10:51 PM  BG remains above goal.   - Increase  transition IV insulin infusion with stepdown parameters. Initial rate starting at 2.2 units/hr.     ** Please call Endocrine for any BG related issues **  ** Please notify Endocrine for any change and/or advance in diet**    Discharge Planning:   TBD. Please notify endocrinology prior to discharge.

## 2020-09-19 NOTE — PLAN OF CARE
Problem: Adult Inpatient Plan of Care  Goal: Optimal Comfort and Wellbeing  Outcome: Ongoing, Progressing  Plan of care was reviewed with the pt. AAOx3. Vitals were stable. Cardiac and glucose monitoring was done. Insulin gtt was changed and the rate was set to 1.8 with sliding scale.  Pain management was reviewed with the pt. PRN pain med was given. Pt was turned Q2. Safety and Contact precautions were in placed.

## 2020-09-19 NOTE — PROGRESS NOTES
[facility]  Nephrology  Progress Note    Patient Name: Ed Patton  MRN: [id number]  Admission Date: 8/30/2020  Hospital Length of Stay: 19 days  Attending Provider: Thomas Reed MD   Primary Care Physician: Qiana Chow MD  Principal Problem:Septic shock due to methicillin resistant Staphylococcus aureus    Subjective:     HPI: Mr. Patton is a 64yo M with insulin dependent T2DM, chronic hypoxemic resp. failure (on 2L oxygen at home), and CHF (EF 25%). He presented to the ED for recurrent falls and sepsis from an infected diabetic ulcer from stepping on a tack 8/30. Wound cultures from foot positive for proteus and MRSA 8/31. Urine culture 8/30 positive for klebsiella. Blood cultures have been positive for MRSA repeatedly from 8/30 to 9/8. He also developed septic arthritis of the right knee likely from seeding secondary to septicemia positive for MRSA 9/3. LUKAS was negative for IE 9/4. Initial management of septicemia was with ciprofloxacin and Vancomycin which was supratherapeutic to 26.5 on 9/2 prompting switch to a pulse dose regimen. Due to persistent bacteremia ID changed antibiotics to ceftaroline and daptomycin on 9/7 I&D of right knee performed by ortho on 9/7. Cr on admission was 1.5 at admit up from the baseline (1.2-1.5). On 9/9 Cr level had a sudden increased to 3. Nephrology was consulted for NANETTE.     Interval History:   Continued improvement in clinical status. Stabilization of cr and bun.    Review of patient's allergies indicates:   Allergen Reactions    Vicodin [hydrocodone-acetaminophen] Itching     Current Facility-Administered Medications   Medication Frequency    acetaminophen tablet 1,000 mg Q8H PRN    albuterol-ipratropium 2.5 mg-0.5 mg/3 mL nebulizer solution 3 mL Q4H PRN    artificial tears 0.5 % ophthalmic solution 1 drop TID    calcium carbonate 200 mg calcium (500 mg) chewable tablet 500 mg BID PRN    DAPTOmycin (CUBICIN) 1,050 mg in sodium chloride  0.9% IVPB Q24H    dextrose 50% injection 12.5 g PRN    dextrose 50% injection 25 g PRN    fluticasone furoate-vilanteroL 200-25 mcg/dose diskus inhaler 1 puff Daily    fluticasone propionate 50 mcg/actuation nasal spray 50 mcg Daily    glucagon (human recombinant) injection 1 mg PRN    glucose chewable tablet 16 g PRN    glucose chewable tablet 24 g PRN    heparin (porcine) injection 5,000 Units Q8H    insulin aspart U-100 pen 0-5 Units PRN    insulin aspart U-100 pen 5-10 Units TIDWM    insulin regular 100 Units in sodium chloride 0.9% 100 mL infusion Continuous    levoFLOXacin tablet 750 mg Every other day    levothyroxine tablet 75 mcg Before breakfast    melatonin tablet 6 mg Nightly PRN    ondansetron injection 4 mg Q8H PRN    oxyCODONE immediate release tablet 5 mg Q8H PRN    polyethylene glycol packet 17 g BID PRN    Tdap vaccine injection 0.5 mL vaccine x 1 dose       Objective:     Vital Signs (Most Recent):  Temp: 97.9 °F (36.6 °C) (09/18/20 1603)  Pulse: (!) 128 (09/18/20 1603)  Resp: 14 (09/18/20 1603)  BP: 113/74 (09/18/20 1603)  SpO2: 100 % (09/18/20 1603)  O2 Device (Oxygen Therapy): nasal cannula (09/18/20 1603) Vital Signs (24h Range):  Temp:  [97.4 °F (36.3 °C)-98.4 °F (36.9 °C)] 97.9 °F (36.6 °C)  Pulse:  [128-131] 128  Resp:  [12-18] 14  SpO2:  [89 %-100 %] 100 %  BP: (102-146)/(59-87) 113/74     Weight: 114.8 kg (253 lb 1.4 oz) (09/18/20 0400)  Body mass index is 30.81 kg/m².  Body surface area is 2.48 meters squared.    I/O last 3 completed shifts:  In: 2045.2 [P.O.:1440; I.V.:65.2; NG/GT:540]  Out: 1180 [Urine:1075; Drains:35; Other:70]    Physical Exam  Vitals signs and nursing note reviewed.   Constitutional:       General: He is not in acute distress.     Appearance: He is well-developed. He is obese. He is ill-appearing. He is not diaphoretic.   HENT:      Head: Normocephalic and atraumatic.      Right Ear: External ear normal.      Left Ear: External ear normal.       Mouth/Throat:      Pharynx: No oropharyngeal exudate.   Eyes:      General: No scleral icterus.        Right eye: No discharge.         Left eye: No discharge.      Conjunctiva/sclera: Conjunctivae normal.      Pupils: Pupils are equal, round, and reactive to light.   Neck:      Musculoskeletal: Normal range of motion.      Thyroid: No thyromegaly.      Trachea: No tracheal deviation.      Comments: [facility]  Cardiovascular:      Rate and Rhythm: Tachycardia present.   Pulmonary:      Effort: Pulmonary effort is normal. No respiratory distress.      Breath sounds: Normal breath sounds. No stridor. No wheezing or rales.   Abdominal:      General: Bowel sounds are normal. There is no distension.      Palpations: Abdomen is soft.      Tenderness: There is no abdominal tenderness. There is no guarding.   Musculoskeletal:         General: No tenderness or deformity.      Right lower leg: Edema present.      Left lower leg: Edema present.   Lymphadenopathy:      Cervical: No cervical adenopathy.   Skin:     Coloration: Skin is not pale.      Findings: No erythema or rash.   Neurological:      General: No focal deficit present.         Significant Labs:  All labs within the past 24 hours have been reviewed.     Significant Imaging:  Labs: Reviewed    Assessment/Plan:     * Septic shock due to methicillin resistant Staphylococcus aureus  -MRSA bacteremia likely secondary to septic arthritis seeded from foot wound  -cause of bernadette  -on abx managed by ID    Acute renal failure with acute tubular necrosis superimposed on stage 3 chronic kidney disease  -initially non oliguric stage 3 bernadette with ischemic atn likely secondary to hypotension from septic shock  -was oliguric requiring rrt last 9/15  -now oliguria improved   -continues to have good urine output and bun/cr are stabilizing   -no acute indication for hd and patient increasing urine output   -if volume removal is desired can use 80mg furosemide q8h        Chente Reid,  MD  Nephrology  [facility]

## 2020-09-19 NOTE — PROGRESS NOTES
[facility] Hospital Medicine  Progress Note    Patient Name: Ed Patton  MRN: [id number]  Patient Class: IP- Inpatient   Admission Date: 8/30/2020  Length of Stay: 20 days  Attending Physician: Thomas Reed MD  Primary Care Provider: Qiana Chow MD    [facility] Medicine Team: [facility] HOSP MED A Thomas Reed MD    Subjective:     Principal Problem:Septic shock due to methicillin resistant Staphylococcus aureus    Interval History: patient reports feeling better than early in hospital stay    No palpitations, greatest complaint is related to pain    Worked with therapy but states he is fatigued and tired afterword and has had increased complaints of pain     Review of Systems   Constitutional: Negative for fever.   Respiratory: Negative for shortness of breath.    Gastrointestinal: Negative for abdominal pain, nausea and vomiting.   Psychiatric/Behavioral: Negative for confusion.     Objective:     Vital Signs (Most Recent):  Temp: 98 °F (36.7 °C) (09/18/20 2341)  Pulse: (!) 130 (09/18/20 2359)  Resp: 18 (09/18/20 2339)  BP: 117/79 (09/18/20 2341)  SpO2: 99 % (09/18/20 2359) Vital Signs (24h Range):  Temp:  [97.9 °F (36.6 °C)-98.4 °F (36.9 °C)] 98 °F (36.7 °C)  Pulse:  [128-131] 130  Resp:  [12-18] 18  SpO2:  [89 %-100 %] 99 %  BP: (102-122)/(59-81) 117/79   Intake/Outake:  This Shift:  No intake/output data recorded.    Net I/O past 24h:     Intake/Output Summary (Last 24 hours) at 9/19/2020 0151  Last data filed at 9/18/2020 1722  Gross per 24 hour   Intake 660 ml   Output 1540 ml   Net -880 ml         Weight: 114.8 kg (253 lb 1.4 oz)  Body mass index is 30.81 kg/m².  Physical Exam  Constitutional:       General: He is not in acute distress.     Appearance: Normal appearance. He is not diaphoretic.   HENT:      Head: Normocephalic and atraumatic.   Eyes:      General: Lids are normal. No scleral icterus.        Right eye: No discharge.         Left eye: No discharge.       Conjunctiva/sclera: Conjunctivae normal.   Neck:      Musculoskeletal: No neck rigidity.      Trachea: Phonation normal.   Cardiovascular:      Rate and Rhythm: Normal rate and regular rhythm.   Pulmonary:      Effort: Pulmonary effort is normal. No tachypnea, accessory muscle usage or respiratory distress.      Breath sounds: Examination of the right-lower field reveals decreased breath sounds. Examination of the left-lower field reveals decreased breath sounds. Decreased breath sounds present. No wheezing.   Abdominal:      General: Bowel sounds are normal. There is distension.      Palpations: Abdomen is soft.      Tenderness: There is no abdominal tenderness.   Musculoskeletal:      Right knee: He exhibits swelling.      Right lower leg: Edema present.      Left lower leg: Edema present.   Neurological:      Mental Status: He is alert.      Cranial Nerves: Cranial nerves are intact.      Motor: Motor function is intact.   Psychiatric:         Attention and Perception: He is inattentive.         Mood and Affect: Affect normal.         Behavior: Behavior is cooperative.         Cognition and Memory: He exhibits impaired recent memory.           Significant Labs:   Blood Culture: No results for input(s): LABBLOO in the last 48 hours.  CBC:   Recent Labs   Lab 09/17/20  0246 09/18/20  0826   WBC 24.69*  24.69* 18.44*   HGB 8.6*  8.6* 8.9*   HCT 29.5*  29.5* 30.0*   *  436* 386*     CMP:   Recent Labs   Lab 09/17/20  0242  09/18/20  1231 09/18/20  1828 09/19/20  0034     142   < > 135* 134* 136   K 3.6  3.6   < > 4.2 4.7 4.0     103   < > 99 98 98   CO2 29  29   < > 25 24 27   *  232*   < > 243* 245* 233*   BUN 41*  41*   < > 48* 50* 47*   CREATININE 2.5*  2.5*   < > 2.6* 2.6* 2.6*   CALCIUM 9.0  9.0   < > 8.4* 8.1* 8.2*   PROT 7.1  --   --   --   --    ALBUMIN 1.5*  --   --   --   --    BILITOT 0.4  --   --   --   --    ALKPHOS 184*  --   --   --   --    AST 45*  --   --   --    --    ALT 42  --   --   --   --    ANIONGAP 10  10   < > 11 12 11   EGFRNONAA 26.3*  26.3*   < > 25.1* 25.1* 25.1*    < > = values in this interval not displayed.       Significant Imaging: I have reviewed all pertinent imaging results/findings within the past 24 hours.    MEDICATIONS  Scheduled Meds:   artificial tears  1 drop Both Eyes TID    DAPTOmycin (CUBICIN)  IV  1,050 mg Intravenous Q24H    fluticasone furoate-vilanteroL  1 puff Inhalation Daily    fluticasone propionate  1 spray Each Nostril Daily    heparin (porcine)  5,000 Units Subcutaneous Q8H    insulin aspart U-100  5-10 Units Subcutaneous TIDWM    levoFLOXacin  750 mg Oral Every other day    levothyroxine  75 mcg Oral Before breakfast                             Continuous Infusions:   insulin (HUMAN R) infusion (adults) 1.5 Units/hr (09/18/20 1534)     PRN Meds:.acetaminophen, albuterol-ipratropium, calcium carbonate, dextrose 50%, dextrose 50%, glucagon (human recombinant), glucose, glucose, insulin aspart U-100, melatonin, ondansetron, oxyCODONE, polyethylene glycol, DIPH,PERTUS (ADACEL),TETANUS PF VAC (ADULT)    VTE Risk Mitigation (From admission, onward)         Ordered     heparin (porcine) injection 5,000 Units  Every 8 hours      09/11/20 0943     IP VTE HIGH RISK PATIENT  Once      08/30/20 2004     Place sequential compression device  Until discontinued      08/30/20 2686                Assessment/Plan:     Overview/Hospital Course:     Septic shock_Septic arthritis of Right Knee_Sespsi due to MRSA  This is a 65 year old  male with PMH significant for HFrEF and COPD with chronic respiratory failure (on 2L home oxygen), T2DM with CKD III, and iron deficiency anemia who originally presented to [facility] on 08/30 with cellulitis of left foot with concern for diabetic foot infection with subsequent development of MRSA bacteremia attributed to septic arthritis of right knee and elbow. He is status post drainage and  coverage for MRSA since that time. His work-up for other etiologies of MRSA bacteremia have included negative LUKAS and MRI of lumbar spine looking for endocarditis and spinal abscess, respectively. Stool studies for C.diff on 09/11 were negative. His hospital course has been associated with worsening hypotension and leukocytosis since 09/10 prompting ICU admission on 09/12 for initiation of vasopressor support. Infectious work-up thus far on ICU admission concern for likely right lower lobe HAP.      CT abdomen pelvis on 9/15 with gallbladder dilation, sludge, and trace pericholic fluid collection. Exam not consistent with cholecystitis but given persistent shock potential source. Also with potential right lower lobe atelectasis with overlying infection  - Patient off pressors now  -continue daptomycin, f/u ID consult recs   >will need weekly CK levels.     Type 2 DM with DKA  -consult endocrinology  -pt transitioning from tube feeds to regular diet  -still on insulin drip will f/u recs.     Postablative hypothyroidism  Plan: Continue home SYNTHROID       Chronic systolic congestive heart failure  -Most recent ECHO in 09/2020 with EF of 25%.   -Unclear is ischemic vs non-ischemic.   -Home regimen: Carvedilol, Lasix 80 mg, and Lisinopril.   -Exam notable for bilateral lower extremity edema and JVD.   -Associated with chronic hypoxemic respiratory failure requiring 2L nasal cannula, but noted to develop worsening oxygen requirements on ICU admission that were likely multifactorial from suspected hospital acquired pneumonia versus declining urine output with pre-disposition to volume overload. .      COPD mixed type  -Based on PFT's from 05/2015 showing mild (small airways) obstruction, airflow not improved after bronchodilator, and moderate restriction.    Acute on chronic respiratory failure with hypoxia  -History of mixed COPD (based on PFT's from 05/2015 showing mild (small airways) obstruction, airflow not improved  after bronchodilator, and moderate restriction) and HFrEF with chronic respiratory failure on 2L home oxygen.   -ICU admission notable for progressive increase in supplemental oxygen requirements.   -Work-up for underlying etiology of acute on chronic respiratory failure include CXR showing concern for possible progression of CHF vs HAP (not entirely convinced that CXR showed consolidation)  - Breathing back to baseline now to 2L NC    Code Status: Full Code    High Risk Conditions:  Patient has a condition that poses threat to life and bodily function: Septic shock, MRSA bacteremia  Patient is currently on drug therapy requiring intensive monitoring for toxicity: Daptomycin.     Discharge Planning   EDY: 9/21/2020     Code Status: Full Code   Is the patient medically ready for discharge?: No    Reason for patient still in hospital (select all that apply): Patient trending condition  Discharge Plan A: Skilled Nursing Facility   Discharge Delays: (!) Change in Medical Condition      Active Hospital Problems    Diagnosis  POA    *Septic shock due to methicillin resistant Staphylococcus aureus [A41.02, R65.21]  Yes     Priority: 1 - High    MRSA bacteremia [R78.81]  Yes     Priority: 1 - High    Acute renal failure with acute tubular necrosis superimposed on stage 3 chronic kidney disease [N17.0, N18.3]  No     Priority: 2     Septic arthritis of knee, right [M00.9]  Yes     Priority: 2     Diabetic ulcer of left foot associated with type 2 diabetes mellitus, with fat layer exposed [E11.621, L97.522]  Yes     Priority: 3     Diabetic foot infection [E11.628, L08.9]  Yes     Priority: 3     Acute metabolic encephalopathy [G93.41]  No     Priority: 4     Acute on chronic respiratory failure with hypoxia [J96.21]  No     Priority: 5     Acute renal insufficiency [N28.9]  Yes    Hospital-acquired pneumonia [J18.9, Y95]  No    Debility [R53.81]  Yes    Staphylococcal arthritis of right knee [M00.061]  Yes     COPD mixed type [J44.9]  Yes     Chronic    Postablative hypothyroidism [E89.0]  Yes     Chronic    Hyperlipidemia [E78.5]  Yes     High ASCVD with CAD, elevated A1c      Type 2 diabetes mellitus with stage 3 chronic kidney disease, with long-term current use of insulin [E11.22, N18.3, Z79.4]  Not Applicable     GFR> 60, uncontrolled DM      Chronic systolic congestive heart failure [I50.22]  Yes     Chronic     EF 35% in 2015 echo, improved in 5/2018. Patient with mixed ischemic and non-ischemic cardiomyopathy        Resolved Hospital Problems    Diagnosis Date Resolved POA    Endocarditis [I38] 09/12/2020 Yes    Sepsis due to methicillin resistant Staphylococcus aureus [A41.02] 09/13/2020 Yes    Type 2 diabetes mellitus with ketoacidosis without coma, with long-term current use of insulin [E11.10, Z79.4] 09/18/2020 Not Applicable     Novolin 70/30 40U in AM, 30U in PM. Elevated A1c                   Thomas Reed M.D.  Attending Physician  [facility] Medicine Dept.  Pager: [phone]  [facility]  [id number]

## 2020-09-20 LAB
ALBUMIN SERPL BCP-MCNC: 1.6 G/DL (ref 3.5–5.2)
ANION GAP SERPL CALC-SCNC: 9 MMOL/L (ref 8–16)
ANISOCYTOSIS BLD QL SMEAR: SLIGHT
BASOPHILS # BLD AUTO: ABNORMAL K/UL (ref 0–0.2)
BASOPHILS NFR BLD: 0 % (ref 0–1.9)
BNP SERPL-MCNC: 374 PG/ML (ref 0–99)
BUN SERPL-MCNC: 45 MG/DL (ref 8–23)
BURR CELLS BLD QL SMEAR: ABNORMAL
CA-I BLDV-SCNC: 1.1 MMOL/L (ref 1.06–1.42)
CA-I BLDV-SCNC: 1.19 MMOL/L (ref 1.06–1.42)
CALCIUM SERPL-MCNC: 8.2 MG/DL (ref 8.7–10.5)
CHLORIDE SERPL-SCNC: 100 MMOL/L (ref 95–110)
CO2 SERPL-SCNC: 27 MMOL/L (ref 23–29)
CREAT SERPL-MCNC: 2.4 MG/DL (ref 0.5–1.4)
DIFFERENTIAL METHOD: ABNORMAL
EOSINOPHIL # BLD AUTO: ABNORMAL K/UL (ref 0–0.5)
EOSINOPHIL NFR BLD: 7 % (ref 0–8)
ERYTHROCYTE [DISTWIDTH] IN BLOOD BY AUTOMATED COUNT: 16.8 % (ref 11.5–14.5)
ERYTHROCYTE [SEDIMENTATION RATE] IN BLOOD BY WESTERGREN METHOD: 110 MM/HR (ref 0–23)
EST. GFR  (AFRICAN AMERICAN): 32 ML/MIN/1.73 M^2
EST. GFR  (NON AFRICAN AMERICAN): 27.7 ML/MIN/1.73 M^2
GLUCOSE SERPL-MCNC: 182 MG/DL (ref 70–110)
HCT VFR BLD AUTO: 27.7 % (ref 40–54)
HGB BLD-MCNC: 8.4 G/DL (ref 14–18)
HYPOCHROMIA BLD QL SMEAR: ABNORMAL
IMM GRANULOCYTES # BLD AUTO: ABNORMAL K/UL (ref 0–0.04)
IMM GRANULOCYTES NFR BLD AUTO: ABNORMAL % (ref 0–0.5)
LYMPHOCYTES # BLD AUTO: ABNORMAL K/UL (ref 1–4.8)
LYMPHOCYTES NFR BLD: 6 % (ref 18–48)
MAGNESIUM SERPL-MCNC: 1.8 MG/DL (ref 1.6–2.6)
MCH RBC QN AUTO: 26.8 PG (ref 27–31)
MCHC RBC AUTO-ENTMCNC: 30.3 G/DL (ref 32–36)
MCV RBC AUTO: 88 FL (ref 82–98)
METAMYELOCYTES NFR BLD MANUAL: 1 %
MONOCYTES # BLD AUTO: ABNORMAL K/UL (ref 0.3–1)
MONOCYTES NFR BLD: 4 % (ref 4–15)
NEUTROPHILS NFR BLD: 82 % (ref 38–73)
NRBC BLD-RTO: 0 /100 WBC
OVALOCYTES BLD QL SMEAR: ABNORMAL
PHOSPHATE SERPL-MCNC: 2.4 MG/DL (ref 2.7–4.5)
PLATELET # BLD AUTO: 337 K/UL (ref 150–350)
PLATELET BLD QL SMEAR: ABNORMAL
PMV BLD AUTO: 11 FL (ref 9.2–12.9)
POCT GLUCOSE: 146 MG/DL (ref 70–110)
POCT GLUCOSE: 170 MG/DL (ref 70–110)
POCT GLUCOSE: 185 MG/DL (ref 70–110)
POCT GLUCOSE: 211 MG/DL (ref 70–110)
POCT GLUCOSE: 212 MG/DL (ref 70–110)
POCT GLUCOSE: 226 MG/DL (ref 70–110)
POIKILOCYTOSIS BLD QL SMEAR: SLIGHT
POLYCHROMASIA BLD QL SMEAR: ABNORMAL
POTASSIUM SERPL-SCNC: 4 MMOL/L (ref 3.5–5.1)
RBC # BLD AUTO: 3.14 M/UL (ref 4.6–6.2)
SODIUM SERPL-SCNC: 136 MMOL/L (ref 136–145)
WBC # BLD AUTO: 23.61 K/UL (ref 3.9–12.7)

## 2020-09-20 PROCEDURE — 80069 RENAL FUNCTION PANEL: CPT | Mod: HCNC

## 2020-09-20 PROCEDURE — 99232 PR SUBSEQUENT HOSPITAL CARE,LEVL II: ICD-10-PCS | Mod: HCNC,,, | Performed by: HOSPITALIST

## 2020-09-20 PROCEDURE — 63600175 PHARM REV CODE 636 W HCPCS: Mod: JG,HCNC | Performed by: STUDENT IN AN ORGANIZED HEALTH CARE EDUCATION/TRAINING PROGRAM

## 2020-09-20 PROCEDURE — 99900035 HC TECH TIME PER 15 MIN (STAT): Mod: HCNC

## 2020-09-20 PROCEDURE — 27000221 HC OXYGEN, UP TO 24 HOURS: Mod: HCNC

## 2020-09-20 PROCEDURE — 36415 COLL VENOUS BLD VENIPUNCTURE: CPT | Mod: HCNC

## 2020-09-20 PROCEDURE — 83735 ASSAY OF MAGNESIUM: CPT | Mod: HCNC

## 2020-09-20 PROCEDURE — 82330 ASSAY OF CALCIUM: CPT | Mod: HCNC

## 2020-09-20 PROCEDURE — 20600001 HC STEP DOWN PRIVATE ROOM: Mod: HCNC

## 2020-09-20 PROCEDURE — 25000003 PHARM REV CODE 250: Mod: HCNC | Performed by: NURSE PRACTITIONER

## 2020-09-20 PROCEDURE — 85652 RBC SED RATE AUTOMATED: CPT | Mod: HCNC

## 2020-09-20 PROCEDURE — 63600175 PHARM REV CODE 636 W HCPCS: Mod: HCNC | Performed by: HOSPITALIST

## 2020-09-20 PROCEDURE — 85007 BL SMEAR W/DIFF WBC COUNT: CPT | Mod: HCNC

## 2020-09-20 PROCEDURE — 85027 COMPLETE CBC AUTOMATED: CPT | Mod: HCNC

## 2020-09-20 PROCEDURE — 25000003 PHARM REV CODE 250: Mod: HCNC | Performed by: HOSPITALIST

## 2020-09-20 PROCEDURE — 83880 ASSAY OF NATRIURETIC PEPTIDE: CPT | Mod: HCNC

## 2020-09-20 PROCEDURE — 63600175 PHARM REV CODE 636 W HCPCS: Mod: HCNC | Performed by: STUDENT IN AN ORGANIZED HEALTH CARE EDUCATION/TRAINING PROGRAM

## 2020-09-20 PROCEDURE — 25000003 PHARM REV CODE 250: Mod: HCNC | Performed by: STUDENT IN AN ORGANIZED HEALTH CARE EDUCATION/TRAINING PROGRAM

## 2020-09-20 PROCEDURE — 63600175 PHARM REV CODE 636 W HCPCS: Mod: HCNC | Performed by: NURSE PRACTITIONER

## 2020-09-20 PROCEDURE — 99232 SBSQ HOSP IP/OBS MODERATE 35: CPT | Mod: HCNC,,, | Performed by: HOSPITALIST

## 2020-09-20 RX ORDER — OXYCODONE HYDROCHLORIDE 5 MG/1
5 TABLET ORAL EVERY 4 HOURS PRN
Status: DISCONTINUED | OUTPATIENT
Start: 2020-09-20 | End: 2020-10-09 | Stop reason: HOSPADM

## 2020-09-20 RX ORDER — METOPROLOL TARTRATE 25 MG/1
12.5 TABLET ORAL 2 TIMES DAILY
Status: DISCONTINUED | OUTPATIENT
Start: 2020-09-20 | End: 2020-09-20

## 2020-09-20 RX ORDER — CARVEDILOL 3.12 MG/1
3.12 TABLET ORAL 2 TIMES DAILY
Status: DISCONTINUED | OUTPATIENT
Start: 2020-09-20 | End: 2020-09-22

## 2020-09-20 RX ADMIN — HEPARIN SODIUM 5000 UNITS: 5000 INJECTION INTRAVENOUS; SUBCUTANEOUS at 02:09

## 2020-09-20 RX ADMIN — INSULIN ASPART 5 UNITS: 100 INJECTION, SOLUTION INTRAVENOUS; SUBCUTANEOUS at 12:09

## 2020-09-20 RX ADMIN — LEVOFLOXACIN 750 MG: 750 TABLET, FILM COATED ORAL at 08:09

## 2020-09-20 RX ADMIN — HYPROMELLOSE 2910 1 DROP: 5 SOLUTION OPHTHALMIC at 02:09

## 2020-09-20 RX ADMIN — INSULIN ASPART 5 UNITS: 100 INJECTION, SOLUTION INTRAVENOUS; SUBCUTANEOUS at 04:09

## 2020-09-20 RX ADMIN — INSULIN ASPART 1 UNITS: 100 INJECTION, SOLUTION INTRAVENOUS; SUBCUTANEOUS at 06:09

## 2020-09-20 RX ADMIN — HEPARIN SODIUM 5000 UNITS: 5000 INJECTION INTRAVENOUS; SUBCUTANEOUS at 09:09

## 2020-09-20 RX ADMIN — LEVOTHYROXINE SODIUM 75 MCG: 25 TABLET ORAL at 06:09

## 2020-09-20 RX ADMIN — METOPROLOL TARTRATE 12.5 MG: 25 TABLET, FILM COATED ORAL at 02:09

## 2020-09-20 RX ADMIN — INSULIN ASPART 2 UNITS: 100 INJECTION, SOLUTION INTRAVENOUS; SUBCUTANEOUS at 01:09

## 2020-09-20 RX ADMIN — HEPARIN SODIUM 5000 UNITS: 5000 INJECTION INTRAVENOUS; SUBCUTANEOUS at 06:09

## 2020-09-20 RX ADMIN — OXYCODONE 5 MG: 5 TABLET ORAL at 09:09

## 2020-09-20 RX ADMIN — OXYCODONE 5 MG: 5 TABLET ORAL at 11:09

## 2020-09-20 RX ADMIN — OXYCODONE 5 MG: 5 TABLET ORAL at 06:09

## 2020-09-20 RX ADMIN — INSULIN ASPART 1 UNITS: 100 INJECTION, SOLUTION INTRAVENOUS; SUBCUTANEOUS at 08:09

## 2020-09-20 RX ADMIN — DAPTOMYCIN 1050 MG: 350 INJECTION, POWDER, LYOPHILIZED, FOR SOLUTION INTRAVENOUS at 11:09

## 2020-09-20 RX ADMIN — HYPROMELLOSE 2910 1 DROP: 5 SOLUTION OPHTHALMIC at 09:09

## 2020-09-20 RX ADMIN — INSULIN ASPART 2 UNITS: 100 INJECTION, SOLUTION INTRAVENOUS; SUBCUTANEOUS at 09:09

## 2020-09-20 RX ADMIN — HYPROMELLOSE 2910 1 DROP: 5 SOLUTION OPHTHALMIC at 08:09

## 2020-09-20 RX ADMIN — INSULIN ASPART 5 UNITS: 100 INJECTION, SOLUTION INTRAVENOUS; SUBCUTANEOUS at 08:09

## 2020-09-20 RX ADMIN — FLUTICASONE FUROATE AND VILANTEROL TRIFENATATE 1 PUFF: 200; 25 POWDER RESPIRATORY (INHALATION) at 08:09

## 2020-09-20 RX ADMIN — FLUTICASONE PROPIONATE 50 MCG: 50 SPRAY, METERED NASAL at 08:09

## 2020-09-20 RX ADMIN — CARVEDILOL 3.12 MG: 3.12 TABLET, FILM COATED ORAL at 09:09

## 2020-09-20 RX ADMIN — SODIUM CHLORIDE 2.2 UNITS/HR: 9 INJECTION, SOLUTION INTRAVENOUS at 01:09

## 2020-09-20 NOTE — PROGRESS NOTES
[facility] Hospital Medicine  Progress Note    Patient Name: Ed Patton  MRN: [id number]  Patient Class: IP- Inpatient   Admission Date: 8/30/2020  Length of Stay: 20 days  Attending Physician: Thomas Reed MD  Primary Care Provider: Qiana Chow MD    [facility] Medicine Team: [facility] HOSP MED A Thomas Reed MD    Subjective:     Principal Problem:Septic shock due to methicillin resistant Staphylococcus aureus    Interval History: patient reports feeling better than early in hospital stay    No palpitations, greatest complaint is related to pain    Worked with therapy but states he is fatigued and tired afterword and has had increased complaints of pain     Review of Systems   Constitutional: Negative for fever.   Respiratory: Negative for shortness of breath.    Gastrointestinal: Negative for abdominal pain, nausea and vomiting.   Psychiatric/Behavioral: Negative for confusion.     Objective:     Vital Signs (Most Recent):  Temp: 99.1 °F (37.3 °C) (09/19/20 2324)  Pulse: (!) 124 (09/19/20 2324)  Resp: 20 (09/19/20 2324)  BP: 116/80 (09/19/20 2324)  SpO2: (!) 93 % (09/19/20 2324) Vital Signs (24h Range):  Temp:  [97.6 °F (36.4 °C)-99.1 °F (37.3 °C)] 99.1 °F (37.3 °C)  Pulse:  [124-130] 124  Resp:  [16-20] 20  SpO2:  [91 %-100 %] 93 %  BP: (105-125)/(56-81) 116/80   Intake/Outake:  This Shift:  No intake/output data recorded.    Net I/O past 24h:     Intake/Output Summary (Last 24 hours) at 9/19/2020 2344  Last data filed at 9/19/2020 1600  Gross per 24 hour   Intake 852 ml   Output 1510 ml   Net -658 ml         Weight: 115.3 kg (254 lb 3.1 oz)  Body mass index is 30.94 kg/m².  Physical Exam  Constitutional:       General: He is not in acute distress.     Appearance: Normal appearance. He is not diaphoretic.   HENT:      Head: Normocephalic and atraumatic.   Eyes:      General: Lids are normal. No scleral icterus.        Right eye: No discharge.         Left eye: No discharge.       Conjunctiva/sclera: Conjunctivae normal.   Neck:      Musculoskeletal: No neck rigidity.      Trachea: Phonation normal.   Cardiovascular:      Rate and Rhythm: Normal rate and regular rhythm.   Pulmonary:      Effort: Pulmonary effort is normal. No tachypnea, accessory muscle usage or respiratory distress.      Breath sounds: Examination of the right-lower field reveals decreased breath sounds. Examination of the left-lower field reveals decreased breath sounds. Decreased breath sounds present. No wheezing.   Abdominal:      General: Bowel sounds are normal. There is distension.      Palpations: Abdomen is soft.      Tenderness: There is no abdominal tenderness.      Comments: Cholecystostomy drain draining green bile   Musculoskeletal:      Right knee: He exhibits swelling.      Right lower leg: Edema present.      Left lower leg: Edema present.   Neurological:      Mental Status: He is alert.      Cranial Nerves: Cranial nerves are intact.      Motor: Motor function is intact.   Psychiatric:         Attention and Perception: He is inattentive.         Mood and Affect: Affect normal.         Behavior: Behavior is cooperative.         Cognition and Memory: He exhibits impaired recent memory.           Significant Labs:   Blood Culture: No results for input(s): LABBLOO in the last 48 hours.  CBC:   Recent Labs   Lab 09/18/20  0826 09/19/20  0605   WBC 18.44* 23.14*   HGB 8.9* 8.7*   HCT 30.0* 29.9*   * 378*     CMP:   Recent Labs   Lab 09/18/20  1828 09/19/20  0034 09/19/20  0605   * 136 134*  133*   K 4.7 4.0 4.1  4.1   CL 98 98 98  98   CO2 24 27 28  26   * 233* 214*  211*   BUN 50* 47* 47*  47*   CREATININE 2.6* 2.6* 2.6*  2.5*   CALCIUM 8.1* 8.2* 8.3*  8.3*   PROT  --   --  6.9   ALBUMIN  --   --  1.7*   BILITOT  --   --  0.5   ALKPHOS  --   --  140*   AST  --   --  55*   ALT  --   --  49*   ANIONGAP 12 11 8  9   EGFRNONAA 25.1* 25.1* 25.1*  26.3*       Significant Imaging: I have  reviewed all pertinent imaging results/findings within the past 24 hours.    MEDICATIONS  Scheduled Meds:   artificial tears  1 drop Both Eyes TID    DAPTOmycin (CUBICIN)  IV  1,050 mg Intravenous Q24H    fluticasone furoate-vilanteroL  1 puff Inhalation Daily    fluticasone propionate  1 spray Each Nostril Daily    heparin (porcine)  5,000 Units Subcutaneous Q8H    insulin aspart U-100  5-10 Units Subcutaneous TIDWM    levoFLOXacin  750 mg Oral Every other day    levothyroxine  75 mcg Oral Before breakfast                             Continuous Infusions:   insulin (HUMAN R) infusion (adults) 2.2 Units/hr (09/19/20 2301)     PRN Meds:.acetaminophen, albuterol-ipratropium, calcium carbonate, dextrose 50%, dextrose 50%, glucagon (human recombinant), glucose, glucose, insulin aspart U-100, melatonin, ondansetron, oxyCODONE, polyethylene glycol, DIPH,PERTUS (ADACEL),TETANUS PF VAC (ADULT)    VTE Risk Mitigation (From admission, onward)         Ordered     heparin (porcine) injection 5,000 Units  Every 8 hours      09/11/20 0943     IP VTE HIGH RISK PATIENT  Once      08/30/20 2004     Place sequential compression device  Until discontinued      08/30/20 1954                Assessment/Plan:     Overview/Hospital Course:     Septic shock_Septic arthritis of Right Knee_Sespsi due to MRSA  This is a 65 year old  male with PMH significant for HFrEF and COPD with chronic respiratory failure (on 2L home oxygen), T2DM with CKD III, and iron deficiency anemia who originally presented to [facility] on 08/30 with cellulitis of left foot with concern for diabetic foot infection with subsequent development of MRSA bacteremia attributed to septic arthritis of right knee and elbow. He is status post drainage and coverage for MRSA since that time. His work-up for other etiologies of MRSA bacteremia have included negative LUKAS and MRI of lumbar spine looking for endocarditis and spinal abscess, respectively. Stool  studies for C.diff on 09/11 were negative. His hospital course has been associated with worsening hypotension and leukocytosis since 09/10 prompting ICU admission on 09/12 for initiation of vasopressor support. Infectious work-up thus far on ICU admission concern for likely right lower lobe HAP.      CT abdomen pelvis on 9/15 with gallbladder dilation, sludge, and trace pericholic fluid collection. Exam not consistent with cholecystitis but given persistent shock potential source. Also with potential right lower lobe atelectasis with overlying infection  S/p ICU stepdown  -continue daptomycin, f/u ID consult recs   >will need weekly CK levels.     Type 2 DM with DKA  -endocrinologyfollowing pt on transitional drip.  -patient   -still on insulin drip will f/u recs.     Postablative hypothyroidism  Plan: Continue home SYNTHROID       Chronic systolic congestive heart failure  -Most recent ECHO in 09/2020 with EF of 25%.   -Unclear is ischemic vs non-ischemic.   -Home regimen: Carvedilol, Lasix 80 mg, and Lisinopril.   -Exam notable for bilateral lower extremity edema and JVD.   -Associated with chronic hypoxemic respiratory failure requiring 2L nasal cannula, but noted to develop worsening oxygen requirements on ICU admission that were likely multifactorial from suspected hospital acquired pneumonia versus declining urine output with pre-disposition to volume overload. .      COPD mixed type  -Based on PFT's from 05/2015 showing mild (small airways) obstruction, airflow not improved after bronchodilator, and moderate restriction.    Acute on chronic respiratory failure with hypoxia  -History of mixed COPD (based on PFT's from 05/2015 showing mild (small airways) obstruction, airflow not improved after bronchodilator, and moderate restriction) and HFrEF with chronic respiratory failure on 2L home oxygen.   -ICU admission notable for progressive increase in supplemental oxygen requirements.   -Work-up for underlying  etiology of acute on chronic respiratory failure include CXR showing concern for possible progression of CHF vs HAP (not entirely convinced that CXR showed consolidation)  - Breathing back to baseline now to 2L NC    Code Status: Full Code    High Risk Conditions:  Patient has a condition that poses threat to life and bodily function: Septic shock, MRSA bacteremia  Patient is currently on drug therapy requiring intensive monitoring for toxicity: Daptomycin.     Discharge Planning   EDY: 9/21/2020     Code Status: Full Code   Is the patient medically ready for discharge?: No    Reason for patient still in hospital (select all that apply): Patient trending condition  Discharge Plan A: Skilled Nursing Facility   Discharge Delays: (!) Change in Medical Condition      Active Hospital Problems    Diagnosis  POA    *Septic shock due to methicillin resistant Staphylococcus aureus [A41.02, R65.21]  Yes     Priority: 1 - High    MRSA bacteremia [R78.81]  Yes     Priority: 1 - High    Acute renal failure with acute tubular necrosis superimposed on stage 3 chronic kidney disease [N17.0, N18.3]  No     Priority: 2     Septic arthritis of knee, right [M00.9]  Yes     Priority: 2     Diabetic ulcer of left foot associated with type 2 diabetes mellitus, with fat layer exposed [E11.621, L97.522]  Yes     Priority: 3     Diabetic foot infection [E11.628, L08.9]  Yes     Priority: 3     Acute metabolic encephalopathy [G93.41]  No     Priority: 4     Acute on chronic respiratory failure with hypoxia [J96.21]  No     Priority: 5     Acute renal insufficiency [N28.9]  Yes    Hospital-acquired pneumonia [J18.9, Y95]  No    Debility [R53.81]  Yes    Staphylococcal arthritis of right knee [M00.061]  Yes    COPD mixed type [J44.9]  Yes     Chronic    Postablative hypothyroidism [E89.0]  Yes     Chronic    Hyperlipidemia [E78.5]  Yes     High ASCVD with CAD, elevated A1c      Type 2 diabetes mellitus with stage 3 chronic  kidney disease, with long-term current use of insulin [E11.22, N18.3, Z79.4]  Not Applicable     GFR> 60, uncontrolled DM      Chronic systolic congestive heart failure [I50.22]  Yes     Chronic     EF 35% in 2015 echo, improved in 5/2018. Patient with mixed ischemic and non-ischemic cardiomyopathy        Resolved Hospital Problems    Diagnosis Date Resolved POA    Endocarditis [I38] 09/12/2020 Yes    Sepsis due to methicillin resistant Staphylococcus aureus [A41.02] 09/13/2020 Yes    Type 2 diabetes mellitus with ketoacidosis without coma, with long-term current use of insulin [E11.10, Z79.4] 09/18/2020 Not Applicable     Novolin 70/30 40U in AM, 30U in PM. Elevated A1c                   Thomas Reed M.D.  Attending Physician  [facility] Medicine Dept.  Pager: [phone]  [facility]  [id number]

## 2020-09-20 NOTE — PROGRESS NOTES
"[facility]  Endocrinology  Progress Note    Admit Date: 2020     Reason for Consult: Management of T2DM, Hyperglycemia     Surgical Procedure and Date: n/a    Diabetes diagnosis year:     Home Diabetes Medications:    Novolin 70/30 u-100   40 units in AM with breakfast.    20 units in PM with dinner.     How often checking glucose at home? 1-3 x day   BG readings on regimen: >200  Hypoglycemia on the regimen?  No  Missed doses on regimen?  No    Diabetes Complications include:     Hyperglycemia, Foot ulcer   and Other skin ulcer    Complicating diabetes co morbidities:   CHF, CAD, HTN, HLD      HPI:   Patient is a 65 y.o. male with a diagnosis of acute cholecystitis and septic shock with MRSA complicated by NANETTE requiring CRRT. Endocrinology consulted to manage DM2 during current admission to [facility].     Lab Results   Component Value Date    HGBA1C 9.5 (H) 2020       Interval HPI:   Overnight events:   BG has trended downward, but is still slightly above goal.   Diet Dysphagia Mechanical Soft (IDDSI Level 5)  12 Days Post-Op    Eatin% - 100%  Nausea: No  Hypoglycemia and intervention: No  Fever: No  TPN and/or TF: No  If yes, type of TF/TPN and rate: None    /81 (BP Location: Left arm, Patient Position: Lying)   Pulse (!) 128   Temp 97.9 °F (36.6 °C) (Oral)   Resp 20   Ht 6' 4" (1.93 m)   Wt 115.3 kg (254 lb 3.1 oz)   SpO2 100%   BMI 30.94 kg/m²     Labs Reviewed and Include    Recent Labs   Lab 20  0605   *  211*   CALCIUM 8.3*  8.3*   ALBUMIN 1.7*   PROT 6.9   *  133*   K 4.1  4.1   CO2 28  26   CL 98  98   BUN 47*  47*   CREATININE 2.6*  2.5*   ALKPHOS 140*   ALT 49*   AST 55*   BILITOT 0.5     Lab Results   Component Value Date    WBC 23.14 (H) 2020    HGB 8.7 (L) 2020    HCT 29.9 (L) 2020    MCV 91 2020     (H) 2020     No results for input(s): TSH, FREET4 in the last 168 hours.  Lab Results "   Component Value Date    HGBA1C 9.5 (H) 08/31/2020       Nutritional status:   Body mass index is 30.94 kg/m².  Lab Results   Component Value Date    ALBUMIN 1.7 (L) 09/19/2020    ALBUMIN 1.5 (L) 09/17/2020    ALBUMIN 1.5 (L) 09/16/2020     No results found for: PREALBUMIN    Estimated Creatinine Clearance: 40.4 mL/min (A) (based on SCr of 2.6 mg/dL (H)).    Accu-Checks  Recent Labs     09/18/20  0617 09/18/20  0709 09/18/20  0802 09/18/20  0907 09/18/20  1118 09/18/20  1605 09/18/20  2103 09/18/20  2346 09/19/20  0223 09/19/20  0742   POCTGLUCOSE 302* 296* 316* 255* 233* 281* 268* 198* 190* 207*       Current Medications and/or Treatments Impacting Glycemic Control  Immunotherapy:    Immunosuppressants     None        Steroids:   Hormones (From admission, onward)    Start     Stop Route Frequency Ordered    08/30/20 2059  melatonin tablet 6 mg      -- Oral Nightly PRN 08/30/20 2004        Pressors:    Autonomic Drugs (From admission, onward)    None        Hyperglycemia/Diabetes Medications:   Antihyperglycemics (From admission, onward)    Start     Stop Route Frequency Ordered    09/18/20 1130  insulin aspart U-100 pen 5-10 Units      -- SubQ 3 times daily with meals 09/18/20 0929 09/18/20 1030  insulin regular 100 Units in sodium chloride 0.9% 100 mL infusion      -- IV Continuous 09/18/20 0929    09/18/20 1028  insulin aspart U-100 pen 0-5 Units      -- SubQ As needed (PRN) 09/18/20 0929          ASSESSMENT and PLAN    * Septic shock due to methicillin resistant Staphylococcus aureus  Managed per primary team  Avoid hypoglycemia    Optimize BG control to improve wound healing        Type 2 diabetes mellitus with stage 3 chronic kidney disease, with long-term current use of insulin  BG goal 140 - 180     - Rewrtie  transition IV insulin infusion with stepdown parameters. Initial rate starting at 1.8 units/hr.   - Start Novolog 5-10 units TIDWM. Basal/Prandial physiologic matching.    - Low Dose SQ Insulin  Correction Scale. Given kidney function.   - BG Monitoring AC/HS/0200     ADDENDUM 10:51 PM  BG remains above goal.   - Increase  transition IV insulin infusion with stepdown parameters. Initial rate starting at 2.2 units/hr.     ** Please call Endocrine for any BG related issues **  ** Please notify Endocrine for any change and/or advance in diet**    Discharge Planning:   TBD. Please notify endocrinology prior to discharge.          Postablative hypothyroidism  The clearance of many drugs, including antiepileptic, anticoagulant, hypnotic, and opioid drugs, is decreased in hypothyroidism. Thus, drug toxicity may occur if drug dose is not reduced. Thyroid disease may also affect lipid profiles which may then cause increase in insulin resistance.     Recommend patient continue Levothyroxine 75 mcg           Luke Rojas NP  Endocrinology  [facility]

## 2020-09-20 NOTE — CARE UPDATE
BG goal 140 -180     BG remains reasonably controlled on current IV/SQ insulin regimen and is now within goal. Endo will continue to follow and assess insulin needs during this admission.   Diet Dysphagia Mechanical Soft (IDDSI Level 5) [facility]  13 Days Post-Op    -  transition IV insulin infusion with stepdown parameters. Initial rate starting at 2.2 units/hr.   - Novolog 5-10 units TIDWM.   - Low Dose SQ Insulin Correction Scale.  - BG Monitoring AC/HS/0200    ** Please call Endocrine for any BG related issues **  ** Please notify Endocrine for any change and/or advance in diet**    Discharge Planning:   TBD. Please notify endocrinology prior to discharge.

## 2020-09-20 NOTE — PLAN OF CARE
Pt remains free from falls and injury. Provided with PRN analgesic with moderate results. Pt remains on insulin drip at 2.2ml hour. Bed low and locked, Call light within reach.

## 2020-09-20 NOTE — PLAN OF CARE
Problem: Adult Inpatient Plan of Care  Goal: Optimal Comfort and Wellbeing  Outcome: Ongoing, Progressing  Plan of care was reviewed with the pt. AAOx3. Vitals were stable. Cardiac and glucose monitoring was done. Insulin gtt was set to 2.2 with sliding scale.  Pain management was reviewed with the pt. PRN pain med was given. Pt was turned Q2. Safety and Contact precautions were in placed.

## 2020-09-21 LAB
ALBUMIN SERPL BCP-MCNC: 1.7 G/DL (ref 3.5–5.2)
ALBUMIN SERPL BCP-MCNC: 1.7 G/DL (ref 3.5–5.2)
ALP SERPL-CCNC: 111 U/L (ref 55–135)
ALT SERPL W/O P-5'-P-CCNC: 43 U/L (ref 10–44)
ANION GAP SERPL CALC-SCNC: 14 MMOL/L (ref 8–16)
AST SERPL-CCNC: 52 U/L (ref 10–40)
BASOPHILS # BLD AUTO: 0.06 K/UL (ref 0–0.2)
BASOPHILS NFR BLD: 0.3 % (ref 0–1.9)
BILIRUB DIRECT SERPL-MCNC: 0.3 MG/DL (ref 0.1–0.3)
BILIRUB SERPL-MCNC: 0.5 MG/DL (ref 0.1–1)
BUN SERPL-MCNC: 36 MG/DL (ref 8–23)
CALCIUM SERPL-MCNC: 8.3 MG/DL (ref 8.7–10.5)
CHLORIDE SERPL-SCNC: 101 MMOL/L (ref 95–110)
CK SERPL-CCNC: 704 U/L (ref 20–200)
CO2 SERPL-SCNC: 23 MMOL/L (ref 23–29)
CREAT SERPL-MCNC: 2.1 MG/DL (ref 0.5–1.4)
CRP SERPL-MCNC: 76.8 MG/L (ref 0–8.2)
DIFFERENTIAL METHOD: ABNORMAL
EOSINOPHIL # BLD AUTO: 0.7 K/UL (ref 0–0.5)
EOSINOPHIL NFR BLD: 3 % (ref 0–8)
ERYTHROCYTE [DISTWIDTH] IN BLOOD BY AUTOMATED COUNT: 16.8 % (ref 11.5–14.5)
EST. GFR  (AFRICAN AMERICAN): 37.6 ML/MIN/1.73 M^2
EST. GFR  (NON AFRICAN AMERICAN): 32.5 ML/MIN/1.73 M^2
GLUCOSE SERPL-MCNC: 82 MG/DL (ref 70–110)
HCT VFR BLD AUTO: 28.7 % (ref 40–54)
HGB BLD-MCNC: 8.5 G/DL (ref 14–18)
IMM GRANULOCYTES # BLD AUTO: 0.9 K/UL (ref 0–0.04)
IMM GRANULOCYTES NFR BLD AUTO: 3.8 % (ref 0–0.5)
LYMPHOCYTES # BLD AUTO: 1 K/UL (ref 1–4.8)
LYMPHOCYTES NFR BLD: 4.3 % (ref 18–48)
MAGNESIUM SERPL-MCNC: 1.6 MG/DL (ref 1.6–2.6)
MCH RBC QN AUTO: 26.6 PG (ref 27–31)
MCHC RBC AUTO-ENTMCNC: 29.6 G/DL (ref 32–36)
MCV RBC AUTO: 90 FL (ref 82–98)
MONOCYTES # BLD AUTO: 1.7 K/UL (ref 0.3–1)
MONOCYTES NFR BLD: 7.1 % (ref 4–15)
NEUTROPHILS # BLD AUTO: 19.1 K/UL (ref 1.8–7.7)
NEUTROPHILS NFR BLD: 81.5 % (ref 38–73)
NRBC BLD-RTO: 0 /100 WBC
PHOSPHATE SERPL-MCNC: 2.5 MG/DL (ref 2.7–4.5)
PLATELET # BLD AUTO: 345 K/UL (ref 150–350)
PMV BLD AUTO: 11.1 FL (ref 9.2–12.9)
POCT GLUCOSE: 102 MG/DL (ref 70–110)
POCT GLUCOSE: 105 MG/DL (ref 70–110)
POCT GLUCOSE: 145 MG/DL (ref 70–110)
POCT GLUCOSE: 172 MG/DL (ref 70–110)
POCT GLUCOSE: 195 MG/DL (ref 70–110)
POTASSIUM SERPL-SCNC: 4.4 MMOL/L (ref 3.5–5.1)
PROT SERPL-MCNC: 6.8 G/DL (ref 6–8.4)
RBC # BLD AUTO: 3.19 M/UL (ref 4.6–6.2)
SODIUM SERPL-SCNC: 138 MMOL/L (ref 136–145)
WBC # BLD AUTO: 23.45 K/UL (ref 3.9–12.7)

## 2020-09-21 PROCEDURE — C1751 CATH, INF, PER/CENT/MIDLINE: HCPCS | Mod: HCNC

## 2020-09-21 PROCEDURE — 97535 SELF CARE MNGMENT TRAINING: CPT | Mod: HCNC

## 2020-09-21 PROCEDURE — 97530 THERAPEUTIC ACTIVITIES: CPT | Mod: HCNC

## 2020-09-21 PROCEDURE — 63600175 PHARM REV CODE 636 W HCPCS: Mod: HCNC | Performed by: NURSE PRACTITIONER

## 2020-09-21 PROCEDURE — 99233 PR SUBSEQUENT HOSPITAL CARE,LEVL III: ICD-10-PCS | Mod: HCNC,,, | Performed by: INTERNAL MEDICINE

## 2020-09-21 PROCEDURE — 63600175 PHARM REV CODE 636 W HCPCS: Mod: HCNC | Performed by: GENERAL ACUTE CARE HOSPITAL

## 2020-09-21 PROCEDURE — 25000003 PHARM REV CODE 250: Mod: HCNC | Performed by: HOSPITALIST

## 2020-09-21 PROCEDURE — 83735 ASSAY OF MAGNESIUM: CPT | Mod: HCNC

## 2020-09-21 PROCEDURE — 86140 C-REACTIVE PROTEIN: CPT | Mod: HCNC

## 2020-09-21 PROCEDURE — 63600175 PHARM REV CODE 636 W HCPCS: Mod: HCNC | Performed by: HOSPITALIST

## 2020-09-21 PROCEDURE — 36410 VNPNXR 3YR/> PHY/QHP DX/THER: CPT | Mod: HCNC

## 2020-09-21 PROCEDURE — 84075 ASSAY ALKALINE PHOSPHATASE: CPT | Mod: HCNC

## 2020-09-21 PROCEDURE — 76937 US GUIDE VASCULAR ACCESS: CPT | Mod: HCNC

## 2020-09-21 PROCEDURE — 99233 SBSQ HOSP IP/OBS HIGH 50: CPT | Mod: HCNC,,, | Performed by: INTERNAL MEDICINE

## 2020-09-21 PROCEDURE — 99232 PR SUBSEQUENT HOSPITAL CARE,LEVL II: ICD-10-PCS | Mod: HCNC,,, | Performed by: NURSE PRACTITIONER

## 2020-09-21 PROCEDURE — 20600001 HC STEP DOWN PRIVATE ROOM: Mod: HCNC

## 2020-09-21 PROCEDURE — 92526 ORAL FUNCTION THERAPY: CPT | Mod: HCNC

## 2020-09-21 PROCEDURE — 82247 BILIRUBIN TOTAL: CPT | Mod: HCNC

## 2020-09-21 PROCEDURE — 97110 THERAPEUTIC EXERCISES: CPT | Mod: HCNC

## 2020-09-21 PROCEDURE — 99232 SBSQ HOSP IP/OBS MODERATE 35: CPT | Mod: HCNC,,, | Performed by: NURSE PRACTITIONER

## 2020-09-21 PROCEDURE — 25000003 PHARM REV CODE 250: Mod: HCNC | Performed by: STUDENT IN AN ORGANIZED HEALTH CARE EDUCATION/TRAINING PROGRAM

## 2020-09-21 PROCEDURE — 94761 N-INVAS EAR/PLS OXIMETRY MLT: CPT | Mod: HCNC

## 2020-09-21 PROCEDURE — 82550 ASSAY OF CK (CPK): CPT | Mod: HCNC

## 2020-09-21 PROCEDURE — 80069 RENAL FUNCTION PANEL: CPT | Mod: HCNC

## 2020-09-21 PROCEDURE — 25000003 PHARM REV CODE 250: Mod: HCNC | Performed by: NURSE PRACTITIONER

## 2020-09-21 PROCEDURE — 85025 COMPLETE CBC W/AUTO DIFF WBC: CPT | Mod: HCNC

## 2020-09-21 PROCEDURE — 25000003 PHARM REV CODE 250: Mod: HCNC | Performed by: GENERAL ACUTE CARE HOSPITAL

## 2020-09-21 PROCEDURE — 63600175 PHARM REV CODE 636 W HCPCS: Mod: JG,HCNC | Performed by: STUDENT IN AN ORGANIZED HEALTH CARE EDUCATION/TRAINING PROGRAM

## 2020-09-21 RX ORDER — IBUPROFEN 200 MG
24 TABLET ORAL
Status: DISCONTINUED | OUTPATIENT
Start: 2020-09-21 | End: 2020-09-21

## 2020-09-21 RX ORDER — GLUCAGON 1 MG
1 KIT INJECTION
Status: DISCONTINUED | OUTPATIENT
Start: 2020-09-21 | End: 2020-09-21

## 2020-09-21 RX ORDER — GLUCAGON 1 MG
1 KIT INJECTION
Status: DISCONTINUED | OUTPATIENT
Start: 2020-09-21 | End: 2020-09-22

## 2020-09-21 RX ORDER — IBUPROFEN 200 MG
24 TABLET ORAL
Status: DISCONTINUED | OUTPATIENT
Start: 2020-09-21 | End: 2020-09-22

## 2020-09-21 RX ORDER — IBUPROFEN 200 MG
16 TABLET ORAL
Status: DISCONTINUED | OUTPATIENT
Start: 2020-09-21 | End: 2020-09-21

## 2020-09-21 RX ORDER — INSULIN ASPART 100 [IU]/ML
10 INJECTION, SOLUTION INTRAVENOUS; SUBCUTANEOUS
Status: DISCONTINUED | OUTPATIENT
Start: 2020-09-21 | End: 2020-09-21

## 2020-09-21 RX ORDER — INSULIN ASPART 100 [IU]/ML
0-5 INJECTION, SOLUTION INTRAVENOUS; SUBCUTANEOUS
Status: DISCONTINUED | OUTPATIENT
Start: 2020-09-21 | End: 2020-09-21

## 2020-09-21 RX ORDER — IBUPROFEN 200 MG
16 TABLET ORAL
Status: DISCONTINUED | OUTPATIENT
Start: 2020-09-21 | End: 2020-09-22

## 2020-09-21 RX ORDER — INSULIN ASPART 100 [IU]/ML
0-4 INJECTION, SOLUTION INTRAVENOUS; SUBCUTANEOUS
Status: DISCONTINUED | OUTPATIENT
Start: 2020-09-21 | End: 2020-09-22

## 2020-09-21 RX ORDER — INSULIN ASPART 100 [IU]/ML
3-5 INJECTION, SOLUTION INTRAVENOUS; SUBCUTANEOUS
Status: DISCONTINUED | OUTPATIENT
Start: 2020-09-22 | End: 2020-09-22

## 2020-09-21 RX ADMIN — HEPARIN SODIUM 5000 UNITS: 5000 INJECTION INTRAVENOUS; SUBCUTANEOUS at 05:09

## 2020-09-21 RX ADMIN — HEPARIN SODIUM 5000 UNITS: 5000 INJECTION INTRAVENOUS; SUBCUTANEOUS at 08:09

## 2020-09-21 RX ADMIN — LEVOTHYROXINE SODIUM 75 MCG: 25 TABLET ORAL at 05:09

## 2020-09-21 RX ADMIN — INSULIN ASPART 5 UNITS: 100 INJECTION, SOLUTION INTRAVENOUS; SUBCUTANEOUS at 08:09

## 2020-09-21 RX ADMIN — OXYCODONE 5 MG: 5 TABLET ORAL at 01:09

## 2020-09-21 RX ADMIN — CARVEDILOL 3.12 MG: 3.12 TABLET, FILM COATED ORAL at 08:09

## 2020-09-21 RX ADMIN — SODIUM CHLORIDE 1.5 UNITS/HR: 9 INJECTION, SOLUTION INTRAVENOUS at 10:09

## 2020-09-21 RX ADMIN — HYPROMELLOSE 2910 1 DROP: 5 SOLUTION OPHTHALMIC at 08:09

## 2020-09-21 RX ADMIN — FLUTICASONE FUROATE AND VILANTEROL TRIFENATATE 1 PUFF: 200; 25 POWDER RESPIRATORY (INHALATION) at 08:09

## 2020-09-21 RX ADMIN — HYPROMELLOSE 2910 1 DROP: 5 SOLUTION OPHTHALMIC at 03:09

## 2020-09-21 RX ADMIN — HEPARIN SODIUM 5000 UNITS: 5000 INJECTION INTRAVENOUS; SUBCUTANEOUS at 01:09

## 2020-09-21 RX ADMIN — FLUTICASONE PROPIONATE 50 MCG: 50 SPRAY, METERED NASAL at 08:09

## 2020-09-21 RX ADMIN — DAPTOMYCIN 1050 MG: 350 INJECTION, POWDER, LYOPHILIZED, FOR SOLUTION INTRAVENOUS at 10:09

## 2020-09-21 RX ADMIN — SODIUM CHLORIDE 0.8 UNITS/HR: 9 INJECTION, SOLUTION INTRAVENOUS at 08:09

## 2020-09-21 RX ADMIN — OXYCODONE 5 MG: 5 TABLET ORAL at 09:09

## 2020-09-21 NOTE — PT/OT/SLP PROGRESS
Speech Language Pathology Treatment  And Discharge Summary    Patient Name:  Ed Patton   MRN:  [id number]  Admitting Diagnosis: Septic shock due to methicillin resistant Staphylococcus aureus    Recommendations:                 General Recommendations:  Follow-up not indicated  Diet recommendations:  Dental Soft, Liquid Diet Level: Thin   Aspiration Precautions: 1 bite/sip at a time, Alternating bites/sips, HOB to 90 degrees, Small bites/sips and Standard aspiration precautions   General Precautions: Standard, fall, special contact  Communication strategies:  go to room if call light pushed    Subjective     Pt alert and awake upon SLP entry.     Objective:     Has the patient been evaluated by SLP for swallowing?   Yes  Keep patient NPO? No   Current Respiratory Status: nasal cannula      Pt seen for ongoing dysphagia therapy to monitor diet tolerance. Alertness maintained t/o session, no cueing required. HOB elevated for PO trials. Pt tolerated solid via 1/4 meena cracker x2 with adequate oral phase c/b timely mastication and no oral stasis. Thin liquid via straw x2 also tolerated with no signs of airway compromise c/b no coughing, throat clearing, or change in vocal quality. Pt with good tolerance of current diet and WFL oral and pharyngeal phase of swallow given edentulous at baseline. Education provided regarding role of SLP, pt progress, continued diet recommendations, and ST discharge. Pt verbalized understanding. ST services no longer warranted.    Assessment:     Ed Patton is a 65 y.o. male presents with WFL oral and pharyngeal phases of swallow. No further ST needs at this time.    Goals:   Multidisciplinary Problems     SLP Goals     Not on file          Multidisciplinary Problems (Resolved)        Problem: SLP Goal    Goal Priority Disciplines Outcome   SLP Goal   (Resolved)     SLP Met   Description: Speech Language Pathology Goals  Goals expected to be met by 9/24:  1. Pt will tolerate diet  of dental soft solids and thin liquids with no overt signs of airway compromise.                          Plan:     · Patient to be seen:  4 x/week   · Plan of Care expires:  10/17/20  · Plan of Care reviewed with:  patient   · SLP Follow-Up:  No       Discharge recommendations:  nursing facility, skilled   Barriers to Discharge:  None    Time Tracking:     SLP Treatment Date:   09/21/20  Speech Start Time:  0920  Speech Stop Time:  0928     Speech Total Time (min):  8 min    Billable Minutes: Treatment Swallowing Dysfunction 8    AKOSUA Das  09/21/2020

## 2020-09-21 NOTE — PROGRESS NOTES
[facility]  Nephrology  Progress Note    Patient Name: Ed Patton  MRN: [id number]  Admission Date: 8/30/2020  Hospital Length of Stay: 22 days  Attending Provider: Thomas Reed MD   Primary Care Physician: Qiana Chow MD  Principal Problem:Septic shock due to methicillin resistant Staphylococcus aureus    Subjective:     HPI: Mr. Patton is a 66yo M with insulin dependent T2DM, chronic hypoxemic resp. failure (on 2L oxygen at home), and CHF (EF 25%). He presented to the ED for recurrent falls and sepsis from an infected diabetic ulcer from stepping on a tack 8/30. Wound cultures from foot positive for proteus and MRSA 8/31. Urine culture 8/30 positive for klebsiella. Blood cultures have been positive for MRSA repeatedly from 8/30 to 9/8. He also developed septic arthritis of the right knee likely from seeding secondary to septicemia positive for MRSA 9/3. LUKAS was negative for IE 9/4. Initial management of septicemia was with ciprofloxacin and Vancomycin which was supratherapeutic to 26.5 on 9/2 prompting switch to a pulse dose regimen. Due to persistent bacteremia ID changed antibiotics to ceftaroline and daptomycin on 9/7 I&D of right knee performed by ortho on 9/7. Cr on admission was 1.5 at admit up from the baseline (1.2-1.5). On 9/9 Cr level had a sudden increased to 3. Nephrology was consulted for NANETTE.     Interval History:  Patient continues to improve. He was up at bedside today. Continued improvement in renal function    Review of patient's allergies indicates:   Allergen Reactions    Vicodin [hydrocodone-acetaminophen] Itching     Current Facility-Administered Medications   Medication Frequency    acetaminophen tablet 1,000 mg Q8H PRN    albuterol-ipratropium 2.5 mg-0.5 mg/3 mL nebulizer solution 3 mL Q4H PRN    artificial tears 0.5 % ophthalmic solution 1 drop TID    calcium carbonate 200 mg calcium (500 mg) chewable tablet 500 mg BID PRN    carvediloL tablet  3.125 mg BID    DAPTOmycin (CUBICIN) 1,050 mg in sodium chloride 0.9% IVPB Q24H    dextrose 50% injection 12.5 g PRN    dextrose 50% injection 25 g PRN    fluticasone furoate-vilanteroL 200-25 mcg/dose diskus inhaler 1 puff Daily    fluticasone propionate 50 mcg/actuation nasal spray 50 mcg Daily    glucagon (human recombinant) injection 1 mg PRN    glucose chewable tablet 16 g PRN    glucose chewable tablet 24 g PRN    heparin (porcine) injection 5,000 Units Q8H    levoFLOXacin tablet 750 mg Every other day    levothyroxine tablet 75 mcg Before breakfast    melatonin tablet 6 mg Nightly PRN    ondansetron injection 4 mg Q8H PRN    oxyCODONE immediate release tablet 5 mg Q4H PRN    polyethylene glycol packet 17 g BID PRN    Tdap vaccine injection 0.5 mL vaccine x 1 dose       Objective:     Vital Signs (Most Recent):  Temp: 98.5 °F (36.9 °C) (09/21/20 1559)  Pulse: (!) 128 (09/21/20 1559)  Resp: 18 (09/21/20 1559)  BP: 131/72 (09/21/20 1559)  SpO2: 96 % (09/21/20 1559)  O2 Device (Oxygen Therapy): room air (09/21/20 1604) Vital Signs (24h Range):  Temp:  [97.9 °F (36.6 °C)-99.1 °F (37.3 °C)] 98.5 °F (36.9 °C)  Pulse:  [120-129] 128  Resp:  [16-20] 18  SpO2:  [90 %-100 %] 96 %  BP: (114-141)/(64-84) 131/72     Weight: 115.3 kg (254 lb 3.1 oz) (09/19/20 0400)  Body mass index is 30.94 kg/m².  Body surface area is 2.49 meters squared.    I/O last 3 completed shifts:  In: 560 [P.O.:560]  Out: 2790 [Urine:2750; Other:40]    Physical Exam  Vitals signs and nursing note reviewed.   Constitutional:       General: He is not in acute distress.     Appearance: He is well-developed. He is obese. He is ill-appearing. He is not diaphoretic.   HENT:      Head: Normocephalic and atraumatic.      Right Ear: External ear normal.      Left Ear: External ear normal.      Mouth/Throat:      Pharynx: No oropharyngeal exudate.   Eyes:      General: No scleral icterus.        Right eye: No discharge.         Left eye: No  discharge.      Conjunctiva/sclera: Conjunctivae normal.      Pupils: Pupils are equal, round, and reactive to light.   Neck:      Musculoskeletal: Normal range of motion.      Thyroid: No thyromegaly.      Trachea: No tracheal deviation.      Comments: [facility]  Cardiovascular:      Rate and Rhythm: Tachycardia present.   Pulmonary:      Effort: Pulmonary effort is normal. No respiratory distress.      Breath sounds: Normal breath sounds. No stridor. No wheezing or rales.   Abdominal:      General: Bowel sounds are normal. There is no distension.      Palpations: Abdomen is soft.      Tenderness: There is no abdominal tenderness. There is no guarding.   Musculoskeletal:         General: No tenderness or deformity.      Right lower leg: Edema present.      Left lower leg: Edema present.   Lymphadenopathy:      Cervical: No cervical adenopathy.   Skin:     Coloration: Skin is not pale.      Findings: No erythema or rash.   Neurological:      General: No focal deficit present.         Significant Labs:  All labs within the past 24 hours have been reviewed.     Significant Imaging:  Labs: Reviewed    Assessment/Plan:     * Septic shock due to methicillin resistant Staphylococcus aureus  -MRSA bacteremia likely secondary to septic arthritis seeded from foot wound  -cause of bernadette  -on abx managed by ID    Acute renal failure with acute tubular necrosis superimposed on stage 3 chronic kidney disease  -initially non oliguric stage 3 bernadette with ischemic atn likely secondary to hypotension from septic shock  -was oliguric requiring rrt, last 9/15  -now oliguria improved   -continues to have good urine output and bun/cr are improving  -if volume removal is desired can use 80mg furosemide q8h, but we feel he is fairly euvolemic      Chente Reid MD  Nephrology  [facility]

## 2020-09-21 NOTE — PT/OT/SLP PROGRESS
Occupational Therapy   Treatment    Name: Ed Patton  MRN: [id number]  Admitting Diagnosis:  Septic shock due to methicillin resistant Staphylococcus aureus  14 Days Post-Op    Recommendations:     Discharge Recommendations: nursing facility, skilled  Discharge Equipment Recommendations:  (TBD)  Barriers to discharge:  Decreased caregiver support    Assessment:     Ed Patton is a 65 y.o. male with a medical diagnosis of Septic shock due to methicillin resistant Staphylococcus aureus.  He presents L S/L willing to participate in OT/PT session. Pt progressing towards goals, requiring less assistance for sitting balance EOB. Pt with delayed/inconsistent command following. He continues to require total A x2 persons for bed mobility. Unable to progress with OOB activities 2* Darco shoe not present in pt's room. Central supply contacted and Darco shoe ordered for use next visit. Pt tachycardic throughout session with -130, SpO2 WNL on 3L NC O2. Performance deficits affecting function are weakness, impaired endurance, impaired functional mobilty, gait instability, impaired balance, impaired coordination, impaired cognition, decreased lower extremity function, decreased ROM, impaired cardiopulmonary response to activity, decreased safety awareness. Pt would benefit from skilled OT services in order to maximize independence with ADLs and facilitate safe discharge. Pt would benefit from SNF upon discharge to return to PLOF and decrease burden of care.     Rehab Prognosis:  Good; patient would benefit from acute skilled OT services to address these deficits and reach maximum level of function.       Plan:     Patient to be seen 3 x/week to address the above listed problems via self-care/home management, therapeutic activities, therapeutic exercises, neuromuscular re-education  · Plan of Care Expires: 10/08/20  · Plan of Care Reviewed with: patient    Subjective     Pain/Comfort:  · Pain Rating 1: (pt c/o of R  knee pain;unable to rate)    Objective:     Communicated with: RN and PT  prior to session.  Patient found left sidelying with blood pressure cuff, pressure relief boots, telemetry, oxygen, pulse ox (continuous), toure catheter upon OT entry to room.    General Precautions: Standard, fall, special contact   Orthopedic Precautions:LLE weight bearing as tolerated   Braces: (Darco shoe)     Occupational Performance:     Bed Mobility:    · Patient completed Rolling/Turning to Left with  total assistance and 2 persons  · Patient completed Rolling/Turning to Right with total assistance and 2 persons  · Patient completed Scooting/Bridging with total assistance and 2 persons  · Patient completed Supine to Sit with total assistance and 2 persons  · Patient completed Sit to Supine with total assistance and 2 persons     Functional Mobility/Transfers:  · Not assessed, Darco shoe not in pt's room    Activities of Daily Living:  · Feeding:  set-up A to grasp bottle, bring to mouth, and drink powerade  · Grooming: moderate assistance to wash face seated EOB with R UE  · Lower Body Dressing: total assistance to don R sock in supine    Sitting Balance EOB:  Pt sat EOB x15 minutes initially requiring max A progressing to close SBA  Pt identified various objects on walls in front of him to promote neutral neck posturing; pt with increased time and delays to identify objects  Pt participated in weight shifting activities and LE exercises with PT while OT facilitated upright posture and provided VCs for posture    [facility] 6 Click ADL: 10    Treatment & Education:  -Therapist provided facilitation and instruction of proper body mechanics, energy conservation, and fall prevention strategies during tasks listed above.  -Darco shoe not present for treatment session; central supply contacted and new Darco shoe ordered for pt  -Co-tx with PT performed due to need for education and assistance from two skilled therapy disciplines at pt's current  functional level.    -Pt educated on role of OT, POC and goals for therapy  -Pt educated on importance of OOB activities with staff member assistance and sitting OOB majority of the day.   -Pt verbalized understanding. Pt expressed no further concerns/questions  -Whiteboard updated     Patient left left sidelying with all lines intact, call button in reach, bed alarm on, RN notified and pressure relief boots donned and pillow placed between kneesEducation:      GOALS:   Multidisciplinary Problems     Occupational Therapy Goals        Problem: Occupational Therapy Goal    Goal Priority Disciplines Outcome Interventions   Occupational Therapy Goal     OT, PT/OT Ongoing, Progressing    Description: Goals to be met by: 10/18/20    Patient will increase functional independence with ADLs by performing:    Feeding with Set-up Assistance.  Grooming while EOB with Minimal Assistance.  Toileting from bedside commode with Moderate Assistance for hygiene and clothing management.   Supine to sit with Moderate Assistance to increased bed mobility independence.   Stand pivot transfers with Moderate Assistance and maintaining weight-bearing precaution(s) to reach bedside chair.  Toilet transfer to bedside commode with Moderate Assistance and maintaining weight-bearing precaution(s).  Upper extremity exercise program x15 reps per handout, with supervision to improve BUE function to increase independence in daily occupations.                     Time Tracking:     OT Date of Treatment: 09/21/20  OT Start Time: 0845  OT Stop Time: 0918  OT Total Time (min): 33 min    Billable Minutes:Self Care/Home Management 18  Therapeutic Activity 15    Alyssa Thompson OT  9/21/2020

## 2020-09-21 NOTE — PT/OT/SLP PROGRESS
Physical Therapy Treatment    Patient Name:  Ed Patton   MRN:  [id number]    Recommendations:     Discharge Recommendations:  nursing facility, skilled   Discharge Equipment Recommendations: (TBD pending progress)   Barriers to discharge: decreased functional mobility    Assessment:     Ed Patton is a 65 y.o. male admitted with a medical diagnosis of Septic shock due to methicillin resistant Staphylococcus aureus.  He presents with the following impairments/functional limitations:  weakness, impaired endurance, impaired functional mobilty, gait instability, impaired balance, pain, impaired cognition, decreased lower extremity function, decreased ROM, decreased safety awareness, impaired coordination, impaired cardiopulmonary response to activity, orthopedic precautions. Pt tolerated session fairly well but was tachycardic with -130 and c/o R knee pain. He has poor situational awareness and shows difficulty with following commands and cues. He also shows delayed responses and requires constant commands and cues to keep him focused. Total Ax2 for bed mobility. Initially max A and then progressed to CGA for EOB balance for ~15 mins while performing functional tasks. Further functional t/fs were deferred due to pt's BLE weakness and difficulty following commands. His mobility is being limited due to R knee pain, impaired cognition, generalized fatigue, and LLE weightbearing precautions. Upon d/c, PT still recommends skilled nursing facility to address the above deficits. He is an excellent candidate due to his potential to continue to progress and prior level of function.     Rehab Prognosis: Good; patient would benefit from acute skilled PT services to address these deficits and reach maximum level of function.    Recent Surgery: Procedure(s) (LRB):  ARTHROSCOPY, KNEE, RIGHT  (Right) 14 Days Post-Op    Plan:     During this hospitalization, patient to be seen 4 x/week to address the identified rehab  impairments via gait training, therapeutic activities, therapeutic exercises, neuromuscular re-education and progress toward the following goals:    · Plan of Care Expires:  10/18/20    Subjective     Chief Complaint: R knee pain  Patient/Family Comments/goals: did not state  Pain/Comfort:  · Pain Rating 1: (pt c/o R knee pain, did not rate)  · Location - Side 1: Right  · Location - Orientation 1: generalized  · Location 1: knee  · Pain Addressed 1: Reposition, Distraction  · Pain Rating Post-Intervention 1: (remained)      Objective:     Communicated with RN prior to session.  Patient found left sidelying with blood pressure cuff, pressure relief boots, telemetry, oxygen, toure catheter upon PT entry to room.     General Precautions: Standard, fall, special contact   Orthopedic Precautions:LLE weight bearing as tolerated   Braces: (L DARCO shoe)     Functional Mobility:    Bed Mobility  Rolling to L/R: total Ax2, siderails used  Supine to Sit on the L side:  total Ax2, HOB elevated  Sit to supine: total Ax2  Scoot to HOB in supine: total Ax2  Scoot to EOB in sitting: max A   Transfers Scoot EOB towards HOB: total Ax2, 1 attempt, unable to clear hips from bed           AM-PAC 6 CLICK MOBILITY  Turning over in bed (including adjusting bedclothes, sheets and blankets)?: 2  Sitting down on and standing up from a chair with arms (e.g., wheelchair, bedside commode, etc.): 1  Moving from lying on back to sitting on the side of the bed?: 2  Moving to and from a bed to a chair (including a wheelchair)?: 1  Need to walk in hospital room?: 1  Climbing 3-5 steps with a railing?: 1  Basic Mobility Total Score: 8       Therapeutic Activities and Exercises:   -Pt safe to t/f with therapy and/or Medi-chair. Whiteboard updated.   -Educated pt about safety with mobility and continuing to be mobile to prevent deconditioning.   -Answered all questions and concerns within PT scope of practice.   -Pt expressed understanding and  agreement     Sitting Balance (~15 mins):   - initially max A and then progressed to CGA, used UEs on bed for support   - posture: kyphotic, L lateral lean, head/eyes down, posterior lean   - manual and verbal cues to center trunk, shift trunk anteriorly, lift head/eyes   - performed 5 reps of LAQs with sustained HS stretches on each side, able to initiate movement but requires facilitation for full ROM; R knee has decreased ROM (lacking 10-15 degrees from extension)  - balance activities: performed unilateral and contralateral reaches with BUEs to work on weight shifts in sitting; required verbal cues for anterior weight shift, motor planning, and sequencing his movements; CGA, no LOB    -L DARCO boot not present in room. Called DME to order another one before next session.     Patient left left sidelying with all lines intact and call button in reach..    GOALS:   Multidisciplinary Problems     Physical Therapy Goals        Problem: Physical Therapy Goal    Goal Priority Disciplines Outcome Goal Variances Interventions   Physical Therapy Goal     PT, PT/OT Ongoing, Progressing     Description: Goals to be met by: 20    Patient will increase functional independence with mobility by performin. Supine to sit with moderate assitance.   2. Sit to supine with moderate assistance  3. Sit to stand transfer with moderate assistance.  4. Gait  x 10 feet with Minimal Assistance using LRAD and maintenance of weight bearing status   5. Lower extremity exercise program x10 with assistance as needed.   6. Sit for 10 minutes with Minimal Assistance while reaching out of JESIKA in all planes to perform functional activities. -MET ()  Sit for 10 minutes with Stand By Assistance while reaching out of JESIKA in all planes to perform functional activities.                    Time Tracking:     PT Received On: 20  PT Start Time: 846     PT Stop Time: 918  PT Total Time (min): 32 min     Billable Minutes: Therapeutic  Exercise 12 and Neuromuscular Re-education 15 (co-tx with OT due to pt's decreased activity tolerance)     Treatment Type: Treatment  PT/PTA: PT     PTA Visit Number: 0     Haley Mejia, SPT  09/21/2020

## 2020-09-21 NOTE — PLAN OF CARE
Problem: Occupational Therapy Goal  Goal: Occupational Therapy Goal  Description: Goals to be met by: 10/18/20    Patient will increase functional independence with ADLs by performing:    Feeding with Set-up Assistance.  Grooming while EOB with Minimal Assistance.  Toileting from bedside commode with Moderate Assistance for hygiene and clothing management.   Supine to sit with Moderate Assistance to increased bed mobility independence.   Stand pivot transfers with Moderate Assistance and maintaining weight-bearing precaution(s) to reach bedside chair.  Toilet transfer to bedside commode with Moderate Assistance and maintaining weight-bearing precaution(s).  Upper extremity exercise program x15 reps per handout, with supervision to improve BUE function to increase independence in daily occupations.    Outcome: Ongoing, Progressing     Goals reviewed and remain appropriate, continue POC    ELISEO Ridley  9/21/2020   Pager #: [phone]

## 2020-09-21 NOTE — NURSING
Wound care completed to left foot. Cleansed with sterile saline. Applied aquacel, kerlix and ACE wrap. Pt tolerated well. Will continue to monitor.

## 2020-09-21 NOTE — CONSULTS
Placed 18g, 10 cm Midline in left brachial vein using u/s guidance.  Max dwell date 10/20/20.  Lot # [id number].    MIDLINE placed for abx: daptomycin ED 10/7/2020

## 2020-09-21 NOTE — PLAN OF CARE
Problem: Physical Therapy Goal  Goal: Physical Therapy Goal  Description: Goals to be met by: 20    Patient will increase functional independence with mobility by performin. Supine to sit with moderate assitance.   2. Sit to supine with moderate assistance  3. Sit to stand transfer with moderate assistance.  4. Gait  x 10 feet with Minimal Assistance using LRAD and maintenance of weight bearing status   5. Lower extremity exercise program x10 with assistance as needed.   6. Sit for 10 minutes with Minimal Assistance while reaching out of JESIKA in all planes to perform functional activities. -MET ()  Sit for 10 minutes with Stand By Assistance while reaching out of JESIKA in all planes to perform functional activities.   Outcome: Ongoing, Progressing    Goals updated to reflect patient's progress. Continue with POC.     Haley Mejia, DARNELL  2020

## 2020-09-21 NOTE — PLAN OF CARE
Pt AAO*3, forgetful at times. Continuous Transitional insulin @2.2u/hr. BG monitoring, Supplemental insulin administered. Turned patient as tolerated. No acute changes overnight. Strict intake and output maintained. Newell to gravity. WTCM.

## 2020-09-21 NOTE — PROGRESS NOTES
[facility]  Orthopedics  Progress Note    Patient Name: Ed Patton  MRN: [id number]  Admission Date: 8/30/2020  Hospital Length of Stay: 22 days  Attending Provider: Thomas Reed MD  Primary Care Provider: Qiana Chow MD  Follow-up For: Procedure(s) (LRB):  ARTHROSCOPY, KNEE, RIGHT  (Right)    Post-Operative Day: 14 Days Post-Op  Subjective:     Principal Problem:Septic shock due to methicillin resistant Staphylococcus aureus    Principal Orthopedic Problem: R knee septic arthritis    Interval History: Patient seen and examined at bedside.  No acute events overnight.  Pain controlled.  No PT yesterday.    Review of patient's allergies indicates:   Allergen Reactions    Vicodin [hydrocodone-acetaminophen] Itching       Current Facility-Administered Medications   Medication    acetaminophen tablet 1,000 mg    albuterol-ipratropium 2.5 mg-0.5 mg/3 mL nebulizer solution 3 mL    artificial tears 0.5 % ophthalmic solution 1 drop    calcium carbonate 200 mg calcium (500 mg) chewable tablet 500 mg    carvediloL tablet 3.125 mg    DAPTOmycin (CUBICIN) 1,050 mg in sodium chloride 0.9% IVPB    dextrose 50% injection 12.5 g    dextrose 50% injection 25 g    fluticasone furoate-vilanteroL 200-25 mcg/dose diskus inhaler 1 puff    fluticasone propionate 50 mcg/actuation nasal spray 50 mcg    glucagon (human recombinant) injection 1 mg    glucose chewable tablet 16 g    glucose chewable tablet 24 g    heparin (porcine) injection 5,000 Units    insulin aspart U-100 pen 0-5 Units    insulin aspart U-100 pen 5-10 Units    insulin regular 100 Units in sodium chloride 0.9% 100 mL infusion    levoFLOXacin tablet 750 mg    levothyroxine tablet 75 mcg    melatonin tablet 6 mg    ondansetron injection 4 mg    oxyCODONE immediate release tablet 5 mg    polyethylene glycol packet 17 g    Tdap vaccine injection 0.5 mL     Objective:     Vital Signs (Most Recent):  Temp: 98.2 °F  "(36.8 °C) (09/21/20 0500)  Pulse: (!) 128 (09/21/20 0500)  Resp: 18 (09/21/20 0500)  BP: 128/79 (09/21/20 0500)  SpO2: 97 % (09/21/20 0500) Vital Signs (24h Range):  Temp:  [97.9 °F (36.6 °C)-98.8 °F (37.1 °C)] 98.2 °F (36.8 °C)  Pulse:  [125-129] 128  Resp:  [15-19] 18  SpO2:  [90 %-100 %] 97 %  BP: (113-141)/(69-83) 128/79     Weight: 115.3 kg (254 lb 3.1 oz)  Height: 6' 4" (193 cm)  Body mass index is 30.94 kg/m².        Ortho/SPM Exam    NAD  No increased WOB  Incisions c/d/i  No knee effusion  SILT T/SP/DP  Motor intact T/DP  WWP extremities    Significant Labs:   CBC:   Recent Labs   Lab 09/20/20  0432   WBC 23.61*   HGB 8.4*   HCT 27.7*        CMP:   Recent Labs   Lab 09/20/20  0432      K 4.0      CO2 27   *   BUN 45*   CREATININE 2.4*   CALCIUM 8.2*   ALBUMIN 1.6*   ANIONGAP 9   EGFRNONAA 27.7*     All pertinent labs within the past 24 hours have been reviewed.    Significant Imaging: I have reviewed all pertinent imaging results/findings.    Assessment/Plan:     Septic arthritis of knee, right  65 y.o. male s/p R knee scope I&D 9/7/20    VS:  3L NC, tachycardia  Nerve block:  none  PT/OT:  WBAT  DVT PPx:  lovenox per primary  Cultures:  MRSA from R knee aspiration 9/3/20; NGTD intraop cx; BCx 9/8 positive, 9/9 NGTD  Abx:  dapto, levaquin per ID  Labs: pending  Drain:  none  Newell:  none    Sutures removed  Needs daily PT          Durga Walker MD  Orthopedics  [facility]  "

## 2020-09-21 NOTE — ASSESSMENT & PLAN NOTE
BG goal 140 - 180     - Discontinue IV insulin infusion orders. Patient is scheduled for discharge today.   - Start levemir 30 units daily. First dose this AM. 20% reduction from IV insulin dosing.   - Start novolog 10 units TIDWM. Basal/Prandial physiologic matching.    - Low Dose SQ Insulin Correction Scale.  - BG Monitoring AC/HS     ADDENDUM: 1830  - Restart  transition IV insulin infusion with stepdown parameters. Initial rate starting at 0.8 units/hr. Will re-order MDI tomorrow morning.   - Start novolog 3-5 units TIDWM.   - Low Dose SQ Insulin Correction Scale.  - BG Monitoring AC/HS/0200      ** Please call Endocrine for any BG related issues **  ** Please notify Endocrine for any change and/or advance in diet**    Discharge Planning:   TBD. Please notify endocrinology prior to discharge.

## 2020-09-21 NOTE — NURSING
Spoke to Dr. Reed with medicine A regarding pt's HR sustaining 125-130bpm. Also asked if we can take Trialysis catheter out. Stated that he will check with nephrology first. Will continue to monitor.

## 2020-09-21 NOTE — ASSESSMENT & PLAN NOTE
-initially non oliguric stage 3 bernadette with ischemic atn likely secondary to hypotension from septic shock  -was oliguric requiring rrt, last 9/15  -now oliguria improved   -continues to have good urine output and bun/cr are improving  -if volume removal is desired can use 80mg furosemide q8h, but we feel he is fairly euvolemic

## 2020-09-21 NOTE — PROGRESS NOTES
[facility] Hospital Medicine  Progress Note    Patient Name: Ed Patton  MRN: [id number]  Patient Class: IP- Inpatient   Admission Date: 8/30/2020  Length of Stay: 22 days  Attending Physician: Thomas Reed MD  Primary Care Provider: Qiana Chow MD    [facility] Medicine Team: [facility]P MED A Thomas Reed MD    Subjective:     Principal Problem:Septic shock due to methicillin resistant Staphylococcus aureus    Interval History:   No events overnight    Remains sinus tachycardia  Will restart home beta blocker due to reduced EF    Pt remains non-oliguric, Cr is stable no significant improvement.   Will discuss with nephrology re: removal of trialysis.     Spoke to wife in evening updated her on plan of care. Wife reports that he was having multiple falls prior to admission she attributes this to subacute knee pain.      Modified pain regimen frequency as pt reported having pain at an interval more frequent than every 8 hours.     Review of Systems   Constitutional: Negative for fever.   Respiratory: Negative for shortness of breath.    Gastrointestinal: Negative for abdominal pain, nausea and vomiting.   Psychiatric/Behavioral: Negative for confusion.     Objective:     Vital Signs (Most Recent):  Temp: 98.2 °F (36.8 °C) (09/21/20 0500)  Pulse: (!) 128 (09/21/20 0730)  Resp: 18 (09/21/20 0500)  BP: 128/79 (09/21/20 0500)  SpO2: 97 % (09/21/20 0500) Vital Signs (24h Range):  Temp:  [97.9 °F (36.6 °C)-98.8 °F (37.1 °C)] 98.2 °F (36.8 °C)  Pulse:  [125-129] 128  Resp:  [16-19] 18  SpO2:  [90 %-100 %] 97 %  BP: (122-141)/(69-83) 128/79   Intake/[place]ake:  This Shift:  No intake/output data recorded.    Net I/O past 24h:     Intake/Output Summary (Last 24 hours) at 9/21/2020 0746  Last data filed at 9/21/2020 0540  Gross per 24 hour   Intake 560 ml   Output 1580 ml   Net -1020 ml         Weight: 115.3 kg (254 lb 3.1 oz)  Body mass index is 30.94 kg/m².  Physical Exam  Constitutional:        General: He is not in acute distress.     Appearance: Normal appearance. He is not diaphoretic.   HENT:      Head: Normocephalic and atraumatic.   Eyes:      General: Lids are normal. No scleral icterus.        Right eye: No discharge.         Left eye: No discharge.      Conjunctiva/sclera: Conjunctivae normal.   Neck:      Musculoskeletal: No neck rigidity.      Trachea: Phonation normal.   Cardiovascular:      Rate and Rhythm: Normal rate and regular rhythm.   Pulmonary:      Effort: Pulmonary effort is normal. No tachypnea, accessory muscle usage or respiratory distress.      Breath sounds: Examination of the right-lower field reveals decreased breath sounds. Examination of the left-lower field reveals decreased breath sounds. Decreased breath sounds present. No wheezing.   Abdominal:      General: Bowel sounds are normal. There is distension.      Palpations: Abdomen is soft.      Tenderness: There is no abdominal tenderness.      Comments: Cholecystostomy drain draining green bile   Musculoskeletal:      Right knee: He exhibits swelling.      Right lower leg: Edema present.      Left lower leg: Edema present.   Neurological:      Mental Status: He is alert.      Cranial Nerves: Cranial nerves are intact.      Motor: Motor function is intact.   Psychiatric:         Attention and Perception: He is inattentive.         Mood and Affect: Affect normal.         Behavior: Behavior is cooperative.         Cognition and Memory: He exhibits impaired recent memory.           Significant Labs:   Blood Culture: No results for input(s): LABBLOO in the last 48 hours.  CBC:   Recent Labs   Lab 09/20/20  0432   WBC 23.61*   HGB 8.4*   HCT 27.7*        CMP:   Recent Labs   Lab 09/20/20  0432      K 4.0      CO2 27   *   BUN 45*   CREATININE 2.4*   CALCIUM 8.2*   ALBUMIN 1.6*   ANIONGAP 9   EGFRNONAA 27.7*       Significant Imaging: I have reviewed all pertinent imaging results/findings within the  past 24 hours.    MEDICATIONS  Scheduled Meds:   artificial tears  1 drop Both Eyes TID    carvediloL  3.125 mg Oral BID    DAPTOmycin (CUBICIN)  IV  1,050 mg Intravenous Q24H    fluticasone furoate-vilanteroL  1 puff Inhalation Daily    fluticasone propionate  1 spray Each Nostril Daily    heparin (porcine)  5,000 Units Subcutaneous Q8H    insulin aspart U-100  5-10 Units Subcutaneous TIDWM    levoFLOXacin  750 mg Oral Every other day    levothyroxine  75 mcg Oral Before breakfast                             Continuous Infusions:   insulin (HUMAN R) infusion (adults) 2.2 Units/hr (09/20/20 0110)     PRN Meds:.acetaminophen, albuterol-ipratropium, calcium carbonate, dextrose 50%, dextrose 50%, glucagon (human recombinant), glucose, glucose, insulin aspart U-100, melatonin, ondansetron, oxyCODONE, polyethylene glycol, DIPH,PERTUS (ADACEL),TETANUS PF VAC (ADULT)    VTE Risk Mitigation (From admission, onward)         Ordered     heparin (porcine) injection 5,000 Units  Every 8 hours      09/11/20 0943     IP VTE HIGH RISK PATIENT  Once      08/30/20 2004     Place sequential compression device  Until discontinued      08/30/20 0195                Assessment/Plan:     Overview/Hospital Course:     Septic shock_Septic arthritis of Right Knee_Sespsi due to MRSA  This is a 65 year old  male with PMH significant for HFrEF and COPD with chronic respiratory failure (on 2L home oxygen), T2DM with CKD III, and iron deficiency anemia who originally presented to [facility] on 08/30 with cellulitis of left foot with concern for diabetic foot infection with subsequent development of MRSA bacteremia attributed to septic arthritis of right knee and elbow. He is status post drainage and coverage for MRSA since that time. His work-up for other etiologies of MRSA bacteremia have included negative LUKAS and MRI of lumbar spine looking for endocarditis and spinal abscess, respectively. Stool studies for C.diff on  09/11 were negative. His hospital course has been associated with worsening hypotension and leukocytosis since 09/10 prompting ICU admission on 09/12 for initiation of vasopressor support. Infectious work-up thus far on ICU admission concern for likely right lower lobe HAP.      CT abdomen pelvis on 9/15 with gallbladder dilation, sludge, and trace pericholic fluid collection. Exam not consistent with cholecystitis but given persistent shock potential source. Also with potential right lower lobe atelectasis with overlying infection  S/p ICU stepdown  -continue daptomycin, f/u ID consult recs   >will need weekly CK levels.     Type 2 DM with DKA  -endocrinologyfollowing pt on transitional drip.  -patient   -still on insulin drip will f/u recs.     Postablative hypothyroidism  Plan: Continue home SYNTHROID       Chronic systolic congestive heart failure  -Most recent ECHO in 09/2020 with EF of 25%.   -Unclear is ischemic vs non-ischemic.   -Home regimen: Carvedilol, Lasix 80 mg, and Lisinopril.   -Exam notable for bilateral lower extremity edema and JVD.   -Associated with chronic hypoxemic respiratory failure requiring 2L nasal cannula, but noted to develop worsening oxygen requirements on ICU admission that were likely multifactorial from suspected hospital acquired pneumonia versus declining urine output with pre-disposition to volume overload. .      COPD mixed type  -Based on PFT's from 05/2015 showing mild (small airways) obstruction, airflow not improved after bronchodilator, and moderate restriction.    Acute on chronic respiratory failure with hypoxia  -History of mixed COPD (based on PFT's from 05/2015 showing mild (small airways) obstruction, airflow not improved after bronchodilator, and moderate restriction) and HFrEF with chronic respiratory failure on 2L home oxygen.   -ICU admission notable for progressive increase in supplemental oxygen requirements.   -Work-up for underlying etiology of acute on  chronic respiratory failure include CXR showing concern for possible progression of CHF vs HAP (not entirely convinced that CXR showed consolidation)  - Breathing back to baseline now to 2L NC    Code Status: Full Code    High Risk Conditions:  Patient has a condition that poses threat to life and bodily function: Septic shock, MRSA bacteremia  Patient is currently on drug therapy requiring intensive monitoring for toxicity: Daptomycin.     Discharge Planning   EDY: 9/21/2020     Code Status: Full Code   Is the patient medically ready for discharge?: No    Reason for patient still in hospital (select all that apply): Patient trending condition  Discharge Plan A: Skilled Nursing Facility   Discharge Delays: (!) Change in Medical Condition      Active Hospital Problems    Diagnosis  POA    *Septic shock due to methicillin resistant Staphylococcus aureus [A41.02, R65.21]  Yes     Priority: 1 - High    MRSA bacteremia [R78.81]  Yes     Priority: 1 - High    Acute renal failure with acute tubular necrosis superimposed on stage 3 chronic kidney disease [N17.0, N18.3]  No     Priority: 2     Septic arthritis of knee, right [M00.9]  Yes     Priority: 2     Diabetic ulcer of left foot associated with type 2 diabetes mellitus, with fat layer exposed [E11.621, L97.522]  Yes     Priority: 3     Diabetic foot infection [E11.628, L08.9]  Yes     Priority: 3     Acute metabolic encephalopathy [G93.41]  No     Priority: 4     Acute on chronic respiratory failure with hypoxia [J96.21]  No     Priority: 5     Acute renal insufficiency [N28.9]  Yes    Hospital-acquired pneumonia [J18.9, Y95]  No    Debility [R53.81]  Yes    Staphylococcal arthritis of right knee [M00.061]  Yes    COPD mixed type [J44.9]  Yes     Chronic    Postablative hypothyroidism [E89.0]  Yes     Chronic    Hyperlipidemia [E78.5]  Yes     High ASCVD with CAD, elevated A1c      Type 2 diabetes mellitus with stage 3 chronic kidney disease, with  long-term current use of insulin [E11.22, N18.3, Z79.4]  Not Applicable     GFR> 60, uncontrolled DM      Chronic systolic congestive heart failure [I50.22]  Yes     Chronic     EF 35% in 2015 echo, improved in 5/2018. Patient with mixed ischemic and non-ischemic cardiomyopathy        Resolved Hospital Problems    Diagnosis Date Resolved POA    Endocarditis [I38] 09/12/2020 Yes    Sepsis due to methicillin resistant Staphylococcus aureus [A41.02] 09/13/2020 Yes    Type 2 diabetes mellitus with ketoacidosis without coma, with long-term current use of insulin [E11.10, Z79.4] 09/18/2020 Not Applicable     Novolin 70/30 40U in AM, 30U in PM. Elevated A1c                   Thomas Reed M.D.  Attending Physician  [facility] Medicine Dept.  Pager: [phone]  [facility]  [phone]

## 2020-09-21 NOTE — ASSESSMENT & PLAN NOTE
65 y.o. male s/p R knee scope I&D 9/7/20    VS:  3L NC, tachycardia  Nerve block:  none  PT/OT:  WBAT  DVT PPx:  lovenox per primary  Cultures:  MRSA from R knee aspiration 9/3/20; NGTD intraop cx; BCx 9/8 positive, 9/9 NGTD  Abx:  dapto, levaquin per ID  Labs: pending  Drain:  none  Newell:  none    Sutures removed  Needs daily PT

## 2020-09-21 NOTE — SUBJECTIVE & OBJECTIVE
Interval History:  Patient continues to improve. He was up at bedside today. Continued improvement in renal function    Review of patient's allergies indicates:   Allergen Reactions    Vicodin [hydrocodone-acetaminophen] Itching     Current Facility-Administered Medications   Medication Frequency    acetaminophen tablet 1,000 mg Q8H PRN    albuterol-ipratropium 2.5 mg-0.5 mg/3 mL nebulizer solution 3 mL Q4H PRN    artificial tears 0.5 % ophthalmic solution 1 drop TID    calcium carbonate 200 mg calcium (500 mg) chewable tablet 500 mg BID PRN    carvediloL tablet 3.125 mg BID    DAPTOmycin (CUBICIN) 1,050 mg in sodium chloride 0.9% IVPB Q24H    dextrose 50% injection 12.5 g PRN    dextrose 50% injection 25 g PRN    fluticasone furoate-vilanteroL 200-25 mcg/dose diskus inhaler 1 puff Daily    fluticasone propionate 50 mcg/actuation nasal spray 50 mcg Daily    glucagon (human recombinant) injection 1 mg PRN    glucose chewable tablet 16 g PRN    glucose chewable tablet 24 g PRN    heparin (porcine) injection 5,000 Units Q8H    levoFLOXacin tablet 750 mg Every other day    levothyroxine tablet 75 mcg Before breakfast    melatonin tablet 6 mg Nightly PRN    ondansetron injection 4 mg Q8H PRN    oxyCODONE immediate release tablet 5 mg Q4H PRN    polyethylene glycol packet 17 g BID PRN    Tdap vaccine injection 0.5 mL vaccine x 1 dose       Objective:     Vital Signs (Most Recent):  Temp: 98.5 °F (36.9 °C) (09/21/20 1559)  Pulse: (!) 128 (09/21/20 1559)  Resp: 18 (09/21/20 1559)  BP: 131/72 (09/21/20 1559)  SpO2: 96 % (09/21/20 1559)  O2 Device (Oxygen Therapy): room air (09/21/20 1604) Vital Signs (24h Range):  Temp:  [97.9 °F (36.6 °C)-99.1 °F (37.3 °C)] 98.5 °F (36.9 °C)  Pulse:  [120-129] 128  Resp:  [16-20] 18  SpO2:  [90 %-100 %] 96 %  BP: (114-141)/(64-84) 131/72     Weight: 115.3 kg (254 lb 3.1 oz) (09/19/20 0400)  Body mass index is 30.94 kg/m².  Body surface area is 2.49 meters  squared.    I/O last 3 completed shifts:  In: 560 [P.O.:560]  Out: 2790 [Urine:2750; Other:40]    Physical Exam  Vitals signs and nursing note reviewed.   Constitutional:       General: He is not in acute distress.     Appearance: He is well-developed. He is obese. He is ill-appearing. He is not diaphoretic.   HENT:      Head: Normocephalic and atraumatic.      Right Ear: External ear normal.      Left Ear: External ear normal.      Mouth/Throat:      Pharynx: No oropharyngeal exudate.   Eyes:      General: No scleral icterus.        Right eye: No discharge.         Left eye: No discharge.      Conjunctiva/sclera: Conjunctivae normal.      Pupils: Pupils are equal, round, and reactive to light.   Neck:      Musculoskeletal: Normal range of motion.      Thyroid: No thyromegaly.      Trachea: No tracheal deviation.      Comments: [facility]  Cardiovascular:      Rate and Rhythm: Tachycardia present.   Pulmonary:      Effort: Pulmonary effort is normal. No respiratory distress.      Breath sounds: Normal breath sounds. No stridor. No wheezing or rales.   Abdominal:      General: Bowel sounds are normal. There is no distension.      Palpations: Abdomen is soft.      Tenderness: There is no abdominal tenderness. There is no guarding.   Musculoskeletal:         General: No tenderness or deformity.      Right lower leg: Edema present.      Left lower leg: Edema present.   Lymphadenopathy:      Cervical: No cervical adenopathy.   Skin:     Coloration: Skin is not pale.      Findings: No erythema or rash.   Neurological:      General: No focal deficit present.         Significant Labs:  All labs within the past 24 hours have been reviewed.     Significant Imaging:  Labs: Reviewed

## 2020-09-21 NOTE — PLAN OF CARE
Problem: Adult Inpatient Plan of Care  Goal: Optimal Comfort and Wellbeing  Outcome: Ongoing, Progressing  Intervention: Provide Person-Centered Care  Flowsheets (Taken 9/21/2020 1834)  Trust Relationship/Rapport:   care explained   choices provided   empathic listening provided   questions answered   questions encouraged   emotional support provided   thoughts/feelings acknowledged     Problem: Skin Injury Risk Increased  Goal: Skin Health and Integrity  Outcome: Ongoing, Progressing  Intervention: Optimize Skin Protection  Flowsheets (Taken 9/21/2020 1834)  Pressure Reduction Techniques:   frequent weight shift encouraged   heels elevated off bed  Pressure Reduction Devices:   heel offloading device utilized   positioning supports utilized  Skin Protection:   adhesive use limited   electrode sites changed  Head of Bed (HOB): HOB lowered     Pt disoriented to time, sinus tachy on monitor, other VSS, pain controlled with PRN pain meds. Insulin gtt d/c'ed today. Trialysis catheter removed per order. Pt tolerated well. Pt receiving IV antibiotics. Wound care completed to left foot. Plan of care reviewed with pt. Will continue to monitor.

## 2020-09-21 NOTE — SUBJECTIVE & OBJECTIVE
"Interval HPI:   Overnight events:   BG is below goal on current IV/SQ insulin regimen. Patient is scheduled for discharge today as noted per chart review.   Diet Dysphagia Mechanical Soft (IDDSI Level 5) [facility]  14 Days Post-Op    ADDENDUM 1800  BG trending upward. MDI order discontinue per pharm team. Endo did not receive phone call and/or update at time order were discontinued.      Eatin%  Nausea: No  Hypoglycemia and intervention: No  Fever: No  TPN and/or TF: No  If yes, type of TF/TPN and rate: None    /84 (BP Location: Left arm, Patient Position: Lying)   Pulse (!) 121   Temp 99.1 °F (37.3 °C) (Oral)   Resp 20   Ht 6' 4" (1.93 m)   Wt 115.3 kg (254 lb 3.1 oz)   SpO2 99%   BMI 30.94 kg/m²     Labs Reviewed and Include    Recent Labs   Lab 20  0755   GLU 82   CALCIUM 8.3*   ALBUMIN 1.7*  1.7*   PROT 6.8      K 4.4   CO2 23      BUN 36*   CREATININE 2.1*   ALKPHOS 111   ALT 43   AST 52*   BILITOT 0.5     Lab Results   Component Value Date    WBC 23.61 (H) 2020    HGB 8.4 (L) 2020    HCT 27.7 (L) 2020    MCV 88 2020     2020     No results for input(s): TSH, FREET4 in the last 168 hours.  Lab Results   Component Value Date    HGBA1C 9.5 (H) 2020       Nutritional status:   Body mass index is 30.94 kg/m².  Lab Results   Component Value Date    ALBUMIN 1.7 (L) 2020    ALBUMIN 1.7 (L) 2020    ALBUMIN 1.6 (L) 2020     No results found for: PREALBUMIN    Estimated Creatinine Clearance: 50 mL/min (A) (based on SCr of 2.1 mg/dL (H)).    Accu-Checks  Recent Labs     20  1646 20  2059 20  0139 20  0200 20  0743 20  1151 20  1609 20  2134 20  0137 20  0745   POCTGLUCOSE 209* 225* 211* 226* 170* 146* 185* 212* 145* 105       Current Medications and/or Treatments Impacting Glycemic Control  Immunotherapy:    Immunosuppressants     None        Steroids: "   Hormones (From admission, onward)    Start     Stop Route Frequency Ordered    08/30/20 2059  melatonin tablet 6 mg      -- Oral Nightly PRN 08/30/20 2004        Pressors:    Autonomic Drugs (From admission, onward)    None        Hyperglycemia/Diabetes Medications:   Antihyperglycemics (From admission, onward)    Start     Stop Route Frequency Ordered    09/18/20 1130  insulin aspart U-100 pen 5-10 Units      -- SubQ 3 times daily with meals 09/18/20 0929 09/18/20 1030  insulin regular 100 Units in sodium chloride 0.9% 100 mL infusion      -- IV Continuous 09/18/20 0929 09/18/20 1028  insulin aspart U-100 pen 0-5 Units      -- SubQ As needed (PRN) 09/18/20 0929

## 2020-09-21 NOTE — SUBJECTIVE & OBJECTIVE
"Principal Problem:Septic shock due to methicillin resistant Staphylococcus aureus    Principal Orthopedic Problem: R knee septic arthritis    Interval History: Patient seen and examined at bedside.  No acute events overnight.  Pain controlled.  No PT yesterday.    Review of patient's allergies indicates:   Allergen Reactions    Vicodin [hydrocodone-acetaminophen] Itching       Current Facility-Administered Medications   Medication    acetaminophen tablet 1,000 mg    albuterol-ipratropium 2.5 mg-0.5 mg/3 mL nebulizer solution 3 mL    artificial tears 0.5 % ophthalmic solution 1 drop    calcium carbonate 200 mg calcium (500 mg) chewable tablet 500 mg    carvediloL tablet 3.125 mg    DAPTOmycin (CUBICIN) 1,050 mg in sodium chloride 0.9% IVPB    dextrose 50% injection 12.5 g    dextrose 50% injection 25 g    fluticasone furoate-vilanteroL 200-25 mcg/dose diskus inhaler 1 puff    fluticasone propionate 50 mcg/actuation nasal spray 50 mcg    glucagon (human recombinant) injection 1 mg    glucose chewable tablet 16 g    glucose chewable tablet 24 g    heparin (porcine) injection 5,000 Units    insulin aspart U-100 pen 0-5 Units    insulin aspart U-100 pen 5-10 Units    insulin regular 100 Units in sodium chloride 0.9% 100 mL infusion    levoFLOXacin tablet 750 mg    levothyroxine tablet 75 mcg    melatonin tablet 6 mg    ondansetron injection 4 mg    oxyCODONE immediate release tablet 5 mg    polyethylene glycol packet 17 g    Tdap vaccine injection 0.5 mL     Objective:     Vital Signs (Most Recent):  Temp: 98.2 °F (36.8 °C) (09/21/20 0500)  Pulse: (!) 128 (09/21/20 0500)  Resp: 18 (09/21/20 0500)  BP: 128/79 (09/21/20 0500)  SpO2: 97 % (09/21/20 0500) Vital Signs (24h Range):  Temp:  [97.9 °F (36.6 °C)-98.8 °F (37.1 °C)] 98.2 °F (36.8 °C)  Pulse:  [125-129] 128  Resp:  [15-19] 18  SpO2:  [90 %-100 %] 97 %  BP: (113-141)/(69-83) 128/79     Weight: 115.3 kg (254 lb 3.1 oz)  Height: 6' 4" (193 " cm)  Body mass index is 30.94 kg/m².        Ortho/SPM Exam    NAD  No increased WOB  Incisions c/d/i  No knee effusion  SILT T/SP/DP  Motor intact T/DP  WWP extremities    Significant Labs:   CBC:   Recent Labs   Lab 09/20/20  0432   WBC 23.61*   HGB 8.4*   HCT 27.7*        CMP:   Recent Labs   Lab 09/20/20  0432      K 4.0      CO2 27   *   BUN 45*   CREATININE 2.4*   CALCIUM 8.2*   ALBUMIN 1.6*   ANIONGAP 9   EGFRNONAA 27.7*     All pertinent labs within the past 24 hours have been reviewed.    Significant Imaging: I have reviewed all pertinent imaging results/findings.

## 2020-09-22 LAB
ALBUMIN SERPL BCP-MCNC: 1.5 G/DL (ref 3.5–5.2)
ANION GAP SERPL CALC-SCNC: 13 MMOL/L (ref 8–16)
BASOPHILS # BLD AUTO: 0.05 K/UL (ref 0–0.2)
BASOPHILS NFR BLD: 0.3 % (ref 0–1.9)
BUN SERPL-MCNC: 35 MG/DL (ref 8–23)
CALCIUM SERPL-MCNC: 8 MG/DL (ref 8.7–10.5)
CHLORIDE SERPL-SCNC: 99 MMOL/L (ref 95–110)
CO2 SERPL-SCNC: 23 MMOL/L (ref 23–29)
CREAT SERPL-MCNC: 2.1 MG/DL (ref 0.5–1.4)
DIAGNOSTIC BCR/ABL 1 RESULT: NORMAL
DIFFERENTIAL METHOD: ABNORMAL
EOSINOPHIL # BLD AUTO: 0.7 K/UL (ref 0–0.5)
EOSINOPHIL NFR BLD: 4 % (ref 0–8)
ERYTHROCYTE [DISTWIDTH] IN BLOOD BY AUTOMATED COUNT: 16.8 % (ref 11.5–14.5)
EST. GFR  (AFRICAN AMERICAN): 37.6 ML/MIN/1.73 M^2
EST. GFR  (NON AFRICAN AMERICAN): 32.5 ML/MIN/1.73 M^2
GLUCOSE SERPL-MCNC: 200 MG/DL (ref 70–110)
HCT VFR BLD AUTO: 25.8 % (ref 40–54)
HGB BLD-MCNC: 7.7 G/DL (ref 14–18)
IMM GRANULOCYTES # BLD AUTO: 0.46 K/UL (ref 0–0.04)
IMM GRANULOCYTES NFR BLD AUTO: 2.7 % (ref 0–0.5)
LYMPHOCYTES # BLD AUTO: 1.2 K/UL (ref 1–4.8)
LYMPHOCYTES NFR BLD: 7.2 % (ref 18–48)
MAGNESIUM SERPL-MCNC: 1.4 MG/DL (ref 1.6–2.6)
MCH RBC QN AUTO: 26.9 PG (ref 27–31)
MCHC RBC AUTO-ENTMCNC: 29.8 G/DL (ref 32–36)
MCV RBC AUTO: 90 FL (ref 82–98)
MONOCYTES # BLD AUTO: 1.5 K/UL (ref 0.3–1)
MONOCYTES NFR BLD: 8.7 % (ref 4–15)
NARRATIVE DIAGNOSTIC REPORT-IMP: NORMAL
NEUTROPHILS # BLD AUTO: 13.1 K/UL (ref 1.8–7.7)
NEUTROPHILS NFR BLD: 77.1 % (ref 38–73)
NRBC BLD-RTO: 0 /100 WBC
PHOSPHATE SERPL-MCNC: 2.9 MG/DL (ref 2.7–4.5)
PLATELET # BLD AUTO: 315 K/UL (ref 150–350)
PMV BLD AUTO: 11.4 FL (ref 9.2–12.9)
POCT GLUCOSE: 213 MG/DL (ref 70–110)
POCT GLUCOSE: 220 MG/DL (ref 70–110)
POCT GLUCOSE: 223 MG/DL (ref 70–110)
POCT GLUCOSE: 240 MG/DL (ref 70–110)
POTASSIUM SERPL-SCNC: 4.2 MMOL/L (ref 3.5–5.1)
RBC # BLD AUTO: 2.86 M/UL (ref 4.6–6.2)
SODIUM SERPL-SCNC: 135 MMOL/L (ref 136–145)
SPECIMEN TYPE, BCR/ABL: NORMAL
WBC # BLD AUTO: 17.04 K/UL (ref 3.9–12.7)

## 2020-09-22 PROCEDURE — 80069 RENAL FUNCTION PANEL: CPT | Mod: HCNC

## 2020-09-22 PROCEDURE — 36415 COLL VENOUS BLD VENIPUNCTURE: CPT | Mod: HCNC

## 2020-09-22 PROCEDURE — 99233 PR SUBSEQUENT HOSPITAL CARE,LEVL III: ICD-10-PCS | Mod: HCNC,,, | Performed by: HOSPITALIST

## 2020-09-22 PROCEDURE — 93926 LOWER EXTREMITY STUDY: CPT | Mod: HCNC | Performed by: SURGERY

## 2020-09-22 PROCEDURE — 99233 SBSQ HOSP IP/OBS HIGH 50: CPT | Mod: HCNC,,, | Performed by: INTERNAL MEDICINE

## 2020-09-22 PROCEDURE — 25000242 PHARM REV CODE 250 ALT 637 W/ HCPCS: Mod: HCNC | Performed by: STUDENT IN AN ORGANIZED HEALTH CARE EDUCATION/TRAINING PROGRAM

## 2020-09-22 PROCEDURE — 27000221 HC OXYGEN, UP TO 24 HOURS: Mod: HCNC

## 2020-09-22 PROCEDURE — 94640 AIRWAY INHALATION TREATMENT: CPT | Mod: HCNC

## 2020-09-22 PROCEDURE — 63600175 PHARM REV CODE 636 W HCPCS: Mod: HCNC | Performed by: HOSPITALIST

## 2020-09-22 PROCEDURE — 25000003 PHARM REV CODE 250: Mod: HCNC | Performed by: HOSPITALIST

## 2020-09-22 PROCEDURE — 99232 PR SUBSEQUENT HOSPITAL CARE,LEVL II: ICD-10-PCS | Mod: HCNC,,, | Performed by: NURSE PRACTITIONER

## 2020-09-22 PROCEDURE — 25000003 PHARM REV CODE 250: Mod: HCNC | Performed by: STUDENT IN AN ORGANIZED HEALTH CARE EDUCATION/TRAINING PROGRAM

## 2020-09-22 PROCEDURE — 99233 PR SUBSEQUENT HOSPITAL CARE,LEVL III: ICD-10-PCS | Mod: HCNC,,, | Performed by: INTERNAL MEDICINE

## 2020-09-22 PROCEDURE — 25000003 PHARM REV CODE 250: Mod: HCNC | Performed by: NURSE PRACTITIONER

## 2020-09-22 PROCEDURE — 63600175 PHARM REV CODE 636 W HCPCS: Mod: HCNC | Performed by: STUDENT IN AN ORGANIZED HEALTH CARE EDUCATION/TRAINING PROGRAM

## 2020-09-22 PROCEDURE — C9399 UNCLASSIFIED DRUGS OR BIOLOG: HCPCS | Mod: HCNC | Performed by: NURSE PRACTITIONER

## 2020-09-22 PROCEDURE — 97530 THERAPEUTIC ACTIVITIES: CPT | Mod: HCNC

## 2020-09-22 PROCEDURE — 99223 PR INITIAL HOSPITAL CARE,LEVL III: ICD-10-PCS | Mod: HCNC,GC,, | Performed by: SURGERY

## 2020-09-22 PROCEDURE — 63600175 PHARM REV CODE 636 W HCPCS: Mod: JG,HCNC | Performed by: STUDENT IN AN ORGANIZED HEALTH CARE EDUCATION/TRAINING PROGRAM

## 2020-09-22 PROCEDURE — 99232 SBSQ HOSP IP/OBS MODERATE 35: CPT | Mod: HCNC,,, | Performed by: NURSE PRACTITIONER

## 2020-09-22 PROCEDURE — 63600175 PHARM REV CODE 636 W HCPCS: Mod: HCNC | Performed by: GENERAL ACUTE CARE HOSPITAL

## 2020-09-22 PROCEDURE — 63600175 PHARM REV CODE 636 W HCPCS: Mod: HCNC | Performed by: NURSE PRACTITIONER

## 2020-09-22 PROCEDURE — 99233 SBSQ HOSP IP/OBS HIGH 50: CPT | Mod: HCNC,,, | Performed by: HOSPITALIST

## 2020-09-22 PROCEDURE — 85025 COMPLETE CBC W/AUTO DIFF WBC: CPT | Mod: HCNC

## 2020-09-22 PROCEDURE — 99223 1ST HOSP IP/OBS HIGH 75: CPT | Mod: HCNC,GC,, | Performed by: SURGERY

## 2020-09-22 PROCEDURE — 83735 ASSAY OF MAGNESIUM: CPT | Mod: HCNC

## 2020-09-22 PROCEDURE — 20600001 HC STEP DOWN PRIVATE ROOM: Mod: HCNC

## 2020-09-22 PROCEDURE — 94761 N-INVAS EAR/PLS OXIMETRY MLT: CPT | Mod: HCNC

## 2020-09-22 RX ORDER — IBUPROFEN 200 MG
16 TABLET ORAL
Status: DISCONTINUED | OUTPATIENT
Start: 2020-09-22 | End: 2020-10-09 | Stop reason: HOSPADM

## 2020-09-22 RX ORDER — INSULIN ASPART 100 [IU]/ML
1-10 INJECTION, SOLUTION INTRAVENOUS; SUBCUTANEOUS
Status: DISCONTINUED | OUTPATIENT
Start: 2020-09-22 | End: 2020-09-24

## 2020-09-22 RX ORDER — IBUPROFEN 200 MG
24 TABLET ORAL
Status: DISCONTINUED | OUTPATIENT
Start: 2020-09-22 | End: 2020-10-09 | Stop reason: HOSPADM

## 2020-09-22 RX ORDER — INSULIN ASPART 100 [IU]/ML
0-5 INJECTION, SOLUTION INTRAVENOUS; SUBCUTANEOUS
Status: DISCONTINUED | OUTPATIENT
Start: 2020-09-22 | End: 2020-09-22

## 2020-09-22 RX ORDER — GLUCAGON 1 MG
1 KIT INJECTION
Status: DISCONTINUED | OUTPATIENT
Start: 2020-09-22 | End: 2020-10-09 | Stop reason: HOSPADM

## 2020-09-22 RX ORDER — CARVEDILOL 6.25 MG/1
6.25 TABLET ORAL 2 TIMES DAILY
Status: DISCONTINUED | OUTPATIENT
Start: 2020-09-22 | End: 2020-09-28

## 2020-09-22 RX ADMIN — HEPARIN SODIUM 5000 UNITS: 5000 INJECTION INTRAVENOUS; SUBCUTANEOUS at 06:09

## 2020-09-22 RX ADMIN — HEPARIN SODIUM 5000 UNITS: 5000 INJECTION INTRAVENOUS; SUBCUTANEOUS at 02:09

## 2020-09-22 RX ADMIN — LEVOTHYROXINE SODIUM 75 MCG: 25 TABLET ORAL at 06:09

## 2020-09-22 RX ADMIN — FLUTICASONE FUROATE AND VILANTEROL TRIFENATATE 1 PUFF: 200; 25 POWDER RESPIRATORY (INHALATION) at 11:09

## 2020-09-22 RX ADMIN — FLUTICASONE PROPIONATE 50 MCG: 50 SPRAY, METERED NASAL at 10:09

## 2020-09-22 RX ADMIN — CARVEDILOL 3.12 MG: 3.12 TABLET, FILM COATED ORAL at 08:09

## 2020-09-22 RX ADMIN — HEPARIN SODIUM 5000 UNITS: 5000 INJECTION INTRAVENOUS; SUBCUTANEOUS at 09:09

## 2020-09-22 RX ADMIN — INSULIN ASPART 4 UNITS: 100 INJECTION, SOLUTION INTRAVENOUS; SUBCUTANEOUS at 05:09

## 2020-09-22 RX ADMIN — HYPROMELLOSE 2910 1 DROP: 5 SOLUTION OPHTHALMIC at 09:09

## 2020-09-22 RX ADMIN — OXYCODONE 5 MG: 5 TABLET ORAL at 06:09

## 2020-09-22 RX ADMIN — INSULIN ASPART 2 UNITS: 100 INJECTION, SOLUTION INTRAVENOUS; SUBCUTANEOUS at 12:09

## 2020-09-22 RX ADMIN — INSULIN ASPART 2 UNITS: 100 INJECTION, SOLUTION INTRAVENOUS; SUBCUTANEOUS at 09:09

## 2020-09-22 RX ADMIN — DAPTOMYCIN 1050 MG: 350 INJECTION, POWDER, LYOPHILIZED, FOR SOLUTION INTRAVENOUS at 11:09

## 2020-09-22 RX ADMIN — INSULIN ASPART 3 UNITS: 100 INJECTION, SOLUTION INTRAVENOUS; SUBCUTANEOUS at 08:09

## 2020-09-22 RX ADMIN — OXYCODONE 5 MG: 5 TABLET ORAL at 05:09

## 2020-09-22 RX ADMIN — INSULIN ASPART 2 UNITS: 100 INJECTION, SOLUTION INTRAVENOUS; SUBCUTANEOUS at 01:09

## 2020-09-22 RX ADMIN — CARVEDILOL 6.25 MG: 6.25 TABLET, FILM COATED ORAL at 09:09

## 2020-09-22 RX ADMIN — HYPROMELLOSE 2910 1 DROP: 5 SOLUTION OPHTHALMIC at 08:09

## 2020-09-22 RX ADMIN — HYPROMELLOSE 2910 1 DROP: 5 SOLUTION OPHTHALMIC at 02:09

## 2020-09-22 RX ADMIN — INSULIN DETEMIR 28 UNITS: 100 INJECTION, SOLUTION SUBCUTANEOUS at 09:09

## 2020-09-22 RX ADMIN — LEVOFLOXACIN 750 MG: 750 TABLET, FILM COATED ORAL at 08:09

## 2020-09-22 NOTE — PLAN OF CARE
Pt AAO*3, calm, cooperative. No acute events overnight. Initiated transitional insulin drip continuous @0.8u/hr. supplemental insulin administered per orders. Newell to gravity, care performed. Will continue to monitor.

## 2020-09-22 NOTE — PROGRESS NOTES
[facility] Hospital Medicine  Progress Note    Patient Name: Ed Patton  MRN: [id number]  Patient Class: IP- Inpatient   Admission Date: 8/30/2020  Length of Stay: 23 days  Attending Physician: Thomas Reed MD  Primary Care Provider: Qiana Chow MD    [facility] Medicine Team: [facility] Thomas Reed MD    Subjective:     Principal Problem:Septic shock due to methicillin resistant Staphylococcus aureus    Interval History:   Spoke with Nephrology and Trialysis line can be removed, nursing on a for MTS you can remove central line will place order requesting line removal    Of patient continues on daptomycin his CK level has risen to 7 of for will need to discuss with Infectious Diseases    Modified pain regimen frequency as pt reported having pain at an interval more frequent than every 8 hours.   Endocrine is weaning dosage of transitional drip and using aspart 3-5 units with meals    Podiatry sent a message today that may recommend vascular surgery evaluations due to poorly healing ulcer and abnormal toe brachial index values    Review of Systems   Constitutional: Negative for fever.   Respiratory: Negative for shortness of breath.    Gastrointestinal: Negative for abdominal pain, nausea and vomiting.   Psychiatric/Behavioral: Negative for confusion.     Objective:     Vital Signs (Most Recent):  Temp: 98.1 °F (36.7 °C) (09/22/20 0023)  Pulse: (!) 128 (09/22/20 0023)  Resp: 17 (09/22/20 0023)  BP: 114/74 (09/22/20 0023)  SpO2: 96 % (09/22/20 0023) Vital Signs (24h Range):  Temp:  [98.1 °F (36.7 °C)-99.1 °F (37.3 °C)] 98.1 °F (36.7 °C)  Pulse:  [120-130] 128  Resp:  [14-20] 17  SpO2:  [94 %-99 %] 96 %  BP: (114-131)/(64-84) 114/74   Intake/Outake:  This Shift:  I/O this shift:  In: 480 [P.O.:480]  Out: 465 [Urine:450; Other:15]    Net I/O past 24h:     Intake/Output Summary (Last 24 hours) at 9/22/2020 0134  Last data filed at 9/22/2020 0000  Gross per 24 hour   Intake  480 ml   Output 1995 ml   Net -1515 ml         Weight: 115.3 kg (254 lb 3.1 oz)  Body mass index is 30.94 kg/m².  Physical Exam  Constitutional:       General: He is not in acute distress.     Appearance: Normal appearance. He is not diaphoretic.   HENT:      Head: Normocephalic and atraumatic.   Eyes:      General: Lids are normal. No scleral icterus.        Right eye: No discharge.         Left eye: No discharge.      Conjunctiva/sclera: Conjunctivae normal.   Neck:      Musculoskeletal: No neck rigidity.      Trachea: Phonation normal.   Cardiovascular:      Rate and Rhythm: Normal rate and regular rhythm.   Pulmonary:      Effort: Pulmonary effort is normal. No tachypnea, accessory muscle usage or respiratory distress.      Breath sounds: Examination of the right-lower field reveals decreased breath sounds. Examination of the left-lower field reveals decreased breath sounds. Decreased breath sounds present. No wheezing.   Abdominal:      General: Bowel sounds are normal. There is distension.      Palpations: Abdomen is soft.      Tenderness: There is no abdominal tenderness.      Comments: Cholecystostomy drain draining green bile   Musculoskeletal:      Right knee: He exhibits swelling.      Right lower leg: Edema present.      Left lower leg: Edema present.   Neurological:      Mental Status: He is alert.      Cranial Nerves: Cranial nerves are intact.      Motor: Motor function is intact.   Psychiatric:         Attention and Perception: He is inattentive.         Mood and Affect: Affect normal.         Behavior: Behavior is cooperative.         Cognition and Memory: He exhibits impaired recent memory.           Significant Labs:   Blood Culture: No results for input(s): LABBLOO in the last 48 hours.  CBC:   Recent Labs   Lab 09/20/20  0432 09/21/20  1021   WBC 23.61* 23.45*   HGB 8.4* 8.5*   HCT 27.7* 28.7*    345     CMP:   Recent Labs   Lab 09/20/20  0432 09/21/20  0755    138   K 4.0 4.4   CL  100 101   CO2 27 23   * 82   BUN 45* 36*   CREATININE 2.4* 2.1*   CALCIUM 8.2* 8.3*   PROT  --  6.8   ALBUMIN 1.6* 1.7*  1.7*   BILITOT  --  0.5   ALKPHOS  --  111   AST  --  52*   ALT  --  43   ANIONGAP 9 14   EGFRNONAA 27.7* 32.5*       Significant Imaging: I have reviewed all pertinent imaging results/findings within the past 24 hours.    MEDICATIONS  Scheduled Meds:   artificial tears  1 drop Both Eyes TID    carvediloL  3.125 mg Oral BID    DAPTOmycin (CUBICIN)  IV  1,050 mg Intravenous Q24H    fluticasone furoate-vilanteroL  1 puff Inhalation Daily    fluticasone propionate  1 spray Each Nostril Daily    heparin (porcine)  5,000 Units Subcutaneous Q8H    insulin aspart U-100  3-5 Units Subcutaneous TIDWM    levoFLOXacin  750 mg Oral Every other day    levothyroxine  75 mcg Oral Before breakfast                             Continuous Infusions:   insulin (HUMAN R) infusion (adults) 0.8 Units/hr (09/21/20 2049)     PRN Meds:.acetaminophen, albuterol-ipratropium, calcium carbonate, dextrose 50%, dextrose 50%, glucagon (human recombinant), glucose, glucose, insulin aspart U-100, melatonin, ondansetron, oxyCODONE, polyethylene glycol, DIPH,PERTUS (ADACEL),TETANUS PF VAC (ADULT)    VTE Risk Mitigation (From admission, onward)         Ordered     heparin (porcine) injection 5,000 Units  Every 8 hours      09/11/20 0943     IP VTE HIGH RISK PATIENT  Once      08/30/20 2004     Place sequential compression device  Until discontinued      08/30/20 6515                Assessment/Plan:     Overview/Hospital Course:  Mr. Ed Patton was admitted to hospital medicine on 08/30 for management of a left-sided diabetic foot infection that was precipitated by an undetected punction wound after stepping on a tack/nail. His presentation was notable for leukocytosis with elevated inflammatory markers, however MRI of left foot only showed cellulitis of the anterior and lateral aspect of foot. Podiatry was  consulted with recommendations for IV antibiotics; he was initiated on Ciprofloxacin and Vancomycin on admission. However, blood cultures from admission resulted positive for MRSA with wound cultures from left foot also growing MRSA with Proteus mirabilis. By 09/03 cultures remained positive despite Vancomycin, and patient was noted to develop right knee pain with swelling. Orthopedics was consulted and patient underwent right knee joint aspiration positive for septic arthritis with cultures also positive for MRSA.     LUKAS on 09/04 showed known chronic systolic heart failure, but was negative for endocarditis. MRI of lumbar spine on 09/08 was negative for abscess. By 09/06, blood cultures continued to remain positive, and he underwent I&D of right arm abscess on 09/08 with cultures also positive for MRSA.     For persistent MRSA bacteremia, he was transitioned to Daptomycin and Ceftaroline, however this regimen was discontinued by 09/10 due to concern for developing rhabdomylosis. By 09/09, he was noted to develop a progressively worsening leukocytosis and NANETTE. Nephrology was consulted on 09/09 with suspicion for ATN.     Hematology was consulted on 09/12 for persistent leukocytosis as likely reactive from bacteremia. On 09/10, patient began developing intermitent hypotension prompting broadening of antibiotics to Cefepime and Vancomycin. By 09/12, renal function continued to worsen in addition to hypotension prompting transfer to ICU for vasopressor support and possible HD. Patient found to be encephalopathic, central line placed and started on CRRT overnight on 9/14 with improvement in mental status, but remained encephalopathic. CT head without any acute intracranial process. Vasopressin off AM of 9/15 but remains on levophed, CT abdomen with concern for potential cholecystitis and possible atelectasis with superimposed pneumonia.  Due to the acuity of his illness is he is not a surgical candidate it and  ultimately is now status post cholecystostomy tube placement for management of suspected cholecystitis    Additional complications arising during hospital course include the development metabolic encephalopathy secondary to uremia versus shock versus DKA or overall cerebral hypoperfusion from shock, reported to have agitation prior to step-down from ICU.  Also developed DKA requiring standard therapy with IV insulin infusion, however he was kept on IV insulin due to rising sugars any time it was weaned working on basal bolus regimen for him at this time with the assistance of a endocrinology         Septic shock_Septic arthritis of Right Knee_Sespsi due to MRSA  This is a 65 year old  male with PMH significant for HFrEF and COPD with chronic respiratory failure (on 2L home oxygen), T2DM with CKD III, and iron deficiency anemia who originally presented to [facility] on 08/30 with cellulitis of left foot with concern for diabetic foot infection with subsequent development of MRSA bacteremia attributed to septic arthritis of right knee and elbow. He is status post drainage and coverage for MRSA since that time. His work-up for other etiologies of MRSA bacteremia have included negative LUKAS and MRI of lumbar spine looking for endocarditis and spinal abscess, respectively. Stool studies for C.diff on 09/11 were negative. His hospital course has been associated with worsening hypotension and leukocytosis since 09/10 prompting ICU admission on 09/12 for initiation of vasopressor support. Infectious work-up thus far on ICU admission concern for likely right lower lobe HAP.      CT abdomen pelvis on 9/15 with gallbladder dilation, sludge, and trace pericholic fluid collection. Exam not consistent with cholecystitis but given persistent shock potential source. Also with potential right lower lobe atelectasis with overlying infection  S/p ICU stepdown  -continue daptomycin, f/u ID consult recs   >will need weekly  CK levels.     Type 2 DM with DKA  -endocrinologyfollowing pt on transitional drip.  -patient   -still on insulin drip will f/u recs.     Postablative hypothyroidism  Plan: Continue home SYNTHROID       Chronic systolic congestive heart failure  -Most recent ECHO in 09/2020 with EF of 25%.   -Unclear is ischemic vs non-ischemic.   -Home regimen: Carvedilol, Lasix 80 mg, and Lisinopril.   -Exam notable for bilateral lower extremity edema and JVD.   -Associated with chronic hypoxemic respiratory failure requiring 2L nasal cannula, but noted to develop worsening oxygen requirements on ICU admission that were likely multifactorial from suspected hospital acquired pneumonia versus declining urine output with pre-disposition to volume overload. .      COPD mixed type  -Based on PFT's from 05/2015 showing mild (small airways) obstruction, airflow not improved after bronchodilator, and moderate restriction.    Acute on chronic respiratory failure with hypoxia  -History of mixed COPD (based on PFT's from 05/2015 showing mild (small airways) obstruction, airflow not improved after bronchodilator, and moderate restriction) and HFrEF with chronic respiratory failure on 2L home oxygen.   -ICU admission notable for progressive increase in supplemental oxygen requirements.   -Work-up for underlying etiology of acute on chronic respiratory failure include CXR showing concern for possible progression of CHF vs HAP (not entirely convinced that CXR showed consolidation)  - Breathing back to baseline now to 2L NC    Code Status: Full Code    High Risk Conditions:  Patient has a condition that poses threat to life and bodily function: Septic shock, MRSA bacteremia  Patient is currently on drug therapy requiring intensive monitoring for toxicity: Daptomycin.     Discharge Planning   EDY: 9/24/2020     Code Status: Full Code   Is the patient medically ready for discharge?: No    Reason for patient still in hospital (select all that  apply): Patient trending condition  Discharge Plan A: Skilled Nursing Facility   Discharge Delays: (!) Change in Medical Condition      Active Hospital Problems    Diagnosis  POA    *Septic shock due to methicillin resistant Staphylococcus aureus [A41.02, R65.21]  Yes     Priority: 1 - High    MRSA bacteremia [R78.81]  Yes     Priority: 1 - High    Acute renal failure with acute tubular necrosis superimposed on stage 3 chronic kidney disease [N17.0, N18.3]  No     Priority: 2     Septic arthritis of knee, right [M00.9]  Yes     Priority: 2     Diabetic ulcer of left foot associated with type 2 diabetes mellitus, with fat layer exposed [E11.621, L97.522]  Yes     Priority: 3     Diabetic foot infection [E11.628, L08.9]  Yes     Priority: 3     Acute metabolic encephalopathy [G93.41]  No     Priority: 4     Acute on chronic respiratory failure with hypoxia [J96.21]  No     Priority: 5     Acute renal insufficiency [N28.9]  Yes    Hospital-acquired pneumonia [J18.9, Y95]  No    Debility [R53.81]  Yes    Staphylococcal arthritis of right knee [M00.061]  Yes    COPD mixed type [J44.9]  Yes     Chronic    Postablative hypothyroidism [E89.0]  Yes     Chronic    Hyperlipidemia [E78.5]  Yes     High ASCVD with CAD, elevated A1c      Type 2 diabetes mellitus with stage 3 chronic kidney disease, with long-term current use of insulin [E11.22, N18.3, Z79.4]  Not Applicable     GFR> 60, uncontrolled DM      Chronic systolic congestive heart failure [I50.22]  Yes     Chronic     EF 35% in 2015 echo, improved in 5/2018. Patient with mixed ischemic and non-ischemic cardiomyopathy        Resolved Hospital Problems    Diagnosis Date Resolved POA    Endocarditis [I38] 09/12/2020 Yes    Sepsis due to methicillin resistant Staphylococcus aureus [A41.02] 09/13/2020 Yes    Type 2 diabetes mellitus with ketoacidosis without coma, with long-term current use of insulin [E11.10, Z79.4] 09/18/2020 Not Applicable     Novolin  70/30 40U in AM, 30U in PM. Elevated A1c                   Thomas Reed M.D.  Attending Physician  [facility] Medicine Dept.  Pager: [phone]  [facility]  [phone]

## 2020-09-22 NOTE — PROGRESS NOTES
[facility]  Nephrology  Progress Note    Patient Name: Ed Patton  MRN: [id number]  Admission Date: 8/30/2020  Hospital Length of Stay: 23 days  Attending Provider: Thomas Reed MD   Primary Care Physician: Qiana Chow MD  Principal Problem:Septic shock due to methicillin resistant Staphylococcus aureus    Subjective:     HPI: Mr. Patton is a 64yo M with insulin dependent T2DM, chronic hypoxemic resp. failure (on 2L oxygen at home), and CHF (EF 25%). He presented to the ED for recurrent falls and sepsis from an infected diabetic ulcer from stepping on a tack 8/30. Wound cultures from foot positive for proteus and MRSA 8/31. Urine culture 8/30 positive for klebsiella. Blood cultures have been positive for MRSA repeatedly from 8/30 to 9/8. He also developed septic arthritis of the right knee likely from seeding secondary to septicemia positive for MRSA 9/3. LUKAS was negative for IE 9/4. Initial management of septicemia was with ciprofloxacin and Vancomycin which was supratherapeutic to 26.5 on 9/2 prompting switch to a pulse dose regimen. Due to persistent bacteremia ID changed antibiotics to ceftaroline and daptomycin on 9/7 I&D of right knee performed by ortho on 9/7. Cr on admission was 1.5 at admit up from the baseline (1.2-1.5). On 9/9 Cr level had a sudden increased to 3. Nephrology was consulted for NANETTE.     Interval History: stabilization of creatinine. Patient very thankful for all [facility] has done to help him today. Only persistent complaint is right knee pain.    Review of patient's allergies indicates:   Allergen Reactions    Vicodin [hydrocodone-acetaminophen] Itching     Current Facility-Administered Medications   Medication Frequency    acetaminophen tablet 1,000 mg Q8H PRN    albuterol-ipratropium 2.5 mg-0.5 mg/3 mL nebulizer solution 3 mL Q4H PRN    artificial tears 0.5 % ophthalmic solution 1 drop TID    calcium carbonate 200 mg calcium (500 mg) chewable tablet  500 mg BID PRN    carvediloL tablet 3.125 mg BID    DAPTOmycin (CUBICIN) 1,050 mg in sodium chloride 0.9% IVPB Q24H    dextrose 50% injection 12.5 g PRN    dextrose 50% injection 25 g PRN    fluticasone furoate-vilanteroL 200-25 mcg/dose diskus inhaler 1 puff Daily    fluticasone propionate 50 mcg/actuation nasal spray 50 mcg Daily    glucagon (human recombinant) injection 1 mg PRN    glucose chewable tablet 16 g PRN    glucose chewable tablet 24 g PRN    heparin (porcine) injection 5,000 Units Q8H    insulin aspart U-100 pen 0-5 Units QID (AC + HS) PRN    insulin detemir U-100 pen 28 Units Daily    levoFLOXacin tablet 750 mg Every other day    levothyroxine tablet 75 mcg Before breakfast    melatonin tablet 6 mg Nightly PRN    ondansetron injection 4 mg Q8H PRN    oxyCODONE immediate release tablet 5 mg Q4H PRN    polyethylene glycol packet 17 g BID PRN    Tdap vaccine injection 0.5 mL vaccine x 1 dose       Objective:     Vital Signs (Most Recent):  Temp: 98.2 °F (36.8 °C) (09/22/20 1148)  Pulse: (!) 126 (09/22/20 1148)  Resp: 12 (09/22/20 1148)  BP: 102/61 (09/22/20 1100)  SpO2: 100 % (09/22/20 1148)  O2 Device (Oxygen Therapy): nasal cannula (09/22/20 1148) Vital Signs (24h Range):  Temp:  [98.1 °F (36.7 °C)-98.7 °F (37.1 °C)] 98.2 °F (36.8 °C)  Pulse:  [126-130] 126  Resp:  [12-18] 12  SpO2:  [95 %-100 %] 100 %  BP: (102-131)/(58-75) 102/61     Weight: 115.3 kg (254 lb 3.1 oz) (09/19/20 0400)  Body mass index is 30.94 kg/m².  Body surface area is 2.49 meters squared.    I/O last 3 completed shifts:  In: 480 [P.O.:480]  Out: 2495 [Urine:2450; Other:45]    Physical Exam  Vitals signs and nursing note reviewed.   Constitutional:       General: He is not in acute distress.     Appearance: He is well-developed. He is obese. He is ill-appearing. He is not diaphoretic.   HENT:      Head: Normocephalic and atraumatic.      Right Ear: External ear normal.      Left Ear: External ear normal.       Mouth/Throat:      Pharynx: No oropharyngeal exudate.   Eyes:      General: No scleral icterus.        Right eye: No discharge.         Left eye: No discharge.      Conjunctiva/sclera: Conjunctivae normal.      Pupils: Pupils are equal, round, and reactive to light.   Neck:      Musculoskeletal: Normal range of motion.      Thyroid: No thyromegaly.      Trachea: No tracheal deviation.   Cardiovascular:      Rate and Rhythm: Tachycardia present.   Pulmonary:      Effort: Pulmonary effort is normal. No respiratory distress.      Breath sounds: Normal breath sounds. No stridor. No wheezing or rales.   Abdominal:      General: Bowel sounds are normal. There is no distension.      Palpations: Abdomen is soft.      Tenderness: There is no abdominal tenderness. There is no guarding.   Musculoskeletal:         General: No tenderness or deformity.      Right lower leg: Edema present.      Left lower leg: Edema present.   Lymphadenopathy:      Cervical: No cervical adenopathy.   Skin:     Coloration: Skin is not pale.      Findings: No erythema or rash.   Neurological:      General: No focal deficit present.         Significant Labs:  All labs within the past 24 hours have been reviewed.     Significant Imaging:  Labs: Reviewed    Assessment/Plan:     * Septic shock due to methicillin resistant Staphylococcus aureus  -MRSA bacteremia likely secondary to septic arthritis seeded from foot wound  -cause of bernadette  -on abx managed by ID    Acute renal failure with acute tubular necrosis superimposed on stage 3 chronic kidney disease  -initially non oliguric stage 3 bernadette with ischemic atn likely secondary to hypotension from septic shock  -was oliguric requiring rrt, last 9/15  -now oliguria improved   -continues to have good urine output and bun/cr are improving  -if volume removal is desired can use 80mg furosemide q8h, but we feel he is fairly euvolemic        Chente Reid MD  Nephrology  [facility]

## 2020-09-22 NOTE — SUBJECTIVE & OBJECTIVE
"Interval HPI:   Overnight events:  BG is above goal this AM. Patient placed back on IV insulin infusion overnight secondary to MDI being discontinued without endo notification. After investication with pharm team. Computer error was reason for insulin order discontinuation. Orders will be rescheduled this am.   Diet Dysphagia Soft (IDDSI Level 6) [facility]; Cardiac (Low Na/Chol); Fluid - 2000mL; Thin  15 Days Post-Op    Eating:   <25%  Nausea: No  Hypoglycemia and intervention: No  Fever: No  TPN and/or TF: No  If yes, type of TF/TPN and rate: none    /75 (BP Location: Right arm, Patient Position: Lying)   Pulse (!) 128   Temp 98.7 °F (37.1 °C) (Oral)   Resp 16   Ht 6' 4" (1.93 m)   Wt 115.3 kg (254 lb 3.1 oz)   SpO2 98%   BMI 30.94 kg/m²     Labs Reviewed and Include    Recent Labs   Lab 09/22/20  0410   *   CALCIUM 8.0*   ALBUMIN 1.5*   *   K 4.2   CO2 23   CL 99   BUN 35*   CREATININE 2.1*     Lab Results   Component Value Date    WBC 17.04 (H) 09/22/2020    HGB 7.7 (L) 09/22/2020    HCT 25.8 (L) 09/22/2020    MCV 90 09/22/2020     09/22/2020     No results for input(s): TSH, FREET4 in the last 168 hours.  Lab Results   Component Value Date    HGBA1C 9.5 (H) 08/31/2020       Nutritional status:   Body mass index is 30.94 kg/m².  Lab Results   Component Value Date    ALBUMIN 1.5 (L) 09/22/2020    ALBUMIN 1.7 (L) 09/21/2020    ALBUMIN 1.7 (L) 09/21/2020     No results found for: PREALBUMIN    Estimated Creatinine Clearance: 50 mL/min (A) (based on SCr of 2.1 mg/dL (H)).    Accu-Checks  Recent Labs     09/20/20  0743 09/20/20  1151 09/20/20  1609 09/20/20  2134 09/21/20  0137 09/21/20  0745 09/21/20  1128 09/21/20  1626 09/21/20  2048 09/22/20  0127   POCTGLUCOSE 170* 146* 185* 212* 145* 105 102 172* 195* 240*       Current Medications and/or Treatments Impacting Glycemic Control  Immunotherapy:    Immunosuppressants     None        Steroids:   Hormones (From admission, " onward)    Start     Stop Route Frequency Ordered    08/30/20 2059  melatonin tablet 6 mg      -- Oral Nightly PRN 08/30/20 2004        Pressors:    Autonomic Drugs (From admission, onward)    None        Hyperglycemia/Diabetes Medications:   Antihyperglycemics (From admission, onward)    Start     Stop Route Frequency Ordered    09/22/20 0715  insulin aspart U-100 pen 3-5 Units      -- SubQ 3 times daily with meals 09/21/20 2003 09/21/20 2115  insulin regular 100 Units in sodium chloride 0.9% 100 mL infusion      -- IV Continuous 09/21/20 2003 09/21/20 2103  insulin aspart U-100 pen 0-4 Units      -- SubQ As needed (PRN) 09/21/20 2003

## 2020-09-22 NOTE — PROGRESS NOTES
"[facility]  Endocrinology  Progress Note    Admit Date: 8/30/2020     Reason for Consult: Management of T2DM, Hyperglycemia     Surgical Procedure and Date: n/a    Diabetes diagnosis year: 2012    Home Diabetes Medications:    Novolin 70/30 u-100   40 units in AM with breakfast.    20 units in PM with dinner.     How often checking glucose at home? 1-3 x day   BG readings on regimen: >200  Hypoglycemia on the regimen?  No  Missed doses on regimen?  No    Diabetes Complications include:     Hyperglycemia, Foot ulcer   and Other skin ulcer    Complicating diabetes co morbidities:   CHF, CAD, HTN, HLD      HPI:   Patient is a 65 y.o. male with a diagnosis of acute cholecystitis and septic shock with MRSA complicated by NANETTE requiring CRRT. Endocrinology consulted to manage DM2 during current admission to [facility].     Lab Results   Component Value Date    HGBA1C 9.5 (H) 08/31/2020       Interval HPI:   Overnight events:  BG is above goal this AM. Patient placed back on IV insulin infusion overnight secondary to MDI being discontinued without endo notification. After investication with pharm team. Computer error was reason for insulin order discontinuation. Orders will be rescheduled this am.   Diet Dysphagia Soft (IDDSI Level 6) [facility]; Cardiac (Low Na/Chol); Fluid - 2000mL; Thin  15 Days Post-Op    Eating:   <25%  Nausea: No  Hypoglycemia and intervention: No  Fever: No  TPN and/or TF: No  If yes, type of TF/TPN and rate: none    /75 (BP Location: Right arm, Patient Position: Lying)   Pulse (!) 128   Temp 98.7 °F (37.1 °C) (Oral)   Resp 16   Ht 6' 4" (1.93 m)   Wt 115.3 kg (254 lb 3.1 oz)   SpO2 98%   BMI 30.94 kg/m²     Labs Reviewed and Include    Recent Labs   Lab 09/22/20  0410   *   CALCIUM 8.0*   ALBUMIN 1.5*   *   K 4.2   CO2 23   CL 99   BUN 35*   CREATININE 2.1*     Lab Results   Component Value Date    WBC 17.04 (H) 09/22/2020    HGB 7.7 (L) 09/22/2020    HCT " 25.8 (L) 09/22/2020    MCV 90 09/22/2020     09/22/2020     No results for input(s): TSH, FREET4 in the last 168 hours.  Lab Results   Component Value Date    HGBA1C 9.5 (H) 08/31/2020       Nutritional status:   Body mass index is 30.94 kg/m².  Lab Results   Component Value Date    ALBUMIN 1.5 (L) 09/22/2020    ALBUMIN 1.7 (L) 09/21/2020    ALBUMIN 1.7 (L) 09/21/2020     No results found for: PREALBUMIN    Estimated Creatinine Clearance: 50 mL/min (A) (based on SCr of 2.1 mg/dL (H)).    Accu-Checks  Recent Labs     09/20/20  0743 09/20/20  1151 09/20/20  1609 09/20/20  2134 09/21/20  0137 09/21/20  0745 09/21/20  1128 09/21/20  1626 09/21/20  2048 09/22/20  0127   POCTGLUCOSE 170* 146* 185* 212* 145* 105 102 172* 195* 240*       Current Medications and/or Treatments Impacting Glycemic Control  Immunotherapy:    Immunosuppressants     None        Steroids:   Hormones (From admission, onward)    Start     Stop Route Frequency Ordered    08/30/20 2059  melatonin tablet 6 mg      -- Oral Nightly PRN 08/30/20 2004        Pressors:    Autonomic Drugs (From admission, onward)    None        Hyperglycemia/Diabetes Medications:   Antihyperglycemics (From admission, onward)    Start     Stop Route Frequency Ordered    09/22/20 0715  insulin aspart U-100 pen 3-5 Units      -- SubQ 3 times daily with meals 09/21/20 2003 09/21/20 2115  insulin regular 100 Units in sodium chloride 0.9% 100 mL infusion      -- IV Continuous 09/21/20 2003 09/21/20 2103  insulin aspart U-100 pen 0-4 Units      -- SubQ As needed (PRN) 09/21/20 2003          ASSESSMENT and PLAN    * Septic shock due to methicillin resistant Staphylococcus aureus  Managed per primary team  Avoid hypoglycemia    Optimize BG control to improve wound healing        Type 2 diabetes mellitus with stage 3 chronic kidney disease, with long-term current use of insulin  BG goal 140 - 180     - Discontinue IV insulin infusion.   - Start Levemir 28 units daily.  First dose this AM. 0.5 u/kg/d dosing.   - Will consider adding scheduled prandial coverage once caloric intake increases and improves.   - Low Dose SQ Insulin Correction Scale. Given kidney function.   - BG Monitoring AC/HS     ADDENDUM 4:38 PM  - Increase to Moderate Dose SQ Insulin Correction Scale. Gram crackers and soft drinks at bedside. This is most likely cause for prandial BG excursions.     ** Please call Endocrine for any BG related issues **  ** Please notify Endocrine for any change and/or advance in diet**    Discharge Planning:   TBD. Please notify endocrinology prior to discharge.          Postablative hypothyroidism  The clearance of many drugs, including antiepileptic, anticoagulant, hypnotic, and opioid drugs, is decreased in hypothyroidism. Thus, drug toxicity may occur if drug dose is not reduced. Thyroid disease may also affect lipid profiles which may then cause increase in insulin resistance.     Recommend patient continue Levothyroxine 75 mcg           Luke Rojas NP  Endocrinology  [facility]

## 2020-09-22 NOTE — PLAN OF CARE
09/22/20 1539   Discharge Reassessment   Assessment Type Discharge Planning Reassessment   Discharge Plan A Skilled Nursing Facility   Discharge Plan B Home Health   DME Needed Upon Discharge  other (see comments)  (TBD)   Anticipated Discharge Disposition SNF   Post-Acute Status   Post-Acute Authorization Placement   Post-Acute Placement Status Awaiting Internal Medical Clearance

## 2020-09-22 NOTE — ASSESSMENT & PLAN NOTE
BG goal 140 - 180     - Discontinue IV insulin infusion.   - Start Levemir 28 units daily. First dose this AM. 0.5 u/kg/d dosing.   - Will consider adding scheduled prandial coverage once caloric intake increases and improves.   - Low Dose SQ Insulin Correction Scale. Given kidney function.   - BG Monitoring AC/HS     ADDENDUM 4:38 PM  - Increase to Moderate Dose SQ Insulin Correction Scale. Gram crackers and soft drinks at bedside. This is most likely cause for prandial BG excursions.     ** Please call Endocrine for any BG related issues **  ** Please notify Endocrine for any change and/or advance in diet**    Discharge Planning:   TBD. Please notify endocrinology prior to discharge.

## 2020-09-22 NOTE — PT/OT/SLP PROGRESS
Physical Therapy Treatment    Patient Name:  Ed Patton   MRN:  [id number]    Recommendations:     Discharge Recommendations:  nursing facility, skilled   Discharge Equipment Recommendations: (TBD pending progress)   Barriers to discharge: Inaccessible home, Decreased caregiver support and level of assist required for mobility    Assessment:     Ed Patton is a 65 y.o. male admitted with a medical diagnosis of Septic shock due to methicillin resistant Staphylococcus aureus.  He presents with the following impairments/functional limitations:  weakness, impaired endurance, impaired self care skills, impaired functional mobilty, decreased lower extremity function, gait instability, impaired balance, impaired cognition, impaired joint extensibility, decreased ROM, impaired skin, edema, impaired cardiopulmonary response to activity. The patient's mobility continues to be limited due to R knee pain, generalized lower extremity weakness, decreased activity tolerance. He required maximum assistance x2 for bed mobility. He demo'd improvement in sitting balance at eob progressing from maximum assistance to contact guard assist to stand by assistance. Performed 1 sit to stand with RW and maximum assistance x2. He is not safe to return home due to risk of falls. He would benefit from SNF placement to address the above deficits and maximize his functional mobility.      Rehab Prognosis: Fair; patient would benefit from acute skilled PT services to address these deficits and reach maximum level of function.    Recent Surgery: Procedure(s) (LRB):  ARTHROSCOPY, KNEE, RIGHT  (Right) 15 Days Post-Op    Plan:     During this hospitalization, patient to be seen 4 x/week to address the identified rehab impairments via gait training, therapeutic activities, therapeutic exercises, neuromuscular re-education and progress toward the following goals:    · Plan of Care Expires:  10/18/20    Subjective     Chief Complaint: R knee pain  "and stiffness with mobility; "there was a little lady that came in and picked me clear up off the bed"  Patient/Family Comments/goals: return home, increase strength and independence  Pain/Comfort:  · Pain Rating 1: 6/10  · Location - Side 1: Right  · Location - Orientation 1: generalized  · Location 1: knee  · Pain Addressed 1: Reposition, Distraction, Cessation of Activity  · Pain Rating Post-Intervention 1: 6/10      Objective:     Communicated with RN prior to session.  Patient found HOB elevated with oxygen, peripheral IV, pulse ox (continuous), nephrostomy, telemetry, toure catheter, pressure relief boots upon PT entry to room.     General Precautions: Standard, contact, fall   Orthopedic Precautions:LLE weight bearing as tolerated   Braces: (L Darco Shoe)     Functional Mobility:    Bed Mobility  Rolling to R: maximum assistance x2  Supine to Sit on the R side: maximum assistance x2, cues for sequencing, assist to progress R LE  Sit to supine: total assistance x2  Scoot to HOB in supine: total assistance x2 drawsheet  Scoot to EOB in sitting: moderate assistance    Transfers Sit to Stand:  maximum assistance x2 with RW, elevated height of bed, RACHEAL feet and knees blocking, cues and facilitation for anterior weight shift, unable to extend fully through hips/trunk/knees          AM-PAC 6 CLICK MOBILITY  Turning over in bed (including adjusting bedclothes, sheets and blankets)?: 2  Sitting down on and standing up from a chair with arms (e.g., wheelchair, bedside commode, etc.): 2  Moving from lying on back to sitting on the side of the bed?: 2  Moving to and from a bed to a chair (including a wheelchair)?: 1  Need to walk in hospital room?: 1  Climbing 3-5 steps with a railing?: 1  Basic Mobility Total Score: 9       Therapeutic Activities and Exercises:   Drawsheet t/f to medichair.   Sitting edge of bed 12 minutes, maximum assistance progressed to contact guard assist to stand by assistance, weight shift to L " for pain avoidance  -Posterior pelvic tilt, kyphosis, cervical flexion  -Visual/verbal/manual cues for midline orientation, thoracic and cervical extension  Cued for R hand in weightbearing for proprioception, wrist and finger extension, shoulder approximation  -Performed R knee AAROM flex/extension 10 reps for full ROM and sustained stretching, moderate assistance        Standing balance, maximum assistance x2 with Rw, relying heavily on RW for support, for 10 sec  -Demonstrates trunk/hip/knee flexion in standing with posterior lean  -Manual/verbal cues and facilitation for hip extension, trunk extension, cues to look up  -Manual/verbal cues for quad and glute engagement  -Blocking RACHEAL knee, buckling with fatigue    Patient educated on role of therapy, goals of session, benefits of out of bed mobility. Patient agreeable to mobilize with therapy.   Pressure relief boots donned.     Patient left HOB elevated with all lines intact, call button in reach and bed alarm on..    GOALS:   Multidisciplinary Problems     Physical Therapy Goals        Problem: Physical Therapy Goal    Goal Priority Disciplines Outcome Goal Variances Interventions   Physical Therapy Goal     PT, PT/OT Ongoing, Progressing     Description: Goals to be met by: 20    Patient will increase functional independence with mobility by performin. Supine to sit with moderate assitance.   2. Sit to supine with moderate assistance  3. Sit to stand transfer with moderate assistance.  4. Gait  x 10 feet with Minimal Assistance using LRAD and maintenance of weight bearing status   5. Lower extremity exercise program x10 with assistance as needed.   6. Sit for 10 minutes with Minimal Assistance while reaching out of JESIKA in all planes to perform functional activities. -MET ()  Sit for 10 minutes with Stand By Assistance while reaching out of JESIKA in all planes to perform functional activities.                    Time Tracking:     PT Received On:  09/22/20  PT Start Time: 1436     PT Stop Time: 1506  PT Total Time (min): 30 min     Billable Minutes: Therapeutic Activity 30    Treatment Type: Treatment  PT/PTA: PT     PTA Visit Number: 0     Marah Thomas, PT  09/22/2020

## 2020-09-22 NOTE — SUBJECTIVE & OBJECTIVE
Interval History: stabilization of creatinine. Patient very thankful for all [facility] has done to help him today. Only persistent complaint is right knee pain.    Review of patient's allergies indicates:   Allergen Reactions    Vicodin [hydrocodone-acetaminophen] Itching     Current Facility-Administered Medications   Medication Frequency    acetaminophen tablet 1,000 mg Q8H PRN    albuterol-ipratropium 2.5 mg-0.5 mg/3 mL nebulizer solution 3 mL Q4H PRN    artificial tears 0.5 % ophthalmic solution 1 drop TID    calcium carbonate 200 mg calcium (500 mg) chewable tablet 500 mg BID PRN    carvediloL tablet 3.125 mg BID    DAPTOmycin (CUBICIN) 1,050 mg in sodium chloride 0.9% IVPB Q24H    dextrose 50% injection 12.5 g PRN    dextrose 50% injection 25 g PRN    fluticasone furoate-vilanteroL 200-25 mcg/dose diskus inhaler 1 puff Daily    fluticasone propionate 50 mcg/actuation nasal spray 50 mcg Daily    glucagon (human recombinant) injection 1 mg PRN    glucose chewable tablet 16 g PRN    glucose chewable tablet 24 g PRN    heparin (porcine) injection 5,000 Units Q8H    insulin aspart U-100 pen 0-5 Units QID (AC + HS) PRN    insulin detemir U-100 pen 28 Units Daily    levoFLOXacin tablet 750 mg Every other day    levothyroxine tablet 75 mcg Before breakfast    melatonin tablet 6 mg Nightly PRN    ondansetron injection 4 mg Q8H PRN    oxyCODONE immediate release tablet 5 mg Q4H PRN    polyethylene glycol packet 17 g BID PRN    Tdap vaccine injection 0.5 mL vaccine x 1 dose       Objective:     Vital Signs (Most Recent):  Temp: 98.2 °F (36.8 °C) (09/22/20 1148)  Pulse: (!) 126 (09/22/20 1148)  Resp: 12 (09/22/20 1148)  BP: 102/61 (09/22/20 1100)  SpO2: 100 % (09/22/20 1148)  O2 Device (Oxygen Therapy): nasal cannula (09/22/20 1148) Vital Signs (24h Range):  Temp:  [98.1 °F (36.7 °C)-98.7 °F (37.1 °C)] 98.2 °F (36.8 °C)  Pulse:  [126-130] 126  Resp:  [12-18] 12  SpO2:  [95 %-100 %] 100 %  BP:  (102-131)/(58-75) 102/61     Weight: 115.3 kg (254 lb 3.1 oz) (09/19/20 0400)  Body mass index is 30.94 kg/m².  Body surface area is 2.49 meters squared.    I/O last 3 completed shifts:  In: 480 [P.O.:480]  Out: 2495 [Urine:2450; Other:45]    Physical Exam  Vitals signs and nursing note reviewed.   Constitutional:       General: He is not in acute distress.     Appearance: He is well-developed. He is obese. He is ill-appearing. He is not diaphoretic.   HENT:      Head: Normocephalic and atraumatic.      Right Ear: External ear normal.      Left Ear: External ear normal.      Mouth/Throat:      Pharynx: No oropharyngeal exudate.   Eyes:      General: No scleral icterus.        Right eye: No discharge.         Left eye: No discharge.      Conjunctiva/sclera: Conjunctivae normal.      Pupils: Pupils are equal, round, and reactive to light.   Neck:      Musculoskeletal: Normal range of motion.      Thyroid: No thyromegaly.      Trachea: No tracheal deviation.   Cardiovascular:      Rate and Rhythm: Tachycardia present.   Pulmonary:      Effort: Pulmonary effort is normal. No respiratory distress.      Breath sounds: Normal breath sounds. No stridor. No wheezing or rales.   Abdominal:      General: Bowel sounds are normal. There is no distension.      Palpations: Abdomen is soft.      Tenderness: There is no abdominal tenderness. There is no guarding.   Musculoskeletal:         General: No tenderness or deformity.      Right lower leg: Edema present.      Left lower leg: Edema present.   Lymphadenopathy:      Cervical: No cervical adenopathy.   Skin:     Coloration: Skin is not pale.      Findings: No erythema or rash.   Neurological:      General: No focal deficit present.         Significant Labs:  All labs within the past 24 hours have been reviewed.     Significant Imaging:  Labs: Reviewed

## 2020-09-22 NOTE — PROGRESS NOTES
"[facility]  Endocrinology  Progress Note    Admit Date: 2020     Reason for Consult: Management of T2DM, Hyperglycemia     Surgical Procedure and Date: n/a    Diabetes diagnosis year:     Home Diabetes Medications:    Novolin 70/30 u-100   40 units in AM with breakfast.    20 units in PM with dinner.     How often checking glucose at home? 1-3 x day   BG readings on regimen: >200  Hypoglycemia on the regimen?  No  Missed doses on regimen?  No    Diabetes Complications include:     Hyperglycemia, Foot ulcer   and Other skin ulcer    Complicating diabetes co morbidities:   CHF, CAD, HTN, HLD      HPI:   Patient is a 65 y.o. male with a diagnosis of acute cholecystitis and septic shock with MRSA complicated by NANETTE requiring CRRT. Endocrinology consulted to manage DM2 during current admission to [facility].     Lab Results   Component Value Date    HGBA1C 9.5 (H) 2020       Interval HPI:   Overnight events:   BG is below goal on current IV/SQ insulin regimen. Patient is scheduled for discharge today as noted per chart review.   Diet Dysphagia Mechanical Soft (IDDSI Level 5) [facility]  14 Days Post-Op    ADDENDUM 1800  BG trending upward. MDI order discontinue per pharm team. Endo did not receive phone call and/or update at time order were discontinued.      Eatin%  Nausea: No  Hypoglycemia and intervention: No  Fever: No  TPN and/or TF: No  If yes, type of TF/TPN and rate: None    /84 (BP Location: Left arm, Patient Position: Lying)   Pulse (!) 121   Temp 99.1 °F (37.3 °C) (Oral)   Resp 20   Ht 6' 4" (1.93 m)   Wt 115.3 kg (254 lb 3.1 oz)   SpO2 99%   BMI 30.94 kg/m²     Labs Reviewed and Include    Recent Labs   Lab 20  0755   GLU 82   CALCIUM 8.3*   ALBUMIN 1.7*  1.7*   PROT 6.8      K 4.4   CO2 23      BUN 36*   CREATININE 2.1*   ALKPHOS 111   ALT 43   AST 52*   BILITOT 0.5     Lab Results   Component Value Date    WBC 23.61 (H) 2020    " HGB 8.4 (L) 09/20/2020    HCT 27.7 (L) 09/20/2020    MCV 88 09/20/2020     09/20/2020     No results for input(s): TSH, FREET4 in the last 168 hours.  Lab Results   Component Value Date    HGBA1C 9.5 (H) 08/31/2020       Nutritional status:   Body mass index is 30.94 kg/m².  Lab Results   Component Value Date    ALBUMIN 1.7 (L) 09/21/2020    ALBUMIN 1.7 (L) 09/21/2020    ALBUMIN 1.6 (L) 09/20/2020     No results found for: PREALBUMIN    Estimated Creatinine Clearance: 50 mL/min (A) (based on SCr of 2.1 mg/dL (H)).    Accu-Checks  Recent Labs     09/19/20  1646 09/19/20  2059 09/20/20  0139 09/20/20  0200 09/20/20  0743 09/20/20  1151 09/20/20  1609 09/20/20  2134 09/21/20  0137 09/21/20  0745   POCTGLUCOSE 209* 225* 211* 226* 170* 146* 185* 212* 145* 105       Current Medications and/or Treatments Impacting Glycemic Control  Immunotherapy:    Immunosuppressants     None        Steroids:   Hormones (From admission, onward)    Start     Stop Route Frequency Ordered    08/30/20 2059  melatonin tablet 6 mg      -- Oral Nightly PRN 08/30/20 2004        Pressors:    Autonomic Drugs (From admission, onward)    None        Hyperglycemia/Diabetes Medications:   Antihyperglycemics (From admission, onward)    Start     Stop Route Frequency Ordered    09/18/20 1130  insulin aspart U-100 pen 5-10 Units      -- SubQ 3 times daily with meals 09/18/20 0929 09/18/20 1030  insulin regular 100 Units in sodium chloride 0.9% 100 mL infusion      -- IV Continuous 09/18/20 0929 09/18/20 1028  insulin aspart U-100 pen 0-5 Units      -- SubQ As needed (PRN) 09/18/20 0929          ASSESSMENT and PLAN    * Septic shock due to methicillin resistant Staphylococcus aureus  Managed per primary team  Avoid hypoglycemia    Optimize BG control to improve wound healing        Type 2 diabetes mellitus with stage 3 chronic kidney disease, with long-term current use of insulin  BG goal 140 - 180     - Discontinue IV insulin infusion orders.  Patient is scheduled for discharge today.   - Start levemir 30 units daily. First dose this AM. 20% reduction from IV insulin dosing.   - Start novolog 10 units TIDWM. Basal/Prandial physiologic matching.    - Low Dose SQ Insulin Correction Scale.  - BG Monitoring AC/HS     ADDENDUM: 1830  - Restart  transition IV insulin infusion with stepdown parameters. Initial rate starting at 0.8 units/hr. Will re-order MDI tomorrow morning.   - Start novolog 3-5 units TIDWM.   - Low Dose SQ Insulin Correction Scale.  - BG Monitoring AC/HS/0200      ** Please call Endocrine for any BG related issues **  ** Please notify Endocrine for any change and/or advance in diet**    Discharge Planning:   TBD. Please notify endocrinology prior to discharge.          Postablative hypothyroidism  The clearance of many drugs, including antiepileptic, anticoagulant, hypnotic, and opioid drugs, is decreased in hypothyroidism. Thus, drug toxicity may occur if drug dose is not reduced. Thyroid disease may also affect lipid profiles which may then cause increase in insulin resistance.     Recommend patient continue Levothyroxine 75 mcg           Luke Rojas NP  Endocrinology  [facility]

## 2020-09-23 LAB
ALBUMIN SERPL BCP-MCNC: 1.6 G/DL (ref 3.5–5.2)
ANION GAP SERPL CALC-SCNC: 13 MMOL/L (ref 8–16)
BASOPHILS # BLD AUTO: 0.05 K/UL (ref 0–0.2)
BASOPHILS NFR BLD: 0.3 % (ref 0–1.9)
BUN SERPL-MCNC: 31 MG/DL (ref 8–23)
CALCIUM SERPL-MCNC: 8.2 MG/DL (ref 8.7–10.5)
CHLORIDE SERPL-SCNC: 102 MMOL/L (ref 95–110)
CK SERPL-CCNC: 1000 U/L (ref 20–200)
CO2 SERPL-SCNC: 21 MMOL/L (ref 23–29)
CREAT SERPL-MCNC: 1.9 MG/DL (ref 0.5–1.4)
DIFFERENTIAL METHOD: ABNORMAL
EOSINOPHIL # BLD AUTO: 0.7 K/UL (ref 0–0.5)
EOSINOPHIL NFR BLD: 4.7 % (ref 0–8)
ERYTHROCYTE [DISTWIDTH] IN BLOOD BY AUTOMATED COUNT: 17 % (ref 11.5–14.5)
EST. GFR  (AFRICAN AMERICAN): 42.4 ML/MIN/1.73 M^2
EST. GFR  (NON AFRICAN AMERICAN): 36.7 ML/MIN/1.73 M^2
GLUCOSE SERPL-MCNC: 146 MG/DL (ref 70–110)
HCT VFR BLD AUTO: 27.5 % (ref 40–54)
HGB BLD-MCNC: 8.2 G/DL (ref 14–18)
IMM GRANULOCYTES # BLD AUTO: 0.28 K/UL (ref 0–0.04)
IMM GRANULOCYTES NFR BLD AUTO: 1.9 % (ref 0–0.5)
LYMPHOCYTES # BLD AUTO: 1 K/UL (ref 1–4.8)
LYMPHOCYTES NFR BLD: 6.5 % (ref 18–48)
MAGNESIUM SERPL-MCNC: 1.4 MG/DL (ref 1.6–2.6)
MCH RBC QN AUTO: 26.9 PG (ref 27–31)
MCHC RBC AUTO-ENTMCNC: 29.8 G/DL (ref 32–36)
MCV RBC AUTO: 90 FL (ref 82–98)
MONOCYTES # BLD AUTO: 1.7 K/UL (ref 0.3–1)
MONOCYTES NFR BLD: 11.4 % (ref 4–15)
MPNR  FINAL DIAGNOSIS: NORMAL
MPNR  SPECIMEN TYPE: NORMAL
MPNR RESULT: NORMAL
NEUTROPHILS # BLD AUTO: 11 K/UL (ref 1.8–7.7)
NEUTROPHILS NFR BLD: 75.2 % (ref 38–73)
NRBC BLD-RTO: 0 /100 WBC
PHOSPHATE SERPL-MCNC: 2.5 MG/DL (ref 2.7–4.5)
PLATELET # BLD AUTO: 338 K/UL (ref 150–350)
PMV BLD AUTO: 11.2 FL (ref 9.2–12.9)
POCT GLUCOSE: 167 MG/DL (ref 70–110)
POCT GLUCOSE: 168 MG/DL (ref 70–110)
POCT GLUCOSE: 188 MG/DL (ref 70–110)
POCT GLUCOSE: 189 MG/DL (ref 70–110)
POCT GLUCOSE: 248 MG/DL (ref 70–110)
POTASSIUM SERPL-SCNC: 4.3 MMOL/L (ref 3.5–5.1)
RBC # BLD AUTO: 3.05 M/UL (ref 4.6–6.2)
SARS-COV-2 RDRP RESP QL NAA+PROBE: NEGATIVE
SODIUM SERPL-SCNC: 136 MMOL/L (ref 136–145)
WBC # BLD AUTO: 14.68 K/UL (ref 3.9–12.7)

## 2020-09-23 PROCEDURE — 63600175 PHARM REV CODE 636 W HCPCS: Mod: JG,HCNC | Performed by: HOSPITALIST

## 2020-09-23 PROCEDURE — 97530 THERAPEUTIC ACTIVITIES: CPT | Mod: HCNC

## 2020-09-23 PROCEDURE — U0002 COVID-19 LAB TEST NON-CDC: HCPCS | Mod: HCNC

## 2020-09-23 PROCEDURE — 80069 RENAL FUNCTION PANEL: CPT | Mod: HCNC

## 2020-09-23 PROCEDURE — 82550 ASSAY OF CK (CPK): CPT | Mod: HCNC

## 2020-09-23 PROCEDURE — 99232 PR SUBSEQUENT HOSPITAL CARE,LEVL II: ICD-10-PCS | Mod: HCNC,,, | Performed by: NURSE PRACTITIONER

## 2020-09-23 PROCEDURE — 85025 COMPLETE CBC W/AUTO DIFF WBC: CPT | Mod: HCNC

## 2020-09-23 PROCEDURE — 97535 SELF CARE MNGMENT TRAINING: CPT | Mod: HCNC

## 2020-09-23 PROCEDURE — 63600175 PHARM REV CODE 636 W HCPCS: Mod: HCNC | Performed by: NURSE PRACTITIONER

## 2020-09-23 PROCEDURE — 27000221 HC OXYGEN, UP TO 24 HOURS: Mod: HCNC

## 2020-09-23 PROCEDURE — 99233 SBSQ HOSP IP/OBS HIGH 50: CPT | Mod: HCNC,,, | Performed by: HOSPITALIST

## 2020-09-23 PROCEDURE — 25000003 PHARM REV CODE 250: Mod: HCNC | Performed by: HOSPITALIST

## 2020-09-23 PROCEDURE — 99900035 HC TECH TIME PER 15 MIN (STAT): Mod: HCNC

## 2020-09-23 PROCEDURE — 63600175 PHARM REV CODE 636 W HCPCS: Mod: HCNC | Performed by: HOSPITALIST

## 2020-09-23 PROCEDURE — 99233 SBSQ HOSP IP/OBS HIGH 50: CPT | Mod: HCNC,,, | Performed by: INTERNAL MEDICINE

## 2020-09-23 PROCEDURE — 99233 PR SUBSEQUENT HOSPITAL CARE,LEVL III: ICD-10-PCS | Mod: HCNC,,, | Performed by: INTERNAL MEDICINE

## 2020-09-23 PROCEDURE — 20600001 HC STEP DOWN PRIVATE ROOM: Mod: HCNC

## 2020-09-23 PROCEDURE — 99232 SBSQ HOSP IP/OBS MODERATE 35: CPT | Mod: HCNC,,, | Performed by: NURSE PRACTITIONER

## 2020-09-23 PROCEDURE — 25000003 PHARM REV CODE 250: Mod: HCNC | Performed by: STUDENT IN AN ORGANIZED HEALTH CARE EDUCATION/TRAINING PROGRAM

## 2020-09-23 PROCEDURE — 99233 PR SUBSEQUENT HOSPITAL CARE,LEVL III: ICD-10-PCS | Mod: HCNC,,, | Performed by: HOSPITALIST

## 2020-09-23 PROCEDURE — 36415 COLL VENOUS BLD VENIPUNCTURE: CPT | Mod: HCNC

## 2020-09-23 PROCEDURE — 83735 ASSAY OF MAGNESIUM: CPT | Mod: HCNC

## 2020-09-23 RX ORDER — MAGNESIUM SULFATE HEPTAHYDRATE 40 MG/ML
2 INJECTION, SOLUTION INTRAVENOUS ONCE
Status: COMPLETED | OUTPATIENT
Start: 2020-09-23 | End: 2020-09-23

## 2020-09-23 RX ORDER — SODIUM BICARBONATE 650 MG/1
1300 TABLET ORAL 2 TIMES DAILY
Status: DISCONTINUED | OUTPATIENT
Start: 2020-09-23 | End: 2020-10-08

## 2020-09-23 RX ORDER — INSULIN ASPART 100 [IU]/ML
6 INJECTION, SOLUTION INTRAVENOUS; SUBCUTANEOUS
Status: DISCONTINUED | OUTPATIENT
Start: 2020-09-23 | End: 2020-09-24

## 2020-09-23 RX ADMIN — HYPROMELLOSE 2910 1 DROP: 5 SOLUTION OPHTHALMIC at 09:09

## 2020-09-23 RX ADMIN — MAGNESIUM SULFATE IN WATER 2 G: 40 INJECTION, SOLUTION INTRAVENOUS at 05:09

## 2020-09-23 RX ADMIN — INSULIN ASPART 1 UNITS: 100 INJECTION, SOLUTION INTRAVENOUS; SUBCUTANEOUS at 09:09

## 2020-09-23 RX ADMIN — HEPARIN SODIUM 5000 UNITS: 5000 INJECTION INTRAVENOUS; SUBCUTANEOUS at 08:09

## 2020-09-23 RX ADMIN — INSULIN ASPART 4 UNITS: 100 INJECTION, SOLUTION INTRAVENOUS; SUBCUTANEOUS at 05:09

## 2020-09-23 RX ADMIN — INSULIN ASPART 2 UNITS: 100 INJECTION, SOLUTION INTRAVENOUS; SUBCUTANEOUS at 08:09

## 2020-09-23 RX ADMIN — HEPARIN SODIUM 5000 UNITS: 5000 INJECTION INTRAVENOUS; SUBCUTANEOUS at 06:09

## 2020-09-23 RX ADMIN — CARVEDILOL 6.25 MG: 6.25 TABLET, FILM COATED ORAL at 08:09

## 2020-09-23 RX ADMIN — FLUTICASONE FUROATE AND VILANTEROL TRIFENATATE 1 PUFF: 200; 25 POWDER RESPIRATORY (INHALATION) at 09:09

## 2020-09-23 RX ADMIN — HYPROMELLOSE 2910 1 DROP: 5 SOLUTION OPHTHALMIC at 03:09

## 2020-09-23 RX ADMIN — CARVEDILOL 6.25 MG: 6.25 TABLET, FILM COATED ORAL at 09:09

## 2020-09-23 RX ADMIN — HEPARIN SODIUM 5000 UNITS: 5000 INJECTION INTRAVENOUS; SUBCUTANEOUS at 03:09

## 2020-09-23 RX ADMIN — OXYCODONE 5 MG: 5 TABLET ORAL at 03:09

## 2020-09-23 RX ADMIN — INSULIN ASPART 6 UNITS: 100 INJECTION, SOLUTION INTRAVENOUS; SUBCUTANEOUS at 05:09

## 2020-09-23 RX ADMIN — INSULIN ASPART 1 UNITS: 100 INJECTION, SOLUTION INTRAVENOUS; SUBCUTANEOUS at 02:09

## 2020-09-23 RX ADMIN — SODIUM BICARBONATE 1300 MG: 650 TABLET ORAL at 08:09

## 2020-09-23 RX ADMIN — VANCOMYCIN HYDROCHLORIDE 1750 MG: 750 INJECTION, POWDER, LYOPHILIZED, FOR SOLUTION INTRAVENOUS at 03:09

## 2020-09-23 RX ADMIN — DAPTOMYCIN 1050 MG: 350 INJECTION, POWDER, LYOPHILIZED, FOR SOLUTION INTRAVENOUS at 11:09

## 2020-09-23 RX ADMIN — LEVOTHYROXINE SODIUM 75 MCG: 25 TABLET ORAL at 06:09

## 2020-09-23 RX ADMIN — FLUTICASONE PROPIONATE 50 MCG: 50 SPRAY, METERED NASAL at 09:09

## 2020-09-23 RX ADMIN — INSULIN DETEMIR 28 UNITS: 100 INJECTION, SOLUTION SUBCUTANEOUS at 09:09

## 2020-09-23 RX ADMIN — MAGNESIUM SULFATE IN WATER 2 G: 40 INJECTION, SOLUTION INTRAVENOUS at 06:09

## 2020-09-23 NOTE — SUBJECTIVE & OBJECTIVE
"Interval HPI:   Overnight events:   BG is slightly above goal this AM. Appetite appears to be improving per chart review.   Diet Dysphagia Soft (IDDSI Level 6) [facility]; Cardiac (Low Na/Chol); Fluid - 2000mL; Thin  16 Days Post-Op    Eatin% - 100%  Nausea: No  Hypoglycemia and intervention: No  Fever: No  TPN and/or TF: No  If yes, type of TF/TPN and rate: None    /80 (BP Location: Right arm, Patient Position: Lying)   Pulse 97   Temp 98 °F (36.7 °C) (Oral)   Resp 13   Ht 6' 4" (1.93 m)   Wt 115.3 kg (254 lb 3.1 oz)   SpO2 97%   BMI 30.94 kg/m²     Labs Reviewed and Include    Recent Labs   Lab 20  0300   *   CALCIUM 8.2*   ALBUMIN 1.6*      K 4.3   CO2 21*      BUN 31*   CREATININE 1.9*     Lab Results   Component Value Date    WBC 14.68 (H) 2020    HGB 8.2 (L) 2020    HCT 27.5 (L) 2020    MCV 90 2020     2020     No results for input(s): TSH, FREET4 in the last 168 hours.  Lab Results   Component Value Date    HGBA1C 9.5 (H) 2020       Nutritional status:   Body mass index is 30.94 kg/m².  Lab Results   Component Value Date    ALBUMIN 1.6 (L) 2020    ALBUMIN 1.5 (L) 2020    ALBUMIN 1.7 (L) 2020    ALBUMIN 1.7 (L) 2020     No results found for: PREALBUMIN    Estimated Creatinine Clearance: 55.3 mL/min (A) (based on SCr of 1.9 mg/dL (H)).    Accu-Checks  Recent Labs     20  0745 20  1128 20  1626 20  2048 20  0127 20  1120 20  1733 20  2146 20  0230 20  0749   POCTGLUCOSE 105 102 172* 195* 240* 220* 223* 213* 167* 188*       Current Medications and/or Treatments Impacting Glycemic Control  Immunotherapy:    Immunosuppressants     None        Steroids:   Hormones (From admission, onward)    Start     Stop Route Frequency Ordered    20  melatonin tablet 6 mg      -- Oral Nightly PRN 20 2004        Pressors:    Autonomic " Drugs (From admission, onward)    None        Hyperglycemia/Diabetes Medications:   Antihyperglycemics (From admission, onward)    Start     Stop Route Frequency Ordered    09/22/20 1738  insulin aspart U-100 pen 1-10 Units      -- SubQ Before meals & nightly PRN 09/22/20 1638    09/22/20 0845  insulin detemir U-100 pen 28 Units      -- SubQ Daily 09/22/20 0831

## 2020-09-23 NOTE — SUBJECTIVE & OBJECTIVE
Interval History: no changes clinically    Review of patient's allergies indicates:   Allergen Reactions    Vicodin [hydrocodone-acetaminophen] Itching     Current Facility-Administered Medications   Medication Frequency    acetaminophen tablet 1,000 mg Q8H PRN    albuterol-ipratropium 2.5 mg-0.5 mg/3 mL nebulizer solution 3 mL Q4H PRN    artificial tears 0.5 % ophthalmic solution 1 drop TID    calcium carbonate 200 mg calcium (500 mg) chewable tablet 500 mg BID PRN    carvediloL tablet 6.25 mg BID    dextrose 50% injection 12.5 g PRN    dextrose 50% injection 25 g PRN    fluticasone furoate-vilanteroL 200-25 mcg/dose diskus inhaler 1 puff Daily    fluticasone propionate 50 mcg/actuation nasal spray 50 mcg Daily    glucagon (human recombinant) injection 1 mg PRN    glucose chewable tablet 16 g PRN    glucose chewable tablet 24 g PRN    heparin (porcine) injection 5,000 Units Q8H    insulin aspart U-100 pen 1-10 Units QID (AC + HS) PRN    insulin aspart U-100 pen 6 Units TIDWM    [START ON 9/24/2020] insulin detemir U-100 pen 30 Units Daily    levoFLOXacin tablet 750 mg Every other day    levothyroxine tablet 75 mcg Before breakfast    magnesium sulfate 2g in water 50mL IVPB (premix) Once    melatonin tablet 6 mg Nightly PRN    ondansetron injection 4 mg Q8H PRN    oxyCODONE immediate release tablet 5 mg Q4H PRN    polyethylene glycol packet 17 g BID PRN    Tdap vaccine injection 0.5 mL vaccine x 1 dose    vancomycin - pharmacy to dose pharmacy to manage frequency    vancomycin 1.75 g in 5 % dextrose 500 mL IVPB Once       Objective:     Vital Signs (Most Recent):  Temp: 98 °F (36.7 °C) (09/23/20 1128)  Pulse: (!) 124 (09/23/20 1537)  Resp: 12 (09/23/20 1520)  BP: 126/78 (09/23/20 1128)  SpO2: 97 % (09/23/20 1128)  O2 Device (Oxygen Therapy): nasal cannula (09/23/20 1000) Vital Signs (24h Range):  Temp:  [97.5 °F (36.4 °C)-98.9 °F (37.2 °C)] 98 °F (36.7 °C)  Pulse:  [] 124  Resp:   [12-24] 12  SpO2:  [93 %-99 %] 97 %  BP: (119-130)/(76-85) 126/78     Weight: 115.3 kg (254 lb 3.1 oz) (09/19/20 0400)  Body mass index is 30.94 kg/m².  Body surface area is 2.49 meters squared.    I/O last 3 completed shifts:  In: 1030 [P.O.:980; IV Piggyback:50]  Out: 1625 [Urine:1605; Other:20]    Physical Exam  Vitals signs and nursing note reviewed.   Constitutional:       General: He is not in acute distress.     Appearance: He is well-developed. He is obese. He is ill-appearing. He is not diaphoretic.   HENT:      Head: Normocephalic and atraumatic.      Right Ear: External ear normal.      Left Ear: External ear normal.      Mouth/Throat:      Pharynx: No oropharyngeal exudate.   Eyes:      General: No scleral icterus.        Right eye: No discharge.         Left eye: No discharge.      Conjunctiva/sclera: Conjunctivae normal.      Pupils: Pupils are equal, round, and reactive to light.   Neck:      Musculoskeletal: Normal range of motion.      Thyroid: No thyromegaly.      Trachea: No tracheal deviation.   Cardiovascular:      Rate and Rhythm: Tachycardia present.   Pulmonary:      Effort: Pulmonary effort is normal. No respiratory distress.      Breath sounds: Normal breath sounds. No stridor. No wheezing or rales.   Abdominal:      General: Bowel sounds are normal. There is no distension.      Palpations: Abdomen is soft.      Tenderness: There is no abdominal tenderness. There is no guarding.   Musculoskeletal:         General: No tenderness or deformity.      Right lower leg: Edema present.      Left lower leg: Edema present.   Lymphadenopathy:      Cervical: No cervical adenopathy.   Skin:     Coloration: Skin is not pale.      Findings: No erythema or rash.   Neurological:      General: No focal deficit present.         Significant Labs:  All labs within the past 24 hours have been reviewed.     Significant Imaging:  Labs: Reviewed

## 2020-09-23 NOTE — PLAN OF CARE
POC reviewed w/ pt, questions and concerns addressed. No acute events thru the night. All vital signs stable. No complaints. AOX4. Supplemental insulin given for coverage at both 2200 and 0200 BG checks. Safety maintained. Bed locked, in lowest position, call light in reach, side rails x2. Mobilized pt to highest level of functioning. See doc flowsheets for further info. Will continue to monitor.

## 2020-09-23 NOTE — PROGRESS NOTES
[facility]  Nephrology  Progress Note    Patient Name: Ed Patton  MRN: [id number]  Admission Date: 8/30/2020  Hospital Length of Stay: 24 days  Attending Provider: Thomas Reed MD   Primary Care Physician: Qiana Chow MD  Principal Problem:Septic shock due to methicillin resistant Staphylococcus aureus    Subjective:     HPI: Mr. Patton is a 66yo M with insulin dependent T2DM, chronic hypoxemic resp. failure (on 2L oxygen at home), and CHF (EF 25%). He presented to the ED for recurrent falls and sepsis from an infected diabetic ulcer from stepping on a tack 8/30. Wound cultures from foot positive for proteus and MRSA 8/31. Urine culture 8/30 positive for klebsiella. Blood cultures have been positive for MRSA repeatedly from 8/30 to 9/8. He also developed septic arthritis of the right knee likely from seeding secondary to septicemia positive for MRSA 9/3. LUKAS was negative for IE 9/4. Initial management of septicemia was with ciprofloxacin and Vancomycin which was supratherapeutic to 26.5 on 9/2 prompting switch to a pulse dose regimen. Due to persistent bacteremia ID changed antibiotics to ceftaroline and daptomycin on 9/7 I&D of right knee performed by ortho on 9/7. Cr on admission was 1.5 at admit up from the baseline (1.2-1.5). On 9/9 Cr level had a sudden increased to 3. Nephrology was consulted for NANETTE.     Interval History: no changes clinically    Review of patient's allergies indicates:   Allergen Reactions    Vicodin [hydrocodone-acetaminophen] Itching     Current Facility-Administered Medications   Medication Frequency    acetaminophen tablet 1,000 mg Q8H PRN    albuterol-ipratropium 2.5 mg-0.5 mg/3 mL nebulizer solution 3 mL Q4H PRN    artificial tears 0.5 % ophthalmic solution 1 drop TID    calcium carbonate 200 mg calcium (500 mg) chewable tablet 500 mg BID PRN    carvediloL tablet 6.25 mg BID    dextrose 50% injection 12.5 g PRN    dextrose 50% injection 25 g  PRN    fluticasone furoate-vilanteroL 200-25 mcg/dose diskus inhaler 1 puff Daily    fluticasone propionate 50 mcg/actuation nasal spray 50 mcg Daily    glucagon (human recombinant) injection 1 mg PRN    glucose chewable tablet 16 g PRN    glucose chewable tablet 24 g PRN    heparin (porcine) injection 5,000 Units Q8H    insulin aspart U-100 pen 1-10 Units QID (AC + HS) PRN    insulin aspart U-100 pen 6 Units TIDWM    [START ON 9/24/2020] insulin detemir U-100 pen 30 Units Daily    levoFLOXacin tablet 750 mg Every other day    levothyroxine tablet 75 mcg Before breakfast    magnesium sulfate 2g in water 50mL IVPB (premix) Once    melatonin tablet 6 mg Nightly PRN    ondansetron injection 4 mg Q8H PRN    oxyCODONE immediate release tablet 5 mg Q4H PRN    polyethylene glycol packet 17 g BID PRN    Tdap vaccine injection 0.5 mL vaccine x 1 dose    vancomycin - pharmacy to dose pharmacy to manage frequency    vancomycin 1.75 g in 5 % dextrose 500 mL IVPB Once       Objective:     Vital Signs (Most Recent):  Temp: 98 °F (36.7 °C) (09/23/20 1128)  Pulse: (!) 124 (09/23/20 1537)  Resp: 12 (09/23/20 1520)  BP: 126/78 (09/23/20 1128)  SpO2: 97 % (09/23/20 1128)  O2 Device (Oxygen Therapy): nasal cannula (09/23/20 1000) Vital Signs (24h Range):  Temp:  [97.5 °F (36.4 °C)-98.9 °F (37.2 °C)] 98 °F (36.7 °C)  Pulse:  [] 124  Resp:  [12-24] 12  SpO2:  [93 %-99 %] 97 %  BP: (119-130)/(76-85) 126/78     Weight: 115.3 kg (254 lb 3.1 oz) (09/19/20 0400)  Body mass index is 30.94 kg/m².  Body surface area is 2.49 meters squared.    I/O last 3 completed shifts:  In: 1030 [P.O.:980; IV Piggyback:50]  Out: 1625 [Urine:1605; Other:20]    Physical Exam  Vitals signs and nursing note reviewed.   Constitutional:       General: He is not in acute distress.     Appearance: He is well-developed. He is obese. He is ill-appearing. He is not diaphoretic.   HENT:      Head: Normocephalic and atraumatic.      Right Ear:  External ear normal.      Left Ear: External ear normal.      Mouth/Throat:      Pharynx: No oropharyngeal exudate.   Eyes:      General: No scleral icterus.        Right eye: No discharge.         Left eye: No discharge.      Conjunctiva/sclera: Conjunctivae normal.      Pupils: Pupils are equal, round, and reactive to light.   Neck:      Musculoskeletal: Normal range of motion.      Thyroid: No thyromegaly.      Trachea: No tracheal deviation.   Cardiovascular:      Rate and Rhythm: Tachycardia present.   Pulmonary:      Effort: Pulmonary effort is normal. No respiratory distress.      Breath sounds: Normal breath sounds. No stridor. No wheezing or rales.   Abdominal:      General: Bowel sounds are normal. There is no distension.      Palpations: Abdomen is soft.      Tenderness: There is no abdominal tenderness. There is no guarding.   Musculoskeletal:         General: No tenderness or deformity.      Right lower leg: Edema present.      Left lower leg: Edema present.   Lymphadenopathy:      Cervical: No cervical adenopathy.   Skin:     Coloration: Skin is not pale.      Findings: No erythema or rash.   Neurological:      General: No focal deficit present.         Significant Labs:  All labs within the past 24 hours have been reviewed.     Significant Imaging:  Labs: Reviewed    Assessment/Plan:     Acute renal failure with acute tubular necrosis superimposed on stage 3 chronic kidney disease  -initially non oliguric stage 3 bernadette with ischemic atn likely secondary to hypotension from septic shock  -was oliguric requiring rrt, last 9/15  -now oliguria improved   -continues to have good urine output and bun/cr are improving  -start patient on nahco3 1300 bid   -needs nephrology follow up on discharge  -signing off      Signing off. Please call with any questions.    Chente Reid MD  Nephrology  [facility]

## 2020-09-23 NOTE — ASSESSMENT & PLAN NOTE
-initially non oliguric stage 3 bernadette with ischemic atn likely secondary to hypotension from septic shock  -was oliguric requiring rrt, last 9/15  -now oliguria improved   -continues to have good urine output and bun/cr are improving  -start patient on nahco3 1300 bid   -needs nephrology follow up on discharge  -signing off

## 2020-09-23 NOTE — ASSESSMENT & PLAN NOTE
Ed Patton is a 65 y.o. male w/ sepsis and DM2 foot infection    -Given palpable pulses in left foot, but non-healing ulcer, will likely plan for limited angiogram (potentially through popliteal artery) to see if any proximal pedal lesions are evident and amenable to intervention.  -Will monitor white count and tachycardia.  Would like to see normalization of white count prior to intervention  -Pt also has lesion on dorsal aspect of foot.  Would recommend against wrapping foot as it will likely worsen this lesion  -Would also recommend removal of callus over DM2 foot lesion.  Potentially could be done at the same time as angiogram.

## 2020-09-23 NOTE — CONSULTS
[facility]  Vascular Surgery  Consult Note    Inpatient consult to Vascular Surgery  Consult performed by: Brent Flor MD  Consult ordered by: Thomas Reed MD  Reason for consult: DM 2 foot lesion        Subjective:     Chief Complaint/Reason for Admission: Sepsis    History of Present Illness: Ed Patton is a 65 y.o. male w/ CHF, CAD, HTN, HLD who was admitted a month ago for DM foot wound.  He became septic, had septic joint, eventually admitted to ICU w/ hypotension and neuro changes.  Now is back to the floor with return to baseline of neuro status.  Is on Daptomycin and levofloxacin treating MRSA infection.  Still with elevated WBC and tachycardic.  Pt being followed by podiatry.  Vascular surgery consulted for potential revascularization given abnormal GINA.    Medications Prior to Admission   Medication Sig Dispense Refill Last Dose    ACCU-CHEK FASTCLIX LANCET DRUM Misc TEST FOUR TIMES DAILY (Patient taking differently: Test twice daily) 408 each 3     ACCU-CHEK SMARTVIEW TEST STRIP Strp TEST FOUR TIMES DAILY (Patient taking differently: Test twice daily) 400 strip 3     albuterol (PROVENTIL) 2.5 mg /3 mL (0.083 %) nebulizer solution INHALE THE CONTENTS OF 1 VIAL VIA NEBULIZER EVERY 4 HOURS AS NEEDED FOR WHEEZING. (Patient taking differently: INHALE THE CONTENTS OF 1 VIAL VIA NEBULIZER TWICE DAILY) 360 mL 3     albuterol (PROVENTIL/VENTOLIN HFA) 90 mcg/actuation inhaler Inhale 2 puffs into the lungs every 6 (six) hours as needed for Wheezing or Shortness of Breath. Rescue       aspirin (ECOTRIN) 81 MG EC tablet Take 1 tablet (81 mg total) by mouth once daily. 90 tablet 3     atorvastatin (LIPITOR) 40 MG tablet Take 1 tablet (40 mg total) by mouth once daily. 90 tablet 3     carvedilol (COREG) 25 MG tablet Take 1 tablet (25 mg total) by mouth 2 (two) times daily. 180 tablet 3     cholecalciferol, vitamin D3, (VITAMIN D3) 1,000 unit capsule Take 1 capsule (1,000 Units  total) by mouth once daily. (Patient taking differently: Take 2,000 Units by mouth once daily. ) 90 capsule 3     fluticasone furoate-vilanterol (BREO ELLIPTA) 200-25 mcg/dose DsDv diskus inhaler INHALE 1 PUFF INTO THE LUNGS ONCE DAILY. (CONTROLLER) 180 each 3     fluticasone propionate (FLONASE) 50 mcg/actuation nasal spray USE 1 SPRAY IN EACH NOSTRIL ONE TIME DAILY 32 g 3     furosemide (LASIX) 80 MG tablet TAKE 1 TABLET BY MOUTH IN THE MORNING  AND  1 TABLET BY MOUTH DAILY  AT  3- TO 4PM 180 tablet 3     gabapentin (NEURONTIN) 100 MG capsule Take 1 capsule (100 mg total) by mouth 3 (three) times daily as needed (pain). 90 capsule 3     insulin NPH-insulin regular, 70/30, (NOVOLIN 70/30 U-100 INSULIN) 100 unit/mL (70-30) injection Inject 45 Units into the skin before breakfast AND 35 Units before dinner. [id number] MINUTES BEFORE BREAKFAST AND DINNER. (Patient taking differently: Inject 40 Units into the skin before breakfast AND 20 Units before dinner.) 20 mL 1     insulin syringe-needle U-100 (INSULIN SYRINGE) 1 mL 30 gauge x 5/16 Syrg 1 each by Misc.(Non-Drug; Combo Route) route 2 (two) times daily. Use twice daily as directed with insulin vials. 200 each 6     levothyroxine (SYNTHROID) 75 MCG tablet Take 1 tablet (75 mcg total) by mouth once daily. 90 tablet 3     lisinopril (PRINIVIL,ZESTRIL) 40 MG tablet Take 1 tablet (40 mg total) by mouth once daily. 90 tablet 3     ACCU-CHEK RAMIREZ Misc USE AS DIRECTED 1 each 0     nitroGLYCERIN (NITROSTAT) 0.4 MG SL tablet PLACE 1 TABLET UNDER THE TONGUE EVERY 5  MINUTES AS NEEDED FOR CHEST PAIN 25 tablet 3        Review of patient's allergies indicates:   Allergen Reactions    Vicodin [hydrocodone-acetaminophen] Itching       Past Medical History:   Diagnosis Date    CHF (congestive heart failure)     COPD (chronic obstructive pulmonary disease)     Coronary artery disease     Diabetes mellitus     Diabetes mellitus type II     DM (diabetes mellitus)  type II uncontrolled with renal manifestation 2013    Hyperlipidemia     Hypertension     Postablative hypothyroidism 2018     Past Surgical History:   Procedure Laterality Date    APPENDECTOMY      ARTHROSCOPY OF KNEE Right 2020    Procedure: ARTHROSCOPY, KNEE, RIGHT ;  Surgeon: You Bhatia MD;  Location: [address];  Service: Orthopedics;  Laterality: Right;    CHOLECYSTECTOMY      CORONARY ANGIOPLASTY WITH STENT PLACEMENT      TONSILLECTOMY      TRANSESOPHAGEAL ECHOCARDIOGRAPHY N/A 2020    Procedure: ECHOCARDIOGRAM, TRANSESOPHAGEAL;  Surgeon: [facility] Diagnostic Provider;  Location: [facility];  Service: Anesthesiology;  Laterality: N/A;    TRANSESOPHAGEAL ECHOCARDIOGRAPHY N/A 9/3/2020    Procedure: ECHOCARDIOGRAM, TRANSESOPHAGEAL;  Surgeon: [facility] Diagnostic Provider;  Location: [facility];  Service: Anesthesiology;  Laterality: N/A;     Family History     Problem Relation (Age of Onset)    Heart disease Mother    Hypertension Son    No Known Problems Father, Sister, Son, Son        Tobacco Use    Smoking status: Former Smoker     Packs/day: 0.01     Years: 3.00     Pack years: 0.03     Types: Cigarettes     Quit date: 1995     Years since quittin.3    Smokeless tobacco: Never Used   Substance and Sexual Activity    Alcohol use: No    Drug use: No    Sexual activity: Never     Comment:  with 1 son     Review of Systems   Constitutional: Negative for activity change, appetite change, chills, diaphoresis, fatigue and fever.   HENT: Negative for congestion, dental problem and trouble swallowing.    Eyes: Negative for photophobia, pain, discharge, redness, itching and visual disturbance.   Respiratory: Negative for choking, chest tightness, shortness of breath and stridor.    Cardiovascular: Negative for chest pain and leg swelling.   Gastrointestinal: Negative for abdominal distention and abdominal pain.   Endocrine: Negative for cold intolerance and heat  intolerance.   Genitourinary: Negative for difficulty urinating and dysuria.   Musculoskeletal: Negative for arthralgias and back pain.   Skin: Positive for color change and wound.   Neurological: Negative for dizziness, seizures, facial asymmetry, light-headedness and numbness.   Hematological: Negative for adenopathy. Does not bruise/bleed easily.   Psychiatric/Behavioral: Negative for agitation and behavioral problems.     Objective:     Vital Signs (Most Recent):  Temp: 98.2 °F (36.8 °C) (09/22/20 1650)  Pulse: (!) 124 (09/22/20 1935)  Resp: 18 (09/22/20 1711)  BP: 130/76 (09/22/20 1650)  SpO2: 100 % (09/22/20 1148) Vital Signs (24h Range):  Temp:  [98.1 °F (36.7 °C)-98.7 °F (37.1 °C)] 98.2 °F (36.8 °C)  Pulse:  [124-130] 124  Resp:  [12-18] 18  SpO2:  [95 %-100 %] 100 %  BP: (102-130)/(58-76) 130/76     Weight: 115.3 kg (254 lb 3.1 oz)  Body mass index is 30.94 kg/m².    Physical Exam  Constitutional:       Appearance: He is well-developed.   HENT:      Head: Normocephalic and atraumatic.   Eyes:      General:         Right eye: No discharge.         Left eye: No discharge.   Neck:      Musculoskeletal: Normal range of motion and neck supple.   Cardiovascular:      Rate and Rhythm: Regular rhythm.      Comments: Tachycardic  Pulmonary:      Effort: Pulmonary effort is normal. No respiratory distress.   Musculoskeletal: Normal range of motion.      Comments: LEFT  Palpable AT 2+, PT and popliteal  Foot ulcer with overlying callus in mid foot plantar aspect  Skin changes overlying proximal dorsal aspect of foot without any skin breakdown.    RIGHT  Palpable pedal pulses   Skin:     General: Skin is warm and dry.   Neurological:      Mental Status: He is alert and oriented to person, place, and time.   Psychiatric:         Behavior: Behavior normal.         Significant Labs:  CBC:   Recent Labs   Lab 09/22/20  0410   WBC 17.04*   RBC 2.86*   HGB 7.7*   HCT 25.8*      MCV 90   MCH 26.9*   MCHC 29.8*     CMP:    Recent Labs   Lab 09/21/20  0755 09/22/20  0410   GLU 82 200*   CALCIUM 8.3* 8.0*   ALBUMIN 1.7*  1.7* 1.5*   PROT 6.8  --     135*   K 4.4 4.2   CO2 23 23    99   BUN 36* 35*   CREATININE 2.1* 2.1*   ALKPHOS 111  --    ALT 43  --    AST 52*  --    BILITOT 0.5  --        Significant Diagnostics:  I have reviewed all pertinent imaging results/findings within the past 24 hours.  Left leg Toe pressure of zero  ?Proximal LICHA lesion on U/S      Assessment/Plan:     Diabetic foot infection  Ed Patton is a 65 y.o. male w/ sepsis and DM2 foot infection    -Given palpable pulses in left foot, but non-healing ulcer and absent toe pressure on left , will likely plan for limited angiogram (distal) to see if any proximal pedal lesions are evident and amenable to intervention.  -Will monitor white count and tachycardia.  Would like to see normalization of white count prior to intervention  -Pt also has lesion on dorsal aspect of foot.  Would recommend against wrapping foot as it will likely worsen this lesion  -Would also recommend removal of callus over DM2 foot lesion.  Potentially could be done at the same time as angiogram.        Thank you for your consult. I will follow-up with patient. Please contact us if you have any additional questions.    Brent Flor MD  Vascular Surgery  [facility]

## 2020-09-23 NOTE — ASSESSMENT & PLAN NOTE
BG goal 140 - 180     - Increase Levemir to 30 units daily. Fasting BG above goal this AM.  - Start novolog 6 units TDWM. 0.3 u/kg/d dosing. Patient's appetite has improved.   - Low Dose SQ Insulin Correction Scale. Given kidney function.   - BG Monitoring AC/HS     ** Please call Endocrine for any BG related issues **  ** Please notify Endocrine for any change and/or advance in diet**    Discharge Planning:   TBD. Please notify endocrinology prior to discharge.

## 2020-09-23 NOTE — CLINICAL REVIEW
Clinical Review     Patient is a 65 year old man admitted for diabetic foot infection and right knee septic arthritis, found to have MRSA bacteremia and worsening renal function. Labs notable for leukocytosis with WBC as high as 46.     Leukocytosis likely reactive leukocytosis (leukemoid reaction) given that on CBC, there is high percentage and number of granulocytes and patient has on going MRSA bacteremia with R knee septic arthritis. Peripheral smear reviewed - showed numerous granulocytes but no concerning morphological changes.          Assessment/Plan:     Thrombocytosis (resolved)  - potentially secondary to infection   - can check ferritin as outpatient, as not likely to replace here with all of current infections  - MPN panel negative         Leukocytosis     - trending downwards, now around 14.7 K range  - Continue antibiotics as recommended by ID to treat bacteremia, septic arthritis, left diabetic foot infection  - can follow up CT C/A/P if completed   - BCR/ABL testing negative         No additional testing or Hematology Follow-up required as all of findings above were potentially reactive.     Xenia Malin MD  Hematology/Oncology Fellow, PGY VI  [facility]

## 2020-09-23 NOTE — PROGRESS NOTES
"[facility]  Endocrinology  Progress Note    Admit Date: 2020     Reason for Consult: Management of T2DM, Hyperglycemia     Surgical Procedure and Date: n/a    Diabetes diagnosis year:     Home Diabetes Medications:    Novolin 70/30 u-100   40 units in AM with breakfast.    20 units in PM with dinner.     How often checking glucose at home? 1-3 x day   BG readings on regimen: >200  Hypoglycemia on the regimen?  No  Missed doses on regimen?  No    Diabetes Complications include:     Hyperglycemia, Foot ulcer   and Other skin ulcer    Complicating diabetes co morbidities:   CHF, CAD, HTN, HLD      HPI:   Patient is a 65 y.o. male with a diagnosis of acute cholecystitis and septic shock with MRSA complicated by NANETTE requiring CRRT. Endocrinology consulted to manage DM2 during current admission to [facility].     Lab Results   Component Value Date    HGBA1C 9.5 (H) 2020       Interval HPI:   Overnight events:   BG is slightly above goal this AM. Appetite appears to be improving per chart review.   Diet Dysphagia Soft (IDDSI Level 6) [facility]; Cardiac (Low Na/Chol); Fluid - 2000mL; Thin  16 Days Post-Op    Eatin% - 100%  Nausea: No  Hypoglycemia and intervention: No  Fever: No  TPN and/or TF: No  If yes, type of TF/TPN and rate: None    /80 (BP Location: Right arm, Patient Position: Lying)   Pulse 97   Temp 98 °F (36.7 °C) (Oral)   Resp 13   Ht 6' 4" (1.93 m)   Wt 115.3 kg (254 lb 3.1 oz)   SpO2 97%   BMI 30.94 kg/m²     Labs Reviewed and Include    Recent Labs   Lab 20  0300   *   CALCIUM 8.2*   ALBUMIN 1.6*      K 4.3   CO2 21*      BUN 31*   CREATININE 1.9*     Lab Results   Component Value Date    WBC 14.68 (H) 2020    HGB 8.2 (L) 2020    HCT 27.5 (L) 2020    MCV 90 2020     2020     No results for input(s): TSH, FREET4 in the last 168 hours.  Lab Results   Component Value Date    HGBA1C 9.5 (H) " 08/31/2020       Nutritional status:   Body mass index is 30.94 kg/m².  Lab Results   Component Value Date    ALBUMIN 1.6 (L) 09/23/2020    ALBUMIN 1.5 (L) 09/22/2020    ALBUMIN 1.7 (L) 09/21/2020    ALBUMIN 1.7 (L) 09/21/2020     No results found for: PREALBUMIN    Estimated Creatinine Clearance: 55.3 mL/min (A) (based on SCr of 1.9 mg/dL (H)).    Accu-Checks  Recent Labs     09/21/20  0745 09/21/20  1128 09/21/20  1626 09/21/20  2048 09/22/20  0127 09/22/20  1120 09/22/20  1733 09/22/20  2146 09/23/20  0230 09/23/20  0749   POCTGLUCOSE 105 102 172* 195* 240* 220* 223* 213* 167* 188*       Current Medications and/or Treatments Impacting Glycemic Control  Immunotherapy:    Immunosuppressants     None        Steroids:   Hormones (From admission, onward)    Start     Stop Route Frequency Ordered    08/30/20 2059  melatonin tablet 6 mg      -- Oral Nightly PRN 08/30/20 2004        Pressors:    Autonomic Drugs (From admission, onward)    None        Hyperglycemia/Diabetes Medications:   Antihyperglycemics (From admission, onward)    Start     Stop Route Frequency Ordered    09/22/20 1738  insulin aspart U-100 pen 1-10 Units      -- SubQ Before meals & nightly PRN 09/22/20 1638    09/22/20 0845  insulin detemir U-100 pen 28 Units      -- SubQ Daily 09/22/20 0831          ASSESSMENT and PLAN    * Septic shock due to methicillin resistant Staphylococcus aureus  Managed per primary team  Avoid hypoglycemia    Optimize BG control to improve wound healing        Type 2 diabetes mellitus with stage 3 chronic kidney disease, with long-term current use of insulin  BG goal 140 - 180     - Increase Levemir to 30 units daily. Fasting BG above goal this AM.  - Start novolog 6 units TDWM. 0.3 u/kg/d dosing. Patient's appetite has improved.   - Low Dose SQ Insulin Correction Scale. Given kidney function.   - BG Monitoring AC/HS     ** Please call Endocrine for any BG related issues **  ** Please notify Endocrine for any change and/or  advance in diet**    Discharge Planning:   TBD. Please notify endocrinology prior to discharge.          Postablative hypothyroidism  The clearance of many drugs, including antiepileptic, anticoagulant, hypnotic, and opioid drugs, is decreased in hypothyroidism. Thus, drug toxicity may occur if drug dose is not reduced. Thyroid disease may also affect lipid profiles which may then cause increase in insulin resistance.     Recommend patient continue Levothyroxine 75 mcg           Luke Rojas NP  Endocrinology  [facility]

## 2020-09-23 NOTE — PLAN OF CARE
Problem: Occupational Therapy Goal  Goal: Occupational Therapy Goal  Description: Goals to be met by: 10/18/20    Patient will increase functional independence with ADLs by performing:    Feeding with Set-up Assistance.  Grooming while EOB with Minimal Assistance.  Toileting from bedside commode with Moderate Assistance for hygiene and clothing management.   Supine to sit with Moderate Assistance to increased bed mobility independence.   Stand pivot transfers with Moderate Assistance and maintaining weight-bearing precaution(s) to reach bedside chair.  Toilet transfer to bedside commode with Moderate Assistance and maintaining weight-bearing precaution(s).  Upper extremity exercise program x15 reps per handout, with supervision to improve BUE function to increase independence in daily occupations.    Goals remain appropriate. Pt making progress in bed mobility today. Continue OT as tolerated.   No Watkins OT  9/23/2020    Outcome: Ongoing, Progressing

## 2020-09-23 NOTE — PT/OT/SLP PROGRESS
Occupational Therapy   Treatment    Name: Ed Patton  MRN: [id number]  Admitting Diagnosis:  Septic shock due to methicillin resistant Staphylococcus aureus  16 Days Post-Op    Recommendations:     Discharge Recommendations: nursing facility, skilled  Discharge Equipment Recommendations:  other (see comments)(tbd)  Barriers to discharge:  Decreased caregiver support, Inaccessible home environment    Assessment:     Ed Patton is a 65 y.o. male with a medical diagnosis of Septic shock due to methicillin resistant Staphylococcus aureus.  He presents with impaired ADL and mobility performance deficits. Pt willing to participate this date in OT services. Pt session focused on bed mobility, EOB grooming tasks, and toileting needs as pt found soiled. Pt required total (A) for bed mobility however better initiation and attempts at rolling in relation to previous therapy sessions per chart review. Pt sat EOB ~20 min with SBA and no voiced pain, with pt explaining, 'This is the best I have felt. I appreciate you so much. There's no pain- I can't believe it.' Pt receptive to pursed lip breathing techniques (as pt on 2L 02 with Sp02 90-94%) and required extensive time between tasks 2/2 cog processing and pt's expressed fear of overworking self. At this time, pt remains a high fall risk and is not safe to return home.  Pt would benefit from continued OT skilled services 3x/wk to improve daily living skills to optimize QOL. Pt is recommended to discharge to SNF at this time.    Performance deficits affecting function are weakness, impaired balance, impaired self care skills, decreased coordination, decreased safety awareness, impaired skin, impaired endurance, impaired functional mobilty, decreased upper extremity function, decreased lower extremity function, impaired cognition, gait instability, impaired cardiopulmonary response to activity, edema, orthopedic precautions.     Rehab Prognosis:  Good; patient would benefit  "from acute skilled OT services to address these deficits and reach maximum level of function.       Plan:     Patient to be seen 3 x/week to address the above listed problems via self-care/home management, therapeutic activities, therapeutic exercises, neuromuscular re-education  · Plan of Care Expires: 10/08/20  · Plan of Care Reviewed with: patient    Subjective     Pain/Comfort:  · Pain Rating 1: 0/10  · Pain Rating Post-Intervention 1: 0/10    Objective:     Communicated with: RN prior to session.  Patient found HOB elevated with oxygen, peripheral IV, blood pressure cuff, telemetry, pulse ox (continuous), pressure relief boots upon OT entry to room.    General Precautions: Standard, fall, contact   Orthopedic Precautions:LLE weight bearing as tolerated   Braces: (L darco shoe)     Occupational Performance:     Bed Mobility:    · Patient completed Rolling/Turning to Left with  total assistance  · Patient completed Rolling/Turning to Right with total assistance  · Patient completed Scooting/Bridging with total assistance  · Patient completed Supine to Sit with total assistance  · Patient completed Sit to Supine with total assistance     Functional Mobility/Transfers:  · Functional Mobility: Pt deferred, stating "I don't want to do too much. This is the first time I'm not in pain. Can we please wait until tomorrow?"    Activities of Daily Living:  · Feeding:  setup (A) of fresh drinking water at EOB   · Grooming: setup (A) to wash face at EOB   · Bathing: abbreviated bed bath performed as pt heavily soiled with pt able to bath UB/tops of legs (~mod A). Pt needed A for back and lower portions of legs at EOB  .  · Upper Body Dressing: maximal assistance donning two gowns at EOB  · Lower Body Dressing: total assistance donning pressure boots and  socks as needed during session   · Toileting: total assistance as pt found heavily soiled. OT assited with maribell pad changing; PCT Levy notified for additional linens " needed      [facility] 6 Click ADL: 10    Treatment & Education:  Pt educated on role of occupational therapy, POC, and safety during ADLs and functional mobility. Pt and OT discussed importance of safe, continued mobility to optimize daily living skills. Pt verbalized understanding.   Pt completed the following during session: bed mobility, EOB sitting tasks (pt sat EOB 25), feeding, UB/LB dressing, abbreviated bed bath with warm towels and soap, toileting needs/linen change as able, safe repositioning. White board updated during session. Pt given instruction to call for medical staff/nurse for assistance.       Patient left HOB elevated with all lines intact, call button in reach and PCT Levy notifiedEducation:      GOALS:   Multidisciplinary Problems     Occupational Therapy Goals        Problem: Occupational Therapy Goal    Goal Priority Disciplines Outcome Interventions   Occupational Therapy Goal     OT, PT/OT Ongoing, Progressing    Description: Goals to be met by: 10/18/20    Patient will increase functional independence with ADLs by performing:    Feeding with Set-up Assistance.  Grooming while EOB with Minimal Assistance.  Toileting from bedside commode with Moderate Assistance for hygiene and clothing management.   Supine to sit with Moderate Assistance to increased bed mobility independence.   Stand pivot transfers with Moderate Assistance and maintaining weight-bearing precaution(s) to reach bedside chair.  Toilet transfer to bedside commode with Moderate Assistance and maintaining weight-bearing precaution(s).  Upper extremity exercise program x15 reps per handout, with supervision to improve BUE function to increase independence in daily occupations.                     Time Tracking:     OT Date of Treatment: 09/23/20  OT Start Time: 0830  OT Stop Time: 0908  OT Total Time (min): 38 min    Billable Minutes:Self Care/Home Management 24 min  Therapeutic Activity 14 min    No Watkins OT  9/23/2020

## 2020-09-23 NOTE — SUBJECTIVE & OBJECTIVE
Medications Prior to Admission   Medication Sig Dispense Refill Last Dose    ACCU-CHEK FASTCLIX LANCET DRUM Misc TEST FOUR TIMES DAILY (Patient taking differently: Test twice daily) 408 each 3     ACCU-CHEK SMARTVIEW TEST STRIP Strp TEST FOUR TIMES DAILY (Patient taking differently: Test twice daily) 400 strip 3     albuterol (PROVENTIL) 2.5 mg /3 mL (0.083 %) nebulizer solution INHALE THE CONTENTS OF 1 VIAL VIA NEBULIZER EVERY 4 HOURS AS NEEDED FOR WHEEZING. (Patient taking differently: INHALE THE CONTENTS OF 1 VIAL VIA NEBULIZER TWICE DAILY) 360 mL 3     albuterol (PROVENTIL/VENTOLIN HFA) 90 mcg/actuation inhaler Inhale 2 puffs into the lungs every 6 (six) hours as needed for Wheezing or Shortness of Breath. Rescue       aspirin (ECOTRIN) 81 MG EC tablet Take 1 tablet (81 mg total) by mouth once daily. 90 tablet 3     atorvastatin (LIPITOR) 40 MG tablet Take 1 tablet (40 mg total) by mouth once daily. 90 tablet 3     carvedilol (COREG) 25 MG tablet Take 1 tablet (25 mg total) by mouth 2 (two) times daily. 180 tablet 3     cholecalciferol, vitamin D3, (VITAMIN D3) 1,000 unit capsule Take 1 capsule (1,000 Units total) by mouth once daily. (Patient taking differently: Take 2,000 Units by mouth once daily. ) 90 capsule 3     fluticasone furoate-vilanterol (BREO ELLIPTA) 200-25 mcg/dose DsDv diskus inhaler INHALE 1 PUFF INTO THE LUNGS ONCE DAILY. (CONTROLLER) 180 each 3     fluticasone propionate (FLONASE) 50 mcg/actuation nasal spray USE 1 SPRAY IN EACH NOSTRIL ONE TIME DAILY 32 g 3     furosemide (LASIX) 80 MG tablet TAKE 1 TABLET BY MOUTH IN THE MORNING  AND  1 TABLET BY MOUTH DAILY  AT  3- TO 4PM 180 tablet 3     gabapentin (NEURONTIN) 100 MG capsule Take 1 capsule (100 mg total) by mouth 3 (three) times daily as needed (pain). 90 capsule 3     insulin NPH-insulin regular, 70/30, (NOVOLIN 70/30 U-100 INSULIN) 100 unit/mL (70-30) injection Inject 45 Units into the skin before breakfast AND 35 Units  before dinner. [id number] MINUTES BEFORE BREAKFAST AND DINNER. (Patient taking differently: Inject 40 Units into the skin before breakfast AND 20 Units before dinner.) 20 mL 1     insulin syringe-needle U-100 (INSULIN SYRINGE) 1 mL 30 gauge x 5/16 Syrg 1 each by Misc.(Non-Drug; Combo Route) route 2 (two) times daily. Use twice daily as directed with insulin vials. 200 each 6     levothyroxine (SYNTHROID) 75 MCG tablet Take 1 tablet (75 mcg total) by mouth once daily. 90 tablet 3     lisinopril (PRINIVIL,ZESTRIL) 40 MG tablet Take 1 tablet (40 mg total) by mouth once daily. 90 tablet 3     ACCU-CHEK RAMIREZ Misc USE AS DIRECTED 1 each 0     nitroGLYCERIN (NITROSTAT) 0.4 MG SL tablet PLACE 1 TABLET UNDER THE TONGUE EVERY 5  MINUTES AS NEEDED FOR CHEST PAIN 25 tablet 3        Review of patient's allergies indicates:   Allergen Reactions    Vicodin [hydrocodone-acetaminophen] Itching       Past Medical History:   Diagnosis Date    CHF (congestive heart failure)     COPD (chronic obstructive pulmonary disease)     Coronary artery disease     Diabetes mellitus     Diabetes mellitus type II     DM (diabetes mellitus) type II uncontrolled with renal manifestation 9/4/2013    Hyperlipidemia     Hypertension     Postablative hypothyroidism 12/6/2018     Past Surgical History:   Procedure Laterality Date    APPENDECTOMY      ARTHROSCOPY OF KNEE Right 9/7/2020    Procedure: ARTHROSCOPY, KNEE, RIGHT ;  Surgeon: You Bhatia MD;  Location: [address];  Service: Orthopedics;  Laterality: Right;    CHOLECYSTECTOMY      CORONARY ANGIOPLASTY WITH STENT PLACEMENT      TONSILLECTOMY      TRANSESOPHAGEAL ECHOCARDIOGRAPHY N/A 9/4/2020    Procedure: ECHOCARDIOGRAM, TRANSESOPHAGEAL;  Surgeon: Dorian Diagnostic Provider;  Location: [facility];  Service: Anesthesiology;  Laterality: N/A;    TRANSESOPHAGEAL ECHOCARDIOGRAPHY N/A 9/3/2020    Procedure: ECHOCARDIOGRAM, TRANSESOPHAGEAL;  Surgeon: Dorian Diagnostic  Provider;  Location: [facility];  Service: Anesthesiology;  Laterality: N/A;     Family History     Problem Relation (Age of Onset)    Heart disease Mother    Hypertension Son    No Known Problems Father, Sister, Son, Son        Tobacco Use    Smoking status: Former Smoker     Packs/day: 0.01     Years: 3.00     Pack years: 0.03     Types: Cigarettes     Quit date: 1995     Years since quittin.3    Smokeless tobacco: Never Used   Substance and Sexual Activity    Alcohol use: No    Drug use: No    Sexual activity: Never     Comment:  with 1 son     Review of Systems   Constitutional: Negative for activity change, appetite change, chills, diaphoresis, fatigue and fever.   HENT: Negative for congestion, dental problem and trouble swallowing.    Eyes: Negative for photophobia, pain, discharge, redness, itching and visual disturbance.   Respiratory: Negative for choking, chest tightness, shortness of breath and stridor.    Cardiovascular: Negative for chest pain and leg swelling.   Gastrointestinal: Negative for abdominal distention and abdominal pain.   Endocrine: Negative for cold intolerance and heat intolerance.   Genitourinary: Negative for difficulty urinating and dysuria.   Musculoskeletal: Negative for arthralgias and back pain.   Skin: Positive for color change and wound.   Neurological: Negative for dizziness, seizures, facial asymmetry, light-headedness and numbness.   Hematological: Negative for adenopathy. Does not bruise/bleed easily.   Psychiatric/Behavioral: Negative for agitation and behavioral problems.     Objective:     Vital Signs (Most Recent):  Temp: 98.2 °F (36.8 °C) (20 1650)  Pulse: (!) 124 (20 1935)  Resp: 18 (20 1711)  BP: 130/76 (20 1650)  SpO2: 100 % (20 1148) Vital Signs (24h Range):  Temp:  [98.1 °F (36.7 °C)-98.7 °F (37.1 °C)] 98.2 °F (36.8 °C)  Pulse:  [124-130] 124  Resp:  [12-18] 18  SpO2:  [95 %-100 %] 100 %  BP: (102-130)/(58-76)  130/76     Weight: 115.3 kg (254 lb 3.1 oz)  Body mass index is 30.94 kg/m².    Physical Exam  Constitutional:       Appearance: He is well-developed.   HENT:      Head: Normocephalic and atraumatic.   Eyes:      General:         Right eye: No discharge.         Left eye: No discharge.   Neck:      Musculoskeletal: Normal range of motion and neck supple.   Cardiovascular:      Rate and Rhythm: Regular rhythm.      Comments: Tachycardic  Pulmonary:      Effort: Pulmonary effort is normal. No respiratory distress.   Musculoskeletal: Normal range of motion.      Comments: LEFT  Palpable AT 2+, PT and popliteal  Foot ulcer with overlying callus in mid foot plantar aspect  Skin changes overlying proximal dorsal aspect of foot without any skin breakdown.    RIGHT  Palpable pedal pulses   Skin:     General: Skin is warm and dry.   Neurological:      Mental Status: He is alert and oriented to person, place, and time.   Psychiatric:         Behavior: Behavior normal.         Significant Labs:  CBC:   Recent Labs   Lab 09/22/20  0410   WBC 17.04*   RBC 2.86*   HGB 7.7*   HCT 25.8*      MCV 90   MCH 26.9*   MCHC 29.8*     CMP:   Recent Labs   Lab 09/21/20  0755 09/22/20  0410   GLU 82 200*   CALCIUM 8.3* 8.0*   ALBUMIN 1.7*  1.7* 1.5*   PROT 6.8  --     135*   K 4.4 4.2   CO2 23 23    99   BUN 36* 35*   CREATININE 2.1* 2.1*   ALKPHOS 111  --    ALT 43  --    AST 52*  --    BILITOT 0.5  --        Significant Diagnostics:  I have reviewed all pertinent imaging results/findings within the past 24 hours.

## 2020-09-23 NOTE — PROGRESS NOTES
Pharmacokinetic Initial Assessment: IV Vancomycin    Assessment/Plan:    · Acute renal failure with acute tubular necrosis superimposed on stage 3 chronic kidney disease  · Will initiate pulse dosing with Vancomycin 1750 mg once and monitor random level tomorrow  · Please contact pharmacy at extension [phone] with any questions regarding this assessment.     Thank you for the consult,   Celso Rios       Patient brief summary:  Ed Patton is a 65 y.o. male initiated on antimicrobial therapy with IV Vancomycin for treatment of suspected bacteremia    Drug Allergies:   Review of patient's allergies indicates:   Allergen Reactions    Vicodin [hydrocodone-acetaminophen] Itching       Actual Body Weight:   115.3 kg     Renal Function:   Estimated Creatinine Clearance: 55.3 mL/min (A) (based on SCr of 1.9 mg/dL (H)).,     Dialysis Method (if applicable):  N/A    CBC (last 72 hours):  Recent Labs   Lab Result Units 09/21/20  1021 09/22/20  0410 09/23/20  0300   WBC K/uL 23.45* 17.04* 14.68*   Hemoglobin g/dL 8.5* 7.7* 8.2*   Hematocrit % 28.7* 25.8* 27.5*   Platelets K/uL 345 315 338   Gran% % 81.5* 77.1* 75.2*   Lymph% % 4.3* 7.2* 6.5*   Mono% % 7.1 8.7 11.4   Eosinophil% % 3.0 4.0 4.7   Basophil% % 0.3 0.3 0.3   Differential Method  Automated Automated Automated       Metabolic Panel (last 72 hours):  Recent Labs   Lab Result Units 09/21/20  0755 09/22/20  0410 09/23/20  0300   Sodium mmol/L 138 135* 136   Potassium mmol/L 4.4 4.2 4.3   Chloride mmol/L 101 99 102   CO2 mmol/L 23 23 21*   Glucose mg/dL 82 200* 146*   BUN, Bld mg/dL 36* 35* 31*   Creatinine mg/dL 2.1* 2.1* 1.9*   Albumin g/dL 1.7*  1.7* 1.5* 1.6*   Total Bilirubin mg/dL 0.5  --   --    Alkaline Phosphatase U/L 111  --   --    AST U/L 52*  --   --    ALT U/L 43  --   --    Magnesium mg/dL 1.6 1.4* 1.4*   Phosphorus mg/dL 2.5* 2.9 2.5*       Drug levels (last 3 results):  No results for input(s): VANCOMYCINRA, VANCOMYCINPE, VANCOMYCINTR in the last  72 hours.    Microbiologic Results:  Microbiology Results (last 7 days)     Procedure Component Value Units Date/Time    Culture, Anaerobe [[id number]] Collected: 09/16/20 1612    Order Status: Completed Specimen: Body Fluid from Gallbladder Updated: 09/22/20 1035     Anaerobic Culture Culture in progress    Aerobic culture [[id number]] Collected: 09/16/20 1612    Order Status: Completed Specimen: Body Fluid from Gallbladder Updated: 09/19/20 1110     Aerobic Bacterial Culture No growth    Blood culture [[id number]] Collected: 09/12/20 2014    Order Status: Completed Specimen: Blood from Peripheral, Forearm, Right Updated: 09/18/20 0612     Blood Culture, Routine No growth after 5 days.    Blood culture [[id number]] Collected: 09/12/20 2015    Order Status: Completed Specimen: Blood from Peripheral, Antecubital, Right Updated: 09/18/20 0612     Blood Culture, Routine No growth after 5 days.    Fungus culture [[id number]] Collected: 09/16/20 1612    Order Status: Completed Specimen: Body Fluid from Gallbladder Updated: 09/17/20 0908     Fungus (Mycology) Culture Culture in progress    Fungus culture [[id number]] Collected: 09/03/20 1647    Order Status: Completed Specimen: Joint Fluid from Knee, Right Updated: 09/17/20 0726     Fungus (Mycology) Culture Culture in progress      No fungus isolated after 2 weeks    Gram stain [[id number]] Collected: 09/16/20 1612    Order Status: Completed Specimen: Body Fluid from Gallbladder Updated: 09/16/20 2213     Gram Stain Result Rare WBC's      No organisms seen

## 2020-09-24 LAB
ALBUMIN SERPL BCP-MCNC: 1.6 G/DL (ref 3.5–5.2)
ALBUMIN SERPL BCP-MCNC: 1.6 G/DL (ref 3.5–5.2)
ALP SERPL-CCNC: 94 U/L (ref 55–135)
ALT SERPL W/O P-5'-P-CCNC: 40 U/L (ref 10–44)
ANION GAP SERPL CALC-SCNC: 8 MMOL/L (ref 8–16)
AST SERPL-CCNC: 50 U/L (ref 10–40)
BASOPHILS # BLD AUTO: 0.06 K/UL (ref 0–0.2)
BASOPHILS NFR BLD: 0.4 % (ref 0–1.9)
BILIRUB DIRECT SERPL-MCNC: 0.3 MG/DL (ref 0.1–0.3)
BILIRUB SERPL-MCNC: 0.5 MG/DL (ref 0.1–1)
BUN SERPL-MCNC: 27 MG/DL (ref 8–23)
CALCIUM SERPL-MCNC: 8.2 MG/DL (ref 8.7–10.5)
CHLORIDE SERPL-SCNC: 101 MMOL/L (ref 95–110)
CO2 SERPL-SCNC: 27 MMOL/L (ref 23–29)
CREAT SERPL-MCNC: 1.7 MG/DL (ref 0.5–1.4)
DIFFERENTIAL METHOD: ABNORMAL
EOSINOPHIL # BLD AUTO: 0.6 K/UL (ref 0–0.5)
EOSINOPHIL NFR BLD: 4.6 % (ref 0–8)
ERYTHROCYTE [DISTWIDTH] IN BLOOD BY AUTOMATED COUNT: 16.9 % (ref 11.5–14.5)
EST. GFR  (AFRICAN AMERICAN): 48.5 ML/MIN/1.73 M^2
EST. GFR  (NON AFRICAN AMERICAN): 42 ML/MIN/1.73 M^2
GLUCOSE SERPL-MCNC: 121 MG/DL (ref 70–110)
HCT VFR BLD AUTO: 27.3 % (ref 40–54)
HGB BLD-MCNC: 8 G/DL (ref 14–18)
IMM GRANULOCYTES # BLD AUTO: 0.15 K/UL (ref 0–0.04)
IMM GRANULOCYTES NFR BLD AUTO: 1.1 % (ref 0–0.5)
LYMPHOCYTES # BLD AUTO: 1.1 K/UL (ref 1–4.8)
LYMPHOCYTES NFR BLD: 8.1 % (ref 18–48)
MAGNESIUM SERPL-MCNC: 1.9 MG/DL (ref 1.6–2.6)
MCH RBC QN AUTO: 26.2 PG (ref 27–31)
MCHC RBC AUTO-ENTMCNC: 29.3 G/DL (ref 32–36)
MCV RBC AUTO: 90 FL (ref 82–98)
MONOCYTES # BLD AUTO: 1.3 K/UL (ref 0.3–1)
MONOCYTES NFR BLD: 9.2 % (ref 4–15)
NEUTROPHILS # BLD AUTO: 10.7 K/UL (ref 1.8–7.7)
NEUTROPHILS NFR BLD: 76.6 % (ref 38–73)
NRBC BLD-RTO: 0 /100 WBC
PHOSPHATE SERPL-MCNC: 2.3 MG/DL (ref 2.7–4.5)
PLATELET # BLD AUTO: 379 K/UL (ref 150–350)
PMV BLD AUTO: 10.5 FL (ref 9.2–12.9)
POCT GLUCOSE: 157 MG/DL (ref 70–110)
POCT GLUCOSE: 163 MG/DL (ref 70–110)
POCT GLUCOSE: 51 MG/DL (ref 70–110)
POCT GLUCOSE: 53 MG/DL (ref 70–110)
POCT GLUCOSE: 68 MG/DL (ref 70–110)
POCT GLUCOSE: 71 MG/DL (ref 70–110)
POCT GLUCOSE: 81 MG/DL (ref 70–110)
POCT GLUCOSE: 85 MG/DL (ref 70–110)
POCT GLUCOSE: 93 MG/DL (ref 70–110)
POCT GLUCOSE: 98 MG/DL (ref 70–110)
POTASSIUM SERPL-SCNC: 3.7 MMOL/L (ref 3.5–5.1)
PROT SERPL-MCNC: 6.8 G/DL (ref 6–8.4)
RBC # BLD AUTO: 3.05 M/UL (ref 4.6–6.2)
SODIUM SERPL-SCNC: 136 MMOL/L (ref 136–145)
VANCOMYCIN SERPL-MCNC: 14 UG/ML
WBC # BLD AUTO: 13.9 K/UL (ref 3.9–12.7)

## 2020-09-24 PROCEDURE — 99233 SBSQ HOSP IP/OBS HIGH 50: CPT | Mod: HCNC,,, | Performed by: INTERNAL MEDICINE

## 2020-09-24 PROCEDURE — 84075 ASSAY ALKALINE PHOSPHATASE: CPT | Mod: HCNC

## 2020-09-24 PROCEDURE — 63600175 PHARM REV CODE 636 W HCPCS: Mod: HCNC | Performed by: HOSPITALIST

## 2020-09-24 PROCEDURE — 99233 PR SUBSEQUENT HOSPITAL CARE,LEVL III: ICD-10-PCS | Mod: HCNC,,, | Performed by: INTERNAL MEDICINE

## 2020-09-24 PROCEDURE — 80069 RENAL FUNCTION PANEL: CPT | Mod: HCNC

## 2020-09-24 PROCEDURE — 25000003 PHARM REV CODE 250: Mod: HCNC | Performed by: HOSPITALIST

## 2020-09-24 PROCEDURE — C9399 UNCLASSIFIED DRUGS OR BIOLOG: HCPCS | Mod: HCNC | Performed by: NURSE PRACTITIONER

## 2020-09-24 PROCEDURE — 85025 COMPLETE CBC W/AUTO DIFF WBC: CPT | Mod: HCNC

## 2020-09-24 PROCEDURE — 25000003 PHARM REV CODE 250: Mod: HCNC | Performed by: NURSE PRACTITIONER

## 2020-09-24 PROCEDURE — 20600001 HC STEP DOWN PRIVATE ROOM: Mod: HCNC

## 2020-09-24 PROCEDURE — 27000221 HC OXYGEN, UP TO 24 HOURS: Mod: HCNC

## 2020-09-24 PROCEDURE — 99232 PR SUBSEQUENT HOSPITAL CARE,LEVL II: ICD-10-PCS | Mod: HCNC,,, | Performed by: NURSE PRACTITIONER

## 2020-09-24 PROCEDURE — 25000003 PHARM REV CODE 250: Mod: HCNC | Performed by: INTERNAL MEDICINE

## 2020-09-24 PROCEDURE — 94761 N-INVAS EAR/PLS OXIMETRY MLT: CPT | Mod: HCNC

## 2020-09-24 PROCEDURE — 63600175 PHARM REV CODE 636 W HCPCS: Mod: HCNC | Performed by: INTERNAL MEDICINE

## 2020-09-24 PROCEDURE — 99900035 HC TECH TIME PER 15 MIN (STAT): Mod: HCNC

## 2020-09-24 PROCEDURE — 80202 ASSAY OF VANCOMYCIN: CPT | Mod: HCNC

## 2020-09-24 PROCEDURE — 83735 ASSAY OF MAGNESIUM: CPT | Mod: HCNC

## 2020-09-24 PROCEDURE — 25000003 PHARM REV CODE 250: Mod: HCNC | Performed by: STUDENT IN AN ORGANIZED HEALTH CARE EDUCATION/TRAINING PROGRAM

## 2020-09-24 PROCEDURE — 97803 MED NUTRITION INDIV SUBSEQ: CPT | Mod: HCNC

## 2020-09-24 PROCEDURE — 99232 SBSQ HOSP IP/OBS MODERATE 35: CPT | Mod: HCNC,,, | Performed by: NURSE PRACTITIONER

## 2020-09-24 RX ORDER — INSULIN ASPART 100 [IU]/ML
5 INJECTION, SOLUTION INTRAVENOUS; SUBCUTANEOUS
Status: DISCONTINUED | OUTPATIENT
Start: 2020-09-24 | End: 2020-09-25

## 2020-09-24 RX ORDER — INSULIN ASPART 100 [IU]/ML
0-5 INJECTION, SOLUTION INTRAVENOUS; SUBCUTANEOUS
Status: DISCONTINUED | OUTPATIENT
Start: 2020-09-24 | End: 2020-10-09 | Stop reason: HOSPADM

## 2020-09-24 RX ADMIN — INSULIN ASPART 2 UNITS: 100 INJECTION, SOLUTION INTRAVENOUS; SUBCUTANEOUS at 08:09

## 2020-09-24 RX ADMIN — CARVEDILOL 6.25 MG: 6.25 TABLET, FILM COATED ORAL at 10:09

## 2020-09-24 RX ADMIN — SODIUM BICARBONATE 1300 MG: 650 TABLET ORAL at 08:09

## 2020-09-24 RX ADMIN — HEPARIN SODIUM 5000 UNITS: 5000 INJECTION INTRAVENOUS; SUBCUTANEOUS at 03:09

## 2020-09-24 RX ADMIN — HEPARIN SODIUM 5000 UNITS: 5000 INJECTION INTRAVENOUS; SUBCUTANEOUS at 10:09

## 2020-09-24 RX ADMIN — INSULIN ASPART 5 UNITS: 100 INJECTION, SOLUTION INTRAVENOUS; SUBCUTANEOUS at 05:09

## 2020-09-24 RX ADMIN — SODIUM BICARBONATE 1300 MG: 650 TABLET ORAL at 10:09

## 2020-09-24 RX ADMIN — CARVEDILOL 6.25 MG: 6.25 TABLET, FILM COATED ORAL at 08:09

## 2020-09-24 RX ADMIN — INSULIN ASPART 6 UNITS: 100 INJECTION, SOLUTION INTRAVENOUS; SUBCUTANEOUS at 12:09

## 2020-09-24 RX ADMIN — FLUTICASONE PROPIONATE 50 MCG: 50 SPRAY, METERED NASAL at 08:09

## 2020-09-24 RX ADMIN — INSULIN ASPART 1 UNITS: 100 INJECTION, SOLUTION INTRAVENOUS; SUBCUTANEOUS at 01:09

## 2020-09-24 RX ADMIN — FLUTICASONE FUROATE AND VILANTEROL TRIFENATATE 1 PUFF: 200; 25 POWDER RESPIRATORY (INHALATION) at 08:09

## 2020-09-24 RX ADMIN — OXYCODONE 5 MG: 5 TABLET ORAL at 04:09

## 2020-09-24 RX ADMIN — LEVOTHYROXINE SODIUM 75 MCG: 25 TABLET ORAL at 05:09

## 2020-09-24 RX ADMIN — HYPROMELLOSE 2910 1 DROP: 5 SOLUTION OPHTHALMIC at 08:09

## 2020-09-24 RX ADMIN — VANCOMYCIN HYDROCHLORIDE 1750 MG: 750 INJECTION, POWDER, LYOPHILIZED, FOR SOLUTION INTRAVENOUS at 12:09

## 2020-09-24 RX ADMIN — INSULIN DETEMIR 30 UNITS: 100 INJECTION, SOLUTION SUBCUTANEOUS at 09:09

## 2020-09-24 RX ADMIN — INSULIN ASPART 6 UNITS: 100 INJECTION, SOLUTION INTRAVENOUS; SUBCUTANEOUS at 08:09

## 2020-09-24 RX ADMIN — LEVOFLOXACIN 750 MG: 750 TABLET, FILM COATED ORAL at 08:09

## 2020-09-24 RX ADMIN — HEPARIN SODIUM 5000 UNITS: 5000 INJECTION INTRAVENOUS; SUBCUTANEOUS at 05:09

## 2020-09-24 RX ADMIN — HYPROMELLOSE 2910 1 DROP: 5 SOLUTION OPHTHALMIC at 03:09

## 2020-09-24 RX ADMIN — HYPROMELLOSE 2910 1 DROP: 5 SOLUTION OPHTHALMIC at 10:09

## 2020-09-24 NOTE — NURSING
Pt verbalized several times his concerns for his angio in the am. Pt is apprehensive to have it done because he stated he is tired of having procedures done to him. He also wants to eat and drink. Pt requested not to be NPO anymore. Questions and concerns addressed. Called Dr. Alexandre in vascular sx. He stated he will pass along the message to the day team which knows this case better, but to go ahead and continue with NPO and surgery in the morning.

## 2020-09-24 NOTE — PROGRESS NOTES
[facility] Hospital Medicine  Progress Note    Patient Name: Ed Patton  MRN: [id number]  Patient Class: IP- Inpatient   Admission Date: 8/30/2020  Length of Stay: 24 days  Attending Physician: Thomas Reed MD  Primary Care Provider: Qiana Chow MD    [facility] Medicine Team: [facility] HOSP MED A Thomas Reed MD    Subjective:     Principal Problem:Septic shock due to methicillin resistant Staphylococcus aureus    Interval History:   Voiding on his own s/p toure removal     His CK is rising, discussed with ID Dr. Lau and she recommends switching patient back to Vancomycin, orders changed.     Rn reports pt had a good day but is anxious, this afternoon vascular surgery note and team informed pt plan for angiography tomorrow. Pt is anxious about the possibility in the future of potential amputation, counseled patient that he hasn't even had starting tests and infection treatment at this time is improving     Review of Systems   Constitutional: Negative for fever.   Respiratory: Negative for shortness of breath.    Gastrointestinal: Negative for abdominal pain, nausea and vomiting.   Psychiatric/Behavioral: Negative for confusion.     Objective:     Vital Signs (Most Recent):  Temp: 98.2 °F (36.8 °C) (09/23/20 1537)  Pulse: (!) 124 (09/23/20 1537)  Resp: 15 (09/23/20 1537)  BP: 130/79 (09/23/20 1537)  SpO2: 97 % (09/23/20 1128) Vital Signs (24h Range):  Temp:  [97.5 °F (36.4 °C)-98.9 °F (37.2 °C)] 98.2 °F (36.8 °C)  Pulse:  [] 124  Resp:  [12-24] 15  SpO2:  [93 %-99 %] 97 %  BP: (121-130)/(77-85) 130/79   Intake/Outake:  This Shift:  No intake/output data recorded.    Net I/O past 24h:     Intake/Output Summary (Last 24 hours) at 9/23/2020 1929  Last data filed at 9/23/2020 1600  Gross per 24 hour   Intake 500 ml   Output 430 ml   Net 70 ml         Weight: 115.3 kg (254 lb 3.1 oz)  Body mass index is 30.94 kg/m².  Physical Exam  Constitutional:       General: He is  not in acute distress.     Appearance: Normal appearance. He is not diaphoretic.   HENT:      Head: Normocephalic and atraumatic.   Eyes:      General: Lids are normal. No scleral icterus.        Right eye: No discharge.         Left eye: No discharge.      Conjunctiva/sclera: Conjunctivae normal.   Neck:      Musculoskeletal: No neck rigidity.      Trachea: Phonation normal.   Cardiovascular:      Rate and Rhythm: Normal rate and regular rhythm.   Pulmonary:      Effort: Pulmonary effort is normal. No tachypnea, accessory muscle usage or respiratory distress.      Breath sounds: Examination of the right-lower field reveals decreased breath sounds. Examination of the left-lower field reveals decreased breath sounds. Decreased breath sounds present. No wheezing.   Abdominal:      General: Bowel sounds are normal. There is distension.      Palpations: Abdomen is soft.      Tenderness: There is no abdominal tenderness.      Comments: Cholecystostomy drain draining green bile   Musculoskeletal:      Right knee: He exhibits swelling.      Right lower leg: Edema present.      Left lower leg: Edema present.   Neurological:      Mental Status: He is alert.      Cranial Nerves: Cranial nerves are intact.      Motor: Motor function is intact.   Psychiatric:         Attention and Perception: He is inattentive.         Mood and Affect: Affect normal.         Behavior: Behavior is cooperative.         Cognition and Memory: He exhibits impaired recent memory.           Significant Labs:   Blood Culture: No results for input(s): LABBLOO in the last 48 hours.  CBC:   Recent Labs   Lab 09/22/20  0410 09/23/20  0300   WBC 17.04* 14.68*   HGB 7.7* 8.2*   HCT 25.8* 27.5*    338     CMP:   Recent Labs   Lab 09/22/20  0410 09/23/20  0300   * 136   K 4.2 4.3   CL 99 102   CO2 23 21*   * 146*   BUN 35* 31*   CREATININE 2.1* 1.9*   CALCIUM 8.0* 8.2*   ALBUMIN 1.5* 1.6*   ANIONGAP 13 13   EGFRNONAA 32.5* 36.7*        Significant Imaging: I have reviewed all pertinent imaging results/findings within the past 24 hours.    MEDICATIONS  Scheduled Meds:   artificial tears  1 drop Both Eyes TID    carvediloL  6.25 mg Oral BID    fluticasone furoate-vilanteroL  1 puff Inhalation Daily    fluticasone propionate  1 spray Each Nostril Daily    heparin (porcine)  5,000 Units Subcutaneous Q8H    insulin aspart U-100  6 Units Subcutaneous TIDWM    [START ON 9/24/2020] insulin detemir U-100  30 Units Subcutaneous Daily    levoFLOXacin  750 mg Oral Every other day    levothyroxine  75 mcg Oral Before breakfast    magnesium sulfate IVPB  2 g Intravenous Once    sodium bicarbonate  1,300 mg Oral BID                             Continuous Infusions:    PRN Meds:.acetaminophen, albuterol-ipratropium, calcium carbonate, dextrose 50%, dextrose 50%, glucagon (human recombinant), glucose, glucose, insulin aspart U-100, melatonin, ondansetron, oxyCODONE, polyethylene glycol, DIPH,PERTUS (ADACEL),TETANUS PF VAC (ADULT), Pharmacy to dose Vancomycin consult **AND** vancomycin - pharmacy to dose    VTE Risk Mitigation (From admission, onward)         Ordered     heparin (porcine) injection 5,000 Units  Every 8 hours      09/11/20 0943     IP VTE HIGH RISK PATIENT  Once      08/30/20 2004     Place sequential compression device  Until discontinued      08/30/20 8466                Assessment/Plan:     Overview/Hospital Course:  Mr. Ed Patton was admitted to hospital medicine on 08/30 for management of a left-sided diabetic foot infection that was precipitated by an undetected punction wound after stepping on a tack/nail. His presentation was notable for leukocytosis with elevated inflammatory markers, however MRI of left foot only showed cellulitis of the anterior and lateral aspect of foot. Podiatry was consulted with recommendations for IV antibiotics; he was initiated on Ciprofloxacin and Vancomycin on admission. However, blood  cultures from admission resulted positive for MRSA with wound cultures from left foot also growing MRSA with Proteus mirabilis. By 09/03 cultures remained positive despite Vancomycin, and patient was noted to develop right knee pain with swelling. Orthopedics was consulted and patient underwent right knee joint aspiration positive for septic arthritis with cultures also positive for MRSA.     LUKAS on 09/04 showed known chronic systolic heart failure, but was negative for endocarditis. MRI of lumbar spine on 09/08 was negative for abscess. By 09/06, blood cultures continued to remain positive, and he underwent I&D of right arm abscess on 09/08 with cultures also positive for MRSA.     For persistent MRSA bacteremia, he was transitioned to Daptomycin and Ceftaroline, however this regimen was discontinued by 09/10 due to concern for developing rhabdomylosis. By 09/09, he was noted to develop a progressively worsening leukocytosis and NANETTE. Nephrology was consulted on 09/09 with suspicion for ATN.     Hematology was consulted on 09/12 for persistent leukocytosis as likely reactive from bacteremia. On 09/10, patient began developing intermitent hypotension prompting broadening of antibiotics to Cefepime and Vancomycin. By 09/12, renal function continued to worsen in addition to hypotension prompting transfer to ICU for vasopressor support and possible HD. Patient found to be encephalopathic, central line placed and started on CRRT overnight on 9/14 with improvement in mental status, but remained encephalopathic. CT head without any acute intracranial process. Vasopressin off AM of 9/15 but remains on levophed, CT abdomen with concern for potential cholecystitis and possible atelectasis with superimposed pneumonia.  Due to the acuity of his illness is he is not a surgical candidate it and ultimately is now status post cholecystostomy tube placement for management of suspected cholecystitis    Additional complications  arising during hospital course include the development metabolic encephalopathy secondary to uremia versus shock versus DKA or overall cerebral hypoperfusion from shock, reported to have agitation prior to step-down from ICU.  Also developed DKA requiring standard therapy with IV insulin infusion, however he was kept on IV insulin due to rising sugars any time it was weaned working on basal bolus regimen for him at this time with the assistance of a endocrinology         Septic shock_Septic arthritis of Right Knee_Sespsi due to MRSA  This is a 65 year old  male with PMH significant for HFrEF and COPD with chronic respiratory failure (on 2L home oxygen), T2DM with CKD III, and iron deficiency anemia who originally presented to [facility] on 08/30 with cellulitis of left foot with concern for diabetic foot infection with subsequent development of MRSA bacteremia attributed to septic arthritis of right knee and elbow. He is status post drainage and coverage for MRSA since that time. His work-up for other etiologies of MRSA bacteremia have included negative LUKAS and MRI of lumbar spine looking for endocarditis and spinal abscess, respectively. Stool studies for C.diff on 09/11 were negative. His hospital course has been associated with worsening hypotension and leukocytosis since 09/10 prompting ICU admission on 09/12 for initiation of vasopressor support. Infectious work-up thus far on ICU admission concern for likely right lower lobe HAP.      CT abdomen pelvis on 9/15 with gallbladder dilation, sludge, and trace pericholic fluid collection. Exam not consistent with cholecystitis but given persistent shock potential source. Also with potential right lower lobe atelectasis with overlying infection  S/p ICU stepdown  -CK levels were rising so spoke with infectious disease and will need to switch patient back to Vancomycin. - Consult order placed.     Type 2 DM with DKA  -endocrinology following pt kept on  transitional drip but has since been weaned to a basal bolus regimen.       Postablative hypothyroidism  Plan: Continue home SYNTHROID    Acute kidney injury  -likely ATN from Shock s/p requiring RRT in ICU  -trialysis line removed   -pt is non-oliguric  -nephrology recommends sodium bicarb tabs - order placed.   -Nephrology recommends ambulatory f/u on discharge     Chronic systolic congestive heart failure  -Most recent ECHO in 09/2020 with EF of 25%.   -Unclear is ischemic vs non-ischemic.   -Home regimen: Carvedilol, Lasix 80 mg, and Lisinopril.    >due to NANETTE lasix and Lisinopril on hold, but he is recovering renal function and prior to discharge to any facility will likely need some amount of diuretic re-initiated and potentially a much lower doseage of Ace-inhibitor.   -Associated with chronic hypoxemic respiratory failure requiring 2L nasal cannula, but noted to develop worsening oxygen requirements on ICU admission that were likely multifactorial from suspected hospital acquired pneumonia versus declining urine output with pre-disposition to volume overload. .      COPD mixed type  -Based on PFT's from 05/2015 showing mild (small airways) obstruction, airflow not improved after bronchodilator, and moderate restriction.    Acute on chronic respiratory failure with hypoxia  -History of mixed COPD (based on PFT's from 05/2015 showing mild (small airways) obstruction, airflow not improved after bronchodilator, and moderate restriction) and HFrEF with chronic respiratory failure on 2L home oxygen.   -ICU admission notable for progressive increase in supplemental oxygen requirements.   -Work-up for underlying etiology of acute on chronic respiratory failure include CXR showing concern for possible progression of CHF vs HAP (not entirely convinced that CXR showed consolidation)  - Breathing back to baseline now to 2L NC    Code Status: Full Code    High Risk Conditions:  Patient has a condition that poses threat  to life and bodily function: Septic shock, MRSA bacteremia  Patient is currently on drug therapy requiring intensive monitoring for toxicity: Daptomycin.     Discharge Planning   EDY: 9/25/2020     Code Status: Full Code   Is the patient medically ready for discharge?: No    Reason for patient still in hospital (select all that apply): Patient trending condition  Discharge Plan A: Skilled Nursing Facility   Discharge Delays: (!) Change in Medical Condition      Active Hospital Problems    Diagnosis  POA    *Septic shock due to methicillin resistant Staphylococcus aureus [A41.02, R65.21]  Yes     Priority: 1 - High    MRSA bacteremia [R78.81]  Yes     Priority: 1 - High    Acute renal failure with acute tubular necrosis superimposed on stage 3 chronic kidney disease [N17.0, N18.3]  No     Priority: 2     Septic arthritis of knee, right [M00.9]  Yes     Priority: 2     Diabetic ulcer of left foot associated with type 2 diabetes mellitus, with fat layer exposed [E11.621, L97.522]  Yes     Priority: 3     Diabetic foot infection [E11.628, L08.9]  Yes     Priority: 3     Acute metabolic encephalopathy [G93.41]  No     Priority: 4     Acute on chronic respiratory failure with hypoxia [J96.21]  No     Priority: 5     Cellulitis of left lower extremity [L03.116]  Unknown    PVD (peripheral vascular disease) [I73.9]  Unknown    Acute renal insufficiency [N28.9]  Yes    Hospital-acquired pneumonia [J18.9, Y95]  No    Debility [R53.81]  Yes    Staphylococcal arthritis of right knee [M00.061]  Yes    COPD mixed type [J44.9]  Yes     Chronic    Postablative hypothyroidism [E89.0]  Yes     Chronic    Hyperlipidemia [E78.5]  Yes     High ASCVD with CAD, elevated A1c      Type 2 diabetes mellitus with stage 3 chronic kidney disease, with long-term current use of insulin [E11.22, N18.3, Z79.4]  Not Applicable     GFR> 60, uncontrolled DM      Chronic systolic congestive heart failure [I50.22]  Yes     Chronic      EF 35% in 2015 echo, improved in 5/2018. Patient with mixed ischemic and non-ischemic cardiomyopathy        Resolved Hospital Problems    Diagnosis Date Resolved POA    Endocarditis [I38] 09/12/2020 Yes    Sepsis due to methicillin resistant Staphylococcus aureus [A41.02] 09/13/2020 Yes    Type 2 diabetes mellitus with ketoacidosis without coma, with long-term current use of insulin [E11.10, Z79.4] 09/18/2020 Not Applicable     Novolin 70/30 40U in AM, 30U in PM. Elevated A1c                   Thomas Reed M.D.  Attending Physician  [facility] Medicine Dept.  Pager: [phone]  [facility]  [phone]

## 2020-09-24 NOTE — PROGRESS NOTES
"  [facility]  Adult Nutrition  Consult Note    SUMMARY     Recommendations    1. Continue current Diabetic diet, add Boost Glucose Control ONS if PO intake consistently < 50%.  2. RD to monitor & follow-up.    Goals: Meet % EEN, EPN by RD f/u date  Nutrition Goal Status: goal met  Communication of RD Recs: reviewed with RN    Reason for Assessment    Reason For Assessment: RD follow-up  Diagnosis: diabetes diagnosis/complications(diabetic foot infection)  Relevant Medical History: DM2, CHF, DCM, CAD, COPD, HLD  Interdisciplinary Rounds: did not attend    General Information Comments: Pt sleeping soundly at time of visit. Per RN documentation, pt tolerating diet w/ varying PO intake (%). TFs discontinued. Pt with good appetite, no wt changes PTA. -250# > 1 year. NFPE not warranted, pt with no indicators of malnutrition at this time. Diabetic diet education complete 9/4.  Nutrition Discharge Planning: Adequate PO intake    Nutrition/Diet History    Spiritual, Cultural Beliefs, Hinduism Practices, Values that Affect Care: no  Factors Affecting Nutritional Intake: decreased appetite    Anthropometrics    Temp: 98.1 °F (36.7 °C)  Height Method: Stated  Height: 6' 4" (193 cm)  Height (inches): 76 in  Weight Method: Bed Scale  Weight: 115.3 kg (254 lb 3.1 oz)  Weight (lb): 254.19 lb  Ideal Body Weight (IBW), Male: 202 lb  % Ideal Body Weight, Male (lb): 125.84 %  BMI (Calculated): 31  BMI Grade: 30 - 34.9- obesity - grade I    Lab/Procedures/Meds    Pertinent Labs Reviewed: reviewed  Pertinent Labs Comments: BUN 27, Creat 1.7, GFR 48.5  Pertinent Medications Reviewed: reviewed  Pertinent Medications Comments: -    Estimated/Assessed Needs    Weight Used For Calorie Calculations: 115.3 kg (254 lb 3.1 oz)     Energy Calorie Requirements (kcal): 2454 kcal/d  Energy Need Method: [place]-St Jeor((x1.2))     Protein Requirements: 115-138 g/d (1-1.2 g/kg)  Weight Used For Protein " Calculations: 114.8 kg (253 lb 1.4 oz)     Fluid Requirements (mL): per MD or 1 mL/kcal  RDA Method (mL): 2454     CHO Requirement: 315g/d    Nutrition Prescription Ordered    Current Diet Order: 2000 kcal ADA  Current Nutrition Support Formula Ordered: Other (Comment)(Discontinued)    Evaluation of Received Nutrient/Fluid Intake    Comments: LBM: 9/20    Tolerance: tolerating    Nutrition Risk    Level of Risk/Frequency of Follow-up: (1x/week)     Assessment and Plan    Nutrition Problem  Inadequate energy intake     Related to (etiology):   Decreased ability to consume sufficient energy     Signs and Symptoms (as evidenced by):   NPO with no alternate means of nutrition at this time     Interventions (treatment strategy):  Collaboration with other providers     Nutrition Diagnosis Status:   Resolved     Monitor and Evaluation    Food and Nutrient Intake: energy intake, food and beverage intake, enteral nutrition intake  Food and Nutrient Adminstration: diet order, enteral and parenteral nutrition administration  Knowledge/Beliefs/Attitudes: food and nutrition knowledge/skill  Physical Activity and Function: nutrition-related ADLs and IADLs  Anthropometric Measurements: weight, weight change  Biochemical Data, Medical Tests and Procedures: lipid profile, inflammatory profile, glucose/endocrine profile, gastrointestinal profile, electrolyte and renal panel  Nutrition-Focused Physical Findings: overall appearance     Nutrition Follow-Up    RD Follow-up?: Yes

## 2020-09-24 NOTE — ASSESSMENT & PLAN NOTE
BG goal 140 - 180   BG trending down below goal ranges throughout the day.     -  Decrease Levemir to 28 units daily.   -  Decrease novolog to 5 units TDWM. 20% dose decrease   -  Low Dose SQ Insulin Correction Scale. Given kidney function.   -  BG Monitoring AC/HS     ** Please call Endocrine for any BG related issues **  ** Please notify Endocrine for any change and/or advance in diet**    Discharge Planning:   TBD. Please notify endocrinology prior to discharge.

## 2020-09-24 NOTE — SUBJECTIVE & OBJECTIVE
"Interval HPI:   Overnight events: BG at or below goal ranges on current SQ insulin regimen. Creatinine 1.7.   Eatin%  Nausea: No  Hypoglycemia and intervention: No  Fever: No  TPN and/or TF: No  If yes, type of TF/TPN and rate: none    /86 (BP Location: Right arm, Patient Position: Lying)   Pulse 103   Temp 98.3 °F (36.8 °C) (Oral)   Resp 16   Ht 6' 4" (1.93 m)   Wt 115.3 kg (254 lb 3.1 oz)   SpO2 96%   BMI 30.94 kg/m²     Labs Reviewed and Include    Recent Labs   Lab 20  0515   *   CALCIUM 8.2*   ALBUMIN 1.6*  1.6*   PROT 6.8      K 3.7   CO2 27      BUN 27*   CREATININE 1.7*   ALKPHOS 94   ALT 40   AST 50*   BILITOT 0.5     Lab Results   Component Value Date    WBC 13.90 (H) 2020    HGB 8.0 (L) 2020    HCT 27.3 (L) 2020    MCV 90 2020     (H) 2020     No results for input(s): TSH, FREET4 in the last 168 hours.  Lab Results   Component Value Date    HGBA1C 9.5 (H) 2020       Nutritional status:   Body mass index is 30.94 kg/m².  Lab Results   Component Value Date    ALBUMIN 1.6 (L) 2020    ALBUMIN 1.6 (L) 2020    ALBUMIN 1.6 (L) 2020     No results found for: PREALBUMIN    Estimated Creatinine Clearance: 61.8 mL/min (A) (based on SCr of 1.7 mg/dL (H)).    Accu-Checks  Recent Labs     20  0127 20  1120 20  1733 20  2146 20  0230 20  0749 20  1134 20  1659 20  2103 20  0721   POCTGLUCOSE 240* 220* 223* 213* 167* 188* 168* 248* 189* 157*       Current Medications and/or Treatments Impacting Glycemic Control  Immunotherapy:    Immunosuppressants     None        Steroids:   Hormones (From admission, onward)    Start     Stop Route Frequency Ordered    20  melatonin tablet 6 mg      -- Oral Nightly PRN 20        Pressors:    Autonomic Drugs (From admission, onward)    None        Hyperglycemia/Diabetes Medications: "   Antihyperglycemics (From admission, onward)    Start     Stop Route Frequency Ordered    09/24/20 0900  insulin detemir U-100 pen 30 Units      -- SubQ Daily 09/23/20 1106    09/23/20 1645  insulin aspart U-100 pen 6 Units      -- SubQ 3 times daily with meals 09/23/20 1517    09/22/20 1738  insulin aspart U-100 pen 1-10 Units      -- SubQ Before meals & nightly PRN 09/22/20 1638

## 2020-09-24 NOTE — PLAN OF CARE
VSS. AAO x 4. Pt complained of pain around 1500. Gave oxycodone 5 mg. Relief obtained. Pt will have an angio tomorrow, npo after midnight. Pt safety maintained. WCTM.

## 2020-09-24 NOTE — CONSULTS
Podiatry service  09/24/2020    Patient is already being managed by podiatry inpatient. Please refer to podiatry consult note dated 8/31 and recent progress note from 9/18. Podiatry will continue to follow.      Nita Faulkner DPM  [facility]  Podiatry PGY2  Pager: [phone]  Spectra:[phone]

## 2020-09-24 NOTE — PLAN OF CARE
POC reviewed w/ pt, questions and concerns addressed. No acute events thru the night. All vital signs stable. Pt c/o pain, gave PRN OXY, full relief achieved and pt slept after. PT voiced concerns of the upcoming angio today, stating that he did not want to go thru with it.Called Dr. Alexandre, on call vascular surgeon, he stated to continue with plan for surgery in am until vascular team could be consulted. Team came to bedside and ok with him denying the procedure. PT is pleased with the decision and is ready to eat.  AOX4. Safety maintained. Bed locked, in lowest position, call light in reach, side rails x2. Mobilized pt to highest level of functioning. See doc flowsheets for further info. Will continue to monitor.

## 2020-09-24 NOTE — PROGRESS NOTES
Pharmacokinetic Assessment Follow Up: IV Vancomycin    Vancomycin serum concentration assessment(s):    Vancomycin concentration = 14 mcg/mL. Represents a 14 hour level.  Scr trending down.    Vancomycin Regimen Plan:  1. Vancomycin 1750 mg IV q24h  2. Trough prior to dose due @ 1200 on 9/26/20  3. Goal trough = 15-20 mcg/mL    Drug levels (last 3 results):  Recent Labs   Lab Result Units 09/24/20  0515   Vancomycin, Random ug/mL 14.0       Thank you for the consult, will continue to follow  Bautista MichelD., BCPS  [phone]       Patient brief summary:  Ed Patton is a 65 y.o. male initiated on antimicrobial therapy with IV Vancomycin for treatment of MRSA osteomyelitis     Drug Allergies:   Review of patient's allergies indicates:   Allergen Reactions    Vicodin [hydrocodone-acetaminophen] Itching       Actual Body Weight:   115.3 kg    Renal Function:   Estimated Creatinine Clearance: 61.8 mL/min (A) (based on SCr of 1.7 mg/dL (H)).,     CBC (last 72 hours):  Recent Labs   Lab Result Units 09/22/20 0410 09/23/20 0300 09/24/20  0515   WBC K/uL 17.04* 14.68* 13.90*   Hemoglobin g/dL 7.7* 8.2* 8.0*   Hematocrit % 25.8* 27.5* 27.3*   Platelets K/uL 315 338 379*   Gran% % 77.1* 75.2* 76.6*   Lymph% % 7.2* 6.5* 8.1*   Mono% % 8.7 11.4 9.2   Eosinophil% % 4.0 4.7 4.6   Basophil% % 0.3 0.3 0.4   Differential Method  Automated Automated Automated       Metabolic Panel (last 72 hours):  Recent Labs   Lab Result Units 09/22/20 0410 09/23/20  0300 09/24/20  0515   Sodium mmol/L 135* 136 136   Potassium mmol/L 4.2 4.3 3.7   Chloride mmol/L 99 102 101   CO2 mmol/L 23 21* 27   Glucose mg/dL 200* 146* 121*   BUN, Bld mg/dL 35* 31* 27*   Creatinine mg/dL 2.1* 1.9* 1.7*   Albumin g/dL 1.5* 1.6* 1.6*  1.6*   Total Bilirubin mg/dL  --   --  0.5   Alkaline Phosphatase U/L  --   --  94   AST U/L  --   --  50*   ALT U/L  --   --  40   Magnesium mg/dL 1.4* 1.4* 1.9   Phosphorus mg/dL 2.9 2.5* 2.3*       Vancomycin  Administrations:  vancomycin given in the last 96 hours                   vancomycin 1.75 g in 5 % dextrose 500 mL IVPB (mg) 1,750 mg New Bag 09/23/20 1502                Microbiologic Results:  Microbiology Results (last 7 days)     Procedure Component Value Units Date/Time    Fungus culture [[id number]] Collected: 09/07/20 1721    Order Status: Completed Specimen: Body Fluid from Knee, Right Updated: 09/24/20 0730     Fungus (Mycology) Culture Culture in progress      No fungus isolated after 2 weeks    Narrative:      1) Right Knee Joint Fluid    Fungus culture [[id number]] Collected: 09/07/20 1721    Order Status: Completed Specimen: Body Fluid from Knee, Right Updated: 09/24/20 0730     Fungus (Mycology) Culture Culture in progress      No fungus isolated after 2 weeks    Narrative:      2) Right Knee Joint Fluid    Culture, Anaerobe [[id number]] Collected: 09/16/20 1612    Order Status: Completed Specimen: Body Fluid from Gallbladder Updated: 09/22/20 1035     Anaerobic Culture Culture in progress    Aerobic culture [[id number]] Collected: 09/16/20 1612    Order Status: Completed Specimen: Body Fluid from Gallbladder Updated: 09/19/20 1110     Aerobic Bacterial Culture No growth    Blood culture [[id number]] Collected: 09/12/20 2014    Order Status: Completed Specimen: Blood from Peripheral, Forearm, Right Updated: 09/18/20 0612     Blood Culture, Routine No growth after 5 days.    Blood culture [[id number]] Collected: 09/12/20 2015    Order Status: Completed Specimen: Blood from Peripheral, Antecubital, Right Updated: 09/18/20 0612     Blood Culture, Routine No growth after 5 days.

## 2020-09-24 NOTE — PROGRESS NOTES
VASCULAR SURGERY    -Seen and examined today.  Still tachycardic, but improving white count  -Will plan on Left lower extremity angiogram on 9/24 with Dr Lugo  -Case booked, consented  -NPO orders in      Brent Flor MD  General Surgery, PGY-3  [phone]

## 2020-09-24 NOTE — PROGRESS NOTES
"[facility]  Endocrinology  Progress Note    Admit Date: 2020     Reason for Consult: Management of T2DM, Hyperglycemia     Surgical Procedure and Date: n/a    Diabetes diagnosis year:     Home Diabetes Medications:    Novolin 70/30 u-100   40 units in AM with breakfast.    20 units in PM with dinner.     How often checking glucose at home? 1-3 x day   BG readings on regimen: >200  Hypoglycemia on the regimen?  No  Missed doses on regimen?  No    Diabetes Complications include:     Hyperglycemia, Foot ulcer   and Other skin ulcer    Complicating diabetes co morbidities:   CHF, CAD, HTN, HLD      HPI:   Patient is a 65 y.o. male with a diagnosis of acute cholecystitis and septic shock with MRSA complicated by NANETTE requiring CRRT. Endocrinology consulted to manage DM2 during current admission to [facility].     Lab Results   Component Value Date    HGBA1C 9.5 (H) 2020       Interval HPI:   Overnight events: BG at or below goal ranges on current SQ insulin regimen. Creatinine 1.7.   Eatin%  Nausea: No  Hypoglycemia and intervention: No  Fever: No  TPN and/or TF: No  If yes, type of TF/TPN and rate: none    /86 (BP Location: Right arm, Patient Position: Lying)   Pulse 103   Temp 98.3 °F (36.8 °C) (Oral)   Resp 16   Ht 6' 4" (1.93 m)   Wt 115.3 kg (254 lb 3.1 oz)   SpO2 96%   BMI 30.94 kg/m²     Labs Reviewed and Include    Recent Labs   Lab 20  0515   *   CALCIUM 8.2*   ALBUMIN 1.6*  1.6*   PROT 6.8      K 3.7   CO2 27      BUN 27*   CREATININE 1.7*   ALKPHOS 94   ALT 40   AST 50*   BILITOT 0.5     Lab Results   Component Value Date    WBC 13.90 (H) 2020    HGB 8.0 (L) 2020    HCT 27.3 (L) 2020    MCV 90 2020     (H) 2020     No results for input(s): TSH, FREET4 in the last 168 hours.  Lab Results   Component Value Date    HGBA1C 9.5 (H) 2020       Nutritional status:   Body mass index is 30.94 kg/m².  Lab " Results   Component Value Date    ALBUMIN 1.6 (L) 09/24/2020    ALBUMIN 1.6 (L) 09/24/2020    ALBUMIN 1.6 (L) 09/23/2020     No results found for: PREALBUMIN    Estimated Creatinine Clearance: 61.8 mL/min (A) (based on SCr of 1.7 mg/dL (H)).    Accu-Checks  Recent Labs     09/22/20  0127 09/22/20  1120 09/22/20  1733 09/22/20  2146 09/23/20  0230 09/23/20  0749 09/23/20  1134 09/23/20  1659 09/23/20  2103 09/24/20  0721   POCTGLUCOSE 240* 220* 223* 213* 167* 188* 168* 248* 189* 157*       Current Medications and/or Treatments Impacting Glycemic Control  Immunotherapy:    Immunosuppressants     None        Steroids:   Hormones (From admission, onward)    Start     Stop Route Frequency Ordered    08/30/20 2059  melatonin tablet 6 mg      -- Oral Nightly PRN 08/30/20 2004        Pressors:    Autonomic Drugs (From admission, onward)    None        Hyperglycemia/Diabetes Medications:   Antihyperglycemics (From admission, onward)    Start     Stop Route Frequency Ordered    09/24/20 0900  insulin detemir U-100 pen 30 Units      -- SubQ Daily 09/23/20 1106    09/23/20 1645  insulin aspart U-100 pen 6 Units      -- SubQ 3 times daily with meals 09/23/20 1517    09/22/20 1738  insulin aspart U-100 pen 1-10 Units      -- SubQ Before meals & nightly PRN 09/22/20 1638          ASSESSMENT and PLAN    * Septic shock due to methicillin resistant Staphylococcus aureus  Managed per primary team  Avoid hypoglycemia    Optimize BG control to improve wound healing        Type 2 diabetes mellitus with stage 3 chronic kidney disease, with long-term current use of insulin  BG goal 140 - 180   BG trending down below goal ranges throughout the day.     -  Decrease Levemir to 28 units daily.   -  Decrease novolog to 5 units TDWM. 20% dose decrease   -  Low Dose SQ Insulin Correction Scale. Given kidney function.   -  BG Monitoring AC/HS     ** Please call Endocrine for any BG related issues **  ** Please notify Endocrine for any change  and/or advance in diet**    Discharge Planning:   TBD. Please notify endocrinology prior to discharge.          Postablative hypothyroidism  The clearance of many drugs, including antiepileptic, anticoagulant, hypnotic, and opioid drugs, is decreased in hypothyroidism. Thus, drug toxicity may occur if drug dose is not reduced. Thyroid disease may also affect lipid profiles which may then cause increase in insulin resistance.     Recommend patient continue Levothyroxine 75 mcg           Kassidy Salas NP  Endocrinology  [facility]

## 2020-09-24 NOTE — PLAN OF CARE
Recommendations     1. Continue current Diabetic diet, add Boost Glucose Control ONS if PO intake consistently < 50%.  2. RD to monitor & follow-up.     Goals: Meet % EEN, EPN by RD f/u date  Nutrition Goal Status: goal met  Communication of RD Recs: reviewed with RN

## 2020-09-24 NOTE — PT/OT/SLP PROGRESS
Physical Therapy      Patient Name:  Ed Patton   MRN:  [id number]  Admitting Diagnosis:  Septic shock due to methicillin resistant Staphylococcus aureus   Recent Surgery: Procedure(s) (LRB):  ARTHROSCOPY, KNEE, RIGHT  (Right) 17 Days Post-Op  Admit Date: 8/30/2020  Length of Stay: 25 days    Patient not seen today due to Other (Comment). Ed Patton's plan of care and PT goals reviewed on this date and remain appropriate. Will follow-up for progressive mobility 9/25/2020 per PT POC and as appropriate.    Nishi Koehler, PT, DPT  9/24/2020

## 2020-09-24 NOTE — PLAN OF CARE
HELENS. AAO x 4. Vascular came to talk to the pt about getting angio today, pt expresses that he does not want to get it. Diabetic 2000 mai diet ordered. Pt has been tolerating well. No complaints of pain. Pt safety maintained. WCTM.

## 2020-09-24 NOTE — PROGRESS NOTES
VASCULAR SURGERY    Did well overnight.  Expresses this AM that he does not want an angiogram today.  Ordered diet.  Will sign off.  If he changes his mind we will be happy to offer angiogram.  Otherwise, can follow up as an outpatient.  Still would recommend removal of callus on foot.    Brent Flor MD  General Surgery, PGY-3  [phone]

## 2020-09-25 LAB
ALBUMIN SERPL BCP-MCNC: 1.6 G/DL (ref 3.5–5.2)
ANION GAP SERPL CALC-SCNC: 11 MMOL/L (ref 8–16)
BASOPHILS # BLD AUTO: 0.08 K/UL (ref 0–0.2)
BASOPHILS NFR BLD: 0.6 % (ref 0–1.9)
BUN SERPL-MCNC: 22 MG/DL (ref 8–23)
CALCIUM SERPL-MCNC: 8.1 MG/DL (ref 8.7–10.5)
CHLORIDE SERPL-SCNC: 99 MMOL/L (ref 95–110)
CK SERPL-CCNC: 555 U/L (ref 20–200)
CO2 SERPL-SCNC: 26 MMOL/L (ref 23–29)
CREAT SERPL-MCNC: 1.7 MG/DL (ref 0.5–1.4)
DIFFERENTIAL METHOD: ABNORMAL
EOSINOPHIL # BLD AUTO: 0.6 K/UL (ref 0–0.5)
EOSINOPHIL NFR BLD: 4.1 % (ref 0–8)
ERYTHROCYTE [DISTWIDTH] IN BLOOD BY AUTOMATED COUNT: 17.1 % (ref 11.5–14.5)
EST. GFR  (AFRICAN AMERICAN): 48.5 ML/MIN/1.73 M^2
EST. GFR  (NON AFRICAN AMERICAN): 42 ML/MIN/1.73 M^2
GLUCOSE SERPL-MCNC: 89 MG/DL (ref 70–110)
HCT VFR BLD AUTO: 26.8 % (ref 40–54)
HGB BLD-MCNC: 8 G/DL (ref 14–18)
IMM GRANULOCYTES # BLD AUTO: 0.12 K/UL (ref 0–0.04)
IMM GRANULOCYTES NFR BLD AUTO: 0.8 % (ref 0–0.5)
LYMPHOCYTES # BLD AUTO: 1.2 K/UL (ref 1–4.8)
LYMPHOCYTES NFR BLD: 8.3 % (ref 18–48)
MAGNESIUM SERPL-MCNC: 1.6 MG/DL (ref 1.6–2.6)
MCH RBC QN AUTO: 26.7 PG (ref 27–31)
MCHC RBC AUTO-ENTMCNC: 29.9 G/DL (ref 32–36)
MCV RBC AUTO: 89 FL (ref 82–98)
MONOCYTES # BLD AUTO: 1 K/UL (ref 0.3–1)
MONOCYTES NFR BLD: 7.2 % (ref 4–15)
NEUTROPHILS # BLD AUTO: 11.2 K/UL (ref 1.8–7.7)
NEUTROPHILS NFR BLD: 79 % (ref 38–73)
NRBC BLD-RTO: 0 /100 WBC
PHOSPHATE SERPL-MCNC: 2.7 MG/DL (ref 2.7–4.5)
PLATELET # BLD AUTO: 322 K/UL (ref 150–350)
PMV BLD AUTO: 10.8 FL (ref 9.2–12.9)
POCT GLUCOSE: 128 MG/DL (ref 70–110)
POCT GLUCOSE: 218 MG/DL (ref 70–110)
POCT GLUCOSE: 231 MG/DL (ref 70–110)
POCT GLUCOSE: 76 MG/DL (ref 70–110)
POCT GLUCOSE: 95 MG/DL (ref 70–110)
POTASSIUM SERPL-SCNC: 3.6 MMOL/L (ref 3.5–5.1)
RBC # BLD AUTO: 3 M/UL (ref 4.6–6.2)
SODIUM SERPL-SCNC: 136 MMOL/L (ref 136–145)
WBC # BLD AUTO: 14.18 K/UL (ref 3.9–12.7)

## 2020-09-25 PROCEDURE — 97112 NEUROMUSCULAR REEDUCATION: CPT | Mod: HCNC

## 2020-09-25 PROCEDURE — 36415 COLL VENOUS BLD VENIPUNCTURE: CPT | Mod: HCNC

## 2020-09-25 PROCEDURE — 83735 ASSAY OF MAGNESIUM: CPT | Mod: HCNC

## 2020-09-25 PROCEDURE — 63600175 PHARM REV CODE 636 W HCPCS: Mod: HCNC | Performed by: NURSE PRACTITIONER

## 2020-09-25 PROCEDURE — 63600175 PHARM REV CODE 636 W HCPCS: Mod: HCNC | Performed by: HOSPITALIST

## 2020-09-25 PROCEDURE — 97110 THERAPEUTIC EXERCISES: CPT | Mod: HCNC

## 2020-09-25 PROCEDURE — 99232 PR SUBSEQUENT HOSPITAL CARE,LEVL II: ICD-10-PCS | Mod: HCNC,,, | Performed by: NURSE PRACTITIONER

## 2020-09-25 PROCEDURE — 25000003 PHARM REV CODE 250: Mod: HCNC | Performed by: STUDENT IN AN ORGANIZED HEALTH CARE EDUCATION/TRAINING PROGRAM

## 2020-09-25 PROCEDURE — 63600175 PHARM REV CODE 636 W HCPCS: Mod: HCNC | Performed by: INTERNAL MEDICINE

## 2020-09-25 PROCEDURE — 82550 ASSAY OF CK (CPK): CPT | Mod: HCNC

## 2020-09-25 PROCEDURE — 99232 PR SUBSEQUENT HOSPITAL CARE,LEVL II: ICD-10-PCS | Mod: HCNC,,, | Performed by: INTERNAL MEDICINE

## 2020-09-25 PROCEDURE — 97530 THERAPEUTIC ACTIVITIES: CPT | Mod: HCNC

## 2020-09-25 PROCEDURE — 80069 RENAL FUNCTION PANEL: CPT | Mod: HCNC

## 2020-09-25 PROCEDURE — 99232 SBSQ HOSP IP/OBS MODERATE 35: CPT | Mod: HCNC,,, | Performed by: INTERNAL MEDICINE

## 2020-09-25 PROCEDURE — 25000003 PHARM REV CODE 250: Mod: HCNC | Performed by: HOSPITALIST

## 2020-09-25 PROCEDURE — 85025 COMPLETE CBC W/AUTO DIFF WBC: CPT | Mod: HCNC

## 2020-09-25 PROCEDURE — 20600001 HC STEP DOWN PRIVATE ROOM: Mod: HCNC

## 2020-09-25 PROCEDURE — 99232 SBSQ HOSP IP/OBS MODERATE 35: CPT | Mod: HCNC,,, | Performed by: NURSE PRACTITIONER

## 2020-09-25 RX ORDER — INSULIN ASPART 100 [IU]/ML
4 INJECTION, SOLUTION INTRAVENOUS; SUBCUTANEOUS
Status: DISCONTINUED | OUTPATIENT
Start: 2020-09-25 | End: 2020-09-29

## 2020-09-25 RX ADMIN — SODIUM BICARBONATE 1300 MG: 650 TABLET ORAL at 10:09

## 2020-09-25 RX ADMIN — FLUTICASONE PROPIONATE 50 MCG: 50 SPRAY, METERED NASAL at 08:09

## 2020-09-25 RX ADMIN — HYPROMELLOSE 2910 1 DROP: 5 SOLUTION OPHTHALMIC at 08:09

## 2020-09-25 RX ADMIN — SODIUM BICARBONATE 1300 MG: 650 TABLET ORAL at 08:09

## 2020-09-25 RX ADMIN — INSULIN ASPART 4 UNITS: 100 INJECTION, SOLUTION INTRAVENOUS; SUBCUTANEOUS at 12:09

## 2020-09-25 RX ADMIN — LEVOTHYROXINE SODIUM 75 MCG: 25 TABLET ORAL at 05:09

## 2020-09-25 RX ADMIN — INSULIN ASPART 2 UNITS: 100 INJECTION, SOLUTION INTRAVENOUS; SUBCUTANEOUS at 05:09

## 2020-09-25 RX ADMIN — INSULIN ASPART 4 UNITS: 100 INJECTION, SOLUTION INTRAVENOUS; SUBCUTANEOUS at 05:09

## 2020-09-25 RX ADMIN — VANCOMYCIN HYDROCHLORIDE 1750 MG: 750 INJECTION, POWDER, LYOPHILIZED, FOR SOLUTION INTRAVENOUS at 11:09

## 2020-09-25 RX ADMIN — HEPARIN SODIUM 5000 UNITS: 5000 INJECTION INTRAVENOUS; SUBCUTANEOUS at 05:09

## 2020-09-25 RX ADMIN — CARVEDILOL 6.25 MG: 6.25 TABLET, FILM COATED ORAL at 08:09

## 2020-09-25 RX ADMIN — HYPROMELLOSE 2910 1 DROP: 5 SOLUTION OPHTHALMIC at 02:09

## 2020-09-25 RX ADMIN — CARVEDILOL 6.25 MG: 6.25 TABLET, FILM COATED ORAL at 10:09

## 2020-09-25 RX ADMIN — HEPARIN SODIUM 5000 UNITS: 5000 INJECTION INTRAVENOUS; SUBCUTANEOUS at 02:09

## 2020-09-25 RX ADMIN — FLUTICASONE FUROATE AND VILANTEROL TRIFENATATE 1 PUFF: 200; 25 POWDER RESPIRATORY (INHALATION) at 08:09

## 2020-09-25 RX ADMIN — HYPROMELLOSE 2910 1 DROP: 5 SOLUTION OPHTHALMIC at 10:09

## 2020-09-25 RX ADMIN — HEPARIN SODIUM 5000 UNITS: 5000 INJECTION INTRAVENOUS; SUBCUTANEOUS at 10:09

## 2020-09-25 NOTE — PROGRESS NOTES
"[facility]  Endocrinology  Progress Note    Admit Date: 2020     Reason for Consult: Management of T2DM, Hyperglycemia     Surgical Procedure and Date: n/a    Diabetes diagnosis year:     Home Diabetes Medications:    Novolin 70/30 u-100   40 units in AM with breakfast.    20 units in PM with dinner.     How often checking glucose at home? 1-3 x day   BG readings on regimen: >200  Hypoglycemia on the regimen?  No  Missed doses on regimen?  No    Diabetes Complications include:     Hyperglycemia, Foot ulcer   and Other skin ulcer    Complicating diabetes co morbidities:   CHF, CAD, HTN, HLD      HPI:   Patient is a 65 y.o. male with a diagnosis of acute cholecystitis and septic shock with MRSA complicated by NANETTE requiring CRRT. Endocrinology consulted to manage DM2 during current admission to [facility].     Lab Results   Component Value Date    HGBA1C 9.5 (H) 2020       Interval HPI:   Overnight events: BG below goal ranges on current SQ insulin regimen. Creatinine 1.7.  Eatin%  Nausea: No  Hypoglycemia and intervention: Yes, BG (53 at 2100). Received orange juice and crackers with appropriate BG response (BG 93).   Fever: No  TPN and/or TF: No  If yes, type of TF/TPN and rate: none    /86 (BP Location: Right arm, Patient Position: Lying)   Pulse (!) 119   Temp 98.6 °F (37 °C) (Oral)   Resp (!) 21   Ht 6' 4" (1.93 m)   Wt 115.3 kg (254 lb 3.1 oz)   SpO2 95%   BMI 30.94 kg/m²     Labs Reviewed and Include    Recent Labs   Lab 20  0459   GLU 89   CALCIUM 8.1*   ALBUMIN 1.6*      K 3.6   CO2 26   CL 99   BUN 22   CREATININE 1.7*     Lab Results   Component Value Date    WBC 14.18 (H) 2020    HGB 8.0 (L) 2020    HCT 26.8 (L) 2020    MCV 89 2020     2020     No results for input(s): TSH, FREET4 in the last 168 hours.  Lab Results   Component Value Date    HGBA1C 9.5 (H) 2020       Nutritional status:   Body mass index " is 30.94 kg/m².  Lab Results   Component Value Date    ALBUMIN 1.6 (L) 09/25/2020    ALBUMIN 1.6 (L) 09/24/2020    ALBUMIN 1.6 (L) 09/24/2020     No results found for: PREALBUMIN    Estimated Creatinine Clearance: 61.8 mL/min (A) (based on SCr of 1.7 mg/dL (H)).    Accu-Checks  Recent Labs     09/24/20  1139 09/24/20  1246 09/24/20  1628 09/24/20  1651 09/24/20  2126 09/24/20  2140 09/24/20  2214 09/24/20  2244 09/25/20  0138 09/25/20  0744   POCTGLUCOSE 98 71 85 81 53* 51* 68* 93 76 95       Current Medications and/or Treatments Impacting Glycemic Control  Immunotherapy:    Immunosuppressants     None        Steroids:   Hormones (From admission, onward)    Start     Stop Route Frequency Ordered    08/30/20 2059  melatonin tablet 6 mg      -- Oral Nightly PRN 08/30/20 2004        Pressors:    Autonomic Drugs (From admission, onward)    None        Hyperglycemia/Diabetes Medications:   Antihyperglycemics (From admission, onward)    Start     Stop Route Frequency Ordered    09/24/20 1536  insulin aspart U-100 pen 0-5 Units      -- SubQ Before meals & nightly PRN 09/24/20 1436          ASSESSMENT and PLAN    * Septic shock due to methicillin resistant Staphylococcus aureus  Managed per primary team  Avoid hypoglycemia    Optimize BG control to improve wound healing        Type 2 diabetes mellitus with stage 3 chronic kidney disease, with long-term current use of insulin  BG goal 140 - 180   BG below goal ranges overnight.     -  Decrease Levemir to 15 units q HS (50% dose reduction) 0.3 u/kg dosing  -  Decrease novolog to 4 units TDWM. 20% dose decrease   -  Low Dose SQ Insulin Correction Scale. Given kidney function.   -  BG Monitoring AC/HS     ** Please call Endocrine for any BG related issues **  ** Please notify Endocrine for any change and/or advance in diet**    Discharge Planning:   TBD. Please notify endocrinology prior to discharge.          Postablative hypothyroidism  The clearance of many drugs, including  antiepileptic, anticoagulant, hypnotic, and opioid drugs, is decreased in hypothyroidism. Thus, drug toxicity may occur if drug dose is not reduced. Thyroid disease may also affect lipid profiles which may then cause increase in insulin resistance.     Recommend patient continue Levothyroxine 75 mcg           Kassidy Salas NP  Endocrinology  [facility]

## 2020-09-25 NOTE — PLAN OF CARE
VSS. AAO x 4. Pt still refusing angio, wants to focus on the infection first according to previous notes. No complaints of pain. Pt safety maintained. WCTM.

## 2020-09-25 NOTE — PT/OT/SLP PROGRESS
Physical Therapy Treatment    Patient Name:  Ed Patton   MRN:  [id number]    Recommendations:     Discharge Recommendations:  nursing facility, skilled   Discharge Equipment Recommendations: (TBD pending progress)   Barriers to discharge: Decreased caregiver support and decreased functional mobility    Assessment:     Ed Patton is a 65 y.o. male admitted with a medical diagnosis of Septic shock due to methicillin resistant Staphylococcus aureus.  He presents with the following impairments/functional limitations:  weakness, impaired endurance, impaired self care skills, impaired functional mobilty, gait instability, impaired balance, impaired cognition, decreased coordination, decreased upper extremity function, decreased lower extremity function, pain, impaired cardiopulmonary response to activity, orthopedic precautions. Pt tolerated session fairly well but required moderate encouragement and education to participate with therapy. His mobility is being limited due to R knee pain, generalized weakness, impaired situational awareness, and difficulty with sequencing and motor planning. He appeared oriented but was easily distracted and required constant commands and cues to redirect him. He also demo'd inconsistent and delayed responses at times. Max A for sup-sit. EOB balance initially mod A and then progressed to SBA while being able to perform functional activities. Max A x2 for partial sit-stand using RW. Upon d/c, PT still recommends skilled nursing facility to address the above deficits and help him reach his maximum level of independent mobility. He is an excellent candidate because of his potential to continuing showing progress.     Rehab Prognosis: Good; patient would benefit from acute skilled PT services to address these deficits and reach maximum level of function.    Recent Surgery: Procedure(s) (LRB):  ARTHROSCOPY, KNEE, RIGHT  (Right) 18 Days Post-Op    Plan:     During this hospitalization,  "patient to be seen 4 x/week to address the identified rehab impairments via gait training, therapeutic activities, therapeutic exercises, neuromuscular re-education and progress toward the following goals:    · Plan of Care Expires:  10/18/20    Subjective     Chief Complaint: "I didn't get good news today"  Patient/Family Comments/goals: to return home  Pain/Comfort:  · Pain Rating 1: (did not rate)  · Location - Side 1: Right  · Location - Orientation 1: generalized  · Location 1: knee  · Pain Addressed 1: Reposition, Distraction  · Pain Rating Post-Intervention 1: (remained - made worse with mobility)      Objective:     Communicated with RN prior to session.  Patient found HOB elevated with pressure relief boots, pulse ox (continuous), oxygen, blood pressure cuff, telemetry, nephrostomy upon PT entry to room.     General Precautions: Standard, fall, contact   Orthopedic Precautions:LLE weight bearing as tolerated   Braces: (L DARCO shoe)     Functional Mobility:    Bed Mobility  Rolling to R: max A, siderails used, verbal cues for hand placement  Supine to Sit on the R side:  max A, verbal cues for hand and foot placement  Sit to supine: max Ax2  Scoot to HOB in supine: max Ax2  Scoot to EOB in sitting: CGA   Transfers Sit to Stand:  max Ax2, using RW, partial stand, verbal and manual cues for anterior weight shift, base of support, hand placement; blocked R knee and foot and facilitated under arm and hips           AM-PAC 6 CLICK MOBILITY  Turning over in bed (including adjusting bedclothes, sheets and blankets)?: 2  Sitting down on and standing up from a chair with arms (e.g., wheelchair, bedside commode, etc.): 2  Moving from lying on back to sitting on the side of the bed?: 2  Moving to and from a bed to a chair (including a wheelchair)?: 1  Need to walk in hospital room?: 1  Climbing 3-5 steps with a railing?: 1  Basic Mobility Total Score: 9       Therapeutic Activities and Exercises:   -Pt safe to t/f " with therapy and/or Medi-Chair.   -Educated pt on safety with mobility and the importance of mobility to prevent deconditioning.   -Discussed POC and answered all questions within scope of PT practice.   -Pt expressed understanding and agreement.      Sitting Balance (~18 min):   -initially mod A and then progressed to SBA  -posture: kyphotic, slight posterior lean, used UEs on bed for support  -verbal cues for shifting weight forward and putting weight through UE/LEs to help with stability, lifting eyes/head up   -worked on anterior weight shift rocks in order to prepare for sit-to-stand t/f  -educated and demo'd sequence of events for performing sit-to-stand - pt has difficulty with sequencing but able to demo the movements  -able to perform LE exercises    10 reps of LAQ on RLE with sustained HS stretches   pt able to initiate movement but requires minimal assistance; showing an increase in ROM    -Educated pt on performing glute and quad sets while in the bed. Encouraged him to do 10 reps 3x/day.     Patient left HOB elevated with all lines intact and call button in reach..    GOALS:   Multidisciplinary Problems     Physical Therapy Goals        Problem: Physical Therapy Goal    Goal Priority Disciplines Outcome Goal Variances Interventions   Physical Therapy Goal     PT, PT/OT Ongoing, Progressing     Description: Goals to be met by: 10/05/20    Patient will increase functional independence with mobility by performin. Supine to sit with moderate assitance.   2. Sit to supine with moderate assistance  3. Sit to stand transfer with moderate assistance.  4. Gait  x 10 feet with Minimal Assistance using LRAD and maintenance of weight bearing status   5. Lower extremity exercise program x10 with assistance as needed.   6. Sit for 10 minutes with Minimal Assistance while reaching out of JESIKA in all planes to perform functional activities. -MET ()  Sit for 10 minutes with Stand By Assistance while reaching  out of JESIKA in all planes to perform functional activities. - MET (9/25)  Sit for 10 minutes with Davis while reaching out of JESIKA in all planes to perform functional activities.                   Time Tracking:     PT Received On: 09/25/20  PT Start Time: 1048     PT Stop Time: 1117  PT Total Time (min): 29 min     Billable Minutes: Therapeutic Activity 13 and Neuromuscular Re-education 16 (co-tx with OT due to pt's decreased activity tolerance)    Treatment Type: Treatment  PT/PTA: PT     PTA Visit Number: 0     Haley Mejia [facility]  09/25/2020

## 2020-09-25 NOTE — SUBJECTIVE & OBJECTIVE
"Interval HPI:   Overnight events: BG below goal ranges on current SQ insulin regimen. Creatinine 1.7.  Eatin%  Nausea: No  Hypoglycemia and intervention: Yes, BG (53 at 2100). Received orange juice and crackers with appropriate BG response (BG 93).   Fever: No  TPN and/or TF: No  If yes, type of TF/TPN and rate: none    /86 (BP Location: Right arm, Patient Position: Lying)   Pulse (!) 119   Temp 98.6 °F (37 °C) (Oral)   Resp (!) 21   Ht 6' 4" (1.93 m)   Wt 115.3 kg (254 lb 3.1 oz)   SpO2 95%   BMI 30.94 kg/m²     Labs Reviewed and Include    Recent Labs   Lab 20  0459   GLU 89   CALCIUM 8.1*   ALBUMIN 1.6*      K 3.6   CO2 26   CL 99   BUN 22   CREATININE 1.7*     Lab Results   Component Value Date    WBC 14.18 (H) 2020    HGB 8.0 (L) 2020    HCT 26.8 (L) 2020    MCV 89 2020     2020     No results for input(s): TSH, FREET4 in the last 168 hours.  Lab Results   Component Value Date    HGBA1C 9.5 (H) 2020       Nutritional status:   Body mass index is 30.94 kg/m².  Lab Results   Component Value Date    ALBUMIN 1.6 (L) 2020    ALBUMIN 1.6 (L) 2020    ALBUMIN 1.6 (L) 2020     No results found for: PREALBUMIN    Estimated Creatinine Clearance: 61.8 mL/min (A) (based on SCr of 1.7 mg/dL (H)).    Accu-Checks  Recent Labs     20  1139 20  1246 20  1628 20  1651 20  2126 20  2140 20  2214 20  2244 20  0138 20  0744   POCTGLUCOSE 98 71 85 81 53* 51* 68* 93 76 95       Current Medications and/or Treatments Impacting Glycemic Control  Immunotherapy:    Immunosuppressants     None        Steroids:   Hormones (From admission, onward)    Start     Stop Route Frequency Ordered    20  melatonin tablet 6 mg      -- Oral Nightly PRN 20        Pressors:    Autonomic Drugs (From admission, onward)    None        Hyperglycemia/Diabetes Medications: "   Antihyperglycemics (From admission, onward)    Start     Stop Route Frequency Ordered    09/24/20 1536  insulin aspart U-100 pen 0-5 Units      -- SubQ Before meals & nightly PRN 09/24/20 1436

## 2020-09-25 NOTE — PLAN OF CARE
Problem: Occupational Therapy Goal  Goal: Occupational Therapy Goal  Description: Goals to be met by: 10/18/20    Patient will increase functional independence with ADLs by performing:    Feeding with Set-up Assistance.  Grooming while EOB with Minimal Assistance.  Toileting from bedside commode with Moderate Assistance for hygiene and clothing management.   Supine to sit with Moderate Assistance to increased bed mobility independence.   Stand pivot transfers with Moderate Assistance and maintaining weight-bearing precaution(s) to reach bedside chair.  Toilet transfer to bedside commode with Moderate Assistance and maintaining weight-bearing precaution(s).  Upper extremity exercise program x15 reps per handout, with supervision to improve BUE function to increase independence in daily occupations.    Outcome: Ongoing, Progressing   INOCENCIO Marquez

## 2020-09-25 NOTE — PT/OT/SLP PROGRESS
"Occupational Therapy   Treatment    Name: Ed Patton  MRN: [id number]  Admitting Diagnosis:  Septic shock due to methicillin resistant Staphylococcus aureus  18 Days Post-Op    Recommendations:     Discharge Recommendations: nursing facility, skilled  Discharge Equipment Recommendations:  (TBD pending progress)  Barriers to discharge:  Other (Comment)(Increased assistance at current functional level)    Assessment:     Ed Patton is a 65 y.o. male with a medical diagnosis of Septic shock due to methicillin resistant Staphylococcus aureus.  He presents with the following performance deficits affecting function: weakness, impaired balance, impaired endurance, impaired self care skills, impaired functional mobilty, gait instability, decreased lower extremity function, decreased upper extremity function, decreased coordination, pain, impaired cardiopulmonary response to activity, decreased ROM, orthopedic precautions. Pt needed max encouragement to participate in therapy session due to pt depressed about news received from MD. Pt completed bed mobility sup <>sit with max A, lateral scooting on EOB with min A, sit <>stand transfer with max A x 2 persons with RW, and anterior weight shifting on EOB with CGA.  At this time, OT is recommending pt d/c to SNF due to pt's increased assistance required to complete ADLs and functional mobility safely. Pt should continue receiving skilled OT services to promote independence and safety during completion of functional mobility and ADL activities.       Rehab Prognosis:  Good; patient would benefit from acute skilled OT services to address these deficits and reach maximum level of function.       Plan:     Patient to be seen 3 x/week to address the above listed problems via self-care/home management, therapeutic activities, therapeutic exercises, neuromuscular re-education  · Plan of Care Expires: 10/08/20  · Plan of Care Reviewed with: patient    Subjective     "I'm having a " "bad day today"    Pain/Comfort:  · Pain Rating 1: (R knee pain; did not rate pain)  · Pain Addressed 1: Distraction, Cessation of Activity  · Pain Rating Post-Intervention 1: (R knee pain increased with mobility)    Objective:     Communicated with: RN prior to session.  Patient found supine with pressure relief boots, pulse ox (continuous), oxygen, blood pressure cuff, telemetry, nephrostomy, bed alarm upon OT entry to room.    General Precautions: Standard, fall, contact   Orthopedic Precautions:LLE weight bearing as tolerated   Braces: (L Darco shoe)     Occupational Performance:     Bed Mobility:    · Patient completed Rolling/Turning to Right with maximal assistance  · Patient completed Scooting/Bridging anteriorly towards EOB with minimum assistance  · Patient completed Scooting towards HOB with min A via drawsheet transfer x 2 persons  · Pt able to reach overhead for bed rails to assist with pulling towards HOB  · Patient completed Scooting/Bridging laterally along EOB with min A  · Assistance provided for guiding hips  · Verbal cues provided for technique   · Patient completed Supine to Sit with maximal assistance  · Assistance provided for BLE management and trunk elevation  · Patient completed Sit to Supine with maximal assistance   · Assistance provided for BLE management and trunk descent    Functional Mobility/Transfers:  · Patient completed Sit <> Stand Transfer from EOB with maximal assistance x 2 persons with rolling walker   · Pt unable to achieve full upright posture but able to clear hips   · Max encouragement provided  · Pt declined trial 2 of sit<>stand transfer due to self limiting behaviors and increased anxiety. Then, pt completed anterior weight shifting exercises to prepare for sit<>stand transfers in upcoming therapy session.    Activities of Daily Living:  · Therapy session focused on bed mobility   · Max A for donning L Darco shoe while sitting EOB    [facility] 6 Click ADL: 12    Treatment & " Education:  -Pt educated on role of OT, POC, and goals for therapy  -Pt educated on importance of OOB activities to improve activity tolerance to maintain functional strength  -Pt educated on technique for sit <>stand transfers  -Pt educated on BUE arm exercises to complete daily (chest press, shoulder flexion, horizontal abduction/ adduction)  -Pt verbalized understanding and expressed no further questions or concerns  -Whiteboard updated       Patient left HOB elevated with all lines intact, call button in reach and bed alarm onEducation:      GOALS:   Multidisciplinary Problems     Occupational Therapy Goals        Problem: Occupational Therapy Goal    Goal Priority Disciplines Outcome Interventions   Occupational Therapy Goal     OT, PT/OT Ongoing, Progressing    Description: Goals to be met by: 10/18/20    Patient will increase functional independence with ADLs by performing:    Feeding with Set-up Assistance.  Grooming while EOB with Minimal Assistance.  Toileting from bedside commode with Moderate Assistance for hygiene and clothing management.   Supine to sit with Moderate Assistance to increased bed mobility independence.   Stand pivot transfers with Moderate Assistance and maintaining weight-bearing precaution(s) to reach bedside chair.  Toilet transfer to bedside commode with Moderate Assistance and maintaining weight-bearing precaution(s).  Upper extremity exercise program x15 reps per handout, with supervision to improve BUE function to increase independence in daily occupations.                     Time Tracking:     OT Date of Treatment: 09/25/20  OT Start Time: 1047  OT Stop Time: 1117  OT Total Time (min): 30 min (Co-treat with PT)    Billable Minutes:Therapeutic Activity 15  Therapeutic Exercise 15    INOCENCIO Marquez  9/25/2020

## 2020-09-25 NOTE — CARE UPDATE
Rapid Response Nurse Chart Check     Chart check completed, abnormal VS noted. Please call [phone] for further concerns or assistance.

## 2020-09-25 NOTE — ASSESSMENT & PLAN NOTE
65 y.o M with PMHx of Type 2 DM admitted for MRSA septicemia. Tachycardic and hypoxic (on 2 L/min oxygen). WBCs improving. left plantar foot wound and right 1st digit wound. No signs of deep foot infection on X ray, MRI, or nuclear medicine scan. Foot wound superficial and stable and likely not the cause of patient's persistent septicemia. Left ankle peak systolic velocity 35 cm/s which is concerning for risk of nonhealing. Abnormal GINA-PVR.     Plan:  -No surgical intervention from podiatry warranted at this time.   -Ulcers are stable, will need debridement in future following angiogram. Debridement at this time would create larger nonhealing wounds due to poor vascular status.   -Antibiotic management per ID.  -Continue local wound care.   -Rest of care per primary  -Podiatry will continue to follow    DC Instructions:  Patient is to follow up with podiatry within 10 days of discharge.  Podiatry will arrange an appt. Home health/facility to change dressings q MWF as follows: rinse left foot wound with saline, paint wound with betadine and dress foot with 4x4 gauze, kerlix, secure with tape.

## 2020-09-25 NOTE — PROGRESS NOTES
[facility]  Podiatry  Progress Note    Patient Name: Ed Patton  MRN: [id number]  Admission Date: 8/30/2020  Hospital Length of Stay: 26 days  Attending Physician: Keenan Gonzales MD  Primary Care Provider: Qiana Chow MD     Subjective:     Interval History: Patient has refused angiogram for now, wants to focus on infection first, will reconsider after infection is resolved. Vascular surgery signed off. WBCs continue to trend down. No left foot pain. No new pedal complaints.     Follow-up For: Procedure(s) (LRB):  ARTHROSCOPY, KNEE, RIGHT  (Right)    Post-Operative Day: 18 Days Post-Op    Scheduled Meds:   artificial tears  1 drop Both Eyes TID    carvediloL  6.25 mg Oral BID    fluticasone furoate-vilanteroL  1 puff Inhalation Daily    fluticasone propionate  1 spray Each Nostril Daily    heparin (porcine)  5,000 Units Subcutaneous Q8H    insulin aspart U-100  4 Units Subcutaneous TIDWM    levoFLOXacin  750 mg Oral Every other day    levothyroxine  75 mcg Oral Before breakfast    sodium bicarbonate  1,300 mg Oral BID    vancomycin (VANCOCIN) IVPB  15 mg/kg Intravenous Q24H     Continuous Infusions:  PRN Meds:acetaminophen, albuterol-ipratropium, calcium carbonate, dextrose 50%, dextrose 50%, glucagon (human recombinant), glucose, glucose, insulin aspart U-100, melatonin, ondansetron, oxyCODONE, polyethylene glycol, DIPH,PERTUS (ADACEL),TETANUS PF VAC (ADULT), Pharmacy to dose Vancomycin consult **AND** vancomycin - pharmacy to dose    Review of Systems   Constitutional: Positive for activity change. Negative for appetite change, chills and fever.   HENT: Negative for congestion.    Respiratory: Negative for cough and shortness of breath.    Gastrointestinal: Negative for nausea and vomiting.   Musculoskeletal: Positive for arthralgias, back pain and myalgias.   Skin: Positive for wound. Negative for color change.   Neurological: Positive for weakness. Negative for  numbness.   Psychiatric/Behavioral: Negative for behavioral problems and confusion.     Objective:     Vital Signs (Most Recent):  Temp: 98.5 °F (36.9 °C) (09/25/20 1100)  Pulse: 110 (09/25/20 1100)  Resp: 20 (09/25/20 1100)  BP: 117/77 (09/25/20 1100)  SpO2: 95 % (09/25/20 0745) Vital Signs (24h Range):  Temp:  [98.5 °F (36.9 °C)-99.4 °F (37.4 °C)] 98.5 °F (36.9 °C)  Pulse:  [] 110  Resp:  [16-21] 20  SpO2:  [91 %-99 %] 95 %  BP: (117-130)/(75-86) 117/77     Weight: 115.3 kg (254 lb 3.1 oz)  Body mass index is 30.94 kg/m².    Foot Exam    General  Orientation: alert and oriented to person, place, and time   Affect: appropriate       Right Foot/Ankle     Inspection and Palpation  Ecchymosis: none  Tenderness: none   Swelling: none   Skin Exam: skin intact;     Neurovascular  Dorsalis pedis: absent  Posterior tibial: absent  Saphenous nerve sensation: diminished  Tibial nerve sensation: diminished  Superficial peroneal nerve sensation: diminished  Deep peroneal nerve sensation: diminished  Sural nerve sensation: diminished      Left Foot/Ankle      Inspection and Palpation  Ecchymosis: none  Tenderness: (Periwound)  Swelling: none   Skin Exam: dry skin, skin changes, abnormal color and ulcer; no drainage     Neurovascular  Dorsalis pedis: absent  Posterior tibial: absent  Saphenous nerve sensation: diminished  Tibial nerve sensation: diminished  Superficial peroneal nerve sensation: diminished  Deep peroneal nerve sensation: diminished  Sural nerve sensation: diminished            Laboratory:  Blood Cultures: No results for input(s): LABBLOO in the last 48 hours.  CBC:   Recent Labs   Lab 09/25/20  0459   WBC 14.18*   RBC 3.00*   HGB 8.0*   HCT 26.8*      MCV 89   MCH 26.7*   MCHC 29.9*     CMP:   Recent Labs   Lab 09/24/20  0515 09/25/20  0459   * 89   CALCIUM 8.2* 8.1*   ALBUMIN 1.6*  1.6* 1.6*   PROT 6.8  --     136   K 3.7 3.6   CO2 27 26    99   BUN 27* 22   CREATININE 1.7* 1.7*    ALKPHOS 94  --    ALT 40  --    AST 50*  --    BILITOT 0.5  --      CRP:   Recent Labs   Lab 09/21/20  0755   CRP 76.8*     ESR:   Recent Labs   Lab 09/20/20  0432   SEDRATE 110*     Microbiology Results (last 7 days)     Procedure Component Value Units Date/Time    Fungus culture [[id number]] Collected: 09/07/20 1721    Order Status: Completed Specimen: Body Fluid from Knee, Right Updated: 09/24/20 0730     Fungus (Mycology) Culture Culture in progress      No fungus isolated after 2 weeks    Narrative:      1) Right Knee Joint Fluid    Fungus culture [[id number]] Collected: 09/07/20 1721    Order Status: Completed Specimen: Body Fluid from Knee, Right Updated: 09/24/20 0730     Fungus (Mycology) Culture Culture in progress      No fungus isolated after 2 weeks    Narrative:      2) Right Knee Joint Fluid    Culture, Anaerobe [[id number]] Collected: 09/16/20 1612    Order Status: Completed Specimen: Body Fluid from Gallbladder Updated: 09/22/20 1035     Anaerobic Culture Culture in progress    Aerobic culture [[id number]] Collected: 09/16/20 1612    Order Status: Completed Specimen: Body Fluid from Gallbladder Updated: 09/19/20 1110     Aerobic Bacterial Culture No growth        Specimen (12h ago, onward)    None          Diagnostic Results:  None    Clinical Findings:  -Left plantar foot ulcer 3x1.7x0.7 cm, fibronecrotic wound base, no drainage, no undermining, no erythema, no edema, no fluctuance or crepitus. Periwound callus. No fluid could be expressed on manual compression. No tenderness. Not clinically infected.     -Left first digit ulcer 1.0x0.6 cm fibronecrotic base, no drainage, no undermining, no erythema, no edema, no fluctuance or crepitus. Periwound callus. No fluid could be expressed on manual compression. No tenderness. Not clinically infected.                 Assessment/Plan:     Diabetic ulcer of left foot associated with type 2 diabetes mellitus, with fat layer exposed  65 y.o M with PMHx of Type 2  DM admitted for MRSA septicemia. Tachycardic and hypoxic (on 2 L/min oxygen). WBCs improving. left plantar foot wound and right 1st digit wound. No signs of deep foot infection on X ray, MRI, or nuclear medicine scan. Foot wound superficial and stable and likely not the cause of patient's persistent septicemia. Left ankle peak systolic velocity 35 cm/s which is concerning for risk of nonhealing. Abnormal GINA-PVR.     Plan:  -No surgical intervention from podiatry warranted at this time.   -Ulcers are stable, will need debridement in future following angiogram. Debridement at this time would create larger nonhealing wounds due to poor vascular status.   -Antibiotic management per ID.  -Continue local wound care.   -Rest of care per primary  -Podiatry will continue to follow    DC Instructions:  Patient is to follow up with podiatry within 10 days of discharge.  Podiatry will arrange an appt. Home health/facility to change dressings q MWF as follows: rinse left foot wound with saline, paint wound with betadine and dress foot with 4x4 gauze, kerlix, secure with tape.            Mk Cook DPM PGY-1  Podiatric Medicine & Surgery  [facility]

## 2020-09-25 NOTE — PLAN OF CARE
Problem: Physical Therapy Goal  Goal: Physical Therapy Goal  Description: Goals to be met by: 10/05/20    Patient will increase functional independence with mobility by performin. Supine to sit with moderate assitance.   2. Sit to supine with moderate assistance  3. Sit to stand transfer with moderate assistance.  4. Gait  x 10 feet with Minimal Assistance using LRAD and maintenance of weight bearing status   5. Lower extremity exercise program x10 with assistance as needed.   6. Sit for 10 minutes with Minimal Assistance while reaching out of JESIKA in all planes to perform functional activities. -MET ()  Sit for 10 minutes with Stand By Assistance while reaching out of JESIKA in all planes to perform functional activities. - MET ()  Sit for 10 minutes with Sproul while reaching out of JESIKA in all planes to perform functional activities.  Outcome: Ongoing, Progressing    Goals updated to reflect pt's progress. Continue with POC.     Haley Mejia, DARNELL  2020

## 2020-09-25 NOTE — PLAN OF CARE
POC reviewed w/ pt, questions and concerns addressed.Pt had a mild hypoglycemic episode, corrected with orange juice and crackers. Pt drain dressing was changed. Drain site is clean, dry, intact, with no redness or warmth. Drain output of 15 cc for shift.  Pt content with decision to not move forward with angio. No complaints. AOX4. Safety maintained. Bed locked, in lowest position, call light in reach, side rails x2. Mobilized pt to highest level of functioning. See doc flowsheets for further info. Will continue to monitor.

## 2020-09-25 NOTE — ASSESSMENT & PLAN NOTE
BG goal 140 - 180   BG below goal ranges overnight.     -  Decrease Levemir to 15 units q HS (50% dose reduction) 0.3 u/kg dosing  -  Decrease novolog to 4 units TDWM. 20% dose decrease   -  Low Dose SQ Insulin Correction Scale. Given kidney function.   -  BG Monitoring AC/HS     ** Please call Endocrine for any BG related issues **  ** Please notify Endocrine for any change and/or advance in diet**    Discharge Planning:   TBD. Please notify endocrinology prior to discharge.

## 2020-09-25 NOTE — SUBJECTIVE & OBJECTIVE
Subjective:     Interval History: Patient has refused angiogram for now, wants to focus on infection first, will reconsider after infection is resolved. Vascular surgery signed off. WBCs continue to trend down. No left foot pain. No new pedal complaints.     Follow-up For: Procedure(s) (LRB):  ARTHROSCOPY, KNEE, RIGHT  (Right)    Post-Operative Day: 18 Days Post-Op    Scheduled Meds:   artificial tears  1 drop Both Eyes TID    carvediloL  6.25 mg Oral BID    fluticasone furoate-vilanteroL  1 puff Inhalation Daily    fluticasone propionate  1 spray Each Nostril Daily    heparin (porcine)  5,000 Units Subcutaneous Q8H    insulin aspart U-100  4 Units Subcutaneous TIDWM    levoFLOXacin  750 mg Oral Every other day    levothyroxine  75 mcg Oral Before breakfast    sodium bicarbonate  1,300 mg Oral BID    vancomycin (VANCOCIN) IVPB  15 mg/kg Intravenous Q24H     Continuous Infusions:  PRN Meds:acetaminophen, albuterol-ipratropium, calcium carbonate, dextrose 50%, dextrose 50%, glucagon (human recombinant), glucose, glucose, insulin aspart U-100, melatonin, ondansetron, oxyCODONE, polyethylene glycol, DIPH,PERTUS (ADACEL),TETANUS PF VAC (ADULT), Pharmacy to dose Vancomycin consult **AND** vancomycin - pharmacy to dose    Review of Systems   Constitutional: Positive for activity change. Negative for appetite change, chills and fever.   HENT: Negative for congestion.    Respiratory: Negative for cough and shortness of breath.    Gastrointestinal: Negative for nausea and vomiting.   Musculoskeletal: Positive for arthralgias, back pain and myalgias.   Skin: Positive for wound. Negative for color change.   Neurological: Positive for weakness. Negative for numbness.   Psychiatric/Behavioral: Negative for behavioral problems and confusion.     Objective:     Vital Signs (Most Recent):  Temp: 98.5 °F (36.9 °C) (09/25/20 1100)  Pulse: 110 (09/25/20 1100)  Resp: 20 (09/25/20 1100)  BP: 117/77 (09/25/20 1100)  SpO2: 95 %  (09/25/20 0745) Vital Signs (24h Range):  Temp:  [98.5 °F (36.9 °C)-99.4 °F (37.4 °C)] 98.5 °F (36.9 °C)  Pulse:  [] 110  Resp:  [16-21] 20  SpO2:  [91 %-99 %] 95 %  BP: (117-130)/(75-86) 117/77     Weight: 115.3 kg (254 lb 3.1 oz)  Body mass index is 30.94 kg/m².    Foot Exam    General  Orientation: alert and oriented to person, place, and time   Affect: appropriate       Right Foot/Ankle     Inspection and Palpation  Ecchymosis: none  Tenderness: none   Swelling: none   Skin Exam: skin intact;     Neurovascular  Dorsalis pedis: absent  Posterior tibial: absent  Saphenous nerve sensation: diminished  Tibial nerve sensation: diminished  Superficial peroneal nerve sensation: diminished  Deep peroneal nerve sensation: diminished  Sural nerve sensation: diminished      Left Foot/Ankle      Inspection and Palpation  Ecchymosis: none  Tenderness: (Periwound)  Swelling: none   Skin Exam: dry skin, skin changes, abnormal color and ulcer; no drainage     Neurovascular  Dorsalis pedis: absent  Posterior tibial: absent  Saphenous nerve sensation: diminished  Tibial nerve sensation: diminished  Superficial peroneal nerve sensation: diminished  Deep peroneal nerve sensation: diminished  Sural nerve sensation: diminished            Laboratory:  Blood Cultures: No results for input(s): LABBLOO in the last 48 hours.  CBC:   Recent Labs   Lab 09/25/20  0459   WBC 14.18*   RBC 3.00*   HGB 8.0*   HCT 26.8*      MCV 89   MCH 26.7*   MCHC 29.9*     CMP:   Recent Labs   Lab 09/24/20  0515 09/25/20  0459   * 89   CALCIUM 8.2* 8.1*   ALBUMIN 1.6*  1.6* 1.6*   PROT 6.8  --     136   K 3.7 3.6   CO2 27 26    99   BUN 27* 22   CREATININE 1.7* 1.7*   ALKPHOS 94  --    ALT 40  --    AST 50*  --    BILITOT 0.5  --      CRP:   Recent Labs   Lab 09/21/20  0755   CRP 76.8*     ESR:   Recent Labs   Lab 09/20/20  0432   SEDRATE 110*     Microbiology Results (last 7 days)     Procedure Component Value Units Date/Time     Fungus culture [[id number]] Collected: 09/07/20 1721    Order Status: Completed Specimen: Body Fluid from Knee, Right Updated: 09/24/20 0730     Fungus (Mycology) Culture Culture in progress      No fungus isolated after 2 weeks    Narrative:      1) Right Knee Joint Fluid    Fungus culture [[id number]] Collected: 09/07/20 1721    Order Status: Completed Specimen: Body Fluid from Knee, Right Updated: 09/24/20 0730     Fungus (Mycology) Culture Culture in progress      No fungus isolated after 2 weeks    Narrative:      2) Right Knee Joint Fluid    Culture, Anaerobe [[id number]] Collected: 09/16/20 1612    Order Status: Completed Specimen: Body Fluid from Gallbladder Updated: 09/22/20 1035     Anaerobic Culture Culture in progress    Aerobic culture [[id number]] Collected: 09/16/20 1612    Order Status: Completed Specimen: Body Fluid from Gallbladder Updated: 09/19/20 1110     Aerobic Bacterial Culture No growth        Specimen (12h ago, onward)    None          Diagnostic Results:  None    Clinical Findings:  -Left plantar foot ulcer 3x1.7x0.7 cm, fibronecrotic wound base, no drainage, no undermining, no erythema, no edema, no fluctuance or crepitus. Periwound callus. No fluid could be expressed on manual compression. No tenderness. Not clinically infected.     -Left first digit ulcer 1.0x0.6 cm fibronecrotic base, no drainage, no undermining, no erythema, no edema, no fluctuance or crepitus. Periwound callus. No fluid could be expressed on manual compression. No tenderness. Not clinically infected.

## 2020-09-25 NOTE — PLAN OF CARE
09/25/20 1329   Discharge Reassessment   Assessment Type Discharge Planning Reassessment   Discharge Plan A Skilled Nursing Facility   Discharge Plan B Home Health   DME Needed Upon Discharge  other (see comments)  (TBD)   Anticipated Discharge Disposition SNF   Post-Acute Status   Post-Acute Authorization Placement  (SNF)   Post-Acute Placement Status Referrals Sent

## 2020-09-25 NOTE — PHYSICIAN QUERY
PT Name: Ed Patton  MR #: [id number]     Documentation Clarification      CDS/: Dominguez Alicia RN               Contact information:   [email or web address]        This form is a permanent document in the medical record.     Query Date: September 25, 2020    By submitting this query, we are merely seeking further clarification of documentation. Please utilize your independent clinical judgment when addressing the question(s) below.    The Medical Record reflects the following:    Clinical Findings Location in Medical Record   Appearance: He is normal weight.  He is not toxic-appearing.     Appearance: Normal appearance. He is well-developed and overweight. He is ill-appearing    #. Severe protein/calorie malnutrition- Resume nutrition post procedure     8/30 HP, Hospital medicine    9/15 PN, Critical care medicine (CCM)    9/16 PN, CCM, Attestation,   Dr Jolly   Pt with good appetite, no wt changes PTA. -250# > 1 year. 25# wt gain noted since admission, unclear if scale error or fluid, will monitor. NFPE not warranted, pt with no indicators of malnutrition at this time....    Pt with good appetite, no wt changes PTA. -250# > 1 year. NFPE not warranted, pt with no indicators of malnutrition at this time.     Pt with good appetite, no wt changes PTA. -250# > 1 year. NFPE not warranted, pt with no indicators of malnutrition at this time.   Diabetic diet education complete 9/4.   9/4 PN Nutrition        9/17  PN, Nutrition      9/24 PN, Nutrition                                                                                Provider, please clarify the diagnosis of  Severe protein/calorie malnutrition :      [ x  ]   Severe protein/calorie malnutrition Diagnosis is confirmed and additional clinical support/decision-making indicators for the diagnosis include (please specify):________________     [   ] Severe protein/calorie malnutrition diagnosis is not confirmed and/or it has  been ruled out     [   ] Other clarification (please specify): ___________________     [  ] Clinically undetermined

## 2020-09-25 NOTE — NURSING
Pt Bg was 53. Pt c/o blurry vision. Pt appears to be lethargic. PT AO4. Gave 2 containers of orange juice and meena crackers. Rechecked Bg--93. Will continue to monitor.

## 2020-09-26 LAB
ALBUMIN SERPL BCP-MCNC: 1.7 G/DL (ref 3.5–5.2)
ANION GAP SERPL CALC-SCNC: 8 MMOL/L (ref 8–16)
BACTERIA SPEC ANAEROBE CULT: NORMAL
BASOPHILS # BLD AUTO: 0.09 K/UL (ref 0–0.2)
BASOPHILS NFR BLD: 0.7 % (ref 0–1.9)
BUN SERPL-MCNC: 20 MG/DL (ref 8–23)
CALCIUM SERPL-MCNC: 8 MG/DL (ref 8.7–10.5)
CHLORIDE SERPL-SCNC: 100 MMOL/L (ref 95–110)
CO2 SERPL-SCNC: 30 MMOL/L (ref 23–29)
CREAT SERPL-MCNC: 1.7 MG/DL (ref 0.5–1.4)
DIFFERENTIAL METHOD: ABNORMAL
EOSINOPHIL # BLD AUTO: 0.7 K/UL (ref 0–0.5)
EOSINOPHIL NFR BLD: 5.1 % (ref 0–8)
ERYTHROCYTE [DISTWIDTH] IN BLOOD BY AUTOMATED COUNT: 17.2 % (ref 11.5–14.5)
EST. GFR  (AFRICAN AMERICAN): 48.5 ML/MIN/1.73 M^2
EST. GFR  (NON AFRICAN AMERICAN): 42 ML/MIN/1.73 M^2
GLUCOSE SERPL-MCNC: 138 MG/DL (ref 70–110)
HCT VFR BLD AUTO: 26.2 % (ref 40–54)
HGB BLD-MCNC: 7.6 G/DL (ref 14–18)
IMM GRANULOCYTES # BLD AUTO: 0.09 K/UL (ref 0–0.04)
IMM GRANULOCYTES NFR BLD AUTO: 0.7 % (ref 0–0.5)
LYMPHOCYTES # BLD AUTO: 1.2 K/UL (ref 1–4.8)
LYMPHOCYTES NFR BLD: 8.7 % (ref 18–48)
MAGNESIUM SERPL-MCNC: 1.5 MG/DL (ref 1.6–2.6)
MCH RBC QN AUTO: 26.3 PG (ref 27–31)
MCHC RBC AUTO-ENTMCNC: 29 G/DL (ref 32–36)
MCV RBC AUTO: 91 FL (ref 82–98)
MONOCYTES # BLD AUTO: 1.1 K/UL (ref 0.3–1)
MONOCYTES NFR BLD: 8.4 % (ref 4–15)
NEUTROPHILS # BLD AUTO: 10.4 K/UL (ref 1.8–7.7)
NEUTROPHILS NFR BLD: 76.4 % (ref 38–73)
NRBC BLD-RTO: 0 /100 WBC
PHOSPHATE SERPL-MCNC: 3 MG/DL (ref 2.7–4.5)
PLATELET # BLD AUTO: 334 K/UL (ref 150–350)
PMV BLD AUTO: 10.4 FL (ref 9.2–12.9)
POCT GLUCOSE: 130 MG/DL (ref 70–110)
POCT GLUCOSE: 144 MG/DL (ref 70–110)
POCT GLUCOSE: 145 MG/DL (ref 70–110)
POCT GLUCOSE: 157 MG/DL (ref 70–110)
POCT GLUCOSE: 178 MG/DL (ref 70–110)
POCT GLUCOSE: 218 MG/DL (ref 70–110)
POTASSIUM SERPL-SCNC: 4 MMOL/L (ref 3.5–5.1)
RBC # BLD AUTO: 2.89 M/UL (ref 4.6–6.2)
SODIUM SERPL-SCNC: 138 MMOL/L (ref 136–145)
VANCOMYCIN TROUGH SERPL-MCNC: 21.2 UG/ML (ref 10–22)
WBC # BLD AUTO: 13.52 K/UL (ref 3.9–12.7)

## 2020-09-26 PROCEDURE — 85025 COMPLETE CBC W/AUTO DIFF WBC: CPT | Mod: HCNC

## 2020-09-26 PROCEDURE — 99232 SBSQ HOSP IP/OBS MODERATE 35: CPT | Mod: HCNC,,, | Performed by: INTERNAL MEDICINE

## 2020-09-26 PROCEDURE — 36415 COLL VENOUS BLD VENIPUNCTURE: CPT | Mod: HCNC

## 2020-09-26 PROCEDURE — 25000003 PHARM REV CODE 250: Mod: HCNC | Performed by: HOSPITALIST

## 2020-09-26 PROCEDURE — 20600001 HC STEP DOWN PRIVATE ROOM: Mod: HCNC

## 2020-09-26 PROCEDURE — C9399 UNCLASSIFIED DRUGS OR BIOLOG: HCPCS | Mod: HCNC | Performed by: NURSE PRACTITIONER

## 2020-09-26 PROCEDURE — 99232 PR SUBSEQUENT HOSPITAL CARE,LEVL II: ICD-10-PCS | Mod: HCNC,,, | Performed by: NURSE PRACTITIONER

## 2020-09-26 PROCEDURE — 94640 AIRWAY INHALATION TREATMENT: CPT | Mod: HCNC

## 2020-09-26 PROCEDURE — 25000003 PHARM REV CODE 250: Mod: HCNC | Performed by: STUDENT IN AN ORGANIZED HEALTH CARE EDUCATION/TRAINING PROGRAM

## 2020-09-26 PROCEDURE — 83735 ASSAY OF MAGNESIUM: CPT | Mod: HCNC

## 2020-09-26 PROCEDURE — 25000003 PHARM REV CODE 250: Mod: HCNC | Performed by: NURSE PRACTITIONER

## 2020-09-26 PROCEDURE — 80202 ASSAY OF VANCOMYCIN: CPT | Mod: HCNC

## 2020-09-26 PROCEDURE — 99232 SBSQ HOSP IP/OBS MODERATE 35: CPT | Mod: HCNC,,, | Performed by: NURSE PRACTITIONER

## 2020-09-26 PROCEDURE — 51798 US URINE CAPACITY MEASURE: CPT | Mod: HCNC

## 2020-09-26 PROCEDURE — 63600175 PHARM REV CODE 636 W HCPCS: Mod: HCNC | Performed by: INTERNAL MEDICINE

## 2020-09-26 PROCEDURE — 80069 RENAL FUNCTION PANEL: CPT | Mod: HCNC

## 2020-09-26 PROCEDURE — 63600175 PHARM REV CODE 636 W HCPCS: Mod: HCNC | Performed by: HOSPITALIST

## 2020-09-26 PROCEDURE — 99232 PR SUBSEQUENT HOSPITAL CARE,LEVL II: ICD-10-PCS | Mod: HCNC,,, | Performed by: INTERNAL MEDICINE

## 2020-09-26 RX ORDER — LEVOFLOXACIN 750 MG/1
750 TABLET ORAL DAILY
Status: COMPLETED | OUTPATIENT
Start: 2020-09-27 | End: 2020-09-30

## 2020-09-26 RX ADMIN — FLUTICASONE FUROATE AND VILANTEROL TRIFENATATE 1 PUFF: 200; 25 POWDER RESPIRATORY (INHALATION) at 08:09

## 2020-09-26 RX ADMIN — LEVOTHYROXINE SODIUM 75 MCG: 25 TABLET ORAL at 05:09

## 2020-09-26 RX ADMIN — LEVOFLOXACIN 750 MG: 750 TABLET, FILM COATED ORAL at 08:09

## 2020-09-26 RX ADMIN — INSULIN ASPART 4 UNITS: 100 INJECTION, SOLUTION INTRAVENOUS; SUBCUTANEOUS at 11:09

## 2020-09-26 RX ADMIN — HEPARIN SODIUM 5000 UNITS: 5000 INJECTION INTRAVENOUS; SUBCUTANEOUS at 09:09

## 2020-09-26 RX ADMIN — HYPROMELLOSE 2910 1 DROP: 5 SOLUTION OPHTHALMIC at 08:09

## 2020-09-26 RX ADMIN — INSULIN ASPART 2 UNITS: 100 INJECTION, SOLUTION INTRAVENOUS; SUBCUTANEOUS at 05:09

## 2020-09-26 RX ADMIN — INSULIN DETEMIR 10 UNITS: 100 INJECTION, SOLUTION SUBCUTANEOUS at 09:09

## 2020-09-26 RX ADMIN — POLYETHYLENE GLYCOL 3350 17 G: 17 POWDER, FOR SOLUTION ORAL at 09:09

## 2020-09-26 RX ADMIN — CARVEDILOL 6.25 MG: 6.25 TABLET, FILM COATED ORAL at 08:09

## 2020-09-26 RX ADMIN — FLUTICASONE PROPIONATE 50 MCG: 50 SPRAY, METERED NASAL at 08:09

## 2020-09-26 RX ADMIN — INSULIN ASPART 4 UNITS: 100 INJECTION, SOLUTION INTRAVENOUS; SUBCUTANEOUS at 05:09

## 2020-09-26 RX ADMIN — INSULIN ASPART 4 UNITS: 100 INJECTION, SOLUTION INTRAVENOUS; SUBCUTANEOUS at 08:09

## 2020-09-26 RX ADMIN — CARVEDILOL 6.25 MG: 6.25 TABLET, FILM COATED ORAL at 09:09

## 2020-09-26 RX ADMIN — HYPROMELLOSE 2910 1 DROP: 5 SOLUTION OPHTHALMIC at 09:09

## 2020-09-26 RX ADMIN — SODIUM BICARBONATE 1300 MG: 650 TABLET ORAL at 08:09

## 2020-09-26 RX ADMIN — VANCOMYCIN HYDROCHLORIDE 1750 MG: 750 INJECTION, POWDER, LYOPHILIZED, FOR SOLUTION INTRAVENOUS at 12:09

## 2020-09-26 RX ADMIN — HEPARIN SODIUM 5000 UNITS: 5000 INJECTION INTRAVENOUS; SUBCUTANEOUS at 05:09

## 2020-09-26 RX ADMIN — SODIUM BICARBONATE 1300 MG: 650 TABLET ORAL at 09:09

## 2020-09-26 RX ADMIN — HYPROMELLOSE 2910 1 DROP: 5 SOLUTION OPHTHALMIC at 02:09

## 2020-09-26 RX ADMIN — HEPARIN SODIUM 5000 UNITS: 5000 INJECTION INTRAVENOUS; SUBCUTANEOUS at 02:09

## 2020-09-26 RX ADMIN — MELATONIN TAB 3 MG 6 MG: 3 TAB at 09:09

## 2020-09-26 NOTE — ASSESSMENT & PLAN NOTE
BG goal 140 - 180     -  Decrease Levemir to 10 units q HS  -  Continue novolog 4 units TDWM. 20% dose decrease   -  Low Dose SQ Insulin Correction Scale. Given kidney function.   -  BG Monitoring AC/HS     ** Please call Endocrine for any BG related issues **  ** Please notify Endocrine for any change and/or advance in diet**    Discharge Planning:   TBD. Please notify endocrinology prior to discharge.

## 2020-09-26 NOTE — PROGRESS NOTES
Pharmacokinetic Assessment Follow Up: IV Vancomycin    Vancomycin serum concentration assessment(s):    - Vancomycin trough prior to the 3rd dose resulted at 21.2 mcg/mL   - Trough was drawn appropriately and can be used to guide treatment at this time.  - Patient's renal function is improving, today Scr is 1.7 mg/dL  - Goal trough 15-20 mcg/mL    Vancomycin Regimen Plan:    - Reduce dose to Vancomycin 1500 mg Q24h.  - Obtain vancomycin trough prior to the 3rd dose on 9/29/20 @1100    Drug levels (last 3 results):  Recent Labs   Lab Result Units 09/24/20 0515 09/26/20  1049   Vancomycin, Random ug/mL 14.0  --    Vancomycin-Trough ug/mL  --  21.2       Pharmacy will continue to follow and monitor vancomycin.    Please contact pharmacy at extension [phone] for questions regarding this assessment.    Thank you for the consult,   Julia Yeondam Roh       Patient brief summary:  Ed Patton is a 65 y.o. male initiated on antimicrobial therapy with IV Vancomycin for treatment of MRSA osteomyelitis     Drug Allergies:   Review of patient's allergies indicates:   Allergen Reactions    Vicodin [hydrocodone-acetaminophen] Itching       Actual Body Weight:   115.3 kg     Renal Function:   Estimated Creatinine Clearance: 61.8 mL/min (A) (based on SCr of 1.7 mg/dL (H)).,     CBC (last 72 hours):  Recent Labs   Lab Result Units 09/24/20 0515 09/25/20 0459 09/26/20  0403   WBC K/uL 13.90* 14.18* 13.52*   Hemoglobin g/dL 8.0* 8.0* 7.6*   Hematocrit % 27.3* 26.8* 26.2*   Platelets K/uL 379* 322 334   Gran% % 76.6* 79.0* 76.4*   Lymph% % 8.1* 8.3* 8.7*   Mono% % 9.2 7.2 8.4   Eosinophil% % 4.6 4.1 5.1   Basophil% % 0.4 0.6 0.7   Differential Method  Automated Automated Automated       Metabolic Panel (last 72 hours):  Recent Labs   Lab Result Units 09/24/20  0515 09/25/20  0459 09/26/20  0403   Sodium mmol/L 136 136 138   Potassium mmol/L 3.7 3.6 4.0   Chloride mmol/L 101 99 100   CO2 mmol/L 27 26 30*   Glucose mg/dL 121* 89  138*   BUN, Bld mg/dL 27* 22 20   Creatinine mg/dL 1.7* 1.7* 1.7*   Albumin g/dL 1.6*  1.6* 1.6* 1.7*   Total Bilirubin mg/dL 0.5  --   --    Alkaline Phosphatase U/L 94  --   --    AST U/L 50*  --   --    ALT U/L 40  --   --    Magnesium mg/dL 1.9 1.6 1.5*   Phosphorus mg/dL 2.3* 2.7 3.0       Vancomycin Administrations:  vancomycin given in the last 96 hours                   vancomycin 1.75 g in 5 % dextrose 500 mL IVPB (mg) 1,750 mg New Bag 09/26/20 1214     1,750 mg New Bag 09/25/20 1150     1,750 mg New Bag 09/24/20 1249    vancomycin 1.75 g in 5 % dextrose 500 mL IVPB (mg) 1,750 mg New Bag 09/23/20 1502                Microbiologic Results:  Microbiology Results (last 7 days)     Procedure Component Value Units Date/Time    Fungus culture [[id number]] Collected: 09/07/20 1721    Order Status: Completed Specimen: Body Fluid from Knee, Right Updated: 09/24/20 0730     Fungus (Mycology) Culture Culture in progress      No fungus isolated after 2 weeks    Narrative:      1) Right Knee Joint Fluid    Fungus culture [[id number]] Collected: 09/07/20 1721    Order Status: Completed Specimen: Body Fluid from Knee, Right Updated: 09/24/20 0730     Fungus (Mycology) Culture Culture in progress      No fungus isolated after 2 weeks    Narrative:      2) Right Knee Joint Fluid    Culture, Anaerobe [[id number]] Collected: 09/16/20 1612    Order Status: Completed Specimen: Body Fluid from Gallbladder Updated: 09/22/20 1035     Anaerobic Culture Culture in progress

## 2020-09-26 NOTE — PLAN OF CARE
Problem: Diabetes Comorbidity  Goal: Blood Glucose Level Within Desired Range  Intervention: Maintain Glycemic Control  Flowsheets (Taken 9/26/2020 1527)  Glycemic Management:   blood glucose monitoring   carbohydrate replacement provided     Problem: Adjustment to Illness (Sepsis/Septic Shock)  Goal: Optimal Coping  Outcome: Ongoing, Progressing  Intervention: Optimize Psychosocial Adjustment to Illness  Flowsheets (Taken 9/26/2020 1527)  Supportive Measures:   active listening utilized   counseling provided   goal setting facilitated   Pt. Educated about antibiotics and infection prevention.  Pt. Awake, alert and oriented times 4.  Will continue to monitor.  Pt. Encourage to get out of bed and sit in bedside chair, pt. Is currently refusing to get into chair.

## 2020-09-26 NOTE — PLAN OF CARE
POC reviewed w/ pt, questions and concerns addressed. No acute events thru the night. Pt remained sinus tach all night. Pt received supplemental insulin. No complaints. AOX4. Safety maintained. Bed locked, in lowest position, call light in reach, side rails x2. Mobilized pt to highest level of functioning. See doc flowsheets for further info. Will continue to monitor.

## 2020-09-26 NOTE — PROGRESS NOTES
Pharmacist Renal Dose Adjustment Note    Ed Patton is a 65 y.o. male being treated with the medication Levofloxacin    Patient Data:    Vital Signs (Most Recent):  Temp: 97.4 °F (36.3 °C) (09/26/20 0815)  Pulse: (!) 124 (09/26/20 0817)  Resp: 18 (09/26/20 0817)  BP: 109/63 (09/26/20 0400)  SpO2: 96 % (09/26/20 0815)   Vital Signs (72h Range):  Temp:  [97.4 °F (36.3 °C)-99.4 °F (37.4 °C)]   Pulse:  []   Resp:  [10-25]   BP: (107-142)/(61-86)   SpO2:  [91 %-99 %]      Recent Labs   Lab 09/24/20  0515 09/25/20  0459 09/26/20 0403   CREATININE 1.7* 1.7* 1.7*     Serum creatinine: 1.7 mg/dL (H) 09/26/20 0403  Estimated creatinine clearance: 61.8 mL/min (A)    Levofloxacin 750 mg PO Q48h will be changed to Levofloxacin 750 mg Q24h for CrCl > 50 mL/min     Pharmacist's Name: Julia Yeondam Roh  Pharmacist's Extension: [phone]

## 2020-09-26 NOTE — PROGRESS NOTES
Hospital Medicine  Progress note    Team: [facility] A Keenan Gonzales MD  Admit Date: 8/30/2020  EDY 9/28/2020  Length of Stay:  LOS: 27 days   Code status: Full Code    Principal Problem:  Septic shock due to methicillin resistant Staphylococcus aureus    HPI / Hospital Course     Interval hx:  9/26 remains tachy bit otherwise stablel, WBC is trending down. No further surgical intervention for now. SNF placement is needed.    Refused angiogram this yesterday. Vascular surgery signed off. Will need IV abx, SNF may be best option.    ROS     Respiratory: neg for cough neg for shortness of breath  Cardiovascular: neg for chest pain neg for palpitations  Gastrointestinal: neg for nausea neg for vomiting, neg for abdominal pain neg for diarrhea neg for constipation   Behavioral/Psych: neg for depression neg for anxiety    PEx  Temp:  [97.4 °F (36.3 °C)-98.6 °F (37 °C)]   Pulse:  []   Resp:  [17-25]   BP: (109-142)/(61-83)   SpO2:  [93 %-98 %]     Intake/Output Summary (Last 24 hours) at 9/26/2020 0855  Last data filed at 9/26/2020 0339  Gross per 24 hour   Intake 962 ml   Output 700 ml   Net 262 ml       General Appearance: no acute distress   Heart: regular rate and rhythm  Respiratory: Normal respiratory effort, no crackles   Abdomen: Soft, non-tender; bowel sounds active  Skin: intact.Skin intact  Neurologic:  No focal numbness or weakness  Mental status: Alert, oriented x 4, affect appropriate     Recent Labs   Lab 09/24/20  0515 09/25/20  0459 09/26/20  0403   WBC 13.90* 14.18* 13.52*   HGB 8.0* 8.0* 7.6*   HCT 27.3* 26.8* 26.2*   * 322 334     Recent Labs   Lab 09/24/20  0515 09/25/20  0459 09/26/20  0403    136 138   K 3.7 3.6 4.0    99 100   CO2 27 26 30*   BUN 27* 22 20   CREATININE 1.7* 1.7* 1.7*   * 89 138*   CALCIUM 8.2* 8.1* 8.0*   MG 1.9 1.6 1.5*   PHOS 2.3* 2.7 3.0     Recent Labs   Lab 09/21/20  0755  09/24/20  0515 09/25/20  0459 09/26/20  0403   ALKPHOS 111  --  94   --   --    ALT 43  --  40  --   --    AST 52*  --  50*  --   --    ALBUMIN 1.7*  1.7*   < > 1.6*  1.6* 1.6* 1.7*   PROT 6.8  --  6.8  --   --    BILITOT 0.5  --  0.5  --   --     < > = values in this interval not displayed.        Recent Labs   Lab 09/25/20  0744 09/25/20  1127 09/25/20  1710 09/25/20  2117 09/26/20  0349 09/26/20  0805   POCTGLUCOSE 95 128* 231* 145* 144* 157*       Scheduled Meds:   artificial tears  1 drop Both Eyes TID    carvediloL  6.25 mg Oral BID    fluticasone furoate-vilanteroL  1 puff Inhalation Daily    fluticasone propionate  1 spray Each Nostril Daily    heparin (porcine)  5,000 Units Subcutaneous Q8H    insulin aspart U-100  4 Units Subcutaneous TIDWM    levoFLOXacin  750 mg Oral Every other day    levothyroxine  75 mcg Oral Before breakfast    sodium bicarbonate  1,300 mg Oral BID    vancomycin (VANCOCIN) IVPB  15 mg/kg Intravenous Q24H     Continuous Infusions:  As Needed:  acetaminophen, albuterol-ipratropium, calcium carbonate, dextrose 50%, dextrose 50%, glucagon (human recombinant), glucose, glucose, insulin aspart U-100, melatonin, ondansetron, oxyCODONE, polyethylene glycol, DIPH,PERTUS (ADACEL),TETANUS PF VAC (ADULT), Pharmacy to dose Vancomycin consult **AND** vancomycin - pharmacy to dose    ** update problem list    Active Hospital Problems    Diagnosis  POA    *Septic shock due to methicillin resistant Staphylococcus aureus [A41.02, R65.21]  Yes    Cellulitis of left lower extremity [L03.116]  Unknown    PVD (peripheral vascular disease) [I73.9]  Unknown    MRSA bacteremia [R78.81]  Yes    Acute renal insufficiency [N28.9]  Yes    Acute metabolic encephalopathy [G93.41]  No    Hospital-acquired pneumonia [J18.9, Y95]  No    Acute on chronic respiratory failure with hypoxia [J96.21]  No    Debility [R53.81]  Yes    Acute renal failure with acute tubular necrosis superimposed on stage 3 chronic kidney disease [N17.0, N18.3]  No    Septic arthritis of  knee, right [M00.9]  Yes    Staphylococcal arthritis of right knee [M00.061]  Yes    Diabetic ulcer of left foot associated with type 2 diabetes mellitus, with fat layer exposed [E11.621, L97.522]  Yes    Diabetic foot infection [E11.628, L08.9]  Yes    COPD mixed type [J44.9]  Yes     Chronic    Postablative hypothyroidism [E89.0]  Yes     Chronic    Hyperlipidemia [E78.5]  Yes     High ASCVD with CAD, elevated A1c      Type 2 diabetes mellitus with stage 3 chronic kidney disease, with long-term current use of insulin [E11.22, N18.3, Z79.4]  Not Applicable     GFR> 60, uncontrolled DM      Chronic systolic congestive heart failure [I50.22]  Yes     Chronic     EF 35% in 2015 echo, improved in 5/2018. Patient with mixed ischemic and non-ischemic cardiomyopathy        Resolved Hospital Problems    Diagnosis Date Resolved POA    Endocarditis [I38] 09/12/2020 Yes    Sepsis due to methicillin resistant Staphylococcus aureus [A41.02] 09/13/2020 Yes    Type 2 diabetes mellitus with ketoacidosis without coma, with long-term current use of insulin [E11.10, Z79.4] 09/18/2020 Not Applicable     Novolin 70/30 40U in AM, 30U in PM. Elevated A1c         Assessment and Plan  / Problems managed today    Overview/Hospital Course:  Mr. Ed Patton was admitted to hospital medicine on 08/30 for management of a left-sided diabetic foot infection that was precipitated by an undetected punction wound after stepping on a tack/nail. His presentation was notable for leukocytosis with elevated inflammatory markers, however MRI of left foot only showed cellulitis of the anterior and lateral aspect of foot. Podiatry was consulted with recommendations for IV antibiotics; he was initiated on Ciprofloxacin and Vancomycin on admission. However, blood cultures from admission resulted positive for MRSA with wound cultures from left foot also growing MRSA with Proteus mirabilis. By 09/03 cultures remained positive despite Vancomycin,  and patient was noted to develop right knee pain with swelling. Orthopedics was consulted and patient underwent right knee joint aspiration positive for septic arthritis with cultures also positive for MRSA.      LUKAS on 09/04 showed known chronic systolic heart failure, but was negative for endocarditis. MRI of lumbar spine on 09/08 was negative for abscess. By 09/06, blood cultures continued to remain positive, and he underwent I&D of right arm abscess on 09/08 with cultures also positive for MRSA.      For persistent MRSA bacteremia, he was transitioned to Daptomycin and Ceftaroline, however this regimen was discontinued by 09/10 due to concern for developing rhabdomylosis. By 09/09, he was noted to develop a progressively worsening leukocytosis and NANETTE. Nephrology was consulted on 09/09 with suspicion for ATN.      Hematology was consulted on 09/12 for persistent leukocytosis as likely reactive from bacteremia. On 09/10, patient began developing intermitent hypotension prompting broadening of antibiotics to Cefepime and Vancomycin. By 09/12, renal function continued to worsen in addition to hypotension prompting transfer to ICU for vasopressor support and possible HD. Patient found to be encephalopathic, central line placed and started on CRRT overnight on 9/14 with improvement in mental status, but remained encephalopathic. CT head without any acute intracranial process. Vasopressin off AM of 9/15 but remains on levophed, CT abdomen with concern for potential cholecystitis and possible atelectasis with superimposed pneumonia.  Due to the acuity of his illness is he is not a surgical candidate it and ultimately is now status post cholecystostomy tube placement for management of suspected cholecystitis     Additional complications arising during hospital course include the development metabolic encephalopathy secondary to uremia versus shock versus DKA or overall cerebral hypoperfusion from shock, reported to have  agitation prior to step-down from ICU.  Also developed DKA requiring standard therapy with IV insulin infusion, however he was kept on IV insulin due to rising sugars any time it was weaned working on basal bolus regimen for him at this time with the assistance of a endocrinology           Septic shock_Septic arthritis of Right Knee_Sespsi due to MRSA  This is a 65 year old  male with PMH significant for HFrEF and COPD with chronic respiratory failure (on 2L home oxygen), T2DM with CKD III, and iron deficiency anemia who originally presented to [facility] on 08/30 with cellulitis of left foot with concern for diabetic foot infection with subsequent development of MRSA bacteremia attributed to septic arthritis of right knee and elbow. He is status post drainage and coverage for MRSA since that time. His work-up for other etiologies of MRSA bacteremia have included negative LUKAS and MRI of lumbar spine looking for endocarditis and spinal abscess, respectively. Stool studies for C.diff on 09/11 were negative. His hospital course has been associated with worsening hypotension and leukocytosis since 09/10 prompting ICU admission on 09/12 for initiation of vasopressor support. Infectious work-up thus far on ICU admission concern for likely right lower lobe HAP.      CT abdomen pelvis on 9/15 with gallbladder dilation, sludge, and trace pericholic fluid collection. Exam not consistent with cholecystitis but given persistent shock potential source. Also with potential right lower lobe atelectasis with overlying infection  S/p ICU stepdown  -CK levels were rising so spoke with infectious disease and will need to switch patient back to Vancomycin. - Consult order placed.      Type 2 DM with DKA  -endocrinology following pt kept on transitional drip but has since been weaned to a basal bolus regimen.         Postablative hypothyroidism  Plan: Continue home SYNTHROID     Acute kidney injury  -likely ATN from Shock  s/p requiring RRT in ICU  -trialysis line removed   -pt is non-oliguric  -nephrology recommends sodium bicarb tabs - order placed.   -Nephrology recommends ambulatory f/u on discharge     Chronic systolic congestive heart failure  -Most recent ECHO in 09/2020 with EF of 25%.   -Unclear is ischemic vs non-ischemic.   -Home regimen: Carvedilol, Lasix 80 mg, and Lisinopril.           >due to NANETTE lasix and Lisinopril on hold, but he is recovering renal function and prior to discharge to any facility will likely need some amount of diuretic re-initiated and potentially a much lower doseage of Ace-inhibitor.   -Associated with chronic hypoxemic respiratory failure requiring 2L nasal cannula, but noted to develop worsening oxygen requirements on ICU admission that were likely multifactorial from suspected hospital acquired pneumonia versus declining urine output with pre-disposition to volume overload. .      COPD mixed type  -Based on PFT's from 05/2015 showing mild (small airways) obstruction, airflow not improved after bronchodilator, and moderate restriction.     Acute on chronic respiratory failure with hypoxia  -History of mixed COPD (based on PFT's from 05/2015 showing mild (small airways) obstruction, airflow not improved after bronchodilator, and moderate restriction) and HFrEF with chronic respiratory failure on 2L home oxygen.   -ICU admission notable for progressive increase in supplemental oxygen requirements.   -Work-up for underlying etiology of acute on chronic respiratory failure include CXR showing concern for possible progression of CHF vs HAP (not entirely convinced that CXR showed consolidation)  - Breathing back to baseline now to 2L NC     Code Status: Full Code     High Risk Conditions:  Patient has a condition that poses threat to life and bodily function: Septic shock, MRSA bacteremia  Patient is currently on drug therapy requiring intensive monitoring for toxicity: Daptomycin.      Discharge  Planning   EDY: 9/25/2020     Code Status: Full Code   Is the patient medically ready for discharge?: No    Reason for patient still in hospital (select all that apply): Patient trending condition  Discharge Plan A: Skilled Nursing Facility   Discharge Delays: (!) Change in Medical Condition           Diet:    GI PPx:   DVT PPx:    Lines:  Drains:  Airways:  Wounds:    Goals of Care:  Return to prior functional status   Discharge plan:    Time (minutes) spent in care of the patient (Greater than 1/2 spent in direct face-to-face contact)  35 minutes    Keenan Gonzales MD

## 2020-09-26 NOTE — SUBJECTIVE & OBJECTIVE
"Interval HPI:   Overnight events: NAEON. BG within goal ranges on current SQ insulin regimen. Creatinine 1.7.   Eatin-75%  Nausea: No  Hypoglycemia and intervention: No  Fever: No  TPN and/or TF: No  If yes, type of TF/TPN and rate: none    /63   Pulse (!) 124   Temp 97.4 °F (36.3 °C) (Oral)   Resp 18   Ht 6' 4" (1.93 m)   Wt 115.3 kg (254 lb 3.1 oz)   SpO2 96%   BMI 30.94 kg/m²     Labs Reviewed and Include    Recent Labs   Lab 20  0403   *   CALCIUM 8.0*   ALBUMIN 1.7*      K 4.0   CO2 30*      BUN 20   CREATININE 1.7*     Lab Results   Component Value Date    WBC 13.52 (H) 2020    HGB 7.6 (L) 2020    HCT 26.2 (L) 2020    MCV 91 2020     2020     No results for input(s): TSH, FREET4 in the last 168 hours.  Lab Results   Component Value Date    HGBA1C 9.5 (H) 2020       Nutritional status:   Body mass index is 30.94 kg/m².  Lab Results   Component Value Date    ALBUMIN 1.7 (L) 2020    ALBUMIN 1.6 (L) 2020    ALBUMIN 1.6 (L) 2020    ALBUMIN 1.6 (L) 2020     No results found for: PREALBUMIN    Estimated Creatinine Clearance: 61.8 mL/min (A) (based on SCr of 1.7 mg/dL (H)).    Accu-Checks  Recent Labs     20  2140 20  2214 20  2244 20  0138 20  0744 20  1127 20  1710 20  2117 20  0349 20  0805   POCTGLUCOSE 51* 68* 93 76 95 128* 231* 145* 144* 157*       Current Medications and/or Treatments Impacting Glycemic Control  Immunotherapy:    Immunosuppressants     None        Steroids:   Hormones (From admission, onward)    Start     Stop Route Frequency Ordered    20  melatonin tablet 6 mg      -- Oral Nightly PRN 20        Pressors:    Autonomic Drugs (From admission, onward)    None        Hyperglycemia/Diabetes Medications:   Antihyperglycemics (From admission, onward)    Start     Stop Route Frequency Ordered    20 1130  " insulin aspart U-100 pen 4 Units      -- SubQ 3 times daily with meals 09/25/20 0934    09/24/20 1536  insulin aspart U-100 pen 0-5 Units      -- SubQ Before meals & nightly PRN 09/24/20 1433

## 2020-09-26 NOTE — PROGRESS NOTES
"[facility]  Endocrinology  Progress Note    Admit Date: 2020     Reason for Consult: Management of T2DM, Hyperglycemia     Surgical Procedure and Date: n/a    Diabetes diagnosis year:     Home Diabetes Medications:    Novolin 70/30 u-100   40 units in AM with breakfast.    20 units in PM with dinner.     How often checking glucose at home? 1-3 x day   BG readings on regimen: >200  Hypoglycemia on the regimen?  No  Missed doses on regimen?  No    Diabetes Complications include:     Hyperglycemia, Foot ulcer   and Other skin ulcer    Complicating diabetes co morbidities:   CHF, CAD, HTN, HLD      HPI:   Patient is a 65 y.o. male with a diagnosis of acute cholecystitis and septic shock with MRSA complicated by NANETTE requiring CRRT. Endocrinology consulted to manage DM2 during current admission to [facility].     Lab Results   Component Value Date    HGBA1C 9.5 (H) 2020       Interval HPI:   Overnight events: NAEON. BG within goal ranges on current SQ insulin regimen. Creatinine 1.7.   Eatin-75%  Nausea: No  Hypoglycemia and intervention: No  Fever: No  TPN and/or TF: No  If yes, type of TF/TPN and rate: none    /63   Pulse (!) 124   Temp 97.4 °F (36.3 °C) (Oral)   Resp 18   Ht 6' 4" (1.93 m)   Wt 115.3 kg (254 lb 3.1 oz)   SpO2 96%   BMI 30.94 kg/m²     Labs Reviewed and Include    Recent Labs   Lab 20  0403   *   CALCIUM 8.0*   ALBUMIN 1.7*      K 4.0   CO2 30*      BUN 20   CREATININE 1.7*     Lab Results   Component Value Date    WBC 13.52 (H) 2020    HGB 7.6 (L) 2020    HCT 26.2 (L) 2020    MCV 91 2020     2020     No results for input(s): TSH, FREET4 in the last 168 hours.  Lab Results   Component Value Date    HGBA1C 9.5 (H) 2020       Nutritional status:   Body mass index is 30.94 kg/m².  Lab Results   Component Value Date    ALBUMIN 1.7 (L) 2020    ALBUMIN 1.6 (L) 2020    ALBUMIN 1.6 (L) " 09/24/2020    ALBUMIN 1.6 (L) 09/24/2020     No results found for: PREALBUMIN    Estimated Creatinine Clearance: 61.8 mL/min (A) (based on SCr of 1.7 mg/dL (H)).    Accu-Checks  Recent Labs     09/24/20  2140 09/24/20  2214 09/24/20  2244 09/25/20  0138 09/25/20  0744 09/25/20  1127 09/25/20  1710 09/25/20  2117 09/26/20  0349 09/26/20  0805   POCTGLUCOSE 51* 68* 93 76 95 128* 231* 145* 144* 157*       Current Medications and/or Treatments Impacting Glycemic Control  Immunotherapy:    Immunosuppressants     None        Steroids:   Hormones (From admission, onward)    Start     Stop Route Frequency Ordered    08/30/20 2059  melatonin tablet 6 mg      -- Oral Nightly PRN 08/30/20 2004        Pressors:    Autonomic Drugs (From admission, onward)    None        Hyperglycemia/Diabetes Medications:   Antihyperglycemics (From admission, onward)    Start     Stop Route Frequency Ordered    09/25/20 1130  insulin aspart U-100 pen 4 Units      -- SubQ 3 times daily with meals 09/25/20 0934    09/24/20 1536  insulin aspart U-100 pen 0-5 Units      -- SubQ Before meals & nightly PRN 09/24/20 1436          ASSESSMENT and PLAN    * Septic shock due to methicillin resistant Staphylococcus aureus  Managed per primary team  Avoid hypoglycemia    Optimize BG control to improve wound healing        Type 2 diabetes mellitus with stage 3 chronic kidney disease, with long-term current use of insulin  BG goal 140 - 180     -  Decrease Levemir to 10 units q HS  -  Continue novolog 4 units TDWM. 20% dose decrease   -  Low Dose SQ Insulin Correction Scale. Given kidney function.   -  BG Monitoring AC/HS     ** Please call Endocrine for any BG related issues **  ** Please notify Endocrine for any change and/or advance in diet**    Discharge Planning:   TBD. Please notify endocrinology prior to discharge.          Postablative hypothyroidism  The clearance of many drugs, including antiepileptic, anticoagulant, hypnotic, and opioid drugs, is  decreased in hypothyroidism. Thus, drug toxicity may occur if drug dose is not reduced. Thyroid disease may also affect lipid profiles which may then cause increase in insulin resistance.     Recommend patient continue Levothyroxine 75 mcg         Kassidy Salas NP  Endocrinology  [facility]

## 2020-09-27 LAB
ALBUMIN SERPL BCP-MCNC: 1.6 G/DL (ref 3.5–5.2)
ANION GAP SERPL CALC-SCNC: 11 MMOL/L (ref 8–16)
BASOPHILS # BLD AUTO: 0.08 K/UL (ref 0–0.2)
BASOPHILS NFR BLD: 0.6 % (ref 0–1.9)
BUN SERPL-MCNC: 18 MG/DL (ref 8–23)
CALCIUM SERPL-MCNC: 8.2 MG/DL (ref 8.7–10.5)
CHLORIDE SERPL-SCNC: 100 MMOL/L (ref 95–110)
CK SERPL-CCNC: 170 U/L (ref 20–200)
CO2 SERPL-SCNC: 27 MMOL/L (ref 23–29)
CREAT SERPL-MCNC: 1.6 MG/DL (ref 0.5–1.4)
DIFFERENTIAL METHOD: ABNORMAL
EOSINOPHIL # BLD AUTO: 0.7 K/UL (ref 0–0.5)
EOSINOPHIL NFR BLD: 5.3 % (ref 0–8)
ERYTHROCYTE [DISTWIDTH] IN BLOOD BY AUTOMATED COUNT: 17.1 % (ref 11.5–14.5)
ERYTHROCYTE [SEDIMENTATION RATE] IN BLOOD BY WESTERGREN METHOD: 103 MM/HR (ref 0–23)
EST. GFR  (AFRICAN AMERICAN): 52.2 ML/MIN/1.73 M^2
EST. GFR  (NON AFRICAN AMERICAN): 45.2 ML/MIN/1.73 M^2
GLUCOSE SERPL-MCNC: 137 MG/DL (ref 70–110)
HCT VFR BLD AUTO: 26.2 % (ref 40–54)
HGB BLD-MCNC: 7.5 G/DL (ref 14–18)
IMM GRANULOCYTES # BLD AUTO: 0.08 K/UL (ref 0–0.04)
IMM GRANULOCYTES NFR BLD AUTO: 0.6 % (ref 0–0.5)
LYMPHOCYTES # BLD AUTO: 1.1 K/UL (ref 1–4.8)
LYMPHOCYTES NFR BLD: 7.8 % (ref 18–48)
MAGNESIUM SERPL-MCNC: 1.5 MG/DL (ref 1.6–2.6)
MCH RBC QN AUTO: 26 PG (ref 27–31)
MCHC RBC AUTO-ENTMCNC: 28.6 G/DL (ref 32–36)
MCV RBC AUTO: 91 FL (ref 82–98)
MONOCYTES # BLD AUTO: 0.9 K/UL (ref 0.3–1)
MONOCYTES NFR BLD: 6.6 % (ref 4–15)
NEUTROPHILS # BLD AUTO: 10.8 K/UL (ref 1.8–7.7)
NEUTROPHILS NFR BLD: 79.1 % (ref 38–73)
NRBC BLD-RTO: 0 /100 WBC
PHOSPHATE SERPL-MCNC: 3.1 MG/DL (ref 2.7–4.5)
PLATELET # BLD AUTO: 307 K/UL (ref 150–350)
PMV BLD AUTO: 10.3 FL (ref 9.2–12.9)
POCT GLUCOSE: 130 MG/DL (ref 70–110)
POCT GLUCOSE: 145 MG/DL (ref 70–110)
POCT GLUCOSE: 146 MG/DL (ref 70–110)
POCT GLUCOSE: 154 MG/DL (ref 70–110)
POCT GLUCOSE: 157 MG/DL (ref 70–110)
POTASSIUM SERPL-SCNC: 4.1 MMOL/L (ref 3.5–5.1)
RBC # BLD AUTO: 2.88 M/UL (ref 4.6–6.2)
SODIUM SERPL-SCNC: 138 MMOL/L (ref 136–145)
WBC # BLD AUTO: 13.62 K/UL (ref 3.9–12.7)

## 2020-09-27 PROCEDURE — 99233 SBSQ HOSP IP/OBS HIGH 50: CPT | Mod: HCNC,,, | Performed by: PODIATRIST

## 2020-09-27 PROCEDURE — 99223 PR INITIAL HOSPITAL CARE,LEVL III: ICD-10-PCS | Mod: HCNC,,, | Performed by: PODIATRIST

## 2020-09-27 PROCEDURE — 63600175 PHARM REV CODE 636 W HCPCS: Mod: HCNC | Performed by: HOSPITALIST

## 2020-09-27 PROCEDURE — 99223 1ST HOSP IP/OBS HIGH 75: CPT | Mod: HCNC,,, | Performed by: PODIATRIST

## 2020-09-27 PROCEDURE — 20600001 HC STEP DOWN PRIVATE ROOM: Mod: HCNC

## 2020-09-27 PROCEDURE — 85025 COMPLETE CBC W/AUTO DIFF WBC: CPT | Mod: HCNC

## 2020-09-27 PROCEDURE — 25000003 PHARM REV CODE 250: Mod: HCNC | Performed by: HOSPITALIST

## 2020-09-27 PROCEDURE — 83735 ASSAY OF MAGNESIUM: CPT | Mod: HCNC

## 2020-09-27 PROCEDURE — 99233 PR SUBSEQUENT HOSPITAL CARE,LEVL III: ICD-10-PCS | Mod: HCNC,,, | Performed by: PODIATRIST

## 2020-09-27 PROCEDURE — 80069 RENAL FUNCTION PANEL: CPT | Mod: HCNC

## 2020-09-27 PROCEDURE — 99232 SBSQ HOSP IP/OBS MODERATE 35: CPT | Mod: HCNC,,, | Performed by: INTERNAL MEDICINE

## 2020-09-27 PROCEDURE — 36415 COLL VENOUS BLD VENIPUNCTURE: CPT | Mod: HCNC

## 2020-09-27 PROCEDURE — 25000003 PHARM REV CODE 250: Mod: HCNC | Performed by: INTERNAL MEDICINE

## 2020-09-27 PROCEDURE — 63600175 PHARM REV CODE 636 W HCPCS: Mod: HCNC | Performed by: INTERNAL MEDICINE

## 2020-09-27 PROCEDURE — 82550 ASSAY OF CK (CPK): CPT | Mod: HCNC

## 2020-09-27 PROCEDURE — 25000003 PHARM REV CODE 250: Mod: HCNC | Performed by: STUDENT IN AN ORGANIZED HEALTH CARE EDUCATION/TRAINING PROGRAM

## 2020-09-27 PROCEDURE — 99232 PR SUBSEQUENT HOSPITAL CARE,LEVL II: ICD-10-PCS | Mod: HCNC,,, | Performed by: INTERNAL MEDICINE

## 2020-09-27 PROCEDURE — 85652 RBC SED RATE AUTOMATED: CPT | Mod: HCNC

## 2020-09-27 RX ORDER — MAGNESIUM GLUCONATE 27 MG(500)
27 TABLET ORAL ONCE
Status: COMPLETED | OUTPATIENT
Start: 2020-09-27 | End: 2020-09-27

## 2020-09-27 RX ORDER — ADHESIVE BANDAGE
30 BANDAGE TOPICAL DAILY PRN
Status: DISCONTINUED | OUTPATIENT
Start: 2020-09-27 | End: 2020-10-09 | Stop reason: HOSPADM

## 2020-09-27 RX ADMIN — HYPROMELLOSE 2910 1 DROP: 5 SOLUTION OPHTHALMIC at 02:09

## 2020-09-27 RX ADMIN — CARVEDILOL 6.25 MG: 6.25 TABLET, FILM COATED ORAL at 09:09

## 2020-09-27 RX ADMIN — FLUTICASONE PROPIONATE 50 MCG: 50 SPRAY, METERED NASAL at 08:09

## 2020-09-27 RX ADMIN — Medication 27 MG: at 11:09

## 2020-09-27 RX ADMIN — FLUTICASONE FUROATE AND VILANTEROL TRIFENATATE 1 PUFF: 200; 25 POWDER RESPIRATORY (INHALATION) at 09:09

## 2020-09-27 RX ADMIN — VANCOMYCIN HYDROCHLORIDE 1500 MG: 1.5 INJECTION, POWDER, LYOPHILIZED, FOR SOLUTION INTRAVENOUS at 11:09

## 2020-09-27 RX ADMIN — HEPARIN SODIUM 5000 UNITS: 5000 INJECTION INTRAVENOUS; SUBCUTANEOUS at 05:09

## 2020-09-27 RX ADMIN — LEVOFLOXACIN 750 MG: 750 TABLET, FILM COATED ORAL at 08:09

## 2020-09-27 RX ADMIN — LEVOTHYROXINE SODIUM 75 MCG: 25 TABLET ORAL at 05:09

## 2020-09-27 RX ADMIN — HEPARIN SODIUM 5000 UNITS: 5000 INJECTION INTRAVENOUS; SUBCUTANEOUS at 01:09

## 2020-09-27 RX ADMIN — SODIUM BICARBONATE 1300 MG: 650 TABLET ORAL at 08:09

## 2020-09-27 RX ADMIN — INSULIN ASPART 4 UNITS: 100 INJECTION, SOLUTION INTRAVENOUS; SUBCUTANEOUS at 04:09

## 2020-09-27 RX ADMIN — MAGNESIUM HYDROXIDE 2400 MG: 400 SUSPENSION ORAL at 01:09

## 2020-09-27 RX ADMIN — INSULIN ASPART 4 UNITS: 100 INJECTION, SOLUTION INTRAVENOUS; SUBCUTANEOUS at 08:09

## 2020-09-27 RX ADMIN — INSULIN DETEMIR 10 UNITS: 100 INJECTION, SOLUTION SUBCUTANEOUS at 09:09

## 2020-09-27 RX ADMIN — POLYETHYLENE GLYCOL 3350 17 G: 17 POWDER, FOR SOLUTION ORAL at 09:09

## 2020-09-27 RX ADMIN — SODIUM BICARBONATE 1300 MG: 650 TABLET ORAL at 09:09

## 2020-09-27 RX ADMIN — CARVEDILOL 6.25 MG: 6.25 TABLET, FILM COATED ORAL at 08:09

## 2020-09-27 RX ADMIN — HEPARIN SODIUM 5000 UNITS: 5000 INJECTION INTRAVENOUS; SUBCUTANEOUS at 09:09

## 2020-09-27 RX ADMIN — HYPROMELLOSE 2910 1 DROP: 5 SOLUTION OPHTHALMIC at 08:09

## 2020-09-27 RX ADMIN — HYPROMELLOSE 2910 1 DROP: 5 SOLUTION OPHTHALMIC at 09:09

## 2020-09-27 RX ADMIN — INSULIN ASPART 4 UNITS: 100 INJECTION, SOLUTION INTRAVENOUS; SUBCUTANEOUS at 11:09

## 2020-09-27 NOTE — PLAN OF CARE
Pt AAO*4, calm, cooperative. No acute changes overnight. BG monitoring, no additional insulin required. UOP per flowsheet. Will continue to monitor.

## 2020-09-27 NOTE — CARE UPDATE
BG goal 140 - 180   BG within goal ranges on current SQ insulin regimen. No changes to plan of care.    -  Continue Levemir 10 units q HS  -  Continue novolog 4 units TDWM.   -  Low Dose SQ Insulin Correction     Scale. Given kidney function.   -  BG Monitoring AC/HS      ** Please call Endocrine for any BG related issues **  ** Please notify Endocrine for any change and/or advance in diet**     Discharge Planning:   TBD. Please notify endocrinology prior to discharge.

## 2020-09-27 NOTE — PROGRESS NOTES
Hospital Medicine  Progress note    Team: [facility] Keenan Gonzales MD  Admit Date: 8/30/2020  EDY 9/28/2020  Length of Stay:  LOS: 28 days   Code status: Full Code    Principal Problem:  Septic shock due to methicillin resistant Staphylococcus aureus    HPI / Hospital Course     Interval hx: 9/27 Refused angiogram tVascular surgery signed off. Will need IV abx, SNF may be best option.    ROS     Respiratory: neg for cough neg for shortness of breath  Cardiovascular: neg for chest pain neg for palpitations  Gastrointestinal: neg for nausea neg for vomiting, neg for abdominal pain neg for diarrhea neg for constipation   Behavioral/Psych: neg for depression neg for anxiety    PEx  Temp:  [97.4 °F (36.3 °C)-98.6 °F (37 °C)]   Pulse:  [107-124]   Resp:  [18-25]   BP: ()/(53-78)   SpO2:  [95 %-98 %]     Intake/Output Summary (Last 24 hours) at 9/27/2020 0809  Last data filed at 9/27/2020 0000  Gross per 24 hour   Intake --   Output 300 ml   Net -300 ml       General Appearance: no acute distress   Heart: regular rate and rhythm  Respiratory: Normal respiratory effort, no crackles   Abdomen: Soft, non-tender; bowel sounds active  Skin: intact.Skin intact  Neurologic:  No focal numbness or weakness  Mental status: Alert, oriented x 4, affect appropriate     Recent Labs   Lab 09/24/20  0515 09/25/20  0459 09/26/20  0403   WBC 13.90* 14.18* 13.52*   HGB 8.0* 8.0* 7.6*   HCT 27.3* 26.8* 26.2*   * 322 334     Recent Labs   Lab 09/24/20  0515 09/25/20  0459 09/26/20  0403 09/27/20  0616    136 138 138   K 3.7 3.6 4.0 4.1    99 100 100   CO2 27 26 30* 27   BUN 27* 22 20 18   CREATININE 1.7* 1.7* 1.7* 1.6*   * 89 138* 137*   CALCIUM 8.2* 8.1* 8.0* 8.2*   MG 1.9 1.6 1.5*  --    PHOS 2.3* 2.7 3.0 3.1     Recent Labs   Lab 09/21/20  0755  09/24/20  0515 09/25/20  0459 09/26/20  0403 09/27/20  0616   ALKPHOS 111  --  94  --   --   --    ALT 43  --  40  --   --   --    AST 52*  --  50*  --   --    --    ALBUMIN 1.7*  1.7*   < > 1.6*  1.6* 1.6* 1.7* 1.6*   PROT 6.8  --  6.8  --   --   --    BILITOT 0.5  --  0.5  --   --   --     < > = values in this interval not displayed.        Recent Labs   Lab 09/26/20  0805 09/26/20  1140 09/26/20  1611 09/26/20  2117 09/27/20  0125 09/27/20  0733   POCTGLUCOSE 157* 130* 218* 178* 146* 145*       Scheduled Meds:   artificial tears  1 drop Both Eyes TID    carvediloL  6.25 mg Oral BID    fluticasone furoate-vilanteroL  1 puff Inhalation Daily    fluticasone propionate  1 spray Each Nostril Daily    heparin (porcine)  5,000 Units Subcutaneous Q8H    insulin aspart U-100  4 Units Subcutaneous TIDWM    insulin detemir U-100  10 Units Subcutaneous QHS    levoFLOXacin  750 mg Oral Daily    levothyroxine  75 mcg Oral Before breakfast    sodium bicarbonate  1,300 mg Oral BID    vancomycin (VANCOCIN) IVPB  1,500 mg Intravenous Q24H     Continuous Infusions:  As Needed:  acetaminophen, albuterol-ipratropium, calcium carbonate, dextrose 50%, dextrose 50%, glucagon (human recombinant), glucose, glucose, insulin aspart U-100, melatonin, ondansetron, oxyCODONE, polyethylene glycol, DIPH,PERTUS (ADACEL),TETANUS PF VAC (ADULT), Pharmacy to dose Vancomycin consult **AND** vancomycin - pharmacy to dose    ** update problem list    Active Hospital Problems    Diagnosis  POA    *Septic shock due to methicillin resistant Staphylococcus aureus [A41.02, R65.21]  Yes    Cellulitis of left lower extremity [L03.116]  Unknown    PVD (peripheral vascular disease) [I73.9]  Unknown    MRSA bacteremia [R78.81]  Yes    Acute renal insufficiency [N28.9]  Yes    Acute metabolic encephalopathy [G93.41]  No    Hospital-acquired pneumonia [J18.9, Y95]  No    Acute on chronic respiratory failure with hypoxia [J96.21]  No    Debility [R53.81]  Yes    Acute renal failure with acute tubular necrosis superimposed on stage 3 chronic kidney disease [N17.0, N18.3]  No    Septic arthritis of  knee, right [M00.9]  Yes    Staphylococcal arthritis of right knee [M00.061]  Yes    Diabetic ulcer of left foot associated with type 2 diabetes mellitus, with fat layer exposed [E11.621, L97.522]  Yes    Diabetic foot infection [E11.628, L08.9]  Yes    COPD mixed type [J44.9]  Yes     Chronic    Postablative hypothyroidism [E89.0]  Yes     Chronic    Hyperlipidemia [E78.5]  Yes     High ASCVD with CAD, elevated A1c      Type 2 diabetes mellitus with stage 3 chronic kidney disease, with long-term current use of insulin [E11.22, N18.3, Z79.4]  Not Applicable     GFR> 60, uncontrolled DM      Chronic systolic congestive heart failure [I50.22]  Yes     Chronic     EF 35% in 2015 echo, improved in 5/2018. Patient with mixed ischemic and non-ischemic cardiomyopathy        Resolved Hospital Problems    Diagnosis Date Resolved POA    Endocarditis [I38] 09/12/2020 Yes    Sepsis due to methicillin resistant Staphylococcus aureus [A41.02] 09/13/2020 Yes    Type 2 diabetes mellitus with ketoacidosis without coma, with long-term current use of insulin [E11.10, Z79.4] 09/18/2020 Not Applicable     Novolin 70/30 40U in AM, 30U in PM. Elevated A1c         Assessment and Plan  / Problems managed today    Overview/Hospital Course:  Mr. Ed Patton was admitted to hospital medicine on 08/30 for management of a left-sided diabetic foot infection that was precipitated by an undetected punction wound after stepping on a tack/nail. His presentation was notable for leukocytosis with elevated inflammatory markers, however MRI of left foot only showed cellulitis of the anterior and lateral aspect of foot. Podiatry was consulted with recommendations for IV antibiotics; he was initiated on Ciprofloxacin and Vancomycin on admission. However, blood cultures from admission resulted positive for MRSA with wound cultures from left foot also growing MRSA with Proteus mirabilis. By 09/03 cultures remained positive despite Vancomycin,  and patient was noted to develop right knee pain with swelling. Orthopedics was consulted and patient underwent right knee joint aspiration positive for septic arthritis with cultures also positive for MRSA.      LUKAS on 09/04 showed known chronic systolic heart failure, but was negative for endocarditis. MRI of lumbar spine on 09/08 was negative for abscess. By 09/06, blood cultures continued to remain positive, and he underwent I&D of right arm abscess on 09/08 with cultures also positive for MRSA.      For persistent MRSA bacteremia, he was transitioned to Daptomycin and Ceftaroline, however this regimen was discontinued by 09/10 due to concern for developing rhabdomylosis. By 09/09, he was noted to develop a progressively worsening leukocytosis and NANETTE. Nephrology was consulted on 09/09 with suspicion for ATN.      Hematology was consulted on 09/12 for persistent leukocytosis as likely reactive from bacteremia. On 09/10, patient began developing intermitent hypotension prompting broadening of antibiotics to Cefepime and Vancomycin. By 09/12, renal function continued to worsen in addition to hypotension prompting transfer to ICU for vasopressor support and possible HD. Patient found to be encephalopathic, central line placed and started on CRRT overnight on 9/14 with improvement in mental status, but remained encephalopathic. CT head without any acute intracranial process. Vasopressin off AM of 9/15 but remains on levophed, CT abdomen with concern for potential cholecystitis and possible atelectasis with superimposed pneumonia.  Due to the acuity of his illness is he is not a surgical candidate it and ultimately is now status post cholecystostomy tube placement for management of suspected cholecystitis     Additional complications arising during hospital course include the development metabolic encephalopathy secondary to uremia versus shock versus DKA or overall cerebral hypoperfusion from shock, reported to have  agitation prior to step-down from ICU.  Also developed DKA requiring standard therapy with IV insulin infusion, however he was kept on IV insulin due to rising sugars any time it was weaned working on basal bolus regimen for him at this time with the assistance of a endocrinology           Septic shock_Septic arthritis of Right Knee_Sespsi due to MRSA  This is a 65 year old  male with PMH significant for HFrEF and COPD with chronic respiratory failure (on 2L home oxygen), T2DM with CKD III, and iron deficiency anemia who originally presented to [facility] on 08/30 with cellulitis of left foot with concern for diabetic foot infection with subsequent development of MRSA bacteremia attributed to septic arthritis of right knee and elbow. He is status post drainage and coverage for MRSA since that time. His work-up for other etiologies of MRSA bacteremia have included negative LUKAS and MRI of lumbar spine looking for endocarditis and spinal abscess, respectively. Stool studies for C.diff on 09/11 were negative. His hospital course has been associated with worsening hypotension and leukocytosis since 09/10 prompting ICU admission on 09/12 for initiation of vasopressor support. Infectious work-up thus far on ICU admission concern for likely right lower lobe HAP.      CT abdomen pelvis on 9/15 with gallbladder dilation, sludge, and trace pericholic fluid collection. Exam not consistent with cholecystitis but given persistent shock potential source. Also with potential right lower lobe atelectasis with overlying infection  S/p ICU stepdown  -CK levels were rising so spoke with infectious disease and will need to switch patient back to Vancomycin. - Consult order placed.      Type 2 DM with DKA  -endocrinology following pt kept on transitional drip but has since been weaned to a basal bolus regimen.         Postablative hypothyroidism  Plan: Continue home SYNTHROID     Acute kidney injury  -likely ATN from Shock  s/p requiring RRT in ICU  -trialysis line removed   -pt is non-oliguric  -nephrology recommends sodium bicarb tabs - order placed.   -Nephrology recommends ambulatory f/u on discharge     Chronic systolic congestive heart failure  -Most recent ECHO in 09/2020 with EF of 25%.   -Unclear is ischemic vs non-ischemic.   -Home regimen: Carvedilol, Lasix 80 mg, and Lisinopril.           >due to NANETTE lasix and Lisinopril on hold, but he is recovering renal function and prior to discharge to any facility will likely need some amount of diuretic re-initiated and potentially a much lower doseage of Ace-inhibitor.   -Associated with chronic hypoxemic respiratory failure requiring 2L nasal cannula, but noted to develop worsening oxygen requirements on ICU admission that were likely multifactorial from suspected hospital acquired pneumonia versus declining urine output with pre-disposition to volume overload. .      COPD mixed type  -Based on PFT's from 05/2015 showing mild (small airways) obstruction, airflow not improved after bronchodilator, and moderate restriction.     Acute on chronic respiratory failure with hypoxia  -History of mixed COPD (based on PFT's from 05/2015 showing mild (small airways) obstruction, airflow not improved after bronchodilator, and moderate restriction) and HFrEF with chronic respiratory failure on 2L home oxygen.   -ICU admission notable for progressive increase in supplemental oxygen requirements.   -Work-up for underlying etiology of acute on chronic respiratory failure include CXR showing concern for possible progression of CHF vs HAP (not entirely convinced that CXR showed consolidation)  - Breathing back to baseline now to 2L NC     Code Status: Full Code     High Risk Conditions:  Patient has a condition that poses threat to life and bodily function: Septic shock, MRSA bacteremia  Patient is currently on drug therapy requiring intensive monitoring for toxicity: Daptomycin.      Discharge  Planning   EDY: 9/25/2020     Code Status: Full Code   Is the patient medically ready for discharge?: No    Reason for patient still in hospital (select all that apply): Patient trending condition  Discharge Plan A: Skilled Nursing Facility   Discharge Delays: (!) Change in Medical Condition           Diet:    GI PPx:   DVT PPx:    Lines:  Drains:  Airways:  Wounds:    Goals of Care:  Return to prior functional status   Discharge plan:    Time (minutes) spent in care of the patient (Greater than 1/2 spent in direct face-to-face contact)  35 minutes    Keenan Gonzales MD

## 2020-09-27 NOTE — PLAN OF CARE
Problem: Adult Inpatient Plan of Care  Goal: Optimal Comfort and Wellbeing  Outcome: Ongoing, Progressing  Intervention: Provide Person-Centered Care  Flowsheets (Taken 9/27/2020 1452)  Trust Relationship/Rapport:   care explained   choices provided   emotional support provided   questions answered     Problem: Adjustment to Illness (Sepsis/Septic Shock)  Goal: Optimal Coping  Outcome: Ongoing, Progressing     Problem: Bleeding (Sepsis/Septic Shock)  Goal: Absence of Bleeding  Intervention: Minimize Bleeding Risk  Flowsheets (Taken 9/27/2020 1452)  Bleeding Precautions:   blood pressure closely monitored   foot protection facilitated  Bleeding Management: affected area elevated    IV antibiotics continued.  Patient up at side of bed X 1 hour.  Pt. Last bowel movement on 9/20/2020.  Miralax and milk of magnesia given.  Patient continues to be resistant to performing ADL's such as bathing and getting out of the bed.  Patient remains tachycardic with pulse in the oneteens and lower one twenties.  Will continue to monitor.

## 2020-09-27 NOTE — CARE UPDATE
Rapid Response Nurse Chart Check     Chart check completed, abnormal VS noted. Mg+ 1.5, MD Gonzales to replace.  Bedside RN, Ernst contacted. No concerns verbalized at this time. Instructed to call [phone] for further concerns or assistance.

## 2020-09-28 LAB
ALBUMIN SERPL BCP-MCNC: 1.6 G/DL (ref 3.5–5.2)
ALBUMIN SERPL BCP-MCNC: 1.6 G/DL (ref 3.5–5.2)
ALP SERPL-CCNC: 72 U/L (ref 55–135)
ALT SERPL W/O P-5'-P-CCNC: 22 U/L (ref 10–44)
ANION GAP SERPL CALC-SCNC: 10 MMOL/L (ref 8–16)
AST SERPL-CCNC: 20 U/L (ref 10–40)
BASOPHILS # BLD AUTO: 0.07 K/UL (ref 0–0.2)
BASOPHILS NFR BLD: 0.6 % (ref 0–1.9)
BILIRUB DIRECT SERPL-MCNC: 0.3 MG/DL (ref 0.1–0.3)
BILIRUB SERPL-MCNC: 0.6 MG/DL (ref 0.1–1)
BUN SERPL-MCNC: 17 MG/DL (ref 8–23)
CALCIUM SERPL-MCNC: 8 MG/DL (ref 8.7–10.5)
CHLORIDE SERPL-SCNC: 100 MMOL/L (ref 95–110)
CO2 SERPL-SCNC: 28 MMOL/L (ref 23–29)
CREAT SERPL-MCNC: 1.4 MG/DL (ref 0.5–1.4)
CRP SERPL-MCNC: 85.4 MG/L (ref 0–8.2)
DIFFERENTIAL METHOD: ABNORMAL
EOSINOPHIL # BLD AUTO: 0.9 K/UL (ref 0–0.5)
EOSINOPHIL NFR BLD: 7 % (ref 0–8)
ERYTHROCYTE [DISTWIDTH] IN BLOOD BY AUTOMATED COUNT: 17.1 % (ref 11.5–14.5)
EST. GFR  (AFRICAN AMERICAN): >60 ML/MIN/1.73 M^2
EST. GFR  (NON AFRICAN AMERICAN): 53.1 ML/MIN/1.73 M^2
GLUCOSE SERPL-MCNC: 116 MG/DL (ref 70–110)
HCT VFR BLD AUTO: 24.7 % (ref 40–54)
HGB BLD-MCNC: 7.4 G/DL (ref 14–18)
IMM GRANULOCYTES # BLD AUTO: 0.1 K/UL (ref 0–0.04)
IMM GRANULOCYTES NFR BLD AUTO: 0.8 % (ref 0–0.5)
LACTATE SERPL-SCNC: 1.1 MMOL/L (ref 0.5–2.2)
LYMPHOCYTES # BLD AUTO: 1.1 K/UL (ref 1–4.8)
LYMPHOCYTES NFR BLD: 8.9 % (ref 18–48)
MAGNESIUM SERPL-MCNC: 1.6 MG/DL (ref 1.6–2.6)
MCH RBC QN AUTO: 26.7 PG (ref 27–31)
MCHC RBC AUTO-ENTMCNC: 30 G/DL (ref 32–36)
MCV RBC AUTO: 89 FL (ref 82–98)
MONOCYTES # BLD AUTO: 0.8 K/UL (ref 0.3–1)
MONOCYTES NFR BLD: 6.6 % (ref 4–15)
NEUTROPHILS # BLD AUTO: 9.2 K/UL (ref 1.8–7.7)
NEUTROPHILS NFR BLD: 76.1 % (ref 38–73)
NRBC BLD-RTO: 0 /100 WBC
PHOSPHATE SERPL-MCNC: 2.8 MG/DL (ref 2.7–4.5)
PLATELET # BLD AUTO: 294 K/UL (ref 150–350)
PMV BLD AUTO: 10.5 FL (ref 9.2–12.9)
POCT GLUCOSE: 140 MG/DL (ref 70–110)
POCT GLUCOSE: 151 MG/DL (ref 70–110)
POCT GLUCOSE: 158 MG/DL (ref 70–110)
POCT GLUCOSE: 188 MG/DL (ref 70–110)
POCT GLUCOSE: 196 MG/DL (ref 70–110)
POTASSIUM SERPL-SCNC: 3.9 MMOL/L (ref 3.5–5.1)
PROT SERPL-MCNC: 6.7 G/DL (ref 6–8.4)
RBC # BLD AUTO: 2.77 M/UL (ref 4.6–6.2)
SODIUM SERPL-SCNC: 138 MMOL/L (ref 136–145)
WBC # BLD AUTO: 12.07 K/UL (ref 3.9–12.7)

## 2020-09-28 PROCEDURE — 25000003 PHARM REV CODE 250: Mod: HCNC | Performed by: HOSPITALIST

## 2020-09-28 PROCEDURE — 97535 SELF CARE MNGMENT TRAINING: CPT | Mod: HCNC

## 2020-09-28 PROCEDURE — 99232 SBSQ HOSP IP/OBS MODERATE 35: CPT | Mod: HCNC,,, | Performed by: INTERNAL MEDICINE

## 2020-09-28 PROCEDURE — 25000003 PHARM REV CODE 250: Mod: HCNC | Performed by: INTERNAL MEDICINE

## 2020-09-28 PROCEDURE — 93010 ELECTROCARDIOGRAM REPORT: CPT | Mod: HCNC,,, | Performed by: INTERNAL MEDICINE

## 2020-09-28 PROCEDURE — 93010 EKG 12-LEAD: ICD-10-PCS | Mod: HCNC,,, | Performed by: INTERNAL MEDICINE

## 2020-09-28 PROCEDURE — 63600175 PHARM REV CODE 636 W HCPCS: Mod: HCNC | Performed by: INTERNAL MEDICINE

## 2020-09-28 PROCEDURE — 63600175 PHARM REV CODE 636 W HCPCS: Mod: HCNC | Performed by: HOSPITALIST

## 2020-09-28 PROCEDURE — 27000221 HC OXYGEN, UP TO 24 HOURS: Mod: HCNC

## 2020-09-28 PROCEDURE — 85025 COMPLETE CBC W/AUTO DIFF WBC: CPT | Mod: HCNC

## 2020-09-28 PROCEDURE — 86140 C-REACTIVE PROTEIN: CPT | Mod: HCNC

## 2020-09-28 PROCEDURE — 97530 THERAPEUTIC ACTIVITIES: CPT | Mod: HCNC

## 2020-09-28 PROCEDURE — 99232 PR SUBSEQUENT HOSPITAL CARE,LEVL II: ICD-10-PCS | Mod: HCNC,,, | Performed by: INTERNAL MEDICINE

## 2020-09-28 PROCEDURE — 83735 ASSAY OF MAGNESIUM: CPT | Mod: HCNC

## 2020-09-28 PROCEDURE — 80069 RENAL FUNCTION PANEL: CPT | Mod: HCNC

## 2020-09-28 PROCEDURE — 84075 ASSAY ALKALINE PHOSPHATASE: CPT | Mod: HCNC

## 2020-09-28 PROCEDURE — 83605 ASSAY OF LACTIC ACID: CPT | Mod: HCNC

## 2020-09-28 PROCEDURE — 25000003 PHARM REV CODE 250: Mod: HCNC | Performed by: STUDENT IN AN ORGANIZED HEALTH CARE EDUCATION/TRAINING PROGRAM

## 2020-09-28 PROCEDURE — 36415 COLL VENOUS BLD VENIPUNCTURE: CPT | Mod: HCNC

## 2020-09-28 PROCEDURE — 20600001 HC STEP DOWN PRIVATE ROOM: Mod: HCNC

## 2020-09-28 PROCEDURE — 94761 N-INVAS EAR/PLS OXIMETRY MLT: CPT | Mod: HCNC

## 2020-09-28 PROCEDURE — 82247 BILIRUBIN TOTAL: CPT | Mod: HCNC

## 2020-09-28 PROCEDURE — 97112 NEUROMUSCULAR REEDUCATION: CPT | Mod: HCNC

## 2020-09-28 PROCEDURE — 93005 ELECTROCARDIOGRAM TRACING: CPT | Mod: HCNC

## 2020-09-28 RX ORDER — FUROSEMIDE 40 MG/1
40 TABLET ORAL 2 TIMES DAILY
Status: DISCONTINUED | OUTPATIENT
Start: 2020-09-28 | End: 2020-10-09 | Stop reason: HOSPADM

## 2020-09-28 RX ORDER — CARVEDILOL 12.5 MG/1
12.5 TABLET ORAL 2 TIMES DAILY
Status: DISCONTINUED | OUTPATIENT
Start: 2020-09-28 | End: 2020-09-29

## 2020-09-28 RX ORDER — POLYETHYLENE GLYCOL 3350 17 G/17G
17 POWDER, FOR SOLUTION ORAL DAILY
Status: DISCONTINUED | OUTPATIENT
Start: 2020-09-28 | End: 2020-10-08

## 2020-09-28 RX ORDER — METOPROLOL TARTRATE 25 MG/1
25 TABLET, FILM COATED ORAL 2 TIMES DAILY
Status: DISCONTINUED | OUTPATIENT
Start: 2020-09-28 | End: 2020-09-28

## 2020-09-28 RX ADMIN — SODIUM BICARBONATE 1300 MG: 650 TABLET ORAL at 08:09

## 2020-09-28 RX ADMIN — CARVEDILOL 12.5 MG: 12.5 TABLET, FILM COATED ORAL at 08:09

## 2020-09-28 RX ADMIN — VANCOMYCIN HYDROCHLORIDE 1500 MG: 1.5 INJECTION, POWDER, LYOPHILIZED, FOR SOLUTION INTRAVENOUS at 12:09

## 2020-09-28 RX ADMIN — HEPARIN SODIUM 5000 UNITS: 5000 INJECTION INTRAVENOUS; SUBCUTANEOUS at 10:09

## 2020-09-28 RX ADMIN — INSULIN ASPART 4 UNITS: 100 INJECTION, SOLUTION INTRAVENOUS; SUBCUTANEOUS at 04:09

## 2020-09-28 RX ADMIN — HYPROMELLOSE 2910 1 DROP: 5 SOLUTION OPHTHALMIC at 08:09

## 2020-09-28 RX ADMIN — HYPROMELLOSE 2910 1 DROP: 5 SOLUTION OPHTHALMIC at 02:09

## 2020-09-28 RX ADMIN — CARVEDILOL 6.25 MG: 6.25 TABLET, FILM COATED ORAL at 08:09

## 2020-09-28 RX ADMIN — MAGNESIUM HYDROXIDE 2400 MG: 400 SUSPENSION ORAL at 12:09

## 2020-09-28 RX ADMIN — FUROSEMIDE 40 MG: 40 TABLET ORAL at 06:09

## 2020-09-28 RX ADMIN — INSULIN ASPART 4 UNITS: 100 INJECTION, SOLUTION INTRAVENOUS; SUBCUTANEOUS at 12:09

## 2020-09-28 RX ADMIN — POLYETHYLENE GLYCOL 3350 17 G: 17 POWDER, FOR SOLUTION ORAL at 08:09

## 2020-09-28 RX ADMIN — POLYETHYLENE GLYCOL 3350 17 G: 17 POWDER, FOR SOLUTION ORAL at 11:09

## 2020-09-28 RX ADMIN — HEPARIN SODIUM 5000 UNITS: 5000 INJECTION INTRAVENOUS; SUBCUTANEOUS at 02:09

## 2020-09-28 RX ADMIN — INSULIN DETEMIR 10 UNITS: 100 INJECTION, SOLUTION SUBCUTANEOUS at 08:09

## 2020-09-28 RX ADMIN — FLUTICASONE FUROATE AND VILANTEROL TRIFENATATE 1 PUFF: 200; 25 POWDER RESPIRATORY (INHALATION) at 08:09

## 2020-09-28 RX ADMIN — FUROSEMIDE 40 MG: 40 TABLET ORAL at 11:09

## 2020-09-28 RX ADMIN — FLUTICASONE PROPIONATE 50 MCG: 50 SPRAY, METERED NASAL at 08:09

## 2020-09-28 RX ADMIN — LEVOTHYROXINE SODIUM 75 MCG: 25 TABLET ORAL at 05:09

## 2020-09-28 RX ADMIN — INSULIN ASPART 4 UNITS: 100 INJECTION, SOLUTION INTRAVENOUS; SUBCUTANEOUS at 08:09

## 2020-09-28 RX ADMIN — HEPARIN SODIUM 5000 UNITS: 5000 INJECTION INTRAVENOUS; SUBCUTANEOUS at 05:09

## 2020-09-28 RX ADMIN — LEVOFLOXACIN 750 MG: 750 TABLET, FILM COATED ORAL at 08:09

## 2020-09-28 NOTE — PLAN OF CARE
Pt without complaints or concerns overnight. Encourage po fluids, pt cooperative with care. Left foot dressing clean/dry/intact.     Continue current level of assessment and plan of care. Daily IV abx.

## 2020-09-28 NOTE — PLAN OF CARE
Problem: Physical Therapy Goal  Goal: Physical Therapy Goal  Description: Goals to be met by: 10/05/20    Patient will increase functional independence with mobility by performin. Supine to sit with moderate assitance. -MET ()  Supine to sit with contact guard assistance.  2. Sit to supine with moderate assistance  3. Sit to stand transfer with moderate assistance.  4. Gait  x 10 feet with Minimal Assistance using LRAD and maintenance of weight bearing status   5. Lower extremity exercise program x10 with assistance as needed.   6. Sit for 10 minutes with Minimal Assistance while reaching out of JESIKA in all planes to perform functional activities. -MET ()  Sit for 10 minutes with Stand By Assistance while reaching out of JESIKA in all planes to perform functional activities. - MET ()  Sit for 10 minutes with [place] while reaching out of JESIKA in all planes to perform functional activities.  Outcome: Ongoing, Progressing    Goals updated to reflect pt's progress. Continue with POC.     Haley Mejia, DARNELL  2020

## 2020-09-28 NOTE — CARE UPDATE
Rapid Response Nurse Chart Check     Chart check completed, abnormal VS noted. EKG ordered and shows a-flutter. MD aware and will place orders. Please call [phone] for further concerns or assistance.

## 2020-09-28 NOTE — PT/OT/SLP PROGRESS
Physical Therapy Treatment    Patient Name:  Ed Patton   MRN:  [id number]    Recommendations:     Discharge Recommendations:  nursing facility, skilled   Discharge Equipment Recommendations: (TBD)   Barriers to discharge: Decreased caregiver support and decreased functional mobility    Assessment:     Ed Patton is a 65 y.o. male admitted with a medical diagnosis of Septic shock due to methicillin resistant Staphylococcus aureus.  He presents with the following impairments/functional limitations:  weakness, impaired endurance, impaired self care skills, impaired functional mobilty, gait instability, impaired balance, decreased coordination, pain, edema, impaired cardiopulmonary response to activity, impaired coordination, decreased lower extremity function. Pt tolerated session well but his mobility is still be limited due to decreased activity tolerance, R knee pain, and anxiety. He appeared orientated but at times, he demo'd delayed and inconsistent responses and showed decreased awareness to his situation. He responded well to direct cues and commands to help redirect. He is showing progress with sitting EOB balance as well as sit-stand t/fs using RW to help prepare for gait. Mod A x2 for supine-sit. SBA for EOB balance ~15 min while performing functional activities. Max A x2 using RW for 3 trials of sit-stand t/fs. Upon d/c, PT still recommends skilled nursing facility to help address the above deficits and improve his overall functional mobility. He is an excellent candidate due to his prior level of function and potential to continue to progress.    Rehab Prognosis: Good; patient would benefit from acute skilled PT services to address these deficits and reach maximum level of function.    Recent Surgery: Procedure(s) (LRB):  ARTHROSCOPY, KNEE, RIGHT  (Right) 21 Days Post-Op    Plan:     During this hospitalization, patient to be seen 4 x/week to address the identified rehab impairments via gait  training, therapeutic activities, therapeutic exercises, neuromuscular re-education and progress toward the following goals:    · Plan of Care Expires:  10/18/20    Subjective     Chief Complaint: did not state  Patient/Family Comments/goals: to return home  Pain/Comfort:  · Pain Rating 1: (did not rate)  · Location - Side 1: Right  · Location - Orientation 1: generalized  · Location 1: knee  · Pain Addressed 1: Distraction, Reposition, Cessation of Activity  · Pain Rating Post-Intervention 1: (remained - made worse with mobility)      Objective:     Communicated with RN prior to session.  Patient found HOB elevated with pulse ox (continuous), oxygen, telemetry, nephrostomy upon PT entry to room.     General Precautions: Standard, contact, fall   Orthopedic Precautions:LLE weight bearing as tolerated   Braces: (L Darco Shoe)     Functional Mobility:    Bed Mobility  Rolling to L: mod A, HOB elevated, verbal cues for hand placement  Supine to Sit on the R side: mod A x2, HOB elevated, siderails used, verbal cues for hand placement    Sit to supine: max A x2  Scoot to HOB in supine: CGA, verbal cues to use headboard  Scoot to EOB in sitting: min A  Soot EOB towards HOB: min A, 4 trials to the R, able to WB through arms and legs to left hips off bed, required verbal cues for sequencing    Transfers Sit to Stand (to prepare for gait):  max A x2, using RW, 3 trials, raised bed, blocked R knee and foot and facilitated under arm and at hips, pt demo'd difficulty with extending hips and knees, difficulty with sequencing and lifting eyes/head; verbal and manual cues for posture, sequencing, hand placement, anterior weight shifts; able to maintain 2 trials for ~15 secs and 1 trial for ~8 secs            AM-PAC 6 CLICK MOBILITY  Turning over in bed (including adjusting bedclothes, sheets and blankets)?: 3  Sitting down on and standing up from a chair with arms (e.g., wheelchair, bedside commode, etc.): 2  Moving from lying on  back to sitting on the side of the bed?: 2  Moving to and from a bed to a chair (including a wheelchair)?: 1  Need to walk in hospital room?: 1  Climbing 3-5 steps with a railing?: 1  Basic Mobility Total Score: 10       Therapeutic Activities and Exercises:   -Pt safe to t/f with therapy and/or Medi-chair.   -Educated patient on safety with mobility and the importance of continuing to be mobile to prevent deconditioning.   -Discussed roles and goals of acute physical therapy services.   -Educated pt on ROM exercises to help with swelling in knee and ankles.   -Educated pt on quad sets to do while in the bed - pt able to demo. Encouraged 10 reps 3x/day.   -Pt expressed understanding and agreement to all.     Sitting Balance (~15 min):   - SBA, using UEs on bed and in lap for support   - posture: posterior lean, L lateral lean (pain avoidance for R knee), kyphotic, decreased midline orientation   - verbal cues for postural correction and anterior weight shift  - able to perform ADLs outside of JESIKA with OT  - able to perform active LAQs (5 reps) with sustained HS stretch on R side - showing an increase in ROM     Patient left HOB elevated with all lines intact and call button in reach..    GOALS:   Multidisciplinary Problems     Physical Therapy Goals        Problem: Physical Therapy Goal    Goal Priority Disciplines Outcome Goal Variances Interventions   Physical Therapy Goal     PT, PT/OT Ongoing, Progressing     Description: Goals to be met by: 10/05/20    Patient will increase functional independence with mobility by performin. Supine to sit with moderate assitance. -MET ()  Supine to sit with contact guard assistance.  2. Sit to supine with moderate assistance  3. Sit to stand transfer with moderate assistance.  4. Gait  x 10 feet with Minimal Assistance using LRAD and maintenance of weight bearing status   5. Lower extremity exercise program x10 with assistance as needed.   6. Sit for 10 minutes with  Minimal Assistance while reaching out of JESIKA in all planes to perform functional activities. -MET (9/21)  Sit for 10 minutes with Stand By Assistance while reaching out of JESIKA in all planes to perform functional activities. - MET (9/25)  Sit for 10 minutes with [place] while reaching out of JESIKA in all planes to perform functional activities.                   Time Tracking:     PT Received On: 09/28/20  PT Start Time: 1116     PT Stop Time: 1145  PT Total Time (min): 29 min     Billable Minutes: Therapeutic Activity 14 and Neuromuscular Re-education 15 (co-tx with OT due to pt's decreased activity tolerance)     Treatment Type: Treatment  PT/PTA: PT     PTA Visit Number: 0     Haley Mejia, [facility]  09/28/2020

## 2020-09-28 NOTE — PROGRESS NOTES
Hospital Medicine  Progress note    Team: [facility] Keenan Gonzales MD  Admit Date: 8/30/2020  EDY 9/28/2020  Length of Stay:  LOS: 29 days   Code status: Full Code    Principal Problem:  Septic shock due to methicillin resistant Staphylococcus aureus    HPI / Hospital Course     Interval hx: 9/28 WBC is 12, remains tach but steady at 120. CRP is 84.   lactate 1.1 today.Looked as well as I have seen him today. Will get EKG  End date of IV antibiotics:  Daptomycin 10/7/2020  Levofloxacin 9/30/2020.     Refused angiogram Vascular surgery signed off. Will need IV abx, SNF may be best option.    ROS     Respiratory: neg for cough neg for shortness of breath  Cardiovascular: neg for chest pain neg for palpitations  Gastrointestinal: neg for nausea neg for vomiting, neg for abdominal pain neg for diarrhea neg for constipation   Behavioral/Psych: neg for depression neg for anxiety    PEx  Temp:  [98 °F (36.7 °C)-98.7 °F (37.1 °C)]   Pulse:  []   Resp:  [13-21]   BP: ()/(59-88)   SpO2:  [96 %-100 %]     Intake/Output Summary (Last 24 hours) at 9/28/2020 0811  Last data filed at 9/28/2020 0655  Gross per 24 hour   Intake 350 ml   Output 650 ml   Net -300 ml       General Appearance: no acute distress   Heart: regular rate and rhythm  Respiratory: Normal respiratory effort, no crackles   Abdomen: Soft, non-tender; bowel sounds active  Skin: intact.Skin intact  Neurologic:  No focal numbness or weakness  Mental status: Alert, oriented x 4, affect appropriate     Recent Labs   Lab 09/26/20  0403 09/27/20  0616 09/28/20  0515   WBC 13.52* 13.62* 12.07   HGB 7.6* 7.5* 7.4*   HCT 26.2* 26.2* 24.7*    307 294     Recent Labs   Lab 09/26/20  0403 09/27/20  0616 09/28/20  0515    138 138   K 4.0 4.1 3.9    100 100   CO2 30* 27 28   BUN 20 18 17   CREATININE 1.7* 1.6* 1.4   * 137* 116*   CALCIUM 8.0* 8.2* 8.0*   MG 1.5* 1.5* 1.6   PHOS 3.0 3.1 2.8     Recent Labs   Lab 09/24/20  0515   09/26/20  0403 09/27/20  0616 09/28/20  0515   ALKPHOS 94  --   --   --  72   ALT 40  --   --   --  22   AST 50*  --   --   --  20   ALBUMIN 1.6*  1.6*   < > 1.7* 1.6* 1.6*  1.6*   PROT 6.8  --   --   --  6.7   BILITOT 0.5  --   --   --  0.6    < > = values in this interval not displayed.        Recent Labs   Lab 09/27/20  0125 09/27/20  0733 09/27/20  1140 09/27/20  1627 09/27/20  2130 09/28/20  0809   POCTGLUCOSE 146* 145* 130* 157* 154* 140*       Scheduled Meds:   artificial tears  1 drop Both Eyes TID    carvediloL  6.25 mg Oral BID    fluticasone furoate-vilanteroL  1 puff Inhalation Daily    fluticasone propionate  1 spray Each Nostril Daily    heparin (porcine)  5,000 Units Subcutaneous Q8H    insulin aspart U-100  4 Units Subcutaneous TIDWM    insulin detemir U-100  10 Units Subcutaneous QHS    levoFLOXacin  750 mg Oral Daily    levothyroxine  75 mcg Oral Before breakfast    sodium bicarbonate  1,300 mg Oral BID    vancomycin (VANCOCIN) IVPB  1,500 mg Intravenous Q24H     Continuous Infusions:  As Needed:  acetaminophen, albuterol-ipratropium, calcium carbonate, dextrose 50%, dextrose 50%, glucagon (human recombinant), glucose, glucose, insulin aspart U-100, magnesium hydroxide 400 mg/5 ml, melatonin, ondansetron, oxyCODONE, polyethylene glycol, DIPH,PERTUS (ADACEL),TETANUS PF VAC (ADULT), Pharmacy to dose Vancomycin consult **AND** vancomycin - pharmacy to dose    ** update problem list    Active Hospital Problems    Diagnosis  POA    *Septic shock due to methicillin resistant Staphylococcus aureus [A41.02, R65.21]  Yes    Cellulitis of left lower extremity [L03.116]  Unknown    PVD (peripheral vascular disease) [I73.9]  Unknown    MRSA bacteremia [R78.81]  Yes    Acute renal insufficiency [N28.9]  Yes    Acute metabolic encephalopathy [G93.41]  No    Hospital-acquired pneumonia [J18.9, Y95]  No    Acute on chronic respiratory failure with hypoxia [J96.21]  No    Debility [R53.81]   Yes    Acute renal failure with acute tubular necrosis superimposed on stage 3 chronic kidney disease [N17.0, N18.3]  No    Septic arthritis of knee, right [M00.9]  Yes    Staphylococcal arthritis of right knee [M00.061]  Yes    Diabetic ulcer of left foot associated with type 2 diabetes mellitus, with fat layer exposed [E11.621, L97.522]  Yes    Diabetic foot infection [E11.628, L08.9]  Yes    COPD mixed type [J44.9]  Yes     Chronic    Postablative hypothyroidism [E89.0]  Yes     Chronic    Hyperlipidemia [E78.5]  Yes     High ASCVD with CAD, elevated A1c      Type 2 diabetes mellitus with stage 3 chronic kidney disease, with long-term current use of insulin [E11.22, N18.3, Z79.4]  Not Applicable     GFR> 60, uncontrolled DM      Chronic systolic congestive heart failure [I50.22]  Yes     Chronic     EF 35% in 2015 echo, improved in 5/2018. Patient with mixed ischemic and non-ischemic cardiomyopathy        Resolved Hospital Problems    Diagnosis Date Resolved POA    Endocarditis [I38] 09/12/2020 Yes    Sepsis due to methicillin resistant Staphylococcus aureus [A41.02] 09/13/2020 Yes    Type 2 diabetes mellitus with ketoacidosis without coma, with long-term current use of insulin [E11.10, Z79.4] 09/18/2020 Not Applicable     Novolin 70/30 40U in AM, 30U in PM. Elevated A1c         Assessment and Plan  / Problems managed today    Overview/Hospital Course:  Mr. Ed Patton was admitted to hospital medicine on 08/30 for management of a left-sided diabetic foot infection that was precipitated by an undetected punction wound after stepping on a tack/nail. His presentation was notable for leukocytosis with elevated inflammatory markers, however MRI of left foot only showed cellulitis of the anterior and lateral aspect of foot. Podiatry was consulted with recommendations for IV antibiotics; he was initiated on Ciprofloxacin and Vancomycin on admission. However, blood cultures from admission resulted  positive for MRSA with wound cultures from left foot also growing MRSA with Proteus mirabilis. By 09/03 cultures remained positive despite Vancomycin, and patient was noted to develop right knee pain with swelling. Orthopedics was consulted and patient underwent right knee joint aspiration positive for septic arthritis with cultures also positive for MRSA.      LUKAS on 09/04 showed known chronic systolic heart failure, but was negative for endocarditis. MRI of lumbar spine on 09/08 was negative for abscess. By 09/06, blood cultures continued to remain positive, and he underwent I&D of right arm abscess on 09/08 with cultures also positive for MRSA.      For persistent MRSA bacteremia, he was transitioned to Daptomycin and Ceftaroline, however this regimen was discontinued by 09/10 due to concern for developing rhabdomylosis. By 09/09, he was noted to develop a progressively worsening leukocytosis and NANETTE. Nephrology was consulted on 09/09 with suspicion for ATN.      Hematology was consulted on 09/12 for persistent leukocytosis as likely reactive from bacteremia. On 09/10, patient began developing intermitent hypotension prompting broadening of antibiotics to Cefepime and Vancomycin. By 09/12, renal function continued to worsen in addition to hypotension prompting transfer to ICU for vasopressor support and possible HD. Patient found to be encephalopathic, central line placed and started on CRRT overnight on 9/14 with improvement in mental status, but remained encephalopathic. CT head without any acute intracranial process. Vasopressin off AM of 9/15 but remains on levophed, CT abdomen with concern for potential cholecystitis and possible atelectasis with superimposed pneumonia.  Due to the acuity of his illness is he is not a surgical candidate it and ultimately is now status post cholecystostomy tube placement for management of suspected cholecystitis     Additional complications arising during hospital course  include the development metabolic encephalopathy secondary to uremia versus shock versus DKA or overall cerebral hypoperfusion from shock, reported to have agitation prior to step-down from ICU.  Also developed DKA requiring standard therapy with IV insulin infusion, however he was kept on IV insulin due to rising sugars any time it was weaned working on basal bolus regimen for him at this time with the assistance of a endocrinology           Septic shock_Septic arthritis of Right Knee_Sespsi due to MRSA  This is a 65 year old  male with PMH significant for HFrEF and COPD with chronic respiratory failure (on 2L home oxygen), T2DM with CKD III, and iron deficiency anemia who originally presented to [facility] on 08/30 with cellulitis of left foot with concern for diabetic foot infection with subsequent development of MRSA bacteremia attributed to septic arthritis of right knee and elbow. He is status post drainage and coverage for MRSA since that time. His work-up for other etiologies of MRSA bacteremia have included negative LUKAS and MRI of lumbar spine looking for endocarditis and spinal abscess, respectively. Stool studies for C.diff on 09/11 were negative. His hospital course has been associated with worsening hypotension and leukocytosis since 09/10 prompting ICU admission on 09/12 for initiation of vasopressor support. Infectious work-up thus far on ICU admission concern for likely right lower lobe HAP.      CT abdomen pelvis on 9/15 with gallbladder dilation, sludge, and trace pericholic fluid collection. Exam not consistent with cholecystitis but given persistent shock potential source. Also with potential right lower lobe atelectasis with overlying infection  S/p ICU stepdown  -CK levels were rising so spoke with infectious disease and will need to switch patient back to Vancomycin. - Consult order placed.      Type 2 DM with DKA  -endocrinology following pt kept on transitional drip but has  since been weaned to a basal bolus regimen.         Postablative hypothyroidism  Plan: Continue home SYNTHROID     Acute kidney injury  -likely ATN from Shock s/p requiring RRT in ICU  -trialysis line removed   -pt is non-oliguric  -nephrology recommends sodium bicarb tabs - order placed.   -Nephrology recommends ambulatory f/u on discharge     Chronic systolic congestive heart failure  -Most recent ECHO in 09/2020 with EF of 25%.   -Unclear is ischemic vs non-ischemic.   -Home regimen: Carvedilol, Lasix 80 mg, and Lisinopril.           >due to NANETTE lasix and Lisinopril on hold, but he is recovering renal function and prior to discharge to any facility will likely need some amount of diuretic re-initiated and potentially a much lower doseage of Ace-inhibitor.   -Associated with chronic hypoxemic respiratory failure requiring 2L nasal cannula, but noted to develop worsening oxygen requirements on ICU admission that were likely multifactorial from suspected hospital acquired pneumonia versus declining urine output with pre-disposition to volume overload. .      COPD mixed type  -Based on PFT's from 05/2015 showing mild (small airways) obstruction, airflow not improved after bronchodilator, and moderate restriction.     Acute on chronic respiratory failure with hypoxia  -History of mixed COPD (based on PFT's from 05/2015 showing mild (small airways) obstruction, airflow not improved after bronchodilator, and moderate restriction) and HFrEF with chronic respiratory failure on 2L home oxygen.   -ICU admission notable for progressive increase in supplemental oxygen requirements.   -Work-up for underlying etiology of acute on chronic respiratory failure include CXR showing concern for possible progression of CHF vs HAP (not entirely convinced that CXR showed consolidation)  - Breathing back to baseline now to 2L NC     Code Status: Full Code     High Risk Conditions:  Patient has a condition that poses threat to life and  bodily function: Septic shock, MRSA bacteremia  Patient is currently on drug therapy requiring intensive monitoring for toxicity: Daptomycin.      Discharge Planning   EDY: 9/25/2020     Code Status: Full Code   Is the patient medically ready for discharge?: No    Reason for patient still in hospital (select all that apply): Patient trending condition  Discharge Plan A: Skilled Nursing Facility   Discharge Delays: (!) Change in Medical Condition           Diet:    GI PPx:   DVT PPx:    Lines:  Drains:  Airways:  Wounds:    Goals of Care:  Return to prior functional status   Discharge plan:    Time (minutes) spent in care of the patient (Greater than 1/2 spent in direct face-to-face contact)  35 minutes    Keenan Gonzales MD

## 2020-09-28 NOTE — PLAN OF CARE
Problem: Adult Inpatient Plan of Care  Goal: Optimal Comfort and Wellbeing  Outcome: Ongoing, Progressing   POC reviewed with pt. VSS. AAOx4. Wound care completed. Only 200cc UO this shift. No BM for a few days, prn laxatives administered. Remains tachycardic. Wound care completed. No other changes. Safety maintained. Will continue to monitor.

## 2020-09-28 NOTE — PLAN OF CARE
Goals remain appropriate, continue with OT POC. Pt has met 2/7 goals.     Problem: Occupational Therapy Goal  Goal: Occupational Therapy Goal  Description: Goals to be met by: 10/18/20    Patient will increase functional independence with ADLs by performing:    Feeding with Set-up Assistance. Goal met  Grooming while EOB with Minimal Assistance. Goal met  Toileting from bedside commode with Moderate Assistance for hygiene and clothing management.   Supine to sit with Moderate Assistance to increased bed mobility independence.   Stand pivot transfers with Moderate Assistance and maintaining weight-bearing precaution(s) to reach bedside chair.  Toilet transfer to bedside commode with Moderate Assistance and maintaining weight-bearing precaution(s).  Upper extremity exercise program x15 reps per handout, with supervision to improve BUE function to increase independence in daily occupations.    9/28/2020 1125 by ELISEO Messina  Outcome: Ongoing, Progressing  9/28/2020 1124 by ELISEO Messina  Outcome: Ongoing, Progressing

## 2020-09-28 NOTE — PT/OT/SLP PROGRESS
Occupational Therapy   Treatment    Name: Ed Patton  MRN: [id number]  Admitting Diagnosis:  Septic shock due to methicillin resistant Staphylococcus aureus  21 Days Post-Op    Recommendations:     Discharge Recommendations: nursing facility, skilled  Discharge Equipment Recommendations:  none  Barriers to discharge:  Other (Comment)    Assessment:     Ed Patton is a 65 y.o. male with a medical diagnosis of Septic shock due to methicillin resistant Staphylococcus aureus.  He presents with decline in ADLs and functional mobility. Pt is progressing well towards goals.  He continues to be limited by pain in R knee with any movement.  Pt was able to stand twice today with total assist x 2.  Performance deficits affecting function are weakness, impaired endurance, impaired self care skills, impaired functional mobilty, gait instability, impaired balance, decreased coordination, decreased lower extremity function, pain, decreased ROM, edema, impaired cardiopulmonary response to activity, impaired joint extensibility.     Rehab Prognosis:  Good; patient would benefit from acute skilled OT services to address these deficits and reach maximum level of function.       Plan:     Patient to be seen 3 x/week to address the above listed problems via self-care/home management, therapeutic activities, therapeutic exercises, neuromuscular re-education  · Plan of Care Expires: 10/08/20  · Plan of Care Reviewed with: patient    Subjective     Pain/Comfort:  Pain Rating 1: 0/10(did not rate)  Location - Side 1: Right  Location 1: knee  Pain Rating Post-Intervention 1: (did not rate but increased pain when sitting EOB)    Objective:     Communicated with: RN prior to session.  Patient found HOB elevated with oxygen, blood pressure cuff, telemetry, nephrostomy, bed alarm upon OT entry to room.    General Precautions: Standard, fall, contact   Orthopedic Precautions:LLE weight bearing as tolerated   Braces:       Occupational  Performance:     Bed Mobility:    · Patient completed Scooting/Bridging with minimum assistance  · Patient completed Supine to Sit with moderate assistance  · Patient completed Sit to Supine with moderate assistance     Functional Mobility/Transfers:  · Patient completed Sit <> Stand Transfer with maximal assistance and of 2 persons  with  rolling walker   · Functional Mobility: 2 sit to stands     Activities of Daily Living:  · Feeding:  set up assist    · Grooming: stand by assistance for oral care while sitting EOB. Assist needed for sitting balance      AMPA 6 Click ADL: 14    Treatment & Education:  · Pt educated on role of OT in acute care setting.   · Assisted with ADLs and functional mobility with assist levels noted above    Patient left HOB elevated with all lines intact and call button in reachEducation:      GOALS:   Multidisciplinary Problems     Occupational Therapy Goals        Problem: Occupational Therapy Goal    Goal Priority Disciplines Outcome Interventions   Occupational Therapy Goal     OT, PT/OT Ongoing, Progressing    Description: Goals to be met by: 10/18/20    Patient will increase functional independence with ADLs by performing:    Feeding with Set-up Assistance.  Grooming while EOB with Minimal Assistance. Goal met  Toileting from bedside commode with Moderate Assistance for hygiene and clothing management.   Supine to sit with Moderate Assistance to increased bed mobility independence.   Stand pivot transfers with Moderate Assistance and maintaining weight-bearing precaution(s) to reach bedside chair.  Toilet transfer to bedside commode with Moderate Assistance and maintaining weight-bearing precaution(s).  Upper extremity exercise program x15 reps per handout, with supervision to improve BUE function to increase independence in daily occupations.                     Time Tracking:     OT Date of Treatment: 09/28/20  OT Start Time: 1113  OT Stop Time: 1144  OT Total Time (min): 31  min    Billable Minutes:Self Care/Home Management  31     ELISEO Rogers  9/28/2020

## 2020-09-28 NOTE — CARE UPDATE
Rapid Response Nurse Chart Check     Chart check completed, abnormal VS noted.   Please call [phone] for further concerns or assistance.

## 2020-09-28 NOTE — SUBJECTIVE & OBJECTIVE
"Interval HPI:   Overnight events:   BG is reasonably controlled on current SQ insulin regimen.   4:52 PM   - BG continues to trend upward above goal. BG excursions appear to be prandial in nature.     Diet diabetic [facility];  Calorie  23 Days Post-Op    Eatin%  Nausea: No  Hypoglycemia and intervention: No  Fever: No  TPN and/or TF: No  If yes, type of TF/TPN and rate: none    /79 (BP Location: Right arm)   Pulse (!) 120   Temp 98.5 °F (36.9 °C) (Oral)   Resp 19   Ht 6' 4" (1.93 m)   Wt 115.3 kg (254 lb 3.1 oz)   SpO2 100%   BMI 30.94 kg/m²     Labs Reviewed and Include    Recent Labs   Lab 20  0515   *   CALCIUM 8.0*   ALBUMIN 1.6*  1.6*   PROT 6.7      K 3.9   CO2 28      BUN 17   CREATININE 1.4   ALKPHOS 72   ALT 22   AST 20   BILITOT 0.6     Lab Results   Component Value Date    WBC 12.07 2020    HGB 7.4 (L) 2020    HCT 24.7 (L) 2020    MCV 89 2020     2020     No results for input(s): TSH, FREET4 in the last 168 hours.  Lab Results   Component Value Date    HGBA1C 9.5 (H) 2020       Nutritional status:   Body mass index is 30.94 kg/m².  Lab Results   Component Value Date    ALBUMIN 1.6 (L) 2020    ALBUMIN 1.6 (L) 2020    ALBUMIN 1.6 (L) 2020     No results found for: PREALBUMIN    Estimated Creatinine Clearance: 75 mL/min (based on SCr of 1.4 mg/dL).    Accu-Checks  Recent Labs     20  0805 20  1140 20  1611 20  2117 20  0125 20  0733 20  1140 20  1627 20  2130 20  0809   POCTGLUCOSE 157* 130* 218* 178* 146* 145* 130* 157* 154* 140*       Current Medications and/or Treatments Impacting Glycemic Control  Immunotherapy:    Immunosuppressants     None        Steroids:   Hormones (From admission, onward)    Start     Stop Route Frequency Ordered    20  melatonin tablet 6 mg      -- Oral Nightly PRN 20 2004        Pressors: "    Autonomic Drugs (From admission, onward)    None        Hyperglycemia/Diabetes Medications:   Antihyperglycemics (From admission, onward)    Start     Stop Route Frequency Ordered    09/26/20 2100  insulin detemir U-100 pen 10 Units      -- SubQ Nightly 09/26/20 1059    09/25/20 1130  insulin aspart U-100 pen 4 Units      -- SubQ 3 times daily with meals 09/25/20 0934    09/24/20 1536  insulin aspart U-100 pen 0-5 Units      -- SubQ Before meals & nightly PRN 09/24/20 1436

## 2020-09-29 LAB
ALBUMIN SERPL BCP-MCNC: 1.6 G/DL (ref 3.5–5.2)
ANION GAP SERPL CALC-SCNC: 9 MMOL/L (ref 8–16)
BASOPHILS # BLD AUTO: 0.09 K/UL (ref 0–0.2)
BASOPHILS NFR BLD: 0.9 % (ref 0–1.9)
BUN SERPL-MCNC: 17 MG/DL (ref 8–23)
CALCIUM SERPL-MCNC: 7.8 MG/DL (ref 8.7–10.5)
CHLORIDE SERPL-SCNC: 98 MMOL/L (ref 95–110)
CK SERPL-CCNC: 73 U/L (ref 20–200)
CO2 SERPL-SCNC: 28 MMOL/L (ref 23–29)
CREAT SERPL-MCNC: 1.5 MG/DL (ref 0.5–1.4)
DIFFERENTIAL METHOD: ABNORMAL
EOSINOPHIL # BLD AUTO: 0.8 K/UL (ref 0–0.5)
EOSINOPHIL NFR BLD: 8.3 % (ref 0–8)
ERYTHROCYTE [DISTWIDTH] IN BLOOD BY AUTOMATED COUNT: 17 % (ref 11.5–14.5)
EST. GFR  (AFRICAN AMERICAN): 56.5 ML/MIN/1.73 M^2
EST. GFR  (NON AFRICAN AMERICAN): 48.8 ML/MIN/1.73 M^2
GLUCOSE SERPL-MCNC: 158 MG/DL (ref 70–110)
HCT VFR BLD AUTO: 25.4 % (ref 40–54)
HGB BLD-MCNC: 7.4 G/DL (ref 14–18)
IMM GRANULOCYTES # BLD AUTO: 0.08 K/UL (ref 0–0.04)
IMM GRANULOCYTES NFR BLD AUTO: 0.8 % (ref 0–0.5)
LYMPHOCYTES # BLD AUTO: 1.1 K/UL (ref 1–4.8)
LYMPHOCYTES NFR BLD: 12 % (ref 18–48)
MAGNESIUM SERPL-MCNC: 1.6 MG/DL (ref 1.6–2.6)
MCH RBC QN AUTO: 26.2 PG (ref 27–31)
MCHC RBC AUTO-ENTMCNC: 29.1 G/DL (ref 32–36)
MCV RBC AUTO: 90 FL (ref 82–98)
MONOCYTES # BLD AUTO: 0.7 K/UL (ref 0.3–1)
MONOCYTES NFR BLD: 7.2 % (ref 4–15)
NEUTROPHILS # BLD AUTO: 6.7 K/UL (ref 1.8–7.7)
NEUTROPHILS NFR BLD: 70.8 % (ref 38–73)
NRBC BLD-RTO: 0 /100 WBC
PHOSPHATE SERPL-MCNC: 2.5 MG/DL (ref 2.7–4.5)
PLATELET # BLD AUTO: 255 K/UL (ref 150–350)
PMV BLD AUTO: 9.8 FL (ref 9.2–12.9)
POCT GLUCOSE: 154 MG/DL (ref 70–110)
POCT GLUCOSE: 164 MG/DL (ref 70–110)
POCT GLUCOSE: 167 MG/DL (ref 70–110)
POCT GLUCOSE: 179 MG/DL (ref 70–110)
POTASSIUM SERPL-SCNC: 3.9 MMOL/L (ref 3.5–5.1)
RBC # BLD AUTO: 2.82 M/UL (ref 4.6–6.2)
SODIUM SERPL-SCNC: 135 MMOL/L (ref 136–145)
VANCOMYCIN TROUGH SERPL-MCNC: 27.3 UG/ML (ref 10–22)
WBC # BLD AUTO: 9.48 K/UL (ref 3.9–12.7)

## 2020-09-29 PROCEDURE — 25000003 PHARM REV CODE 250: Mod: HCNC | Performed by: STUDENT IN AN ORGANIZED HEALTH CARE EDUCATION/TRAINING PROGRAM

## 2020-09-29 PROCEDURE — 83735 ASSAY OF MAGNESIUM: CPT | Mod: HCNC

## 2020-09-29 PROCEDURE — 80202 ASSAY OF VANCOMYCIN: CPT | Mod: HCNC

## 2020-09-29 PROCEDURE — 63600175 PHARM REV CODE 636 W HCPCS: Mod: HCNC | Performed by: INTERNAL MEDICINE

## 2020-09-29 PROCEDURE — 80069 RENAL FUNCTION PANEL: CPT | Mod: HCNC

## 2020-09-29 PROCEDURE — 27000221 HC OXYGEN, UP TO 24 HOURS: Mod: HCNC

## 2020-09-29 PROCEDURE — 82550 ASSAY OF CK (CPK): CPT | Mod: HCNC

## 2020-09-29 PROCEDURE — 25000003 PHARM REV CODE 250: Mod: HCNC | Performed by: HOSPITALIST

## 2020-09-29 PROCEDURE — 94761 N-INVAS EAR/PLS OXIMETRY MLT: CPT | Mod: HCNC

## 2020-09-29 PROCEDURE — 25000003 PHARM REV CODE 250: Mod: HCNC | Performed by: INTERNAL MEDICINE

## 2020-09-29 PROCEDURE — 20600001 HC STEP DOWN PRIVATE ROOM: Mod: HCNC

## 2020-09-29 PROCEDURE — 99232 SBSQ HOSP IP/OBS MODERATE 35: CPT | Mod: HCNC,,, | Performed by: INTERNAL MEDICINE

## 2020-09-29 PROCEDURE — 85025 COMPLETE CBC W/AUTO DIFF WBC: CPT | Mod: HCNC

## 2020-09-29 PROCEDURE — 36415 COLL VENOUS BLD VENIPUNCTURE: CPT | Mod: HCNC

## 2020-09-29 PROCEDURE — 63600175 PHARM REV CODE 636 W HCPCS: Mod: HCNC | Performed by: HOSPITALIST

## 2020-09-29 PROCEDURE — 99232 PR SUBSEQUENT HOSPITAL CARE,LEVL II: ICD-10-PCS | Mod: HCNC,,, | Performed by: INTERNAL MEDICINE

## 2020-09-29 RX ORDER — CARVEDILOL 25 MG/1
25 TABLET ORAL 2 TIMES DAILY
Status: DISCONTINUED | OUTPATIENT
Start: 2020-09-29 | End: 2020-10-09 | Stop reason: HOSPADM

## 2020-09-29 RX ORDER — INSULIN ASPART 100 [IU]/ML
5 INJECTION, SOLUTION INTRAVENOUS; SUBCUTANEOUS
Status: DISCONTINUED | OUTPATIENT
Start: 2020-09-29 | End: 2020-09-30

## 2020-09-29 RX ADMIN — HEPARIN SODIUM 5000 UNITS: 5000 INJECTION INTRAVENOUS; SUBCUTANEOUS at 03:09

## 2020-09-29 RX ADMIN — CARVEDILOL 12.5 MG: 12.5 TABLET, FILM COATED ORAL at 08:09

## 2020-09-29 RX ADMIN — FLUTICASONE PROPIONATE 50 MCG: 50 SPRAY, METERED NASAL at 08:09

## 2020-09-29 RX ADMIN — HYPROMELLOSE 2910 1 DROP: 5 SOLUTION OPHTHALMIC at 11:09

## 2020-09-29 RX ADMIN — INSULIN ASPART 4 UNITS: 100 INJECTION, SOLUTION INTRAVENOUS; SUBCUTANEOUS at 08:09

## 2020-09-29 RX ADMIN — FUROSEMIDE 40 MG: 40 TABLET ORAL at 08:09

## 2020-09-29 RX ADMIN — CARVEDILOL 25 MG: 25 TABLET, FILM COATED ORAL at 11:09

## 2020-09-29 RX ADMIN — LEVOFLOXACIN 750 MG: 750 TABLET, FILM COATED ORAL at 08:09

## 2020-09-29 RX ADMIN — SODIUM BICARBONATE 1300 MG: 650 TABLET ORAL at 09:09

## 2020-09-29 RX ADMIN — FUROSEMIDE 40 MG: 40 TABLET ORAL at 05:09

## 2020-09-29 RX ADMIN — FLUTICASONE FUROATE AND VILANTEROL TRIFENATATE 1 PUFF: 200; 25 POWDER RESPIRATORY (INHALATION) at 08:09

## 2020-09-29 RX ADMIN — INSULIN ASPART 5 UNITS: 100 INJECTION, SOLUTION INTRAVENOUS; SUBCUTANEOUS at 05:09

## 2020-09-29 RX ADMIN — HEPARIN SODIUM 5000 UNITS: 5000 INJECTION INTRAVENOUS; SUBCUTANEOUS at 07:09

## 2020-09-29 RX ADMIN — HYPROMELLOSE 2910 1 DROP: 5 SOLUTION OPHTHALMIC at 08:09

## 2020-09-29 RX ADMIN — SODIUM BICARBONATE 1300 MG: 650 TABLET ORAL at 08:09

## 2020-09-29 RX ADMIN — LEVOTHYROXINE SODIUM 75 MCG: 25 TABLET ORAL at 07:09

## 2020-09-29 RX ADMIN — INSULIN DETEMIR 10 UNITS: 100 INJECTION, SOLUTION SUBCUTANEOUS at 08:09

## 2020-09-29 RX ADMIN — HYPROMELLOSE 2910 1 DROP: 5 SOLUTION OPHTHALMIC at 03:09

## 2020-09-29 RX ADMIN — INSULIN ASPART 5 UNITS: 100 INJECTION, SOLUTION INTRAVENOUS; SUBCUTANEOUS at 12:09

## 2020-09-29 RX ADMIN — HEPARIN SODIUM 5000 UNITS: 5000 INJECTION INTRAVENOUS; SUBCUTANEOUS at 11:09

## 2020-09-29 RX ADMIN — POLYETHYLENE GLYCOL 3350 17 G: 17 POWDER, FOR SOLUTION ORAL at 08:09

## 2020-09-29 NOTE — CARE UPDATE
BG goal 140 - 180   BG within goal ranges on current SQ insulin regimen.     -  Continue Levemir 10 units q HS  -  Increase novolog to 5 units TDWM.     (20% dose increase)   -  Low Dose SQ Insulin Correction     Scale. Given kidney function.   -  BG Monitoring AC/HS      ** Please call Endocrine for any BG related issues **  ** Please notify Endocrine for any change and/or advance in diet**     Discharge Planning:   TBD. Please notify endocrinology prior to discharge.

## 2020-09-29 NOTE — PLAN OF CARE
Problem: Adult Inpatient Plan of Care  Goal: Optimal Comfort and Wellbeing  Outcome: Ongoing, Progressing   POC reviewed with pt. CAROLE. AAOx4. Had 2 BMs today. No acute changes. Safety maintained. Will continue to monitor.

## 2020-09-29 NOTE — PROGRESS NOTES
Pharmacokinetic Assessment Follow Up: IV Vancomycin    Vancomycin serum concentration assessment(s):    The trough level was drawn correctly and can be used to guide therapy at this time. The measurement is above the desired definitive target range of 15 to 20 mcg/mL.    Vancomycin Regimen Plan:    Discontinue the scheduled vancomycin regimen and re-dose when the random level is less than 20 mcg/mL, next level to be drawn at 0430 on 9/30.    Drug levels (last 3 results):  Recent Labs   Lab Result Units 09/29/20  1057   Vancomycin-Trough ug/mL 27.3*       Pharmacy will continue to follow and monitor vancomycin.    Please contact pharmacy at extension [phone] for questions regarding this assessment.    Thank you for the consult,   Brent Jackson       Patient brief summary:  Ed Patton is a 65 y.o. male initiated on antimicrobial therapy with IV Vancomycin for treatment of bacteremia      Drug Allergies:   Review of patient's allergies indicates:   Allergen Reactions    Vicodin [hydrocodone-acetaminophen] Itching       Actual Body Weight:   115.3kg    Renal Function:   Estimated Creatinine Clearance: 70 mL/min (A) (based on SCr of 1.5 mg/dL (H)).,     Dialysis Method (if applicable):  N/A    CBC (last 72 hours):  Recent Labs   Lab Result Units 09/27/20  0616 09/28/20 0515 09/29/20  0727   WBC K/uL 13.62* 12.07 9.48   Hemoglobin g/dL 7.5* 7.4* 7.4*   Hematocrit % 26.2* 24.7* 25.4*   Platelets K/uL 307 294 255   Gran% % 79.1* 76.1* 70.8   Lymph% % 7.8* 8.9* 12.0*   Mono% % 6.6 6.6 7.2   Eosinophil% % 5.3 7.0 8.3*   Basophil% % 0.6 0.6 0.9   Differential Method  Automated Automated Automated       Metabolic Panel (last 72 hours):  Recent Labs   Lab Result Units 09/27/20  0616 09/28/20  0515 09/29/20  0727   Sodium mmol/L 138 138 135*   Potassium mmol/L 4.1 3.9 3.9   Chloride mmol/L 100 100 98   CO2 mmol/L 27 28 28   Glucose mg/dL 137* 116* 158*   BUN, Bld mg/dL 18 17 17   Creatinine mg/dL 1.6* 1.4 1.5*   Albumin g/dL  1.6* 1.6*  1.6* 1.6*   Total Bilirubin mg/dL  --  0.6  --    Alkaline Phosphatase U/L  --  72  --    AST U/L  --  20  --    ALT U/L  --  22  --    Magnesium mg/dL 1.5* 1.6 1.6   Phosphorus mg/dL 3.1 2.8 2.5*       Vancomycin Administrations:  vancomycin given in the last 96 hours                     vancomycin 1.5 g in dextrose 5 % 250 mL IVPB (ready to mix) (mg) 1,500 mg New Bag 09/28/20 1234     1,500 mg New Bag 09/27/20 1142    vancomycin 1.75 g in 5 % dextrose 500 mL IVPB (mg) 1,750 mg New Bag 09/26/20 1214     1,750 mg New Bag 09/25/20 1150                    Microbiologic Results:  Microbiology Results (last 7 days)       Procedure Component Value Units Date/Time    Culture, Anaerobe [[id number]] Collected: 09/16/20 1612    Order Status: Completed Specimen: Body Fluid from Gallbladder Updated: 09/26/20 1328     Anaerobic Culture No anaerobes isolated    Fungus culture [[id number]] Collected: 09/07/20 1721    Order Status: Completed Specimen: Body Fluid from Knee, Right Updated: 09/24/20 0730     Fungus (Mycology) Culture Culture in progress      No fungus isolated after 2 weeks    Narrative:      1) Right Knee Joint Fluid    Fungus culture [[id number]] Collected: 09/07/20 1721    Order Status: Completed Specimen: Body Fluid from Knee, Right Updated: 09/24/20 0730     Fungus (Mycology) Culture Culture in progress      No fungus isolated after 2 weeks    Narrative:      2) Right Knee Joint Fluid

## 2020-09-29 NOTE — PROGRESS NOTES
Hospital Medicine  Progress note    Team: [facility] A Keenan Gonzales MD  Admit Date: 8/30/2020  EDY 9/28/2020  Length of Stay:  LOS: 30 days   Code status: Full Code    Principal Problem:  Septic shock due to methicillin resistant Staphylococcus aureus    HPI / Hospital Course     Interval hx:9/29 's will titrate up beta blocker. Looks well wbc 9.8   9/28 WBC is 12, remains tach but steady at 120. CRP is 84.   lactate 1.1 today.Looked as well as I have seen him today. Will get EKG  End date of IV antibiotics:  Daptomycin 10/7/2020  Levofloxacin 9/30/2020.     Refused angiogram Vascular surgery signed off. Will need IV abx, SNF may be best option.    ROS     Respiratory: neg for cough neg for shortness of breath  Cardiovascular: neg for chest pain neg for palpitations  Gastrointestinal: neg for nausea neg for vomiting, neg for abdominal pain neg for diarrhea neg for constipation   Behavioral/Psych: neg for depression neg for anxiety    PEx  Temp:  [97.9 °F (36.6 °C)-98.8 °F (37.1 °C)]   Pulse:  []   Resp:  [18-22]   BP: (110-130)/(65-81)   SpO2:  [96 %-98 %]     Intake/Output Summary (Last 24 hours) at 9/29/2020 0856  Last data filed at 9/29/2020 0600  Gross per 24 hour   Intake 380 ml   Output 500 ml   Net -120 ml       General Appearance: no acute distress   Heart: regular rate and rhythm  Respiratory: Normal respiratory effort, no crackles   Abdomen: Soft, non-tender; bowel sounds active  Skin: intact.Skin intact  Neurologic:  No focal numbness or weakness  Mental status: Alert, oriented x 4, affect appropriate     Recent Labs   Lab 09/27/20  0616 09/28/20  0515 09/29/20  0727   WBC 13.62* 12.07 9.48   HGB 7.5* 7.4* 7.4*   HCT 26.2* 24.7* 25.4*    294 255     Recent Labs   Lab 09/26/20  0403 09/27/20  0616 09/28/20  0515    138 138   K 4.0 4.1 3.9    100 100   CO2 30* 27 28   BUN 20 18 17   CREATININE 1.7* 1.6* 1.4   * 137* 116*   CALCIUM 8.0* 8.2* 8.0*   MG 1.5* 1.5* 1.6    PHOS 3.0 3.1 2.8     Recent Labs   Lab 09/24/20  0515  09/26/20  0403 09/27/20  0616 09/28/20  0515   ALKPHOS 94  --   --   --  72   ALT 40  --   --   --  22   AST 50*  --   --   --  20   ALBUMIN 1.6*  1.6*   < > 1.7* 1.6* 1.6*  1.6*   PROT 6.8  --   --   --  6.7   BILITOT 0.5  --   --   --  0.6    < > = values in this interval not displayed.        Recent Labs   Lab 09/28/20  0809 09/28/20  1205 09/28/20  1600 09/28/20  1651 09/28/20  2042 09/29/20  0828   POCTGLUCOSE 140* 158* 188* 196* 151* 154*       Scheduled Meds:   artificial tears  1 drop Both Eyes TID    carvediloL  25 mg Oral BID    fluticasone furoate-vilanteroL  1 puff Inhalation Daily    fluticasone propionate  1 spray Each Nostril Daily    furosemide  40 mg Oral BID    heparin (porcine)  5,000 Units Subcutaneous Q8H    insulin aspart U-100  4 Units Subcutaneous TIDWM    insulin detemir U-100  10 Units Subcutaneous QHS    levoFLOXacin  750 mg Oral Daily    levothyroxine  75 mcg Oral Before breakfast    polyethylene glycol  17 g Oral Daily    sodium bicarbonate  1,300 mg Oral BID    vancomycin (VANCOCIN) IVPB  1,500 mg Intravenous Q24H     Continuous Infusions:  As Needed:  acetaminophen, albuterol-ipratropium, calcium carbonate, dextrose 50%, dextrose 50%, glucagon (human recombinant), glucose, glucose, insulin aspart U-100, magnesium hydroxide 400 mg/5 ml, melatonin, ondansetron, oxyCODONE, polyethylene glycol, DIPH,PERTUS (ADACEL),TETANUS PF VAC (ADULT), Pharmacy to dose Vancomycin consult **AND** vancomycin - pharmacy to dose    ** update problem list    Active Hospital Problems    Diagnosis  POA    *Septic shock due to methicillin resistant Staphylococcus aureus [A41.02, R65.21]  Yes    Cellulitis of left lower extremity [L03.116]  Unknown    PVD (peripheral vascular disease) [I73.9]  Unknown    MRSA bacteremia [R78.81]  Yes    Acute renal insufficiency [N28.9]  Yes    Acute metabolic encephalopathy [G93.41]  No     Hospital-acquired pneumonia [J18.9, Y95]  No    Acute on chronic respiratory failure with hypoxia [J96.21]  No    Debility [R53.81]  Yes    Acute renal failure with acute tubular necrosis superimposed on stage 3 chronic kidney disease [N17.0, N18.3]  No    Septic arthritis of knee, right [M00.9]  Yes    Staphylococcal arthritis of right knee [M00.061]  Yes    Diabetic ulcer of left foot associated with type 2 diabetes mellitus, with fat layer exposed [E11.621, L97.522]  Yes    Diabetic foot infection [E11.628, L08.9]  Yes    COPD mixed type [J44.9]  Yes     Chronic    Postablative hypothyroidism [E89.0]  Yes     Chronic    Hyperlipidemia [E78.5]  Yes     High ASCVD with CAD, elevated A1c      Type 2 diabetes mellitus with stage 3 chronic kidney disease, with long-term current use of insulin [E11.22, N18.3, Z79.4]  Not Applicable     GFR> 60, uncontrolled DM      Chronic systolic congestive heart failure [I50.22]  Yes     Chronic     EF 35% in 2015 echo, improved in 5/2018. Patient with mixed ischemic and non-ischemic cardiomyopathy        Resolved Hospital Problems    Diagnosis Date Resolved POA    Endocarditis [I38] 09/12/2020 Yes    Sepsis due to methicillin resistant Staphylococcus aureus [A41.02] 09/13/2020 Yes    Type 2 diabetes mellitus with ketoacidosis without coma, with long-term current use of insulin [E11.10, Z79.4] 09/18/2020 Not Applicable     Novolin 70/30 40U in AM, 30U in PM. Elevated A1c         Assessment and Plan  / Problems managed today    Overview/Hospital Course:  Mr. Ed Patton was admitted to hospital medicine on 08/30 for management of a left-sided diabetic foot infection that was precipitated by an undetected punction wound after stepping on a tack/nail. His presentation was notable for leukocytosis with elevated inflammatory markers, however MRI of left foot only showed cellulitis of the anterior and lateral aspect of foot. Podiatry was consulted with recommendations  for IV antibiotics; he was initiated on Ciprofloxacin and Vancomycin on admission. However, blood cultures from admission resulted positive for MRSA with wound cultures from left foot also growing MRSA with Proteus mirabilis. By 09/03 cultures remained positive despite Vancomycin, and patient was noted to develop right knee pain with swelling. Orthopedics was consulted and patient underwent right knee joint aspiration positive for septic arthritis with cultures also positive for MRSA.      LUKAS on 09/04 showed known chronic systolic heart failure, but was negative for endocarditis. MRI of lumbar spine on 09/08 was negative for abscess. By 09/06, blood cultures continued to remain positive, and he underwent I&D of right arm abscess on 09/08 with cultures also positive for MRSA.      For persistent MRSA bacteremia, he was transitioned to Daptomycin and Ceftaroline, however this regimen was discontinued by 09/10 due to concern for developing rhabdomylosis. By 09/09, he was noted to develop a progressively worsening leukocytosis and NANETTE. Nephrology was consulted on 09/09 with suspicion for ATN.      Hematology was consulted on 09/12 for persistent leukocytosis as likely reactive from bacteremia. On 09/10, patient began developing intermitent hypotension prompting broadening of antibiotics to Cefepime and Vancomycin. By 09/12, renal function continued to worsen in addition to hypotension prompting transfer to ICU for vasopressor support and possible HD. Patient found to be encephalopathic, central line placed and started on CRRT overnight on 9/14 with improvement in mental status, but remained encephalopathic. CT head without any acute intracranial process. Vasopressin off AM of 9/15 but remains on levophed, CT abdomen with concern for potential cholecystitis and possible atelectasis with superimposed pneumonia.  Due to the acuity of his illness is he is not a surgical candidate it and ultimately is now status post  cholecystostomy tube placement for management of suspected cholecystitis     Additional complications arising during hospital course include the development metabolic encephalopathy secondary to uremia versus shock versus DKA or overall cerebral hypoperfusion from shock, reported to have agitation prior to step-down from ICU.  Also developed DKA requiring standard therapy with IV insulin infusion, however he was kept on IV insulin due to rising sugars any time it was weaned working on basal bolus regimen for him at this time with the assistance of a endocrinology           Septic shock_Septic arthritis of Right Knee_Sespsi due to MRSA  This is a 65 year old  male with PMH significant for HFrEF and COPD with chronic respiratory failure (on 2L home oxygen), T2DM with CKD III, and iron deficiency anemia who originally presented to [facility] on 08/30 with cellulitis of left foot with concern for diabetic foot infection with subsequent development of MRSA bacteremia attributed to septic arthritis of right knee and elbow. He is status post drainage and coverage for MRSA since that time. His work-up for other etiologies of MRSA bacteremia have included negative LUKAS and MRI of lumbar spine looking for endocarditis and spinal abscess, respectively. Stool studies for C.diff on 09/11 were negative. His hospital course has been associated with worsening hypotension and leukocytosis since 09/10 prompting ICU admission on 09/12 for initiation of vasopressor support. Infectious work-up thus far on ICU admission concern for likely right lower lobe HAP.      CT abdomen pelvis on 9/15 with gallbladder dilation, sludge, and trace pericholic fluid collection. Exam not consistent with cholecystitis but given persistent shock potential source. Also with potential right lower lobe atelectasis with overlying infection  S/p ICU stepdown  -CK levels were rising so spoke with infectious disease and will need to switch patient  back to Vancomycin. - Consult order placed.      Type 2 DM with DKA  -endocrinology following pt kept on transitional drip but has since been weaned to a basal bolus regimen.         Postablative hypothyroidism  Plan: Continue home SYNTHROID     Acute kidney injury  -likely ATN from Shock s/p requiring RRT in ICU  -trialysis line removed   -pt is non-oliguric  -nephrology recommends sodium bicarb tabs - order placed.   -Nephrology recommends ambulatory f/u on discharge     Chronic systolic congestive heart failure  -Most recent ECHO in 09/2020 with EF of 25%.   -Unclear is ischemic vs non-ischemic.   -Home regimen: Carvedilol, Lasix 80 mg, and Lisinopril.           >due to NANETTE lasix and Lisinopril on hold, but he is recovering renal function and prior to discharge to any facility will likely need some amount of diuretic re-initiated and potentially a much lower doseage of Ace-inhibitor.   -Associated with chronic hypoxemic respiratory failure requiring 2L nasal cannula, but noted to develop worsening oxygen requirements on ICU admission that were likely multifactorial from suspected hospital acquired pneumonia versus declining urine output with pre-disposition to volume overload. .      COPD mixed type  -Based on PFT's from 05/2015 showing mild (small airways) obstruction, airflow not improved after bronchodilator, and moderate restriction.     Acute on chronic respiratory failure with hypoxia  -History of mixed COPD (based on PFT's from 05/2015 showing mild (small airways) obstruction, airflow not improved after bronchodilator, and moderate restriction) and HFrEF with chronic respiratory failure on 2L home oxygen.   -ICU admission notable for progressive increase in supplemental oxygen requirements.   -Work-up for underlying etiology of acute on chronic respiratory failure include CXR showing concern for possible progression of CHF vs HAP (not entirely convinced that CXR showed consolidation)  - Breathing back to  baseline now to 2L NC     Code Status: Full Code     High Risk Conditions:  Patient has a condition that poses threat to life and bodily function: Septic shock, MRSA bacteremia  Patient is currently on drug therapy requiring intensive monitoring for toxicity: Daptomycin.      Discharge Planning   EDY: 9/25/2020     Code Status: Full Code   Is the patient medically ready for discharge?: No    Reason for patient still in hospital (select all that apply): Patient trending condition  Discharge Plan A: Skilled Nursing Facility   Discharge Delays: (!) Change in Medical Condition           Diet:    GI PPx:   DVT PPx:    Lines:  Drains:  Airways:  Wounds:    Goals of Care:  Return to prior functional status   Discharge plan:    Time (minutes) spent in care of the patient (Greater than 1/2 spent in direct face-to-face contact)  35 minutes    Keenan Gonzales MD

## 2020-09-29 NOTE — PLAN OF CARE
09/29/20 1857   Discharge Reassessment   Assessment Type Discharge Planning Reassessment   Discharge Plan A Skilled Nursing Facility   Discharge Plan B Home Health   DME Needed Upon Discharge  other (see comments)  (TBD)   Anticipated Discharge Disposition SNF   Post-Acute Status   Post-Acute Authorization Placement   Post-Acute Placement Status Awaiting Internal Medical Clearance

## 2020-09-30 LAB
ALBUMIN SERPL BCP-MCNC: 1.7 G/DL (ref 3.5–5.2)
ANION GAP SERPL CALC-SCNC: 9 MMOL/L (ref 8–16)
BASOPHILS # BLD AUTO: 0.08 K/UL (ref 0–0.2)
BASOPHILS NFR BLD: 0.9 % (ref 0–1.9)
BUN SERPL-MCNC: 18 MG/DL (ref 8–23)
CALCIUM SERPL-MCNC: 7.8 MG/DL (ref 8.7–10.5)
CHLORIDE SERPL-SCNC: 97 MMOL/L (ref 95–110)
CO2 SERPL-SCNC: 29 MMOL/L (ref 23–29)
CREAT SERPL-MCNC: 1.7 MG/DL (ref 0.5–1.4)
DIFFERENTIAL METHOD: ABNORMAL
EOSINOPHIL # BLD AUTO: 0.9 K/UL (ref 0–0.5)
EOSINOPHIL NFR BLD: 10.2 % (ref 0–8)
ERYTHROCYTE [DISTWIDTH] IN BLOOD BY AUTOMATED COUNT: 17.1 % (ref 11.5–14.5)
EST. GFR  (AFRICAN AMERICAN): 48.5 ML/MIN/1.73 M^2
EST. GFR  (NON AFRICAN AMERICAN): 42 ML/MIN/1.73 M^2
GLUCOSE SERPL-MCNC: 203 MG/DL (ref 70–110)
HCT VFR BLD AUTO: 24.2 % (ref 40–54)
HGB BLD-MCNC: 7.2 G/DL (ref 14–18)
IMM GRANULOCYTES # BLD AUTO: 0.06 K/UL (ref 0–0.04)
IMM GRANULOCYTES NFR BLD AUTO: 0.7 % (ref 0–0.5)
LYMPHOCYTES # BLD AUTO: 1.2 K/UL (ref 1–4.8)
LYMPHOCYTES NFR BLD: 13.1 % (ref 18–48)
MAGNESIUM SERPL-MCNC: 1.5 MG/DL (ref 1.6–2.6)
MCH RBC QN AUTO: 26.3 PG (ref 27–31)
MCHC RBC AUTO-ENTMCNC: 29.8 G/DL (ref 32–36)
MCV RBC AUTO: 88 FL (ref 82–98)
MONOCYTES # BLD AUTO: 0.7 K/UL (ref 0.3–1)
MONOCYTES NFR BLD: 8 % (ref 4–15)
NEUTROPHILS # BLD AUTO: 6.1 K/UL (ref 1.8–7.7)
NEUTROPHILS NFR BLD: 67.1 % (ref 38–73)
NRBC BLD-RTO: 0 /100 WBC
PHOSPHATE SERPL-MCNC: 2.7 MG/DL (ref 2.7–4.5)
PLATELET # BLD AUTO: 246 K/UL (ref 150–350)
PMV BLD AUTO: 10 FL (ref 9.2–12.9)
POCT GLUCOSE: 174 MG/DL (ref 70–110)
POCT GLUCOSE: 183 MG/DL (ref 70–110)
POCT GLUCOSE: 207 MG/DL (ref 70–110)
POCT GLUCOSE: 238 MG/DL (ref 70–110)
POTASSIUM SERPL-SCNC: 3.5 MMOL/L (ref 3.5–5.1)
RBC # BLD AUTO: 2.74 M/UL (ref 4.6–6.2)
SODIUM SERPL-SCNC: 135 MMOL/L (ref 136–145)
VANCOMYCIN SERPL-MCNC: 21.9 UG/ML
WBC # BLD AUTO: 9.09 K/UL (ref 3.9–12.7)

## 2020-09-30 PROCEDURE — 80069 RENAL FUNCTION PANEL: CPT | Mod: HCNC

## 2020-09-30 PROCEDURE — 99900035 HC TECH TIME PER 15 MIN (STAT): Mod: HCNC

## 2020-09-30 PROCEDURE — 25000003 PHARM REV CODE 250: Mod: HCNC | Performed by: HOSPITALIST

## 2020-09-30 PROCEDURE — 97530 THERAPEUTIC ACTIVITIES: CPT | Mod: HCNC

## 2020-09-30 PROCEDURE — 99232 SBSQ HOSP IP/OBS MODERATE 35: CPT | Mod: HCNC,,, | Performed by: INTERNAL MEDICINE

## 2020-09-30 PROCEDURE — 63600175 PHARM REV CODE 636 W HCPCS: Mod: HCNC | Performed by: HOSPITALIST

## 2020-09-30 PROCEDURE — 63600175 PHARM REV CODE 636 W HCPCS: Mod: HCNC | Performed by: INTERNAL MEDICINE

## 2020-09-30 PROCEDURE — 25000003 PHARM REV CODE 250: Mod: HCNC | Performed by: STUDENT IN AN ORGANIZED HEALTH CARE EDUCATION/TRAINING PROGRAM

## 2020-09-30 PROCEDURE — 83735 ASSAY OF MAGNESIUM: CPT | Mod: HCNC

## 2020-09-30 PROCEDURE — 99232 PR SUBSEQUENT HOSPITAL CARE,LEVL II: ICD-10-PCS | Mod: HCNC,,, | Performed by: INTERNAL MEDICINE

## 2020-09-30 PROCEDURE — 99232 PR SUBSEQUENT HOSPITAL CARE,LEVL II: ICD-10-PCS | Mod: HCNC,,, | Performed by: NURSE PRACTITIONER

## 2020-09-30 PROCEDURE — 20600001 HC STEP DOWN PRIVATE ROOM: Mod: HCNC

## 2020-09-30 PROCEDURE — 25000003 PHARM REV CODE 250: Mod: HCNC | Performed by: INTERNAL MEDICINE

## 2020-09-30 PROCEDURE — 85025 COMPLETE CBC W/AUTO DIFF WBC: CPT | Mod: HCNC

## 2020-09-30 PROCEDURE — 97112 NEUROMUSCULAR REEDUCATION: CPT | Mod: HCNC

## 2020-09-30 PROCEDURE — 94761 N-INVAS EAR/PLS OXIMETRY MLT: CPT | Mod: HCNC

## 2020-09-30 PROCEDURE — C9399 UNCLASSIFIED DRUGS OR BIOLOG: HCPCS | Mod: HCNC | Performed by: NURSE PRACTITIONER

## 2020-09-30 PROCEDURE — 97535 SELF CARE MNGMENT TRAINING: CPT | Mod: HCNC

## 2020-09-30 PROCEDURE — 25000003 PHARM REV CODE 250: Mod: HCNC | Performed by: NURSE PRACTITIONER

## 2020-09-30 PROCEDURE — 80202 ASSAY OF VANCOMYCIN: CPT | Mod: HCNC

## 2020-09-30 PROCEDURE — 99232 SBSQ HOSP IP/OBS MODERATE 35: CPT | Mod: HCNC,,, | Performed by: NURSE PRACTITIONER

## 2020-09-30 RX ORDER — VANCOMYCIN HCL IN 5 % DEXTROSE 1G/250ML
1000 PLASTIC BAG, INJECTION (ML) INTRAVENOUS ONCE
Status: COMPLETED | OUTPATIENT
Start: 2020-09-30 | End: 2020-09-30

## 2020-09-30 RX ORDER — INSULIN ASPART 100 [IU]/ML
7 INJECTION, SOLUTION INTRAVENOUS; SUBCUTANEOUS
Status: DISCONTINUED | OUTPATIENT
Start: 2020-10-01 | End: 2020-10-06

## 2020-09-30 RX ADMIN — SODIUM BICARBONATE 1300 MG: 650 TABLET ORAL at 08:09

## 2020-09-30 RX ADMIN — INSULIN ASPART 5 UNITS: 100 INJECTION, SOLUTION INTRAVENOUS; SUBCUTANEOUS at 12:09

## 2020-09-30 RX ADMIN — LEVOFLOXACIN 750 MG: 750 TABLET, FILM COATED ORAL at 08:09

## 2020-09-30 RX ADMIN — INSULIN ASPART 2 UNITS: 100 INJECTION, SOLUTION INTRAVENOUS; SUBCUTANEOUS at 12:09

## 2020-09-30 RX ADMIN — FUROSEMIDE 40 MG: 40 TABLET ORAL at 05:09

## 2020-09-30 RX ADMIN — VANCOMYCIN HYDROCHLORIDE 1000 MG: 1 INJECTION, POWDER, LYOPHILIZED, FOR SOLUTION INTRAVENOUS at 01:09

## 2020-09-30 RX ADMIN — CARVEDILOL 25 MG: 25 TABLET, FILM COATED ORAL at 08:09

## 2020-09-30 RX ADMIN — INSULIN ASPART 5 UNITS: 100 INJECTION, SOLUTION INTRAVENOUS; SUBCUTANEOUS at 05:09

## 2020-09-30 RX ADMIN — INSULIN DETEMIR 10 UNITS: 100 INJECTION, SOLUTION SUBCUTANEOUS at 09:09

## 2020-09-30 RX ADMIN — FLUTICASONE FUROATE AND VILANTEROL TRIFENATATE 1 PUFF: 200; 25 POWDER RESPIRATORY (INHALATION) at 08:09

## 2020-09-30 RX ADMIN — INSULIN ASPART 2 UNITS: 100 INJECTION, SOLUTION INTRAVENOUS; SUBCUTANEOUS at 08:09

## 2020-09-30 RX ADMIN — INSULIN ASPART 5 UNITS: 100 INJECTION, SOLUTION INTRAVENOUS; SUBCUTANEOUS at 08:09

## 2020-09-30 RX ADMIN — FLUTICASONE PROPIONATE 50 MCG: 50 SPRAY, METERED NASAL at 08:09

## 2020-09-30 RX ADMIN — LEVOTHYROXINE SODIUM 75 MCG: 25 TABLET ORAL at 05:09

## 2020-09-30 RX ADMIN — HEPARIN SODIUM 5000 UNITS: 5000 INJECTION INTRAVENOUS; SUBCUTANEOUS at 01:09

## 2020-09-30 RX ADMIN — HEPARIN SODIUM 5000 UNITS: 5000 INJECTION INTRAVENOUS; SUBCUTANEOUS at 09:09

## 2020-09-30 RX ADMIN — HYPROMELLOSE 2910 1 DROP: 5 SOLUTION OPHTHALMIC at 08:09

## 2020-09-30 RX ADMIN — HEPARIN SODIUM 5000 UNITS: 5000 INJECTION INTRAVENOUS; SUBCUTANEOUS at 05:09

## 2020-09-30 RX ADMIN — FUROSEMIDE 40 MG: 40 TABLET ORAL at 08:09

## 2020-09-30 NOTE — PROGRESS NOTES
Hospital Medicine  Progress note    Team: [facility] HOSP MED A Keenan Gonzales MD  Admit Date: 8/30/2020  EDY 9/28/2020  Length of Stay:  LOS: 31 days   Code status: Full Code    Principal Problem:  Septic shock due to methicillin resistant Staphylococcus aureus    HPI / Hospital Course     Interval hx:9/30 HR is trending down with increase in beta blocker. Looks well wbc 9..0. I think SNF would be appropriate in next few days. Encourage to get out of bed.  End date of IV antibiotics:  Daptomycin 10/7/2020  Levofloxacin 9/30/2020.      9/29 's will titrate up beta blocker. Looks well wbc 9.8   9/28 WBC is 12, remains tach but steady at 120. CRP is 84.   lactate 1.1 today.Looked as well as I have seen him today. Will get EKG  End date of IV antibiotics:  Daptomycin 10/7/2020  Levofloxacin 9/30/2020.     Refused angiogram Vascular surgery signed off. Will need IV abx, SNF may be best option.    ROS     Constitutional: Positive for activity change. Negative for appetite change, chills and fever.   HENT: Negative for congestion.    Respiratory: Negative for cough and shortness of breath.    Gastrointestinal: Negative for nausea and vomiting.   Musculoskeletal: Positive for arthralgias, back pain and myalgias.   Skin: Positive for wound. Negative for color change.   Neurological: Positive for weakness. Negative for numbness.   Psychiatric/Behavioral: Negative for behavioral problems and confusion    PEx  Temp:  [98 °F (36.7 °C)-98.6 °F (37 °C)]   Pulse:  []   Resp:  [15-23]   BP: (111-132)/(56-86)   SpO2:  [98 %-100 %]     Intake/Output Summary (Last 24 hours) at 9/30/2020 0842  Last data filed at 9/30/2020 0300  Gross per 24 hour   Intake 200 ml   Output 450 ml   Net -250 ml       Constitutional:       General: He is not in acute distress.     Appearance: He is well-developed. He is obese.. He is not diaphoretic.   HENT:      Head: Normocephalic and atraumatic.      Right Ear: External ear normal.      Left Ear:  External ear normal.      Mouth/Throat:      Pharynx: No oropharyngeal exudate.   Eyes:      General: No scleral icterus.        Musculoskeletal: Normal range of motion.      Thyroid: No thyromegaly.      Trachea: No tracheal deviation.      Comments:   Cardiovascular:      Rate and Rhythm: Tachycardia present.   Pulmonary:      Effort: Pulmonary effort is normal. No respiratory distress.      Breath sounds: Normal breath sounds. No stridor. No wheezing or rales.   Abdominal:      General: Bowel sounds are normal. There is no distension.      Palpations: Abdomen is soft.      Tenderness: There is no abdominal tenderness. There is no guarding.   Musculoskeletal:         General: No tenderness or deformity.      Right lower leg: Edema present.      Left lower leg: Edema present.   Lymphadenopathy:      Cervical: No cervical adenopathy.   Skin:     Coloration: Skin is not pale.      Findings: No erythema or rash.   Neurological:      General: No focal deficit present.     Recent Labs   Lab 09/28/20 0515 09/29/20 0727 09/30/20  0430   WBC 12.07 9.48 9.09   HGB 7.4* 7.4* 7.2*   HCT 24.7* 25.4* 24.2*    255 246     Recent Labs   Lab 09/28/20 0515 09/29/20 0727 09/30/20  0430    135* 135*   K 3.9 3.9 3.5    98 97   CO2 28 28 29   BUN 17 17 18   CREATININE 1.4 1.5* 1.7*   * 158* 203*   CALCIUM 8.0* 7.8* 7.8*   MG 1.6 1.6 1.5*   PHOS 2.8 2.5* 2.7     Recent Labs   Lab 09/24/20 0515 09/28/20 0515 09/29/20 0727 09/30/20  0430   ALKPHOS 94  --  72  --   --    ALT 40  --  22  --   --    AST 50*  --  20  --   --    ALBUMIN 1.6*  1.6*   < > 1.6*  1.6* 1.6* 1.7*   PROT 6.8  --  6.7  --   --    BILITOT 0.5  --  0.6  --   --     < > = values in this interval not displayed.        Recent Labs   Lab 09/28/20  1651 09/28/20  2042 09/29/20  0828 09/29/20  1114 09/29/20  1637 09/29/20 2053   POCTGLUCOSE 196* 151* 154* 179* 167* 164*       Scheduled Meds:   artificial tears  1 drop Both Eyes TID     carvediloL  25 mg Oral BID    fluticasone furoate-vilanteroL  1 puff Inhalation Daily    fluticasone propionate  1 spray Each Nostril Daily    furosemide  40 mg Oral BID    heparin (porcine)  5,000 Units Subcutaneous Q8H    insulin aspart U-100  5 Units Subcutaneous TIDWM    insulin detemir U-100  10 Units Subcutaneous QHS    levothyroxine  75 mcg Oral Before breakfast    polyethylene glycol  17 g Oral Daily    sodium bicarbonate  1,300 mg Oral BID     Continuous Infusions:  As Needed:  acetaminophen, albuterol-ipratropium, calcium carbonate, dextrose 50%, dextrose 50%, glucagon (human recombinant), glucose, glucose, insulin aspart U-100, magnesium hydroxide 400 mg/5 ml, melatonin, ondansetron, oxyCODONE, polyethylene glycol, DIPH,PERTUS (ADACEL),TETANUS PF VAC (ADULT), Pharmacy to dose Vancomycin consult **AND** vancomycin - pharmacy to dose        Active Hospital Problems    Diagnosis  POA    *Septic shock due to methicillin resistant Staphylococcus aureus [A41.02, R65.21]  Yes    Cellulitis of left lower extremity [L03.116]  Unknown    PVD (peripheral vascular disease) [I73.9]  Unknown    MRSA bacteremia [R78.81]  Yes    Acute renal insufficiency [N28.9]  Yes    Acute metabolic encephalopathy [G93.41]  No    Hospital-acquired pneumonia [J18.9, Y95]  No    Acute on chronic respiratory failure with hypoxia [J96.21]  No    Debility [R53.81]  Yes    Acute renal failure with acute tubular necrosis superimposed on stage 3 chronic kidney disease [N17.0, N18.3]  No    Septic arthritis of knee, right [M00.9]  Yes    Staphylococcal arthritis of right knee [M00.061]  Yes    Diabetic ulcer of left foot associated with type 2 diabetes mellitus, with fat layer exposed [E11.621, L97.522]  Yes    Diabetic foot infection [E11.628, L08.9]  Yes    COPD mixed type [J44.9]  Yes     Chronic    Postablative hypothyroidism [E89.0]  Yes     Chronic    Hyperlipidemia [E78.5]  Yes     High ASCVD with CAD, elevated  A1c      Type 2 diabetes mellitus with stage 3 chronic kidney disease, with long-term current use of insulin [E11.22, N18.3, Z79.4]  Not Applicable     GFR> 60, uncontrolled DM      Chronic systolic congestive heart failure [I50.22]  Yes     Chronic     EF 35% in 2015 echo, improved in 5/2018. Patient with mixed ischemic and non-ischemic cardiomyopathy        Resolved Hospital Problems    Diagnosis Date Resolved POA    Endocarditis [I38] 09/12/2020 Yes    Sepsis due to methicillin resistant Staphylococcus aureus [A41.02] 09/13/2020 Yes    Type 2 diabetes mellitus with ketoacidosis without coma, with long-term current use of insulin [E11.10, Z79.4] 09/18/2020 Not Applicable     Novolin 70/30 40U in AM, 30U in PM. Elevated A1c         Assessment and Plan  / Problems managed today    Overview/Hospital Course:  Mr. Ed Patton was admitted to hospital medicine on 08/30 for management of a left-sided diabetic foot infection that was precipitated by an undetected punction wound after stepping on a tack/nail. His presentation was notable for leukocytosis with elevated inflammatory markers, however MRI of left foot only showed cellulitis of the anterior and lateral aspect of foot. Podiatry was consulted with recommendations for IV antibiotics; he was initiated on Ciprofloxacin and Vancomycin on admission. However, blood cultures from admission resulted positive for MRSA with wound cultures from left foot also growing MRSA with Proteus mirabilis. By 09/03 cultures remained positive despite Vancomycin, and patient was noted to develop right knee pain with swelling. Orthopedics was consulted and patient underwent right knee joint aspiration positive for septic arthritis with cultures also positive for MRSA.      LUKAS on 09/04 showed known chronic systolic heart failure, but was negative for endocarditis. MRI of lumbar spine on 09/08 was negative for abscess. By 09/06, blood cultures continued to remain positive, and he  underwent I&D of right arm abscess on 09/08 with cultures also positive for MRSA.      For persistent MRSA bacteremia, he was transitioned to Daptomycin and Ceftaroline, however this regimen was discontinued by 09/10 due to concern for developing rhabdomylosis. By 09/09, he was noted to develop a progressively worsening leukocytosis and NANETTE. Nephrology was consulted on 09/09 with suspicion for ATN.      Hematology was consulted on 09/12 for persistent leukocytosis as likely reactive from bacteremia. On 09/10, patient began developing intermitent hypotension prompting broadening of antibiotics to Cefepime and Vancomycin. By 09/12, renal function continued to worsen in addition to hypotension prompting transfer to ICU for vasopressor support and possible HD. Patient found to be encephalopathic, central line placed and started on CRRT overnight on 9/14 with improvement in mental status, but remained encephalopathic. CT head without any acute intracranial process. Vasopressin off AM of 9/15 but remains on levophed, CT abdomen with concern for potential cholecystitis and possible atelectasis with superimposed pneumonia.  Due to the acuity of his illness is he is not a surgical candidate it and ultimately is now status post cholecystostomy tube placement for management of suspected cholecystitis     Additional complications arising during hospital course include the development metabolic encephalopathy secondary to uremia versus shock versus DKA or overall cerebral hypoperfusion from shock, reported to have agitation prior to step-down from ICU.  Also developed DKA requiring standard therapy with IV insulin infusion, however he was kept on IV insulin due to rising sugars any time it was weaned working on basal bolus regimen for him at this time with the assistance of a endocrinology           Septic shock_Septic arthritis of Right Knee_Sespsi due to MRSA  This is a 65 year old  male with PMH significant  for HFrEF and COPD with chronic respiratory failure (on 2L home oxygen), T2DM with CKD III, and iron deficiency anemia who originally presented to [facility] on 08/30 with cellulitis of left foot with concern for diabetic foot infection with subsequent development of MRSA bacteremia attributed to septic arthritis of right knee and elbow. He is status post drainage and coverage for MRSA since that time. His work-up for other etiologies of MRSA bacteremia have included negative LUKAS and MRI of lumbar spine looking for endocarditis and spinal abscess, respectively. Stool studies for C.diff on 09/11 were negative. His hospital course has been associated with worsening hypotension and leukocytosis since 09/10 prompting ICU admission on 09/12 for initiation of vasopressor support. Infectious work-up thus far on ICU admission concern for likely right lower lobe HAP.      CT abdomen pelvis on 9/15 with gallbladder dilation, sludge, and trace pericholic fluid collection. Exam not consistent with cholecystitis but given persistent shock potential source. Also with potential right lower lobe atelectasis with overlying infection  S/p ICU stepdown  -CK levels were rising so spoke with infectious disease and will need to switch patient back to Vancomycin. - Consult order placed.      Type 2 DM with DKA  -endocrinology following pt kept on transitional drip but has since been weaned to a basal bolus regimen.         Postablative hypothyroidism  Plan: Continue home SYNTHROID     Acute kidney injury  -likely ATN from Shock s/p requiring RRT in ICU  -trialysis line removed   -pt is non-oliguric  -nephrology recommends sodium bicarb tabs - order placed.   -Nephrology recommends ambulatory f/u on discharge     Chronic systolic congestive heart failure  -Most recent ECHO in 09/2020 with EF of 25%.   -Unclear is ischemic vs non-ischemic.   -Home regimen: Carvedilol, Lasix 80 mg, and Lisinopril.           >due to NANETTE lasix and Lisinopril on  hold, but he is recovering renal function and prior to discharge to any facility will likely need some amount of diuretic re-initiated and potentially a much lower doseage of Ace-inhibitor.   -Associated with chronic hypoxemic respiratory failure requiring 2L nasal cannula, but noted to develop worsening oxygen requirements on ICU admission that were likely multifactorial from suspected hospital acquired pneumonia versus declining urine output with pre-disposition to volume overload. .      COPD mixed type  -Based on PFT's from 05/2015 showing mild (small airways) obstruction, airflow not improved after bronchodilator, and moderate restriction.     Acute on chronic respiratory failure with hypoxia  -History of mixed COPD (based on PFT's from 05/2015 showing mild (small airways) obstruction, airflow not improved after bronchodilator, and moderate restriction) and HFrEF with chronic respiratory failure on 2L home oxygen.   -ICU admission notable for progressive increase in supplemental oxygen requirements.   -Work-up for underlying etiology of acute on chronic respiratory failure include CXR showing concern for possible progression of CHF vs HAP (not entirely convinced that CXR showed consolidation)  - Breathing back to baseline now to 2L NC     Code Status: Full Code     High Risk Conditions:  Patient has a condition that poses threat to life and bodily function: Septic shock, MRSA bacteremia  Patient is currently on drug therapy requiring intensive monitoring for toxicity: Daptomycin.      Discharge Planning   EDY: 9/25/2020     Code Status: Full Code   Is the patient medically ready for discharge?: No    Reason for patient still in hospital (select all that apply): Patient trending condition  Discharge Plan A: Skilled Nursing Facility   Discharge Delays: (!) Change in Medical Condition               Goals of Care:  Return to prior functional status   Discharge plan: SNF    Time (minutes) spent in care of the  patient (Greater than 1/2 spent in direct face-to-face contact)  35 minutes    Keenan Gonzales MD

## 2020-09-30 NOTE — PROGRESS NOTES
"[facility]  Endocrinology  Progress Note    Admit Date: 2020     Reason for Consult: Management of T2DM, Hyperglycemia     Surgical Procedure and Date: n/a    Diabetes diagnosis year:     Home Diabetes Medications:    Novolin 70/30 u-100   40 units in AM with breakfast.    20 units in PM with dinner.     How often checking glucose at home? 1-3 x day   BG readings on regimen: >200  Hypoglycemia on the regimen?  No  Missed doses on regimen?  No    Diabetes Complications include:     Hyperglycemia, Foot ulcer   and Other skin ulcer    Complicating diabetes co morbidities:   CHF, CAD, HTN, HLD      HPI:   Patient is a 65 y.o. male with a diagnosis of acute cholecystitis and septic shock with MRSA complicated by NANETTE requiring CRRT. Endocrinology consulted to manage DM2 during current admission to [facility].     Lab Results   Component Value Date    HGBA1C 9.5 (H) 2020       Interval HPI:   Overnight events:   BG is reasonably controlled on current SQ insulin regimen.   4:52 PM   - BG continues to trend upward above goal. BG excursions appear to be prandial in nature.     Diet diabetic [facility];  Calorie  23 Days Post-Op    Eatin%  Nausea: No  Hypoglycemia and intervention: No  Fever: No  TPN and/or TF: No  If yes, type of TF/TPN and rate: none    /79 (BP Location: Right arm)   Pulse (!) 120   Temp 98.5 °F (36.9 °C) (Oral)   Resp 19   Ht 6' 4" (1.93 m)   Wt 115.3 kg (254 lb 3.1 oz)   SpO2 100%   BMI 30.94 kg/m²     Labs Reviewed and Include    Recent Labs   Lab 20  0515   *   CALCIUM 8.0*   ALBUMIN 1.6*  1.6*   PROT 6.7      K 3.9   CO2 28      BUN 17   CREATININE 1.4   ALKPHOS 72   ALT 22   AST 20   BILITOT 0.6     Lab Results   Component Value Date    WBC 12.07 2020    HGB 7.4 (L) 2020    HCT 24.7 (L) 2020    MCV 89 2020     2020     No results for input(s): TSH, FREET4 in the last 168 hours.  Lab " Results   Component Value Date    HGBA1C 9.5 (H) 08/31/2020       Nutritional status:   Body mass index is 30.94 kg/m².  Lab Results   Component Value Date    ALBUMIN 1.6 (L) 09/28/2020    ALBUMIN 1.6 (L) 09/28/2020    ALBUMIN 1.6 (L) 09/27/2020     No results found for: PREALBUMIN    Estimated Creatinine Clearance: 75 mL/min (based on SCr of 1.4 mg/dL).    Accu-Checks  Recent Labs     09/26/20  0805 09/26/20  1140 09/26/20  1611 09/26/20  2117 09/27/20  0125 09/27/20  0733 09/27/20  1140 09/27/20  1627 09/27/20  2130 09/28/20  0809   POCTGLUCOSE 157* 130* 218* 178* 146* 145* 130* 157* 154* 140*       Current Medications and/or Treatments Impacting Glycemic Control  Immunotherapy:    Immunosuppressants     None        Steroids:   Hormones (From admission, onward)    Start     Stop Route Frequency Ordered    08/30/20 2059  melatonin tablet 6 mg      -- Oral Nightly PRN 08/30/20 2004        Pressors:    Autonomic Drugs (From admission, onward)    None        Hyperglycemia/Diabetes Medications:   Antihyperglycemics (From admission, onward)    Start     Stop Route Frequency Ordered    09/26/20 2100  insulin detemir U-100 pen 10 Units      -- SubQ Nightly 09/26/20 1059    09/25/20 1130  insulin aspart U-100 pen 4 Units      -- SubQ 3 times daily with meals 09/25/20 0934    09/24/20 1536  insulin aspart U-100 pen 0-5 Units      -- SubQ Before meals & nightly PRN 09/24/20 1436          ASSESSMENT and PLAN    * Septic shock due to methicillin resistant Staphylococcus aureus  Managed per primary team  Avoid hypoglycemia    Optimize BG control to improve wound healing        Type 2 diabetes mellitus with stage 3 chronic kidney disease, with long-term current use of insulin  BG goal 140 - 180     -  Continue Levemir to 10 units q HS  -  Continue novolog 5 units TDWM.   -  Low Dose SQ Insulin Correction Scale. Given kidney function.   -  BG Monitoring AC/HS     ADDENDUM 4:53 PM   - Increase novolog to 7 units TIDWM.     **  Please call Endocrine for any BG related issues **  ** Please notify Endocrine for any change and/or advance in diet**    Discharge Planning:   TBD. Please notify endocrinology prior to discharge.    Tentative:   Novolin 70/30 U-100    - 14 units AM    - 12 units PM.       Postablative hypothyroidism  The clearance of many drugs, including antiepileptic, anticoagulant, hypnotic, and opioid drugs, is decreased in hypothyroidism. Thus, drug toxicity may occur if drug dose is not reduced. Thyroid disease may also affect lipid profiles which may then cause increase in insulin resistance.     Recommend patient continue Levothyroxine 75 mcg           Luke Rojas NP  Endocrinology  [facility]

## 2020-09-30 NOTE — PROGRESS NOTES
Pharmacokinetic Assessment Follow Up: IV Vancomycin    Vancomycin serum concentration assessment(s):    Vancomycin concentration = 21.9 mcg/mL.  Scr appears to be at baseline and patient likely will clear to below therapeutic range if vancomycin held today.    Vancomycin Regimen Plan:  1. Vancomycin 1000 mg IV x 1 today  2. Obtain concentration @ 1200 on 10/1/20  3. Goal trough = 15-20 mcg/mL  4. End date 10/7/20    Drug levels (last 3 results):  Recent Labs   Lab Result Units 09/29/20  1057 09/30/20  0430   Vancomycin, Random ug/mL  --  21.9   Vancomycin-Trough ug/mL 27.3*  --        Thank you for the consult, will continue to follow  Isra Alva Pharm.D., BCPS  [id number]       Patient brief summary:  Ed Patton is a 65 y.o. male initiated on antimicrobial therapy with IV Vancomycin for treatment of MRSA bacteremia      Drug Allergies:   Review of patient's allergies indicates:   Allergen Reactions    Vicodin [hydrocodone-acetaminophen] Itching       Actual Body Weight:   115.3kg    Renal Function:   Estimated Creatinine Clearance: 61.8 mL/min (A) (based on SCr of 1.7 mg/dL (H)).,     CBC (last 72 hours):  Recent Labs   Lab Result Units 09/28/20  0515 09/29/20  0727 09/30/20  0430   WBC K/uL 12.07 9.48 9.09   Hemoglobin g/dL 7.4* 7.4* 7.2*   Hematocrit % 24.7* 25.4* 24.2*   Platelets K/uL 294 255 246   Gran% % 76.1* 70.8 67.1   Lymph% % 8.9* 12.0* 13.1*   Mono% % 6.6 7.2 8.0   Eosinophil% % 7.0 8.3* 10.2*   Basophil% % 0.6 0.9 0.9   Differential Method  Automated Automated Automated       Metabolic Panel (last 72 hours):  Recent Labs   Lab Result Units 09/28/20  0515 09/29/20  0727 09/30/20  0430   Sodium mmol/L 138 135* 135*   Potassium mmol/L 3.9 3.9 3.5   Chloride mmol/L 100 98 97   CO2 mmol/L 28 28 29   Glucose mg/dL 116* 158* 203*   BUN, Bld mg/dL 17 17 18   Creatinine mg/dL 1.4 1.5* 1.7*   Albumin g/dL 1.6*  1.6* 1.6* 1.7*   Total Bilirubin mg/dL 0.6  --   --    Alkaline Phosphatase U/L 72  --   --    AST  U/L 20  --   --    ALT U/L 22  --   --    Magnesium mg/dL 1.6 1.6 1.5*   Phosphorus mg/dL 2.8 2.5* 2.7       Vancomycin Administrations:  vancomycin given in the last 96 hours                     vancomycin 1.5 g in dextrose 5 % 250 mL IVPB (ready to mix) (mg) 1,500 mg New Bag 09/28/20 1234     1,500 mg New Bag 09/27/20 1142    vancomycin 1.75 g in 5 % dextrose 500 mL IVPB (mg) 1,750 mg New Bag 09/26/20 1214     1,750 mg New Bag 09/25/20 1150                    Microbiologic Results:  Microbiology Results (last 7 days)     Procedure Component Value Units Date/Time    Fungus culture [[id number]] Collected: 09/16/20 1612    Order Status: Completed Specimen: Body Fluid from Gallbladder Updated: 09/30/20 1057     Fungus (Mycology) Culture Culture in progress      No fungus isolated after 2 weeks    Culture, Anaerobe [[id number]] Collected: 09/16/20 1612    Order Status: Completed Specimen: Body Fluid from Gallbladder Updated: 09/26/20 1328     Anaerobic Culture No anaerobes isolated    Fungus culture [[id number]] Collected: 09/07/20 1721    Order Status: Completed Specimen: Body Fluid from Knee, Right Updated: 09/24/20 0730     Fungus (Mycology) Culture Culture in progress      No fungus isolated after 2 weeks    Narrative:      1) Right Knee Joint Fluid    Fungus culture [[id number]] Collected: 09/07/20 1721    Order Status: Completed Specimen: Body Fluid from Knee, Right Updated: 09/24/20 0730     Fungus (Mycology) Culture Culture in progress      No fungus isolated after 2 weeks    Narrative:      2) Right Knee Joint Fluid

## 2020-09-30 NOTE — PLAN OF CARE
Problem: Physical Therapy Goal  Goal: Physical Therapy Goal  Description: Goals to be met by: 10/05/20    Patient will increase functional independence with mobility by performin. Supine to sit with moderate assitance. -MET ()  Supine to sit with contact guard assistance.  2. Sit to supine with moderate assistance - MET ()  Sit to supine with contact guard assistance.   3. Sit to stand transfer with moderate assistance.  4. Gait  x 10 feet with Minimal Assistance using LRAD and maintenance of weight bearing status   5. Lower extremity exercise program x10 with assistance as needed.   6. Sit for 10 minutes with Minimal Assistance while reaching out of JESIKA in all planes to perform functional activities. -MET ()  Sit for 10 minutes with Stand By Assistance while reaching out of JESIKA in all planes to perform functional activities. - MET ()  Sit for 10 minutes with Alexander while reaching out of JESIKA in all planes to perform functional activities.  Outcome: Ongoing, Progressing    Goals updated to reflect pt's progress. Continue with POC.     DARNELL Smith  2020

## 2020-09-30 NOTE — PLAN OF CARE
Problem: Occupational Therapy Goal  Goal: Occupational Therapy Goal  Description: Goals to be met by: 10/18/20    Patient will increase functional independence with ADLs by performing:    Feeding with Set-up Assistance.  Grooming while EOB with Minimal Assistance. Goal met  Toileting from bedside commode with Moderate Assistance for hygiene and clothing management.   Supine to sit with Moderate Assistance to increased bed mobility independence.   Stand pivot transfers with Moderate Assistance and maintaining weight-bearing precaution(s) to reach bedside chair.  Toilet transfer to bedside commode with Moderate Assistance and maintaining weight-bearing precaution(s).  Upper extremity exercise program x15 reps per handout, with supervision to improve BUE function to increase independence in daily occupations.    Continue OT as tolerated. Goals remain appropriate.  No Watkins OT  9/30/2020      Outcome: Ongoing, Progressing

## 2020-09-30 NOTE — PLAN OF CARE
Problem: Adult Inpatient Plan of Care  Goal: Rounds/Family Conference  Outcome: Ongoing, Progressing     Problem: Adult Inpatient Plan of Care  Goal: Optimal Comfort and Wellbeing  Outcome: Ongoing, Progressing     POC reviewed with Pt. VSS. A&Ox4. No acute changes. Pt denies pain. Wound care complete. IV Aab administered per MD order. Safety checks preformed. Call light in reach. All questions answered. Will continue to monitor.

## 2020-09-30 NOTE — PLAN OF CARE
Call placed to patient's spouse Teresa ([phone]) to discuss her 's discharge plan. This CM informed Ms. Moore that at this time we have no accepting facilities and inquired if additional referrals could be sent in the [place] and [facility] areas. Mrs Patton expressed understanding and was agreeable to sending additional referrals.       Mala Guerin RN  Ext [phone]

## 2020-09-30 NOTE — PT/OT/SLP PROGRESS
Occupational Therapy   Treatment    Name: Ed Patton  MRN: [id number]  Admitting Diagnosis:  Septic shock due to methicillin resistant Staphylococcus aureus  23 Days Post-Op    Recommendations:     Discharge Recommendations: nursing facility, skilled  Discharge Equipment Recommendations:  other (see comments)(TBD)  Barriers to discharge:  Decreased caregiver support, Inaccessible home environment    Assessment:     Ed Patton is a 65 y.o. male with a medical diagnosis of Septic shock due to methicillin resistant Staphylococcus aureus.  He presents with   Impaired ADL and mobility performance deficits. Pt eager to work with OT/PT treatment team this morning for therapy. Pt session included bed mobility, EOB sitting tolerance, LB dressing including Darco shoe donning, sit<stand t/f trials, and brief toileting/pericare during standing. Pt required max (A) for bed mobility and SBA at EOB. Pt tolerated two standing trials max (A) x2 for standing this date using walker and maintaining LLE WBAT in Darco. Pt continues to make excellent progress in bed mobility and for standing tolerance. Delayed processing noted and additional time needed between tasks 2/2 per pt request from fear of falling.  At this time, pt is a high fall risk and not safe to return home. Pt would benefit from continued OT skilled services 3x/wk to improve daily living skills to optimize QOL. Pt is recommended to discharge to SNF at this time.    Performance deficits affecting function are weakness, impaired self care skills, impaired balance, decreased coordination, decreased safety awareness, decreased ROM, impaired skin, edema, impaired coordination, pain, decreased upper extremity function, decreased lower extremity function, impaired functional mobilty, impaired endurance, gait instability, impaired cognition, impaired cardiopulmonary response to activity.     Rehab Prognosis:  Good; patient would benefit from acute skilled OT services to  address these deficits and reach maximum level of function.       Plan:     Patient to be seen 3 x/week to address the above listed problems via self-care/home management, therapeutic activities, therapeutic exercises, neuromuscular re-education  · Plan of Care Expires: 10/08/20  · Plan of Care Reviewed with: patient    Subjective     Pain/Comfort:  · Location - Side 1: Right  · Location 1: knee  · Pain Addressed 1: Reposition, Distraction, Cessation of Activity    Objective:     Communicated with: RN prior to session.  Patient found HOB elevated with pulse ox (continuous), oxygen, telemetry, nephrostomy, blood pressure cuff upon OT entry to room.    General Precautions: Standard, fall, contact   Orthopedic Precautions:LLE weight bearing as tolerated   Braces: (L Darco shoe)     Occupational Performance:     Bed Mobility:    · Patient completed Rolling/Turning to Right with maximal assistance  · Patient completed Scooting/Bridging with maximal assistance  · Patient completed Supine to Sit with maximal assistance  · Patient completed Sit to Supine with maximal assistance     Functional Mobility/Transfers:  · Patient completed Sit <> Stand Transfer with maximal assistance and of 2 persons  with  rolling walker   · Functional Mobility: Pt stood with max (A)x2 at EOB with RW for two trials. Pt stood ~10 seconds on first trial however promptly sat 2/2 voiced fear of falling. On second trial, pt tolerated ~15 seconds for linen change and brief pericare with vc's for upright posturing.    Activities of Daily Living:  · Upper Body Dressing: minimum assistance donning gown around back at EOB   · Lower Body Dressing: maximal assistance donning L darco however pt attempting to use figure 4 technique with therapist facilitation. Pt iwth posterior leaning noted during task requiring increase (A) to don  · Toileting: total assistance provided in standing for pericare/linen change       [facility] 6 Click ADL: 14    Treatment &  Education:  Pt educated on role of occupational therapy, POC, and safety during ADLs and functional mobility. Pt and OT discussed importance of safe, continued mobility to optimize daily living skills. Pt verbalized understanding. Pt completed the following during session: bed mobility, UB/LB dressing, edu and don of Darco shoe, sit<Stand t/f trials, safe repositioning.  White board updated during session. Pt given instruction to call for medical staff/nurse for assistance.       Patient left HOB elevated with all lines intact, call button in reach and RN  notifiedEducation:      GOALS:   Multidisciplinary Problems     Occupational Therapy Goals        Problem: Occupational Therapy Goal    Goal Priority Disciplines Outcome Interventions   Occupational Therapy Goal     OT, PT/OT Ongoing, Progressing    Description: Goals to be met by: 10/18/20    Patient will increase functional independence with ADLs by performing:    Feeding with Set-up Assistance.  Grooming while EOB with Minimal Assistance. Goal met  Toileting from bedside commode with Moderate Assistance for hygiene and clothing management.   Supine to sit with Moderate Assistance to increased bed mobility independence.   Stand pivot transfers with Moderate Assistance and maintaining weight-bearing precaution(s) to reach bedside chair.  Toilet transfer to bedside commode with Moderate Assistance and maintaining weight-bearing precaution(s).  Upper extremity exercise program x15 reps per handout, with supervision to improve BUE function to increase independence in daily occupations.                     Time Tracking:     OT Date of Treatment: 09/30/20  OT Start Time: 1030  OT Stop Time: 1056  OT Total Time (min): 26 min    Billable Minutes:Self Care/Home Management 26 min    No Watkins OT  9/30/2020

## 2020-09-30 NOTE — PT/OT/SLP PROGRESS
Physical Therapy Treatment    Patient Name:  Ed Patton   MRN:  [id number]    Recommendations:     Discharge Recommendations:  nursing facility, skilled   Discharge Equipment Recommendations: (TBD pending progress)   Barriers to discharge: Decreased caregiver support and decreased functional mobility     Assessment:     Ed Patton is a 65 y.o. male admitted with a medical diagnosis of Septic shock due to methicillin resistant Staphylococcus aureus.  He presents with the following impairments/functional limitations:  weakness, impaired endurance, impaired self care skills, impaired functional mobilty, gait instability, impaired balance, impaired cognition, decreased coordination, decreased lower extremity function, pain, impaired cardiopulmonary response to activity, orthopedic precautions. Pt tolerated session well and appears motivated to work with therapy. He demo's self-limiting behavior at times due to R knee pain and anxiety, but he is able to participate with encouragement and education. He also demo's inconsistent and delayed responses, but he can be directed by direct cues and commands. His mobility is being limited due to decreased endurance and generalized fatigue. His HR and O2 sats were monitored closely during session and both stayed WNL. Max A x1 for supine-sit. SBA for EOB sitting balance for ~15 min while performing functional activities. Max A x2 using RW for 2 trials of sit-stand t/f. He is showing progress with his t/f and standing tolerance, which are preparing him for gait. Upon d/c, PT still recommends skilled nursing facility to address the above deficits and improve his overall safety. He is an excellent candidate because of his potential to continue showing progress and his prior level of function.     Rehab Prognosis: Good; patient would benefit from acute skilled PT services to address these deficits and reach maximum level of function.    Recent Surgery: Procedure(s)  "(LRB):  ARTHROSCOPY, KNEE, RIGHT  (Right) 23 Days Post-Op    Plan:     During this hospitalization, patient to be seen 4 x/week to address the identified rehab impairments via gait training, therapeutic activities, therapeutic exercises, neuromuscular re-education and progress toward the following goals:    · Plan of Care Expires:  10/18/20    Subjective     Chief Complaint: "I need to catch my breath"  Patient/Family Comments/goals: to start being able to get up out of bed to go to the bathroom/chair/etc.  Pain/Comfort:  · Pain Rating 1: (did not rate)  · Location - Side 1: Right  · Location - Orientation 1: generalized  · Location 1: knee  · Pain Addressed 1: Reposition, Distraction, Cessation of Activity  · Pain Rating Post-Intervention 1: (did not rate - made worse with mobility)      Objective:     Communicated with RN prior to session.  Patient found HOB elevated with pulse ox (continuous), oxygen, telemetry, nephrostomy, blood pressure cuff upon PT entry to room.     General Precautions: Standard, contact, fall   Orthopedic Precautions:LLE weight bearing as tolerated   Braces: (L Darco shoe)     Functional Mobility:    Bed Mobility  Rolling to R: max A, HOB elevated, siderails used, verbal cues for hand placement  Supine to Sit on the R side:  max A, HOB elevated, siderails used, verbal cues for hand and leg placement  Sit to supine: min A, verbal cues for hand placement, needed assistance with swinging legs   Scoot to HOB in supine: max A  Scoot to EOB in sitting: CGA   Transfers Sit to Stand:  max A x2, using RW, 2 trials, verbal cues for hand placement, anterior weight shift, hip/knee extension, and base of support; blocked R knee and foot and facilitated under hips; pt demo'd difficulty with extending hips and knees; able to stand ~10 sec for first trial, ~15 sec for second trial  Scoot EOB towards HOB: min A, 3 trials, able to WB through UE/LE to lift hips, required verbal cues for sequencing  "           AM-PAC 6 CLICK MOBILITY  Turning over in bed (including adjusting bedclothes, sheets and blankets)?: 3  Sitting down on and standing up from a chair with arms (e.g., wheelchair, bedside commode, etc.): 2  Moving from lying on back to sitting on the side of the bed?: 2  Moving to and from a bed to a chair (including a wheelchair)?: 1  Need to walk in hospital room?: 1  Climbing 3-5 steps with a railing?: 1  Basic Mobility Total Score: 10       Therapeutic Activities and Exercises:   -Pt safe to t/f with therapy and/or [employer].   -Educated pt on safety with mobility and the importance of continuing being mobile to increase strength and endurance.   -Discussed goals and POC with patient - answered questions/concerns within scope of PT practice.     Sitting Balance (~15 min):   - initially CGA and then progressed to SBA   - posture: kyphotic, increased cervical and thoracic flexion, L lateral lean, used UEs on bed/rail for support, posterior lean   - verbal cues for midline orientation, cervical and thoracic extension, anterior weight shift   - able to perform ROM exercises for R knee - 5 reps of LAQs with sustained HS stretch   - able to perform donning of Crispin shoe on L foot with assistance of OT - required min A for sitting balance (demo'd increased posterior lean)    Patient left HOB elevated with all lines intact and call button in reach..    GOALS:   Multidisciplinary Problems     Physical Therapy Goals        Problem: Physical Therapy Goal    Goal Priority Disciplines Outcome Goal Variances Interventions   Physical Therapy Goal     PT, PT/OT Ongoing, Progressing     Description: Goals to be met by: 10/05/20    Patient will increase functional independence with mobility by performin. Supine to sit with moderate assitance. -MET ()  Supine to sit with contact guard assistance.  2. Sit to supine with moderate assistance - MET ()  Sit to supine with contact guard assistance.   3. Sit to  stand transfer with moderate assistance.  4. Gait  x 10 feet with Minimal Assistance using LRAD and maintenance of weight bearing status   5. Lower extremity exercise program x10 with assistance as needed.   6. Sit for 10 minutes with Minimal Assistance while reaching out of JESIKA in all planes to perform functional activities. -MET (9/21)  Sit for 10 minutes with Stand By Assistance while reaching out of JESIKA in all planes to perform functional activities. - MET (9/25)  Sit for 10 minutes with [place] while reaching out of JESIKA in all planes to perform functional activities.                   Time Tracking:     PT Received On: 09/30/20  PT Start Time: 1031     PT Stop Time: 1057  PT Total Time (min): 26 min     Billable Minutes: Therapeutic Activity 11 and Neuromuscular Re-education 15 (co-tx with OT due to pt's decreased activity tolerance)     Treatment Type: Treatment  PT/PTA: PT     PTA Visit Number: 0     Haley Mejia, SPT  09/30/2020

## 2020-09-30 NOTE — ASSESSMENT & PLAN NOTE
BG goal 140 - 180     -  Continue Levemir to 10 units q HS  -  Continue novolog 5 units TDWM.   -  Low Dose SQ Insulin Correction Scale. Given kidney function.   -  BG Monitoring AC/HS     ADDENDUM 4:53 PM   - Increase novolog to 7 units TIDWM.     ** Please call Endocrine for any BG related issues **  ** Please notify Endocrine for any change and/or advance in diet**    Discharge Planning:   TBD. Please notify endocrinology prior to discharge.    Tentative:   Novolin 70/30 U-100    - 14 units AM    - 12 units PM.

## 2020-10-01 LAB
ALBUMIN SERPL BCP-MCNC: 1.8 G/DL (ref 3.5–5.2)
ALBUMIN SERPL BCP-MCNC: 1.8 G/DL (ref 3.5–5.2)
ALP SERPL-CCNC: 63 U/L (ref 55–135)
ALT SERPL W/O P-5'-P-CCNC: 15 U/L (ref 10–44)
ANION GAP SERPL CALC-SCNC: 11 MMOL/L (ref 8–16)
AST SERPL-CCNC: 17 U/L (ref 10–40)
BASOPHILS # BLD AUTO: 0.1 K/UL (ref 0–0.2)
BASOPHILS NFR BLD: 1.1 % (ref 0–1.9)
BILIRUB DIRECT SERPL-MCNC: 0.3 MG/DL (ref 0.1–0.3)
BILIRUB SERPL-MCNC: 0.7 MG/DL (ref 0.1–1)
BUN SERPL-MCNC: 20 MG/DL (ref 8–23)
CALCIUM SERPL-MCNC: 7.9 MG/DL (ref 8.7–10.5)
CHLORIDE SERPL-SCNC: 98 MMOL/L (ref 95–110)
CK SERPL-CCNC: 65 U/L (ref 20–200)
CO2 SERPL-SCNC: 28 MMOL/L (ref 23–29)
CREAT SERPL-MCNC: 1.8 MG/DL (ref 0.5–1.4)
DIFFERENTIAL METHOD: ABNORMAL
EOSINOPHIL # BLD AUTO: 0.9 K/UL (ref 0–0.5)
EOSINOPHIL NFR BLD: 9.8 % (ref 0–8)
ERYTHROCYTE [DISTWIDTH] IN BLOOD BY AUTOMATED COUNT: 16.9 % (ref 11.5–14.5)
EST. GFR  (AFRICAN AMERICAN): 45.3 ML/MIN/1.73 M^2
EST. GFR  (NON AFRICAN AMERICAN): 39.2 ML/MIN/1.73 M^2
GLUCOSE SERPL-MCNC: 173 MG/DL (ref 70–110)
HCT VFR BLD AUTO: 24.7 % (ref 40–54)
HGB BLD-MCNC: 7.2 G/DL (ref 14–18)
IMM GRANULOCYTES # BLD AUTO: 0.09 K/UL (ref 0–0.04)
IMM GRANULOCYTES NFR BLD AUTO: 1 % (ref 0–0.5)
LYMPHOCYTES # BLD AUTO: 1.2 K/UL (ref 1–4.8)
LYMPHOCYTES NFR BLD: 12.7 % (ref 18–48)
MAGNESIUM SERPL-MCNC: 1.5 MG/DL (ref 1.6–2.6)
MCH RBC QN AUTO: 25.9 PG (ref 27–31)
MCHC RBC AUTO-ENTMCNC: 29.1 G/DL (ref 32–36)
MCV RBC AUTO: 89 FL (ref 82–98)
MONOCYTES # BLD AUTO: 0.8 K/UL (ref 0.3–1)
MONOCYTES NFR BLD: 8.2 % (ref 4–15)
NEUTROPHILS # BLD AUTO: 6.3 K/UL (ref 1.8–7.7)
NEUTROPHILS NFR BLD: 67.2 % (ref 38–73)
NRBC BLD-RTO: 0 /100 WBC
PHOSPHATE SERPL-MCNC: 2.9 MG/DL (ref 2.7–4.5)
PLATELET # BLD AUTO: 225 K/UL (ref 150–350)
PMV BLD AUTO: 10.3 FL (ref 9.2–12.9)
POCT GLUCOSE: 102 MG/DL (ref 70–110)
POCT GLUCOSE: 115 MG/DL (ref 70–110)
POCT GLUCOSE: 141 MG/DL (ref 70–110)
POCT GLUCOSE: 193 MG/DL (ref 70–110)
POTASSIUM SERPL-SCNC: 3.6 MMOL/L (ref 3.5–5.1)
PROT SERPL-MCNC: 6.6 G/DL (ref 6–8.4)
RBC # BLD AUTO: 2.78 M/UL (ref 4.6–6.2)
SODIUM SERPL-SCNC: 137 MMOL/L (ref 136–145)
VANCOMYCIN SERPL-MCNC: 18.8 UG/ML
WBC # BLD AUTO: 9.31 K/UL (ref 3.9–12.7)

## 2020-10-01 PROCEDURE — 84075 ASSAY ALKALINE PHOSPHATASE: CPT | Mod: HCNC

## 2020-10-01 PROCEDURE — 27000221 HC OXYGEN, UP TO 24 HOURS: Mod: HCNC

## 2020-10-01 PROCEDURE — 80069 RENAL FUNCTION PANEL: CPT | Mod: HCNC

## 2020-10-01 PROCEDURE — 97803 MED NUTRITION INDIV SUBSEQ: CPT | Mod: HCNC

## 2020-10-01 PROCEDURE — 25000003 PHARM REV CODE 250: Mod: HCNC | Performed by: HOSPITALIST

## 2020-10-01 PROCEDURE — 94761 N-INVAS EAR/PLS OXIMETRY MLT: CPT | Mod: HCNC

## 2020-10-01 PROCEDURE — 63600175 PHARM REV CODE 636 W HCPCS: Mod: HCNC | Performed by: STUDENT IN AN ORGANIZED HEALTH CARE EDUCATION/TRAINING PROGRAM

## 2020-10-01 PROCEDURE — 63600175 PHARM REV CODE 636 W HCPCS: Mod: HCNC | Performed by: HOSPITALIST

## 2020-10-01 PROCEDURE — 25000003 PHARM REV CODE 250: Mod: HCNC | Performed by: STUDENT IN AN ORGANIZED HEALTH CARE EDUCATION/TRAINING PROGRAM

## 2020-10-01 PROCEDURE — 94640 AIRWAY INHALATION TREATMENT: CPT | Mod: HCNC

## 2020-10-01 PROCEDURE — 25000003 PHARM REV CODE 250: Mod: HCNC | Performed by: INTERNAL MEDICINE

## 2020-10-01 PROCEDURE — 99233 SBSQ HOSP IP/OBS HIGH 50: CPT | Mod: HCNC,,, | Performed by: STUDENT IN AN ORGANIZED HEALTH CARE EDUCATION/TRAINING PROGRAM

## 2020-10-01 PROCEDURE — 85025 COMPLETE CBC W/AUTO DIFF WBC: CPT | Mod: HCNC

## 2020-10-01 PROCEDURE — 83735 ASSAY OF MAGNESIUM: CPT | Mod: HCNC

## 2020-10-01 PROCEDURE — 99233 PR SUBSEQUENT HOSPITAL CARE,LEVL III: ICD-10-PCS | Mod: HCNC,,, | Performed by: STUDENT IN AN ORGANIZED HEALTH CARE EDUCATION/TRAINING PROGRAM

## 2020-10-01 PROCEDURE — 99900035 HC TECH TIME PER 15 MIN (STAT): Mod: HCNC

## 2020-10-01 PROCEDURE — 20600001 HC STEP DOWN PRIVATE ROOM: Mod: HCNC

## 2020-10-01 PROCEDURE — 94799 UNLISTED PULMONARY SVC/PX: CPT | Mod: HCNC

## 2020-10-01 PROCEDURE — 36415 COLL VENOUS BLD VENIPUNCTURE: CPT | Mod: HCNC

## 2020-10-01 PROCEDURE — 82550 ASSAY OF CK (CPK): CPT | Mod: HCNC

## 2020-10-01 PROCEDURE — 80202 ASSAY OF VANCOMYCIN: CPT | Mod: HCNC

## 2020-10-01 RX ORDER — MAGNESIUM SULFATE HEPTAHYDRATE 40 MG/ML
2 INJECTION, SOLUTION INTRAVENOUS ONCE
Status: COMPLETED | OUTPATIENT
Start: 2020-10-01 | End: 2020-10-01

## 2020-10-01 RX ORDER — VANCOMYCIN HCL IN 5 % DEXTROSE 1G/250ML
1000 PLASTIC BAG, INJECTION (ML) INTRAVENOUS ONCE
Status: COMPLETED | OUTPATIENT
Start: 2020-10-01 | End: 2020-10-01

## 2020-10-01 RX ADMIN — HYPROMELLOSE 2910 1 DROP: 5 SOLUTION OPHTHALMIC at 02:10

## 2020-10-01 RX ADMIN — FLUTICASONE FUROATE AND VILANTEROL TRIFENATATE 1 PUFF: 200; 25 POWDER RESPIRATORY (INHALATION) at 09:10

## 2020-10-01 RX ADMIN — SODIUM BICARBONATE 1300 MG: 650 TABLET ORAL at 08:10

## 2020-10-01 RX ADMIN — INSULIN ASPART 7 UNITS: 100 INJECTION, SOLUTION INTRAVENOUS; SUBCUTANEOUS at 05:10

## 2020-10-01 RX ADMIN — HEPARIN SODIUM 5000 UNITS: 5000 INJECTION INTRAVENOUS; SUBCUTANEOUS at 10:10

## 2020-10-01 RX ADMIN — LEVOTHYROXINE SODIUM 75 MCG: 25 TABLET ORAL at 06:10

## 2020-10-01 RX ADMIN — CARVEDILOL 25 MG: 25 TABLET, FILM COATED ORAL at 08:10

## 2020-10-01 RX ADMIN — FUROSEMIDE 40 MG: 40 TABLET ORAL at 08:10

## 2020-10-01 RX ADMIN — HYPROMELLOSE 2910 1 DROP: 5 SOLUTION OPHTHALMIC at 08:10

## 2020-10-01 RX ADMIN — MAGNESIUM SULFATE IN WATER 2 G: 40 INJECTION, SOLUTION INTRAVENOUS at 12:10

## 2020-10-01 RX ADMIN — HEPARIN SODIUM 5000 UNITS: 5000 INJECTION INTRAVENOUS; SUBCUTANEOUS at 06:10

## 2020-10-01 RX ADMIN — FLUTICASONE PROPIONATE 50 MCG: 50 SPRAY, METERED NASAL at 08:10

## 2020-10-01 RX ADMIN — INSULIN ASPART 7 UNITS: 100 INJECTION, SOLUTION INTRAVENOUS; SUBCUTANEOUS at 12:10

## 2020-10-01 RX ADMIN — HEPARIN SODIUM 5000 UNITS: 5000 INJECTION INTRAVENOUS; SUBCUTANEOUS at 02:10

## 2020-10-01 RX ADMIN — FUROSEMIDE 40 MG: 40 TABLET ORAL at 05:10

## 2020-10-01 RX ADMIN — INSULIN DETEMIR 10 UNITS: 100 INJECTION, SOLUTION SUBCUTANEOUS at 09:10

## 2020-10-01 RX ADMIN — VANCOMYCIN HYDROCHLORIDE 1000 MG: 1 INJECTION, POWDER, LYOPHILIZED, FOR SOLUTION INTRAVENOUS at 05:10

## 2020-10-01 RX ADMIN — POLYETHYLENE GLYCOL 3350 17 G: 17 POWDER, FOR SOLUTION ORAL at 08:10

## 2020-10-01 RX ADMIN — INSULIN ASPART 7 UNITS: 100 INJECTION, SOLUTION INTRAVENOUS; SUBCUTANEOUS at 08:10

## 2020-10-01 NOTE — CARE UPDATE
BG goal 140 - 180    Diet diabetic [facility]; 2000 Calorie  24 Days Post-Op    -  Continue Levemir to 10 units q HS  -  Continue novolog 7 units TDWM.   -  Low Dose SQ Insulin Correction Scale. Given kidney function.   -  BG Monitoring AC/HS         ** Please call Endocrine for any BG related issues **  ** Please notify Endocrine for any change and/or advance in diet**     Discharge Planning:   TBD. Please notify endocrinology prior to discharge.    Tentative:   Novolin 70/30 U-100    - 14 units AM    - 12 units PM.

## 2020-10-01 NOTE — PROGRESS NOTES
Pharmacokinetic Assessment Follow Up: IV Vancomycin    Vancomycin serum concentration assessment(s):    Vancomycin concentration = 18.8 mcg/mL.SCr trending up but appears near baseline.    Vancomycin Regimen Plan:  1. Vancomycin 1000 mg IV x 1 today  2. Obtain concentration @ 1200 on 10/2/20  3. Goal trough = 15-20 mcg/mL  4. End date 10/7/20    Drug levels (last 3 results):  Recent Labs   Lab Result Units 09/29/20  1057 09/30/20  0430 10/01/20  1212   Vancomycin, Random ug/mL  --  21.9 18.8   Vancomycin-Trough ug/mL 27.3*  --   --        Thank you for the consult, will continue to follow  Isra Alva PharmAnselmoD., BCPS  [id number]       Patient brief summary:  Ed Patton is a 65 y.o. male initiated on antimicrobial therapy with IV Vancomycin for treatment of MRSA bacteremia      Drug Allergies:   Review of patient's allergies indicates:   Allergen Reactions    Vicodin [hydrocodone-acetaminophen] Itching       Actual Body Weight:   115.3kg    Renal Function:   Estimated Creatinine Clearance: 58.3 mL/min (A) (based on SCr of 1.8 mg/dL (H)).,     CBC (last 72 hours):  Recent Labs   Lab Result Units 09/29/20  0727 09/30/20  0430 10/01/20  0524   WBC K/uL 9.48 9.09 9.31   Hemoglobin g/dL 7.4* 7.2* 7.2*   Hematocrit % 25.4* 24.2* 24.7*   Platelets K/uL 255 246 225   Gran% % 70.8 67.1 67.2   Lymph% % 12.0* 13.1* 12.7*   Mono% % 7.2 8.0 8.2   Eosinophil% % 8.3* 10.2* 9.8*   Basophil% % 0.9 0.9 1.1   Differential Method  Automated Automated Automated       Metabolic Panel (last 72 hours):  Recent Labs   Lab Result Units 09/29/20  0727 09/30/20  0430 10/01/20  0524   Sodium mmol/L 135* 135* 137   Potassium mmol/L 3.9 3.5 3.6   Chloride mmol/L 98 97 98   CO2 mmol/L 28 29 28   Glucose mg/dL 158* 203* 173*   BUN, Bld mg/dL 17 18 20   Creatinine mg/dL 1.5* 1.7* 1.8*   Albumin g/dL 1.6* 1.7* 1.8*  1.8*   Total Bilirubin mg/dL  --   --  0.7   Alkaline Phosphatase U/L  --   --  63   AST U/L  --   --  17   ALT U/L  --   --  15    Magnesium mg/dL 1.6 1.5* 1.5*   Phosphorus mg/dL 2.5* 2.7 2.9       Vancomycin Administrations:  vancomycin given in the last 96 hours                     vancomycin 1.5 g in dextrose 5 % 250 mL IVPB (ready to mix) (mg) 1,500 mg New Bag 09/28/20 1234     1,500 mg New Bag 09/27/20 1142    vancomycin 1.75 g in 5 % dextrose 500 mL IVPB (mg) 1,750 mg New Bag 09/26/20 1214     1,750 mg New Bag 09/25/20 1150                    Microbiologic Results:  Microbiology Results (last 7 days)     Procedure Component Value Units Date/Time    Fungus culture [[id number]] Collected: 09/16/20 1612    Order Status: Completed Specimen: Body Fluid from Gallbladder Updated: 09/30/20 1057     Fungus (Mycology) Culture Culture in progress      No fungus isolated after 2 weeks    Culture, Anaerobe [[id number]] Collected: 09/16/20 1612    Order Status: Completed Specimen: Body Fluid from Gallbladder Updated: 09/26/20 1328     Anaerobic Culture No anaerobes isolated

## 2020-10-01 NOTE — PLAN OF CARE
Problem: Adult Inpatient Plan of Care  Goal: Optimal Comfort and Wellbeing  Outcome: Ongoing, Progressing     Discussed w/ pt plan of care, pt verbalized understanding, wound care dressing not changed today since previously changed, pt given chg bath, pt denies pain/needs at this time, pt shows no s/s of distress, pt in isolation for MRSA, pt cbgs monitored, pt treated for DM, pt A&O x4, will continue to monitor      Problem: Glycemic Control Impaired (Sepsis/Septic Shock)  Goal: Blood Glucose Level Within Desired Range  Outcome: Ongoing, Progressing     Pt given insulin scheduled w/ meals, no addition insulin needed at this time, cbgs monitored, will continue to monitor

## 2020-10-01 NOTE — PLAN OF CARE
Pt. Aoox4. Vss. 2l nc. No acute changes during shift. Turned q2. Poc reviewed with pt. Safety measures maintained. Wctm.

## 2020-10-01 NOTE — PROGRESS NOTES
Hospital Medicine  Progress note    [place]: [facility] [place] Shaan Joel MD  Admit Date: 8/30/2020  EDY 10/2/2020  Length of Stay:  LOS: 32 days   Code status: Full Code    Principal Problem:  Septic shock due to methicillin resistant Staphylococcus aureus    HPI / Hospital Course     Interval hx: No complaints this AM, HR better controlled, labs stable. Mild trend up in sCr. Patient pending SNF placement.    End date of IV antibiotics:  Daptomycin 10/7/2020  Levofloxacin 9/30/2020.      9/29 's will titrate up beta blocker. Looks well wbc 9.8   9/28 WBC is 12, remains tach but steady at 120. CRP is 84.   lactate 1.1 today.Looked as well as I have seen him today. Will get EKG  End date of IV antibiotics:  Daptomycin 10/7/2020  Levofloxacin 9/30/2020.     Refused angiogram Vascular surgery signed off. Will need IV abx, SNF may be best option.    ROS     Constitutional: Positive for activity change. Negative for appetite change, chills and fever.   HENT: Negative for congestion.    Respiratory: Negative for cough and shortness of breath.    Gastrointestinal: Negative for nausea and vomiting.   Musculoskeletal: Positive for arthralgias, back pain and myalgias.   Skin: Positive for wound. Negative for color change.   Neurological: Positive for weakness. Negative for numbness.   Psychiatric/Behavioral: Negative for behavioral problems and confusion    PEx  Temp:  [98 °F (36.7 °C)-98.7 °F (37.1 °C)]   Pulse:  []   Resp:  [14-27]   BP: (114-139)/(56-89)   SpO2:  [95 %-100 %]     Intake/Output Summary (Last 24 hours) at 10/1/2020 1702  Last data filed at 10/1/2020 1500  Gross per 24 hour   Intake --   Output 1000 ml   Net -1000 ml       Constitutional:       General: He is not in acute distress.     Appearance: He is well-developed. He is obese.. He is not diaphoretic.   HENT:      Head: Normocephalic and atraumatic.      Right Ear: External ear normal.      Left Ear: External ear normal.      Mouth/Throat:       Pharynx: No oropharyngeal exudate.   Eyes:      General: No scleral icterus.        Musculoskeletal: Normal range of motion.      Thyroid: No thyromegaly.      Trachea: No tracheal deviation.      Comments:   Cardiovascular:      Rate and Rhythm: Tachycardia present.   Pulmonary:      Effort: Pulmonary effort is normal. No respiratory distress.      Breath sounds: Normal breath sounds. No stridor. No wheezing or rales.   Abdominal:      General: Bowel sounds are normal. There is no distension.      Palpations: Abdomen is soft.      Tenderness: There is no abdominal tenderness. There is no guarding.   Musculoskeletal:         General: No tenderness or deformity.      Right lower leg: Edema present.      Left lower leg: Edema present.   Lymphadenopathy:      Cervical: No cervical adenopathy.   Skin:     Coloration: Skin is not pale.      Findings: No erythema or rash.   Neurological:      General: No focal deficit present.     Recent Labs   Lab 09/29/20 0727 09/30/20  0430 10/01/20  0524   WBC 9.48 9.09 9.31   HGB 7.4* 7.2* 7.2*   HCT 25.4* 24.2* 24.7*    246 225     Recent Labs   Lab 09/29/20 0727 09/30/20  0430 10/01/20  0524   * 135* 137   K 3.9 3.5 3.6   CL 98 97 98   CO2 28 29 28   BUN 17 18 20   CREATININE 1.5* 1.7* 1.8*   * 203* 173*   CALCIUM 7.8* 7.8* 7.9*   MG 1.6 1.5* 1.5*   PHOS 2.5* 2.7 2.9     Recent Labs   Lab 09/28/20  0515 09/29/20 0727 09/30/20  0430 10/01/20  0524   ALKPHOS 72  --   --  63   ALT 22  --   --  15   AST 20  --   --  17   ALBUMIN 1.6*  1.6* 1.6* 1.7* 1.8*  1.8*   PROT 6.7  --   --  6.6   BILITOT 0.6  --   --  0.7        Recent Labs   Lab 09/29/20 2053 09/30/20  0809 09/30/20  1132 09/30/20  1659 09/30/20  2142 10/01/20  0725   POCTGLUCOSE 164* 207* 238* 174* 183* 193*       Scheduled Meds:   artificial tears  1 drop Both Eyes TID    carvediloL  25 mg Oral BID    fluticasone furoate-vilanteroL  1 puff Inhalation Daily    fluticasone propionate  1 spray  Each Nostril Daily    furosemide  40 mg Oral BID    heparin (porcine)  5,000 Units Subcutaneous Q8H    insulin aspart U-100  7 Units Subcutaneous TIDWM    insulin detemir U-100  10 Units Subcutaneous QHS    levothyroxine  75 mcg Oral Before breakfast    polyethylene glycol  17 g Oral Daily    sodium bicarbonate  1,300 mg Oral BID    vancomycin (VANCOCIN) IVPB  1,000 mg Intravenous Once     Continuous Infusions:  As Needed:  acetaminophen, albuterol-ipratropium, calcium carbonate, dextrose 50%, dextrose 50%, glucagon (human recombinant), glucose, glucose, insulin aspart U-100, magnesium hydroxide 400 mg/5 ml, melatonin, ondansetron, oxyCODONE, polyethylene glycol, DIPH,PERTUS (ADACEL),TETANUS PF VAC (ADULT), Pharmacy to dose Vancomycin consult **AND** vancomycin - pharmacy to dose        Active Hospital Problems    Diagnosis  POA    *Septic shock due to methicillin resistant Staphylococcus aureus [A41.02, R65.21]  Yes    Cellulitis of left lower extremity [L03.116]  Unknown    PVD (peripheral vascular disease) [I73.9]  Unknown    MRSA bacteremia [R78.81, B95.62]  Yes    Acute renal insufficiency [N28.9]  Yes    Acute metabolic encephalopathy [G93.41]  No    Hospital-acquired pneumonia [J18.9, Y95]  No    Acute on chronic respiratory failure with hypoxia [J96.21]  No    Debility [R53.81]  Yes    Acute renal failure with acute tubular necrosis superimposed on stage 3 chronic kidney disease [N17.0, N18.30]  No    Septic arthritis of knee, right [M00.9]  Yes    Staphylococcal arthritis of right knee [M00.061]  Yes    Diabetic ulcer of left foot associated with type 2 diabetes mellitus, with fat layer exposed [E11.621, L97.522]  Yes    Diabetic foot infection [E11.628, L08.9]  Yes    COPD mixed type [J44.9]  Yes     Chronic    Postablative hypothyroidism [E89.0]  Yes     Chronic    Hyperlipidemia [E78.5]  Yes     High ASCVD with CAD, elevated A1c      Type 2 diabetes mellitus with stage 3 chronic  kidney disease, with long-term current use of insulin [E11.22, N18.30, Z79.4]  Not Applicable     GFR> 60, uncontrolled DM      Chronic systolic congestive heart failure [I50.22]  Yes     Chronic     EF 35% in 2015 echo, improved in 5/2018. Patient with mixed ischemic and non-ischemic cardiomyopathy        Resolved Hospital Problems    Diagnosis Date Resolved POA    Endocarditis [I38] 09/12/2020 Yes    Sepsis due to methicillin resistant Staphylococcus aureus [A41.02] 09/13/2020 Yes    Type 2 diabetes mellitus with ketoacidosis without coma, with long-term current use of insulin [E11.10, Z79.4] 09/18/2020 Not Applicable     Novolin 70/30 40U in AM, 30U in PM. Elevated A1c         Assessment and Plan  / Problems managed today    Overview/Hospital Course:  Mr. Ed Patton was admitted to hospital medicine on 08/30 for management of a left-sided diabetic foot infection that was precipitated by an undetected punction wound after stepping on a tack/nail. His presentation was notable for leukocytosis with elevated inflammatory markers, however MRI of left foot only showed cellulitis of the anterior and lateral aspect of foot. Podiatry was consulted with recommendations for IV antibiotics; he was initiated on Ciprofloxacin and Vancomycin on admission. However, blood cultures from admission resulted positive for MRSA with wound cultures from left foot also growing MRSA with Proteus mirabilis. By 09/03 cultures remained positive despite Vancomycin, and patient was noted to develop right knee pain with swelling. Orthopedics was consulted and patient underwent right knee joint aspiration positive for septic arthritis with cultures also positive for MRSA.      LUKAS on 09/04 showed known chronic systolic heart failure, but was negative for endocarditis. MRI of lumbar spine on 09/08 was negative for abscess. By 09/06, blood cultures continued to remain positive, and he underwent I&D of right arm abscess on 09/08 with  cultures also positive for MRSA.      For persistent MRSA bacteremia, he was transitioned to Daptomycin and Ceftaroline, however this regimen was discontinued by 09/10 due to concern for developing rhabdomylosis. By 09/09, he was noted to develop a progressively worsening leukocytosis and NANETTE. Nephrology was consulted on 09/09 with suspicion for ATN.      Hematology was consulted on 09/12 for persistent leukocytosis as likely reactive from bacteremia. On 09/10, patient began developing intermitent hypotension prompting broadening of antibiotics to Cefepime and Vancomycin. By 09/12, renal function continued to worsen in addition to hypotension prompting transfer to ICU for vasopressor support and possible HD. Patient found to be encephalopathic, central line placed and started on CRRT overnight on 9/14 with improvement in mental status, but remained encephalopathic. CT head without any acute intracranial process. Vasopressin off AM of 9/15 but remains on levophed, CT abdomen with concern for potential cholecystitis and possible atelectasis with superimposed pneumonia.  Due to the acuity of his illness is he is not a surgical candidate it and ultimately is now status post cholecystostomy tube placement for management of suspected cholecystitis     Additional complications arising during hospital course include the development metabolic encephalopathy secondary to uremia versus shock versus DKA or overall cerebral hypoperfusion from shock, reported to have agitation prior to step-down from ICU.  Also developed DKA requiring standard therapy with IV insulin infusion, however he was kept on IV insulin due to rising sugars any time it was weaned working on basal bolus regimen for him at this time with the assistance of a endocrinology           Septic shock_Septic arthritis of Right Knee_Sespsi due to MRSA  This is a 65 year old  male with PMH significant for HFrEF and COPD with chronic respiratory failure  (on 2L home oxygen), T2DM with CKD III, and iron deficiency anemia who originally presented to [facility] on 08/30 with cellulitis of left foot with concern for diabetic foot infection with subsequent development of MRSA bacteremia attributed to septic arthritis of right knee and elbow. He is status post drainage and coverage for MRSA since that time. His work-up for other etiologies of MRSA bacteremia have included negative LUKAS and MRI of lumbar spine looking for endocarditis and spinal abscess, respectively. Stool studies for C.diff on 09/11 were negative. His hospital course has been associated with worsening hypotension and leukocytosis since 09/10 prompting ICU admission on 09/12 for initiation of vasopressor support. Infectious work-up thus far on ICU admission concern for likely right lower lobe HAP.      CT abdomen pelvis on 9/15 with gallbladder dilation, sludge, and trace pericholic fluid collection. Exam not consistent with cholecystitis but given persistent shock potential source. Also with potential right lower lobe atelectasis with overlying infection  S/p ICU stepdown  -CK levels were rising so spoke with infectious disease and will need to switch patient back to Vancomycin. - Consult order placed.      Type 2 DM with DKA  -endocrinology following pt kept on transitional drip but has since been weaned to a basal bolus regimen.         Postablative hypothyroidism  Plan: Continue home SYNTHROID     Acute kidney injury  -likely ATN from Shock s/p requiring RRT in ICU  -trialysis line removed   -pt is non-oliguric  -nephrology recommends sodium bicarb tabs - order placed.   -Nephrology recommends ambulatory f/u on discharge     Chronic systolic congestive heart failure  -Most recent ECHO in 09/2020 with EF of 25%.   -Unclear is ischemic vs non-ischemic.   -Home regimen: Carvedilol, Lasix 80 mg, and Lisinopril.           >due to NANETTE lasix and Lisinopril on hold, but he is recovering renal function and prior  to discharge to any facility will likely need some amount of diuretic re-initiated and potentially a much lower doseage of Ace-inhibitor.   -Associated with chronic hypoxemic respiratory failure requiring 2L nasal cannula, but noted to develop worsening oxygen requirements on ICU admission that were likely multifactorial from suspected hospital acquired pneumonia versus declining urine output with pre-disposition to volume overload. .      COPD mixed type  -Based on PFT's from 05/2015 showing mild (small airways) obstruction, airflow not improved after bronchodilator, and moderate restriction.     Acute on chronic respiratory failure with hypoxia  -History of mixed COPD (based on PFT's from 05/2015 showing mild (small airways) obstruction, airflow not improved after bronchodilator, and moderate restriction) and HFrEF with chronic respiratory failure on 2L home oxygen.   -ICU admission notable for progressive increase in supplemental oxygen requirements.   -Work-up for underlying etiology of acute on chronic respiratory failure include CXR showing concern for possible progression of CHF vs HAP (not entirely convinced that CXR showed consolidation)  - Breathing back to baseline now to 2L NC     Code Status: Full Code     High Risk Conditions:  Patient has a condition that poses threat to life and bodily function: Septic shock, MRSA bacteremia  Patient is currently on drug therapy requiring intensive monitoring for toxicity: Daptomycin.      Discharge Planning   EDY: 9/25/2020     Code Status: Full Code   Is the patient medically ready for discharge?: No    Reason for patient still in hospital (select all that apply): Patient trending condition  Discharge Plan A: Skilled Nursing Facility   Discharge Delays: (!) Change in Medical Condition               Goals of Care:  Return to prior functional status   Discharge plan: SNF    Time (minutes) spent in care of the patient (Greater than 1/2 spent in direct face-to-face  contact)  35 minutes    Shaan Joel MD

## 2020-10-01 NOTE — PROGRESS NOTES
"  [facility]  Adult Nutrition  Consult Note    SUMMARY     Recommendations    1. Continue current Diabetic diet, add Boost Glucose Control ONS if PO intake declines.   2. RD to monitor & follow-up.    Goals: Meet % EEN, EPN by RD f/u date  Nutrition Goal Status: goal met  Communication of RD Recs: reviewed with RN    Reason for Assessment    Reason For Assessment: RD follow-up  Diagnosis: diabetes diagnosis/complications(diabetic foot infection)  Relevant Medical History: DM2, CHF, DCM, CAD, COPD, HLD  Interdisciplinary Rounds: did not attend    General Information Comments: Pt continues w/ adequate PO intake, consuming 50-75% of meals & tolerating diet. Pt with good appetite, no wt changes PTA. -250# > 1 year. NFPE not warranted, pt with no indicators of malnutrition at this time. Diabetic diet education complete 9/4 - pt w/ no further questions at this time.  Nutrition Discharge Planning: Adequate PO intake    Nutrition/Diet History    Spiritual, Cultural Beliefs, Synagogue Practices, Values that Affect Care: no  Factors Affecting Nutritional Intake: decreased appetite    Anthropometrics    Temp: 98.7 °F (37.1 °C)  Height Method: Stated  Height: 6' 4" (193 cm)  Height (inches): 76 in  Weight Method: Bed Scale  Weight: 115.3 kg (254 lb 3.1 oz)  Weight (lb): 254.19 lb  Ideal Body Weight (IBW), Male: 202 lb  % Ideal Body Weight, Male (lb): 125.84 %  BMI (Calculated): 31  BMI Grade: 30 - 34.9- obesity - grade I    Lab/Procedures/Meds    Pertinent Labs Reviewed: reviewed  Pertinent Labs Comments: Creat 1.8, GFR 45.3, A1C 9.5  Pertinent Medications Reviewed: reviewed  Pertinent Medications Comments: -    Estimated/Assessed Needs    Weight Used For Calorie Calculations: 115.3 kg (254 lb 3.1 oz)     Energy Calorie Requirements (kcal): 2454 kcal/d  Energy Need Method: Sedgwick-St Jeor((x1.2))     Protein Requirements: 115-138 g/d (1-1.2 g/kg)   Weight Used For Protein Calculations: 114.8 kg " (253 lb 1.4 oz)     Fluid Requirements (mL): per MD or 1 mL/kcal   RDA Method (mL): 2454     CHO Requirement: 315g/d    Nutrition Prescription Ordered    Current Diet Order: 2000 kcal ADA    Evaluation of Received Nutrient/Fluid Intake    Comments: LBM: 9/30    Tolerance: tolerating    Nutrition Risk    Level of Risk/Frequency of Follow-up: (1x/week)     Assessment and Plan    Nutrition Problem  Inadequate energy intake     Related to (etiology):   Decreased ability to consume sufficient energy     Signs and Symptoms (as evidenced by):   NPO with no alternate means of nutrition at this time     Interventions (treatment strategy):  Collaboration with other providers     Nutrition Diagnosis Status:   Resolved     Monitor and Evaluation    Food and Nutrient Intake: energy intake, food and beverage intake, enteral nutrition intake  Food and Nutrient Adminstration: diet order, enteral and parenteral nutrition administration  Knowledge/Beliefs/Attitudes: food and nutrition knowledge/skill  Physical Activity and Function: nutrition-related ADLs and IADLs  Anthropometric Measurements: weight, weight change  Biochemical Data, Medical Tests and Procedures: lipid profile, inflammatory profile, glucose/endocrine profile, gastrointestinal profile, electrolyte and renal panel  Nutrition-Focused Physical Findings: overall appearance     Nutrition Follow-Up    RD Follow-up?: Yes

## 2020-10-02 LAB
ALBUMIN SERPL BCP-MCNC: 1.9 G/DL (ref 3.5–5.2)
ANION GAP SERPL CALC-SCNC: 9 MMOL/L (ref 8–16)
BASOPHILS # BLD AUTO: 0.07 K/UL (ref 0–0.2)
BASOPHILS NFR BLD: 0.8 % (ref 0–1.9)
BUN SERPL-MCNC: 19 MG/DL (ref 8–23)
CALCIUM SERPL-MCNC: 8 MG/DL (ref 8.7–10.5)
CHLORIDE SERPL-SCNC: 98 MMOL/L (ref 95–110)
CO2 SERPL-SCNC: 30 MMOL/L (ref 23–29)
CREAT SERPL-MCNC: 1.8 MG/DL (ref 0.5–1.4)
DIFFERENTIAL METHOD: ABNORMAL
EOSINOPHIL # BLD AUTO: 1.1 K/UL (ref 0–0.5)
EOSINOPHIL NFR BLD: 12.4 % (ref 0–8)
ERYTHROCYTE [DISTWIDTH] IN BLOOD BY AUTOMATED COUNT: 16.8 % (ref 11.5–14.5)
EST. GFR  (AFRICAN AMERICAN): 45.3 ML/MIN/1.73 M^2
EST. GFR  (NON AFRICAN AMERICAN): 39.2 ML/MIN/1.73 M^2
GLUCOSE SERPL-MCNC: 154 MG/DL (ref 70–110)
HCT VFR BLD AUTO: 25.1 % (ref 40–54)
HGB BLD-MCNC: 7.4 G/DL (ref 14–18)
IMM GRANULOCYTES # BLD AUTO: 0.07 K/UL (ref 0–0.04)
IMM GRANULOCYTES NFR BLD AUTO: 0.8 % (ref 0–0.5)
LYMPHOCYTES # BLD AUTO: 1.3 K/UL (ref 1–4.8)
LYMPHOCYTES NFR BLD: 13.6 % (ref 18–48)
MAGNESIUM SERPL-MCNC: 1.7 MG/DL (ref 1.6–2.6)
MCH RBC QN AUTO: 26.1 PG (ref 27–31)
MCHC RBC AUTO-ENTMCNC: 29.5 G/DL (ref 32–36)
MCV RBC AUTO: 88 FL (ref 82–98)
MONOCYTES # BLD AUTO: 0.7 K/UL (ref 0.3–1)
MONOCYTES NFR BLD: 7.4 % (ref 4–15)
NEUTROPHILS # BLD AUTO: 6 K/UL (ref 1.8–7.7)
NEUTROPHILS NFR BLD: 65 % (ref 38–73)
NRBC BLD-RTO: 0 /100 WBC
PHOSPHATE SERPL-MCNC: 3.2 MG/DL (ref 2.7–4.5)
PLATELET # BLD AUTO: 236 K/UL (ref 150–350)
PMV BLD AUTO: 10.2 FL (ref 9.2–12.9)
POCT GLUCOSE: 105 MG/DL (ref 70–110)
POCT GLUCOSE: 157 MG/DL (ref 70–110)
POCT GLUCOSE: 168 MG/DL (ref 70–110)
POCT GLUCOSE: 177 MG/DL (ref 70–110)
POTASSIUM SERPL-SCNC: 3.3 MMOL/L (ref 3.5–5.1)
RBC # BLD AUTO: 2.84 M/UL (ref 4.6–6.2)
SODIUM SERPL-SCNC: 137 MMOL/L (ref 136–145)
VANCOMYCIN SERPL-MCNC: 19.7 UG/ML
VANCOMYCIN SERPL-MCNC: 20.2 UG/ML
WBC # BLD AUTO: 9.21 K/UL (ref 3.9–12.7)

## 2020-10-02 PROCEDURE — 36415 COLL VENOUS BLD VENIPUNCTURE: CPT | Mod: HCNC

## 2020-10-02 PROCEDURE — 97530 THERAPEUTIC ACTIVITIES: CPT | Mod: HCNC

## 2020-10-02 PROCEDURE — 97112 NEUROMUSCULAR REEDUCATION: CPT | Mod: HCNC

## 2020-10-02 PROCEDURE — 83735 ASSAY OF MAGNESIUM: CPT | Mod: HCNC

## 2020-10-02 PROCEDURE — 94761 N-INVAS EAR/PLS OXIMETRY MLT: CPT | Mod: HCNC

## 2020-10-02 PROCEDURE — 27000221 HC OXYGEN, UP TO 24 HOURS: Mod: HCNC

## 2020-10-02 PROCEDURE — 25000003 PHARM REV CODE 250: Mod: HCNC | Performed by: STUDENT IN AN ORGANIZED HEALTH CARE EDUCATION/TRAINING PROGRAM

## 2020-10-02 PROCEDURE — 63600175 PHARM REV CODE 636 W HCPCS: Mod: HCNC | Performed by: STUDENT IN AN ORGANIZED HEALTH CARE EDUCATION/TRAINING PROGRAM

## 2020-10-02 PROCEDURE — 25000003 PHARM REV CODE 250: Mod: HCNC | Performed by: INTERNAL MEDICINE

## 2020-10-02 PROCEDURE — 99232 SBSQ HOSP IP/OBS MODERATE 35: CPT | Mod: HCNC,,, | Performed by: PODIATRIST

## 2020-10-02 PROCEDURE — 20600001 HC STEP DOWN PRIVATE ROOM: Mod: HCNC

## 2020-10-02 PROCEDURE — 99233 SBSQ HOSP IP/OBS HIGH 50: CPT | Mod: HCNC,,, | Performed by: STUDENT IN AN ORGANIZED HEALTH CARE EDUCATION/TRAINING PROGRAM

## 2020-10-02 PROCEDURE — 99233 PR SUBSEQUENT HOSPITAL CARE,LEVL III: ICD-10-PCS | Mod: HCNC,,, | Performed by: STUDENT IN AN ORGANIZED HEALTH CARE EDUCATION/TRAINING PROGRAM

## 2020-10-02 PROCEDURE — 99232 PR SUBSEQUENT HOSPITAL CARE,LEVL II: ICD-10-PCS | Mod: HCNC,,, | Performed by: PODIATRIST

## 2020-10-02 PROCEDURE — 97535 SELF CARE MNGMENT TRAINING: CPT | Mod: HCNC

## 2020-10-02 PROCEDURE — 80202 ASSAY OF VANCOMYCIN: CPT | Mod: 91,HCNC

## 2020-10-02 PROCEDURE — 85025 COMPLETE CBC W/AUTO DIFF WBC: CPT | Mod: HCNC

## 2020-10-02 PROCEDURE — 80069 RENAL FUNCTION PANEL: CPT | Mod: HCNC

## 2020-10-02 PROCEDURE — 25000003 PHARM REV CODE 250: Mod: HCNC | Performed by: HOSPITALIST

## 2020-10-02 PROCEDURE — 94640 AIRWAY INHALATION TREATMENT: CPT | Mod: HCNC

## 2020-10-02 PROCEDURE — 63600175 PHARM REV CODE 636 W HCPCS: Mod: HCNC | Performed by: HOSPITALIST

## 2020-10-02 RX ORDER — POTASSIUM CHLORIDE 750 MG/1
40 CAPSULE, EXTENDED RELEASE ORAL ONCE
Status: COMPLETED | OUTPATIENT
Start: 2020-10-02 | End: 2020-10-02

## 2020-10-02 RX ORDER — VANCOMYCIN HCL IN 5 % DEXTROSE 1G/250ML
1000 PLASTIC BAG, INJECTION (ML) INTRAVENOUS ONCE
Status: COMPLETED | OUTPATIENT
Start: 2020-10-02 | End: 2020-10-02

## 2020-10-02 RX ADMIN — POLYETHYLENE GLYCOL 3350 17 G: 17 POWDER, FOR SOLUTION ORAL at 08:10

## 2020-10-02 RX ADMIN — INSULIN ASPART 7 UNITS: 100 INJECTION, SOLUTION INTRAVENOUS; SUBCUTANEOUS at 08:10

## 2020-10-02 RX ADMIN — INSULIN DETEMIR 10 UNITS: 100 INJECTION, SOLUTION SUBCUTANEOUS at 09:10

## 2020-10-02 RX ADMIN — SODIUM BICARBONATE 1300 MG: 650 TABLET ORAL at 08:10

## 2020-10-02 RX ADMIN — CARVEDILOL 25 MG: 25 TABLET, FILM COATED ORAL at 09:10

## 2020-10-02 RX ADMIN — FLUTICASONE FUROATE AND VILANTEROL TRIFENATATE 1 PUFF: 200; 25 POWDER RESPIRATORY (INHALATION) at 08:10

## 2020-10-02 RX ADMIN — HEPARIN SODIUM 5000 UNITS: 5000 INJECTION INTRAVENOUS; SUBCUTANEOUS at 06:10

## 2020-10-02 RX ADMIN — HEPARIN SODIUM 5000 UNITS: 5000 INJECTION INTRAVENOUS; SUBCUTANEOUS at 02:10

## 2020-10-02 RX ADMIN — FUROSEMIDE 40 MG: 40 TABLET ORAL at 08:10

## 2020-10-02 RX ADMIN — VANCOMYCIN HYDROCHLORIDE 1000 MG: 1 INJECTION, POWDER, LYOPHILIZED, FOR SOLUTION INTRAVENOUS at 05:10

## 2020-10-02 RX ADMIN — FUROSEMIDE 40 MG: 40 TABLET ORAL at 05:10

## 2020-10-02 RX ADMIN — HEPARIN SODIUM 5000 UNITS: 5000 INJECTION INTRAVENOUS; SUBCUTANEOUS at 09:10

## 2020-10-02 RX ADMIN — HYPROMELLOSE 2910 1 DROP: 5 SOLUTION OPHTHALMIC at 09:10

## 2020-10-02 RX ADMIN — LEVOTHYROXINE SODIUM 75 MCG: 25 TABLET ORAL at 06:10

## 2020-10-02 RX ADMIN — INSULIN ASPART 7 UNITS: 100 INJECTION, SOLUTION INTRAVENOUS; SUBCUTANEOUS at 12:10

## 2020-10-02 RX ADMIN — CARVEDILOL 25 MG: 25 TABLET, FILM COATED ORAL at 08:10

## 2020-10-02 RX ADMIN — FLUTICASONE PROPIONATE 50 MCG: 50 SPRAY, METERED NASAL at 08:10

## 2020-10-02 RX ADMIN — POTASSIUM CHLORIDE 40 MEQ: 750 CAPSULE, EXTENDED RELEASE ORAL at 12:10

## 2020-10-02 RX ADMIN — INSULIN ASPART 7 UNITS: 100 INJECTION, SOLUTION INTRAVENOUS; SUBCUTANEOUS at 05:10

## 2020-10-02 RX ADMIN — HYPROMELLOSE 2910 1 DROP: 5 SOLUTION OPHTHALMIC at 08:10

## 2020-10-02 RX ADMIN — HYPROMELLOSE 2910 1 DROP: 5 SOLUTION OPHTHALMIC at 02:10

## 2020-10-02 RX ADMIN — SODIUM BICARBONATE 1300 MG: 650 TABLET ORAL at 09:10

## 2020-10-02 NOTE — PT/OT/SLP PROGRESS
Physical Therapy Treatment    Patient Name:  Ed Patton   MRN:  [id number]    Recommendations:     Discharge Recommendations:  nursing facility, skilled   Discharge Equipment Recommendations: (TBD)   Barriers to discharge: decreased functional mobility    Assessment:     Ed Patton is a 65 y.o. male admitted with a medical diagnosis of Septic shock due to methicillin resistant Staphylococcus aureus.  He presents with the following impairments/functional limitations:  weakness, impaired endurance, impaired self care skills, impaired functional mobilty, gait instability, impaired balance, impaired cognition, decreased coordination, decreased lower extremity function, decreased safety awareness, pain, decreased ROM, impaired cardiopulmonary response to activity, orthopedic precautions. Pt tolerated session fairly well. He demo's delayed and inconsistent responses and requires constant cues and commands for redirection and sequencing. He also demo's difficulty with consistent participation when given commands. His mobility is being limited due to decreased activity tolerance, R knee pain, anxiety, and decreased functional mobility. VSS throughout session. Min A for sup-sit. SBA for EOB sitting balance for ~12 mins while performing ADLs. Max A x3 for bed to Medi-chair t/f using squat pivot t/f. Upon d/c, PT still recommends skilled nursing facility to help address the above deficits and help him reach his maximum level of independent mobility. He is an excellent candidate for SNF because of his potential to continuing showing progress, prior level of function, and him being unsafe to return home at this time.     Rehab Prognosis: Good; patient would benefit from acute skilled PT services to address these deficits and reach maximum level of function.    Recent Surgery: Procedure(s) (LRB):  ARTHROSCOPY, KNEE, RIGHT  (Right) 25 Days Post-Op    Plan:     During this hospitalization, patient to be seen 4 x/week to  "address the identified rehab impairments via gait training, therapeutic activities, therapeutic exercises, neuromuscular re-education and progress toward the following goals:    · Plan of Care Expires:  10/18/20    Subjective     Chief Complaint: "I want to get out of this bed"   Patient/Family Comments/goals: to return home  Pain/Comfort:  · Pain Rating 1: (did not rate)  · Location - Side 1: Right  · Location - Orientation 1: generalized  · Location 1: knee  · Pain Addressed 1: Reposition, Distraction, Cessation of Activity  · Pain Rating Post-Intervention 1: (remained - made worse with mobility)      Objective:     Communicated with RN prior to session.  Patient found HOB elevated with pulse ox (continuous), oxygen, telemetry, nephrostomy, blood pressure cuff upon PT entry to room.     General Precautions: Standard, fall, contact   Orthopedic Precautions:LLE weight bearing as tolerated   Braces: (L Darco shoe)     Functional Mobility:    Bed Mobility  Rolling to R: CGA, HOB elevated, used siderails     Supine to Sit on the R side:  min A, HOB elevated, used siderails, verbal cues for sequencing    Scoot to EOB in sitting: CGA, verbal cues for sequencing   Transfers Sit to Stand: max Ax2, no AD, bed elevated, partial stand to perform joseluis-care; verbal and manual cues for anterior weight shift and base of support    Scoots EOB towards HOB: 4x to the R, ranged from min-max A, blocked R knee and foot and facilitated at hips; able to use UEs to lift hips but had difficulty with sequencing and motor planning; verbal and manual cues for anterior weight shift, hand placement, and sequencing    Bed to Medichair: max Ax3, no AD, bed elevated, squat pivot t/f to the L, difficulty extending hips and knees; blocked R knee and foot and facilitated under hips; verbal and manual cues for sequencing and hand placement          AM-PAC 6 CLICK MOBILITY  Turning over in bed (including adjusting bedclothes, sheets and blankets)?: " 3  Sitting down on and standing up from a chair with arms (e.g., wheelchair, bedside commode, etc.): 2  Moving from lying on back to sitting on the side of the bed?: 3  Moving to and from a bed to a chair (including a wheelchair)?: 2  Need to walk in hospital room?: 1  Climbing 3-5 steps with a railing?: 1  Basic Mobility Total Score: 12       Therapeutic Activities and Exercises:   -Pt safe to t/f with therapy and/or Medi-chair. Whiteboard updated.   -Educated pt on safety with mobility, importance of continuing to be mobile to prevent deconditioning.   -Discussed POC and attempted to goal collaborate with pt.   -Pt expressed understanding and agreement to all.     Sitting Balance (~12 min):   -SBA with bouts of CGA  -posture: L lateral lean, increased thoracic and cervical flexion, slight posterior lean, used UEs on bed/lap for support  -verbal cues for midline orientation, thoracic and cervical extension, anterior weight shift, core engagement  -able to perform ADLs with OT    Left pt in Medi-chair in safe position. Encouraged him to stay in chair and out of bed as much as he can tolerate. Instructed pt to call for RN when ready to return to bed. Call light within reach. RN notified. Pt expressed agreement.     Patient left up in Medi-chair with all lines intact, call button in reach and RN notified..    GOALS:   Multidisciplinary Problems     Physical Therapy Goals        Problem: Physical Therapy Goal    Goal Priority Disciplines Outcome Goal Variances Interventions   Physical Therapy Goal     PT, PT/OT Ongoing, Progressing     Description: Goals to be met by: 10/05/20    Patient will increase functional independence with mobility by performin. Supine to sit with moderate assitance. -MET ()  Supine to sit with contact guard assistance.  2. Sit to supine with moderate assistance - MET ()  Sit to supine with contact guard assistance.   3. Sit to stand transfer with moderate assistance.  4. Gait  x  10 feet with Minimal Assistance using LRAD and maintenance of weight bearing status   5. Lower extremity exercise program x10 with assistance as needed.   6. Sit for 10 minutes with Minimal Assistance while reaching out of JESIKA in all planes to perform functional activities. -MET (9/21)  Sit for 10 minutes with Stand By Assistance while reaching out of JESIKA in all planes to perform functional activities. - MET (9/25)  Sit for 10 minutes with [place] while reaching out of JESIKA in all planes to perform functional activities.  7. Transfer from chair to Medi-chair with moderate assistance.                      Time Tracking:     PT Received On: 10/02/20  PT Start Time: 0910     PT Stop Time: 0942  PT Total Time (min): 32 min     Billable Minutes: Therapeutic Activity 20 and Neuromuscular Re-education 12 (co-tx with OT due to pt's decreased activity tolerance)    Treatment Type: Treatment  PT/PTA: PT     PTA Visit Number: 0     DARNELL Smith  10/02/2020

## 2020-10-02 NOTE — PLAN OF CARE
Problem: Occupational Therapy Goal  Goal: Occupational Therapy Goal  Description: Goals to be met by: 10/18/20    Patient will increase functional independence with ADLs by performing:    Feeding with Set-up Assistance.  Grooming while EOB with Minimal Assistance. Goal met  Toileting from bedside commode with Moderate Assistance for hygiene and clothing management.   Supine to sit with Moderate Assistance to increased bed mobility independence. - met 10/2  Stand pivot transfers with Moderate Assistance and maintaining weight-bearing precaution(s) to reach bedside chair.  Toilet transfer to bedside commode with Moderate Assistance and maintaining weight-bearing precaution(s).  Upper extremity exercise program x15 reps per handout, with supervision to improve BUE function to increase independence in daily occupations.    Outcome: Ongoing, Progressing    OT goals remain appropriate.    Rigoberto Leon, OT   10/02/2020

## 2020-10-02 NOTE — PROGRESS NOTES
Hospital Medicine  Progress note    [place] [facility] [place] Shaan Joel MD  Admit Date: 8/30/2020  EDY 10/2/2020  Length of Stay:  LOS: 33 days   Code status: Full Code    Principal Problem:  Septic shock due to methicillin resistant Staphylococcus aureus    HPI / Hospital Course     Interval hx: No complaints this AM, HR better controlled, labs stable. Mild trend up in sCr. Patient pending SNF placement. Notified that SNF is not accepting this patient due to Hb of < 8. Of note, patient has chronic anemia and Hb has been stable for AT MINIMUM, the last 10 days and he has not required any transfusions. This should not be a barrier to discharge.     End date of IV antibiotics:  Daptomycin 10/7/2020  Levofloxacin 9/30/2020.      9/29 's will titrate up beta blocker. Looks well wbc 9.8   9/28 WBC is 12, remains tach but steady at 120. CRP is 84.   lactate 1.1 today.Looked as well as I have seen him today. Will get EKG  End date of IV antibiotics:  Daptomycin 10/7/2020  Levofloxacin 9/30/2020.     Refused angiogram Vascular surgery signed off. Will need IV abx, SNF may be best option.    ROS     Constitutional:Negative for appetite change, chills and fever.   HENT: Negative for congestion.    Respiratory: Negative for cough and shortness of breath.    Gastrointestinal: Negative for nausea and vomiting.   Musculoskeletal: Negative for arthralgias, back pain and myalgias.   Skin: Positive for wound. Negative for color change.   Neurological: Positive for weakness. Negative for numbness.   Psychiatric/Behavioral: Negative for behavioral problems and confusion    PEx  Temp:  [97.7 °F (36.5 °C)-98.9 °F (37.2 °C)]   Pulse:  []   Resp:  [14-23]   BP: (111-133)/(68-75)   SpO2:  [99 %-100 %]     Intake/Output Summary (Last 24 hours) at 10/2/2020 1508  Last data filed at 10/2/2020 1400  Gross per 24 hour   Intake 10 ml   Output 875 ml   Net -865 ml       Constitutional:       General: He is not in acute distress.      Appearance: He is well-developed. He is not diaphoretic.   HENT:      Head: Normocephalic and atraumatic.      Right Ear: External ear normal.      Left Ear: External ear normal.      Mouth/Throat:      Pharynx: No oropharyngeal exudate.   Eyes:      General: No scleral icterus.        Musculoskeletal: Normal range of motion.      Thyroid: No thyromegaly.      Trachea: No tracheal deviation.      Comments:   Cardiovascular:      Rate and Rhythm: Regular rate .   Pulmonary:      Effort: Pulmonary effort is normal. No respiratory distress.      Breath sounds: Normal breath sounds. No stridor. No wheezing or rales.   Abdominal:      General: Bowel sounds are normal. There is no distension.      Palpations: Abdomen is soft.      Tenderness: There is no abdominal tenderness. There is no guarding.   Musculoskeletal:         General: No tenderness or deformity.      Right lower leg: Edema present.      Left lower leg: Edema present.   Lymphadenopathy:      Cervical: No cervical adenopathy.   Skin:     Coloration: Skin is not pale.      Findings: No erythema or rash.   Neurological:      General: No focal deficit present.     Recent Labs   Lab 09/30/20  0430 10/01/20  0524 10/02/20  0320   WBC 9.09 9.31 9.21   HGB 7.2* 7.2* 7.4*   HCT 24.2* 24.7* 25.1*    225 236     Recent Labs   Lab 09/30/20  0430 10/01/20  0524 10/02/20  0320   * 137 137   K 3.5 3.6 3.3*   CL 97 98 98   CO2 29 28 30*   BUN 18 20 19   CREATININE 1.7* 1.8* 1.8*   * 173* 154*   CALCIUM 7.8* 7.9* 8.0*   MG 1.5* 1.5* 1.7   PHOS 2.7 2.9 3.2     Recent Labs   Lab 09/28/20  0515  09/30/20  0430 10/01/20  0524 10/02/20  0320   ALKPHOS 72  --   --  63  --    ALT 22  --   --  15  --    AST 20  --   --  17  --    ALBUMIN 1.6*  1.6*   < > 1.7* 1.8*  1.8* 1.9*   PROT 6.7  --   --  6.6  --    BILITOT 0.6  --   --  0.7  --     < > = values in this interval not displayed.        Recent Labs   Lab 10/01/20  0725 10/01/20  1213 10/01/20  1713  10/01/20  2156 10/02/20  0735 10/02/20  1151   POCTGLUCOSE 193* 102 115* 141* 177* 157*       Scheduled Meds:   artificial tears  1 drop Both Eyes TID    carvediloL  25 mg Oral BID    fluticasone furoate-vilanteroL  1 puff Inhalation Daily    fluticasone propionate  1 spray Each Nostril Daily    furosemide  40 mg Oral BID    heparin (porcine)  5,000 Units Subcutaneous Q8H    insulin aspart U-100  7 Units Subcutaneous TIDWM    insulin detemir U-100  10 Units Subcutaneous QHS    levothyroxine  75 mcg Oral Before breakfast    polyethylene glycol  17 g Oral Daily    sodium bicarbonate  1,300 mg Oral BID    vancomycin (VANCOCIN) IVPB  1,000 mg Intravenous Once     Continuous Infusions:  As Needed:  acetaminophen, albuterol-ipratropium, calcium carbonate, dextrose 50%, dextrose 50%, glucagon (human recombinant), glucose, glucose, insulin aspart U-100, magnesium hydroxide 400 mg/5 ml, melatonin, ondansetron, oxyCODONE, polyethylene glycol, DIPH,PERTUS (ADACEL),TETANUS PF VAC (ADULT), Pharmacy to dose Vancomycin consult **AND** vancomycin - pharmacy to dose        Active Hospital Problems    Diagnosis  POA    *Septic shock due to methicillin resistant Staphylococcus aureus [A41.02, R65.21]  Yes    Cellulitis of left lower extremity [L03.116]  Unknown    PVD (peripheral vascular disease) [I73.9]  Unknown    MRSA bacteremia [R78.81, B95.62]  Yes    Acute renal insufficiency [N28.9]  Yes    Acute metabolic encephalopathy [G93.41]  No    Hospital-acquired pneumonia [J18.9, Y95]  No    Acute on chronic respiratory failure with hypoxia [J96.21]  No    Debility [R53.81]  Yes    Acute renal failure with acute tubular necrosis superimposed on stage 3 chronic kidney disease [N17.0, N18.30]  No    Septic arthritis of knee, right [M00.9]  Yes    Staphylococcal arthritis of right knee [M00.061]  Yes    Diabetic ulcer of left foot associated with type 2 diabetes mellitus, with fat layer exposed [E11.621, L97.522]   Yes    Diabetic foot infection [E11.628, L08.9]  Yes    COPD mixed type [J44.9]  Yes     Chronic    Postablative hypothyroidism [E89.0]  Yes     Chronic    Hyperlipidemia [E78.5]  Yes     High ASCVD with CAD, elevated A1c      Type 2 diabetes mellitus with stage 3 chronic kidney disease, with long-term current use of insulin [E11.22, N18.30, Z79.4]  Not Applicable     GFR> 60, uncontrolled DM      Chronic systolic congestive heart failure [I50.22]  Yes     Chronic     EF 35% in 2015 echo, improved in 5/2018. Patient with mixed ischemic and non-ischemic cardiomyopathy        Resolved Hospital Problems    Diagnosis Date Resolved POA    Endocarditis [I38] 09/12/2020 Yes    Sepsis due to methicillin resistant Staphylococcus aureus [A41.02] 09/13/2020 Yes    Type 2 diabetes mellitus with ketoacidosis without coma, with long-term current use of insulin [E11.10, Z79.4] 09/18/2020 Not Applicable     Novolin 70/30 40U in AM, 30U in PM. Elevated A1c         Assessment and Plan  / Problems managed today    Overview/Hospital Course:  Mr. Ed Patton was admitted to hospital medicine on 08/30 for management of a left-sided diabetic foot infection that was precipitated by an undetected punction wound after stepping on a tack/nail. His presentation was notable for leukocytosis with elevated inflammatory markers, however MRI of left foot only showed cellulitis of the anterior and lateral aspect of foot. Podiatry was consulted with recommendations for IV antibiotics; he was initiated on Ciprofloxacin and Vancomycin on admission. However, blood cultures from admission resulted positive for MRSA with wound cultures from left foot also growing MRSA with Proteus mirabilis. By 09/03 cultures remained positive despite Vancomycin, and patient was noted to develop right knee pain with swelling. Orthopedics was consulted and patient underwent right knee joint aspiration positive for septic arthritis with cultures also positive  for MRSA.      LUKAS on 09/04 showed known chronic systolic heart failure, but was negative for endocarditis. MRI of lumbar spine on 09/08 was negative for abscess. By 09/06, blood cultures continued to remain positive, and he underwent I&D of right arm abscess on 09/08 with cultures also positive for MRSA.      For persistent MRSA bacteremia, he was transitioned to Daptomycin and Ceftaroline, however this regimen was discontinued by 09/10 due to concern for developing rhabdomylosis. By 09/09, he was noted to develop a progressively worsening leukocytosis and NANETTE. Nephrology was consulted on 09/09 with suspicion for ATN.      Hematology was consulted on 09/12 for persistent leukocytosis as likely reactive from bacteremia. On 09/10, patient began developing intermitent hypotension prompting broadening of antibiotics to Cefepime and Vancomycin. By 09/12, renal function continued to worsen in addition to hypotension prompting transfer to ICU for vasopressor support and possible HD. Patient found to be encephalopathic, central line placed and started on CRRT overnight on 9/14 with improvement in mental status, but remained encephalopathic. CT head without any acute intracranial process. Vasopressin off AM of 9/15 but remains on levophed, CT abdomen with concern for potential cholecystitis and possible atelectasis with superimposed pneumonia.  Due to the acuity of his illness is he is not a surgical candidate it and ultimately is now status post cholecystostomy tube placement for management of suspected cholecystitis     Additional complications arising during hospital course include the development metabolic encephalopathy secondary to uremia versus shock versus DKA or overall cerebral hypoperfusion from shock, reported to have agitation prior to step-down from ICU.  Also developed DKA requiring standard therapy with IV insulin infusion, however he was kept on IV insulin due to rising sugars any time it was weaned working  on basal bolus regimen for him at this time with the assistance of a endocrinology           Septic shock_Septic arthritis of Right Knee_Sespsi due to MRSA  This is a 65 year old  male with PMH significant for HFrEF and COPD with chronic respiratory failure (on 2L home oxygen), T2DM with CKD III, and iron deficiency anemia who originally presented to [facility] on 08/30 with cellulitis of left foot with concern for diabetic foot infection with subsequent development of MRSA bacteremia attributed to septic arthritis of right knee and elbow. He is status post drainage and coverage for MRSA since that time. His work-up for other etiologies of MRSA bacteremia have included negative LUKAS and MRI of lumbar spine looking for endocarditis and spinal abscess, respectively. Stool studies for C.diff on 09/11 were negative. His hospital course has been associated with worsening hypotension and leukocytosis since 09/10 prompting ICU admission on 09/12 for initiation of vasopressor support. Infectious work-up thus far on ICU admission concern for likely right lower lobe HAP.      CT abdomen pelvis on 9/15 with gallbladder dilation, sludge, and trace pericholic fluid collection. Exam not consistent with cholecystitis but given persistent shock potential source. Also with potential right lower lobe atelectasis with overlying infection  S/p ICU stepdown  -Appreciate final ID recs     Type 2 DM with DKA  -endocrinology following pt kept on transitional drip but has since been weaned to a basal bolus regimen.         Postablative hypothyroidism  Plan: Continue home SYNTHROID     Acute kidney injury  -likely ATN from Shock s/p requiring RRT in ICU  -trialysis line removed   -pt is non-oliguric  -nephrology recommends sodium bicarb tabs - order placed.   -Nephrology recommends ambulatory f/u on discharge     Chronic systolic congestive heart failure  -Most recent ECHO in 09/2020 with EF of 25%.   -Unclear is ischemic vs  non-ischemic.   -Home regimen: Carvedilol, Lasix 80 mg, and Lisinopril.           >due to NANETTE lasix and Lisinopril on hold, but he is recovering renal function and prior to discharge to any facility will likely need some amount of diuretic re-initiated and potentially a much lower doseage of Ace-inhibitor.   -Associated with chronic hypoxemic respiratory failure requiring 2L nasal cannula, but noted to develop worsening oxygen requirements on ICU admission that were likely multifactorial from suspected hospital acquired pneumonia versus declining urine output with pre-disposition to volume overload. .      Anemia       -Chronic, no s/s of bleeding       -Notified that SNF is not accepting this patient due to Hb of < 8. Of note, patient has chronic anemia and Hb has been stable for AT MINIMUM, the last 10 days and he has not required any transfusions. This should not be a barrier to discharge.     COPD mixed type  -Based on PFT's from 05/2015 showing mild (small airways) obstruction, airflow not improved after bronchodilator, and moderate restriction.     Acute on chronic respiratory failure with hypoxia  -History of mixed COPD (based on PFT's from 05/2015 showing mild (small airways) obstruction, airflow not improved after bronchodilator, and moderate restriction) and HFrEF with chronic respiratory failure on 2L home oxygen.   -ICU admission notable for progressive increase in supplemental oxygen requirements.   -Work-up for underlying etiology of acute on chronic respiratory failure include CXR showing concern for possible progression of CHF vs HAP (not entirely convinced that CXR showed consolidation)  - Breathing back to baseline now to 2L NC         Code Status: Full Code     High Risk Conditions:  Patient has a condition that poses threat to life and bodily function: Septic shock, MRSA bacteremia  Patient is currently on drug therapy requiring intensive monitoring for toxicity: Daptomycin.      Discharge  Planning   EDY: 9/25/2020     Code Status: Full Code   Is the patient medically ready for discharge?: No    Reason for patient still in hospital (select all that apply): Patient trending condition  Discharge Plan A: Skilled Nursing Facility   Discharge Delays: (!) Change in Medical Condition               Goals of Care:  Return to prior functional status   Discharge plan: SNF    Time (minutes) spent in care of the patient (Greater than 1/2 spent in direct face-to-face contact)  35 minutes    Shaan Joel MD

## 2020-10-02 NOTE — PLAN OF CARE
Problem: Physical Therapy Goal  Goal: Physical Therapy Goal  Description: Goals to be met by: 10/05/20    Patient will increase functional independence with mobility by performin. Supine to sit with moderate assitance. -MET ()  Supine to sit with contact guard assistance.  2. Sit to supine with moderate assistance - MET ()  Sit to supine with contact guard assistance.   3. Sit to stand transfer with moderate assistance.  4. Gait  x 10 feet with Minimal Assistance using LRAD and maintenance of weight bearing status   5. Lower extremity exercise program x10 with assistance as needed.   6. Sit for 10 minutes with Minimal Assistance while reaching out of JESIKA in all planes to perform functional activities. -MET ()  Sit for 10 minutes with Stand By Assistance while reaching out of JESIKA in all planes to perform functional activities. - MET ()  Sit for 10 minutes with [place] while reaching out of JESIKA in all planes to perform functional activities.  7. Transfer from chair to Medi-chair with moderate assistance using stand-pivot t/f.     Outcome: Ongoing, Progressing    Goals updated to reflect pt's progress. Continue with POC.     Haley Mejia, DARNELL  10/2/2020

## 2020-10-02 NOTE — PLAN OF CARE
CARMINA spoke with Ken at [facility] [place] and was told that he is still following pt but his H&H needs to come up before they can accept.  SW will continue to send updates and F/U to facilitate transfer to SNF.        Babs Humphreys, [facility] [place]  Ext [phone]

## 2020-10-02 NOTE — PLAN OF CARE
10/02/20 1528   Discharge Reassessment   Assessment Type Discharge Planning Reassessment   Provided patient/caregiver education on the expected discharge date and the discharge plan Yes   Discharge Plan A Skilled Nursing Facility   Discharge Plan B Home Health   DME Needed Upon Discharge  other (see comments)  (TBD)   Anticipated Discharge Disposition SNF   Post-Acute Status   Post-Acute Authorization Placement   Post-Acute Placement Status Referrals Sent

## 2020-10-02 NOTE — PROGRESS NOTES
"Subjective:       Patient ID: Ed Patton is a 65 y.o. male.    Chief Complaint: Frequent Falls (frequent falls, weakness, "stepped on a tack" left leg now swollen. ) and Leg Swelling    Seen at bedside resting comfortably. No complaint of foot pain. Nurse at bedside with me. F/u foot wounds on both feet.    Review of Systems   Constitutional: Negative for chills and fever.           Vitals:    10/02/20 0812 10/02/20 0815 10/02/20 1149 10/02/20 1528   BP:   116/68 127/75   Pulse: 88 92 89 96   Resp:  (!) 23 20 20   Temp:   97.7 °F (36.5 °C) 98.1 °F (36.7 °C)   TempSrc:   Oral Oral   SpO2: 100% 100% 100% 100%   Weight:       Height:           Objective:      Physical Exam  Vitals signs reviewed.   Constitutional:       General: He is not in acute distress.  Cardiovascular:      Comments: Pedal pulses palpable bilaterally.  Musculoskeletal:         General: Swelling (mild in both feet) present.   Skin:     General: Skin is warm and dry.      Findings: No erythema.      Comments: Wounds as follows:    Small L hallux eschar dry, clean. L heel eschar, R heel bulla, L plantar midfoot ulcer with mostly eschar and some healthy granulation tissue surrounding with mild drainage. L dorsal foot large wound superficial without signs of infection, but some bullous drainage noted.    No acute signs of infection.    Neurological:      Mental Status: He is alert.      Comments: Sharp/dull sensation absent Bilaterally. Light touch absent Bilaterally in feet.           Assessment:       1. Cellulitis of left lower extremity    2. Fall    3. Infection    4. CHF (congestive heart failure)    5. Endocarditis    6. Diabetic foot infection    7. Staphylococcal arthritis of right knee    8. NANETTE (acute kidney injury)    9. Hypotension, unspecified hypotension type    10. Bacteremia due to methicillin resistant Staphylococcus aureus    11. Chronic respiratory failure with hypoxia, on home oxygen therapy    12. Chronic systolic congestive " heart failure    13. COPD mixed type    14. Diabetic polyneuropathy associated with type 2 diabetes mellitus    15. Postablative hypothyroidism    16. Uncontrolled type 2 diabetes mellitus with hyperglycemia, with long-term current use of insulin    17. Urinary retention    18. Tachycardia    19. Sepsis due to methicillin resistant Staphylococcus aureus    20. Sepsis due to methicillin resistant Staphylococcus aureus (MRSA) without acute organ dysfunction    21. Acute renal insufficiency    22. Congestive heart failure, unspecified HF chronicity, unspecified heart failure type    23. CKD stage 3 due to type 2 diabetes mellitus    24. Endocarditis, unspecified chronicity, unspecified endocarditis type    25. Fall, sequela    26. Leukocytosis, unspecified type    27. Shock, unspecified    28. Acute on chronic respiratory failure    29. Septic shock    30. Thrombocytosis    31. Type 2 diabetes mellitus with ketoacidosis without coma, with long-term current use of insulin    32. Septic shock due to methicillin resistant Staphylococcus aureus    33. Type 2 diabetes mellitus with stage 3 chronic kidney disease, with long-term current use of insulin    34. PVD (peripheral vascular disease)    35. Diabetic ulcer of left foot associated with type 2 diabetes mellitus, with fat layer exposed, unspecified part of foot        Plan:       Discontinue wrapping to L foot as it may have irritated L dorsal foot. Betadine to R heel bulla abnd all escharred wounds. Aquacel Silver to L dorsal and plantar wounds, covered with bordered foam. Discussed with nurse at bedside. Will get heel lift boots for both feet and apply now.

## 2020-10-02 NOTE — PROGRESS NOTES
Pharmacokinetic Assessment Follow Up: IV Vancomycin    Vancomycin serum concentration assessment(s):    · Vancomycin concentration = 20.2 mcg/mL (~20 hr level); goal 15-20 mcg/mL  · SCr stable ~1.8 mg/dL    Vancomycin Regimen Plan:  · Re-dose Vancomycin 1000 mg IV x 1 today  · Continue to dose by level when the random is less than 20 mcg/mL. May consider scheduling vancomycin q24h if renal function remains stable.  · Obtain next level on 10/3@1300   · End date 10/7/20    Drug levels (last 3 results):  Recent Labs   Lab Result Units 09/30/20  0430 10/01/20  1212 10/02/20  1218   Vancomycin, Random ug/mL 21.9 18.8 20.2       Thank you for the consult, will continue to follow  Zahra Powers, PharmD, BCPS  [id number]      Patient brief summary:  Ed Patton is a 65 y.o. male initiated on antimicrobial therapy with IV Vancomycin for treatment of MRSA bacteremia    Drug Allergies:   Review of patient's allergies indicates:   Allergen Reactions    Vicodin [hydrocodone-acetaminophen] Itching     Actual Body Weight:   115.3kg    Renal Function:   Estimated Creatinine Clearance: 58.3 mL/min (A) (based on SCr of 1.8 mg/dL (H)).,     CBC (last 72 hours):  Recent Labs   Lab Result Units 09/30/20  0430 10/01/20  0524 10/02/20  0320   WBC K/uL 9.09 9.31 9.21   Hemoglobin g/dL 7.2* 7.2* 7.4*   Hematocrit % 24.2* 24.7* 25.1*   Platelets K/uL 246 225 236   Gran% % 67.1 67.2 65.0   Lymph% % 13.1* 12.7* 13.6*   Mono% % 8.0 8.2 7.4   Eosinophil% % 10.2* 9.8* 12.4*   Basophil% % 0.9 1.1 0.8   Differential Method  Automated Automated Automated     Metabolic Panel (last 72 hours):  Recent Labs   Lab Result Units 09/30/20  0430 10/01/20  0524 10/02/20  0320   Sodium mmol/L 135* 137 137   Potassium mmol/L 3.5 3.6 3.3*   Chloride mmol/L 97 98 98   CO2 mmol/L 29 28 30*   Glucose mg/dL 203* 173* 154*   BUN, Bld mg/dL 18 20 19   Creatinine mg/dL 1.7* 1.8* 1.8*   Albumin g/dL 1.7* 1.8*  1.8* 1.9*   Total Bilirubin mg/dL  --  0.7  --     Alkaline Phosphatase U/L  --  63  --    AST U/L  --  17  --    ALT U/L  --  15  --    Magnesium mg/dL 1.5* 1.5* 1.7   Phosphorus mg/dL 2.7 2.9 3.2       Vancomycin Administrations:  vancomycin given in the last 96 hours                     vancomycin 1.5 g in dextrose 5 % 250 mL IVPB (ready to mix) (mg) 1,500 mg New Bag 09/28/20 1234     1,500 mg New Bag 09/27/20 1142    vancomycin 1.75 g in 5 % dextrose 500 mL IVPB (mg) 1,750 mg New Bag 09/26/20 1214     1,750 mg New Bag 09/25/20 1150                    Microbiologic Results:  Microbiology Results (last 7 days)     Procedure Component Value Units Date/Time    Fungus culture [[id number]] Collected: 09/16/20 1612    Order Status: Completed Specimen: Body Fluid from Gallbladder Updated: 09/30/20 1057     Fungus (Mycology) Culture Culture in progress      No fungus isolated after 2 weeks    Culture, Anaerobe [[id number]] Collected: 09/16/20 1612    Order Status: Completed Specimen: Body Fluid from Gallbladder Updated: 09/26/20 1328     Anaerobic Culture No anaerobes isolated

## 2020-10-02 NOTE — PT/OT/SLP PROGRESS
Occupational Therapy   Treatment    Name: Ed Patton  MRN: [id number]  Admitting Diagnosis:  Septic shock due to methicillin resistant Staphylococcus aureus  25 Days Post-Op    Recommendations:     Discharge Recommendations: nursing facility, skilled  Discharge Equipment Recommendations:  other (see comments)(TBD)  Barriers to discharge:  Other (Comment)(increased assistance at current level of function/impaired functional mobility)    Assessment:     Ed Patton is a 65 y.o. male with a medical diagnosis of Septic shock due to methicillin resistant Staphylococcus aureus.  Co-treat with PT due to pt's need for increased level of assistance and decreased activity tolerance. Pt with good participation but fair activity tolerance during session.  He demonstrated delayed response time to commands, requiring cues for redirection and sequencing of tasks.  Pt demonstrated anxiety with transfers due to fear of falling.  R knee pain noted with movement.    He presents with the following deficits. Performance deficits affecting function are weakness, impaired endurance, impaired self care skills, impaired functional mobilty, gait instability, impaired balance, impaired cognition, decreased coordination, decreased lower extremity function, decreased safety awareness, pain, decreased ROM, impaired cardiopulmonary response to activity, orthopedic precautions.     Rehab Prognosis:  Good; patient would benefit from acute skilled OT services to address these deficits and reach maximum level of function.       Plan:     Patient to be seen 3 x/week to address the above listed problems via self-care/home management, therapeutic activities, therapeutic exercises, neuromuscular re-education  · Plan of Care Expires: 10/08/20  · Plan of Care Reviewed with: patient    Subjective   Pt was pleasant and cooperative.  Pain/Comfort:  · Pain Rating 1: other (see comments)(Pt did not rate.)  · Location - Side 1: Right  · Location -  Orientation 1: generalized  · Location 1: knee  · Pain Addressed 1: Reposition, Distraction, Cessation of Activity  · Pain Rating Post-Intervention 1: other (see comments)(remained but increased with mobility)    Objective:     Communicated with: RN and PT prior to session.  Patient found HOB elevated with pulse ox (continuous), oxygen, telemetry, nephrostomy, blood pressure cuff upon OT entry to room.    General Precautions: Standard, fall, contact   Orthopedic Precautions:LLE weight bearing as tolerated   Braces: (L Darco shoe)     Occupational Performance:     Bed Mobility:    · Patient completed Rolling/Turning to Right with contact guard assistance  · Patient completed Scooting/Bridging to EOB while sitting with CGA.  Pt scooted to HOB x 4 trials to the R, ranging from Min A to Max A.   · Patient completed Supine to Sit with minimum assistance     Functional Mobility/Transfers:  · Patient completed Sit <> Stand Transfer from EOB  with maximal assistance and of 2 persons  with  no assistive device.  Pt able to clear buttocks but unable to come to complete stand.   · Patient completed Bed <> Medichair Transfer using Squat Pivot technique with maximal assistance and of 3 persons with no assistive device.  Pt required facilitation at the hips and manual cues for sequencing and hand placement on Medichair.     Activities of Daily Living:  · Toileting: maximal assistance to perform pericare/adjust bed pads while pt stood EOB.  Pt had a small BM in the bed.   · Lower Body Dressing: Mod A to levi L Darco shoe while sitting EOB.  Pt was able to place shoe on his foot via figure 4 technique, but the shoe slipped off while he was attempting to fasten it due to his posterior lean.       [facility] 6 Click ADL: 14    Treatment & Education:  -Pt's sitting balance ranged from SBA-Min A to correct his L lean and his posterior lean while attempting to levi L Darco shoe.    - Pt edu on role of OT, POC, safety when performing self  care tasks, benefit of performing OOB activity, and safety when performing functional transfers and mobility.  - White board updated  - Self care tasks completed-- as noted above       Patient left reclined up in Medichair with wedge and pillows placed on his L side with all lines intact, call button in reach and RN notifiedEducation:      GOALS:   Multidisciplinary Problems     Occupational Therapy Goals        Problem: Occupational Therapy Goal    Goal Priority Disciplines Outcome Interventions   Occupational Therapy Goal     OT, PT/OT Ongoing, Progressing    Description: Goals to be met by: 10/18/20    Patient will increase functional independence with ADLs by performing:    Feeding with Set-up Assistance.  Grooming while EOB with Minimal Assistance. Goal met  Toileting from bedside commode with Moderate Assistance for hygiene and clothing management.   Supine to sit with Moderate Assistance to increased bed mobility independence. - met 10/2  Stand pivot transfers with Moderate Assistance and maintaining weight-bearing precaution(s) to reach bedside chair.  Toilet transfer to bedside commode with Moderate Assistance and maintaining weight-bearing precaution(s).  Upper extremity exercise program x15 reps per handout, with supervision to improve BUE function to increase independence in daily occupations.                     Time Tracking:     OT Date of Treatment: 10/02/20  OT Start Time: 0913  OT Stop Time: 0941  OT Total Time (min): 28 min    Billable Minutes:Self Care/Home Management 14 min.  Therapeutic Activity 14 min.    Rigoberto Leon, OT  10/2/2020

## 2020-10-02 NOTE — PLAN OF CARE
Pt. Aaox4.vss. 2L NC O2 sats >96%. scant green drainage from abdominal drain. No acute changes during shift. Poc reviewed with pt. Safety measures maintained. Wctm.

## 2020-10-02 NOTE — PLAN OF CARE
Problem: Adult Inpatient Plan of Care  Goal: Optimal Comfort and Wellbeing  Outcome: Ongoing, Progressing     Discussed w/ pt plan of care, pt verbalized understanding, pt denies pain/needs at this time, pt shows no s/s of distress, pt sat in chair for 4-5 hours approx., pt placed back in bed, pt given chg bath, pt had BM, pt shows cbgs checked, pt awaiting placement acceptance, will continue to monitor, dressing changed per flow sheet      Problem: Wound  Goal: Optimal Wound Healing  Outcome: Ongoing, Progressing     Dressing changed per flow sheet on chart, pt has dressing changed MW, will continue to monitor pt healing status/needs

## 2020-10-02 NOTE — CARE UPDATE
BG goal 140 - 180     Diet diabetic [facility]; 2000 Calorie  25 Days Post-Op    -  Continue Levemir to 10 units q HS  -  Continue novolog 7 units TDWM.   -  Low Dose SQ Insulin Correction Scale. Given kidney function.   -  BG Monitoring AC/HS         ** Please call Endocrine for any BG related issues **  ** Please notify Endocrine for any change and/or advance in diet**     Discharge Planning:   TBD. Please notify endocrinology prior to discharge.    Tentative:   Novolin 70/30 U-100    - 14 units AM    - 12 units PM.

## 2020-10-03 LAB
ABO + RH BLD: NORMAL
ALBUMIN SERPL BCP-MCNC: 1.8 G/DL (ref 3.5–5.2)
ANION GAP SERPL CALC-SCNC: 12 MMOL/L (ref 8–16)
BASOPHILS # BLD AUTO: 0.06 K/UL (ref 0–0.2)
BASOPHILS NFR BLD: 0.8 % (ref 0–1.9)
BLD GP AB SCN CELLS X3 SERPL QL: NORMAL
BLD PROD TYP BPU: NORMAL
BLOOD UNIT EXPIRATION DATE: NORMAL
BLOOD UNIT TYPE CODE: 6200
BLOOD UNIT TYPE: NORMAL
BUN SERPL-MCNC: 20 MG/DL (ref 8–23)
CALCIUM SERPL-MCNC: 8 MG/DL (ref 8.7–10.5)
CHLORIDE SERPL-SCNC: 99 MMOL/L (ref 95–110)
CK SERPL-CCNC: 54 U/L (ref 20–200)
CO2 SERPL-SCNC: 27 MMOL/L (ref 23–29)
CODING SYSTEM: NORMAL
CREAT SERPL-MCNC: 1.8 MG/DL (ref 0.5–1.4)
DIFFERENTIAL METHOD: ABNORMAL
DISPENSE STATUS: NORMAL
EOSINOPHIL # BLD AUTO: 1 K/UL (ref 0–0.5)
EOSINOPHIL NFR BLD: 13.5 % (ref 0–8)
ERYTHROCYTE [DISTWIDTH] IN BLOOD BY AUTOMATED COUNT: 16.8 % (ref 11.5–14.5)
EST. GFR  (AFRICAN AMERICAN): 45.3 ML/MIN/1.73 M^2
EST. GFR  (NON AFRICAN AMERICAN): 39.2 ML/MIN/1.73 M^2
GLUCOSE SERPL-MCNC: 150 MG/DL (ref 70–110)
HCT VFR BLD AUTO: 23 % (ref 40–54)
HGB BLD-MCNC: 6.8 G/DL (ref 14–18)
IMM GRANULOCYTES # BLD AUTO: 0.05 K/UL (ref 0–0.04)
IMM GRANULOCYTES NFR BLD AUTO: 0.7 % (ref 0–0.5)
LYMPHOCYTES # BLD AUTO: 1.2 K/UL (ref 1–4.8)
LYMPHOCYTES NFR BLD: 16 % (ref 18–48)
MAGNESIUM SERPL-MCNC: 1.6 MG/DL (ref 1.6–2.6)
MCH RBC QN AUTO: 26.4 PG (ref 27–31)
MCHC RBC AUTO-ENTMCNC: 29.6 G/DL (ref 32–36)
MCV RBC AUTO: 89 FL (ref 82–98)
MONOCYTES # BLD AUTO: 0.7 K/UL (ref 0.3–1)
MONOCYTES NFR BLD: 10 % (ref 4–15)
NEUTROPHILS # BLD AUTO: 4.2 K/UL (ref 1.8–7.7)
NEUTROPHILS NFR BLD: 59 % (ref 38–73)
NRBC BLD-RTO: 0 /100 WBC
PHOSPHATE SERPL-MCNC: 3.2 MG/DL (ref 2.7–4.5)
PLATELET # BLD AUTO: 225 K/UL (ref 150–350)
PMV BLD AUTO: 9.7 FL (ref 9.2–12.9)
POCT GLUCOSE: 151 MG/DL (ref 70–110)
POCT GLUCOSE: 152 MG/DL (ref 70–110)
POCT GLUCOSE: 155 MG/DL (ref 70–110)
POCT GLUCOSE: 159 MG/DL (ref 70–110)
POTASSIUM SERPL-SCNC: 3.9 MMOL/L (ref 3.5–5.1)
RBC # BLD AUTO: 2.58 M/UL (ref 4.6–6.2)
SODIUM SERPL-SCNC: 138 MMOL/L (ref 136–145)
TRANS ERYTHROCYTES VOL PATIENT: NORMAL ML
VANCOMYCIN SERPL-MCNC: 14 UG/ML
WBC # BLD AUTO: 7.18 K/UL (ref 3.9–12.7)

## 2020-10-03 PROCEDURE — 99232 SBSQ HOSP IP/OBS MODERATE 35: CPT | Mod: HCNC,,, | Performed by: STUDENT IN AN ORGANIZED HEALTH CARE EDUCATION/TRAINING PROGRAM

## 2020-10-03 PROCEDURE — 25000003 PHARM REV CODE 250: Mod: HCNC | Performed by: HOSPITALIST

## 2020-10-03 PROCEDURE — 20600001 HC STEP DOWN PRIVATE ROOM: Mod: HCNC

## 2020-10-03 PROCEDURE — 36430 TRANSFUSION BLD/BLD COMPNT: CPT

## 2020-10-03 PROCEDURE — 99232 PR SUBSEQUENT HOSPITAL CARE,LEVL II: ICD-10-PCS | Mod: HCNC,,, | Performed by: STUDENT IN AN ORGANIZED HEALTH CARE EDUCATION/TRAINING PROGRAM

## 2020-10-03 PROCEDURE — 76937 US GUIDE VASCULAR ACCESS: CPT | Mod: HCNC

## 2020-10-03 PROCEDURE — 80202 ASSAY OF VANCOMYCIN: CPT | Mod: HCNC

## 2020-10-03 PROCEDURE — 63600175 PHARM REV CODE 636 W HCPCS: Mod: HCNC | Performed by: HOSPITALIST

## 2020-10-03 PROCEDURE — C1751 CATH, INF, PER/CENT/MIDLINE: HCPCS | Mod: HCNC

## 2020-10-03 PROCEDURE — 82550 ASSAY OF CK (CPK): CPT | Mod: HCNC

## 2020-10-03 PROCEDURE — 83735 ASSAY OF MAGNESIUM: CPT | Mod: HCNC

## 2020-10-03 PROCEDURE — 86901 BLOOD TYPING SEROLOGIC RH(D): CPT | Mod: HCNC

## 2020-10-03 PROCEDURE — 25000003 PHARM REV CODE 250: Mod: HCNC | Performed by: INTERNAL MEDICINE

## 2020-10-03 PROCEDURE — 25000003 PHARM REV CODE 250: Mod: HCNC | Performed by: STUDENT IN AN ORGANIZED HEALTH CARE EDUCATION/TRAINING PROGRAM

## 2020-10-03 PROCEDURE — P9021 RED BLOOD CELLS UNIT: HCPCS | Mod: HCNC

## 2020-10-03 PROCEDURE — 85025 COMPLETE CBC W/AUTO DIFF WBC: CPT | Mod: HCNC

## 2020-10-03 PROCEDURE — 36415 COLL VENOUS BLD VENIPUNCTURE: CPT | Mod: HCNC

## 2020-10-03 PROCEDURE — 36410 VNPNXR 3YR/> PHY/QHP DX/THER: CPT | Mod: HCNC

## 2020-10-03 PROCEDURE — 86920 COMPATIBILITY TEST SPIN: CPT | Mod: HCNC

## 2020-10-03 PROCEDURE — 63600175 PHARM REV CODE 636 W HCPCS: Mod: HCNC | Performed by: STUDENT IN AN ORGANIZED HEALTH CARE EDUCATION/TRAINING PROGRAM

## 2020-10-03 PROCEDURE — 80069 RENAL FUNCTION PANEL: CPT | Mod: HCNC

## 2020-10-03 RX ORDER — VANCOMYCIN HCL IN 5 % DEXTROSE 1G/250ML
1000 PLASTIC BAG, INJECTION (ML) INTRAVENOUS
Status: COMPLETED | OUTPATIENT
Start: 2020-10-03 | End: 2020-10-07

## 2020-10-03 RX ORDER — HYDROCODONE BITARTRATE AND ACETAMINOPHEN 500; 5 MG/1; MG/1
TABLET ORAL
Status: DISCONTINUED | OUTPATIENT
Start: 2020-10-03 | End: 2020-10-09 | Stop reason: HOSPADM

## 2020-10-03 RX ADMIN — HYPROMELLOSE 2910 1 DROP: 5 SOLUTION OPHTHALMIC at 08:10

## 2020-10-03 RX ADMIN — FLUTICASONE PROPIONATE 50 MCG: 50 SPRAY, METERED NASAL at 08:10

## 2020-10-03 RX ADMIN — HEPARIN SODIUM 5000 UNITS: 5000 INJECTION INTRAVENOUS; SUBCUTANEOUS at 06:10

## 2020-10-03 RX ADMIN — INSULIN ASPART 7 UNITS: 100 INJECTION, SOLUTION INTRAVENOUS; SUBCUTANEOUS at 12:10

## 2020-10-03 RX ADMIN — HYPROMELLOSE 2910 1 DROP: 5 SOLUTION OPHTHALMIC at 02:10

## 2020-10-03 RX ADMIN — SODIUM BICARBONATE 1300 MG: 650 TABLET ORAL at 09:10

## 2020-10-03 RX ADMIN — HEPARIN SODIUM 5000 UNITS: 5000 INJECTION INTRAVENOUS; SUBCUTANEOUS at 02:10

## 2020-10-03 RX ADMIN — CARVEDILOL 25 MG: 25 TABLET, FILM COATED ORAL at 08:10

## 2020-10-03 RX ADMIN — HYPROMELLOSE 2910 1 DROP: 5 SOLUTION OPHTHALMIC at 09:10

## 2020-10-03 RX ADMIN — LEVOTHYROXINE SODIUM 75 MCG: 25 TABLET ORAL at 06:10

## 2020-10-03 RX ADMIN — POLYETHYLENE GLYCOL 3350 17 G: 17 POWDER, FOR SOLUTION ORAL at 08:10

## 2020-10-03 RX ADMIN — INSULIN DETEMIR 10 UNITS: 100 INJECTION, SOLUTION SUBCUTANEOUS at 09:10

## 2020-10-03 RX ADMIN — INSULIN ASPART 7 UNITS: 100 INJECTION, SOLUTION INTRAVENOUS; SUBCUTANEOUS at 08:10

## 2020-10-03 RX ADMIN — FUROSEMIDE 40 MG: 40 TABLET ORAL at 05:10

## 2020-10-03 RX ADMIN — HEPARIN SODIUM 5000 UNITS: 5000 INJECTION INTRAVENOUS; SUBCUTANEOUS at 09:10

## 2020-10-03 RX ADMIN — SODIUM BICARBONATE 1300 MG: 650 TABLET ORAL at 08:10

## 2020-10-03 RX ADMIN — VANCOMYCIN HYDROCHLORIDE 1000 MG: 1 INJECTION, POWDER, LYOPHILIZED, FOR SOLUTION INTRAVENOUS at 09:10

## 2020-10-03 RX ADMIN — FUROSEMIDE 40 MG: 40 TABLET ORAL at 08:10

## 2020-10-03 RX ADMIN — CARVEDILOL 25 MG: 25 TABLET, FILM COATED ORAL at 09:10

## 2020-10-03 NOTE — PLAN OF CARE
Pt remains free from falls and injuries. Yesika drain remains; minimal output. Blood glucose monitored as ordered. No c/o pain or SOB. Plan of care discussed with pt. VSS, no acute issues overnight.

## 2020-10-03 NOTE — PLAN OF CARE
Problem: Adult Inpatient Plan of Care  Goal: Optimal Comfort and Wellbeing  Outcome: Ongoing, Progressing     Discussed w/ pt plan of care, pt verbalized understanding, cbgs checked, meds given per MAR, pt hgb was low, blood administered, pt midline infiltrated during blood administration, IV insertion attempts x5 by 3 nurses since blood needed administered, new midline placed on right arm, pt reported tenderness, arm elevated, hot pack placed on arm for comfort, provider notified, will continue to monitor LUE, pt sleeping after comforted, blood administration completed, will tend to pt needs as requested      Problem: Hematologic Alteration (Acute Kidney Injury/Impairment)  Goal: Hemoglobin, Hematocrit and Platelets Within Normal Range  Outcome: Ongoing, Not Progressing       Hgb <7, gave blood administration, pt tolerated well

## 2020-10-03 NOTE — CONSULTS
Midline placed in r brachial vein 18g x 8cm size, Lot#[id number]  Needle advanced using realtime u/s guidance.    Placed by THANIA Seymour RN

## 2020-10-03 NOTE — CARE UPDATE
BG goal 140 - 180     BG continues to be well controlled on current SQ insulin regimen. Endo will continue to follow.     - Continue Levemir 10 units HS.   - Continue novolog 7 units TDWM.   -  Low Dose SQ Insulin Correction Scale. Given kidney function.   -  BG Monitoring AC/HS         ** Please call Endocrine for any BG related issues **  ** Please notify Endocrine for any change and/or advance in diet**     Discharge Planning:   TBD. Please notify endocrinology prior to discharge.    Tentative:   Novolin 70/30 U-100    - 14 units AM          - 12 units PM.     Lab Results   Component Value Date    HGBA1C 9.5 (H) 08/31/2020

## 2020-10-03 NOTE — PROGRESS NOTES
Hospital Medicine  Progress note    Team: [facility] MED A Shaan Joel MD  Admit Date: 8/30/2020  EDY 10/5/2020  Length of Stay:  LOS: 34 days   Code status: Full Code    Principal Problem:  Septic shock due to methicillin resistant Staphylococcus aureus    HPI / Hospital Course     Interval hx: No complaints this AM, HR better controlled, labs stable. Mild trend up in sCr. Patient pending SNF placement. 1U PRBC transfused today.    End date of IV antibiotics:  Daptomycin 10/7/2020  Levofloxacin 9/30/2020.      9/29 's will titrate up beta blocker. Looks well wbc 9.8   9/28 WBC is 12, remains tach but steady at 120. CRP is 84.   lactate 1.1 today.Looked as well as I have seen him today. Will get EKG  End date of IV antibiotics:  Daptomycin 10/7/2020  Levofloxacin 9/30/2020.     Refused angiogram Vascular surgery signed off. Will need IV abx, SNF may be best option.    ROS     Constitutional:Negative for appetite change, chills and fever.   HENT: Negative for congestion.    Respiratory: Negative for cough and shortness of breath.    Gastrointestinal: Negative for nausea and vomiting.   Musculoskeletal: Negative for arthralgias, back pain and myalgias.   Skin: Positive for wound. Negative for color change.   Neurological: Positive for weakness. Negative for numbness.   Psychiatric/Behavioral: Negative for behavioral problems and confusion    PEx  Temp:  [97.6 °F (36.4 °C)-98.9 °F (37.2 °C)]   Pulse:  []   Resp:  [12-19]   BP: ()/(55-79)   SpO2:  [99 %-100 %]     Intake/Output Summary (Last 24 hours) at 10/3/2020 1532  Last data filed at 10/3/2020 1400  Gross per 24 hour   Intake 810 ml   Output 780 ml   Net 30 ml       Constitutional:       General: He is not in acute distress.     Appearance: He is well-developed. He is not diaphoretic.   HENT:      Head: Normocephalic and atraumatic.      Right Ear: External ear normal.      Left Ear: External ear normal.      Mouth/Throat:      Pharynx: No  oropharyngeal exudate.   Eyes:      General: No scleral icterus.        Musculoskeletal: Normal range of motion.      Thyroid: No thyromegaly.      Trachea: No tracheal deviation.      Comments:   Cardiovascular:      Rate and Rhythm: Regular rate .   Pulmonary:      Effort: Pulmonary effort is normal. No respiratory distress.      Breath sounds: Normal breath sounds. No stridor. No wheezing or rales.   Abdominal:      General: Bowel sounds are normal. There is no distension.      Palpations: Abdomen is soft.      Tenderness: There is no abdominal tenderness. There is no guarding.   Musculoskeletal:         General: No tenderness or deformity.      Right lower leg: Edema present.      Left lower leg: Edema present.   Lymphadenopathy:      Cervical: No cervical adenopathy.   Skin:     Coloration: Skin is not pale.      Findings: No erythema or rash.   Neurological:      General: No focal deficit present.     Recent Labs   Lab 10/01/20  0524 10/02/20  0320 10/03/20  0551   WBC 9.31 9.21 7.18   HGB 7.2* 7.4* 6.8*   HCT 24.7* 25.1* 23.0*    236 225     Recent Labs   Lab 10/01/20  0524 10/02/20  0320 10/03/20  0551    137 138   K 3.6 3.3* 3.9   CL 98 98 99   CO2 28 30* 27   BUN 20 19 20   CREATININE 1.8* 1.8* 1.8*   * 154* 150*   CALCIUM 7.9* 8.0* 8.0*   MG 1.5* 1.7 1.6   PHOS 2.9 3.2 3.2     Recent Labs   Lab 09/28/20  0515  10/01/20  0524 10/02/20  0320 10/03/20  0551   ALKPHOS 72  --  63  --   --    ALT 22  --  15  --   --    AST 20  --  17  --   --    ALBUMIN 1.6*  1.6*   < > 1.8*  1.8* 1.9* 1.8*   PROT 6.7  --  6.6  --   --    BILITOT 0.6  --  0.7  --   --     < > = values in this interval not displayed.        Recent Labs   Lab 10/02/20  0735 10/02/20  1151 10/02/20  1710 10/02/20  2110 10/03/20  0719 10/03/20  1215   POCTGLUCOSE 177* 157* 105 168* 152* 151*       Scheduled Meds:   artificial tears  1 drop Both Eyes TID    carvediloL  25 mg Oral BID    fluticasone furoate-vilanteroL  1 puff  Inhalation Daily    fluticasone propionate  1 spray Each Nostril Daily    furosemide  40 mg Oral BID    heparin (porcine)  5,000 Units Subcutaneous Q8H    insulin aspart U-100  7 Units Subcutaneous TIDWM    insulin detemir U-100  10 Units Subcutaneous QHS    levothyroxine  75 mcg Oral Before breakfast    polyethylene glycol  17 g Oral Daily    sodium bicarbonate  1,300 mg Oral BID     Continuous Infusions:  As Needed:  sodium chloride, acetaminophen, albuterol-ipratropium, calcium carbonate, dextrose 50%, dextrose 50%, glucagon (human recombinant), glucose, glucose, insulin aspart U-100, magnesium hydroxide 400 mg/5 ml, melatonin, ondansetron, oxyCODONE, polyethylene glycol, DIPH,PERTUS (ADACEL),TETANUS PF VAC (ADULT), Pharmacy to dose Vancomycin consult **AND** vancomycin - pharmacy to dose        Active Hospital Problems    Diagnosis  POA    *Septic shock due to methicillin resistant Staphylococcus aureus [A41.02, R65.21]  Yes    Diabetic polyneuropathy associated with type 2 diabetes mellitus [E11.42]  Unknown     Chronic    Cellulitis of left lower extremity [L03.116]  Unknown    PVD (peripheral vascular disease) [I73.9]  Unknown    MRSA bacteremia [R78.81, B95.62]  Yes    Acute renal insufficiency [N28.9]  Yes    Acute metabolic encephalopathy [G93.41]  No    Hospital-acquired pneumonia [J18.9, Y95]  No    Acute on chronic respiratory failure with hypoxia [J96.21]  No    Debility [R53.81]  Yes    Acute renal failure with acute tubular necrosis superimposed on stage 3 chronic kidney disease [N17.0, N18.30]  No    Septic arthritis of knee, right [M00.9]  Yes    Staphylococcal arthritis of right knee [M00.061]  Yes    Diabetic ulcer of left foot associated with type 2 diabetes mellitus, with fat layer exposed [E11.621, L97.522]  Yes    Diabetic foot infection [E11.628, L08.9]  Yes    COPD mixed type [J44.9]  Yes     Chronic    Postablative hypothyroidism [E89.0]  Yes     Chronic     Hyperlipidemia [E78.5]  Yes     High ASCVD with CAD, elevated A1c      Type 2 diabetes mellitus with stage 3 chronic kidney disease, with long-term current use of insulin [E11.22, N18.30, Z79.4]  Not Applicable     GFR> 60, uncontrolled DM      Chronic systolic congestive heart failure [I50.22]  Yes     Chronic     EF 35% in 2015 echo, improved in 5/2018. Patient with mixed ischemic and non-ischemic cardiomyopathy        Resolved Hospital Problems    Diagnosis Date Resolved POA    Endocarditis [I38] 09/12/2020 Yes    Sepsis due to methicillin resistant Staphylococcus aureus [A41.02] 09/13/2020 Yes    Type 2 diabetes mellitus with ketoacidosis without coma, with long-term current use of insulin [E11.10, Z79.4] 09/18/2020 Not Applicable     Novolin 70/30 40U in AM, 30U in PM. Elevated A1c         Assessment and Plan  / Problems managed today    Overview/Hospital Course:  Mr. Ed Patton was admitted to hospital medicine on 08/30 for management of a left-sided diabetic foot infection that was precipitated by an undetected punction wound after stepping on a tack/nail. His presentation was notable for leukocytosis with elevated inflammatory markers, however MRI of left foot only showed cellulitis of the anterior and lateral aspect of foot. Podiatry was consulted with recommendations for IV antibiotics; he was initiated on Ciprofloxacin and Vancomycin on admission. However, blood cultures from admission resulted positive for MRSA with wound cultures from left foot also growing MRSA with Proteus mirabilis. By 09/03 cultures remained positive despite Vancomycin, and patient was noted to develop right knee pain with swelling. Orthopedics was consulted and patient underwent right knee joint aspiration positive for septic arthritis with cultures also positive for MRSA.      LUKAS on 09/04 showed known chronic systolic heart failure, but was negative for endocarditis. MRI of lumbar spine on 09/08 was negative for  abscess. By 09/06, blood cultures continued to remain positive, and he underwent I&D of right arm abscess on 09/08 with cultures also positive for MRSA.      For persistent MRSA bacteremia, he was transitioned to Daptomycin and Ceftaroline, however this regimen was discontinued by 09/10 due to concern for developing rhabdomylosis. By 09/09, he was noted to develop a progressively worsening leukocytosis and NANETTE. Nephrology was consulted on 09/09 with suspicion for ATN.      Hematology was consulted on 09/12 for persistent leukocytosis as likely reactive from bacteremia. On 09/10, patient began developing intermitent hypotension prompting broadening of antibiotics to Cefepime and Vancomycin. By 09/12, renal function continued to worsen in addition to hypotension prompting transfer to ICU for vasopressor support and possible HD. Patient found to be encephalopathic, central line placed and started on CRRT overnight on 9/14 with improvement in mental status, but remained encephalopathic. CT head without any acute intracranial process. Vasopressin off AM of 9/15 but remains on levophed, CT abdomen with concern for potential cholecystitis and possible atelectasis with superimposed pneumonia.  Due to the acuity of his illness is he is not a surgical candidate it and ultimately is now status post cholecystostomy tube placement for management of suspected cholecystitis     Additional complications arising during hospital course include the development metabolic encephalopathy secondary to uremia versus shock versus DKA or overall cerebral hypoperfusion from shock, reported to have agitation prior to step-down from ICU.  Also developed DKA requiring standard therapy with IV insulin infusion, however he was kept on IV insulin due to rising sugars any time it was weaned working on basal bolus regimen for him at this time with the assistance of a endocrinology           Septic shock_Septic arthritis of Right Knee_Sespsi due to  MRSA  This is a 65 year old  male with PMH significant for HFrEF and COPD with chronic respiratory failure (on 2L home oxygen), T2DM with CKD III, and iron deficiency anemia who originally presented to [facility] on 08/30 with cellulitis of left foot with concern for diabetic foot infection with subsequent development of MRSA bacteremia attributed to septic arthritis of right knee and elbow. He is status post drainage and coverage for MRSA since that time. His work-up for other etiologies of MRSA bacteremia have included negative LUKAS and MRI of lumbar spine looking for endocarditis and spinal abscess, respectively. Stool studies for C.diff on 09/11 were negative. His hospital course has been associated with worsening hypotension and leukocytosis since 09/10 prompting ICU admission on 09/12 for initiation of vasopressor support. Infectious work-up thus far on ICU admission concern for likely right lower lobe HAP.      CT abdomen pelvis on 9/15 with gallbladder dilation, sludge, and trace pericholic fluid collection. Exam not consistent with cholecystitis but given persistent shock potential source. Also with potential right lower lobe atelectasis with overlying infection  S/p ICU stepdown  -Appreciate final ID recs     Type 2 DM with DKA  -endocrinology following pt kept on transitional drip but has since been weaned to a basal bolus regimen.         Postablative hypothyroidism  Plan: Continue home SYNTHROID     Acute kidney injury  -likely ATN from Shock s/p requiring RRT in ICU  -trialysis line removed   -pt is non-oliguric  -nephrology recommends sodium bicarb tabs - order placed.   -Nephrology recommends ambulatory f/u on discharge     Chronic systolic congestive heart failure  -Most recent ECHO in 09/2020 with EF of 25%.   -Unclear is ischemic vs non-ischemic.   -Home regimen: Carvedilol, Lasix 80 mg, and Lisinopril.           >due to NANETTE lasix and Lisinopril on hold, but he is recovering renal  function and prior to discharge to any facility will likely need some amount of diuretic re-initiated and potentially a much lower doseage of Ace-inhibitor.   -Associated with chronic hypoxemic respiratory failure requiring 2L nasal cannula, but noted to develop worsening oxygen requirements on ICU admission that were likely multifactorial from suspected hospital acquired pneumonia versus declining urine output with pre-disposition to volume overload. .      Anemia       -Chronic, no s/s of bleeding       -Notified that SNF is not accepting this patient due to Hb of < 8. Of note, patient has chronic anemia and Hb has been stable for AT MINIMUM, the last 10 days and he has not required any transfusions. This should not be a barrier to discharge.     COPD mixed type  -Based on PFT's from 05/2015 showing mild (small airways) obstruction, airflow not improved after bronchodilator, and moderate restriction.     Acute on chronic respiratory failure with hypoxia  -History of mixed COPD (based on PFT's from 05/2015 showing mild (small airways) obstruction, airflow not improved after bronchodilator, and moderate restriction) and HFrEF with chronic respiratory failure on 2L home oxygen.   -ICU admission notable for progressive increase in supplemental oxygen requirements.   -Work-up for underlying etiology of acute on chronic respiratory failure include CXR showing concern for possible progression of CHF vs HAP (not entirely convinced that CXR showed consolidation)  - Breathing back to baseline now to 2L NC         Code Status: Full Code     High Risk Conditions:  Patient has a condition that poses threat to life and bodily function: Septic shock, MRSA bacteremia  Patient is currently on drug therapy requiring intensive monitoring for toxicity: Daptomycin.      Discharge Planning   EDY: 9/25/2020     Code Status: Full Code   Is the patient medically ready for discharge?: No    Reason for patient still in hospital (select all  that apply): Patient trending condition  Discharge Plan A: Skilled Nursing Facility   Discharge Delays: (!) Change in Medical Condition               Goals of Care:  Return to prior functional status   Discharge plan: SNF    Time (minutes) spent in care of the patient (Greater than 1/2 spent in direct face-to-face contact)  35 minutes    Shaan Joel MD

## 2020-10-04 LAB
ALBUMIN SERPL BCP-MCNC: 1.9 G/DL (ref 3.5–5.2)
ANION GAP SERPL CALC-SCNC: 10 MMOL/L (ref 8–16)
BASOPHILS # BLD AUTO: 0.07 K/UL (ref 0–0.2)
BASOPHILS NFR BLD: 0.9 % (ref 0–1.9)
BUN SERPL-MCNC: 21 MG/DL (ref 8–23)
CALCIUM SERPL-MCNC: 8.1 MG/DL (ref 8.7–10.5)
CHLORIDE SERPL-SCNC: 97 MMOL/L (ref 95–110)
CO2 SERPL-SCNC: 30 MMOL/L (ref 23–29)
CREAT SERPL-MCNC: 1.7 MG/DL (ref 0.5–1.4)
DIFFERENTIAL METHOD: ABNORMAL
EOSINOPHIL # BLD AUTO: 1.1 K/UL (ref 0–0.5)
EOSINOPHIL NFR BLD: 13.3 % (ref 0–8)
ERYTHROCYTE [DISTWIDTH] IN BLOOD BY AUTOMATED COUNT: 17 % (ref 11.5–14.5)
ERYTHROCYTE [SEDIMENTATION RATE] IN BLOOD BY WESTERGREN METHOD: 69 MM/HR (ref 0–23)
EST. GFR  (AFRICAN AMERICAN): 48.5 ML/MIN/1.73 M^2
EST. GFR  (NON AFRICAN AMERICAN): 42 ML/MIN/1.73 M^2
GLUCOSE SERPL-MCNC: 199 MG/DL (ref 70–110)
HCT VFR BLD AUTO: 25.9 % (ref 40–54)
HGB BLD-MCNC: 7.7 G/DL (ref 14–18)
IMM GRANULOCYTES # BLD AUTO: 0.07 K/UL (ref 0–0.04)
IMM GRANULOCYTES NFR BLD AUTO: 0.9 % (ref 0–0.5)
LYMPHOCYTES # BLD AUTO: 1.1 K/UL (ref 1–4.8)
LYMPHOCYTES NFR BLD: 13.2 % (ref 18–48)
MAGNESIUM SERPL-MCNC: 1.5 MG/DL (ref 1.6–2.6)
MCH RBC QN AUTO: 26.6 PG (ref 27–31)
MCHC RBC AUTO-ENTMCNC: 29.7 G/DL (ref 32–36)
MCV RBC AUTO: 89 FL (ref 82–98)
MONOCYTES # BLD AUTO: 0.8 K/UL (ref 0.3–1)
MONOCYTES NFR BLD: 9.7 % (ref 4–15)
NEUTROPHILS # BLD AUTO: 5 K/UL (ref 1.8–7.7)
NEUTROPHILS NFR BLD: 62 % (ref 38–73)
NRBC BLD-RTO: 0 /100 WBC
PHOSPHATE SERPL-MCNC: 3.2 MG/DL (ref 2.7–4.5)
PLATELET # BLD AUTO: 260 K/UL (ref 150–350)
PMV BLD AUTO: 10.3 FL (ref 9.2–12.9)
POCT GLUCOSE: 130 MG/DL (ref 70–110)
POCT GLUCOSE: 149 MG/DL (ref 70–110)
POCT GLUCOSE: 159 MG/DL (ref 70–110)
POCT GLUCOSE: 182 MG/DL (ref 70–110)
POTASSIUM SERPL-SCNC: 3.4 MMOL/L (ref 3.5–5.1)
RBC # BLD AUTO: 2.9 M/UL (ref 4.6–6.2)
SODIUM SERPL-SCNC: 137 MMOL/L (ref 136–145)
WBC # BLD AUTO: 8.05 K/UL (ref 3.9–12.7)

## 2020-10-04 PROCEDURE — 27000221 HC OXYGEN, UP TO 24 HOURS: Mod: HCNC

## 2020-10-04 PROCEDURE — 36415 COLL VENOUS BLD VENIPUNCTURE: CPT | Mod: HCNC

## 2020-10-04 PROCEDURE — 83735 ASSAY OF MAGNESIUM: CPT | Mod: HCNC

## 2020-10-04 PROCEDURE — 85652 RBC SED RATE AUTOMATED: CPT | Mod: HCNC

## 2020-10-04 PROCEDURE — 94761 N-INVAS EAR/PLS OXIMETRY MLT: CPT | Mod: HCNC

## 2020-10-04 PROCEDURE — 63600175 PHARM REV CODE 636 W HCPCS: Mod: HCNC | Performed by: STUDENT IN AN ORGANIZED HEALTH CARE EDUCATION/TRAINING PROGRAM

## 2020-10-04 PROCEDURE — 20600001 HC STEP DOWN PRIVATE ROOM: Mod: HCNC

## 2020-10-04 PROCEDURE — 25000003 PHARM REV CODE 250: Mod: HCNC | Performed by: HOSPITALIST

## 2020-10-04 PROCEDURE — 63600175 PHARM REV CODE 636 W HCPCS: Mod: HCNC | Performed by: HOSPITALIST

## 2020-10-04 PROCEDURE — 25000003 PHARM REV CODE 250: Mod: HCNC | Performed by: INTERNAL MEDICINE

## 2020-10-04 PROCEDURE — 99900035 HC TECH TIME PER 15 MIN (STAT): Mod: HCNC

## 2020-10-04 PROCEDURE — 94799 UNLISTED PULMONARY SVC/PX: CPT | Mod: HCNC

## 2020-10-04 PROCEDURE — 85025 COMPLETE CBC W/AUTO DIFF WBC: CPT | Mod: HCNC

## 2020-10-04 PROCEDURE — 99232 SBSQ HOSP IP/OBS MODERATE 35: CPT | Mod: HCNC,,, | Performed by: STUDENT IN AN ORGANIZED HEALTH CARE EDUCATION/TRAINING PROGRAM

## 2020-10-04 PROCEDURE — 99232 PR SUBSEQUENT HOSPITAL CARE,LEVL II: ICD-10-PCS | Mod: HCNC,,, | Performed by: STUDENT IN AN ORGANIZED HEALTH CARE EDUCATION/TRAINING PROGRAM

## 2020-10-04 PROCEDURE — 25000003 PHARM REV CODE 250: Mod: HCNC | Performed by: STUDENT IN AN ORGANIZED HEALTH CARE EDUCATION/TRAINING PROGRAM

## 2020-10-04 PROCEDURE — 80069 RENAL FUNCTION PANEL: CPT | Mod: HCNC

## 2020-10-04 RX ORDER — MAGNESIUM SULFATE HEPTAHYDRATE 40 MG/ML
2 INJECTION, SOLUTION INTRAVENOUS ONCE
Status: COMPLETED | OUTPATIENT
Start: 2020-10-04 | End: 2020-10-04

## 2020-10-04 RX ORDER — POTASSIUM CHLORIDE 750 MG/1
40 CAPSULE, EXTENDED RELEASE ORAL ONCE
Status: COMPLETED | OUTPATIENT
Start: 2020-10-04 | End: 2020-10-04

## 2020-10-04 RX ADMIN — POLYETHYLENE GLYCOL 3350 17 G: 17 POWDER, FOR SOLUTION ORAL at 08:10

## 2020-10-04 RX ADMIN — LEVOTHYROXINE SODIUM 75 MCG: 25 TABLET ORAL at 05:10

## 2020-10-04 RX ADMIN — HYPROMELLOSE 2910 1 DROP: 5 SOLUTION OPHTHALMIC at 02:10

## 2020-10-04 RX ADMIN — SODIUM BICARBONATE 1300 MG: 650 TABLET ORAL at 08:10

## 2020-10-04 RX ADMIN — INSULIN ASPART 7 UNITS: 100 INJECTION, SOLUTION INTRAVENOUS; SUBCUTANEOUS at 08:10

## 2020-10-04 RX ADMIN — VANCOMYCIN HYDROCHLORIDE 1000 MG: 1 INJECTION, POWDER, LYOPHILIZED, FOR SOLUTION INTRAVENOUS at 08:10

## 2020-10-04 RX ADMIN — INSULIN ASPART 7 UNITS: 100 INJECTION, SOLUTION INTRAVENOUS; SUBCUTANEOUS at 12:10

## 2020-10-04 RX ADMIN — HEPARIN SODIUM 5000 UNITS: 5000 INJECTION INTRAVENOUS; SUBCUTANEOUS at 08:10

## 2020-10-04 RX ADMIN — FUROSEMIDE 40 MG: 40 TABLET ORAL at 05:10

## 2020-10-04 RX ADMIN — INSULIN DETEMIR 10 UNITS: 100 INJECTION, SOLUTION SUBCUTANEOUS at 08:10

## 2020-10-04 RX ADMIN — HEPARIN SODIUM 5000 UNITS: 5000 INJECTION INTRAVENOUS; SUBCUTANEOUS at 02:10

## 2020-10-04 RX ADMIN — POTASSIUM CHLORIDE 40 MEQ: 750 CAPSULE, EXTENDED RELEASE ORAL at 12:10

## 2020-10-04 RX ADMIN — MAGNESIUM SULFATE IN WATER 2 G: 40 INJECTION, SOLUTION INTRAVENOUS at 12:10

## 2020-10-04 RX ADMIN — CARVEDILOL 25 MG: 25 TABLET, FILM COATED ORAL at 08:10

## 2020-10-04 RX ADMIN — HYPROMELLOSE 2910 1 DROP: 5 SOLUTION OPHTHALMIC at 08:10

## 2020-10-04 RX ADMIN — HEPARIN SODIUM 5000 UNITS: 5000 INJECTION INTRAVENOUS; SUBCUTANEOUS at 05:10

## 2020-10-04 RX ADMIN — FLUTICASONE PROPIONATE 50 MCG: 50 SPRAY, METERED NASAL at 08:10

## 2020-10-04 RX ADMIN — FUROSEMIDE 40 MG: 40 TABLET ORAL at 08:10

## 2020-10-04 NOTE — PLAN OF CARE
Problem: Adult Inpatient Plan of Care  Goal: Optimal Comfort and Wellbeing  Outcome: Ongoing, Progressing     Discussed w/ pt plan of care, pt verbalized understanding, bed bath given, pt denies pain/needs at this time, pt shows no s/s of distress, pt irritable most of today, pt is ready to discharge, awaiting placement, will continue to monitor  .    Problem: Diabetes Comorbidity  Goal: Blood Glucose Level Within Desired Range  Outcome: Ongoing, Progressing     Pt CBGs checked per results, insulin administered per MAR, pt shows no s/s of hypo/hyperglycemia

## 2020-10-04 NOTE — CARE UPDATE
BG goal 140 - 180     BG within goal and continues to be well controlled on current SQ insulin regimen.   Diet diabetic [facility]; 2000 Calorie  27 Days Post-Op    - Levemir 10 units HS.   - Novolog 7 units TIDWM.   - Low Dose SQ Insulin Correction Scale.  - BG Monitoring AC/HS     ** Please call Endocrine for any BG related issues **  ** Please notify Endocrine for any change and/or advance in diet**    Discharge Planning:   TBD. Please notify endocrinology prior to discharge.     Tentative:   Novolin 70/30 U-100  -14 units AM  - 12 units PM.     Lab Results   Component Value Date    HGBA1C 9.5 (H) 08/31/2020

## 2020-10-04 NOTE — PROGRESS NOTES
Pharmacokinetic Assessment Follow Up: IV Vancomycin    Vancomycin serum concentration assessment(s):    · Vancomycin random concentration = 14.0 mcg/mL (~25 hr level); goal 15-20 mcg/mL  · SCr stable ~1.8 mg/dL    Vancomycin Regimen Plan:  · Re-dose   · Renal function has remained stable, will schedule regimen of vancomycin 1000 mg IV q 24 hours  · Will monitor renal function  · Next trough level due 10/5 @1900  · End date 10/7/20    Drug levels (last 3 results):  Recent Labs   Lab Result Units 10/02/20  1218 10/02/20  1505 10/03/20  1815   Vancomycin, Random ug/mL 20.2 19.7 14.0       Thank you for the consult, will continue to follow  Maki Gonzalez, PharmD  EXT [id number]       Patient brief summary:  Ed Patton is a 65 y.o. male initiated on antimicrobial therapy with IV Vancomycin for treatment of MRSA bacteremia    Drug Allergies:   Review of patient's allergies indicates:   Allergen Reactions    Vicodin [hydrocodone-acetaminophen] Itching     Actual Body Weight:   115.3kg    Renal Function:   Estimated Creatinine Clearance: 58.3 mL/min (A) (based on SCr of 1.8 mg/dL (H)).,     CBC (last 72 hours):  Recent Labs   Lab Result Units 10/01/20  0524 10/02/20  0320 10/03/20  0551   WBC K/uL 9.31 9.21 7.18   Hemoglobin g/dL 7.2* 7.4* 6.8*   Hematocrit % 24.7* 25.1* 23.0*   Platelets K/uL 225 236 225   Gran% % 67.2 65.0 59.0   Lymph% % 12.7* 13.6* 16.0*   Mono% % 8.2 7.4 10.0   Eosinophil% % 9.8* 12.4* 13.5*   Basophil% % 1.1 0.8 0.8   Differential Method  Automated Automated Automated     Metabolic Panel (last 72 hours):  Recent Labs   Lab Result Units 10/01/20  0524 10/02/20  0320 10/03/20  0551   Sodium mmol/L 137 137 138   Potassium mmol/L 3.6 3.3* 3.9   Chloride mmol/L 98 98 99   CO2 mmol/L 28 30* 27   Glucose mg/dL 173* 154* 150*   BUN, Bld mg/dL 20 19 20   Creatinine mg/dL 1.8* 1.8* 1.8*   Albumin g/dL 1.8*  1.8* 1.9* 1.8*   Total Bilirubin mg/dL 0.7  --   --    Alkaline Phosphatase U/L 63  --   --    AST U/L 17   --   --    ALT U/L 15  --   --    Magnesium mg/dL 1.5* 1.7 1.6   Phosphorus mg/dL 2.9 3.2 3.2       Vancomycin Administrations:  vancomycin given in the last 96 hours                     vancomycin 1.5 g in dextrose 5 % 250 mL IVPB (ready to mix) (mg) 1,500 mg New Bag 09/28/20 1234     1,500 mg New Bag 09/27/20 1142    vancomycin 1.75 g in 5 % dextrose 500 mL IVPB (mg) 1,750 mg New Bag 09/26/20 1214     1,750 mg New Bag 09/25/20 1150                    Microbiologic Results:  Microbiology Results (last 7 days)     Procedure Component Value Units Date/Time    Fungus culture [[id number]] Collected: 09/16/20 1612    Order Status: Completed Specimen: Body Fluid from Gallbladder Updated: 09/30/20 1057     Fungus (Mycology) Culture Culture in progress      No fungus isolated after 2 weeks

## 2020-10-05 LAB
ALBUMIN SERPL BCP-MCNC: 1.9 G/DL (ref 3.5–5.2)
ALBUMIN SERPL BCP-MCNC: 1.9 G/DL (ref 3.5–5.2)
ALP SERPL-CCNC: 62 U/L (ref 55–135)
ALT SERPL W/O P-5'-P-CCNC: 11 U/L (ref 10–44)
ANION GAP SERPL CALC-SCNC: 9 MMOL/L (ref 8–16)
AST SERPL-CCNC: 11 U/L (ref 10–40)
BASOPHILS # BLD AUTO: 0.1 K/UL (ref 0–0.2)
BASOPHILS NFR BLD: 1.3 % (ref 0–1.9)
BILIRUB DIRECT SERPL-MCNC: 0.4 MG/DL (ref 0.1–0.3)
BILIRUB SERPL-MCNC: 0.8 MG/DL (ref 0.1–1)
BUN SERPL-MCNC: 19 MG/DL (ref 8–23)
CALCIUM SERPL-MCNC: 8.1 MG/DL (ref 8.7–10.5)
CHLORIDE SERPL-SCNC: 98 MMOL/L (ref 95–110)
CK SERPL-CCNC: 51 U/L (ref 20–200)
CO2 SERPL-SCNC: 30 MMOL/L (ref 23–29)
CREAT SERPL-MCNC: 1.5 MG/DL (ref 0.5–1.4)
CRP SERPL-MCNC: 43.5 MG/L (ref 0–8.2)
DIFFERENTIAL METHOD: ABNORMAL
EOSINOPHIL # BLD AUTO: 1 K/UL (ref 0–0.5)
EOSINOPHIL NFR BLD: 13.4 % (ref 0–8)
ERYTHROCYTE [DISTWIDTH] IN BLOOD BY AUTOMATED COUNT: 17 % (ref 11.5–14.5)
EST. GFR  (AFRICAN AMERICAN): 56.5 ML/MIN/1.73 M^2
EST. GFR  (NON AFRICAN AMERICAN): 48.8 ML/MIN/1.73 M^2
FUNGUS SPEC CULT: NORMAL
GLUCOSE SERPL-MCNC: 135 MG/DL (ref 70–110)
HCT VFR BLD AUTO: 26.1 % (ref 40–54)
HGB BLD-MCNC: 7.6 G/DL (ref 14–18)
IMM GRANULOCYTES # BLD AUTO: 0.06 K/UL (ref 0–0.04)
IMM GRANULOCYTES NFR BLD AUTO: 0.8 % (ref 0–0.5)
LYMPHOCYTES # BLD AUTO: 1 K/UL (ref 1–4.8)
LYMPHOCYTES NFR BLD: 13.8 % (ref 18–48)
MAGNESIUM SERPL-MCNC: 1.5 MG/DL (ref 1.6–2.6)
MCH RBC QN AUTO: 25.9 PG (ref 27–31)
MCHC RBC AUTO-ENTMCNC: 29.1 G/DL (ref 32–36)
MCV RBC AUTO: 89 FL (ref 82–98)
MONOCYTES # BLD AUTO: 0.8 K/UL (ref 0.3–1)
MONOCYTES NFR BLD: 11 % (ref 4–15)
NEUTROPHILS # BLD AUTO: 4.5 K/UL (ref 1.8–7.7)
NEUTROPHILS NFR BLD: 59.7 % (ref 38–73)
NRBC BLD-RTO: 0 /100 WBC
PHOSPHATE SERPL-MCNC: 2.8 MG/DL (ref 2.7–4.5)
PLATELET # BLD AUTO: 277 K/UL (ref 150–350)
PMV BLD AUTO: 9.8 FL (ref 9.2–12.9)
POCT GLUCOSE: 136 MG/DL (ref 70–110)
POCT GLUCOSE: 159 MG/DL (ref 70–110)
POCT GLUCOSE: 173 MG/DL (ref 70–110)
POCT GLUCOSE: 186 MG/DL (ref 70–110)
POTASSIUM SERPL-SCNC: 3.4 MMOL/L (ref 3.5–5.1)
PROT SERPL-MCNC: 6.7 G/DL (ref 6–8.4)
RBC # BLD AUTO: 2.93 M/UL (ref 4.6–6.2)
SODIUM SERPL-SCNC: 137 MMOL/L (ref 136–145)
VANCOMYCIN TROUGH SERPL-MCNC: 15.6 UG/ML (ref 10–22)
WBC # BLD AUTO: 7.55 K/UL (ref 3.9–12.7)

## 2020-10-05 PROCEDURE — 97530 THERAPEUTIC ACTIVITIES: CPT | Mod: HCNC

## 2020-10-05 PROCEDURE — 86140 C-REACTIVE PROTEIN: CPT | Mod: HCNC

## 2020-10-05 PROCEDURE — 25000003 PHARM REV CODE 250: Mod: HCNC | Performed by: STUDENT IN AN ORGANIZED HEALTH CARE EDUCATION/TRAINING PROGRAM

## 2020-10-05 PROCEDURE — 63600175 PHARM REV CODE 636 W HCPCS: Mod: HCNC | Performed by: HOSPITALIST

## 2020-10-05 PROCEDURE — 97112 NEUROMUSCULAR REEDUCATION: CPT | Mod: HCNC

## 2020-10-05 PROCEDURE — 99233 PR SUBSEQUENT HOSPITAL CARE,LEVL III: ICD-10-PCS | Mod: HCNC,95,, | Performed by: INTERNAL MEDICINE

## 2020-10-05 PROCEDURE — 83735 ASSAY OF MAGNESIUM: CPT | Mod: HCNC

## 2020-10-05 PROCEDURE — 63600175 PHARM REV CODE 636 W HCPCS: Mod: HCNC | Performed by: STUDENT IN AN ORGANIZED HEALTH CARE EDUCATION/TRAINING PROGRAM

## 2020-10-05 PROCEDURE — 84075 ASSAY ALKALINE PHOSPHATASE: CPT | Mod: HCNC

## 2020-10-05 PROCEDURE — 80069 RENAL FUNCTION PANEL: CPT | Mod: HCNC

## 2020-10-05 PROCEDURE — 25000003 PHARM REV CODE 250: Mod: HCNC | Performed by: NURSE PRACTITIONER

## 2020-10-05 PROCEDURE — 25000003 PHARM REV CODE 250: Mod: HCNC | Performed by: HOSPITALIST

## 2020-10-05 PROCEDURE — 97535 SELF CARE MNGMENT TRAINING: CPT | Mod: HCNC

## 2020-10-05 PROCEDURE — 25000003 PHARM REV CODE 250: Mod: HCNC | Performed by: INTERNAL MEDICINE

## 2020-10-05 PROCEDURE — 80202 ASSAY OF VANCOMYCIN: CPT | Mod: HCNC

## 2020-10-05 PROCEDURE — 82247 BILIRUBIN TOTAL: CPT | Mod: HCNC

## 2020-10-05 PROCEDURE — 36415 COLL VENOUS BLD VENIPUNCTURE: CPT | Mod: HCNC

## 2020-10-05 PROCEDURE — 20600001 HC STEP DOWN PRIVATE ROOM: Mod: HCNC

## 2020-10-05 PROCEDURE — C9399 UNCLASSIFIED DRUGS OR BIOLOG: HCPCS | Mod: HCNC | Performed by: NURSE PRACTITIONER

## 2020-10-05 PROCEDURE — 82550 ASSAY OF CK (CPK): CPT | Mod: HCNC

## 2020-10-05 PROCEDURE — 99233 SBSQ HOSP IP/OBS HIGH 50: CPT | Mod: HCNC,95,, | Performed by: INTERNAL MEDICINE

## 2020-10-05 PROCEDURE — 85025 COMPLETE CBC W/AUTO DIFF WBC: CPT | Mod: HCNC

## 2020-10-05 RX ORDER — POTASSIUM CHLORIDE 20 MEQ/1
40 TABLET, EXTENDED RELEASE ORAL ONCE
Status: COMPLETED | OUTPATIENT
Start: 2020-10-05 | End: 2020-10-05

## 2020-10-05 RX ADMIN — Medication 200 MG: at 08:10

## 2020-10-05 RX ADMIN — POLYETHYLENE GLYCOL 3350 17 G: 17 POWDER, FOR SOLUTION ORAL at 08:10

## 2020-10-05 RX ADMIN — HEPARIN SODIUM 5000 UNITS: 5000 INJECTION INTRAVENOUS; SUBCUTANEOUS at 05:10

## 2020-10-05 RX ADMIN — LEVOTHYROXINE SODIUM 75 MCG: 25 TABLET ORAL at 05:10

## 2020-10-05 RX ADMIN — FUROSEMIDE 40 MG: 40 TABLET ORAL at 08:10

## 2020-10-05 RX ADMIN — FUROSEMIDE 40 MG: 40 TABLET ORAL at 06:10

## 2020-10-05 RX ADMIN — CARVEDILOL 25 MG: 25 TABLET, FILM COATED ORAL at 08:10

## 2020-10-05 RX ADMIN — INSULIN DETEMIR 10 UNITS: 100 INJECTION, SOLUTION SUBCUTANEOUS at 08:10

## 2020-10-05 RX ADMIN — HEPARIN SODIUM 5000 UNITS: 5000 INJECTION INTRAVENOUS; SUBCUTANEOUS at 02:10

## 2020-10-05 RX ADMIN — INSULIN ASPART 7 UNITS: 100 INJECTION, SOLUTION INTRAVENOUS; SUBCUTANEOUS at 08:10

## 2020-10-05 RX ADMIN — SODIUM BICARBONATE 1300 MG: 650 TABLET ORAL at 08:10

## 2020-10-05 RX ADMIN — HYPROMELLOSE 2910 1 DROP: 5 SOLUTION OPHTHALMIC at 08:10

## 2020-10-05 RX ADMIN — POTASSIUM CHLORIDE 40 MEQ: 1500 TABLET, EXTENDED RELEASE ORAL at 02:10

## 2020-10-05 RX ADMIN — FLUTICASONE PROPIONATE 50 MCG: 50 SPRAY, METERED NASAL at 08:10

## 2020-10-05 RX ADMIN — HYPROMELLOSE 2910 1 DROP: 5 SOLUTION OPHTHALMIC at 02:10

## 2020-10-05 RX ADMIN — VANCOMYCIN HYDROCHLORIDE 1000 MG: 1 INJECTION, POWDER, LYOPHILIZED, FOR SOLUTION INTRAVENOUS at 09:10

## 2020-10-05 RX ADMIN — INSULIN ASPART 7 UNITS: 100 INJECTION, SOLUTION INTRAVENOUS; SUBCUTANEOUS at 11:10

## 2020-10-05 RX ADMIN — HYPROMELLOSE 2910 1 DROP: 5 SOLUTION OPHTHALMIC at 10:10

## 2020-10-05 RX ADMIN — HEPARIN SODIUM 5000 UNITS: 5000 INJECTION INTRAVENOUS; SUBCUTANEOUS at 08:10

## 2020-10-05 NOTE — PT/OT/SLP PROGRESS
"Physical Therapy Treatment    Patient Name:  Ed Patton   MRN:  [id number]    Recommendations:     Discharge Recommendations:  nursing facility, skilled   Discharge Equipment Recommendations: wheelchair, bath bench   Barriers to discharge: Inaccessible home, Decreased caregiver support and patient below baseline, level of assist required for mobility    Assessment:     Ed Patton is a 65 y.o. male admitted with a medical diagnosis of Septic shock due to methicillin resistant Staphylococcus aureus.  He presents with the following impairments/functional limitations:  weakness, gait instability, impaired balance, impaired endurance, impaired cognition, impaired self care skills, impaired functional mobilty, pain, decreased safety awareness, decreased lower extremity function, orthopedic precautions, impaired joint extensibility, impaired cardiopulmonary response to activity. The patient requires frequent rest breaks and encouragement to progress with mobility. Patient shows improvement in independence with bed mobility, performed with contact guard assist. Required less assist with stand pivot t/f bed to medicahir on L side, maximum assistance x2. He is not safe to return home due to risk of falls. He would benefit from SNF placement to address the above deficits and maximize his functional mobility.      Rehab Prognosis: Fair; patient would benefit from acute skilled PT services to address these deficits and reach maximum level of function.    Recent Surgery: Procedure(s) (LRB):  ARTHROSCOPY, KNEE, RIGHT  (Right) 28 Days Post-Op    Plan:     During this hospitalization, patient to be seen 4 x/week to address the identified rehab impairments via gait training, therapeutic activities, therapeutic exercises, neuromuscular re-education and progress toward the following goals:    · Plan of Care Expires:  10/18/20    Subjective     Chief Complaint: "Give me a second I need to get my strength", "I just don't have any " "strength"  Patient/Family Comments/goals: motivated to participate with therapy, but requires encouragement  Pain/Comfort:  · Pain Rating 1: (R knee pain with ROM, flexion, WBing, did not rate)  · Location - Side 1: Right  · Location - Orientation 1: generalized  · Location 1: leg  · Pain Addressed 1: Reposition, Distraction      Objective:     Communicated with RN prior to session.  Patient found HOB elevated with pulse ox (continuous), telemetry, nephrostomy upon PT entry to room.     General Precautions: Standard, contact, fall   Orthopedic Precautions:LLE weight bearing as tolerated   Braces: (L Darco shoe)     Functional Mobility:    Bed Mobility  Supine to Sit on the R side:  contact guard assist, increased time, cues for using UE to progress R LE, good sequencing, at one point spontaneously and suddenly returned to supine due to R knee pain  Scooting laterally on sitting EOB: minimum assistance, cued for hand placement, cued for anterior weight shift  Scooting back into medichair: moderate assistance, cued for hand placement, anteior weight shift, WBing through LE   Transfers Stand pivot bed to medichair on R: maximum assistance x2, PT facilitation anteriorly/OT posteriorly, cued for sequencing and hand placement, RACHEAL feet and knees blocked, L LE slipping forward as patient with decreased weight shift over LE   Sit to Stand: 2x attempted to stand from medichair, patient not initiating t/f due to LE pain, avoiding WBing through R LE            AM-PAC 6 CLICK MOBILITY  Turning over in bed (including adjusting bedclothes, sheets and blankets)?: 3  Sitting down on and standing up from a chair with arms (e.g., wheelchair, bedside commode, etc.): 2  Moving from lying on back to sitting on the side of the bed?: 3  Moving to and from a bed to a chair (including a wheelchair)?: 2  Need to walk in hospital room?: 1  Climbing 3-5 steps with a railing?: 1  Basic Mobility Total Score: 12       Therapeutic Activities and " "Exercises:   Patient safe for medichair t/f with RN staff, RN alerted and alerted to patient position.   Sitting edge of bed 8 minutes, contact guard assist progressed to stand by assistance , weight shift posteriorly and to L to unweight R LE  -Posterior pelvic tilt, kyphosis, cervical flexion  -Visual/verbal/manual cues for midline orientation, thoracic and cervical extension  -Sitting weight shift with emphasis on anterior weight shift to progress with sitting and scooting as described above     Patient ed that his progress with therapy is impeded by his self limiting tendencies during transfers- he will in the middle of transfers say "wait stop" or he will stop attempting to perform the transfer. When asked why, patient states- "I just start feeling weak", patient ed that performing transfer is important in strengthening his LE and that he is able to perform the transfers when he tries, but it is unsafe for himself and therapists if he stops assisting mid-way through t/f. He verbalizes understanding but with questionable carryover, when we attempted stands- patient with mininmal initiation, denies pain, states he is weak.     Elevated L UE on pillow, lateral pillow supports for midline orientation, seatbelt donned, patient aware to call RN for assist to t/f back to bed.     Patient left sitting up in medichair, LE supported with all lines intact, call button in reach and RN notified..    GOALS:   Multidisciplinary Problems     Physical Therapy Goals        Problem: Physical Therapy Goal    Goal Priority Disciplines Outcome Goal Variances Interventions   Physical Therapy Goal     PT, PT/OT Ongoing, Progressing     Description: Goals to be met by: 10/05/20    Patient will increase functional independence with mobility by performin. Supine to sit with moderate assitance. -MET ()  Supine to sit with contact guard assistance.-Met 10/5  Supine to sit with supervision assistance   2. Sit to supine with " moderate assistance - MET (9/30)  Sit to supine with contact guard assistance.   3. Sit to stand transfer with moderate assistance.  4. Gait  x 10 feet with Minimal Assistance using LRAD and maintenance of weight bearing status   5. Lower extremity exercise program x10 with assistance as needed.   6. Sit for 10 minutes with Minimal Assistance while reaching out of JESIKA in all planes to perform functional activities. -MET (9/21)  Sit for 10 minutes with Stand By Assistance while reaching out of JESIKA in all planes to perform functional activities. - MET (9/25)  Sit for 10 minutes with [place] while reaching out of JESIKA in all planes to perform functional activities.  7. Transfer from chair to Medi-chair with moderate assistance using stand-pivot t/f.                    Time Tracking:     PT Received On: 10/05/20  PT Start Time: 1029     PT Stop Time: 1100  PT Total Time (min): 31 min     Billable Minutes: Therapeutic Activity 16 and Neuromuscular Re-education 8 (co-tx with OT, low activity tolerance)    Treatment Type: Treatment  PT/PTA: PT     PTA Visit Number: 0     Marah Thomas, PT  10/05/2020

## 2020-10-05 NOTE — CONSULTS
[facility] Hospital Medicine  Telemedicine Consult Note    Patient Name: Ed Patton  MRN: [id number]  Admission Date: 8/30/2020  Hospital Length of Stay: 35 days  Attending Physician: Shaan Joel MD   Primary Care Provider: Qiana Chow MD       .    Ed Patton has been accepted for transfer to [facility] and will be followed through telemedicine services beginning 10/05/20 at 7 AM.          Gisela Mota MD  Department of Hospital Medicine   [facility]

## 2020-10-05 NOTE — PLAN OF CARE
Problem: Physical Therapy Goal  Goal: Physical Therapy Goal  Description: Goals to be met by: 10/05/20    Patient will increase functional independence with mobility by performin. Supine to sit with moderate assitance. -MET ()  Supine to sit with contact guard assistance.-Met 10/5  Supine to sit with supervision assistance   2. Sit to supine with moderate assistance - MET ()  Sit to supine with contact guard assistance.   3. Sit to stand transfer with moderate assistance.  4. Gait  x 10 feet with Minimal Assistance using LRAD and maintenance of weight bearing status   5. Lower extremity exercise program x10 with assistance as needed.   6. Sit for 10 minutes with Minimal Assistance while reaching out of JESIKA in all planes to perform functional activities. -MET ()  Sit for 10 minutes with Stand By Assistance while reaching out of JESIKA in all planes to perform functional activities. - MET ()  Sit for 10 minutes with [place] while reaching out of JESIKA in all planes to perform functional activities.  7. Transfer from chair to Medi-chair with moderate assistance using stand-pivot t/f.   10/5/2020 1419 by Marah Thomas, PT  Outcome: Ongoing, Progressing  10/5/2020 1419 by Marah Thomas, PT  Outcome: Ongoing, Progressing   Continue with plan of care.   Marah Thomas, PT  10/5/2020

## 2020-10-05 NOTE — CARE UPDATE
BG goal 140 - 180     Diet diabetic [facility]; 2000 Calorie  28 Days Post-Op    BG remains controlled on current SQ insulin regimen.   - Levemir 10 units HS.   - Novolog 7 units TIDWM.   - Low Dose SQ Insulin Correction Scale.  - BG Monitoring AC/HS      ** Please call Endocrine for any BG related issues **  ** Please notify Endocrine for any change and/or advance in diet**     Discharge Planning:   TBD. Please notify endocrinology prior to discharge.      Tentative:   Novolin 70/30 U-100  -14 units AM  - 12 units PM.     Lab Results   Component Value Date    HGBA1C 9.5 (H) 08/31/2020

## 2020-10-05 NOTE — PLAN OF CARE
Problem: Occupational Therapy Goal  Goal: Occupational Therapy Goal  Description: Goals to be met by: 10/18/20    Patient will increase functional independence with ADLs by performing:    Feeding with Set-up Assistance.  Grooming while EOB with Minimal Assistance. Goal met  Toileting from bedside commode with Moderate Assistance for hygiene and clothing management.   Supine to sit with Moderate Assistance to increased bed mobility independence. - met 10/2  Stand pivot transfers with Moderate Assistance and maintaining weight-bearing precaution(s) to reach bedside chair.  Toilet transfer to bedside commode with Moderate Assistance and maintaining weight-bearing precaution(s).  Upper extremity exercise program x15 reps per handout, with supervision to improve BUE function to increase independence in daily occupations.    Continue OT as tolerated.  No Watkins OT  10/5/2020    Outcome: Ongoing, Progressing

## 2020-10-05 NOTE — PROGRESS NOTES
"    [facility] Hospital Medicine  Telemedicine Progress Note    Patient Name: Ed Patton  MRN: [id number]  Patient Class: IP- Inpatient   Admission Date: 8/30/2020  Length of Stay: 36 days  Attending Physician: Gisela Mota MD  Primary Care Provider: Qiana Chow MD    [facility] Medicine Team: [facility] VIRTUAL TEAM 10 Gisela Mota MD  Virtual Telemedicine Progress Note  Start time: 1047  Chief complaint: Septic shock due to methicillin resistant Staphylococcus aureus  The patient location is: [facility]/92 A  The patient arrived at: 8/30/2020  2:18 PM  Present with the patient at the time of the telemed/virtual assessment: telepresenter   End time:  1057  Total time spent with patient: 10 min  I have assessed findings virtually using a telemedicine platform and with assistance of the bedside nurse or telemedicine presenter.  The attending portion of this evaluation, treatment, and documentation was performed per Gisela Mota MD via audiovisual.    Patient was transferred to the telemedicine service on: 10/5/2020    Subjective:     Admission CC:   Chief Complaint   Patient presents with    Frequent Falls     frequent falls, weakness, "stepped on a tack" left leg now swollen.     Leg Swelling     Follow up visit for: Septic shock due to methicillin resistant Staphylococcus aureus    Interval History / Events Overnight:   The patient is able to provide adequate history. Additional history was obtained from past medical records and chart review. No significant events reported by Nursing. UOP not recorded.  Patient complains of nothing specific. Symptoms have been unchanged since yesterday. Associated symptoms include: fatigue. Symptoms are stable.     Data reviewed 10/5/2020: Lab test(s) reviewed: H&H stable. Hypomagnesemia    Review of Systems   Constitutional: Negative for fever.   Respiratory: Negative for shortness of breath.      Objective:     Vital Signs (Most " Recent):  Temp: 98.6 °F (37 °C) (10/05/20 1558)  Pulse: (!) 117 (10/05/20 1558)  Resp: 17 (10/05/20 1558)  BP: 99/60 (10/05/20 1558)  SpO2: 98 % (10/05/20 1558) Vital Signs (24h Range):  Temp:  [97.9 °F (36.6 °C)-99 °F (37.2 °C)] 98.6 °F (37 °C)  Pulse:  [] 117  Resp:  [17-19] 17  SpO2:  [94 %-100 %] 98 %  BP: ()/(60-84) 99/60     Weight: 115.6 kg (254 lb 13.6 oz)  Body mass index is 31.02 kg/m².    Intake/Output Summary (Last 24 hours) at 10/5/2020 1628  Last data filed at 10/5/2020 0806  Gross per 24 hour   Intake --   Output 20 ml   Net -20 ml      Physical Exam  Constitutional:       General: He is not in acute distress.     Appearance: Normal appearance. He is not diaphoretic.   Eyes:      General: Lids are normal. No scleral icterus.        Right eye: No discharge.         Left eye: No discharge.      Conjunctiva/sclera: Conjunctivae normal.   Neck:      Trachea: Phonation normal.   Cardiovascular:      Rate and Rhythm: Tachycardia present.   Pulmonary:      Effort: Pulmonary effort is normal. No tachypnea, accessory muscle usage or respiratory distress.   Abdominal:      General: There is no distension.   Neurological:      Mental Status: He is alert. Mental status is at baseline. He is not disoriented.   Psychiatric:         Attention and Perception: Attention normal.         Mood and Affect: Affect normal.         Behavior: Behavior is cooperative.         Significant Labs:     Recent Labs   Lab 08/31/20  0434   HGBA1C 9.5*     Recent Labs   Lab 10/05/20  0802 10/05/20  1151 10/05/20  1623   POCTGLUCOSE 186* 136* 159*     Recent Labs   Lab 10/03/20  0551 10/04/20  0602 10/05/20  0950   WBC 7.18 8.05 7.55   HGB 6.8* 7.7* 7.6*   HCT 23.0* 25.9* 26.1*    260 277     Recent Labs   Lab 10/03/20  0551 10/04/20  0602 10/05/20  0950   GRAN 59.0  4.2 62.0  5.0 59.7  4.5   LYMPH 16.0*  1.2 13.2*  1.1 13.8*  1.0   MONO 10.0  0.7 9.7  0.8 11.0  0.8   EOS 1.0* 1.1* 1.0*     Recent Labs   Lab  10/03/20  0551 10/04/20  0602 10/05/20  0950    137 137   K 3.9 3.4* 3.4*   CL 99 97 98   CO2 27 30* 30*   BUN 20 21 19   CREATININE 1.8* 1.7* 1.5*   * 199* 135*   CALCIUM 8.0* 8.1* 8.1*   ALBUMIN 1.8* 1.9* 1.9*  1.9*   MG 1.6 1.5* 1.5*   PHOS 3.2 3.2 2.8     Recent Labs   Lab 10/01/20  0524 10/05/20  0950   ALKPHOS 63 62   ALT 15 11   AST 17 11   PROT 6.6 6.7   BILITOT 0.7 0.8   CRP  --  43.5*     Recent Labs   Lab 09/29/20  0727 10/01/20  0524 10/03/20  0551 10/05/20  0950   CPK 73 65 54 51     Recent Labs   Lab 09/03/20  1538 09/10/20  1514 09/12/20 2015 09/20/20  0432 09/27/20  0616 09/28/20  0823 10/04/20  0602   PROCAL 0.13 0.26*  --   --   --   --   --    LACTATE  --  1.2 1.4  --   --  1.1  --    SEDRATE  --   --   --  110* 103*  --  69*     Results for orders placed or performed during the hospital encounter of 08/30/20   Vitamin D   Result Value Ref Range    Vit D, 25-Hydroxy 35 30 - 96 ng/mL     No results found for this or any previous visit.  SARS-CoV2 (COVID-19) Qualitative PCR (no units)   Date Value   09/10/2020 Not Detected   09/03/2020 Not Detected     SARS-CoV-2 RNA, Amplification, Qual (no units)   Date Value   09/23/2020 Negative   09/07/2020 Negative   08/30/2020 Negative       ECG Results          EKG 12-lead (Final result)  Result time 09/01/20 05:02:05    Final result by Interface, Lab In [facility] (09/01/20 05:02:05)                 Narrative:    Test Reason : W19.XXXA,    Vent. Rate : 101 BPM     Atrial Rate : 234 BPM     P-R Int : 000 ms          QRS Dur : 098 ms      QT Int : 308 ms       P-R-T Axes : 000 -55 035 degrees     QTc Int : 399 ms    Age and gender specific analysis  Atrial fibrillation with rapid ventricular response  Left axis deviation  Cannot rule out Septal infarct ,age undetermined  Nonspecific ST and/or T wave abnormalities  Abnormal ECG  When compared with ECG of 01-MAY-2015 10:32,  Atrial fibrillation has replaced Sinus rhythm  Confirmed by Danish Linda MD  ([id number]) on 9/1/2020 5:01:56 AM    Referred By: [email or web address]   SELF           Confirmed By:Danish Linda MD                              Results for orders placed during the hospital encounter of 08/30/20   Echo Color Flow Doppler? Yes    Narrative · Concentric left ventricular remodeling.  · Moderately decreased left ventricular systolic function. The estimated   ejection fraction is 35%.  · Moderately to severely reduced right ventricular systolic function.  · Left ventricular diastolic dysfunction.  · The estimated PA systolic pressure is 44 mmHg.  · Normal central venous pressure (3 mmHg).          VAS US Arterial Leg Left  Indication  ========    Peripheral Vascular Disease    Lower Extremity Arterial Imaging  ========================    Left Lower Extremity  Lt mid CFA PSV 88 cm/s  Lt prox DFA PSV    88 cm/s  Lt prox SFA PSV    64 cm/s  Lt mid SFA PSV 75 cm/s  Lt distal SFA PSV  53 cm/s  Lt prox POP PSV    48 cm/s  Lt mid POP  cm/s  Lt distal POP PSV  74 cm/s  Lt prox LICHA PSV    186 cm/s  Lt mid LICHA PSV 40 cm/s  Lt distal LICHA PSV  54 cm/s  Lt prox PTA PSV    37 cm/s  Lt mid PTA PSV 62 cm/s  Lt distal PTA PSV  39 cm/s    Impression  =========    Duplex imaging of the right lower extremity arterial tree reveals a velocity elevation of 67 cm/sec to 186 cm/sec in the prox LICHA with a  visual narrowing. These findings suggest a > 50% hemodynamically significant stenosis. There is another velocity elevation of 48  cm/sec to 106 cm/sec with a slight visual narrowing, which suggests an approximately 50% hemodynamically significant stenosis.  Branches are noted at the PTA.    DATE OF SERVICE: 09/22/2020                                                      Sonographer: LYNDA THORNTON RVT  Electronically Signed by: LUCIA Tsang M.D. at 09/23/2020-11:36      Assessment/Plan:      Active Diagnoses:    Diagnosis Date Noted POA    PRINCIPAL PROBLEM:  Septic shock due to methicillin resistant Staphylococcus aureus [A41.02,  R65.21] 09/12/2020 Yes    Diabetic polyneuropathy associated with type 2 diabetes mellitus [E11.42]  Yes     Chronic    Cellulitis of left lower extremity [L03.116]  Unknown    PVD (peripheral vascular disease) [I73.9]  Unknown    MRSA bacteremia [R78.81, B95.62] 09/17/2020 Yes    Acute renal insufficiency [N28.9]  Yes    Acute metabolic encephalopathy [G93.41] 09/13/2020 No    Hospital-acquired pneumonia [J18.9, Y95] 09/13/2020 No    Acute on chronic respiratory failure with hypoxia [J96.21] 09/13/2020 No    Debility [R53.81]  Yes    Acute renal failure with acute tubular necrosis superimposed on stage 3 chronic kidney disease [N17.0, N18.30] 09/09/2020 No    Septic arthritis of knee, right [M00.9] 09/07/2020 Yes    Staphylococcal arthritis of right knee [M00.061] 09/05/2020 Yes    Diabetic ulcer of left foot associated with type 2 diabetes mellitus, with fat layer exposed [E11.621, L97.522] 08/31/2020 Yes    Diabetic foot infection [E11.628, L08.9] 08/30/2020 Yes    COPD mixed type [J44.9] 10/15/2019 Yes     Chronic    Postablative hypothyroidism [E89.0] 12/06/2018 Yes     Chronic    Hyperlipidemia [E78.5] 08/25/2015 Yes    Type 2 diabetes mellitus with stage 3 chronic kidney disease, with long-term current use of insulin [E11.22, N18.30, Z79.4] 12/23/2014 Not Applicable    Chronic systolic congestive heart failure [I50.22] 05/07/2013 Yes     Chronic      Problems Resolved During this Admission:    Diagnosis Date Noted Date Resolved POA    Endocarditis [I38]  09/12/2020 Yes    Sepsis due to methicillin resistant Staphylococcus aureus [A41.02] 08/30/2020 09/13/2020 Yes    Type 2 diabetes mellitus with ketoacidosis without coma, with long-term current use of insulin [E11.10, Z79.4] 10/09/2017 09/18/2020 Not Applicable       Overview / ICU Course:    Ed Patton is a 65 y.o. male admitted for Septic shock due to methicillin resistant Staphylococcus aureus.    Inpatient Medications Prescribed  for Management of Current Problems:     Scheduled Meds:    artificial tears  1 drop Both Eyes TID    carvediloL  25 mg Oral BID    fluticasone furoate-vilanteroL  1 puff Inhalation Daily    fluticasone propionate  1 spray Each Nostril Daily    furosemide  40 mg Oral BID    heparin (porcine)  5,000 Units Subcutaneous Q8H    insulin aspart U-100  7 Units Subcutaneous TIDWM    insulin detemir U-100  10 Units Subcutaneous QHS    levothyroxine  75 mcg Oral Before breakfast    magnesium oxide  200 mg Oral BID    polyethylene glycol  17 g Oral Daily    sodium bicarbonate  1,300 mg Oral BID    vancomycin (VANCOCIN) IVPB  1,000 mg Intravenous Q24H     Continuous Infusions:   As Needed: sodium chloride, acetaminophen, albuterol-ipratropium, calcium carbonate, dextrose 50%, dextrose 50%, glucagon (human recombinant), glucose, glucose, insulin aspart U-100, magnesium hydroxide 400 mg/5 ml, melatonin, ondansetron, oxyCODONE, polyethylene glycol, DIPH,PERTUS (ADACEL),TETANUS PF VAC (ADULT), Pharmacy to dose Vancomycin consult **AND** vancomycin - pharmacy to dose    Assessment and Plan by Problem    Septic shock_Septic arthritis of Right Knee_Sepsis due to MRSA  This is a 65 year old  male with PMH significant for HFrEF and COPD with chronic respiratory failure (on 2L home oxygen), T2DM with CKD III, and iron deficiency anemia who [facility] with cellulitis of left foot with concern for diabetic foot infection with subsequent development of MRSA bacteremia attributed to septic arthritis of right knee and elbow. He is status post drainage and coverage for MRSA since that time. His work-up for other etiologies of MRSA bacteremia have included negative LUKAS and MRI of lumbar spine looking for endocarditis and spinal abscess, respectively. Stool studies for C.diff on 09/11 were negative. His hospital course has been associated with worsening hypotension and leukocytosis since  09/10 prompting ICU admission on 09/12 for initiation of vasopressor support. Infectious work-up thus far on ICU admission concern for likely right lower lobe HAP. However;  CT abdomen pelvis on 9/15 with gallbladder dilation, sludge, and trace pericholic fluid collection. Exam not consistent with cholecystitis but given persistent shock considered a potential source. Also with potential right lower lobe atelectasis with overlying infection  S/p ICU stepdown. CK levels were rising so per infectious disease daptomycin changed back to vancomycin.   -Appreciate final ID recs: End date of IV antibiotics:  Daptomycin /vancomycin 10/7/2020  Levofloxacin 9/30/2020     Uncontrolled type 2 diabetes mellitus with hyperglycemia, with long-term current use of insulin  Diabetic polyneuropathy associated with type 2 diabetes mellitus  Takes insulin NPH-insulin regular 70/30 45 units before breakfast and 35 units before dinner.   Giving insulin detemir 29 units daily and insulin aspart 14 units with meals and correction dose scale. Monitor BGs.  Increased basal insulin slightly 9/8. BGs improved.  Due to increase in sCr, insulin regimen reduced 9/9 and 9/10. Monitor for hyperglycemia/hypoglycemia.    Type 2 DM with DKA associated with recurrent sepsis  -Endocrinology following; kept on transitional drip but has since been weaned to a basal bolus regimen.      Postablative hypothyroidism  Plan: Continue home SYNTHROID     Acute kidney injury  -likely ATN from Shock s/p requiring RRT in ICU  -trialysis line removed   -pt is non-oliguric  -Nephrology recommends sodium bicarb tabs   -Nephrology recommends ambulatory f/u on discharge     Chronic systolic congestive heart failure  -Most recent ECHO in 09/2020 with EF of 25%.   -Unclear if ischemic vs non-ischemic.   -Home regimen: Carvedilol, Lasix 80 mg, and Lisinopril.           >due to NANETTE Lisinopril on hold, but he is recovering renal function and prior to discharge to any facility  will likely need some amount of diuretic re-initiated and potentially a much lower doseage of Ace-inhibitor.   -Associated with chronic hypoxemic respiratory failure requiring 2L nasal cannula, but noted to develop worsening oxygen requirements on ICU admission that were likely multifactorial from suspected hospital acquired pneumonia versus declining urine output with pre-disposition to volume overload. .      Anemia       -Chronic, no s/s of bleeding       -Notified that SNF is not accepting this patient due to Hb of < 8. Of note, patient has chronic anemia and Hb has been stable for AT MINIMUM, the last 10 days and he has not required any transfusions. This should not be a barrier to discharge.        -transfused 1U PRBC on 10/3      COPD mixed type  -Based on PFT's from 05/2015 showing mild (small airways) obstruction, airflow not improved after bronchodilator, and moderate restriction.     Acute on chronic respiratory failure with hypoxia  Chronic respiratory failure with hypoxia, on home oxygen therapy  On 2 L nasal cannula chronically due to combination of COPD and CHF; titrate as needed.  -History of mixed COPD (based on PFT's from 05/2015 showing mild (small airways) obstruction, airflow not improved after bronchodilator, and moderate restriction) and HFrEF with chronic respiratory failure on 2L home oxygen.   -ICU admission notable for progressive increase in supplemental oxygen requirements.   -Work-up for underlying etiology of acute on chronic respiratory failure include CXR showing concern for possible progression of CHF vs HAP (not entirely convinced that CXR showed consolidation, doubt HAP)  - Breathing back to baseline now to 2L NC     Diabetic foot infection  Bacteremia due to methicillin resistant Staphylococcus aureus  Diabetic ulcer of left foot associated with type 2 diabetes mellitus, with fat layer exposed  Right knee pain / septic arthritis  Appreciate Infectious Disease, Orthopedic  Surgery. Treating with antibiotics per ID.   Arthrocentesis and cultures suggest septic knee. LUKAS to evaluate for endocarditis negative for vegetations.  Plan for I and D of the knee 9/7/2020.  Per ID: Antibiotics changed to daptomycin and ceftaroline combination therapy for 'persistent MRSA bacteremia'  -- Monitor CPK and CBC (ceftaroline can lead to leucopenia)  -- MRI lumbar spine w/wo contrast ordered  -- NM WB WBC scan for inflammatory localization pending  -- Repeat blood cultures until neagtive  -- PICC line after clearance of bacteremia, anticipate prolonged IV antibiotics on discharge.  -- ID recommends: MRI Lumbar spine/L psoas to r/o abscess. Broaden coverage to dapto/ceftaroline. Baseline CK obtained. Renal dose antibiotics.  -- s/p R knee scope I&D 9/7/20. PT/OT:  WBAT  -- Per ID: Blood cultures seem to be clearing since I&D of knee / adequate source control. Podiatry considering need for debridement. Await repeat blood cultures to be negative x 72h before placing PICC line.  -GINA's ordered by podiatry but may need vascular surgery evaluation, unfortunately he is not a candidate for contrasted studies due to NANETTE  -- DC Instructions:  Patient is to follow up with Podiatry within 10 days of discharge.  Change dressings q MWF as follows: rinse left foot wound with saline, paint wound with betadine and dress foot with 4x4 gauze, kerlix, secure with tape.     Sepsis due to DM wound, UTI, bacteremia  Due to Proteus and MRSA. Continued management with antibiotics.  Resolved.  Follow up on ID recommendations.  Discontinued Ciprofloxacine (completed 7 days, was administered for diabetic foot wound growing Proteus and urine growing Klebseilla)  -Foot wound Dressing changed 9/9 by Podiatry     Cellulitis of left lower extremity  Continued antibiotics.  Resolved     Acute kidney injury due to Urinary retention - required Newell   Bladder scan showed >500 mL. He had a Newell. ACEi held for now.  - Per Nephrology:  Given hypotension and simultaneous volume overload reccomend IVF as small volume boluses rather than continuous IVF to maintain MAPS >65.   Repeat UA and Urine culture.  Strict I/Os. Daily weights. C3/C4 levels.  - Newell removed now; no UOP recorded. Need to monitor closely       Diet: Diet diabetic [facility]; 2000 Calorie  GI Prophylaxis: Not indicated  Significant LDAs:   IV Access Type: PICC  Urinary Catheter Indication if present: Patient Does Not Have Urinary Catheter  Other Lines/Tubes/Drains:  PCCholeTube    HIGH RISK CONDITION(S):   Patient has a condition that poses threat to life and bodily function: Respiratory Distress and Acute Renal Insufficiency  Patient is currently on drug therapy requiring intensive monitoring for toxicity: Vancomycin     Goals of Care:    Previous admission:  4/15/16  Likely prognosis:  Fair  Code Status: Full Code  Comfort Only: No  Hospice: No  Goals at discharge: remain at home, with physician follow-up, with caregiver    Discharge Planning   EDY: 10/7/2020     Code Status: Full Code   Is the patient medically ready for discharge?: Yes    Reason for patient still in hospital (select all that apply): Patient trending condition, Treatment and Pending disposition  Discharge Plan A: Skilled Nursing Facility   Discharge Delays: (!) Change in Medical Condition    VTE Risk Mitigation (From admission, onward)         Ordered     heparin (porcine) injection 5,000 Units  Every 8 hours      09/11/20 0943     IP VTE HIGH RISK PATIENT  Once      08/30/20 2004     Place sequential compression device  Until discontinued      08/30/20 3883                   Gisela Mota MD  Department of Hospital Medicine   [facility]

## 2020-10-05 NOTE — PLAN OF CARE
Problem: Adult Inpatient Plan of Care  Goal: Optimal Comfort and Wellbeing  Outcome: Ongoing, Progressing     Problem: Adult Inpatient Plan of Care  Goal: Readiness for Transition of Care  Outcome: Ongoing, Progressing     Discussed w/ pt and wife plan of care, pt verbalized understanding, currently awaiting placement, pt up in chair 4-5 hours today, pt declined bath, pt dressings changed, pt denies pain/needs at this time,

## 2020-10-05 NOTE — PLAN OF CARE
Problem: Physical Therapy Goal  Goal: Physical Therapy Goal  Description: Goals to be met by: 10/05/20    Patient will increase functional independence with mobility by performin. Supine to sit with moderate assitance. -MET ()  Supine to sit with contact guard assistance.-Met 10/5  Supine to sit with supervision assistance   2. Sit to supine with moderate assistance - MET ()  Sit to supine with contact guard assistance.   3. Sit to stand transfer with moderate assistance.  4. Gait  x 10 feet with Minimal Assistance using LRAD and maintenance of weight bearing status   5. Lower extremity exercise program x10 with assistance as needed.   6. Sit for 10 minutes with Minimal Assistance while reaching out of JESIKA in all planes to perform functional activities. -MET ()  Sit for 10 minutes with Stand By Assistance while reaching out of JESIKA in all planes to perform functional activities. - MET ()  Sit for 10 minutes with Hunt while reaching out of JESIKA in all planes to perform functional activities.  7. Transfer from chair to Medi-chair with moderate assistance using stand-pivot t/f.   Outcome: Ongoing, Progressing   Continue with plan of care.   Marah Thomas, PT  10/5/2020

## 2020-10-05 NOTE — PROGRESS NOTES
Hospital Medicine  Progress note    Team: [facility] Shaan Joel MD  Admit Date: 8/30/2020  EDY 10/5/2020  Length of Stay:  LOS: 35 days   Code status: Full Code    Principal Problem:  Septic shock due to methicillin resistant Staphylococcus aureus    HPI / Hospital Course     Interval hx: No complaints this AM, Hb stable after transfusion. Patient is medically stable for discharge however does not have any accepting facilities. Will discuss with CM/SW again tomorrow.     End date of IV antibiotics:  Daptomycin 10/7/2020  Levofloxacin 9/30/2020.      9/29 's will titrate up beta blocker. Looks well wbc 9.8   9/28 WBC is 12, remains tach but steady at 120. CRP is 84.   lactate 1.1 today.Looked as well as I have seen him today. Will get EKG  End date of IV antibiotics:  Daptomycin 10/7/2020  Levofloxacin 9/30/2020.     Refused angiogram Vascular surgery signed off. Will need IV abx, SNF may be best option.    ROS     Constitutional:Negative for appetite change, chills and fever.   HENT: Negative for congestion.    Respiratory: Negative for cough and shortness of breath.    Gastrointestinal: Negative for nausea and vomiting.   Musculoskeletal: Negative for arthralgias, back pain and myalgias.   Skin: Positive for wound. Negative for color change.   Neurological: Positive for weakness. Negative for numbness.   Psychiatric/Behavioral: Negative for behavioral problems and confusion    PEx  Temp:  [98 °F (36.7 °C)-98.9 °F (37.2 °C)]   Pulse:  []   Resp:  [11-28]   BP: (104-154)/(58-84)   SpO2:  [98 %-100 %]     Intake/Output Summary (Last 24 hours) at 10/4/2020 2005  Last data filed at 10/4/2020 0818  Gross per 24 hour   Intake 240 ml   Output 885 ml   Net -645 ml       Constitutional:       General: He is not in acute distress.     Appearance: He is well-developed. He is not diaphoretic.   HENT:      Head: Normocephalic and atraumatic.      Right Ear: External ear normal.      Left Ear: External ear  normal.      Mouth/Throat:      Pharynx: No oropharyngeal exudate.   Eyes:      General: No scleral icterus.        Musculoskeletal: Normal range of motion.      Thyroid: No thyromegaly.      Trachea: No tracheal deviation.      Comments:   Cardiovascular:      Rate and Rhythm: Regular rate .   Pulmonary:      Effort: Pulmonary effort is normal. No respiratory distress.      Breath sounds: Normal breath sounds. No stridor. No wheezing or rales.   Abdominal:      General: Bowel sounds are normal. There is no distension.      Palpations: Abdomen is soft.      Tenderness: There is no abdominal tenderness. There is no guarding.   Musculoskeletal:         General: No tenderness or deformity.      Right lower leg: Edema present.      Left lower leg: Edema present.   Lymphadenopathy:      Cervical: No cervical adenopathy.   Skin:     Coloration: Skin is not pale.      Findings: No erythema or rash.   Neurological:      General: No focal deficit present.     Recent Labs   Lab 10/02/20  0320 10/03/20  0551 10/04/20  0602   WBC 9.21 7.18 8.05   HGB 7.4* 6.8* 7.7*   HCT 25.1* 23.0* 25.9*    225 260     Recent Labs   Lab 10/02/20  0320 10/03/20  0551 10/04/20  0602    138 137   K 3.3* 3.9 3.4*   CL 98 99 97   CO2 30* 27 30*   BUN 19 20 21   CREATININE 1.8* 1.8* 1.7*   * 150* 199*   CALCIUM 8.0* 8.0* 8.1*   MG 1.7 1.6 1.5*   PHOS 3.2 3.2 3.2     Recent Labs   Lab 09/28/20  0515  10/01/20  0524 10/02/20  0320 10/03/20  0551 10/04/20  0602   ALKPHOS 72  --  63  --   --   --    ALT 22  --  15  --   --   --    AST 20  --  17  --   --   --    ALBUMIN 1.6*  1.6*   < > 1.8*  1.8* 1.9* 1.8* 1.9*   PROT 6.7  --  6.6  --   --   --    BILITOT 0.6  --  0.7  --   --   --     < > = values in this interval not displayed.        Recent Labs   Lab 10/03/20  1215 10/03/20  1628 10/03/20  2110 10/04/20  0811 10/04/20  1135 10/04/20  1528   POCTGLUCOSE 151* 159* 155* 182* 130* 149*       Scheduled Meds:   artificial tears  1  drop Both Eyes TID    carvediloL  25 mg Oral BID    fluticasone furoate-vilanteroL  1 puff Inhalation Daily    fluticasone propionate  1 spray Each Nostril Daily    furosemide  40 mg Oral BID    heparin (porcine)  5,000 Units Subcutaneous Q8H    insulin aspart U-100  7 Units Subcutaneous TIDWM    insulin detemir U-100  10 Units Subcutaneous QHS    levothyroxine  75 mcg Oral Before breakfast    polyethylene glycol  17 g Oral Daily    sodium bicarbonate  1,300 mg Oral BID    vancomycin (VANCOCIN) IVPB  1,000 mg Intravenous Q24H     Continuous Infusions:  As Needed:  sodium chloride, acetaminophen, albuterol-ipratropium, calcium carbonate, dextrose 50%, dextrose 50%, glucagon (human recombinant), glucose, glucose, insulin aspart U-100, magnesium hydroxide 400 mg/5 ml, melatonin, ondansetron, oxyCODONE, polyethylene glycol, DIPH,PERTUS (ADACEL),TETANUS PF VAC (ADULT), Pharmacy to dose Vancomycin consult **AND** vancomycin - pharmacy to dose        Active Hospital Problems    Diagnosis  POA    *Septic shock due to methicillin resistant Staphylococcus aureus [A41.02, R65.21]  Yes    Diabetic polyneuropathy associated with type 2 diabetes mellitus [E11.42]  Unknown     Chronic    Cellulitis of left lower extremity [L03.116]  Unknown    PVD (peripheral vascular disease) [I73.9]  Unknown    MRSA bacteremia [R78.81, B95.62]  Yes    Acute renal insufficiency [N28.9]  Yes    Acute metabolic encephalopathy [G93.41]  No    Hospital-acquired pneumonia [J18.9, Y95]  No    Acute on chronic respiratory failure with hypoxia [J96.21]  No    Debility [R53.81]  Yes    Acute renal failure with acute tubular necrosis superimposed on stage 3 chronic kidney disease [N17.0, N18.30]  No    Septic arthritis of knee, right [M00.9]  Yes    Staphylococcal arthritis of right knee [M00.061]  Yes    Diabetic ulcer of left foot associated with type 2 diabetes mellitus, with fat layer exposed [E11.621, L97.522]  Yes     Diabetic foot infection [E11.628, L08.9]  Yes    COPD mixed type [J44.9]  Yes     Chronic    Postablative hypothyroidism [E89.0]  Yes     Chronic    Hyperlipidemia [E78.5]  Yes     High ASCVD with CAD, elevated A1c      Type 2 diabetes mellitus with stage 3 chronic kidney disease, with long-term current use of insulin [E11.22, N18.30, Z79.4]  Not Applicable     GFR> 60, uncontrolled DM      Chronic systolic congestive heart failure [I50.22]  Yes     Chronic     EF 35% in 2015 echo, improved in 5/2018. Patient with mixed ischemic and non-ischemic cardiomyopathy        Resolved Hospital Problems    Diagnosis Date Resolved POA    Endocarditis [I38] 09/12/2020 Yes    Sepsis due to methicillin resistant Staphylococcus aureus [A41.02] 09/13/2020 Yes    Type 2 diabetes mellitus with ketoacidosis without coma, with long-term current use of insulin [E11.10, Z79.4] 09/18/2020 Not Applicable     Novolin 70/30 40U in AM, 30U in PM. Elevated A1c         Assessment and Plan  / Problems managed today    Overview/Hospital Course:  Mr. Ed Patton was admitted to hospital medicine on 08/30 for management of a left-sided diabetic foot infection that was precipitated by an undetected punction wound after stepping on a tack/nail. His presentation was notable for leukocytosis with elevated inflammatory markers, however MRI of left foot only showed cellulitis of the anterior and lateral aspect of foot. Podiatry was consulted with recommendations for IV antibiotics; he was initiated on Ciprofloxacin and Vancomycin on admission. However, blood cultures from admission resulted positive for MRSA with wound cultures from left foot also growing MRSA with Proteus mirabilis. By 09/03 cultures remained positive despite Vancomycin, and patient was noted to develop right knee pain with swelling. Orthopedics was consulted and patient underwent right knee joint aspiration positive for septic arthritis with cultures also positive for MRSA.       LUKAS on 09/04 showed known chronic systolic heart failure, but was negative for endocarditis. MRI of lumbar spine on 09/08 was negative for abscess. By 09/06, blood cultures continued to remain positive, and he underwent I&D of right arm abscess on 09/08 with cultures also positive for MRSA.      For persistent MRSA bacteremia, he was transitioned to Daptomycin and Ceftaroline, however this regimen was discontinued by 09/10 due to concern for developing rhabdomylosis. By 09/09, he was noted to develop a progressively worsening leukocytosis and NANETTE. Nephrology was consulted on 09/09 with suspicion for ATN.      Hematology was consulted on 09/12 for persistent leukocytosis as likely reactive from bacteremia. On 09/10, patient began developing intermitent hypotension prompting broadening of antibiotics to Cefepime and Vancomycin. By 09/12, renal function continued to worsen in addition to hypotension prompting transfer to ICU for vasopressor support and possible HD. Patient found to be encephalopathic, central line placed and started on CRRT overnight on 9/14 with improvement in mental status, but remained encephalopathic. CT head without any acute intracranial process. Vasopressin off AM of 9/15 but remains on levophed, CT abdomen with concern for potential cholecystitis and possible atelectasis with superimposed pneumonia.  Due to the acuity of his illness is he is not a surgical candidate it and ultimately is now status post cholecystostomy tube placement for management of suspected cholecystitis     Additional complications arising during hospital course include the development metabolic encephalopathy secondary to uremia versus shock versus DKA or overall cerebral hypoperfusion from shock, reported to have agitation prior to step-down from ICU.  Also developed DKA requiring standard therapy with IV insulin infusion, however he was kept on IV insulin due to rising sugars any time it was weaned working on basal  bolus regimen for him at this time with the assistance of a endocrinology           Septic shock_Septic arthritis of Right Knee_Sespsi due to MRSA  This is a 65 year old  male with PMH significant for HFrEF and COPD with chronic respiratory failure (on 2L home oxygen), T2DM with CKD III, and iron deficiency anemia who originally presented to [facility] on 08/30 with cellulitis of left foot with concern for diabetic foot infection with subsequent development of MRSA bacteremia attributed to septic arthritis of right knee and elbow. He is status post drainage and coverage for MRSA since that time. His work-up for other etiologies of MRSA bacteremia have included negative LUKAS and MRI of lumbar spine looking for endocarditis and spinal abscess, respectively. Stool studies for C.diff on 09/11 were negative. His hospital course has been associated with worsening hypotension and leukocytosis since 09/10 prompting ICU admission on 09/12 for initiation of vasopressor support. Infectious work-up thus far on ICU admission concern for likely right lower lobe HAP.      CT abdomen pelvis on 9/15 with gallbladder dilation, sludge, and trace pericholic fluid collection. Exam not consistent with cholecystitis but given persistent shock potential source. Also with potential right lower lobe atelectasis with overlying infection  S/p ICU stepdown  -Appreciate final ID recs     Type 2 DM with DKA  -endocrinology following pt kept on transitional drip but has since been weaned to a basal bolus regimen.         Postablative hypothyroidism  Plan: Continue home SYNTHROID     Acute kidney injury  -likely ATN from Shock s/p requiring RRT in ICU  -trialysis line removed   -pt is non-oliguric  -nephrology recommends sodium bicarb tabs - order placed.   -Nephrology recommends ambulatory f/u on discharge     Chronic systolic congestive heart failure  -Most recent ECHO in 09/2020 with EF of 25%.   -Unclear is ischemic vs non-ischemic.    -Home regimen: Carvedilol, Lasix 80 mg, and Lisinopril.           >due to NANETTE Lisinopril on hold, but he is recovering renal function and prior to discharge to any facility will likely need some amount of diuretic re-initiated and potentially a much lower doseage of Ace-inhibitor.   -Associated with chronic hypoxemic respiratory failure requiring 2L nasal cannula, but noted to develop worsening oxygen requirements on ICU admission that were likely multifactorial from suspected hospital acquired pneumonia versus declining urine output with pre-disposition to volume overload. .      Anemia       -Chronic, no s/s of bleeding       -Notified that SNF is not accepting this patient due to Hb of < 8. Of note, patient has chronic anemia and Hb has been stable for AT MINIMUM, the last 10 days and he has not required any transfusions. This should not be a barrier to discharge.        -transfused 1U PRBC on 10/3     COPD mixed type  -Based on PFT's from 05/2015 showing mild (small airways) obstruction, airflow not improved after bronchodilator, and moderate restriction.     Acute on chronic respiratory failure with hypoxia  -History of mixed COPD (based on PFT's from 05/2015 showing mild (small airways) obstruction, airflow not improved after bronchodilator, and moderate restriction) and HFrEF with chronic respiratory failure on 2L home oxygen.   -ICU admission notable for progressive increase in supplemental oxygen requirements.   -Work-up for underlying etiology of acute on chronic respiratory failure include CXR showing concern for possible progression of CHF vs HAP (not entirely convinced that CXR showed consolidation)  - Breathing back to baseline now to 2L NC         Code Status: Full Code     High Risk Conditions:  Patient has a condition that poses threat to life and bodily function: Septic shock, MRSA bacteremia  Patient is currently on drug therapy requiring intensive monitoring for toxicity: Daptomycin.       Discharge Planning   EDY: 9/25/2020     Code Status: Full Code   Is the patient medically ready for discharge?: No    Reason for patient still in hospital (select all that apply): Patient trending condition  Discharge Plan A: Skilled Nursing Facility   Discharge Delays: (!) Change in Medical Condition               Goals of Care:  Return to prior functional status   Discharge plan: SNF    Time (minutes) spent in care of the patient (Greater than 1/2 spent in direct face-to-face contact)  35 minutes    Shaan Joel MD

## 2020-10-06 PROBLEM — L08.9 DIABETIC FOOT INFECTION: Status: RESOLVED | Noted: 2020-08-30 | Resolved: 2020-10-06
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
## 2020-10-06 PROBLEM — E11.621 DIABETIC ULCER OF LEFT FOOT ASSOCIATED WITH TYPE 2 DIABETES MELLITUS, WITH FAT LAYER EXPOSED: Status: RESOLVED | Noted: 2020-08-31 | Resolved: 2020-10-06

## 2020-10-06 PROBLEM — A41.02 SEPTIC SHOCK DUE TO METHICILLIN RESISTANT STAPHYLOCOCCUS AUREUS: Status: RESOLVED | Noted: 2020-09-12 | Resolved: 2020-10-06

## 2020-10-06 PROBLEM — G93.41 ACUTE METABOLIC ENCEPHALOPATHY: Status: RESOLVED | Noted: 2020-09-13 | Resolved: 2020-10-06

## 2020-10-06 PROBLEM — M00.061 STAPHYLOCOCCAL ARTHRITIS OF RIGHT KNEE: Status: RESOLVED | Noted: 2020-09-05 | Resolved: 2020-10-06

## 2020-10-06 PROBLEM — N18.30 ACUTE RENAL FAILURE WITH ACUTE TUBULAR NECROSIS SUPERIMPOSED ON STAGE 3 CHRONIC KIDNEY DISEASE: Status: RESOLVED | Noted: 2020-09-09 | Resolved: 2020-10-06

## 2020-10-06 PROBLEM — M00.9 SEPTIC ARTHRITIS OF KNEE, RIGHT: Status: RESOLVED | Noted: 2020-09-07 | Resolved: 2020-10-06

## 2020-10-06 PROBLEM — J18.9 HOSPITAL-ACQUIRED PNEUMONIA: Status: RESOLVED | Noted: 2020-09-13 | Resolved: 2020-10-06

## 2020-10-06 PROBLEM — L97.522 DIABETIC ULCER OF LEFT FOOT ASSOCIATED WITH TYPE 2 DIABETES MELLITUS, WITH FAT LAYER EXPOSED: Status: RESOLVED | Noted: 2020-08-31 | Resolved: 2020-10-06

## 2020-10-06 PROBLEM — E11.628 DIABETIC FOOT INFECTION: Status: RESOLVED | Noted: 2020-08-30 | Resolved: 2020-10-06

## 2020-10-06 PROBLEM — B95.62 MRSA BACTEREMIA: Status: RESOLVED | Noted: 2020-09-17 | Resolved: 2020-10-06

## 2020-10-06 PROBLEM — N17.0 ACUTE RENAL FAILURE WITH ACUTE TUBULAR NECROSIS SUPERIMPOSED ON STAGE 3 CHRONIC KIDNEY DISEASE: Status: RESOLVED | Noted: 2020-09-09 | Resolved: 2020-10-06

## 2020-10-06 PROBLEM — R65.21 SEPTIC SHOCK DUE TO METHICILLIN RESISTANT STAPHYLOCOCCUS AUREUS: Status: RESOLVED | Noted: 2020-09-12 | Resolved: 2020-10-06

## 2020-10-06 PROBLEM — R78.81 MRSA BACTEREMIA: Status: RESOLVED | Noted: 2020-09-17 | Resolved: 2020-10-06

## 2020-10-06 LAB
ALBUMIN SERPL BCP-MCNC: 1.8 G/DL (ref 3.5–5.2)
ANION GAP SERPL CALC-SCNC: 12 MMOL/L (ref 8–16)
BASOPHILS # BLD AUTO: 0.09 K/UL (ref 0–0.2)
BASOPHILS NFR BLD: 1.1 % (ref 0–1.9)
BUN SERPL-MCNC: 20 MG/DL (ref 8–23)
CALCIUM SERPL-MCNC: 8 MG/DL (ref 8.7–10.5)
CHLORIDE SERPL-SCNC: 98 MMOL/L (ref 95–110)
CO2 SERPL-SCNC: 27 MMOL/L (ref 23–29)
CREAT SERPL-MCNC: 1.6 MG/DL (ref 0.5–1.4)
DIFFERENTIAL METHOD: ABNORMAL
EOSINOPHIL # BLD AUTO: 1.1 K/UL (ref 0–0.5)
EOSINOPHIL NFR BLD: 13.8 % (ref 0–8)
ERYTHROCYTE [DISTWIDTH] IN BLOOD BY AUTOMATED COUNT: 17 % (ref 11.5–14.5)
EST. GFR  (AFRICAN AMERICAN): 52.2 ML/MIN/1.73 M^2
EST. GFR  (NON AFRICAN AMERICAN): 45.2 ML/MIN/1.73 M^2
GLUCOSE SERPL-MCNC: 145 MG/DL (ref 70–110)
HCT VFR BLD AUTO: 25.2 % (ref 40–54)
HGB BLD-MCNC: 7.3 G/DL (ref 14–18)
IMM GRANULOCYTES # BLD AUTO: 0.08 K/UL (ref 0–0.04)
IMM GRANULOCYTES NFR BLD AUTO: 1 % (ref 0–0.5)
LYMPHOCYTES # BLD AUTO: 1.2 K/UL (ref 1–4.8)
LYMPHOCYTES NFR BLD: 14.4 % (ref 18–48)
MAGNESIUM SERPL-MCNC: 1.6 MG/DL (ref 1.6–2.6)
MCH RBC QN AUTO: 26.2 PG (ref 27–31)
MCHC RBC AUTO-ENTMCNC: 29 G/DL (ref 32–36)
MCV RBC AUTO: 90 FL (ref 82–98)
MONOCYTES # BLD AUTO: 1.1 K/UL (ref 0.3–1)
MONOCYTES NFR BLD: 13.2 % (ref 4–15)
NEUTROPHILS # BLD AUTO: 4.7 K/UL (ref 1.8–7.7)
NEUTROPHILS NFR BLD: 56.5 % (ref 38–73)
NRBC BLD-RTO: 0 /100 WBC
PHOSPHATE SERPL-MCNC: 2.9 MG/DL (ref 2.7–4.5)
PLATELET # BLD AUTO: 304 K/UL (ref 150–350)
PMV BLD AUTO: 10.2 FL (ref 9.2–12.9)
POCT GLUCOSE: 109 MG/DL (ref 70–110)
POCT GLUCOSE: 164 MG/DL (ref 70–110)
POCT GLUCOSE: 178 MG/DL (ref 70–110)
POTASSIUM SERPL-SCNC: 3.8 MMOL/L (ref 3.5–5.1)
RBC # BLD AUTO: 2.79 M/UL (ref 4.6–6.2)
SARS-COV-2 RDRP RESP QL NAA+PROBE: NEGATIVE
SODIUM SERPL-SCNC: 137 MMOL/L (ref 136–145)
WBC # BLD AUTO: 8.27 K/UL (ref 3.9–12.7)

## 2020-10-06 PROCEDURE — 63600175 PHARM REV CODE 636 W HCPCS: Mod: HCNC | Performed by: HOSPITALIST

## 2020-10-06 PROCEDURE — 63600175 PHARM REV CODE 636 W HCPCS: Mod: HCNC | Performed by: STUDENT IN AN ORGANIZED HEALTH CARE EDUCATION/TRAINING PROGRAM

## 2020-10-06 PROCEDURE — 97530 THERAPEUTIC ACTIVITIES: CPT | Mod: HCNC

## 2020-10-06 PROCEDURE — 99233 PR SUBSEQUENT HOSPITAL CARE,LEVL III: ICD-10-PCS | Mod: HCNC,95,, | Performed by: INTERNAL MEDICINE

## 2020-10-06 PROCEDURE — 83735 ASSAY OF MAGNESIUM: CPT | Mod: HCNC

## 2020-10-06 PROCEDURE — 85025 COMPLETE CBC W/AUTO DIFF WBC: CPT | Mod: HCNC

## 2020-10-06 PROCEDURE — 25000003 PHARM REV CODE 250: Mod: HCNC | Performed by: STUDENT IN AN ORGANIZED HEALTH CARE EDUCATION/TRAINING PROGRAM

## 2020-10-06 PROCEDURE — 25000003 PHARM REV CODE 250: Mod: HCNC | Performed by: INTERNAL MEDICINE

## 2020-10-06 PROCEDURE — 97110 THERAPEUTIC EXERCISES: CPT | Mod: HCNC,CQ

## 2020-10-06 PROCEDURE — 80069 RENAL FUNCTION PANEL: CPT | Mod: HCNC

## 2020-10-06 PROCEDURE — 97530 THERAPEUTIC ACTIVITIES: CPT | Mod: HCNC,CQ

## 2020-10-06 PROCEDURE — 36415 COLL VENOUS BLD VENIPUNCTURE: CPT | Mod: HCNC

## 2020-10-06 PROCEDURE — 99233 SBSQ HOSP IP/OBS HIGH 50: CPT | Mod: HCNC,95,, | Performed by: INTERNAL MEDICINE

## 2020-10-06 PROCEDURE — 25000242 PHARM REV CODE 250 ALT 637 W/ HCPCS: Mod: HCNC | Performed by: STUDENT IN AN ORGANIZED HEALTH CARE EDUCATION/TRAINING PROGRAM

## 2020-10-06 PROCEDURE — 20600001 HC STEP DOWN PRIVATE ROOM: Mod: HCNC

## 2020-10-06 PROCEDURE — 25000003 PHARM REV CODE 250: Mod: HCNC | Performed by: HOSPITALIST

## 2020-10-06 PROCEDURE — 97535 SELF CARE MNGMENT TRAINING: CPT | Mod: HCNC

## 2020-10-06 PROCEDURE — U0002 COVID-19 LAB TEST NON-CDC: HCPCS | Mod: HCNC

## 2020-10-06 RX ORDER — INSULIN ASPART 100 [IU]/ML
5 INJECTION, SOLUTION INTRAVENOUS; SUBCUTANEOUS
Status: DISCONTINUED | OUTPATIENT
Start: 2020-10-07 | End: 2020-10-09 | Stop reason: HOSPADM

## 2020-10-06 RX ADMIN — Medication 200 MG: at 08:10

## 2020-10-06 RX ADMIN — INSULIN ASPART 7 UNITS: 100 INJECTION, SOLUTION INTRAVENOUS; SUBCUTANEOUS at 04:10

## 2020-10-06 RX ADMIN — FLUTICASONE PROPIONATE 50 MCG: 50 SPRAY, METERED NASAL at 08:10

## 2020-10-06 RX ADMIN — SODIUM BICARBONATE 1300 MG: 650 TABLET ORAL at 08:10

## 2020-10-06 RX ADMIN — HYPROMELLOSE 2910 1 DROP: 5 SOLUTION OPHTHALMIC at 02:10

## 2020-10-06 RX ADMIN — INSULIN DETEMIR 10 UNITS: 100 INJECTION, SOLUTION SUBCUTANEOUS at 08:10

## 2020-10-06 RX ADMIN — HYPROMELLOSE 2910 1 DROP: 5 SOLUTION OPHTHALMIC at 08:10

## 2020-10-06 RX ADMIN — VANCOMYCIN HYDROCHLORIDE 1000 MG: 1 INJECTION, POWDER, LYOPHILIZED, FOR SOLUTION INTRAVENOUS at 08:10

## 2020-10-06 RX ADMIN — CARVEDILOL 25 MG: 25 TABLET, FILM COATED ORAL at 08:10

## 2020-10-06 RX ADMIN — HEPARIN SODIUM 5000 UNITS: 5000 INJECTION INTRAVENOUS; SUBCUTANEOUS at 05:10

## 2020-10-06 RX ADMIN — INSULIN ASPART 7 UNITS: 100 INJECTION, SOLUTION INTRAVENOUS; SUBCUTANEOUS at 08:10

## 2020-10-06 RX ADMIN — FUROSEMIDE 40 MG: 40 TABLET ORAL at 07:10

## 2020-10-06 RX ADMIN — POLYETHYLENE GLYCOL 3350 17 G: 17 POWDER, FOR SOLUTION ORAL at 08:10

## 2020-10-06 RX ADMIN — HEPARIN SODIUM 5000 UNITS: 5000 INJECTION INTRAVENOUS; SUBCUTANEOUS at 02:10

## 2020-10-06 RX ADMIN — HEPARIN SODIUM 5000 UNITS: 5000 INJECTION INTRAVENOUS; SUBCUTANEOUS at 08:10

## 2020-10-06 RX ADMIN — FUROSEMIDE 40 MG: 40 TABLET ORAL at 08:10

## 2020-10-06 RX ADMIN — FLUTICASONE FUROATE AND VILANTEROL TRIFENATATE 1 PUFF: 200; 25 POWDER RESPIRATORY (INHALATION) at 09:10

## 2020-10-06 RX ADMIN — LEVOTHYROXINE SODIUM 75 MCG: 25 TABLET ORAL at 05:10

## 2020-10-06 NOTE — PT/OT/SLP PROGRESS
"Physical Therapy Treatment  -cotx with OT d/t assistance required for skilled treatment    Patient Name:  Ed Patton   MRN:  [id number]    Recommendations:     Discharge Recommendations:  nursing facility, skilled   Discharge Equipment Recommendations: bath bench, wheelchair   Barriers to discharge: Decreased caregiver support and decreased functional mobility    Assessment:     Ed Patton is a 65 y.o. male admitted with a medical diagnosis of Septic shock due to methicillin resistant Staphylococcus aureus.  He presents with the following impairments/functional limitations:  weakness, impaired endurance, impaired self care skills, impaired functional mobilty, gait instability, impaired balance, decreased coordination, decreased upper extremity function, decreased lower extremity function, decreased safety awareness, pain, edema, impaired cardiopulmonary response to activity, orthopedic precautions. Pt continues to present as self-limiting with pt demonstrating poor initiation of transfers or attempting to sit mid-transfer for c/o "legs feeling weak". Pt educated that attempting transfers, especially with assist of two therapist was beneficial but only with constant effort from pt. No learning noted with pt stating he just feels weak and doesn't want to fall. Pt will continue to benefit from therapy services to address impairments listed above.     Rehab Prognosis: Fair; patient would benefit from acute skilled PT services to address these deficits and reach maximum level of function.    Recent Surgery: Procedure(s) (LRB):  ARTHROSCOPY, KNEE, RIGHT  (Right) 29 Days Post-Op    Plan:     During this hospitalization, patient to be seen 4 x/week to address the identified rehab impairments via gait training, therapeutic activities, therapeutic exercises, neuromuscular re-education and progress toward the following goals:    · Plan of Care Expires:  10/18/20    Subjective     Chief Complaint: weakness / back " pain  Pain/Comfort:  · Pain Rating 1: 0/10  · Pain Addressed 1: Distraction, Cessation of Activity  · Pain Rating Post-Intervention 1: (c/o pain in low back with sitting at EOB)      Objective:     Communicated with NSG prior to session.  Patient found HOB elevated with telemetry, nephrostomy, pulse ox (continuous) upon PTA/OT entry to room. Pt with LLE hanging out of bed.     General Precautions: Standard, contact, fall   Orthopedic Precautions:LLE weight bearing as tolerated   Braces: (L Darco shoe)     Functional Mobility:  · Bed Mobility:     · Scooting: minimum assistance  · Supine to Sit: stand by assistance  · Sit to Supine: minimum assistance  · Transfers:     · Sit to Stand:  maximal assistance, total assistance and of 2 persons with no AD ; pt with brief period of effort on first standing attempt, pt with no effort to stand on second attempt - resisting therapist assistance      AM-PAC 6 CLICK MOBILITY  Turning over in bed (including adjusting bedclothes, sheets and blankets)?: 3  Sitting down on and standing up from a chair with arms (e.g., wheelchair, bedside commode, etc.): 2  Moving from lying on back to sitting on the side of the bed?: 3  Moving to and from a bed to a chair (including a wheelchair)?: 2  Need to walk in hospital room?: 1  Climbing 3-5 steps with a railing?: 1  Basic Mobility Total Score: 12     Therex:  Pt performs BLE LAQ and Seated Marching in between standing attempts. X 15-20 reps. Strong contractions noted.     Patient left HOB elevated with all lines intact and call button in reach..    GOALS:   Multidisciplinary Problems     Physical Therapy Goals        Problem: Physical Therapy Goal    Goal Priority Disciplines Outcome Goal Variances Interventions   Physical Therapy Goal     PT, PT/OT Ongoing, Progressing     Description: Goals to be met by: 10/05/20    Patient will increase functional independence with mobility by performin. Supine to sit with moderate assitance. -MET  (9/28)  Supine to sit with contact guard assistance.-Met 10/5  Supine to sit with supervision assistance   2. Sit to supine with moderate assistance - MET (9/30)  Sit to supine with contact guard assistance.   3. Sit to stand transfer with moderate assistance.  4. Gait  x 10 feet with Minimal Assistance using LRAD and maintenance of weight bearing status   5. Lower extremity exercise program x10 with assistance as needed.   6. Sit for 10 minutes with Minimal Assistance while reaching out of JESIKA in all planes to perform functional activities. -MET (9/21)  Sit for 10 minutes with Stand By Assistance while reaching out of JESIKA in all planes to perform functional activities. - MET (9/25)  Sit for 10 minutes with [place] while reaching out of JESIKA in all planes to perform functional activities.  7. Transfer from chair to Medi-chair with moderate assistance using stand-pivot t/f.                    Time Tracking:     PT Received On: 10/06/20  PT Start Time: 0921     PT Stop Time: 0951  PT Total Time (min): 30 min     Billable Minutes: Therapeutic Activity 15 and Therapeutic Exercise 10    Treatment Type: Treatment  PT/PTA: PTA     PTA Visit Number: 1     Bill Munroe, ALICE  10/06/2020

## 2020-10-06 NOTE — PLAN OF CARE
10/06/20 1334   Discharge Reassessment   Assessment Type Discharge Planning Reassessment   Discharge Plan A Skilled Nursing Facility   Discharge Plan B Home Health   DME Needed Upon Discharge  other (see comments)  (TBD)   Anticipated Discharge Disposition SNF   Post-Acute Status   Post-Acute Authorization Placement   Post-Acute Placement Status Referrals Sent

## 2020-10-06 NOTE — PLAN OF CARE
Problem: Adult Inpatient Plan of Care  Goal: Readiness for Transition of Care  Outcome: Ongoing, Progressing     Pt had one large BM today 10/6/20

## 2020-10-06 NOTE — PLAN OF CARE
Problem: Occupational Therapy Goal  Goal: Occupational Therapy Goal  Description: Goals to be met by: 10/18/20    Patient will increase functional independence with ADLs by performing:    Feeding with Set-up Assistance.  Grooming while EOB with Minimal Assistance. Goal met  Toileting from bedside commode with Moderate Assistance for hygiene and clothing management.   Supine to sit with Moderate Assistance to increased bed mobility independence. - met 10/2  Stand pivot transfers with Moderate Assistance and maintaining weight-bearing precaution(s) to reach bedside chair.  Toilet transfer to bedside commode with Moderate Assistance and maintaining weight-bearing precaution(s).  Upper extremity exercise program x15 reps per handout, with supervision to improve BUE function to increase independence in daily occupations.    Outcome: Ongoing, Progressing     Goals reviewed and remain appropriate, continue POC    ELISEO Ridley  10/6/2020   Pager #: [phone]

## 2020-10-06 NOTE — PLAN OF CARE
Problem: Adult Inpatient Plan of Care  Goal: Optimal Comfort and Wellbeing  Outcome: Ongoing, Progressing     Problem: Adult Inpatient Plan of Care  Goal: Readiness for Transition of Care  Outcome: Ongoing, Progressing     Discussed w/ pt plan of care, pt verbalized understanding, pt denies pain/needs at this time, pt converted to a-flutter per telemetry, notified provider, provider stated as long as pt is asymptomatic and less than 120bpm he will just be monitored while here, had pt perform 10x each leg -leg raises while in pt room, pt cooperative will continue to monitor

## 2020-10-06 NOTE — PROGRESS NOTES
Pharmacokinetic Assessment Follow Up: IV Vancomycin    Vancomycin Assessment and Regimen Plan:  - Vancomycin trough level (11-hour) resulted at 15.5 mcg/mL, which is considered therapeutic  - Renal function remains stable (serum creatinine 1.5-1.7 mg/dL)  - Continue vancomycin 1000 mg IV every 24 hours  - Will schedule next vancomycin trough for 10/7 @ 2000 or sooner if renal function changes    Drug levels (last 3 results):  Recent Labs   Lab Result Units 10/03/20  1815 10/05/20  2017   Vancomycin, Random ug/mL 14.0  --    Vancomycin-Trough ug/mL  --  15.6       Pharmacy will continue to follow and monitor vancomycin.    Please contact pharmacy at extension [phone] for questions regarding this assessment.    Thank you for the consult,   Sintia Rodriguez       Patient brief summary:  Ed Patton is a 65 y.o. male initiated on antimicrobial therapy with IV Vancomycin for treatment of MRSA bacteremia      Drug Allergies:   Review of patient's allergies indicates:   Allergen Reactions    Vicodin [hydrocodone-acetaminophen] Itching       Actual Body Weight:   115.6 kg    Renal Function:   Estimated Creatinine Clearance: 70.1 mL/min (A) (based on SCr of 1.5 mg/dL (H)).     Dialysis Method (if applicable):  N/A

## 2020-10-06 NOTE — PT/OT/SLP PROGRESS
"Occupational Therapy   Treatment    Name: Ed Patton  MRN: [id number]  Admitting Diagnosis:  Septic shock due to methicillin resistant Staphylococcus aureus  29 Days Post-Op  *co-treatment with PTA  Recommendations:     Discharge Recommendations: nursing facility, skilled  Discharge Equipment Recommendations:  wheelchair, bath bench  Barriers to discharge:  (requires increased level of assistance)    Assessment:     Ed Patton is a 65 y.o. male with a medical diagnosis of Septic shock due to methicillin resistant Staphylococcus aureus.  He presents with HOB elevated with fair participation this date. Pt continues to display self limiting behaviors, decreased safety awareness, and poor response to cuing. Pt frequently expressing fear of falling requiring reassurance for safety.  Performance deficits affecting function are impaired endurance, impaired sensation, weakness, impaired self care skills, impaired functional mobilty, gait instability, impaired cognition, decreased coordination, decreased upper extremity function, decreased lower extremity function, decreased safety awareness, pain, edema, orthopedic precautions, impaired cardiopulmonary response to activity. Pt would benefit from skilled OT services in order to maximize independence with ADLs and facilitate safe discharge. Pt would benefit from SNF upon discharge to return to [facility] and decrease burden of care.     Rehab Prognosis:  Fair; patient would benefit from acute skilled OT services to address these deficits and reach maximum level of function.       Plan:     Patient to be seen 3 x/week to address the above listed problems via self-care/home management, therapeutic activities, therapeutic exercises, neuromuscular re-education  · Plan of Care Expires: 10/21/20  · Plan of Care Reviewed with: patient    Subjective     "why can't I just sit up at the edge I really don't want to stand. I am going to fall"    Pain/Comfort:  · Pain Rating 1: " 0/10    Objective:     Communicated with: RN and PTA prior to session.  Patient found HOB elevated with LLE hanging off side of bed with pulse ox (continuous), telemetry, nephrostomy(NC in nose; not turned on and humidified cartrige empty; RN notified) upon OT entry to room.    General Precautions: Standard, contact, fall   Orthopedic Precautions:LLE weight bearing as tolerated   Braces: (L darco)     Occupational Performance:     Bed Mobility:    · Patient completed Supine to Sit with stand by assistance  · Patient completed Sit to Supine with contact guard assistance     Functional Mobility/Transfers:  · Patient completed Sit <> Stand Transfer with maximal assistance and total assistance  with  hand-held assist    · 1st trial completed EOB with max A x2 persons  · 2nd trial completed EOB with total A x1 person  · Pt with poor response to cuing, attempting to sit and minimal initiation of stand  · Functional Mobility: NT    Activities of Daily Living:  · Grooming: set-up A to perform facial hygiene seated EOB  · Bathing: total assistance to bathe back with bath wipe seated EOB  · Lower Body Dressing: maximal assistance to don L darco shoe and R sock seated EOB  · Toileting: total assistance pt noted to be soiled upon 1st attempt sit<>stand; pt required total A for cleaning in standing; unable to achieve full stand, pt returned to L sidelying and required total A for cleaning and maribell pad change      AMPA 6 Click ADL: 14    Treatment & Education:  -Therapist provided facilitation and instruction of proper body mechanics, energy conservation, and fall prevention strategies during tasks listed above.  -Co-tx with PTA performed due to need for education and assistance from two skilled therapy disciplines at pt's current functional level.   -Pt educated on benefits of participating in therapy and his self-limiting behaviors hindering his participation in therapy  -Pt educated on role of OT, POC and goals for  therapy  -Pt educated on importance of OOB activities with staff member assistance and sitting OOB majority of the day.   -Pt verbalized understanding. Pt expressed no further concerns/questions  -Whiteboard updated    Patient left HOB elevated with all lines intact, call button in reach and RN notifiedEducation:      GOALS:   Multidisciplinary Problems     Occupational Therapy Goals        Problem: Occupational Therapy Goal    Goal Priority Disciplines Outcome Interventions   Occupational Therapy Goal     OT, PT/OT Ongoing, Progressing    Description: Goals to be met by: 10/18/20    Patient will increase functional independence with ADLs by performing:    Feeding with Set-up Assistance.  Grooming while EOB with Minimal Assistance. Goal met  Toileting from bedside commode with Moderate Assistance for hygiene and clothing management.   Supine to sit with Moderate Assistance to increased bed mobility independence. - met 10/2  Stand pivot transfers with Moderate Assistance and maintaining weight-bearing precaution(s) to reach bedside chair.  Toilet transfer to bedside commode with Moderate Assistance and maintaining weight-bearing precaution(s).  Upper extremity exercise program x15 reps per handout, with supervision to improve BUE function to increase independence in daily occupations.                     Time Tracking:     OT Date of Treatment: 10/06/20  OT Start Time: 0920  OT Stop Time: 0950  OT Total Time (min): 30 min    Billable Minutes:Self Care/Home Management 20  Therapeutic Activity 10    Alyssa Thompson OT  10/6/2020

## 2020-10-06 NOTE — PLAN OF CARE
CARMINA spoke with Brent with admissions at [facility] & was told that they are still reviewing updates sent via [employer] this morning.  CARMINA also explained that after speaking with Dr. REGGIE Joel, this is pt's baseline as he has chronic anemia.  CARMINA also sent this documentation via [employer].  Brent stated that he would pass information on to Maggy and CARMINA will continue to F/U for DC on 10/7.  CARMINA will continue to F/U.        Babs Humphreys, CARMINA  Ext [phone]

## 2020-10-06 NOTE — PLAN OF CARE
POC reviewed w/ pt, questions and concerns addressed. No acute events thru the night. All vital signs stable, pt still in sinus tach. No complaints. AOX4. Safety maintained. Bed locked, in lowest position, call light in reach, side rails x2. Mobilized pt to highest level of functioning. See doc flowsheets for further info. Will continue to monitor.

## 2020-10-06 NOTE — PROGRESS NOTES
"    [facility] Medicine  Telemedicine Progress Note    Patient Name: Ed Patton  MRN: [id number]  Patient Class: IP- Inpatient   Admission Date: 8/30/2020  Length of Stay: 37 days  Attending Physician: Gisela Mota MD  Primary Care Provider: Qiana Chow MD    [facility] Medicine Team: [facility] VIRTUAL TEAM 10 Gisela Mota MD  Virtual Telemedicine Progress Note  Start time: 1138  Chief complaint: Septic shock due to methicillin resistant Staphylococcus aureus  The patient location is: 92/92 A  The patient arrived at: 8/30/2020  2:18 PM  Present with the patient at the time of the telemed/virtual assessment: telepresenter   End time:  1142  Total time spent with patient: 4 min  I have assessed findings virtually using a telemedicine platform and with assistance of the bedside nurse or telemedicine presenter.  The attending portion of this evaluation, treatment, and documentation was performed per Gisela Mota MD via audiovisual.    Patient was transferred to the telemedicine service on: 10/5/2020    Subjective:     Admission CC:   Chief Complaint   Patient presents with    Frequent Falls     frequent falls, weakness, "stepped on a tack" left leg now swollen.     Leg Swelling     Follow up visit for: Septic shock due to methicillin resistant Staphylococcus aureus    Interval History / Events Overnight:   The patient is able to provide adequate history. Additional history was obtained from past medical records and chart review. No significant events reported by Nursing. UOP not recorded. Patient converted from NSR back to A-flutter.  Patient complains of nothing specific. Symptoms have been unchanged since yesterday. Associated symptoms include: fatigue. Symptoms are stable.     Data reviewed 10/6/2020: Lab test(s) reviewed: H&H stable. Hypomagnesemia    Review of Systems   Constitutional: Negative for fever.   Respiratory: Negative for shortness of breath.  "     Objective:     Vital Signs (Most Recent):  Temp: 97.6 °F (36.4 °C) (10/06/20 1557)  Pulse: (!) 116 (10/06/20 1557)  Resp: 17 (10/06/20 1557)  BP: 113/68 (10/06/20 1557)  SpO2: 96 % (10/06/20 1557) Vital Signs (24h Range):  Temp:  [97.5 °F (36.4 °C)-99 °F (37.2 °C)] 97.6 °F (36.4 °C)  Pulse:  [] 116  Resp:  [12-34] 17  SpO2:  [91 %-100 %] 96 %  BP: (111-132)/(62-85) 113/68     Weight: 115.6 kg (254 lb 13.6 oz)  Body mass index is 31.02 kg/m².    Intake/Output Summary (Last 24 hours) at 10/6/2020 1721  Last data filed at 10/6/2020 0522  Gross per 24 hour   Intake 0 ml   Output 570 ml   Net -570 ml      Physical Exam  Constitutional:       General: He is not in acute distress.     Appearance: Normal appearance. He is not diaphoretic.   Eyes:      General: Lids are normal. No scleral icterus.        Right eye: No discharge.         Left eye: No discharge.      Conjunctiva/sclera: Conjunctivae normal.   Neck:      Trachea: Phonation normal.   Cardiovascular:      Rate and Rhythm: Tachycardia present.   Pulmonary:      Effort: Pulmonary effort is normal. No tachypnea, accessory muscle usage or respiratory distress.   Abdominal:      General: There is no distension.   Neurological:      Mental Status: He is alert. Mental status is at baseline. He is not disoriented.   Psychiatric:         Attention and Perception: Attention normal.         Mood and Affect: Affect normal.         Behavior: Behavior is cooperative.         Significant Labs:     Recent Labs   Lab 08/31/20  0434   HGBA1C 9.5*     Recent Labs   Lab 10/06/20  0809 10/06/20  1204 10/06/20  1643   POCTGLUCOSE 164* 109 178*     Recent Labs   Lab 10/04/20  0602 10/05/20  0950 10/06/20  0543   WBC 8.05 7.55 8.27   HGB 7.7* 7.6* 7.3*   HCT 25.9* 26.1* 25.2*    277 304     Recent Labs   Lab 10/04/20  0602 10/05/20  0950 10/06/20  0543   GRAN 62.0  5.0 59.7  4.5 56.5  4.7   LYMPH 13.2*  1.1 13.8*  1.0 14.4*  1.2   MONO 9.7  0.8 11.0  0.8 13.2   1.1*   EOS 1.1* 1.0* 1.1*     Recent Labs   Lab 10/04/20  0602 10/05/20  0950 10/06/20  0543    137 137   K 3.4* 3.4* 3.8   CL 97 98 98   CO2 30* 30* 27   BUN 21 19 20   CREATININE 1.7* 1.5* 1.6*   * 135* 145*   CALCIUM 8.1* 8.1* 8.0*   ALBUMIN 1.9* 1.9*  1.9* 1.8*   MG 1.5* 1.5* 1.6   PHOS 3.2 2.8 2.9     Recent Labs   Lab 10/01/20  0524 10/05/20  0950   ALKPHOS 63 62   ALT 15 11   AST 17 11   PROT 6.6 6.7   BILITOT 0.7 0.8   CRP  --  43.5*     Recent Labs   Lab 10/01/20  0524 10/03/20  0551 10/05/20  0950   CPK 65 54 51     Recent Labs   Lab 09/03/20  1538 09/10/20  1514 09/12/20 2015 09/20/20  0432 09/27/20  0616 09/28/20  0823 10/04/20  0602   PROCAL 0.13 0.26*  --   --   --   --   --    LACTATE  --  1.2 1.4  --   --  1.1  --    SEDRATE  --   --   --  110* 103*  --  69*     Results for orders placed or performed during the hospital encounter of 08/30/20   Vitamin D   Result Value Ref Range    Vit D, 25-Hydroxy 35 30 - 96 ng/mL     No results found for this or any previous visit.  SARS-CoV2 (COVID-19) Qualitative PCR (no units)   Date Value   09/10/2020 Not Detected   09/03/2020 Not Detected     SARS-CoV-2 RNA, Amplification, Qual (no units)   Date Value   10/06/2020 Negative   09/23/2020 Negative   09/07/2020 Negative   08/30/2020 Negative       ECG Results          EKG 12-lead (Final result)  Result time 09/01/20 05:02:05    Final result by Interface, Lab In [facility] (09/01/20 05:02:05)                 Narrative:    Test Reason : W19.XXXA,    Vent. Rate : 101 BPM     Atrial Rate : 234 BPM     P-R Int : 000 ms          QRS Dur : 098 ms      QT Int : 308 ms       P-R-T Axes : 000 -55 035 degrees     QTc Int : 399 ms    Age and gender specific analysis  Atrial fibrillation with rapid ventricular response  Left axis deviation  Cannot rule out Septal infarct ,age undetermined  Nonspecific ST and/or T wave abnormalities  Abnormal ECG  When compared with ECG of 01-MAY-2015 10:32,  Atrial fibrillation has  replaced Sinus rhythm  Confirmed by Danish Linda MD ([id number]) on 9/1/2020 5:01:56 AM    Referred By: [email or web address]   SELF           Confirmed By:Danish Linda MD                              Results for orders placed during the hospital encounter of 08/30/20   Echo Color Flow Doppler? Yes    Narrative · Concentric left ventricular remodeling.  · Moderately decreased left ventricular systolic function. The estimated   ejection fraction is 35%.  · Moderately to severely reduced right ventricular systolic function.  · Left ventricular diastolic dysfunction.  · The estimated PA systolic pressure is 44 mmHg.  · Normal central venous pressure (3 mmHg).          VAS US Arterial Leg Left  Indication  ========    Peripheral Vascular Disease    Lower Extremity Arterial Imaging  ========================    Left Lower Extremity  Lt mid CFA PSV 88 cm/s  Lt prox DFA PSV    88 cm/s  Lt prox SFA PSV    64 cm/s  Lt mid SFA PSV 75 cm/s  Lt distal SFA PSV  53 cm/s  Lt prox POP PSV    48 cm/s  Lt mid POP  cm/s  Lt distal POP PSV  74 cm/s  Lt prox LICHA PSV    186 cm/s  Lt mid LICHA PSV 40 cm/s  Lt distal LICHA PSV  54 cm/s  Lt prox PTA PSV    37 cm/s  Lt mid PTA PSV 62 cm/s  Lt distal PTA PSV  39 cm/s    Impression  =========    Duplex imaging of the right lower extremity arterial tree reveals a velocity elevation of 67 cm/sec to 186 cm/sec in the prox LICHA with a  visual narrowing. These findings suggest a > 50% hemodynamically significant stenosis. There is another velocity elevation of 48  cm/sec to 106 cm/sec with a slight visual narrowing, which suggests an approximately 50% hemodynamically significant stenosis.  Branches are noted at the PTA.    DATE OF SERVICE: 09/22/2020                                                      Sonographer: LYNDA THORNTON RVT  Electronically Signed by: LUCIA Tsang M.D. at 09/23/2020-11:36      Assessment/Plan:      Active Diagnoses:    Diagnosis Date Noted POA    PRINCIPAL PROBLEM:  Septic shock due to  methicillin resistant Staphylococcus aureus [A41.02, R65.21] 09/12/2020 Yes    Diabetic polyneuropathy associated with type 2 diabetes mellitus [E11.42]  Yes     Chronic    Cellulitis of left lower extremity [L03.116]  Unknown    PVD (peripheral vascular disease) [I73.9]  Unknown    MRSA bacteremia [R78.81, B95.62] 09/17/2020 Yes    Acute renal insufficiency [N28.9]  Yes    Acute metabolic encephalopathy [G93.41] 09/13/2020 No    Hospital-acquired pneumonia [J18.9, Y95] 09/13/2020 No    Acute on chronic respiratory failure with hypoxia [J96.21] 09/13/2020 No    Debility [R53.81]  Yes    Acute renal failure with acute tubular necrosis superimposed on stage 3 chronic kidney disease [N17.0, N18.30] 09/09/2020 No    Septic arthritis of knee, right [M00.9] 09/07/2020 Yes    Staphylococcal arthritis of right knee [M00.061] 09/05/2020 Yes    Diabetic ulcer of left foot associated with type 2 diabetes mellitus, with fat layer exposed [E11.621, L97.522] 08/31/2020 Yes    Diabetic foot infection [E11.628, L08.9] 08/30/2020 Yes    COPD mixed type [J44.9] 10/15/2019 Yes     Chronic    Postablative hypothyroidism [E89.0] 12/06/2018 Yes     Chronic    Hyperlipidemia [E78.5] 08/25/2015 Yes    Type 2 diabetes mellitus with stage 3 chronic kidney disease, with long-term current use of insulin [E11.22, N18.30, Z79.4] 12/23/2014 Not Applicable    Chronic systolic congestive heart failure [I50.22] 05/07/2013 Yes     Chronic      Problems Resolved During this Admission:    Diagnosis Date Noted Date Resolved POA    Endocarditis [I38]  09/12/2020 Yes    Sepsis due to methicillin resistant Staphylococcus aureus [A41.02] 08/30/2020 09/13/2020 Yes    Type 2 diabetes mellitus with ketoacidosis without coma, with long-term current use of insulin [E11.10, Z79.4] 10/09/2017 09/18/2020 Not Applicable       Overview / ICU Course:    Ed Patton is a 65 y.o. male admitted for Septic shock due to methicillin resistant  Staphylococcus aureus.    Inpatient Medications Prescribed for Management of Current Problems:     Scheduled Meds:    artificial tears  1 drop Both Eyes TID    carvediloL  25 mg Oral BID    fluticasone furoate-vilanteroL  1 puff Inhalation Daily    fluticasone propionate  1 spray Each Nostril Daily    furosemide  40 mg Oral BID    heparin (porcine)  5,000 Units Subcutaneous Q8H    insulin aspart U-100  7 Units Subcutaneous TIDWM    insulin detemir U-100  10 Units Subcutaneous QHS    levothyroxine  75 mcg Oral Before breakfast    magnesium oxide  200 mg Oral BID    polyethylene glycol  17 g Oral Daily    sodium bicarbonate  1,300 mg Oral BID    vancomycin (VANCOCIN) IVPB  1,000 mg Intravenous Q24H     Continuous Infusions:   As Needed: sodium chloride, acetaminophen, albuterol-ipratropium, calcium carbonate, dextrose 50%, dextrose 50%, glucagon (human recombinant), glucose, glucose, insulin aspart U-100, magnesium hydroxide 400 mg/5 ml, melatonin, ondansetron, oxyCODONE, polyethylene glycol, DIPH,PERTUS (ADACEL),TETANUS PF VAC (ADULT), Pharmacy to dose Vancomycin consult **AND** vancomycin - pharmacy to dose    Assessment and Plan by Problem    Septic shock_Septic arthritis of Right Knee_Sepsis due to MRSA  This is a 65 year old  male with PMH significant for HFrEF and COPD with chronic respiratory failure (on 2L home oxygen), T2DM with CKD III, and iron deficiency anemia who originally presented to [facility] on 08/30 with cellulitis of left foot with concern for diabetic foot infection with subsequent development of MRSA bacteremia attributed to septic arthritis of right knee and elbow. He is status post drainage and coverage for MRSA since that time. His work-up for other etiologies of MRSA bacteremia have included negative LUKAS and MRI of lumbar spine looking for endocarditis and spinal abscess, respectively. Stool studies for C.diff on 09/11 were negative. His hospital course has been  associated with worsening hypotension and leukocytosis since 09/10 prompting ICU admission on 09/12 for initiation of vasopressor support. Infectious work-up thus far on ICU admission concern for likely right lower lobe HAP. However;  CT abdomen pelvis on 9/15 with gallbladder dilation, sludge, and trace pericholic fluid collection. Exam not consistent with cholecystitis but given persistent shock considered a potential source. Also with potential right lower lobe atelectasis with overlying infection  S/p ICU stepdown. CK levels were rising so per infectious disease daptomycin changed back to vancomycin.   -Appreciate final ID recs: End date of IV antibiotics:  Daptomycin /vancomycin 10/7/2020  Levofloxacin 9/30/2020     Uncontrolled type 2 diabetes mellitus with hyperglycemia, with long-term current use of insulin  Diabetic polyneuropathy associated with type 2 diabetes mellitus  Takes insulin NPH-insulin regular 70/30 45 units before breakfast and 35 units before dinner.   Giving insulin detemir 29 units daily and insulin aspart 14 units with meals and correction dose scale. Monitor BGs.  Increased basal insulin slightly 9/8. BGs improved.  Due to increase in sCr, insulin regimen reduced 9/9 and 9/10. Monitor for hyperglycemia/hypoglycemia.    Type 2 DM with DKA associated with recurrent sepsis  -Endocrinology consulted; kept on transitional drip but has since been weaned to a basal bolus regimen.      Postablative hypothyroidism  Plan: Continue home SYNTHROID     Acute kidney injury  -likely ATN from Shock s/p requiring RRT in ICU  -trialysis line removed   -pt is non-oliguric  -Nephrology recommends sodium bicarb tabs   -Nephrology recommends ambulatory f/u on discharge     Chronic systolic congestive heart failure  -Most recent ECHO in 09/2020 with EF of 25%.   -Unclear if ischemic vs non-ischemic.   -Home regimen: Carvedilol, Lasix 80 mg, and Lisinopril.           >due to NANETTE Lisinopril on hold, but he is  recovering renal function and prior to discharge to any facility will likely need some amount of diuretic re-initiated and potentially a much lower doseage of Ace-inhibitor.   -Associated with chronic hypoxemic respiratory failure requiring 2L nasal cannula, but noted to develop worsening oxygen requirements on ICU admission that were likely multifactorial from suspected hospital acquired pneumonia versus declining urine output with pre-disposition to volume overload. .      Anemia       -Chronic, no s/s of bleeding       -Notified that SNF is not accepting this patient due to Hb of < 8. Of note, patient has chronic anemia and Hb has been stable for AT MINIMUM, the last 10 days and he has not required any transfusions. This should not be a barrier to discharge.        -transfused 1U PRBC on 10/3      COPD mixed type  -Based on PFT's from 05/2015 showing mild (small airways) obstruction, airflow not improved after bronchodilator, and moderate restriction.     Acute on chronic respiratory failure with hypoxia  Chronic respiratory failure with hypoxia, on home oxygen therapy  On 2 L nasal cannula chronically due to combination of COPD and CHF; titrate as needed.  -History of mixed COPD (based on PFT's from 05/2015 showing mild (small airways) obstruction, airflow not improved after bronchodilator, and moderate restriction) and HFrEF with chronic respiratory failure on 2L home oxygen.   -ICU admission notable for progressive increase in supplemental oxygen requirements.   -Work-up for underlying etiology of acute on chronic respiratory failure include CXR showing concern for possible progression of CHF vs HAP (not entirely convinced that CXR showed consolidation, doubt HAP)  - Breathing back to baseline now to 2L NC     Diabetic foot infection  Bacteremia due to methicillin resistant Staphylococcus aureus  Diabetic ulcer of left foot associated with type 2 diabetes mellitus, with fat layer exposed  Right knee pain /  septic arthritis  Appreciate Infectious Disease, Orthopedic Surgery. Treating with antibiotics per ID.   Arthrocentesis and cultures suggest septic knee. LUKAS to evaluate for endocarditis negative for vegetations.  Plan for I and D of the knee 9/7/2020.  Per ID: Antibiotics changed to daptomycin and ceftaroline combination therapy for 'persistent MRSA bacteremia'  -- Monitor CPK and CBC (ceftaroline can lead to leucopenia)  -- MRI lumbar spine w/wo contrast ordered  -- NM WB WBC scan for inflammatory localization pending  -- Repeat blood cultures until neagtive  -- PICC line after clearance of bacteremia, anticipate prolonged IV antibiotics on discharge.  -- ID recommends: MRI Lumbar spine/L psoas to r/o abscess. Broaden coverage to dapto/ceftaroline. Baseline CK obtained. Renal dose antibiotics.  -- s/p R knee scope I&D 9/7/20. PT/OT:  WBAT  -- Per ID: Blood cultures seem to be clearing since I&D of knee / adequate source control. Podiatry considering need for debridement. Await repeat blood cultures to be negative x 72h before placing PICC line.  -GINA's ordered by podiatry but may need vascular surgery evaluation, unfortunately he is not a candidate for contrasted studies due to NANETTE  -- DC Instructions:  Patient is to follow up with Podiatry within 10 days of discharge.  Change dressings q MWF as follows: rinse left foot wound with saline, paint wound with Betadine and dress foot with 4x4 gauze, kerlix, secure with tape.     Sepsis due to DM wound, UTI, bacteremia  Due to Proteus and MRSA. Continued management with antibiotics.  Resolved.  Follow up on ID recommendations.  Discontinued Ciprofloxacine (completed 7 days, was administered for diabetic foot wound growing Proteus and urine growing Klebseilla)  -Foot wound Dressing changed 9/9 by Podiatry     Cellulitis of left lower extremity  Continued antibiotics.  Resolved     Acute kidney injury due to Urinary retention - required Newell   Bladder scan showed >500  mL. He had a Newell. ACEi held for now.  - Per Nephrology: Given hypotension and simultaneous volume overload reccomend IVF as small volume boluses rather than continuous IVF to maintain MAPS >65.   Repeat UA and Urine culture.  Strict I/Os. Daily weights. C3/C4 levels.  - Newell removed now; no UOP recorded. Need to monitor closely       Diet: Diet diabetic [facility]; 2000 Calorie  GI Prophylaxis: Not indicated  Significant LDAs:   IV Access Type: PICC  Urinary Catheter Indication if present: Patient Does Not Have Urinary Catheter  Other Lines/Tubes/Drains:  PCCholeTube    HIGH RISK CONDITION(S):   Patient has a condition that poses threat to life and bodily function: Respiratory Distress and Acute Renal Insufficiency  Patient is currently on drug therapy requiring intensive monitoring for toxicity: Vancomycin     Goals of Care:    Previous admission:  4/15/16  Likely prognosis:  Fair  Code Status: Full Code  Comfort Only: No  Hospice: No  Goals at discharge: remain at home, with physician follow-up, with caregiver    Discharge Planning   EDY: 10/7/2020     Code Status: Full Code   Is the patient medically ready for discharge?: Yes    Reason for patient still in hospital (select all that apply): Patient trending condition, Treatment and Pending disposition  Discharge Plan A: Skilled Nursing Facility   Discharge Delays: (!) Change in Medical Condition    VTE Risk Mitigation (From admission, onward)         Ordered     heparin (porcine) injection 5,000 Units  Every 8 hours      09/11/20 0943     IP VTE HIGH RISK PATIENT  Once      08/30/20 2004     Place sequential compression device  Until discontinued      08/30/20 8232                   Gisela Mota MD  Department of Hospital Medicine   [facility]

## 2020-10-06 NOTE — CARE UPDATE
BG goal 140 - 180     Diet diabetic [facility]; 2000 Calorie  29 Days Post-Op    BG remains controlled on current SQ insulin regimen.   - Levemir 10 units HS.   - Novolog 7 units TIDWM.   - Low Dose SQ Insulin Correction Scale.  - BG Monitoring AC/HS      ** Please call Endocrine for any BG related issues **  ** Please notify Endocrine for any change and/or advance in diet**     Discharge Planning:      Novolin 70/30 U-100  -14 units AM  - 12 units PM.     Lab Results   Component Value Date    HGBA1C 9.5 (H) 08/31/2020

## 2020-10-07 PROBLEM — K81.2 ACUTE ON CHRONIC CHOLECYSTITIS: Status: ACTIVE | Noted: 2020-10-07

## 2020-10-07 PROBLEM — K81.9 CHOLECYSTITIS: Status: ACTIVE | Noted: 2020-10-07

## 2020-10-07 LAB
ALBUMIN SERPL BCP-MCNC: 2 G/DL (ref 3.5–5.2)
ANION GAP SERPL CALC-SCNC: 10 MMOL/L (ref 8–16)
BASOPHILS # BLD AUTO: 0.09 K/UL (ref 0–0.2)
BASOPHILS NFR BLD: 1.2 % (ref 0–1.9)
BUN SERPL-MCNC: 18 MG/DL (ref 8–23)
CALCIUM SERPL-MCNC: 8.3 MG/DL (ref 8.7–10.5)
CHLORIDE SERPL-SCNC: 99 MMOL/L (ref 95–110)
CO2 SERPL-SCNC: 31 MMOL/L (ref 23–29)
CREAT SERPL-MCNC: 1.6 MG/DL (ref 0.5–1.4)
DIFFERENTIAL METHOD: ABNORMAL
EOSINOPHIL # BLD AUTO: 1 K/UL (ref 0–0.5)
EOSINOPHIL NFR BLD: 12.3 % (ref 0–8)
ERYTHROCYTE [DISTWIDTH] IN BLOOD BY AUTOMATED COUNT: 17.2 % (ref 11.5–14.5)
EST. GFR  (AFRICAN AMERICAN): 52.2 ML/MIN/1.73 M^2
EST. GFR  (NON AFRICAN AMERICAN): 45.2 ML/MIN/1.73 M^2
GLUCOSE SERPL-MCNC: 148 MG/DL (ref 70–110)
HCT VFR BLD AUTO: 24.7 % (ref 40–54)
HGB BLD-MCNC: 7.4 G/DL (ref 14–18)
IMM GRANULOCYTES # BLD AUTO: 0.07 K/UL (ref 0–0.04)
IMM GRANULOCYTES NFR BLD AUTO: 0.9 % (ref 0–0.5)
LYMPHOCYTES # BLD AUTO: 1.2 K/UL (ref 1–4.8)
LYMPHOCYTES NFR BLD: 14.9 % (ref 18–48)
MAGNESIUM SERPL-MCNC: 1.6 MG/DL (ref 1.6–2.6)
MCH RBC QN AUTO: 26.4 PG (ref 27–31)
MCHC RBC AUTO-ENTMCNC: 30 G/DL (ref 32–36)
MCV RBC AUTO: 88 FL (ref 82–98)
MONOCYTES # BLD AUTO: 1 K/UL (ref 0.3–1)
MONOCYTES NFR BLD: 13.2 % (ref 4–15)
NEUTROPHILS # BLD AUTO: 4.5 K/UL (ref 1.8–7.7)
NEUTROPHILS NFR BLD: 57.5 % (ref 38–73)
NRBC BLD-RTO: 0 /100 WBC
PHOSPHATE SERPL-MCNC: 3 MG/DL (ref 2.7–4.5)
PLATELET # BLD AUTO: 361 K/UL (ref 150–350)
PMV BLD AUTO: 10.2 FL (ref 9.2–12.9)
POCT GLUCOSE: 135 MG/DL (ref 70–110)
POCT GLUCOSE: 137 MG/DL (ref 70–110)
POCT GLUCOSE: 164 MG/DL (ref 70–110)
POCT GLUCOSE: 179 MG/DL (ref 70–110)
POCT GLUCOSE: 205 MG/DL (ref 70–110)
POTASSIUM SERPL-SCNC: 3.9 MMOL/L (ref 3.5–5.1)
RBC # BLD AUTO: 2.8 M/UL (ref 4.6–6.2)
SODIUM SERPL-SCNC: 140 MMOL/L (ref 136–145)
VANCOMYCIN TROUGH SERPL-MCNC: 16.9 UG/ML (ref 10–22)
WBC # BLD AUTO: 7.78 K/UL (ref 3.9–12.7)

## 2020-10-07 PROCEDURE — 99233 SBSQ HOSP IP/OBS HIGH 50: CPT | Mod: HCNC,95,, | Performed by: INTERNAL MEDICINE

## 2020-10-07 PROCEDURE — 36415 COLL VENOUS BLD VENIPUNCTURE: CPT | Mod: HCNC

## 2020-10-07 PROCEDURE — 25000003 PHARM REV CODE 250: Mod: HCNC | Performed by: INTERNAL MEDICINE

## 2020-10-07 PROCEDURE — 20600001 HC STEP DOWN PRIVATE ROOM: Mod: HCNC

## 2020-10-07 PROCEDURE — 25000003 PHARM REV CODE 250: Mod: HCNC | Performed by: STUDENT IN AN ORGANIZED HEALTH CARE EDUCATION/TRAINING PROGRAM

## 2020-10-07 PROCEDURE — 80069 RENAL FUNCTION PANEL: CPT | Mod: HCNC

## 2020-10-07 PROCEDURE — 83735 ASSAY OF MAGNESIUM: CPT | Mod: HCNC

## 2020-10-07 PROCEDURE — 85025 COMPLETE CBC W/AUTO DIFF WBC: CPT | Mod: HCNC

## 2020-10-07 PROCEDURE — 99232 SBSQ HOSP IP/OBS MODERATE 35: CPT | Mod: HCNC,,, | Performed by: NURSE PRACTITIONER

## 2020-10-07 PROCEDURE — 80202 ASSAY OF VANCOMYCIN: CPT | Mod: HCNC

## 2020-10-07 PROCEDURE — 63600175 PHARM REV CODE 636 W HCPCS: Mod: HCNC | Performed by: HOSPITALIST

## 2020-10-07 PROCEDURE — 63600175 PHARM REV CODE 636 W HCPCS: Mod: HCNC | Performed by: INTERNAL MEDICINE

## 2020-10-07 PROCEDURE — 99233 PR SUBSEQUENT HOSPITAL CARE,LEVL III: ICD-10-PCS | Mod: HCNC,95,, | Performed by: INTERNAL MEDICINE

## 2020-10-07 PROCEDURE — 25000003 PHARM REV CODE 250: Mod: HCNC | Performed by: HOSPITALIST

## 2020-10-07 PROCEDURE — 99232 PR SUBSEQUENT HOSPITAL CARE,LEVL II: ICD-10-PCS | Mod: HCNC,,, | Performed by: NURSE PRACTITIONER

## 2020-10-07 RX ORDER — LANOLIN ALCOHOL/MO/W.PET/CERES
200 CREAM (GRAM) TOPICAL 2 TIMES DAILY
Refills: 0 | COMMUNITY
Start: 2020-10-07 | End: 2021-03-03 | Stop reason: SDUPTHER

## 2020-10-07 RX ORDER — FUROSEMIDE 40 MG/1
40 TABLET ORAL
Start: 2020-10-07 | End: 2020-11-17

## 2020-10-07 RX ORDER — ALBUTEROL SULFATE 0.83 MG/ML
SOLUTION RESPIRATORY (INHALATION)
Qty: 360 ML | Refills: 3 | Status: SHIPPED | OUTPATIENT
Start: 2020-10-07 | End: 2021-03-03 | Stop reason: SDUPTHER

## 2020-10-07 RX ORDER — VIT C/E/ZN/COPPR/LUTEIN/ZEAXAN 250MG-90MG
1000 CAPSULE ORAL DAILY
Start: 2020-10-07

## 2020-10-07 RX ORDER — GABAPENTIN 100 MG/1
100 CAPSULE ORAL NIGHTLY
Qty: 90 CAPSULE | Refills: 3
Start: 2020-10-07 | End: 2021-03-03 | Stop reason: SDUPTHER

## 2020-10-07 RX ORDER — SODIUM BICARBONATE 650 MG/1
1300 TABLET ORAL 2 TIMES DAILY
Start: 2020-10-07 | End: 2020-10-09 | Stop reason: HOSPADM

## 2020-10-07 RX ORDER — ATORVASTATIN CALCIUM 10 MG/1
10 TABLET, FILM COATED ORAL DAILY
Start: 2020-10-07 | End: 2021-03-03 | Stop reason: SDUPTHER

## 2020-10-07 RX ADMIN — INSULIN ASPART 5 UNITS: 100 INJECTION, SOLUTION INTRAVENOUS; SUBCUTANEOUS at 08:10

## 2020-10-07 RX ADMIN — Medication 200 MG: at 08:10

## 2020-10-07 RX ADMIN — FUROSEMIDE 40 MG: 40 TABLET ORAL at 05:10

## 2020-10-07 RX ADMIN — HYPROMELLOSE 2910 1 DROP: 5 SOLUTION OPHTHALMIC at 11:10

## 2020-10-07 RX ADMIN — HEPARIN SODIUM 5000 UNITS: 5000 INJECTION INTRAVENOUS; SUBCUTANEOUS at 08:10

## 2020-10-07 RX ADMIN — FLUTICASONE PROPIONATE 50 MCG: 50 SPRAY, METERED NASAL at 08:10

## 2020-10-07 RX ADMIN — POLYETHYLENE GLYCOL 3350 17 G: 17 POWDER, FOR SOLUTION ORAL at 08:10

## 2020-10-07 RX ADMIN — HEPARIN SODIUM 5000 UNITS: 5000 INJECTION INTRAVENOUS; SUBCUTANEOUS at 02:10

## 2020-10-07 RX ADMIN — INSULIN DETEMIR 10 UNITS: 100 INJECTION, SOLUTION SUBCUTANEOUS at 08:10

## 2020-10-07 RX ADMIN — FUROSEMIDE 40 MG: 40 TABLET ORAL at 08:10

## 2020-10-07 RX ADMIN — LEVOTHYROXINE SODIUM 75 MCG: 25 TABLET ORAL at 06:10

## 2020-10-07 RX ADMIN — CARVEDILOL 25 MG: 25 TABLET, FILM COATED ORAL at 08:10

## 2020-10-07 RX ADMIN — HYPROMELLOSE 2910 1 DROP: 5 SOLUTION OPHTHALMIC at 02:10

## 2020-10-07 RX ADMIN — INSULIN ASPART 5 UNITS: 100 INJECTION, SOLUTION INTRAVENOUS; SUBCUTANEOUS at 05:10

## 2020-10-07 RX ADMIN — SODIUM BICARBONATE 1300 MG: 650 TABLET ORAL at 08:10

## 2020-10-07 RX ADMIN — VANCOMYCIN HYDROCHLORIDE 1000 MG: 1 INJECTION, POWDER, LYOPHILIZED, FOR SOLUTION INTRAVENOUS at 10:10

## 2020-10-07 RX ADMIN — HEPARIN SODIUM 5000 UNITS: 5000 INJECTION INTRAVENOUS; SUBCUTANEOUS at 06:10

## 2020-10-07 RX ADMIN — HYPROMELLOSE 2910 1 DROP: 5 SOLUTION OPHTHALMIC at 08:10

## 2020-10-07 NOTE — PLAN OF CARE
[facility]     Department of Hospital Medicine     [address] [place] [address]     [phone] after hours  [phone] fax       NURSING HOME ORDERS    10/09/2020    Admit to Nursing Home:     Skilled Bed                                                 Diagnoses:  Active Hospital Problems    Diagnosis  POA    *Sepsis due to methicillin resistant Staphylococcus aureus (MRSA) [A41.02]  Yes    Acute on chronic cholecystitis [K81.2]  Yes    Diabetic polyneuropathy associated with type 2 diabetes mellitus [E11.42]  Yes     Chronic    PVD (peripheral vascular disease) [I73.9]  Yes    Acute renal insufficiency [N28.9]  Yes    Acute on chronic respiratory failure with hypoxia [J96.21]  Yes    Debility [R53.81]  Yes    COPD mixed type [J44.9]  Yes     Chronic    Postablative hypothyroidism [E89.0]  Yes     Chronic    Hyperlipidemia [E78.5]  Yes     High ASCVD with CAD, elevated A1c      Type 2 diabetes mellitus with stage 3 chronic kidney disease, with long-term current use of insulin [E11.22, N18.30, Z79.4]  Not Applicable     GFR> 60, uncontrolled DM      Chronic systolic congestive heart failure [I50.22]  Yes     Chronic     EF 35% in 2015 echo, improved in 5/2018. Patient with mixed ischemic and non-ischemic cardiomyopathy        Resolved Hospital Problems    Diagnosis Date Resolved POA    Cellulitis of left lower extremity [L03.116] 10/06/2020 Yes    MRSA bacteremia [R78.81, B95.62] 10/06/2020 Yes    Acute metabolic encephalopathy [G93.41] 10/06/2020 No    Septic shock due to methicillin resistant Staphylococcus aureus [A41.02, R65.21] 10/06/2020 Yes    Endocarditis [I38] 09/12/2020 Yes    Acute renal failure with acute tubular necrosis superimposed on stage 3 chronic kidney disease [N17.0, N18.30] 10/06/2020 Yes    Septic arthritis of knee, right [M00.9] 10/06/2020 Yes    Staphylococcal arthritis of right knee [M00.061] 10/06/2020 Yes     Diabetic ulcer of left foot associated with type 2 diabetes mellitus, with fat layer exposed [E11.621, L97.522] 10/06/2020 Yes    Diabetic foot infection [E11.628, L08.9] 10/06/2020 Yes    Type 2 diabetes mellitus with ketoacidosis without coma, with long-term current use of insulin [E11.10, Z79.4] 09/18/2020 Not Applicable     Novolin 70/30 40U in AM, 30U in PM. Elevated A1c         Patient is homebound due to:  Sepsis due to methicillin resistant Staphylococcus aureus (MRSA)    Allergies:  Review of patient's allergies indicates:   Allergen Reactions    Vicodin [hydrocodone-acetaminophen] Itching       Vitals:    Every shift (Skilled Nursing patients)    Diet: Diabetic; 2000 Calorie    Acitivities:     - Up in a chair each morning as tolerated   - Ambulate with assistance to bathroom   - May use walker   - Weight bearing: WBAT     LABS:  Per facility protocol    Nursing Precautions:    - Aspiration precautions:             -  Assistance with meals            -  Upright 90 degrees before, during and after meals      - Fall precautions per nursing home protocol   - Decubitus precautions:        -  for positioning   - Pressure reducing foam mattress   - Turn patient every two hours. Use wedge pillows to anchor patient    CONSULTS:      Physical Therapy to evaluate and treat     Occupational Therapy to evaluate and treat     Nutrition to evaluate and recommend diet    MISCELLANEOUS CARE:              Yesika Tube Care:  Empty bag every shift and prn. Keep bag secured.                                               Change and clean site per protocol     Routine Skin Care:  Apply moisture barrier cream to all    skin folds and wet areas in perineal area daily and after baths and                           all bowel movements.     Monitor for Urinary Retention    Monitor urine output     Oxygen: 2 L NC      WOUND CARE:  Wound spray or saline for wound cleaning with all dressing changes.    All wounds to be measured  with first dressing changes and every week.  Betadine to R heel bulla and all escharred wounds. Aquacel Silver to L dorsal and plantar wounds, covered with bordered foam.                   DIABETES CARE:       Check blood sugar:      Fingerstick blood sugar AC and HS   Report CBG < 70 or > 300 to physician.                                          Insulin Sliding Scale          Glucose  Novolog Insulin Subcutaneous        0 - 60   Orange juice or glucose tablet, hold insulin      No insulin   201-250  2 units   251-300  4 units   301-350  6 units   351-400  8 units   >400   10 units then call physician      Medications: Discontinue all previous medication orders, if any. See new list below.     Fantasma Patton   Home Medication Instructions MOHSEN:[id number]    Printed on:10/09/20 0805   Medication Information                      ACCU-CHEK FASTCLIX LANCET DRUM Misc  TEST FOUR TIMES DAILY             ACCU-CHEK RAMIREZ Misc  USE AS DIRECTED             ACCU-CHEK SMARTVIEW TEST STRIP Strp  TEST FOUR TIMES DAILY             albuterol (PROVENTIL) 2.5 mg /3 mL (0.083 %) nebulizer solution  INHALE THE CONTENTS OF 1 VIAL VIA NEBULIZER TWICE DAILY             albuterol (PROVENTIL/VENTOLIN HFA) 90 mcg/actuation inhaler  Inhale 2 puffs into the lungs every 6 (six) hours as needed for Wheezing or Shortness of Breath. Rescue             artificial tears (ISOPTO TEARS) 0.5 % ophthalmic solution  Place 1 drop into both eyes 3 (three) times daily.             aspirin (ECOTRIN) 81 MG EC tablet  Take 1 tablet (81 mg total) by mouth once daily.             atorvastatin (LIPITOR) 10 MG tablet  Take 1 tablet (10 mg total) by mouth once daily.             carvedilol (COREG) 25 MG tablet  Take 1 tablet (25 mg total) by mouth 2 (two) times daily.             cholecalciferol, vitamin D3, (VITAMIN D3) 25 mcg (1,000 unit) capsule  Take 1 capsule (1,000 Units total) by mouth once daily.             fluticasone furoate-vilanterol (BREO ELLIPTA)  200-25 mcg/dose DsDv diskus inhaler  INHALE 1 PUFF INTO THE LUNGS ONCE DAILY. (CONTROLLER)             fluticasone propionate (FLONASE) 50 mcg/actuation nasal spray  USE 1 SPRAY IN EACH NOSTRIL ONE TIME DAILY             furosemide (LASIX) 40 MG tablet  Take 1 tablet (40 mg total) by mouth 2 (two) times daily before meals.             gabapentin (NEURONTIN) 100 MG capsule  Take 1 capsule (100 mg total) by mouth every evening.             insulin NPH-insulin regular, 70/30, (NOVOLIN 70/30 U-100 INSULIN) 100 unit/mL (70-30) injection  Inject 14 Units into the skin before breakfast AND 12 Units before dinner. [id number] MINUTES BEFORE BREAKFAST AND DINNER.             insulin syringe-needle U-100 (INSULIN SYRINGE) 1 mL 30 gauge x 5/16 Syrg  1 each by Misc.(Non-Drug; Combo Route) route 2 (two) times daily. Use twice daily as directed with insulin vials.             levothyroxine (SYNTHROID) 75 MCG tablet  Take 1 tablet (75 mcg total) by mouth once daily.             magnesium oxide (MAG-OX) 400 mg (241.3 mg magnesium) tablet  Take 0.5 tablets (200 mg total) by mouth 2 (two) times daily.             nitroGLYCERIN (NITROSTAT) 0.4 MG SL tablet  PLACE 1 TABLET UNDER THE TONGUE EVERY 5  MINUTES AS NEEDED FOR CHEST PAIN             polyethylene glycol (GLYCOLAX) 17 gram PwPk  Take 17 g by mouth 2 (two) times daily as needed (Constipation).                     Palmira Gage MD  10/09/2020

## 2020-10-07 NOTE — PLAN OF CARE
CARMINA spoke with Mrs. Teresa Patton regarding placement for pt.  Mrs. Patton stated that she has made contact with [facility] and that she is in agreement with pt transfer.  CARMNIA then spoke with Brent and Maggy in admissions and was told that they have spoken with wife and she had completed admissions paperwork over the  Phone.  Brent and Maggy both stated that the earliest pt could be accepted to the SNF is 10am & CARMINA notified Dr. Mota and Vida (Supervisor) via secure chat.  The plan at this time is for pt to DC on 10/8 after Dr. Gage sees him.  CARMINA will continue to F/U.        Babs Humphreys, [facility]  Ext [phone]

## 2020-10-07 NOTE — PROGRESS NOTES
"    [facility] Hospital Medicine  Telemedicine Progress Note    Patient Name: Ed Patton  MRN: [id number]  Patient Class: IP- Inpatient   Admission Date: 8/30/2020  Length of Stay: 38 days  Attending Physician: Gisela Mota MD  Primary Care Provider: Qiana Chow MD    [facility] [place] [facility] [place] Gisela Mota MD  Virtual Telemedicine Progress Note  Start time: 0947  Chief complaint: Sepsis due to methicillin resistant Staphylococcus aureus (MRSA)  The [facility] location is: [facility]  The patient arrived at: 8/30/2020  2:18 PM  Present with the patient at the time of the telemed/virtual assessment: telepresenter   End time:  0955  Total time spent with patient: 9 min  I have assessed findings virtually using a telemedicine platform and with assistance of the bedside nurse or telemedicine presenter.  The attending portion of this evaluation, treatment, and documentation was performed per Gisela Mota MD via audiovisual.    Patient was transferred to the telemedicine service on: 10/5/2020    Subjective:     Admission CC:   Chief Complaint   Patient presents with    Frequent Falls     frequent falls, weakness, "stepped on a tack" left leg now swollen.     Leg Swelling     Follow up visit for: Sepsis due to methicillin resistant Staphylococcus aureus (MRSA)    Interval History / Events Overnight:   The patient is able to provide adequate history. Additional history was obtained from past medical records and chart review. No significant events reported by Nursing.   Patient complains of L knee stiffness. Symptoms have worsened since yesterday. Associated symptoms include: fatigue. Symptoms are stable.     Data reviewed 10/7/2020: Lab test(s) reviewed: H&H stable. sCr stable    Review of Systems   Constitutional: Negative for fever.   Respiratory: Negative for shortness of breath.      Objective:     Vital Signs (Most Recent):  Temp: 98.2 °F (36.8 °C) " (10/07/20 1525)  Pulse: 102 (10/07/20 1525)  Resp: 16 (10/07/20 1525)  BP: 122/80 (10/07/20 1525)  SpO2: 100 % (10/07/20 1525) Vital Signs (24h Range):  Temp:  [97.8 °F (36.6 °C)-99 °F (37.2 °C)] 98.2 °F (36.8 °C)  Pulse:  [] 102  Resp:  [12-23] 16  SpO2:  [93 %-100 %] 100 %  BP: (106-133)/(61-80) 122/80     Weight: 115.6 kg (254 lb 13.6 oz)  Body mass index is 31.02 kg/m².    Intake/Output Summary (Last 24 hours) at 10/7/2020 1707  Last data filed at 10/7/2020 1600  Gross per 24 hour   Intake 370 ml   Output 1870 ml   Net -1500 ml      Physical Exam  Constitutional:       General: He is not in acute distress.     Appearance: Normal appearance. He is not diaphoretic.   Eyes:      General: Lids are normal. No scleral icterus.        Right eye: No discharge.         Left eye: No discharge.      Conjunctiva/sclera: Conjunctivae normal.   Neck:      Trachea: Phonation normal.   Cardiovascular:      Rate and Rhythm: Tachycardia present.   Pulmonary:      Effort: Pulmonary effort is normal. No tachypnea, accessory muscle usage or respiratory distress.   Abdominal:      General: There is no distension.   Neurological:      Mental Status: He is alert. Mental status is at baseline. He is not disoriented.   Psychiatric:         Attention and Perception: Attention normal.         Mood and Affect: Affect normal.         Behavior: Behavior is cooperative.         Significant Labs:     Recent Labs   Lab 08/31/20  0434   HGBA1C 9.5*     Recent Labs   Lab 10/07/20  0801 10/07/20  1127 10/07/20  1619   POCTGLUCOSE 164* 135* 205*     Recent Labs   Lab 10/05/20  0950 10/06/20  0543 10/07/20  0606   WBC 7.55 8.27 7.78   HGB 7.6* 7.3* 7.4*   HCT 26.1* 25.2* 24.7*    304 361*     Recent Labs   Lab 10/05/20  0950 10/06/20  0543 10/07/20  0606   GRAN 59.7  4.5 56.5  4.7 57.5  4.5   LYMPH 13.8*  1.0 14.4*  1.2 14.9*  1.2   MONO 11.0  0.8 13.2  1.1* 13.2  1.0   EOS 1.0* 1.1* 1.0*     Recent Labs   Lab 10/05/20  0942  10/06/20  0543 10/07/20  0606    137 140   K 3.4* 3.8 3.9   CL 98 98 99   CO2 30* 27 31*   BUN 19 20 18   CREATININE 1.5* 1.6* 1.6*   * 145* 148*   CALCIUM 8.1* 8.0* 8.3*   ALBUMIN 1.9*  1.9* 1.8* 2.0*   MG 1.5* 1.6 1.6   PHOS 2.8 2.9 3.0     Recent Labs   Lab 10/01/20  0524 10/05/20  0950   ALKPHOS 63 62   ALT 15 11   AST 17 11   PROT 6.6 6.7   BILITOT 0.7 0.8   CRP  --  43.5*     Recent Labs   Lab 10/01/20  0524 10/03/20  0551 10/05/20  0950   CPK 65 54 51     Recent Labs   Lab 09/03/20  1538 09/10/20  1514 09/12/20 2015 09/20/20  0432 09/27/20  0616 09/28/20  0823 10/04/20  0602   PROCAL 0.13 0.26*  --   --   --   --   --    LACTATE  --  1.2 1.4  --   --  1.1  --    SEDRATE  --   --   --  110* 103*  --  69*     Results for orders placed or performed during the hospital encounter of 08/30/20   Vitamin D   Result Value Ref Range    Vit D, 25-Hydroxy 35 30 - 96 ng/mL     No results found for this or any previous visit.  SARS-CoV2 (COVID-19) Qualitative PCR (no units)   Date Value   09/10/2020 Not Detected   09/03/2020 Not Detected     SARS-CoV-2 RNA, Amplification, Qual (no units)   Date Value   10/06/2020 Negative   09/23/2020 Negative   09/07/2020 Negative   08/30/2020 Negative       ECG Results          EKG 12-lead (Final result)  Result time 09/01/20 05:02:05    Final result by Interface, Lab In [facility] (09/01/20 05:02:05)                 Narrative:    Test Reason : W19.XXXA,    Vent. Rate : 101 BPM     Atrial Rate : 234 BPM     P-R Int : 000 ms          QRS Dur : 098 ms      QT Int : 308 ms       P-R-T Axes : 000 -55 035 degrees     QTc Int : 399 ms    Age and gender specific analysis  Atrial fibrillation with rapid ventricular response  Left axis deviation  Cannot rule out Septal infarct ,age undetermined  Nonspecific ST and/or T wave abnormalities  Abnormal ECG  When compared with ECG of 01-MAY-2015 10:32,  Atrial fibrillation has replaced Sinus rhythm  Confirmed by Danish Linda MD ([id number]) on  9/1/2020 5:01:56 AM    Referred By: [email or web address]   SELF           Confirmed By:Danish Linda MD                              Results for orders placed during the hospital encounter of 08/30/20   Echo Color Flow Doppler? Yes    Narrative · Concentric left ventricular remodeling.  · Moderately decreased left ventricular systolic function. The estimated   ejection fraction is 35%.  · Moderately to severely reduced right ventricular systolic function.  · Left ventricular diastolic dysfunction.  · The estimated PA systolic pressure is 44 mmHg.  · Normal central venous pressure (3 mmHg).          VAS US Arterial Leg Left  Indication  ========    Peripheral Vascular Disease    Lower Extremity Arterial Imaging  ========================    Left Lower Extremity  Lt mid CFA PSV 88 cm/s  Lt prox DFA PSV    88 cm/s  Lt prox SFA PSV    64 cm/s  Lt mid SFA PSV 75 cm/s  Lt distal SFA PSV  53 cm/s  Lt prox POP PSV    48 cm/s  Lt mid POP  cm/s  Lt distal POP PSV  74 cm/s  Lt prox LICHA PSV    186 cm/s  Lt mid LICHA PSV 40 cm/s  Lt distal LICHA PSV  54 cm/s  Lt prox PTA PSV    37 cm/s  Lt mid PTA PSV 62 cm/s  Lt distal PTA PSV  39 cm/s    Impression  =========    Duplex imaging of the right lower extremity arterial tree reveals a velocity elevation of 67 cm/sec to 186 cm/sec in the prox LICHA with a  visual narrowing. These findings suggest a > 50% hemodynamically significant stenosis. There is another velocity elevation of 48  cm/sec to 106 cm/sec with a slight visual narrowing, which suggests an approximately 50% hemodynamically significant stenosis.  Branches are noted at the PTA.    DATE OF SERVICE: 09/22/2020                                                      Sonographer: LYNDA THORNTON RVT  Electronically Signed by: LUCIA Tsang M.D. at 09/23/2020-11:36      Assessment/Plan:      Active Diagnoses:    Diagnosis Date Noted POA    PRINCIPAL PROBLEM:  Sepsis due to methicillin resistant Staphylococcus aureus (MRSA) [A41.02] 08/30/2020  Yes    Acute on chronic cholecystitis [K81.2] 10/07/2020 Yes    Diabetic polyneuropathy associated with type 2 diabetes mellitus [E11.42]  Yes     Chronic    PVD (peripheral vascular disease) [I73.9]  Yes    Acute renal insufficiency [N28.9]  Yes    Acute on chronic respiratory failure with hypoxia [J96.21] 09/13/2020 Yes    Debility [R53.81]  Yes    COPD mixed type [J44.9] 10/15/2019 Yes     Chronic    Postablative hypothyroidism [E89.0] 12/06/2018 Yes     Chronic    Hyperlipidemia [E78.5] 08/25/2015 Yes    Type 2 diabetes mellitus with stage 3 chronic kidney disease, with long-term current use of insulin [E11.22, N18.30, Z79.4] 12/23/2014 Not Applicable    Chronic systolic congestive heart failure [I50.22] 05/07/2013 Yes     Chronic      Problems Resolved During this Admission:    Diagnosis Date Noted Date Resolved POA    Cellulitis of left lower extremity [L03.116]  10/06/2020 Yes    MRSA bacteremia [R78.81, B95.62] 09/17/2020 10/06/2020 Yes    Acute metabolic encephalopathy [G93.41] 09/13/2020 10/06/2020 No    Hospital-acquired pneumonia [J18.9, Y95] 09/13/2020 10/06/2020 No    Septic shock due to methicillin resistant Staphylococcus aureus [A41.02, R65.21] 09/12/2020 10/06/2020 Yes    Endocarditis [I38]  09/12/2020 Yes    Acute renal failure with acute tubular necrosis superimposed on stage 3 chronic kidney disease [N17.0, N18.30] 09/09/2020 10/06/2020 No    Septic arthritis of knee, right [M00.9] 09/07/2020 10/06/2020 Yes    Staphylococcal arthritis of right knee [M00.061] 09/05/2020 10/06/2020 Yes    Diabetic ulcer of left foot associated with type 2 diabetes mellitus, with fat layer exposed [E11.621, L97.522] 08/31/2020 10/06/2020 Yes    Diabetic foot infection [E11.628, L08.9] 08/30/2020 10/06/2020 Yes    Type 2 diabetes mellitus with ketoacidosis without coma, with long-term current use of insulin [E11.10, Z79.4] 10/09/2017 09/18/2020 Not Applicable       Overview / ICU Course:     Ed Patton is a 65 y.o. male admitted for Sepsis due to methicillin resistant Staphylococcus aureus (MRSA).    Inpatient Medications Prescribed for Management of Current Problems:     Scheduled Meds:    artificial tears  1 drop Both Eyes TID    carvediloL  25 mg Oral BID    fluticasone furoate-vilanteroL  1 puff Inhalation Daily    fluticasone propionate  1 spray Each Nostril Daily    furosemide  40 mg Oral BID    heparin (porcine)  5,000 Units Subcutaneous Q8H    insulin aspart U-100  5 Units Subcutaneous TIDWM    insulin detemir U-100  10 Units Subcutaneous QHS    levothyroxine  75 mcg Oral Before breakfast    magnesium oxide  200 mg Oral BID    polyethylene glycol  17 g Oral Daily    sodium bicarbonate  1,300 mg Oral BID    vancomycin (VANCOCIN) IVPB  1,000 mg Intravenous Q24H     Continuous Infusions:   As Needed: sodium chloride, acetaminophen, albuterol-ipratropium, calcium carbonate, dextrose 50%, dextrose 50%, glucagon (human recombinant), glucose, glucose, insulin aspart U-100, magnesium hydroxide 400 mg/5 ml, melatonin, ondansetron, oxyCODONE, polyethylene glycol, DIPH,PERTUS (ADACEL),TETANUS PF VAC (ADULT)    Assessment and Plan by Problem    Septic shock_Septic arthritis of Right Knee_Sepsis due to MRSA  Acute on Chronic Cholecystitis  This is a 65 year old  male with PMH significant for HFrEF and COPD with chronic respiratory failure (on 2L home oxygen), T2DM with CKD III, and iron deficiency anemia who originally presented to [facility] on 08/30 with cellulitis of left foot with concern for diabetic foot infection with subsequent development of MRSA bacteremia attributed to septic arthritis of right knee and elbow. He is status post drainage and coverage for MRSA since that time. His work-up for other etiologies of MRSA bacteremia have included negative LUKAS and MRI of lumbar spine looking for endocarditis and spinal abscess, respectively. Stool studies for C.diff on  09/11 were negative. His hospital course has been associated with worsening hypotension and leukocytosis since 09/10 prompting ICU admission on 09/12 for initiation of vasopressor support. Infectious work-up thus far on ICU admission concern for possible right lower lobe HAP. However; CT abdomen pelvis on 9/15 with gallbladder dilation, sludge, and trace pericholic fluid collection. Exam not consistent with cholecystitis but given persistent shock considered a potential source. Also with potential right lower lobe atelectasis with possible overlying infection.  S/p ICU stepdown. CK levels were rising so per infectious disease daptomycin changed back to vancomycin.   -Appreciate final ID recs: End date of IV antibiotics:  Daptomycin /vancomycin 10/7/2020  Levofloxacin 9/30/2020  - Follow up with ID and Gen Surg     Uncontrolled type 2 diabetes mellitus with hyperglycemia, with long-term current use of insulin  Diabetic polyneuropathy associated with type 2 diabetes mellitus  Takes insulin NPH-insulin regular 70/30 45 units before breakfast and 35 units before dinner.   Giving insulin detemir 29 units daily and insulin aspart 14 units with meals and correction dose scale. Monitor BGs.  Increased basal insulin slightly 9/8. BGs improved.  Due to increase in sCr, insulin regimen reduced 9/9 and 9/10. Monitor for hyperglycemia/hypoglycemia.    Type 2 DM with DKA associated with recurrent sepsis  -Endocrinology consulted; kept on transitional drip but has since been weaned to a basal bolus regimen.      Postablative hypothyroidism  Plan: Continue home SYNTHROID     Acute kidney injury  -likely ATN from Shock s/p requiring RRT in ICU  -trialysis line removed   -pt is non-oliguric  -Nephrology recommends sodium bicarb tabs   -Nephrology recommends ambulatory f/u on discharge     Chronic systolic congestive heart failure  -Most recent ECHO in 09/2020 with EF of 25%.   -Unclear if ischemic vs non-ischemic.   -Home regimen:  Carvedilol, Lasix 80 mg, and Lisinopril.           - due to NANETTE, Lisinopril on hold, but he is recovering renal function and prior to discharge to any facility will likely need some amount of diuretic re-initiated and potentially a much lower doseage of Ace-inhibitor.   -Associated with chronic hypoxemic respiratory failure requiring 2L nasal cannula, but noted to develop worsening oxygen requirements on ICU admission that were likely multifactorial from suspected hospital acquired pneumonia versus declining urine output with pre-disposition to volume overload. .      Anemia       -Chronic, no s/s of bleeding       -Notified that SNF is not accepting this patient due to Hb of < 8. Of note, patient has chronic anemia and Hb has been stable for AT MINIMUM, the last 10 days and he has not required any transfusions. This should not be a barrier to discharge.        -transfused 1U PRBC on 10/3      COPD mixed type  -Based on PFT's from 05/2015 showing mild (small airways) obstruction, airflow not improved after bronchodilator, and moderate restriction.     Acute on chronic respiratory failure with hypoxia  Chronic respiratory failure with hypoxia, on home oxygen therapy  On 2 L nasal cannula chronically due to combination of COPD and CHF; titrate as needed.  -History of mixed COPD (based on PFT's from 05/2015 showing mild (small airways) obstruction, airflow not improved after bronchodilator, and moderate restriction) and HFrEF with chronic respiratory failure on 2L home oxygen.   -ICU admission notable for progressive increase in supplemental oxygen requirements.   -Work-up for underlying etiology of acute on chronic respiratory failure include CXR showing concern for possible progression of CHF vs HAP (not entirely convinced that CXR showed consolidation, doubt HAP)  - Breathing back to baseline now to 2L NC     Diabetic foot infection  Bacteremia due to methicillin resistant Staphylococcus aureus  Diabetic ulcer of  left foot associated with type 2 diabetes mellitus, with fat layer exposed  Right knee pain / septic arthritis  Appreciate Infectious Disease, Orthopedic Surgery. Treating with antibiotics per ID.   Arthrocentesis and cultures suggest septic knee. LUKAS to evaluate for endocarditis negative for vegetations.  Plan for I and D of the knee 9/7/2020.  Per ID: Antibiotics changed to daptomycin and ceftaroline combination therapy for 'persistent MRSA bacteremia'  -- Monitor CPK and CBC (ceftaroline can lead to leucopenia)  -- MRI lumbar spine w/wo contrast ordered  -- NM WB WBC scan for inflammatory localization pending  -- Repeat blood cultures until neagtive  -- PICC line after clearance of bacteremia, anticipate prolonged IV antibiotics on discharge.  -- ID recommends: MRI Lumbar spine/L psoas to r/o abscess. Broaden coverage to dapto/ceftaroline. Baseline CK obtained. Renal dose antibiotics.  -- s/p R knee scope I&D 9/7/20. PT/OT:  WBAT  -- Per ID: Blood cultures seem to be clearing since I&D of knee / adequate source control. Podiatry considering need for debridement. Await repeat blood cultures to be negative x 72h before placing PICC line.  -GINA's ordered by podiatry but may need vascular surgery evaluation, unfortunately he is not a candidate for contrasted studies due to NANETTE  -- Podiatry DC Instructions:  Patient is to follow up with Podiatry within 10 days of discharge. Discontinue wrapping to L foot as it may have irritated L dorsal foot. Betadine to R heel bulla abnd all escharred wounds. Aquacel Silver to L dorsal and plantar wounds, covered with bordered foam.    Sepsis due to DM wound, UTI, bacteremia  Due to Proteus and MRSA. Continued management with antibiotics.  Resolved.  Follow up on ID recommendations.  Discontinued Ciprofloxacine (completed 7 days, was administered for diabetic foot wound growing Proteus and urine growing Klebseilla)  -Foot wound Dressing changed 9/9 by Podiatry     Cellulitis of left  lower extremity  Continued antibiotics.  Resolved     Acute kidney injury due to Urinary retention - required Newell   Bladder scan showed >500 mL. He had a Newell. ACEi held for now.  - Per Nephrology: Given hypotension and simultaneous volume overload reccomend IVF as small volume boluses rather than continuous IVF to maintain MAPS >65.   Repeat UA and Urine culture.  Strict I/Os. Daily weights. C3/C4 levels.  - Newell removed now; no UOP recorded. Need to monitor closely      Diet: Diet diabetic [facility]; 2000 Calorie  GI Prophylaxis: Not indicated  Significant LDAs:   IV Access Type: PICC  Urinary Catheter Indication if present: Patient Does Not Have Urinary Catheter  Other Lines/Tubes/Drains:  PCCholeTube    HIGH RISK CONDITION(S):   Patient has a condition that poses threat to life and bodily function: Respiratory Distress and Acute Renal Insufficiency  Patient is currently on drug therapy requiring intensive monitoring for toxicity: Vancomycin     Goals of Care:    Previous admission:  4/15/16  Likely prognosis:  Fair  Code Status: Full Code  Comfort Only: No  Hospice: No  Goals at discharge: remain at home, with physician follow-up, with caregiver    Discharge Planning   EDY: 10/8/2020     Code Status: Full Code   Is the patient medically ready for discharge?: Yes    Reason for patient still in hospital (select all that apply): Patient trending condition, Treatment and Pending disposition  Discharge Plan A: Skilled Nursing Facility   Discharge Delays: (!) Change in Medical Condition    VTE Risk Mitigation (From admission, onward)         Ordered     heparin (porcine) injection 5,000 Units  Every 8 hours      09/11/20 0943     IP VTE HIGH RISK PATIENT  Once      08/30/20 2004     Place sequential compression device  Until discontinued      08/30/20 3715                   Gisela Mota MD  Department of Hospital Medicine   [facility]

## 2020-10-07 NOTE — CONSULTS
Pharmacokinetic Assessment Follow Up: IV Vancomycin    Vancomycin Assessment and Regimen Plan:  - Renal function remains stable (serum creatinine 1.5-1.7 mg/dL)  - Continue vancomycin 1000 mg IV every 24 hours  - End date 10/7/2020  - Further trough monitoring not necessary  - Pharmacy will sign-off, please re-consult as needed    Drug levels (last 3 results):  Recent Labs   Lab Result Units 10/05/20  2017   Vancomycin-Trough ug/mL 15.6     Please contact pharmacy at extension [phone] for questions regarding this assessment.    Thank you for the consult,   Zahra Powers     Patient brief summary:  Ed Patton is a 65 y.o. male initiated on antimicrobial therapy with IV Vancomycin for treatment of MRSA bacteremia    Drug Allergies:   Review of patient's allergies indicates:   Allergen Reactions    Vicodin [hydrocodone-acetaminophen] Itching     Actual Body Weight:   115.6 kg    Renal Function:   Estimated Creatinine Clearance: 65.7 mL/min (A) (based on SCr of 1.6 mg/dL (H)).     Dialysis Method (if applicable):  N/A

## 2020-10-07 NOTE — PLAN OF CARE
Problem: Adult Inpatient Plan of Care  Goal: Optimal Comfort and Wellbeing  Outcome: Ongoing, Progressing     Problem: Adult Inpatient Plan of Care  Goal: Readiness for Transition of Care  Outcome: Ongoing, Progressing     Discussed w/ pt plan of care, pt verbalized understanding, pt denies pain/needs at this time, cbgs monitored per chart, pt performed leg raises x10 on each leg, pt aware he will likely discharge on 10/8/20, will continue to monitor pt needs

## 2020-10-07 NOTE — ASSESSMENT & PLAN NOTE
BG goal 140 - 180     -  Continue Levemir 10 units q HS  -  Continue novolog 7 units TDWM.   -  Low Dose SQ Insulin Correction Scale. Given kidney function.   -  BG Monitoring AC/HS     ** Please call Endocrine for any BG related issues **  ** Please notify Endocrine for any change and/or advance in diet**    Discharge Planning:    Novolin 70/30 U-100  -14 units AM  - 12 units PM.            Lab Results   Component Value Date     HGBA1C 9.5 (H) 08/31/2020

## 2020-10-07 NOTE — PROGRESS NOTES
"[facility]  Endocrinology  Progress Note    Admit Date: 2020     Reason for Consult: Management of T2DM, Hyperglycemia     Surgical Procedure and Date: n/a    Diabetes diagnosis year:     Home Diabetes Medications:    Novolin 70/30 u-100   40 units in AM with breakfast.    20 units in PM with dinner.     How often checking glucose at home? 1-3 x day   BG readings on regimen: >200  Hypoglycemia on the regimen?  No  Missed doses on regimen?  No    Diabetes Complications include:     Hyperglycemia, Foot ulcer   and Other skin ulcer    Complicating diabetes co morbidities:   CHF, CAD, HTN, HLD      HPI:   Patient is a 65 y.o. male with a diagnosis of acute cholecystitis and septic shock with MRSA complicated by NANETTE requiring CRRT. Endocrinology consulted to manage DM2 during [place] [facility] [place]     Lab Results   Component Value Date    HGBA1C 9.5 (H) 2020       Interval HPI:   Overnight events:  BG well controlled on current SQ insulin regimen. Remains on diabetic diet.   Eatin%  Nausea: No  Hypoglycemia and intervention: No  Fever: No  TPN and/or TF: No  If yes, type of TF/TPN and rate: none    /79 (BP Location: Left arm, Patient Position: Lying)   Pulse 92   Temp 98.5 °F (36.9 °C) (Oral)   Resp 15   Ht 6' 4" (1.93 m)   Wt 115.6 kg (254 lb 13.6 oz)   SpO2 100%   BMI 31.02 kg/m²     Labs Reviewed and Include    Recent Labs   Lab 10/07/20  0606   *   CALCIUM 8.3*   ALBUMIN 2.0*      K 3.9   CO2 31*   CL 99   BUN 18   CREATININE 1.6*     Lab Results   Component Value Date    WBC 7.78 10/07/2020    HGB 7.4 (L) 10/07/2020    HCT 24.7 (L) 10/07/2020    MCV 88 10/07/2020     (H) 10/07/2020     No results for input(s): TSH, FREET4 in the last 168 hours.  Lab Results   Component Value Date    HGBA1C 9.5 (H) 2020       Nutritional status:   Body mass index is 31.02 kg/m².  Lab Results   Component Value Date    ALBUMIN 2.0 (L) 10/07/2020    " ALBUMIN 1.8 (L) 10/06/2020    ALBUMIN 1.9 (L) 10/05/2020    ALBUMIN 1.9 (L) 10/05/2020     No results found for: PREALBUMIN    Estimated Creatinine Clearance: 65.7 mL/min (A) (based on SCr of 1.6 mg/dL (H)).    Accu-Checks  Recent Labs     10/05/20  0802 10/05/20  1151 10/05/20  1623 10/05/20  2055 10/06/20  0809 10/06/20  1204 10/06/20  1643 10/06/20  2027 10/07/20  0801 10/07/20  1127   POCTGLUCOSE 186* 136* 159* 173* 164* 109 178* 137* 164* 135*       Current Medications and/or Treatments Impacting Glycemic Control  Immunotherapy:    Immunosuppressants     None        Steroids:   Hormones (From admission, onward)    Start     Stop Route Frequency Ordered    08/30/20 2059  melatonin tablet 6 mg      -- Oral Nightly PRN 08/30/20 2004        Pressors:    Autonomic Drugs (From admission, onward)    None        Hyperglycemia/Diabetes Medications:   Antihyperglycemics (From admission, onward)    Start     Stop Route Frequency Ordered    10/07/20 0715  insulin aspart U-100 pen 5 Units      -- SubQ 3 times daily with meals 10/06/20 2109    09/26/20 2100  insulin detemir U-100 pen 10 Units      -- SubQ Nightly 09/26/20 1059    09/24/20 1536  insulin aspart U-100 pen 0-5 Units      -- SubQ Before meals & nightly PRN 09/24/20 1436          ASSESSMENT and PLAN    * Sepsis due to methicillin resistant Staphylococcus aureus (MRSA)  Managed per primary team          Type 2 diabetes mellitus with stage 3 chronic kidney disease, with long-term current use of insulin  BG goal 140 - 180     -  Continue Levemir 10 units q HS  -  Continue novolog 7 units TDWM.   -  Low Dose SQ Insulin Correction Scale. Given kidney function.   -  BG Monitoring AC/HS     ** Please call Endocrine for any BG related issues **  ** Please notify Endocrine for any change and/or advance in diet**    Discharge Planning:    Novolin 70/30 U-100  -14 units AM  - 12 units PM.            Lab Results   Component Value Date     HGBA1C 9.5 (H) 08/31/2020                    Postablative hypothyroidism  The clearance of many drugs, including antiepileptic, anticoagulant, hypnotic, and opioid drugs, is decreased in hypothyroidism. Thus, drug toxicity may occur if drug dose is not reduced. Thyroid disease may also affect lipid profiles which may then cause increase in insulin resistance.     Recommend patient continue Levothyroxine 75 mcg           Kassidy Salas NP  Endocrinology  [facility]

## 2020-10-07 NOTE — SUBJECTIVE & OBJECTIVE
"Interval HPI:   Overnight events:  BG well controlled on current SQ insulin regimen. Remains on diabetic diet.   Eatin%  Nausea: No  Hypoglycemia and intervention: No  Fever: No  TPN and/or TF: No  If yes, type of TF/TPN and rate: none    /79 (BP Location: Left arm, Patient Position: Lying)   Pulse 92   Temp 98.5 °F (36.9 °C) (Oral)   Resp 15   Ht 6' 4" (1.93 m)   Wt 115.6 kg (254 lb 13.6 oz)   SpO2 100%   BMI 31.02 kg/m²     Labs Reviewed and Include    Recent Labs   Lab 10/07/20  06   *   CALCIUM 8.3*   ALBUMIN 2.0*      K 3.9   CO2 31*   CL 99   BUN 18   CREATININE 1.6*     Lab Results   Component Value Date    WBC 7.78 10/07/2020    HGB 7.4 (L) 10/07/2020    HCT 24.7 (L) 10/07/2020    MCV 88 10/07/2020     (H) 10/07/2020     No results for input(s): TSH, FREET4 in the last 168 hours.  Lab Results   Component Value Date    HGBA1C 9.5 (H) 2020       Nutritional status:   Body mass index is 31.02 kg/m².  Lab Results   Component Value Date    ALBUMIN 2.0 (L) 10/07/2020    ALBUMIN 1.8 (L) 10/06/2020    ALBUMIN 1.9 (L) 10/05/2020    ALBUMIN 1.9 (L) 10/05/2020     No results found for: PREALBUMIN    Estimated Creatinine Clearance: 65.7 mL/min (A) (based on SCr of 1.6 mg/dL (H)).    Accu-Checks  Recent Labs     10/05/20  0802 10/05/20  1151 10/05/20  1623 10/05/20  2055 10/06/20  0809 10/06/20  1204 10/06/20  1643 10/06/20  2027 10/07/20  0801 10/07/20  1127   POCTGLUCOSE 186* 136* 159* 173* 164* 109 178* 137* 164* 135*       Current Medications and/or Treatments Impacting Glycemic Control  Immunotherapy:    Immunosuppressants     None        Steroids:   Hormones (From admission, onward)    Start     Stop Route Frequency Ordered    20  melatonin tablet 6 mg      -- Oral Nightly PRN 20        Pressors:    Autonomic Drugs (From admission, onward)    None        Hyperglycemia/Diabetes Medications:   Antihyperglycemics (From admission, onward)    Start    "  Stop Route Frequency Ordered    10/07/20 0715  insulin aspart U-100 pen 5 Units      -- SubQ 3 times daily with meals 10/06/20 2109    09/26/20 2100  insulin detemir U-100 pen 10 Units      -- SubQ Nightly 09/26/20 1059    09/24/20 1536  insulin aspart U-100 pen 0-5 Units      -- SubQ Before meals & nightly PRN 09/24/20 1436

## 2020-10-07 NOTE — PLAN OF CARE
POC reviewed w/ pt, questions and concerns addressed. No acute events thru the night. All vital signs stable. No complaints. Pt diuresed adequately. Pt had intermittent incontinence. Pt did leg raises while awake. PT mostly in sinus rhythm and sinus tach, no atrial rhythms noted. Disoriented to time. Safety maintained. Bed locked, in lowest position, call light in reach, side rails x2. Mobilized pt to highest level of functioning. See doc flowsheets for further info. Will continue to monitor.

## 2020-10-08 LAB
ALBUMIN SERPL BCP-MCNC: 2.1 G/DL (ref 3.5–5.2)
ALBUMIN SERPL BCP-MCNC: 2.1 G/DL (ref 3.5–5.2)
ALP SERPL-CCNC: 64 U/L (ref 55–135)
ALT SERPL W/O P-5'-P-CCNC: 10 U/L (ref 10–44)
ANION GAP SERPL CALC-SCNC: 10 MMOL/L (ref 8–16)
AST SERPL-CCNC: 11 U/L (ref 10–40)
BASOPHILS # BLD AUTO: 0.1 K/UL (ref 0–0.2)
BASOPHILS NFR BLD: 1.2 % (ref 0–1.9)
BILIRUB DIRECT SERPL-MCNC: 0.4 MG/DL (ref 0.1–0.3)
BILIRUB SERPL-MCNC: 0.9 MG/DL (ref 0.1–1)
BUN SERPL-MCNC: 19 MG/DL (ref 8–23)
CALCIUM SERPL-MCNC: 8.3 MG/DL (ref 8.7–10.5)
CHLORIDE SERPL-SCNC: 95 MMOL/L (ref 95–110)
CO2 SERPL-SCNC: 33 MMOL/L (ref 23–29)
CREAT SERPL-MCNC: 1.6 MG/DL (ref 0.5–1.4)
DIFFERENTIAL METHOD: ABNORMAL
EOSINOPHIL # BLD AUTO: 1 K/UL (ref 0–0.5)
EOSINOPHIL NFR BLD: 11.6 % (ref 0–8)
ERYTHROCYTE [DISTWIDTH] IN BLOOD BY AUTOMATED COUNT: 17.1 % (ref 11.5–14.5)
EST. GFR  (AFRICAN AMERICAN): 52.2 ML/MIN/1.73 M^2
EST. GFR  (NON AFRICAN AMERICAN): 45.2 ML/MIN/1.73 M^2
GLUCOSE SERPL-MCNC: 185 MG/DL (ref 70–110)
HCT VFR BLD AUTO: 25.1 % (ref 40–54)
HGB BLD-MCNC: 7.2 G/DL (ref 14–18)
IMM GRANULOCYTES # BLD AUTO: 0.07 K/UL (ref 0–0.04)
IMM GRANULOCYTES NFR BLD AUTO: 0.8 % (ref 0–0.5)
LYMPHOCYTES # BLD AUTO: 1.2 K/UL (ref 1–4.8)
LYMPHOCYTES NFR BLD: 13.4 % (ref 18–48)
MAGNESIUM SERPL-MCNC: 1.6 MG/DL (ref 1.6–2.6)
MCH RBC QN AUTO: 26 PG (ref 27–31)
MCHC RBC AUTO-ENTMCNC: 28.7 G/DL (ref 32–36)
MCV RBC AUTO: 91 FL (ref 82–98)
MONOCYTES # BLD AUTO: 1 K/UL (ref 0.3–1)
MONOCYTES NFR BLD: 12 % (ref 4–15)
NEUTROPHILS # BLD AUTO: 5.3 K/UL (ref 1.8–7.7)
NEUTROPHILS NFR BLD: 61 % (ref 38–73)
NRBC BLD-RTO: 0 /100 WBC
PHOSPHATE SERPL-MCNC: 3.2 MG/DL (ref 2.7–4.5)
PLATELET # BLD AUTO: 367 K/UL (ref 150–350)
PMV BLD AUTO: 10.3 FL (ref 9.2–12.9)
POCT GLUCOSE: 129 MG/DL (ref 70–110)
POCT GLUCOSE: 153 MG/DL (ref 70–110)
POCT GLUCOSE: 170 MG/DL (ref 70–110)
POCT GLUCOSE: 174 MG/DL (ref 70–110)
POTASSIUM SERPL-SCNC: 3.6 MMOL/L (ref 3.5–5.1)
PROT SERPL-MCNC: 7 G/DL (ref 6–8.4)
RBC # BLD AUTO: 2.77 M/UL (ref 4.6–6.2)
SODIUM SERPL-SCNC: 138 MMOL/L (ref 136–145)
WBC # BLD AUTO: 8.64 K/UL (ref 3.9–12.7)

## 2020-10-08 PROCEDURE — 83735 ASSAY OF MAGNESIUM: CPT | Mod: HCNC

## 2020-10-08 PROCEDURE — 97803 MED NUTRITION INDIV SUBSEQ: CPT | Mod: HCNC

## 2020-10-08 PROCEDURE — 25000003 PHARM REV CODE 250: Mod: HCNC | Performed by: INTERNAL MEDICINE

## 2020-10-08 PROCEDURE — 94761 N-INVAS EAR/PLS OXIMETRY MLT: CPT | Mod: HCNC

## 2020-10-08 PROCEDURE — 99232 SBSQ HOSP IP/OBS MODERATE 35: CPT | Mod: HCNC,,, | Performed by: INTERNAL MEDICINE

## 2020-10-08 PROCEDURE — 94640 AIRWAY INHALATION TREATMENT: CPT | Mod: HCNC

## 2020-10-08 PROCEDURE — 25000003 PHARM REV CODE 250: Mod: HCNC | Performed by: NURSE PRACTITIONER

## 2020-10-08 PROCEDURE — 85025 COMPLETE CBC W/AUTO DIFF WBC: CPT | Mod: HCNC

## 2020-10-08 PROCEDURE — 25000242 PHARM REV CODE 250 ALT 637 W/ HCPCS: Mod: HCNC | Performed by: STUDENT IN AN ORGANIZED HEALTH CARE EDUCATION/TRAINING PROGRAM

## 2020-10-08 PROCEDURE — 99232 PR SUBSEQUENT HOSPITAL CARE,LEVL II: ICD-10-PCS | Mod: HCNC,,, | Performed by: INTERNAL MEDICINE

## 2020-10-08 PROCEDURE — 25000003 PHARM REV CODE 250: Mod: HCNC | Performed by: STUDENT IN AN ORGANIZED HEALTH CARE EDUCATION/TRAINING PROGRAM

## 2020-10-08 PROCEDURE — 20600001 HC STEP DOWN PRIVATE ROOM: Mod: HCNC

## 2020-10-08 PROCEDURE — 80069 RENAL FUNCTION PANEL: CPT | Mod: HCNC

## 2020-10-08 PROCEDURE — C9399 UNCLASSIFIED DRUGS OR BIOLOG: HCPCS | Mod: HCNC | Performed by: NURSE PRACTITIONER

## 2020-10-08 PROCEDURE — 84075 ASSAY ALKALINE PHOSPHATASE: CPT | Mod: HCNC

## 2020-10-08 PROCEDURE — 63600175 PHARM REV CODE 636 W HCPCS: Mod: HCNC | Performed by: HOSPITALIST

## 2020-10-08 PROCEDURE — 63600175 PHARM REV CODE 636 W HCPCS: Mod: HCNC | Performed by: INTERNAL MEDICINE

## 2020-10-08 PROCEDURE — 36415 COLL VENOUS BLD VENIPUNCTURE: CPT | Mod: HCNC

## 2020-10-08 PROCEDURE — 27000221 HC OXYGEN, UP TO 24 HOURS: Mod: HCNC

## 2020-10-08 PROCEDURE — 25000003 PHARM REV CODE 250: Mod: HCNC | Performed by: HOSPITALIST

## 2020-10-08 RX ORDER — SODIUM BICARBONATE 650 MG/1
650 TABLET ORAL 2 TIMES DAILY
Status: DISCONTINUED | OUTPATIENT
Start: 2020-10-08 | End: 2020-10-09 | Stop reason: HOSPADM

## 2020-10-08 RX ADMIN — HYPROMELLOSE 2910 1 DROP: 5 SOLUTION OPHTHALMIC at 05:10

## 2020-10-08 RX ADMIN — SODIUM BICARBONATE 650 MG TABLET 650 MG: at 09:10

## 2020-10-08 RX ADMIN — INSULIN ASPART 5 UNITS: 100 INJECTION, SOLUTION INTRAVENOUS; SUBCUTANEOUS at 12:10

## 2020-10-08 RX ADMIN — HEPARIN SODIUM 5000 UNITS: 5000 INJECTION INTRAVENOUS; SUBCUTANEOUS at 09:10

## 2020-10-08 RX ADMIN — INSULIN DETEMIR 10 UNITS: 100 INJECTION, SOLUTION SUBCUTANEOUS at 09:10

## 2020-10-08 RX ADMIN — LEVOTHYROXINE SODIUM 75 MCG: 25 TABLET ORAL at 05:10

## 2020-10-08 RX ADMIN — INSULIN ASPART 5 UNITS: 100 INJECTION, SOLUTION INTRAVENOUS; SUBCUTANEOUS at 08:10

## 2020-10-08 RX ADMIN — CARVEDILOL 25 MG: 25 TABLET, FILM COATED ORAL at 09:10

## 2020-10-08 RX ADMIN — FUROSEMIDE 40 MG: 40 TABLET ORAL at 05:10

## 2020-10-08 RX ADMIN — FLUTICASONE FUROATE AND VILANTEROL TRIFENATATE 1 PUFF: 200; 25 POWDER RESPIRATORY (INHALATION) at 12:10

## 2020-10-08 RX ADMIN — HEPARIN SODIUM 5000 UNITS: 5000 INJECTION INTRAVENOUS; SUBCUTANEOUS at 05:10

## 2020-10-08 RX ADMIN — HYPROMELLOSE 2910 1 DROP: 5 SOLUTION OPHTHALMIC at 08:10

## 2020-10-08 RX ADMIN — INSULIN ASPART 5 UNITS: 100 INJECTION, SOLUTION INTRAVENOUS; SUBCUTANEOUS at 05:10

## 2020-10-08 RX ADMIN — Medication 200 MG: at 08:10

## 2020-10-08 RX ADMIN — POLYETHYLENE GLYCOL 3350 17 G: 17 POWDER, FOR SOLUTION ORAL at 08:10

## 2020-10-08 RX ADMIN — HEPARIN SODIUM 5000 UNITS: 5000 INJECTION INTRAVENOUS; SUBCUTANEOUS at 02:10

## 2020-10-08 RX ADMIN — FUROSEMIDE 40 MG: 40 TABLET ORAL at 08:10

## 2020-10-08 RX ADMIN — MELATONIN TAB 3 MG 6 MG: 3 TAB at 09:10

## 2020-10-08 RX ADMIN — HYPROMELLOSE 2910 1 DROP: 5 SOLUTION OPHTHALMIC at 09:10

## 2020-10-08 RX ADMIN — CARVEDILOL 25 MG: 25 TABLET, FILM COATED ORAL at 08:10

## 2020-10-08 RX ADMIN — Medication 200 MG: at 09:10

## 2020-10-08 RX ADMIN — SODIUM BICARBONATE 1300 MG: 650 TABLET ORAL at 08:10

## 2020-10-08 NOTE — CARE UPDATE
Rapid Response Nurse Chart Check     Chart check completed, abnormal VS noted.  Please call [phone] for further concerns or assistance.

## 2020-10-08 NOTE — PROGRESS NOTES
"    [facility] Medicine  Telemedicine Progress Note    Patient Name: Ed Patton  MRN: [id number]  Patient Class: IP- Inpatient   Admission Date: 8/30/2020  Length of Stay: 39 days  Attending Physician: Palmira Gage MD  Primary Care Provider: Qiana Chow MD    [facility] Medicine Team: [facility] VIRTUAL TEAM 10 Palmira Gage MD  Virtual Telemedicine Progress Note  Start time: 933a  Chief complaint: Sepsis due to methicillin resistant Staphylococcus aureus (MRSA)  The patient location is: [facility]/92 A  The patient arrived at: 8/30/2020  2:18 PM  Present with the patient at the time of the telemed/virtual assessment: telepresenter   End time:  941a  Total time spent with patient: 8 min  I have assessed findings virtually using a telemedicine platform and with assistance of the bedside nurse or telemedicine presenter.  The attending portion of this evaluation, treatment, and documentation was performed per Palmira Gage MD via audiovisual.    Patient was transferred to the telemedicine service on: 10/5/2020    Subjective:     Admission CC:   Chief Complaint   Patient presents with    Frequent Falls     frequent falls, weakness, "stepped on a tack" left leg now swollen.     Leg Swelling     Follow up visit for: Sepsis due to methicillin resistant Staphylococcus aureus (MRSA)    Interval History / Events Overnight:  Patient complained of abdominal pain and diarrhea this a.m.; nurse states stool was soft but not watery; he also complains of nausea; + cholecystostomy tube in place; LFTs normal; no vomiting      Review of Systems   Constitutional: Negative for fever.   Respiratory: Negative for shortness of breath.      Objective:     Vital Signs (Most Recent):  Temp: 98.3 °F (36.8 °C) (10/08/20 1124)  Pulse: 105 (10/08/20 1235)  Resp: (!) 25 (10/08/20 1235)  BP: 124/69 (10/08/20 1124)  SpO2: (!) 92 % (10/08/20 1235) Vital Signs (24h Range):  Temp:  [98.2 °F (36.8 °C)-99 °F (37.2 " °C)] 98.3 °F (36.8 °C)  Pulse:  [] 105  Resp:  [16-25] 25  SpO2:  [92 %-100 %] 92 %  BP: (122-133)/(61-80) 124/69     Weight: 115.7 kg (255 lb 1.2 oz)  Body mass index is 31.05 kg/m².    Intake/Output Summary (Last 24 hours) at 10/8/2020 1516  Last data filed at 10/8/2020 1100  Gross per 24 hour   Intake 50 ml   Output 400 ml   Net -350 ml      Physical Exam  Constitutional:       General: He is not in acute distress.     Appearance: Normal appearance. He is not diaphoretic.   Eyes:      General: Lids are normal. No scleral icterus.        Right eye: No discharge.         Left eye: No discharge.      Conjunctiva/sclera: Conjunctivae normal.   Neck:      Trachea: Phonation normal.   Cardiovascular:      Rate and Rhythm: Tachycardia present.   Pulmonary:      Effort: Pulmonary effort is normal. No tachypnea, accessory muscle usage or respiratory distress.   Abdominal:      General: Bowel sounds are normal. There is no distension.      Tenderness: There is abdominal tenderness (Generalized per RN).      Comments: Per RN exam   Musculoskeletal:         General: Swelling (right knee, improving) present.      Right lower leg: No edema.      Left lower leg: No edema.   Neurological:      Mental Status: He is alert. Mental status is at baseline. He is not disoriented.   Psychiatric:         Attention and Perception: Attention normal.         Mood and Affect: Affect normal.         Behavior: Behavior is cooperative.         Significant Labs:     Recent Labs   Lab 08/31/20  0434   HGBA1C 9.5*     Recent Labs   Lab 10/07/20  2023 10/08/20  0852 10/08/20  1135   POCTGLUCOSE 179* 153* 174*     Recent Labs   Lab 10/06/20  0543 10/07/20  0606 10/08/20  0416   WBC 8.27 7.78 8.64   HGB 7.3* 7.4* 7.2*   HCT 25.2* 24.7* 25.1*    361* 367*     Recent Labs   Lab 10/06/20  0543 10/07/20  0606 10/08/20  0416   GRAN 56.5  4.7 57.5  4.5 61.0  5.3   LYMPH 14.4*  1.2 14.9*  1.2 13.4*  1.2   MONO 13.2  1.1* 13.2  1.0 12.0   1.0   EOS 1.1* 1.0* 1.0*     Recent Labs   Lab 10/06/20  0543 10/07/20  0606 10/08/20  0416    140 138   K 3.8 3.9 3.6   CL 98 99 95   CO2 27 31* 33*   BUN 20 18 19   CREATININE 1.6* 1.6* 1.6*   * 148* 185*   CALCIUM 8.0* 8.3* 8.3*   ALBUMIN 1.8* 2.0* 2.1*  2.1*   MG 1.6 1.6 1.6   PHOS 2.9 3.0 3.2     Recent Labs   Lab 10/05/20  0950 10/08/20  0416   ALKPHOS 62 64   ALT 11 10   AST 11 11   PROT 6.7 7.0   BILITOT 0.8 0.9   CRP 43.5*  --      Recent Labs   Lab 10/03/20  0551 10/05/20  0950   CPK 54 51     Recent Labs   Lab 09/03/20  1538 09/10/20  1514 09/12/20 2015 09/20/20  0432 09/27/20  0616 09/28/20  0823 10/04/20  0602   PROCAL 0.13 0.26*  --   --   --   --   --    LACTATE  --  1.2 1.4  --   --  1.1  --    SEDRATE  --   --   --  110* 103*  --  69*     Results for orders placed or performed during the hospital encounter of 08/30/20   Vitamin D   Result Value Ref Range    Vit D, 25-Hydroxy 35 30 - 96 ng/mL     No results found for this or any previous visit.  SARS-CoV2 (COVID-19) Qualitative PCR (no units)   Date Value   09/10/2020 Not Detected   09/03/2020 Not Detected     SARS-CoV-2 RNA, Amplification, Qual (no units)   Date Value   10/06/2020 Negative   09/23/2020 Negative   09/07/2020 Negative   08/30/2020 Negative       ECG Results          EKG 12-lead (Final result)  Result time 09/01/20 05:02:05    Final result by Interface, Lab In [facility] (09/01/20 05:02:05)                 Narrative:    Test Reason : W19.XXXA,    Vent. Rate : 101 BPM     Atrial Rate : 234 BPM     P-R Int : 000 ms          QRS Dur : 098 ms      QT Int : 308 ms       P-R-T Axes : 000 -55 035 degrees     QTc Int : 399 ms    Age and gender specific analysis  Atrial fibrillation with rapid ventricular response  Left axis deviation  Cannot rule out Septal infarct ,age undetermined  Nonspecific ST and/or T wave abnormalities  Abnormal ECG  When compared with ECG of 01-MAY-2015 10:32,  Atrial fibrillation has replaced Sinus  rhythm  Confirmed by Danish Linda MD ([id number]) on 9/1/2020 5:01:56 AM    Referred By: [email or web address]   SELF           Confirmed By:Danish Linda MD                              Results for orders placed during the hospital encounter of 08/30/20   Echo Color Flow Doppler? Yes    Narrative · Concentric left ventricular remodeling.  · Moderately decreased left ventricular systolic function. The estimated   ejection fraction is 35%.  · Moderately to severely reduced right ventricular systolic function.  · Left ventricular diastolic dysfunction.  · The estimated PA systolic pressure is 44 mmHg.  · Normal central venous pressure (3 mmHg).          VAS US Arterial Leg Left  Indication  ========    Peripheral Vascular Disease    Lower Extremity Arterial Imaging  ========================    Left Lower Extremity  Lt mid CFA PSV 88 cm/s  Lt prox DFA PSV    88 cm/s  Lt prox SFA PSV    64 cm/s  Lt mid SFA PSV 75 cm/s  Lt distal SFA PSV  53 cm/s  Lt prox POP PSV    48 cm/s  Lt mid POP  cm/s  Lt distal POP PSV  74 cm/s  Lt prox LICHA PSV    186 cm/s  Lt mid LICHA PSV 40 cm/s  Lt distal LICHA PSV  54 cm/s  Lt prox PTA PSV    37 cm/s  Lt mid PTA PSV 62 cm/s  Lt distal PTA PSV  39 cm/s    Impression  =========    Duplex imaging of the right lower extremity arterial tree reveals a velocity elevation of 67 cm/sec to 186 cm/sec in the prox LICHA with a  visual narrowing. These findings suggest a > 50% hemodynamically significant stenosis. There is another velocity elevation of 48  cm/sec to 106 cm/sec with a slight visual narrowing, which suggests an approximately 50% hemodynamically significant stenosis.  Branches are noted at the PTA.    DATE OF SERVICE: 09/22/2020                                                      Sonographer: LYNDA THORNTON RVT  Electronically Signed by: LUCIA Tsang M.D. at 09/23/2020-11:36      Assessment/Plan:      Active Diagnoses:    Diagnosis Date Noted POA    PRINCIPAL PROBLEM:  Sepsis due to methicillin resistant  Staphylococcus aureus (MRSA) [A41.02] 08/30/2020 Yes    Acute on chronic cholecystitis [K81.2] 10/07/2020 Yes    Diabetic polyneuropathy associated with type 2 diabetes mellitus [E11.42]  Yes     Chronic    PVD (peripheral vascular disease) [I73.9]  Yes    Acute renal insufficiency [N28.9]  Yes    Acute on chronic respiratory failure with hypoxia [J96.21] 09/13/2020 Yes    Debility [R53.81]  Yes    COPD mixed type [J44.9] 10/15/2019 Yes     Chronic    Postablative hypothyroidism [E89.0] 12/06/2018 Yes     Chronic    Hyperlipidemia [E78.5] 08/25/2015 Yes    Type 2 diabetes mellitus with stage 3 chronic kidney disease, with long-term current use of insulin [E11.22, N18.30, Z79.4] 12/23/2014 Not Applicable    Chronic systolic congestive heart failure [I50.22] 05/07/2013 Yes     Chronic      Problems Resolved During this Admission:    Diagnosis Date Noted Date Resolved POA    Cellulitis of left lower extremity [L03.116]  10/06/2020 Yes    MRSA bacteremia [R78.81, B95.62] 09/17/2020 10/06/2020 Yes    Acute metabolic encephalopathy [G93.41] 09/13/2020 10/06/2020 No    Septic shock due to methicillin resistant Staphylococcus aureus [A41.02, R65.21] 09/12/2020 10/06/2020 Yes    Endocarditis [I38]  09/12/2020 Yes    Acute renal failure with acute tubular necrosis superimposed on stage 3 chronic kidney disease [N17.0, N18.30] 09/09/2020 10/06/2020 Yes    Septic arthritis of knee, right [M00.9] 09/07/2020 10/06/2020 Yes    Staphylococcal arthritis of right knee [M00.061] 09/05/2020 10/06/2020 Yes    Diabetic ulcer of left foot associated with type 2 diabetes mellitus, with fat layer exposed [E11.621, L97.522] 08/31/2020 10/06/2020 Yes    Diabetic foot infection [E11.628, L08.9] 08/30/2020 10/06/2020 Yes    Type 2 diabetes mellitus with ketoacidosis without coma, with long-term current use of insulin [E11.10, Z79.4] 10/09/2017 09/18/2020 Not Applicable       Overview / ICU Course:    Ed Patton is a  65 y.o. male admitted for Sepsis due to methicillin resistant Staphylococcus aureus (MRSA).    Inpatient Medications Prescribed for Management of Current Problems:     Scheduled Meds:    artificial tears  1 drop Both Eyes TID    carvediloL  25 mg Oral BID    fluticasone furoate-vilanteroL  1 puff Inhalation Daily    fluticasone propionate  1 spray Each Nostril Daily    furosemide  40 mg Oral BID    heparin (porcine)  5,000 Units Subcutaneous Q8H    insulin aspart U-100  5 Units Subcutaneous TIDWM    insulin detemir U-100  10 Units Subcutaneous QHS    levothyroxine  75 mcg Oral Before breakfast    magnesium oxide  200 mg Oral BID    sodium bicarbonate  650 mg Oral BID     Continuous Infusions:   As Needed: sodium chloride, acetaminophen, albuterol-ipratropium, calcium carbonate, dextrose 50%, dextrose 50%, glucagon (human recombinant), glucose, glucose, insulin aspart U-100, magnesium hydroxide 400 mg/5 ml, melatonin, ondansetron, oxyCODONE, polyethylene glycol, DIPH,PERTUS (ADACEL),TETANUS PF VAC (ADULT)    Assessment and Plan by Problem    Septic shock_Septic arthritis of Right Knee_Sepsis due to MRSA  Acute on Chronic Cholecystitis  This is a 65 year old  male with PMH significant for HFrEF and COPD with chronic respiratory failure (on 2L home oxygen), T2DM with CKD III, and iron deficiency anemia who originally presented to [facility] on 08/30 with cellulitis of left foot with concern for diabetic foot infection with subsequent development of MRSA bacteremia attributed to septic arthritis of right knee and elbow. He is status post drainage and coverage for MRSA since that time. His work-up for other etiologies of MRSA bacteremia have included negative LUKAS and MRI of lumbar spine looking for endocarditis and spinal abscess, respectively. Stool studies for C.diff on 09/11 were negative. His hospital course has been associated with worsening hypotension and leukocytosis since 09/10 prompting  ICU admission on 09/12 for initiation of vasopressor support. Infectious work-up thus far on ICU admission concern for possible right lower lobe HAP. However; CT abdomen pelvis on 9/15 with gallbladder dilation, sludge, and trace pericholic fluid collection. Exam not consistent with cholecystitis but given persistent shock considered a potential source. Also with potential right lower lobe atelectasis with possible overlying infection.  S/p ICU stepdown. CK levels were rising so per infectious disease daptomycin changed back to vancomycin.   -Appreciate final ID recs: End date of IV antibiotics:  Daptomycin /vancomycin 10/7/2020  Levofloxacin 9/30/2020  - Follow up with ID and Gen Surg     Uncontrolled type 2 diabetes mellitus with hyperglycemia, with long-term current use of insulin  Diabetic polyneuropathy associated with type 2 diabetes mellitus  Takes insulin NPH-insulin regular 70/30 45 units before breakfast and 35 units before dinner.   Giving insulin detemir 29 units daily and insulin aspart 14 units with meals and correction dose scale. Monitor BGs.  Increased basal insulin slightly 9/8. BGs improved.  Due to increase in sCr, insulin regimen reduced 9/9 and 9/10. Monitor for hyperglycemia/hypoglycemia.    Type 2 DM with DKA associated with recurrent sepsis  -Endocrinology consulted; kept on transitional drip but has since been weaned to a basal bolus regimen.      Postablative hypothyroidism  Plan: Continue home SYNTHROID     Acute kidney injury  -likely ATN from Shock s/p requiring RRT in ICU  -trialysis line removed   -pt is non-oliguric  -Nephrology recommends sodium bicarb tabs   -Nephrology recommends ambulatory f/u on discharge  -decrease sodium bicarb tabs to 1 twice a day due to metabolic alkalosis     Chronic systolic congestive heart failure  -Most recent ECHO in 09/2020 with EF of 25%.   -Unclear if ischemic vs non-ischemic.   -Home regimen: Carvedilol, Lasix 80 mg, and Lisinopril.           -  due to NANETTE, Lisinopril on hold, but he is recovering renal function and prior to discharge to any facility will likely need some amount of diuretic re-initiated and potentially a much lower doseage of Ace-inhibitor.   -Associated with chronic hypoxemic respiratory failure requiring 2L nasal cannula, but noted to develop worsening oxygen requirements on ICU admission that were likely multifactorial from suspected hospital acquired pneumonia versus declining urine output with pre-disposition to volume overload. .      Anemia       -Chronic, no s/s of bleeding       -Notified that SNF is not accepting this patient due to Hb of < 8. Of note, patient has chronic anemia and Hb has been stable for AT MINIMUM, the last 10 days and he has not required any transfusions. This should not be a barrier to discharge.        -transfused 1U PRBC on 10/3      COPD mixed type  -Based on PFT's from 05/2015 showing mild (small airways) obstruction, airflow not improved after bronchodilator, and moderate restriction.     Acute on chronic respiratory failure with hypoxia  Chronic respiratory failure with hypoxia, on home oxygen therapy  On 2 L nasal cannula chronically due to combination of COPD and CHF; titrate as needed.  -History of mixed COPD (based on PFT's from 05/2015 showing mild (small airways) obstruction, airflow not improved after bronchodilator, and moderate restriction) and HFrEF with chronic respiratory failure on 2L home oxygen.   -ICU admission notable for progressive increase in supplemental oxygen requirements.   -Work-up for underlying etiology of acute on chronic respiratory failure include CXR showing concern for possible progression of CHF vs HAP (not entirely convinced that CXR showed consolidation, doubt HAP)  - Breathing back to baseline now to 2L NC     Diabetic foot infection  Bacteremia due to methicillin resistant Staphylococcus aureus  Diabetic ulcer of left foot associated with type 2 diabetes mellitus,  with fat layer exposed  Right knee pain / septic arthritis  Appreciate Infectious Disease, Orthopedic Surgery. Treating with antibiotics per ID.   Arthrocentesis and cultures suggest septic knee. LUKAS to evaluate for endocarditis negative for vegetations.  Plan for I and D of the knee 9/7/2020.  Per ID: Antibiotics changed to daptomycin and ceftaroline combination therapy for 'persistent MRSA bacteremia'  -- Monitor CPK and CBC (ceftaroline can lead to leucopenia)  -- MRI lumbar spine w/wo contrast ordered  -- NM WB WBC scan for inflammatory localization pending  -- Repeat blood cultures until neagtive  -- PICC line after clearance of bacteremia, anticipate prolonged IV antibiotics on discharge.  -- ID recommends: MRI Lumbar spine/L psoas to r/o abscess. Broaden coverage to dapto/ceftaroline. Baseline CK obtained. Renal dose antibiotics.  -- s/p R knee scope I&D 9/7/20. PT/OT:  WBAT  -- Per ID: Blood cultures seem to be clearing since I&D of knee / adequate source control. Podiatry considering need for debridement. Await repeat blood cultures to be negative x 72h before placing PICC line.  -GINA's ordered by podiatry but may need vascular surgery evaluation, unfortunately he is not a candidate for contrasted studies due to NANETTE  -- Podiatry DC Instructions:  Patient is to follow up with Podiatry within 10 days of discharge. Discontinue wrapping to L foot as it may have irritated L dorsal foot. Betadine to R heel bulla abnd all escharred wounds. Aquacel Silver to L dorsal and plantar wounds, covered with bordered foam.    Sepsis due to DM wound, UTI, bacteremia  Due to Proteus and MRSA. Continued management with antibiotics.  Resolved.  Follow up on ID recommendations.  Discontinued Ciprofloxacine (completed 7 days, was administered for diabetic foot wound growing Proteus and urine growing Klebseilla)  -Foot wound Dressing changed 9/9 by Podiatry     Cellulitis of left lower extremity  Continued  antibiotics.  Resolved     Acute kidney injury due to Urinary retention - required Newell   Bladder scan showed >500 mL. He had a Newell. ACEi held for now.  - Per Nephrology: Given hypotension and simultaneous volume overload reccomend IVF as small volume boluses rather than continuous IVF to maintain MAPS >65.   Repeat UA and Urine culture.  Strict I/Os. Daily weights. C3/C4 levels.  - Newell removed now; no UOP recorded. Need to monitor closely    Cholecystitis  S/p cholecystostomy to placed on 09/16/2020 after + HIDA  -follow-up with general surgery on discharge  -ensure resolution of GI symptoms and no changes in LFT's prior to discharge       Diet: Diet diabetic [facility]; 2000 Calorie  GI Prophylaxis: Not indicated  Significant LDAs:   IV Access Type: PICC  Urinary Catheter Indication if present: Patient Does Not Have Urinary Catheter  Other Lines/Tubes/Drains:  PCCholeTube    HIGH RISK CONDITION(S):   Patient has a condition that poses threat to life and bodily function: Respiratory Distress and Acute Renal Insufficiency  Patient is currently on drug therapy requiring intensive monitoring for toxicity: Vancomycin     Goals of Care:    Previous admission:  4/15/16  Likely prognosis:  Fair  Code Status: Full Code  Comfort Only: No  Hospice: No  Goals at discharge: remain at home, with physician follow-up, with caregiver    Discharge Planning   EDY: 10/9/2020     Code Status: Full Code   Is the patient medically ready for discharge?: Yes    Reason for patient still in hospital (select all that apply): Patient trending condition  Discharge Plan A: Skilled Nursing Facility   Discharge Delays: (!) Change in Medical Condition    VTE Risk Mitigation (From admission, onward)         Ordered     heparin (porcine) injection 5,000 Units  Every 8 hours      09/11/20 0943     IP VTE HIGH RISK PATIENT  Once      08/30/20 2004     Place sequential compression device  Until discontinued      08/30/20 3554                    Palmira Gage MD  Department of Hospital Medicine   [facility]  [phone]

## 2020-10-08 NOTE — CARE UPDATE
BG goal 140 - 180   BG within goal ranges on current SQ insulin regimen.     -  Continue Levemir 10 units q HS  -  Continue novolog 5 units TDWM.   -  Low Dose SQ Insulin Correction Scale.      Given kidney function.   -  BG Monitoring AC/HS      ** Please call Endocrine for any BG related issues **     Discharge Planning:    Novolin 70/30 U-100  -14 units AM  -12 units PM.                Lab Results   Component Value Date     HGBA1C 9.5 (H) 08/31/2020

## 2020-10-08 NOTE — CARE UPDATE
Rapid Response Nurse Chart Check     Chart check completed, abnormal VS noted. Call [phone] for further concerns or assistance.

## 2020-10-08 NOTE — PROGRESS NOTES
"  [facility]  Adult Nutrition  Consult Note    SUMMARY     Recommendations    1. Continue current Diabetic diet, add Boost Glucose Control ONS if PO intake declines.   2. RD to monitor & follow-up.    Goals: Meet % EEN, EPN by RD f/u date  Nutrition Goal Status: goal met  Communication of RD Recs: reviewed with RN    Reason for Assessment    Reason For Assessment: RD follow-up  Diagnosis: diabetes diagnosis/complications(diabetic foot infection)  Relevant Medical History: DM2, CHF, DCM, CAD, COPD, HLD  Interdisciplinary Rounds: did not attend    General Information Comments: Unable to see patient this AM. Per documentation, pt continues to tolerate diet w/ adequate PO intake. Pt with good appetite, no wt changes PTA. -250# > 1 year. NFPE not warranted, pt with no indicators of malnutrition at this time. Diabetic diet education complete 9/4 - pt w/ no further questions at this time.  Nutrition Discharge Planning: Adequate PO intake    Nutrition/Diet History    Spiritual, Cultural Beliefs, Restorationism Practices, Values that Affect Care: no  Factors Affecting Nutritional Intake: decreased appetite    Anthropometrics    Temp: 98.3 °F (36.8 °C)  Height Method: Stated  Height: 6' 4" (193 cm)  Height (inches): 76 in  Weight Method: Bed Scale  Weight: 115.7 kg (255 lb 1.2 oz)  Weight (lb): 255.07 lb  Ideal Body Weight (IBW), Male: 202 lb  % Ideal Body Weight, Male (lb): 126.27 %  BMI (Calculated): 31.1  BMI Grade: 30 - 34.9- obesity - grade I    Lab/Procedures/Meds    Pertinent Labs Reviewed: reviewed  Pertinent Labs Comments: Creat 1.6, GFR 52.2  Pertinent Medications Reviewed: reviewed  Pertinent Medications Comments: -    Estimated/Assessed Needs    Weight Used For Calorie Calculations: 115.7 kg (255 lb 1.2 oz)     Energy Calorie Requirements (kcal): 2454 kcal/d  Energy Need Method: [place]((x1.2))     Protein Requirements: 115-138 g/d (1-1.2 g/kg)  Weight Used For Protein " Calculations: 114.8 kg (253 lb 1.4 oz)     Fluid Requirements (mL): per MD or 1 mL/kcal  RDA Method (mL): 2454     CHO Requirement: 315g/d    Nutrition Prescription Ordered    Current Diet Order: 2000 kcal ADA    Evaluation of Received Nutrient/Fluid Intake    Comments: LBM: 10/6    Tolerance: tolerating    Nutrition Risk    Level of Risk/Frequency of Follow-up: (1x/week)     Assessment and Plan    Nutrition Problem  Inadequate energy intake     Related to (etiology):   Decreased ability to consume sufficient energy     Signs and Symptoms (as evidenced by):   NPO with no alternate means of nutrition at this time     Interventions (treatment strategy):  Collaboration with other providers     Nutrition Diagnosis Status:   Resolved     Monitor and Evaluation    Food and Nutrient Intake: energy intake, food and beverage intake, enteral nutrition intake  Food and Nutrient Adminstration: diet order, enteral and parenteral nutrition administration  Knowledge/Beliefs/Attitudes: food and nutrition knowledge/skill  Physical Activity and Function: nutrition-related ADLs and IADLs  Anthropometric Measurements: weight, weight change  Biochemical Data, Medical Tests and Procedures: lipid profile, inflammatory profile, glucose/endocrine profile, gastrointestinal profile, electrolyte and renal panel  Nutrition-Focused Physical Findings: overall appearance     Nutrition Follow-Up    RD Follow-up?: Yes

## 2020-10-09 VITALS
HEIGHT: 76 IN | WEIGHT: 255.06 LBS | OXYGEN SATURATION: 98 % | HEART RATE: 92 BPM | DIASTOLIC BLOOD PRESSURE: 67 MMHG | RESPIRATION RATE: 16 BRPM | SYSTOLIC BLOOD PRESSURE: 117 MMHG | BODY MASS INDEX: 31.06 KG/M2 | TEMPERATURE: 99 F

## 2020-10-09 PROBLEM — L97.522 ULCER OF LEFT FOOT WITH FAT LAYER EXPOSED: Status: ACTIVE | Noted: 2020-10-09

## 2020-10-09 LAB
ALBUMIN SERPL BCP-MCNC: 2 G/DL (ref 3.5–5.2)
ANION GAP SERPL CALC-SCNC: 9 MMOL/L (ref 8–16)
BASOPHILS # BLD AUTO: 0.11 K/UL (ref 0–0.2)
BASOPHILS NFR BLD: 1.2 % (ref 0–1.9)
BUN SERPL-MCNC: 16 MG/DL (ref 8–23)
CALCIUM SERPL-MCNC: 8 MG/DL (ref 8.7–10.5)
CHLORIDE SERPL-SCNC: 97 MMOL/L (ref 95–110)
CO2 SERPL-SCNC: 32 MMOL/L (ref 23–29)
CREAT SERPL-MCNC: 1.5 MG/DL (ref 0.5–1.4)
DIFFERENTIAL METHOD: ABNORMAL
EOSINOPHIL # BLD AUTO: 0.9 K/UL (ref 0–0.5)
EOSINOPHIL NFR BLD: 9.8 % (ref 0–8)
ERYTHROCYTE [DISTWIDTH] IN BLOOD BY AUTOMATED COUNT: 17 % (ref 11.5–14.5)
EST. GFR  (AFRICAN AMERICAN): 56.5 ML/MIN/1.73 M^2
EST. GFR  (NON AFRICAN AMERICAN): 48.8 ML/MIN/1.73 M^2
GLUCOSE SERPL-MCNC: 126 MG/DL (ref 70–110)
HCT VFR BLD AUTO: 24 % (ref 40–54)
HGB BLD-MCNC: 7.1 G/DL (ref 14–18)
IMM GRANULOCYTES # BLD AUTO: 0.06 K/UL (ref 0–0.04)
IMM GRANULOCYTES NFR BLD AUTO: 0.6 % (ref 0–0.5)
LYMPHOCYTES # BLD AUTO: 1.2 K/UL (ref 1–4.8)
LYMPHOCYTES NFR BLD: 12.8 % (ref 18–48)
MAGNESIUM SERPL-MCNC: 1.8 MG/DL (ref 1.6–2.6)
MCH RBC QN AUTO: 26.2 PG (ref 27–31)
MCHC RBC AUTO-ENTMCNC: 29.6 G/DL (ref 32–36)
MCV RBC AUTO: 89 FL (ref 82–98)
MONOCYTES # BLD AUTO: 1.1 K/UL (ref 0.3–1)
MONOCYTES NFR BLD: 11.9 % (ref 4–15)
NEUTROPHILS # BLD AUTO: 6 K/UL (ref 1.8–7.7)
NEUTROPHILS NFR BLD: 63.7 % (ref 38–73)
NRBC BLD-RTO: 0 /100 WBC
PHOSPHATE SERPL-MCNC: 3.2 MG/DL (ref 2.7–4.5)
PLATELET # BLD AUTO: 368 K/UL (ref 150–350)
PMV BLD AUTO: 9.8 FL (ref 9.2–12.9)
POCT GLUCOSE: 112 MG/DL (ref 70–110)
POCT GLUCOSE: 117 MG/DL (ref 70–110)
POTASSIUM SERPL-SCNC: 3.4 MMOL/L (ref 3.5–5.1)
RBC # BLD AUTO: 2.71 M/UL (ref 4.6–6.2)
SODIUM SERPL-SCNC: 138 MMOL/L (ref 136–145)
WBC # BLD AUTO: 9.42 K/UL (ref 3.9–12.7)

## 2020-10-09 PROCEDURE — 99239 PR HOSPITAL DISCHARGE DAY,>30 MIN: ICD-10-PCS | Mod: HCNC,,, | Performed by: INTERNAL MEDICINE

## 2020-10-09 PROCEDURE — 80069 RENAL FUNCTION PANEL: CPT | Mod: HCNC

## 2020-10-09 PROCEDURE — 94761 N-INVAS EAR/PLS OXIMETRY MLT: CPT | Mod: HCNC

## 2020-10-09 PROCEDURE — 99239 HOSP IP/OBS DSCHRG MGMT >30: CPT | Mod: HCNC,,, | Performed by: INTERNAL MEDICINE

## 2020-10-09 PROCEDURE — 63600175 PHARM REV CODE 636 W HCPCS: Mod: HCNC | Performed by: HOSPITALIST

## 2020-10-09 PROCEDURE — 25000003 PHARM REV CODE 250: Mod: HCNC | Performed by: INTERNAL MEDICINE

## 2020-10-09 PROCEDURE — 27000221 HC OXYGEN, UP TO 24 HOURS: Mod: HCNC

## 2020-10-09 PROCEDURE — 83735 ASSAY OF MAGNESIUM: CPT | Mod: HCNC

## 2020-10-09 PROCEDURE — 99233 PR SUBSEQUENT HOSPITAL CARE,LEVL III: ICD-10-PCS | Mod: HCNC,,, | Performed by: PODIATRIST

## 2020-10-09 PROCEDURE — 85025 COMPLETE CBC W/AUTO DIFF WBC: CPT | Mod: HCNC

## 2020-10-09 PROCEDURE — 99233 SBSQ HOSP IP/OBS HIGH 50: CPT | Mod: HCNC,,, | Performed by: PODIATRIST

## 2020-10-09 PROCEDURE — 25000003 PHARM REV CODE 250: Mod: HCNC | Performed by: STUDENT IN AN ORGANIZED HEALTH CARE EDUCATION/TRAINING PROGRAM

## 2020-10-09 PROCEDURE — 94640 AIRWAY INHALATION TREATMENT: CPT | Mod: HCNC

## 2020-10-09 RX ORDER — POLYETHYLENE GLYCOL 3350 17 G/17G
17 POWDER, FOR SOLUTION ORAL 2 TIMES DAILY PRN
Refills: 0
Start: 2020-10-09 | End: 2020-10-09

## 2020-10-09 RX ORDER — POTASSIUM CHLORIDE 20 MEQ/1
40 TABLET, EXTENDED RELEASE ORAL ONCE
Status: COMPLETED | OUTPATIENT
Start: 2020-10-09 | End: 2020-10-09

## 2020-10-09 RX ORDER — POLYETHYLENE GLYCOL 3350 17 G/17G
17 POWDER, FOR SOLUTION ORAL 2 TIMES DAILY PRN
Refills: 0
Start: 2020-10-09

## 2020-10-09 RX ADMIN — FUROSEMIDE 40 MG: 40 TABLET ORAL at 09:10

## 2020-10-09 RX ADMIN — FLUTICASONE FUROATE AND VILANTEROL TRIFENATATE 1 PUFF: 200; 25 POWDER RESPIRATORY (INHALATION) at 07:10

## 2020-10-09 RX ADMIN — HEPARIN SODIUM 5000 UNITS: 5000 INJECTION INTRAVENOUS; SUBCUTANEOUS at 12:10

## 2020-10-09 RX ADMIN — HYPROMELLOSE 2910 1 DROP: 5 SOLUTION OPHTHALMIC at 09:10

## 2020-10-09 RX ADMIN — POTASSIUM CHLORIDE 40 MEQ: 1500 TABLET, EXTENDED RELEASE ORAL at 09:10

## 2020-10-09 RX ADMIN — INSULIN ASPART 5 UNITS: 100 INJECTION, SOLUTION INTRAVENOUS; SUBCUTANEOUS at 12:10

## 2020-10-09 RX ADMIN — INSULIN ASPART 5 UNITS: 100 INJECTION, SOLUTION INTRAVENOUS; SUBCUTANEOUS at 09:10

## 2020-10-09 RX ADMIN — LEVOTHYROXINE SODIUM 75 MCG: 25 TABLET ORAL at 05:10

## 2020-10-09 RX ADMIN — Medication 200 MG: at 09:10

## 2020-10-09 RX ADMIN — HEPARIN SODIUM 5000 UNITS: 5000 INJECTION INTRAVENOUS; SUBCUTANEOUS at 05:10

## 2020-10-09 RX ADMIN — CARVEDILOL 25 MG: 25 TABLET, FILM COATED ORAL at 09:10

## 2020-10-09 RX ADMIN — SODIUM BICARBONATE 650 MG TABLET 650 MG: at 09:10

## 2020-10-09 NOTE — CARE UPDATE
BG goal 140 - 180   BG well controlled on current SQ insulin regimen. No changes to plan of care.      -  Continue Levemir 10 units q HS  -  Continue novolog 5 units TDWM.   -  Low Dose SQ Insulin Correction Scale.      Given kidney function.   -  BG Monitoring AC/HS      ** Please call Endocrine for any BG related issues **     Discharge Planning:   Recommend:   Novolin 70/30 U-100  -12 units AM  -10 units PM.                Lab Results   Component Value Date     HGBA1C 9.5 (H) 08/31/2020

## 2020-10-09 NOTE — PLAN OF CARE
10/09/20 1557   Final Note   Assessment Type Final Discharge Note   Anticipated Discharge Disposition SNF   What phone number can be called within the next 1-3 days to see how you are doing after discharge? [phone]   Right Care Referral Info   Post Acute Recommendation SNF / Sub-Acute Rehab   Facility Name [facility]   Post-Acute Status   Post-Acute Authorization Placement   Post-Acute Placement Status Set-up Complete

## 2020-10-09 NOTE — PROGRESS NOTES
[facility]  Podiatry  Progress Note    Patient Name: Ed Patton  MRN: [id number]  Admission Date: 8/30/2020  Hospital Length of Stay: 40 days  Attending Physician: Palmira Gage MD  Primary Care Provider: Qiana Chow MD   Interval History: NAEON. Patient improved systemically, no longer on antibiotics. Ready for discharge soon. No pain in feet, no new pedal complaints. Still having some residual left knee pain but improved from before.     Scheduled Meds:   artificial tears  1 drop Both Eyes TID    carvediloL  25 mg Oral BID    fluticasone furoate-vilanteroL  1 puff Inhalation Daily    fluticasone propionate  1 spray Each Nostril Daily    furosemide  40 mg Oral BID    heparin (porcine)  5,000 Units Subcutaneous Q8H    insulin aspart U-100  5 Units Subcutaneous TIDWM    insulin detemir U-100  10 Units Subcutaneous QHS    levothyroxine  75 mcg Oral Before breakfast    magnesium oxide  200 mg Oral BID    sodium bicarbonate  650 mg Oral BID     Continuous Infusions:  PRN Meds:sodium chloride, acetaminophen, albuterol-ipratropium, calcium carbonate, dextrose 50%, dextrose 50%, glucagon (human recombinant), glucose, glucose, insulin aspart U-100, magnesium hydroxide 400 mg/5 ml, melatonin, ondansetron, oxyCODONE, polyethylene glycol, DIPH,PERTUS (ADACEL),TETANUS PF VAC (ADULT)    Review of patient's allergies indicates:   Allergen Reactions    Vicodin [hydrocodone-acetaminophen] Itching        Past Medical History:   Diagnosis Date    CHF (congestive heart failure)     COPD (chronic obstructive pulmonary disease)     Coronary artery disease     Diabetes mellitus     Diabetes mellitus type II     DM (diabetes mellitus) type II uncontrolled with renal manifestation 9/4/2013    Hyperlipidemia     Hypertension     Postablative hypothyroidism 12/6/2018     Past Surgical History:   Procedure Laterality Date    APPENDECTOMY      ARTHROSCOPY OF KNEE Right 9/7/2020     Procedure: ARTHROSCOPY, KNEE, RIGHT ;  Surgeon: You Bhatia MD;  Location: [address];  Service: Orthopedics;  Laterality: Right;    CHOLECYSTECTOMY      CORONARY ANGIOPLASTY WITH STENT PLACEMENT      TONSILLECTOMY      TRANSESOPHAGEAL ECHOCARDIOGRAPHY N/A 2020    Procedure: ECHOCARDIOGRAM, TRANSESOPHAGEAL;  Surgeon: [facility] Diagnostic Provider;  Location: [facility] EP LAB;  Service: Anesthesiology;  Laterality: N/A;    TRANSESOPHAGEAL ECHOCARDIOGRAPHY N/A 9/3/2020    Procedure: ECHOCARDIOGRAM, TRANSESOPHAGEAL;  Surgeon: [facility] Diagnostic Provider;  Location: [facility] EP LAB;  Service: Anesthesiology;  Laterality: N/A;       Family History     Problem Relation (Age of Onset)    Heart disease Mother    Hypertension Son    No Known Problems Father, Sister, Son, Son        Tobacco Use    Smoking status: Former Smoker     Packs/day: 0.01     Years: 3.00     Pack years: 0.03     Types: Cigarettes     Quit date: 1995     Years since quittin.4    Smokeless tobacco: Never Used   Substance and Sexual Activity    Alcohol use: No    Drug use: No    Sexual activity: Never     Comment:  with 1 son     Review of Systems   Constitutional: Positive for activity change. Negative for appetite change, chills and fever.   HENT: Negative for congestion and ear pain.    Eyes: Negative for pain.   Respiratory: Negative for cough and shortness of breath.    Cardiovascular: Negative for chest pain.   Gastrointestinal: Negative for abdominal pain, nausea and vomiting.   Genitourinary: Negative for dysuria.   Musculoskeletal: Positive for arthralgias, back pain and myalgias.   Skin: Positive for wound. Negative for color change.   Neurological: Positive for weakness. Negative for numbness.   Psychiatric/Behavioral: Negative for agitation and confusion.     Objective:     Vital Signs (Most Recent):  Temp: 98.5 °F (36.9 °C) (10/09/20 1100)  Pulse: 92 (10/09/20 1152)  Resp: 16 (10/09/20 0746)  BP: 117/67 (10/09/20  1100)  SpO2: 98 % (10/09/20 1152) Vital Signs (24h Range):  Temp:  [98.1 °F (36.7 °C)-98.5 °F (36.9 °C)] 98.5 °F (36.9 °C)  Pulse:  [] 92  Resp:  [14-24] 16  SpO2:  [92 %-100 %] 98 %  BP: ()/(60-78) 117/67     Weight: 115.7 kg (255 lb 1.2 oz)  Body mass index is 31.05 kg/m².    Foot Exam    General  Orientation: alert and oriented to person, place, and time   Affect: appropriate       Right Foot/Ankle     Inspection and Palpation  Ecchymosis: none  Tenderness: none   Swelling: none   Skin Exam: skin intact;     Neurovascular  Dorsalis pedis: absent  Posterior tibial: absent  Saphenous nerve sensation: diminished  Tibial nerve sensation: diminished  Superficial peroneal nerve sensation: diminished  Deep peroneal nerve sensation: diminished  Sural nerve sensation: diminished      Left Foot/Ankle      Inspection and Palpation  Ecchymosis: none  Tenderness: none   Swelling: none   Skin Exam: blister, dry skin, skin changes, abnormal color and ulcer; no drainage     Neurovascular  Dorsalis pedis: absent  Posterior tibial: absent  Saphenous nerve sensation: diminished  Tibial nerve sensation: diminished  Superficial peroneal nerve sensation: diminished  Deep peroneal nerve sensation: diminished  Sural nerve sensation: diminished        Biomechanical Exam:  Non weight bearing assessment:   Right (degrees) Left (degrees)   Malleolar position 20 20   Ankle DF (knee extended) 0 0   Ankle DF (knee flexed) 5 5   Heel Inversion 10 10   Heel Eversion 5 5   STJ Neutral Position 5 inverted 5 inverted   Forefoot to rearfoot (1-5) perp perp   Forefoot to Rearfoot (2-5) perp perp   First Ray Dorsiflextion 5mm 5mm   First ray plantarflextion 5mm 5mm   First ray Neutral Position neutral neutral   Hallux Dorsiflexion 30 30   Hallux plantarflexion 20 20      Musculoskeletal evaluation, Range of Motion    Normal Limited Excessive pain   Ankle DF  B     Ankle PF B      STJ supination  B     STJ pronation  B     Hallux DF   B      Hallux PF   B     Lesser digits DF B      Lesser Digits PF B        Muscle Strength   Right Left   Gastrocnemius 5 5   Soleus 5 5   Tib. Posterior 4+ 4+   FDL 4+ 4+   FHL 4+ 4+   FDB 4+ 4+   Tib Anterior 4+ 4+   EDL 4+ 4+   EHL 4+ 4+   EDB 4+ 4+   Peroneus Longus 4+ 4+   Peroneus Brevis 4+ 4+     Foot Morphology    Varus  R L    Valgus  R L      Forefoot             Rearfoot X X                 Sagittal Plane semireducible cavus B/L    Transverse Plane Normal B/L    Ankle Morphology ankle equinus, subtalar varus B/L      Digital Assessments    Right  1 2 3 4 5    Left  1 2 3 4 5      Abducted                   Adducted                   Clawtoe                   Hammer toe                   Mallet toetoe                   Hallux rigidus Present Present       Gait Analysis  Gait Pattern: Apropulsive      Right Left   Angle of Gait 8 8   Base of Gait 5cm 5cm       Heel Position Right Left   Contact sup sup   Mid-stance vert vert   Propulsion sup sup   Swing sup sup       Heel off: Early Both  Abductory twist?  Yes Both      Laboratory:  Blood Cultures: No results for input(s): LABBLOO in the last 48 hours.  CBC:   Recent Labs   Lab 10/09/20  0455   WBC 9.42   RBC 2.71*   HGB 7.1*   HCT 24.0*   *   MCV 89   MCH 26.2*   MCHC 29.6*     CMP:   Recent Labs   Lab 10/08/20  0416 10/09/20  0455   * 126*   CALCIUM 8.3* 8.0*   ALBUMIN 2.1*  2.1* 2.0*   PROT 7.0  --     138   K 3.6 3.4*   CO2 33* 32*   CL 95 97   BUN 19 16   CREATININE 1.6* 1.5*   ALKPHOS 64  --    ALT 10  --    AST 11  --    BILITOT 0.9  --      CRP:   Recent Labs   Lab 10/05/20  0950   CRP 43.5*     ESR:   Recent Labs   Lab 10/04/20  0602   SEDRATE 69*     Microbiology Results (last 7 days)     Procedure Component Value Units Date/Time    Fungus culture [[id number]] Collected: 09/07/20 1721    Order Status: Completed Specimen: Body Fluid from Knee, Right Updated: 10/05/20 1113     Fungus (Mycology) Culture No fungus isolated after 4 weeks     Narrative:      1) Right Knee Joint Fluid    Fungus culture [[id number]] Collected: 09/07/20 1721    Order Status: Completed Specimen: Body Fluid from Knee, Right Updated: 10/05/20 1113     Fungus (Mycology) Culture No fungus isolated after 4 weeks    Narrative:      2) Right Knee Joint Fluid    Fungus culture [[id number]] Collected: 09/03/20 1647    Order Status: Completed Specimen: Joint Fluid from Knee, Right Updated: 10/05/20 1107     Fungus (Mycology) Culture No fungus isolated after 4 weeks        Specimen (12h ago, onward)    None          Diagnostic Results:  None    Clinical Findings:    -Left plantar foot ulcer 3x1.7x0.7 cm, fibronecrotic wound base, no drainage, no undermining, no erythema, no edema, no fluctuance or crepitus. Periwound callus. No fluid could be expressed on manual compression. No tenderness. Not clinically infected.   -Left dorsal foot bulla in geographic area 7x7 cm spontaneously deroofed, base of medial deroofed area primarily fibronecrotic with small islands of granular tissue. No drainage, no erythema, no edema, no fluctuance or crepitus. No fluid could be expressed on manual compression. No tenderness. Not clinically infected.                       Assessment/Plan:     Ulcer of left foot with fat layer exposed  Assessment: 65 y.o M with PMHx of Type 2 DM admitted for MRSA septicemia. Left plantar foot wound and left dorsal foot wound. No signs of deep foot infection on X ray, MRI, or nuclear medicine scan. Foot wounds superficial and stable and likely not the cause of patient's persistent septicemia. Left ankle peak systolic velocity 35 cm/s which is concerning for risk of nonhealing. Abnormal GINA-PVR, poor vascular status may be contributing to maintenance of wounds. Patient refusing revascularization at this time. Semireducible cavus foot deformity may be contributing to maintenance of left plantar-lateral foot wound.     Plan:  - Debrided left periwound callus with #15 blade  without incident. Did not debride base of wound due to poor vascular status.  - Dressings changed. Continue local wound care, wound care orders in.  - Patient would still benefit from revascularization, refusing at this time. Encouraged patient to reconsider and he states that he will after he gets out of rehab.  - Left surgical shoe modified with felt heel lift to accommodate ankle equinus and felt lateral wedge to correct semireducible rearfoot varus which may be contributing to maintenance of wound. Cutout included to offload wound. Patient encouraged to ambulate only in this surgical shoe until podiatry followup. Discussed need for foot orthoses and appropriate footwear in future to continue offloading the left plantar foot wound.  - WBAT in left surgical shoe.   - Continue bilateral heel protectors at all times when not standing.  - OK for discharge from podiatry perspective.  - Podiatry will sign off.     DC Instructions:  Patient is to follow up with podiatry within 10 days of discharge. WBAT in left surgical shoe. Podiatry will arrange an appt. Home health/facility to change dressings q MWF as follows: rinse left foot wound with saline, paint wounds with betadine and dress with bordered foam.        Mk Cook DPM PGY-1  Podiatric Medicine & Surgery  [facility]

## 2020-10-09 NOTE — PLAN OF CARE
10/09/20 1322   Post-Acute Status   Post-Acute Authorization Placement   Post-Acute Placement Status Set-up Complete   Discharge Delays None known at this time   Discharge Plan   Discharge Plan A Skilled Nursing Facility     Pt accepted to [facility] as per Brent in admissions.  [facility] RN should call report to Abbie at [phone].  Transportation order placed in pt chart for stretcher at 230pm with 2L of Oxygen and all questions regarding transfer should be directed to extension [phone], option 5.  Transport packet printed and sent to nurses's station desk on 8th floor WT.  Pt's spouse Teresa ([phone]) notified and in agreement with DC plan.              Babs Humphreys, [facility]  Ext [phone]

## 2020-10-09 NOTE — SUBJECTIVE & OBJECTIVE
Interval History: NAEON. Patient improved systemically, no longer on antibiotics. Ready for discharge soon. No pain in feet, no new pedal complaints. Still having some residual left knee pain but improved from before.     Scheduled Meds:   artificial tears  1 drop Both Eyes TID    carvediloL  25 mg Oral BID    fluticasone furoate-vilanteroL  1 puff Inhalation Daily    fluticasone propionate  1 spray Each Nostril Daily    furosemide  40 mg Oral BID    heparin (porcine)  5,000 Units Subcutaneous Q8H    insulin aspart U-100  5 Units Subcutaneous TIDWM    insulin detemir U-100  10 Units Subcutaneous QHS    levothyroxine  75 mcg Oral Before breakfast    magnesium oxide  200 mg Oral BID    sodium bicarbonate  650 mg Oral BID     Continuous Infusions:  PRN Meds:sodium chloride, acetaminophen, albuterol-ipratropium, calcium carbonate, dextrose 50%, dextrose 50%, glucagon (human recombinant), glucose, glucose, insulin aspart U-100, magnesium hydroxide 400 mg/5 ml, melatonin, ondansetron, oxyCODONE, polyethylene glycol, DIPH,PERTUS (ADACEL),TETANUS PF VAC (ADULT)    Review of patient's allergies indicates:   Allergen Reactions    Vicodin [hydrocodone-acetaminophen] Itching        Past Medical History:   Diagnosis Date    CHF (congestive heart failure)     COPD (chronic obstructive pulmonary disease)     Coronary artery disease     Diabetes mellitus     Diabetes mellitus type II     DM (diabetes mellitus) type II uncontrolled with renal manifestation 9/4/2013    Hyperlipidemia     Hypertension     Postablative hypothyroidism 12/6/2018     Past Surgical History:   Procedure Laterality Date    APPENDECTOMY      ARTHROSCOPY OF KNEE Right 9/7/2020    Procedure: ARTHROSCOPY, KNEE, RIGHT ;  Surgeon: You Bhatia MD;  Location: [facility] OR [address] [place] [address];  Service: Orthopedics;  Laterality: Right;    CHOLECYSTECTOMY      CORONARY ANGIOPLASTY WITH STENT PLACEMENT      TONSILLECTOMY      TRANSESOPHAGEAL  ECHOCARDIOGRAPHY N/A 2020    Procedure: ECHOCARDIOGRAM, TRANSESOPHAGEAL;  Surgeon: [facility] Diagnostic Provider;  Location: [facility] EP LAB;  Service: Anesthesiology;  Laterality: N/A;    TRANSESOPHAGEAL ECHOCARDIOGRAPHY N/A 9/3/2020    Procedure: ECHOCARDIOGRAM, TRANSESOPHAGEAL;  Surgeon: [facility] Diagnostic Provider;  Location: [facility] EP LAB;  Service: Anesthesiology;  Laterality: N/A;       Family History     Problem Relation (Age of Onset)    Heart disease Mother    Hypertension Son    No Known Problems Father, Sister, Son, Son        Tobacco Use    Smoking status: Former Smoker     Packs/day: 0.01     Years: 3.00     Pack years: 0.03     Types: Cigarettes     Quit date: 1995     Years since quittin.4    Smokeless tobacco: Never Used   Substance and Sexual Activity    Alcohol use: No    Drug use: No    Sexual activity: Never     Comment:  with 1 son     Review of Systems   Constitutional: Positive for activity change. Negative for appetite change, chills and fever.   HENT: Negative for congestion and ear pain.    Eyes: Negative for pain.   Respiratory: Negative for cough and shortness of breath.    Cardiovascular: Negative for chest pain.   Gastrointestinal: Negative for abdominal pain, nausea and vomiting.   Genitourinary: Negative for dysuria.   Musculoskeletal: Positive for arthralgias, back pain and myalgias.   Skin: Positive for wound. Negative for color change.   Neurological: Positive for weakness. Negative for numbness.   Psychiatric/Behavioral: Negative for agitation and confusion.     Objective:     Vital Signs (Most Recent):  Temp: 98.5 °F (36.9 °C) (10/09/20 1100)  Pulse: 92 (10/09/20 1152)  Resp: 16 (10/09/20 0746)  BP: 117/67 (10/09/20 1100)  SpO2: 98 % (10/09/20 1152) Vital Signs (24h Range):  Temp:  [98.1 °F (36.7 °C)-98.5 °F (36.9 °C)] 98.5 °F (36.9 °C)  Pulse:  [] 92  Resp:  [14-24] 16  SpO2:  [92 %-100 %] 98 %  BP: ()/(60-78) 117/67     Weight: 115.7 kg (255 lb 1.2  oz)  Body mass index is 31.05 kg/m².    Foot Exam    General  Orientation: alert and oriented to person, place, and time   Affect: appropriate       Right Foot/Ankle     Inspection and Palpation  Ecchymosis: none  Tenderness: none   Swelling: none   Skin Exam: skin intact;     Neurovascular  Dorsalis pedis: absent  Posterior tibial: absent  Saphenous nerve sensation: diminished  Tibial nerve sensation: diminished  Superficial peroneal nerve sensation: diminished  Deep peroneal nerve sensation: diminished  Sural nerve sensation: diminished      Left Foot/Ankle      Inspection and Palpation  Ecchymosis: none  Tenderness: none   Swelling: none   Skin Exam: blister, dry skin, skin changes, abnormal color and ulcer; no drainage     Neurovascular  Dorsalis pedis: absent  Posterior tibial: absent  Saphenous nerve sensation: diminished  Tibial nerve sensation: diminished  Superficial peroneal nerve sensation: diminished  Deep peroneal nerve sensation: diminished  Sural nerve sensation: diminished        Biomechanical Exam:  Non weight bearing assessment:   Right (degrees) Left (degrees)   Malleolar position 20 20   Ankle DF (knee extended) 0 0   Ankle DF (knee flexed) 5 5   Heel Inversion 10 10   Heel Eversion 5 5   STJ Neutral Position 5 inverted 5 inverted   Forefoot to rearfoot (1-5) perp perp   Forefoot to Rearfoot (2-5) perp perp   First Ray Dorsiflextion 5mm 5mm   First ray plantarflextion 5mm 5mm   First ray Neutral Position neutral neutral   Hallux Dorsiflexion 30 30   Hallux plantarflexion 20 20      Musculoskeletal evaluation, Range of Motion    Normal Limited Excessive pain   Ankle DF  B     Ankle PF B      STJ supination  B     STJ pronation  B     Hallux DF   B     Hallux PF   B     Lesser digits DF B      Lesser Digits PF B        Muscle Strength   Right Left   Gastrocnemius 5 5   Soleus 5 5   Tib. Posterior 4+ 4+   FDL 4+ 4+   FHL 4+ 4+   FDB 4+ 4+   Tib Anterior 4+ 4+   EDL 4+ 4+   EHL 4+ 4+   EDB 4+ 4+    Peroneus Longus 4+ 4+   Peroneus Brevis 4+ 4+     Foot Morphology    Varus  R L    Valgus  R L      Forefoot             Rearfoot X X                 Sagittal Plane semireducible cavus B/L    Transverse Plane Normal B/L    Ankle Morphology ankle equinus, subtalar varus B/L      Digital Assessments    Right  1 2 3 4 5    Left  1 2 3 4 5      Abducted                   Adducted                   Clawtoe                   Hammer toe                   Mallet toetoe                   Hallux rigidus Present Present       Gait Analysis  Gait Pattern: Apropulsive      Right Left   Angle of Gait 8 8   Base of Gait 5cm 5cm       Heel Position Right Left   Contact sup sup   Mid-stance vert vert   Propulsion sup sup   Swing sup sup       Heel off: Early Both  Abductory twist?  Yes Both      Laboratory:  Blood Cultures: No results for input(s): LABBLOO in the last 48 hours.  CBC:   Recent Labs   Lab 10/09/20  0455   WBC 9.42   RBC 2.71*   HGB 7.1*   HCT 24.0*   *   MCV 89   MCH 26.2*   MCHC 29.6*     CMP:   Recent Labs   Lab 10/08/20  0416 10/09/20  0455   * 126*   CALCIUM 8.3* 8.0*   ALBUMIN 2.1*  2.1* 2.0*   PROT 7.0  --     138   K 3.6 3.4*   CO2 33* 32*   CL 95 97   BUN 19 16   CREATININE 1.6* 1.5*   ALKPHOS 64  --    ALT 10  --    AST 11  --    BILITOT 0.9  --      CRP:   Recent Labs   Lab 10/05/20  0950   CRP 43.5*     ESR:   Recent Labs   Lab 10/04/20  0602   SEDRATE 69*     Microbiology Results (last 7 days)     Procedure Component Value Units Date/Time    Fungus culture [[id number]] Collected: 09/07/20 1721    Order Status: Completed Specimen: Body Fluid from Knee, Right Updated: 10/05/20 1113     Fungus (Mycology) Culture No fungus isolated after 4 weeks    Narrative:      1) Right Knee Joint Fluid    Fungus culture [[id number]] Collected: 09/07/20 1721    Order Status: Completed Specimen: Body Fluid from Knee, Right Updated: 10/05/20 1113     Fungus (Mycology) Culture No fungus isolated after 4  weeks    Narrative:      2) Right Knee Joint Fluid    Fungus culture [[id number]] Collected: 09/03/20 1769    Order Status: Completed Specimen: Joint Fluid from Knee, Right Updated: 10/05/20 7100     Fungus (Mycology) Culture No fungus isolated after 4 weeks        Specimen (12h ago, onward)    None          Diagnostic Results:  None    Clinical Findings:    -Left plantar foot ulcer 3x1.7x0.7 cm, fibronecrotic wound base, no drainage, no undermining, no erythema, no edema, no fluctuance or crepitus. Periwound callus. No fluid could be expressed on manual compression. No tenderness. Not clinically infected.   -Left dorsal foot bulla in geographic area 7x7 cm spontaneously deroofed, base of medial deroofed area primarily fibronecrotic with small islands of granular tissue. No drainage, no erythema, no edema, no fluctuance or crepitus. No fluid could be expressed on manual compression. No tenderness. Not clinically infected.

## 2020-10-09 NOTE — PROGRESS NOTES
[facility]  Podiatry  Progress Note    Patient Name: Ed Patton  MRN: [id number]  Admission Date: 8/30/2020  Hospital Length of Stay: 40 days  Attending Physician: Palmira Gage MD  Primary Care Provider: Qiana Chow MD   Scheduled Meds:   artificial tears  1 drop Both Eyes TID    carvediloL  25 mg Oral BID    fluticasone furoate-vilanteroL  1 puff Inhalation Daily    fluticasone propionate  1 spray Each Nostril Daily    furosemide  40 mg Oral BID    heparin (porcine)  5,000 Units Subcutaneous Q8H    insulin aspart U-100  5 Units Subcutaneous TIDWM    insulin detemir U-100  10 Units Subcutaneous QHS    levothyroxine  75 mcg Oral Before breakfast    magnesium oxide  200 mg Oral BID    sodium bicarbonate  650 mg Oral BID     Continuous Infusions:  PRN Meds:sodium chloride, acetaminophen, albuterol-ipratropium, calcium carbonate, dextrose 50%, dextrose 50%, glucagon (human recombinant), glucose, glucose, insulin aspart U-100, magnesium hydroxide 400 mg/5 ml, melatonin, ondansetron, oxyCODONE, polyethylene glycol, DIPH,PERTUS (ADACEL),TETANUS PF VAC (ADULT)    Review of patient's allergies indicates:   Allergen Reactions    Vicodin [hydrocodone-acetaminophen] Itching        Past Medical History:   Diagnosis Date    CHF (congestive heart failure)     COPD (chronic obstructive pulmonary disease)     Coronary artery disease     Diabetes mellitus     Diabetes mellitus type II     DM (diabetes mellitus) type II uncontrolled with renal manifestation 9/4/2013    Hyperlipidemia     Hypertension     Postablative hypothyroidism 12/6/2018     Past Surgical History:   Procedure Laterality Date    APPENDECTOMY      ARTHROSCOPY OF KNEE Right 9/7/2020    Procedure: ARTHROSCOPY, KNEE, RIGHT ;  Surgeon: You Bhatia MD;  Location: [facility] OR [address] [place] [address];  Service: Orthopedics;  Laterality: Right;    CHOLECYSTECTOMY      CORONARY ANGIOPLASTY WITH STENT PLACEMENT      TONSILLECTOMY       TRANSESOPHAGEAL ECHOCARDIOGRAPHY N/A 2020    Procedure: ECHOCARDIOGRAM, TRANSESOPHAGEAL;  Surgeon: Dos Diagnostic Provider;  Location: [facility];  Service: Anesthesiology;  Laterality: N/A;    TRANSESOPHAGEAL ECHOCARDIOGRAPHY N/A 9/3/2020    Procedure: ECHOCARDIOGRAM, TRANSESOPHAGEAL;  Surgeon: Dos Diagnostic Provider;  Location: [facility];  Service: Anesthesiology;  Laterality: N/A;       Family History     Problem Relation (Age of Onset)    Heart disease Mother    Hypertension Son    No Known Problems Father, Sister, Son, Son        Tobacco Use    Smoking status: Former Smoker     Packs/day: 0.01     Years: 3.00     Pack years: 0.03     Types: Cigarettes     Quit date: 1995     Years since quittin.4    Smokeless tobacco: Never Used   Substance and Sexual Activity    Alcohol use: No    Drug use: No    Sexual activity: Never     Comment:  with 1 son     Review of Systems   Constitutional: Positive for activity change. Negative for appetite change, chills and fever.   HENT: Negative for congestion and ear pain.    Eyes: Negative for pain.   Respiratory: Negative for cough and shortness of breath.    Cardiovascular: Negative for chest pain.   Gastrointestinal: Negative for abdominal pain, nausea and vomiting.   Genitourinary: Negative for dysuria.   Musculoskeletal: Positive for arthralgias, back pain and myalgias.   Skin: Positive for wound. Negative for color change.   Neurological: Positive for weakness. Negative for numbness.   Psychiatric/Behavioral: Negative for agitation and confusion.     Objective:     Vital Signs (Most Recent):  Temp: 98.5 °F (36.9 °C) (10/09/20 1100)  Pulse: 92 (10/09/20 1152)  Resp: 16 (10/09/20 0746)  BP: 117/67 (10/09/20 1100)  SpO2: 98 % (10/09/20 1152) Vital Signs (24h Range):  Temp:  [98.1 °F (36.7 °C)-98.5 °F (36.9 °C)] 98.5 °F (36.9 °C)  Pulse:  [] 92  Resp:  [14-24] 16  SpO2:  [92 %-100 %] 98 %  BP: ()/(60-78) 117/67     Weight:  115.7 kg (255 lb 1.2 oz)  Body mass index is 31.05 kg/m².    Foot Exam    General  Orientation: alert and oriented to person, place, and time   Affect: appropriate       Right Foot/Ankle     Inspection and Palpation  Ecchymosis: none  Tenderness: none   Swelling: none   Skin Exam: skin intact;     Neurovascular  Dorsalis pedis: absent  Posterior tibial: absent  Saphenous nerve sensation: diminished  Tibial nerve sensation: diminished  Superficial peroneal nerve sensation: diminished  Deep peroneal nerve sensation: diminished  Sural nerve sensation: diminished      Left Foot/Ankle      Inspection and Palpation  Ecchymosis: none  Tenderness: none   Swelling: none   Skin Exam: blister, dry skin, skin changes, abnormal color and ulcer; no drainage     Neurovascular  Dorsalis pedis: absent  Posterior tibial: absent  Saphenous nerve sensation: diminished  Tibial nerve sensation: diminished  Superficial peroneal nerve sensation: diminished  Deep peroneal nerve sensation: diminished  Sural nerve sensation: diminished        Biomechanical Exam:  Non weight bearing assessment:   Right (degrees) Left (degrees)   Malleolar position 20 20   Ankle DF (knee extended) 0 0   Ankle DF (knee flexed) 5 5   Heel Inversion 10 10   Heel Eversion 5 5   STJ Neutral Position 5 inverted 5 inverted   Forefoot to rearfoot (1-5) perp perp   Forefoot to Rearfoot (2-5) perp perp   First Ray Dorsiflextion 5mm 5mm   First ray plantarflextion 5mm 5mm   First ray Neutral Position neutral neutral   Hallux Dorsiflexion 30 30   Hallux plantarflexion 20 20      Musculoskeletal evaluation, Range of Motion    Normal Limited Excessive pain   Ankle DF  B     Ankle PF B      STJ supination  B     STJ pronation  B     Hallux DF   B     Hallux PF   B     Lesser digits DF B      Lesser Digits PF B        Muscle Strength   Right Left   Gastrocnemius 5 5   Soleus 5 5   Tib. Posterior 4+ 4+   FDL 4+ 4+   FHL 4+ 4+   FDB 4+ 4+   Tib Anterior 4+ 4+   EDL 4+ 4+   EHL  4+ 4+   EDB 4+ 4+   Peroneus Longus 4+ 4+   Peroneus Brevis 4+ 4+     Foot Morphology    Varus  R L    Valgus  R L      Forefoot             Rearfoot X X                 Sagittal Plane semireducible cavus B/L    Transverse Plane Normal B/L    Ankle Morphology ankle equinus, subtalar varus B/L      Digital Assessments    Right  1 2 3 4 5    Left  1 2 3 4 5      Abducted                   Adducted                   Clawtoe                   Hammer toe                   Mallet toetoe                   Hallux rigidus Present Present       Gait Analysis  Gait Pattern: Apropulsive      Right Left   Angle of Gait 8 8   Base of Gait 5cm 5cm       Heel Position Right Left   Contact sup sup   Mid-stance Pro Pro   Propulsion sup sup   Swing sup sup       Heel off: Early Both  Abductory twist?  Yes Both      Laboratory:  Blood Cultures: No results for input(s): LABBLOO in the last 48 hours.  CBC:   Recent Labs   Lab 10/09/20  0455   WBC 9.42   RBC 2.71*   HGB 7.1*   HCT 24.0*   *   MCV 89   MCH 26.2*   MCHC 29.6*     CMP:   Recent Labs   Lab 10/08/20  0416 10/09/20  0455   * 126*   CALCIUM 8.3* 8.0*   ALBUMIN 2.1*  2.1* 2.0*   PROT 7.0  --     138   K 3.6 3.4*   CO2 33* 32*   CL 95 97   BUN 19 16   CREATININE 1.6* 1.5*   ALKPHOS 64  --    ALT 10  --    AST 11  --    BILITOT 0.9  --      CRP:   Recent Labs   Lab 10/05/20  0950   CRP 43.5*     ESR:   Recent Labs   Lab 10/04/20  0602   SEDRATE 69*     Microbiology Results (last 7 days)     Procedure Component Value Units Date/Time    Fungus culture [[id number]] Collected: 09/07/20 1721    Order Status: Completed Specimen: Body Fluid from Knee, Right Updated: 10/05/20 1113     Fungus (Mycology) Culture No fungus isolated after 4 weeks    Narrative:      1) Right Knee Joint Fluid    Fungus culture [[id number]] Collected: 09/07/20 1721    Order Status: Completed Specimen: Body Fluid from Knee, Right Updated: 10/05/20 1113     Fungus (Mycology) Culture No fungus  isolated after 4 weeks    Narrative:      2) Right Knee Joint Fluid    Fungus culture [[id number]] Collected: 09/03/20 1649    Order Status: Completed Specimen: Joint Fluid from Knee, Right Updated: 10/05/20 1106     Fungus (Mycology) Culture No fungus isolated after 4 weeks        Specimen (12h ago, onward)    None          Diagnostic Results:  None    Clinical Findings:    -Left plantar foot ulcer 3x1.7x0.7 cm, fibronecrotic wound base, no drainage, no undermining, no erythema, no edema, no fluctuance or crepitus. Periwound callus. No fluid could be expressed on manual compression. No tenderness. Not clinically infected.   -Left dorsal foot bulla in geographic area 7x7 cm spontaneously deroofed, base of medial deroofed area primarily fibronecrotic with small islands of granular tissue. No drainage, no erythema, no edema, no fluctuance or crepitus. No fluid could be expressed on manual compression. No tenderness. Not clinically infected.                       Assessment/Plan:     Ulcer of left foot with fat layer exposed  Assessment: 65 y.o M with PMHx of Type 2 DM admitted for MRSA septicemia. Left plantar foot wound and left dorsal foot wound. No signs of deep foot infection on X ray, MRI, or nuclear medicine scan. Foot wounds superficial and stable and likely not the cause of patient's persistent septicemia. Left ankle peak systolic velocity 35 cm/s which is concerning for risk of nonhealing. Abnormal GINA-PVR, poor vascular status may be contributing to maintenance of wounds. Patient refusing revascularization at this time. Semireducible cavus foot deformity may be contributing to maintenance of left plantar-lateral foot wound.     Plan:  - Debrided left periwound callus with #15 blade without incident. Did not debride base of wound due to poor vascular status.  - Dressings changed. Continue local wound care, wound care orders in.  - Patient would still benefit from revascularization, refusing at this time.  Encouraged patient to reconsider and he states that he will after he gets out of rehab.  - Left surgical shoe modified with felt heel lift to accommodate ankle equinus and felt lateral wedge to correct semireducible rearfoot varus which may be contributing to maintenance of wound. Cutout included to offload wound. Patient encouraged to ambulate only in this surgical shoe until podiatry followup. Discussed need for foot orthoses and appropriate footwear in future to continue offloading the left plantar foot wound.  - WBAT in left surgical shoe.   - Continue bilateral heel protectors at all times when not standing.  - OK for discharge from podiatry perspective.  - Podiatry will sign off.     DC Instructions:  Patient is to follow up with podiatry within 10 days of discharge. WBAT in left surgical shoe. Podiatry will arrange an appt. Home health/facility to change dressings q MWF as follows: rinse left foot wound with saline, paint wounds with betadine and dress with bordered foam.          Mk Cook DPM PGY-1  Podiatric Medicine & Surgery  [facility]

## 2020-10-09 NOTE — PLAN OF CARE
POC reviewed with patient, verbalized understanding. BG monitoring, no supplemental insulin administered. Pt on 2L nasal cannula. Possible discharge today. Will continue to monitor.

## 2020-10-09 NOTE — ASSESSMENT & PLAN NOTE
Assessment: 65 y.o M with PMHx of Type 2 DM admitted for MRSA septicemia. Left plantar foot wound and left dorsal foot wound. No signs of deep foot infection on X ray, MRI, or nuclear medicine scan. Foot wounds superficial and stable and likely not the cause of patient's persistent septicemia. Left ankle peak systolic velocity 35 cm/s which is concerning for risk of nonhealing. Abnormal GINA-PVR, poor vascular status may be contributing to maintenance of wounds. Patient refusing revascularization at this time. Semireducible cavus foot deformity may be contributing to maintenance of left plantar-lateral foot wound.     Plan:  - Debrided left periwound callus with #15 blade without incident. Did not debride base of wound due to poor vascular status.  - Dressings changed. Continue local wound care, wound care orders in.  - Patient would still benefit from revascularization, refusing at this time. Encouraged patient to reconsider and he states that he will after he gets out of rehab.  - Left surgical shoe modified with felt heel lift to accommodate ankle equinus and felt lateral wedge to correct semireducible rearfoot varus which may be contributing to maintenance of wound. Cutout included to offload wound. Patient encouraged to ambulate only in this surgical shoe until podiatry followup. Discussed need for foot orthoses and appropriate footwear in future to continue offloading the left plantar foot wound.  - WBAT in left surgical shoe.   - Continue bilateral heel protectors at all times when not standing.  - OK for discharge from podiatry perspective.  - Podiatry will sign off.     DC Instructions:  Patient is to follow up with podiatry within 10 days of discharge. WBAT in left surgical shoe. Podiatry will arrange an appt. Home health/facility to change dressings q MWF as follows: rinse left foot wound with saline, paint wounds with betadine and dress with bordered foam.

## 2020-10-10 NOTE — DISCHARGE SUMMARY
[facility] Hospital Medicine  Discharge Summary      Patient Name: Ed Patton  MRN: [id number]  Admission Date: 8/30/2020  Hospital Length of Stay: 40 days  Discharge Date and Time: 10/9/2020  3:05 PM  Attending Physician: Rhonda att. providers found   Discharging Provider: Palmira Gage MD  Primary Care Provider: Qiana Chow MD    [facility] Medicine Team: [facility] 10 Palmira Gage MD    Virtual Telemedicine Visit  Start time: 758a  Chief complaint: Sepsis due to methicillin resistant Staphylococcus aureus (MRSA)  The patient location is: [id number] A  The patient arrived at: 8/30/2020  2:18 PM  Present with the patient at the time of the telemed/virtual assessment: telepresenter   End time:  803a  Total time spent with patient: 5 min  I have assessed findings virtually using a telemedicine platform and with assistance of the bedside nurse or telemedicine presenter.  The attending portion of this evaluation, treatment, and documentation was performed per Palmira Gage MD via audiovisual.     Patient was transferred to the telemedicine service on: 10/5/2020    HPI:    65-year-old male with type 2 diabetes on insulin, chronic hypoxemic respiratory failure on 2 L oxygen via nasal cannula, and chronic systolic heart failure who presented to the ED for recurrent falls.  He reports a few episodes where his legs seem to just give out under him and other times where he has to crawl around the house over the past week.  At some point prior to this he found a tack imbedded in the bottom of his slipper which had punctured the sole of his left foot.  His left foot has become more swollen recently and he has had occasional twinges of pain in both of his legs.  He denies any fevers or chills, or any other symptoms out of the ordinary.  He denies any change in his respiratory status.  He says that he has been afraid to leave his house on account of the Coronavirus pandemic.  He has been  getting home health services including physical therapy has been walking to his mailbox and back for exercise.    Procedure(s) (LRB):  ARTHROSCOPY, KNEE, RIGHT  (Right)      Hospital Course:     Septic shock_Septic arthritis of Right Knee_Sepsis due to MRSA  Acute on Chronic Cholecystitis  This is a 65 year old  male with PMH significant for HFrEF and COPD with chronic respiratory failure (on 2L home oxygen), T2DM with CKD III, and iron deficiency anemia who originally presented to [facility] on 08/30 with cellulitis of left foot with concern for diabetic foot infection with subsequent development of MRSA bacteremia attributed to septic arthritis of right knee and elbow. He is status post drainage and coverage for MRSA since that time. His work-up for other etiologies of MRSA bacteremia have included negative LUKAS and MRI of lumbar spine looking for endocarditis and spinal abscess, respectively. Stool studies for C.diff on 09/11 were negative. His hospital course has been associated with worsening hypotension and leukocytosis since 09/10 prompting ICU admission on 09/12 for initiation of vasopressor support. Infectious work-up thus far on ICU admission concern for possible right lower lobe HAP. However; CT abdomen pelvis on 9/15 with gallbladder dilation, sludge, and trace pericholic fluid collection. Exam not consistent with cholecystitis but given persistent shock considered a potential source. Also with potential right lower lobe atelectasis with possible overlying infection.  S/p ICU stepdown. CK levels were rising so per infectious disease daptomycin changed back to vancomycin.   -Appreciate final ID recs  -End date of IV antibiotics:  Daptomycin /vancomycin 10/7/2020  Levofloxacin 9/30/2020  - Follow up with ID and Gen Surgery    Uncontrolled type 2 diabetes mellitus with hyperglycemia, with long-term current use of insulin  Diabetic polyneuropathy associated with type 2 diabetes mellitus  Takes  insulin NPH-insulin regular 70/30 45 units before breakfast and 35 units before dinner.   Giving insulin detemir 29 units daily and insulin aspart 14 units with meals and correction dose scale. Monitor BGs.  Increased basal insulin slightly 9/8. BGs improved.  Due to increase in sCr, insulin regimen reduced 9/9 and 9/10. Monitor for hyperglycemia/hypoglycemia.    Type 2 DM with DKA associated with recurrent sepsis  -Endocrinology consulted; kept on transitional drip but has since been weaned to a basal bolus regimen.      Postablative hypothyroidism  Plan: Continue home SYNTHROID    Acute kidney injury  -likely ATN from Shock s/p requiring RRT in ICU  -trialysis line removed   -pt is non-oliguric  -Nephrology recommends sodium bicarb tabs   -Nephrology recommends ambulatory f/u on discharge  -discontinue sodium bicarb tabs due to metabolic alkalosis     Chronic systolic congestive heart failure  -Most recent ECHO in 09/2020 with EF of 25%.   -Unclear if ischemic vs non-ischemic.   -Home regimen: Carvedilol, Lasix 80 mg, and Lisinopril.           - due to NANETTE, Lisinopril on hold, but he is recovering renal function and prior to discharge to any facility will likely need some amount of diuretic re-initiated and potentially a much lower doseage of Ace-inhibitor.   -Associated with chronic hypoxemic respiratory failure requiring 2L nasal cannula, but noted to develop worsening oxygen requirements on ICU admission that were likely multifactorial from suspected hospital acquired pneumonia versus declining urine output with pre-disposition to volume overload.      Anemia       -Chronic, no s/s of bleeding       - SNF did not accept this patient due to Hb of < 8. Of note, patient has chronic anemia       -transfused 1U PRBC on 10/3      COPD mixed type  -Based on PFT's from 05/2015 showing mild (small airways) obstruction, airflow not improved after bronchodilator, and moderate restriction.     Acute on chronic respiratory  failure with hypoxia  Chronic respiratory failure with hypoxia, on home oxygen therapy  On 2 L nasal cannula chronically due to combination of COPD and CHF; titrate as needed.  -History of mixed COPD (based on PFT's from 05/2015 showing mild (small airways) obstruction, airflow not improved after bronchodilator, and moderate restriction) and HFrEF with chronic respiratory failure on 2L home oxygen.   -ICU admission notable for progressive increase in supplemental oxygen requirements.   -Work-up for underlying etiology of acute on chronic respiratory failure include CXR showing concern for possible progression of CHF vs HAP (not entirely convinced that CXR showed consolidation, doubt HAP)  - Breathing back to baseline now to 2L NC      Diabetic foot infection  Bacteremia due to methicillin resistant Staphylococcus aureus  Diabetic ulcer of left foot associated with type 2 diabetes mellitus, with fat layer exposed  Right knee pain / septic arthritis  Appreciate Infectious Disease, Orthopedic Surgery. Treating with antibiotics per ID.   Arthrocentesis and cultures suggest septic knee. LUKAS to evaluate for endocarditis negative for vegetations.  Plan for I and D of the knee 9/7/2020.  Per ID: Antibiotics changed to daptomycin and ceftaroline combination therapy for 'persistent MRSA bacteremia'  -- Monitor CPK and CBC (ceftaroline can lead to leucopenia)  -- MRI lumbar spine w/wo contrast ordered  -- NM WB WBC scan for inflammatory localization pending  -- Repeat blood cultures until neagtive  -- PICC line after clearance of bacteremia, anticipate prolonged IV antibiotics on discharge.  -- ID recommends: MRI Lumbar spine/L psoas to r/o abscess. Broaden coverage to dapto/ceftaroline. Baseline CK obtained. Renal dose antibiotics.  -- s/p R knee scope I&D 9/7/20. PT/OT:  WBAT  -- Per ID: Blood cultures seem to be clearing since I&D of knee / adequate source control. Podiatry considering need for debridement. Await repeat  blood cultures to be negative x 72h before placing PICC line.  -GINA's ordered by podiatry but may need vascular surgery evaluation, unfortunately he is not a candidate for contrasted studies due to NANETTE  -- Podiatry DC Instructions:  Patient is to follow up with Podiatry within 10 days of discharge. Discontinue wrapping to L foot as it may have irritated L dorsal foot. Betadine to R heel bulla abnd all escharred wounds. Aquacel Silver to L dorsal and plantar wounds, covered with bordered foam.  -f/uwith podiatry on discharge     Sepsis due to DM wound, UTI, bacteremia  Due to Proteus and MRSA. Continued management with antibiotics.  Resolved.  Follow up on ID recommendations.  Discontinued Ciprofloxacine (completed 7 days, was administered for diabetic foot wound growing Proteus and urine growing Klebseilla)  -Foot wound Dressing changed 9/9 by Podiatry     Cellulitis of left lower extremity  Continued antibiotics.  Resolved     Acute kidney injury due to Urinary retention - required Newell   Bladder scan showed >500 mL. He had a Newell. ACEi held for now.  - Per Nephrology: Given hypotension and simultaneous volume overload reccomend IVF as small volume boluses rather than continuous IVF to maintain MAPS >65.   Repeat UA and Urine culture.  Strict I/Os. Daily weights. C3/C4 levels.  - Newell removed now; bladder scan prn     Cholecystitis  S/p cholecystostomy to placed on 09/16/2020 after + HIDA  -follow-up with general surgery on discharge  -denies GI symptoms and no changes in LFT's prior to discharge       Consults:   Consults (From admission, onward)        Status Ordering Provider     Inpatient consult to Critical Care Medicine  Once     Provider:  (Not yet assigned)    Completed MIGNON VARGAS     Inpatient consult to Critical Care Medicine  Once     Provider:  (Not yet assigned)    Completed HEATHER ORDAZ     Inpatient consult to Endocrinology  Once     Provider:  (Not yet assigned)    Completed EDILBERTO  JORGE     Inpatient consult to General Surgery  Once     Provider:  (Not yet assigned)    Completed TONY CARROLL     Inpatient consult to Hematology  Once     Provider:  (Not yet assigned)    Completed HEATHER ORDAZ     Inpatient consult to Infectious Diseases  Once     Provider:  (Not yet assigned)    Completed HEATHER ORDAZ     Inpatient consult to Interventional Radiology  Once     Provider:  (Not yet assigned)    Completed MERARY SMITH     Inpatient consult to Midline team  Once     Provider:  (Not yet assigned)    Completed MIGNON VARGAS     Inpatient consult to Midline team  Once     Provider:  (Not yet assigned)    Completed JORGE CASANOVA     Inpatient consult to Midline team  Once     Provider:  (Not yet assigned)    Completed ANGEL MCKEON     Inpatient consult to Nephrology  Once     Provider:  (Not yet assigned)    Completed ZAIDA PRIETO     Inpatient consult to Orthopedic Surgery  Once     Provider:  (Not yet assigned)    Completed CHELSEY PRECIADO     Inpatient consult to Podiatry  Once     Provider:  (Not yet assigned)    Completed DONNELL TRAYLOR     Inpatient consult to Podiatry  Once     Provider:  (Not yet assigned)    Completed COLLIN LIRA     Inpatient consult to Registered Dietitian/Nutritionist  Once     Provider:  (Not yet assigned)    Completed HEATHER ORDAZ     Inpatient consult to Vascular Surgery  Once     Provider:  (Not yet assigned)    Completed JORGE CASANOVA     Inpatient virtual consult to Hospital Medicine  Once     Provider:  (Not yet assigned)    Completed CHELSEY PRECIADO     Inpatient virtual consult to Hospital Medicine  Once     Provider:  (Not yet assigned)    Completed ANGEL MCKEON     Pharmacy to dose Vancomycin consult  Once     Provider:  (Not yet assigned)    Completed JORGE CASANOVA          Final Active Diagnoses:    Diagnosis Date Noted POA    PRINCIPAL PROBLEM:  Sepsis due to methicillin resistant  Staphylococcus aureus (MRSA) [A41.02] 08/30/2020 Yes    Ulcer of left foot with fat layer exposed [L97.522] 10/09/2020 Unknown    Chronic respiratory failure with hypoxia, on home oxygen therapy [J96.11, Z99.81]  Not Applicable     Chronic    CKD stage 3 due to type 2 diabetes mellitus [E11.22, N18.30]  Yes     Chronic    Acute on chronic cholecystitis [K81.2] 10/07/2020 Yes    Diabetic polyneuropathy associated with type 2 diabetes mellitus [E11.42]  Yes     Chronic    PVD (peripheral vascular disease) [I73.9]  Yes    Acute renal insufficiency [N28.9]  Yes    Acute on chronic respiratory failure with hypoxia [J96.21] 09/13/2020 Yes    Debility [R53.81]  Yes    COPD mixed type [J44.9] 10/15/2019 Yes     Chronic    Postablative hypothyroidism [E89.0] 12/06/2018 Yes     Chronic    Hyperlipidemia [E78.5] 08/25/2015 Yes    Type 2 diabetes mellitus with stage 3 chronic kidney disease, with long-term current use of insulin [E11.22, N18.30, Z79.4] 12/23/2014 Not Applicable    Chronic systolic congestive heart failure [I50.22] 05/07/2013 Yes     Chronic      Problems Resolved During this Admission:    Diagnosis Date Noted Date Resolved POA    Cellulitis of left lower extremity [L03.116]  10/06/2020 Yes    MRSA bacteremia [R78.81, B95.62] 09/17/2020 10/06/2020 Yes    Acute metabolic encephalopathy [G93.41] 09/13/2020 10/06/2020 No    Septic shock due to methicillin resistant Staphylococcus aureus [A41.02, R65.21] 09/12/2020 10/06/2020 Yes    Endocarditis [I38]  09/12/2020 Yes    Acute renal failure with acute tubular necrosis superimposed on stage 3 chronic kidney disease [N17.0, N18.30] 09/09/2020 10/06/2020 Yes    Septic arthritis of knee, right [M00.9] 09/07/2020 10/06/2020 Yes    Staphylococcal arthritis of right knee [M00.061] 09/05/2020 10/06/2020 Yes    Diabetic ulcer of left foot associated with type 2 diabetes mellitus, with fat layer exposed [E11.621, L97.522] 08/31/2020 10/06/2020 Yes     "Diabetic foot infection [E11.628, L08.9] 08/30/2020 10/06/2020 Yes    Type 2 diabetes mellitus with ketoacidosis without coma, with long-term current use of insulin [E11.10, Z79.4] 10/09/2017 09/18/2020 Not Applicable      Discharged Condition: stable    Disposition: Skilled Nursing Facility    Follow Up:  Follow-up Information     Qiana Chow MD On 9/18/2020.    Specialty: Internal Medicine  Why: scheduled @ 1:00  Contact information:  [address]  [facility] [id number]  [phone]                 Patient Instructions:      WALKER FOR HOME USE     Order Specific Question Answer Comments   Type of Walker: Adult (5'4"-6'6")    With wheels? Yes    Height: 6' 4" (1.93 m)    Weight: 117 kg (257 lb 15 oz)    Length of need (1-99 months): 99    Please check all that apply: Patient's condition impairs ambulation.      Ambulatory referral/consult to Outpatient Case Management   Referral Priority: Routine Referral Type: Consultation   Referral Reason: Specialty Services Required   Number of Visits Requested: 1     Ambulatory referral/consult to Cardiology   Standing Status: Future   Referral Priority: Routine Referral Type: Consultation   Referral Reason: Specialty Services Required   Requested Specialty: Cardiology   Number of Visits Requested: 1     Ambulatory referral/consult to Nephrology   Standing Status: Future   Referral Priority: Routine Referral Type: Consultation   Referral Reason: Specialty Services Required   Requested Specialty: Nephrology   Number of Visits Requested: 1     Ambulatory referral/consult to General Surgery   Standing Status: Future   Referral Priority: Routine Referral Type: Procedure   Referral Reason: Specialty Services Required   Requested Specialty: General Surgery   Number of Visits Requested: 1     Ambulatory referral/consult to Infectious Disease   Standing Status: Future   Referral Priority: Routine Referral Type: Consultation   Referral Reason: Specialty Services " Required   Requested Specialty: Infectious Diseases   Number of Visits Requested: 1     Ambulatory referral/consult to Podiatry   Standing Status: Future   Referral Priority: Routine Referral Type: Consultation   Referral Reason: Specialty Services Required   Requested Specialty: Podiatry   Number of Visits Requested: 1     Medications:  Reconciled Home Medications:      Medication List      START taking these medications    artificial tears 0.5 % ophthalmic solution  Commonly known as: ISOPTO TEARS  Place 1 drop into both eyes 3 (three) times daily.     insulin NPH-insulin regular (70/30) 100 unit/mL (70-30) injection  Commonly known as: NovoLIN 70/30 U-100 Insulin  Inject 14 Units into the skin before breakfast AND 12 Units before dinner. [id number] MINUTES BEFORE BREAKFAST AND DINNER.     magnesium oxide 400 mg (241.3 mg magnesium) tablet  Commonly known as: MAG-OX  Take 0.5 tablets (200 mg total) by mouth 2 (two) times daily.     polyethylene glycol 17 gram Pwpk  Commonly known as: GLYCOLAX  Take 17 g by mouth 2 (two) times daily as needed (Constipation).        CHANGE how you take these medications    ACCU-CHEK FASTCLIX LANCET DRUM Misc  Generic drug: lancets  TEST FOUR TIMES DAILY  What changed:   · when to take this  · additional instructions     ACCU-CHEK SMARTVIEW TEST STRIP Strp  Generic drug: blood sugar diagnostic  TEST FOUR TIMES DAILY  What changed:   · when to take this  · additional instructions     atorvastatin 10 MG tablet  Commonly known as: LIPITOR  Take 1 tablet (10 mg total) by mouth once daily.  What changed:   · medication strength  · how much to take     cholecalciferol (vitamin D3) 25 mcg (1,000 unit) capsule  Commonly known as: VITAMIN D3  Take 1 capsule (1,000 Units total) by mouth once daily.  What changed: how much to take     furosemide 40 MG tablet  Commonly known as: LASIX  Take 1 tablet (40 mg total) by mouth 2 (two) times daily before meals.  What changed:   · medication  strength  · how much to take  · how to take this  · when to take this  · additional instructions     gabapentin 100 MG capsule  Commonly known as: NEURONTIN  Take 1 capsule (100 mg total) by mouth every evening.  What changed:   · when to take this  · reasons to take this        CONTINUE taking these medications    ACCU-CHEK RAMIREZ Misc  Generic drug: blood-glucose meter  USE AS DIRECTED     * albuterol 90 mcg/actuation inhaler  Commonly known as: PROVENTIL/VENTOLIN HFA  Inhale 2 puffs into the lungs every 6 (six) hours as needed for Wheezing or Shortness of Breath. Rescue     * albuterol 2.5 mg /3 mL (0.083 %) nebulizer solution  Commonly known as: PROVENTIL  INHALE THE CONTENTS OF 1 VIAL VIA NEBULIZER TWICE DAILY     aspirin 81 MG EC tablet  Commonly known as: ECOTRIN  Take 1 tablet (81 mg total) by mouth once daily.     carvediloL 25 MG tablet  Commonly known as: COREG  Take 1 tablet (25 mg total) by mouth 2 (two) times daily.     fluticasone furoate-vilanteroL 200-25 mcg/dose Dsdv diskus inhaler  Commonly known as: BREO ELLIPTA  INHALE 1 PUFF INTO THE LUNGS ONCE DAILY. (CONTROLLER)     fluticasone propionate 50 mcg/actuation nasal spray  Commonly known as: FLONASE  USE 1 SPRAY IN EACH NOSTRIL ONE TIME DAILY     insulin syringe-needle U-100 1 mL 30 gauge x 5/16 Syrg  Commonly known as: INSULIN SYRINGE  1 each by Misc.(Non-Drug; Combo Route) route 2 (two) times daily. Use twice daily as directed with insulin vials.     levothyroxine 75 MCG tablet  Commonly known as: SYNTHROID  Take 1 tablet (75 mcg total) by mouth once daily.     nitroGLYCERIN 0.4 MG SL tablet  Commonly known as: NITROSTAT  PLACE 1 TABLET UNDER THE TONGUE EVERY 5  MINUTES AS NEEDED FOR CHEST PAIN         * This list has 2 medication(s) that are the same as other medications prescribed for you. Read the directions carefully, and ask your doctor or other care provider to review them with you.            STOP taking these medications    lisinopriL 40  MG tablet  Commonly known as: PRINIVIL,ZESTRIL            Significant Diagnostic Studies: as above    Pending Diagnostic Studies:     Procedure Component Value Units Date/Time    Basic metabolic panel [[id number]] Collected: 09/13/20 2016    Order Status: Sent Lab Status: In process Updated: 09/13/20 2016    Specimen: Blood     Fungitell Assay For (1.3)-B-D-Glucans [[id number]] Collected: 09/13/20 0156    Order Status: Sent Lab Status: In process Updated: 09/13/20 0157    Specimen: Blood     Magnesium [[id number]] Collected: 09/15/20 0649    Order Status: Sent Lab Status: In process Updated: 09/15/20 0650    Specimen: Blood         Indwelling Lines/Drains at time of discharge:   Lines/Drains/Airways     Drain                 Drain/Device  09/16/20 1606 Right upper abdomen t-tube 23 days                Time spent on the discharge of patient: 45 minutes  Patient was seen and examined on the date of discharge and determined to be suitable for discharge.         Palmira Gage MD  Department of Hospital Medicine  [facility]

## 2020-10-12 LAB — FUNGUS SPEC CULT: NORMAL

## 2020-10-13 ENCOUNTER — EXTERNAL HOSPITAL ADMISSION (OUTPATIENT)
Dept: SKILLED NURSING FACILITY | Facility: HOSPITAL | Age: 65
End: 2020-10-13
Payer: MEDICARE

## 2020-10-13 DIAGNOSIS — Z99.81 CHRONIC RESPIRATORY FAILURE WITH HYPOXIA, ON HOME OXYGEN THERAPY: Primary | ICD-10-CM

## 2020-10-13 DIAGNOSIS — J96.11 CHRONIC RESPIRATORY FAILURE WITH HYPOXIA, ON HOME OXYGEN THERAPY: Primary | ICD-10-CM

## 2020-10-13 NOTE — PROGRESS NOTES
[facility]   New Visit Progress Note   Recent Hospital Discharge  DOS 10/13/2020     PRESENTING HISTORY     Chief Complaint/Reason for Admission:  Follow up Hospital Discharge   PCP: Qiana Chow MD   Admission Date: 8/30/2020  Hospital Length of Stay: 40 days  Discharge Date and Time: 10/9/2020  3:05 PM    History of Present Illness:  Mr. Ed Patton is a 65 y.o. male who was recently  presented to the ED for recurrent falls.  At some point prior to this he found a tack imbedded in the bottom of his slipper which had punctured the sole of his left foot.  His left foot has become more swollen recently and he has had occasional twinges of pain in both of his legs. He says that he has been afraid to leave his house on account of the Coronavirus pandemic.  He has been getting home health services including physical therapy has been walking to his mailbox and back for exercise.   He was admitted to [facility] on 08/30 with cellulitis of left foot with concern for diabetic foot infection with subsequent development of MRSA bacteremia attributed to septic arthritis of right knee and elbow. He is status post drainage and coverage for MRSA since that time.S/p R knee scope I&D 9/7/20.  His work-up for other etiologies of MRSA bacteremia have included negative LUKAS and MRI of lumbar spine looking for endocarditis and spinal abscess, respectively. Stool studies for C.diff on 09/11 were negative. His hospital course has been associated with worsening hypotension and leukocytosis since 09/10 prompting ICU admission on 09/12 for initiation of vasopressor support. Infectious work-up thus far on ICU admission concern for possible right lower lobe HAP. However; CT abdomen pelvis on 9/15 with gallbladder dilation, sludge, and trace pericholic fluid collection. Exam not consistent with cholecystitis but given persistent shock considered a potential source. Also with potential right lower  lobe atelectasis with possible overlying infection. End date of IV abx: Daptomycin /vancomycin 10/7/2020 and Levofloxacin 9/30/2020.    Transferred to [facility] for SNF PT/OT.    ________________________________________________________    Today:  The resident was seen for initial hospital follow up. He is currently laying in bed with eyes closed, aroused to voice. Oriented to person and year. Appears comfortable and with no complaints today. Answering questions appropriately. Following commands. LCTAB with trace edema to BLE. LLE boot with dressing c/d/i.   Per PT, resident is non-ambulatory and unable to stand. Max assist x 2 people with transfers and sliding board.       Review of Systems  General ROS: negative for chills, fever or weight loss  Psychological ROS: negative for hallucination, depression or suicidal ideation  Ophthalmic ROS: negative for blurry vision, photophobia or eye pain  ENT ROS: negative for epistaxis, sore throat or rhinorrhea  Respiratory ROS: no cough, shortness of breath, or wheezing  Cardiovascular ROS: no chest pain or dyspnea on exertion  Gastrointestinal ROS: no abdominal pain, change in bowel habits, or black/ bloody stools  Genito-Urinary ROS: no dysuria, trouble voiding, or hematuria  Musculoskeletal ROS: + gait disturbance or muscular weakness  Neurological ROS: no syncope or seizures; no ataxia  Dermatological ROS: + wounds to LLE          PAST HISTORY:     Past Medical History:   Diagnosis Date    CHF (congestive heart failure)     COPD (chronic obstructive pulmonary disease)     Coronary artery disease     Diabetes mellitus     Diabetes mellitus type II     DM (diabetes mellitus) type II uncontrolled with renal manifestation 9/4/2013    Hyperlipidemia     Hypertension     Postablative hypothyroidism 12/6/2018       Past Surgical History:   Procedure Laterality Date    APPENDECTOMY      ARTHROSCOPY OF KNEE Right 9/7/2020    Procedure: ARTHROSCOPY, KNEE, RIGHT ;  Surgeon:  You Bhatia MD;  [facility];  Service: Orthopedics;  Laterality: Right;    CHOLECYSTECTOMY      CORONARY ANGIOPLASTY WITH STENT PLACEMENT      TONSILLECTOMY      TRANSESOPHAGEAL ECHOCARDIOGRAPHY N/A 9/4/2020    Procedure: ECHOCARDIOGRAM, TRANSESOPHAGEAL;  Surgeon: [facility] Diagnostic Provider;  Location: [facility] EP LAB;  Service: Anesthesiology;  Laterality: N/A;    TRANSESOPHAGEAL ECHOCARDIOGRAPHY N/A 9/3/2020    Procedure: ECHOCARDIOGRAM, TRANSESOPHAGEAL;  Surgeon: [facility] Diagnostic Provider;  Location: [facility] EP LAB;  Service: Anesthesiology;  Laterality: N/A;       Family History   Problem Relation Age of Onset    Heart disease Mother     No Known Problems Father     No Known Problems Sister     Hypertension Son     No Known Problems Son     No Known Problems Son          MEDICATIONS & ALLERGIES:     Current Outpatient Medications on File Prior to Visit   Medication Sig Dispense Refill    ACCU-CHEK FASTCLIX LANCET DRUM Misc TEST FOUR TIMES DAILY (Patient taking differently: Test twice daily) 408 each 3    ACCU-CHEK RAMIREZ Misc USE AS DIRECTED 1 each 0    ACCU-CHEK SMARTVIEW TEST STRIP Strp TEST FOUR TIMES DAILY (Patient taking differently: Test twice daily) 400 strip 3    albuterol (PROVENTIL) 2.5 mg /3 mL (0.083 %) nebulizer solution INHALE THE CONTENTS OF 1 VIAL VIA NEBULIZER TWICE DAILY 360 mL 3    albuterol (PROVENTIL/VENTOLIN HFA) 90 mcg/actuation inhaler Inhale 2 puffs into the lungs every 6 (six) hours as needed for Wheezing or Shortness of Breath. Rescue      artificial tears (ISOPTO TEARS) 0.5 % ophthalmic solution Place 1 drop into both eyes 3 (three) times daily.      aspirin (ECOTRIN) 81 MG EC tablet Take 1 tablet (81 mg total) by mouth once daily. 90 tablet 3    atorvastatin (LIPITOR) 10 MG tablet Take 1 tablet (10 mg total) by mouth once daily.      carvedilol (COREG) 25 MG tablet Take 1 tablet (25 mg total) by mouth 2 (two) times daily. 180 tablet 3    cholecalciferol,  vitamin D3, (VITAMIN D3) 25 mcg (1,000 unit) capsule Take 1 capsule (1,000 Units total) by mouth once daily.      fluticasone furoate-vilanterol (BREO ELLIPTA) 200-25 mcg/dose DsDv diskus inhaler INHALE 1 PUFF INTO THE LUNGS ONCE DAILY. (CONTROLLER) 180 each 3    fluticasone propionate (FLONASE) 50 mcg/actuation nasal spray USE 1 SPRAY IN EACH NOSTRIL ONE TIME DAILY 32 g 3    furosemide (LASIX) 40 MG tablet Take 1 tablet (40 mg total) by mouth 2 (two) times daily before meals.      gabapentin (NEURONTIN) 100 MG capsule Take 1 capsule (100 mg total) by mouth every evening. 90 capsule 3    insulin NPH-insulin regular, 70/30, (NOVOLIN 70/30 U-100 INSULIN) 100 unit/mL (70-30) injection Inject 14 Units into the skin before breakfast AND 12 Units before dinner. [id number] MINUTES BEFORE BREAKFAST AND DINNER. 20 mL 1    insulin syringe-needle U-100 (INSULIN SYRINGE) 1 mL 30 gauge x 5/16 Syrg 1 each by Misc.(Non-Drug; Combo Route) route 2 (two) times daily. Use twice daily as directed with insulin vials. 200 each 6    levothyroxine (SYNTHROID) 75 MCG tablet Take 1 tablet (75 mcg total) by mouth once daily. 90 tablet 3    magnesium oxide (MAG-OX) 400 mg (241.3 mg magnesium) tablet Take 0.5 tablets (200 mg total) by mouth 2 (two) times daily.  0    nitroGLYCERIN (NITROSTAT) 0.4 MG SL tablet PLACE 1 TABLET UNDER THE TONGUE EVERY 5  MINUTES AS NEEDED FOR CHEST PAIN 25 tablet 3    polyethylene glycol (GLYCOLAX) 17 gram PwPk Take 17 g by mouth 2 (two) times daily as needed (Constipation).  0     No current facility-administered medications on file prior to visit.         Review of patient's allergies indicates:   Allergen Reactions    Vicodin [hydrocodone-acetaminophen] Itching       OBJECTIVE:     Vital Signs:  /74, pulse 94, resp 18, sp02 97%, temp 97.4    Physical Exam:  General appearance: alert, cooperative, no distress  Constitutional:Oriented to person and year  + appears well-developed and  well-nourished.   HEENT: Normocephalic, atraumatic,  Eyes: conjunctivae/corneas clear, PERRL  Lungs: clear to auscultation bilaterally  Heart: regular rate and rhythm without rub  Abdomen: soft, non-tender; bowel sounds normoactive  Extremities: extremities symmetric; no clubbing, cyanosis,+ trace edema to BLE  Integument: Skin warm and dry to touch  Location: left hallux  Wound type: diabetic ulcer   Measurements: 1.0 x 0.8 cm   Wound bed:   Odor: no   Drainage: no  Surrounding skin: normal  Wound edges: unattached    Location: lateral left foot  Wound type: diabetic ulcer   Measurements: 3.4 x 2.1 x 0.1 cm   Wound bed: 100% slough  Odor: no   Drainage: no  Surrounding skin: normal  Wound edges: unattached    Location: left heal  Wound type: DTI  Measurements: 5.0 x 4.5 cm   Wound bed: purple 100%  Odor: no   Drainage: no  Surrounding skin: normal  Wound edges: attached    Location: dorsum left foot  Wound type: diabetic ulcer   Measurements: 6.7 x 6.1 cm   Wound bed: slough 100%  Odor: no   Drainage: light serosang  Surrounding skin: normal  Wound edges: attached    Neurologic: Alert and oriented X 2, +generalized weakness.   Psychiatric: no pressured speech; normal affect; no evidence of impaired cognition     Laboratory  Lab Results   Component Value Date    WBC 9.42 10/09/2020    HGB 7.1 (L) 10/09/2020    HCT 24.0 (L) 10/09/2020    MCV 89 10/09/2020     (H) 10/09/2020     BMP  Lab Results   Component Value Date     10/09/2020    K 3.4 (L) 10/09/2020    CL 97 10/09/2020    CO2 32 (H) 10/09/2020    BUN 16 10/09/2020    CREATININE 1.5 (H) 10/09/2020    CALCIUM 8.0 (L) 10/09/2020    ANIONGAP 9 10/09/2020    ESTGFRAFRICA 56.5 (A) 10/09/2020    EGFRNONAA 48.8 (A) 10/09/2020     Lab Results   Component Value Date    ALT 10 10/08/2020    AST 11 10/08/2020    ALKPHOS 64 10/08/2020    BILITOT 0.9 10/08/2020     Lab Results   Component Value Date    INR 1.1 09/16/2020    INR 1.0 10/16/2014    INR 1.1  02/24/2012     Lab Results   Component Value Date    HGBA1C 9.5 (H) 08/31/2020         ASSESSMENT & PLAN:     Septic shock  Septic arthritis of Right Knee  Sepsis due to MRSA  Acute on Chronic Cholecystitis  -He is status post drainage and coverage for MRSA   -His work-up for other etiologies of MRSA bacteremia have included negative LUKAS and MRI of lumbar spine looking for endocarditis and spinal abscess, respectively. Stool studies for C.diff on 09/11 were negative.   - Infectious work-up thus far on ICU admission concern for possible right lower lobe HAP. However; CT abdomen pelvis on 9/15 with gallbladder dilation, sludge, and trace pericholic fluid collection.   - End date of IV antibiotics:Daptomycin /vancomycin 10/7/2020   and Levofloxacin 9/30/2020  - Follow up with ID and Gen Surg     Uncontrolled type 2 diabetes mellitus with hyperglycemia, with long-term current use of insulin  Diabetic polyneuropathy associated with type 2 diabetes mellitus  -continue with current regimen  - Monitor for hyperglycemia/hypoglycemia.        Postablative hypothyroidism  Continue home SYNTHROID     Acute kidney injury  -likely ATN from Shock s/p requiring RRT in ICU  -trialysis line removed   -pt is non-oliguric  -Nephrology recommends ambulatory f/u on discharge     Chronic systolic congestive heart failure  -Most recent ECHO in 09/2020 with EF of 25%.   -Unclear if ischemic vs non-ischemic.   -on Carvedilol and lasix  -Associated with chronic hypoxemic respiratory failure requiring 2L nasal cannula     Anemia       -Chronic, no s/s of bleeding       -transfused 1U PRBC on 10/3        Acute on chronic respiratory failure with hypoxia  Chronic respiratory failure with hypoxia, on home oxygen therapy  On 2 L nasal cannula chronically due to combination of COPD and CHF; titrate as needed.  -History of mixed COPD (based on PFT's from 05/2015 showing mild (small airways) obstruction, airflow not improved after bronchodilator, and  moderate restriction) and HFrEF with chronic respiratory failure on 2L home oxygen.        Diabetic foot infection  Bacteremia due to methicillin resistant Staphylococcus aureus  Diabetic ulcer of left foot associated with type 2 diabetes mellitus, with fat layer exposed  Right knee pain / septic arthritis  Arthrocentesis and cultures suggest septic knee. LUKSA to evaluate for endocarditis negative for vegetations.  Per ID: Antibiotics changed to daptomycin and ceftaroline combination therapy for 'persistent MRSA bacteremia'  -- s/p R knee scope I&D 9/7/20. PT/OT:  [facility]  -- Podiatry DC Instructions:  Patient is to follow up with Podiatry within 10 days of discharge. Discontinue wrapping to L foot as it may have irritated L dorsal foot. Betadine to R heel bulla abnd all escharred wounds. Aquacel Silver to L dorsal and plantar wounds, covered with bordered foam.        Cellulitis of left lower extremity - resolved         Scheduled Follow-up :  No future appointments.    Post Visit Medication List:     Medication List          Accurate as of October 13, 2020  2:13 PM. If you have any questions, ask your nurse or doctor.            CHANGE how you take these medications    ACCU-CHEK FASTCLIX LANCET DRUM Misc  Generic drug: lancets  TEST FOUR TIMES DAILY  What changed:   · when to take this  · additional instructions     ACCU-CHEK SMARTVIEW TEST STRIP Strp  Generic drug: blood sugar diagnostic  TEST FOUR TIMES DAILY  What changed:   · when to take this  · additional instructions        CONTINUE taking these medications    ACCU-CHEK RAMIREZ Misc  Generic drug: blood-glucose meter  USE AS DIRECTED     * albuterol 90 mcg/actuation inhaler  Commonly known as: PROVENTIL/VENTOLIN HFA     * albuterol 2.5 mg /3 mL (0.083 %) nebulizer solution  Commonly known as: PROVENTIL  INHALE THE CONTENTS OF 1 VIAL VIA NEBULIZER TWICE DAILY     artificial tears 0.5 % ophthalmic solution  Commonly known as: ISOPTO TEARS  Place 1 drop into both  eyes 3 (three) times daily.     aspirin 81 MG EC tablet  Commonly known as: ECOTRIN  Take 1 tablet (81 mg total) by mouth once daily.     atorvastatin 10 MG tablet  Commonly known as: LIPITOR  Take 1 tablet (10 mg total) by mouth once daily.     carvediloL 25 MG tablet  Commonly known as: COREG  Take 1 tablet (25 mg total) by mouth 2 (two) times daily.     cholecalciferol (vitamin D3) 25 mcg (1,000 unit) capsule  Commonly known as: VITAMIN D3  Take 1 capsule (1,000 Units total) by mouth once daily.     fluticasone furoate-vilanteroL 200-25 mcg/dose Dsdv diskus inhaler  Commonly known as: BREO ELLIPTA  INHALE 1 PUFF INTO THE LUNGS ONCE DAILY. (CONTROLLER)     fluticasone propionate 50 mcg/actuation nasal spray  Commonly known as: FLONASE  USE 1 SPRAY IN EACH NOSTRIL ONE TIME DAILY     furosemide 40 MG tablet  Commonly known as: LASIX  Take 1 tablet (40 mg total) by mouth 2 (two) times daily before meals.     gabapentin 100 MG capsule  Commonly known as: NEURONTIN  Take 1 capsule (100 mg total) by mouth every evening.     insulin NPH-insulin regular (70/30) 100 unit/mL (70-30) injection  Commonly known as: NovoLIN 70/30 U-100 Insulin  Inject 14 Units into the skin before breakfast AND 12 Units before dinner. [id number] MINUTES BEFORE BREAKFAST AND DINNER.     insulin syringe-needle U-100 1 mL 30 gauge x 5/16 Syrg  Commonly known as: INSULIN SYRINGE  1 each by Misc.(Non-Drug; Combo Route) route 2 (two) times daily. Use twice daily as directed with insulin vials.     levothyroxine 75 MCG tablet  Commonly known as: SYNTHROID  Take 1 tablet (75 mcg total) by mouth once daily.     magnesium oxide 400 mg (241.3 mg magnesium) tablet  Commonly known as: MAG-OX  Take 0.5 tablets (200 mg total) by mouth 2 (two) times daily.     nitroGLYCERIN 0.4 MG SL tablet  Commonly known as: NITROSTAT  PLACE 1 TABLET UNDER THE TONGUE EVERY 5  MINUTES AS NEEDED FOR CHEST PAIN     polyethylene glycol 17 gram Pwpk  Commonly known as:  GLYCOLAX  Take 17 g by mouth 2 (two) times daily as needed (Constipation).         * This list has 2 medication(s) that are the same as other medications prescribed for you. Read the directions carefully, and ask your doctor or other care provider to review them with you.              Total time of the visit 70 min 9:00 am -10:10 am   Non physical exam/ non charting time: 50 minutes   Description of non physical exam/non charting time:  Extensive chart review completed including all consultation notes.  All pertinent laboratory and radiographical images reviewed.    Signing Physician:  Laurence Herrera NP

## 2020-10-15 ENCOUNTER — EXTERNAL HOSPITAL ADMISSION (OUTPATIENT)
Dept: SKILLED NURSING FACILITY | Facility: HOSPITAL | Age: 65
End: 2020-10-15
Payer: MEDICARE

## 2020-10-15 DIAGNOSIS — E11.42 DIABETIC POLYNEUROPATHY ASSOCIATED WITH TYPE 2 DIABETES MELLITUS: Primary | ICD-10-CM

## 2020-10-15 NOTE — PROGRESS NOTES
[facility]   Re-evaluate Visit  DOS 10/13/2020     PRESENTING HISTORY     Chief Complaint/Reason for Admission: Follow up  PCP: Qiana Chow MD   Admission Date: 8/30/2020  Hospital Length of Stay: 40 days  Discharge Date and Time: 10/9/2020  3:05 PM    History of Present Illness:  Mr. Ed Patton is a 65 y.o. male who was recently  presented to the ED for recurrent falls.  At some point prior to this he found a tack imbedded in the bottom of his slipper which had punctured the sole of his left foot.  His left foot has become more swollen recently and he has had occasional twinges of pain in both of his legs. He says that he has been afraid to leave his house on account of the Coronavirus pandemic.  He has been getting home health services including physical therapy has been walking to his mailbox and back for exercise.   He was admitted to [facility] on 08/30 with cellulitis of left foot with concern for diabetic foot infection with subsequent development of MRSA bacteremia attributed to septic arthritis of right knee and elbow. He is status post drainage and coverage for MRSA since that time.S/p R knee scope I&D 9/7/20.  His work-up for other etiologies of MRSA bacteremia have included negative LUKAS and MRI of lumbar spine looking for endocarditis and spinal abscess, respectively. Stool studies for C.diff on 09/11 were negative. His hospital course has been associated with worsening hypotension and leukocytosis since 09/10 prompting ICU admission on 09/12 for initiation of vasopressor support. Infectious work-up thus far on ICU admission concern for possible right lower lobe HAP. However; CT abdomen pelvis on 9/15 with gallbladder dilation, sludge, and trace pericholic fluid collection. Exam not consistent with cholecystitis but given persistent shock considered a potential source. Also with potential right lower lobe atelectasis with possible overlying infection. End  date of IV abx: Daptomycin /vancomycin 10/7/2020 and Levofloxacin 9/30/2020.    Transferred to [facility] for SNF PT/OT.    ________________________________________________________    Today:  The patient is currently sitting up in the high back wheelchair. No acute distress. Pleasant. States he is doing fine. No complaints and no report of acute changes.  LCTAB with trace edema to BLE. LLE boot with dressing c/d/i.   Per PT, resident is non-ambulatory and unable to stand. Max assist x 2 people with transfers and sliding board.       Review of Systems  General ROS: negative for chills, fever or weight loss  Psychological ROS: negative for hallucination, depression or suicidal ideation  Ophthalmic ROS: negative for blurry vision, photophobia or eye pain  ENT ROS: negative for epistaxis, sore throat or rhinorrhea  Respiratory ROS: no cough, shortness of breath, or wheezing  Cardiovascular ROS: no chest pain or dyspnea on exertion  Gastrointestinal ROS: no abdominal pain, change in bowel habits, or black/ bloody stools  Genito-Urinary ROS: no dysuria, trouble voiding, or hematuria  Musculoskeletal ROS: + gait disturbance or muscular weakness  Neurological ROS: no syncope or seizures; no ataxia  Dermatological ROS: + wounds to LLE          PAST HISTORY:     Past Medical History:   Diagnosis Date    CHF (congestive heart failure)     COPD (chronic obstructive pulmonary disease)     Coronary artery disease     Diabetes mellitus     Diabetes mellitus type II     DM (diabetes mellitus) type II uncontrolled with renal manifestation 9/4/2013    Hyperlipidemia     Hypertension     Postablative hypothyroidism 12/6/2018       Past Surgical History:   Procedure Laterality Date    APPENDECTOMY      ARTHROSCOPY OF KNEE Right 9/7/2020    Procedure: ARTHROSCOPY, KNEE, RIGHT ;  Surgeon: You Bhatia MD;  Location: [facility] OR [address] [place] [address];  Service: Orthopedics;  Laterality: Right;    CHOLECYSTECTOMY      CORONARY ANGIOPLASTY WITH  STENT PLACEMENT      TONSILLECTOMY      TRANSESOPHAGEAL ECHOCARDIOGRAPHY N/A 9/4/2020    Procedure: ECHOCARDIOGRAM, TRANSESOPHAGEAL;  Surgeon: [facility] Diagnostic Provider;  Location: [facility];  Service: Anesthesiology;  Laterality: N/A;    TRANSESOPHAGEAL ECHOCARDIOGRAPHY N/A 9/3/2020    Procedure: ECHOCARDIOGRAM, TRANSESOPHAGEAL;  Surgeon: Emil Diagnostic Provider;  Location: [facility];  Service: Anesthesiology;  Laterality: N/A;       Family History   Problem Relation Age of Onset    Heart disease Mother     No Known Problems Father     No Known Problems Sister     Hypertension Son     No Known Problems Son     No Known Problems Son          MEDICATIONS & ALLERGIES:     Current Outpatient Medications on File Prior to Visit   Medication Sig Dispense Refill    ACCU-CHEK FASTCLIX LANCET DRUM Misc TEST FOUR TIMES DAILY (Patient taking differently: Test twice daily) 408 each 3    ACCU-CHEK RAMIREZ Misc USE AS DIRECTED 1 each 0    ACCU-CHEK SMARTVIEW TEST STRIP Strp TEST FOUR TIMES DAILY (Patient taking differently: Test twice daily) 400 strip 3    albuterol (PROVENTIL) 2.5 mg /3 mL (0.083 %) nebulizer solution INHALE THE CONTENTS OF 1 VIAL VIA NEBULIZER TWICE DAILY 360 mL 3    albuterol (PROVENTIL/VENTOLIN HFA) 90 mcg/actuation inhaler Inhale 2 puffs into the lungs every 6 (six) hours as needed for Wheezing or Shortness of Breath. Rescue      artificial tears (ISOPTO TEARS) 0.5 % ophthalmic solution Place 1 drop into both eyes 3 (three) times daily.      aspirin (ECOTRIN) 81 MG EC tablet Take 1 tablet (81 mg total) by mouth once daily. 90 tablet 3    atorvastatin (LIPITOR) 10 MG tablet Take 1 tablet (10 mg total) by mouth once daily.      carvedilol (COREG) 25 MG tablet Take 1 tablet (25 mg total) by mouth 2 (two) times daily. 180 tablet 3    cholecalciferol, vitamin D3, (VITAMIN D3) 25 mcg (1,000 unit) capsule Take 1 capsule (1,000 Units total) by mouth once daily.      fluticasone  furoate-vilanterol (BREO ELLIPTA) 200-25 mcg/dose DsDv diskus inhaler INHALE 1 PUFF INTO THE LUNGS ONCE DAILY. (CONTROLLER) 180 each 3    fluticasone propionate (FLONASE) 50 mcg/actuation nasal spray USE 1 SPRAY IN EACH NOSTRIL ONE TIME DAILY 32 g 3    furosemide (LASIX) 40 MG tablet Take 1 tablet (40 mg total) by mouth 2 (two) times daily before meals.      gabapentin (NEURONTIN) 100 MG capsule Take 1 capsule (100 mg total) by mouth every evening. 90 capsule 3    insulin NPH-insulin regular, 70/30, (NOVOLIN 70/30 U-100 INSULIN) 100 unit/mL (70-30) injection Inject 14 Units into the skin before breakfast AND 12 Units before dinner. [id number] MINUTES BEFORE BREAKFAST AND DINNER. 20 mL 1    insulin syringe-needle U-100 (INSULIN SYRINGE) 1 mL 30 gauge x 5/16 Syrg 1 each by Misc.(Non-Drug; Combo Route) route 2 (two) times daily. Use twice daily as directed with insulin vials. 200 each 6    levothyroxine (SYNTHROID) 75 MCG tablet Take 1 tablet (75 mcg total) by mouth once daily. 90 tablet 3    magnesium oxide (MAG-OX) 400 mg (241.3 mg magnesium) tablet Take 0.5 tablets (200 mg total) by mouth 2 (two) times daily.  0    nitroGLYCERIN (NITROSTAT) 0.4 MG SL tablet PLACE 1 TABLET UNDER THE TONGUE EVERY 5  MINUTES AS NEEDED FOR CHEST PAIN 25 tablet 3    polyethylene glycol (GLYCOLAX) 17 gram PwPk Take 17 g by mouth 2 (two) times daily as needed (Constipation).  0     No current facility-administered medications on file prior to visit.         Review of patient's allergies indicates:   Allergen Reactions    Vicodin [hydrocodone-acetaminophen] Itching       OBJECTIVE:     Vital Signs:  /70, pulse 82, resp 18, sp02 97%, temp 98.1    Physical Exam:  General appearance: alert, cooperative, no distress  Constitutional:Oriented to person and year  + appears well-developed and well-nourished.   HEENT: Normocephalic, atraumatic,  Eyes: conjunctivae/corneas clear, PERRL  Lungs: clear to auscultation  bilaterally  Heart: regular rate and rhythm without rub  Abdomen: soft, non-tender; bowel sounds normoactive  Extremities: extremities symmetric; no clubbing, cyanosis,+ trace edema to BLE  Integument: Skin warm and dry to touch  Location: left hallux  Wound type: diabetic ulcer   Measurements: 1.0 x 0.8 cm   Wound bed:   Odor: no   Drainage: no  Surrounding skin: normal  Wound edges: unattached    Location: lateral left foot  Wound type: diabetic ulcer   Measurements: 3.4 x 2.1 x 0.1 cm   Wound bed: 100% slough  Odor: no   Drainage: no  Surrounding skin: normal  Wound edges: unattached    Location: left heal  Wound type: DTI  Measurements: 5.0 x 4.5 cm   Wound bed: purple 100%  Odor: no   Drainage: no  Surrounding skin: normal  Wound edges: attached    Location: dorsum left foot  Wound type: diabetic ulcer   Measurements: 6.7 x 6.1 cm   Wound bed: slough 100%  Odor: no   Drainage: light serosang  Surrounding skin: normal  Wound edges: attached  Neurologic: Alert and oriented X 2, +generalized weakness.   Psychiatric: no pressured speech; normal affect; no evidence of impaired cognition       ASSESSMENT & PLAN:     Septic shock  Septic arthritis of Right Knee  Sepsis due to MRSA  Acute on Chronic Cholecystitis  -He is status post drainage and coverage for MRSA   -His work-up for other etiologies of MRSA bacteremia have included negative LUKAS and MRI of lumbar spine looking for endocarditis and spinal abscess, respectively. Stool studies for C.diff on 09/11 were negative.   - Infectious work-up thus far on ICU admission concern for possible right lower lobe HAP. However; CT abdomen pelvis on 9/15 with gallbladder dilation, sludge, and trace pericholic fluid collection.   - End date of IV antibiotics:Daptomycin /vancomycin 10/7/2020   and Levofloxacin 9/30/2020  - Follow up with ID and Gen Surg     Uncontrolled type 2 diabetes mellitus with hyperglycemia, with long-term current use of insulin  Diabetic polyneuropathy  associated with type 2 diabetes mellitus  -continue with current regimen  - Monitor for hyperglycemia/hypoglycemia.        Postablative hypothyroidism  Continue home SYNTHROID     Acute kidney injury  -likely ATN from Shock s/p requiring RRT in ICU  -trialysis line removed   -pt is non-oliguric  -Nephrology recommends ambulatory f/u on discharge     Chronic systolic congestive heart failure  -Most recent ECHO in 09/2020 with EF of 25%.   -Unclear if ischemic vs non-ischemic.   -on Carvedilol and lasix  -Associated with chronic hypoxemic respiratory failure requiring 2L nasal cannula     Anemia       -Chronic, no s/s of bleeding       -transfused 1U PRBC on 10/3        Acute on chronic respiratory failure with hypoxia  Chronic respiratory failure with hypoxia, on home oxygen therapy  On 2 L nasal cannula chronically due to combination of COPD and CHF; titrate as needed.  -History of mixed COPD (based on PFT's from 05/2015 showing mild (small airways) obstruction, airflow not improved after bronchodilator, and moderate restriction) and HFrEF with chronic respiratory failure on 2L home oxygen.        Diabetic foot infection  Bacteremia due to methicillin resistant Staphylococcus aureus  Diabetic ulcer of left foot associated with type 2 diabetes mellitus, with fat layer exposed  Right knee pain / septic arthritis  Arthrocentesis and cultures suggest septic knee. LUKAS to evaluate for endocarditis negative for vegetations.  Per ID: Antibiotics changed to daptomycin and ceftaroline combination therapy for 'persistent MRSA bacteremia'  -- s/p R knee scope I&D 9/7/20. PT/OT:  [facility]  -- Podiatry DC Instructions:  Patient is to follow up with Podiatry within 10 days of discharge. Discontinue wrapping to L foot as it may have irritated L dorsal foot. Betadine to R heel bulla abnd all escharred wounds. Aquacel Silver to L dorsal and plantar wounds, covered with bordered foam.        Cellulitis of left lower extremity -  resolved         Scheduled Follow-up :  No future appointments.    Post Visit Medication List:     Medication List          Accurate as of October 15, 2020  2:24 PM. If you have any questions, ask your nurse or doctor.            CHANGE how you take these medications    ACCU-CHEK FASTCLIX LANCET DRUM Misc  Generic drug: lancets  TEST FOUR TIMES DAILY  What changed:   · when to take this  · additional instructions     ACCU-CHEK SMARTVIEW TEST STRIP Strp  Generic drug: blood sugar diagnostic  TEST FOUR TIMES DAILY  What changed:   · when to take this  · additional instructions        CONTINUE taking these medications    ACCU-CHEK RAMIREZ Misc  Generic drug: blood-glucose meter  USE AS DIRECTED     * albuterol 90 mcg/actuation inhaler  Commonly known as: PROVENTIL/VENTOLIN HFA     * albuterol 2.5 mg /3 mL (0.083 %) nebulizer solution  Commonly known as: PROVENTIL  INHALE THE CONTENTS OF 1 VIAL VIA NEBULIZER TWICE DAILY     artificial tears 0.5 % ophthalmic solution  Commonly known as: ISOPTO TEARS  Place 1 drop into both eyes 3 (three) times daily.     aspirin 81 MG EC tablet  Commonly known as: ECOTRIN  Take 1 tablet (81 mg total) by mouth once daily.     atorvastatin 10 MG tablet  Commonly known as: LIPITOR  Take 1 tablet (10 mg total) by mouth once daily.     carvediloL 25 MG tablet  Commonly known as: COREG  Take 1 tablet (25 mg total) by mouth 2 (two) times daily.     cholecalciferol (vitamin D3) 25 mcg (1,000 unit) capsule  Commonly known as: VITAMIN D3  Take 1 capsule (1,000 Units total) by mouth once daily.     fluticasone furoate-vilanteroL 200-25 mcg/dose Dsdv diskus inhaler  Commonly known as: BREO ELLIPTA  INHALE 1 PUFF INTO THE LUNGS ONCE DAILY. (CONTROLLER)     fluticasone propionate 50 mcg/actuation nasal spray  Commonly known as: FLONASE  USE 1 SPRAY IN EACH NOSTRIL ONE TIME DAILY     furosemide 40 MG tablet  Commonly known as: LASIX  Take 1 tablet (40 mg total) by mouth 2 (two) times daily before meals.      gabapentin 100 MG capsule  Commonly known as: NEURONTIN  Take 1 capsule (100 mg total) by mouth every evening.     insulin NPH-insulin regular (70/30) 100 unit/mL (70-30) injection  Commonly known as: NovoLIN 70/30 U-100 Insulin  Inject 14 Units into the skin before breakfast AND 12 Units before dinner. [id number] MINUTES BEFORE BREAKFAST AND DINNER.     insulin syringe-needle U-100 1 mL 30 gauge x 5/16 Syrg  Commonly known as: INSULIN SYRINGE  1 each by Misc.(Non-Drug; Combo Route) route 2 (two) times daily. Use twice daily as directed with insulin vials.     levothyroxine 75 MCG tablet  Commonly known as: SYNTHROID  Take 1 tablet (75 mcg total) by mouth once daily.     magnesium oxide 400 mg (241.3 mg magnesium) tablet  Commonly known as: MAG-OX  Take 0.5 tablets (200 mg total) by mouth 2 (two) times daily.     nitroGLYCERIN 0.4 MG SL tablet  Commonly known as: NITROSTAT  PLACE 1 TABLET UNDER THE TONGUE EVERY 5  MINUTES AS NEEDED FOR CHEST PAIN     polyethylene glycol 17 gram Pwpk  Commonly known as: GLYCOLAX  Take 17 g by mouth 2 (two) times daily as needed (Constipation).         * This list has 2 medication(s) that are the same as other medications prescribed for you. Read the directions carefully, and ask your doctor or other care provider to review them with you.                  Signing Physician:  Laurence Herrera NP

## 2020-10-15 NOTE — PROGRESS NOTES
[facility]   Re-evaluate Visit  DOS 10/15/2020     PRESENTING HISTORY     Chief Complaint/Reason for Admission: Follow up  PCP: Qiana Chow MD   Admission Date: 8/30/2020  Hospital Length of Stay: 40 days  Discharge Date and Time: 10/9/2020  3:05 PM    History of Present Illness:  Mr. Ed Patton is a 65 y.o. male who was recently  presented to the ED for recurrent falls.  At some point prior to this he found a tack imbedded in the bottom of his slipper which had punctured the sole of his left foot.  His left foot has become more swollen recently and he has had occasional twinges of pain in both of his legs. He says that he has been afraid to leave his house on account of the Coronavirus pandemic.  He has been getting home health services including physical therapy has been walking to his mailbox and back for exercise.   He was admitted to [facility] on 08/30 with cellulitis of left foot with concern for diabetic foot infection with subsequent development of MRSA bacteremia attributed to septic arthritis of right knee and elbow. He is status post drainage and coverage for MRSA since that time.S/p R knee scope I&D 9/7/20.  His work-up for other etiologies of MRSA bacteremia have included negative LUKAS and MRI of lumbar spine looking for endocarditis and spinal abscess, respectively. Stool studies for C.diff on 09/11 were negative. His hospital course has been associated with worsening hypotension and leukocytosis since 09/10 prompting ICU admission on 09/12 for initiation of vasopressor support. Infectious work-up thus far on ICU admission concern for possible right lower lobe HAP. However; CT abdomen pelvis on 9/15 with gallbladder dilation, sludge, and trace pericholic fluid collection. Exam not consistent with cholecystitis but given persistent shock considered a potential source. Also with potential right lower lobe atelectasis with possible overlying infection. End  date of IV abx: Daptomycin /vancomycin 10/7/2020 and Levofloxacin 9/30/2020.    Transferred to [facility] for SNF PT/OT.    ________________________________________________________    Today:  The patient is currently sitting up in the high back wheelchair. No acute distress. Pleasant. States he is doing fine. No complaints and no report of acute changes.  LCTAB with trace edema to BLE. LLE boot with dressing c/d/i.   Per PT, resident is non-ambulatory and unable to stand. Max assist x 2 people with transfers and sliding board.       Review of Systems  General ROS: negative for chills, fever or weight loss  Psychological ROS: negative for hallucination, depression or suicidal ideation  Ophthalmic ROS: negative for blurry vision, photophobia or eye pain  ENT ROS: negative for epistaxis, sore throat or rhinorrhea  Respiratory ROS: no cough, shortness of breath, or wheezing  Cardiovascular ROS: no chest pain or dyspnea on exertion  Gastrointestinal ROS: no abdominal pain, change in bowel habits, or black/ bloody stools  Genito-Urinary ROS: no dysuria, trouble voiding, or hematuria  Musculoskeletal ROS: + gait disturbance or muscular weakness  Neurological ROS: no syncope or seizures; no ataxia  Dermatological ROS: + wounds to LLE          PAST HISTORY:     Past Medical History:   Diagnosis Date    CHF (congestive heart failure)     COPD (chronic obstructive pulmonary disease)     Coronary artery disease     Diabetes mellitus     Diabetes mellitus type II     DM (diabetes mellitus) type II uncontrolled with renal manifestation 9/4/2013    Hyperlipidemia     Hypertension     Postablative hypothyroidism 12/6/2018       Past Surgical History:   Procedure Laterality Date    APPENDECTOMY      ARTHROSCOPY OF KNEE Right 9/7/2020    Procedure: ARTHROSCOPY, KNEE, RIGHT ;  Surgeon: You Bhatia MD;  Location: [facility] OR [address] [place] [address];  Service: Orthopedics;  Laterality: Right;    CHOLECYSTECTOMY      CORONARY ANGIOPLASTY WITH  STENT PLACEMENT      TONSILLECTOMY      TRANSESOPHAGEAL ECHOCARDIOGRAPHY N/A 9/4/2020    Procedure: ECHOCARDIOGRAM, TRANSESOPHAGEAL;  Surgeon: [facility] Diagnostic Provider;  Location: [facility] EP LAB;  Service: Anesthesiology;  Laterality: N/A;    TRANSESOPHAGEAL ECHOCARDIOGRAPHY N/A 9/3/2020    Procedure: ECHOCARDIOGRAM, TRANSESOPHAGEAL;  Surgeon: Emil Diagnostic Provider;  Location: [facility] EP LAB;  Service: Anesthesiology;  Laterality: N/A;       Family History   Problem Relation Age of Onset    Heart disease Mother     No Known Problems Father     No Known Problems Sister     Hypertension Son     No Known Problems Son     No Known Problems Son          MEDICATIONS & ALLERGIES:     Current Outpatient Medications on File Prior to Visit   Medication Sig Dispense Refill    ACCU-CHEK FASTCLIX LANCET DRUM Misc TEST FOUR TIMES DAILY (Patient taking differently: Test twice daily) 408 each 3    ACCU-CHEK RAMIREZ Misc USE AS DIRECTED 1 each 0    ACCU-CHEK SMARTVIEW TEST STRIP Strp TEST FOUR TIMES DAILY (Patient taking differently: Test twice daily) 400 strip 3    albuterol (PROVENTIL) 2.5 mg /3 mL (0.083 %) nebulizer solution INHALE THE CONTENTS OF 1 VIAL VIA NEBULIZER TWICE DAILY 360 mL 3    albuterol (PROVENTIL/VENTOLIN HFA) 90 mcg/actuation inhaler Inhale 2 puffs into the lungs every 6 (six) hours as needed for Wheezing or Shortness of Breath. Rescue      artificial tears (ISOPTO TEARS) 0.5 % ophthalmic solution Place 1 drop into both eyes 3 (three) times daily.      aspirin (ECOTRIN) 81 MG EC tablet Take 1 tablet (81 mg total) by mouth once daily. 90 tablet 3    atorvastatin (LIPITOR) 10 MG tablet Take 1 tablet (10 mg total) by mouth once daily.      carvedilol (COREG) 25 MG tablet Take 1 tablet (25 mg total) by mouth 2 (two) times daily. 180 tablet 3    cholecalciferol, vitamin D3, (VITAMIN D3) 25 mcg (1,000 unit) capsule Take 1 capsule (1,000 Units total) by mouth once daily.      fluticasone  furoate-vilanterol (BREO ELLIPTA) 200-25 mcg/dose DsDv diskus inhaler INHALE 1 PUFF INTO THE LUNGS ONCE DAILY. (CONTROLLER) 180 each 3    fluticasone propionate (FLONASE) 50 mcg/actuation nasal spray USE 1 SPRAY IN EACH NOSTRIL ONE TIME DAILY 32 g 3    furosemide (LASIX) 40 MG tablet Take 1 tablet (40 mg total) by mouth 2 (two) times daily before meals.      gabapentin (NEURONTIN) 100 MG capsule Take 1 capsule (100 mg total) by mouth every evening. 90 capsule 3    insulin NPH-insulin regular, 70/30, (NOVOLIN 70/30 U-100 INSULIN) 100 unit/mL (70-30) injection Inject 14 Units into the skin before breakfast AND 12 Units before dinner. [id number] MINUTES BEFORE BREAKFAST AND DINNER. 20 mL 1    insulin syringe-needle U-100 (INSULIN SYRINGE) 1 mL 30 gauge x 5/16 Syrg 1 each by Misc.(Non-Drug; Combo Route) route 2 (two) times daily. Use twice daily as directed with insulin vials. 200 each 6    levothyroxine (SYNTHROID) 75 MCG tablet Take 1 tablet (75 mcg total) by mouth once daily. 90 tablet 3    magnesium oxide (MAG-OX) 400 mg (241.3 mg magnesium) tablet Take 0.5 tablets (200 mg total) by mouth 2 (two) times daily.  0    nitroGLYCERIN (NITROSTAT) 0.4 MG SL tablet PLACE 1 TABLET UNDER THE TONGUE EVERY 5  MINUTES AS NEEDED FOR CHEST PAIN 25 tablet 3    polyethylene glycol (GLYCOLAX) 17 gram PwPk Take 17 g by mouth 2 (two) times daily as needed (Constipation).  0     No current facility-administered medications on file prior to visit.         Review of patient's allergies indicates:   Allergen Reactions    Vicodin [hydrocodone-acetaminophen] Itching       OBJECTIVE:     Vital Signs:  /70, pulse 82, resp 18, sp02 97%, temp 98.1    Physical Exam:  General appearance: alert, cooperative, no distress  Constitutional:Oriented to person and year  + appears well-developed and well-nourished.   HEENT: Normocephalic, atraumatic,  Eyes: conjunctivae/corneas clear, PERRL  Lungs: clear to auscultation  bilaterally  Heart: regular rate and rhythm without rub  Abdomen: soft, non-tender; bowel sounds normoactive  Extremities: extremities symmetric; no clubbing, cyanosis,+ trace edema to BLE  Integument: Skin warm and dry to touch  Location: left hallux  Wound type: diabetic ulcer   Measurements: 1.0 x 0.8 cm   Wound bed:   Odor: no   Drainage: no  Surrounding skin: normal  Wound edges: unattached    Location: lateral left foot  Wound type: diabetic ulcer   Measurements: 3.4 x 2.1 x 0.1 cm   Wound bed: 100% slough  Odor: no   Drainage: no  Surrounding skin: normal  Wound edges: unattached    Location: left heal  Wound type: DTI  Measurements: 5.0 x 4.5 cm   Wound bed: purple 100%  Odor: no   Drainage: no  Surrounding skin: normal  Wound edges: attached    Location: dorsum left foot  Wound type: diabetic ulcer   Measurements: 6.7 x 6.1 cm   Wound bed: slough 100%  Odor: no   Drainage: light serosang  Surrounding skin: normal  Wound edges: attached  Neurologic: Alert and oriented X 2, +generalized weakness.   Psychiatric: no pressured speech; normal affect; no evidence of impaired cognition       ASSESSMENT & PLAN:     Septic shock  Septic arthritis of Right Knee  Sepsis due to MRSA  Acute on Chronic Cholecystitis  -He is status post drainage and coverage for MRSA   -His work-up for other etiologies of MRSA bacteremia have included negative LUKAS and MRI of lumbar spine looking for endocarditis and spinal abscess, respectively. Stool studies for C.diff on 09/11 were negative.   - Infectious work-up thus far on ICU admission concern for possible right lower lobe HAP. However; CT abdomen pelvis on 9/15 with gallbladder dilation, sludge, and trace pericholic fluid collection.   - End date of IV antibiotics:Daptomycin /vancomycin 10/7/2020   and Levofloxacin 9/30/2020  - Follow up with ID and Gen Surg     Uncontrolled type 2 diabetes mellitus with hyperglycemia, with long-term current use of insulin  Diabetic polyneuropathy  associated with type 2 diabetes mellitus  -continue with current regimen  - Monitor for hyperglycemia/hypoglycemia.        Postablative hypothyroidism  Continue home SYNTHROID     Acute kidney injury  -likely ATN from Shock s/p requiring RRT in ICU  -trialysis line removed   -pt is non-oliguric  -Nephrology recommends ambulatory f/u on discharge     Chronic systolic congestive heart failure  -Most recent ECHO in 09/2020 with EF of 25%.   -Unclear if ischemic vs non-ischemic.   -on Carvedilol and lasix  -Associated with chronic hypoxemic respiratory failure requiring 2L nasal cannula     Anemia       -Chronic, no s/s of bleeding       -transfused 1U PRBC on 10/3        Acute on chronic respiratory failure with hypoxia  Chronic respiratory failure with hypoxia, on home oxygen therapy  On 2 L nasal cannula chronically due to combination of COPD and CHF; titrate as needed.  -History of mixed COPD (based on PFT's from 05/2015 showing mild (small airways) obstruction, airflow not improved after bronchodilator, and moderate restriction) and HFrEF with chronic respiratory failure on 2L home oxygen.        Diabetic foot infection  Bacteremia due to methicillin resistant Staphylococcus aureus  Diabetic ulcer of left foot associated with type 2 diabetes mellitus, with fat layer exposed  Right knee pain / septic arthritis  Arthrocentesis and cultures suggest septic knee. LUKAS to evaluate for endocarditis negative for vegetations.  Per ID: Antibiotics changed to daptomycin and ceftaroline combination therapy for 'persistent MRSA bacteremia'  -- s/p R knee scope I&D 9/7/20. PT/OT:  [facility]  -- Podiatry DC Instructions:  Patient is to follow up with Podiatry within 10 days of discharge. Discontinue wrapping to L foot as it may have irritated L dorsal foot. Betadine to R heel bulla abnd all escharred wounds. Aquacel Silver to L dorsal and plantar wounds, covered with bordered foam.        Cellulitis of left lower extremity -  resolved         Scheduled Follow-up :  No future appointments.    Post Visit Medication List:     Medication List          Accurate as of October 15, 2020  2:30 PM. If you have any questions, ask your nurse or doctor.            CHANGE how you take these medications    ACCU-CHEK FASTCLIX LANCET DRUM Misc  Generic drug: lancets  TEST FOUR TIMES DAILY  What changed:   · when to take this  · additional instructions     ACCU-CHEK SMARTVIEW TEST STRIP Strp  Generic drug: blood sugar diagnostic  TEST FOUR TIMES DAILY  What changed:   · when to take this  · additional instructions        CONTINUE taking these medications    ACCU-CHEK RAMIREZ Misc  Generic drug: blood-glucose meter  USE AS DIRECTED     * albuterol 90 mcg/actuation inhaler  Commonly known as: PROVENTIL/VENTOLIN HFA     * albuterol 2.5 mg /3 mL (0.083 %) nebulizer solution  Commonly known as: PROVENTIL  INHALE THE CONTENTS OF 1 VIAL VIA NEBULIZER TWICE DAILY     artificial tears 0.5 % ophthalmic solution  Commonly known as: ISOPTO TEARS  Place 1 drop into both eyes 3 (three) times daily.     aspirin 81 MG EC tablet  Commonly known as: ECOTRIN  Take 1 tablet (81 mg total) by mouth once daily.     atorvastatin 10 MG tablet  Commonly known as: LIPITOR  Take 1 tablet (10 mg total) by mouth once daily.     carvediloL 25 MG tablet  Commonly known as: COREG  Take 1 tablet (25 mg total) by mouth 2 (two) times daily.     cholecalciferol (vitamin D3) 25 mcg (1,000 unit) capsule  Commonly known as: VITAMIN D3  Take 1 capsule (1,000 Units total) by mouth once daily.     fluticasone furoate-vilanteroL 200-25 mcg/dose Dsdv diskus inhaler  Commonly known as: BREO ELLIPTA  INHALE 1 PUFF INTO THE LUNGS ONCE DAILY. (CONTROLLER)     fluticasone propionate 50 mcg/actuation nasal spray  Commonly known as: FLONASE  USE 1 SPRAY IN EACH NOSTRIL ONE TIME DAILY     furosemide 40 MG tablet  Commonly known as: LASIX  Take 1 tablet (40 mg total) by mouth 2 (two) times daily before meals.      gabapentin 100 MG capsule  Commonly known as: NEURONTIN  Take 1 capsule (100 mg total) by mouth every evening.     insulin NPH-insulin regular (70/30) 100 unit/mL (70-30) injection  Commonly known as: NovoLIN 70/30 U-100 Insulin  Inject 14 Units into the skin before breakfast AND 12 Units before dinner. [id number] MINUTES BEFORE BREAKFAST AND DINNER.     insulin syringe-needle U-100 1 mL 30 gauge x 5/16 Syrg  Commonly known as: INSULIN SYRINGE  1 each by Misc.(Non-Drug; Combo Route) route 2 (two) times daily. Use twice daily as directed with insulin vials.     levothyroxine 75 MCG tablet  Commonly known as: SYNTHROID  Take 1 tablet (75 mcg total) by mouth once daily.     magnesium oxide 400 mg (241.3 mg magnesium) tablet  Commonly known as: MAG-OX  Take 0.5 tablets (200 mg total) by mouth 2 (two) times daily.     nitroGLYCERIN 0.4 MG SL tablet  Commonly known as: NITROSTAT  PLACE 1 TABLET UNDER THE TONGUE EVERY 5  MINUTES AS NEEDED FOR CHEST PAIN     polyethylene glycol 17 gram Pwpk  Commonly known as: GLYCOLAX  Take 17 g by mouth 2 (two) times daily as needed (Constipation).         * This list has 2 medication(s) that are the same as other medications prescribed for you. Read the directions carefully, and ask your doctor or other care provider to review them with you.                  Signing Physician:  Laurence Herrera NP

## 2020-10-19 ENCOUNTER — TELEPHONE (OUTPATIENT)
Dept: VASCULAR SURGERY | Facility: CLINIC | Age: 65
End: 2020-10-19

## 2020-10-19 DIAGNOSIS — I73.9 PAD (PERIPHERAL ARTERY DISEASE): Primary | ICD-10-CM

## 2020-10-19 NOTE — TELEPHONE ENCOUNTER
Received call from nurse at [facility] stating that patient is being referred to vascular surgery for arterial problems. States she has referral that can be faxed to clinic. After reviewing chart nurse noted that pt was seen by Dr. Flor under Dr. Lugo's supervision. Asked nurse to have referral packet faxed to clinic with attention to [facility] for Dr. Lugo. Nurse states she will send fax.

## 2020-10-20 ENCOUNTER — EXTERNAL HOSPITAL ADMISSION (OUTPATIENT)
Dept: SKILLED NURSING FACILITY | Facility: HOSPITAL | Age: 65
End: 2020-10-20
Payer: MEDICARE

## 2020-10-20 DIAGNOSIS — K81.2 ACUTE ON CHRONIC CHOLECYSTITIS: Primary | ICD-10-CM

## 2020-10-20 NOTE — PROGRESS NOTES
[facility]   Re-evaluate Visit  DOS 10/20/2020     PRESENTING HISTORY     Chief Complaint/Reason for Admission: Evaluate for debility,diarrhea and chronic diseases  PCP: Qiana Chow MD   Admission Date: 8/30/2020  Hospital Length of Stay: 40 days  Discharge Date and Time: 10/9/2020  3:05 PM    History of Present Illness:  Mr. Ed Patton is a 65 y.o. male who was recently  presented to the ED for recurrent falls.  At some point prior to this he found a tack imbedded in the bottom of his slipper which had punctured the sole of his left foot.  His left foot has become more swollen recently and he has had occasional twinges of pain in both of his legs. He says that he has been afraid to leave his house on account of the Coronavirus pandemic.  He has been getting home health services including physical therapy has been walking to his mailbox and back for exercise.   He was admitted to [facility] on 08/30 with cellulitis of left foot with concern for diabetic foot infection with subsequent development of MRSA bacteremia attributed to septic arthritis of right knee and elbow. He is status post drainage and coverage for MRSA since that time.S/p R knee scope I&D 9/7/20.  His work-up for other etiologies of MRSA bacteremia have included negative LUKAS and MRI of lumbar spine looking for endocarditis and spinal abscess, respectively. Stool studies for C.diff on 09/11 were negative. His hospital course has been associated with worsening hypotension and leukocytosis since 09/10 prompting ICU admission on 09/12 for initiation of vasopressor support. Infectious work-up thus far on ICU admission concern for possible right lower lobe HAP. However; CT abdomen pelvis on 9/15 with gallbladder dilation, sludge, and trace pericholic fluid collection. Exam not consistent with cholecystitis but given persistent shock considered a potential source. Also with potential right lower lobe  atelectasis with possible overlying infection. End date of IV abx: Daptomycin /vancomycin 10/7/2020 and Levofloxacin 9/30/2020.    Transferred to [facility] for SNF PT/OT.    ________________________________________________________    Today:  The patient is currently laying in bed with no acute distress. Per nurse, resident has several episodes of loose stool. He reports no diarrhea since yesterday. States he is doing fine. LCTAB with trace edema to BLE. LLE boot with dressing c/d/i.   Per PT, resident is non-ambulatory and unable to stand. Max assist x 2 people with transfers and sliding board.       Review of Systems  General ROS: negative for chills, fever or weight loss  Psychological ROS: negative for hallucination, depression or suicidal ideation  Ophthalmic ROS: negative for blurry vision, photophobia or eye pain  ENT ROS: negative for epistaxis, sore throat or rhinorrhea  Respiratory ROS: no cough, shortness of breath, or wheezing  Cardiovascular ROS: no chest pain or dyspnea on exertion  Gastrointestinal ROS: no abdominal pain, change in bowel habits, or black/ bloody stools;   Genito-Urinary ROS: no dysuria, trouble voiding, or hematuria  Musculoskeletal ROS: + gait disturbance or muscular weakness  Neurological ROS: no syncope or seizures; no ataxia  Dermatological ROS: + wounds to LLE          PAST HISTORY:     Past Medical History:   Diagnosis Date    CHF (congestive heart failure)     COPD (chronic obstructive pulmonary disease)     Coronary artery disease     Diabetes mellitus     Diabetes mellitus type II     DM (diabetes mellitus) type II uncontrolled with renal manifestation 9/4/2013    Hyperlipidemia     Hypertension     Postablative hypothyroidism 12/6/2018       Past Surgical History:   Procedure Laterality Date    APPENDECTOMY      ARTHROSCOPY OF KNEE Right 9/7/2020    Procedure: ARTHROSCOPY, KNEE, RIGHT ;  Surgeon: You Bhatia MD;  Location: [facility] OR [address] [place] [address];  Service: Orthopedics;   Laterality: Right;    CHOLECYSTECTOMY      CORONARY ANGIOPLASTY WITH STENT PLACEMENT      TONSILLECTOMY      TRANSESOPHAGEAL ECHOCARDIOGRAPHY N/A 9/4/2020    Procedure: ECHOCARDIOGRAM, TRANSESOPHAGEAL;  Surgeon: Emil Diagnostic Provider;  Location: [facility] EP LAB;  Service: Anesthesiology;  Laterality: N/A;    TRANSESOPHAGEAL ECHOCARDIOGRAPHY N/A 9/3/2020    Procedure: ECHOCARDIOGRAM, TRANSESOPHAGEAL;  Surgeon: Dorian Diagnostic Provider;  Location: [facility] EP LAB;  Service: Anesthesiology;  Laterality: N/A;       Family History   Problem Relation Age of Onset    Heart disease Mother     No Known Problems Father     No Known Problems Sister     Hypertension Son     No Known Problems Son     No Known Problems Son          MEDICATIONS & ALLERGIES:     Current Outpatient Medications on File Prior to Visit   Medication Sig Dispense Refill    ACCU-CHEK FASTCLIX LANCET DRUM Misc TEST FOUR TIMES DAILY (Patient taking differently: Test twice daily) 408 each 3    ACCU-CHEK RAMIREZ Misc USE AS DIRECTED 1 each 0    ACCU-CHEK SMARTVIEW TEST STRIP Strp TEST FOUR TIMES DAILY (Patient taking differently: Test twice daily) 400 strip 3    albuterol (PROVENTIL) 2.5 mg /3 mL (0.083 %) nebulizer solution INHALE THE CONTENTS OF 1 VIAL VIA NEBULIZER TWICE DAILY 360 mL 3    albuterol (PROVENTIL/VENTOLIN HFA) 90 mcg/actuation inhaler Inhale 2 puffs into the lungs every 6 (six) hours as needed for Wheezing or Shortness of Breath. Rescue      artificial tears (ISOPTO TEARS) 0.5 % ophthalmic solution Place 1 drop into both eyes 3 (three) times daily.      aspirin (ECOTRIN) 81 MG EC tablet Take 1 tablet (81 mg total) by mouth once daily. 90 tablet 3    atorvastatin (LIPITOR) 10 MG tablet Take 1 tablet (10 mg total) by mouth once daily.      carvedilol (COREG) 25 MG tablet Take 1 tablet (25 mg total) by mouth 2 (two) times daily. 180 tablet 3    cholecalciferol, vitamin D3, (VITAMIN D3) 25 mcg (1,000 unit) capsule Take 1 capsule  (1,000 Units total) by mouth once daily.      fluticasone furoate-vilanterol (BREO ELLIPTA) 200-25 mcg/dose DsDv diskus inhaler INHALE 1 PUFF INTO THE LUNGS ONCE DAILY. (CONTROLLER) 180 each 3    fluticasone propionate (FLONASE) 50 mcg/actuation nasal spray USE 1 SPRAY IN EACH NOSTRIL ONE TIME DAILY 32 g 3    furosemide (LASIX) 40 MG tablet Take 1 tablet (40 mg total) by mouth 2 (two) times daily before meals.      gabapentin (NEURONTIN) 100 MG capsule Take 1 capsule (100 mg total) by mouth every evening. 90 capsule 3    insulin NPH-insulin regular, 70/30, (NOVOLIN 70/30 U-100 INSULIN) 100 unit/mL (70-30) injection Inject 14 Units into the skin before breakfast AND 12 Units before dinner. [id number] MINUTES BEFORE BREAKFAST AND DINNER. 20 mL 1    insulin syringe-needle U-100 (INSULIN SYRINGE) 1 mL 30 gauge x 5/16 Syrg 1 each by Misc.(Non-Drug; Combo Route) route 2 (two) times daily. Use twice daily as directed with insulin vials. 200 each 6    levothyroxine (SYNTHROID) 75 MCG tablet Take 1 tablet (75 mcg total) by mouth once daily. 90 tablet 3    magnesium oxide (MAG-OX) 400 mg (241.3 mg magnesium) tablet Take 0.5 tablets (200 mg total) by mouth 2 (two) times daily.  0    nitroGLYCERIN (NITROSTAT) 0.4 MG SL tablet PLACE 1 TABLET UNDER THE TONGUE EVERY 5  MINUTES AS NEEDED FOR CHEST PAIN 25 tablet 3    polyethylene glycol (GLYCOLAX) 17 gram PwPk Take 17 g by mouth 2 (two) times daily as needed (Constipation).  0     No current facility-administered medications on file prior to visit.         Review of patient's allergies indicates:   Allergen Reactions    Vicodin [hydrocodone-acetaminophen] Itching       OBJECTIVE:     Vital Signs:  /90, pulse 100, resp 18, sp02 98%, temp 97.8    Physical Exam:  General appearance: alert, cooperative, no distress  Constitutional:Oriented to person and year  + appears well-developed and well-nourished.   HEENT: Normocephalic, atraumatic,  Eyes:  conjunctivae/corneas clear, PERRL  Lungs: clear to auscultation bilaterally  Heart: regular rate and rhythm without rub  Abdomen: soft, non-tender; bowel sounds normoactive; + gallbladder drain with brown drainage and sediments  Extremities: extremities symmetric; no clubbing, cyanosis, no edema  Integument: Skin warm and dry to touch  Location: left hallux  Wound type: diabetic ulcer   Measurements: 1.1 x 0.7 cm   Wound bed: dark brown  Odor: no   Drainage: no  Surrounding skin: normal  Wound edges: unattached    Location: lateral left foot  Wound type: diabetic ulcer   Measurements: 2.8 x 1.1 x 0.1 cm   Wound bed: 100% slough  Odor: no   Drainage: no  Surrounding skin: normal  Wound edges: unattached      Location: dorsum left foot  Wound type: diabetic ulcer   Measurements: 6.5 x 5.6 x 0.1 cm   Wound bed: 100% slough  Odor: no   Drainage: no  Surrounding skin: normal  Wound edges: unattached    Location: left heal  Wound type: DTI  Measurements: 6.0 x 5.3 cm   Wound bed: purple 100%  Odor: no   Drainage: no  Surrounding skin: normal  Wound edges: attached    Location: dorsum left foot  Wound type: diabetic ulcer   Measurements: 6.7 x 6.1 cm   Wound bed: slough 100%  Odor: no   Drainage: light serosang  Surrounding skin: normal  Wound edges: attached  Neurologic: Alert and oriented X 2, +generalized weakness.   Psychiatric: no pressured speech; normal affect; no evidence of impaired cognition       ASSESSMENT & PLAN:     Diarrhea  - 10/20 Imodium 2 tablets po daily prn. Order CBC and BMP next lab draw    Septic shock  Septic arthritis of Right Knee  Sepsis due to MRSA  Acute on Chronic Cholecystitis  -He is status post drainage and coverage for MRSA   -His work-up for other etiologies of MRSA bacteremia have included negative LUKAS and MRI of lumbar spine looking for endocarditis and spinal abscess, respectively. Stool studies for C.diff on 09/11 were negative.   - Infectious work-up thus far on ICU admission  concern for possible right lower lobe HAP. However; CT abdomen pelvis on 9/15 with gallbladder dilation, sludge, and trace pericholic fluid collection.   - End date of IV antibiotics:Daptomycin /vancomycin 10/7/2020   and Levofloxacin 9/30/2020  - Follow up with ID and Gen Surg     Uncontrolled type 2 diabetes mellitus with hyperglycemia, with long-term current use of insulin  Diabetic polyneuropathy associated with type 2 diabetes mellitus  -continue with current regimen  - Monitor for hyperglycemia/hypoglycemia.        Postablative hypothyroidism  Continue home SYNTHROID     Acute kidney injury  -likely ATN from Shock s/p requiring RRT in ICU  -trialysis line removed   -pt is non-oliguric  -Nephrology recommends ambulatory f/u on discharge     Chronic systolic congestive heart failure  -Most recent ECHO in 09/2020 with EF of 25%.   -Unclear if ischemic vs non-ischemic.   -on Carvedilol and lasix  -Associated with chronic hypoxemic respiratory failure requiring 2L nasal cannula     Anemia       -Chronic, no s/s of bleeding       -transfused 1U PRBC on 10/3        Acute on chronic respiratory failure with hypoxia  Chronic respiratory failure with hypoxia, on home oxygen therapy  On 2 L nasal cannula chronically due to combination of COPD and CHF; titrate as needed.  -History of mixed COPD (based on PFT's from 05/2015 showing mild (small airways) obstruction, airflow not improved after bronchodilator, and moderate restriction) and HFrEF with chronic respiratory failure on 2L home oxygen.        Diabetic foot infection  Bacteremia due to methicillin resistant Staphylococcus aureus  Diabetic ulcer of left foot associated with type 2 diabetes mellitus, with fat layer exposed  Right knee pain / septic arthritis  Arthrocentesis and cultures suggest septic knee. LUKAS to evaluate for endocarditis negative for vegetations.  Per ID: Antibiotics changed to daptomycin and ceftaroline combination therapy for 'persistent MRSA  bacteremia'  -- s/p R knee scope I&D 9/7/20. PT/OT:  [facility]  -- Podiatry DC Instructions:  Patient is to follow up with Podiatry within 10 days of discharge. Discontinue wrapping to L foot as it may have irritated L dorsal foot. Betadine to R heel bulla abnd all escharred wounds. Aquacel Silver to L dorsal and plantar wounds, covered with bordered foam.        Cellulitis of left lower extremity - resolved         Scheduled Follow-up :  No future appointments.    Post Visit Medication List:     Medication List          Accurate as of October 20, 2020  1:41 PM. If you have any questions, ask your nurse or doctor.            CHANGE how you take these medications    ACCU-CHEK FASTCLIX LANCET DRUM Misc  Generic drug: lancets  TEST FOUR TIMES DAILY  What changed:   · when to take this  · additional instructions     ACCU-CHEK SMARTVIEW TEST STRIP Strp  Generic drug: blood sugar diagnostic  TEST FOUR TIMES DAILY  What changed:   · when to take this  · additional instructions        CONTINUE taking these medications    ACCU-CHEK RAMIREZ Misc  Generic drug: blood-glucose meter  USE AS DIRECTED     * albuterol 90 mcg/actuation inhaler  Commonly known as: PROVENTIL/VENTOLIN HFA     * albuterol 2.5 mg /3 mL (0.083 %) nebulizer solution  Commonly known as: PROVENTIL  INHALE THE CONTENTS OF 1 VIAL VIA NEBULIZER TWICE DAILY     artificial tears 0.5 % ophthalmic solution  Commonly known as: ISOPTO TEARS  Place 1 drop into both eyes 3 (three) times daily.     aspirin 81 MG EC tablet  Commonly known as: ECOTRIN  Take 1 tablet (81 mg total) by mouth once daily.     atorvastatin 10 MG tablet  Commonly known as: LIPITOR  Take 1 tablet (10 mg total) by mouth once daily.     carvediloL 25 MG tablet  Commonly known as: COREG  Take 1 tablet (25 mg total) by mouth 2 (two) times daily.     cholecalciferol (vitamin D3) 25 mcg (1,000 unit) capsule  Commonly known as: VITAMIN D3  Take 1 capsule (1,000 Units total) by mouth once daily.     fluticasone  furoate-vilanteroL 200-25 mcg/dose Dsdv diskus inhaler  Commonly known as: BREO ELLIPTA  INHALE 1 PUFF INTO THE LUNGS ONCE DAILY. (CONTROLLER)     fluticasone propionate 50 mcg/actuation nasal spray  Commonly known as: FLONASE  USE 1 SPRAY IN EACH NOSTRIL ONE TIME DAILY     furosemide 40 MG tablet  Commonly known as: LASIX  Take 1 tablet (40 mg total) by mouth 2 (two) times daily before meals.     gabapentin 100 MG capsule  Commonly known as: NEURONTIN  Take 1 capsule (100 mg total) by mouth every evening.     insulin NPH-insulin regular (70/30) 100 unit/mL (70-30) injection  Commonly known as: NovoLIN 70/30 U-100 Insulin  Inject 14 Units into the skin before breakfast AND 12 Units before dinner. [id number] MINUTES BEFORE BREAKFAST AND DINNER.     insulin syringe-needle U-100 1 mL 30 gauge x 5/16 Syrg  Commonly known as: INSULIN SYRINGE  1 each by Misc.(Non-Drug; Combo Route) route 2 (two) times daily. Use twice daily as directed with insulin vials.     levothyroxine 75 MCG tablet  Commonly known as: SYNTHROID  Take 1 tablet (75 mcg total) by mouth once daily.     magnesium oxide 400 mg (241.3 mg magnesium) tablet  Commonly known as: MAG-OX  Take 0.5 tablets (200 mg total) by mouth 2 (two) times daily.     nitroGLYCERIN 0.4 MG SL tablet  Commonly known as: NITROSTAT  PLACE 1 TABLET UNDER THE TONGUE EVERY 5  MINUTES AS NEEDED FOR CHEST PAIN     polyethylene glycol 17 gram Pwpk  Commonly known as: GLYCOLAX  Take 17 g by mouth 2 (two) times daily as needed (Constipation).         * This list has 2 medication(s) that are the same as other medications prescribed for you. Read the directions carefully, and ask your doctor or other care provider to review them with you.                  Signing Physician:  Laurence Herrera NP

## 2020-10-22 ENCOUNTER — EXTERNAL HOSPITAL ADMISSION (OUTPATIENT)
Dept: SKILLED NURSING FACILITY | Facility: HOSPITAL | Age: 65
End: 2020-10-22
Payer: MEDICARE

## 2020-10-22 DIAGNOSIS — J96.11 CHRONIC RESPIRATORY FAILURE WITH HYPOXIA, ON HOME OXYGEN THERAPY: Primary | ICD-10-CM

## 2020-10-22 DIAGNOSIS — Z99.81 CHRONIC RESPIRATORY FAILURE WITH HYPOXIA, ON HOME OXYGEN THERAPY: Primary | ICD-10-CM

## 2020-10-24 RX ORDER — FERROUS SULFATE 325(65) MG
325 TABLET ORAL DAILY
Refills: 0
Start: 2020-10-24

## 2020-10-24 NOTE — PROGRESS NOTES
[facility]   Re-evaluate Visit  DOS 10/22/2020     PRESENTING HISTORY     Chief Complaint/Reason for Admission: Labs reviewed and re-evaluate for debility,diarrhea and chronic diseases  PCP: Qiana Chow MD   Admission Date: 8/30/2020  Hospital Length of Stay: 40 days  Discharge Date and Time: 10/9/2020  3:05 PM    History of Present Illness:  Mr. Ed Patton is a 65 y.o. male who was recently  presented to the ED for recurrent falls.  At some point prior to this he found a tack imbedded in the bottom of his slipper which had punctured the sole of his left foot.  His left foot has become more swollen recently and he has had occasional twinges of pain in both of his legs. He says that he has been afraid to leave his house on account of the Coronavirus pandemic.  He has been getting home health services including physical therapy has been walking to his mailbox and back for exercise.   He was admitted to [facility] on 08/30 with cellulitis of left foot with concern for diabetic foot infection with subsequent development of MRSA bacteremia attributed to septic arthritis of right knee and elbow. He is status post drainage and coverage for MRSA since that time.S/p R knee scope I&D 9/7/20.  His work-up for other etiologies of MRSA bacteremia have included negative LUKAS and MRI of lumbar spine looking for endocarditis and spinal abscess, respectively. Stool studies for C.diff on 09/11 were negative. His hospital course has been associated with worsening hypotension and leukocytosis since 09/10 prompting ICU admission on 09/12 for initiation of vasopressor support. Infectious work-up thus far on ICU admission concern for possible right lower lobe HAP. However; CT abdomen pelvis on 9/15 with gallbladder dilation, sludge, and trace pericholic fluid collection. Exam not consistent with cholecystitis but given persistent shock considered a potential source. Also with potential right  lower lobe atelectasis with possible overlying infection. End date of IV abx: Daptomycin /vancomycin 10/7/2020 and Levofloxacin 9/30/2020.    Transferred to [facility] for SNF PT/OT.    ________________________________________________________    Today:  The patient is currently laying in bed with no acute distress. No complaints and no report of acute changes. Denies SOB, fatigue, weakness, dizziness, CP, pain.  LCTAB with trace edema to BLE. LLE boot with dressing c/d/i.   Per PT, resident is non-ambulatory and unable to stand. Max assist x 2 people with transfers and sliding board.   Labs 10/21: K 3.1, creat 1.6, BUN 21, H&H 7.4 & 22.2      Review of Systems  General ROS: negative for chills, fever or weight loss  Psychological ROS: negative for hallucination, depression or suicidal ideation  Ophthalmic ROS: negative for blurry vision, photophobia or eye pain  ENT ROS: negative for epistaxis, sore throat or rhinorrhea  Respiratory ROS: no cough, shortness of breath, or wheezing  Cardiovascular ROS: no chest pain or dyspnea on exertion  Gastrointestinal ROS: no abdominal pain, change in bowel habits, or black/ bloody stools;   Genito-Urinary ROS: no dysuria, trouble voiding, or hematuria  Musculoskeletal ROS: + gait disturbance or muscular weakness  Neurological ROS: no syncope or seizures; no ataxia  Dermatological ROS: + wounds to LLE          PAST HISTORY:     Past Medical History:   Diagnosis Date    CHF (congestive heart failure)     COPD (chronic obstructive pulmonary disease)     Coronary artery disease     Diabetes mellitus     Diabetes mellitus type II     DM (diabetes mellitus) type II uncontrolled with renal manifestation 9/4/2013    Hyperlipidemia     Hypertension     Postablative hypothyroidism 12/6/2018       Past Surgical History:   Procedure Laterality Date    APPENDECTOMY      ARTHROSCOPY OF KNEE Right 9/7/2020    Procedure: ARTHROSCOPY, KNEE, RIGHT ;  Surgeon: You Bhatia MD;  Location:  [facility] OR 2ND FLR;  Service: Orthopedics;  Laterality: Right;    CHOLECYSTECTOMY      CORONARY ANGIOPLASTY WITH STENT PLACEMENT      TONSILLECTOMY      TRANSESOPHAGEAL ECHOCARDIOGRAPHY N/A 9/4/2020    Procedure: ECHOCARDIOGRAM, TRANSESOPHAGEAL;  Surgeon: [facility] Diagnostic Provider;  Location: [facility] EP LAB;  Service: Anesthesiology;  Laterality: N/A;    TRANSESOPHAGEAL ECHOCARDIOGRAPHY N/A 9/3/2020    Procedure: ECHOCARDIOGRAM, TRANSESOPHAGEAL;  Surgeon: Dos Diagnostic Provider;  Location: [facility] EP LAB;  Service: Anesthesiology;  Laterality: N/A;       Family History   Problem Relation Age of Onset    Heart disease Mother     No Known Problems Father     No Known Problems Sister     Hypertension Son     No Known Problems Son     No Known Problems Son          MEDICATIONS & ALLERGIES:     Current Outpatient Medications on File Prior to Visit   Medication Sig Dispense Refill    ACCU-CHEK FASTCLIX LANCET DRUM Misc TEST FOUR TIMES DAILY (Patient taking differently: Test twice daily) 408 each 3    ACCU-CHEK RAMIREZ Misc USE AS DIRECTED 1 each 0    ACCU-CHEK SMARTVIEW TEST STRIP Strp TEST FOUR TIMES DAILY (Patient taking differently: Test twice daily) 400 strip 3    albuterol (PROVENTIL) 2.5 mg /3 mL (0.083 %) nebulizer solution INHALE THE CONTENTS OF 1 VIAL VIA NEBULIZER TWICE DAILY 360 mL 3    albuterol (PROVENTIL/VENTOLIN HFA) 90 mcg/actuation inhaler Inhale 2 puffs into the lungs every 6 (six) hours as needed for Wheezing or Shortness of Breath. Rescue      artificial tears (ISOPTO TEARS) 0.5 % ophthalmic solution Place 1 drop into both eyes 3 (three) times daily.      aspirin (ECOTRIN) 81 MG EC tablet Take 1 tablet (81 mg total) by mouth once daily. 90 tablet 3    atorvastatin (LIPITOR) 10 MG tablet Take 1 tablet (10 mg total) by mouth once daily.      carvedilol (COREG) 25 MG tablet Take 1 tablet (25 mg total) by mouth 2 (two) times daily. 180 tablet 3    cholecalciferol, vitamin D3, (VITAMIN D3) 25 mcg  (1,000 unit) capsule Take 1 capsule (1,000 Units total) by mouth once daily.      fluticasone furoate-vilanterol (BREO ELLIPTA) 200-25 mcg/dose DsDv diskus inhaler INHALE 1 PUFF INTO THE LUNGS ONCE DAILY. (CONTROLLER) 180 each 3    fluticasone propionate (FLONASE) 50 mcg/actuation nasal spray USE 1 SPRAY IN EACH NOSTRIL ONE TIME DAILY 32 g 3    furosemide (LASIX) 40 MG tablet Take 1 tablet (40 mg total) by mouth 2 (two) times daily before meals.      gabapentin (NEURONTIN) 100 MG capsule Take 1 capsule (100 mg total) by mouth every evening. 90 capsule 3    insulin NPH-insulin regular, 70/30, (NOVOLIN 70/30 U-100 INSULIN) 100 unit/mL (70-30) injection Inject 14 Units into the skin before breakfast AND 12 Units before dinner. [id number] MINUTES BEFORE BREAKFAST AND DINNER. 20 mL 1    insulin syringe-needle U-100 (INSULIN SYRINGE) 1 mL 30 gauge x 5/16 Syrg 1 each by Misc.(Non-Drug; Combo Route) route 2 (two) times daily. Use twice daily as directed with insulin vials. 200 each 6    levothyroxine (SYNTHROID) 75 MCG tablet Take 1 tablet (75 mcg total) by mouth once daily. 90 tablet 3    magnesium oxide (MAG-OX) 400 mg (241.3 mg magnesium) tablet Take 0.5 tablets (200 mg total) by mouth 2 (two) times daily.  0    nitroGLYCERIN (NITROSTAT) 0.4 MG SL tablet PLACE 1 TABLET UNDER THE TONGUE EVERY 5  MINUTES AS NEEDED FOR CHEST PAIN 25 tablet 3    polyethylene glycol (GLYCOLAX) 17 gram PwPk Take 17 g by mouth 2 (two) times daily as needed (Constipation).  0     No current facility-administered medications on file prior to visit.         Review of patient's allergies indicates:   Allergen Reactions    Vicodin [hydrocodone-acetaminophen] Itching       OBJECTIVE:       Physical Exam:  General appearance: alert, cooperative, no distress  Constitutional:Oriented to person and year  + appears well-developed and well-nourished.   HEENT: Normocephalic, atraumatic,  Eyes: conjunctivae/corneas clear, PERRL  Lungs: clear  to auscultation bilaterally  Heart: regular rate and rhythm without rub  Abdomen: soft, non-tender; bowel sounds normoactive; + gallbladder drain with brown drainage and sediments  Extremities: extremities symmetric; no clubbing, cyanosis, no edema  Integument: Skin warm and dry to touch  Location: left hallux  Wound type: diabetic ulcer   Measurements: 1.1 x 0.7 cm   Wound bed: dark brown  Odor: no   Drainage: no  Surrounding skin: normal  Wound edges: unattached    Location: lateral left foot  Wound type: diabetic ulcer   Measurements: 2.8 x 1.1 x 0.1 cm   Wound bed: 100% slough  Odor: no   Drainage: no  Surrounding skin: normal  Wound edges: unattached      Location: dorsum left foot  Wound type: diabetic ulcer   Measurements: 6.5 x 5.6 x 0.1 cm   Wound bed: 100% slough  Odor: no   Drainage: no  Surrounding skin: normal  Wound edges: unattached    Location: left heal  Wound type: DTI  Measurements: 6.0 x 5.3 cm   Wound bed: purple 100%  Odor: no   Drainage: no  Surrounding skin: normal  Wound edges: attached    Location: dorsum left foot  Wound type: diabetic ulcer   Measurements: 6.7 x 6.1 cm   Wound bed: slough 100%  Odor: no   Drainage: light serosang  Surrounding skin: normal  Wound edges: attached  Neurologic: Alert and oriented X 2, +generalized weakness.   Psychiatric: no pressured speech; normal affect; no evidence of impaired cognition       ASSESSMENT & PLAN:     Anemia   -10/22 start on ferrous sulfate 325 mg daily.    Hypokalemia   - on KCL 20 meq daily x 7 days    Diarrhea - resolved  - 10/20 Imodium 2 tablets po daily prn.     Septic shock  Septic arthritis of Right Knee  Sepsis due to MRSA  Acute on Chronic Cholecystitis  -He is status post drainage and coverage for MRSA   -His work-up for other etiologies of MRSA bacteremia have included negative LUKAS and MRI of lumbar spine looking for endocarditis and spinal abscess, respectively. Stool studies for C.diff on 09/11 were negative.   - Infectious  work-up thus far on ICU admission concern for possible right lower lobe HAP. However; CT abdomen pelvis on 9/15 with gallbladder dilation, sludge, and trace pericholic fluid collection.   - End date of IV antibiotics:Daptomycin /vancomycin 10/7/2020   and Levofloxacin 9/30/2020  - Follow up with ID and Gen Surg     Uncontrolled type 2 diabetes mellitus with hyperglycemia, with long-term current use of insulin  Diabetic polyneuropathy associated with type 2 diabetes mellitus  -continue with current regimen  - Monitor for hyperglycemia/hypoglycemia.        Postablative hypothyroidism  Continue home SYNTHROID     Acute kidney injury  -likely ATN from Shock s/p requiring RRT in ICU  -trialysis line removed   -pt is non-oliguric  -Nephrology recommends ambulatory f/u on discharge     Chronic systolic congestive heart failure  -Most recent ECHO in 09/2020 with EF of 25%.   -Unclear if ischemic vs non-ischemic.   -on Carvedilol and lasix  -Associated with chronic hypoxemic respiratory failure requiring 2L nasal cannula     Anemia       -Chronic, no s/s of bleeding       -transfused 1U PRBC on 10/3        Acute on chronic respiratory failure with hypoxia  Chronic respiratory failure with hypoxia, on home oxygen therapy  On 2 L nasal cannula chronically due to combination of COPD and CHF; titrate as needed.  -History of mixed COPD (based on PFT's from 05/2015 showing mild (small airways) obstruction, airflow not improved after bronchodilator, and moderate restriction) and HFrEF with chronic respiratory failure on 2L home oxygen.        Diabetic foot infection  Bacteremia due to methicillin resistant Staphylococcus aureus  Diabetic ulcer of left foot associated with type 2 diabetes mellitus, with fat layer exposed  Right knee pain / septic arthritis  Arthrocentesis and cultures suggest septic knee. LUKAS to evaluate for endocarditis negative for vegetations.  Per ID: Antibiotics changed to daptomycin and ceftaroline combination  therapy for 'persistent MRSA bacteremia'  -- s/p R knee scope I&D 9/7/20. PT/OT:  [facility]  -- Podiatry DC Instructions:  Patient is to follow up with Podiatry within 10 days of discharge. Discontinue wrapping to L foot as it may have irritated L dorsal foot. Betadine to R heel bulla abnd all escharred wounds. Aquacel Silver to L dorsal and plantar wounds, covered with bordered foam.        Cellulitis of left lower extremity - resolved         Scheduled Follow-up :  Future Appointments   Date Time Provider Department Center   10/27/2020  9:00 AM Ganga Diallo MD [facility]   10/28/2020 10:30 AM Sharron Campbell MD [facility] ID [facility]       Post Visit Medication List:     Medication List          Accurate as of October 22, 2020 11:59 PM. If you have any questions, ask your nurse or doctor.            CHANGE how you take these medications    ACCU-CHEK FASTCLIX LANCET DRUM Misc  Generic drug: lancets  TEST FOUR TIMES DAILY  What changed:   · when to take this  · additional instructions     ACCU-CHEK SMARTVIEW TEST STRIP Strp  Generic drug: blood sugar diagnostic  TEST FOUR TIMES DAILY  What changed:   · when to take this  · additional instructions        CONTINUE taking these medications    ACCU-CHEK RAMIREZ Misc  Generic drug: blood-glucose meter  USE AS DIRECTED     * albuterol 90 mcg/actuation inhaler  Commonly known as: PROVENTIL/VENTOLIN HFA     * albuterol 2.5 mg /3 mL (0.083 %) nebulizer solution  Commonly known as: PROVENTIL  INHALE THE CONTENTS OF 1 VIAL VIA NEBULIZER TWICE DAILY     artificial tears 0.5 % ophthalmic solution  Commonly known as: ISOPTO TEARS  Place 1 drop into both eyes 3 (three) times daily.     atorvastatin 10 MG tablet  Commonly known as: LIPITOR  Take 1 tablet (10 mg total) by mouth once daily.     carvediloL 25 MG tablet  Commonly known as: COREG  Take 1 tablet (25 mg total) by mouth 2 (two) times daily.     cholecalciferol (vitamin D3) 25 mcg (1,000 unit) capsule  Commonly known  as: VITAMIN D3  Take 1 capsule (1,000 Units total) by mouth once daily.     fluticasone furoate-vilanteroL 200-25 mcg/dose Dsdv diskus inhaler  Commonly known as: BREO ELLIPTA  INHALE 1 PUFF INTO THE LUNGS ONCE DAILY. (CONTROLLER)     fluticasone propionate 50 mcg/actuation nasal spray  Commonly known as: FLONASE  USE 1 SPRAY IN EACH NOSTRIL ONE TIME DAILY     furosemide 40 MG tablet  Commonly known as: LASIX  Take 1 tablet (40 mg total) by mouth 2 (two) times daily before meals.     gabapentin 100 MG capsule  Commonly known as: NEURONTIN  Take 1 capsule (100 mg total) by mouth every evening.     insulin NPH-insulin regular (70/30) 100 unit/mL (70-30) injection  Commonly known as: NovoLIN 70/30 U-100 Insulin  Inject 14 Units into the skin before breakfast AND 12 Units before dinner. [id number] MINUTES BEFORE BREAKFAST AND DINNER.     insulin syringe-needle U-100 1 mL 30 gauge x 5/16 Syrg  Commonly known as: INSULIN SYRINGE  1 each by Misc.(Non-Drug; Combo Route) route 2 (two) times daily. Use twice daily as directed with insulin vials.     levothyroxine 75 MCG tablet  Commonly known as: SYNTHROID  Take 1 tablet (75 mcg total) by mouth once daily.     magnesium oxide 400 mg (241.3 mg magnesium) tablet  Commonly known as: MAG-OX  Take 0.5 tablets (200 mg total) by mouth 2 (two) times daily.     nitroGLYCERIN 0.4 MG SL tablet  Commonly known as: NITROSTAT  PLACE 1 TABLET UNDER THE TONGUE EVERY 5  MINUTES AS NEEDED FOR CHEST PAIN     polyethylene glycol 17 gram Pwpk  Commonly known as: GLYCOLAX  Take 17 g by mouth 2 (two) times daily as needed (Constipation).         * This list has 2 medication(s) that are the same as other medications prescribed for you. Read the directions carefully, and ask your doctor or other care provider to review them with you.                  Signing Physician:  Laurence Herrera NP

## 2020-10-27 ENCOUNTER — PATIENT OUTREACH (OUTPATIENT)
Dept: ADMINISTRATIVE | Facility: OTHER | Age: 65
End: 2020-10-27

## 2020-10-27 ENCOUNTER — OFFICE VISIT (OUTPATIENT)
Dept: SURGERY | Facility: CLINIC | Age: 65
End: 2020-10-27
Payer: MEDICARE

## 2020-10-27 VITALS — HEART RATE: 113 BPM | DIASTOLIC BLOOD PRESSURE: 95 MMHG | SYSTOLIC BLOOD PRESSURE: 144 MMHG | TEMPERATURE: 98 F

## 2020-10-27 DIAGNOSIS — E11.9 TYPE 2 DIABETES MELLITUS WITHOUT COMPLICATION, UNSPECIFIED WHETHER LONG TERM INSULIN USE: Primary | ICD-10-CM

## 2020-10-27 DIAGNOSIS — K80.01 CALCULUS OF GALLBLADDER WITH ACUTE CHOLECYSTITIS AND OBSTRUCTION: Primary | ICD-10-CM

## 2020-10-27 PROCEDURE — 99203 OFFICE O/P NEW LOW 30 MIN: CPT | Mod: S$GLB,,, | Performed by: SURGERY

## 2020-10-27 PROCEDURE — 99999 PR PBB SHADOW E&M-EST. PATIENT-LVL II: ICD-10-PCS | Mod: PBBFAC,,, | Performed by: SURGERY

## 2020-10-27 PROCEDURE — 3080F PR MOST RECENT DIASTOLIC BLOOD PRESSURE >= 90 MM HG: ICD-10-PCS | Mod: CPTII,S$GLB,, | Performed by: SURGERY

## 2020-10-27 PROCEDURE — 3077F SYST BP >= 140 MM HG: CPT | Mod: CPTII,S$GLB,, | Performed by: SURGERY

## 2020-10-27 PROCEDURE — 1126F PR PAIN SEVERITY QUANTIFIED, NO PAIN PRESENT: ICD-10-PCS | Mod: S$GLB,,, | Performed by: SURGERY

## 2020-10-27 PROCEDURE — 1126F AMNT PAIN NOTED NONE PRSNT: CPT | Mod: S$GLB,,, | Performed by: SURGERY

## 2020-10-27 PROCEDURE — 99999 PR PBB SHADOW E&M-EST. PATIENT-LVL II: CPT | Mod: PBBFAC,,, | Performed by: SURGERY

## 2020-10-27 PROCEDURE — 3077F PR MOST RECENT SYSTOLIC BLOOD PRESSURE >= 140 MM HG: ICD-10-PCS | Mod: CPTII,S$GLB,, | Performed by: SURGERY

## 2020-10-27 PROCEDURE — 3080F DIAST BP >= 90 MM HG: CPT | Mod: CPTII,S$GLB,, | Performed by: SURGERY

## 2020-10-27 PROCEDURE — 99203 PR OFFICE/OUTPT VISIT, NEW, LEVL III, 30-44 MIN: ICD-10-PCS | Mod: S$GLB,,, | Performed by: SURGERY

## 2020-10-27 NOTE — PROGRESS NOTES
Care Everywhere: updated  Immunization: updated(delay in links)   Health Maintenance: updated  Media Review: review for outside eye exam report   Legacy Review:   Order placed: diabetic eye screening photo   Upcoming appts:

## 2020-11-02 ENCOUNTER — TELEPHONE (OUTPATIENT)
Dept: SURGERY | Facility: CLINIC | Age: 65
End: 2020-11-02

## 2020-11-02 NOTE — TELEPHONE ENCOUNTER
Tried calling patient regarding appointment with  General Surgery , Dr. Pandya today and no answer.

## 2020-11-05 LAB
ACID FAST MOD KINY STN SPEC: NORMAL
MYCOBACTERIUM SPEC QL CULT: NORMAL

## 2020-11-06 ENCOUNTER — EXTERNAL HOSPITAL ADMISSION (OUTPATIENT)
Dept: SKILLED NURSING FACILITY | Facility: HOSPITAL | Age: 65
End: 2020-11-06
Payer: MEDICARE

## 2020-11-06 DIAGNOSIS — J96.11 CHRONIC RESPIRATORY FAILURE WITH HYPOXIA, ON HOME OXYGEN THERAPY: Primary | ICD-10-CM

## 2020-11-06 DIAGNOSIS — Z99.81 CHRONIC RESPIRATORY FAILURE WITH HYPOXIA, ON HOME OXYGEN THERAPY: Primary | ICD-10-CM

## 2020-11-08 NOTE — PROGRESS NOTES
[facility]   Re-evaluate Visit  DOS 11/6/2020     PRESENTING HISTORY     Chief Complaint/Reason for Admission: re-evaluate for debility and chronic diseases  PCP: Qiana Chow MD   Admission Date: 8/30/2020  Hospital Length of Stay: 40 days  Discharge Date and Time: 10/9/2020  3:05 PM    History of Present Illness:  Mr. Ed Patton is a 65 y.o. male who was recently  presented to the ED for recurrent falls.  At some point prior to this he found a tack imbedded in the bottom of his slipper which had punctured the sole of his left foot.  His left foot has become more swollen recently and he has had occasional twinges of pain in both of his legs. He says that he has been afraid to leave his house on account of the Coronavirus pandemic.  He has been getting home health services including physical therapy has been walking to his mailbox and back for exercise.   He was admitted to [facility] on 08/30 with cellulitis of left foot with concern for diabetic foot infection with subsequent development of MRSA bacteremia attributed to septic arthritis of right knee and elbow. He is status post drainage and coverage for MRSA since that time.S/p R knee scope I&D 9/7/20.  His work-up for other etiologies of MRSA bacteremia have included negative LUKAS and MRI of lumbar spine looking for endocarditis and spinal abscess, respectively. Stool studies for C.diff on 09/11 were negative. His hospital course has been associated with worsening hypotension and leukocytosis since 09/10 prompting ICU admission on 09/12 for initiation of vasopressor support. Infectious work-up thus far on ICU admission concern for possible right lower lobe HAP. However; CT abdomen pelvis on 9/15 with gallbladder dilation, sludge, and trace pericholic fluid collection. Exam not consistent with cholecystitis but given persistent shock considered a potential source. Also with potential right lower lobe atelectasis with  possible overlying infection. End date of IV abx: Daptomycin /vancomycin 10/7/2020 and Levofloxacin 9/30/2020.    Transferred to [facility] for SNF PT/OT.    ________________________________________________________    Today:  The patient is currently working with PT and is ambulating with rolling walker, 30 feet. Per PT, he is independent with bed mobility, transfer with sliding board, and sit to stand with mod-max assist.   LCTAB with no edema. No report of acute changes and no complaints. Dressing to left foot dry and intact.       Review of Systems  General ROS: negative for chills, fever or weight loss  Psychological ROS: negative for hallucination, depression or suicidal ideation  Ophthalmic ROS: negative for blurry vision, photophobia or eye pain  ENT ROS: negative for epistaxis, sore throat or rhinorrhea  Respiratory ROS: no cough, shortness of breath, or wheezing  Cardiovascular ROS: no chest pain or dyspnea on exertion  Gastrointestinal ROS: no abdominal pain, change in bowel habits, or black/ bloody stools;   Genito-Urinary ROS: no dysuria, trouble voiding, or hematuria  Musculoskeletal ROS: + gait disturbance or muscular weakness  Neurological ROS: no syncope or seizures; no ataxia  Dermatological ROS: + wounds to LLE          PAST HISTORY:     Past Medical History:   Diagnosis Date    CHF (congestive heart failure)     COPD (chronic obstructive pulmonary disease)     Coronary artery disease     Diabetes mellitus     Diabetes mellitus type II     DM (diabetes mellitus) type II uncontrolled with renal manifestation 9/4/2013    Hyperlipidemia     Hypertension     Postablative hypothyroidism 12/6/2018       Past Surgical History:   Procedure Laterality Date    APPENDECTOMY      ARTHROSCOPY OF KNEE Right 9/7/2020    Procedure: ARTHROSCOPY, KNEE, RIGHT ;  Surgeon: You Bhatia MD;  Location: [facility] OR [address] [place] [address];  Service: Orthopedics;  Laterality: Right;    CHOLECYSTECTOMY      CORONARY ANGIOPLASTY WITH  STENT PLACEMENT      TONSILLECTOMY      TRANSESOPHAGEAL ECHOCARDIOGRAPHY N/A 9/4/2020    Procedure: ECHOCARDIOGRAM, TRANSESOPHAGEAL;  Surgeon: [facility] Diagnostic Provider;  Location: [facility] EP LAB;  Service: Anesthesiology;  Laterality: N/A;    TRANSESOPHAGEAL ECHOCARDIOGRAPHY N/A 9/3/2020    Procedure: ECHOCARDIOGRAM, TRANSESOPHAGEAL;  Surgeon: Emil Diagnostic Provider;  Location: [facility] EP LAB;  Service: Anesthesiology;  Laterality: N/A;       Family History   Problem Relation Age of Onset    Heart disease Mother     No Known Problems Father     No Known Problems Sister     Hypertension Son     No Known Problems Son     No Known Problems Son          MEDICATIONS & ALLERGIES:     Current Outpatient Medications on File Prior to Visit   Medication Sig Dispense Refill    ACCU-CHEK FASTCLIX LANCET DRUM Misc TEST FOUR TIMES DAILY (Patient taking differently: Test twice daily) 408 each 3    ACCU-CHEK RAMIREZ Misc USE AS DIRECTED 1 each 0    ACCU-CHEK SMARTVIEW TEST STRIP Strp TEST FOUR TIMES DAILY (Patient taking differently: Test twice daily) 400 strip 3    albuterol (PROVENTIL) 2.5 mg /3 mL (0.083 %) nebulizer solution INHALE THE CONTENTS OF 1 VIAL VIA NEBULIZER TWICE DAILY 360 mL 3    albuterol (PROVENTIL/VENTOLIN HFA) 90 mcg/actuation inhaler Inhale 2 puffs into the lungs every 6 (six) hours as needed for Wheezing or Shortness of Breath. Rescue      artificial tears (ISOPTO TEARS) 0.5 % ophthalmic solution Place 1 drop into both eyes 3 (three) times daily.      aspirin (ECOTRIN) 81 MG EC tablet Take 1 tablet (81 mg total) by mouth once daily. 90 tablet 3    atorvastatin (LIPITOR) 10 MG tablet Take 1 tablet (10 mg total) by mouth once daily.      carvedilol (COREG) 25 MG tablet Take 1 tablet (25 mg total) by mouth 2 (two) times daily. 180 tablet 3    cholecalciferol, vitamin D3, (VITAMIN D3) 25 mcg (1,000 unit) capsule Take 1 capsule (1,000 Units total) by mouth once daily.      ferrous sulfate (FEOSOL) 325  mg (65 mg iron) Tab tablet Take 1 tablet (325 mg total) by mouth once daily.  0    fluticasone furoate-vilanterol (BREO ELLIPTA) 200-25 mcg/dose DsDv diskus inhaler INHALE 1 PUFF INTO THE LUNGS ONCE DAILY. (CONTROLLER) 180 each 3    fluticasone propionate (FLONASE) 50 mcg/actuation nasal spray USE 1 SPRAY IN EACH NOSTRIL ONE TIME DAILY 32 g 3    furosemide (LASIX) 40 MG tablet Take 1 tablet (40 mg total) by mouth 2 (two) times daily before meals.      gabapentin (NEURONTIN) 100 MG capsule Take 1 capsule (100 mg total) by mouth every evening. 90 capsule 3    insulin NPH-insulin regular, 70/30, (NOVOLIN 70/30 U-100 INSULIN) 100 unit/mL (70-30) injection Inject 14 Units into the skin before breakfast AND 12 Units before dinner. [id number] MINUTES BEFORE BREAKFAST AND DINNER. 20 mL 1    insulin syringe-needle U-100 (INSULIN SYRINGE) 1 mL 30 gauge x 5/16 Syrg 1 each by Misc.(Non-Drug; Combo Route) route 2 (two) times daily. Use twice daily as directed with insulin vials. 200 each 6    levothyroxine (SYNTHROID) 75 MCG tablet Take 1 tablet (75 mcg total) by mouth once daily. 90 tablet 3    magnesium oxide (MAG-OX) 400 mg (241.3 mg magnesium) tablet Take 0.5 tablets (200 mg total) by mouth 2 (two) times daily.  0    nitroGLYCERIN (NITROSTAT) 0.4 MG SL tablet PLACE 1 TABLET UNDER THE TONGUE EVERY 5  MINUTES AS NEEDED FOR CHEST PAIN 25 tablet 3    polyethylene glycol (GLYCOLAX) 17 gram PwPk Take 17 g by mouth 2 (two) times daily as needed (Constipation).  0     No current facility-administered medications on file prior to visit.         Review of patient's allergies indicates:   Allergen Reactions    Vicodin [hydrocodone-acetaminophen] Itching       OBJECTIVE:       Physical Exam:  General appearance: alert, cooperative, no distress  Constitutional:Oriented to person and year  + appears well-developed and well-nourished.   HEENT: Normocephalic, atraumatic,  Eyes: conjunctivae/corneas clear, PERRL  Lungs: clear  to auscultation bilaterally  Heart: regular rate and rhythm without rub  Abdomen: soft, non-tender; bowel sounds normoactive; + gallbladder drain with brown drainage and sediments  Extremities: extremities symmetric; no clubbing, cyanosis, no edema  Integument: Skin warm and dry to touch  Location: left hallux  Wound type: diabetic ulcer   Measurements: 0.7 x 0.2 cm   Wound bed: dark brown  Odor: no   Drainage: no  Surrounding skin: normal  Wound edges: unattached    Location: lateral left foot  Wound type: diabetic ulcer   Measurements: 3.0 x 1.0 x 0.1 cm   Wound bed: 100% slough  Odor: no   Drainage: no  Surrounding skin: normal  Wound edges: unattached      Location: dorsum left foot  Wound type: diabetic ulcer   Measurements: 6.4 x 3.6 x 0.1 cm   Wound bed: 100% slough  Odor: no   Drainage: no  Surrounding skin: normal  Wound edges: unattached    Location: left heal  Wound type: pressure ulcer stage 2  Measurements: 3.8 x 2.0 cm   Wound bed: epithelial 100%  Odor: no   Drainage: no  Surrounding skin: normal  Wound edges: attached    Location: dorsum left foot  Wound type: diabetic ulcer   Measurements: 6.4 x 3.6 cm   Wound bed: slough 100%  Odor: no   Drainage: light serosang  Surrounding skin: normal  Wound edges: attached  Neurologic: Alert and oriented X 2, +generalized weakness.   Psychiatric: no pressured speech; normal affect; no evidence of impaired cognition       ASSESSMENT & PLAN:     Septic shock  Septic arthritis of Right Knee  Sepsis due to MRSA  Acute on Chronic Cholecystitis  -He is status post drainage and coverage for MRSA   -His work-up for other etiologies of MRSA bacteremia have included negative LUKAS and MRI of lumbar spine looking for endocarditis and spinal abscess, respectively. Stool studies for C.diff on 09/11 were negative.   - Infectious work-up thus far on ICU admission concern for possible right lower lobe HAP. However; CT abdomen pelvis on 9/15 with gallbladder dilation, sludge, and  trace pericholic fluid collection.   - End date of IV antibiotics:Daptomycin /vancomycin 10/7/2020   and Levofloxacin 9/30/2020  - Follow up with ID and Gen Surg     Uncontrolled type 2 diabetes mellitus with hyperglycemia, with long-term current use of insulin  Diabetic polyneuropathy associated with type 2 diabetes mellitus  -continue with current regimen  - Monitor for hyperglycemia/hypoglycemia.        Postablative hypothyroidism  Continue home SYNTHROID     Acute kidney injury  -likely ATN from Shock s/p requiring RRT in ICU  -trialysis line removed   -pt is non-oliguric  -Nephrology recommends ambulatory f/u on discharge     Chronic systolic congestive heart failure  -Most recent ECHO in 09/2020 with EF of 25%.   -Unclear if ischemic vs non-ischemic.   -on Carvedilol and lasix  -Associated with chronic hypoxemic respiratory failure requiring 2L nasal cannula     Anemia       -Chronic, no s/s of bleeding       -on ferrous sulfate 325 mg daily       Acute on chronic respiratory failure with hypoxia  Chronic respiratory failure with hypoxia, on home oxygen therapy  On 2 L nasal cannula chronically due to combination of COPD and CHF; titrate as needed.  -History of mixed COPD (based on PFT's from 05/2015 showing mild (small airways) obstruction, airflow not improved after bronchodilator, and moderate restriction) and HFrEF with chronic respiratory failure on 2L home oxygen.        Diabetic foot infection  Bacteremia due to methicillin resistant Staphylococcus aureus  Diabetic ulcer of left foot associated with type 2 diabetes mellitus, with fat layer exposed  Right knee pain / septic arthritis  Arthrocentesis and cultures suggest septic knee. LUKAS to evaluate for endocarditis negative for vegetations.  Per ID: Antibiotics changed to daptomycin and ceftaroline combination therapy for 'persistent MRSA bacteremia'  -- s/p R knee scope I&D 9/7/20. PT/OT:  [facility]  -- Podiatry DC Instructions:  Patient is to follow up with  Podiatry within 10 days of discharge. Discontinue wrapping to L foot as it may have irritated L dorsal foot. Betadine to R heel bulla abnd all escharred wounds. Aquacel Silver to L dorsal and plantar wounds, covered with bordered foam.        Cellulitis of left lower extremity - resolved         Scheduled Follow-up :  Future Appointments   Date Time Provider Department Center   11/16/2020 10:00 AM Rigoberto Pandya MD [facility]       Post Visit Medication List:     Medication List          Accurate as of November 6, 2020 11:59 PM. If you have any questions, ask your nurse or doctor.            CHANGE how you take these medications    ACCU-CHEK FASTCLIX LANCET DRUM Misc  Generic drug: lancets  TEST FOUR TIMES DAILY  What changed:   · when to take this  · additional instructions     ACCU-CHEK SMARTVIEW TEST STRIP Strp  Generic drug: blood sugar diagnostic  TEST FOUR TIMES DAILY  What changed:   · when to take this  · additional instructions        CONTINUE taking these medications    ACCU-CHEK RAMIREZ Misc  Generic drug: blood-glucose meter  USE AS DIRECTED     * albuterol 90 mcg/actuation inhaler  Commonly known as: PROVENTIL/VENTOLIN HFA     * albuterol 2.5 mg /3 mL (0.083 %) nebulizer solution  Commonly known as: PROVENTIL  INHALE THE CONTENTS OF 1 VIAL VIA NEBULIZER TWICE DAILY     artificial tears 0.5 % ophthalmic solution  Commonly known as: ISOPTO TEARS  Place 1 drop into both eyes 3 (three) times daily.     atorvastatin 10 MG tablet  Commonly known as: LIPITOR  Take 1 tablet (10 mg total) by mouth once daily.     carvediloL 25 MG tablet  Commonly known as: COREG  Take 1 tablet (25 mg total) by mouth 2 (two) times daily.     cholecalciferol (vitamin D3) 25 mcg (1,000 unit) capsule  Commonly known as: VITAMIN D3  Take 1 capsule (1,000 Units total) by mouth once daily.     ferrous sulfate 325 mg (65 mg iron) Tab tablet  Commonly known as: FEOSOL  Take 1 tablet (325 mg total) by mouth once daily.      fluticasone furoate-vilanteroL 200-25 mcg/dose Dsdv diskus inhaler  Commonly known as: BREO ELLIPTA  INHALE 1 PUFF INTO THE LUNGS ONCE DAILY. (CONTROLLER)     fluticasone propionate 50 mcg/actuation nasal spray  Commonly known as: FLONASE  USE 1 SPRAY IN EACH NOSTRIL ONE TIME DAILY     furosemide 40 MG tablet  Commonly known as: LASIX  Take 1 tablet (40 mg total) by mouth 2 (two) times daily before meals.     gabapentin 100 MG capsule  Commonly known as: NEURONTIN  Take 1 capsule (100 mg total) by mouth every evening.     insulin NPH-insulin regular (70/30) 100 unit/mL (70-30) injection  Commonly known as: NovoLIN 70/30 U-100 Insulin  Inject 14 Units into the skin before breakfast AND 12 Units before dinner. [id number] MINUTES BEFORE BREAKFAST AND DINNER.     insulin syringe-needle U-100 1 mL 30 gauge x 5/16 Syrg  Commonly known as: INSULIN SYRINGE  1 each by Misc.(Non-Drug; Combo Route) route 2 (two) times daily. Use twice daily as directed with insulin vials.     levothyroxine 75 MCG tablet  Commonly known as: SYNTHROID  Take 1 tablet (75 mcg total) by mouth once daily.     magnesium oxide 400 mg (241.3 mg magnesium) tablet  Commonly known as: MAG-OX  Take 0.5 tablets (200 mg total) by mouth 2 (two) times daily.     nitroGLYCERIN 0.4 MG SL tablet  Commonly known as: NITROSTAT  PLACE 1 TABLET UNDER THE TONGUE EVERY 5  MINUTES AS NEEDED FOR CHEST PAIN     polyethylene glycol 17 gram Pwpk  Commonly known as: GLYCOLAX  Take 17 g by mouth 2 (two) times daily as needed (Constipation).         * This list has 2 medication(s) that are the same as other medications prescribed for you. Read the directions carefully, and ask your doctor or other care provider to review them with you.                  Signing Physician:  Laurence Herrera NP

## 2020-11-09 LAB
ACID FAST MOD KINY STN SPEC: NORMAL
ACID FAST MOD KINY STN SPEC: NORMAL
MYCOBACTERIUM SPEC QL CULT: NORMAL
MYCOBACTERIUM SPEC QL CULT: NORMAL

## 2020-11-10 ENCOUNTER — EXTERNAL HOSPITAL ADMISSION (OUTPATIENT)
Dept: SKILLED NURSING FACILITY | Facility: HOSPITAL | Age: 65
End: 2020-11-10
Payer: MEDICARE

## 2020-11-10 DIAGNOSIS — Z99.81 CHRONIC RESPIRATORY FAILURE WITH HYPOXIA, ON HOME OXYGEN THERAPY: Primary | ICD-10-CM

## 2020-11-10 DIAGNOSIS — J96.11 CHRONIC RESPIRATORY FAILURE WITH HYPOXIA, ON HOME OXYGEN THERAPY: Primary | ICD-10-CM

## 2020-11-10 NOTE — PROGRESS NOTES
[facility]   Re-evaluate Visit  DOS 11/10/2020     PRESENTING HISTORY     Chief Complaint/Reason for Admission: re-evaluate for debility wounds to left foot and chronic diseases  PCP: Qiana Chow MD   Admission Date: 8/30/2020  Hospital Length of Stay: 40 days  Discharge Date and Time: 10/9/2020  3:05 PM    History of Present Illness:  Mr. Ed Patton is a 65 y.o. male who was recently  presented to the ED for recurrent falls.  At some point prior to this he found a tack imbedded in the bottom of his slipper which had punctured the sole of his left foot.  His left foot has become more swollen recently and he has had occasional twinges of pain in both of his legs. He says that he has been afraid to leave his house on account of the Coronavirus pandemic.  He has been getting home health services including physical therapy has been walking to his mailbox and back for exercise.   He was admitted to [facility] on 08/30 with cellulitis of left foot with concern for diabetic foot infection with subsequent development of MRSA bacteremia attributed to septic arthritis of right knee and elbow. He is status post drainage and coverage for MRSA since that time.S/p R knee scope I&D 9/7/20.  His work-up for other etiologies of MRSA bacteremia have included negative LUKAS and MRI of lumbar spine looking for endocarditis and spinal abscess, respectively. Stool studies for C.diff on 09/11 were negative. His hospital course has been associated with worsening hypotension and leukocytosis since 09/10 prompting ICU admission on 09/12 for initiation of vasopressor support. Infectious work-up thus far on ICU admission concern for possible right lower lobe HAP. However; CT abdomen pelvis on 9/15 with gallbladder dilation, sludge, and trace pericholic fluid collection. Exam not consistent with cholecystitis but given persistent shock considered a potential source. Also with potential right lower  lobe atelectasis with possible overlying infection. End date of IV abx: Daptomycin /vancomycin 10/7/2020 and Levofloxacin 9/30/2020.    Transferred to [facility] for SNF PT/OT.    ________________________________________________________    Today:  The patient is currently laying in bed with no acute distress. Alert and awake. Appears comfortable and has no complaints. Denies SOB, CP, pain, dysuria, chills. LCTAB with no edema.  Dressing to left foot dry and intact.   Per PT, he is ambulating with rolling walker, 30 feet. Per PT, he is independent with bed mobility, transfer with sliding board, and sit to stand with mod-max assist.         Review of Systems  General ROS: negative for chills, fever or weight loss  Psychological ROS: negative for hallucination, depression or suicidal ideation  Ophthalmic ROS: negative for blurry vision, photophobia or eye pain  ENT ROS: negative for epistaxis, sore throat or rhinorrhea  Respiratory ROS: no cough, shortness of breath, or wheezing  Cardiovascular ROS: no chest pain or dyspnea on exertion  Gastrointestinal ROS: no abdominal pain, change in bowel habits, or black/ bloody stools;   Genito-Urinary ROS: no dysuria, trouble voiding, or hematuria  Musculoskeletal ROS: + gait disturbance or muscular weakness  Neurological ROS: no syncope or seizures; no ataxia  Dermatological ROS: + wounds to LLE          PAST HISTORY:     Past Medical History:   Diagnosis Date    CHF (congestive heart failure)     COPD (chronic obstructive pulmonary disease)     Coronary artery disease     Diabetes mellitus     Diabetes mellitus type II     DM (diabetes mellitus) type II uncontrolled with renal manifestation 9/4/2013    Hyperlipidemia     Hypertension     Postablative hypothyroidism 12/6/2018       Past Surgical History:   Procedure Laterality Date    APPENDECTOMY      ARTHROSCOPY OF KNEE Right 9/7/2020    Procedure: ARTHROSCOPY, KNEE, RIGHT ;  Surgeon: You Bhatia MD;  Location: [facility]  OR 2ND FLR;  Service: Orthopedics;  Laterality: Right;    CHOLECYSTECTOMY      CORONARY ANGIOPLASTY WITH STENT PLACEMENT      TONSILLECTOMY      TRANSESOPHAGEAL ECHOCARDIOGRAPHY N/A 9/4/2020    Procedure: ECHOCARDIOGRAM, TRANSESOPHAGEAL;  Surgeon: [facility] Diagnostic Provider;  Location: [facility] EP LAB;  Service: Anesthesiology;  Laterality: N/A;    TRANSESOPHAGEAL ECHOCARDIOGRAPHY N/A 9/3/2020    Procedure: ECHOCARDIOGRAM, TRANSESOPHAGEAL;  Surgeon: Dorian Diagnostic Provider;  Location: [facility] EP LAB;  Service: Anesthesiology;  Laterality: N/A;       Family History   Problem Relation Age of Onset    Heart disease Mother     No Known Problems Father     No Known Problems Sister     Hypertension Son     No Known Problems Son     No Known Problems Son          MEDICATIONS & ALLERGIES:     Current Outpatient Medications on File Prior to Visit   Medication Sig Dispense Refill    ACCU-CHEK FASTCLIX LANCET DRUM Misc TEST FOUR TIMES DAILY (Patient taking differently: Test twice daily) 408 each 3    ACCU-CHEK RAMIREZ Misc USE AS DIRECTED 1 each 0    ACCU-CHEK SMARTVIEW TEST STRIP Strp TEST FOUR TIMES DAILY (Patient taking differently: Test twice daily) 400 strip 3    albuterol (PROVENTIL) 2.5 mg /3 mL (0.083 %) nebulizer solution INHALE THE CONTENTS OF 1 VIAL VIA NEBULIZER TWICE DAILY 360 mL 3    albuterol (PROVENTIL/VENTOLIN HFA) 90 mcg/actuation inhaler Inhale 2 puffs into the lungs every 6 (six) hours as needed for Wheezing or Shortness of Breath. Rescue      artificial tears (ISOPTO TEARS) 0.5 % ophthalmic solution Place 1 drop into both eyes 3 (three) times daily.      aspirin (ECOTRIN) 81 MG EC tablet Take 1 tablet (81 mg total) by mouth once daily. 90 tablet 3    atorvastatin (LIPITOR) 10 MG tablet Take 1 tablet (10 mg total) by mouth once daily.      carvedilol (COREG) 25 MG tablet Take 1 tablet (25 mg total) by mouth 2 (two) times daily. 180 tablet 3    cholecalciferol, vitamin D3, (VITAMIN D3) 25 mcg  (1,000 unit) capsule Take 1 capsule (1,000 Units total) by mouth once daily.      ferrous sulfate (FEOSOL) 325 mg (65 mg iron) Tab tablet Take 1 tablet (325 mg total) by mouth once daily.  0    fluticasone furoate-vilanterol (BREO ELLIPTA) 200-25 mcg/dose DsDv diskus inhaler INHALE 1 PUFF INTO THE LUNGS ONCE DAILY. (CONTROLLER) 180 each 3    fluticasone propionate (FLONASE) 50 mcg/actuation nasal spray USE 1 SPRAY IN EACH NOSTRIL ONE TIME DAILY 32 g 3    furosemide (LASIX) 40 MG tablet Take 1 tablet (40 mg total) by mouth 2 (two) times daily before meals.      gabapentin (NEURONTIN) 100 MG capsule Take 1 capsule (100 mg total) by mouth every evening. 90 capsule 3    insulin NPH-insulin regular, 70/30, (NOVOLIN 70/30 U-100 INSULIN) 100 unit/mL (70-30) injection Inject 14 Units into the skin before breakfast AND 12 Units before dinner. [id number] MINUTES BEFORE BREAKFAST AND DINNER. 20 mL 1    insulin syringe-needle U-100 (INSULIN SYRINGE) 1 mL 30 gauge x 5/16 Syrg 1 each by Misc.(Non-Drug; Combo Route) route 2 (two) times daily. Use twice daily as directed with insulin vials. 200 each 6    levothyroxine (SYNTHROID) 75 MCG tablet Take 1 tablet (75 mcg total) by mouth once daily. 90 tablet 3    magnesium oxide (MAG-OX) 400 mg (241.3 mg magnesium) tablet Take 0.5 tablets (200 mg total) by mouth 2 (two) times daily.  0    nitroGLYCERIN (NITROSTAT) 0.4 MG SL tablet PLACE 1 TABLET UNDER THE TONGUE EVERY 5  MINUTES AS NEEDED FOR CHEST PAIN 25 tablet 3    polyethylene glycol (GLYCOLAX) 17 gram PwPk Take 17 g by mouth 2 (two) times daily as needed (Constipation).  0     No current facility-administered medications on file prior to visit.         Review of patient's allergies indicates:   Allergen Reactions    Vicodin [hydrocodone-acetaminophen] Itching       OBJECTIVE:       Physical Exam:  General appearance: alert, cooperative, no distress  Constitutional:Oriented to person and year  + appears  well-developed and well-nourished.   HEENT: Normocephalic, atraumatic,  Eyes: conjunctivae/corneas clear, PERRL  Lungs: clear to auscultation bilaterally  Heart: regular rate and rhythm without rub  Abdomen: soft, non-tender; bowel sounds normoactive; + gallbladder drain with brown drainage and sediments  Extremities: extremities symmetric; no clubbing, cyanosis, no edema  Integument: Skin warm and dry to touch  Location: left hallux  Wound type: diabetic ulcer   Measurements: 0.7 x 0.4 cm   Wound bed: epithelial 100%  Odor: no   Drainage: no  Surrounding skin: normal  Wound edges: unattached    Location: left heel  Wound type: DTI  Measurements: 5.9 x 2.0 cm   Wound bed: purple 100%  Odor: no   Drainage: no  Surrounding skin: normal  Wound edges: unattached    Location: lateral left foot  Wound type: diabetic ulcer   Measurements: 3.8 x 1.6 x 0.1 cm   Wound bed: 100% slough  Odor: no   Drainage: no  Surrounding skin: normal  Wound edges: unattached      Location: dorsum left foot  Wound type: diabetic ulcer   Measurements: 6.4 x 3.6 x 0.1 cm   Wound bed: 100% slough  Odor: no   Drainage: no  Surrounding skin: normal  Wound edges: unattached    Location: dorsum left foot  Wound type: diabetic ulcer   Measurements: 6.5 x 5.6 cm   Wound bed: slough 20%, epithelial 80%  Odor: no   Drainage: light serosang  Surrounding skin: normal  Wound edges: attached  Neurologic: Alert and oriented X 2, +generalized weakness.   Psychiatric: no pressured speech; normal affect; no evidence of impaired cognition       ASSESSMENT & PLAN:     Septic shock  Septic arthritis of Right Knee  Sepsis due to MRSA  Acute on Chronic Cholecystitis  -He is status post drainage and coverage for MRSA   -His work-up for other etiologies of MRSA bacteremia have included negative LUKAS and MRI of lumbar spine looking for endocarditis and spinal abscess, respectively. Stool studies for C.diff on 09/11 were negative.   - Infectious work-up thus far on ICU  admission concern for possible right lower lobe HAP. However; CT abdomen pelvis on 9/15 with gallbladder dilation, sludge, and trace pericholic fluid collection.   - End date of IV antibiotics:Daptomycin /vancomycin 10/7/2020   and Levofloxacin 9/30/2020  - Follow up with ID and Gen Surg     Uncontrolled type 2 diabetes mellitus with hyperglycemia, with long-term current use of insulin  Diabetic polyneuropathy associated with type 2 diabetes mellitus  -continue with current regimen  - Monitor for hyperglycemia/hypoglycemia.        Postablative hypothyroidism  Continue home SYNTHROID     Acute kidney injury  -likely ATN from Shock s/p requiring RRT in ICU  -trialysis line removed   -pt is non-oliguric  -Nephrology recommends ambulatory f/u on discharge     Chronic systolic congestive heart failure  -Most recent ECHO in 09/2020 with EF of 25%.   -Unclear if ischemic vs non-ischemic.   -on Carvedilol and lasix  -Associated with chronic hypoxemic respiratory failure requiring 2L nasal cannula     Anemia       -Chronic, no s/s of bleeding       -on ferrous sulfate 325 mg daily       Acute on chronic respiratory failure with hypoxia  Chronic respiratory failure with hypoxia, on home oxygen therapy  On 2 L nasal cannula chronically due to combination of COPD and CHF; titrate as needed.  -History of mixed COPD (based on PFT's from 05/2015 showing mild (small airways) obstruction, airflow not improved after bronchodilator, and moderate restriction) and HFrEF with chronic respiratory failure on 2L home oxygen.        Diabetic foot infection  Bacteremia due to methicillin resistant Staphylococcus aureus  Diabetic ulcer of left foot associated with type 2 diabetes mellitus, with fat layer exposed  Right knee pain / septic arthritis  Arthrocentesis and cultures suggest septic knee. LUKAS to evaluate for endocarditis negative for vegetations.  -completed daptomycin and ceftaroline   -- s/p R knee scope I&D 9/7/20. PT/OT:  [facility]  --  Podiatry DC Instructions:  Patient is to follow up with Podiatry within 10 days of discharge. Discontinue wrapping to L foot as it may have irritated L dorsal foot. Betadine to R heel bulla abnd all escharred wounds. Aquacel Silver to L dorsal and plantar wounds, covered with bordered foam.        Cellulitis of left lower extremity - resolved         Scheduled Follow-up :  Future Appointments   Date Time Provider Department Center   11/16/2020 10:00 AM Rigoberto Pandya MD [facility]       Post Visit Medication List:     Medication List          Accurate as of November 10, 2020  1:27 PM. If you have any questions, ask your nurse or doctor.            CHANGE how you take these medications    ACCU-CHEK FASTCLIX LANCET DRUM Misc  Generic drug: lancets  TEST FOUR TIMES DAILY  What changed:   · when to take this  · additional instructions     ACCU-CHEK SMARTVIEW TEST STRIP Strp  Generic drug: blood sugar diagnostic  TEST FOUR TIMES DAILY  What changed:   · when to take this  · additional instructions        CONTINUE taking these medications    ACCU-CHEK RAMIREZ Misc  Generic drug: blood-glucose meter  USE AS DIRECTED     * albuterol 90 mcg/actuation inhaler  Commonly known as: PROVENTIL/VENTOLIN HFA     * albuterol 2.5 mg /3 mL (0.083 %) nebulizer solution  Commonly known as: PROVENTIL  INHALE THE CONTENTS OF 1 VIAL VIA NEBULIZER TWICE DAILY     artificial tears 0.5 % ophthalmic solution  Commonly known as: ISOPTO TEARS  Place 1 drop into both eyes 3 (three) times daily.     aspirin 81 MG EC tablet  Commonly known as: ECOTRIN  Take 1 tablet (81 mg total) by mouth once daily.     atorvastatin 10 MG tablet  Commonly known as: LIPITOR  Take 1 tablet (10 mg total) by mouth once daily.     carvediloL 25 MG tablet  Commonly known as: COREG  Take 1 tablet (25 mg total) by mouth 2 (two) times daily.     cholecalciferol (vitamin D3) 25 mcg (1,000 unit) capsule  Commonly known as: VITAMIN D3  Take 1 capsule (1,000 Units  total) by mouth once daily.     ferrous sulfate 325 mg (65 mg iron) Tab tablet  Commonly known as: FEOSOL  Take 1 tablet (325 mg total) by mouth once daily.     fluticasone furoate-vilanteroL 200-25 mcg/dose Dsdv diskus inhaler  Commonly known as: BREO ELLIPTA  INHALE 1 PUFF INTO THE LUNGS ONCE DAILY. (CONTROLLER)     fluticasone propionate 50 mcg/actuation nasal spray  Commonly known as: FLONASE  USE 1 SPRAY IN EACH NOSTRIL ONE TIME DAILY     furosemide 40 MG tablet  Commonly known as: LASIX  Take 1 tablet (40 mg total) by mouth 2 (two) times daily before meals.     gabapentin 100 MG capsule  Commonly known as: NEURONTIN  Take 1 capsule (100 mg total) by mouth every evening.     insulin NPH-insulin regular (70/30) 100 unit/mL (70-30) injection  Commonly known as: NovoLIN 70/30 U-100 Insulin  Inject 14 Units into the skin before breakfast AND 12 Units before dinner. [id number] MINUTES BEFORE BREAKFAST AND DINNER.     insulin syringe-needle U-100 1 mL 30 gauge x 5/16 Syrg  Commonly known as: INSULIN SYRINGE  1 each by Misc.(Non-Drug; Combo Route) route 2 (two) times daily. Use twice daily as directed with insulin vials.     levothyroxine 75 MCG tablet  Commonly known as: SYNTHROID  Take 1 tablet (75 mcg total) by mouth once daily.     magnesium oxide 400 mg (241.3 mg magnesium) tablet  Commonly known as: MAG-OX  Take 0.5 tablets (200 mg total) by mouth 2 (two) times daily.     nitroGLYCERIN 0.4 MG SL tablet  Commonly known as: NITROSTAT  PLACE 1 TABLET UNDER THE TONGUE EVERY 5  MINUTES AS NEEDED FOR CHEST PAIN     polyethylene glycol 17 gram Pwpk  Commonly known as: GLYCOLAX  Take 17 g by mouth 2 (two) times daily as needed (Constipation).         * This list has 2 medication(s) that are the same as other medications prescribed for you. Read the directions carefully, and ask your doctor or other care provider to review them with you.                  Signing Physician:  Laurence Herrera NP

## 2020-11-12 ENCOUNTER — EXTERNAL HOSPITAL ADMISSION (OUTPATIENT)
Dept: SKILLED NURSING FACILITY | Facility: HOSPITAL | Age: 65
End: 2020-11-12
Payer: MEDICARE

## 2020-11-12 DIAGNOSIS — J96.11 CHRONIC RESPIRATORY FAILURE WITH HYPOXIA, ON HOME OXYGEN THERAPY: Primary | ICD-10-CM

## 2020-11-12 DIAGNOSIS — Z99.81 CHRONIC RESPIRATORY FAILURE WITH HYPOXIA, ON HOME OXYGEN THERAPY: Primary | ICD-10-CM

## 2020-11-12 NOTE — PROGRESS NOTES
[facility]   Re-evaluate Visit  DOS 11/12/2020     PRESENTING HISTORY     Chief Complaint/Reason for Admission: re-evaluate for debility wounds to left foot and chronic diseases  PCP: Qiana Chow MD   Admission Date: 8/30/2020  Hospital Length of Stay: 40 days  Discharge Date and Time: 10/9/2020  3:05 PM    History of Present Illness:  Mr. Ed Patton is a 65 y.o. male who was recently  presented to the ED for recurrent falls.  At some point prior to this he found a tack imbedded in the bottom of his slipper which had punctured the sole of his left foot.  His left foot has become more swollen recently and he has had occasional twinges of pain in both of his legs. He says that he has been afraid to leave his house on account of the Coronavirus pandemic.  He has been getting home health services including physical therapy has been walking to his mailbox and back for exercise.   He was admitted to [facility] on 08/30 with cellulitis of left foot with concern for diabetic foot infection with subsequent development of MRSA bacteremia attributed to septic arthritis of right knee and elbow. He is status post drainage and coverage for MRSA since that time.S/p R knee scope I&D 9/7/20.  His work-up for other etiologies of MRSA bacteremia have included negative LUKAS and MRI of lumbar spine looking for endocarditis and spinal abscess, respectively. Stool studies for C.diff on 09/11 were negative. His hospital course has been associated with worsening hypotension and leukocytosis since 09/10 prompting ICU admission on 09/12 for initiation of vasopressor support. Infectious work-up thus far on ICU admission concern for possible right lower lobe HAP. However; CT abdomen pelvis on 9/15 with gallbladder dilation, sludge, and trace pericholic fluid collection. Exam not consistent with cholecystitis but given persistent shock considered a potential source. Also with potential right lower  lobe atelectasis with possible overlying infection. End date of IV abx: Daptomycin /vancomycin 10/7/2020 and Levofloxacin 9/30/2020.    Transferred to [facility] for SNF PT/OT.    ________________________________________________________    Today:  The patient is currently sitting up in the wheelchair with no acute distress. Alert and awake. Appears comfortable and has no complaints. No report of acute changes.  LCTAB with no edema.  Dressing to left foot dry and intact.   Per PT, he is ambulating with rolling walker, 30 feet. Per PT, he is independent with bed mobility, transfer with sliding board, and sit to stand with mod-max assist.   Per , he will transition to [facility] care after completing skilled therapy.       Review of Systems  General ROS: negative for chills, fever or weight loss  Psychological ROS: negative for hallucination, depression or suicidal ideation  Ophthalmic ROS: negative for blurry vision, photophobia or eye pain  ENT ROS: negative for epistaxis, sore throat or rhinorrhea  Respiratory ROS: no cough, shortness of breath, or wheezing  Cardiovascular ROS: no chest pain or dyspnea on exertion  Gastrointestinal ROS: no abdominal pain, change in bowel habits, or black/ bloody stools;   Genito-Urinary ROS: no dysuria, trouble voiding, or hematuria  Musculoskeletal ROS: + gait disturbance or muscular weakness  Neurological ROS: no syncope or seizures; no ataxia  Dermatological ROS: + wounds to LLE          PAST HISTORY:     Past Medical History:   Diagnosis Date    CHF (congestive heart failure)     COPD (chronic obstructive pulmonary disease)     Coronary artery disease     Diabetes mellitus     Diabetes mellitus type II     DM (diabetes mellitus) type II uncontrolled with renal manifestation 9/4/2013    Hyperlipidemia     Hypertension     Postablative hypothyroidism 12/6/2018       Past Surgical History:   Procedure Laterality Date    APPENDECTOMY      ARTHROSCOPY OF KNEE Right  9/7/2020    Procedure: ARTHROSCOPY, KNEE, RIGHT ;  Surgeon: You Bhatia MD;  Location: [facility] OR [address] [place] [address];  Service: Orthopedics;  Laterality: Right;    CHOLECYSTECTOMY      CORONARY ANGIOPLASTY WITH STENT PLACEMENT      TONSILLECTOMY      TRANSESOPHAGEAL ECHOCARDIOGRAPHY N/A 9/4/2020    Procedure: ECHOCARDIOGRAM, TRANSESOPHAGEAL;  Surgeon: [facility] Diagnostic Provider;  Location: [facility] EP LAB;  Service: Anesthesiology;  Laterality: N/A;    TRANSESOPHAGEAL ECHOCARDIOGRAPHY N/A 9/3/2020    Procedure: ECHOCARDIOGRAM, TRANSESOPHAGEAL;  Surgeon: [facility] Diagnostic Provider;  Location: [facility] EP LAB;  Service: Anesthesiology;  Laterality: N/A;       Family History   Problem Relation Age of Onset    Heart disease Mother     No Known Problems Father     No Known Problems Sister     Hypertension Son     No Known Problems Son     No Known Problems Son          MEDICATIONS & ALLERGIES:     Current Outpatient Medications on File Prior to Visit   Medication Sig Dispense Refill    ACCU-CHEK FASTCLIX LANCET DRUM Misc TEST FOUR TIMES DAILY (Patient taking differently: Test twice daily) 408 each 3    ACCU-CHEK RAMIREZ Misc USE AS DIRECTED 1 each 0    ACCU-CHEK SMARTVIEW TEST STRIP Strp TEST FOUR TIMES DAILY (Patient taking differently: Test twice daily) 400 strip 3    albuterol (PROVENTIL) 2.5 mg /3 mL (0.083 %) nebulizer solution INHALE THE CONTENTS OF 1 VIAL VIA NEBULIZER TWICE DAILY 360 mL 3    albuterol (PROVENTIL/VENTOLIN HFA) 90 mcg/actuation inhaler Inhale 2 puffs into the lungs every 6 (six) hours as needed for Wheezing or Shortness of Breath. Rescue      artificial tears (ISOPTO TEARS) 0.5 % ophthalmic solution Place 1 drop into both eyes 3 (three) times daily.      aspirin (ECOTRIN) 81 MG EC tablet Take 1 tablet (81 mg total) by mouth once daily. 90 tablet 3    atorvastatin (LIPITOR) 10 MG tablet Take 1 tablet (10 mg total) by mouth once daily.      carvedilol (COREG) 25 MG tablet Take 1 tablet (25 mg total) by  mouth 2 (two) times daily. 180 tablet 3    cholecalciferol, vitamin D3, (VITAMIN D3) 25 mcg (1,000 unit) capsule Take 1 capsule (1,000 Units total) by mouth once daily.      ferrous sulfate (FEOSOL) 325 mg (65 mg iron) Tab tablet Take 1 tablet (325 mg total) by mouth once daily.  0    fluticasone furoate-vilanterol (BREO ELLIPTA) 200-25 mcg/dose DsDv diskus inhaler INHALE 1 PUFF INTO THE LUNGS ONCE DAILY. (CONTROLLER) 180 each 3    fluticasone propionate (FLONASE) 50 mcg/actuation nasal spray USE 1 SPRAY IN EACH NOSTRIL ONE TIME DAILY 32 g 3    furosemide (LASIX) 40 MG tablet Take 1 tablet (40 mg total) by mouth 2 (two) times daily before meals.      gabapentin (NEURONTIN) 100 MG capsule Take 1 capsule (100 mg total) by mouth every evening. 90 capsule 3    insulin NPH-insulin regular, 70/30, (NOVOLIN 70/30 U-100 INSULIN) 100 unit/mL (70-30) injection Inject 14 Units into the skin before breakfast AND 12 Units before dinner. [id number] MINUTES BEFORE BREAKFAST AND DINNER. 20 mL 1    insulin syringe-needle U-100 (INSULIN SYRINGE) 1 mL 30 gauge x 5/16 Syrg 1 each by Misc.(Non-Drug; Combo Route) route 2 (two) times daily. Use twice daily as directed with insulin vials. 200 each 6    levothyroxine (SYNTHROID) 75 MCG tablet Take 1 tablet (75 mcg total) by mouth once daily. 90 tablet 3    magnesium oxide (MAG-OX) 400 mg (241.3 mg magnesium) tablet Take 0.5 tablets (200 mg total) by mouth 2 (two) times daily.  0    nitroGLYCERIN (NITROSTAT) 0.4 MG SL tablet PLACE 1 TABLET UNDER THE TONGUE EVERY 5  MINUTES AS NEEDED FOR CHEST PAIN 25 tablet 3    polyethylene glycol (GLYCOLAX) 17 gram PwPk Take 17 g by mouth 2 (two) times daily as needed (Constipation).  0     No current facility-administered medications on file prior to visit.         Review of patient's allergies indicates:   Allergen Reactions    Vicodin [hydrocodone-acetaminophen] Itching       OBJECTIVE:       Physical Exam:  General appearance: alert,  cooperative, no distress  Constitutional:Oriented to person and year  + appears well-developed and well-nourished.   HEENT: Normocephalic, atraumatic,  Eyes: conjunctivae/corneas clear, PERRL  Lungs: clear to auscultation bilaterally  Heart: regular rate and rhythm without rub  Abdomen: soft, non-tender; bowel sounds normoactive  Extremities: extremities symmetric; no clubbing, cyanosis, no edema  Integument: Skin warm and dry to touch  Location: left hallux  Wound type: diabetic ulcer   Measurements: 0.7 x 0.4 cm   Wound bed: epithelial 100%  Odor: no   Drainage: no  Surrounding skin: normal  Wound edges: unattached    Location: left heel  Wound type: DTI  Measurements: 5.9 x 2.0 cm   Wound bed: purple 100%  Odor: no   Drainage: no  Surrounding skin: normal  Wound edges: unattached    Location: lateral left foot  Wound type: diabetic ulcer   Measurements: 3.8 x 1.6 x 0.1 cm   Wound bed: 100% slough  Odor: no   Drainage: no  Surrounding skin: normal  Wound edges: unattached      Location: dorsum left foot  Wound type: diabetic ulcer   Measurements: 6.4 x 3.6 x 0.1 cm   Wound bed: 100% slough  Odor: no   Drainage: no  Surrounding skin: normal  Wound edges: unattached    Location: dorsum left foot  Wound type: diabetic ulcer   Measurements: 6.5 x 5.6 cm   Wound bed: slough 20%, epithelial 80%  Odor: no   Drainage: light serosang  Surrounding skin: normal  Wound edges: attached  Neurologic: Alert and oriented X 2, +generalized weakness.   Psychiatric: no pressured speech; normal affect; no evidence of impaired cognition       ASSESSMENT & PLAN:     Septic shock  Septic arthritis of Right Knee  Sepsis due to MRSA  Acute on Chronic Cholecystitis  -He is status post drainage and coverage for MRSA   -His work-up for other etiologies of MRSA bacteremia have included negative LUKAS and MRI of lumbar spine looking for endocarditis and spinal abscess, respectively. Stool studies for C.diff on 09/11 were negative.   - Infectious  work-up thus far on ICU admission concern for possible right lower lobe HAP. However; CT abdomen pelvis on 9/15 with gallbladder dilation, sludge, and trace pericholic fluid collection.   - End date of IV antibiotics:Daptomycin /vancomycin 10/7/2020   and Levofloxacin 9/30/2020  - Follow up with ID and Gen Surg     Uncontrolled type 2 diabetes mellitus with hyperglycemia, with long-term current use of insulin  Diabetic polyneuropathy associated with type 2 diabetes mellitus  -continue with current regimen  - Monitor for hyperglycemia/hypoglycemia.        Postablative hypothyroidism  Continue home SYNTHROID     Acute kidney injury  -likely ATN from Shock s/p requiring RRT in ICU  -trialysis line removed   -pt is non-oliguric  -Nephrology recommends ambulatory f/u on discharge     Chronic systolic congestive heart failure  -Most recent ECHO in 09/2020 with EF of 25%.   -Unclear if ischemic vs non-ischemic.   -on Carvedilol and lasix  -Associated with chronic hypoxemic respiratory failure requiring 2L nasal cannula     Anemia       -Chronic, no s/s of bleeding       -on ferrous sulfate 325 mg daily       Acute on chronic respiratory failure with hypoxia  Chronic respiratory failure with hypoxia, on home oxygen therapy  On 2 L nasal cannula chronically due to combination of COPD and CHF; titrate as needed.  -History of mixed COPD (based on PFT's from 05/2015 showing mild (small airways) obstruction, airflow not improved after bronchodilator, and moderate restriction) and HFrEF with chronic respiratory failure on 2L home oxygen.        Diabetic foot infection  Bacteremia due to methicillin resistant Staphylococcus aureus  Diabetic ulcer of left foot associated with type 2 diabetes mellitus, with fat layer exposed  Right knee pain / septic arthritis  Arthrocentesis and cultures suggest septic knee. LUKAS to evaluate for endocarditis negative for vegetations.  -completed daptomycin and ceftaroline   -- s/p R knee scope I&D  9/7/20. PT/OT:  [facility]  -- Podiatry DC Instructions:  Patient is to follow up with Podiatry within 10 days of discharge. Discontinue wrapping to L foot as it may have irritated L dorsal foot. Betadine to R heel bulla abnd all escharred wounds. Aquacel Silver to L dorsal and plantar wounds, covered with bordered foam.        Cellulitis of left lower extremity - resolved         Scheduled Follow-up :  Future Appointments   Date Time Provider Department Center   11/16/2020 10:00 AM [facility]       Post Visit Medication List:     Medication List          Accurate as of November 12, 2020 12:53 PM. If you have any questions, ask your nurse or doctor.            CHANGE how you take these medications    ACCU-CHEK FASTCLIX LANCET DRUM Misc  Generic drug: lancets  TEST FOUR TIMES DAILY  What changed:   · when to take this  · additional instructions     ACCU-CHEK SMARTVIEW TEST STRIP Strp  Generic drug: blood sugar diagnostic  TEST FOUR TIMES DAILY  What changed:   · when to take this  · additional instructions        CONTINUE taking these medications    ACCU-CHEK RAMIREZ Misc  Generic drug: blood-glucose meter  USE AS DIRECTED     * albuterol 90 mcg/actuation inhaler  Commonly known as: PROVENTIL/VENTOLIN HFA     * albuterol 2.5 mg /3 mL (0.083 %) nebulizer solution  Commonly known as: PROVENTIL  INHALE THE CONTENTS OF 1 VIAL VIA NEBULIZER TWICE DAILY     artificial tears 0.5 % ophthalmic solution  Commonly known as: ISOPTO TEARS  Place 1 drop into both eyes 3 (three) times daily.     aspirin 81 MG EC tablet  Commonly known as: ECOTRIN  Take 1 tablet (81 mg total) by mouth once daily.     atorvastatin 10 MG tablet  Commonly known as: LIPITOR  Take 1 tablet (10 mg total) by mouth once daily.     carvediloL 25 MG tablet  Commonly known as: COREG  Take 1 tablet (25 mg total) by mouth 2 (two) times daily.     cholecalciferol (vitamin D3) 25 mcg (1,000 unit) capsule  Commonly known as: VITAMIN D3  Take 1  capsule (1,000 Units total) by mouth once daily.     ferrous sulfate 325 mg (65 mg iron) Tab tablet  Commonly known as: FEOSOL  Take 1 tablet (325 mg total) by mouth once daily.     fluticasone furoate-vilanteroL 200-25 mcg/dose Dsdv diskus inhaler  Commonly known as: BREO ELLIPTA  INHALE 1 PUFF INTO THE LUNGS ONCE DAILY. (CONTROLLER)     fluticasone propionate 50 mcg/actuation nasal spray  Commonly known as: FLONASE  USE 1 SPRAY IN EACH NOSTRIL ONE TIME DAILY     furosemide 40 MG tablet  Commonly known as: LASIX  Take 1 tablet (40 mg total) by mouth 2 (two) times daily before meals.     gabapentin 100 MG capsule  Commonly known as: NEURONTIN  Take 1 capsule (100 mg total) by mouth every evening.     insulin NPH-insulin regular (70/30) 100 unit/mL (70-30) injection  Commonly known as: NovoLIN 70/30 U-100 Insulin  Inject 14 Units into the skin before breakfast AND 12 Units before dinner. [id number] MINUTES BEFORE BREAKFAST AND DINNER.     insulin syringe-needle U-100 1 mL 30 gauge x 5/16 Syrg  Commonly known as: INSULIN SYRINGE  1 each by Misc.(Non-Drug; Combo Route) route 2 (two) times daily. Use twice daily as directed with insulin vials.     levothyroxine 75 MCG tablet  Commonly known as: SYNTHROID  Take 1 tablet (75 mcg total) by mouth once daily.     magnesium oxide 400 mg (241.3 mg magnesium) tablet  Commonly known as: MAG-OX  Take 0.5 tablets (200 mg total) by mouth 2 (two) times daily.     nitroGLYCERIN 0.4 MG SL tablet  Commonly known as: NITROSTAT  PLACE 1 TABLET UNDER THE TONGUE EVERY 5  MINUTES AS NEEDED FOR CHEST PAIN     polyethylene glycol 17 gram Pwpk  Commonly known as: GLYCOLAX  Take 17 g by mouth 2 (two) times daily as needed (Constipation).         * This list has 2 medication(s) that are the same as other medications prescribed for you. Read the directions carefully, and ask your doctor or other care provider to review them with you.                  Signing Physician:  Laurence Herrera NP

## 2020-11-16 ENCOUNTER — OFFICE VISIT (OUTPATIENT)
Dept: SURGERY | Facility: CLINIC | Age: 65
End: 2020-11-16
Payer: MEDICARE

## 2020-11-16 VITALS
TEMPERATURE: 98 F | HEART RATE: 111 BPM | HEIGHT: 76 IN | WEIGHT: 250.5 LBS | BODY MASS INDEX: 30.5 KG/M2 | DIASTOLIC BLOOD PRESSURE: 74 MMHG | SYSTOLIC BLOOD PRESSURE: 130 MMHG

## 2020-11-16 DIAGNOSIS — K81.9 ACALCULOUS CHOLECYSTITIS: Primary | ICD-10-CM

## 2020-11-16 PROCEDURE — 99214 PR OFFICE/OUTPT VISIT, EST, LEVL IV, 30-39 MIN: ICD-10-PCS | Mod: HCNC,S$GLB,, | Performed by: SURGERY

## 2020-11-16 PROCEDURE — 3078F DIAST BP <80 MM HG: CPT | Mod: HCNC,CPTII,S$GLB, | Performed by: SURGERY

## 2020-11-16 PROCEDURE — 1126F AMNT PAIN NOTED NONE PRSNT: CPT | Mod: HCNC,S$GLB,, | Performed by: SURGERY

## 2020-11-16 PROCEDURE — 3075F SYST BP GE 130 - 139MM HG: CPT | Mod: HCNC,CPTII,S$GLB, | Performed by: SURGERY

## 2020-11-16 PROCEDURE — 1126F PR PAIN SEVERITY QUANTIFIED, NO PAIN PRESENT: ICD-10-PCS | Mod: HCNC,S$GLB,, | Performed by: SURGERY

## 2020-11-16 PROCEDURE — 3008F PR BODY MASS INDEX (BMI) DOCUMENTED: ICD-10-PCS | Mod: HCNC,CPTII,S$GLB, | Performed by: SURGERY

## 2020-11-16 PROCEDURE — 3075F PR MOST RECENT SYSTOLIC BLOOD PRESS GE 130-139MM HG: ICD-10-PCS | Mod: HCNC,CPTII,S$GLB, | Performed by: SURGERY

## 2020-11-16 PROCEDURE — 3078F PR MOST RECENT DIASTOLIC BLOOD PRESSURE < 80 MM HG: ICD-10-PCS | Mod: HCNC,CPTII,S$GLB, | Performed by: SURGERY

## 2020-11-16 PROCEDURE — 99999 PR PBB SHADOW E&M-EST. PATIENT-LVL III: CPT | Mod: PBBFAC,HCNC,, | Performed by: SURGERY

## 2020-11-16 PROCEDURE — 99999 PR PBB SHADOW E&M-EST. PATIENT-LVL III: ICD-10-PCS | Mod: PBBFAC,HCNC,, | Performed by: SURGERY

## 2020-11-16 PROCEDURE — 3008F BODY MASS INDEX DOCD: CPT | Mod: HCNC,CPTII,S$GLB, | Performed by: SURGERY

## 2020-11-16 PROCEDURE — 99214 OFFICE O/P EST MOD 30 MIN: CPT | Mod: HCNC,S$GLB,, | Performed by: SURGERY

## 2020-11-16 NOTE — PROGRESS NOTES
General Surgery Office Visit   History and Physical    Patient Name: Ed Patton  YOB: 1955 (65 y.o.)  MRN: [id number]  Today's Date: 11/16/2020    Referring Md:   No referring provider defined for this encounter.    SUBJECTIVE:     Chief Complaint: follow up Cholecystostomy tube    Interval Hx:  Per pt, perc silvia tube has been clamped since last clinic visit 10/27. He is eating and not having any issues or RUQ pain.    History of Present Illness:  Patient is a 65 y.o. male that was hospitalized in September 2020 for septic shock related to infected hardware from a knee replacement.  He was treated for MSSA bacteremia.  During his hospitalization he developed acalculous cholecystitis.  General surgery was consulted and he underwent IR placement of a cholecystostomy tube on 09/16/2020.  He is here today for 6 week follow-up of the cholecystostomy tube.  He is currently admitted at a nursing home.  He presents today with a nursing aide that knows nothing about his care.  His nursing home was called, where I spoke with the nurse about his biliary output.  She reports that it has been minimal daily, nothing to accurately measure.  Patient denies any fever, chills, abdominal pain, nausea, vomiting.  Tolerating regular diet.  Did not have a cholecystostomy tube study prior to this visit.          Review of patient's allergies indicates:   Allergen Reactions    Vicodin [hydrocodone-acetaminophen] Itching       Past Medical History:   Diagnosis Date    CHF (congestive heart failure)     COPD (chronic obstructive pulmonary disease)     Coronary artery disease     Diabetes mellitus     Diabetes mellitus type II     DM (diabetes mellitus) type II uncontrolled with renal manifestation 9/4/2013    Hyperlipidemia     Hypertension     Postablative hypothyroidism 12/6/2018     Past Surgical History:   Procedure Laterality Date    APPENDECTOMY      ARTHROSCOPY OF KNEE Right 9/7/2020    Procedure:  "ARTHROSCOPY, KNEE, RIGHT ;  Surgeon: You Bhatia MD;  Location: [facility] OR [address] [place] [address];  Service: Orthopedics;  Laterality: Right;    CHOLECYSTECTOMY      CORONARY ANGIOPLASTY WITH STENT PLACEMENT      TONSILLECTOMY      TRANSESOPHAGEAL ECHOCARDIOGRAPHY N/A 2020    Procedure: ECHOCARDIOGRAM, TRANSESOPHAGEAL;  Surgeon: [facility] Diagnostic Provider;  Location: [facility] EP LAB;  Service: Anesthesiology;  Laterality: N/A;    TRANSESOPHAGEAL ECHOCARDIOGRAPHY N/A 9/3/2020    Procedure: ECHOCARDIOGRAM, TRANSESOPHAGEAL;  Surgeon: [facility] Diagnostic Provider;  Location: [facility] EP LAB;  Service: Anesthesiology;  Laterality: N/A;     Family History   Problem Relation Age of Onset    Heart disease Mother     No Known Problems Father     No Known Problems Sister     Hypertension Son     No Known Problems Son     No Known Problems Son      Social History     Tobacco Use    Smoking status: Former Smoker     Packs/day: 0.01     Years: 3.00     Pack years: 0.03     Types: Cigarettes     Quit date: 1995     Years since quittin.5    Smokeless tobacco: Never Used   Substance Use Topics    Alcohol use: No    Drug use: No        Review of Systems:  Review of Systems   Constitutional: Negative for chills, fever, malaise/fatigue and weight loss.   Respiratory: Negative for cough and shortness of breath.    Cardiovascular: Negative for chest pain, palpitations and leg swelling.   Gastrointestinal: Negative for abdominal pain, constipation, diarrhea, nausea and vomiting.   Musculoskeletal: Negative for myalgias.   Neurological: Negative for dizziness and headaches.       OBJECTIVE:     Vital Signs (Most Recent)  /74 (BP Location: Left arm, Patient Position: Sitting)   Pulse (!) 111   Temp 98.2 °F (36.8 °C)   Ht 6' 4" (1.93 m)   Wt 113.6 kg (250 lb 8 oz)   BMI 30.49 kg/m²     Physical Exam:  General: Black or  male in no distress   Neuro: alert and oriented x 4.  Moves all extremities.     HEENT: no " icterus.  Trachea midline  Respiratory: respirations are even and unlabored  Cardiac: regular rate  Abdomen:  Right lower quadrant IR drain in place, clamped  Extremities: Warm dry and intact  Skin: no rashes    Labs:  None    Imaging:  None      ASSESSMENT/PLAN:     Ed Patton is a 65 y.o. male that underwent cholecystostomy tube placement for acalculous cholecystitis on 09/16/2020. He presents today for 6 week follow-up.    - removed perc silvia tube in clinic  - f/u prn

## 2020-11-17 ENCOUNTER — EXTERNAL HOSPITAL ADMISSION (OUTPATIENT)
Dept: SKILLED NURSING FACILITY | Facility: HOSPITAL | Age: 65
End: 2020-11-17
Payer: MEDICARE

## 2020-11-17 DIAGNOSIS — J96.11 CHRONIC RESPIRATORY FAILURE WITH HYPOXIA, ON HOME OXYGEN THERAPY: Primary | ICD-10-CM

## 2020-11-17 DIAGNOSIS — Z99.81 CHRONIC RESPIRATORY FAILURE WITH HYPOXIA, ON HOME OXYGEN THERAPY: Primary | ICD-10-CM

## 2020-11-17 RX ORDER — FUROSEMIDE 40 MG/1
80 TABLET ORAL
Qty: 120 TABLET | Refills: 11 | Status: ON HOLD
Start: 2020-11-17 | End: 2020-12-31 | Stop reason: HOSPADM

## 2020-11-18 NOTE — PROGRESS NOTES
[facility]   Re-evaluate Visit  DOS 11/17/2020     PRESENTING HISTORY     Chief Complaint/Reason for Admission: re-evaluate for debility, wounds to left foot and chronic diseases  PCP: Qiana Chow MD   Admission Date: 8/30/2020  Hospital Length of Stay: 40 days  Discharge Date and Time: 10/9/2020  3:05 PM    History of Present Illness:  Mr. Ed Patton is a 65 y.o. male who was recently  presented to the ED for recurrent falls.  At some point prior to this he found a tack imbedded in the bottom of his slipper which had punctured the sole of his left foot.  His left foot has become more swollen recently and he has had occasional twinges of pain in both of his legs. He says that he has been afraid to leave his house on account of the Coronavirus pandemic.  He has been getting home health services including physical therapy has been walking to his mailbox and back for exercise.   He was admitted to [facility] on 08/30 with cellulitis of left foot with concern for diabetic foot infection with subsequent development of MRSA bacteremia attributed to septic arthritis of right knee and elbow. He is status post drainage and coverage for MRSA since that time.S/p R knee scope I&D 9/7/20.  His work-up for other etiologies of MRSA bacteremia have included negative LUKAS and MRI of lumbar spine looking for endocarditis and spinal abscess, respectively. Stool studies for C.diff on 09/11 were negative. His hospital course has been associated with worsening hypotension and leukocytosis since 09/10 prompting ICU admission on 09/12 for initiation of vasopressor support. Infectious work-up thus far on ICU admission concern for possible right lower lobe HAP. However; CT abdomen pelvis on 9/15 with gallbladder dilation, sludge, and trace pericholic fluid collection. Exam not consistent with cholecystitis but given persistent shock considered a potential source. Also with potential right lower  lobe atelectasis with possible overlying infection. End date of IV abx: Daptomycin /vancomycin 10/7/2020 and Levofloxacin 9/30/2020.    Transferred to [facility] for SNF PT/OT.    ________________________________________________________    Today:  The patient is currently sitting up in the wheelchair with no acute distress. Appears comfortable and has no complaints. No report of acute changes. Pleasantly confused.  LCTAB with no edema.  Dressing to left foot dry and intact. Wounds stable.   Per PT, he is ambulating 80 feet with r/w and contact guard. Per PT, he is independent with bed mobility, transfer with sliding board, and sit to stand with mod assist.     Review of Systems  General ROS: negative for chills, fever or weight loss  Psychological ROS: negative for hallucination, depression or suicidal ideation  Ophthalmic ROS: negative for blurry vision, photophobia or eye pain  ENT ROS: negative for epistaxis, sore throat or rhinorrhea  Respiratory ROS: no cough, shortness of breath, or wheezing  Cardiovascular ROS: no chest pain or dyspnea on exertion  Gastrointestinal ROS: no abdominal pain, change in bowel habits, or black/ bloody stools;   Genito-Urinary ROS: no dysuria, trouble voiding, or hematuria  Musculoskeletal ROS: + gait disturbance or muscular weakness  Neurological ROS: no syncope or seizures; no ataxia  Dermatological ROS: + wounds to LLE          PAST HISTORY:     Past Medical History:   Diagnosis Date    CHF (congestive heart failure)     COPD (chronic obstructive pulmonary disease)     Coronary artery disease     Diabetes mellitus     Diabetes mellitus type II     DM (diabetes mellitus) type II uncontrolled with renal manifestation 9/4/2013    Hyperlipidemia     Hypertension     Postablative hypothyroidism 12/6/2018       Past Surgical History:   Procedure Laterality Date    APPENDECTOMY      ARTHROSCOPY OF KNEE Right 9/7/2020    Procedure: ARTHROSCOPY, KNEE, RIGHT ;  Surgeon: You CASTILLO  MD Sriram;  Location: [address] FLR;  Service: Orthopedics;  Laterality: Right;    CHOLECYSTECTOMY      CORONARY ANGIOPLASTY WITH STENT PLACEMENT      TONSILLECTOMY      TRANSESOPHAGEAL ECHOCARDIOGRAPHY N/A 9/4/2020    Procedure: ECHOCARDIOGRAM, TRANSESOPHAGEAL;  Surgeon: [facility] Diagnostic Provider;  Location: [facility] EP LAB;  Service: Anesthesiology;  Laterality: N/A;    TRANSESOPHAGEAL ECHOCARDIOGRAPHY N/A 9/3/2020    Procedure: ECHOCARDIOGRAM, TRANSESOPHAGEAL;  Surgeon: [facility] Diagnostic Provider;  Location: [facility] EP LAB;  Service: Anesthesiology;  Laterality: N/A;       Family History   Problem Relation Age of Onset    Heart disease Mother     No Known Problems Father     No Known Problems Sister     Hypertension Son     No Known Problems Son     No Known Problems Son          MEDICATIONS & ALLERGIES:     Current Outpatient Medications on File Prior to Visit   Medication Sig Dispense Refill    ACCU-CHEK FASTCLIX LANCET DRUM Misc TEST FOUR TIMES DAILY (Patient taking differently: Test twice daily) 408 each 3    ACCU-CHEK RAMIREZ Misc USE AS DIRECTED 1 each 0    ACCU-CHEK SMARTVIEW TEST STRIP Strp TEST FOUR TIMES DAILY (Patient taking differently: Test twice daily) 400 strip 3    albuterol (PROVENTIL) 2.5 mg /3 mL (0.083 %) nebulizer solution INHALE THE CONTENTS OF 1 VIAL VIA NEBULIZER TWICE DAILY 360 mL 3    albuterol (PROVENTIL/VENTOLIN HFA) 90 mcg/actuation inhaler Inhale 2 puffs into the lungs every 6 (six) hours as needed for Wheezing or Shortness of Breath. Rescue      artificial tears (ISOPTO TEARS) 0.5 % ophthalmic solution Place 1 drop into both eyes 3 (three) times daily.      aspirin (ECOTRIN) 81 MG EC tablet Take 1 tablet (81 mg total) by mouth once daily. 90 tablet 3    atorvastatin (LIPITOR) 10 MG tablet Take 1 tablet (10 mg total) by mouth once daily.      carvedilol (COREG) 25 MG tablet Take 1 tablet (25 mg total) by mouth 2 (two) times daily. 180 tablet 3    cholecalciferol, vitamin  D3, (VITAMIN D3) 25 mcg (1,000 unit) capsule Take 1 capsule (1,000 Units total) by mouth once daily.      ferrous sulfate (FEOSOL) 325 mg (65 mg iron) Tab tablet Take 1 tablet (325 mg total) by mouth once daily.  0    fluticasone furoate-vilanterol (BREO ELLIPTA) 200-25 mcg/dose DsDv diskus inhaler INHALE 1 PUFF INTO THE LUNGS ONCE DAILY. (CONTROLLER) 180 each 3    fluticasone propionate (FLONASE) 50 mcg/actuation nasal spray USE 1 SPRAY IN EACH NOSTRIL ONE TIME DAILY 32 g 3    furosemide (LASIX) 40 MG tablet Take 1 tablet (40 mg total) by mouth 2 (two) times daily before meals.      gabapentin (NEURONTIN) 100 MG capsule Take 1 capsule (100 mg total) by mouth every evening. 90 capsule 3    insulin NPH-insulin regular, 70/30, (NOVOLIN 70/30 U-100 INSULIN) 100 unit/mL (70-30) injection Inject 14 Units into the skin before breakfast AND 12 Units before dinner. [id number] MINUTES BEFORE BREAKFAST AND DINNER. 20 mL 1    insulin syringe-needle U-100 (INSULIN SYRINGE) 1 mL 30 gauge x 5/16 Syrg 1 each by Misc.(Non-Drug; Combo Route) route 2 (two) times daily. Use twice daily as directed with insulin vials. 200 each 6    levothyroxine (SYNTHROID) 75 MCG tablet Take 1 tablet (75 mcg total) by mouth once daily. 90 tablet 3    magnesium oxide (MAG-OX) 400 mg (241.3 mg magnesium) tablet Take 0.5 tablets (200 mg total) by mouth 2 (two) times daily.  0    nitroGLYCERIN (NITROSTAT) 0.4 MG SL tablet PLACE 1 TABLET UNDER THE TONGUE EVERY 5  MINUTES AS NEEDED FOR CHEST PAIN 25 tablet 3    polyethylene glycol (GLYCOLAX) 17 gram PwPk Take 17 g by mouth 2 (two) times daily as needed (Constipation).  0     No current facility-administered medications on file prior to visit.         Review of patient's allergies indicates:   Allergen Reactions    Vicodin [hydrocodone-acetaminophen] Itching       OBJECTIVE:     Vital signs:  /57, pulse 97, resp 18, Sp02 94%, temp 97.2    Physical Exam:  General appearance: alert,  cooperative, no distress  Constitutional:Oriented to person and year  + appears well-developed and well-nourished.   HEENT: Normocephalic, atraumatic,  Eyes: conjunctivae/corneas clear, PERRL  Lungs: clear to auscultation bilaterally  Heart: regular rate and rhythm without rub  Abdomen: soft, non-tender; bowel sounds normoactive  Extremities: extremities symmetric; no clubbing, cyanosis, no edema  Integument: Skin warm and dry to touch  Location: left hallux  Wound type: diabetic ulcer   Measurements: 0.5 x 0.5 x 0.1 cm   Wound bed: slough 100%  Odor: no   Drainage: no  Surrounding skin: normal  Wound edges: unattached    Location: left heel  Wound type: DTI  Measurements: 7.0 x 5.0 x 0.1 cm   Wound bed: purple 100%  Odor: no   Drainage: no  Surrounding skin: normal  Wound edges: attached    Location: lateral left foot  Wound type: diabetic ulcer   Measurements: 3.0 x 1.0 cm cm   Wound bed: 100% slough  Odor: no   Drainage: no  Surrounding skin: normal  Wound edges: unattached      Location: dorsum left foot  Wound type: diabetic ulcer   Measurements: 5.7 x 2.0 x 0.1 cm   Wound bed: 100% slough  Odor: no   Drainage: no  Surrounding skin: normal  Wound edges: unattached  Neurologic: Alert and oriented X 2, +generalized weakness.   Psychiatric: no pressured speech; normal affect; no evidence of impaired cognition       ASSESSMENT & PLAN:     Septic shock- resolved  Septic arthritis of Right Knee  Sepsis due to MRSA  Acute on Chronic Cholecystitis  -He is status post drainage and coverage for MRSA   -His work-up for other etiologies of MRSA bacteremia have included negative LUKAS and MRI of lumbar spine looking for endocarditis and spinal abscess, respectively. Stool studies for C.diff on 09/11 were negative.   - Infectious work-up thus far on ICU admission concern for possible right lower lobe HAP. However; CT abdomen pelvis on 9/15 with gallbladder dilation, sludge, and trace pericholic fluid collection.   - End date  of IV antibiotics:Daptomycin /vancomycin 10/7/2020   and Levofloxacin 9/30/2020  - Follow up with ID and Gen Surg     Uncontrolled type 2 diabetes mellitus with hyperglycemia, with long-term current use of insulin  Diabetic polyneuropathy associated with type 2 diabetes mellitus  -continue with current regimen  - Monitor for hyperglycemia/hypoglycemia.        Postablative hypothyroidism  Continue home SYNTHROID     Acute kidney injury  -likely ATN from Shock s/p requiring RRT in ICU  -trialysis line removed   -pt is non-oliguric  -Nephrology recommends ambulatory f/u on discharge     Chronic systolic congestive heart failure  -Most recent ECHO in 09/2020 with EF of 25%.   -Unclear if ischemic vs non-ischemic.   -on Carvedilol and lasix  -Associated with chronic hypoxemic respiratory failure requiring 2L nasal cannula  - Dr. Ruiz recently increased Lasix to 80 mg BID     Anemia       -Chronic, no s/s of bleeding       -on ferrous sulfate 325 mg daily       Acute on chronic respiratory failure with hypoxia  Chronic respiratory failure with hypoxia, on home oxygen therapy  On 2 L nasal cannula chronically due to combination of COPD and CHF; titrate as needed.  -History of mixed COPD (based on PFT's from 05/2015 showing mild (small airways) obstruction, airflow not improved after bronchodilator, and moderate restriction) and HFrEF with chronic respiratory failure on 2L home oxygen.        Diabetic foot infection  Bacteremia due to methicillin resistant Staphylococcus aureus  Diabetic ulcer of left foot associated with type 2 diabetes mellitus  -completed daptomycin and ceftaroline   -- s/p R knee scope I&D 9/7/20. PT/OT:  [facility]  --. Betadine to R heel bulla abnd all escharred wounds. Aquacel Silver to L dorsal and plantar wounds, covered with bordered foam.        Scheduled Follow-up :  No future appointments.    Post Visit Medication List:     Medication List          Accurate as of November 17, 2020  7:51 PM. If you  have any questions, ask your nurse or doctor.            CHANGE how you take these medications    ACCU-CHEK FASTCLIX LANCET DRUM Misc  Generic drug: lancets  TEST FOUR TIMES DAILY  What changed:   · when to take this  · additional instructions     ACCU-CHEK SMARTVIEW TEST STRIP Strp  Generic drug: blood sugar diagnostic  TEST FOUR TIMES DAILY  What changed:   · when to take this  · additional instructions        CONTINUE taking these medications    ACCU-CHEK RAMIREZ Misc  Generic drug: blood-glucose meter  USE AS DIRECTED     * albuterol 90 mcg/actuation inhaler  Commonly known as: PROVENTIL/VENTOLIN HFA     * albuterol 2.5 mg /3 mL (0.083 %) nebulizer solution  Commonly known as: PROVENTIL  INHALE THE CONTENTS OF 1 VIAL VIA NEBULIZER TWICE DAILY     artificial tears 0.5 % ophthalmic solution  Commonly known as: ISOPTO TEARS  Place 1 drop into both eyes 3 (three) times daily.     aspirin 81 MG EC tablet  Commonly known as: ECOTRIN  Take 1 tablet (81 mg total) by mouth once daily.     atorvastatin 10 MG tablet  Commonly known as: LIPITOR  Take 1 tablet (10 mg total) by mouth once daily.     carvediloL 25 MG tablet  Commonly known as: COREG  Take 1 tablet (25 mg total) by mouth 2 (two) times daily.     cholecalciferol (vitamin D3) 25 mcg (1,000 unit) capsule  Commonly known as: VITAMIN D3  Take 1 capsule (1,000 Units total) by mouth once daily.     ferrous sulfate 325 mg (65 mg iron) Tab tablet  Commonly known as: FEOSOL  Take 1 tablet (325 mg total) by mouth once daily.     fluticasone furoate-vilanteroL 200-25 mcg/dose Dsdv diskus inhaler  Commonly known as: BREO ELLIPTA  INHALE 1 PUFF INTO THE LUNGS ONCE DAILY. (CONTROLLER)     fluticasone propionate 50 mcg/actuation nasal spray  Commonly known as: FLONASE  USE 1 SPRAY IN EACH NOSTRIL ONE TIME DAILY     furosemide 40 MG tablet  Commonly known as: LASIX  Take 1 tablet (40 mg total) by mouth 2 (two) times daily before meals.     gabapentin 100 MG capsule  Commonly known  as: NEURONTIN  Take 1 capsule (100 mg total) by mouth every evening.     insulin NPH-insulin regular (70/30) 100 unit/mL (70-30) injection  Commonly known as: NovoLIN 70/30 U-100 Insulin  Inject 14 Units into the skin before breakfast AND 12 Units before dinner. [id number] MINUTES BEFORE BREAKFAST AND DINNER.     insulin syringe-needle U-100 1 mL 30 gauge x 5/16 Syrg  Commonly known as: INSULIN SYRINGE  1 each by Misc.(Non-Drug; Combo Route) route 2 (two) times daily. Use twice daily as directed with insulin vials.     levothyroxine 75 MCG tablet  Commonly known as: SYNTHROID  Take 1 tablet (75 mcg total) by mouth once daily.     magnesium oxide 400 mg (241.3 mg magnesium) tablet  Commonly known as: MAG-OX  Take 0.5 tablets (200 mg total) by mouth 2 (two) times daily.     nitroGLYCERIN 0.4 MG SL tablet  Commonly known as: NITROSTAT  PLACE 1 TABLET UNDER THE TONGUE EVERY 5  MINUTES AS NEEDED FOR CHEST PAIN     polyethylene glycol 17 gram Pwpk  Commonly known as: GLYCOLAX  Take 17 g by mouth 2 (two) times daily as needed (Constipation).         * This list has 2 medication(s) that are the same as other medications prescribed for you. Read the directions carefully, and ask your doctor or other care provider to review them with you.                  Signing Physician:  Laurence Herrera NP

## 2020-11-24 ENCOUNTER — EXTERNAL HOSPITAL ADMISSION (OUTPATIENT)
Dept: SKILLED NURSING FACILITY | Facility: HOSPITAL | Age: 65
End: 2020-11-24
Payer: MEDICARE

## 2020-11-24 ENCOUNTER — TELEPHONE (OUTPATIENT)
Dept: VASCULAR SURGERY | Facility: CLINIC | Age: 65
End: 2020-11-24

## 2020-11-24 DIAGNOSIS — J96.11 CHRONIC RESPIRATORY FAILURE WITH HYPOXIA, ON HOME OXYGEN THERAPY: Primary | ICD-10-CM

## 2020-11-24 DIAGNOSIS — Z99.81 CHRONIC RESPIRATORY FAILURE WITH HYPOXIA, ON HOME OXYGEN THERAPY: Primary | ICD-10-CM

## 2020-11-24 NOTE — PROGRESS NOTES
[facility]   Re-evaluate Visit  DOS 11/24/2020     PRESENTING HISTORY     Chief Complaint/Reason for Admission: re-evaluate for debility, wounds to left foot and chronic diseases  PCP: Qiana Chow MD   Admission Date: 8/30/2020  Hospital Length of Stay: 40 days  Discharge Date and Time: 10/9/2020  3:05 PM    History of Present Illness:  Mr. Ed Patton is a 65 y.o. male who was recently  presented to the ED for recurrent falls.  At some point prior to this he found a tack imbedded in the bottom of his slipper which had punctured the sole of his left foot.  His left foot has become more swollen recently and he has had occasional twinges of pain in both of his legs. He says that he has been afraid to leave his house on account of the Coronavirus pandemic.  He has been getting home health services including physical therapy has been walking to his mailbox and back for exercise.   He was admitted to [facility] on 08/30 with cellulitis of left foot with concern for diabetic foot infection with subsequent development of MRSA bacteremia attributed to septic arthritis of right knee and elbow. He is status post drainage and coverage for MRSA since that time.S/p R knee scope I&D 9/7/20.  His work-up for other etiologies of MRSA bacteremia have included negative LUKAS and MRI of lumbar spine looking for endocarditis and spinal abscess, respectively. Stool studies for C.diff on 09/11 were negative. His hospital course has been associated with worsening hypotension and leukocytosis since 09/10 prompting ICU admission on 09/12 for initiation of vasopressor support. Infectious work-up thus far on ICU admission concern for possible right lower lobe HAP. However; CT abdomen pelvis on 9/15 with gallbladder dilation, sludge, and trace pericholic fluid collection. Exam not consistent with cholecystitis but given persistent shock considered a potential source. Also with potential right lower  lobe atelectasis with possible overlying infection. End date of IV abx: Daptomycin /vancomycin 10/7/2020 and Levofloxacin 9/30/2020.    Transferred to [facility] for SNF PT/OT.    ________________________________________________________    Today:  The patient is currently sitting up in the wheelchair with no acute distress. Appears comfortable and has no complaints. Denies SOB, CP, pain, chills, dysuria, constipation.  LCTAB with no edema.  Dressing to left foot dry and intact. Wounds stable.   Per PT, he is ambulating 80 feet with r/w and contact guard. Per PT, he is independent with bed mobility, transfer with sliding board, and sit to stand with mod assist.     Review of Systems  General ROS: negative for chills, fever or weight loss  Psychological ROS: negative for hallucination, depression or suicidal ideation  Ophthalmic ROS: negative for blurry vision, photophobia or eye pain  ENT ROS: negative for epistaxis, sore throat or rhinorrhea  Respiratory ROS: no cough, shortness of breath, or wheezing  Cardiovascular ROS: no chest pain or dyspnea on exertion  Gastrointestinal ROS: no abdominal pain, change in bowel habits, or black/ bloody stools;   Genito-Urinary ROS: no dysuria, trouble voiding, or hematuria  Musculoskeletal ROS: + gait disturbance or muscular weakness  Neurological ROS: no syncope or seizures; no ataxia  Dermatological ROS: + wounds to LLE          PAST HISTORY:     Past Medical History:   Diagnosis Date    CHF (congestive heart failure)     COPD (chronic obstructive pulmonary disease)     Coronary artery disease     Diabetes mellitus     Diabetes mellitus type II     DM (diabetes mellitus) type II uncontrolled with renal manifestation 9/4/2013    Hyperlipidemia     Hypertension     Postablative hypothyroidism 12/6/2018       Past Surgical History:   Procedure Laterality Date    APPENDECTOMY      ARTHROSCOPY OF KNEE Right 9/7/2020    Procedure: ARTHROSCOPY, KNEE, RIGHT ;  Surgeon: You CASTILLO  MD Sriram;  Location: [address] FLR;  Service: Orthopedics;  Laterality: Right;    CHOLECYSTECTOMY      CORONARY ANGIOPLASTY WITH STENT PLACEMENT      TONSILLECTOMY      TRANSESOPHAGEAL ECHOCARDIOGRAPHY N/A 9/4/2020    Procedure: ECHOCARDIOGRAM, TRANSESOPHAGEAL;  Surgeon: [facility] Diagnostic Provider;  Location: [facility] EP LAB;  Service: Anesthesiology;  Laterality: N/A;    TRANSESOPHAGEAL ECHOCARDIOGRAPHY N/A 9/3/2020    Procedure: ECHOCARDIOGRAM, TRANSESOPHAGEAL;  Surgeon: [facility] Diagnostic Provider;  Location: [facility] EP LAB;  Service: Anesthesiology;  Laterality: N/A;       Family History   Problem Relation Age of Onset    Heart disease Mother     No Known Problems Father     No Known Problems Sister     Hypertension Son     No Known Problems Son     No Known Problems Son          MEDICATIONS & ALLERGIES:     Current Outpatient Medications on File Prior to Visit   Medication Sig Dispense Refill    ACCU-CHEK FASTCLIX LANCET DRUM Misc TEST FOUR TIMES DAILY (Patient taking differently: Test twice daily) 408 each 3    ACCU-CHEK RAMIREZ Misc USE AS DIRECTED 1 each 0    ACCU-CHEK SMARTVIEW TEST STRIP Strp TEST FOUR TIMES DAILY (Patient taking differently: Test twice daily) 400 strip 3    albuterol (PROVENTIL) 2.5 mg /3 mL (0.083 %) nebulizer solution INHALE THE CONTENTS OF 1 VIAL VIA NEBULIZER TWICE DAILY 360 mL 3    albuterol (PROVENTIL/VENTOLIN HFA) 90 mcg/actuation inhaler Inhale 2 puffs into the lungs every 6 (six) hours as needed for Wheezing or Shortness of Breath. Rescue      artificial tears (ISOPTO TEARS) 0.5 % ophthalmic solution Place 1 drop into both eyes 3 (three) times daily.      aspirin (ECOTRIN) 81 MG EC tablet Take 1 tablet (81 mg total) by mouth once daily. 90 tablet 3    atorvastatin (LIPITOR) 10 MG tablet Take 1 tablet (10 mg total) by mouth once daily.      carvedilol (COREG) 25 MG tablet Take 1 tablet (25 mg total) by mouth 2 (two) times daily. 180 tablet 3    cholecalciferol, vitamin  D3, (VITAMIN D3) 25 mcg (1,000 unit) capsule Take 1 capsule (1,000 Units total) by mouth once daily.      ferrous sulfate (FEOSOL) 325 mg (65 mg iron) Tab tablet Take 1 tablet (325 mg total) by mouth once daily.  0    fluticasone furoate-vilanterol (BREO ELLIPTA) 200-25 mcg/dose DsDv diskus inhaler INHALE 1 PUFF INTO THE LUNGS ONCE DAILY. (CONTROLLER) 180 each 3    fluticasone propionate (FLONASE) 50 mcg/actuation nasal spray USE 1 SPRAY IN EACH NOSTRIL ONE TIME DAILY 32 g 3    furosemide (LASIX) 40 MG tablet Take 2 tablets (80 mg total) by mouth 2 (two) times daily before meals. 120 tablet 11    gabapentin (NEURONTIN) 100 MG capsule Take 1 capsule (100 mg total) by mouth every evening. 90 capsule 3    insulin NPH-insulin regular, 70/30, (NOVOLIN 70/30 U-100 INSULIN) 100 unit/mL (70-30) injection Inject 14 Units into the skin before breakfast AND 12 Units before dinner. [id number] MINUTES BEFORE BREAKFAST AND DINNER. 20 mL 1    insulin syringe-needle U-100 (INSULIN SYRINGE) 1 mL 30 gauge x 5/16 Syrg 1 each by Misc.(Non-Drug; Combo Route) route 2 (two) times daily. Use twice daily as directed with insulin vials. 200 each 6    levothyroxine (SYNTHROID) 75 MCG tablet Take 1 tablet (75 mcg total) by mouth once daily. 90 tablet 3    magnesium oxide (MAG-OX) 400 mg (241.3 mg magnesium) tablet Take 0.5 tablets (200 mg total) by mouth 2 (two) times daily.  0    nitroGLYCERIN (NITROSTAT) 0.4 MG SL tablet PLACE 1 TABLET UNDER THE TONGUE EVERY 5  MINUTES AS NEEDED FOR CHEST PAIN 25 tablet 3    polyethylene glycol (GLYCOLAX) 17 gram PwPk Take 17 g by mouth 2 (two) times daily as needed (Constipation).  0     No current facility-administered medications on file prior to visit.         Review of patient's allergies indicates:   Allergen Reactions    Vicodin [hydrocodone-acetaminophen] Itching       OBJECTIVE:     Physical Exam:  General appearance: alert, cooperative, no distress  Constitutional:Oriented to  person and year  + appears well-developed and well-nourished.   HEENT: Normocephalic, atraumatic,  Eyes: conjunctivae/corneas clear, PERRL  Lungs: clear to auscultation bilaterally  Heart: regular rate and rhythm without rub  Abdomen: soft, non-tender; bowel sounds normoactive  Extremities: extremities symmetric; no clubbing, cyanosis, no edema  Integument: Skin warm and dry to touch  Location: left hallux  Wound type: diabetic ulcer   Measurements:  1.2 x 0.9 x 0.1 cm   Wound bed: epithelial 100%  Odor: no   Drainage: no  Surrounding skin: normal  Wound edges: unattached    Location: left heel  Wound type: unstageable  Measurements: 6.0 x 3.8 x 0.1 cm   Wound bed: eschar 100%  Odor: no   Drainage: no  Surrounding skin: normal  Wound edges: unattached    Location: lateral left foot  Wound type: diabetic ulcer   Measurements: 2.8 x 1.5 cm   Wound bed: 100% slough  Odor: no   Drainage: no  Surrounding skin: normal  Wound edges: unattached      Location: dorsum left foot  Wound type: diabetic ulcer   Measurements: 3.8 x 1.3 x 0.1 cm   Wound bed: 100% slough  Odor: no   Drainage: no  Surrounding skin: normal  Wound edges: unattached  Neurologic: Alert and oriented X 2, +generalized weakness.   Psychiatric: no pressured speech; normal affect; no evidence of impaired cognition       ASSESSMENT & PLAN:     Septic shock- resolved  Septic arthritis of Right Knee  Sepsis due to MRSA  Acute on Chronic Cholecystitis  -He is status post drainage and coverage for MRSA   -His work-up for other etiologies of MRSA bacteremia have included negative LUKAS and MRI of lumbar spine looking for endocarditis and spinal abscess, respectively. Stool studies for C.diff on 09/11 were negative.   - Infectious work-up thus far on ICU admission concern for possible right lower lobe HAP. However; CT abdomen pelvis on 9/15 with gallbladder dilation, sludge, and trace pericholic fluid collection.   - End date of IV antibiotics:Daptomycin /vancomycin  10/7/2020   and Levofloxacin 9/30/2020  - Follow up with ID and Gen Surg     Uncontrolled type 2 diabetes mellitus with hyperglycemia, with long-term current use of insulin  Diabetic polyneuropathy associated with type 2 diabetes mellitus  -continue with current regimen  - Monitor for hyperglycemia/hypoglycemia.        Postablative hypothyroidism  Continue home SYNTHROID     Acute kidney injury  -likely ATN from Shock s/p requiring RRT in ICU  -trialysis line removed   -pt is non-oliguric  -Nephrology recommends ambulatory f/u on discharge     Chronic systolic congestive heart failure  -Most recent ECHO in 09/2020 with EF of 25%.   -Unclear if ischemic vs non-ischemic.   -on Carvedilol and lasix  -Associated with chronic hypoxemic respiratory failure requiring 2L nasal cannula  - Dr. Ruiz recently increased Lasix to 80 mg BID     Anemia       -Chronic, no s/s of bleeding       -on ferrous sulfate 325 mg daily       Acute on chronic respiratory failure with hypoxia  Chronic respiratory failure with hypoxia, on home oxygen therapy  On 2 L nasal cannula chronically due to combination of COPD and CHF; titrate as needed.  -History of mixed COPD (based on PFT's from 05/2015 showing mild (small airways) obstruction, airflow not improved after bronchodilator, and moderate restriction) and HFrEF with chronic respiratory failure on 2L home oxygen.        Diabetic foot infection  Bacteremia due to methicillin resistant Staphylococcus aureus  Diabetic ulcer of left foot associated with type 2 diabetes mellitus  -completed daptomycin and ceftaroline   -- s/p R knee scope I&D 9/7/20. PT/OT:  WBAT  --. Betadine to R heel bulla abnd all escharred wounds. Aquacel Silver to L dorsal and plantar wounds, covered with bordered foam.        Scheduled Follow-up :  Future Appointments   Date Time Provider Department Center   11/27/2020  2:00 PM VASCULAR, LAB [facility] LUCI[place]   11/27/2020  2:45 PM ANNA Lugo II, MD [facility] [address]   12/16/2020  9:30 AM Faraz Lugo DPM [facility] POD [address]   12/16/2020 10:00 AM Dieudonne Ferraro MD [facility] CARDIO [address]       Post Visit Medication List:     Medication List          Accurate as of November 24, 2020  3:19 PM. If you have any questions, ask your nurse or doctor.            CHANGE how you take these medications    ACCU-CHEK FASTCLIX LANCET DRUM Misc  Generic drug: lancets  TEST FOUR TIMES DAILY  What changed:   · when to take this  · additional instructions     ACCU-CHEK SMARTVIEW TEST STRIP Strp  Generic drug: blood sugar diagnostic  TEST FOUR TIMES DAILY  What changed:   · when to take this  · additional instructions        CONTINUE taking these medications    ACCU-CHEK RAMIREZ Misc  Generic drug: blood-glucose meter  USE AS DIRECTED     * albuterol 90 mcg/actuation inhaler  Commonly known as: PROVENTIL/VENTOLIN HFA     * albuterol 2.5 mg /3 mL (0.083 %) nebulizer solution  Commonly known as: PROVENTIL  INHALE THE CONTENTS OF 1 VIAL VIA NEBULIZER TWICE DAILY     artificial tears 0.5 % ophthalmic solution  Commonly known as: ISOPTO TEARS  Place 1 drop into both eyes 3 (three) times daily.     aspirin 81 MG EC tablet  Commonly known as: ECOTRIN  Take 1 tablet (81 mg total) by mouth once daily.     atorvastatin 10 MG tablet  Commonly known as: LIPITOR  Take 1 tablet (10 mg total) by mouth once daily.     carvediloL 25 MG tablet  Commonly known as: COREG  Take 1 tablet (25 mg total) by mouth 2 (two) times daily.     cholecalciferol (vitamin D3) 25 mcg (1,000 unit) capsule  Commonly known as: VITAMIN D3  Take 1 capsule (1,000 Units total) by mouth once daily.     ferrous sulfate 325 mg (65 mg iron) Tab tablet  Commonly known as: FEOSOL  Take 1 tablet (325 mg total) by mouth once daily.     fluticasone furoate-vilanteroL 200-25 mcg/dose Dsdv diskus inhaler  Commonly known as: BREO ELLIPTA  INHALE 1 PUFF INTO THE LUNGS ONCE DAILY. (CONTROLLER)     fluticasone propionate 50 mcg/actuation  nasal spray  Commonly known as: FLONASE  USE 1 SPRAY IN EACH NOSTRIL ONE TIME DAILY     furosemide 40 MG tablet  Commonly known as: LASIX  Take 2 tablets (80 mg total) by mouth 2 (two) times daily before meals.     gabapentin 100 MG capsule  Commonly known as: NEURONTIN  Take 1 capsule (100 mg total) by mouth every evening.     insulin NPH-insulin regular (70/30) 100 unit/mL (70-30) injection  Commonly known as: NovoLIN 70/30 U-100 Insulin  Inject 14 Units into the skin before breakfast AND 12 Units before dinner. [id number] MINUTES BEFORE BREAKFAST AND DINNER.     insulin syringe-needle U-100 1 mL 30 gauge x 5/16 Syrg  Commonly known as: INSULIN SYRINGE  1 each by Misc.(Non-Drug; Combo Route) route 2 (two) times daily. Use twice daily as directed with insulin vials.     levothyroxine 75 MCG tablet  Commonly known as: SYNTHROID  Take 1 tablet (75 mcg total) by mouth once daily.     magnesium oxide 400 mg (241.3 mg magnesium) tablet  Commonly known as: MAG-OX  Take 0.5 tablets (200 mg total) by mouth 2 (two) times daily.     nitroGLYCERIN 0.4 MG SL tablet  Commonly known as: NITROSTAT  PLACE 1 TABLET UNDER THE TONGUE EVERY 5  MINUTES AS NEEDED FOR CHEST PAIN     polyethylene glycol 17 gram Pwpk  Commonly known as: GLYCOLAX  Take 17 g by mouth 2 (two) times daily as needed (Constipation).         * This list has 2 medication(s) that are the same as other medications prescribed for you. Read the directions carefully, and ask your doctor or other care provider to review them with you.                  Signing Physician:  Laurence Herrera NP

## 2020-11-24 NOTE — TELEPHONE ENCOUNTER
Returned call spoke with Miller(sawant clerk) appointment scheduled 11/27/2020 with Dr BRYANT Lugo.----- Message from Madhavi Jones sent at 11/24/2020  9:00 AM CST -----  Contact: [phone]  Nurse called to schedule appt for pt for poor circulation

## 2020-11-27 ENCOUNTER — HOSPITAL ENCOUNTER (OUTPATIENT)
Dept: VASCULAR SURGERY | Facility: CLINIC | Age: 65
Discharge: HOME | End: 2020-11-27
Attending: SURGERY
Payer: MEDICARE

## 2020-11-27 ENCOUNTER — INITIAL CONSULT (OUTPATIENT)
Dept: VASCULAR SURGERY | Facility: CLINIC | Age: 65
End: 2020-11-27
Attending: SURGERY
Payer: MEDICARE

## 2020-11-27 VITALS
WEIGHT: 250 LBS | BODY MASS INDEX: 30.44 KG/M2 | DIASTOLIC BLOOD PRESSURE: 64 MMHG | HEART RATE: 83 BPM | TEMPERATURE: 99 F | HEIGHT: 76 IN | SYSTOLIC BLOOD PRESSURE: 104 MMHG

## 2020-11-27 DIAGNOSIS — Z79.4 UNCONTROLLED TYPE 2 DIABETES MELLITUS WITH HYPERGLYCEMIA, WITH LONG-TERM CURRENT USE OF INSULIN: ICD-10-CM

## 2020-11-27 DIAGNOSIS — L97.429 HEEL ULCERATION, LEFT, WITH UNSPECIFIED SEVERITY: Primary | ICD-10-CM

## 2020-11-27 DIAGNOSIS — I73.9 PAD (PERIPHERAL ARTERY DISEASE): ICD-10-CM

## 2020-11-27 DIAGNOSIS — E11.65 UNCONTROLLED TYPE 2 DIABETES MELLITUS WITH HYPERGLYCEMIA, WITH LONG-TERM CURRENT USE OF INSULIN: ICD-10-CM

## 2020-11-27 PROCEDURE — 99213 OFFICE O/P EST LOW 20 MIN: CPT | Mod: HCNC,S$GLB,, | Performed by: SURGERY

## 2020-11-27 PROCEDURE — 3046F PR MOST RECENT HEMOGLOBIN A1C LEVEL > 9.0%: ICD-10-PCS | Mod: HCNC,CPTII,S$GLB, | Performed by: SURGERY

## 2020-11-27 PROCEDURE — 99499 UNLISTED E&M SERVICE: CPT | Mod: S$GLB,,, | Performed by: SURGERY

## 2020-11-27 PROCEDURE — 3074F PR MOST RECENT SYSTOLIC BLOOD PRESSURE < 130 MM HG: ICD-10-PCS | Mod: HCNC,CPTII,S$GLB, | Performed by: SURGERY

## 2020-11-27 PROCEDURE — 3078F PR MOST RECENT DIASTOLIC BLOOD PRESSURE < 80 MM HG: ICD-10-PCS | Mod: HCNC,CPTII,S$GLB, | Performed by: SURGERY

## 2020-11-27 PROCEDURE — 3074F SYST BP LT 130 MM HG: CPT | Mod: HCNC,CPTII,S$GLB, | Performed by: SURGERY

## 2020-11-27 PROCEDURE — 99213 PR OFFICE/OUTPT VISIT, EST, LEVL III, 20-29 MIN: ICD-10-PCS | Mod: HCNC,S$GLB,, | Performed by: SURGERY

## 2020-11-27 PROCEDURE — 99999 PR PBB SHADOW E&M-EST. PATIENT-LVL III: CPT | Mod: PBBFAC,HCNC,, | Performed by: SURGERY

## 2020-11-27 PROCEDURE — 93923 UPR/LXTR ART STDY 3+ LVLS: CPT | Mod: 52,HCNC,S$GLB, | Performed by: SURGERY

## 2020-11-27 PROCEDURE — 99499 RISK ADDL DX/OHS AUDIT: ICD-10-PCS | Mod: S$GLB,,, | Performed by: SURGERY

## 2020-11-27 PROCEDURE — 93923 PR NON-INVASIVE PHYSIOLOGIC STUDY EXTREMITY 3 LEVELS: ICD-10-PCS | Mod: 52,HCNC,S$GLB, | Performed by: SURGERY

## 2020-11-27 PROCEDURE — 99999 PR PBB SHADOW E&M-EST. PATIENT-LVL III: ICD-10-PCS | Mod: PBBFAC,HCNC,, | Performed by: SURGERY

## 2020-11-27 PROCEDURE — 3046F HEMOGLOBIN A1C LEVEL >9.0%: CPT | Mod: HCNC,CPTII,S$GLB, | Performed by: SURGERY

## 2020-11-27 PROCEDURE — 3078F DIAST BP <80 MM HG: CPT | Mod: HCNC,CPTII,S$GLB, | Performed by: SURGERY

## 2020-11-27 RX ORDER — ASPIRIN 81 MG/1
81 TABLET ORAL DAILY
Qty: 90 TABLET | Refills: 3 | Status: SHIPPED | OUTPATIENT
Start: 2020-11-27 | End: 2021-11-27

## 2020-11-27 NOTE — PROGRESS NOTES
"VASCULAR SURGERY NOTE    Patient ID: Ed Patton is a 65 y.o. male.    I. HISTORY     Chief Complaint: poor circulation    HPI: Ed Patton is a 65 y.o. male who is here today for established patient appointment. I first saw him when he was hospitalized for sepsis in September for concern for PAD. He had palpable AT pulse at the time and his non-invasive imaging did not suggest any macrovascular disease. We offered lower extremity angiography to diagnose pedal/microvascular disease and rule out macrovascular disease but the patient did not want to proceed at that time. He returns for re-evaluation for "poor circulation". He's been getting woundcare at a SNF and says that his wounds have been slow to heal. He denies any pain at rest. He arrives in a wheelchair today and says that he cannot walk. He says that he is able to stand for brief periods of time with assistance during physical therapy. He had repeat non-invasive testing in the vascular lab today.    ALLERGIES: vicodin    SOCIAL HISTORY: quit smoking 1995, no drugs, no EtOH    FAMILY HISTORY: no h/o aneurysm    MEDICATIONS: ASA, atorvastatin 10mg. Remainder of med list reviewed in EMR    Past Medical History:   Diagnosis Date    CHF (congestive heart failure)     COPD (chronic obstructive pulmonary disease)     Coronary artery disease     Diabetes mellitus     Diabetes mellitus type II     DM (diabetes mellitus) type II uncontrolled with renal manifestation 9/4/2013    Hyperlipidemia     Hypertension     Postablative hypothyroidism 12/6/2018        Past Surgical History:   Procedure Laterality Date    APPENDECTOMY      ARTHROSCOPY OF KNEE Right 9/7/2020    Procedure: ARTHROSCOPY, KNEE, RIGHT ;  Surgeon: You Bhatia MD;  Location: [facility] OR [address] [place] [address];  Service: Orthopedics;  Laterality: Right;    CHOLECYSTECTOMY      CORONARY ANGIOPLASTY WITH STENT PLACEMENT      TONSILLECTOMY      TRANSESOPHAGEAL ECHOCARDIOGRAPHY N/A 9/4/2020    " Procedure: ECHOCARDIOGRAM, TRANSESOPHAGEAL;  Surgeon: [facility] Diagnostic Provider;  Location: [facility] EP LAB;  Service: Anesthesiology;  Laterality: N/A;    TRANSESOPHAGEAL ECHOCARDIOGRAPHY N/A 9/3/2020    Procedure: ECHOCARDIOGRAM, TRANSESOPHAGEAL;  Surgeon: [facility] Diagnostic Provider;  Location: [facility] EP LAB;  Service: Anesthesiology;  Laterality: N/A;       Social History     Tobacco Use   Smoking Status Former Smoker    Packs/day: 0.01    Years: 3.00    Pack years: 0.03    Types: Cigarettes    Quit date: 1995    Years since quittin.5   Smokeless Tobacco Never Used    No drinking, No drug use    Review of Systems   Constitution: Negative for chills and fever.   HENT: Negative for ear pain and hoarse voice.    Eyes: Negative for discharge and pain.   Cardiovascular: Negative for chest pain and palpitations.   Respiratory: Positive for shortness of breath. Negative for cough and hemoptysis. Sleep disturbances due to breathing: with exertion.    Endocrine: Negative for polydipsia and polyuria.   Skin: Negative for dry skin and rash.   Musculoskeletal: Negative for back pain and neck pain.   Gastrointestinal: Negative for abdominal pain, diarrhea, nausea and vomiting.   Genitourinary: Negative for dysuria and hematuria.   Neurological: Negative for dizziness and paresthesias.         II. PHYSICAL EXAM     Physical Exam   Constitutional: He is oriented to person, place, and time. He appears well-developed and well-nourished. No distress.   HENT:   Head: Normocephalic and atraumatic.   Mouth/Throat: Oropharynx is clear and moist. No oropharyngeal exudate.   Nasal cannula in place   Eyes: Conjunctivae and EOM are normal. Right eye exhibits no discharge. Left eye exhibits no discharge. No scleral icterus.   Neck: Normal range of motion. Neck supple. No tracheal deviation present. No thyromegaly present.   Cardiovascular: Normal rate, regular rhythm, normal heart sounds and intact distal pulses.   Pulmonary/Chest:  Effort normal and breath sounds normal. No stridor. No respiratory distress.   Abdominal: Soft. He exhibits no distension. There is no abdominal tenderness. There is no rebound and no guarding.   Musculoskeletal: Normal range of motion.   Neurological: He is alert and oriented to person, place, and time. No cranial nerve deficit.   Skin: Skin is warm and dry. He is not diaphoretic. No erythema.   Dry flaky skin in bilateral legs/feet, small superficial healing ulceration on distal L 1st toe, dressing in place over L heel wound   Psychiatric: He has a normal mood and affect. His behavior is normal.     VASC:  RLE: 2+ femoral pulse, triphasic AT/PT signal  LLE: 2+ femoral pulse, triphasic AT/PT signal    III. ASSESSMENT & PLAN (MEDICAL DECISION MAKING)     1. Heel ulceration, left, with unspecified severity    2. Uncontrolled type 2 diabetes mellitus with hyperglycemia, with long-term current use of insulin        Imaging Results:   GINA 9/13/20: Non-compressible vessels. GINA unreliable but PVR suggests minimal disease.    Toe Pressures/PPG 11/27/20:  R L   67mmHg (normal waveform) 78mmHg (mildly blunted waveform) TBI 0.7       Assessment/Diagnosis and Plan:    1. Heel ulceration, left, with unspecified severity    2. Uncontrolled type 2 diabetes mellitus with hyperglycemia, with long-term current use of insulin        65 y.o. male without hemodynamically significant PAD in his bilateral lower extremities. He has normal toe brachial index on the left and toe pressure of 78mmHg is more than adequate for wound healing. He says that he gets intermittent swelling in his legs. I recommended that he wear compression wraps or stockings for this when he is able. Recommend control of atherosclerotic risk factors and continued woundcare. He would not benefit from any form of lower extremity peripheral vascular intervention at this time.    -Secondary prevention of atherosclerotic risk factors: Goal BP <140/90, goal LDL <100,  goal HbA1C <7.0  -Recommend strict control of DM to promote wound healing  -Continue ASA 81mg daily for prevention of MI, stroke, and acute limb ischemia in setting of known PAD equivalent risk factors (DM)  -Recommend high intensity statin (atorvastatin 40mg or rosuvastatin 20mg). Patient takes 10mg atorvastatin-- Will defer to PCP for increase and monitoring  -If patient experiences lower extremity edema with wounds, recommend apply ACE wrap or compression dressing to minimize edema. If patient has swelling after wounds have healed, recommend 20-30mmHg compression hose when out of bed    ANNA Lugo II, MD, [facility]  Vascular Surgeon  [facility]

## 2020-11-27 NOTE — LETTER
November 27, 2020      Maria De Jesus Ruiz MD  [address]  [facility] [address]           [facility]  [address]  [facility] [address]  Phone: [phone]  Fax: [phone]          Patient: Ed Patton   MR Number: [id number]   YOB: 1955   Date of Visit: 11/27/2020       Dear Dr. Maria De Jesus Ruiz:    Thank you for referring Ed Patton to me for evaluation. Attached you will find relevant portions of my assessment and plan of care.    If you have questions, please do not hesitate to call me. I look forward to following Ed Patton along with you.    Sincerely,    ANNA Lugo II, MD    Enclosure  CC:  No Recipients    If you would like to receive this communication electronically, please contact [email or web address] or [phone] to request more information on [employer] access.    For providers and/or their staff who would like to refer a patient to [facility], please contact us through our one-stop-shop provider referral line, [facility], at [phone].    If you feel you have received this communication in error or would no longer like to receive these types of communications, please e-mail [email or web address]

## 2020-12-01 ENCOUNTER — EXTERNAL HOSPITAL ADMISSION (OUTPATIENT)
Dept: SKILLED NURSING FACILITY | Facility: HOSPITAL | Age: 65
End: 2020-12-01
Payer: MEDICARE

## 2020-12-01 DIAGNOSIS — J96.11 CHRONIC RESPIRATORY FAILURE WITH HYPOXIA, ON HOME OXYGEN THERAPY: Primary | ICD-10-CM

## 2020-12-01 DIAGNOSIS — Z99.81 CHRONIC RESPIRATORY FAILURE WITH HYPOXIA, ON HOME OXYGEN THERAPY: Primary | ICD-10-CM

## 2020-12-01 NOTE — PROGRESS NOTES
[facility]   Discharge Visit  DOS 12/1/2020     PRESENTING HISTORY     Chief Complaint/Reason for Admission: Discharge summary  PCP: Qiana Chow MD   Admission Date: 8/30/2020  Hospital Length of Stay: 40 days  Discharge Date and Time: 10/9/2020  3:05 PM    History of Present Illness:  Mr. Ed Patton is a 65 y.o. male who was recently  presented to the ED for recurrent falls.  At some point prior to this he found a tack imbedded in the bottom of his slipper which had punctured the sole of his left foot.  His left foot has become more swollen recently and he has had occasional twinges of pain in both of his legs. He says that he has been afraid to leave his house on account of the Coronavirus pandemic.  He has been getting home health services including physical therapy has been walking to his mailbox and back for exercise.   He was admitted to [facility] on 08/30 with cellulitis of left foot with concern for diabetic foot infection with subsequent development of MRSA bacteremia attributed to septic arthritis of right knee and elbow. He is status post drainage and coverage for MRSA since that time.S/p R knee scope I&D 9/7/20.  His work-up for other etiologies of MRSA bacteremia have included negative LUKAS and MRI of lumbar spine looking for endocarditis and spinal abscess, respectively. Stool studies for C.diff on 09/11 were negative. His hospital course has been associated with worsening hypotension and leukocytosis since 09/10 prompting ICU admission on 09/12 for initiation of vasopressor support. Infectious work-up thus far on ICU admission concern for possible right lower lobe HAP. However; CT abdomen pelvis on 9/15 with gallbladder dilation, sludge, and trace pericholic fluid collection. Exam not consistent with cholecystitis but given persistent shock considered a potential source. Also with potential right lower lobe atelectasis with possible overlying infection.  End date of IV abx: Daptomycin /vancomycin 10/7/2020 and Levofloxacin 9/30/2020.    Transferred to [facility] for SNF PT/OT.    ________________________________________________________    Today:  The patient is scheduled for discharge home on 12/3 with home health services for nurse, CNA, PT/OT. DME: semi electric hospital bed with extension, portable home oxygen, bedside commode chair, rolling walker. The patient needs O2 at 2L NC continuously due to O2 sat at 82% on room air at rest.   He currently sitting up in the wheelchair with no acute distress. Appears comfortable and has no complaints. Denies SOB, CP, pain, chills, dysuria, constipation.  LCTAB with no edema.  Dressing to left foot dry and intact. Wounds stable.       Review of Systems  General ROS: negative for chills, fever or weight loss  Psychological ROS: negative for hallucination, depression or suicidal ideation  Ophthalmic ROS: negative for blurry vision, photophobia or eye pain  ENT ROS: negative for epistaxis, sore throat or rhinorrhea  Respiratory ROS: no cough, shortness of breath, or wheezing  Cardiovascular ROS: no chest pain or dyspnea on exertion  Gastrointestinal ROS: no abdominal pain, change in bowel habits, or black/ bloody stools;   Genito-Urinary ROS: no dysuria, trouble voiding, or hematuria  Musculoskeletal ROS: + gait disturbance or muscular weakness  Neurological ROS: no syncope or seizures; no ataxia  Dermatological ROS: + wounds to LLE          PAST HISTORY:     Past Medical History:   Diagnosis Date    CHF (congestive heart failure)     COPD (chronic obstructive pulmonary disease)     Coronary artery disease     Diabetes mellitus     Diabetes mellitus type II     DM (diabetes mellitus) type II uncontrolled with renal manifestation 9/4/2013    Hyperlipidemia     Hypertension     Postablative hypothyroidism 12/6/2018       Past Surgical History:   Procedure Laterality Date    APPENDECTOMY      ARTHROSCOPY OF KNEE Right  9/7/2020    Procedure: ARTHROSCOPY, KNEE, RIGHT ;  Surgeon: You Bhatia MD;  Location: [facility] OR [address] [place] [address];  Service: Orthopedics;  Laterality: Right;    CHOLECYSTECTOMY      CORONARY ANGIOPLASTY WITH STENT PLACEMENT      TONSILLECTOMY      TRANSESOPHAGEAL ECHOCARDIOGRAPHY N/A 9/4/2020    Procedure: ECHOCARDIOGRAM, TRANSESOPHAGEAL;  Surgeon: [facility] Diagnostic Provider;  Location: [facility] EP LAB;  Service: Anesthesiology;  Laterality: N/A;    TRANSESOPHAGEAL ECHOCARDIOGRAPHY N/A 9/3/2020    Procedure: ECHOCARDIOGRAM, TRANSESOPHAGEAL;  Surgeon: [facility] Diagnostic Provider;  Location: [facility] EP LAB;  Service: Anesthesiology;  Laterality: N/A;       Family History   Problem Relation Age of Onset    Heart disease Mother     No Known Problems Father     No Known Problems Sister     Hypertension Son     No Known Problems Son     No Known Problems Son          MEDICATIONS & ALLERGIES:     Current Outpatient Medications on File Prior to Visit   Medication Sig Dispense Refill    ACCU-CHEK FASTCLIX LANCET DRUM Misc TEST FOUR TIMES DAILY (Patient taking differently: Test twice daily) 408 each 3    ACCU-CHEK RAMIREZ Misc USE AS DIRECTED 1 each 0    ACCU-CHEK SMARTVIEW TEST STRIP Strp TEST FOUR TIMES DAILY (Patient taking differently: Test twice daily) 400 strip 3    albuterol (PROVENTIL) 2.5 mg /3 mL (0.083 %) nebulizer solution INHALE THE CONTENTS OF 1 VIAL VIA NEBULIZER TWICE DAILY 360 mL 3    albuterol (PROVENTIL/VENTOLIN HFA) 90 mcg/actuation inhaler Inhale 2 puffs into the lungs every 6 (six) hours as needed for Wheezing or Shortness of Breath. Rescue      artificial tears (ISOPTO TEARS) 0.5 % ophthalmic solution Place 1 drop into both eyes 3 (three) times daily.      aspirin (ECOTRIN) 81 MG EC tablet Take 1 tablet (81 mg total) by mouth once daily. 90 tablet 3    atorvastatin (LIPITOR) 10 MG tablet Take 1 tablet (10 mg total) by mouth once daily.      carvedilol (COREG) 25 MG tablet Take 1 tablet (25 mg total) by  mouth 2 (two) times daily. 180 tablet 3    cholecalciferol, vitamin D3, (VITAMIN D3) 25 mcg (1,000 unit) capsule Take 1 capsule (1,000 Units total) by mouth once daily.      ferrous sulfate (FEOSOL) 325 mg (65 mg iron) Tab tablet Take 1 tablet (325 mg total) by mouth once daily.  0    fluticasone furoate-vilanterol (BREO ELLIPTA) 200-25 mcg/dose DsDv diskus inhaler INHALE 1 PUFF INTO THE LUNGS ONCE DAILY. (CONTROLLER) 180 each 3    fluticasone propionate (FLONASE) 50 mcg/actuation nasal spray USE 1 SPRAY IN EACH NOSTRIL ONE TIME DAILY 32 g 3    furosemide (LASIX) 40 MG tablet Take 2 tablets (80 mg total) by mouth 2 (two) times daily before meals. 120 tablet 11    gabapentin (NEURONTIN) 100 MG capsule Take 1 capsule (100 mg total) by mouth every evening. 90 capsule 3    insulin NPH-insulin regular, 70/30, (NOVOLIN 70/30 U-100 INSULIN) 100 unit/mL (70-30) injection Inject 14 Units into the skin before breakfast AND 12 Units before dinner. [id number] MINUTES BEFORE BREAKFAST AND DINNER. 20 mL 1    insulin syringe-needle U-100 (INSULIN SYRINGE) 1 mL 30 gauge x 5/16 Syrg 1 each by Misc.(Non-Drug; Combo Route) route 2 (two) times daily. Use twice daily as directed with insulin vials. 200 each 6    levothyroxine (SYNTHROID) 75 MCG tablet Take 1 tablet (75 mcg total) by mouth once daily. 90 tablet 3    magnesium oxide (MAG-OX) 400 mg (241.3 mg magnesium) tablet Take 0.5 tablets (200 mg total) by mouth 2 (two) times daily.  0    nitroGLYCERIN (NITROSTAT) 0.4 MG SL tablet PLACE 1 TABLET UNDER THE TONGUE EVERY 5  MINUTES AS NEEDED FOR CHEST PAIN 25 tablet 3    polyethylene glycol (GLYCOLAX) 17 gram PwPk Take 17 g by mouth 2 (two) times daily as needed (Constipation).  0     No current facility-administered medications on file prior to visit.         Review of patient's allergies indicates:   Allergen Reactions    Vicodin [hydrocodone-acetaminophen] Itching       OBJECTIVE:     Vital signs:   /79, pulse  90, resp 18, sp02 93% 2L NC, temp 97.9    Physical Exam:  General appearance: alert, cooperative, no distress  Constitutional:Oriented to person and year  + appears well-developed and well-nourished.   HEENT: Normocephalic, atraumatic,  Eyes: conjunctivae/corneas clear, PERRL  Lungs: clear to auscultation bilaterally  Heart: regular rate and rhythm without rub  Abdomen: soft, non-tender; bowel sounds normoactive  Extremities: extremities symmetric; no clubbing, cyanosis, no edema  Integument: Skin warm and dry to touch  Location: left hallux  Wound type: diabetic ulcer   Measurements:  1.1 x 0.9 x 0.1 cm   Wound bed: epithelial 100%  Odor: no   Drainage: no  Surrounding skin: normal  Wound edges: unattached    Location: left heel  Wound type: unstageable  Measurements: 7.3 x 4.5  x 0.1 cm   Wound bed: eschar 100%  Odor: no   Drainage: no  Surrounding skin: normal  Wound edges: unattached    Location: lateral left foot  Wound type: diabetic ulcer   Measurements: 2.8 x 1.1 cm   Wound bed: 100% slough  Odor: no   Drainage: no  Surrounding skin: normal  Wound edges: unattached      Location: dorsum left foot  Wound type: diabetic ulcer   Measurements: 5.2 x 2.5 x 0.1 cm   Wound bed: 100% slough  Odor: no   Drainage: no  Surrounding skin: normal  Wound edges: unattached  Neurologic: Alert and oriented X 2, +generalized weakness.   Psychiatric: no pressured speech; normal affect; no evidence of impaired cognition       ASSESSMENT & PLAN:     Debility  - 12/1 discharge home on 12/3 with home health services for nurse, CNA, PT/OT. DME: semi electric hospital bed with extension, portable home oxygen, bedside commode chair, rolling walker.       Acute on chronic respiratory failure with hypoxia  Chronic respiratory failure with hypoxia, on home oxygen therapy  On 2 L nasal cannula chronically due to combination of COPD and CHF; titrate as needed.  -History of mixed COPD (based on PFT's from 05/2015 showing mild (small  airways) obstruction, airflow not improved after bronchodilator, and moderate restriction) and HFrEF with chronic respiratory failure on 2L home oxygen.   - 12/1 The patient needs home O2 at 2L NC continuously due to O2 sat at 82% on room air at rest.     Septic shock- resolved  Septic arthritis of Right Knee  Sepsis due to MRSA  Acute on Chronic Cholecystitis  -He is status post drainage and coverage for MRSA   -His work-up for other etiologies of MRSA bacteremia have included negative LUAKS and MRI of lumbar spine looking for endocarditis and spinal abscess, respectively. Stool studies for C.diff on 09/11 were negative.   - Infectious work-up thus far on ICU admission concern for possible right lower lobe HAP. However; CT abdomen pelvis on 9/15 with gallbladder dilation, sludge, and trace pericholic fluid collection.   - End date of IV antibiotics:Daptomycin /vancomycin 10/7/2020   and Levofloxacin 9/30/2020  - Follow up with ID and Gen Surg     Uncontrolled type 2 diabetes mellitus with hyperglycemia, with long-term current use of insulin  Diabetic polyneuropathy associated with type 2 diabetes mellitus  -continue with current regimen  - Monitor for hyperglycemia/hypoglycemia.        Postablative hypothyroidism  Continue home SYNTHROID     Acute kidney injury  -likely ATN from Shock s/p requiring RRT in ICU  -trialysis line removed   -pt is non-oliguric  -Nephrology recommends ambulatory f/u on discharge     Chronic systolic congestive heart failure  -Most recent ECHO in 09/2020 with EF of 25%.   -Unclear if ischemic vs non-ischemic.   -on Carvedilol and lasix  -Associated with chronic hypoxemic respiratory failure requiring 2L nasal cannula  - Dr. Ruiz recently increased Lasix to 80 mg BID     Anemia       -Chronic, no s/s of bleeding       -on ferrous sulfate 325 mg daily        Diabetic foot infection  Bacteremia due to methicillin resistant Staphylococcus aureus  Diabetic ulcer of left foot associated with  type 2 diabetes mellitus  -completed daptomycin and ceftaroline   -- s/p R knee scope I&D 9/7/20. PT/OT:  [facility]  --.wound care with home health        Scheduled Follow-up :  Future Appointments   Date Time Provider Department Center   12/16/2020  9:30 AM Faraz Lugo DPM [facility] POD [facility]   12/16/2020 10:00 AM Dieudonne Ferraro MD [facility] CARDIO [facility]       Post Visit Medication List:     Medication List          Accurate as of December 1, 2020  1:00 PM. If you have any questions, ask your nurse or doctor.            CHANGE how you take these medications    ACCU-CHEK FASTCLIX LANCET DRUM Misc  Generic drug: lancets  TEST FOUR TIMES DAILY  What changed:   · when to take this  · additional instructions     ACCU-CHEK SMARTVIEW TEST STRIP Strp  Generic drug: blood sugar diagnostic  TEST FOUR TIMES DAILY  What changed:   · when to take this  · additional instructions        CONTINUE taking these medications    ACCU-CHEK RAMIREZ Misc  Generic drug: blood-glucose meter  USE AS DIRECTED     * albuterol 90 mcg/actuation inhaler  Commonly known as: PROVENTIL/VENTOLIN HFA     * albuterol 2.5 mg /3 mL (0.083 %) nebulizer solution  Commonly known as: PROVENTIL  INHALE THE CONTENTS OF 1 VIAL VIA NEBULIZER TWICE DAILY     artificial tears 0.5 % ophthalmic solution  Commonly known as: ISOPTO TEARS  Place 1 drop into both eyes 3 (three) times daily.     aspirin 81 MG EC tablet  Commonly known as: ECOTRIN  Take 1 tablet (81 mg total) by mouth once daily.     atorvastatin 10 MG tablet  Commonly known as: LIPITOR  Take 1 tablet (10 mg total) by mouth once daily.     carvediloL 25 MG tablet  Commonly known as: COREG  Take 1 tablet (25 mg total) by mouth 2 (two) times daily.     cholecalciferol (vitamin D3) 25 mcg (1,000 unit) capsule  Commonly known as: VITAMIN D3  Take 1 capsule (1,000 Units total) by mouth once daily.     ferrous sulfate 325 mg (65 mg iron) Tab tablet  Commonly known as: FEOSOL  Take 1 tablet (325 mg total)  by mouth once daily.     fluticasone furoate-vilanteroL 200-25 mcg/dose Dsdv diskus inhaler  Commonly known as: BREO ELLIPTA  INHALE 1 PUFF INTO THE LUNGS ONCE DAILY. (CONTROLLER)     fluticasone propionate 50 mcg/actuation nasal spray  Commonly known as: FLONASE  USE 1 SPRAY IN EACH NOSTRIL ONE TIME DAILY     furosemide 40 MG tablet  Commonly known as: LASIX  Take 2 tablets (80 mg total) by mouth 2 (two) times daily before meals.     gabapentin 100 MG capsule  Commonly known as: NEURONTIN  Take 1 capsule (100 mg total) by mouth every evening.     insulin NPH-insulin regular (70/30) 100 unit/mL (70-30) injection  Commonly known as: NovoLIN 70/30 U-100 Insulin  Inject 14 Units into the skin before breakfast AND 12 Units before dinner. [id number] MINUTES BEFORE BREAKFAST AND DINNER.     insulin syringe-needle U-100 1 mL 30 gauge x 5/16 Syrg  Commonly known as: INSULIN SYRINGE  1 each by Misc.(Non-Drug; Combo Route) route 2 (two) times daily. Use twice daily as directed with insulin vials.     levothyroxine 75 MCG tablet  Commonly known as: SYNTHROID  Take 1 tablet (75 mcg total) by mouth once daily.     magnesium oxide 400 mg (241.3 mg magnesium) tablet  Commonly known as: MAG-OX  Take 0.5 tablets (200 mg total) by mouth 2 (two) times daily.     nitroGLYCERIN 0.4 MG SL tablet  Commonly known as: NITROSTAT  PLACE 1 TABLET UNDER THE TONGUE EVERY 5  MINUTES AS NEEDED FOR CHEST PAIN     polyethylene glycol 17 gram Pwpk  Commonly known as: GLYCOLAX  Take 17 g by mouth 2 (two) times daily as needed (Constipation).         * This list has 2 medication(s) that are the same as other medications prescribed for you. Read the directions carefully, and ask your doctor or other care provider to review them with you.              Total time of the visit 36 min    Signing Physician:  Laurence Herrera NP

## 2020-12-05 PROCEDURE — G0180 MD CERTIFICATION HHA PATIENT: HCPCS | Mod: ,,, | Performed by: INTERNAL MEDICINE

## 2020-12-05 PROCEDURE — G0180 PR HOME HEALTH MD CERTIFICATION: ICD-10-PCS | Mod: ,,, | Performed by: INTERNAL MEDICINE

## 2020-12-11 ENCOUNTER — EXTERNAL HOME HEALTH (OUTPATIENT)
Dept: HOME HEALTH SERVICES | Facility: HOSPITAL | Age: 65
End: 2020-12-11
Payer: MEDICARE

## 2020-12-14 ENCOUNTER — PATIENT OUTREACH (OUTPATIENT)
Dept: ADMINISTRATIVE | Facility: OTHER | Age: 65
End: 2020-12-14

## 2020-12-14 DIAGNOSIS — Z79.4 TYPE 2 DIABETES MELLITUS WITH STAGE 3 CHRONIC KIDNEY DISEASE, WITH LONG-TERM CURRENT USE OF INSULIN, UNSPECIFIED WHETHER STAGE 3A OR 3B CKD: Primary | ICD-10-CM

## 2020-12-14 DIAGNOSIS — E11.22 TYPE 2 DIABETES MELLITUS WITH STAGE 3 CHRONIC KIDNEY DISEASE, WITH LONG-TERM CURRENT USE OF INSULIN, UNSPECIFIED WHETHER STAGE 3A OR 3B CKD: Primary | ICD-10-CM

## 2020-12-14 DIAGNOSIS — N18.30 TYPE 2 DIABETES MELLITUS WITH STAGE 3 CHRONIC KIDNEY DISEASE, WITH LONG-TERM CURRENT USE OF INSULIN, UNSPECIFIED WHETHER STAGE 3A OR 3B CKD: Primary | ICD-10-CM

## 2020-12-14 NOTE — PROGRESS NOTES
Requested updates within Care Everywhere.  Patient's chart was reviewed for overdue VANESSA topics.  Immunizations reconciled.    Orders placed:Hemoglobin A1c

## 2020-12-21 ENCOUNTER — HOSPITAL ENCOUNTER (INPATIENT)
Facility: HOSPITAL | Age: 65
LOS: 10 days | Discharge: NURSING, REHAB OR LONG-TERM CARE | DRG: 291 | End: 2020-12-31
Attending: EMERGENCY MEDICINE | Admitting: INTERNAL MEDICINE
Payer: MEDICARE

## 2020-12-21 DIAGNOSIS — I50.9 CHF (CONGESTIVE HEART FAILURE): ICD-10-CM

## 2020-12-21 DIAGNOSIS — E11.42 TYPE 2 DIABETES MELLITUS WITH DIABETIC POLYNEUROPATHY, WITH LONG-TERM CURRENT USE OF INSULIN: ICD-10-CM

## 2020-12-21 DIAGNOSIS — I42.0 DILATED CARDIOMYOPATHY: ICD-10-CM

## 2020-12-21 DIAGNOSIS — I48.91 ATRIAL FIBRILLATION, UNSPECIFIED TYPE: ICD-10-CM

## 2020-12-21 DIAGNOSIS — I48.91 ATRIAL FIBRILLATION STATUS POST CARDIOVERSION: ICD-10-CM

## 2020-12-21 DIAGNOSIS — E78.2 MIXED HYPERLIPIDEMIA: ICD-10-CM

## 2020-12-21 DIAGNOSIS — Z79.4 TYPE 2 DIABETES MELLITUS WITH DIABETIC POLYNEUROPATHY, WITH LONG-TERM CURRENT USE OF INSULIN: ICD-10-CM

## 2020-12-21 DIAGNOSIS — I50.22 CHRONIC SYSTOLIC CONGESTIVE HEART FAILURE: ICD-10-CM

## 2020-12-21 DIAGNOSIS — I50.43 ACUTE ON CHRONIC COMBINED SYSTOLIC AND DIASTOLIC CONGESTIVE HEART FAILURE: Primary | ICD-10-CM

## 2020-12-21 DIAGNOSIS — R07.9 CHEST PAIN: ICD-10-CM

## 2020-12-21 DIAGNOSIS — I48.92 ATRIAL FLUTTER, UNSPECIFIED TYPE: ICD-10-CM

## 2020-12-21 DIAGNOSIS — R79.89 ELEVATED TROPONIN: ICD-10-CM

## 2020-12-21 DIAGNOSIS — I50.9 HEART FAILURE: ICD-10-CM

## 2020-12-21 DIAGNOSIS — R73.9 HYPERGLYCEMIA: ICD-10-CM

## 2020-12-21 DIAGNOSIS — R06.02 SHORTNESS OF BREATH: ICD-10-CM

## 2020-12-21 DIAGNOSIS — I48.92 ATRIAL FLUTTER: ICD-10-CM

## 2020-12-21 DIAGNOSIS — I48.91 ATRIAL FIBRILLATION AND FLUTTER: ICD-10-CM

## 2020-12-21 DIAGNOSIS — I48.92 ATRIAL FIBRILLATION AND FLUTTER: ICD-10-CM

## 2020-12-21 LAB
ALBUMIN SERPL BCP-MCNC: 2.8 G/DL (ref 3.5–5.2)
ALLENS TEST: ABNORMAL
ALP SERPL-CCNC: 83 U/L (ref 55–135)
ALT SERPL W/O P-5'-P-CCNC: 39 U/L (ref 10–44)
ANION GAP SERPL CALC-SCNC: 10 MMOL/L (ref 8–16)
AST SERPL-CCNC: 29 U/L (ref 10–40)
B-OH-BUTYR BLD STRIP-SCNC: 0 MMOL/L (ref 0–0.5)
BASOPHILS # BLD AUTO: 0.07 K/UL (ref 0–0.2)
BASOPHILS NFR BLD: 0.6 % (ref 0–1.9)
BILIRUB SERPL-MCNC: 1.5 MG/DL (ref 0.1–1)
BNP SERPL-MCNC: 1076 PG/ML (ref 0–99)
BUN SERPL-MCNC: 28 MG/DL (ref 6–30)
BUN SERPL-MCNC: 28 MG/DL (ref 8–23)
CALCIUM SERPL-MCNC: 8.9 MG/DL (ref 8.7–10.5)
CHLORIDE SERPL-SCNC: 96 MMOL/L (ref 95–110)
CHLORIDE SERPL-SCNC: 99 MMOL/L (ref 95–110)
CO2 SERPL-SCNC: 28 MMOL/L (ref 23–29)
CREAT SERPL-MCNC: 1.2 MG/DL (ref 0.5–1.4)
CREAT SERPL-MCNC: 1.4 MG/DL (ref 0.5–1.4)
CTP QC/QA: YES
DIFFERENTIAL METHOD: ABNORMAL
EOSINOPHIL # BLD AUTO: 0.5 K/UL (ref 0–0.5)
EOSINOPHIL NFR BLD: 4.7 % (ref 0–8)
ERYTHROCYTE [DISTWIDTH] IN BLOOD BY AUTOMATED COUNT: 17.5 % (ref 11.5–14.5)
EST. GFR  (AFRICAN AMERICAN): >60 ML/MIN/1.73 M^2
EST. GFR  (NON AFRICAN AMERICAN): 53.1 ML/MIN/1.73 M^2
GLUCOSE SERPL-MCNC: 344 MG/DL (ref 70–110)
GLUCOSE SERPL-MCNC: 359 MG/DL (ref 70–110)
HCO3 UR-SCNC: 29.3 MMOL/L (ref 24–28)
HCT VFR BLD AUTO: 27.7 % (ref 40–54)
HCT VFR BLD CALC: 28 %PCV (ref 36–54)
HGB BLD-MCNC: 8.3 G/DL (ref 14–18)
IMM GRANULOCYTES # BLD AUTO: 0.04 K/UL (ref 0–0.04)
IMM GRANULOCYTES NFR BLD AUTO: 0.4 % (ref 0–0.5)
INR PPP: 1.1 (ref 0.8–1.2)
LYMPHOCYTES # BLD AUTO: 0.9 K/UL (ref 1–4.8)
LYMPHOCYTES NFR BLD: 8.1 % (ref 18–48)
MCH RBC QN AUTO: 26.4 PG (ref 27–31)
MCHC RBC AUTO-ENTMCNC: 30 G/DL (ref 32–36)
MCV RBC AUTO: 88 FL (ref 82–98)
MONOCYTES # BLD AUTO: 0.8 K/UL (ref 0.3–1)
MONOCYTES NFR BLD: 7.3 % (ref 4–15)
NEUTROPHILS # BLD AUTO: 8.8 K/UL (ref 1.8–7.7)
NEUTROPHILS NFR BLD: 78.9 % (ref 38–73)
NRBC BLD-RTO: 0 /100 WBC
PCO2 BLDA: 48.9 MMHG (ref 35–45)
PH SMN: 7.38 [PH] (ref 7.35–7.45)
PLATELET # BLD AUTO: 283 K/UL (ref 150–350)
PMV BLD AUTO: 10.6 FL (ref 9.2–12.9)
PO2 BLDA: 28 MMHG (ref 40–60)
POC BE: 4 MMOL/L
POC IONIZED CALCIUM: 1.09 MMOL/L (ref 1.06–1.42)
POC SATURATED O2: 51 % (ref 95–100)
POC TCO2 (MEASURED): 30 MMOL/L (ref 23–29)
POC TCO2: 31 MMOL/L (ref 24–29)
POCT GLUCOSE: 392 MG/DL (ref 70–110)
POTASSIUM BLD-SCNC: 3.3 MMOL/L (ref 3.5–5.1)
POTASSIUM SERPL-SCNC: 3.5 MMOL/L (ref 3.5–5.1)
PROT SERPL-MCNC: 8.2 G/DL (ref 6–8.4)
PROTHROMBIN TIME: 12.3 SEC (ref 9–12.5)
RBC # BLD AUTO: 3.14 M/UL (ref 4.6–6.2)
SAMPLE: ABNORMAL
SAMPLE: ABNORMAL
SARS-COV-2 RDRP RESP QL NAA+PROBE: NEGATIVE
SITE: ABNORMAL
SODIUM BLD-SCNC: 136 MMOL/L (ref 136–145)
SODIUM SERPL-SCNC: 137 MMOL/L (ref 136–145)
TROPONIN I SERPL DL<=0.01 NG/ML-MCNC: 0.07 NG/ML (ref 0–0.03)
WBC # BLD AUTO: 11.16 K/UL (ref 3.9–12.7)

## 2020-12-21 PROCEDURE — 25000003 PHARM REV CODE 250: Mod: HCNC | Performed by: PHYSICIAN ASSISTANT

## 2020-12-21 PROCEDURE — 99285 PR EMERGENCY DEPT VISIT,LEVEL V: ICD-10-PCS | Mod: ,,, | Performed by: PHYSICIAN ASSISTANT

## 2020-12-21 PROCEDURE — 84484 ASSAY OF TROPONIN QUANT: CPT | Mod: HCNC

## 2020-12-21 PROCEDURE — 96361 HYDRATE IV INFUSION ADD-ON: CPT | Mod: HCNC

## 2020-12-21 PROCEDURE — 12000002 HC ACUTE/MED SURGE SEMI-PRIVATE ROOM: Mod: HCNC

## 2020-12-21 PROCEDURE — 93010 ELECTROCARDIOGRAM REPORT: CPT | Mod: HCNC,,, | Performed by: INTERNAL MEDICINE

## 2020-12-21 PROCEDURE — 80053 COMPREHEN METABOLIC PANEL: CPT | Mod: HCNC

## 2020-12-21 PROCEDURE — 80047 BASIC METABLC PNL IONIZED CA: CPT | Mod: HCNC

## 2020-12-21 PROCEDURE — 99285 EMERGENCY DEPT VISIT HI MDM: CPT | Mod: 25,HCNC

## 2020-12-21 PROCEDURE — 93010 EKG 12-LEAD: ICD-10-PCS | Mod: HCNC,,, | Performed by: INTERNAL MEDICINE

## 2020-12-21 PROCEDURE — 93005 ELECTROCARDIOGRAM TRACING: CPT | Mod: HCNC

## 2020-12-21 PROCEDURE — 82962 GLUCOSE BLOOD TEST: CPT | Mod: HCNC

## 2020-12-21 PROCEDURE — 85610 PROTHROMBIN TIME: CPT | Mod: HCNC

## 2020-12-21 PROCEDURE — U0002 COVID-19 LAB TEST NON-CDC: HCPCS | Mod: HCNC | Performed by: PHYSICIAN ASSISTANT

## 2020-12-21 PROCEDURE — 85025 COMPLETE CBC W/AUTO DIFF WBC: CPT | Mod: HCNC

## 2020-12-21 PROCEDURE — 99285 EMERGENCY DEPT VISIT HI MDM: CPT | Mod: ,,, | Performed by: PHYSICIAN ASSISTANT

## 2020-12-21 PROCEDURE — 82010 KETONE BODYS QUAN: CPT | Mod: HCNC

## 2020-12-21 PROCEDURE — 83880 ASSAY OF NATRIURETIC PEPTIDE: CPT | Mod: HCNC

## 2020-12-21 RX ORDER — FUROSEMIDE 10 MG/ML
80 INJECTION INTRAMUSCULAR; INTRAVENOUS
Status: COMPLETED | OUTPATIENT
Start: 2020-12-21 | End: 2020-12-22

## 2020-12-21 RX ADMIN — SODIUM CHLORIDE 250 ML: 0.9 INJECTION, SOLUTION INTRAVENOUS at 10:12

## 2020-12-22 ENCOUNTER — TELEPHONE (OUTPATIENT)
Dept: PRIMARY CARE CLINIC | Facility: CLINIC | Age: 65
End: 2020-12-22

## 2020-12-22 PROBLEM — I48.92 ATRIAL FIBRILLATION AND FLUTTER: Status: ACTIVE | Noted: 2020-12-22

## 2020-12-22 PROBLEM — R79.89 ELEVATED TROPONIN: Status: ACTIVE | Noted: 2020-12-22

## 2020-12-22 PROBLEM — R73.9 HYPERGLYCEMIA: Status: ACTIVE | Noted: 2020-12-22

## 2020-12-22 PROBLEM — I50.43 ACUTE ON CHRONIC COMBINED SYSTOLIC AND DIASTOLIC CONGESTIVE HEART FAILURE: Status: ACTIVE | Noted: 2020-12-22

## 2020-12-22 PROBLEM — I48.91 ATRIAL FIBRILLATION AND FLUTTER: Status: ACTIVE | Noted: 2020-12-22

## 2020-12-22 PROBLEM — I48.92 ATRIAL FLUTTER: Status: ACTIVE | Noted: 2020-12-22

## 2020-12-22 LAB
ALBUMIN SERPL BCP-MCNC: 2.8 G/DL (ref 3.5–5.2)
ALLENS TEST: ABNORMAL
ALP SERPL-CCNC: 78 U/L (ref 55–135)
ALT SERPL W/O P-5'-P-CCNC: 35 U/L (ref 10–44)
ANION GAP SERPL CALC-SCNC: 9 MMOL/L (ref 8–16)
AST SERPL-CCNC: 20 U/L (ref 10–40)
BACTERIA #/AREA URNS AUTO: ABNORMAL /HPF
BASOPHILS # BLD AUTO: 0.1 K/UL (ref 0–0.2)
BASOPHILS NFR BLD: 1 % (ref 0–1.9)
BILIRUB SERPL-MCNC: 1.6 MG/DL (ref 0.1–1)
BILIRUB UR QL STRIP: NEGATIVE
BILIRUB UR QL STRIP: NEGATIVE
BUN SERPL-MCNC: 27 MG/DL (ref 8–23)
CALCIUM SERPL-MCNC: 8.9 MG/DL (ref 8.7–10.5)
CHLORIDE SERPL-SCNC: 97 MMOL/L (ref 95–110)
CLARITY UR REFRACT.AUTO: ABNORMAL
CLARITY UR REFRACT.AUTO: ABNORMAL
CO2 SERPL-SCNC: 31 MMOL/L (ref 23–29)
COLOR UR AUTO: YELLOW
COLOR UR AUTO: YELLOW
CREAT SERPL-MCNC: 1.5 MG/DL (ref 0.5–1.4)
DELSYS: ABNORMAL
DIFFERENTIAL METHOD: ABNORMAL
EOSINOPHIL # BLD AUTO: 0.7 K/UL (ref 0–0.5)
EOSINOPHIL NFR BLD: 6.5 % (ref 0–8)
ERYTHROCYTE [DISTWIDTH] IN BLOOD BY AUTOMATED COUNT: 17.6 % (ref 11.5–14.5)
EST. GFR  (AFRICAN AMERICAN): 56.5 ML/MIN/1.73 M^2
EST. GFR  (NON AFRICAN AMERICAN): 48.8 ML/MIN/1.73 M^2
ESTIMATED AVG GLUCOSE: 206 MG/DL (ref 68–131)
FLOW: 4
GLUCOSE SERPL-MCNC: 265 MG/DL (ref 70–110)
GLUCOSE UR QL STRIP: ABNORMAL
GLUCOSE UR QL STRIP: ABNORMAL
HBA1C MFR BLD HPLC: 8.8 % (ref 4–5.6)
HCO3 UR-SCNC: 31.9 MMOL/L (ref 24–28)
HCT VFR BLD AUTO: 27.2 % (ref 40–54)
HGB BLD-MCNC: 7.9 G/DL (ref 14–18)
HGB UR QL STRIP: ABNORMAL
HGB UR QL STRIP: ABNORMAL
HYALINE CASTS UR QL AUTO: 0 /LPF
IMM GRANULOCYTES # BLD AUTO: 0.04 K/UL (ref 0–0.04)
IMM GRANULOCYTES NFR BLD AUTO: 0.4 % (ref 0–0.5)
KETONES UR QL STRIP: NEGATIVE
KETONES UR QL STRIP: NEGATIVE
LACTATE SERPL-SCNC: 1 MMOL/L (ref 0.5–2.2)
LEUKOCYTE ESTERASE UR QL STRIP: ABNORMAL
LEUKOCYTE ESTERASE UR QL STRIP: ABNORMAL
LYMPHOCYTES # BLD AUTO: 1.4 K/UL (ref 1–4.8)
LYMPHOCYTES NFR BLD: 13.6 % (ref 18–48)
MAGNESIUM SERPL-MCNC: 1.8 MG/DL (ref 1.6–2.6)
MCH RBC QN AUTO: 25.9 PG (ref 27–31)
MCHC RBC AUTO-ENTMCNC: 29 G/DL (ref 32–36)
MCV RBC AUTO: 89 FL (ref 82–98)
MICROSCOPIC COMMENT: ABNORMAL
MODE: ABNORMAL
MONOCYTES # BLD AUTO: 0.7 K/UL (ref 0.3–1)
MONOCYTES NFR BLD: 7.1 % (ref 4–15)
NEUTROPHILS # BLD AUTO: 7.2 K/UL (ref 1.8–7.7)
NEUTROPHILS NFR BLD: 71.4 % (ref 38–73)
NITRITE UR QL STRIP: NEGATIVE
NITRITE UR QL STRIP: NEGATIVE
NRBC BLD-RTO: 0 /100 WBC
PCO2 BLDA: 48.4 MMHG (ref 35–45)
PH SMN: 7.43 [PH] (ref 7.35–7.45)
PH UR STRIP: 5 [PH] (ref 5–8)
PH UR STRIP: 5 [PH] (ref 5–8)
PHOSPHATE SERPL-MCNC: 3.1 MG/DL (ref 2.7–4.5)
PLATELET # BLD AUTO: 306 K/UL (ref 150–350)
PMV BLD AUTO: 11.5 FL (ref 9.2–12.9)
PO2 BLDA: 41 MMHG (ref 40–60)
POC BE: 7 MMOL/L
POC SATURATED O2: 76 % (ref 95–100)
POC TCO2: 33 MMOL/L (ref 24–29)
POCT GLUCOSE: 167 MG/DL (ref 70–110)
POCT GLUCOSE: 174 MG/DL (ref 70–110)
POCT GLUCOSE: 200 MG/DL (ref 70–110)
POCT GLUCOSE: 251 MG/DL (ref 70–110)
POCT GLUCOSE: 350 MG/DL (ref 70–110)
POTASSIUM SERPL-SCNC: 3.2 MMOL/L (ref 3.5–5.1)
PROCALCITONIN SERPL IA-MCNC: 0.06 NG/ML
PROT SERPL-MCNC: 8.1 G/DL (ref 6–8.4)
PROT UR QL STRIP: ABNORMAL
PROT UR QL STRIP: ABNORMAL
RBC # BLD AUTO: 3.05 M/UL (ref 4.6–6.2)
RBC #/AREA URNS AUTO: 1 /HPF (ref 0–4)
SAMPLE: ABNORMAL
SITE: ABNORMAL
SODIUM SERPL-SCNC: 137 MMOL/L (ref 136–145)
SP GR UR STRIP: 1.01 (ref 1–1.03)
SP GR UR STRIP: 1.01 (ref 1–1.03)
SP02: 96
TROPONIN I SERPL DL<=0.01 NG/ML-MCNC: 0.08 NG/ML (ref 0–0.03)
URN SPEC COLLECT METH UR: ABNORMAL
URN SPEC COLLECT METH UR: ABNORMAL
WBC # BLD AUTO: 10.14 K/UL (ref 3.9–12.7)
WBC #/AREA URNS AUTO: 23 /HPF (ref 0–5)

## 2020-12-22 PROCEDURE — 87186 SC STD MICRODIL/AGAR DIL: CPT | Mod: HCNC

## 2020-12-22 PROCEDURE — 99900035 HC TECH TIME PER 15 MIN (STAT): Mod: HCNC

## 2020-12-22 PROCEDURE — 87086 URINE CULTURE/COLONY COUNT: CPT | Mod: HCNC

## 2020-12-22 PROCEDURE — 63600175 PHARM REV CODE 636 W HCPCS: Mod: HCNC | Performed by: PHYSICIAN ASSISTANT

## 2020-12-22 PROCEDURE — 94761 N-INVAS EAR/PLS OXIMETRY MLT: CPT | Mod: HCNC

## 2020-12-22 PROCEDURE — 96372 THER/PROPH/DIAG INJ SC/IM: CPT | Mod: 59

## 2020-12-22 PROCEDURE — 83036 HEMOGLOBIN GLYCOSYLATED A1C: CPT | Mod: HCNC

## 2020-12-22 PROCEDURE — 83735 ASSAY OF MAGNESIUM: CPT | Mod: HCNC

## 2020-12-22 PROCEDURE — C9399 UNCLASSIFIED DRUGS OR BIOLOG: HCPCS | Mod: HCNC | Performed by: PHYSICIAN ASSISTANT

## 2020-12-22 PROCEDURE — 84484 ASSAY OF TROPONIN QUANT: CPT | Mod: HCNC

## 2020-12-22 PROCEDURE — 87077 CULTURE AEROBIC IDENTIFY: CPT | Mod: HCNC

## 2020-12-22 PROCEDURE — 25000003 PHARM REV CODE 250: Mod: HCNC | Performed by: PHYSICIAN ASSISTANT

## 2020-12-22 PROCEDURE — 84145 PROCALCITONIN (PCT): CPT | Mod: HCNC

## 2020-12-22 PROCEDURE — 25000242 PHARM REV CODE 250 ALT 637 W/ HCPCS: Mod: HCNC | Performed by: PHYSICIAN ASSISTANT

## 2020-12-22 PROCEDURE — 99220 PR INITIAL OBSERVATION CARE,LEVL III: ICD-10-PCS | Mod: HCNC,,, | Performed by: PHYSICIAN ASSISTANT

## 2020-12-22 PROCEDURE — 99214 OFFICE O/P EST MOD 30 MIN: CPT | Mod: HCNC,,, | Performed by: INTERNAL MEDICINE

## 2020-12-22 PROCEDURE — 93005 ELECTROCARDIOGRAM TRACING: CPT | Mod: HCNC

## 2020-12-22 PROCEDURE — 83605 ASSAY OF LACTIC ACID: CPT | Mod: HCNC

## 2020-12-22 PROCEDURE — 99214 PR OFFICE/OUTPT VISIT, EST, LEVL IV, 30-39 MIN: ICD-10-PCS | Mod: HCNC,,, | Performed by: INTERNAL MEDICINE

## 2020-12-22 PROCEDURE — 11000001 HC ACUTE MED/SURG PRIVATE ROOM: Mod: HCNC

## 2020-12-22 PROCEDURE — 27000221 HC OXYGEN, UP TO 24 HOURS: Mod: HCNC

## 2020-12-22 PROCEDURE — 85025 COMPLETE CBC W/AUTO DIFF WBC: CPT | Mod: HCNC

## 2020-12-22 PROCEDURE — 93010 EKG 12-LEAD: ICD-10-PCS | Mod: HCNC,,, | Performed by: INTERNAL MEDICINE

## 2020-12-22 PROCEDURE — 81001 URINALYSIS AUTO W/SCOPE: CPT | Mod: HCNC

## 2020-12-22 PROCEDURE — 96375 TX/PRO/DX INJ NEW DRUG ADDON: CPT

## 2020-12-22 PROCEDURE — 94640 AIRWAY INHALATION TREATMENT: CPT | Mod: HCNC

## 2020-12-22 PROCEDURE — 87088 URINE BACTERIA CULTURE: CPT | Mod: HCNC

## 2020-12-22 PROCEDURE — 96374 THER/PROPH/DIAG INJ IV PUSH: CPT

## 2020-12-22 PROCEDURE — 80053 COMPREHEN METABOLIC PANEL: CPT | Mod: HCNC

## 2020-12-22 PROCEDURE — 99220 PR INITIAL OBSERVATION CARE,LEVL III: CPT | Mod: HCNC,,, | Performed by: PHYSICIAN ASSISTANT

## 2020-12-22 PROCEDURE — 93010 ELECTROCARDIOGRAM REPORT: CPT | Mod: HCNC,,, | Performed by: INTERNAL MEDICINE

## 2020-12-22 PROCEDURE — 84100 ASSAY OF PHOSPHORUS: CPT | Mod: HCNC

## 2020-12-22 PROCEDURE — G0378 HOSPITAL OBSERVATION PER HR: HCPCS | Mod: HCNC

## 2020-12-22 PROCEDURE — 36415 COLL VENOUS BLD VENIPUNCTURE: CPT | Mod: HCNC

## 2020-12-22 PROCEDURE — 96376 TX/PRO/DX INJ SAME DRUG ADON: CPT

## 2020-12-22 RX ORDER — IPRATROPIUM BROMIDE AND ALBUTEROL SULFATE 2.5; .5 MG/3ML; MG/3ML
3 SOLUTION RESPIRATORY (INHALATION) EVERY 4 HOURS PRN
Status: DISCONTINUED | OUTPATIENT
Start: 2020-12-22 | End: 2020-12-31 | Stop reason: HOSPADM

## 2020-12-22 RX ORDER — FUROSEMIDE 10 MG/ML
40 INJECTION INTRAMUSCULAR; INTRAVENOUS
Status: DISCONTINUED | OUTPATIENT
Start: 2020-12-22 | End: 2020-12-22

## 2020-12-22 RX ORDER — ENOXAPARIN SODIUM 100 MG/ML
40 INJECTION SUBCUTANEOUS EVERY 24 HOURS
Status: DISCONTINUED | OUTPATIENT
Start: 2020-12-22 | End: 2020-12-22

## 2020-12-22 RX ORDER — GABAPENTIN 100 MG/1
100 CAPSULE ORAL NIGHTLY
Status: DISCONTINUED | OUTPATIENT
Start: 2020-12-22 | End: 2020-12-31 | Stop reason: HOSPADM

## 2020-12-22 RX ORDER — POLYETHYLENE GLYCOL 3350 17 G/17G
17 POWDER, FOR SOLUTION ORAL DAILY
Status: DISCONTINUED | OUTPATIENT
Start: 2020-12-22 | End: 2020-12-31 | Stop reason: HOSPADM

## 2020-12-22 RX ORDER — TALC
6 POWDER (GRAM) TOPICAL NIGHTLY PRN
Status: DISCONTINUED | OUTPATIENT
Start: 2020-12-22 | End: 2020-12-31 | Stop reason: HOSPADM

## 2020-12-22 RX ORDER — POTASSIUM CHLORIDE 20 MEQ/1
40 TABLET, EXTENDED RELEASE ORAL EVERY 4 HOURS
Status: COMPLETED | OUTPATIENT
Start: 2020-12-22 | End: 2020-12-22

## 2020-12-22 RX ORDER — LEVOTHYROXINE SODIUM 75 UG/1
75 TABLET ORAL
Status: DISCONTINUED | OUTPATIENT
Start: 2020-12-22 | End: 2020-12-31 | Stop reason: HOSPADM

## 2020-12-22 RX ORDER — FLUTICASONE PROPIONATE 50 MCG
1 SPRAY, SUSPENSION (ML) NASAL DAILY
Status: DISCONTINUED | OUTPATIENT
Start: 2020-12-22 | End: 2020-12-31 | Stop reason: HOSPADM

## 2020-12-22 RX ORDER — GLUCAGON 1 MG
1 KIT INJECTION
Status: DISCONTINUED | OUTPATIENT
Start: 2020-12-22 | End: 2020-12-31 | Stop reason: HOSPADM

## 2020-12-22 RX ORDER — IBUPROFEN 200 MG
16 TABLET ORAL
Status: DISCONTINUED | OUTPATIENT
Start: 2020-12-22 | End: 2020-12-31 | Stop reason: HOSPADM

## 2020-12-22 RX ORDER — INSULIN ASPART 100 [IU]/ML
3 INJECTION, SOLUTION INTRAVENOUS; SUBCUTANEOUS
Status: DISCONTINUED | OUTPATIENT
Start: 2020-12-22 | End: 2020-12-25

## 2020-12-22 RX ORDER — CEFTRIAXONE 1 G/1
1 INJECTION, POWDER, FOR SOLUTION INTRAMUSCULAR; INTRAVENOUS
Status: DISCONTINUED | OUTPATIENT
Start: 2020-12-22 | End: 2020-12-25

## 2020-12-22 RX ORDER — ATORVASTATIN CALCIUM 10 MG/1
10 TABLET, FILM COATED ORAL DAILY
Status: DISCONTINUED | OUTPATIENT
Start: 2020-12-22 | End: 2020-12-31 | Stop reason: HOSPADM

## 2020-12-22 RX ORDER — FLUTICASONE FUROATE AND VILANTEROL 200; 25 UG/1; UG/1
1 POWDER RESPIRATORY (INHALATION) DAILY
Status: DISCONTINUED | OUTPATIENT
Start: 2020-12-22 | End: 2020-12-31 | Stop reason: HOSPADM

## 2020-12-22 RX ORDER — FUROSEMIDE 10 MG/ML
80 INJECTION INTRAMUSCULAR; INTRAVENOUS 3 TIMES DAILY
Status: DISCONTINUED | OUTPATIENT
Start: 2020-12-22 | End: 2020-12-24

## 2020-12-22 RX ORDER — ALBUTEROL SULFATE 2.5 MG/.5ML
2.5 SOLUTION RESPIRATORY (INHALATION) EVERY 12 HOURS
Status: DISCONTINUED | OUTPATIENT
Start: 2020-12-22 | End: 2020-12-31 | Stop reason: HOSPADM

## 2020-12-22 RX ORDER — CARVEDILOL 25 MG/1
25 TABLET ORAL 2 TIMES DAILY
Status: DISCONTINUED | OUTPATIENT
Start: 2020-12-22 | End: 2020-12-31 | Stop reason: HOSPADM

## 2020-12-22 RX ORDER — INSULIN ASPART 100 [IU]/ML
0-5 INJECTION, SOLUTION INTRAVENOUS; SUBCUTANEOUS
Status: DISCONTINUED | OUTPATIENT
Start: 2020-12-22 | End: 2020-12-31 | Stop reason: HOSPADM

## 2020-12-22 RX ORDER — ASPIRIN 81 MG/1
81 TABLET ORAL DAILY
Status: DISCONTINUED | OUTPATIENT
Start: 2020-12-22 | End: 2020-12-31 | Stop reason: HOSPADM

## 2020-12-22 RX ORDER — IBUPROFEN 200 MG
24 TABLET ORAL
Status: DISCONTINUED | OUTPATIENT
Start: 2020-12-22 | End: 2020-12-31 | Stop reason: HOSPADM

## 2020-12-22 RX ORDER — ONDANSETRON 8 MG/1
8 TABLET, ORALLY DISINTEGRATING ORAL EVERY 8 HOURS PRN
Status: DISCONTINUED | OUTPATIENT
Start: 2020-12-22 | End: 2020-12-31 | Stop reason: HOSPADM

## 2020-12-22 RX ORDER — ALBUTEROL SULFATE 90 UG/1
2 AEROSOL, METERED RESPIRATORY (INHALATION) EVERY 6 HOURS PRN
Status: DISCONTINUED | OUTPATIENT
Start: 2020-12-22 | End: 2020-12-31 | Stop reason: HOSPADM

## 2020-12-22 RX ORDER — BISACODYL 10 MG
10 SUPPOSITORY, RECTAL RECTAL DAILY PRN
Status: DISCONTINUED | OUTPATIENT
Start: 2020-12-22 | End: 2020-12-31 | Stop reason: HOSPADM

## 2020-12-22 RX ORDER — FERROUS SULFATE 325(65) MG
325 TABLET, DELAYED RELEASE (ENTERIC COATED) ORAL DAILY
Status: DISCONTINUED | OUTPATIENT
Start: 2020-12-22 | End: 2020-12-31 | Stop reason: HOSPADM

## 2020-12-22 RX ORDER — ACETAMINOPHEN 325 MG/1
650 TABLET ORAL EVERY 4 HOURS PRN
Status: DISCONTINUED | OUTPATIENT
Start: 2020-12-22 | End: 2020-12-31 | Stop reason: HOSPADM

## 2020-12-22 RX ADMIN — HYPROMELLOSE 2910 1 DROP: 5 SOLUTION OPHTHALMIC at 08:12

## 2020-12-22 RX ADMIN — CEFTRIAXONE SODIUM 1 G: 1 INJECTION, POWDER, FOR SOLUTION INTRAMUSCULAR; INTRAVENOUS at 12:12

## 2020-12-22 RX ADMIN — FERROUS SULFATE TAB EC 325 MG (65 MG FE EQUIVALENT) 325 MG: 325 (65 FE) TABLET DELAYED RESPONSE at 08:12

## 2020-12-22 RX ADMIN — POLYETHYLENE GLYCOL 3350 17 G: 17 POWDER, FOR SOLUTION ORAL at 08:12

## 2020-12-22 RX ADMIN — Medication 200 MG: at 08:12

## 2020-12-22 RX ADMIN — FUROSEMIDE 40 MG: 10 INJECTION, SOLUTION INTRAMUSCULAR; INTRAVENOUS at 05:12

## 2020-12-22 RX ADMIN — HYPROMELLOSE 2910 1 DROP: 5 SOLUTION OPHTHALMIC at 05:12

## 2020-12-22 RX ADMIN — CARVEDILOL 25 MG: 25 TABLET, FILM COATED ORAL at 08:12

## 2020-12-22 RX ADMIN — ALBUTEROL SULFATE 2.5 MG: 2.5 SOLUTION RESPIRATORY (INHALATION) at 07:12

## 2020-12-22 RX ADMIN — INSULIN ASPART 3 UNITS: 100 INJECTION, SOLUTION INTRAVENOUS; SUBCUTANEOUS at 05:12

## 2020-12-22 RX ADMIN — INSULIN ASPART 3 UNITS: 100 INJECTION, SOLUTION INTRAVENOUS; SUBCUTANEOUS at 12:12

## 2020-12-22 RX ADMIN — ALBUTEROL SULFATE 2 PUFF: 90 AEROSOL, METERED RESPIRATORY (INHALATION) at 04:12

## 2020-12-22 RX ADMIN — ACETAMINOPHEN 650 MG: 325 TABLET ORAL at 05:12

## 2020-12-22 RX ADMIN — APIXABAN 5 MG: 5 TABLET, FILM COATED ORAL at 08:12

## 2020-12-22 RX ADMIN — POTASSIUM CHLORIDE 40 MEQ: 1500 TABLET, EXTENDED RELEASE ORAL at 05:12

## 2020-12-22 RX ADMIN — FLUTICASONE PROPIONATE 50 MCG: 50 SPRAY, METERED NASAL at 08:12

## 2020-12-22 RX ADMIN — GABAPENTIN 100 MG: 100 CAPSULE ORAL at 04:12

## 2020-12-22 RX ADMIN — INSULIN DETEMIR 10 UNITS: 100 INJECTION, SOLUTION SUBCUTANEOUS at 08:12

## 2020-12-22 RX ADMIN — GABAPENTIN 100 MG: 100 CAPSULE ORAL at 08:12

## 2020-12-22 RX ADMIN — FUROSEMIDE 80 MG: 10 INJECTION, SOLUTION INTRAMUSCULAR; INTRAVENOUS at 12:12

## 2020-12-22 RX ADMIN — ASPIRIN 81 MG: 81 TABLET, COATED ORAL at 08:12

## 2020-12-22 RX ADMIN — INSULIN ASPART 3 UNITS: 100 INJECTION, SOLUTION INTRAVENOUS; SUBCUTANEOUS at 08:12

## 2020-12-22 RX ADMIN — ATORVASTATIN CALCIUM 10 MG: 10 TABLET, FILM COATED ORAL at 08:12

## 2020-12-22 RX ADMIN — FUROSEMIDE 80 MG: 10 INJECTION, SOLUTION INTRAMUSCULAR; INTRAVENOUS at 08:12

## 2020-12-22 RX ADMIN — INSULIN DETEMIR 10 UNITS: 100 INJECTION, SOLUTION SUBCUTANEOUS at 01:12

## 2020-12-22 RX ADMIN — FUROSEMIDE 40 MG: 10 INJECTION, SOLUTION INTRAMUSCULAR; INTRAVENOUS at 04:12

## 2020-12-22 RX ADMIN — LEVOTHYROXINE SODIUM 75 MCG: 75 TABLET ORAL at 05:12

## 2020-12-22 RX ADMIN — POTASSIUM CHLORIDE 40 MEQ: 1500 TABLET, EXTENDED RELEASE ORAL at 12:12

## 2020-12-22 NOTE — TELEPHONE ENCOUNTER
Please call his wife (Teresa [phone]) to get patient set up for [facility] appionent.    Please let Teresa know that he has not shown up for his appointments with me for almost a year! She will need to be involved in order to help me get him healthy. He doesn't come to his appointments!    Thanks,  KRISTINA

## 2020-12-22 NOTE — TELEPHONE ENCOUNTER
----- Message from Gertrudis Magdaleno RN sent at 12/22/2020  1:23 PM CST -----  Hello, This patient is currently in the hospital and will need follow- up care. His wife is now understanding that she needs to be more involved in his care, because of his poor compliance. Her name is Teresa [phone].       Gertrudis Magdaleno RN Case Manager   #[phone]

## 2020-12-23 LAB
ALBUMIN SERPL BCP-MCNC: 2.7 G/DL (ref 3.5–5.2)
ALP SERPL-CCNC: 89 U/L (ref 55–135)
ALT SERPL W/O P-5'-P-CCNC: 35 U/L (ref 10–44)
ANION GAP SERPL CALC-SCNC: 12 MMOL/L (ref 8–16)
ASCENDING AORTA: 2.98 CM
AST SERPL-CCNC: 29 U/L (ref 10–40)
AV INDEX (PROSTH): 0.76
AV MEAN GRADIENT: 3 MMHG
AV PEAK GRADIENT: 3 MMHG
AV VALVE AREA: 2.42 CM2
AV VELOCITY RATIO: 0.72
BASOPHILS # BLD AUTO: 0.08 K/UL (ref 0–0.2)
BASOPHILS NFR BLD: 0.8 % (ref 0–1.9)
BILIRUB SERPL-MCNC: 1.2 MG/DL (ref 0.1–1)
BSA FOR ECHO PROCEDURE: 2.5 M2
BUN SERPL-MCNC: 32 MG/DL (ref 8–23)
CALCIUM SERPL-MCNC: 8.9 MG/DL (ref 8.7–10.5)
CHLORIDE SERPL-SCNC: 99 MMOL/L (ref 95–110)
CO2 SERPL-SCNC: 29 MMOL/L (ref 23–29)
CREAT SERPL-MCNC: 1.4 MG/DL (ref 0.5–1.4)
CV ECHO LV RWT: 0.25 CM
DIFFERENTIAL METHOD: ABNORMAL
DOP CALC AO PEAK VEL: 0.93 M/S
DOP CALC AO VTI: 14.6 CM
DOP CALC LVOT AREA: 3.2 CM2
DOP CALC LVOT DIAMETER: 2.02 CM
DOP CALC LVOT PEAK VEL: 0.67 M/S
DOP CALC LVOT STROKE VOLUME: 35.33 CM3
DOP CALCLVOT PEAK VEL VTI: 11.03 CM
E/E' RATIO: 14.17 M/S
ECHO LV POSTERIOR WALL: 0.74 CM (ref 0.6–1.1)
EOSINOPHIL # BLD AUTO: 1.3 K/UL (ref 0–0.5)
EOSINOPHIL NFR BLD: 12 % (ref 0–8)
ERYTHROCYTE [DISTWIDTH] IN BLOOD BY AUTOMATED COUNT: 17.4 % (ref 11.5–14.5)
EST. GFR  (AFRICAN AMERICAN): >60 ML/MIN/1.73 M^2
EST. GFR  (NON AFRICAN AMERICAN): 53.1 ML/MIN/1.73 M^2
FRACTIONAL SHORTENING: 9 % (ref 28–44)
GLUCOSE SERPL-MCNC: 148 MG/DL (ref 70–110)
HCT VFR BLD AUTO: 26.9 % (ref 40–54)
HGB BLD-MCNC: 7.8 G/DL (ref 14–18)
IMM GRANULOCYTES # BLD AUTO: 0.03 K/UL (ref 0–0.04)
IMM GRANULOCYTES NFR BLD AUTO: 0.3 % (ref 0–0.5)
INTERVENTRICULAR SEPTUM: 0.82 CM (ref 0.6–1.1)
LA MAJOR: 5.55 CM
LA MINOR: 5.44 CM
LA WIDTH: 4.39 CM
LEFT ATRIUM SIZE: 4.3 CM
LEFT ATRIUM VOLUME INDEX MOD: 35.7 ML/M2
LEFT ATRIUM VOLUME INDEX: 35.8 ML/M2
LEFT ATRIUM VOLUME MOD: 87.77 CM3
LEFT ATRIUM VOLUME: 88.16 CM3
LEFT INTERNAL DIMENSION IN SYSTOLE: 5.3 CM (ref 2.1–4)
LEFT VENTRICLE DIASTOLIC VOLUME INDEX: 68.45 ML/M2
LEFT VENTRICLE DIASTOLIC VOLUME: 168.43 ML
LEFT VENTRICLE MASS INDEX: 70 G/M2
LEFT VENTRICLE SYSTOLIC VOLUME INDEX: 54.9 ML/M2
LEFT VENTRICLE SYSTOLIC VOLUME: 135.09 ML
LEFT VENTRICULAR INTERNAL DIMENSION IN DIASTOLE: 5.83 CM (ref 3.5–6)
LEFT VENTRICULAR MASS: 171.52 G
LV LATERAL E/E' RATIO: 12.14 M/S
LV SEPTAL E/E' RATIO: 17 M/S
LYMPHOCYTES # BLD AUTO: 1.1 K/UL (ref 1–4.8)
LYMPHOCYTES NFR BLD: 10.6 % (ref 18–48)
MAGNESIUM SERPL-MCNC: 1.9 MG/DL (ref 1.6–2.6)
MCH RBC QN AUTO: 26.2 PG (ref 27–31)
MCHC RBC AUTO-ENTMCNC: 29 G/DL (ref 32–36)
MCV RBC AUTO: 90 FL (ref 82–98)
MONOCYTES # BLD AUTO: 0.7 K/UL (ref 0.3–1)
MONOCYTES NFR BLD: 7 % (ref 4–15)
MV PEAK E VEL: 0.85 M/S
NEUTROPHILS # BLD AUTO: 7.4 K/UL (ref 1.8–7.7)
NEUTROPHILS NFR BLD: 69.3 % (ref 38–73)
NRBC BLD-RTO: 0 /100 WBC
PHOSPHATE SERPL-MCNC: 3.4 MG/DL (ref 2.7–4.5)
PISA TR MAX VEL: 3.3 M/S
PLATELET # BLD AUTO: 281 K/UL (ref 150–350)
PMV BLD AUTO: 11.4 FL (ref 9.2–12.9)
POCT GLUCOSE: 182 MG/DL (ref 70–110)
POCT GLUCOSE: 200 MG/DL (ref 70–110)
POCT GLUCOSE: 241 MG/DL (ref 70–110)
POCT GLUCOSE: 307 MG/DL (ref 70–110)
POTASSIUM SERPL-SCNC: 3.8 MMOL/L (ref 3.5–5.1)
PROT SERPL-MCNC: 7.9 G/DL (ref 6–8.4)
RA MAJOR: 5.4 CM
RA PRESSURE: 15 MMHG
RA WIDTH: 3.94 CM
RBC # BLD AUTO: 2.98 M/UL (ref 4.6–6.2)
RIGHT VENTRICULAR END-DIASTOLIC DIMENSION: 3.63 CM
RV TISSUE DOPPLER FREE WALL SYSTOLIC VELOCITY 1 (APICAL 4 CHAMBER VIEW): 6.85 CM/S
SINUS: 3.3 CM
SODIUM SERPL-SCNC: 140 MMOL/L (ref 136–145)
STJ: 2.72 CM
TDI LATERAL: 0.07 M/S
TDI SEPTAL: 0.05 M/S
TDI: 0.06 M/S
TR MAX PG: 44 MMHG
TRICUSPID ANNULAR PLANE SYSTOLIC EXCURSION: 1.31 CM
TV REST PULMONARY ARTERY PRESSURE: 59 MMHG
WBC # BLD AUTO: 10.6 K/UL (ref 3.9–12.7)

## 2020-12-23 PROCEDURE — 99900035 HC TECH TIME PER 15 MIN (STAT): Mod: HCNC

## 2020-12-23 PROCEDURE — 85025 COMPLETE CBC W/AUTO DIFF WBC: CPT | Mod: HCNC

## 2020-12-23 PROCEDURE — 11000001 HC ACUTE MED/SURG PRIVATE ROOM: Mod: HCNC

## 2020-12-23 PROCEDURE — 97162 PT EVAL MOD COMPLEX 30 MIN: CPT | Mod: HCNC

## 2020-12-23 PROCEDURE — 94660 CPAP INITIATION&MGMT: CPT | Mod: HCNC

## 2020-12-23 PROCEDURE — 97530 THERAPEUTIC ACTIVITIES: CPT | Mod: HCNC

## 2020-12-23 PROCEDURE — 63600175 PHARM REV CODE 636 W HCPCS: Mod: HCNC | Performed by: PHYSICIAN ASSISTANT

## 2020-12-23 PROCEDURE — 25000003 PHARM REV CODE 250: Mod: HCNC | Performed by: PHYSICIAN ASSISTANT

## 2020-12-23 PROCEDURE — 36415 COLL VENOUS BLD VENIPUNCTURE: CPT | Mod: HCNC

## 2020-12-23 PROCEDURE — 84100 ASSAY OF PHOSPHORUS: CPT | Mod: HCNC

## 2020-12-23 PROCEDURE — 99226 PR SUBSEQUENT OBSERVATION CARE,LEVEL III: ICD-10-PCS | Mod: HCNC,,, | Performed by: PHYSICIAN ASSISTANT

## 2020-12-23 PROCEDURE — 96376 TX/PRO/DX INJ SAME DRUG ADON: CPT

## 2020-12-23 PROCEDURE — 99226 PR SUBSEQUENT OBSERVATION CARE,LEVEL III: CPT | Mod: HCNC,,, | Performed by: PHYSICIAN ASSISTANT

## 2020-12-23 PROCEDURE — 99233 PR SUBSEQUENT HOSPITAL CARE,LEVL III: ICD-10-PCS | Mod: HCNC,,, | Performed by: INTERNAL MEDICINE

## 2020-12-23 PROCEDURE — 27000221 HC OXYGEN, UP TO 24 HOURS: Mod: HCNC

## 2020-12-23 PROCEDURE — 94640 AIRWAY INHALATION TREATMENT: CPT | Mod: HCNC

## 2020-12-23 PROCEDURE — 83735 ASSAY OF MAGNESIUM: CPT | Mod: HCNC

## 2020-12-23 PROCEDURE — 99233 SBSQ HOSP IP/OBS HIGH 50: CPT | Mod: HCNC,,, | Performed by: INTERNAL MEDICINE

## 2020-12-23 PROCEDURE — 94761 N-INVAS EAR/PLS OXIMETRY MLT: CPT | Mod: HCNC

## 2020-12-23 PROCEDURE — 27000190 HC CPAP FULL FACE MASK W/VALVE: Mod: HCNC

## 2020-12-23 PROCEDURE — 80053 COMPREHEN METABOLIC PANEL: CPT | Mod: HCNC

## 2020-12-23 PROCEDURE — 97167 OT EVAL HIGH COMPLEX 60 MIN: CPT | Mod: HCNC

## 2020-12-23 PROCEDURE — 25000242 PHARM REV CODE 250 ALT 637 W/ HCPCS: Mod: HCNC | Performed by: PHYSICIAN ASSISTANT

## 2020-12-23 PROCEDURE — 96372 THER/PROPH/DIAG INJ SC/IM: CPT | Mod: 59

## 2020-12-23 RX ORDER — POTASSIUM CHLORIDE 20 MEQ/1
40 TABLET, EXTENDED RELEASE ORAL ONCE
Status: COMPLETED | OUTPATIENT
Start: 2020-12-23 | End: 2020-12-23

## 2020-12-23 RX ORDER — SACUBITRIL AND VALSARTAN 24; 26 MG/1; MG/1
1 TABLET, FILM COATED ORAL 2 TIMES DAILY
Qty: 60 TABLET | Refills: 0 | Status: SHIPPED | OUTPATIENT
Start: 2020-12-23 | End: 2020-12-31 | Stop reason: HOSPADM

## 2020-12-23 RX ADMIN — ALBUTEROL SULFATE 2.5 MG: 2.5 SOLUTION RESPIRATORY (INHALATION) at 08:12

## 2020-12-23 RX ADMIN — GABAPENTIN 100 MG: 100 CAPSULE ORAL at 09:12

## 2020-12-23 RX ADMIN — INSULIN ASPART 3 UNITS: 100 INJECTION, SOLUTION INTRAVENOUS; SUBCUTANEOUS at 12:12

## 2020-12-23 RX ADMIN — FLUTICASONE PROPIONATE 50 MCG: 50 SPRAY, METERED NASAL at 08:12

## 2020-12-23 RX ADMIN — LEVOTHYROXINE SODIUM 75 MCG: 75 TABLET ORAL at 05:12

## 2020-12-23 RX ADMIN — CEFTRIAXONE SODIUM 1 G: 1 INJECTION, POWDER, FOR SOLUTION INTRAMUSCULAR; INTRAVENOUS at 12:12

## 2020-12-23 RX ADMIN — FUROSEMIDE 80 MG: 10 INJECTION, SOLUTION INTRAMUSCULAR; INTRAVENOUS at 09:12

## 2020-12-23 RX ADMIN — FUROSEMIDE 80 MG: 10 INJECTION, SOLUTION INTRAMUSCULAR; INTRAVENOUS at 08:12

## 2020-12-23 RX ADMIN — ASPIRIN 81 MG: 81 TABLET, COATED ORAL at 08:12

## 2020-12-23 RX ADMIN — POTASSIUM CHLORIDE 40 MEQ: 1500 TABLET, EXTENDED RELEASE ORAL at 12:12

## 2020-12-23 RX ADMIN — ATORVASTATIN CALCIUM 10 MG: 10 TABLET, FILM COATED ORAL at 08:12

## 2020-12-23 RX ADMIN — Medication 200 MG: at 08:12

## 2020-12-23 RX ADMIN — HYPROMELLOSE 2910 1 DROP: 5 SOLUTION OPHTHALMIC at 09:12

## 2020-12-23 RX ADMIN — ALBUTEROL SULFATE 2.5 MG: 2.5 SOLUTION RESPIRATORY (INHALATION) at 09:12

## 2020-12-23 RX ADMIN — INSULIN ASPART 3 UNITS: 100 INJECTION, SOLUTION INTRAVENOUS; SUBCUTANEOUS at 08:12

## 2020-12-23 RX ADMIN — POLYETHYLENE GLYCOL 3350 17 G: 17 POWDER, FOR SOLUTION ORAL at 08:12

## 2020-12-23 RX ADMIN — CARVEDILOL 25 MG: 25 TABLET, FILM COATED ORAL at 08:12

## 2020-12-23 RX ADMIN — FUROSEMIDE 80 MG: 10 INJECTION, SOLUTION INTRAMUSCULAR; INTRAVENOUS at 04:12

## 2020-12-23 RX ADMIN — INSULIN ASPART 2 UNITS: 100 INJECTION, SOLUTION INTRAVENOUS; SUBCUTANEOUS at 04:12

## 2020-12-23 RX ADMIN — APIXABAN 5 MG: 5 TABLET, FILM COATED ORAL at 09:12

## 2020-12-23 RX ADMIN — INSULIN ASPART 3 UNITS: 100 INJECTION, SOLUTION INTRAVENOUS; SUBCUTANEOUS at 04:12

## 2020-12-23 RX ADMIN — FLUTICASONE FUROATE AND VILANTEROL TRIFENATATE 1 PUFF: 200; 25 POWDER RESPIRATORY (INHALATION) at 08:12

## 2020-12-23 RX ADMIN — INSULIN DETEMIR 10 UNITS: 100 INJECTION, SOLUTION SUBCUTANEOUS at 09:12

## 2020-12-23 RX ADMIN — INSULIN ASPART 2 UNITS: 100 INJECTION, SOLUTION INTRAVENOUS; SUBCUTANEOUS at 09:12

## 2020-12-23 RX ADMIN — APIXABAN 5 MG: 5 TABLET, FILM COATED ORAL at 08:12

## 2020-12-23 RX ADMIN — HYPROMELLOSE 2910 1 DROP: 5 SOLUTION OPHTHALMIC at 04:12

## 2020-12-23 RX ADMIN — FERROUS SULFATE TAB EC 325 MG (65 MG FE EQUIVALENT) 325 MG: 325 (65 FE) TABLET DELAYED RESPONSE at 08:12

## 2020-12-23 RX ADMIN — Medication 200 MG: at 09:12

## 2020-12-23 RX ADMIN — HYPROMELLOSE 2910 1 DROP: 5 SOLUTION OPHTHALMIC at 08:12

## 2020-12-23 RX ADMIN — CARVEDILOL 25 MG: 25 TABLET, FILM COATED ORAL at 09:12

## 2020-12-24 LAB
ALBUMIN SERPL BCP-MCNC: 2.7 G/DL (ref 3.5–5.2)
ALP SERPL-CCNC: 106 U/L (ref 55–135)
ALT SERPL W/O P-5'-P-CCNC: 37 U/L (ref 10–44)
ANION GAP SERPL CALC-SCNC: 13 MMOL/L (ref 8–16)
AST SERPL-CCNC: 26 U/L (ref 10–40)
BACTERIA UR CULT: ABNORMAL
BASOPHILS # BLD AUTO: 0.09 K/UL (ref 0–0.2)
BASOPHILS NFR BLD: 0.9 % (ref 0–1.9)
BILIRUB SERPL-MCNC: 1.1 MG/DL (ref 0.1–1)
BUN SERPL-MCNC: 35 MG/DL (ref 8–23)
CALCIUM SERPL-MCNC: 8.9 MG/DL (ref 8.7–10.5)
CHLORIDE SERPL-SCNC: 99 MMOL/L (ref 95–110)
CO2 SERPL-SCNC: 28 MMOL/L (ref 23–29)
CREAT SERPL-MCNC: 1.5 MG/DL (ref 0.5–1.4)
DIFFERENTIAL METHOD: ABNORMAL
EOSINOPHIL # BLD AUTO: 0.7 K/UL (ref 0–0.5)
EOSINOPHIL NFR BLD: 6.9 % (ref 0–8)
ERYTHROCYTE [DISTWIDTH] IN BLOOD BY AUTOMATED COUNT: 17.6 % (ref 11.5–14.5)
EST. GFR  (AFRICAN AMERICAN): 56.5 ML/MIN/1.73 M^2
EST. GFR  (NON AFRICAN AMERICAN): 48.8 ML/MIN/1.73 M^2
GLUCOSE SERPL-MCNC: 181 MG/DL (ref 70–110)
GLUCOSE SERPL-MCNC: 192 MG/DL (ref 70–110)
HCT VFR BLD AUTO: 28.4 % (ref 40–54)
HGB BLD-MCNC: 8.1 G/DL (ref 14–18)
IMM GRANULOCYTES # BLD AUTO: 0.05 K/UL (ref 0–0.04)
IMM GRANULOCYTES NFR BLD AUTO: 0.5 % (ref 0–0.5)
LYMPHOCYTES # BLD AUTO: 1.2 K/UL (ref 1–4.8)
LYMPHOCYTES NFR BLD: 11.7 % (ref 18–48)
MAGNESIUM SERPL-MCNC: 1.9 MG/DL (ref 1.6–2.6)
MCH RBC QN AUTO: 25.8 PG (ref 27–31)
MCHC RBC AUTO-ENTMCNC: 28.5 G/DL (ref 32–36)
MCV RBC AUTO: 90 FL (ref 82–98)
MONOCYTES # BLD AUTO: 0.6 K/UL (ref 0.3–1)
MONOCYTES NFR BLD: 6.3 % (ref 4–15)
NEUTROPHILS # BLD AUTO: 7.4 K/UL (ref 1.8–7.7)
NEUTROPHILS NFR BLD: 73.7 % (ref 38–73)
NRBC BLD-RTO: 0 /100 WBC
PHOSPHATE SERPL-MCNC: 3.5 MG/DL (ref 2.7–4.5)
PLATELET # BLD AUTO: 300 K/UL (ref 150–350)
PMV BLD AUTO: 11.5 FL (ref 9.2–12.9)
POCT GLUCOSE: 202 MG/DL (ref 70–110)
POCT GLUCOSE: 215 MG/DL (ref 70–110)
POCT GLUCOSE: 219 MG/DL (ref 70–110)
POCT GLUCOSE: 241 MG/DL (ref 70–110)
POCT GLUCOSE: 273 MG/DL (ref 70–110)
POCT GLUCOSE: 284 MG/DL (ref 70–110)
POTASSIUM SERPL-SCNC: 3.8 MMOL/L (ref 3.5–5.1)
PROT SERPL-MCNC: 8.3 G/DL (ref 6–8.4)
RBC # BLD AUTO: 3.14 M/UL (ref 4.6–6.2)
SODIUM SERPL-SCNC: 140 MMOL/L (ref 136–145)
WBC # BLD AUTO: 10.1 K/UL (ref 3.9–12.7)

## 2020-12-24 PROCEDURE — 94761 N-INVAS EAR/PLS OXIMETRY MLT: CPT | Mod: HCNC

## 2020-12-24 PROCEDURE — 99232 SBSQ HOSP IP/OBS MODERATE 35: CPT | Mod: HCNC,,, | Performed by: INTERNAL MEDICINE

## 2020-12-24 PROCEDURE — 30200315 PPD INTRADERMAL TEST REV CODE 302: Mod: HCNC | Performed by: PHYSICIAN ASSISTANT

## 2020-12-24 PROCEDURE — 63600175 PHARM REV CODE 636 W HCPCS: Mod: HCNC | Performed by: PHYSICIAN ASSISTANT

## 2020-12-24 PROCEDURE — 99233 SBSQ HOSP IP/OBS HIGH 50: CPT | Mod: HCNC,GC,, | Performed by: PHYSICIAN ASSISTANT

## 2020-12-24 PROCEDURE — 20600001 HC STEP DOWN PRIVATE ROOM: Mod: HCNC

## 2020-12-24 PROCEDURE — 86580 TB INTRADERMAL TEST: CPT | Mod: HCNC | Performed by: PHYSICIAN ASSISTANT

## 2020-12-24 PROCEDURE — 63600175 PHARM REV CODE 636 W HCPCS: Mod: HCNC | Performed by: INTERNAL MEDICINE

## 2020-12-24 PROCEDURE — 99900035 HC TECH TIME PER 15 MIN (STAT): Mod: HCNC

## 2020-12-24 PROCEDURE — 99232 PR SUBSEQUENT HOSPITAL CARE,LEVL II: ICD-10-PCS | Mod: HCNC,,, | Performed by: INTERNAL MEDICINE

## 2020-12-24 PROCEDURE — 94660 CPAP INITIATION&MGMT: CPT | Mod: HCNC

## 2020-12-24 PROCEDURE — 36415 COLL VENOUS BLD VENIPUNCTURE: CPT | Mod: HCNC

## 2020-12-24 PROCEDURE — 85025 COMPLETE CBC W/AUTO DIFF WBC: CPT | Mod: HCNC

## 2020-12-24 PROCEDURE — 25000003 PHARM REV CODE 250: Mod: HCNC | Performed by: PHYSICIAN ASSISTANT

## 2020-12-24 PROCEDURE — 80053 COMPREHEN METABOLIC PANEL: CPT | Mod: HCNC

## 2020-12-24 PROCEDURE — 84100 ASSAY OF PHOSPHORUS: CPT | Mod: HCNC

## 2020-12-24 PROCEDURE — 94640 AIRWAY INHALATION TREATMENT: CPT | Mod: HCNC

## 2020-12-24 PROCEDURE — 25000003 PHARM REV CODE 250: Mod: HCNC | Performed by: INTERNAL MEDICINE

## 2020-12-24 PROCEDURE — 27000221 HC OXYGEN, UP TO 24 HOURS: Mod: HCNC

## 2020-12-24 PROCEDURE — 25000242 PHARM REV CODE 250 ALT 637 W/ HCPCS: Mod: HCNC | Performed by: PHYSICIAN ASSISTANT

## 2020-12-24 PROCEDURE — 99233 PR SUBSEQUENT HOSPITAL CARE,LEVL III: ICD-10-PCS | Mod: HCNC,GC,, | Performed by: PHYSICIAN ASSISTANT

## 2020-12-24 PROCEDURE — 83735 ASSAY OF MAGNESIUM: CPT | Mod: HCNC

## 2020-12-24 RX ORDER — FUROSEMIDE 10 MG/ML
INJECTION INTRAMUSCULAR; INTRAVENOUS
Status: COMPLETED
Start: 2020-12-24 | End: 2020-12-24

## 2020-12-24 RX ORDER — POTASSIUM CHLORIDE 20 MEQ/1
40 TABLET, EXTENDED RELEASE ORAL ONCE
Status: COMPLETED | OUTPATIENT
Start: 2020-12-24 | End: 2020-12-24

## 2020-12-24 RX ORDER — MUPIROCIN 20 MG/G
OINTMENT TOPICAL 2 TIMES DAILY
Status: COMPLETED | OUTPATIENT
Start: 2020-12-24 | End: 2020-12-28

## 2020-12-24 RX ORDER — FUROSEMIDE 10 MG/ML
120 INJECTION INTRAMUSCULAR; INTRAVENOUS ONCE
Status: COMPLETED | OUTPATIENT
Start: 2020-12-24 | End: 2020-12-24

## 2020-12-24 RX ADMIN — FERROUS SULFATE TAB EC 325 MG (65 MG FE EQUIVALENT) 325 MG: 325 (65 FE) TABLET DELAYED RESPONSE at 08:12

## 2020-12-24 RX ADMIN — CEFTRIAXONE SODIUM 1 G: 1 INJECTION, POWDER, FOR SOLUTION INTRAMUSCULAR; INTRAVENOUS at 12:12

## 2020-12-24 RX ADMIN — INSULIN ASPART 3 UNITS: 100 INJECTION, SOLUTION INTRAVENOUS; SUBCUTANEOUS at 12:12

## 2020-12-24 RX ADMIN — GABAPENTIN 100 MG: 100 CAPSULE ORAL at 09:12

## 2020-12-24 RX ADMIN — INSULIN ASPART 2 UNITS: 100 INJECTION, SOLUTION INTRAVENOUS; SUBCUTANEOUS at 12:12

## 2020-12-24 RX ADMIN — INSULIN ASPART 1 UNITS: 100 INJECTION, SOLUTION INTRAVENOUS; SUBCUTANEOUS at 11:12

## 2020-12-24 RX ADMIN — FUROSEMIDE 120 MG: 10 INJECTION, SOLUTION INTRAMUSCULAR; INTRAVENOUS at 01:12

## 2020-12-24 RX ADMIN — ALBUTEROL SULFATE 2.5 MG: 2.5 SOLUTION RESPIRATORY (INHALATION) at 09:12

## 2020-12-24 RX ADMIN — INSULIN DETEMIR 10 UNITS: 100 INJECTION, SOLUTION SUBCUTANEOUS at 10:12

## 2020-12-24 RX ADMIN — LEVOTHYROXINE SODIUM 75 MCG: 75 TABLET ORAL at 05:12

## 2020-12-24 RX ADMIN — TUBERCULIN PURIFIED PROTEIN DERIVATIVE 5 UNITS: 5 INJECTION, SOLUTION INTRADERMAL at 01:12

## 2020-12-24 RX ADMIN — INSULIN ASPART 2 UNITS: 100 INJECTION, SOLUTION INTRAVENOUS; SUBCUTANEOUS at 05:12

## 2020-12-24 RX ADMIN — Medication 200 MG: at 08:12

## 2020-12-24 RX ADMIN — APIXABAN 5 MG: 5 TABLET, FILM COATED ORAL at 08:12

## 2020-12-24 RX ADMIN — MUPIROCIN: 20 OINTMENT TOPICAL at 11:12

## 2020-12-24 RX ADMIN — POLYETHYLENE GLYCOL 3350 17 G: 17 POWDER, FOR SOLUTION ORAL at 08:12

## 2020-12-24 RX ADMIN — FLUTICASONE PROPIONATE 50 MCG: 50 SPRAY, METERED NASAL at 08:12

## 2020-12-24 RX ADMIN — ATORVASTATIN CALCIUM 10 MG: 10 TABLET, FILM COATED ORAL at 08:12

## 2020-12-24 RX ADMIN — FUROSEMIDE 20 MG/HR: 10 INJECTION, SOLUTION INTRAMUSCULAR; INTRAVENOUS at 02:12

## 2020-12-24 RX ADMIN — ASPIRIN 81 MG: 81 TABLET, COATED ORAL at 08:12

## 2020-12-24 RX ADMIN — HYPROMELLOSE 2910 1 DROP: 5 SOLUTION OPHTHALMIC at 10:12

## 2020-12-24 RX ADMIN — POTASSIUM CHLORIDE 40 MEQ: 1500 TABLET, EXTENDED RELEASE ORAL at 12:12

## 2020-12-24 RX ADMIN — HYPROMELLOSE 2910 1 DROP: 5 SOLUTION OPHTHALMIC at 05:12

## 2020-12-24 RX ADMIN — CARVEDILOL 25 MG: 25 TABLET, FILM COATED ORAL at 08:12

## 2020-12-24 RX ADMIN — FUROSEMIDE 80 MG: 10 INJECTION, SOLUTION INTRAMUSCULAR; INTRAVENOUS at 08:12

## 2020-12-24 RX ADMIN — INSULIN ASPART 3 UNITS: 100 INJECTION, SOLUTION INTRAVENOUS; SUBCUTANEOUS at 08:12

## 2020-12-24 RX ADMIN — MUPIROCIN: 20 OINTMENT TOPICAL at 12:12

## 2020-12-24 RX ADMIN — APIXABAN 5 MG: 5 TABLET, FILM COATED ORAL at 09:12

## 2020-12-24 RX ADMIN — Medication 200 MG: at 09:12

## 2020-12-24 RX ADMIN — INSULIN ASPART 2 UNITS: 100 INJECTION, SOLUTION INTRAVENOUS; SUBCUTANEOUS at 08:12

## 2020-12-24 RX ADMIN — INSULIN ASPART 3 UNITS: 100 INJECTION, SOLUTION INTRAVENOUS; SUBCUTANEOUS at 05:12

## 2020-12-24 RX ADMIN — CARVEDILOL 25 MG: 25 TABLET, FILM COATED ORAL at 09:12

## 2020-12-25 LAB
ALBUMIN SERPL BCP-MCNC: 2.6 G/DL (ref 3.5–5.2)
ALP SERPL-CCNC: 92 U/L (ref 55–135)
ALT SERPL W/O P-5'-P-CCNC: 29 U/L (ref 10–44)
ANION GAP SERPL CALC-SCNC: 20 MMOL/L (ref 8–16)
AST SERPL-CCNC: 20 U/L (ref 10–40)
BASOPHILS # BLD AUTO: 0.07 K/UL (ref 0–0.2)
BASOPHILS NFR BLD: 0.8 % (ref 0–1.9)
BILIRUB SERPL-MCNC: 0.8 MG/DL (ref 0.1–1)
BUN SERPL-MCNC: 34 MG/DL (ref 8–23)
CALCIUM SERPL-MCNC: 8.9 MG/DL (ref 8.7–10.5)
CHLORIDE SERPL-SCNC: 98 MMOL/L (ref 95–110)
CO2 SERPL-SCNC: 21 MMOL/L (ref 23–29)
CREAT SERPL-MCNC: 1.4 MG/DL (ref 0.5–1.4)
DIFFERENTIAL METHOD: ABNORMAL
EOSINOPHIL # BLD AUTO: 1.2 K/UL (ref 0–0.5)
EOSINOPHIL NFR BLD: 13.7 % (ref 0–8)
ERYTHROCYTE [DISTWIDTH] IN BLOOD BY AUTOMATED COUNT: 17.3 % (ref 11.5–14.5)
EST. GFR  (AFRICAN AMERICAN): >60 ML/MIN/1.73 M^2
EST. GFR  (NON AFRICAN AMERICAN): 53.1 ML/MIN/1.73 M^2
GLUCOSE SERPL-MCNC: 242 MG/DL (ref 70–110)
HCT VFR BLD AUTO: 26.2 % (ref 40–54)
HGB BLD-MCNC: 7.5 G/DL (ref 14–18)
IMM GRANULOCYTES # BLD AUTO: 0.02 K/UL (ref 0–0.04)
IMM GRANULOCYTES NFR BLD AUTO: 0.2 % (ref 0–0.5)
LYMPHOCYTES # BLD AUTO: 1.5 K/UL (ref 1–4.8)
LYMPHOCYTES NFR BLD: 17.3 % (ref 18–48)
MAGNESIUM SERPL-MCNC: 2 MG/DL (ref 1.6–2.6)
MCH RBC QN AUTO: 25.7 PG (ref 27–31)
MCHC RBC AUTO-ENTMCNC: 28.6 G/DL (ref 32–36)
MCV RBC AUTO: 90 FL (ref 82–98)
MONOCYTES # BLD AUTO: 0.7 K/UL (ref 0.3–1)
MONOCYTES NFR BLD: 7.8 % (ref 4–15)
NEUTROPHILS # BLD AUTO: 5.2 K/UL (ref 1.8–7.7)
NEUTROPHILS NFR BLD: 60.2 % (ref 38–73)
NRBC BLD-RTO: 0 /100 WBC
PHOSPHATE SERPL-MCNC: 3.4 MG/DL (ref 2.7–4.5)
PLATELET # BLD AUTO: 315 K/UL (ref 150–350)
PMV BLD AUTO: 11 FL (ref 9.2–12.9)
POCT GLUCOSE: 228 MG/DL (ref 70–110)
POCT GLUCOSE: 261 MG/DL (ref 70–110)
POCT GLUCOSE: 279 MG/DL (ref 70–110)
POCT GLUCOSE: 287 MG/DL (ref 70–110)
POTASSIUM SERPL-SCNC: 3.6 MMOL/L (ref 3.5–5.1)
PROT SERPL-MCNC: 8 G/DL (ref 6–8.4)
RBC # BLD AUTO: 2.92 M/UL (ref 4.6–6.2)
SODIUM SERPL-SCNC: 139 MMOL/L (ref 136–145)
WBC # BLD AUTO: 8.57 K/UL (ref 3.9–12.7)

## 2020-12-25 PROCEDURE — 99233 PR SUBSEQUENT HOSPITAL CARE,LEVL III: ICD-10-PCS | Mod: HCNC,,, | Performed by: PHYSICIAN ASSISTANT

## 2020-12-25 PROCEDURE — 63600175 PHARM REV CODE 636 W HCPCS: Mod: HCNC | Performed by: PHYSICIAN ASSISTANT

## 2020-12-25 PROCEDURE — 85025 COMPLETE CBC W/AUTO DIFF WBC: CPT | Mod: HCNC

## 2020-12-25 PROCEDURE — 63600175 PHARM REV CODE 636 W HCPCS: Mod: HCNC | Performed by: INTERNAL MEDICINE

## 2020-12-25 PROCEDURE — 25000003 PHARM REV CODE 250: Mod: HCNC | Performed by: INTERNAL MEDICINE

## 2020-12-25 PROCEDURE — 99233 SBSQ HOSP IP/OBS HIGH 50: CPT | Mod: HCNC,,, | Performed by: PHYSICIAN ASSISTANT

## 2020-12-25 PROCEDURE — 99900035 HC TECH TIME PER 15 MIN (STAT): Mod: HCNC

## 2020-12-25 PROCEDURE — 94761 N-INVAS EAR/PLS OXIMETRY MLT: CPT | Mod: HCNC

## 2020-12-25 PROCEDURE — 25000003 PHARM REV CODE 250: Mod: HCNC | Performed by: PHYSICIAN ASSISTANT

## 2020-12-25 PROCEDURE — 99222 PR INITIAL HOSPITAL CARE,LEVL II: ICD-10-PCS | Mod: HCNC,,, | Performed by: INTERNAL MEDICINE

## 2020-12-25 PROCEDURE — 80053 COMPREHEN METABOLIC PANEL: CPT | Mod: HCNC

## 2020-12-25 PROCEDURE — 25000242 PHARM REV CODE 250 ALT 637 W/ HCPCS: Mod: HCNC | Performed by: PHYSICIAN ASSISTANT

## 2020-12-25 PROCEDURE — 99222 1ST HOSP IP/OBS MODERATE 55: CPT | Mod: HCNC,,, | Performed by: INTERNAL MEDICINE

## 2020-12-25 PROCEDURE — 94640 AIRWAY INHALATION TREATMENT: CPT | Mod: HCNC

## 2020-12-25 PROCEDURE — 94660 CPAP INITIATION&MGMT: CPT | Mod: HCNC

## 2020-12-25 PROCEDURE — 84100 ASSAY OF PHOSPHORUS: CPT | Mod: HCNC

## 2020-12-25 PROCEDURE — 83735 ASSAY OF MAGNESIUM: CPT | Mod: HCNC

## 2020-12-25 PROCEDURE — 20600001 HC STEP DOWN PRIVATE ROOM: Mod: HCNC

## 2020-12-25 PROCEDURE — 27000221 HC OXYGEN, UP TO 24 HOURS: Mod: HCNC

## 2020-12-25 PROCEDURE — 36415 COLL VENOUS BLD VENIPUNCTURE: CPT | Mod: HCNC

## 2020-12-25 RX ORDER — LANOLIN ALCOHOL/MO/W.PET/CERES
400 CREAM (GRAM) TOPICAL ONCE
Status: DISCONTINUED | OUTPATIENT
Start: 2020-12-25 | End: 2020-12-25

## 2020-12-25 RX ORDER — INSULIN ASPART 100 [IU]/ML
5 INJECTION, SOLUTION INTRAVENOUS; SUBCUTANEOUS
Status: DISCONTINUED | OUTPATIENT
Start: 2020-12-25 | End: 2020-12-31

## 2020-12-25 RX ORDER — POTASSIUM CHLORIDE 20 MEQ/1
40 TABLET, EXTENDED RELEASE ORAL ONCE
Status: COMPLETED | OUTPATIENT
Start: 2020-12-25 | End: 2020-12-25

## 2020-12-25 RX ORDER — LANOLIN ALCOHOL/MO/W.PET/CERES
400 CREAM (GRAM) TOPICAL ONCE
Status: COMPLETED | OUTPATIENT
Start: 2020-12-25 | End: 2020-12-25

## 2020-12-25 RX ADMIN — CEFTRIAXONE SODIUM 1 G: 1 INJECTION, POWDER, FOR SOLUTION INTRAMUSCULAR; INTRAVENOUS at 11:12

## 2020-12-25 RX ADMIN — FUROSEMIDE 20 MG/HR: 10 INJECTION, SOLUTION INTRAMUSCULAR; INTRAVENOUS at 03:12

## 2020-12-25 RX ADMIN — MUPIROCIN: 20 OINTMENT TOPICAL at 08:12

## 2020-12-25 RX ADMIN — CARVEDILOL 25 MG: 25 TABLET, FILM COATED ORAL at 08:12

## 2020-12-25 RX ADMIN — LEVOTHYROXINE SODIUM 75 MCG: 75 TABLET ORAL at 06:12

## 2020-12-25 RX ADMIN — INSULIN ASPART 3 UNITS: 100 INJECTION, SOLUTION INTRAVENOUS; SUBCUTANEOUS at 11:12

## 2020-12-25 RX ADMIN — SACUBITRIL AND VALSARTAN 1 TABLET: 24; 26 TABLET, FILM COATED ORAL at 02:12

## 2020-12-25 RX ADMIN — FERROUS SULFATE TAB EC 325 MG (65 MG FE EQUIVALENT) 325 MG: 325 (65 FE) TABLET DELAYED RESPONSE at 08:12

## 2020-12-25 RX ADMIN — POTASSIUM CHLORIDE 40 MEQ: 1500 TABLET, EXTENDED RELEASE ORAL at 11:12

## 2020-12-25 RX ADMIN — ALBUTEROL SULFATE 2.5 MG: 2.5 SOLUTION RESPIRATORY (INHALATION) at 08:12

## 2020-12-25 RX ADMIN — INSULIN ASPART 3 UNITS: 100 INJECTION, SOLUTION INTRAVENOUS; SUBCUTANEOUS at 08:12

## 2020-12-25 RX ADMIN — Medication 200 MG: at 08:12

## 2020-12-25 RX ADMIN — INSULIN ASPART 1 UNITS: 100 INJECTION, SOLUTION INTRAVENOUS; SUBCUTANEOUS at 08:12

## 2020-12-25 RX ADMIN — APIXABAN 5 MG: 5 TABLET, FILM COATED ORAL at 08:12

## 2020-12-25 RX ADMIN — HYPROMELLOSE 2910 1 DROP: 5 SOLUTION OPHTHALMIC at 09:12

## 2020-12-25 RX ADMIN — SACUBITRIL AND VALSARTAN 1 TABLET: 24; 26 TABLET, FILM COATED ORAL at 08:12

## 2020-12-25 RX ADMIN — FUROSEMIDE 20 MG/HR: 10 INJECTION, SOLUTION INTRAMUSCULAR; INTRAVENOUS at 02:12

## 2020-12-25 RX ADMIN — Medication 400 MG: at 11:12

## 2020-12-25 RX ADMIN — ASPIRIN 81 MG: 81 TABLET, COATED ORAL at 08:12

## 2020-12-25 RX ADMIN — ATORVASTATIN CALCIUM 10 MG: 10 TABLET, FILM COATED ORAL at 08:12

## 2020-12-25 RX ADMIN — INSULIN ASPART 2 UNITS: 100 INJECTION, SOLUTION INTRAVENOUS; SUBCUTANEOUS at 08:12

## 2020-12-25 RX ADMIN — INSULIN ASPART 3 UNITS: 100 INJECTION, SOLUTION INTRAVENOUS; SUBCUTANEOUS at 04:12

## 2020-12-25 RX ADMIN — FLUTICASONE PROPIONATE 50 MCG: 50 SPRAY, METERED NASAL at 02:12

## 2020-12-25 RX ADMIN — MUPIROCIN: 20 OINTMENT TOPICAL at 09:12

## 2020-12-25 RX ADMIN — HYPROMELLOSE 2910 1 DROP: 5 SOLUTION OPHTHALMIC at 02:12

## 2020-12-25 RX ADMIN — Medication 200 MG: at 09:12

## 2020-12-25 RX ADMIN — FUROSEMIDE 20 MG/HR: 10 INJECTION, SOLUTION INTRAMUSCULAR; INTRAVENOUS at 11:12

## 2020-12-25 RX ADMIN — HYPROMELLOSE 2910 1 DROP: 5 SOLUTION OPHTHALMIC at 08:12

## 2020-12-25 RX ADMIN — GABAPENTIN 100 MG: 100 CAPSULE ORAL at 08:12

## 2020-12-25 RX ADMIN — INSULIN ASPART 5 UNITS: 100 INJECTION, SOLUTION INTRAVENOUS; SUBCUTANEOUS at 04:12

## 2020-12-25 RX ADMIN — FLUTICASONE FUROATE AND VILANTEROL TRIFENATATE 1 PUFF: 200; 25 POWDER RESPIRATORY (INHALATION) at 09:12

## 2020-12-26 PROBLEM — E11.65 TYPE 2 DIABETES MELLITUS WITH HYPERGLYCEMIA: Status: ACTIVE | Noted: 2020-12-22

## 2020-12-26 PROBLEM — N30.00 ACUTE CYSTITIS WITHOUT HEMATURIA: Status: ACTIVE | Noted: 2020-12-26

## 2020-12-26 LAB
ALBUMIN SERPL BCP-MCNC: 2.7 G/DL (ref 3.5–5.2)
ALP SERPL-CCNC: 88 U/L (ref 55–135)
ALT SERPL W/O P-5'-P-CCNC: 29 U/L (ref 10–44)
ANION GAP SERPL CALC-SCNC: 11 MMOL/L (ref 8–16)
AST SERPL-CCNC: 18 U/L (ref 10–40)
BASOPHILS # BLD AUTO: 0.09 K/UL (ref 0–0.2)
BASOPHILS NFR BLD: 1 % (ref 0–1.9)
BILIRUB SERPL-MCNC: 0.6 MG/DL (ref 0.1–1)
BUN SERPL-MCNC: 37 MG/DL (ref 8–23)
CALCIUM SERPL-MCNC: 9.1 MG/DL (ref 8.7–10.5)
CHLORIDE SERPL-SCNC: 97 MMOL/L (ref 95–110)
CO2 SERPL-SCNC: 32 MMOL/L (ref 23–29)
CREAT SERPL-MCNC: 1.5 MG/DL (ref 0.5–1.4)
DIFFERENTIAL METHOD: ABNORMAL
EOSINOPHIL # BLD AUTO: 1.2 K/UL (ref 0–0.5)
EOSINOPHIL NFR BLD: 13.3 % (ref 0–8)
ERYTHROCYTE [DISTWIDTH] IN BLOOD BY AUTOMATED COUNT: 17.4 % (ref 11.5–14.5)
EST. GFR  (AFRICAN AMERICAN): 56.5 ML/MIN/1.73 M^2
EST. GFR  (NON AFRICAN AMERICAN): 48.8 ML/MIN/1.73 M^2
GLUCOSE SERPL-MCNC: 197 MG/DL (ref 70–110)
HCT VFR BLD AUTO: 30.1 % (ref 40–54)
HGB BLD-MCNC: 8.4 G/DL (ref 14–18)
IMM GRANULOCYTES # BLD AUTO: 0.03 K/UL (ref 0–0.04)
IMM GRANULOCYTES NFR BLD AUTO: 0.3 % (ref 0–0.5)
LYMPHOCYTES # BLD AUTO: 1.7 K/UL (ref 1–4.8)
LYMPHOCYTES NFR BLD: 18.4 % (ref 18–48)
MAGNESIUM SERPL-MCNC: 1.9 MG/DL (ref 1.6–2.6)
MCH RBC QN AUTO: 25.2 PG (ref 27–31)
MCHC RBC AUTO-ENTMCNC: 27.9 G/DL (ref 32–36)
MCV RBC AUTO: 90 FL (ref 82–98)
MONOCYTES # BLD AUTO: 0.6 K/UL (ref 0.3–1)
MONOCYTES NFR BLD: 6.5 % (ref 4–15)
NEUTROPHILS # BLD AUTO: 5.7 K/UL (ref 1.8–7.7)
NEUTROPHILS NFR BLD: 60.5 % (ref 38–73)
NRBC BLD-RTO: 0 /100 WBC
PHOSPHATE SERPL-MCNC: 3.3 MG/DL (ref 2.7–4.5)
PLATELET # BLD AUTO: 370 K/UL (ref 150–350)
PMV BLD AUTO: 11.2 FL (ref 9.2–12.9)
POCT GLUCOSE: 220 MG/DL (ref 70–110)
POCT GLUCOSE: 275 MG/DL (ref 70–110)
POTASSIUM SERPL-SCNC: 3.5 MMOL/L (ref 3.5–5.1)
PROT SERPL-MCNC: 8.4 G/DL (ref 6–8.4)
RBC # BLD AUTO: 3.33 M/UL (ref 4.6–6.2)
SODIUM SERPL-SCNC: 140 MMOL/L (ref 136–145)
WBC # BLD AUTO: 9.35 K/UL (ref 3.9–12.7)

## 2020-12-26 PROCEDURE — 94640 AIRWAY INHALATION TREATMENT: CPT | Mod: HCNC

## 2020-12-26 PROCEDURE — 25000003 PHARM REV CODE 250: Mod: HCNC | Performed by: INTERNAL MEDICINE

## 2020-12-26 PROCEDURE — 83735 ASSAY OF MAGNESIUM: CPT | Mod: HCNC

## 2020-12-26 PROCEDURE — 99900035 HC TECH TIME PER 15 MIN (STAT): Mod: HCNC

## 2020-12-26 PROCEDURE — 27100171 HC OXYGEN HIGH FLOW UP TO 24 HOURS: Mod: HCNC

## 2020-12-26 PROCEDURE — 25000003 PHARM REV CODE 250: Mod: HCNC | Performed by: PHYSICIAN ASSISTANT

## 2020-12-26 PROCEDURE — 63600175 PHARM REV CODE 636 W HCPCS: Mod: HCNC | Performed by: INTERNAL MEDICINE

## 2020-12-26 PROCEDURE — 93005 ELECTROCARDIOGRAM TRACING: CPT | Mod: HCNC

## 2020-12-26 PROCEDURE — 99233 PR SUBSEQUENT HOSPITAL CARE,LEVL III: ICD-10-PCS | Mod: HCNC,,, | Performed by: PHYSICIAN ASSISTANT

## 2020-12-26 PROCEDURE — 94660 CPAP INITIATION&MGMT: CPT | Mod: HCNC

## 2020-12-26 PROCEDURE — 80053 COMPREHEN METABOLIC PANEL: CPT | Mod: HCNC

## 2020-12-26 PROCEDURE — 27000221 HC OXYGEN, UP TO 24 HOURS: Mod: HCNC

## 2020-12-26 PROCEDURE — 94761 N-INVAS EAR/PLS OXIMETRY MLT: CPT | Mod: HCNC

## 2020-12-26 PROCEDURE — 99233 SBSQ HOSP IP/OBS HIGH 50: CPT | Mod: HCNC,,, | Performed by: PHYSICIAN ASSISTANT

## 2020-12-26 PROCEDURE — 25000003 PHARM REV CODE 250: Mod: HCNC | Performed by: FAMILY MEDICINE

## 2020-12-26 PROCEDURE — 84100 ASSAY OF PHOSPHORUS: CPT | Mod: HCNC

## 2020-12-26 PROCEDURE — 20600001 HC STEP DOWN PRIVATE ROOM: Mod: HCNC

## 2020-12-26 PROCEDURE — 93010 ELECTROCARDIOGRAM REPORT: CPT | Mod: HCNC,,, | Performed by: INTERNAL MEDICINE

## 2020-12-26 PROCEDURE — 63600175 PHARM REV CODE 636 W HCPCS: Mod: HCNC | Performed by: PHYSICIAN ASSISTANT

## 2020-12-26 PROCEDURE — 25000242 PHARM REV CODE 250 ALT 637 W/ HCPCS: Mod: HCNC | Performed by: PHYSICIAN ASSISTANT

## 2020-12-26 PROCEDURE — 93010 EKG 12-LEAD: ICD-10-PCS | Mod: HCNC,,, | Performed by: INTERNAL MEDICINE

## 2020-12-26 PROCEDURE — 85025 COMPLETE CBC W/AUTO DIFF WBC: CPT | Mod: HCNC

## 2020-12-26 PROCEDURE — 36415 COLL VENOUS BLD VENIPUNCTURE: CPT | Mod: HCNC

## 2020-12-26 RX ORDER — POTASSIUM CHLORIDE 20 MEQ/1
40 TABLET, EXTENDED RELEASE ORAL ONCE
Status: COMPLETED | OUTPATIENT
Start: 2020-12-26 | End: 2020-12-26

## 2020-12-26 RX ORDER — CEFEPIME HYDROCHLORIDE 1 G/1
1 INJECTION, POWDER, FOR SOLUTION INTRAMUSCULAR; INTRAVENOUS
Status: COMPLETED | OUTPATIENT
Start: 2020-12-26 | End: 2020-12-31

## 2020-12-26 RX ADMIN — CEFEPIME 1 G: 1 INJECTION, POWDER, FOR SOLUTION INTRAMUSCULAR; INTRAVENOUS at 10:12

## 2020-12-26 RX ADMIN — FLUTICASONE FUROATE AND VILANTEROL TRIFENATATE 1 PUFF: 200; 25 POWDER RESPIRATORY (INHALATION) at 08:12

## 2020-12-26 RX ADMIN — MUPIROCIN: 20 OINTMENT TOPICAL at 09:12

## 2020-12-26 RX ADMIN — SACUBITRIL AND VALSARTAN 1 TABLET: 24; 26 TABLET, FILM COATED ORAL at 08:12

## 2020-12-26 RX ADMIN — CARVEDILOL 25 MG: 25 TABLET, FILM COATED ORAL at 08:12

## 2020-12-26 RX ADMIN — INSULIN ASPART 5 UNITS: 100 INJECTION, SOLUTION INTRAVENOUS; SUBCUTANEOUS at 04:12

## 2020-12-26 RX ADMIN — INSULIN ASPART 1 UNITS: 100 INJECTION, SOLUTION INTRAVENOUS; SUBCUTANEOUS at 08:12

## 2020-12-26 RX ADMIN — FUROSEMIDE 20 MG/HR: 10 INJECTION, SOLUTION INTRAMUSCULAR; INTRAVENOUS at 10:12

## 2020-12-26 RX ADMIN — FLUTICASONE PROPIONATE 50 MCG: 50 SPRAY, METERED NASAL at 08:12

## 2020-12-26 RX ADMIN — MUPIROCIN: 20 OINTMENT TOPICAL at 08:12

## 2020-12-26 RX ADMIN — HYPROMELLOSE 2910 1 DROP: 5 SOLUTION OPHTHALMIC at 02:12

## 2020-12-26 RX ADMIN — FERROUS SULFATE TAB EC 325 MG (65 MG FE EQUIVALENT) 325 MG: 325 (65 FE) TABLET DELAYED RESPONSE at 08:12

## 2020-12-26 RX ADMIN — HYPROMELLOSE 2910 1 DROP: 5 SOLUTION OPHTHALMIC at 08:12

## 2020-12-26 RX ADMIN — INSULIN ASPART 5 UNITS: 100 INJECTION, SOLUTION INTRAVENOUS; SUBCUTANEOUS at 11:12

## 2020-12-26 RX ADMIN — APIXABAN 5 MG: 5 TABLET, FILM COATED ORAL at 08:12

## 2020-12-26 RX ADMIN — POLYETHYLENE GLYCOL 3350 17 G: 17 POWDER, FOR SOLUTION ORAL at 08:12

## 2020-12-26 RX ADMIN — CEFEPIME 1 G: 1 INJECTION, POWDER, FOR SOLUTION INTRAMUSCULAR; INTRAVENOUS at 06:12

## 2020-12-26 RX ADMIN — INSULIN ASPART 5 UNITS: 100 INJECTION, SOLUTION INTRAVENOUS; SUBCUTANEOUS at 07:12

## 2020-12-26 RX ADMIN — ASPIRIN 81 MG: 81 TABLET, COATED ORAL at 08:12

## 2020-12-26 RX ADMIN — LEVOTHYROXINE SODIUM 75 MCG: 75 TABLET ORAL at 05:12

## 2020-12-26 RX ADMIN — ALBUTEROL SULFATE 2.5 MG: 2.5 SOLUTION RESPIRATORY (INHALATION) at 11:12

## 2020-12-26 RX ADMIN — Medication 200 MG: at 08:12

## 2020-12-26 RX ADMIN — POTASSIUM CHLORIDE 40 MEQ: 1500 TABLET, EXTENDED RELEASE ORAL at 08:12

## 2020-12-26 RX ADMIN — GABAPENTIN 100 MG: 100 CAPSULE ORAL at 08:12

## 2020-12-26 RX ADMIN — ALBUTEROL SULFATE 2.5 MG: 2.5 SOLUTION RESPIRATORY (INHALATION) at 09:12

## 2020-12-26 RX ADMIN — ATORVASTATIN CALCIUM 10 MG: 10 TABLET, FILM COATED ORAL at 08:12

## 2020-12-26 RX ADMIN — FUROSEMIDE 20 MG/HR: 10 INJECTION, SOLUTION INTRAMUSCULAR; INTRAVENOUS at 08:12

## 2020-12-26 RX ADMIN — INSULIN ASPART 2 UNITS: 100 INJECTION, SOLUTION INTRAVENOUS; SUBCUTANEOUS at 11:12

## 2020-12-26 RX ADMIN — INSULIN ASPART 3 UNITS: 100 INJECTION, SOLUTION INTRAVENOUS; SUBCUTANEOUS at 07:12

## 2020-12-26 RX ADMIN — INSULIN ASPART 2 UNITS: 100 INJECTION, SOLUTION INTRAVENOUS; SUBCUTANEOUS at 04:12

## 2020-12-27 PROBLEM — Z74.09 OTHER REDUCED MOBILITY: Status: ACTIVE | Noted: 2020-12-27

## 2020-12-27 PROBLEM — N18.30 CHRONIC KIDNEY DISEASE, STAGE III (MODERATE): Status: ACTIVE | Noted: 2020-12-27

## 2020-12-27 LAB
ALBUMIN SERPL BCP-MCNC: 2.5 G/DL (ref 3.5–5.2)
ALP SERPL-CCNC: 79 U/L (ref 55–135)
ALT SERPL W/O P-5'-P-CCNC: 23 U/L (ref 10–44)
ANION GAP SERPL CALC-SCNC: 11 MMOL/L (ref 8–16)
AST SERPL-CCNC: 15 U/L (ref 10–40)
BILIRUB SERPL-MCNC: 0.5 MG/DL (ref 0.1–1)
BUN SERPL-MCNC: 34 MG/DL (ref 8–23)
CALCIUM SERPL-MCNC: 8.9 MG/DL (ref 8.7–10.5)
CHLORIDE SERPL-SCNC: 95 MMOL/L (ref 95–110)
CO2 SERPL-SCNC: 32 MMOL/L (ref 23–29)
CREAT SERPL-MCNC: 1.7 MG/DL (ref 0.5–1.4)
EST. GFR  (AFRICAN AMERICAN): 48.5 ML/MIN/1.73 M^2
EST. GFR  (NON AFRICAN AMERICAN): 42 ML/MIN/1.73 M^2
GLUCOSE SERPL-MCNC: 349 MG/DL (ref 70–110)
MAGNESIUM SERPL-MCNC: 1.8 MG/DL (ref 1.6–2.6)
POCT GLUCOSE: 165 MG/DL (ref 70–110)
POCT GLUCOSE: 184 MG/DL (ref 70–110)
POCT GLUCOSE: 243 MG/DL (ref 70–110)
POCT GLUCOSE: 244 MG/DL (ref 70–110)
POCT GLUCOSE: 315 MG/DL (ref 70–110)
POCT GLUCOSE: 323 MG/DL (ref 70–110)
POTASSIUM SERPL-SCNC: 3.7 MMOL/L (ref 3.5–5.1)
PROT SERPL-MCNC: 8.2 G/DL (ref 6–8.4)
SARS-COV-2 RDRP RESP QL NAA+PROBE: NEGATIVE
SODIUM SERPL-SCNC: 138 MMOL/L (ref 136–145)

## 2020-12-27 PROCEDURE — 25000003 PHARM REV CODE 250: Mod: HCNC | Performed by: PHYSICIAN ASSISTANT

## 2020-12-27 PROCEDURE — 93010 ELECTROCARDIOGRAM REPORT: CPT | Mod: HCNC,,, | Performed by: INTERNAL MEDICINE

## 2020-12-27 PROCEDURE — 63600175 PHARM REV CODE 636 W HCPCS: Mod: HCNC | Performed by: PHYSICIAN ASSISTANT

## 2020-12-27 PROCEDURE — 36415 COLL VENOUS BLD VENIPUNCTURE: CPT | Mod: HCNC

## 2020-12-27 PROCEDURE — 25000003 PHARM REV CODE 250: Mod: HCNC | Performed by: INTERNAL MEDICINE

## 2020-12-27 PROCEDURE — 80053 COMPREHEN METABOLIC PANEL: CPT | Mod: HCNC

## 2020-12-27 PROCEDURE — 99233 SBSQ HOSP IP/OBS HIGH 50: CPT | Mod: HCNC,,, | Performed by: INTERNAL MEDICINE

## 2020-12-27 PROCEDURE — 20600001 HC STEP DOWN PRIVATE ROOM: Mod: HCNC

## 2020-12-27 PROCEDURE — 94640 AIRWAY INHALATION TREATMENT: CPT | Mod: HCNC

## 2020-12-27 PROCEDURE — 99233 PR SUBSEQUENT HOSPITAL CARE,LEVL III: ICD-10-PCS | Mod: HCNC,,, | Performed by: INTERNAL MEDICINE

## 2020-12-27 PROCEDURE — U0002 COVID-19 LAB TEST NON-CDC: HCPCS | Mod: HCNC

## 2020-12-27 PROCEDURE — 93010 EKG 12-LEAD: ICD-10-PCS | Mod: HCNC,,, | Performed by: INTERNAL MEDICINE

## 2020-12-27 PROCEDURE — 93005 ELECTROCARDIOGRAM TRACING: CPT | Mod: HCNC

## 2020-12-27 PROCEDURE — 27000221 HC OXYGEN, UP TO 24 HOURS: Mod: HCNC

## 2020-12-27 PROCEDURE — 83735 ASSAY OF MAGNESIUM: CPT | Mod: HCNC

## 2020-12-27 PROCEDURE — 63600175 PHARM REV CODE 636 W HCPCS: Mod: HCNC | Performed by: INTERNAL MEDICINE

## 2020-12-27 PROCEDURE — 25000242 PHARM REV CODE 250 ALT 637 W/ HCPCS: Mod: HCNC | Performed by: PHYSICIAN ASSISTANT

## 2020-12-27 PROCEDURE — 99900035 HC TECH TIME PER 15 MIN (STAT): Mod: HCNC

## 2020-12-27 PROCEDURE — 94660 CPAP INITIATION&MGMT: CPT | Mod: HCNC

## 2020-12-27 PROCEDURE — 94761 N-INVAS EAR/PLS OXIMETRY MLT: CPT | Mod: HCNC

## 2020-12-27 RX ORDER — POTASSIUM CHLORIDE 20 MEQ/1
40 TABLET, EXTENDED RELEASE ORAL ONCE
Status: COMPLETED | OUTPATIENT
Start: 2020-12-27 | End: 2020-12-27

## 2020-12-27 RX ORDER — TORSEMIDE 20 MG/1
60 TABLET ORAL 2 TIMES DAILY
Status: DISCONTINUED | OUTPATIENT
Start: 2020-12-27 | End: 2020-12-27

## 2020-12-27 RX ORDER — TORSEMIDE 20 MG/1
60 TABLET ORAL 2 TIMES DAILY
Status: DISCONTINUED | OUTPATIENT
Start: 2020-12-28 | End: 2020-12-31 | Stop reason: HOSPADM

## 2020-12-27 RX ADMIN — HYPROMELLOSE 2910 1 DROP: 5 SOLUTION OPHTHALMIC at 09:12

## 2020-12-27 RX ADMIN — CARVEDILOL 25 MG: 25 TABLET, FILM COATED ORAL at 08:12

## 2020-12-27 RX ADMIN — CEFEPIME 1 G: 1 INJECTION, POWDER, FOR SOLUTION INTRAMUSCULAR; INTRAVENOUS at 10:12

## 2020-12-27 RX ADMIN — APIXABAN 5 MG: 5 TABLET, FILM COATED ORAL at 09:12

## 2020-12-27 RX ADMIN — ATORVASTATIN CALCIUM 10 MG: 10 TABLET, FILM COATED ORAL at 08:12

## 2020-12-27 RX ADMIN — FUROSEMIDE 20 MG/HR: 10 INJECTION, SOLUTION INTRAMUSCULAR; INTRAVENOUS at 06:12

## 2020-12-27 RX ADMIN — INSULIN ASPART 5 UNITS: 100 INJECTION, SOLUTION INTRAVENOUS; SUBCUTANEOUS at 08:12

## 2020-12-27 RX ADMIN — ASPIRIN 81 MG: 81 TABLET, COATED ORAL at 08:12

## 2020-12-27 RX ADMIN — HYPROMELLOSE 2910 1 DROP: 5 SOLUTION OPHTHALMIC at 03:12

## 2020-12-27 RX ADMIN — CARVEDILOL 25 MG: 25 TABLET, FILM COATED ORAL at 09:12

## 2020-12-27 RX ADMIN — HYPROMELLOSE 2910 1 DROP: 5 SOLUTION OPHTHALMIC at 08:12

## 2020-12-27 RX ADMIN — INSULIN ASPART 4 UNITS: 100 INJECTION, SOLUTION INTRAVENOUS; SUBCUTANEOUS at 08:12

## 2020-12-27 RX ADMIN — ALBUTEROL SULFATE 2.5 MG: 2.5 SOLUTION RESPIRATORY (INHALATION) at 08:12

## 2020-12-27 RX ADMIN — FERROUS SULFATE TAB EC 325 MG (65 MG FE EQUIVALENT) 325 MG: 325 (65 FE) TABLET DELAYED RESPONSE at 08:12

## 2020-12-27 RX ADMIN — CEFEPIME 1 G: 1 INJECTION, POWDER, FOR SOLUTION INTRAMUSCULAR; INTRAVENOUS at 02:12

## 2020-12-27 RX ADMIN — INSULIN ASPART 5 UNITS: 100 INJECTION, SOLUTION INTRAVENOUS; SUBCUTANEOUS at 04:12

## 2020-12-27 RX ADMIN — SACUBITRIL AND VALSARTAN 1 TABLET: 24; 26 TABLET, FILM COATED ORAL at 08:12

## 2020-12-27 RX ADMIN — SACUBITRIL AND VALSARTAN 1 TABLET: 24; 26 TABLET, FILM COATED ORAL at 09:12

## 2020-12-27 RX ADMIN — Medication 200 MG: at 09:12

## 2020-12-27 RX ADMIN — LEVOTHYROXINE SODIUM 75 MCG: 75 TABLET ORAL at 05:12

## 2020-12-27 RX ADMIN — FLUTICASONE PROPIONATE 50 MCG: 50 SPRAY, METERED NASAL at 08:12

## 2020-12-27 RX ADMIN — Medication 200 MG: at 08:12

## 2020-12-27 RX ADMIN — POTASSIUM CHLORIDE 40 MEQ: 1500 TABLET, EXTENDED RELEASE ORAL at 12:12

## 2020-12-27 RX ADMIN — APIXABAN 5 MG: 5 TABLET, FILM COATED ORAL at 08:12

## 2020-12-27 RX ADMIN — MUPIROCIN: 20 OINTMENT TOPICAL at 09:12

## 2020-12-27 RX ADMIN — INSULIN ASPART 4 UNITS: 100 INJECTION, SOLUTION INTRAVENOUS; SUBCUTANEOUS at 11:12

## 2020-12-27 RX ADMIN — FUROSEMIDE 20 MG/HR: 10 INJECTION, SOLUTION INTRAMUSCULAR; INTRAVENOUS at 12:12

## 2020-12-27 RX ADMIN — INSULIN DETEMIR 20 UNITS: 100 INJECTION, SOLUTION SUBCUTANEOUS at 09:12

## 2020-12-27 RX ADMIN — FLUTICASONE FUROATE AND VILANTEROL TRIFENATATE 1 PUFF: 200; 25 POWDER RESPIRATORY (INHALATION) at 08:12

## 2020-12-27 RX ADMIN — INSULIN ASPART 5 UNITS: 100 INJECTION, SOLUTION INTRAVENOUS; SUBCUTANEOUS at 11:12

## 2020-12-27 RX ADMIN — MUPIROCIN: 20 OINTMENT TOPICAL at 08:12

## 2020-12-27 RX ADMIN — ACETAMINOPHEN 650 MG: 325 TABLET ORAL at 11:12

## 2020-12-27 RX ADMIN — CEFEPIME 1 G: 1 INJECTION, POWDER, FOR SOLUTION INTRAMUSCULAR; INTRAVENOUS at 05:12

## 2020-12-27 RX ADMIN — GABAPENTIN 100 MG: 100 CAPSULE ORAL at 09:12

## 2020-12-28 ENCOUNTER — ANESTHESIA EVENT (OUTPATIENT)
Dept: MEDSURG UNIT | Facility: HOSPITAL | Age: 65
DRG: 291 | End: 2020-12-28
Payer: MEDICARE

## 2020-12-28 ENCOUNTER — ANESTHESIA (OUTPATIENT)
Dept: MEDSURG UNIT | Facility: HOSPITAL | Age: 65
DRG: 291 | End: 2020-12-28
Payer: MEDICARE

## 2020-12-28 LAB
ALBUMIN SERPL BCP-MCNC: 2.5 G/DL (ref 3.5–5.2)
ALP SERPL-CCNC: 72 U/L (ref 55–135)
ALT SERPL W/O P-5'-P-CCNC: 21 U/L (ref 10–44)
ANION GAP SERPL CALC-SCNC: 11 MMOL/L (ref 8–16)
AST SERPL-CCNC: 15 U/L (ref 10–40)
BASOPHILS # BLD AUTO: 0.13 K/UL (ref 0–0.2)
BASOPHILS NFR BLD: 1.2 % (ref 0–1.9)
BILIRUB SERPL-MCNC: 0.4 MG/DL (ref 0.1–1)
BUN SERPL-MCNC: 39 MG/DL (ref 8–23)
CALCIUM SERPL-MCNC: 9 MG/DL (ref 8.7–10.5)
CHLORIDE SERPL-SCNC: 99 MMOL/L (ref 95–110)
CO2 SERPL-SCNC: 30 MMOL/L (ref 23–29)
CREAT SERPL-MCNC: 1.6 MG/DL (ref 0.5–1.4)
DIFFERENTIAL METHOD: ABNORMAL
EOSINOPHIL # BLD AUTO: 1.5 K/UL (ref 0–0.5)
EOSINOPHIL NFR BLD: 13.2 % (ref 0–8)
ERYTHROCYTE [DISTWIDTH] IN BLOOD BY AUTOMATED COUNT: 17.5 % (ref 11.5–14.5)
EST. GFR  (AFRICAN AMERICAN): 52.2 ML/MIN/1.73 M^2
EST. GFR  (NON AFRICAN AMERICAN): 45.2 ML/MIN/1.73 M^2
GLUCOSE SERPL-MCNC: 186 MG/DL (ref 70–110)
HCT VFR BLD AUTO: 29.5 % (ref 40–54)
HGB BLD-MCNC: 8.4 G/DL (ref 14–18)
IMM GRANULOCYTES # BLD AUTO: 0.06 K/UL (ref 0–0.04)
IMM GRANULOCYTES NFR BLD AUTO: 0.5 % (ref 0–0.5)
LYMPHOCYTES # BLD AUTO: 1.9 K/UL (ref 1–4.8)
LYMPHOCYTES NFR BLD: 16.9 % (ref 18–48)
MAGNESIUM SERPL-MCNC: 1.9 MG/DL (ref 1.6–2.6)
MCH RBC QN AUTO: 26.1 PG (ref 27–31)
MCHC RBC AUTO-ENTMCNC: 28.5 G/DL (ref 32–36)
MCV RBC AUTO: 92 FL (ref 82–98)
MONOCYTES # BLD AUTO: 0.8 K/UL (ref 0.3–1)
MONOCYTES NFR BLD: 7.4 % (ref 4–15)
NEUTROPHILS # BLD AUTO: 6.7 K/UL (ref 1.8–7.7)
NEUTROPHILS NFR BLD: 60.8 % (ref 38–73)
NRBC BLD-RTO: 0 /100 WBC
PLATELET # BLD AUTO: 404 K/UL (ref 150–350)
PMV BLD AUTO: 10.7 FL (ref 9.2–12.9)
POCT GLUCOSE: 177 MG/DL (ref 70–110)
POCT GLUCOSE: 205 MG/DL (ref 70–110)
POCT GLUCOSE: 270 MG/DL (ref 70–110)
POCT GLUCOSE: 315 MG/DL (ref 70–110)
POTASSIUM SERPL-SCNC: 3.8 MMOL/L (ref 3.5–5.1)
PROT SERPL-MCNC: 7.9 G/DL (ref 6–8.4)
RBC # BLD AUTO: 3.22 M/UL (ref 4.6–6.2)
SODIUM SERPL-SCNC: 140 MMOL/L (ref 136–145)
WBC # BLD AUTO: 11.02 K/UL (ref 3.9–12.7)

## 2020-12-28 PROCEDURE — 99900035 HC TECH TIME PER 15 MIN (STAT): Mod: HCNC

## 2020-12-28 PROCEDURE — 99223 PR INITIAL HOSPITAL CARE,LEVL III: ICD-10-PCS | Mod: HCNC,GC,, | Performed by: INTERNAL MEDICINE

## 2020-12-28 PROCEDURE — 83735 ASSAY OF MAGNESIUM: CPT | Mod: HCNC

## 2020-12-28 PROCEDURE — 99233 PR SUBSEQUENT HOSPITAL CARE,LEVL III: ICD-10-PCS | Mod: HCNC,,, | Performed by: INTERNAL MEDICINE

## 2020-12-28 PROCEDURE — 94761 N-INVAS EAR/PLS OXIMETRY MLT: CPT | Mod: HCNC

## 2020-12-28 PROCEDURE — 94640 AIRWAY INHALATION TREATMENT: CPT | Mod: HCNC

## 2020-12-28 PROCEDURE — 97530 THERAPEUTIC ACTIVITIES: CPT | Mod: HCNC

## 2020-12-28 PROCEDURE — 25000003 PHARM REV CODE 250: Mod: HCNC | Performed by: PHYSICIAN ASSISTANT

## 2020-12-28 PROCEDURE — 27000221 HC OXYGEN, UP TO 24 HOURS: Mod: HCNC

## 2020-12-28 PROCEDURE — 80053 COMPREHEN METABOLIC PANEL: CPT | Mod: HCNC

## 2020-12-28 PROCEDURE — 63600175 PHARM REV CODE 636 W HCPCS: Mod: HCNC | Performed by: PHYSICIAN ASSISTANT

## 2020-12-28 PROCEDURE — 99233 SBSQ HOSP IP/OBS HIGH 50: CPT | Mod: HCNC,,, | Performed by: INTERNAL MEDICINE

## 2020-12-28 PROCEDURE — 97110 THERAPEUTIC EXERCISES: CPT | Mod: HCNC

## 2020-12-28 PROCEDURE — 25000242 PHARM REV CODE 250 ALT 637 W/ HCPCS: Mod: HCNC | Performed by: PHYSICIAN ASSISTANT

## 2020-12-28 PROCEDURE — 36415 COLL VENOUS BLD VENIPUNCTURE: CPT | Mod: HCNC

## 2020-12-28 PROCEDURE — 99223 1ST HOSP IP/OBS HIGH 75: CPT | Mod: HCNC,GC,, | Performed by: INTERNAL MEDICINE

## 2020-12-28 PROCEDURE — 25000003 PHARM REV CODE 250: Mod: HCNC | Performed by: INTERNAL MEDICINE

## 2020-12-28 PROCEDURE — 85025 COMPLETE CBC W/AUTO DIFF WBC: CPT | Mod: HCNC

## 2020-12-28 PROCEDURE — 20600001 HC STEP DOWN PRIVATE ROOM: Mod: HCNC

## 2020-12-28 PROCEDURE — 94660 CPAP INITIATION&MGMT: CPT | Mod: HCNC

## 2020-12-28 PROCEDURE — 97535 SELF CARE MNGMENT TRAINING: CPT | Mod: HCNC

## 2020-12-28 RX ADMIN — GABAPENTIN 100 MG: 100 CAPSULE ORAL at 08:12

## 2020-12-28 RX ADMIN — LEVOTHYROXINE SODIUM 75 MCG: 75 TABLET ORAL at 04:12

## 2020-12-28 RX ADMIN — Medication 200 MG: at 09:12

## 2020-12-28 RX ADMIN — FLUTICASONE FUROATE AND VILANTEROL TRIFENATATE 1 PUFF: 200; 25 POWDER RESPIRATORY (INHALATION) at 08:12

## 2020-12-28 RX ADMIN — HYPROMELLOSE 2910 1 DROP: 5 SOLUTION OPHTHALMIC at 09:12

## 2020-12-28 RX ADMIN — INSULIN DETEMIR 20 UNITS: 100 INJECTION, SOLUTION SUBCUTANEOUS at 08:12

## 2020-12-28 RX ADMIN — TORSEMIDE 60 MG: 20 TABLET ORAL at 09:12

## 2020-12-28 RX ADMIN — FLUTICASONE PROPIONATE 50 MCG: 50 SPRAY, METERED NASAL at 09:12

## 2020-12-28 RX ADMIN — ACETAMINOPHEN 650 MG: 325 TABLET ORAL at 08:12

## 2020-12-28 RX ADMIN — APIXABAN 5 MG: 5 TABLET, FILM COATED ORAL at 08:12

## 2020-12-28 RX ADMIN — INSULIN ASPART 2 UNITS: 100 INJECTION, SOLUTION INTRAVENOUS; SUBCUTANEOUS at 08:12

## 2020-12-28 RX ADMIN — CARVEDILOL 25 MG: 25 TABLET, FILM COATED ORAL at 09:12

## 2020-12-28 RX ADMIN — CEFEPIME 1 G: 1 INJECTION, POWDER, FOR SOLUTION INTRAMUSCULAR; INTRAVENOUS at 06:12

## 2020-12-28 RX ADMIN — HYPROMELLOSE 2910 1 DROP: 5 SOLUTION OPHTHALMIC at 04:12

## 2020-12-28 RX ADMIN — CEFEPIME 1 G: 1 INJECTION, POWDER, FOR SOLUTION INTRAMUSCULAR; INTRAVENOUS at 11:12

## 2020-12-28 RX ADMIN — Medication 200 MG: at 08:12

## 2020-12-28 RX ADMIN — SACUBITRIL AND VALSARTAN 1 TABLET: 24; 26 TABLET, FILM COATED ORAL at 09:12

## 2020-12-28 RX ADMIN — TORSEMIDE 60 MG: 20 TABLET ORAL at 08:12

## 2020-12-28 RX ADMIN — INSULIN ASPART 3 UNITS: 100 INJECTION, SOLUTION INTRAVENOUS; SUBCUTANEOUS at 04:12

## 2020-12-28 RX ADMIN — ASPIRIN 81 MG: 81 TABLET, COATED ORAL at 09:12

## 2020-12-28 RX ADMIN — SACUBITRIL AND VALSARTAN 1 TABLET: 24; 26 TABLET, FILM COATED ORAL at 08:12

## 2020-12-28 RX ADMIN — ATORVASTATIN CALCIUM 10 MG: 10 TABLET, FILM COATED ORAL at 09:12

## 2020-12-28 RX ADMIN — CARVEDILOL 25 MG: 25 TABLET, FILM COATED ORAL at 08:12

## 2020-12-28 RX ADMIN — HYPROMELLOSE 2910 1 DROP: 5 SOLUTION OPHTHALMIC at 08:12

## 2020-12-28 RX ADMIN — APIXABAN 5 MG: 5 TABLET, FILM COATED ORAL at 09:12

## 2020-12-28 RX ADMIN — MUPIROCIN: 20 OINTMENT TOPICAL at 09:12

## 2020-12-28 RX ADMIN — INSULIN ASPART 5 UNITS: 100 INJECTION, SOLUTION INTRAVENOUS; SUBCUTANEOUS at 11:12

## 2020-12-28 RX ADMIN — CEFEPIME 1 G: 1 INJECTION, POWDER, FOR SOLUTION INTRAMUSCULAR; INTRAVENOUS at 03:12

## 2020-12-28 RX ADMIN — INSULIN ASPART 5 UNITS: 100 INJECTION, SOLUTION INTRAVENOUS; SUBCUTANEOUS at 04:12

## 2020-12-28 RX ADMIN — MUPIROCIN: 20 OINTMENT TOPICAL at 08:12

## 2020-12-28 RX ADMIN — Medication 1 CAPSULE: at 09:12

## 2020-12-28 RX ADMIN — ALBUTEROL SULFATE 2.5 MG: 2.5 SOLUTION RESPIRATORY (INHALATION) at 08:12

## 2020-12-28 RX ADMIN — FERROUS SULFATE TAB EC 325 MG (65 MG FE EQUIVALENT) 325 MG: 325 (65 FE) TABLET DELAYED RESPONSE at 09:12

## 2020-12-28 NOTE — ANESTHESIA PREPROCEDURE EVALUATION
12/28/2020  Ed Patton is a 65 y.o., male.  Pre-operative evaluation for Procedure(s) (LRB):  CARDIOVERSION (N/A)  ECHOCARDIOGRAM,TRANSESOPHAGEAL (N/A)    Ed Patton is a 65 y.o. male here for LUKAS and cardioversion. Hx of HFrEF (EF 30%), mildly reduced RV function, PASP est 60mmHg; chronic restrictive lung disease on 2L NC    Patient Active Problem List   Diagnosis    CRLD (chronic restrictive lung disease)    Chronic systolic congestive heart failure    Dilated cardiomyopathy    Angina pectoris associated with type 2 diabetes mellitus    Type 2 diabetes mellitus with stage 3 chronic kidney disease, with long-term current use of insulin    Type 2 diabetes mellitus with diabetic polyneuropathy, with long-term current use of insulin    Hyperlipidemia    Iron deficiency anemia    Toxic thyroid nodule    Hypertension associated with diabetes    Polypharmacy    Postablative hypothyroidism    COPD mixed type    Sepsis due to methicillin resistant Staphylococcus aureus (MRSA)    Debility    Increased anion gap metabolic acidosis    Acute on chronic respiratory failure with hypoxia    Acute renal insufficiency    PAD (peripheral artery disease)    Diabetic polyneuropathy associated with type 2 diabetes mellitus    Acute on chronic cholecystitis    Chronic respiratory failure with hypoxia, on home oxygen therapy    CKD stage 3 due to type 2 diabetes mellitus    Ulcer of left foot with fat layer exposed    Acute on chronic combined systolic and diastolic congestive heart failure    Elevated troponin    Type 2 diabetes mellitus with hyperglycemia    Atrial fibrillation and flutter    Atrial flutter    CHF (congestive heart failure)    Acute cystitis without hematuria    Chronic kidney disease, stage III (moderate)    Other reduced mobility       Review of patient's allergies  indicates:   Allergen Reactions    Vicodin [hydrocodone-acetaminophen] Itching       No current facility-administered medications on file prior to encounter.      Current Outpatient Medications on File Prior to Encounter   Medication Sig Dispense Refill    ACCU-CHEK FASTCLIX LANCET DRUM Misc TEST FOUR TIMES DAILY (Patient taking differently: Test twice daily) 408 each 3    ACCU-CHEK RAMIREZ Misc USE AS DIRECTED 1 each 0    ACCU-CHEK SMARTVIEW TEST STRIP Strp TEST FOUR TIMES DAILY (Patient taking differently: Test twice daily) 400 strip 3    albuterol (PROVENTIL) 2.5 mg /3 mL (0.083 %) nebulizer solution INHALE THE CONTENTS OF 1 VIAL VIA NEBULIZER TWICE DAILY 360 mL 3    albuterol (PROVENTIL/VENTOLIN HFA) 90 mcg/actuation inhaler Inhale 2 puffs into the lungs every 6 (six) hours as needed for Wheezing or Shortness of Breath. Rescue      artificial tears (ISOPTO TEARS) 0.5 % ophthalmic solution Place 1 drop into both eyes 3 (three) times daily.      aspirin (ECOTRIN) 81 MG EC tablet Take 1 tablet (81 mg total) by mouth once daily. 90 tablet 3    atorvastatin (LIPITOR) 10 MG tablet Take 1 tablet (10 mg total) by mouth once daily.      carvedilol (COREG) 25 MG tablet Take 1 tablet (25 mg total) by mouth 2 (two) times daily. 180 tablet 3    cholecalciferol, vitamin D3, (VITAMIN D3) 25 mcg (1,000 unit) capsule Take 1 capsule (1,000 Units total) by mouth once daily.      ferrous sulfate (FEOSOL) 325 mg (65 mg iron) Tab tablet Take 1 tablet (325 mg total) by mouth once daily.  0    fluticasone furoate-vilanterol (BREO ELLIPTA) 200-25 mcg/dose DsDv diskus inhaler INHALE 1 PUFF INTO THE LUNGS ONCE DAILY. (CONTROLLER) 180 each 3    fluticasone propionate (FLONASE) 50 mcg/actuation nasal spray USE 1 SPRAY IN EACH NOSTRIL ONE TIME DAILY 32 g 3    furosemide (LASIX) 40 MG tablet Take 2 tablets (80 mg total) by mouth 2 (two) times daily before meals. 120 tablet 11    gabapentin (NEURONTIN) 100 MG capsule Take 1  capsule (100 mg total) by mouth every evening. 90 capsule 3    insulin NPH-insulin regular, 70/30, (NOVOLIN 70/30 U-100 INSULIN) 100 unit/mL (70-30) injection Inject 14 Units into the skin before breakfast AND 12 Units before dinner. [id number] MINUTES BEFORE BREAKFAST AND DINNER. 20 mL 1    insulin syringe-needle U-100 (INSULIN SYRINGE) 1 mL 30 gauge x 5/16 Syrg 1 each by Misc.(Non-Drug; Combo Route) route 2 (two) times daily. Use twice daily as directed with insulin vials. 200 each 6    levothyroxine (SYNTHROID) 75 MCG tablet Take 1 tablet (75 mcg total) by mouth once daily. 90 tablet 3    magnesium oxide (MAG-OX) 400 mg (241.3 mg magnesium) tablet Take 0.5 tablets (200 mg total) by mouth 2 (two) times daily.  0    nitroGLYCERIN (NITROSTAT) 0.4 MG SL tablet PLACE 1 TABLET UNDER THE TONGUE EVERY 5  MINUTES AS NEEDED FOR CHEST PAIN 25 tablet 3    polyethylene glycol (GLYCOLAX) 17 gram PwPk Take 17 g by mouth 2 (two) times daily as needed (Constipation).  0       Past Surgical History:   Procedure Laterality Date    APPENDECTOMY      ARTHROSCOPY OF KNEE Right 9/7/2020    Procedure: ARTHROSCOPY, KNEE, RIGHT ;  Surgeon: You Bhatia MD;  Location: [address];  Service: Orthopedics;  Laterality: Right;    CHOLECYSTECTOMY      CORONARY ANGIOPLASTY WITH STENT PLACEMENT      TONSILLECTOMY      TRANSESOPHAGEAL ECHOCARDIOGRAPHY N/A 9/4/2020    Procedure: ECHOCARDIOGRAM, TRANSESOPHAGEAL;  Surgeon: [facility] Diagnostic Provider;  Location: [facility];  Service: Anesthesiology;  Laterality: N/A;    TRANSESOPHAGEAL ECHOCARDIOGRAPHY N/A 9/3/2020    Procedure: ECHOCARDIOGRAM, TRANSESOPHAGEAL;  Surgeon: [facility] Diagnostic Provider;  Location: [facility] EP LAB;  Service: Anesthesiology;  Laterality: N/A;       Social History     Socioeconomic History    Marital status:      Spouse name: Not on file    Number of children: Not on file    Years of education: Not on file    Highest education level: Not on file    Occupational History    Not on file   Social Needs    Financial resource strain: Not on file    Food insecurity     Worry: Not on file     Inability: Not on file    Transportation needs     Medical: Not on file     Non-medical: Not on file   Tobacco Use    Smoking status: Former Smoker     Packs/day: 0.01     Years: 3.00     Pack years: 0.03     Types: Cigarettes     Quit date: 1995     Years since quittin.6    Smokeless tobacco: Never Used   Substance and Sexual Activity    Alcohol use: No    Drug use: No    Sexual activity: Never     Comment:  with 1 son   Lifestyle    Physical activity     Days per week: Not on file     Minutes per session: Not on file    Stress: Not on file   Relationships    Social connections     Talks on phone: Not on file     Gets together: Not on file     Attends [employer] service: Not on file     Active member of club or organization: Not on file     Attends meetings of clubs or organizations: Not on file     Relationship status: Not on file   Other Topics Concern    Not on file   Social History Narrative     with 1 son         CBC:   Recent Labs     20  0550 20  0451   WBC 9.35 11.02   RBC 3.33* 3.22*   HGB 8.4* 8.4*   HCT 30.1* 29.5*   * 404*   MCV 90 92   MCH 25.2* 26.1*   MCHC 27.9* 28.5*       CMP:   Recent Labs     20  0550 20  0855 20  0451    138 140   K 3.5 3.7 3.8   CL 97 95 99   CO2 32* 32* 30*   BUN 37* 34* 39*   CREATININE 1.5* 1.7* 1.6*   * 349* 186*   MG 1.9 1.8 1.9   PHOS 3.3  --   --    CALCIUM 9.1 8.9 9.0   ALBUMIN 2.7* 2.5* 2.5*   PROT 8.4 8.2 7.9   ALKPHOS 88 79 72   ALT 29 23 21   AST 18 15 15   BILITOT 0.6 0.5 0.4       INR  No results for input(s): PT, INR, PROTIME, APTT in the last 72 hours.        Diagnostic Studies:      EKD Echo:  Results for orders placed or performed during the hospital encounter of 18   2D Echo w/ Color Flow Doppler   Result Value Ref Range     EF 45 55 - 65   12/22/20  · Moderately decreased systolic function. The estimated ejection fraction is 30%  · Indeterminate left ventricular diastolic function.  · Normal right ventricular size with mildly reduced right ventricular systolic function. Limtied RV views.  · Mild left atrial enlargement.  · Mild mitral regurgitation.  · The estimated PA systolic pressure is 59 mmHg. There is pulmonary hypertension.  · Elevated central venous pressure (15 mmHg).    Anesthesia Evaluation    I have reviewed the Patient Summary Reports.    I have reviewed the Nursing Notes. I have reviewed the NPO Status.   I have reviewed the Medications.     Review of Systems  Cardiovascular:   Hypertension CAD   Angina CHF    Pulmonary:   COPD    Renal/:   Chronic Renal Disease    Neurological:   Neuromuscular Disease,    Endocrine:   Diabetes Hypothyroidism Hyperthyroidism        Physical Exam  General:  Well nourished    Airway/Jaw/Neck:  Airway Findings: Mouth Opening: Normal Tongue: Normal  General Airway Assessment: Adult  Mallampati: III  TM Distance: Normal, at least 6 cm         Dental:  Dental Findings: Edentulous   Chest/Lungs:  Chest/Lungs Findings: Normal Respiratory Rate  On 2 liters   Heart/Vascular:  Heart Findings: Rate: Tachycardia  Rhythm: Irregularly Irregular  Heart murmur: negative       Mental Status:  Mental Status Findings:  Cooperative, Confusion         Anesthesia Plan  Type of Anesthesia, risks & benefits discussed:  Anesthesia Type:  general, MAC  Patient's Preference:   Intra-op Monitoring Plan: standard ASA monitors  Intra-op Monitoring Plan Comments:   Post Op Pain Control Plan: multimodal analgesia, IV/PO Opioids PRN and per primary service following discharge from PACU  Post Op Pain Control Plan Comments:   Induction:   IV  Beta Blocker:  Patient is on a Beta-Blocker and has received one dose within the past 24 hours (No further documentation required).       Informed Consent: Patient representative  understands risks and agrees with Anesthesia plan.  Questions answered. Anesthesia consent signed with patient representative.  ASA Score: 4     Day of Surgery Review of History & Physical: I have interviewed and examined the patient. I have reviewed the patient's H&P dated:    H&P update referred to the surgeon.         Ready For Surgery From Anesthesia Perspective.

## 2020-12-29 LAB
ALBUMIN SERPL BCP-MCNC: 2.6 G/DL (ref 3.5–5.2)
ALP SERPL-CCNC: 60 U/L (ref 55–135)
ALT SERPL W/O P-5'-P-CCNC: 21 U/L (ref 10–44)
ANION GAP SERPL CALC-SCNC: 10 MMOL/L (ref 8–16)
ANION GAP SERPL CALC-SCNC: 9 MMOL/L (ref 8–16)
AST SERPL-CCNC: 19 U/L (ref 10–40)
BILIRUB SERPL-MCNC: 0.4 MG/DL (ref 0.1–1)
BSA FOR ECHO PROCEDURE: 2.46 M2
BUN SERPL-MCNC: 38 MG/DL (ref 8–23)
BUN SERPL-MCNC: 41 MG/DL (ref 8–23)
CALCIUM SERPL-MCNC: 8.8 MG/DL (ref 8.7–10.5)
CALCIUM SERPL-MCNC: 9.3 MG/DL (ref 8.7–10.5)
CHLORIDE SERPL-SCNC: 100 MMOL/L (ref 95–110)
CHLORIDE SERPL-SCNC: 99 MMOL/L (ref 95–110)
CO2 SERPL-SCNC: 28 MMOL/L (ref 23–29)
CO2 SERPL-SCNC: 32 MMOL/L (ref 23–29)
CREAT SERPL-MCNC: 1.5 MG/DL (ref 0.5–1.4)
CREAT SERPL-MCNC: 1.8 MG/DL (ref 0.5–1.4)
EST. GFR  (AFRICAN AMERICAN): 45.3 ML/MIN/1.73 M^2
EST. GFR  (AFRICAN AMERICAN): 56.5 ML/MIN/1.73 M^2
EST. GFR  (NON AFRICAN AMERICAN): 39.2 ML/MIN/1.73 M^2
EST. GFR  (NON AFRICAN AMERICAN): 48.8 ML/MIN/1.73 M^2
GLUCOSE SERPL-MCNC: 282 MG/DL (ref 70–110)
GLUCOSE SERPL-MCNC: 58 MG/DL (ref 70–110)
MAGNESIUM SERPL-MCNC: 2.2 MG/DL (ref 1.6–2.6)
POCT GLUCOSE: 139 MG/DL (ref 70–110)
POCT GLUCOSE: 216 MG/DL (ref 70–110)
POCT GLUCOSE: 221 MG/DL (ref 70–110)
POTASSIUM SERPL-SCNC: 3.5 MMOL/L (ref 3.5–5.1)
POTASSIUM SERPL-SCNC: 3.5 MMOL/L (ref 3.5–5.1)
PROT SERPL-MCNC: 8.1 G/DL (ref 6–8.4)
SODIUM SERPL-SCNC: 137 MMOL/L (ref 136–145)
SODIUM SERPL-SCNC: 141 MMOL/L (ref 136–145)

## 2020-12-29 PROCEDURE — 20600001 HC STEP DOWN PRIVATE ROOM: Mod: HCNC

## 2020-12-29 PROCEDURE — 36415 COLL VENOUS BLD VENIPUNCTURE: CPT | Mod: HCNC

## 2020-12-29 PROCEDURE — 82962 GLUCOSE BLOOD TEST: CPT | Mod: HCNC | Performed by: INTERNAL MEDICINE

## 2020-12-29 PROCEDURE — 80053 COMPREHEN METABOLIC PANEL: CPT | Mod: HCNC

## 2020-12-29 PROCEDURE — 25000003 PHARM REV CODE 250: Mod: HCNC | Performed by: ANESTHESIOLOGY

## 2020-12-29 PROCEDURE — 99233 PR SUBSEQUENT HOSPITAL CARE,LEVL III: ICD-10-PCS | Mod: HCNC,,, | Performed by: INTERNAL MEDICINE

## 2020-12-29 PROCEDURE — 37000009 HC ANESTHESIA EA ADD 15 MINS: Mod: HCNC | Performed by: INTERNAL MEDICINE

## 2020-12-29 PROCEDURE — 63600175 PHARM REV CODE 636 W HCPCS: Mod: HCNC | Performed by: PHYSICIAN ASSISTANT

## 2020-12-29 PROCEDURE — D9220A PRA ANESTHESIA: Mod: HCNC,CRNA,, | Performed by: NURSE ANESTHETIST, CERTIFIED REGISTERED

## 2020-12-29 PROCEDURE — D9220A PRA ANESTHESIA: ICD-10-PCS | Mod: HCNC,ANES,, | Performed by: ANESTHESIOLOGY

## 2020-12-29 PROCEDURE — 99900035 HC TECH TIME PER 15 MIN (STAT): Mod: HCNC

## 2020-12-29 PROCEDURE — 83735 ASSAY OF MAGNESIUM: CPT | Mod: HCNC

## 2020-12-29 PROCEDURE — 94761 N-INVAS EAR/PLS OXIMETRY MLT: CPT | Mod: HCNC

## 2020-12-29 PROCEDURE — 92960 PR CARDIOVERSION, ELECTIVE;EXTERN: ICD-10-PCS | Mod: HCNC,,, | Performed by: INTERNAL MEDICINE

## 2020-12-29 PROCEDURE — 63600175 PHARM REV CODE 636 W HCPCS: Mod: HCNC | Performed by: ANESTHESIOLOGY

## 2020-12-29 PROCEDURE — 25000003 PHARM REV CODE 250: Mod: HCNC | Performed by: PHYSICIAN ASSISTANT

## 2020-12-29 PROCEDURE — 27000221 HC OXYGEN, UP TO 24 HOURS: Mod: HCNC

## 2020-12-29 PROCEDURE — 94640 AIRWAY INHALATION TREATMENT: CPT | Mod: HCNC

## 2020-12-29 PROCEDURE — 92960 CARDIOVERSION ELECTRIC EXT: CPT | Mod: HCNC,,, | Performed by: INTERNAL MEDICINE

## 2020-12-29 PROCEDURE — 93010 ELECTROCARDIOGRAM REPORT: CPT | Mod: HCNC,,, | Performed by: INTERNAL MEDICINE

## 2020-12-29 PROCEDURE — 93005 ELECTROCARDIOGRAM TRACING: CPT | Mod: HCNC

## 2020-12-29 PROCEDURE — 80048 BASIC METABOLIC PNL TOTAL CA: CPT | Mod: HCNC

## 2020-12-29 PROCEDURE — 94660 CPAP INITIATION&MGMT: CPT | Mod: HCNC

## 2020-12-29 PROCEDURE — 93010 EKG 12-LEAD: ICD-10-PCS | Mod: HCNC,,, | Performed by: INTERNAL MEDICINE

## 2020-12-29 PROCEDURE — 25000003 PHARM REV CODE 250: Mod: HCNC | Performed by: INTERNAL MEDICINE

## 2020-12-29 PROCEDURE — 25000242 PHARM REV CODE 250 ALT 637 W/ HCPCS: Mod: HCNC | Performed by: PHYSICIAN ASSISTANT

## 2020-12-29 PROCEDURE — 92960 CARDIOVERSION ELECTRIC EXT: CPT | Mod: HCNC | Performed by: INTERNAL MEDICINE

## 2020-12-29 PROCEDURE — D9220A PRA ANESTHESIA: Mod: HCNC,ANES,, | Performed by: ANESTHESIOLOGY

## 2020-12-29 PROCEDURE — 99233 SBSQ HOSP IP/OBS HIGH 50: CPT | Mod: HCNC,,, | Performed by: INTERNAL MEDICINE

## 2020-12-29 PROCEDURE — 37000008 HC ANESTHESIA 1ST 15 MINUTES: Mod: HCNC | Performed by: INTERNAL MEDICINE

## 2020-12-29 PROCEDURE — D9220A PRA ANESTHESIA: ICD-10-PCS | Mod: HCNC,CRNA,, | Performed by: NURSE ANESTHETIST, CERTIFIED REGISTERED

## 2020-12-29 RX ORDER — LIDOCAINE HYDROCHLORIDE 20 MG/ML
INJECTION INTRAVENOUS
Status: DISCONTINUED | OUTPATIENT
Start: 2020-12-29 | End: 2020-12-29

## 2020-12-29 RX ORDER — KETAMINE HCL IN 0.9 % NACL 50 MG/5 ML
SYRINGE (ML) INTRAVENOUS
Status: DISCONTINUED | OUTPATIENT
Start: 2020-12-29 | End: 2020-12-29

## 2020-12-29 RX ORDER — LIDOCAINE HYDROCHLORIDE 20 MG/ML
SOLUTION OROPHARYNGEAL
Status: DISCONTINUED | OUTPATIENT
Start: 2020-12-29 | End: 2020-12-29

## 2020-12-29 RX ORDER — FENTANYL CITRATE 50 UG/ML
25 INJECTION, SOLUTION INTRAMUSCULAR; INTRAVENOUS EVERY 5 MIN PRN
Status: DISCONTINUED | OUTPATIENT
Start: 2020-12-29 | End: 2020-12-31 | Stop reason: HOSPADM

## 2020-12-29 RX ORDER — ONDANSETRON 2 MG/ML
4 INJECTION INTRAMUSCULAR; INTRAVENOUS ONCE AS NEEDED
Status: DISCONTINUED | OUTPATIENT
Start: 2020-12-29 | End: 2020-12-31 | Stop reason: HOSPADM

## 2020-12-29 RX ORDER — SODIUM CHLORIDE 0.9 % (FLUSH) 0.9 %
3 SYRINGE (ML) INJECTION
Status: DISCONTINUED | OUTPATIENT
Start: 2020-12-29 | End: 2020-12-31 | Stop reason: HOSPADM

## 2020-12-29 RX ORDER — PROPOFOL 10 MG/ML
VIAL (ML) INTRAVENOUS CONTINUOUS PRN
Status: DISCONTINUED | OUTPATIENT
Start: 2020-12-29 | End: 2020-12-29

## 2020-12-29 RX ORDER — PHENYLEPHRINE HYDROCHLORIDE 10 MG/ML
INJECTION INTRAVENOUS
Status: DISCONTINUED | OUTPATIENT
Start: 2020-12-29 | End: 2020-12-29

## 2020-12-29 RX ORDER — ETOMIDATE 2 MG/ML
INJECTION INTRAVENOUS
Status: DISCONTINUED | OUTPATIENT
Start: 2020-12-29 | End: 2020-12-29

## 2020-12-29 RX ORDER — DIPHENHYDRAMINE HYDROCHLORIDE 50 MG/ML
25 INJECTION INTRAMUSCULAR; INTRAVENOUS EVERY 6 HOURS PRN
Status: DISCONTINUED | OUTPATIENT
Start: 2020-12-29 | End: 2020-12-31 | Stop reason: HOSPADM

## 2020-12-29 RX ORDER — AMIODARONE HYDROCHLORIDE 200 MG/1
400 TABLET ORAL 2 TIMES DAILY
Status: DISCONTINUED | OUTPATIENT
Start: 2020-12-29 | End: 2020-12-31 | Stop reason: HOSPADM

## 2020-12-29 RX ORDER — PROPOFOL 10 MG/ML
VIAL (ML) INTRAVENOUS
Status: DISCONTINUED | OUTPATIENT
Start: 2020-12-29 | End: 2020-12-29

## 2020-12-29 RX ORDER — AMIODARONE HYDROCHLORIDE 200 MG/1
200 TABLET ORAL DAILY
Status: DISCONTINUED | OUTPATIENT
Start: 2021-01-13 | End: 2020-12-31 | Stop reason: HOSPADM

## 2020-12-29 RX ADMIN — FLUTICASONE FUROATE AND VILANTEROL TRIFENATATE 1 PUFF: 200; 25 POWDER RESPIRATORY (INHALATION) at 08:12

## 2020-12-29 RX ADMIN — INSULIN ASPART 2 UNITS: 100 INJECTION, SOLUTION INTRAVENOUS; SUBCUTANEOUS at 01:12

## 2020-12-29 RX ADMIN — ETOMIDATE 8 MG: 2 INJECTION, SOLUTION INTRAVENOUS at 11:12

## 2020-12-29 RX ADMIN — Medication 20 MG: at 11:12

## 2020-12-29 RX ADMIN — Medication 10 MG: at 12:12

## 2020-12-29 RX ADMIN — Medication 200 MG: at 09:12

## 2020-12-29 RX ADMIN — Medication 200 MG: at 08:12

## 2020-12-29 RX ADMIN — ASPIRIN 81 MG: 81 TABLET, COATED ORAL at 08:12

## 2020-12-29 RX ADMIN — INSULIN ASPART 5 UNITS: 100 INJECTION, SOLUTION INTRAVENOUS; SUBCUTANEOUS at 01:12

## 2020-12-29 RX ADMIN — PROPOFOL 50 MCG/KG/MIN: 10 INJECTION, EMULSION INTRAVENOUS at 11:12

## 2020-12-29 RX ADMIN — CEFEPIME 1 G: 1 INJECTION, POWDER, FOR SOLUTION INTRAMUSCULAR; INTRAVENOUS at 09:12

## 2020-12-29 RX ADMIN — CEFEPIME 1 G: 1 INJECTION, POWDER, FOR SOLUTION INTRAMUSCULAR; INTRAVENOUS at 04:12

## 2020-12-29 RX ADMIN — SACUBITRIL AND VALSARTAN 1 TABLET: 24; 26 TABLET, FILM COATED ORAL at 09:12

## 2020-12-29 RX ADMIN — TORSEMIDE 60 MG: 20 TABLET ORAL at 09:12

## 2020-12-29 RX ADMIN — DEXTROSE MONOHYDRATE 25 G: 25 INJECTION, SOLUTION INTRAVENOUS at 09:12

## 2020-12-29 RX ADMIN — GABAPENTIN 100 MG: 100 CAPSULE ORAL at 09:12

## 2020-12-29 RX ADMIN — LIDOCAINE HYDROCHLORIDE 15 ML: 20 SOLUTION OROPHARYNGEAL at 11:12

## 2020-12-29 RX ADMIN — PROPOFOL 30 MG: 10 INJECTION, EMULSION INTRAVENOUS at 11:12

## 2020-12-29 RX ADMIN — INSULIN ASPART 2 UNITS: 100 INJECTION, SOLUTION INTRAVENOUS; SUBCUTANEOUS at 08:12

## 2020-12-29 RX ADMIN — AMIODARONE HYDROCHLORIDE 400 MG: 200 TABLET ORAL at 10:12

## 2020-12-29 RX ADMIN — APIXABAN 5 MG: 5 TABLET, FILM COATED ORAL at 08:12

## 2020-12-29 RX ADMIN — ATORVASTATIN CALCIUM 10 MG: 10 TABLET, FILM COATED ORAL at 08:12

## 2020-12-29 RX ADMIN — TORSEMIDE 60 MG: 20 TABLET ORAL at 08:12

## 2020-12-29 RX ADMIN — INSULIN ASPART 5 UNITS: 100 INJECTION, SOLUTION INTRAVENOUS; SUBCUTANEOUS at 05:12

## 2020-12-29 RX ADMIN — PROPOFOL 40 MG: 10 INJECTION, EMULSION INTRAVENOUS at 11:12

## 2020-12-29 RX ADMIN — LEVOTHYROXINE SODIUM 75 MCG: 75 TABLET ORAL at 04:12

## 2020-12-29 RX ADMIN — HYPROMELLOSE 2910 1 DROP: 5 SOLUTION OPHTHALMIC at 09:12

## 2020-12-29 RX ADMIN — PHENYLEPHRINE HYDROCHLORIDE 100 MCG: 10 INJECTION, SOLUTION INTRAMUSCULAR; INTRAVENOUS; SUBCUTANEOUS at 12:12

## 2020-12-29 RX ADMIN — APIXABAN 5 MG: 5 TABLET, FILM COATED ORAL at 09:12

## 2020-12-29 RX ADMIN — SACUBITRIL AND VALSARTAN 1 TABLET: 24; 26 TABLET, FILM COATED ORAL at 08:12

## 2020-12-29 RX ADMIN — FERROUS SULFATE TAB EC 325 MG (65 MG FE EQUIVALENT) 325 MG: 325 (65 FE) TABLET DELAYED RESPONSE at 08:12

## 2020-12-29 RX ADMIN — Medication 1 CAPSULE: at 08:12

## 2020-12-29 RX ADMIN — CARVEDILOL 25 MG: 25 TABLET, FILM COATED ORAL at 08:12

## 2020-12-29 RX ADMIN — ALBUTEROL SULFATE 2.5 MG: 2.5 SOLUTION RESPIRATORY (INHALATION) at 08:12

## 2020-12-29 RX ADMIN — INSULIN ASPART 5 UNITS: 100 INJECTION, SOLUTION INTRAVENOUS; SUBCUTANEOUS at 08:12

## 2020-12-29 RX ADMIN — POTASSIUM BICARBONATE 50 MEQ: 977.5 TABLET, EFFERVESCENT ORAL at 10:12

## 2020-12-29 RX ADMIN — CARVEDILOL 25 MG: 25 TABLET, FILM COATED ORAL at 09:12

## 2020-12-29 RX ADMIN — ALBUTEROL SULFATE 2.5 MG: 2.5 SOLUTION RESPIRATORY (INHALATION) at 07:12

## 2020-12-29 RX ADMIN — CEFEPIME 1 G: 1 INJECTION, POWDER, FOR SOLUTION INTRAMUSCULAR; INTRAVENOUS at 12:12

## 2020-12-29 RX ADMIN — LIDOCAINE HYDROCHLORIDE 100 MG: 20 INJECTION, SOLUTION INTRAVENOUS at 11:12

## 2020-12-29 NOTE — TRANSFER OF CARE
"Anesthesia Transfer of Care Note    Patient: Ed Patton    Procedure(s) Performed: Procedure(s) (LRB):  CARDIOVERSION (N/A)  ECHOCARDIOGRAM,TRANSESOPHAGEAL (N/A)    Patient location: [facility]    Anesthesia Type: general    Transport from OR: Transported from OR on 2-3 L/min O2 by NC with adequate spontaneous ventilation    Post pain: adequate analgesia    Post assessment: no apparent anesthetic complications    Post vital signs: stable    Level of consciousness: responds to stimulation and lethargic    Nausea/Vomiting: no nausea/vomiting    Complications: none    Transfer of care protocol was followed      Last vitals:   Visit Vitals  BP 89/55   Pulse 69   Temp 36.7 °C (98.1 °F) (Oral)   Resp 16   Ht 6' 4" (1.93 m)   Wt 112.5 kg (248 lb)   SpO2 99%   BMI 30.19 kg/m²     "

## 2020-12-29 NOTE — ANESTHESIA POSTPROCEDURE EVALUATION
Anesthesia Post Evaluation    Patient: Ed Patton    Procedure(s) Performed: Procedure(s) (LRB):  CARDIOVERSION (N/A)  ECHOCARDIOGRAM,TRANSESOPHAGEAL (N/A)    Final Anesthesia Type: general      Patient location during evaluation: PACU  Patient participation: Yes- Able to Participate  Level of consciousness: awake and alert  Post-procedure vital signs: reviewed and stable  Pain management: adequate  Airway patency: patent    PONV status at discharge: No PONV  Anesthetic complications: no      Cardiovascular status: blood pressure returned to baseline  Respiratory status: unassisted  Follow-up not needed.          Vitals Value Taken Time   /63 12/29/20 1332   Temp 36.6 °C (97.9 °F) 12/29/20 1225   Pulse 67 12/29/20 1355   Resp 20 12/29/20 1334   SpO2 98 % 12/29/20 1351   Vitals shown include unvalidated device data.      No case tracking events are documented in the log.      Pain/Natalie Score: Pain Rating Prior to Med Admin: 6 (12/28/2020  8:06 PM)  Natalie Score: 9 (12/29/2020  1:00 PM)

## 2020-12-30 LAB
ALBUMIN SERPL BCP-MCNC: 2.7 G/DL (ref 3.5–5.2)
ALP SERPL-CCNC: 67 U/L (ref 55–135)
ALT SERPL W/O P-5'-P-CCNC: 23 U/L (ref 10–44)
ANION GAP SERPL CALC-SCNC: 10 MMOL/L (ref 8–16)
AST SERPL-CCNC: 25 U/L (ref 10–40)
BASOPHILS # BLD AUTO: 0.06 K/UL (ref 0–0.2)
BASOPHILS NFR BLD: 0.5 % (ref 0–1.9)
BILIRUB SERPL-MCNC: 0.4 MG/DL (ref 0.1–1)
BUN SERPL-MCNC: 39 MG/DL (ref 8–23)
CALCIUM SERPL-MCNC: 9 MG/DL (ref 8.7–10.5)
CHLORIDE SERPL-SCNC: 96 MMOL/L (ref 95–110)
CO2 SERPL-SCNC: 31 MMOL/L (ref 23–29)
CREAT SERPL-MCNC: 1.7 MG/DL (ref 0.5–1.4)
DIFFERENTIAL METHOD: ABNORMAL
EOSINOPHIL # BLD AUTO: 1.1 K/UL (ref 0–0.5)
EOSINOPHIL NFR BLD: 9.7 % (ref 0–8)
ERYTHROCYTE [DISTWIDTH] IN BLOOD BY AUTOMATED COUNT: 17.7 % (ref 11.5–14.5)
EST. GFR  (AFRICAN AMERICAN): 48.5 ML/MIN/1.73 M^2
EST. GFR  (NON AFRICAN AMERICAN): 42 ML/MIN/1.73 M^2
GLUCOSE SERPL-MCNC: 266 MG/DL (ref 70–110)
HCT VFR BLD AUTO: 29.7 % (ref 40–54)
HGB BLD-MCNC: 8.8 G/DL (ref 14–18)
IMM GRANULOCYTES # BLD AUTO: 0.08 K/UL (ref 0–0.04)
IMM GRANULOCYTES NFR BLD AUTO: 0.7 % (ref 0–0.5)
LYMPHOCYTES # BLD AUTO: 1.5 K/UL (ref 1–4.8)
LYMPHOCYTES NFR BLD: 13.3 % (ref 18–48)
MAGNESIUM SERPL-MCNC: 1.9 MG/DL (ref 1.6–2.6)
MCH RBC QN AUTO: 26.3 PG (ref 27–31)
MCHC RBC AUTO-ENTMCNC: 29.6 G/DL (ref 32–36)
MCV RBC AUTO: 89 FL (ref 82–98)
MONOCYTES # BLD AUTO: 0.8 K/UL (ref 0.3–1)
MONOCYTES NFR BLD: 7 % (ref 4–15)
NEUTROPHILS # BLD AUTO: 7.7 K/UL (ref 1.8–7.7)
NEUTROPHILS NFR BLD: 68.8 % (ref 38–73)
NRBC BLD-RTO: 0 /100 WBC
PLATELET # BLD AUTO: 260 K/UL (ref 150–350)
PMV BLD AUTO: 11.7 FL (ref 9.2–12.9)
POCT GLUCOSE: 120 MG/DL (ref 70–110)
POCT GLUCOSE: 203 MG/DL (ref 70–110)
POCT GLUCOSE: 307 MG/DL (ref 70–110)
POCT GLUCOSE: 377 MG/DL (ref 70–110)
POCT GLUCOSE: 384 MG/DL (ref 70–110)
POCT GLUCOSE: 49 MG/DL (ref 70–110)
POCT GLUCOSE: 54 MG/DL (ref 70–110)
POTASSIUM SERPL-SCNC: 4.1 MMOL/L (ref 3.5–5.1)
PROT SERPL-MCNC: 8.1 G/DL (ref 6–8.4)
RBC # BLD AUTO: 3.35 M/UL (ref 4.6–6.2)
SODIUM SERPL-SCNC: 137 MMOL/L (ref 136–145)
WBC # BLD AUTO: 11.16 K/UL (ref 3.9–12.7)

## 2020-12-30 PROCEDURE — 83735 ASSAY OF MAGNESIUM: CPT | Mod: HCNC

## 2020-12-30 PROCEDURE — 85025 COMPLETE CBC W/AUTO DIFF WBC: CPT | Mod: HCNC

## 2020-12-30 PROCEDURE — 27000221 HC OXYGEN, UP TO 24 HOURS: Mod: HCNC

## 2020-12-30 PROCEDURE — 94660 CPAP INITIATION&MGMT: CPT | Mod: HCNC

## 2020-12-30 PROCEDURE — 63600175 PHARM REV CODE 636 W HCPCS: Mod: HCNC | Performed by: PHYSICIAN ASSISTANT

## 2020-12-30 PROCEDURE — 25000242 PHARM REV CODE 250 ALT 637 W/ HCPCS: Mod: HCNC | Performed by: PHYSICIAN ASSISTANT

## 2020-12-30 PROCEDURE — 94761 N-INVAS EAR/PLS OXIMETRY MLT: CPT | Mod: HCNC

## 2020-12-30 PROCEDURE — 25000003 PHARM REV CODE 250: Mod: HCNC | Performed by: INTERNAL MEDICINE

## 2020-12-30 PROCEDURE — 20600001 HC STEP DOWN PRIVATE ROOM: Mod: HCNC

## 2020-12-30 PROCEDURE — 99900035 HC TECH TIME PER 15 MIN (STAT): Mod: HCNC

## 2020-12-30 PROCEDURE — 25000003 PHARM REV CODE 250: Mod: HCNC | Performed by: PHYSICIAN ASSISTANT

## 2020-12-30 PROCEDURE — 36415 COLL VENOUS BLD VENIPUNCTURE: CPT | Mod: HCNC

## 2020-12-30 PROCEDURE — C9399 UNCLASSIFIED DRUGS OR BIOLOG: HCPCS | Mod: HCNC | Performed by: INTERNAL MEDICINE

## 2020-12-30 PROCEDURE — 97530 THERAPEUTIC ACTIVITIES: CPT | Mod: HCNC

## 2020-12-30 PROCEDURE — 94640 AIRWAY INHALATION TREATMENT: CPT | Mod: HCNC

## 2020-12-30 PROCEDURE — 80053 COMPREHEN METABOLIC PANEL: CPT | Mod: HCNC

## 2020-12-30 RX ADMIN — HYPROMELLOSE 2910 1 DROP: 5 SOLUTION OPHTHALMIC at 08:12

## 2020-12-30 RX ADMIN — CEFEPIME 1 G: 1 INJECTION, POWDER, FOR SOLUTION INTRAMUSCULAR; INTRAVENOUS at 12:12

## 2020-12-30 RX ADMIN — POLYETHYLENE GLYCOL 3350 17 G: 17 POWDER, FOR SOLUTION ORAL at 09:12

## 2020-12-30 RX ADMIN — AMIODARONE HYDROCHLORIDE 400 MG: 200 TABLET ORAL at 09:12

## 2020-12-30 RX ADMIN — CEFEPIME 1 G: 1 INJECTION, POWDER, FOR SOLUTION INTRAMUSCULAR; INTRAVENOUS at 10:12

## 2020-12-30 RX ADMIN — TORSEMIDE 60 MG: 20 TABLET ORAL at 08:12

## 2020-12-30 RX ADMIN — HYPROMELLOSE 2910 1 DROP: 5 SOLUTION OPHTHALMIC at 10:12

## 2020-12-30 RX ADMIN — INSULIN ASPART 5 UNITS: 100 INJECTION, SOLUTION INTRAVENOUS; SUBCUTANEOUS at 11:12

## 2020-12-30 RX ADMIN — SACUBITRIL AND VALSARTAN 1 TABLET: 24; 26 TABLET, FILM COATED ORAL at 10:12

## 2020-12-30 RX ADMIN — ATORVASTATIN CALCIUM 10 MG: 10 TABLET, FILM COATED ORAL at 08:12

## 2020-12-30 RX ADMIN — INSULIN ASPART 3 UNITS: 100 INJECTION, SOLUTION INTRAVENOUS; SUBCUTANEOUS at 10:12

## 2020-12-30 RX ADMIN — TORSEMIDE 60 MG: 20 TABLET ORAL at 09:12

## 2020-12-30 RX ADMIN — INSULIN ASPART 5 UNITS: 100 INJECTION, SOLUTION INTRAVENOUS; SUBCUTANEOUS at 04:12

## 2020-12-30 RX ADMIN — SACUBITRIL AND VALSARTAN 1 TABLET: 24; 26 TABLET, FILM COATED ORAL at 08:12

## 2020-12-30 RX ADMIN — INSULIN ASPART 4 UNITS: 100 INJECTION, SOLUTION INTRAVENOUS; SUBCUTANEOUS at 11:12

## 2020-12-30 RX ADMIN — LEVOTHYROXINE SODIUM 75 MCG: 75 TABLET ORAL at 04:12

## 2020-12-30 RX ADMIN — AMIODARONE HYDROCHLORIDE 400 MG: 200 TABLET ORAL at 10:12

## 2020-12-30 RX ADMIN — INSULIN ASPART 4 UNITS: 100 INJECTION, SOLUTION INTRAVENOUS; SUBCUTANEOUS at 08:12

## 2020-12-30 RX ADMIN — FLUTICASONE PROPIONATE 50 MCG: 50 SPRAY, METERED NASAL at 08:12

## 2020-12-30 RX ADMIN — CARVEDILOL 25 MG: 25 TABLET, FILM COATED ORAL at 10:12

## 2020-12-30 RX ADMIN — GABAPENTIN 100 MG: 100 CAPSULE ORAL at 10:12

## 2020-12-30 RX ADMIN — Medication 1 CAPSULE: at 08:12

## 2020-12-30 RX ADMIN — APIXABAN 5 MG: 5 TABLET, FILM COATED ORAL at 10:12

## 2020-12-30 RX ADMIN — ALBUTEROL SULFATE 2.5 MG: 2.5 SOLUTION RESPIRATORY (INHALATION) at 07:12

## 2020-12-30 RX ADMIN — HYPROMELLOSE 2910 1 DROP: 5 SOLUTION OPHTHALMIC at 03:12

## 2020-12-30 RX ADMIN — INSULIN ASPART 5 UNITS: 100 INJECTION, SOLUTION INTRAVENOUS; SUBCUTANEOUS at 08:12

## 2020-12-30 RX ADMIN — CARVEDILOL 25 MG: 25 TABLET, FILM COATED ORAL at 09:12

## 2020-12-30 RX ADMIN — FLUTICASONE FUROATE AND VILANTEROL TRIFENATATE 1 PUFF: 200; 25 POWDER RESPIRATORY (INHALATION) at 09:12

## 2020-12-30 RX ADMIN — INSULIN DETEMIR 20 UNITS: 100 INJECTION, SOLUTION SUBCUTANEOUS at 10:12

## 2020-12-30 RX ADMIN — ALBUTEROL SULFATE 2.5 MG: 2.5 SOLUTION RESPIRATORY (INHALATION) at 09:12

## 2020-12-30 RX ADMIN — FERROUS SULFATE TAB EC 325 MG (65 MG FE EQUIVALENT) 325 MG: 325 (65 FE) TABLET DELAYED RESPONSE at 08:12

## 2020-12-30 RX ADMIN — ASPIRIN 81 MG: 81 TABLET, COATED ORAL at 08:12

## 2020-12-30 RX ADMIN — Medication 200 MG: at 10:12

## 2020-12-30 RX ADMIN — APIXABAN 5 MG: 5 TABLET, FILM COATED ORAL at 09:12

## 2020-12-30 RX ADMIN — Medication 200 MG: at 08:12

## 2020-12-30 RX ADMIN — CEFEPIME 1 G: 1 INJECTION, POWDER, FOR SOLUTION INTRAMUSCULAR; INTRAVENOUS at 04:12

## 2020-12-31 VITALS
TEMPERATURE: 98 F | DIASTOLIC BLOOD PRESSURE: 65 MMHG | BODY MASS INDEX: 30.2 KG/M2 | HEART RATE: 68 BPM | SYSTOLIC BLOOD PRESSURE: 129 MMHG | WEIGHT: 248 LBS | RESPIRATION RATE: 18 BRPM | HEIGHT: 76 IN | OXYGEN SATURATION: 93 %

## 2020-12-31 LAB
ALBUMIN SERPL BCP-MCNC: 2.6 G/DL (ref 3.5–5.2)
ALP SERPL-CCNC: 64 U/L (ref 55–135)
ALT SERPL W/O P-5'-P-CCNC: 21 U/L (ref 10–44)
ANION GAP SERPL CALC-SCNC: 16 MMOL/L (ref 8–16)
AST SERPL-CCNC: 23 U/L (ref 10–40)
BILIRUB SERPL-MCNC: 0.4 MG/DL (ref 0.1–1)
BUN SERPL-MCNC: 47 MG/DL (ref 8–23)
CALCIUM SERPL-MCNC: 9.2 MG/DL (ref 8.7–10.5)
CHLORIDE SERPL-SCNC: 96 MMOL/L (ref 95–110)
CO2 SERPL-SCNC: 27 MMOL/L (ref 23–29)
CREAT SERPL-MCNC: 1.8 MG/DL (ref 0.5–1.4)
EST. GFR  (AFRICAN AMERICAN): 45.3 ML/MIN/1.73 M^2
EST. GFR  (NON AFRICAN AMERICAN): 39.2 ML/MIN/1.73 M^2
GLUCOSE SERPL-MCNC: 269 MG/DL (ref 70–110)
MAGNESIUM SERPL-MCNC: 2.1 MG/DL (ref 1.6–2.6)
POCT GLUCOSE: 286 MG/DL (ref 70–110)
POCT GLUCOSE: 345 MG/DL (ref 70–110)
POCT GLUCOSE: 404 MG/DL (ref 70–110)
POTASSIUM SERPL-SCNC: 3.7 MMOL/L (ref 3.5–5.1)
PROT SERPL-MCNC: 8.2 G/DL (ref 6–8.4)
SODIUM SERPL-SCNC: 139 MMOL/L (ref 136–145)

## 2020-12-31 PROCEDURE — 94640 AIRWAY INHALATION TREATMENT: CPT | Mod: HCNC

## 2020-12-31 PROCEDURE — 99900035 HC TECH TIME PER 15 MIN (STAT): Mod: HCNC

## 2020-12-31 PROCEDURE — 99239 HOSP IP/OBS DSCHRG MGMT >30: CPT | Mod: HCNC,GC,, | Performed by: INTERNAL MEDICINE

## 2020-12-31 PROCEDURE — 63600175 PHARM REV CODE 636 W HCPCS: Mod: HCNC | Performed by: PHYSICIAN ASSISTANT

## 2020-12-31 PROCEDURE — 99239 PR HOSPITAL DISCHARGE DAY,>30 MIN: ICD-10-PCS | Mod: HCNC,GC,, | Performed by: INTERNAL MEDICINE

## 2020-12-31 PROCEDURE — 25000003 PHARM REV CODE 250: Mod: HCNC | Performed by: INTERNAL MEDICINE

## 2020-12-31 PROCEDURE — 25000003 PHARM REV CODE 250: Mod: HCNC | Performed by: PHYSICIAN ASSISTANT

## 2020-12-31 PROCEDURE — 25000242 PHARM REV CODE 250 ALT 637 W/ HCPCS: Mod: HCNC | Performed by: PHYSICIAN ASSISTANT

## 2020-12-31 PROCEDURE — 94761 N-INVAS EAR/PLS OXIMETRY MLT: CPT | Mod: HCNC

## 2020-12-31 PROCEDURE — 83735 ASSAY OF MAGNESIUM: CPT | Mod: HCNC

## 2020-12-31 PROCEDURE — 27000221 HC OXYGEN, UP TO 24 HOURS: Mod: HCNC

## 2020-12-31 PROCEDURE — 80053 COMPREHEN METABOLIC PANEL: CPT | Mod: HCNC

## 2020-12-31 PROCEDURE — 36415 COLL VENOUS BLD VENIPUNCTURE: CPT | Mod: HCNC

## 2020-12-31 RX ORDER — INSULIN ASPART 100 [IU]/ML
7 INJECTION, SOLUTION INTRAVENOUS; SUBCUTANEOUS 3 TIMES DAILY
Refills: 0
Start: 2020-12-31 | End: 2021-12-31

## 2020-12-31 RX ORDER — LOSARTAN POTASSIUM 25 MG/1
25 TABLET ORAL DAILY
Qty: 90 TABLET | Refills: 3 | Status: SHIPPED | OUTPATIENT
Start: 2020-12-31 | End: 2021-03-03 | Stop reason: SDUPTHER

## 2020-12-31 RX ORDER — TORSEMIDE 20 MG/1
60 TABLET ORAL 2 TIMES DAILY
Qty: 180 TABLET | Refills: 0
Start: 2020-12-31 | End: 2021-03-03

## 2020-12-31 RX ORDER — AMIODARONE HYDROCHLORIDE 400 MG/1
400 TABLET ORAL 2 TIMES DAILY
Qty: 60 TABLET | Refills: 11 | Status: SHIPPED | OUTPATIENT
Start: 2020-12-31 | End: 2021-03-03

## 2020-12-31 RX ORDER — LOSARTAN POTASSIUM 25 MG/1
25 TABLET ORAL DAILY
Status: DISCONTINUED | OUTPATIENT
Start: 2020-12-31 | End: 2020-12-31 | Stop reason: HOSPADM

## 2020-12-31 RX ORDER — AMIODARONE HYDROCHLORIDE 200 MG/1
200 TABLET ORAL DAILY
Qty: 30 TABLET | Refills: 11 | Status: SHIPPED | OUTPATIENT
Start: 2021-01-13 | End: 2021-03-03 | Stop reason: SDUPTHER

## 2020-12-31 RX ORDER — INSULIN ASPART 100 [IU]/ML
7 INJECTION, SOLUTION INTRAVENOUS; SUBCUTANEOUS
Status: DISCONTINUED | OUTPATIENT
Start: 2020-12-31 | End: 2020-12-31 | Stop reason: HOSPADM

## 2020-12-31 RX ORDER — TALC
6 POWDER (GRAM) TOPICAL NIGHTLY PRN
Refills: 0 | COMMUNITY
Start: 2020-12-31

## 2020-12-31 RX ADMIN — INSULIN ASPART 5 UNITS: 100 INJECTION, SOLUTION INTRAVENOUS; SUBCUTANEOUS at 09:12

## 2020-12-31 RX ADMIN — MELATONIN TAB 3 MG 6 MG: 3 TAB at 12:12

## 2020-12-31 RX ADMIN — INSULIN ASPART 4 UNITS: 100 INJECTION, SOLUTION INTRAVENOUS; SUBCUTANEOUS at 04:12

## 2020-12-31 RX ADMIN — HYPROMELLOSE 2910 1 DROP: 5 SOLUTION OPHTHALMIC at 09:12

## 2020-12-31 RX ADMIN — AMIODARONE HYDROCHLORIDE 400 MG: 200 TABLET ORAL at 09:12

## 2020-12-31 RX ADMIN — HYPROMELLOSE 2910 1 DROP: 5 SOLUTION OPHTHALMIC at 03:12

## 2020-12-31 RX ADMIN — ALBUTEROL SULFATE 2.5 MG: 2.5 SOLUTION RESPIRATORY (INHALATION) at 08:12

## 2020-12-31 RX ADMIN — LEVOTHYROXINE SODIUM 75 MCG: 75 TABLET ORAL at 05:12

## 2020-12-31 RX ADMIN — Medication 200 MG: at 09:12

## 2020-12-31 RX ADMIN — FLUTICASONE FUROATE AND VILANTEROL TRIFENATATE 1 PUFF: 200; 25 POWDER RESPIRATORY (INHALATION) at 08:12

## 2020-12-31 RX ADMIN — CEFEPIME 1 G: 1 INJECTION, POWDER, FOR SOLUTION INTRAMUSCULAR; INTRAVENOUS at 05:12

## 2020-12-31 RX ADMIN — Medication 1 CAPSULE: at 09:12

## 2020-12-31 RX ADMIN — INSULIN ASPART 3 UNITS: 100 INJECTION, SOLUTION INTRAVENOUS; SUBCUTANEOUS at 09:12

## 2020-12-31 RX ADMIN — ATORVASTATIN CALCIUM 10 MG: 10 TABLET, FILM COATED ORAL at 09:12

## 2020-12-31 RX ADMIN — LOSARTAN POTASSIUM 25 MG: 25 TABLET, FILM COATED ORAL at 09:12

## 2020-12-31 RX ADMIN — CARVEDILOL 25 MG: 25 TABLET, FILM COATED ORAL at 09:12

## 2020-12-31 RX ADMIN — APIXABAN 5 MG: 5 TABLET, FILM COATED ORAL at 09:12

## 2020-12-31 RX ADMIN — FLUTICASONE PROPIONATE 50 MCG: 50 SPRAY, METERED NASAL at 09:12

## 2020-12-31 RX ADMIN — INSULIN ASPART 7 UNITS: 100 INJECTION, SOLUTION INTRAVENOUS; SUBCUTANEOUS at 04:12

## 2020-12-31 RX ADMIN — INSULIN ASPART 5 UNITS: 100 INJECTION, SOLUTION INTRAVENOUS; SUBCUTANEOUS at 11:12

## 2020-12-31 RX ADMIN — TORSEMIDE 60 MG: 20 TABLET ORAL at 11:12

## 2020-12-31 RX ADMIN — ASPIRIN 81 MG: 81 TABLET, COATED ORAL at 09:12

## 2020-12-31 RX ADMIN — POLYETHYLENE GLYCOL 3350 17 G: 17 POWDER, FOR SOLUTION ORAL at 09:12

## 2020-12-31 RX ADMIN — INSULIN ASPART 7 UNITS: 100 INJECTION, SOLUTION INTRAVENOUS; SUBCUTANEOUS at 11:12

## 2020-12-31 RX ADMIN — FERROUS SULFATE TAB EC 325 MG (65 MG FE EQUIVALENT) 325 MG: 325 (65 FE) TABLET DELAYED RESPONSE at 09:12

## 2021-01-04 ENCOUNTER — TELEPHONE (OUTPATIENT)
Dept: PRIMARY CARE CLINIC | Facility: CLINIC | Age: 66
End: 2021-01-04

## 2021-01-07 DIAGNOSIS — I48.92 ATRIAL FLUTTER, UNSPECIFIED TYPE: Primary | ICD-10-CM

## 2021-01-12 ENCOUNTER — TELEPHONE (OUTPATIENT)
Dept: ADMINISTRATIVE | Facility: OTHER | Age: 66
End: 2021-01-12

## 2021-01-20 ENCOUNTER — TELEPHONE (OUTPATIENT)
Dept: PRIMARY CARE CLINIC | Facility: CLINIC | Age: 66
End: 2021-01-20

## 2021-01-22 ENCOUNTER — TELEPHONE (OUTPATIENT)
Dept: ADMINISTRATIVE | Facility: OTHER | Age: 66
End: 2021-01-22

## 2021-01-29 ENCOUNTER — TELEPHONE (OUTPATIENT)
Dept: ELECTROPHYSIOLOGY | Facility: CLINIC | Age: 66
End: 2021-01-29

## 2021-01-31 ENCOUNTER — PATIENT OUTREACH (OUTPATIENT)
Dept: ADMINISTRATIVE | Facility: OTHER | Age: 66
End: 2021-01-31

## 2021-02-17 ENCOUNTER — TELEPHONE (OUTPATIENT)
Dept: PRIMARY CARE CLINIC | Facility: CLINIC | Age: 66
End: 2021-02-17

## 2021-02-17 ENCOUNTER — TELEPHONE (OUTPATIENT)
Dept: ADMINISTRATIVE | Facility: OTHER | Age: 66
End: 2021-02-17

## 2021-02-22 ENCOUNTER — PES CALL (OUTPATIENT)
Dept: ADMINISTRATIVE | Facility: CLINIC | Age: 66
End: 2021-02-22

## 2021-02-23 ENCOUNTER — OFFICE VISIT (OUTPATIENT)
Dept: PRIMARY CARE CLINIC | Facility: CLINIC | Age: 66
End: 2021-02-23
Payer: MEDICARE

## 2021-02-23 DIAGNOSIS — J98.4 CRLD (CHRONIC RESTRICTIVE LUNG DISEASE): Primary | ICD-10-CM

## 2021-02-23 PROCEDURE — 99443 PR PHYSICIAN TELEPHONE EVALUATION 21-30 MIN: ICD-10-PCS | Mod: 95,,, | Performed by: INTERNAL MEDICINE

## 2021-02-23 PROCEDURE — 99443 PR PHYSICIAN TELEPHONE EVALUATION 21-30 MIN: CPT | Mod: 95,,, | Performed by: INTERNAL MEDICINE

## 2021-02-24 ENCOUNTER — TELEPHONE (OUTPATIENT)
Dept: PRIMARY CARE CLINIC | Facility: CLINIC | Age: 66
End: 2021-02-24

## 2021-02-25 ENCOUNTER — TELEPHONE (OUTPATIENT)
Dept: PRIMARY CARE CLINIC | Facility: CLINIC | Age: 66
End: 2021-02-25

## 2021-03-01 DIAGNOSIS — E11.65 UNCONTROLLED TYPE 2 DIABETES MELLITUS WITH HYPERGLYCEMIA, WITH LONG-TERM CURRENT USE OF INSULIN: ICD-10-CM

## 2021-03-01 DIAGNOSIS — I50.9 CONGESTIVE HEART FAILURE, UNSPECIFIED HF CHRONICITY, UNSPECIFIED HEART FAILURE TYPE: ICD-10-CM

## 2021-03-01 DIAGNOSIS — Z79.4 UNCONTROLLED TYPE 2 DIABETES MELLITUS WITH HYPERGLYCEMIA, WITH LONG-TERM CURRENT USE OF INSULIN: ICD-10-CM

## 2021-03-01 DIAGNOSIS — I50.42 CHRONIC COMBINED SYSTOLIC AND DIASTOLIC HF (HEART FAILURE): ICD-10-CM

## 2021-03-01 RX ORDER — AMIODARONE HYDROCHLORIDE 200 MG/1
200 TABLET ORAL DAILY
Qty: 30 TABLET | Refills: 11 | Status: CANCELLED | OUTPATIENT
Start: 2021-03-01 | End: 2022-03-01

## 2021-03-03 ENCOUNTER — CARE AT HOME (OUTPATIENT)
Dept: HOME HEALTH SERVICES | Facility: CLINIC | Age: 66
End: 2021-03-03
Payer: MEDICARE

## 2021-03-03 ENCOUNTER — TELEPHONE (OUTPATIENT)
Dept: PRIMARY CARE CLINIC | Facility: CLINIC | Age: 66
End: 2021-03-03

## 2021-03-03 VITALS
HEART RATE: 88 BPM | SYSTOLIC BLOOD PRESSURE: 138 MMHG | TEMPERATURE: 98 F | OXYGEN SATURATION: 88 % | RESPIRATION RATE: 18 BRPM | DIASTOLIC BLOOD PRESSURE: 88 MMHG

## 2021-03-03 DIAGNOSIS — J96.11 CHRONIC RESPIRATORY FAILURE WITH HYPOXIA, ON HOME OXYGEN THERAPY: ICD-10-CM

## 2021-03-03 DIAGNOSIS — I48.91 ATRIAL FIBRILLATION AND FLUTTER: ICD-10-CM

## 2021-03-03 DIAGNOSIS — I48.92 ATRIAL FIBRILLATION AND FLUTTER: ICD-10-CM

## 2021-03-03 DIAGNOSIS — E11.42 DIABETIC POLYNEUROPATHY ASSOCIATED WITH TYPE 2 DIABETES MELLITUS: ICD-10-CM

## 2021-03-03 DIAGNOSIS — Z99.81 CHRONIC RESPIRATORY FAILURE WITH HYPOXIA, ON HOME OXYGEN THERAPY: ICD-10-CM

## 2021-03-03 DIAGNOSIS — E89.0 POSTABLATIVE HYPOTHYROIDISM: ICD-10-CM

## 2021-03-03 DIAGNOSIS — I50.22 CHRONIC SYSTOLIC CONGESTIVE HEART FAILURE: ICD-10-CM

## 2021-03-03 DIAGNOSIS — J96.11 CHRONIC RESPIRATORY FAILURE WITH HYPOXIA: ICD-10-CM

## 2021-03-03 DIAGNOSIS — E11.65 UNCONTROLLED TYPE 2 DIABETES MELLITUS WITH HYPERGLYCEMIA, WITH LONG-TERM CURRENT USE OF INSULIN: Chronic | ICD-10-CM

## 2021-03-03 DIAGNOSIS — I50.9 CONGESTIVE HEART FAILURE, UNSPECIFIED HF CHRONICITY, UNSPECIFIED HEART FAILURE TYPE: ICD-10-CM

## 2021-03-03 DIAGNOSIS — I50.42 CHRONIC COMBINED SYSTOLIC AND DIASTOLIC HF (HEART FAILURE): ICD-10-CM

## 2021-03-03 DIAGNOSIS — J98.4 CRLD (CHRONIC RESTRICTIVE LUNG DISEASE): Primary | ICD-10-CM

## 2021-03-03 DIAGNOSIS — Z79.4 UNCONTROLLED TYPE 2 DIABETES MELLITUS WITH HYPERGLYCEMIA, WITH LONG-TERM CURRENT USE OF INSULIN: Chronic | ICD-10-CM

## 2021-03-03 PROCEDURE — 99497 PR ADVNCD CARE PLAN 30 MIN: ICD-10-PCS | Mod: 25,S$GLB,, | Performed by: NURSE PRACTITIONER

## 2021-03-03 PROCEDURE — 99350 HOME/RES VST EST HIGH MDM 60: CPT | Mod: S$GLB,,, | Performed by: NURSE PRACTITIONER

## 2021-03-03 PROCEDURE — 99350 PR HOME VISIT,ESTAB PATIENT,LEVEL IV: ICD-10-PCS | Mod: S$GLB,,, | Performed by: NURSE PRACTITIONER

## 2021-03-03 PROCEDURE — 99497 ADVNCD CARE PLAN 30 MIN: CPT | Mod: 25,S$GLB,, | Performed by: NURSE PRACTITIONER

## 2021-03-03 RX ORDER — LEVOTHYROXINE SODIUM 75 UG/1
75 TABLET ORAL DAILY
Qty: 90 TABLET | Refills: 3 | Status: SHIPPED | OUTPATIENT
Start: 2021-03-03 | End: 2022-03-03

## 2021-03-03 RX ORDER — ALBUTEROL SULFATE 90 UG/1
2 AEROSOL, METERED RESPIRATORY (INHALATION) EVERY 6 HOURS PRN
Qty: 18 G | Refills: 3 | Status: SHIPPED | OUTPATIENT
Start: 2021-03-03

## 2021-03-03 RX ORDER — POTASSIUM CHLORIDE 20 MEQ/1
20 TABLET, EXTENDED RELEASE ORAL DAILY
Qty: 30 TABLET | Refills: 5 | Status: SHIPPED | OUTPATIENT
Start: 2021-03-03

## 2021-03-03 RX ORDER — ATORVASTATIN CALCIUM 10 MG/1
10 TABLET, FILM COATED ORAL DAILY
Qty: 90 TABLET | Refills: 3
Start: 2021-03-03 | End: 2022-03-03

## 2021-03-03 RX ORDER — CARVEDILOL 25 MG/1
25 TABLET ORAL 2 TIMES DAILY
Qty: 180 TABLET | Refills: 3 | Status: SHIPPED | OUTPATIENT
Start: 2021-03-03

## 2021-03-03 RX ORDER — ALBUTEROL SULFATE 0.83 MG/ML
SOLUTION RESPIRATORY (INHALATION)
Qty: 360 ML | Refills: 3 | Status: SHIPPED | OUTPATIENT
Start: 2021-03-03

## 2021-03-03 RX ORDER — GABAPENTIN 100 MG/1
100 CAPSULE ORAL NIGHTLY
Qty: 90 CAPSULE | Refills: 3 | Status: SHIPPED | OUTPATIENT
Start: 2021-03-03

## 2021-03-03 RX ORDER — LANOLIN ALCOHOL/MO/W.PET/CERES
200 CREAM (GRAM) TOPICAL 2 TIMES DAILY
Qty: 60 TABLET | Refills: 11 | Status: SHIPPED | OUTPATIENT
Start: 2021-03-03

## 2021-03-03 RX ORDER — FUROSEMIDE 80 MG/1
80 TABLET ORAL DAILY
Qty: 30 TABLET | Refills: 5 | Status: SHIPPED | OUTPATIENT
Start: 2021-03-03 | End: 2021-08-30

## 2021-03-03 RX ORDER — LOSARTAN POTASSIUM 25 MG/1
25 TABLET ORAL DAILY
Qty: 90 TABLET | Refills: 3 | Status: SHIPPED | OUTPATIENT
Start: 2021-03-03 | End: 2022-03-03

## 2021-03-03 RX ORDER — FLUTICASONE PROPIONATE AND SALMETEROL 100; 50 UG/1; UG/1
1 POWDER RESPIRATORY (INHALATION) 2 TIMES DAILY
Qty: 60 EACH | Refills: 11 | Status: SHIPPED | OUTPATIENT
Start: 2021-03-03 | End: 2022-03-03

## 2021-03-03 RX ORDER — AMIODARONE HYDROCHLORIDE 200 MG/1
200 TABLET ORAL DAILY
Qty: 30 TABLET | Refills: 11 | Status: SHIPPED | OUTPATIENT
Start: 2021-03-03 | End: 2022-03-03

## 2021-03-04 ENCOUNTER — TELEPHONE (OUTPATIENT)
Dept: ADMINISTRATIVE | Facility: OTHER | Age: 66
End: 2021-03-04

## 2021-03-05 ENCOUNTER — TELEPHONE (OUTPATIENT)
Dept: ADMINISTRATIVE | Facility: OTHER | Age: 66
End: 2021-03-05

## 2021-03-08 ENCOUNTER — TELEPHONE (OUTPATIENT)
Dept: ADMINISTRATIVE | Facility: OTHER | Age: 66
End: 2021-03-08

## 2021-03-08 ENCOUNTER — TELEPHONE (OUTPATIENT)
Dept: PRIMARY CARE CLINIC | Facility: CLINIC | Age: 66
End: 2021-03-08

## 2021-03-10 ENCOUNTER — TELEPHONE (OUTPATIENT)
Dept: ADMINISTRATIVE | Facility: OTHER | Age: 66
End: 2021-03-10

## 2021-03-12 ENCOUNTER — TELEPHONE (OUTPATIENT)
Dept: PRIMARY CARE CLINIC | Facility: CLINIC | Age: 66
End: 2021-03-12

## 2021-03-12 RX ORDER — CARVEDILOL 25 MG/1
TABLET ORAL
Qty: 180 TABLET | Refills: 0 | OUTPATIENT
Start: 2021-03-12

## 2021-03-12 RX ORDER — AMIODARONE HYDROCHLORIDE 400 MG/1
400 TABLET ORAL 2 TIMES DAILY
Qty: 60 TABLET | Refills: 11 | OUTPATIENT
Start: 2021-03-12 | End: 2022-03-12

## 2021-03-12 RX ORDER — ATORVASTATIN CALCIUM 40 MG/1
TABLET, FILM COATED ORAL
Qty: 90 TABLET | Refills: 0 | OUTPATIENT
Start: 2021-03-12

## 2021-03-26 ENCOUNTER — TELEPHONE (OUTPATIENT)
Dept: PRIMARY CARE CLINIC | Facility: CLINIC | Age: 66
End: 2021-03-26

## 2021-05-18 ENCOUNTER — TELEPHONE (OUTPATIENT)
Dept: PRIMARY CARE CLINIC | Facility: CLINIC | Age: 66
End: 2021-05-18

## 2021-05-29 NOTE — HPI
Ed Patton is a 65 y.o. male w/ CHF, CAD, HTN, HLD who was admitted a month ago for DM foot wound.  He became septic, had septic joint, eventually admitted to ICU w/ hypotension and neuro changes.  Now is back to the floor with return to baseline of neuro status.  Is on Daptomycin and levofloxacin treating MRSA infection.  Still with elevated WBC and tachycardic.  Pt being followed by podiatry.  Vascular surgery consulted for potential revascularization given abnormal GINA.   Talked with pt about Asthma care; s/s, triggers, meds, environment. Stressed the importance of controller meds and following up with Pulm.  And following the AAP at home.  Info folder given also.

## 2021-08-08 NOTE — ANESTHESIA PREPROCEDURE EVALUATION
[facility]  Anesthesia Pre-Operative Evaluation         Patient Name: Ed Patton  YOB: 1955  MRN: [id number]    SUBJECTIVE:     Pre-operative evaluation for Procedure(s) (LRB):  ECHOCARDIOGRAM, TRANSESOPHAGEAL (N/A)     09/03/2020    Ed Patton is a 65 y.o. male w/ a significant PMHx of CAD s/p PCI with stent placement, CHF (EF 35%) PA pressures 53mmhg, COPD on home oxygen, chronic restrictive lung disease, uncontrolled DM II, HTN, admitted for diabetic foot ulcer with MRSA bacteremia on vanc and cipro. Plan for LUKAS tomorrow to r/o endocarditis.     Patient now presents for the above procedure(s).      LDA: None documented.       Peripheral IV - Single Lumen 08/30/20 1448 20 G Left Antecubital (Active)   Site Assessment Clean;Dry;Intact;No redness;No swelling;No warmth;No drainage 09/03/20 0735   Line Status Saline locked 09/03/20 0735   Dressing Status Clean;Dry;Intact 09/03/20 0735   Dressing Intervention Integrity maintained 09/02/20 0800   Dressing Change Due 09/03/20 09/02/20 0800   Site Change Due 09/03/20 09/02/20 0800   Reason Not Rotated Not due 09/02/20 0800   Number of days: 4       Prev airway: None documented.    Drips: None documented.      Patient Active Problem List   Diagnosis    CRLD (chronic restrictive lung disease)    Chronic systolic congestive heart failure    Dilated cardiomyopathy    Angina pectoris associated with type 2 diabetes mellitus    CKD stage 3 due to type 2 diabetes mellitus    Diabetic polyneuropathy associated with type 2 diabetes mellitus    Hyperlipidemia associated with type 2 diabetes mellitus    Iron deficiency anemia    Toxic thyroid nodule    Hypertension associated with diabetes    Uncontrolled type 2 diabetes mellitus with hyperglycemia, with long-term current use of insulin    Polypharmacy    Chronic  respiratory failure with hypoxia, on home oxygen therapy    Postablative hypothyroidism    COPD mixed type    Cellulitis of left lower extremity    Diabetic foot infection    Sepsis    Diabetic ulcer of left foot associated with type 2 diabetes mellitus, with fat layer exposed    Bacteremia due to methicillin resistant Staphylococcus aureus       Review of patient's allergies indicates:   Allergen Reactions    Vicodin [hydrocodone-acetaminophen] Itching       Current Inpatient Medications:   aspirin  81 mg Oral Daily    atorvastatin  40 mg Oral Daily    carvediloL  25 mg Oral BID    ciprofloxacin HCl  500 mg Oral Q12H    enoxaparin  40 mg Subcutaneous Q24H    fluticasone furoate-vilanteroL  1 puff Inhalation Daily    fluticasone propionate  1 spray Each Nostril Daily    furosemide  80 mg Oral BID    insulin aspart U-100  14 Units Subcutaneous TIDWM    insulin detemir U-100  29 Units Subcutaneous QHS    levothyroxine  75 mcg Oral Before breakfast    lisinopriL  40 mg Oral Daily    vancomycin (VANCOCIN) IVPB  750 mg Intravenous Q12H    vitamin D  1,000 Units Oral Daily       No current facility-administered medications on file prior to encounter.      Current Outpatient Medications on File Prior to Encounter   Medication Sig Dispense Refill    ACCU-CHEK FASTCLIX LANCET DRUM Misc TEST FOUR TIMES DAILY 408 each 3    ACCU-CHEK RAMIREZ Misc USE AS DIRECTED 1 each 0    ACCU-CHEK SMARTVIEW TEST STRIP Strp TEST FOUR TIMES DAILY 400 strip 3    albuterol (PROVENTIL) 2.5 mg /3 mL (0.083 %) nebulizer solution INHALE THE CONTENTS OF 1 VIAL VIA NEBULIZER EVERY 4 HOURS AS NEEDED FOR WHEEZING. 360 mL 3    alcohol swabs (BD ALCOHOL SWABS) PadM Apply 1 each topically as needed.      aspirin (ECOTRIN) 81 MG EC tablet Take 1 tablet (81 mg total) by mouth once daily. 90 tablet 3    atorvastatin (LIPITOR) 40 MG tablet Take 1 tablet (40 mg total) by mouth once daily. 90 tablet 3    carvedilol (COREG) 25 MG tablet  "Take 1 tablet (25 mg total) by mouth 2 (two) times daily. 180 tablet 3    cholecalciferol, vitamin D3, (VITAMIN D3) 1,000 unit capsule Take 1 capsule (1,000 Units total) by mouth once daily. 90 capsule 3    fluticasone furoate-vilanterol (BREO ELLIPTA) 200-25 mcg/dose DsDv diskus inhaler INHALE 1 PUFF INTO THE LUNGS ONCE DAILY. (CONTROLLER) 180 each 3    fluticasone propionate (FLONASE) 50 mcg/actuation nasal spray USE 1 SPRAY IN EACH NOSTRIL ONE TIME DAILY 32 g 3    furosemide (LASIX) 80 MG tablet TAKE 1 TABLET BY MOUTH IN THE MORNING  AND  1 TABLET BY MOUTH DAILY  AT  3- TO 4PM 180 tablet 3    gabapentin (NEURONTIN) 100 MG capsule Take 1 capsule (100 mg total) by mouth 3 (three) times daily as needed (pain). 90 capsule 3    insulin NPH-insulin regular, 70/30, (NOVOLIN 70/30 U-100 INSULIN) 100 unit/mL (70-30) injection Inject 45 Units into the skin before breakfast AND 35 Units before dinner. [id number] MINUTES BEFORE BREAKFAST AND DINNER. 20 mL 1    insulin syringe-needle U-100 (INSULIN SYRINGE) 1 mL 30 gauge x 5/16 Syrg 1 each by Misc.(Non-Drug; Combo Route) route 2 (two) times daily. Use twice daily as directed with insulin vials. 200 each 6    levothyroxine (SYNTHROID) 75 MCG tablet Take 1 tablet (75 mcg total) by mouth once daily. 90 tablet 3    lisinopril (PRINIVIL,ZESTRIL) 40 MG tablet Take 1 tablet (40 mg total) by mouth once daily. 90 tablet 3    mometasone 0.1% (ELOCON) 0.1 % cream Applied to the affected area once daily for rash. 45 g 0    nitroGLYCERIN (NITROSTAT) 0.4 MG SL tablet PLACE 1 TABLET UNDER THE TONGUE EVERY 5  MINUTES AS NEEDED FOR CHEST PAIN 25 tablet 3    pen needle, diabetic (BD ULTRA-FINE RAMIREZ PEN NEEDLE) 32 gauge x 5/32" Ndle Use with multiple daily injections of insulin 200 each 3       Past Surgical History:   Procedure Laterality Date    APPENDECTOMY      CHOLECYSTECTOMY      CORONARY ANGIOPLASTY WITH STENT PLACEMENT      TONSILLECTOMY         Social History "     Socioeconomic History    Marital status:      Spouse name: Not on file    Number of children: Not on file    Years of education: Not on file    Highest education level: Not on file   Occupational History    Not on file   Social Needs    Financial resource strain: Not on file    Food insecurity     Worry: Not on file     Inability: Not on file    Transportation needs     Medical: Not on file     Non-medical: Not on file   Tobacco Use    Smoking status: Former Smoker     Packs/day: 0.01     Years: 3.00     Pack years: 0.03     Types: Cigarettes     Quit date: 1995     Years since quittin.3    Smokeless tobacco: Never Used   Substance and Sexual Activity    Alcohol use: No    Drug use: No    Sexual activity: Never     Comment:  with 1 son   Lifestyle    Physical activity     Days per week: Not on file     Minutes per session: Not on file    Stress: Not on file   Relationships    Social connections     Talks on phone: Not on file     Gets together: Not on file     Attends Gnosticist service: Not on file     Active member of club or organization: Not on file     Attends meetings of clubs or organizations: Not on file     Relationship status: Not on file   Other Topics Concern    Not on file   Social History Narrative     with 1 son       OBJECTIVE:     Vital Signs Range (Last 24H):  Temp:  [35.8 °C (96.4 °F)-37.3 °C (99.2 °F)]   Pulse:  []   Resp:  [16-20]   BP: ()/(56-72)   SpO2:  [92 %-100 %]       Significant Labs:  Lab Results   Component Value Date    WBC 19.52 (H) 2020    HGB 9.7 (L) 2020    HCT 32.4 (L) 2020     (H) 2020    CHOL 157 10/15/2019    TRIG 73 10/15/2019    HDL 47 10/15/2019    ALT 23 2020    AST 27 2020     2020    K 3.8 2020    CL 99 2020    CREATININE 1.8 (H) 2020    BUN 46 (H) 2020    CO2 26 2020    TSH 8.692 (H) 10/15/2019    PSA 0.4 2004    INR  1.0 10/16/2014    HGBA1C 9.5 (H) 08/31/2020       Diagnostic Studies: No relevant studies.    EKG:   Results for orders placed or performed during the hospital encounter of 08/30/20   EKG 12-lead    Collection Time: 08/30/20  3:17 PM    Narrative    Test Reason : W19.XXXA,    Vent. Rate : 101 BPM     Atrial Rate : 234 BPM     P-R Int : 000 ms          QRS Dur : 098 ms      QT Int : 308 ms       P-R-T Axes : 000 -55 035 degrees     QTc Int : 399 ms    Age and gender specific analysis  Atrial fibrillation with rapid ventricular response  Left axis deviation  Cannot rule out Septal infarct ,age undetermined  Nonspecific ST and/or T wave abnormalities  Abnormal ECG  When compared with ECG of 01-MAY-2015 10:32,  Atrial fibrillation has replaced Sinus rhythm  Confirmed by Danish Linda MD ([id number]) on 9/1/2020 5:01:56 AM    Referred By: [email or web address]   SELF           Confirmed By:Danish Linda MD       2D ECHO:  TTE:  Results for orders placed or performed during the hospital encounter of 08/30/20   Echo Color Flow Doppler? Yes   Result Value Ref Range    Ascending aorta 2.82 cm    STJ 2.56 cm    AV mean gradient 3 mmHg    Ao peak david 1.18 m/s    Ao VTI 23.10 cm    IVRT 42.82 msec    IVS 0.76 0.6 - 1.1 cm    LA size 4.79 cm    Left Atrium Major Axis 5.84 cm    Left Atrium Minor Axis 5.82 cm    LVIDD 5.35 3.5 - 6.0 cm    LVIDS 4.14 (A) 2.1 - 4.0 cm    LVOT diameter 2.30 cm    LVOT peak VTI 12.20 cm    PW 1.07 0.6 - 1.1 cm    E wave decelartion time 164.89 msec    MV Peak E David 0.91 m/s    PV Peak D David 0.53 m/s    PV Peak S David 0.17 m/s    RA Major Axis 5.61 cm    RA Width 3.95 cm    RVDD 3.25 cm    Sinus 3.01 cm    TAPSE 1.24 cm    TR Max David 3.37 m/s    TDI LATERAL 0.09 m/s    TDI SEPTAL 0.05 m/s    LA WIDTH 4.84 cm    MV stenosis pressure 1/2 time 47.82 ms    LV Diastolic Volume 138.12 mL    LV Systolic Volume 76.15 mL    RV S' 10.24 cm/s    LVOT peak david 0.67 m/s    LV LATERAL E/E' RATIO 10.11 m/s    LV SEPTAL E/E' RATIO 18.20 m/s     FS 23 %    LA volume 114.89 cm3    LV mass 181.16 g    Left Ventricle Relative Wall Thickness 0.40 cm    AV valve area 2.19 cm2    AV Velocity Ratio 0.57     AV index (prosthetic) 0.53     MV valve area p 1/2 method 4.60 cm2    Mean e' 0.07 m/s    Pulm vein S/D ratio 0.32     LVOT area 4.2 cm2    LVOT stroke volume 50.66 cm3    AV peak gradient 6 mmHg    E/E' ratio 13.00 m/s    LV Systolic Volume Index 30.8 mL/m2    LV Diastolic Volume Index 55.95 mL/m2    LA Volume Index 46.5 mL/m2    LV Mass Index 73 g/m2    Triscuspid Valve Regurgitation Peak Gradient 45 mmHg    BSA 2.5 m2    Right Atrial Pressure (from IVC) 8 mmHg    TV rest pulmonary artery pressure 53 mmHg    Narrative    · Moderately decreased left ventricular systolic function. The estimated   ejection fraction is 35%.  · Moderately reduced right ventricular systolic function.  · Moderate left atrial enlargement.  · Atrial fibrillation observed.  · The estimated PA systolic pressure is 53 mmHg.  · Intermediate central venous pressure (8 mmHg).          LUKAS:  No results found for this or any previous visit.    ASSESSMENT/PLAN:       Anesthesia Evaluation    I have reviewed the Patient Summary Reports.    I have reviewed the Nursing Notes. I have reviewed the NPO Status.   I have reviewed the Medications.     Review of Systems  Anesthesia Hx:  History of prior surgery of interest to airway management or planning:  Denies Personal Hx of Anesthesia complications.   Hematology/Oncology:         -- Anemia:   Cardiovascular:   Hypertension CAD  CABG/stent  CHF    Pulmonary:   COPD, moderate    Renal/:   Chronic Renal Disease, CRI    Neurological:   Peripheral Neuropathy    Endocrine:   Diabetes, poorly controlled, type 2, using insulin        Physical Exam  General:  Well nourished    Airway/Jaw/Neck:  Airway Findings: Mouth Opening: Normal Tongue: Normal  General Airway Assessment: Adult  Mallampati: III  TM Distance: Normal, at least 6 cm          Dental:  Dental Findings: Edentulous   Chest/Lungs:  Chest/Lungs Findings: Clear to auscultation, Normal Respiratory Rate  On 2 liters   Heart/Vascular:  Heart Findings: Rate: Normal  Rhythm: Irregularly Irregular  Sounds: Normal  Heart murmur: negative       Mental Status:  Mental Status Findings:  Cooperative, Alert and Oriented         Anesthesia Plan  Type of Anesthesia, risks & benefits discussed:  Anesthesia Type:  general, MAC  Patient's Preference:   Intra-op Monitoring Plan: standard ASA monitors  Intra-op Monitoring Plan Comments:   Post Op Pain Control Plan: per primary service following discharge from PACU  Post Op Pain Control Plan Comments:   Induction:   IV  Beta Blocker:  Patient is on a Beta-Blocker and has received one dose within the past 24 hours (No further documentation required).       Informed Consent: Patient understands risks and agrees with Anesthesia plan.  Questions answered. Anesthesia consent signed with patient.  ASA Score: 4     Day of Surgery Review of History & Physical: I have interviewed and examined the patient. I have reviewed the patient's H&P dated:  There are no significant changes.  H&P update referred to the provider.         Ready For Surgery From Anesthesia Perspective.        Yes

## 2021-08-23 ENCOUNTER — TELEPHONE (OUTPATIENT)
Dept: PRIMARY CARE CLINIC | Facility: CLINIC | Age: 66
End: 2021-08-23
